# Patient Record
Sex: MALE | Race: WHITE | Employment: OTHER | URBAN - METROPOLITAN AREA
[De-identification: names, ages, dates, MRNs, and addresses within clinical notes are randomized per-mention and may not be internally consistent; named-entity substitution may affect disease eponyms.]

---

## 2017-01-12 ENCOUNTER — HOSPITAL ENCOUNTER (OUTPATIENT)
Dept: LAB | Age: 62
Discharge: HOME | End: 2017-01-12
Attending: INTERNAL MEDICINE
Payer: COMMERCIAL

## 2017-01-12 DIAGNOSIS — I34.0 NON-RHEUMATIC MITRAL REGURGITATION: ICD-10-CM

## 2017-01-12 DIAGNOSIS — I10 ESSENTIAL HYPERTENSION: ICD-10-CM

## 2017-01-12 LAB
ANION GAP BLD CALC-SCNC: 8 MMOL/L
BUN SERPL-MCNC: 14 MG/DL (ref 8–23)
CALCIUM SERPL-MCNC: 9.3 MG/DL (ref 8.3–10.4)
CHLORIDE SERPL-SCNC: 102 MMOL/L (ref 98–107)
CO2 SERPL-SCNC: 26 MMOL/L (ref 23–32)
CREAT SERPL-MCNC: 0.9 MG/DL (ref 0.6–1)
GLUCOSE SERPL-MCNC: 130 MG/DL (ref 65–100)
POTASSIUM SERPL-SCNC: 4.5 MMOL/L (ref 3.5–5.1)
SODIUM SERPL-SCNC: 136 MMOL/L (ref 136–145)

## 2017-01-12 PROCEDURE — 80048 BASIC METABOLIC PNL TOTAL CA: CPT | Performed by: INTERNAL MEDICINE

## 2017-01-12 PROCEDURE — 36415 COLL VENOUS BLD VENIPUNCTURE: CPT | Performed by: INTERNAL MEDICINE

## 2017-02-07 ENCOUNTER — HOSPITAL ENCOUNTER (OUTPATIENT)
Dept: INFUSION THERAPY | Age: 62
Discharge: HOME | End: 2017-02-07
Payer: COMMERCIAL

## 2017-02-07 VITALS
WEIGHT: 315 LBS | DIASTOLIC BLOOD PRESSURE: 65 MMHG | BODY MASS INDEX: 41.87 KG/M2 | RESPIRATION RATE: 18 BRPM | OXYGEN SATURATION: 96 % | SYSTOLIC BLOOD PRESSURE: 105 MMHG | TEMPERATURE: 97.8 F | HEART RATE: 81 BPM

## 2017-02-07 LAB — FERRITIN SERPL-MCNC: 266 NG/ML (ref 8–388)

## 2017-02-07 PROCEDURE — 99195 PHLEBOTOMY: CPT

## 2017-02-07 PROCEDURE — 82728 ASSAY OF FERRITIN: CPT | Performed by: INTERNAL MEDICINE

## 2017-02-07 PROCEDURE — 36415 COLL VENOUS BLD VENIPUNCTURE: CPT | Performed by: INTERNAL MEDICINE

## 2017-02-07 NOTE — PROGRESS NOTES
Arrived to the [facility]. Therapeutic phlebotomy completed. IV inserted in patient's right arm, 500 ml of blood removed. Patient tolerated well. Given crackers and water post procedure, patient monitored 40 minutes. Vital signs remained stable, patient felt stable to be discharged. Any issues or concerns during appointment: None. Patient aware of next infusion appointment on 5/12/17 at [address] [place] [address]. Discharged ambulatory from Infusion.

## 2017-02-10 ENCOUNTER — APPOINTMENT (OUTPATIENT)
Dept: INFUSION THERAPY | Age: 62
End: 2017-02-10
Payer: COMMERCIAL

## 2017-02-24 ENCOUNTER — HOSPITAL ENCOUNTER (OUTPATIENT)
Dept: INFUSION THERAPY | Age: 62
Discharge: HOME | End: 2017-02-24
Payer: COMMERCIAL

## 2017-02-24 VITALS
HEART RATE: 100 BPM | OXYGEN SATURATION: 96 % | RESPIRATION RATE: 20 BRPM | TEMPERATURE: 97.8 F | DIASTOLIC BLOOD PRESSURE: 60 MMHG | SYSTOLIC BLOOD PRESSURE: 104 MMHG

## 2017-02-24 DIAGNOSIS — E83.110 HEREDITARY HEMOCHROMATOSIS (HCC): ICD-10-CM

## 2017-02-24 LAB
BASOPHILS # BLD AUTO: 0 K/UL (ref 0–0.2)
BASOPHILS # BLD: 0 % (ref 0–2)
DIFFERENTIAL METHOD BLD: ABNORMAL
EOSINOPHIL # BLD: 0.1 K/UL (ref 0–0.8)
EOSINOPHIL NFR BLD: 1 % (ref 0.5–7.8)
ERYTHROCYTE [DISTWIDTH] IN BLOOD BY AUTOMATED COUNT: 13.4 % (ref 11.9–14.6)
FERRITIN SERPL-MCNC: 164 NG/ML (ref 8–388)
HCT VFR BLD AUTO: 41 % (ref 41.1–50.3)
HGB BLD-MCNC: 13.9 G/DL (ref 13.6–17.2)
LYMPHOCYTES # BLD AUTO: 18 % (ref 13–44)
LYMPHOCYTES # BLD: 1.4 K/UL (ref 0.5–4.6)
MCH RBC QN AUTO: 32.5 PG (ref 26.1–32.9)
MCHC RBC AUTO-ENTMCNC: 33.9 G/DL (ref 31.4–35)
MCV RBC AUTO: 95.8 FL (ref 79.6–97.8)
MONOCYTES # BLD: 0.5 K/UL (ref 0.1–1.3)
MONOCYTES NFR BLD AUTO: 6 % (ref 4–12)
NEUTS SEG # BLD: 5.9 K/UL (ref 1.7–8.2)
NEUTS SEG NFR BLD AUTO: 75 % (ref 43–78)
NRBC # BLD: 0 K/UL (ref 0–0.2)
PLATELET # BLD AUTO: 180 K/UL (ref 150–450)
PMV BLD AUTO: 9.9 FL (ref 10.8–14.1)
RBC # BLD AUTO: 4.28 M/UL (ref 4.23–5.67)
WBC # BLD AUTO: 7.8 K/UL (ref 4.3–11.1)

## 2017-02-24 PROCEDURE — 99195 PHLEBOTOMY: CPT

## 2017-02-24 PROCEDURE — 82728 ASSAY OF FERRITIN: CPT | Performed by: INTERNAL MEDICINE

## 2017-02-24 PROCEDURE — 36415 COLL VENOUS BLD VENIPUNCTURE: CPT | Performed by: INTERNAL MEDICINE

## 2017-02-24 PROCEDURE — 85025 COMPLETE CBC W/AUTO DIFF WBC: CPT | Performed by: INTERNAL MEDICINE

## 2017-02-24 NOTE — PROGRESS NOTES
Arrived to the [facility]. 500cc phlebotomy completed. Patient tolerated well. Any issues or concerns during appointment: no.  Patient aware of next infusion appointment on 3/10 (date) at 0 (time). Discharged ambulatory.

## 2017-03-10 ENCOUNTER — HOSPITAL ENCOUNTER (OUTPATIENT)
Dept: INFUSION THERAPY | Age: 62
Discharge: HOME | End: 2017-03-10
Payer: COMMERCIAL

## 2017-03-10 VITALS
HEART RATE: 72 BPM | RESPIRATION RATE: 18 BRPM | TEMPERATURE: 98.2 F | SYSTOLIC BLOOD PRESSURE: 118 MMHG | DIASTOLIC BLOOD PRESSURE: 70 MMHG

## 2017-03-10 DIAGNOSIS — E83.110 HEREDITARY HEMOCHROMATOSIS (HCC): ICD-10-CM

## 2017-03-10 LAB — FERRITIN SERPL-MCNC: 182 NG/ML (ref 8–388)

## 2017-03-10 PROCEDURE — 82728 ASSAY OF FERRITIN: CPT | Performed by: INTERNAL MEDICINE

## 2017-03-10 PROCEDURE — 36415 COLL VENOUS BLD VENIPUNCTURE: CPT | Performed by: INTERNAL MEDICINE

## 2017-03-10 PROCEDURE — 99195 PHLEBOTOMY: CPT

## 2017-03-10 NOTE — PROGRESS NOTES
Arrived ambulatory for infusion appt  Ferritin 189 today.   Phlebotomy performed for 450cc  Tolerated well  Observed for 30 min,drinking 16 oz fluids and eating a pack of crackers  No concerns   Next appt 3/24

## 2017-03-24 ENCOUNTER — HOSPITAL ENCOUNTER (OUTPATIENT)
Dept: INFUSION THERAPY | Age: 62
Discharge: HOME | End: 2017-03-24
Payer: COMMERCIAL

## 2017-03-24 DIAGNOSIS — E83.110 HEREDITARY HEMOCHROMATOSIS (HCC): ICD-10-CM

## 2017-03-24 LAB — FERRITIN SERPL-MCNC: 112 NG/ML (ref 8–388)

## 2017-03-24 PROCEDURE — 99195 PHLEBOTOMY: CPT

## 2017-03-24 PROCEDURE — 36415 COLL VENOUS BLD VENIPUNCTURE: CPT | Performed by: INTERNAL MEDICINE

## 2017-03-24 PROCEDURE — 82728 ASSAY OF FERRITIN: CPT | Performed by: INTERNAL MEDICINE

## 2017-03-24 NOTE — PROGRESS NOTES
Arrived to the [facility]. Assessment completed. Patient tolerated therapeutic phlebotomy well today. There was a total of 500 ml blood collected. Ferritin level 112 today. Patient drank (2) full glasses of water and ate crackers before discharge today. Any issues or concerns during appointment: none. Patient aware of next infusion appointment on 5/12/17 (date) at 0700 (time) with lab and 0715 with IV infusion center. Discharged ambulatory, per self. Patient instructed to call Dr. Bart Mathias office immediately for any problems or concerns. He verbalizes understanding.

## 2017-03-25 VITALS
TEMPERATURE: 98.4 F | OXYGEN SATURATION: 97 % | WEIGHT: 315 LBS | SYSTOLIC BLOOD PRESSURE: 118 MMHG | BODY MASS INDEX: 42.02 KG/M2 | HEART RATE: 86 BPM | DIASTOLIC BLOOD PRESSURE: 74 MMHG | RESPIRATION RATE: 16 BRPM

## 2017-04-25 ENCOUNTER — HOSPITAL ENCOUNTER (OUTPATIENT)
Dept: LAB | Age: 62
Discharge: HOME | End: 2017-04-25
Payer: COMMERCIAL

## 2017-04-25 DIAGNOSIS — E83.110 HEREDITARY HEMOCHROMATOSIS (HCC): ICD-10-CM

## 2017-04-25 LAB
ALBUMIN SERPL BCP-MCNC: 3.5 G/DL (ref 3.2–4.6)
ALBUMIN/GLOB SERPL: 0.9 {RATIO} (ref 1.2–3.5)
ALP SERPL-CCNC: 107 U/L (ref 50–136)
ALT SERPL-CCNC: 49 U/L (ref 12–65)
ANION GAP BLD CALC-SCNC: 8 MMOL/L (ref 7–16)
AST SERPL W P-5'-P-CCNC: 29 U/L (ref 15–37)
BASOPHILS # BLD AUTO: 0 K/UL (ref 0–0.2)
BASOPHILS # BLD: 0 % (ref 0–2)
BILIRUB SERPL-MCNC: 0.6 MG/DL (ref 0.2–1.1)
BUN SERPL-MCNC: 17 MG/DL (ref 8–23)
CALCIUM SERPL-MCNC: 9.3 MG/DL (ref 8.3–10.4)
CHLORIDE SERPL-SCNC: 102 MMOL/L (ref 98–107)
CO2 SERPL-SCNC: 26 MMOL/L (ref 21–32)
CREAT SERPL-MCNC: 0.93 MG/DL (ref 0.8–1.5)
DIFFERENTIAL METHOD BLD: ABNORMAL
EOSINOPHIL # BLD: 0.1 K/UL (ref 0–0.8)
EOSINOPHIL NFR BLD: 2 % (ref 0.5–7.8)
ERYTHROCYTE [DISTWIDTH] IN BLOOD BY AUTOMATED COUNT: 12.8 % (ref 11.9–14.6)
FERRITIN SERPL-MCNC: 94 NG/ML (ref 8–388)
GLOBULIN SER CALC-MCNC: 3.7 G/DL (ref 2.3–3.5)
GLUCOSE SERPL-MCNC: 198 MG/DL (ref 65–100)
HCT VFR BLD AUTO: 40.1 % (ref 41.1–50.3)
HGB BLD-MCNC: 13.4 G/DL (ref 13.6–17.2)
LYMPHOCYTES # BLD AUTO: 14 % (ref 13–44)
LYMPHOCYTES # BLD: 1.2 K/UL (ref 0.5–4.6)
MCH RBC QN AUTO: 32.1 PG (ref 26.1–32.9)
MCHC RBC AUTO-ENTMCNC: 33.4 G/DL (ref 31.4–35)
MCV RBC AUTO: 96.2 FL (ref 79.6–97.8)
MONOCYTES # BLD: 0.5 K/UL (ref 0.1–1.3)
MONOCYTES NFR BLD AUTO: 6 % (ref 4–12)
NEUTS SEG # BLD: 6.3 K/UL (ref 1.7–8.2)
NEUTS SEG NFR BLD AUTO: 78 % (ref 43–78)
NRBC # BLD: 0 K/UL (ref 0–0.2)
PLATELET # BLD AUTO: 197 K/UL (ref 150–450)
PMV BLD AUTO: 9.9 FL (ref 10.8–14.1)
POTASSIUM SERPL-SCNC: 4.1 MMOL/L (ref 3.5–5.1)
PROT SERPL-MCNC: 7.2 G/DL (ref 6.3–8.2)
RBC # BLD AUTO: 4.17 M/UL (ref 4.23–5.67)
SODIUM SERPL-SCNC: 136 MMOL/L (ref 136–145)
WBC # BLD AUTO: 8.1 K/UL (ref 4.3–11.1)

## 2017-04-25 PROCEDURE — 82728 ASSAY OF FERRITIN: CPT | Performed by: INTERNAL MEDICINE

## 2017-04-25 PROCEDURE — 80053 COMPREHEN METABOLIC PANEL: CPT | Performed by: INTERNAL MEDICINE

## 2017-04-25 PROCEDURE — 36415 COLL VENOUS BLD VENIPUNCTURE: CPT | Performed by: INTERNAL MEDICINE

## 2017-04-25 PROCEDURE — 85025 COMPLETE CBC W/AUTO DIFF WBC: CPT | Performed by: INTERNAL MEDICINE

## 2017-06-06 ENCOUNTER — HOSPITAL ENCOUNTER (OUTPATIENT)
Dept: INFUSION THERAPY | Age: 62
Discharge: HOME | End: 2017-06-06
Payer: COMMERCIAL

## 2017-06-06 VITALS
HEIGHT: 75 IN | RESPIRATION RATE: 18 BRPM | OXYGEN SATURATION: 93 % | DIASTOLIC BLOOD PRESSURE: 75 MMHG | WEIGHT: 315 LBS | TEMPERATURE: 98.3 F | BODY MASS INDEX: 39.17 KG/M2 | HEART RATE: 90 BPM | SYSTOLIC BLOOD PRESSURE: 126 MMHG

## 2017-06-06 DIAGNOSIS — E83.110 HEREDITARY HEMOCHROMATOSIS (HCC): ICD-10-CM

## 2017-06-06 LAB — FERRITIN SERPL-MCNC: 82 NG/ML (ref 8–388)

## 2017-06-06 PROCEDURE — 99211 OFF/OP EST MAY X REQ PHY/QHP: CPT

## 2017-06-06 PROCEDURE — 82728 ASSAY OF FERRITIN: CPT | Performed by: INTERNAL MEDICINE

## 2017-06-06 PROCEDURE — 36415 COLL VENOUS BLD VENIPUNCTURE: CPT | Performed by: INTERNAL MEDICINE

## 2017-06-06 NOTE — PROGRESS NOTES
Arrived to the [facility]. Therapeutic phlebotomy held for ferritin level of 82. Any issues or concerns during appointment: NO.  Patient aware of next infusion appointment on 09/05/17 (date) at [address] [place] [address] (time). Discharged ambulatory.

## 2017-06-19 PROBLEM — J30.1 SEASONAL ALLERGIC RHINITIS DUE TO POLLEN: Status: ACTIVE | Noted: 2017-06-19

## 2017-06-22 ENCOUNTER — HOSPITAL ENCOUNTER (OUTPATIENT)
Dept: GENERAL RADIOLOGY | Age: 62
Discharge: HOME | End: 2017-06-22
Attending: INTERNAL MEDICINE
Payer: COMMERCIAL

## 2017-06-22 ENCOUNTER — HOSPITAL ENCOUNTER (OUTPATIENT)
Dept: CT IMAGING | Age: 62
Discharge: HOME | End: 2017-06-22
Attending: INTERNAL MEDICINE
Payer: COMMERCIAL

## 2017-06-22 DIAGNOSIS — R93.5 ABNORMAL X-RAY OF ABDOMEN: ICD-10-CM

## 2017-06-22 DIAGNOSIS — R05.9 COUGH: ICD-10-CM

## 2017-06-22 DIAGNOSIS — R10.31 RIGHT LOWER QUADRANT ABDOMINAL PAIN: ICD-10-CM

## 2017-06-22 PROCEDURE — 74011000258 HC RX REV CODE- 258: Performed by: INTERNAL MEDICINE

## 2017-06-22 PROCEDURE — 74022 RADEX COMPL AQT ABD SERIES: CPT

## 2017-06-22 PROCEDURE — 74177 CT ABD & PELVIS W/CONTRAST: CPT

## 2017-06-22 PROCEDURE — 74011636320 HC RX REV CODE- 636/320: Performed by: INTERNAL MEDICINE

## 2017-06-22 PROCEDURE — 82565 ASSAY OF CREATININE: CPT

## 2017-06-22 RX ORDER — SODIUM CHLORIDE 0.9 % (FLUSH) 0.9 %
10 SYRINGE (ML) INJECTION
Status: COMPLETED | OUTPATIENT
Start: 2017-06-22 | End: 2017-06-22

## 2017-06-22 RX ADMIN — DIATRIZOATE MEGLUMINE AND DIATRIZOATE SODIUM 15 ML: 660; 100 LIQUID ORAL; RECTAL at 16:11

## 2017-06-22 RX ADMIN — Medication 10 ML: at 16:11

## 2017-06-22 RX ADMIN — IOPAMIDOL 100 ML: 755 INJECTION, SOLUTION INTRAVENOUS at 16:11

## 2017-06-22 RX ADMIN — SODIUM CHLORIDE 100 ML: 900 INJECTION, SOLUTION INTRAVENOUS at 16:11

## 2017-06-26 LAB — CREAT BLD-MCNC: 0.8 MG/DL (ref 0.6–1)

## 2017-07-12 ENCOUNTER — APPOINTMENT (RX ONLY)
Dept: URBAN - METROPOLITAN AREA CLINIC 24 | Facility: CLINIC | Age: 62
Setting detail: DERMATOLOGY
End: 2017-07-12

## 2017-07-12 DIAGNOSIS — L28.1 PRURIGO NODULARIS: ICD-10-CM

## 2017-07-12 DIAGNOSIS — D485 NEOPLASM OF UNCERTAIN BEHAVIOR OF SKIN: ICD-10-CM

## 2017-07-12 DIAGNOSIS — L20.89 OTHER ATOPIC DERMATITIS: ICD-10-CM

## 2017-07-12 DIAGNOSIS — L57.0 ACTINIC KERATOSIS: ICD-10-CM

## 2017-07-12 DIAGNOSIS — L81.4 OTHER MELANIN HYPERPIGMENTATION: ICD-10-CM

## 2017-07-12 DIAGNOSIS — L82.1 OTHER SEBORRHEIC KERATOSIS: ICD-10-CM

## 2017-07-12 DIAGNOSIS — D18.0 HEMANGIOMA: ICD-10-CM

## 2017-07-12 DIAGNOSIS — D22 MELANOCYTIC NEVI: ICD-10-CM

## 2017-07-12 PROBLEM — L20.84 INTRINSIC (ALLERGIC) ECZEMA: Status: ACTIVE | Noted: 2017-07-12

## 2017-07-12 PROBLEM — D22.5 MELANOCYTIC NEVI OF TRUNK: Status: ACTIVE | Noted: 2017-07-12

## 2017-07-12 PROBLEM — D18.01 HEMANGIOMA OF SKIN AND SUBCUTANEOUS TISSUE: Status: ACTIVE | Noted: 2017-07-12

## 2017-07-12 PROBLEM — D48.5 NEOPLASM OF UNCERTAIN BEHAVIOR OF SKIN: Status: ACTIVE | Noted: 2017-07-12

## 2017-07-12 PROCEDURE — 99203 OFFICE O/P NEW LOW 30 MIN: CPT | Mod: 25

## 2017-07-12 PROCEDURE — ? LIQUID NITROGEN

## 2017-07-12 PROCEDURE — ? COUNSELING

## 2017-07-12 PROCEDURE — 11100: CPT | Mod: 59

## 2017-07-12 PROCEDURE — 17000 DESTRUCT PREMALG LESION: CPT

## 2017-07-12 PROCEDURE — 17003 DESTRUCT PREMALG LES 2-14: CPT

## 2017-07-12 PROCEDURE — ? BIOPSY BY SHAVE METHOD

## 2017-07-12 PROCEDURE — ? PRESCRIPTION

## 2017-07-12 RX ORDER — TRIAMCINOLONE ACETONIDE 1 MG/G
CREAM TOPICAL
Qty: 1 | Refills: 2 | Status: ACTIVE

## 2017-07-12 ASSESSMENT — LOCATION DETAILED DESCRIPTION DERM
LOCATION DETAILED: EPIGASTRIC SKIN
LOCATION DETAILED: RIGHT ANTERIOR LATERAL MALLEOLUS
LOCATION DETAILED: LEFT PROXIMAL DORSAL FOREARM
LOCATION DETAILED: RIGHT POSTERIOR SHOULDER
LOCATION DETAILED: LEFT INFERIOR LATERAL UPPER BACK
LOCATION DETAILED: LEFT ACHILLES SKIN
LOCATION DETAILED: LEFT INFERIOR TEMPLE
LOCATION DETAILED: RIGHT LOWER CUTANEOUS LIP
LOCATION DETAILED: LEFT POSTERIOR SHOULDER
LOCATION DETAILED: PERIUMBILICAL SKIN

## 2017-07-12 ASSESSMENT — LOCATION SIMPLE DESCRIPTION DERM
LOCATION SIMPLE: LEFT UPPER BACK
LOCATION SIMPLE: LEFT TEMPLE
LOCATION SIMPLE: LEFT FOREARM
LOCATION SIMPLE: RIGHT SHOULDER
LOCATION SIMPLE: LEFT ACHILLES SKIN
LOCATION SIMPLE: ABDOMEN
LOCATION SIMPLE: LEFT SHOULDER
LOCATION SIMPLE: RIGHT ANKLE
LOCATION SIMPLE: RIGHT LIP

## 2017-07-12 ASSESSMENT — LOCATION ZONE DERM
LOCATION ZONE: LIP
LOCATION ZONE: FACE
LOCATION ZONE: LEG
LOCATION ZONE: ARM
LOCATION ZONE: TRUNK

## 2017-07-12 NOTE — PROCEDURE: BIOPSY BY SHAVE METHOD
Anesthesia Volume In Cc: 0.5
Destruction After The Procedure: No
Dressing: bandage
Billing Type: Third-Party Bill
Electrodesiccation Text: The wound bed was treated with electrodesiccation after the biopsy was performed.
Silver Nitrate Text: The wound bed was treated with silver nitrate after the biopsy was performed.
Curettage Text: The wound bed was treated with curettage after the biopsy was performed.
Electrodesiccation And Curettage Text: The wound bed was treated with electrodesiccation and curettage after the biopsy was performed.
Wound Care: Petrolatum
Type Of Destruction Used: Curettage
Anesthesia Type: 1% lidocaine with 1:100,000 epinephrine and a 1:6 solution of 8.4% sodium bicarbonate
Biopsy Method: Dermablade
Additional Anesthesia Volume In Cc (Will Not Render If 0): 0
Biopsy Type: H and E
Detail Level: Detailed
Cryotherapy Text: The wound bed was treated with cryotherapy after the biopsy was performed.
Hemostasis: Aluminum Chloride

## 2017-09-05 ENCOUNTER — HOSPITAL ENCOUNTER (OUTPATIENT)
Dept: INFUSION THERAPY | Age: 62
Discharge: HOME | End: 2017-09-05
Payer: COMMERCIAL

## 2017-09-05 VITALS
RESPIRATION RATE: 20 BRPM | TEMPERATURE: 97.9 F | DIASTOLIC BLOOD PRESSURE: 76 MMHG | SYSTOLIC BLOOD PRESSURE: 140 MMHG | OXYGEN SATURATION: 96 % | HEART RATE: 94 BPM

## 2017-09-05 LAB — FERRITIN SERPL-MCNC: 141 NG/ML (ref 8–388)

## 2017-09-05 PROCEDURE — 36415 COLL VENOUS BLD VENIPUNCTURE: CPT | Performed by: INTERNAL MEDICINE

## 2017-09-05 PROCEDURE — 82728 ASSAY OF FERRITIN: CPT | Performed by: INTERNAL MEDICINE

## 2017-09-05 PROCEDURE — 99195 PHLEBOTOMY: CPT

## 2017-09-05 NOTE — PROGRESS NOTES
Arrived ambulatory, theraputic phlebotomy completed, pt tolerated well, monitored and given a snack  post phlebotomy, discharged home ambulatory

## 2017-09-14 ENCOUNTER — APPOINTMENT (OUTPATIENT)
Dept: CT IMAGING | Age: 62
End: 2017-09-14
Attending: EMERGENCY MEDICINE
Payer: COMMERCIAL

## 2017-09-14 ENCOUNTER — HOSPITAL ENCOUNTER (EMERGENCY)
Age: 62
Discharge: HOME | End: 2017-09-14
Attending: EMERGENCY MEDICINE
Payer: COMMERCIAL

## 2017-09-14 ENCOUNTER — APPOINTMENT (OUTPATIENT)
Dept: GENERAL RADIOLOGY | Age: 62
End: 2017-09-14
Attending: EMERGENCY MEDICINE
Payer: COMMERCIAL

## 2017-09-14 VITALS
OXYGEN SATURATION: 100 % | RESPIRATION RATE: 18 BRPM | WEIGHT: 315 LBS | SYSTOLIC BLOOD PRESSURE: 151 MMHG | HEART RATE: 95 BPM | TEMPERATURE: 98 F | HEIGHT: 75 IN | DIASTOLIC BLOOD PRESSURE: 80 MMHG | BODY MASS INDEX: 39.17 KG/M2

## 2017-09-14 DIAGNOSIS — S22.32XA CLOSED FRACTURE OF ONE RIB OF LEFT SIDE, INITIAL ENCOUNTER: Primary | ICD-10-CM
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
## 2017-09-14 PROCEDURE — 99283 EMERGENCY DEPT VISIT LOW MDM: CPT | Performed by: EMERGENCY MEDICINE

## 2017-09-14 PROCEDURE — 71100 X-RAY EXAM RIBS UNI 2 VIEWS: CPT

## 2017-09-14 PROCEDURE — 70450 CT HEAD/BRAIN W/O DYE: CPT

## 2017-09-14 RX ORDER — HYDROCODONE BITARTRATE AND ACETAMINOPHEN 7.5; 325 MG/1; MG/1
1 TABLET ORAL
Qty: 15 TAB | Refills: 0 | Status: SHIPPED | OUTPATIENT
Start: 2017-09-14 | End: 2017-10-31

## 2017-09-15 NOTE — ED NOTES
Patient was given the incentive spirometer, as well as the instructional sheet. The patient was told how to use it, and was able to demonstrate correct use.

## 2017-09-15 NOTE — ED NOTES
I have reviewed discharge instructions with the patient. The patient verbalized understanding. Patient left ED via Discharge Method: ambulatory to Home with wife    Opportunity for questions and clarification provided. Patient given 1 scripts.

## 2017-09-15 NOTE — ED PROVIDER NOTES
Patient is a 57 y.o. male presenting with fall. The history is provided by the patient and the spouse. Fall   The accident occurred 1 to 2 hours ago. The fall occurred while standing. He fell from a height of ground level. He landed on hard floor. The pain is present in the right shoulder (left chest). The pain is at a severity of 9/10. The pain is severe. He was ambulatory at the scene. There was no entrapment after the fall. There was no drug use involved in the accident. There was no alcohol use involved in the accident. Pertinent negatives include no loss of consciousness and no laceration. He has tried nothing for the symptoms. Past Medical History:   Diagnosis Date    A-fib [facility])      cardioversion    Arteriosclerosis     Atrial fibrillation ([facility] [address] 1/3/2012    Diabetes mellitus     pharmacologically controlled    Diabetes mellitus ([facility] [address] 1/3/2012    Essential hypertension, benign 2014    Family history of malignant neoplasm of prostate 2014    Hemochromatosis     HTN (hypertension) 2014    Hypercholesteremia     Nodular prostate without urinary obstruction 2014    Obesity 2014       Past Surgical History:   Procedure Laterality Date    HX UROLOGICAL      prostate u/s and BX    KNEE ARTHROSCP HARV      right         Family History:   Problem Relation Age of Onset    Heart Attack Father 61         Diabetes Father     Heart Disease Father     Hypertension Father     Cancer Father      lung    Other Mother      Sarcoidosis    Stroke Brother     Psychiatric Disorder Brother     Heart Disease Paternal Grandmother        Social History     Social History    Marital status:      Spouse name: N/A    Number of children: N/A    Years of education: N/A     Occupational History    Not on file.      Social History Main Topics    Smoking status: Former Smoker     Packs/day: 1.50     Years: 18.00     Types: Cigarettes     Quit date: 1989    Smokeless tobacco: Former User     Quit date: 12/29/2016    Alcohol use 0.0 oz/week      Comment: has lessened his alcohol intake since learning about his abnormal liver labs    Drug use: No    Sexual activity: Yes     Partners: Female     Birth control/ protection: None     Other Topics Concern    Stress Concern Yes    Weight Concern Yes    Special Diet No    Exercise No    Seat Belt Yes    Self-Exams No     Social History Narrative         ALLERGIES: Zithromax [azithromycin]    Review of Systems   Constitutional: Negative. HENT: Negative. Respiratory: Negative. Cardiovascular: Negative. Gastrointestinal: Negative. Endocrine: Negative. Genitourinary: Negative. Musculoskeletal: Negative. Skin: Negative. Neurological: Negative. Negative for loss of consciousness. Hematological: Negative. Vitals:    09/14/17 2019   BP: 151/81   Pulse: 92   Resp: 16   Temp: 98 °F (36.7 °C)   SpO2: 94%   Weight: 154.2 kg (340 lb)   Height: 6' 3\" (1.905 m)            Physical Exam   Constitutional: He is oriented to person, place, and time. He appears well-developed and well-nourished. No distress. HENT:   Head: Normocephalic and atraumatic. Cardiovascular: Intact distal pulses. Pulmonary/Chest: Effort normal. No respiratory distress. He has no wheezes. TTP   Abdominal: Soft. Musculoskeletal:        Arms:  Pain with ROM. Only able to lift to 90 degrees without significant pain. No swelling. No deformity. N/V intact. Neurological: He is alert and oriented to person, place, and time. Skin: Skin is warm and dry. No laceration noted. Psychiatric: He has a normal mood and affect. His behavior is normal.   Nursing note and vitals reviewed.        MDM  Number of Diagnoses or Management Options  Accidental fall, initial encounter: new and requires workup  Closed fracture of one rib of left side, initial encounter: new and requires workup  Diagnosis management comments: Patient came to the ER for evaluation after falling at home due to a trip. He states that he has severe pain in his left chest and right shoulder pain but has a history of chronic shoulder injury. He feels that the fall has exacerbated this but is not as tender or is uncomfortable as his left chest.  Patient states he takes blood thinner for atrial fibrillation was concerned since he fell and primarily wanted to make sure he did not have any intracranial bleeding. On examination patient does have very well localized pain in the left chest wall with inspiration and palpation. Does not appear to be short of breath or in distress. Abdomen is nontender on examination. There is no bruising or injury seen on skin. Patient does have a very small superficial abrasion on the dorsal aspect left hand. Patient understands that if he has any delayed symptoms such as vomiting, headache, shortness of breath or other concerns needs to return have repeat images since slow bleeding cannot be excluded when her on blood thinners at this point does not have any evidence of such pathology. Patient was provided with an incentive spirometer and instructed to use every hour while awake. Medications provided for pain to use as needed. Amount and/or Complexity of Data Reviewed  Tests in the radiology section of CPT®: ordered and reviewed (Xr Ribs Lt Uni 2 V    Result Date: 9/14/2017  Left RIBS Clinical indication: Pain left ribs moderate acute after fall injury Comparison: none Technique: AP upright and oblique views left ribs Findings: There are 4 submitted images. There is no evidence of a displaced rib fracture or dislocation. There is a metallic marker overlying the left lower lateral chest wall area of pain, with minimal linear atelectasis noted in the lateral left base. There is suggestion of a nondisplaced fracture involving the left lateral eighth rib. No evidence of pneumothorax.      IMPRESSION: Nondisplaced left lateral rib fracture. Ct Head Wo Cont    Result Date: 9/14/2017  Noncontrast head CT Clinical Indication: Acute left frontal head pain after fall injury. Technique: Noncontrast axial images were obtained through the brain. Comparison: None available Findings: There is no acute intracranial hemorrhage, hydrocephalus, intra-axial mass, or mass-effect. There is no CT evidence of acute large artery territorial infarction or abnormal extra-axial fluid collection. The mastoid air cells and paranasal sinuses are aerated. No displaced skull fractures are present.      Impression: No acute intracranial abnormality.     )      ED Course       Procedures

## 2017-09-19 ENCOUNTER — HOSPITAL ENCOUNTER (EMERGENCY)
Age: 62
Discharge: HOME | End: 2017-09-19
Attending: EMERGENCY MEDICINE
Payer: COMMERCIAL

## 2017-09-19 ENCOUNTER — APPOINTMENT (OUTPATIENT)
Dept: GENERAL RADIOLOGY | Age: 62
End: 2017-09-19
Attending: EMERGENCY MEDICINE
Payer: COMMERCIAL

## 2017-09-19 VITALS
SYSTOLIC BLOOD PRESSURE: 130 MMHG | BODY MASS INDEX: 39.17 KG/M2 | DIASTOLIC BLOOD PRESSURE: 70 MMHG | RESPIRATION RATE: 16 BRPM | HEIGHT: 75 IN | WEIGHT: 315 LBS | TEMPERATURE: 97.6 F | HEART RATE: 92 BPM | OXYGEN SATURATION: 95 %

## 2017-09-19 DIAGNOSIS — S22.32XA CLOSED FRACTURE OF ONE RIB OF LEFT SIDE, INITIAL ENCOUNTER: Primary | ICD-10-CM

## 2017-09-19 LAB
ALBUMIN SERPL-MCNC: 3.7 G/DL (ref 3.2–4.6)
ALBUMIN/GLOB SERPL: 1 {RATIO} (ref 1.2–3.5)
ALP SERPL-CCNC: 94 U/L (ref 50–136)
ALT SERPL-CCNC: 79 U/L (ref 12–65)
ANION GAP SERPL CALC-SCNC: 7 MMOL/L (ref 7–16)
AST SERPL-CCNC: 36 U/L (ref 15–37)
ATRIAL RATE: 81 BPM
BASOPHILS # BLD: 0 K/UL (ref 0–0.2)
BASOPHILS NFR BLD: 0 % (ref 0–2)
BILIRUB SERPL-MCNC: 1.2 MG/DL (ref 0.2–1.1)
BUN SERPL-MCNC: 11 MG/DL (ref 8–23)
CALCIUM SERPL-MCNC: 10.8 MG/DL (ref 8.3–10.4)
CALCULATED R AXIS, ECG10: 32 DEGREES
CALCULATED T AXIS, ECG11: 34 DEGREES
CHLORIDE SERPL-SCNC: 94 MMOL/L (ref 98–107)
CO2 SERPL-SCNC: 29 MMOL/L (ref 21–32)
CREAT SERPL-MCNC: 1 MG/DL (ref 0.8–1.5)
DIAGNOSIS, 93000: NORMAL
DIFFERENTIAL METHOD BLD: ABNORMAL
EOSINOPHIL # BLD: 0.1 K/UL (ref 0–0.8)
EOSINOPHIL NFR BLD: 1 % (ref 0.5–7.8)
ERYTHROCYTE [DISTWIDTH] IN BLOOD BY AUTOMATED COUNT: 13.2 % (ref 11.9–14.6)
GLOBULIN SER CALC-MCNC: 3.8 G/DL (ref 2.3–3.5)
GLUCOSE SERPL-MCNC: 113 MG/DL (ref 65–100)
HCT VFR BLD AUTO: 41.3 % (ref 41.1–50.3)
HGB BLD-MCNC: 14.3 G/DL (ref 13.6–17.2)
IMM GRANULOCYTES # BLD: 0.1 K/UL (ref 0–0.5)
IMM GRANULOCYTES NFR BLD: 0.9 % (ref 0–5)
LIPASE SERPL-CCNC: 155 U/L (ref 73–393)
LYMPHOCYTES # BLD: 1 K/UL (ref 0.5–4.6)
LYMPHOCYTES NFR BLD: 11 % (ref 13–44)
MCH RBC QN AUTO: 32.6 PG (ref 26.1–32.9)
MCHC RBC AUTO-ENTMCNC: 34.6 G/DL (ref 31.4–35)
MCV RBC AUTO: 94.3 FL (ref 79.6–97.8)
MONOCYTES # BLD: 0.7 K/UL (ref 0.1–1.3)
MONOCYTES NFR BLD: 8 % (ref 4–12)
NEUTS SEG # BLD: 6.9 K/UL (ref 1.7–8.2)
NEUTS SEG NFR BLD: 79 % (ref 43–78)
PLATELET # BLD AUTO: 211 K/UL (ref 150–450)
PMV BLD AUTO: 10.5 FL (ref 10.8–14.1)
POTASSIUM SERPL-SCNC: 3.9 MMOL/L (ref 3.5–5.1)
PROT SERPL-MCNC: 7.5 G/DL (ref 6.3–8.2)
Q-T INTERVAL, ECG07: 334 MS
QRS DURATION, ECG06: 92 MS
QTC CALCULATION (BEZET), ECG08: 408 MS
RBC # BLD AUTO: 4.38 M/UL (ref 4.23–5.67)
SODIUM SERPL-SCNC: 130 MMOL/L (ref 136–145)
VENTRICULAR RATE, ECG03: 90 BPM
WBC # BLD AUTO: 8.8 K/UL (ref 4.3–11.1)

## 2017-09-19 PROCEDURE — 93005 ELECTROCARDIOGRAM TRACING: CPT | Performed by: EMERGENCY MEDICINE

## 2017-09-19 PROCEDURE — 83690 ASSAY OF LIPASE: CPT | Performed by: EMERGENCY MEDICINE

## 2017-09-19 PROCEDURE — 99284 EMERGENCY DEPT VISIT MOD MDM: CPT | Performed by: EMERGENCY MEDICINE

## 2017-09-19 PROCEDURE — 80053 COMPREHEN METABOLIC PANEL: CPT | Performed by: EMERGENCY MEDICINE

## 2017-09-19 PROCEDURE — 74020 XR ABD (AP AND ERECT OR DECUB): CPT

## 2017-09-19 PROCEDURE — 85025 COMPLETE CBC W/AUTO DIFF WBC: CPT | Performed by: EMERGENCY MEDICINE

## 2017-09-19 PROCEDURE — 71020 XR CHEST PA LAT: CPT

## 2017-09-19 RX ORDER — SODIUM CHLORIDE 0.9 % (FLUSH) 0.9 %
5-10 SYRINGE (ML) INJECTION EVERY 8 HOURS
Status: DISCONTINUED | OUTPATIENT
Start: 2017-09-19 | End: 2017-09-19 | Stop reason: HOSPADM

## 2017-09-19 RX ORDER — SODIUM CHLORIDE 0.9 % (FLUSH) 0.9 %
5-10 SYRINGE (ML) INJECTION AS NEEDED
Status: DISCONTINUED | OUTPATIENT
Start: 2017-09-19 | End: 2017-09-19 | Stop reason: HOSPADM

## 2017-09-19 NOTE — ED PROVIDER NOTES
HPI Comments: Patient is a 13-year-old male currently taking pradaxa for atrial fibrillation he experienced a mechanical fall 5 days ago. At that time. X-ray demonstrated a nondisplaced left eighth rib fracture and a normal noncontrasted head CT. He states he was driving today and felt worsening pain in his left chest wall. States it had been feeling okay for several days but sharp pain came back. He believes it may have been from making a turn while he was driving. He reports he's had a very mild headache but thinks this is improving with Tylenol and has no significant vomiting, nausea, dizziness. Reports some pain with deep inspiration but is been using incentive spirometer. Patient is a 57 y.o. male presenting with abdominal pain and rib pain. The history is provided by the patient. No  was used. Abdominal Pain    Associated symptoms include headaches. Pertinent negatives include no fever, no nausea, no vomiting, no dysuria and no back pain. Rib Pain   Associated symptoms include headaches. Pertinent negatives include no fever, no sore throat, no cough, no vomiting, no abdominal pain, no rash and no leg swelling.         Past Medical History:   Diagnosis Date    A-fib [facility])      cardioversion    Arteriosclerosis     Atrial fibrillation ([facility] [address] 1/3/2012    Diabetes mellitus     pharmacologically controlled    Diabetes mellitus ([facility] [address] 1/3/2012    Essential hypertension, benign 2014    Family history of malignant neoplasm of prostate 2014    Hemochromatosis     HTN (hypertension) 2014    Hypercholesteremia     Nodular prostate without urinary obstruction 2014    Obesity 2014       Past Surgical History:   Procedure Laterality Date    HX UROLOGICAL      prostate u/s and BX    KNEE ARTHROSCP HARV      right         Family History:   Problem Relation Age of Onset    Heart Attack Father 61         Diabetes Father     Heart Disease Father    [facility] Hypertension Father    [facility] Cancer Father      lung    Other Mother      Sarcoidosis    Stroke Brother     Psychiatric Disorder Brother     Heart Disease Paternal Grandmother        Social History     Social History    Marital status:      Spouse name: N/A    Number of children: N/A    Years of education: N/A     Occupational History    Not on file. Social History Main Topics    Smoking status: Former Smoker     Packs/day: 1.50     Years: 18.00     Types: Cigarettes     Quit date: 8/1/1989    Smokeless tobacco: Former User     Quit date: 12/29/2016    Alcohol use 0.0 oz/week      Comment: has lessened his alcohol intake since learning about his abnormal liver labs    Drug use: No    Sexual activity: Yes     Partners: Female     Birth control/ protection: None     Other Topics Concern    Stress Concern Yes    Weight Concern Yes    Special Diet No    Exercise No    Seat Belt Yes    Self-Exams No     Social History Narrative         ALLERGIES: Zithromax [azithromycin]    Review of Systems   Constitutional: Negative for fatigue and fever. HENT: Negative for sore throat. Eyes: Negative for pain. Respiratory: Negative for cough, chest tightness and shortness of breath. Cardiovascular: Negative for leg swelling. Gastrointestinal: Positive for abdominal distention. Negative for abdominal pain, nausea and vomiting. Genitourinary: Negative for dysuria. Musculoskeletal: Negative for back pain. Chest wall pain   Skin: Negative for rash. Neurological: Positive for headaches. Negative for syncope. Vitals:    09/19/17 1459   BP: 124/88   Pulse: 95   Resp: 16   Temp: 97.6 °F (36.4 °C)   SpO2: 96%   Weight: 154.2 kg (340 lb)   Height: 6' 3\" (1.905 m)            Physical Exam   Constitutional: He is oriented to person, place, and time. He appears well-developed and well-nourished. No distress. HENT:   Head: Normocephalic and atraumatic.    Eyes: Conjunctivae and EOM are normal. Pupils are equal, round, and reactive to light. Neck: Normal range of motion. Neck supple. Cardiovascular: Normal rate, regular rhythm and normal heart sounds. Pulmonary/Chest: Effort normal and breath sounds normal. No respiratory distress. He has no wheezes. He has no rales. Abdominal: Soft. He exhibits no distension. There is no tenderness. There is no rebound. No significant abdominal pain with palpation. Musculoskeletal: Normal range of motion. He exhibits tenderness. He exhibits no edema. Arms:  Chest wall pain is significantly worse with movement of his torso such as sitting up or turning. Neurological: He is alert and oriented to person, place, and time. Skin: Skin is warm and dry. No rash noted. He is not diaphoretic. Psychiatric: He has a normal mood and affect. His behavior is normal.   Vitals reviewed. MDM  Number of Diagnoses or Management Options  Closed fracture of one rib of left side, initial encounter: new and does not require workup  Diagnosis management comments: Patient's chest x-ray does not demonstrate any significant rib fractures or pneumothorax. Previous x-ray demonstrated a nondisplaced fracture of the left eighth rib. He do not feel the need to return pedis at this time. Abdominal x-ray negative. Labs overall reassuring with hemoglobin within normal range. Chest possibility of performing a CT of the patient's head and he states overall his headache is really not that concerning to him. He denies any significant nausea, vomiting or dizziness. He reports he would like to go home at this time. I suggested continue to take Tylenol and Norco as as needed for pain. I instructed him that rib fractures can take several weeks to improve. Return precautions discussed.        Amount and/or Complexity of Data Reviewed  Clinical lab tests: reviewed and ordered (Results for orders placed or performed during the hospital encounter of 09/19/17  -CBC WITH AUTOMATED DIFF       Result                                            Value                         Ref Range                       WBC                                               8.8                           4.3 - 11.1 K/uL                 RBC                                               4.38                          4.23 - 5.67 M/uL                HGB                                               14.3                          13.6 - 17.2 g/dL                HCT                                               41.3                          41.1 - 50.3 %                   MCV                                               94.3                          79.6 - 97.8 FL                  MCH                                               32.6                          26.1 - 32.9 PG                  MCHC                                              34.6                          31.4 - 35.0 g/dL                RDW                                               13.2                          11.9 - 14.6 %                   PLATELET                                          211                           150 - 450 K/uL                  MPV                                               10.5 (L)                      10.8 - 14.1 FL                  DF                                                AUTOMATED                                                     NEUTROPHILS                                       79 (H)                        43 - 78 %                       LYMPHOCYTES                                       11 (L)                        13 - 44 %                       MONOCYTES                                         8                             4.0 - 12.0 %                    EOSINOPHILS                                       1                             0.5 - 7.8 %                     BASOPHILS                                         0                             0.0 - 2.0 %                     IMMATURE GRANULOCYTES 0.9                           0.0 - 5.0 %                     ABS. NEUTROPHILS                                  6.9                           1.7 - 8.2 K/UL                  ABS. LYMPHOCYTES                                  1.0                           0.5 - 4.6 K/UL                  ABS. MONOCYTES                                    0.7                           0.1 - 1.3 K/UL                  ABS. EOSINOPHILS                                  0.1                           0.0 - 0.8 K/UL                  ABS. BASOPHILS                                    0.0                           0.0 - 0.2 K/UL                  ABS. IMM.  GRANS.                                  0.1                           0.0 - 0.5 K/UL             -METABOLIC PANEL, COMPREHENSIVE       Result                                            Value                         Ref Range                       Sodium                                            130 (L)                       136 - 145 mmol/L                Potassium                                         3.9                           3.5 - 5.1 mmol/L                Chloride                                          94 (L)                        98 - 107 mmol/L                 CO2                                               29                            21 - 32 mmol/L                  Anion gap                                         7                             7 - 16 mmol/L                   Glucose                                           113 (H)                       65 - 100 mg/dL                  BUN                                               11                            8 - 23 MG/DL                    Creatinine                                        1.00                          0.8 - 1.5 MG/DL                 GFR est AA                                        >60                           >60 ml/min/1.73m2               GFR est non-AA >60                           >60 ml/min/1.73m2               Calcium                                           10.8 (H)                      8.3 - 10.4 MG/DL                Bilirubin, total                                  1.2 (H)                       0.2 - 1.1 MG/DL                 ALT (SGPT)                                        79 (H)                        12 - 65 U/L                     AST (SGOT)                                        36                            15 - 37 U/L                     Alk. phosphatase                                  94                            50 - 136 U/L                    Protein, total                                    7.5                           6.3 - 8.2 g/dL                  Albumin                                           3.7                           3.2 - 4.6 g/dL                  Globulin                                          3.8 (H)                       2.3 - 3.5 g/dL                  A-G Ratio                                         1.0 (L)                       1.2 - 3.5                  -LIPASE       Result                                            Value                         Ref Range                       Lipase                                            155                           73 - 393 U/L               -EKG, 12 LEAD, INITIAL       Result                                            Value                         Ref Range                       Ventricular Rate                                  90                            BPM                             Atrial Rate                                       81                            BPM                             QRS Duration                                      92                            ms                              Q-T Interval                                      334                           ms                              QTC Calculation (Bhargav) 408                           ms                              Calculated R Axis                                 32                            degrees                         Calculated T Axis                                 34                            degrees                         Diagnosis                                                                                                   Atrial fibrillation with premature ventricular or aberrantly conducted    complexes   Nonspecific ST abnormality   Abnormal ECG   No previous ECGs available     )  Tests in the radiology section of CPT®: ordered and reviewed (Xr Chest Pa Lat    Result Date: 9/19/2017  Two view chest:  09/19/2017 History: History of fall 5 days ago with rib fracture. Pain worsening today. Comparison: 06/22/2017 Findings: The heart and mediastinal silhouette are normal in size and configuration. The lungs and pleural spaces are clear. The pulmonary vascularity is within normal limits. The visualized osseous structures are unremarkable. No definite acute rib fractures identified on this exam. There is no pneumothorax. Impression: No active disease in the chest.     Xr Abd (ap And Erect Or Decub)    Result Date: 9/19/2017  Abdominal radiographs HISTORY: Abdominal bloating since this morning. Severe left-sided pain. AP views of the abdomen were obtained in the supine and upright position. COMPARISON: 06/22/2017. FINDINGS: The intestinal gas pattern shows no evidence of obstruction. Lucency within the right upper abdomen is less pronounced and corresponds to a superiorly positioned cecum based on prior CT imaging. There is no free intraperitoneal air. No radiopaque densities resembling calculi are identified.      IMPRESSION: Unremarkable abdominal radiographs.    )  Review and summarize past medical records: yes  Independent visualization of images, tracings, or specimens: yes    Risk of Complications, Morbidity, and/or Mortality  Presenting problems: moderate  Diagnostic procedures: moderate  Management options: moderate    Patient Progress  Patient progress: stable    ED Course       Procedures

## 2017-09-19 NOTE — ED TRIAGE NOTES
PMD-Dr Tracee Edouard. Pt c/o sudden onset of abdominal bloating at around 11 am today. Subsequently he is healing from a left rib fracture and it has been doing better but since abdomen is bloated he has had pain in the left rib fracture area. O2 sat is 94% on RA. Tender to take a deep breath now where that had improved. Takes Pradaxa.

## 2017-10-02 ENCOUNTER — HOSPITAL ENCOUNTER (OUTPATIENT)
Dept: CT IMAGING | Age: 62
Discharge: HOME | End: 2017-10-02
Attending: PHYSICIAN ASSISTANT
Payer: COMMERCIAL

## 2017-10-02 ENCOUNTER — HOSPITAL ENCOUNTER (INPATIENT)
Age: 62
LOS: 3 days | Discharge: HOME | DRG: 026 | End: 2017-10-05
Attending: NEUROLOGICAL SURGERY | Admitting: NEUROLOGICAL SURGERY
Payer: COMMERCIAL

## 2017-10-02 ENCOUNTER — ANESTHESIA EVENT (OUTPATIENT)
Dept: SURGERY | Age: 62
DRG: 026 | End: 2017-10-02
Payer: COMMERCIAL

## 2017-10-02 ENCOUNTER — ANESTHESIA (OUTPATIENT)
Dept: SURGERY | Age: 62
DRG: 026 | End: 2017-10-02
Payer: COMMERCIAL

## 2017-10-02 DIAGNOSIS — G44.311 INTRACTABLE ACUTE POST-TRAUMATIC HEADACHE: ICD-10-CM

## 2017-10-02 DIAGNOSIS — Z92.29 HX OF LONG TERM USE OF BLOOD THINNERS: ICD-10-CM

## 2017-10-02 DIAGNOSIS — S06.5XAA SUBDURAL HEMATOMA: Primary | ICD-10-CM

## 2017-10-02 DIAGNOSIS — Z91.81 HISTORY OF RECENT FALL: ICD-10-CM

## 2017-10-02 LAB
ABO + RH BLD: NORMAL
ANION GAP SERPL CALC-SCNC: 11 MMOL/L (ref 7–16)
BLOOD GROUP ANTIBODIES SERPL: NORMAL
BUN SERPL-MCNC: 13 MG/DL (ref 8–23)
CALCIUM SERPL-MCNC: 10 MG/DL (ref 8.3–10.4)
CHLORIDE SERPL-SCNC: 95 MMOL/L (ref 98–107)
CO2 SERPL-SCNC: 24 MMOL/L (ref 21–32)
CREAT SERPL-MCNC: 0.79 MG/DL (ref 0.8–1.5)
ERYTHROCYTE [DISTWIDTH] IN BLOOD BY AUTOMATED COUNT: 13.2 % (ref 11.9–14.6)
GLUCOSE SERPL-MCNC: 133 MG/DL (ref 65–100)
HCT VFR BLD AUTO: 39.6 % (ref 41.1–50.3)
HGB BLD-MCNC: 14.1 G/DL (ref 13.6–17.2)
MCH RBC QN AUTO: 33.3 PG (ref 26.1–32.9)
MCHC RBC AUTO-ENTMCNC: 35.6 G/DL (ref 31.4–35)
MCV RBC AUTO: 93.6 FL (ref 79.6–97.8)
PLATELET # BLD AUTO: 258 K/UL (ref 150–450)
PMV BLD AUTO: 10 FL (ref 10.8–14.1)
POTASSIUM SERPL-SCNC: 4.3 MMOL/L (ref 3.5–5.1)
RBC # BLD AUTO: 4.23 M/UL (ref 4.23–5.67)
SODIUM SERPL-SCNC: 130 MMOL/L (ref 136–145)
SPECIMEN EXP DATE BLD: NORMAL
WBC # BLD AUTO: 9.3 K/UL (ref 4.3–11.1)

## 2017-10-02 PROCEDURE — 86900 BLOOD TYPING SEROLOGIC ABO: CPT | Performed by: ANESTHESIOLOGY

## 2017-10-02 PROCEDURE — 77030012406 HC DRN WND PENRS BARD -A: Performed by: NEUROLOGICAL SURGERY

## 2017-10-02 PROCEDURE — 74011250636 HC RX REV CODE- 250/636: Performed by: NEUROLOGICAL SURGERY

## 2017-10-02 PROCEDURE — 77030034850: Performed by: NEUROLOGICAL SURGERY

## 2017-10-02 PROCEDURE — 75810000275 HC EMERGENCY DEPT VISIT NO LEVEL OF CARE: Performed by: NEUROLOGICAL SURGERY

## 2017-10-02 PROCEDURE — 74011250636 HC RX REV CODE- 250/636

## 2017-10-02 PROCEDURE — 77030004472 HC BUR TAPR MEDT -B: Performed by: NEUROLOGICAL SURGERY

## 2017-10-02 PROCEDURE — 74011250636 HC RX REV CODE- 250/636: Performed by: ANESTHESIOLOGY

## 2017-10-02 PROCEDURE — 76060000033 HC ANESTHESIA 1 TO 1.5 HR: Performed by: NEUROLOGICAL SURGERY

## 2017-10-02 PROCEDURE — 77030021678 HC GLIDESCP STAT DISP VERT -B: Performed by: NURSE ANESTHETIST, CERTIFIED REGISTERED

## 2017-10-02 PROCEDURE — 77030013567 HC DRN WND RESERV BARD -A: Performed by: NEUROLOGICAL SURGERY

## 2017-10-02 PROCEDURE — 74011250637 HC RX REV CODE- 250/637: Performed by: NEUROLOGICAL SURGERY

## 2017-10-02 PROCEDURE — 74011000250 HC RX REV CODE- 250: Performed by: NEUROLOGICAL SURGERY

## 2017-10-02 PROCEDURE — 77030002916 HC SUT ETHLN J&J -A: Performed by: NEUROLOGICAL SURGERY

## 2017-10-02 PROCEDURE — 76210000063 HC OR PH I REC FIRST 0.5 HR: Performed by: NEUROLOGICAL SURGERY

## 2017-10-02 PROCEDURE — 82962 GLUCOSE BLOOD TEST: CPT

## 2017-10-02 PROCEDURE — 80048 BASIC METABOLIC PNL TOTAL CA: CPT | Performed by: ANESTHESIOLOGY

## 2017-10-02 PROCEDURE — 74011000258 HC RX REV CODE- 258: Performed by: NEUROLOGICAL SURGERY

## 2017-10-02 PROCEDURE — 77030014007 HC SPNG HEMSTAT J&J -B: Performed by: NEUROLOGICAL SURGERY

## 2017-10-02 PROCEDURE — 77030008468 HC STPLR SKN VISIS TELE -A: Performed by: NEUROLOGICAL SURGERY

## 2017-10-02 PROCEDURE — 77030004416 HC BUR PERF J&J -C: Performed by: NEUROLOGICAL SURGERY

## 2017-10-02 PROCEDURE — 76010000161 HC OR TIME 1 TO 1.5 HR INTENSV-TIER 1: Performed by: NEUROLOGICAL SURGERY

## 2017-10-02 PROCEDURE — 77030032490 HC SLV COMPR SCD KNE COVD -B: Performed by: NEUROLOGICAL SURGERY

## 2017-10-02 PROCEDURE — 77030020782 HC GWN BAIR PAWS FLX 3M -B: Performed by: NURSE ANESTHETIST, CERTIFIED REGISTERED

## 2017-10-02 PROCEDURE — 74011000250 HC RX REV CODE- 250

## 2017-10-02 PROCEDURE — 77030019908 HC STETH ESOPH SIMS -A: Performed by: NURSE ANESTHETIST, CERTIFIED REGISTERED

## 2017-10-02 PROCEDURE — 77030030163 HC BN WAX J&J -A: Performed by: NEUROLOGICAL SURGERY

## 2017-10-02 PROCEDURE — 65610000001 HC ROOM ICU GENERAL

## 2017-10-02 PROCEDURE — 85027 COMPLETE CBC AUTOMATED: CPT | Performed by: ANESTHESIOLOGY

## 2017-10-02 PROCEDURE — 70450 CT HEAD/BRAIN W/O DYE: CPT

## 2017-10-02 PROCEDURE — 77030008703 HC TU ET UNCUF COVD -A: Performed by: NURSE ANESTHETIST, CERTIFIED REGISTERED

## 2017-10-02 PROCEDURE — 77030018836 HC SOL IRR NACL ICUM -A: Performed by: NEUROLOGICAL SURGERY

## 2017-10-02 PROCEDURE — 77030003029 HC SUT VCRL J&J -B: Performed by: NEUROLOGICAL SURGERY

## 2017-10-02 PROCEDURE — 77030011640 HC PAD GRND REM COVD -A: Performed by: NEUROLOGICAL SURGERY

## 2017-10-02 PROCEDURE — 009400Z DRAINAGE OF INTRACRANIAL SUBDURAL SPACE WITH DRAINAGE DEVICE, OPEN APPROACH: ICD-10-PCS | Performed by: NEUROLOGICAL SURGERY

## 2017-10-02 RX ORDER — SODIUM CHLORIDE, SODIUM LACTATE, POTASSIUM CHLORIDE, CALCIUM CHLORIDE 600; 310; 30; 20 MG/100ML; MG/100ML; MG/100ML; MG/100ML
75 INJECTION, SOLUTION INTRAVENOUS CONTINUOUS
Status: DISCONTINUED | OUTPATIENT
Start: 2017-10-02 | End: 2017-10-02 | Stop reason: HOSPADM

## 2017-10-02 RX ORDER — SODIUM CHLORIDE 0.9 % (FLUSH) 0.9 %
5-10 SYRINGE (ML) INJECTION AS NEEDED
Status: DISCONTINUED | OUTPATIENT
Start: 2017-10-02 | End: 2017-10-02 | Stop reason: HOSPADM

## 2017-10-02 RX ORDER — HYDROCHLOROTHIAZIDE 25 MG/1
25 TABLET ORAL DAILY
Status: DISCONTINUED | OUTPATIENT
Start: 2017-10-03 | End: 2017-10-05 | Stop reason: HOSPADM

## 2017-10-02 RX ORDER — DEXAMETHASONE SODIUM PHOSPHATE 4 MG/ML
INJECTION, SOLUTION INTRA-ARTICULAR; INTRALESIONAL; INTRAMUSCULAR; INTRAVENOUS; SOFT TISSUE AS NEEDED
Status: DISCONTINUED | OUTPATIENT
Start: 2017-10-02 | End: 2017-10-02 | Stop reason: HOSPADM

## 2017-10-02 RX ORDER — ESMOLOL HYDROCHLORIDE 10 MG/ML
INJECTION INTRAVENOUS
Status: DISCONTINUED | OUTPATIENT
Start: 2017-10-02 | End: 2017-10-02

## 2017-10-02 RX ORDER — CARVEDILOL 25 MG/1
25 TABLET ORAL 2 TIMES DAILY WITH MEALS
Status: DISCONTINUED | OUTPATIENT
Start: 2017-10-03 | End: 2017-10-05 | Stop reason: HOSPADM

## 2017-10-02 RX ORDER — PROPOFOL 10 MG/ML
INJECTION, EMULSION INTRAVENOUS AS NEEDED
Status: DISCONTINUED | OUTPATIENT
Start: 2017-10-02 | End: 2017-10-02 | Stop reason: HOSPADM

## 2017-10-02 RX ORDER — ENALAPRIL MALEATE 10 MG/1
10 TABLET ORAL 2 TIMES DAILY
Status: DISCONTINUED | OUTPATIENT
Start: 2017-10-02 | End: 2017-10-05 | Stop reason: HOSPADM

## 2017-10-02 RX ORDER — NEOSTIGMINE METHYLSULFATE 1 MG/ML
INJECTION INTRAVENOUS AS NEEDED
Status: DISCONTINUED | OUTPATIENT
Start: 2017-10-02 | End: 2017-10-02 | Stop reason: HOSPADM

## 2017-10-02 RX ORDER — SODIUM CHLORIDE 0.9 % (FLUSH) 0.9 %
5-10 SYRINGE (ML) INJECTION AS NEEDED
Status: DISCONTINUED | OUTPATIENT
Start: 2017-10-02 | End: 2017-10-05 | Stop reason: HOSPADM

## 2017-10-02 RX ORDER — BUPIVACAINE HYDROCHLORIDE AND EPINEPHRINE 5; 5 MG/ML; UG/ML
INJECTION, SOLUTION EPIDURAL; INTRACAUDAL; PERINEURAL AS NEEDED
Status: DISCONTINUED | OUTPATIENT
Start: 2017-10-02 | End: 2017-10-02 | Stop reason: HOSPADM

## 2017-10-02 RX ORDER — POLYETHYLENE GLYCOL 3350 17 G/17G
17 POWDER, FOR SOLUTION ORAL 2 TIMES DAILY
Status: DISCONTINUED | OUTPATIENT
Start: 2017-10-02 | End: 2017-10-05 | Stop reason: HOSPADM

## 2017-10-02 RX ORDER — LORATADINE 10 MG/1
10 TABLET ORAL DAILY
Status: DISCONTINUED | OUTPATIENT
Start: 2017-10-03 | End: 2017-10-05 | Stop reason: HOSPADM

## 2017-10-02 RX ORDER — HYDROMORPHONE HYDROCHLORIDE 2 MG/ML
0.5 INJECTION, SOLUTION INTRAMUSCULAR; INTRAVENOUS; SUBCUTANEOUS
Status: DISCONTINUED | OUTPATIENT
Start: 2017-10-02 | End: 2017-10-02 | Stop reason: HOSPADM

## 2017-10-02 RX ORDER — OXYCODONE AND ACETAMINOPHEN 7.5; 325 MG/1; MG/1
1 TABLET ORAL
Status: DISCONTINUED | OUTPATIENT
Start: 2017-10-02 | End: 2017-10-03

## 2017-10-02 RX ORDER — SPIRONOLACTONE 25 MG/1
25 TABLET ORAL DAILY
Status: DISCONTINUED | OUTPATIENT
Start: 2017-10-03 | End: 2017-10-05 | Stop reason: HOSPADM

## 2017-10-02 RX ORDER — ACETAMINOPHEN 325 MG/1
650 TABLET ORAL
Status: DISCONTINUED | OUTPATIENT
Start: 2017-10-02 | End: 2017-10-05 | Stop reason: HOSPADM

## 2017-10-02 RX ORDER — HYDROMORPHONE HYDROCHLORIDE 1 MG/ML
1 INJECTION, SOLUTION INTRAMUSCULAR; INTRAVENOUS; SUBCUTANEOUS
Status: DISCONTINUED | OUTPATIENT
Start: 2017-10-02 | End: 2017-10-03

## 2017-10-02 RX ORDER — ESMOLOL HYDROCHLORIDE 10 MG/ML
INJECTION INTRAVENOUS AS NEEDED
Status: DISCONTINUED | OUTPATIENT
Start: 2017-10-02 | End: 2017-10-02 | Stop reason: HOSPADM

## 2017-10-02 RX ORDER — SIMVASTATIN 40 MG/1
40 TABLET, FILM COATED ORAL
Status: DISCONTINUED | OUTPATIENT
Start: 2017-10-02 | End: 2017-10-05 | Stop reason: HOSPADM

## 2017-10-02 RX ORDER — CLOTRIMAZOLE AND BETAMETHASONE DIPROPIONATE 10; .64 MG/G; MG/G
CREAM TOPICAL 2 TIMES DAILY
Status: DISCONTINUED | OUTPATIENT
Start: 2017-10-02 | End: 2017-10-05 | Stop reason: HOSPADM

## 2017-10-02 RX ORDER — ROCURONIUM BROMIDE 10 MG/ML
INJECTION, SOLUTION INTRAVENOUS AS NEEDED
Status: DISCONTINUED | OUTPATIENT
Start: 2017-10-02 | End: 2017-10-02 | Stop reason: HOSPADM

## 2017-10-02 RX ORDER — LIDOCAINE HYDROCHLORIDE 20 MG/ML
INJECTION, SOLUTION EPIDURAL; INFILTRATION; INTRACAUDAL; PERINEURAL AS NEEDED
Status: DISCONTINUED | OUTPATIENT
Start: 2017-10-02 | End: 2017-10-02 | Stop reason: HOSPADM

## 2017-10-02 RX ORDER — ENOXAPARIN SODIUM 100 MG/ML
30 INJECTION SUBCUTANEOUS EVERY 24 HOURS
Status: DISCONTINUED | OUTPATIENT
Start: 2017-10-02 | End: 2017-10-02

## 2017-10-02 RX ORDER — OXYCODONE HYDROCHLORIDE 5 MG/1
5 TABLET ORAL
Status: DISCONTINUED | OUTPATIENT
Start: 2017-10-02 | End: 2017-10-02 | Stop reason: HOSPADM

## 2017-10-02 RX ORDER — OXYCODONE AND ACETAMINOPHEN 10; 325 MG/1; MG/1
1 TABLET ORAL AS NEEDED
Status: DISCONTINUED | OUTPATIENT
Start: 2017-10-02 | End: 2017-10-02 | Stop reason: HOSPADM

## 2017-10-02 RX ORDER — GLYCOPYRROLATE 0.2 MG/ML
INJECTION INTRAMUSCULAR; INTRAVENOUS AS NEEDED
Status: DISCONTINUED | OUTPATIENT
Start: 2017-10-02 | End: 2017-10-02 | Stop reason: HOSPADM

## 2017-10-02 RX ORDER — ACETAMINOPHEN 10 MG/ML
INJECTION, SOLUTION INTRAVENOUS AS NEEDED
Status: DISCONTINUED | OUTPATIENT
Start: 2017-10-02 | End: 2017-10-02 | Stop reason: HOSPADM

## 2017-10-02 RX ORDER — SODIUM CHLORIDE 0.9 % (FLUSH) 0.9 %
5-10 SYRINGE (ML) INJECTION EVERY 8 HOURS
Status: DISCONTINUED | OUTPATIENT
Start: 2017-10-02 | End: 2017-10-05 | Stop reason: HOSPADM

## 2017-10-02 RX ORDER — SODIUM CHLORIDE AND POTASSIUM CHLORIDE .9; .15 G/100ML; G/100ML
SOLUTION INTRAVENOUS CONTINUOUS
Status: DISCONTINUED | OUTPATIENT
Start: 2017-10-02 | End: 2017-10-05 | Stop reason: HOSPADM

## 2017-10-02 RX ORDER — CEFAZOLIN SODIUM IN 0.9 % NACL 2 G/50 ML
2 INTRAVENOUS SOLUTION, PIGGYBACK (ML) INTRAVENOUS EVERY 8 HOURS
Status: COMPLETED | OUTPATIENT
Start: 2017-10-03 | End: 2017-10-04

## 2017-10-02 RX ORDER — CEFAZOLIN SODIUM IN 0.9 % NACL 2 G/50 ML
2 INTRAVENOUS SOLUTION, PIGGYBACK (ML) INTRAVENOUS ONCE
Status: COMPLETED | OUTPATIENT
Start: 2017-10-02 | End: 2017-10-02

## 2017-10-02 RX ORDER — FENTANYL CITRATE 50 UG/ML
INJECTION, SOLUTION INTRAMUSCULAR; INTRAVENOUS AS NEEDED
Status: DISCONTINUED | OUTPATIENT
Start: 2017-10-02 | End: 2017-10-02 | Stop reason: HOSPADM

## 2017-10-02 RX ORDER — METOPROLOL TARTRATE 5 MG/5ML
INJECTION INTRAVENOUS AS NEEDED
Status: DISCONTINUED | OUTPATIENT
Start: 2017-10-02 | End: 2017-10-02 | Stop reason: HOSPADM

## 2017-10-02 RX ORDER — HYDROCODONE BITARTRATE AND ACETAMINOPHEN 7.5; 325 MG/1; MG/1
1 TABLET ORAL
Status: DISCONTINUED | OUTPATIENT
Start: 2017-10-02 | End: 2017-10-03

## 2017-10-02 RX ORDER — METFORMIN HYDROCHLORIDE 500 MG/1
500 TABLET ORAL 2 TIMES DAILY WITH MEALS
Status: DISCONTINUED | OUTPATIENT
Start: 2017-10-03 | End: 2017-10-05 | Stop reason: HOSPADM

## 2017-10-02 RX ORDER — FLUTICASONE PROPIONATE 50 MCG
2 SPRAY, SUSPENSION (ML) NASAL DAILY
Status: DISCONTINUED | OUTPATIENT
Start: 2017-10-03 | End: 2017-10-05 | Stop reason: HOSPADM

## 2017-10-02 RX ADMIN — POLYETHYLENE GLYCOL 3350 17 G: 17 POWDER, FOR SOLUTION ORAL at 22:05

## 2017-10-02 RX ADMIN — ROCURONIUM BROMIDE 10 MG: 10 INJECTION, SOLUTION INTRAVENOUS at 17:58

## 2017-10-02 RX ADMIN — METOPROLOL TARTRATE 1 MG: 5 INJECTION INTRAVENOUS at 18:08

## 2017-10-02 RX ADMIN — SIMVASTATIN 40 MG: 40 TABLET, FILM COATED ORAL at 21:59

## 2017-10-02 RX ADMIN — POTASSIUM CHLORIDE AND SODIUM CHLORIDE: 900; 150 INJECTION, SOLUTION INTRAVENOUS at 21:41

## 2017-10-02 RX ADMIN — LIDOCAINE HYDROCHLORIDE 80 MG: 20 INJECTION, SOLUTION EPIDURAL; INFILTRATION; INTRACAUDAL; PERINEURAL at 17:49

## 2017-10-02 RX ADMIN — CEFAZOLIN 2 G: 1 INJECTION, POWDER, FOR SOLUTION INTRAMUSCULAR; INTRAVENOUS; PARENTERAL at 17:53

## 2017-10-02 RX ADMIN — METOPROLOL TARTRATE 1 MG: 5 INJECTION INTRAVENOUS at 18:09

## 2017-10-02 RX ADMIN — METOPROLOL TARTRATE 1 MG: 5 INJECTION INTRAVENOUS at 18:10

## 2017-10-02 RX ADMIN — SODIUM CHLORIDE 1000 MG: 900 INJECTION, SOLUTION INTRAVENOUS at 20:17

## 2017-10-02 RX ADMIN — GLYCOPYRROLATE 0.4 MG: 0.2 INJECTION INTRAMUSCULAR; INTRAVENOUS at 18:30

## 2017-10-02 RX ADMIN — ROCURONIUM BROMIDE 10 MG: 10 INJECTION, SOLUTION INTRAVENOUS at 17:49

## 2017-10-02 RX ADMIN — Medication 10 ML: at 22:08

## 2017-10-02 RX ADMIN — FENTANYL CITRATE 100 MCG: 50 INJECTION, SOLUTION INTRAMUSCULAR; INTRAVENOUS at 17:49

## 2017-10-02 RX ADMIN — ACETAMINOPHEN 1000 MG: 10 INJECTION, SOLUTION INTRAVENOUS at 18:55

## 2017-10-02 RX ADMIN — ESMOLOL HYDROCHLORIDE 30 MG: 10 INJECTION INTRAVENOUS at 17:52

## 2017-10-02 RX ADMIN — SODIUM CHLORIDE, SODIUM LACTATE, POTASSIUM CHLORIDE, AND CALCIUM CHLORIDE 75 ML/HR: 600; 310; 30; 20 INJECTION, SOLUTION INTRAVENOUS at 17:21

## 2017-10-02 RX ADMIN — PROPOFOL 200 MG: 10 INJECTION, EMULSION INTRAVENOUS at 17:49

## 2017-10-02 RX ADMIN — DEXAMETHASONE SODIUM PHOSPHATE 4 MG: 4 INJECTION, SOLUTION INTRA-ARTICULAR; INTRALESIONAL; INTRAMUSCULAR; INTRAVENOUS; SOFT TISSUE at 18:29

## 2017-10-02 RX ADMIN — NEOSTIGMINE METHYLSULFATE 3 MG: 1 INJECTION INTRAVENOUS at 18:30

## 2017-10-02 RX ADMIN — METOPROLOL TARTRATE 2 MG: 5 INJECTION INTRAVENOUS at 18:35

## 2017-10-02 RX ADMIN — ROCURONIUM BROMIDE 30 MG: 10 INJECTION, SOLUTION INTRAVENOUS at 17:56

## 2017-10-02 NOTE — IP AVS SNAPSHOT
Louis Mera 
 
 
 [address] 
[phone] Patient: Benjy Pollard MRN: [id number] OIV:1/0/2703 You are allergic to the following Allergen Reactions Zithromax (Azithromycin) Other (comments) Skin dryness/peeling Immunizations Administered for This Admission Name Date Influenza Vaccine (Quad) PF 10/5/2017 Recent Documentation Height Weight BMI Smoking Status 1.905 m 155.8 kg 42.93 kg/m2 Former Smoker Emergency Contacts Name Discharge Info Relation Home Work Mobile Zach Spillers DISCHARGE CAREGIVER [3] Spouse [3] [phone] About your hospitalization You were admitted on:  October 2, 2017 You last received care in the:  [facility] 7 MED SURG You were discharged on:  October 5, 2017 Unit phone number:  [phone] Why you were hospitalized Your primary diagnosis was:  Subdural Hematoma (Hcc) Providers Seen During Your Hospitalizations Provider Role Specialty Primary office phone Vernon Dill MD Attending Provider Neurosurgery [phone] Your Primary Care Physician (PCP) Primary Care Physician Office Phone Office Fax Helene Barrett [phone] Follow-up Information Follow up With Details Comments Contact Info Eugenio Powell MD Call As needed [address] [address] 
[phone] Vernon Dill MD On 10/16/2017 9:30  AM [address] Neurosurgical Group [facility] [address] 
[phone] Your Appointments Friday October 13, 2017  8:00 AM EDT Office Visit with Eugenio Powell MD  
[facility] ([facility]) [address] [facility] [address] [place] [id number] [phone]  Monday October 16, 2017  9:30 AM EDT  
 WOUND CHECK with [facility] NURSE  
[facility] ([facility]) [address] [address] [address] [address]  
[phone] Tuesday October 24, 2017  1:00 PM EDT  
LAB with [address]  
18[address] [facility] ([facility]) Rola Marshall [phone] [facility]  
[phone] Tuesday October 24, 2017  1:30 PM EDT Follow Up with MD Marshall Siddiqui Hematology and Oncology [facility]) SERGE/ Noah Ferrisesias [address] [facility] [address]  
[phone] Current Discharge Medication List  
  
CONTINUE these medications which have CHANGED Dose & Instructions Dispensing Information Comments Morning Noon Evening Bedtime * HYDROcodone-acetaminophen 7.5-325 mg per tablet Commonly known as:  Carline Romero What changed:  Another medication with the same name was added. Make sure you understand how and when to take each. Dose:  1 Tab Take 1 Tab by mouth every eight (8) hours as needed (breakthrough pain). Max Daily Amount: 3 Tabs. Quantity:  15 Tab Refills:  0  
     
   
   
   
  
 * HYDROcodone-acetaminophen 7.5-325 mg per tablet Commonly known as:  Carline Romero What changed: You were already taking a medication with the same name, and this prescription was added. Make sure you understand how and when to take each. Notes to Patient: This is your pain medication Dose:  1 Tab Take 1 Tab by mouth every eight (8) hours as needed for Pain. Max Daily Amount: 3 Tabs. Quantity:  30 Tab Refills:  0  
     
   
   
   
  
 * Notice: This list has 2 medication(s) that are the same as other medications prescribed for you. Read the directions carefully, and ask your doctor or other care provider to review them with you. CONTINUE these medications which have NOT CHANGED Dose & Instructions Dispensing Information Comments Morning Noon Evening Bedtime  
 carvedilol 25 mg tablet Commonly known as:  Karen Kowalski Your last dose was: This morning 10/6 Your next dose is: This evening 10/6 Dose:  25 mg Take 1 Tab by mouth two (2) times daily (with meals). Quantity:  180 Tab Refills:  3 CENTRUM ULTRA MEN'S PO Your next dose is: This morning 10/6 Dose:  1 Tab Take 1 Tab by mouth daily. Refills:  0  
     
  
   
   
   
  
 clotrimazole-betamethasone topical cream  
Commonly known as:  Hannibal Donahue Your next dose is: Take today if needed Apply to affected area bid as directed Quantity:  15 g Refills:  0  
     
   
   
   
  
 cpap machine kit  
   
 nightly. 14-18 cm pressure Refills:  0  
     
   
   
   
  
 enalapril 10 mg tablet Commonly known as:  Glenna Peñaloza Your last dose was: This morning 10/6 Your next dose is: This evening 10/6 Dose:  10 mg Take 1 Tab by mouth two (2) times a day. Quantity:  180 Tab Refills:  3 FISH OIL 1,000 mg Cap Generic drug:  omega-3 fatty acids-vitamin e Your next dose is: This evening 10/6 Dose:  1 Cap Take 1 Cap by mouth two (2) times a day. 1200 mg daily twice daily Refills:  0  
     
  
   
   
  
   
  
 FLONASE NA Your last dose was: This morning 10/6 Your next dose is:  Tomorrow morning 10/7 Dose:  2 Spray 2 Sprays by Nasal route daily. Refills:  0  
     
  
   
   
   
  
 hydroCHLOROthiazide 25 mg tablet Commonly known as:  HYDRODIURIL Your last dose was: This morning 10/6 Your next dose is:  Tomorrow morning 10/7 Dose:  25 mg Take 1 Tab by mouth daily. Quantity:  90 Tab Refills:  3  
     
  
   
   
   
  
 linaclotide 145 mcg Cap capsule Commonly known as:  Hannah Muir Your last dose was: This morning 10/6 Your next dose is:  Tomorrow morning 10/7 Dose:  145 mcg Take 1 Cap by mouth Daily (before breakfast). Quantity:  90 Cap Refills:  3  
     
  
   
   
   
  
 loratadine 10 mg tablet Commonly known as:  Darnell Sor Your last dose was: This morning 10/6 Your next dose is:  Tomorrow morning 10/7 Dose:  10 mg Take 10 mg by mouth. Refills:  0  
     
  
   
   
   
  
 metFORMIN 500 mg tablet Commonly known as:  GLUCOPHAGE Your last dose was: This morning 10/6 Your next dose is: This evening 10/6 Dose:  500 mg Take 1 Tab by mouth two (2) times daily (with meals). Quantity:  180 Tab Refills:  3 MIRALAX 17 gram/dose powder Generic drug:  polyethylene glycol Your next dose is: This evening 10/6 Dose:  17 g Take 17 g by mouth two (2) times a day. Refills:  0  
     
  
   
   
  
   
  
 NASACORT AQ 55 mcg nasal inhaler Generic drug:  triamcinolone Your next dose is:  Tomorrow morning 10/7 Dose:  2 Spray 2 Sprays daily. Refills:  0 PROBIOTIC 4X 10-15 mg Tbec Generic drug:  B.infantis-B.ani-B.long-B.bifi Your next dose is:  Tomorrow morning 10/7 Dose:  2 Cap Take 2 Caps by mouth daily. Refills:  0  
     
  
   
   
   
  
 simvastatin 40 mg tablet Commonly known as:  ZOCOR Your last dose was: Last night 10/5 Your next dose is: Take tonight 10/6 Dose:  40 mg Take 1 Tab by mouth nightly. Quantity:  30 Tab Refills:  0  
     
   
   
   
  
  
 spironolactone 25 mg tablet Commonly known as:  ALDACTONE Your last dose was: This morning 10/6 Your next dose is:  Tomorrow morning 10/7 Dose:  25 mg Take 1 Tab by mouth daily. Quantity:  90 Tab Refills:  3 STOP taking these medications   
 dabigatran etexilate 150 mg capsule Commonly known as:  PRADAXA Where to Get Your Medications Information on where to get these meds will be given to you by the nurse or doctor. ! Ask your nurse or doctor about these medications HYDROcodone-acetaminophen 7.5-325 mg per tablet Discharge Instructions MAY shower-->NO tub baths LEAVE incision open to the air NO lifting anything heavier than 5LBS  
 
NO driving until directed by [facility] Avoid having pets sleep in bed with you until incision is completely healed CALL DR. Gonzalo De La Rosa if:  Fever >100.5 
 
[phone])                Incision becomes red, swollen or opens up Incision has yellow, thick drainage or an odor Pain is not managed with prescribed medications Excessive nausea and/or vomiting Sudden visual changes Seizures HEADACHE CHANGES IN MENTAL STATUS Craniotomy: What to Expect at [facility] Your Recovery A craniotomy is surgery to open your skull to fix a problem in your brain. It can be done for many reasons. For example, you may need a craniotomy if your brain or blood vessels are damaged or if you have a tumor or an infection in your brain. You will probably feel very tired for several weeks after surgery. You may also have headaches or problems concentrating. It can take 4 to 8 weeks to recover from surgery. Your cuts (incisions) may be sore for about 5 days after surgery. You may also have numbness and shooting pains near your wound, or swelling and bruising around your eyes. As your wound starts to heal, it may begin to itch. Medicines and ice packs can help with headaches, pain, swelling, and itching. The stitches that hold your incisions together may go away on their own or will be removed in 7 to 10 days. This depends on the type of stitches the doctor uses. It is common for your scalp to swell with fluid. After the swelling goes down, you may have a dent in your head. Some kinds of plates stay attached to hold the skull flap to your head. If your head was shaved, you may want to wear hats or scarves on your head until your hair grows back. Or, it may not bother you. This care sheet gives you a general idea about how long it will take for you to recover. But each person recovers at a different pace. Follow the steps below to get better as quickly as possible. How can you care for yourself at home? Activity · Rest when you feel tired. It is normal to want to sleep during the day. It is a good idea to plan to take a nap every day. Getting enough sleep will help you recover. · Try not to lie flat when you rest or sleep. You can use a wedge pillow, or you can put a rolled towel or foam padding under your pillow. You can also raise the head of your bed by putting bricks or wooden blocks under the bed legs. · After lying down, bring your head up slowly. This can prevent headaches or dizziness. · You can wash your hair 2 to 3 days after your surgery. But do not soak your head or swim for 2 to 3 weeks. · Do not dye or color your hair for 4 weeks after your surgery. · Try to walk each day. Start by walking a little more than you did the day before. Bit by bit, increase the amount you walk. Walking boosts blood flow and helps prevent pneumonia and constipation. · Avoid heavy lifting until your doctor says it is okay. · Do not drive for 2 to 3 weeks or until your doctor says it is okay. When you begin driving again, start with short, familiar routes in the daytime. · Ask your doctor if it is safe for you to travel by plane. Diet · You can eat your normal diet. If your stomach is upset, try bland, low-fat foods like plain rice, broiled chicken, toast, and yogurt. · Follow your doctor's orders about how much fluid you should drink after surgery. · Do not drink alcohol until your doctor says it is okay. · You may notice that your bowel movements are not regular right after your surgery. This is common. Try to avoid constipation and straining with bowel movements. You may want to take a fiber supplement every day. If you have not had a bowel movement after a couple of days, ask your doctor about taking a mild laxative. Medicines · Your doctor will tell you if and when you can restart your medicines. He or she will also give you instructions about taking any new medicines. · If you take blood thinners, such as warfarin (Coumadin), clopidogrel (Plavix), or aspirin, be sure to talk to your doctor. He or she will tell you if and when to start taking those medicines again. Make sure that you understand exactly what your doctor wants you to do. · Be safe with medicines. Take pain medicines exactly as directed. ¨ If the doctor gave you a prescription medicine for pain, take it as prescribed. ¨ If you are not taking a prescription pain medicine, ask your doctor if you can take an over-the-counter medicine. · If you think your pain medicine is making you sick to your stomach: 
¨ Take your medicine after meals (unless your doctor has told you not to). ¨ Ask your doctor for a different pain medicine. · If your doctor prescribed antibiotics, take them as directed. Do not stop taking them just because you feel better. You need to take the full course of antibiotics. · If you get medicines to prevent seizures, take them exactly as directed. Incision care · If you have strips of tape on the incisions, leave the tape on for a week or until it falls off. · Keep the area clean and dry. Change the bandage every 2 days, or if it gets wet or soiled. · After your doctor says it is okay to shower or bathe, gently wash the surgery area with warm, soapy water and pat it dry. Exercise · Avoid risky activities, such as climbing a ladder, for 3 months after surgery. · Avoid strenuous activities, such as bicycle riding, jogging, weight lifting, or aerobic exercise, for 3 months or until your doctor says it is okay. · Do not play any rough or contact sports for 3 months or until your doctor says it is okay. Ice · For the first 1 or 2 days, you can use ice to reduce pain, swelling, and itching. Put ice or a cold pack on your head for 10 to 20 minutes at a time. Put a thin cloth between the ice and your skin. Follow-up care is a key part of your treatment and safety. Be sure to make and go to all appointments, and call your doctor if you are having problems. It's also a good idea to know your test results and keep a list of the medicines you take. When should you call for help? Call 911 anytime you think you may need emergency care. For example, call if: 
· You passed out (lost consciousness). · You have severe trouble breathing. · You have sudden chest pain and shortness of breath, or you cough up blood. · It is hard to think, move, speak, or see. · Your body is jerking or shaking. Call your doctor now or seek immediate medical care if: 
· You have trouble thinking clearly. · You have a fever with a stiff neck or a severe headache. · Your incision comes open. · You have signs of infection, such as: 
¨ Increased pain, swelling, warmth, or redness. ¨ Red streaks leading from the incision. ¨ Pus draining from the incision. ¨ Swollen lymph nodes in your neck, armpits, or groin. ¨ A fever. · You have any sudden vision changes. · You have new or worse headaches. · You fall and hit your head. · You are sleeping more than you are awake. · You have pain that does not get better after you take pain medicine. · You have a fever over 100°F. 
· You have a headache and you throw up. Watch closely for changes in your health, and be sure to contact your doctor if you have any problems. Where can you learn more? Go to http://mark.info/. Enter 26 [id number] in the search box to learn more about \"Craniotomy: What to Expect at Home. \" Current as of: March 20, 2017 Content Version: 11.3 © 8964-9712 [employer]. Care instructions adapted under license by [employer] (which disclaims liability or warranty for this information). If you have questions about a medical condition or this instruction, always ask your healthcare professional. [place] [address] any warranty or liability for your use of this information. DISCHARGE SUMMARY from Nurse The following personal items are in your possession at time of discharge: 
 
Dental Appliances: None Visual Aid: Glasses, With patient Home Medications: None Jewelry: Ring Clothing: Footwear, Pants, Shirt, Undergarments Other Valuables: None Personal Items Sent to Safe: none PATIENT INSTRUCTIONS: 
 
 
F-face looks uneven A-arms unable to move or move unevenly S-speech slurred or non-existent T-time-call 911 as soon as signs and symptoms begin-DO NOT go Back to bed or wait to see if you get better-TIME IS BRAIN. Warning Signs of HEART ATTACK Call 911 if you have these symptoms: 
? Chest discomfort. Most heart attacks involve discomfort in the center of the chest that lasts more than a few minutes, or that goes away and comes back. It can feel like uncomfortable pressure, squeezing, fullness, or pain. ? Discomfort in other areas of the upper body. Symptoms can include pain or discomfort in one or both arms, the back, neck, jaw, or stomach. ? Shortness of breath with or without chest discomfort. ? Other signs may include breaking out in a cold sweat, nausea, or lightheadedness. Don't wait more than five minutes to call [address]! Fast action can save your life. Calling 911 is almost always the fastest way to get lifesaving treatment. Emergency Medical Services staff can begin treatment when they arrive  up to an hour sooner than if someone gets to the hospital by car. The discharge information has been reviewed with the patient. The patient verbalized understanding. Discharge medications reviewed with the patient and appropriate educational materials and side effects teaching were provided. Discharge Orders None Introducing [facility]! Dear Mitchell Man: Thank you for requesting a [employer] account. Our records indicate that you already have an active Buzz[employer] account. You can access your account anytime at [email or web address] Did you know that you can access your hospital and ER discharge instructions at any time in BuzzFeed? You can also review all of your test results from your hospital stay or ER visit. Additional Information If you have questions, please visit the Frequently Asked Questions section of the Buzz[employer] website at [email or web address] Remember, BuzzFeed is NOT to be used for urgent needs. For medical emergencies, dial 911. Now available from your iPhone and Android! General Information Please provide this summary of care documentation to your next provider. Patient Signature:  ____________________________________________________________ Date:  ____________________________________________________________  
  
Aloma Snellen Provider Signature:  ____________________________________________________________ Date:  ____________________________________________________________

## 2017-10-02 NOTE — IP AVS SNAPSHOT
[address] 
[phone] Patient: Anne Cleveland MRN: [id number] QUH:2[birth year]2 Current Discharge Medication List  
  
CONTINUE these medications which have CHANGED Dose & Instructions Dispensing Information Comments Morning Noon Evening Bedtime * HYDROcodone-acetaminophen 7.5-325 mg per tablet Commonly known as:  Tamiko Saldana What changed:  Another medication with the same name was added. Make sure you understand how and when to take each. Dose:  1 Tab Take 1 Tab by mouth every eight (8) hours as needed (breakthrough pain). Max Daily Amount: 3 Tabs. Quantity:  15 Tab Refills:  0  
     
   
   
   
  
 * HYDROcodone-acetaminophen 7.5-325 mg per tablet Commonly known as:  Tamiko Saldana What changed: You were already taking a medication with the same name, and this prescription was added. Make sure you understand how and when to take each. Notes to Patient: This is your pain medication Dose:  1 Tab Take 1 Tab by mouth every eight (8) hours as needed for Pain. Max Daily Amount: 3 Tabs. Quantity:  30 Tab Refills:  0  
     
   
   
   
  
 * Notice: This list has 2 medication(s) that are the same as other medications prescribed for you. Read the directions carefully, and ask your doctor or other care provider to review them with you. CONTINUE these medications which have NOT CHANGED Dose & Instructions Dispensing Information Comments Morning Noon Evening Bedtime  
 carvedilol 25 mg tablet Commonly known as:  Evie Rocha Your last dose was: This morning 10/6 Your next dose is: This evening 10/6 Dose:  25 mg Take 1 Tab by mouth two (2) times daily (with meals). Quantity:  180 Tab Refills:  3 CENTRUM ULTRA MEN'S PO Your next dose is: This morning 10/6 Dose:  1 Tab Take 1 Tab by mouth daily. Refills:  0 clotrimazole-betamethasone topical cream  
Commonly known as:  Libertad Donahue Your next dose is: Take today if needed Apply to affected area bid as directed Quantity:  15 g Refills:  0  
     
   
   
   
  
 cpap machine kit  
   
 nightly. 14-18 cm pressure Refills:  0  
     
   
   
   
  
 enalapril 10 mg tablet Commonly known as:  Glenna Peñaloza Your last dose was: This morning 10/6 Your next dose is: This evening 10/6 Dose:  10 mg Take 1 Tab by mouth two (2) times a day. Quantity:  180 Tab Refills:  3 FISH OIL 1,000 mg Cap Generic drug:  omega-3 fatty acids-vitamin e Your next dose is: This evening 10/6 Dose:  1 Cap Take 1 Cap by mouth two (2) times a day. 1200 mg daily twice daily Refills:  0  
     
  
   
   
  
   
  
 FLONASE NA Your last dose was: This morning 10/6 Your next dose is:  Tomorrow morning 10/7 Dose:  2 Spray 2 Sprays by Nasal route daily. Refills:  0  
     
  
   
   
   
  
 hydroCHLOROthiazide 25 mg tablet Commonly known as:  HYDRODIURIL Your last dose was: This morning 10/6 Your next dose is:  Tomorrow morning 10/7 Dose:  25 mg Take 1 Tab by mouth daily. Quantity:  90 Tab Refills:  3  
     
  
   
   
   
  
 linaclotide 145 mcg Cap capsule Commonly known as:  Hannah Muir Your last dose was: This morning 10/6 Your next dose is:  Tomorrow morning 10/7 Dose:  145 mcg Take 1 Cap by mouth Daily (before breakfast). Quantity:  90 Cap Refills:  3  
     
  
   
   
   
  
 loratadine 10 mg tablet Commonly known as:  Kay Choi Your last dose was: This morning 10/6 Your next dose is:  Tomorrow morning 10/7 Dose:  10 mg Take 10 mg by mouth. Refills:  0  
     
  
   
   
   
  
 metFORMIN 500 mg tablet Commonly known as:  GLUCOPHAGE Your last dose was: This morning 10/6 Your next dose is: This evening 10/6  Dose:  500 mg  
 Take 1 Tab by mouth two (2) times daily (with meals). Quantity:  180 Tab Refills:  3 MIRALAX 17 gram/dose powder Generic drug:  polyethylene glycol Your next dose is: This evening 10/6 Dose:  17 g Take 17 g by mouth two (2) times a day. Refills:  0  
     
  
   
   
  
   
  
 NASACORT AQ 55 mcg nasal inhaler Generic drug:  triamcinolone Your next dose is:  Tomorrow morning 10/7 Dose:  2 Spray 2 Sprays daily. Refills:  0 PROBIOTIC 4X 10-15 mg Tbec Generic drug:  B.infantis-B.ani-B.long-B.bifi Your next dose is:  Tomorrow morning 10/7 Dose:  2 Cap Take 2 Caps by mouth daily. Refills:  0  
     
  
   
   
   
  
 simvastatin 40 mg tablet Commonly known as:  ZOCOR Your last dose was: Last night 10/5 Your next dose is: Take tonight 10/6 Dose:  40 mg Take 1 Tab by mouth nightly. Quantity:  30 Tab Refills:  0  
     
   
   
   
  
  
 spironolactone 25 mg tablet Commonly known as:  ALDACTONE Your last dose was: This morning 10/6 Your next dose is:  Tomorrow morning 10/7 Dose:  25 mg Take 1 Tab by mouth daily. Quantity:  90 Tab Refills:  3 STOP taking these medications   
 dabigatran etexilate 150 mg capsule Commonly known as:  PRADAXA Where to Get Your Medications Information on where to get these meds will be given to you by the nurse or doctor. ! Ask your nurse or doctor about these medications HYDROcodone-acetaminophen 7.5-325 mg per tablet

## 2017-10-02 NOTE — PROGRESS NOTES
NS  ADMIT NOTE  FULL NOTE  DICTATED  IMP: RIGHT  SUBDURAL HEMATOMA  WITH MASS EFFECT  VERY SMALL LEFT SUBDURAL HEMATOMA  NORMAL  NEURO EXAM    PLAN:  Ángela Prior SDH    Andrae Fisher MD

## 2017-10-02 NOTE — PROGRESS NOTES
Results discussed with patient and his wife. Instructed CT staff at [facility] to arrange for EMS transport to [address] [place] [address].

## 2017-10-02 NOTE — BRIEF OP NOTE
BRIEF OPERATIVE NOTE    Date of Procedure: 10/2/2017   Preoperative Diagnosis: RIGHT SUBDURAL HEMATOMA  Postoperative Diagnosis: RIGHT SUBDURAL HEMATOMA    Procedure(s):  RIGHT CRANIOTOMY HARI HOLES FOR SUBDURAL HEMATOMA  Surgeon(s) and Role:     * Aidee Phillips MD - Primary         Assistant Staff:       Surgical Staff:  Circ-1: Jens Collins RN  Scrub Tech-1: Humera Stauffer CNA  Scrub Private/Assistant: Sonal Mejia  Event Time In   Incision Start 1808   Incision Close 1836     Anesthesia: General   Estimated Blood Loss: MINIMAL  Specimens: * No specimens in log *   Findings:Large  Subacute  SDH   Complications: NONE    Implants: * No implants in log *

## 2017-10-02 NOTE — OP NOTES
[facility] 63   OPERATIVE REPORT       Name:  Ana Prater   MR#:  [id number]   :  1955   Account #:  [de-identified]   Date of Adm:  10/02/2017       DATE OF SURGERY: 10/02/2017    PREOPERATIVE DIAGNOSIS: Subacute right subdural hematoma. POSTOPERATIVE DIAGNOSIS: Subacute right subdural hematoma. OPERATION/PROCEDURE: Bur hole evacuation of right subacute   subdural hematoma. SURGEON: Milena Ardon. Krunal Matias MD    ANESTHESIA: General endotracheal.    ESTIMATED BLOOD LOSS: Minimal.    PREPARATION: ChloraPrep. COMPLICATIONS: None. HISTORY OF PRESENT ILLNESS: A 78-year-old man status post fall   with head injury 2-1/2 weeks ago with an initially normal head   CT; however, he is on Pradaxa and developed worsening headaches. CT brain scanning today confirmed a large right-sided subacute   subdural hematoma with mass effect and shift and a smaller   anterior left subdural hematoma which was more acute. He is   emergently taken to the operating room today for evacuation. He   is on the Pradaxa blood thinner; however, his CT brain scanning   shows significant mass effect and pressure and I do not believe   that delayed surgery is indicated. OPERATIVE NOTE: The patient was brought to the operating room,   was carefully placed under general endotracheal anesthesia   without complications. Tirado catheter, thigh high pneumatic hose   and PAWEL hose were placed. 2 grams of Ancef and 1 gram of Keppra   were given. He was positioned supine, table flexed, knees bent,   head turned to the left. The right frontal temporal scalp was   shaved and prepped in the usual sterile fashion. A linear   incision was drawn out just above the ear in the mid skull   region. This was infiltrated with 1% Xylocaine with epinephrine   and incised with a 15 blade down to the skull. Hemostasis   achieved using Bovie and bipolar cautery and a periosteal   elevator was used. Deep retractor was placed.  The Noble Click  was used and a bur hole was created down to the dura. Bone edges were waxed. The dura was opened with a 15 blade,   dural forceps and a large subacute subdural hematoma emanated   under significantly elevated pressure. The bloody fluid actually   shot out of the head and hit the floor in a pulsatile manner. It   was irrigated with 1000 mL of antibiotic irrigation until   relatively clear and the brain began reexpanding under direct   vision. A #10 round Eh-Segovia drain was placed in the   subdural space and brought out through a stab incision   inferiorly and secured with 3-0 Vicryl suture. A small amount of   Gelfoam soaked in thrombin was used in and around the hole. The   wound was dry. No further acute bleeding was noted and the wound   was closed. Subcutaneous and galeal tissues were closed with   interrupted 3-0 Vicryl. The scalp was closed with running locked   3-0 nylon suture and a large head dressing was placed. The drain   was hooked to a bulb suction. The patient tolerated the   procedure well, was awakened, extubated, and taken to PACU in   stable condition. There were no obvious complications.         MD RADHA Rodriguez / Daniel Escobar   D:  10/02/2017   18:46   T:  10/02/2017   19:46   Job #:  [id number]

## 2017-10-02 NOTE — PERIOP NOTES
TRANSFER - OUT REPORT:    Verbal report given to Philip RN(name) on Deon Sheffield  being transferred to [facility](unit) for routine post - op       Report consisted of patients Situation, Background, Assessment and   Recommendations(SBAR). Information from the following report(s) SBAR, Kardex, OR Summary, Procedure Summary, Intake/Output and MAR was reviewed with the receiving nurse. Lines:   Peripheral IV 10/02/17 Left Hand (Active)   Site Assessment Clean, dry, & intact 10/2/2017  6:53 PM   Phlebitis Assessment 0 10/2/2017  6:53 PM   Infiltration Assessment 0 10/2/2017  6:53 PM   Dressing Status Clean, dry, & intact 10/2/2017  6:53 PM   Dressing Type Tape;Transparent 10/2/2017  6:53 PM   Hub Color/Line Status Green; Infusing 10/2/2017  6:53 PM        Opportunity for questions and clarification was provided. Patient transported with:   Monitor  Registered Nurse    VTE prophylaxis orders have been written for Deon Sheffield. Patient and family given floor number and nurses name. Family updated re: pt status after security code verified.

## 2017-10-02 NOTE — ANESTHESIA PREPROCEDURE EVALUATION
Anesthetic History               Review of Systems / Medical History  Patient summary reviewed and pertinent labs reviewed    Pulmonary        Sleep apnea           Neuro/Psych              Cardiovascular    Hypertension: well controlled        Dysrhythmias : atrial fibrillation      Exercise tolerance: >4 METS  Comments:  On Pradaxa   GI/Hepatic/Renal           Liver disease     Endo/Other    Diabetes: type 2    Morbid obesity     Other Findings   Comments: Hx of hemochromatosis           Physical Exam    Airway  Mallampati: II  TM Distance: > 6 cm  Neck ROM: normal range of motion   Mouth opening: Normal     Cardiovascular    Rhythm: irregular           Dental    Dentition: Bridges and Caps/crowns     Pulmonary                 Abdominal  GI exam deferred       Other Findings            Anesthetic Plan    ASA: 3, emergent  Anesthesia type: general          Induction: Intravenous and RSI  Anesthetic plan and risks discussed with: Patient      Glidescope in OR for induction

## 2017-10-02 NOTE — H&P
[facility] 63   HISTORY AND PHYSICAL       Name:  Alka Dale   MR#:  [id number]   :  1955   Account #:  [de-identified]   Date of Adm:  10/02/2017       CHIEF COMPLAINT: Worsening headache times weeks. HISTORY OF PRESENT ILLNESS: A 27-year-old man status post fall   landing on his head and fracturing ribs on 2017. CT   scanning of the brain at that time was unremarkable. Over the   past 2 weeks he has had worsening headaches and presented to his   primary care office today. Repeat CT brain scanning confirmed a   large right-sided subacute subdural hematoma with mass effect   and shift. Smaller bleeding was present on the left side without   mass effect or shift. He is urgently admitted for surgical   intervention. ALLERGIES: Idelia Fines. PAST MEDICAL HISTORY: Significant for obesity, atrial   fibrillation, hypertension, prostate cancer, hemochromatosis,   mitral valve regurgitation, rhinitis, sleep apnea. FAMILY HISTORY: Positive for stroke, cancer, diabetes, and   coronary artery disease. SOCIAL HISTORY: Reveals he is a former smoker, having quit in    [employer]. He is a minimal ethanol consumer. He is . REVIEW OF SYSTEMS   Negative for chest pain, shortness of breath, fatigue. PHYSICAL EXAMINATION   GENERAL: Well-developed, well-nourished, overweight gentleman in   no significant distress. HEENT: Unremarkable. Nose and throat clear. CHEST: Clear bilaterally. CARDIAC: Regular rate and rhythm. No murmurs or gallops. ABDOMEN: Soft, benign, nontender, no masses. Bowel sounds   positive. EXTREMITIES: Free of deformities. NEUROLOGIC: Awake, alert, oriented x3. Cranial nerves 2 through   12 are intact. Motor strength 5/5. Gait not assessed. Sensation   normal. Reflexes symmetric. IMPRESSION: Right-sided subacute subdural hematoma with mass   effect and shift. Small left-sided bleeding. PLAN: Bur hole evacuation of right subdural hematoma.  The left side is not large enough for any surgical intervention at this   time. He may require craniotomy with bur hole evacuation  either today   or at some point in the future. understands this. The risks were   thoroughly explained and include bleeding, infection, stroke,   paralysis, death, and the additional need for more surgery if   this fails. I will speak with the patient's wife, Mrs. Josefa Tran as well. the patient understands and agrees to proceed.         MD RADHA Rodriguez / Daniel Escobar   D:  10/02/2017   17:21   T:  10/02/2017   17:48   Job #:  [id number]

## 2017-10-03 ENCOUNTER — APPOINTMENT (OUTPATIENT)
Dept: CT IMAGING | Age: 62
DRG: 026 | End: 2017-10-03
Attending: NEUROLOGICAL SURGERY
Payer: COMMERCIAL

## 2017-10-03 LAB
ANION GAP SERPL CALC-SCNC: 13 MMOL/L (ref 7–16)
BASOPHILS # BLD: 0 K/UL (ref 0–0.2)
BASOPHILS NFR BLD: 0 % (ref 0–2)
BUN SERPL-MCNC: 11 MG/DL (ref 8–23)
CALCIUM SERPL-MCNC: 8.8 MG/DL (ref 8.3–10.4)
CHLORIDE SERPL-SCNC: 100 MMOL/L (ref 98–107)
CO2 SERPL-SCNC: 24 MMOL/L (ref 21–32)
CREAT SERPL-MCNC: 1.19 MG/DL (ref 0.8–1.5)
DIFFERENTIAL METHOD BLD: ABNORMAL
EOSINOPHIL # BLD: 0 K/UL (ref 0–0.8)
EOSINOPHIL NFR BLD: 0 % (ref 0.5–7.8)
ERYTHROCYTE [DISTWIDTH] IN BLOOD BY AUTOMATED COUNT: 13.3 % (ref 11.9–14.6)
GLUCOSE BLD STRIP.AUTO-MCNC: 140 MG/DL (ref 65–100)
GLUCOSE SERPL-MCNC: 201 MG/DL (ref 65–100)
HCT VFR BLD AUTO: 39 % (ref 41.1–50.3)
HGB BLD-MCNC: 13.7 G/DL (ref 13.6–17.2)
IMM GRANULOCYTES # BLD: 0 K/UL (ref 0–0.5)
IMM GRANULOCYTES NFR BLD: 0.4 % (ref 0–5)
LYMPHOCYTES # BLD: 0.6 K/UL (ref 0.5–4.6)
LYMPHOCYTES NFR BLD: 6 % (ref 13–44)
MCH RBC QN AUTO: 33.1 PG (ref 26.1–32.9)
MCHC RBC AUTO-ENTMCNC: 35.1 G/DL (ref 31.4–35)
MCV RBC AUTO: 94.2 FL (ref 79.6–97.8)
MONOCYTES # BLD: 0.4 K/UL (ref 0.1–1.3)
MONOCYTES NFR BLD: 4 % (ref 4–12)
NEUTS SEG # BLD: 9.9 K/UL (ref 1.7–8.2)
NEUTS SEG NFR BLD: 90 % (ref 43–78)
PLATELET # BLD AUTO: 232 K/UL (ref 150–450)
PMV BLD AUTO: 9.9 FL (ref 10.8–14.1)
POTASSIUM SERPL-SCNC: 4.2 MMOL/L (ref 3.5–5.1)
RBC # BLD AUTO: 4.14 M/UL (ref 4.23–5.67)
SODIUM SERPL-SCNC: 137 MMOL/L (ref 136–145)
WBC # BLD AUTO: 11 K/UL (ref 4.3–11.1)

## 2017-10-03 PROCEDURE — 80048 BASIC METABOLIC PNL TOTAL CA: CPT | Performed by: NEUROLOGICAL SURGERY

## 2017-10-03 PROCEDURE — 97166 OT EVAL MOD COMPLEX 45 MIN: CPT

## 2017-10-03 PROCEDURE — 85025 COMPLETE CBC W/AUTO DIFF WBC: CPT | Performed by: NEUROLOGICAL SURGERY

## 2017-10-03 PROCEDURE — 97162 PT EVAL MOD COMPLEX 30 MIN: CPT

## 2017-10-03 PROCEDURE — 74011000250 HC RX REV CODE- 250: Performed by: NEUROLOGICAL SURGERY

## 2017-10-03 PROCEDURE — 77010033678 HC OXYGEN DAILY

## 2017-10-03 PROCEDURE — 74011250636 HC RX REV CODE- 250/636: Performed by: NEUROLOGICAL SURGERY

## 2017-10-03 PROCEDURE — 65610000001 HC ROOM ICU GENERAL

## 2017-10-03 PROCEDURE — 74011250637 HC RX REV CODE- 250/637: Performed by: NEUROLOGICAL SURGERY

## 2017-10-03 PROCEDURE — 74011000258 HC RX REV CODE- 258: Performed by: NEUROLOGICAL SURGERY

## 2017-10-03 PROCEDURE — 70450 CT HEAD/BRAIN W/O DYE: CPT

## 2017-10-03 PROCEDURE — 36415 COLL VENOUS BLD VENIPUNCTURE: CPT | Performed by: NEUROLOGICAL SURGERY

## 2017-10-03 RX ORDER — ONDANSETRON 2 MG/ML
8 INJECTION INTRAMUSCULAR; INTRAVENOUS
Status: DISCONTINUED | OUTPATIENT
Start: 2017-10-03 | End: 2017-10-05 | Stop reason: HOSPADM

## 2017-10-03 RX ORDER — TRAMADOL HYDROCHLORIDE 50 MG/1
50 TABLET ORAL
Status: DISCONTINUED | OUTPATIENT
Start: 2017-10-03 | End: 2017-10-05 | Stop reason: HOSPADM

## 2017-10-03 RX ADMIN — SODIUM CHLORIDE 25 MG: 9 INJECTION INTRAMUSCULAR; INTRAVENOUS; SUBCUTANEOUS at 01:59

## 2017-10-03 RX ADMIN — ACETAMINOPHEN 650 MG: 325 TABLET ORAL at 10:18

## 2017-10-03 RX ADMIN — LORATADINE 10 MG: 10 TABLET ORAL at 09:08

## 2017-10-03 RX ADMIN — FLUTICASONE PROPIONATE 2 SPRAY: 50 SPRAY, METERED NASAL at 09:08

## 2017-10-03 RX ADMIN — CARVEDILOL 25 MG: 25 TABLET, FILM COATED ORAL at 09:08

## 2017-10-03 RX ADMIN — ONDANSETRON 8 MG: 2 INJECTION INTRAMUSCULAR; INTRAVENOUS at 00:52

## 2017-10-03 RX ADMIN — SODIUM CHLORIDE 25 MG: 9 INJECTION INTRAMUSCULAR; INTRAVENOUS; SUBCUTANEOUS at 12:23

## 2017-10-03 RX ADMIN — CARVEDILOL 25 MG: 25 TABLET, FILM COATED ORAL at 17:23

## 2017-10-03 RX ADMIN — Medication 10 ML: at 05:53

## 2017-10-03 RX ADMIN — METFORMIN HYDROCHLORIDE 500 MG: 500 TABLET, FILM COATED ORAL at 17:23

## 2017-10-03 RX ADMIN — METFORMIN HYDROCHLORIDE 500 MG: 500 TABLET, FILM COATED ORAL at 09:08

## 2017-10-03 RX ADMIN — CEFAZOLIN 2 G: 1 INJECTION, POWDER, FOR SOLUTION INTRAMUSCULAR; INTRAVENOUS; PARENTERAL at 09:25

## 2017-10-03 RX ADMIN — POTASSIUM CHLORIDE AND SODIUM CHLORIDE: 900; 150 INJECTION, SOLUTION INTRAVENOUS at 14:37

## 2017-10-03 RX ADMIN — CEFAZOLIN 2 G: 1 INJECTION, POWDER, FOR SOLUTION INTRAMUSCULAR; INTRAVENOUS; PARENTERAL at 17:23

## 2017-10-03 RX ADMIN — SODIUM CHLORIDE 500 MG: 900 INJECTION, SOLUTION INTRAVENOUS at 19:38

## 2017-10-03 RX ADMIN — POLYETHYLENE GLYCOL 3350 17 G: 17 POWDER, FOR SOLUTION ORAL at 09:08

## 2017-10-03 RX ADMIN — ENALAPRIL MALEATE 10 MG: 10 TABLET ORAL at 17:23

## 2017-10-03 RX ADMIN — CEFAZOLIN 2 G: 1 INJECTION, POWDER, FOR SOLUTION INTRAMUSCULAR; INTRAVENOUS; PARENTERAL at 01:19

## 2017-10-03 RX ADMIN — ACETAMINOPHEN 650 MG: 325 TABLET ORAL at 18:38

## 2017-10-03 RX ADMIN — SIMVASTATIN 40 MG: 40 TABLET, FILM COATED ORAL at 21:27

## 2017-10-03 RX ADMIN — SODIUM CHLORIDE 500 MG: 900 INJECTION, SOLUTION INTRAVENOUS at 09:19

## 2017-10-03 RX ADMIN — SPIRONOLACTONE 25 MG: 25 TABLET, FILM COATED ORAL at 09:08

## 2017-10-03 RX ADMIN — HYDROMORPHONE HYDROCHLORIDE 1 MG: 1 INJECTION, SOLUTION INTRAMUSCULAR; INTRAVENOUS; SUBCUTANEOUS at 03:10

## 2017-10-03 RX ADMIN — ONDANSETRON 4 MG: 2 INJECTION INTRAMUSCULAR; INTRAVENOUS at 07:30

## 2017-10-03 RX ADMIN — HYDROCHLOROTHIAZIDE 25 MG: 25 TABLET ORAL at 09:08

## 2017-10-03 RX ADMIN — Medication 10 ML: at 14:00

## 2017-10-03 RX ADMIN — ENALAPRIL MALEATE 10 MG: 10 TABLET ORAL at 09:08

## 2017-10-03 RX ADMIN — POLYETHYLENE GLYCOL 3350 17 G: 17 POWDER, FOR SOLUTION ORAL at 17:26

## 2017-10-03 RX ADMIN — ONDANSETRON 4 MG: 2 INJECTION INTRAMUSCULAR; INTRAVENOUS at 09:19

## 2017-10-03 RX ADMIN — Medication 10 ML: at 21:27

## 2017-10-03 RX ADMIN — TRAMADOL HYDROCHLORIDE 50 MG: 50 TABLET, FILM COATED ORAL at 14:06

## 2017-10-03 RX ADMIN — SODIUM CHLORIDE 25 MG: 9 INJECTION INTRAMUSCULAR; INTRAVENOUS; SUBCUTANEOUS at 05:53

## 2017-10-03 NOTE — PROGRESS NOTES
Patient still very uncomfortable, complaining of 'indigestion,' and tossing and turning in bed, HR 140s. Given 1 mg dilaudid per orders @ 0310, able to sleep for a short time, HR down to 100s. Will continue to monitor.

## 2017-10-03 NOTE — INTERDISCIPLINARY ROUNDS
Interdisciplinary team rounds were held 10/3/2017 with the following team members:Nursing, Nurse Practitioner, Nutrition, Palliative Care, Pastoral Care, Pharmacy, Physician, Respiratory Therapy, Wound Care and Clinical Coordinator. Plan of care discussed. See clinical pathway and/or care plan for interventions and desired outcomes.

## 2017-10-03 NOTE — PROGRESS NOTES
Patient still with significant nausea, belching, and dry heaves. Dr. Chrissy Newman notified, new orders for phenergan 25mg now and q6hrs prn.

## 2017-10-03 NOTE — PROGRESS NOTES
Problem: Mobility Impaired (Adult and Pediatric)  Goal: *Acute Goals and Plan of Care (Insert Text)  LTG:  (1.)Mr. Harlan Hollis will move from supine to sit and sit to supine, scoot up and down and roll side to side in bed with INDEPENDENT within 7 day(s). (2.)Mr. Harlan Hollis will transfer from bed to chair and chair to bed with INDEPENDENT using the least restrictive device within 7 day(s). (3.)Mr. Harlan Hollis will ambulate with modified INDEPENDENCE for 250+ feet with the least restrictive/no device within 7 day(s). (4.)Mr. Harlan Hollis will perform standing static and dynamic balance activities x 8 minutes with SUPERVISION to improve safety within 7 day(s). (5.)Mr. Harlan Hollis will ascend and descend 4 stairs using one hand rail(s) with SUPERVISION to improve functional mobility and safety within 7 day(s). PHYSICAL THERAPY: INITIAL ASSESSMENT, AM 10/3/2017  INPATIENT: Hospital Day: 2  Payor: Kit Chol / Plan: [place] [employer] [address] / Product Type: PPO /      NAME/AGE/GENDER: Riki Cain is a 57 y.o. male       PRIMARY DIAGNOSIS: RIGHT SUBDURAL HEMATOMA  Subdural hematoma (HCC) Subdural hematoma (HCC) Subdural hematoma (HCC)  Procedure(s) (LRB):  RIGHT CRANIOTOMY HARI HOLES FOR SUBDURAL HEMATOMA (Right)  1 Day Post-Op  ICD-10: Treatment Diagnosis:       · Difficulty in walking, Not elsewhere classified (R26.2)  · History of falling (Z91.81)   Precaution/Allergies:  Zithromax [azithromycin]       ASSESSMENT:      Mr. Harlan Hollis presents sitting in recliner in ICU after undergoing above surgery for R SDH. Patient states that he fell 3 weeks ago and hit the floor very forcefully resulting in rib fracture. Patient extremely talkative and required redirection at times to stay on task. His LE strength was grossly equal.  He stood with min assist and cueing but felt unsteady. States that he has OA of both knees and needs TKR surgery. Ambulated 20' in room with RW and trunk sway.   Patient required cueing for safe technique with sit to/from stand and in use of RW. Complained of HA after walking. Patient has declined in functional mobility and per OT has decreased tracking visually. He seems to be at high risk of falling again. Mr. Faith Guerra would benefit from skilled physical therapy (medically necessary) to address his deficits and maximize his function. This section established at most recent assessment   PROBLEM LIST (Impairments causing functional limitations):  1. Decreased ADL/Functional Activities  2. Decreased Transfer Abilities  3. Decreased Ambulation Ability/Technique  4. Decreased Balance  5. Increased Pain  6. Decreased Activity Tolerance    INTERVENTIONS PLANNED: (Benefits and precautions of physical therapy have been discussed with the patient.)  1. Balance Exercise  2. Bed Mobility  3. Gait Training  4. Therapeutic Activites  5. Therapeutic Exercise/Strengthening  6. Transfer Training  7. education  8. Group Therapy      TREATMENT PLAN: Frequency/Duration: 3 times a week for duration of hospital stay  Rehabilitation Potential For Stated Goals: GOOD      RECOMMENDED REHABILITATION/EQUIPMENT: (at time of discharge pending progress): Due to the probability of continued deficits (see above) this patient will likely need continued skilled physical therapy after discharge. Equipment:   · cane vs. RW                   HISTORY:   History of Present Injury/Illness (Reason for Referral):  Per MD note, \" A 64-year-old man status post fall   landing on his head and fracturing ribs on 09/14/2017. CT   scanning of the brain at that time was unremarkable. Over the   past 2 weeks he has had worsening headaches and presented to his   primary care office today. Repeat CT brain scanning confirmed a   large right-sided subacute subdural hematoma with mass effect   and shift. Smaller bleeding was present on the left side without   mass effect or shift. He is urgently admitted for surgical   Intervention. \"  Past Medical History/Comorbidities:   Mr. Michael Damon  has a past medical history of A-fib ([facility] [address] Arteriosclerosis; Atrial fibrillation ([facility] [address] (1/3/2012); Diabetes mellitus; Diabetes mellitus ([facility] [address] (1/3/2012); Essential hypertension, benign (2/6/2014); Family history of malignant neoplasm of prostate (2/6/2014); Hemochromatosis; HTN (hypertension) (2/6/2014); Hypercholesteremia; Nodular prostate without urinary obstruction (2/6/2014); and Obesity (2/6/2014). Mr. Michael Damon  has a past surgical history that includes knee arthroscp harv and urological.  Social History/Living Environment:   Home Environment: Private residence  # Steps to Enter: 8  Rails to Enter: Yes  Hand Rails : Bilateral  One/Two Story Residence: Two story, live on 1st floor  [place]: Child(titi), [employer] day / marilin community, Family member(s), Friends \ neighbors, Spouse/Significant Other/Partner  Tub or Shower Type: Tub  Prior Level of Function/Work/Activity:  Lives at home with wife. Ambulates independently but negotiates stairs slowly and carefully via step to pattern due to knee OA. Independent in home and community. Works at home from basement. Dominant Side:         RIGHT   Number of Personal Factors/Comorbidities that affect the Plan of Care: 1-2: MODERATE COMPLEXITY   EXAMINATION:   Most Recent Physical Functioning:   Gross Assessment:  AROM: Generally decreased, functional  Strength: Within functional limits  Coordination: Generally decreased, functional  Sensation:  (intact to light touch, however, patient reports peripheral neuropathy)               Posture:  Posture (WDL): Exceptions to WDL  Posture Assessment:  Forward head, Rounded shoulders  Balance:  Sitting: Intact  Standing: Impaired  Standing - Static: Fair  Standing - Dynamic : Fair Bed Mobility:     Wheelchair Mobility:     Transfers:  Sit to Stand: Minimum assistance  Stand to Sit: Minimum assistance  Gait:     Base of Support: Widened  Speed/Loreto: Slow  Step Length: Left shortened;Right shortened  Gait Abnormalities: Decreased step clearance; Step to gait;Trunk sway increased  Distance (ft): 20 Feet (ft)  Assistive Device: Gait belt;Walker, rolling  Ambulation - Level of Assistance: Contact guard assistance;Minimal assistance       Body Structures Involved:  1. Nerves  2. Joints  3. Muscles Body Functions Affected:  1. Sensory/Pain  2. Neuromusculoskeletal  3. Movement Related Activities and Participation Affected:  1. Mobility  2. Self Care  3. Domestic Life  4. Community, Social and [place]   Number of elements that affect the Plan of Care: 4+: HIGH COMPLEXITY   CLINICAL PRESENTATION:   Presentation: Evolving clinical presentation with changing clinical characteristics: MODERATE COMPLEXITY   CLINICAL DECISION MAKIN [facility] Mobility Inpatient Short Form  How much difficulty does the patient currently have. .. Unable A Lot A Little None   1. Turning over in bed (including adjusting bedclothes, sheets and blankets)? [ ] 1   [ ] 2   [X] 3   [ ] 4   2. Sitting down on and standing up from a chair with arms ( e.g., wheelchair, bedside commode, etc.)   [ ] 1   [ ] 2   [X] 3   [ ] 4   3. Moving from lying on back to sitting on the side of the bed? [ ] 1   [ ] 2   [X] 3   [ ] 4   How much help from another person does the patient currently need. .. Total A Lot A Little None   4. Moving to and from a bed to a chair (including a wheelchair)? [ ] 1   [ ] 2   [X] 3   [ ] 4   5. Need to walk in hospital room? [ ] 1   [ ] 2   [X] 3   [ ] 4   6. Climbing 3-5 steps with a railing? [ ] 1   [ ] 2   [X] 3   [ ] 4   © , Trustees of [address] [place] [id number], under license to [employer]. All rights reserved    Score:  Initial: 18 Most Recent: X (Date: -- )     Interpretation of Tool:  Represents activities that are increasingly more difficult (i.e. Bed mobility, Transfers, Gait).        Score 24 23 22-20 19-15 14-10 9-7 6       Modifier CH CI CJ CK CL CM CN · Mobility - Walking and Moving Around:               - CURRENT STATUS:    CK - 40%-59% impaired, limited or restricted               - GOAL STATUS:           CJ - 20%-39% impaired, limited or restricted               - D/C STATUS:                       ---------------To be determined---------------  Payor: BLUE CROSS / Plan: [place] [employer]OSS OF [address] [place] [address] Rd / Product Type: PPO /       Medical Necessity:     · Patient is expected to demonstrate progress in functional technique to increase independence with   and improve safety during transfers/gait. Reason for Services/Other Comments:  · Patient continues to require skilled intervention due to medical complications and recent fall with decreased safety. Use of outcome tool(s) and clinical judgement create a POC that gives a: Questionable prediction of patient's progress: MODERATE COMPLEXITY                 TREATMENT:   (In addition to Assessment/Re-Assessment sessions the following treatments were rendered)   Pre-treatment Symptoms/Complaints:    Pain: Initial:   Pain Intensity 1: 0  Post Session:  Moderate HA, notified RN. Assessment/Reassessment only, no treatment provided today     Braces/Orthotics/Lines/Etc:   · IV  · nieves catheter  Treatment/Session Assessment:    · Response to Treatment:  HA  · Interdisciplinary Collaboration:  · Physical Therapist  · Occupational Therapist  · Registered Nurse  · Physician  · After treatment position/precautions:  · Up in chair  · Bed/Chair-wheels locked  · Call light within reach  · RN notified  · Family at bedside  · Compliance with Program/Exercises: Will assess as treatment progresses. · Recommendations/Intent for next treatment session: \"Next visit will focus on advancements to more challenging activities and reduction in assistance provided\".   Total Treatment Duration:  PT Patient Time In/Time Out  Time In: 0925  Time Out: [address], PT, DPT

## 2017-10-03 NOTE — CONSULTS
LEAPFROG PROTOCOL NOTE    Macario Vaughan  10/3/2017    The patient is currently in the critical care setting managed by Dr. Isabel Gallego with subdural hematoma s/p right craniotomy with cailin holes. The patient's chart is reviewed and the patient is discussed with the staff. On room air. Patient is currently hemodynamically stable. Patient has no needs identified for Intensivist management in the critical care setting at this time. Please notify us if can be of assistance. No charge billed to the patient. Thank you.     NANCY Garcia MD

## 2017-10-03 NOTE — CDMP QUERY
Please clarify if this patient is being treated/managed for:    BRAIN COMPRESSION in the setting of SDH, mass effect and midline shift on CT  treated with craniotomy cailin holes for evacuation of hematoma. =>Other Explanation of clinical findings  =>Unable to Determine (no explanation of clinical findings)    The medical record reflects the following:    Risk Factors: SDH    Clinical Indicators: mass effect and midline shift on CT    Treatment: [facility] Evacuation     Please clarify and document your clinical opinion in the progress notes and discharge summary including the definitive and/or presumptive diagnosis, (suspected or probable), related to the above clinical findings. Please include clinical findings supporting your diagnosis.     Thank you,  Parris Maier RN  [phone]

## 2017-10-03 NOTE — PROGRESS NOTES
Pt c/o nausea. MD Kayley Lane paged, orders received to administer Zofran 8 mg IVP now and every 6 hours PRN.

## 2017-10-03 NOTE — PROGRESS NOTES
Bedside and Verbal shift change report given to Loyd Edwards RN by Maureen Thomas RN. Report included the following information SBAR, Kardex, Intake/Output, MAR, Recent Results, Med Rec Status and Cardiac Rhythm A Fib .

## 2017-10-03 NOTE — PROGRESS NOTES
SPEECH PATHOLOGY NOTE:    Participated in interdisciplinary rounds in ICU and chart reviewed. Patient would benefit from skilled acute therapy to assess speech, language, and cognitive abilities.   Recommend ST consult when medically stable and MD agrees.     Thank you for your consideration,  MADHU Padron, CCC-SLP, CBIS

## 2017-10-03 NOTE — PROGRESS NOTES
[employer] received request to offer assistance with [address]. Spoke with nurse to ensure that patient was sufficiently alert and oriented to proceed with consult. Mr. Dang Erm stated that he already had a completed [address] at home. I encouraged family to bring the document to the hospital for patient's medical record. Rev.  Arlette Loya MDiv, BS  Board Certified

## 2017-10-03 NOTE — PROGRESS NOTES
Patient now with emesis after going to CT, phenergan with no effect. Given cool rag and emesis bag. Brown/orange liquid emesis ~300mL in bag. Scalp incision with small amount sanguinous drainage, RON with serosanguinous output, remains charged to thumbprint. Will continue to monitor.

## 2017-10-03 NOTE — PROGRESS NOTES
Patient still with nausea when awake, CT head pending, Dr. Chrissy Newman notified of continued nausea, new orders for additional dose of phenergan for trip to CT.

## 2017-10-03 NOTE — ACP (ADVANCE CARE PLANNING)
[employer] received request to offer assistance with [address]. Spoke with nurse to ensure that patient was sufficiently alert and oriented to proceed with consult. Mr. Moses Severs stated that he already had a completed [address] at home. I encouraged family to bring the document to the hospital for patient's medical record. Rev.  Cherry Díaz MDiv, BS  Board Certified

## 2017-10-03 NOTE — ROUTINE PROCESS
TRANSFER - IN REPORT:    Verbal report received from LIDIA Bernstein(name) on Stacy Gain  being received from PACU (unit) for routine progression of care      Report consisted of patients Situation, Background, Assessment and   Recommendations(SBAR). Information from the following report(s) SBAR, Kardex, OR Summary, Intake/Output, MAR, Recent Results and Cardiac Rhythm a fib was reviewed with the receiving nurse. Opportunity for questions and clarification was provided. Assessment completed upon patients arrival to unit and care assumed. Dual skin assessment completed with Swapna Lyon RN (name) findings were Skin color, texture, turgor normal. Posterior scalp incision covered by dressing and gauze wrap, RON warren intact, charged with thumbprint, sanguinous output. Sacrum intact, allevyn placed. CHG bath given. SCDs in place to BLE. Patient denies headache, neuro assessment WNL, do deficits noted.

## 2017-10-03 NOTE — PROGRESS NOTES
NS  The patient did have preoperative brain compression from the subdural hematoma.     Justin Cruz MD

## 2017-10-03 NOTE — PROGRESS NOTES
NS  POD#1  AFEBRILE  DRY DRESSINGS  NON FOCAL;  5/5 POWER  CT: RESOLUTION OF  SHIFT,  SOME BLOOD ON LEFT MORE CHRONIC APPEARING  INTRACTABLE  NAUSEA:  [address] PHENERGAN  A/P   STOP NARCOTICS  HOSPITALIST CONSULT  Axel Subramanian MD

## 2017-10-03 NOTE — PROGRESS NOTES
Problem: Self Care Deficits Care Plan (Adult)  Goal: *Acute Goals and Plan of Care (Insert Text)  GOALS:    1: Pt will perform toileting with CGA and adaptive equipment as needed to prevent skin breakdown. 2: Pt will perform LB dressing with minimal assistance and adaptive equipment as needed to reduce risk of falls. 3: Pt will perform bathing with minimal assistance and adaptive equipment as needed to promote good skin integrity. 4: Pt will perform toilet transfers with CGA and the least restrictive device to promote quality of life. 5: Pt will tolerate 25 minutes of therapeutic activity with minimal rest breaks. Time Frame: 7 visits      OCCUPATIONAL THERAPY: Initial Assessment 10/3/2017  INPATIENT: Hospital Day: 2  Payor: [facility] / Plan: [place] [facility] [address] / Product Type: PPO /      NAME/AGE/GENDER: Dio Hunter is a 57 y.o. male       PRIMARY DIAGNOSIS:  RIGHT SUBDURAL HEMATOMA  Subdural hematoma (HCC) Subdural hematoma (HCC) Subdural hematoma (HCC)  Procedure(s) (LRB):  RIGHT CRANIOTOMY HARI HOLES FOR SUBDURAL HEMATOMA (Right)  1 Day Post-Op  ICD-10: Treatment Diagnosis:        · Generalized Muscle Weakness (M62.81)  · Dizziness and Giddiness (R42)   Precautions/Allergies:         Zithromax [azithromycin]       ASSESSMENT:       1. Mr. Cruz Soriano presents sitting in chair agreeable to therapy. He is s/p RIGHT CRANIOTOMY HARI HOLES FOR SUBDURAL HEMATOMA and presents with decreased activity tolerance, and decreased independence for self care, functional mobility, and ADL's. Pt's visual assessment reveals a fixed gaze with minimal tracking across midline as well as in all quadrants. Pt with moderate difficulty identifying the number of fingers held up both right and left upper quadrants and left lower. Pt transferred to sink to brush teeth with minimal assistance exhibiting slight impulsivity; pt appears to over compensate to mask deficits.   He completed teeth brushing in standing with electric toothbrush and then requested to sit secondary to dizziness and headache; noted BUE tremor L>R. Mr. Yolie Herzog  requires skilled OT to maximize independence with self care tasks and functional transfers, educate on safety awareness, fall prevention, energy conservation/work simplification, and AE/adaptations. Pt left with PT and all needs in reach, pt instructed to call for assistance; RN aware. Pt appears motivated with good family support. Patient will make a good candidate for IPR from a therapy perspective. This section established at most recent assessment   PROBLEM LIST (Impairments causing functional limitations):  1. Decreased Strength  2. Decreased ADL/Functional Activities  3. Decreased Transfer Abilities  4. Decreased Ambulation Ability/Technique  5. Decreased Balance  6. Increased Pain  7. Decreased Activity Tolerance  8. Decreased Work Simplification/Energy Conservation Techniques  9. Increased Fatigue  10. Edema/Girth  11. Decreased [place] with Home Exercise Program    INTERVENTIONS PLANNED: (Benefits and precautions of occupational therapy have been discussed with the patient.)  1. Activities of daily living training  2. Adaptive equipment training  3. Balance training  4. Donning&doffing training  5. Group therapy  6. Neuromuscular re-eduation  7. Sensory reintegration training  8. Therapeutic activity  9. Therapeutic exercise      TREATMENT PLAN: Frequency/Duration: Follow patient 3x/week to address above goals. Rehabilitation Potential For Stated Goals: EXCELLENT      RECOMMENDED REHABILITATION/EQUIPMENT: (at time of discharge pending progress): Due to the probability of continued deficits (see above) this patient will likely need continued skilled occupational therapy after discharge.   Equipment:   · to be detemined                      OCCUPATIONAL PROFILE AND HISTORY:   History of Present Injury/Illness (Reason for Referral):  See H&P  Past Medical History/Comorbidities: Mr. Raji Traore  has a past medical history of A-fib ([facility] [address] Arteriosclerosis; Atrial fibrillation ([facility] [address] (1/3/2012); Diabetes mellitus; Diabetes mellitus ([facility] [address] (1/3/2012); Essential hypertension, benign (2/6/2014); Family history of malignant neoplasm of prostate (2/6/2014); Hemochromatosis; HTN (hypertension) (2/6/2014); Hypercholesteremia; Nodular prostate without urinary obstruction (2/6/2014); and Obesity (2/6/2014). Mr. Raji Traore  has a past surgical history that includes knee arthroscp harv and urological.  Social History/Living Environment:   Home Environment: Private residence  # Steps to Enter: 8  Rails to Enter: Yes  Hand Rails : Bilateral  One/Two Story Residence: Two story, live on 1st floor  [place]: Child(titi), Taoism / marilin community, Family member(s), Friends \ neighbors, Spouse/Significant Other/Partner  Tub or Shower Type: Tub/Shower combination  Prior Level of Function/Work/Activity:  Lives with wife, independent      Number of Personal Factors/Comorbidities that affect the Plan of Care: Expanded review of therapy/medical records (1-2):  MODERATE COMPLEXITY   ASSESSMENT OF OCCUPATIONAL PERFORMANCE[de-identified]   Activities of Daily Living:           Basic ADLs (From Assessment) Complex ADLs (From Assessment)   Basic ADL  Feeding: Modified independent  Oral Facial Hygiene/Grooming: Setup  Bathing: Moderate assistance  Upper Body Dressing: Setup  Lower Body Dressing: Moderate assistance  Toileting: Minimum assistance Instrumental ADL  Meal Preparation: Maximum assistance  Homemaking:  Total assistance   Grooming/Bathing/Dressing Activities of Daily Living   Grooming  Grooming Assistance: Supervision/set up  Washing Face: Supervision/set-up  Washing Hands: Supervision/set-up  Brushing Teeth: Supervision/set-up  Adaptive Equipment: Electric toothbrush Cognitive Retraining  Safety/Judgement: Fall prevention                 Functional Transfers  Bathroom Mobility: Minimum assistance  Toilet Transfer : Minimum assistance  Tub Transfer: Moderate assistance     Bed/Mat Mobility  Sit to Stand: Minimum assistance  Bed to Chair: Minimum assistance          Most Recent Physical Functioning:   Gross Assessment:  AROM: Within functional limits  Strength: Generally decreased, functional  Coordination: Within functional limits  Sensation: Intact               Posture:  Posture (WDL): Exceptions to WDL  Posture Assessment: Forward head, Rounded shoulders  Balance:  Sitting: Intact  Standing: Impaired  Standing - Static: Fair  Standing - Dynamic : Fair Bed Mobility:     Wheelchair Mobility:     Transfers:  Sit to Stand: Minimum assistance  Stand to Sit: Minimum assistance  Bed to Chair: Minimum assistance                 Patient Vitals for the past 6 hrs:       BP SpO2 O2 Flow Rate (L/min) Pulse   10/03/17 0706 138/69 90 % - 99   10/03/17 0729 134/76 93 % - 98   10/03/17 0746 - 95 % 3 l/min -   10/03/17 0758 136/75 - - (!) 104   10/03/17 0828 127/66 95 % - (!) 108   10/03/17 0859 121/73 96 % - (!) 102   10/03/17 0928 143/67 94 % - (!) 109   10/03/17 1011 151/72 98 % - (!) 104        Mental Status  Neurologic State: Alert  Orientation Level: Oriented X4  Cognition: Appropriate for age attention/concentration  Perception: Appears intact  Perseveration: No perseveration noted  Safety/Judgement: Fall prevention                               Physical Skills Involved:  1. Balance  2. Strength  3. Activity Tolerance  4. Pain (acute) Cognitive Skills Affected (resulting in the inability to perform in a timely and safe manner):  1. WFLs Psychosocial Skills Affected:  1. Habits/Routines  2. Environmental Adaptation  3. Self-Awareness   Number of elements that affect the Plan of Care: 3-5:  MODERATE COMPLEXITY   CLINICAL DECISION MAKIN hospitals [address] [address] AM-PAC 6 Clicks   Daily Activity Inpatient Short Form  How much help from another person does the patient currently need. .. Total A Lot A Little None   1.   Putting on and taking off regular lower body clothing?   [ ] 1   [X] 2   [ ] 3   [ ] 4   2. Bathing (including washing, rinsing, drying)? [ ] 1   [X] 2   [ ] 3   [ ] 4   3. Toileting, which includes using toilet, bedpan or urinal?   [ ] 1   [ ] 2   [X] 3   [ ] 4   4. Putting on and taking off regular upper body clothing?   [ ] 1   [ ] 2   [X] 3   [ ] 4   5. Taking care of personal grooming such as brushing teeth? [ ] 1   [ ] 2   [X] 3   [ ] 4   6. Eating meals? [ ] 1   [ ] 2   [ ] 3   [X] 4   © 2007, Trustees of [address] [place] [address] [address] [address], under license to [employer]. All rights reserved    Score:  Initial: 17 Most Recent: X (Date: -- )     Interpretation of Tool:  Represents activities that are increasingly more difficult (i.e. Bed mobility, Transfers, Gait). Score 24 23 22-20 19-15 14-10 9-7 6       Modifier CH CI CJ CK CL CM CN         · Self Care:               - CURRENT STATUS:    CK - 40%-59% impaired, limited or restricted               - GOAL STATUS:           CJ - 20%-39% impaired, limited or restricted               - D/C STATUS:                       ---------------To be determined---------------  Payor: [employer] / [place]: [place] [employer] / Product Type: PPO /       Medical Necessity:     · Patient demonstrates excellent rehab potential due to higher previous functional level. Reason for Services/Other Comments:  · Patient continues to require skilled intervention due to decreased ability to perform self care.    Use of outcome tool(s) and clinical judgement create a POC that gives a: MODERATE COMPLEXITY             TREATMENT:   (In addition to Assessment/Re-Assessment sessions the following treatments were rendered)      Pre-treatment Symptoms/Complaints:  Decreased ability to perform ADLs, self care, and functional mobility; decreased tolerance for activities  Pain: Initial:   Pain Intensity 1: 5  Pain Location 1: Head  Pain Intervention(s) 1: Emotional support, Repositioned  Post Session:  5      Assessment/Reassessment only, no treatment provided today     Braces/Orthotics/Lines/Etc:   · room air  Treatment/Session Assessment:    · Response to Treatment:  Agrees to therapy  · Interdisciplinary Collaboration:  · Physical Therapist  · Occupational Therapist  · Registered Nurse  · Physician  · After treatment position/precautions:  · Up in chair  · Bed/Chair-wheels locked  · Call light within reach  · RN notified  · Family at bedside  · Compliance with Program/Exercises: Will assess as treatment progresses. · Recommendations/Intent for next treatment session: \"Next visit will focus on advancements to more challenging activities and reduction in assistance provided\".   Total Treatment Duration:  OT Patient Time In/Time Out  Time In: 0946  Time Out: Janeth 57, OTR/L

## 2017-10-03 NOTE — PROGRESS NOTES
Problem: Falls - Risk of  Goal: *Absence of Falls  Document [place] Fall Risk and appropriate interventions in the flowsheet. Outcome: Progressing Towards Goal  Fall Risk Interventions:                                      Comments:   Bed low, alarm on, call light within reach, to call for assistance as needed. Family at bedside.

## 2017-10-03 NOTE — H&P
[address] 63   HISTORY AND PHYSICAL       Name:  Elinor Sofia   MR#:  [id number]   :  1955   Account #:  [de-identified]   Date of Adm:  10/02/2017       CHIEF COMPLAINT: Worsening headache times weeks. HISTORY OF PRESENT ILLNESS: A 25-year-old man status post fall   landing on his head and fracturing ribs on 2017. CT   scanning of the brain at that time was unremarkable. Over the   past 2 weeks he has had worsening headaches and presented to his   primary care office today. Repeat CT brain scanning confirmed a   large right-sided subacute subdural hematoma with mass effect   and shift. Smaller bleeding was present on the left side without   mass effect or shift. He is urgently admitted for surgical   intervention. ALLERGIES: Unice Messing. PAST MEDICAL HISTORY: Significant for obesity, atrial   fibrillation, hypertension, prostate cancer, hemochromatosis,   mitral valve regurgitation, rhinitis, sleep apnea. FAMILY HISTORY: Positive for stroke, cancer, diabetes, and   coronary artery disease. SOCIAL HISTORY: Reveals he is a former smoker, having quit in    [place]. He is a minimal ethanol consumer. He is . REVIEW OF SYSTEMS   Negative for chest pain, shortness of breath, fatigue. PHYSICAL EXAMINATION   GENERAL: Well-developed, well-nourished, overweight gentleman in   no significant distress. HEENT: Unremarkable. Nose and throat clear. CHEST: Clear bilaterally. CARDIAC: Regular rate and rhythm. No murmurs or gallops. ABDOMEN: Soft, benign, nontender, no masses. Bowel sounds   positive. EXTREMITIES: Free of deformities. NEUROLOGIC: Awake, alert, oriented x3. Cranial nerves 2 through   12 are intact. Motor strength 5/5. Gait not assessed. Sensation   normal. Reflexes symmetric. IMPRESSION: Right-sided subacute subdural hematoma with mass   effect and shift. Small left-sided bleeding. PLAN: Bur hole evacuation of right subdural hematoma.  The left side is not large enough for any surgical intervention at this   time. He may require craniotomy or cailin hole evacuation either today   or at some point in the future. understands this. The risks were   thoroughly explained and include bleeding, infection, stroke,   paralysis, death, and the additional need for more surgery if   this fails. I will speak with the patient's wife, Mrs. Meche Li as well. the patient understands and agrees to proceed. ADDENDUM     After again speaking with the patient and his wife, the patient   states that he is on chronic anticoagulant medication, namely   Pradaxa. I explained to both of them that there was no FDA-  approved antidote for this. We normally would like to wait 2-3   days to let the medication effects eliminate from the body   system; however, he has mass effect and shift and I do not feel   comfortable waiting 2 or 3 days with the potential for   neurological deterioration. Therefore, we will proceed, if we   need to give platelets intraoperatively we will.         MD RADHA Cheney / Chino Castañeda   D:  10/02/2017   17:21   T:  10/02/2017   17:48   Job #:  [id number]

## 2017-10-03 NOTE — PROGRESS NOTES
SPEECH PATHOLOGY NOTE:    Speech therapy consult received and appreciated. Attempted to see patient this PM; however, he reports increased fatigue due to recent medications. Will hold speech-language/cognitive assessment at this time per patient's request. Plan to re-attempt tomorrow when patient is more alert. He does report mild word finding deficits both before and since surgery.      MADHU Garcia, CCC-SLP, CBIS

## 2017-10-03 NOTE — ANESTHESIA POSTPROCEDURE EVALUATION
Post-Anesthesia Evaluation and Assessment    Patient: Cayden Napier MRN: [id number]  SSN: [id number]    YOB: 1955  Age: 57 y.o. Sex: male       Cardiovascular Function/Vital Signs  Visit Vitals    /64    Pulse 81    Temp 36.6 °C (97.9 °F)    Resp 16    Ht 6' 3\" (1.905 m)    Wt (!) 160.6 kg (354 lb)    SpO2 94%    BMI 44.25 kg/m2       Patient is status post general anesthesia for Procedure(s):  RIGHT CRANIOTOMY HARI HOLES FOR SUBDURAL HEMATOMA. Nausea/Vomiting: None    Postoperative hydration reviewed and adequate. Pain:  Pain Scale 1: Numeric (0 - 10) (10/02/17 1853)  Pain Intensity 1: 5 (10/02/17 1853)   Managed    Neurological Status:   Neuro (WDL): Exceptions to [facility] (10/02/17 1853)  Neuro  Neurologic State: Alert; Appropriate for age (10/02/17 1853)  Orientation Level: Oriented X4 (10/02/17 1853)  Cognition: Follows commands (10/02/17 1853)  Speech: Clear (10/02/17 1853)  Assessment L Pupil: Brisk (10/02/17 1853)  Size L Pupil (mm): 3 (10/02/17 1853)  Assessment R Pupil: Brisk (10/02/17 1853)  Size R Pupil (mm): 3 (10/02/17 1853)  LUE Motor Response: Purposeful;Spontaneous  (10/02/17 1853)  LLE Motor Response: Purposeful;Spontaneous  (10/02/17 1853)  RUE Motor Response: Purposeful;Spontaneous  (10/02/17 1853)  RLE Motor Response: Purposeful;Spontaneous  (10/02/17 1853)   At baseline    Mental Status and Level of Consciousness: Arousable    Pulmonary Status:   O2 Device: Room air (10/02/17 1924)   Adequate oxygenation and airway patent    Complications related to anesthesia: None    Post-anesthesia assessment completed.  No concerns    Signed By: Rodolfo Hardy MD     October 2, 2017

## 2017-10-03 NOTE — CONSULTS
Jose Armando Baker is a 58 yo male with PMH of atrial Fib on pradaxa, DM, admitted on 10/2/17 after right sided subdural hematoma after a mechanical fall he suffered 2-3 weeks ago. He underwent right craniotomy with cailin holes yesterday. Managed by Dr Christal Bains. Primary team requested hospitalist evaluation in terms of persistent nausea and vomiting. Patient is currently receiving zofran and phenergan. On my assessment he was found today sitting in a chair. Family members in room ( spouse and daughter ). He seemed calm, in  No distress, stated he has been vomiting and nausea free since early this morning. Has been drinking water. Currently on opiates for pain. PMH:  A FIB on pradaxa ( held since admission )  HTN  DM    Allergies: zithromax     Physical exam:  VS:   /72  RR 21  O2: 98%  Alert, oriented x 3, no distress  Heent: perrla, right side of face and ear redness, non tender- right scalp with carson, braxton prat drain in place. Wet oral mucosas   Neck: supple  Cardiac: irregular, no murmurs, no gallops  Lungs: clear, no rales  Abdomen: obese, non tender, no masses  Extremities: no edema, pulses present   Neurology: cranial nerves grossly normal, strength preserved in all 4 extremities. Labs and ancillary:  CBC: H/H: 13.7/39%  platelets 003M   Chemistry: normal electrolytes   cr 1.1    Assessment and plan:    #S/P Right parietal craniotomy with cailin holes due to subdural hematoma  #Persistent nausea and vomiting, possible anesthesia effect and opiate related  #A fib, off pradaxa now     At this moment patient has no signs of dehydration, hemodynamically stable. Has been able to tolerate water for now. No nausea nor vomiting since 7am today.      Plan:  -Continue clear liquid diet and progress as tolerated  -continue on zofran and phenergan, in case he continues with symptoms can start reglan 10mg iv q 8hrs  -monitor bmp daily, and correct electrolytes as needed   -Discontinue opiates since it may be a trigger   -rest as per primary team  -Will follow     Thank you for allowing us to participate in the care of this pleasant patient.

## 2017-10-04 PROCEDURE — 74011250637 HC RX REV CODE- 250/637: Performed by: NEUROLOGICAL SURGERY

## 2017-10-04 PROCEDURE — 74011250636 HC RX REV CODE- 250/636: Performed by: NEUROLOGICAL SURGERY

## 2017-10-04 PROCEDURE — 97530 THERAPEUTIC ACTIVITIES: CPT

## 2017-10-04 PROCEDURE — 74011000250 HC RX REV CODE- 250: Performed by: NEUROLOGICAL SURGERY

## 2017-10-04 PROCEDURE — 65270000029 HC RM PRIVATE

## 2017-10-04 PROCEDURE — 97110 THERAPEUTIC EXERCISES: CPT

## 2017-10-04 PROCEDURE — 96125 COGNITIVE TEST BY HC PRO: CPT

## 2017-10-04 RX ORDER — CALCIUM CARBONATE 200(500)MG
200 TABLET,CHEWABLE ORAL
Status: DISCONTINUED | OUTPATIENT
Start: 2017-10-04 | End: 2017-10-05 | Stop reason: HOSPADM

## 2017-10-04 RX ORDER — LEVETIRACETAM 500 MG/1
500 TABLET ORAL EVERY 12 HOURS
Status: DISCONTINUED | OUTPATIENT
Start: 2017-10-04 | End: 2017-10-05 | Stop reason: HOSPADM

## 2017-10-04 RX ORDER — BACITRACIN ZINC 500 UNIT/G
OINTMENT (GRAM) TOPICAL 2 TIMES DAILY
Status: DISCONTINUED | OUTPATIENT
Start: 2017-10-04 | End: 2017-10-05 | Stop reason: HOSPADM

## 2017-10-04 RX ADMIN — ACETAMINOPHEN 650 MG: 325 TABLET ORAL at 15:46

## 2017-10-04 RX ADMIN — LEVETIRACETAM 500 MG: 500 TABLET, FILM COATED ORAL at 20:07

## 2017-10-04 RX ADMIN — HYDROCHLOROTHIAZIDE 25 MG: 25 TABLET ORAL at 08:41

## 2017-10-04 RX ADMIN — POLYETHYLENE GLYCOL 3350 17 G: 17 POWDER, FOR SOLUTION ORAL at 17:36

## 2017-10-04 RX ADMIN — LEVETIRACETAM 500 MG: 500 TABLET, FILM COATED ORAL at 09:19

## 2017-10-04 RX ADMIN — CEFAZOLIN 2 G: 1 INJECTION, POWDER, FOR SOLUTION INTRAMUSCULAR; INTRAVENOUS; PARENTERAL at 17:34

## 2017-10-04 RX ADMIN — CARVEDILOL 25 MG: 25 TABLET, FILM COATED ORAL at 08:41

## 2017-10-04 RX ADMIN — ENALAPRIL MALEATE 10 MG: 10 TABLET ORAL at 17:33

## 2017-10-04 RX ADMIN — METFORMIN HYDROCHLORIDE 500 MG: 500 TABLET, FILM COATED ORAL at 08:42

## 2017-10-04 RX ADMIN — TRAMADOL HYDROCHLORIDE 50 MG: 50 TABLET, FILM COATED ORAL at 06:10

## 2017-10-04 RX ADMIN — SPIRONOLACTONE 25 MG: 25 TABLET, FILM COATED ORAL at 08:41

## 2017-10-04 RX ADMIN — POLYETHYLENE GLYCOL 3350 17 G: 17 POWDER, FOR SOLUTION ORAL at 08:43

## 2017-10-04 RX ADMIN — METFORMIN HYDROCHLORIDE 500 MG: 500 TABLET, FILM COATED ORAL at 17:34

## 2017-10-04 RX ADMIN — CALCIUM CARBONATE (ANTACID) CHEW TAB 500 MG 200 MG: 500 CHEW TAB at 14:28

## 2017-10-04 RX ADMIN — TRAMADOL HYDROCHLORIDE 50 MG: 50 TABLET, FILM COATED ORAL at 17:47

## 2017-10-04 RX ADMIN — CARVEDILOL 25 MG: 25 TABLET, FILM COATED ORAL at 17:34

## 2017-10-04 RX ADMIN — FLUTICASONE PROPIONATE 2 SPRAY: 50 SPRAY, METERED NASAL at 08:42

## 2017-10-04 RX ADMIN — ACETAMINOPHEN 650 MG: 325 TABLET ORAL at 04:16

## 2017-10-04 RX ADMIN — TRAMADOL HYDROCHLORIDE 50 MG: 50 TABLET, FILM COATED ORAL at 12:29

## 2017-10-04 RX ADMIN — SIMVASTATIN 40 MG: 40 TABLET, FILM COATED ORAL at 20:07

## 2017-10-04 RX ADMIN — LORATADINE 10 MG: 10 TABLET ORAL at 08:41

## 2017-10-04 RX ADMIN — Medication 10 ML: at 06:10

## 2017-10-04 RX ADMIN — ACETAMINOPHEN 650 MG: 325 TABLET ORAL at 21:45

## 2017-10-04 RX ADMIN — CEFAZOLIN 2 G: 1 INJECTION, POWDER, FOR SOLUTION INTRAMUSCULAR; INTRAVENOUS; PARENTERAL at 10:00

## 2017-10-04 RX ADMIN — POTASSIUM CHLORIDE AND SODIUM CHLORIDE: 900; 150 INJECTION, SOLUTION INTRAVENOUS at 06:56

## 2017-10-04 RX ADMIN — TRAMADOL HYDROCHLORIDE 50 MG: 50 TABLET, FILM COATED ORAL at 23:26

## 2017-10-04 RX ADMIN — Medication 10 ML: at 20:08

## 2017-10-04 RX ADMIN — ACETAMINOPHEN 650 MG: 325 TABLET ORAL at 10:16

## 2017-10-04 RX ADMIN — Medication 10 ML: at 14:29

## 2017-10-04 RX ADMIN — CALCIUM CARBONATE (ANTACID) CHEW TAB 500 MG 200 MG: 500 CHEW TAB at 17:34

## 2017-10-04 RX ADMIN — CEFAZOLIN 2 G: 1 INJECTION, POWDER, FOR SOLUTION INTRAMUSCULAR; INTRAVENOUS; PARENTERAL at 01:39

## 2017-10-04 RX ADMIN — BACITRACIN ZINC: 500 OINTMENT TOPICAL at 11:00

## 2017-10-04 RX ADMIN — ENALAPRIL MALEATE 10 MG: 10 TABLET ORAL at 08:41

## 2017-10-04 RX ADMIN — POTASSIUM CHLORIDE AND SODIUM CHLORIDE: 900; 150 INJECTION, SOLUTION INTRAVENOUS at 21:42

## 2017-10-04 NOTE — PROGRESS NOTES
NS  POD#2  AFEBRILE  DOING WELL  NO NAUSEA  5/5  POWER  RON:  HIGH OUTPUT  STILL   CLEARER  FLUID  A/P TX TO FLOOR  PULL NEWELL  CT IN AM  Kalpesh Mina MD

## 2017-10-04 NOTE — PROGRESS NOTES
Pt up to bedside commode with assist x2 RNs, tolerated well and returned to bed, in chair position. C/o mild headache after ambulating. VSS. Two peripheral IVs leaking and painful, both removed and new 20g IV placed in left AC. Right crani site is leaking and has old drainage. Dr. Krunal Matias ordered to removed dressing, and cover site with Bacitracin BID. Waiting for ointment from pharmacy. Nieves catheter removed per VOR[facility] by Dr. Krunal Matias this AM, nieves pulled at 1000. Report called to [facility] on 7th floor.

## 2017-10-04 NOTE — PROGRESS NOTES
TRANSFER - IN REPORT:    Verbal report received from [facility]  on Leola Feliberto  being received from CCU for routine progression of care      Report consisted of patients Situation, Background, Assessment and   Recommendations(SBAR). Information from the following report(s) MAR  was reviewed with the receiving nurse. Opportunity for questions and clarification was provided. Assessment completed upon patients arrival to unit and care assumed.

## 2017-10-04 NOTE — PROGRESS NOTES
Dual Skin Assessment    Skin assessment completed with Dimas Cagle RN . See below. Wound Head Right (Active)   DRESSING STATUS Discontinued; Old drainage 10/4/2017 11:00 AM   DRESSING TYPE Transparent film 10/4/2017 11:00 AM   Drainage Amount  Scant 10/4/2017 11:00 AM   Drainage Color Serosanguinous 10/4/2017 11:00 AM   Periwound Skin Condition Intact 10/2/2017  8:14 PM        Pollo Adames RN    10/4/2017 3:27 PM

## 2017-10-04 NOTE — INTERDISCIPLINARY ROUNDS
Interdisciplinary team rounds were held 10/4/2017 with the following team members:Cardiac Rehab and Wellness, Care Management, Diabetes Treatment Specialist, Nursing, Nurse Practitioner, Nutrition, Occupational Therapy, Palliative Care, Pastoral Care, Patient Relations, Pharmacy, Physical Therapy, Physician, Respiratory Therapy, Speech Therapy and Clinical Coordinator and the patient and child(titi). Plan of care discussed. See clinical pathway and/or care plan for interventions and desired outcomes.

## 2017-10-04 NOTE — CONSULTS
Laly Sesay is a 58 yo male with PMH of atrial Fib on pradaxa, DM, admitted on 10/2/17 after right sided subdural hematoma after a mechanical fall he suffered 2-3 weeks ago. He underwent right craniotomy with cailin holes 10/2/17. He was presented to us for nausea and vomiting. He was able to tolerate diet since yesterday afternoon, no need for antiemetics. Doing fine this morning. Denies abdominal pain. PMH:  A FIB on pradaxa ( held since admission )  HTN  DM    Allergies: zithromax     Physical exam:  Alert, oriented x 3, no distress  Heent: perrla, right side of face and ear redness, non tender- right scalp with carson, braxton prat drain in place. Wet oral mucosas   Neck: supple  Cardiac: irregular, no murmurs, no gallops  Lungs: clear, no rales  Abdomen: obese, non tender, no masses  Extremities: no edema, pulses present   Neurology: cranial nerves grossly normal, strength preserved in all 4 extremities. Labs and ancillary: 10/3/17   CBC: H/H: 13.7/39%  platelets 312C   Chemistry: normal electrolytes   cr 1.1    Assessment and plan:    #Persistent nausea and vomiting, possible anesthesia effect and opiate related: resolved  #S/P Right parietal craniotomy with cailin holes due to subdural hematoma  #A fib, off pradaxa now     Plan:    -Continue regular diet   -continue on zofran and phenergan  -rest as per primary team  -Will sign off    Thank you for allowing us to participate in the care of this pleasant patient.

## 2017-10-04 NOTE — PROGRESS NOTES
Spoke with Skylar Whitlock from PT. Feels patient would benefit from PT [facility] and may require a walker for stability. CM will continue to follow for any further d/c needs.

## 2017-10-04 NOTE — PROGRESS NOTES
Problem: Mobility Impaired (Adult and Pediatric)  Goal: *Acute Goals and Plan of Care (Insert Text)  LTG:  (1.)Mr. Michael Damon will move from supine to sit and sit to supine, scoot up and down and roll side to side in bed with INDEPENDENT within 7 day(s). (2.)Mr. Michael Damon will transfer from bed to chair and chair to bed with INDEPENDENT using the least restrictive device within 7 day(s). (3.)Mr. Michael Damon will ambulate with modified INDEPENDENCE for 250+ feet with the least restrictive/no device within 7 day(s). (4.)Mr. Michael Damon will perform standing static and dynamic balance activities x 8 minutes with SUPERVISION to improve safety within 7 day(s). (5.)Mr. Michael Damon will ascend and descend 4 stairs using one hand rail(s) with SUPERVISION to improve functional mobility and safety within 7 day(s). PHYSICAL THERAPY: Daily Note, Treatment Day: 1st and PM 10/4/2017  INPATIENT: Hospital Day: 3  Payor: Shahzad Tee / Plan: [address] / Product Type: PPO /      NAME/AGE/GENDER: Alisa Lozano is a 57 y.o. male       PRIMARY DIAGNOSIS: RIGHT SUBDURAL HEMATOMA  Subdural hematoma (HCC) Subdural hematoma (HCC) Subdural hematoma (HCC)  Procedure(s) (LRB):  RIGHT CRANIOTOMY HARI HOLES FOR SUBDURAL HEMATOMA (Right)  2 Days Post-Op  ICD-10: Treatment Diagnosis:       · Difficulty in walking, Not elsewhere classified (R26.2)  · History of falling (Z91.81)   Precaution/Allergies:  Zithromax [azithromycin]       ASSESSMENT:      Mr. Michael Damon presents sitting edge of bed, underwent above surgery for R SDH. Performed standing balance exercises with RW for support. Patient not demonstrating impulsivity during PT today. He stood with CG assist and cueing then ambulated 250' in room and rivas with RW. During challenges to balance such as head turning, patient slightly unsteady, but no loss of balance occurred. Patient required cueing for safe technique with sit to/from stand and in use of RW.   Tracking appears improved today. Patient progressing well with all functional mobility. Mr. Rex Burns would benefit from skilled physical therapy (medically necessary) to address his deficits and maximize his function. This section established at most recent assessment   PROBLEM LIST (Impairments causing functional limitations):  1. Decreased ADL/Functional Activities  2. Decreased Transfer Abilities  3. Decreased Ambulation Ability/Technique  4. Decreased Balance  5. Increased Pain  6. Decreased Activity Tolerance    INTERVENTIONS PLANNED: (Benefits and precautions of physical therapy have been discussed with the patient.)  1. Balance Exercise  2. Bed Mobility  3. Gait Training  4. Therapeutic Activites  5. Therapeutic Exercise/Strengthening  6. Transfer Training  7. education  8. Group Therapy      TREATMENT PLAN: Frequency/Duration: 3 times a week for duration of hospital stay  Rehabilitation Potential For Stated Goals: GOOD      RECOMMENDED REHABILITATION/EQUIPMENT: (at time of discharge pending progress): Due to the probability of continued deficits (see above) this patient will likely need continued skilled physical therapy after discharge. Equipment:   · RW, may need bariatric                   HISTORY:   History of Present Injury/Illness (Reason for Referral):  Per MD note, \" A 20-year-old man status post fall   landing on his head and fracturing ribs on 09/14/2017. CT   scanning of the brain at that time was unremarkable. Over the   past 2 weeks he has had worsening headaches and presented to his   primary care office today. Repeat CT brain scanning confirmed a   large right-sided subacute subdural hematoma with mass effect   and shift. Smaller bleeding was present on the left side without   mass effect or shift. He is urgently admitted for surgical   Intervention. \"  Past Medical History/Comorbidities:   Mr. Rex Burns  has a past medical history of A-fib ([address] Arteriosclerosis; Atrial fibrillation ([address] (1/3/2012);  Diabetes mellitus; Diabetes mellitus ([facility] (1/3/2012); Essential hypertension, benign (2/6/2014); Family history of malignant neoplasm of prostate (2/6/2014); Hemochromatosis; HTN (hypertension) (2/6/2014); Hypercholesteremia; Nodular prostate without urinary obstruction (2/6/2014); and Obesity (2/6/2014). Mr. Mila Meier  has a past surgical history that includes knee arthroscp harv and urological.  Social History/Living Environment:   Home Environment: Private residence  # Steps to Enter: 8  Rails to Enter: Yes  Hand Rails : Bilateral  One/Two Story Residence: Two story, live on 1st floor  Living Alone: No  Support Systems: Child(titi), Family member(s), Friends \ neighbors, Spouse/Significant Other/Partner  Patient Expects to be Discharged to[de-identified] Private residence  Current DME Used/Available at Home: None  Tub or Shower Type: Tub/Shower combination  Prior Level of Function/Work/Activity:  Lives at home with wife. Ambulates independently but negotiates stairs slowly and carefully via step to pattern due to knee OA. Independent in home and community. Works at home from basement. Dominant Side:         RIGHT   Number of Personal Factors/Comorbidities that affect the Plan of Care: 1-2: MODERATE COMPLEXITY   EXAMINATION:   Most Recent Physical Functioning:   Gross Assessment:  AROM: Generally decreased, functional  Strength: Within functional limits  Coordination: Generally decreased, functional  Sensation:  (intact to light touch, however, patient reports peripheral neuropathy)               Posture:  Posture (WDL): Exceptions to WDL  Posture Assessment:  Forward head, Rounded shoulders  Balance:  Sitting: Intact  Standing: Impaired  Standing - Static: Fair  Standing - Dynamic : Fair Bed Mobility:     Wheelchair Mobility:     Transfers:  Sit to Stand: Contact guard assistance  Stand to Sit: Contact guard assistance  Duration: 15 Minutes  Gait:     Base of Support: Widened  Speed/Loreto: Slow  Step Length: Right shortened;Left shortened  Gait Abnormalities: Decreased step clearance;Trunk sway increased  Distance (ft): 250 Feet (ft)  Assistive Device: Walker, rolling;Gait belt  Ambulation - Level of Assistance: Contact guard assistance       Body Structures Involved:  1. Nerves  2. Joints  3. Muscles Body Functions Affected:  1. Sensory/Pain  2. Neuromusculoskeletal  3. Movement Related Activities and Participation Affected:  1. Mobility  2. Self Care  3. Domestic Life  4. Community, Social and [place]   Number of elements that affect the Plan of Care: 4+: HIGH COMPLEXITY   CLINICAL PRESENTATION:   Presentation: Evolving clinical presentation with changing clinical characteristics: MODERATE COMPLEXITY   CLINICAL DECISION MAKIN [facility] Mobility Inpatient Short Form  How much difficulty does the patient currently have. .. Unable A Lot A Little None   1. Turning over in bed (including adjusting bedclothes, sheets and blankets)? [ ] 1   [ ] 2   [X] 3   [ ] 4   2. Sitting down on and standing up from a chair with arms ( e.g., wheelchair, bedside commode, etc.)   [ ] 1   [ ] 2   [X] 3   [ ] 4   3. Moving from lying on back to sitting on the side of the bed? [ ] 1   [ ] 2   [X] 3   [ ] 4   How much help from another person does the patient currently need. .. Total A Lot A Little None   4. Moving to and from a bed to a chair (including a wheelchair)? [ ] 1   [ ] 2   [X] 3   [ ] 4   5. Need to walk in hospital room? [ ] 1   [ ] 2   [X] 3   [ ] 4   6. Climbing 3-5 steps with a railing? [ ] 1   [ ] 2   [X] 3   [ ] 4   © , Trustees of [address] [place] [address] [address] [address], under license to [employer]. All rights reserved    Score:  Initial: 18 Most Recent: X (Date: -- )     Interpretation of Tool:  Represents activities that are increasingly more difficult (i.e. Bed mobility, Transfers, Gait).        Score 24 23 22-20 19-15 14-10 9-7 6       Modifier CH CI CJ CK CL CM CN         · Mobility - Walking and Moving Around:               - CURRENT STATUS:    CK - 40%-59% impaired, limited or restricted               - GOAL STATUS:           CJ - 20%-39% impaired, limited or restricted               - D/C STATUS:                       ---------------To be determined---------------  Payor: BLUE CROSS / [place] OF [address] [facility] / Product Type: PPO /       Medical Necessity:     · Patient is expected to demonstrate progress in functional technique to increase independence with   and improve safety during transfers/gait. Reason for Services/Other Comments:  · Patient continues to require skilled intervention due to medical complications and recent fall with decreased safety. Use of outcome tool(s) and clinical judgement create a POC that gives a: Questionable prediction of patient's progress: MODERATE COMPLEXITY                 TREATMENT:   (In addition to Assessment/Re-Assessment sessions the following treatments were rendered)   Pre-treatment Symptoms/Complaints:    Pain: Initial:   Pain Intensity 1: 2  Pain Location 1: Head  Pain Intervention(s) 1: Ambulation/Increased Activity  Post Session:  Unchanged. Therapeutic Activity: (  15 Minutes ):  Therapeutic activities including Ambulation on level ground and sit to/from stand to improve mobility, strength and balance. Required minimal cueing   to promote safe technique during sit to/from stand. Therapeutic Exercise: (  15 minutes):  Exercises per grid below to improve strength, balance and coordination. Required minimal visual and verbal cues to promote proper body alignment. Progressed complexity of movement as indicated. Hands on RW for balance.      Date: 10/04/17        Ambulation  Device  assistance         Partial Squats         Hip Abduction/ Adduction Standing 2x10 AB        Heel Raises  Standing 2x10 AB        Toe Raises Standing 2x10 AB        Hip Flexion Standing         Sit to Stand         Key:  R=right, L=left, B=bilaterally, A=active, AA=active assisted, P=passive           Braces/Orthotics/Lines/Etc:   · IV  Treatment/Session Assessment:    · Response to Treatment:    · Interdisciplinary Collaboration:  · Physical Therapist, Occupational Therapist, Registered Nurse and   · After treatment position/precautions:  · Bed/Chair-wheels locked, Call light within reach, Family at bedside, Nurse at bedside and sitting edge of bed  · Compliance with Program/Exercises: Will assess as treatment progresses. · Recommendations/Intent for next treatment session: \"Next visit will focus on advancements to more challenging activities and reduction in assistance provided\".   Total Treatment Duration:PT Patient Time In/Time Out  Time In: 7388  Time Out: [address], PT, DPT

## 2017-10-04 NOTE — PROGRESS NOTES
Pt requested tums for indigestion, Dr. Gonzalo De La Rosa notified. New order received, see MAR. Primary RN updated.

## 2017-10-04 NOTE — PROGRESS NOTES
[address] CRITICAL CARE OUTREACH NURSE PROGRESS REPORT      SUBJECTIVE: Called to assess patient secondary to transfer from critical care. MEWS Score: 0 (10/04/17 6340)  Vitals:    10/04/17 5134 10/04/17 0758 10/04/17 0843 10/04/17 0859   BP: 110/59 102/56 111/61 118/57   Pulse: 92 95 98 (!) 108   Resp: 13 10 11    Temp:   97.9 °F (36.6 °C)    SpO2: 95% 94% 94% 97%   Weight:       Height:          LAB DATA:    Recent Labs      10/03/17   0414  10/02/17   1737   NA  137  130*   K  4.2  4.3   CL  100  95*   CO2  24  24   AGAP  13  11   GLU  201*  133*   BUN  11  13   CREA  1.19  0.79*   GFRAA  >60  >60   GFRNA  >60  >60   CA  8.8  10.0        Recent Labs      10/03/17   0414  10/02/17   1737   WBC  11.0  9.3   HGB  13.7  14.1   HCT  39.0*  39.6*   PLT  232  258          OBJECTIVE: On arrival to room, I found patient to be in room with family and primary RN at bedside. Pain Assessment  Pain Intensity 1: 0 (10/04/17 0951)  Pain Location 1: Head  Pain Intervention(s) 1: Medication (see MAR)  Patient Stated Pain Goal: 0        ASSESSMENT:  Pt awake and oriented x 4. States HA is controlled with medication. PERRLA. No facial droop noted. Speech is clear. surgical site dsg intact with old drainage. Pt states that dsg will be changed by primary RN after bactricin arrives. RON with serosanguinous drainage.  equal bilaterally. Moves all extremities purposefully and spontaneously. VSS. Pulse is irregular, pt has hx of chronic a-fib. Rate 90's. PLAN:  Will continue to follow per outreach protocol.

## 2017-10-04 NOTE — PROGRESS NOTES
Date of Outreach Update:  Benjy Pollard was seen and assessed. MEWS Score: 2 (10/04/17 1133)  Vitals:    10/04/17 0758 10/04/17 0843 10/04/17 0859 10/04/17 1133   BP: 102/56 111/61 118/57 93/59   Pulse: 95 98 (!) 108 90   Resp: 10 11  19   Temp:  97.9 °F (36.6 °C)  98.4 °F (36.9 °C)   SpO2: 94% 94% 97% 94%   Weight:       Height:             Pain Assessment  Pain Intensity 1: 4 (10/04/17 1231)  Pain Location 1: Head  Pain Intervention(s) 1: Medication (see MAR)  Patient Stated Pain Goal: 0      Previous Outreach assessment has been reviewed. There have been no significant clinical changes since the completion of the last dated Outreach assessment. Will continue to follow up per outreach protocol.     Signed By:   Melly Skinner RN    October 4, 2017 1:37 PM

## 2017-10-04 NOTE — PROGRESS NOTES
STG: Patient will complete abstract divergent naming tasks with 80% accuracy with minimal assistance. STG: Patient will complete functional organizational and planning tasks with 80% accuracy and minimal assistance. STG: Patient will recall relevant verbal information with 80% accuracy with minimal assistance. LTG: Patient will increase neuro-linguistic abilities to increase safety and awareness of deficits. Speech language pathology: Speech-language and cognitive note: Initial Assessment    NAME/AGE/GENDER: Juanjose Lara is a 57 y.o. male  DATE: 10/4/2017  PRIMARY DIAGNOSIS: RIGHT SUBDURAL HEMATOMA  Subdural hematoma (HCC)  Procedure(s) (LRB):  RIGHT CRANIOTOMY HARI HOLES FOR SUBDURAL HEMATOMA (Right) 2 Days Post-Op  ICD-10: Treatment Diagnosis: .R41.841 Cognitive communicative deficit  INTERDISCIPLINARY COLLABORATION: Registered NurseASSESSMENT:Based on the objective data described below, Mr. Raji Traore presents with mild cognitive communicative impairment characterized by decreased memory, attention, organization, and higher level language. He completed the [facility] REHABILITATION assessment with a raw score of 23/30. Decreased visual attention to details in trial making task. Poor planning when completing clock task as he began with #1 and skipped #8. Errors with spacing also noted with patient verbalizing minimal awareness of errors. He recalled 1/5 words after 5 minute delay. No improvement with semantic cues. Patient will benefit from skilled intervention to address the below impairments. ?????? ? ? This section established at most recent assessment??????????  PROBLEM LIST (Impairments causing functional limitations):  1. Memory  2. Attention  3. Organization  4. Higher level language  REHABILITATION POTENTIAL FOR STATED GOALS: Excellent  PLAN OF CARE:   Patient will benefit from skilled intervention to address the following impairments.   INTERVENTIONS PLANNED: (Benefits and precautions of therapy have been discussed with the patient.)  1. Cognitive communication deficit  FREQUENCY/DURATION: Continue to follow patient 3 times a week for duration of hospital stay to address above goals. RECOMMENDED REHABILITATION/EQUIPMENT: (at time of discharge pending progress): Due to the probability of continued deficits (see above) this patient will not likely need continued skilled speech therapy after discharge. SUBJECTIVE:   Alert, cooperative. Daughter at bedside  History of Present Injury/Illness: Mr. Laura Manrique  has a past medical history of A-fib ([facility] [address] Arteriosclerosis; Atrial fibrillation ([facility] [address] (1/3/2012); Diabetes mellitus; Diabetes mellitus ([address] (1/3/2012); Essential hypertension, benign (2/6/2014); Family history of malignant neoplasm of prostate (2/6/2014); Hemochromatosis; HTN (hypertension) (2/6/2014); Hypercholesteremia; Nodular prostate without urinary obstruction (2/6/2014); and Obesity (2/6/2014). Bianca Riser He also  has a past surgical history that includes knee arthroscp harv and urological.   Present Symptoms: Decreased attention and memory with structured tasks   Pain Intensity 1: 0  Pain Location 1: Head  Pain Intervention(s) 1: Medication (see MAR)  Current Medications:   No current facility-administered medications on file prior to encounter. Current Outpatient Prescriptions on File Prior to Encounter   Medication Sig Dispense Refill    linaclotide (LINZESS) 145 mcg cap capsule Take 1 Cap by mouth Daily (before breakfast). 90 Cap 3    HYDROcodone-acetaminophen (NORCO) 7.5-325 mg per tablet Take 1 Tab by mouth every eight (8) hours as needed (breakthrough pain). Max Daily Amount: 3 Tabs. 15 Tab 0    simvastatin (ZOCOR) 40 mg tablet Take 1 Tab by mouth nightly. 30 Tab 0    enalapril (VASOTEC) 10 mg tablet Take 1 Tab by mouth two (2) times a day. 180 Tab 3    metFORMIN (GLUCOPHAGE) 500 mg tablet Take 1 Tab by mouth two (2) times daily (with meals).  180 Tab 3    hydroCHLOROthiazide (HYDRODIURIL) 25 mg tablet Take 1 Tab by mouth daily. 90 Tab 3    triamcinolone (NASACORT AQ) 55 mcg nasal inhaler 2 Sprays daily.  carvedilol (COREG) 25 mg tablet Take 1 Tab by mouth two (2) times daily (with meals). 180 Tab 3    spironolactone (ALDACTONE) 25 mg tablet Take 1 Tab by mouth daily. 90 Tab 3    dabigatran etexilate (PRADAXA) 150 mg capsule Take 1 Cap by mouth every twelve (12) hours. 180 Cap 3    loratadine (CLARITIN) 10 mg tablet Take 10 mg by mouth.  FLUTICASONE PROPIONATE (FLONASE NA) 2 Sprays by Nasal route daily.  B.infantis-B.ani-B.long-B.bifi (PROBIOTIC 4X) 10-15 mg TbEC Take 2 Caps by mouth daily.  clotrimazole-betamethasone (LOTRISONE) topical cream Apply to affected area bid as directed 15 g 0    MULTIVITS,CA,MIN/IRON/FA/LYCOP (CENTRUM ULTRA MEN'S PO) Take 1 Tab by mouth daily.  omega-3 fatty acids-vitamin e (FISH OIL) 1,000 mg cap Take 1 Cap by mouth two (2) times a day. 1200 mg daily twice daily      cpap machine kit nightly. 14-18 cm pressure       polyethylene glycol (MIRALAX) 17 gram/dose powder Take 17 g by mouth two (2) times a day.        Current Dietary Status:  Regular/thin      Social History/Home Situation:    Home Environment: Private residence  # Steps to Enter: 8  Rails to Enter: Yes  Hand Rails : Bilateral  One/Two Story Residence: Two story, live on 1st floor  Living Alone: No  Support Systems: Child(titi), Family member(s), Friends \ neighbors, Spouse/Significant Other/Partner  Patient Expects to be Discharged to[de-identified] Private residence  Current DME Used/Available at Home: None  Tub or Shower Type: Tub/Shower combination  Work/Activity History:   OBJECTIVE:   Oral Motor Structure/Speech:  Oral-Motor Structure/Motor Speech  Labial: Left droop  Oral Hygiene: Adequate  Lingual: No impairment    SPEECH-LANGUAGE COGNITIVE EVALUATION  Tests Given:MOCA    Mental Status:  Neurologic State: Alert  Orientation Level: Oriented X4  Cognition: Follows commands  Perception: Appears intact  Perseveration: No perseveration noted  Safety/Judgement: Fall prevention    Motor Speech:  Oral-Motor Structure/Motor Speech  Labial: Left droop  Oral Hygiene: Adequate  Lingual: No impairment    Auditory Comprehension:   Auditory Comprehension  Auditory Impairment: No   Cueing type: Verbal  Verbal Expression:   Verbal Expression  Primary Mode of Expression: Verbal  Initiation: No impairment  Automatic Speech Task: No impairment  Repetition: No impairment  Naming: Impaired  Confrontation (%): 100 %  Divergent (%): 50 %  Sentence Completion: No impairment  Conversation: No impairment  Overall Impairment: Mild    Neuro-Linguistics:  Verbal Reasoning Tasks: Impaired  Reasoning Task Exercises-Similarities/Differences  Similarities Level of Impairment: Simple  Similarities Accuracy (%): 100 %    MOCA Results   Visuospatial/executive: 3/5  Naming: 3/3  Memory: 5/5  Attention: 6/6  Language: 2/3  Abstraction: 0/1  Delayed Recall: 1/5  Orientation: 6/6    Pragmatics:  Pragmatics Impairment: No impairment       Assessment/Reassessment only, no treatment provided today    Tool Used: Functional [place] Measure (FIMTM)   Score Comments   Eating       Comprehension       Expression   Primary Mode of Expression: Verbal   Social Interaction       Problem Solving       Memory  5        Score:  Initial:5 Most Recent: X (Date: -- )   Interpretation of Tool: Provides a uniform system of measurement for disability based on the International Classification of Impairment, Disabilities and Handicaps; measures the level of a patient's disability and indicates how much assistance is required for the individual to carry out activities of daily living. Score 7 6 5 4 3 2 1   Modifier   CH CI CJ CK CL CM CN   ?  Memory:     - CURRENT STATUS: CJ - 20%-39% impaired, limited or restricted    - GOAL STATUS:  CI - 1%-19% impaired, limited or restricted    - D/C STATUS:  ---------------To be determined---------------  Payor: BLUE CROSS / [place] [facility] / Product Type: PPO /   __________________________________________________________________________________________________  Safety:   After treatment position/precautions:  · Upright in Bed. Progression/Medical Necessity:   · Patient is expected to demonstrate progress in cognitive ability to return to work. Compliance with Program/Exercises: Will assess as treatment progresses. Reason for Continuation of Services/Other Comments:  · Patient continues to require skilled intervention due to cognitive-communicative impairment. Recommendations/Intent for next treatment session: \"Treatment next visit will focus on executive functioning skills\".     Total Treatment Duration:  Time In: 0929  Time Out: 0951    MADHU Candelaria, CCC-SLP, CBIS

## 2017-10-04 NOTE — PROGRESS NOTES
Bedside report received from [facility]. Pt resting quietly, A&Ox4. No need expressed at this time. Call light within reach. R RON drain site with old drainage but intact.

## 2017-10-04 NOTE — PROGRESS NOTES
Pt seen in ICU s/p subdural hematoma. He is now s/p right bur hole evacuation. He is alert and oriented, sitting up in bed. Daughter at bedside. Pt states he lives at home with wife. They have 2 children. He was independent prior to hospital admission with ADL's. Does not use anything to ambulate with and drives self. Works from home. Able to get rx with drug plan and confirms [facility] insurance. Dr. Bladimir Helms is PCP. 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 We discussed different options that he may need at d/c. HH, outpt therapy, inpt therapy (IRC vs STR). He is doing very well, verbalized understanding of therapy. Explained that CM will continue to follow and available for d/c needs. Care Management Interventions  PCP Verified by CM: Yes  Mode of Transport at Discharge: Other (see comment)  Physical Therapy Consult: Yes  Occupational Therapy Consult: Yes  Speech Therapy Consult: Yes  Current Support Network: Lives with Spouse, Own Home  Confirm Follow Up Transport: Family  Plan discussed with Pt/Family/Caregiver: Yes  Freedom of Choice Offered:  Yes

## 2017-10-04 NOTE — PROGRESS NOTES
TRANSFER - OUT REPORT:    Verbal report given to Monserrat Ramos RN (name) on Julia Mackey  being transferred to 708(unit) for routine progression of care       Report consisted of patients Situation, Background, Assessment and   Recommendations(SBAR). Information from the following report(s) SBAR, ED Summary, OR Summary, Procedure Summary, Intake/Output, MAR, Accordion, Recent Results, Med Rec Status and Cardiac Rhythm Afib was reviewed with the receiving nurse. Lines:   Peripheral IV 10/04/17 Left Antecubital (Active)   Site Assessment Clean, dry, & intact 10/4/2017  6:59 AM   Phlebitis Assessment 0 10/4/2017  6:59 AM   Infiltration Assessment 0 10/4/2017  6:59 AM   Dressing Status Clean, dry, & intact; New 10/4/2017  6:59 AM   Dressing Type Tape;Transparent 10/4/2017  6:59 AM   Hub Color/Line Status Pink;Flushed 10/4/2017  6:59 AM        Opportunity for questions and clarification was provided.       Patient transported with:   Patient-specific medications from Pharmacy  Tech    Daughter  Pt belongings  SCD sleeves, IVF, Abx

## 2017-10-04 NOTE — PROGRESS NOTES
TRANSFER - IN REPORT:    Verbal report received from [address]  on Eneida Mccartney  being received from CCU for routine progression of care      Report consisted of patients Situation, Background, Assessment and   Recommendations(SBAR). Information from the following report(s) SBAR, Kardex, Procedure Summary, Intake/Output, MAR, Recent Results, Med Rec Status and Cardiac Rhythm A Fib was reviewed with the receiving nurse. Opportunity for questions and clarification was provided. Assessment completed upon patients arrival to unit and care assumed.

## 2017-10-04 NOTE — PROGRESS NOTES
Date of Outreach Update:  Tamie Dill was seen and assessed. MEWS Score: 1 (10/04/17 1535)  Vitals:    10/04/17 0843 10/04/17 0859 10/04/17 1133 10/04/17 1535   BP: 111/61 118/57 93/59 112/73   Pulse: 98 (!) 108 90 85   Resp: 11  19 19   Temp: 97.9 °F (36.6 °C)  98.4 °F (36.9 °C) 97.9 °F (36.6 °C)   SpO2: 94% 97% 94% 94%   Weight:       Height:             Pain Assessment  Pain Intensity 1: 5 (10/04/17 8738)  Pain Location 1: Head  Pain Intervention(s) 1: Medication (see MAR)  Patient Stated Pain Goal: 0      Previous Outreach assessment has been reviewed. There have been no significant clinical changes since the completion of the last dated Outreach assessment. Will continue to follow up per outreach protocol.     Signed By:   Giselle Arteaga RN    October 4, 2017 5:55 PM

## 2017-10-04 NOTE — PROGRESS NOTES
Problem: Falls - Risk of  Goal: *Absence of Falls  Document [place] Fall Risk and appropriate interventions in the flowsheet.    Outcome: Progressing Towards Goal  Fall Risk Interventions:  Mobility Interventions: Assess mobility with egress test, Bed/chair exit alarm, Communicate number of staff needed for ambulation/transfer, OT consult for ADLs, Patient to call before getting OOB           Medication Interventions: Bed/chair exit alarm, Evaluate medications/consider consulting pharmacy, Patient to call before getting OOB, Teach patient to arise slowly     Elimination Interventions: Bed/chair exit alarm, Call light in reach, Patient to call for help with toileting needs     History of Falls Interventions: Bed/chair exit alarm, Evaluate medications/consider consulting pharmacy, Investigate reason for fall

## 2017-10-05 ENCOUNTER — APPOINTMENT (OUTPATIENT)
Dept: CT IMAGING | Age: 62
DRG: 026 | End: 2017-10-05
Attending: NEUROLOGICAL SURGERY
Payer: COMMERCIAL

## 2017-10-05 VITALS
HEART RATE: 99 BPM | RESPIRATION RATE: 20 BRPM | TEMPERATURE: 98 F | BODY MASS INDEX: 39.17 KG/M2 | HEIGHT: 75 IN | WEIGHT: 315 LBS | DIASTOLIC BLOOD PRESSURE: 79 MMHG | SYSTOLIC BLOOD PRESSURE: 120 MMHG | OXYGEN SATURATION: 94 %

## 2017-10-05 PROCEDURE — 74011250637 HC RX REV CODE- 250/637: Performed by: NEUROLOGICAL SURGERY

## 2017-10-05 PROCEDURE — 74011250636 HC RX REV CODE- 250/636: Performed by: NEUROLOGICAL SURGERY

## 2017-10-05 PROCEDURE — 90471 IMMUNIZATION ADMIN: CPT

## 2017-10-05 PROCEDURE — 70450 CT HEAD/BRAIN W/O DYE: CPT

## 2017-10-05 PROCEDURE — 97530 THERAPEUTIC ACTIVITIES: CPT

## 2017-10-05 PROCEDURE — 90686 IIV4 VACC NO PRSV 0.5 ML IM: CPT | Performed by: NEUROLOGICAL SURGERY

## 2017-10-05 RX ORDER — HYDROCODONE BITARTRATE AND ACETAMINOPHEN 7.5; 325 MG/1; MG/1
1 TABLET ORAL
Qty: 30 TAB | Refills: 0 | Status: SHIPPED | OUTPATIENT
Start: 2017-10-05 | End: 2017-10-08

## 2017-10-05 RX ADMIN — POLYETHYLENE GLYCOL 3350 17 G: 17 POWDER, FOR SOLUTION ORAL at 07:25

## 2017-10-05 RX ADMIN — CARVEDILOL 25 MG: 25 TABLET, FILM COATED ORAL at 07:24

## 2017-10-05 RX ADMIN — ACETAMINOPHEN 650 MG: 325 TABLET ORAL at 02:39

## 2017-10-05 RX ADMIN — SPIRONOLACTONE 25 MG: 25 TABLET, FILM COATED ORAL at 07:23

## 2017-10-05 RX ADMIN — Medication 10 ML: at 05:44

## 2017-10-05 RX ADMIN — INFLUENZA VIRUS VACCINE 0.5 ML: 15; 15; 15; 15 SUSPENSION INTRAMUSCULAR at 08:58

## 2017-10-05 RX ADMIN — LORATADINE 10 MG: 10 TABLET ORAL at 07:23

## 2017-10-05 RX ADMIN — LEVETIRACETAM 500 MG: 500 TABLET, FILM COATED ORAL at 07:24

## 2017-10-05 RX ADMIN — TRAMADOL HYDROCHLORIDE 50 MG: 50 TABLET, FILM COATED ORAL at 07:24

## 2017-10-05 RX ADMIN — FLUTICASONE PROPIONATE 2 SPRAY: 50 SPRAY, METERED NASAL at 07:30

## 2017-10-05 RX ADMIN — METFORMIN HYDROCHLORIDE 500 MG: 500 TABLET, FILM COATED ORAL at 07:23

## 2017-10-05 RX ADMIN — ENALAPRIL MALEATE 10 MG: 10 TABLET ORAL at 07:24

## 2017-10-05 RX ADMIN — HYDROCHLOROTHIAZIDE 25 MG: 25 TABLET ORAL at 07:24

## 2017-10-05 RX ADMIN — BACITRACIN ZINC: 500 OINTMENT TOPICAL at 07:28

## 2017-10-05 NOTE — DISCHARGE INSTRUCTIONS
MAY shower-->NO tub baths    LEAVE incision open to the air    NO lifting anything heavier than 5LBS     NO driving until directed by [facility]    Avoid having pets sleep in bed with you until incision is completely healed    CALL DR. Marii Rolon if:  Fever >100.5    ([phone])                Incision becomes red, swollen or opens up                Incision has yellow, thick drainage or an odor                Pain is not managed with prescribed medications                Excessive nausea and/or vomiting                                      Sudden visual changes                                      Seizures                                                       HEADACHE           CHANGES IN MENTAL STATUS             Craniotomy: What to Expect at [address] [place] [address]  A craniotomy is surgery to open your skull to fix a problem in your brain. It can be done for many reasons. For example, you may need a craniotomy if your brain or blood vessels are damaged or if you have a tumor or an infection in your brain. You will probably feel very tired for several weeks after surgery. You may also have headaches or problems concentrating. It can take 4 to 8 weeks to recover from surgery. Your cuts (incisions) may be sore for about 5 days after surgery. You may also have numbness and shooting pains near your wound, or swelling and bruising around your eyes. As your wound starts to heal, it may begin to itch. Medicines and ice packs can help with headaches, pain, swelling, and itching. The stitches that hold your incisions together may go away on their own or will be removed in 7 to 10 days. This depends on the type of stitches the doctor uses. It is common for your scalp to swell with fluid. After the swelling goes down, you may have a dent in your head. Some kinds of plates stay attached to hold the skull flap to your head. If your head was shaved, you may want to wear hats or scarves on your head until your hair grows back.  Or, it may not bother you. This care sheet gives you a general idea about how long it will take for you to recover. But each person recovers at a different pace. Follow the steps below to get better as quickly as possible. How can you care for yourself at home? Activity  · Rest when you feel tired. It is normal to want to sleep during the day. It is a good idea to plan to take a nap every day. Getting enough sleep will help you recover. · Try not to lie flat when you rest or sleep. You can use a wedge pillow, or you can put a rolled towel or foam padding under your pillow. You can also raise the head of your bed by putting bricks or wooden blocks under the bed legs. · After lying down, bring your head up slowly. This can prevent headaches or dizziness. · You can wash your hair 2 to 3 days after your surgery. But do not soak your head or swim for 2 to 3 weeks. · Do not dye or color your hair for 4 weeks after your surgery. · Try to walk each day. Start by walking a little more than you did the day before. Bit by bit, increase the amount you walk. Walking boosts blood flow and helps prevent pneumonia and constipation. · Avoid heavy lifting until your doctor says it is okay. · Do not drive for 2 to 3 weeks or until your doctor says it is okay. When you begin driving again, start with short, familiar routes in the daytime. · Ask your doctor if it is safe for you to travel by plane. Diet  · You can eat your normal diet. If your stomach is upset, try bland, low-fat foods like plain rice, broiled chicken, toast, and yogurt. · Follow your doctor's orders about how much fluid you should drink after surgery. · Do not drink alcohol until your doctor says it is okay. · You may notice that your bowel movements are not regular right after your surgery. This is common. Try to avoid constipation and straining with bowel movements. You may want to take a fiber supplement every day.  If you have not had a bowel movement after a couple of days, ask your doctor about taking a mild laxative. Medicines  · Your doctor will tell you if and when you can restart your medicines. He or she will also give you instructions about taking any new medicines. · If you take blood thinners, such as warfarin (Coumadin), clopidogrel (Plavix), or aspirin, be sure to talk to your doctor. He or she will tell you if and when to start taking those medicines again. Make sure that you understand exactly what your doctor wants you to do. · Be safe with medicines. Take pain medicines exactly as directed. ¨ If the doctor gave you a prescription medicine for pain, take it as prescribed. ¨ If you are not taking a prescription pain medicine, ask your doctor if you can take an over-the-counter medicine. · If you think your pain medicine is making you sick to your stomach:  ¨ Take your medicine after meals (unless your doctor has told you not to). ¨ Ask your doctor for a different pain medicine. · If your doctor prescribed antibiotics, take them as directed. Do not stop taking them just because you feel better. You need to take the full course of antibiotics. · If you get medicines to prevent seizures, take them exactly as directed. Incision care  · If you have strips of tape on the incisions, leave the tape on for a week or until it falls off. · Keep the area clean and dry. Change the bandage every 2 days, or if it gets wet or soiled. · After your doctor says it is okay to shower or bathe, gently wash the surgery area with warm, soapy water and pat it dry. Exercise  · Avoid risky activities, such as climbing a ladder, for 3 months after surgery. · Avoid strenuous activities, such as bicycle riding, jogging, weight lifting, or aerobic exercise, for 3 months or until your doctor says it is okay. · Do not play any rough or contact sports for 3 months or until your doctor says it is okay.   Ice  · For the first 1 or 2 days, you can use ice to reduce pain, swelling, and itching. Put ice or a cold pack on your head for 10 to 20 minutes at a time. Put a thin cloth between the ice and your skin. Follow-up care is a key part of your treatment and safety. Be sure to make and go to all appointments, and call your doctor if you are having problems. It's also a good idea to know your test results and keep a list of the medicines you take. When should you call for help? Call 911 anytime you think you may need emergency care. For example, call if:  · You passed out (lost consciousness). · You have severe trouble breathing. · You have sudden chest pain and shortness of breath, or you cough up blood. · It is hard to think, move, speak, or see. · Your body is jerking or shaking. Call your doctor now or seek immediate medical care if:  · You have trouble thinking clearly. · You have a fever with a stiff neck or a severe headache. · Your incision comes open. · You have signs of infection, such as:  ¨ Increased pain, swelling, warmth, or redness. ¨ Red streaks leading from the incision. ¨ Pus draining from the incision. ¨ Swollen lymph nodes in your neck, armpits, or groin. ¨ A fever. · You have any sudden vision changes. · You have new or worse headaches. · You fall and hit your head. · You are sleeping more than you are awake. · You have pain that does not get better after you take pain medicine. · You have a fever over 100°F.  · You have a headache and you throw up. Watch closely for changes in your health, and be sure to contact your doctor if you have any problems. Where can you learn more? Go to http://shital-javi.info/. Enter 26 [id number] in the search box to learn more about \"Craniotomy: What to Expect at Home. \"  Current as of: March 20, 2017  Content Version: 11.3  © 5192-3349 [employer], [facility]. Care instructions adapted under license by [employer] (which disclaims liability or warranty for this information).  If you have questions about a medical condition or this instruction, always ask your healthcare professional. [address] any warranty or liability for your use of this information. DISCHARGE SUMMARY from Nurse    The following personal items are in your possession at time of discharge:    Dental Appliances: None  Visual Aid: Glasses, With patient     Home Medications: None  Jewelry: Ring  Clothing: Footwear, Pants, Shirt, Undergarments  Other Valuables: None  Personal Items Sent to Safe: none          PATIENT INSTRUCTIONS:    After general anesthesia or intravenous sedation, for 24 hours or while taking prescription Narcotics:  · Limit your activities  · Do not drive and operate hazardous machinery  · Do not make important personal or business decisions  · Do  not drink alcoholic beverages  · If you have not urinated within 8 hours after discharge, please contact your surgeon on call. Report the following to your surgeon:  · Excessive pain, swelling, redness or odor of or around the surgical area  · Temperature over 100.5  · Nausea and vomiting lasting longer than 4 hours or if unable to take medications  · Any signs of decreased circulation or nerve impairment to extremity: change in color, persistent  numbness, tingling, coldness or increase pain  · Any questions        What to do at Home:  Recommended activity: see discharfge instructions    If you experience any of the following symptoms see discharge instructions, please follow up with surgeon. *  Please give a list of your current medications to your Primary Care Provider. *  Please update this list whenever your medications are discontinued, doses are      changed, or new medications (including over-the-counter products) are added. *  Please carry medication information at all times in case of emergency situations.           These are general instructions for a healthy lifestyle:    No smoking/ No tobacco products/ Avoid exposure to second hand smoke    Surgeon General's Warning:  Quitting smoking now greatly reduces serious risk to your health. Obesity, smoking, and sedentary lifestyle greatly increases your risk for illness    A healthy diet, regular physical exercise & weight monitoring are important for maintaining a healthy lifestyle    You may be retaining fluid if you have a history of heart failure or if you experience any of the following symptoms:  Weight gain of 3 pounds or more overnight or 5 pounds in a week, increased swelling in our hands or feet or shortness of breath while lying flat in bed. Please call your doctor as soon as you notice any of these symptoms; do not wait until your next office visit. Recognize signs and symptoms of STROKE:    F-face looks uneven    A-arms unable to move or move unevenly    S-speech slurred or non-existent    T-time-call 911 as soon as signs and symptoms begin-DO NOT go       Back to bed or wait to see if you get better-TIME IS BRAIN. Warning Signs of HEART ATTACK     Call 911 if you have these symptoms:   Chest discomfort. Most heart attacks involve discomfort in the center of the chest that lasts more than a few minutes, or that goes away and comes back. It can feel like uncomfortable pressure, squeezing, fullness, or pain.  Discomfort in other areas of the upper body. Symptoms can include pain or discomfort in one or both arms, the back, neck, jaw, or stomach.  Shortness of breath with or without chest discomfort.  Other signs may include breaking out in a cold sweat, nausea, or lightheadedness. Don't wait more than five minutes to call 911 - MINUTES MATTER! Fast action can save your life. Calling 911 is almost always the fastest way to get lifesaving treatment. Emergency Medical Services staff can begin treatment when they arrive -- up to an hour sooner than if someone gets to the hospital by car.        The discharge information has been reviewed with the patient. The patient verbalized understanding. Discharge medications reviewed with the patient and appropriate educational materials and side effects teaching were provided.

## 2017-10-05 NOTE — PROGRESS NOTES
Problem: Falls - Risk of  Goal: *Absence of Falls  Document [place] Fall Risk and appropriate interventions in the flowsheet.    Outcome: Progressing Towards Goal  Fall Risk Interventions:  Mobility Interventions: Assess mobility with egress test, Bed/chair exit alarm, Communicate number of staff needed for ambulation/transfer, OT consult for ADLs, Patient to call before getting OOB           Medication Interventions: Patient to call before getting OOB, Teach patient to arise slowly     Elimination Interventions: Bed/chair exit alarm, Call light in reach, Patient to call for help with toileting needs     History of Falls Interventions: Door open when patient unattended, Bed/chair exit alarm

## 2017-10-05 NOTE — PROGRESS NOTES
NS   POD#3  AFEBRILE  DOING WELL  RON:  CLEARER FLUID DECREASED OUTPUT  CT:  STABLE  WITH LESS R  SDH  NEURO EXAM  WNL  A/P Kanchan Galo MD

## 2017-10-05 NOTE — PROGRESS NOTES
Critical Care Outreach Nurse Progress Report:    Subjective: In to assess pt secondary to recent tx from ICU. MEWS Score: 0 (10/04/17 2012)    Vitals:    10/04/17 2012 10/05/17 0040 10/05/17 0426 10/05/17 0903   BP: 131/77 107/68 102/65 120/79   Pulse: 93 94 99    Resp: 14 18 17 20   Temp: 98 °F (36.7 °C) 98 °F (36.7 °C) 99.1 °F (37.3 °C) 98 °F (36.7 °C)   SpO2: 94% 93% 93% 94%   Weight:       Height:            Objective: Adult male pt sitting in recliner beside bed. Pain Intensity 1: 3 (10/05/17 0724)  Pain Location 1: Head  Pain Intervention(s) 1: Medication (see MAR)  Patient Stated Pain Goal: 0    Assessment: Adult male pt sitting in recliner beside bed. Pt is awake and alert. No distress noted. Stable at this time. Pt reports he will be going home today. Plan: Will discharge from outreach program d/t impending d/c.

## 2017-10-05 NOTE — PROGRESS NOTES
[facility] CRITICAL CARE OUTREACH NURSE PROGRESS REPORT      SUBJECTIVE: Called to assess patient secondary to transfer from unit. MEWS Score: 0 (10/04/17 2012)  Vitals:    10/04/17 0859 10/04/17 1133 10/04/17 1535 10/04/17 2012   BP: 118/57 93/59 112/73 131/77   Pulse: (!) 108 90 85 93   Resp:  19 19 14   Temp:  98.4 °F (36.9 °C) 97.9 °F (36.6 °C) 98 °F (36.7 °C)   SpO2: 97% 94% 94% 94%   Weight:       Height:          LAB DATA:    Recent Labs      10/03/17   0414  10/02/17   1737   NA  137  130*   K  4.2  4.3   CL  100  95*   CO2  24  24   AGAP  13  11   GLU  201*  133*   BUN  11  13   CREA  1.19  0.79*   GFRAA  >60  >60   GFRNA  >60  >60   CA  8.8  10.0        Recent Labs      10/03/17   0414  10/02/17   1737   WBC  11.0  9.3   HGB  13.7  14.1   HCT  39.0*  39.6*   PLT  232  258          OBJECTIVE: On arrival to room, I found patient to be resting in bed. ASSESSMENT:  Pt resting in bed NAD. Pt reports headache, states he was just medicated for same. Respirations even and unlabored. Pt with no complaints at this time. PLAN:  Continue to follow per outreach protocol.

## 2017-10-05 NOTE — PROGRESS NOTES
limitations):  1. Decreased ADL/Functional Activities  2. Decreased Transfer Abilities  3. Decreased Ambulation Ability/Technique  4. Decreased Balance  5. Increased Pain  6. Decreased Activity Tolerance    INTERVENTIONS PLANNED: (Benefits and precautions of physical therapy have been discussed with the patient.)  1. Balance Exercise  2. Bed Mobility  3. Gait Training  4. Therapeutic Activites  5. Therapeutic Exercise/Strengthening  6. Transfer Training  7. education  8. Group Therapy      TREATMENT PLAN: Frequency/Duration: 3 times a week for duration of hospital stay  Rehabilitation Potential For Stated Goals: GOOD      RECOMMENDED REHABILITATION/EQUIPMENT: (at time of discharge pending progress): Due to the probability of continued deficits (see above) this patient will likely need continued skilled physical therapy after discharge. Equipment:   · RW, may need bariatric                   HISTORY:   History of Present Injury/Illness (Reason for Referral):  Per MD note, \" A 58-year-old man status post fall   landing on his head and fracturing ribs on 09/14/2017. CT   scanning of the brain at that time was unremarkable. Over the   past 2 weeks he has had worsening headaches and presented to his   primary care office today. Repeat CT brain scanning confirmed a   large right-sided subacute subdural hematoma with mass effect   and shift. Smaller bleeding was present on the left side without   mass effect or shift. He is urgently admitted for surgical   Intervention. \"  Past Medical History/Comorbidities:   Mr. Maribel Chapman  has a past medical history of A-fib ([facility] [address] Arteriosclerosis; Atrial fibrillation ([facility] [address] (1/3/2012); Diabetes mellitus; Diabetes mellitus ([address] (1/3/2012); Essential hypertension, benign (2/6/2014); Family history of malignant neoplasm of prostate (2/6/2014); Hemochromatosis; HTN (hypertension) (2/6/2014); Hypercholesteremia; Nodular prostate without urinary obstruction (2/6/2014); and Obesity (2/6/2014). Mr. Koko Solorzano  has a past surgical history that includes knee arthroscp harv and urological.  Social History/Living Environment:   Home Environment: Private residence  # Steps to Enter: 8  Rails to Enter: Yes  Hand Rails : Bilateral  One/Two Story Residence: Two story, live on 1st floor  Living Alone: No  Support Systems: Child(titi), Family member(s), Friends \ neighbors, Spouse/Significant Other/Partner  Patient Expects to be Discharged to[de-identified] Private residence  Current DME Used/Available at Home: None  Tub or Shower Type: Tub/Shower combination  Prior Level of Function/Work/Activity:  Lives at home with wife. Ambulates independently but negotiates stairs slowly and carefully via step to pattern due to knee OA. Independent in home and community. Works at home from basement. Dominant Side:         RIGHT   Number of Personal Factors/Comorbidities that affect the Plan of Care: 1-2: MODERATE COMPLEXITY   EXAMINATION:   Most Recent Physical Functioning:   Gross Assessment:                  Posture:     Balance:  Sitting: Intact  Standing: Impaired  Standing - Static: Good  Standing - Dynamic : Fair Bed Mobility:     Wheelchair Mobility:     Transfers:  Sit to Stand: Modified independent  Stand to Sit: Modified independent  Gait:     Base of Support: Widened  Speed/[place]: Slow  Step Length: Left shortened;Right shortened  Gait Abnormalities: Trunk sway increased  Distance (ft): 500 Feet (ft)  Assistive Device: Walker, rolling  Ambulation - Level of Assistance: Modified independent  Number of Stairs Trained: 3  Stairs - Level of Assistance: Supervision  Rail Use: Both       Body Structures Involved:  1. Nerves  2. Joints  3. Muscles Body Functions Affected:  1. Sensory/Pain  2. Neuromusculoskeletal  3. Movement Related Activities and Participation Affected:  1. Mobility  2. Self Care  3. Domestic Life  4.  Community, Social and [place]   Number of elements that affect the Plan of Care: 4+: HIGH COMPLEXITY   CLINICAL PRESENTATION:   Presentation: Evolving clinical presentation with changing clinical characteristics: MODERATE COMPLEXITY   CLINICAL DECISION MAKIN [employer] Mobility Inpatient Short Form  How much difficulty does the patient currently have. .. Unable A Lot A Little None   1. Turning over in bed (including adjusting bedclothes, sheets and blankets)? [ ] 1   [ ] 2   [X] 3   [ ] 4   2. Sitting down on and standing up from a chair with arms ( e.g., wheelchair, bedside commode, etc.)   [ ] 1   [ ] 2   [X] 3   [ ] 4   3. Moving from lying on back to sitting on the side of the bed? [ ] 1   [ ] 2   [X] 3   [ ] 4   How much help from another person does the patient currently need. .. Total A Lot A Little None   4. Moving to and from a bed to a chair (including a wheelchair)? [ ] 1   [ ] 2   [X] 3   [ ] 4   5. Need to walk in hospital room? [ ] 1   [ ] 2   [X] 3   [ ] 4   6. Climbing 3-5 steps with a railing? [ ] 1   [ ] 2   [X] 3   [ ] 4   © , Trustees of [facility] [employer], under license to [employer]. All rights reserved    Score:  Initial: 18 Most Recent: X (Date: -- )     Interpretation of Tool:  Represents activities that are increasingly more difficult (i.e. Bed mobility, Transfers, Gait). Score 24 23 22-20 19-15 14-10 9-7 6       Modifier CH CI CJ CK CL CM CN         · Mobility - Walking and Moving Around:               - CURRENT STATUS:    CK - 40%-59% impaired, limited or restricted               - GOAL STATUS:           CJ - 20%-39% impaired, limited or restricted               - D/C STATUS:                       ---------------To be determined---------------  Payor: BLUE CROSS / Plan: [place] BLUE CROSS [facility] / Product Type: PPO /       Medical Necessity:     · Patient is expected to demonstrate progress in functional technique to increase independence with   and improve safety during transfers/gait.   Reason for Services/Other Comments:  · Patient continues to require skilled intervention due to medical complications and recent fall with decreased safety. Use of outcome tool(s) and clinical judgement create a POC that gives a: Questionable prediction of patient's progress: MODERATE COMPLEXITY                 TREATMENT:   (In addition to Assessment/Re-Assessment sessions the following treatments were rendered)   Pre-treatment Symptoms/Complaints: Headache   Pain: Initial:   Pain Intensity 1: 3  Pain Location 1: Head  Post Session:  3/10. Therapeutic Activity: (   9 minutes ):  Therapeutic activities including Ambulation on level ground, stairs and STS transfers to improve mobility, strength and balance. Required minimal verbal cueing   to promote safe stair negotiation. Therapeutic Exercise: (  ):  Exercises per grid below to improve strength, balance and coordination. Required minimal visual and verbal cues to promote proper body alignment. Progressed complexity of movement as indicated. Hands on RW for balance. Date: 10/04/17        Ambulation  Device  assistance         Partial Squats         Hip Abduction/ Adduction Standing 2x10 AB        Heel Raises  Standing 2x10 AB        Toe Raises Standing 2x10 AB        Hip Flexion Standing         Sit to Stand         Key:  R=right, L=left, B=bilaterally, A=active, AA=active assisted, P=passive           Braces/Orthotics/Lines/Etc:   · Room air  Treatment/Session Assessment:    · Response to Treatment:    · Interdisciplinary Collaboration:  · Physical Therapist and Registered Nurse  · After treatment position/precautions:  · Family at bedside and Up in room  · Compliance with Program/Exercises: Will assess as treatment progresses. · Recommendations/Intent for next treatment session: \"Next visit will focus on advancements to more challenging activities and reduction in assistance provided\".   Total Treatment Duration:PT Patient Time In/Time Out  Time In: 1046  Time Out: 111 NEHEMIAS ReynosoT

## 2017-10-05 NOTE — PROGRESS NOTES
Date of Outreach Update:  Stacy Dallas was seen and assessed. MEWS Score: 0 (10/04/17 2012)  Vitals:    10/04/17 1133 10/04/17 1535 10/04/17 2012 10/05/17 0040   BP: 93/59 112/73 131/77 107/68   Pulse: 90 85 93 94   Resp: 19 19 14 18   Temp: 98.4 °F (36.9 °C) 97.9 °F (36.6 °C) 98 °F (36.7 °C) 98 °F (36.7 °C)   SpO2: 94% 94% 94% 93%   Weight:       Height:             Pain Assessment  Pain Intensity 1: 0 (10/05/17 0010)  Pain Location 1: Head  Pain Intervention(s) 1: Medication (see MAR)  Patient Stated Pain Goal: 0      Previous Outreach assessment has been reviewed. There have been no significant clinical changes since the completion of the last dated Outreach assessment. Will continue to follow up per outreach protocol.     Signed By:   Arabella Archer RN    October 5, 2017 3:24 AM

## 2017-10-05 NOTE — PROGRESS NOTES
Discharge instructions ,medication side effects sheet, follow up appointment and prescriptions reviewed and explained to the patient and spouse. Patient verbalizes understanding of instructions. A copy of discharge instructions and prescriptions  have been given to patient. Opportunity for questions provided.   Patient to be discharged after drain removed and receiving morning PT

## 2017-10-05 NOTE — PROGRESS NOTES
Patient and wife are declining [facility] services at this time. Would like script for walker and shower chair in case they feel they are needed for his safety at home. Order for walker faxed to [facility] and wife will call and  if needed. Lesa Song at [facility] made aware of the situation.

## 2017-10-05 NOTE — DISCHARGE SUMMARY
[address] SUMMARY       Name:  Milena Reid   MR#:  [id number]   :  1955   Account #:  [de-identified]   Date of Adm:  10/02/2017       DISCHARGE DIAGNOSES:   1. Bilateral subdural hematoma, right greater than left,   subacute. 2. Fatty liver. 3. Sleep apnea. 4. Morbid obesity. 5. Mitral valve regurgitation. 6. Hypertension. 7. Atrial fibrillation. OPERATIONS AND PROCEDURES: Bur hole evacuation of right subdural   hematoma on      COMPLICATIONS: None. DISCHARGE CONDITION: Satisfactory and improved. HISTORY OF PRESENT ILLNESS: 67-year-old gentleman with atrial   fibrillation on Pradaxa, status post fall 2-1/2 weeks prior to   admission, with fractured ribs and mild closed head injury. CT   brain scanning was negative. Over the course of the next 2   weeks, he developed worsening headaches and presented back to   the hospital where CT scanning now showed a large right-sided   subacute subdural hematoma with mass effect and significant   shift with bleeding in the left subdural space as well. He was   emergently admitted for surgery. ALLERGIES: [place]. PAST MEDICAL HISTORY: As listed above. MEDICATIONS: Listed on the universal medication form attached to   the chart, which did include Pradaxa. PHYSICAL EXAMINATION   GENERAL: Significant for morbid obesity. NEUROLOGIC: Normal cranial nerves 2-12 bilaterally, 5/5   strength, no drift. Reflexes symmetric. HOSPITAL COURSE: He was emergently taken to the operating room   where he underwent cailin hole evacuation of his right subdural   hematoma and postoperatively, he did well. Although he had some   initial intractable nausea and vomiting, had resolved by 7 a.m.   the next morning. CT brain scanning the following morning showed   resolution of the brain shift with some residual fluid and the   drain in place. The left-sided subdural was more apparent.  He   was kept in the ICU for 24 hours and transferred to the general   [facility] [place]. He was seen by Physical Therapy and was noted to be   able to ambulate, although somewhat unsteady at times. He was   strong and his headaches had resolved. A followup CT brain   scanning on 10/05/2017 showed minimal residual right-sided   fluid,stable left-sided fluid in the subdural space, and   resolution of the shift. The Eh-Segovia drain was pulled. He   was ambulatory and eating well and it was decided to be   discharged home. DISCHARGE INSTRUCTIONS: Diet diabetic. Activity up as tolerated. He will remain off his blood thinners for the time being. Return   to my office in 10 days for suture removal.    DISCHARGE MEDICATIONS: Include admission medicines except he   will hold his Pradaxa. Norco 7.5/325 q.8. p.r.n. prescribed. He understands that he has left subdural fluid and we will watch   this closely for any sign of worsening.         [place] MD Felicity Schreiber / Andrew Kelley   D:  10/05/2017   08:34   T:  10/05/2017   11:17   Job #:  [id number]

## 2017-10-07 ENCOUNTER — HOSPITAL ENCOUNTER (INPATIENT)
Age: 62
LOS: 1 days | Discharge: HOME | DRG: 199 | End: 2017-10-09
Admitting: INTERNAL MEDICINE
Payer: COMMERCIAL

## 2017-10-07 DIAGNOSIS — J93.9 PNEUMOTHORAX, RIGHT: ICD-10-CM

## 2017-10-07 DIAGNOSIS — I48.21 PERMANENT ATRIAL FIBRILLATION (HCC): ICD-10-CM

## 2017-10-07 DIAGNOSIS — J96.01 ACUTE RESPIRATORY FAILURE WITH HYPOXIA (HCC): ICD-10-CM

## 2017-10-07 PROCEDURE — 84484 ASSAY OF TROPONIN QUANT: CPT

## 2017-10-07 PROCEDURE — 99285 EMERGENCY DEPT VISIT HI MDM: CPT

## 2017-10-07 PROCEDURE — 96374 THER/PROPH/DIAG INJ IV PUSH: CPT

## 2017-10-07 PROCEDURE — 96375 TX/PRO/DX INJ NEW DRUG ADDON: CPT

## 2017-10-07 NOTE — IP AVS SNAPSHOT
Yasmin Coelho 
 
 
 [address] 
[phone] Patient: Leola Dao MRN: [id number] AXN:[birth year] You are allergic to the following Allergen Reactions Zithromax (Azithromycin) Other (comments) Skin dryness/peeling Recent Documentation Height Weight BMI Smoking Status 1.905 m 155.6 kg 42.87 kg/m2 Former Smoker Emergency Contacts Name Discharge Info Relation Home Work Mobile March Patter DISCHARGE CAREGIVER [3] Spouse [3] [phone] About your hospitalization You were admitted on:  October 8, 2017 You last received care in the:  [facility] 6 MED SURG You were discharged on:  October 9, 2017 Unit phone number:  [phone] Why you were hospitalized Your primary diagnosis was:  Pneumothorax, Right Your diagnoses also included:  Atrial Fibrillation (Hcc), Obesity, Acute Respiratory Failure With Hypoxia (Hcc), Subdural Hematoma (Hcc), Hepatic Steatosis Providers Seen During Your Hospitalizations Provider Role Specialty Primary office phone Manohar Alfonso MD Attending Provider Emergency Medicine [phone] Zaira Kearney MD Attending Provider Pulmonary Disease [phone] Your Primary Care Physician (PCP) Primary Care Physician Office Phone Office Fax Delmer Ken [phone] Follow-up Information Follow up With Details Comments Contact Info Samia Chua MD   [address] [place] [id number] 
[phone] Irma Mcdonald NP On 10/30/2017 1:10 with CXR [facility] [place] [address] 
[phone] Your Appointments Thursday October 12, 2017  9:00 AM EDT  
WOUND CHECK with [facility] NURSE  
[facility] ([facility]) [address] [address] [place] [address]  
[phone] Friday October 13, 2017  8:00 AM EDT Office Visit with Breanne Montano MD  
[facility] ([facility] INTERNAL MEDICINE) [address] [facility] [address] [facility] [address] [phone] Tuesday October 24, 2017  1:00 PM EDT  
LAB with [address]  
[address] [facility] OUTREACH INSURANCE ([address]) Rola Marshall [address] [place] [address]  
[phone] Tuesday October 24, 2017  1:30 PM EDT Follow Up with MD Triston Denis [facility] SERGE/ Noah Glaser [address] [facility] [address]  
[phone] Current Discharge Medication List  
  
CONTINUE these medications which have NOT CHANGED Dose & Instructions Dispensing Information Comments Morning Noon Evening Bedtime  
 carvedilol 25 mg tablet Commonly known as:  Jinx Re Your next dose is: This evening Dose:  25 mg Take 1 Tab by mouth two (2) times daily (with meals). Quantity:  180 Tab Refills:  3 CENTRUM ULTRA MEN'S PO Your next dose is:  10-10 Dose:  1 Tab Take 1 Tab by mouth daily. Refills:  0  
     
   
   
   
  
 clotrimazole-betamethasone topical cream  
Commonly known as:  Yaima Chapman Your next dose is:  As needed Apply to affected area bid as directed Quantity:  15 g Refills:  0  
     
   
   
   
  
 cpap machine kit Your next dose is:  Hold for 7 days  
   
 nightly. 14-18 cm pressure Refills:  0  
     
   
   
   
  
 enalapril 10 mg tablet Commonly known as:  Marixa Bhats Your next dose is: This evening Dose:  10 mg Take 1 Tab by mouth two (2) times a day. Quantity:  180 Tab Refills:  3 FISH OIL 1,000 mg Cap Generic drug:  omega-3 fatty acids-vitamin e Your next dose is:  10-10 Dose:  1 Cap Take 1 Cap by mouth two (2) times a day. 1200 mg daily twice daily Refills:  0  
     
   
   
   
  
 FLONASE NA Your next dose is:  10-10 Dose:  2 Spray 2 Sprays by Nasal route daily. Refills:  0  
     
   
   
   
  
 hydroCHLOROthiazide 25 mg tablet Commonly known as:  HYDRODIURIL Your next dose is:  10-10 Dose:  25 mg Take 1 Tab by mouth daily. Quantity:  90 Tab Refills:  3 HYDROcodone-acetaminophen 7.5-325 mg per tablet Commonly known as:  Sukhwinder Ill Your next dose is:  As needed Dose:  1 Tab Take 1 Tab by mouth every eight (8) hours as needed (breakthrough pain). Max Daily Amount: 3 Tabs. Quantity:  15 Tab Refills:  0  
     
   
   
   
  
 linaclotide 145 mcg Cap capsule Commonly known as:  [place] Your next dose is:  10-10 Dose:  145 mcg Take 1 Cap by mouth Daily (before breakfast). Quantity:  90 Cap Refills:  3  
     
   
   
   
  
 loratadine 10 mg tablet Commonly known as:  Amber Isa Your next dose is:  10-10 Dose:  10 mg Take 10 mg by mouth. Refills:  0  
     
   
   
   
  
 metFORMIN 500 mg tablet Commonly known as:  GLUCOPHAGE Your next dose is: This evening Dose:  500 mg Take 1 Tab by mouth two (2) times daily (with meals). Quantity:  180 Tab Refills:  3 MIRALAX 17 gram/dose powder Generic drug:  polyethylene glycol Your next dose is: This evening Dose:  17 g Take 17 g by mouth two (2) times a day. Refills:  0  
     
   
   
   
  
 NASACORT AQ 55 mcg nasal inhaler Generic drug:  triamcinolone Your next dose is:  10-10 Dose:  2 Spray 2 Sprays daily. Refills:  0 PROBIOTIC 4X 10-15 mg Tbec Generic drug:  B.infantis-B.ani-B.long-B.bifi Your next dose is:  10-10 Dose:  2 Cap Take 2 Caps by mouth daily. Refills:  0  
     
   
   
   
  
 simvastatin 40 mg tablet Commonly known as:  ZOCOR Your next dose is:  bedtime  Dose:  40 mg  
 Take 1 Tab by mouth nightly. Quantity:  30 Tab Refills:  0  
     
   
   
   
  
  
 spironolactone 25 mg tablet Commonly known as:  ALDACTONE Your next dose is:  10-10 Dose:  25 mg Take 1 Tab by mouth daily. Quantity:  90 Tab Refills:  3 Discharge Instructions DISCHARGE SUMMARY from Nurse The following personal items are in your possession at time of discharge: 
 
  
Visual Aid: Glasses, With patient PATIENT INSTRUCTIONS: 
 
 
F-face looks uneven A-arms unable to move or move unevenly S-speech slurred or non-existent T-time-call 911 as soon as signs and symptoms begin-DO NOT go Back to bed or wait to see if you get better-TIME IS BRAIN. Warning Signs of HEART ATTACK Call 911 if you have these symptoms: 
? Chest discomfort. Most heart attacks involve discomfort in the center of the chest that lasts more than a few minutes, or that goes away and comes back. It can feel like uncomfortable pressure, squeezing, fullness, or pain. ? Discomfort in other areas of the upper body. Symptoms can include pain or discomfort in one or both arms, the back, neck, jaw, or stomach. ? Shortness of breath with or without chest discomfort. ? Other signs may include breaking out in a cold sweat, nausea, or lightheadedness. Don't wait more than five minutes to call [address]! Fast action can save your life. Calling 911 is almost always the fastest way to get lifesaving treatment. Emergency Medical Services staff can begin treatment when they arrive  up to an hour sooner than if someone gets to the hospital by car. The discharge information has been reviewed with the patient. The patient verbalized understanding. Discharge medications reviewed with the patient and appropriate educational materials and side effects teaching were provided. Discharge Instructions Attachments/References COLLAPSED LUNG (ENGLISH) Discharge Orders None [employer] Announcement We are excited to announce that we are making your provider's discharge notes available to you in [employer]. You will see these notes when they are completed and signed by the physician that discharged you from your recent hospital stay. If you have any questions or concerns about any information you see in [employer], please call the Health Information Department where you were seen or reach out to your Primary Care Provider for more information about your plan of care. Introducing [facility]! Dear Tom Going: Thank you for requesting a [employer] account. Our records indicate that you already have an active [employer] account. You can access your account anytime at [email or web address] Did you know that you can access your hospital and ER discharge instructions at any time in [employer]? You can also review all of your test results from your hospital stay or ER visit. Additional Information If you have questions, please visit the Frequently Asked Questions section of the [employer] website at [email or web address] Remember, [employer] is NOT to be used for urgent needs. For medical emergencies, dial 911. Now available from your iPhone and Android! General Information Please provide this summary of care documentation to your next provider. Patient Signature:  ____________________________________________________________ Date:  ____________________________________________________________  
  
Nils Carrasquillo  Provider Signature: ____________________________________________________________ Date:  ____________________________________________________________ More Information Collapsed Lung: Care Instructions Your Care Instructions A collapsed lung (pneumothorax) is a buildup of air in the space between the lung and the chest wall. As more air builds up in this space, the pressure against the lung makes the lung collapse. This causes shortness of breath and chest pain because your lung cannot fully expand. A collapsed lung is usually caused by an injury to the chest, but it may also occur suddenly without an injury because of a lung illness, such as emphysema or lung fibrosis. Your lung may collapse after lung surgery or another medical procedure. Sometimes it happens for no known reason in an otherwise healthy person (spontaneous pneumothorax). Treatment depends on the cause of the collapse. It may heal with rest, although your doctor will want to keep track of your progress. It can take several days for the lung to expand again. Your doctor may have drained the air with a needle or tube inserted into the space between your chest and the collapsed lung. If you have a chest tube, be sure to follow your doctor's instructions about how to care for the tube. You may need further treatment if you are not getting better. Surgery is sometimes needed to keep the lung inflated. The doctor will want to keep track of your progress, so you will need a follow-up exam within a few days. The doctor has checked you carefully, but problems can develop later. If you notice any problems or new symptoms, get medical treatment right away. Follow-up care is a key part of your treatment and safety. Be sure to make and go to all appointments, and call your doctor if you are having problems. It's also a good idea to know your test results and keep a list of the medicines you take. How can you care for yourself at home? · Get plenty of rest and sleep. You may feel weak and tired for a while, but your energy level will improve with time. · Hold a pillow against your chest when you cough or take deep breaths. This will support your chest and decrease your pain. · Take pain medicines exactly as directed. ¨ If the doctor gave you a prescription medicine for pain, take it as prescribed. ¨ If you are not taking a prescription pain medicine, ask your doctor if you can take an over-the-counter medicine. · If your doctor prescribed antibiotics, take them as directed. Do not stop taking them just because you feel better. You need to take the full course of antibiotics. · If you have a bandage over your chest tube, or the place where the chest tube was inserted, keep it clean and dry. Follow your doctor's instructions on bandage care. · If you go home with a tube in place, follow the doctor's directions. Do not adjust the tube in any way. This could break the seal or cause other problems. Keep the tube dry. · Avoid any movements that require your muscles, especially your chest muscles, to strain. Such movements include laughing hard, bearing down to have a bowel movement, and heavy lifting. Try not to cough. · Do not fly in an airplane until your doctor tells you it is okay. Avoid any situations where there is increased air pressure. · Do not smoke or allow others to smoke around you. If you need help quitting, talk to your doctor about stop-smoking programs and medicines. These can increase your chances of quitting for good. When should you call for help? Call 911 anytime you think you may need emergency care. For example, call if: 
· You have severe trouble breathing. · You passed out (lost consciousness). Call your doctor now or seek immediate medical care if: 
· You have new or worse trouble breathing. · You have new pain or your pain gets worse. · You have a fever. · You cough up blood. · Your chest tube starts to come out or falls out. · You are bleeding through the bandage where the tube was put in. Watch closely for changes in your health, and be sure to contact your doctor if: · The skin around the place where the chest tube was put in is red or irritated. · You do not get better as expected. Where can you learn more? Go to http://shital-javi.info/. Enter D657 in the search box to learn more about \"Collapsed Lung: Care Instructions. \" Current as of: March 25, 2017 Content Version: 11.3 © 1247-8468 [employer]. Care instructions adapted under license by [employer] (which disclaims liability or warranty for this information). If you have questions about a medical condition or this instruction, always ask your healthcare professional. [address] any warranty or liability for your use of this information.

## 2017-10-08 ENCOUNTER — APPOINTMENT (OUTPATIENT)
Dept: GENERAL RADIOLOGY | Age: 62
DRG: 199 | End: 2017-10-08
Payer: COMMERCIAL

## 2017-10-08 PROBLEM — S06.5XAA SUBDURAL HEMATOMA: Status: ACTIVE | Noted: 2017-10-08

## 2017-10-08 PROBLEM — I48.91 ATRIAL FIBRILLATION (HCC): Status: ACTIVE | Noted: 2017-10-08

## 2017-10-08 PROBLEM — J96.01 ACUTE RESPIRATORY FAILURE WITH HYPOXIA (HCC): Status: ACTIVE | Noted: 2017-10-08

## 2017-10-08 PROBLEM — J93.9 PNEUMOTHORAX, RIGHT: Status: ACTIVE | Noted: 2017-10-08

## 2017-10-08 LAB
ALBUMIN SERPL-MCNC: 3.6 G/DL (ref 3.2–4.6)
ALBUMIN/GLOB SERPL: 0.9 {RATIO} (ref 1.2–3.5)
ALP SERPL-CCNC: 129 U/L (ref 50–136)
ALT SERPL-CCNC: 75 U/L (ref 12–65)
ANION GAP SERPL CALC-SCNC: 10 MMOL/L (ref 7–16)
APTT PPP: 25.7 SEC (ref 23.5–31.7)
AST SERPL-CCNC: 53 U/L (ref 15–37)
ATRIAL RATE: 227 BPM
BASOPHILS # BLD: 0 K/UL (ref 0–0.2)
BASOPHILS NFR BLD: 0 % (ref 0–2)
BILIRUB SERPL-MCNC: 0.9 MG/DL (ref 0.2–1.1)
BNP SERPL-MCNC: 28 PG/ML
BUN SERPL-MCNC: 16 MG/DL (ref 8–23)
CALCIUM SERPL-MCNC: 9.8 MG/DL (ref 8.3–10.4)
CALCULATED R AXIS, ECG10: 70 DEGREES
CALCULATED T AXIS, ECG11: 74 DEGREES
CHLORIDE SERPL-SCNC: 100 MMOL/L (ref 98–107)
CO2 SERPL-SCNC: 26 MMOL/L (ref 21–32)
CREAT SERPL-MCNC: 0.87 MG/DL (ref 0.8–1.5)
DIAGNOSIS, 93000: NORMAL
DIFFERENTIAL METHOD BLD: ABNORMAL
EOSINOPHIL # BLD: 0.1 K/UL (ref 0–0.8)
EOSINOPHIL NFR BLD: 2 % (ref 0.5–7.8)
ERYTHROCYTE [DISTWIDTH] IN BLOOD BY AUTOMATED COUNT: 13.3 % (ref 11.9–14.6)
ERYTHROCYTE [DISTWIDTH] IN BLOOD BY AUTOMATED COUNT: 13.3 % (ref 11.9–14.6)
GLOBULIN SER CALC-MCNC: 3.9 G/DL (ref 2.3–3.5)
GLUCOSE BLD STRIP.AUTO-MCNC: 129 MG/DL (ref 65–100)
GLUCOSE BLD STRIP.AUTO-MCNC: 137 MG/DL (ref 65–100)
GLUCOSE BLD STRIP.AUTO-MCNC: 139 MG/DL (ref 65–100)
GLUCOSE BLD STRIP.AUTO-MCNC: 140 MG/DL (ref 65–100)
GLUCOSE SERPL-MCNC: 154 MG/DL (ref 65–100)
HCT VFR BLD AUTO: 40.1 % (ref 41.1–50.3)
HCT VFR BLD AUTO: 40.2 % (ref 41.1–50.3)
HGB BLD-MCNC: 13.9 G/DL (ref 13.6–17.2)
HGB BLD-MCNC: 13.9 G/DL (ref 13.6–17.2)
IMM GRANULOCYTES # BLD: 0.1 K/UL (ref 0–0.5)
IMM GRANULOCYTES NFR BLD: 0.9 % (ref 0–5)
INR PPP: 1 (ref 0.9–1.2)
LYMPHOCYTES # BLD: 1.2 K/UL (ref 0.5–4.6)
LYMPHOCYTES NFR BLD: 13 % (ref 13–44)
MCH RBC QN AUTO: 32.9 PG (ref 26.1–32.9)
MCH RBC QN AUTO: 33 PG (ref 26.1–32.9)
MCHC RBC AUTO-ENTMCNC: 34.6 G/DL (ref 31.4–35)
MCHC RBC AUTO-ENTMCNC: 34.7 G/DL (ref 31.4–35)
MCV RBC AUTO: 94.8 FL (ref 79.6–97.8)
MCV RBC AUTO: 95.5 FL (ref 79.6–97.8)
MONOCYTES # BLD: 0.6 K/UL (ref 0.1–1.3)
MONOCYTES NFR BLD: 7 % (ref 4–12)
NEUTS SEG # BLD: 7 K/UL (ref 1.7–8.2)
NEUTS SEG NFR BLD: 77 % (ref 43–78)
PLATELET # BLD AUTO: 225 K/UL (ref 150–450)
PLATELET # BLD AUTO: 238 K/UL (ref 150–450)
PMV BLD AUTO: 9.7 FL (ref 10.8–14.1)
PMV BLD AUTO: 9.8 FL (ref 10.8–14.1)
POTASSIUM SERPL-SCNC: 4 MMOL/L (ref 3.5–5.1)
PROT SERPL-MCNC: 7.5 G/DL (ref 6.3–8.2)
PROTHROMBIN TIME: 10.7 SEC (ref 9.6–12)
Q-T INTERVAL, ECG07: 328 MS
QRS DURATION, ECG06: 86 MS
QTC CALCULATION (BEZET), ECG08: 407 MS
RBC # BLD AUTO: 4.21 M/UL (ref 4.23–5.67)
RBC # BLD AUTO: 4.23 M/UL (ref 4.23–5.67)
SODIUM SERPL-SCNC: 136 MMOL/L (ref 136–145)
TROPONIN I BLD-MCNC: 0 NG/ML (ref 0.02–0.05)
TROPONIN I SERPL-MCNC: <0.02 NG/ML (ref 0.02–0.05)
VENTRICULAR RATE, ECG03: 93 BPM
WBC # BLD AUTO: 9 K/UL (ref 4.3–11.1)
WBC # BLD AUTO: 9.5 K/UL (ref 4.3–11.1)

## 2017-10-08 PROCEDURE — 94762 N-INVAS EAR/PLS OXIMTRY CONT: CPT

## 2017-10-08 PROCEDURE — 85025 COMPLETE CBC W/AUTO DIFF WBC: CPT

## 2017-10-08 PROCEDURE — 83880 ASSAY OF NATRIURETIC PEPTIDE: CPT

## 2017-10-08 PROCEDURE — 74011250636 HC RX REV CODE- 250/636

## 2017-10-08 PROCEDURE — 65270000029 HC RM PRIVATE

## 2017-10-08 PROCEDURE — 94660 CPAP INITIATION&MGMT: CPT

## 2017-10-08 PROCEDURE — 71010 XR CHEST PORT: CPT

## 2017-10-08 PROCEDURE — 80053 COMPREHEN METABOLIC PANEL: CPT

## 2017-10-08 PROCEDURE — C1729 CATH, DRAINAGE: HCPCS

## 2017-10-08 PROCEDURE — 74011250637 HC RX REV CODE- 250/637: Performed by: INTERNAL MEDICINE

## 2017-10-08 PROCEDURE — 36415 COLL VENOUS BLD VENIPUNCTURE: CPT | Performed by: INTERNAL MEDICINE

## 2017-10-08 PROCEDURE — 85027 COMPLETE CBC AUTOMATED: CPT | Performed by: INTERNAL MEDICINE

## 2017-10-08 PROCEDURE — 82962 GLUCOSE BLOOD TEST: CPT

## 2017-10-08 PROCEDURE — 77030012301

## 2017-10-08 PROCEDURE — 85610 PROTHROMBIN TIME: CPT

## 2017-10-08 PROCEDURE — 93005 ELECTROCARDIOGRAM TRACING: CPT

## 2017-10-08 PROCEDURE — 99223 1ST HOSP IP/OBS HIGH 75: CPT | Performed by: INTERNAL MEDICINE

## 2017-10-08 PROCEDURE — 85730 THROMBOPLASTIN TIME PARTIAL: CPT

## 2017-10-08 PROCEDURE — 74011250636 HC RX REV CODE- 250/636: Performed by: INTERNAL MEDICINE

## 2017-10-08 PROCEDURE — 99152 MOD SED SAME PHYS/QHP 5/>YRS: CPT

## 2017-10-08 PROCEDURE — 0W9930Z DRAINAGE OF RIGHT PLEURAL CAVITY WITH DRAINAGE DEVICE, PERCUTANEOUS APPROACH: ICD-10-PCS

## 2017-10-08 PROCEDURE — 75810000165 HC THORACENTESIS

## 2017-10-08 RX ORDER — MIDAZOLAM HYDROCHLORIDE 1 MG/ML
5 INJECTION, SOLUTION INTRAMUSCULAR; INTRAVENOUS ONCE
Status: COMPLETED | OUTPATIENT
Start: 2017-10-08 | End: 2017-10-08

## 2017-10-08 RX ORDER — SODIUM CHLORIDE 0.9 % (FLUSH) 0.9 %
5-10 SYRINGE (ML) INJECTION AS NEEDED
Status: DISCONTINUED | OUTPATIENT
Start: 2017-10-08 | End: 2017-10-09 | Stop reason: HOSPADM

## 2017-10-08 RX ORDER — FENTANYL CITRATE 50 UG/ML
100 INJECTION, SOLUTION INTRAMUSCULAR; INTRAVENOUS ONCE
Status: COMPLETED | OUTPATIENT
Start: 2017-10-08 | End: 2017-10-08

## 2017-10-08 RX ORDER — MIDAZOLAM HYDROCHLORIDE 1 MG/ML
INJECTION, SOLUTION INTRAMUSCULAR; INTRAVENOUS
Status: ACTIVE
Start: 2017-10-08 | End: 2017-10-08

## 2017-10-08 RX ORDER — MORPHINE SULFATE 10 MG/ML
4 INJECTION, SOLUTION INTRAMUSCULAR; INTRAVENOUS
Status: DISCONTINUED | OUTPATIENT
Start: 2017-10-08 | End: 2017-10-09

## 2017-10-08 RX ORDER — DOCUSATE SODIUM 100 MG/1
100 CAPSULE, LIQUID FILLED ORAL 2 TIMES DAILY
Status: DISCONTINUED | OUTPATIENT
Start: 2017-10-08 | End: 2017-10-09 | Stop reason: HOSPADM

## 2017-10-08 RX ORDER — FENTANYL CITRATE 50 UG/ML
50 INJECTION, SOLUTION INTRAMUSCULAR; INTRAVENOUS
Status: COMPLETED | OUTPATIENT
Start: 2017-10-08 | End: 2017-10-08

## 2017-10-08 RX ORDER — FENTANYL CITRATE 50 UG/ML
INJECTION, SOLUTION INTRAMUSCULAR; INTRAVENOUS
Status: COMPLETED
Start: 2017-10-08 | End: 2017-10-08

## 2017-10-08 RX ORDER — SENNOSIDES 8.6 MG/1
2 TABLET ORAL
Status: DISCONTINUED | OUTPATIENT
Start: 2017-10-08 | End: 2017-10-09 | Stop reason: HOSPADM

## 2017-10-08 RX ORDER — HYDROCODONE BITARTRATE AND ACETAMINOPHEN 7.5; 325 MG/1; MG/1
1 TABLET ORAL
Status: DISCONTINUED | OUTPATIENT
Start: 2017-10-08 | End: 2017-10-09 | Stop reason: HOSPADM

## 2017-10-08 RX ORDER — ENALAPRIL MALEATE 2.5 MG/1
10 TABLET ORAL 2 TIMES DAILY
Status: DISCONTINUED | OUTPATIENT
Start: 2017-10-08 | End: 2017-10-09 | Stop reason: HOSPADM

## 2017-10-08 RX ORDER — LORATADINE 10 MG/1
10 TABLET ORAL DAILY
Status: DISCONTINUED | OUTPATIENT
Start: 2017-10-08 | End: 2017-10-09 | Stop reason: HOSPADM

## 2017-10-08 RX ORDER — SPIRONOLACTONE 25 MG/1
25 TABLET ORAL DAILY
Status: DISCONTINUED | OUTPATIENT
Start: 2017-10-08 | End: 2017-10-09 | Stop reason: HOSPADM

## 2017-10-08 RX ORDER — CARVEDILOL 25 MG/1
25 TABLET ORAL 2 TIMES DAILY WITH MEALS
Status: DISCONTINUED | OUTPATIENT
Start: 2017-10-08 | End: 2017-10-09 | Stop reason: HOSPADM

## 2017-10-08 RX ORDER — POLYETHYLENE GLYCOL 3350 17 G/17G
17 POWDER, FOR SOLUTION ORAL 2 TIMES DAILY
Status: DISCONTINUED | OUTPATIENT
Start: 2017-10-08 | End: 2017-10-09 | Stop reason: HOSPADM

## 2017-10-08 RX ORDER — SODIUM CHLORIDE 0.9 % (FLUSH) 0.9 %
5-10 SYRINGE (ML) INJECTION EVERY 8 HOURS
Status: DISCONTINUED | OUTPATIENT
Start: 2017-10-08 | End: 2017-10-09 | Stop reason: HOSPADM

## 2017-10-08 RX ORDER — METFORMIN HYDROCHLORIDE 500 MG/1
500 TABLET ORAL 2 TIMES DAILY WITH MEALS
Status: DISCONTINUED | OUTPATIENT
Start: 2017-10-08 | End: 2017-10-09 | Stop reason: HOSPADM

## 2017-10-08 RX ORDER — MORPHINE SULFATE 2 MG/ML
2 INJECTION, SOLUTION INTRAMUSCULAR; INTRAVENOUS
Status: DISCONTINUED | OUTPATIENT
Start: 2017-10-08 | End: 2017-10-09

## 2017-10-08 RX ORDER — WATER FOR INJECTION,STERILE
VIAL (ML) INJECTION
Status: ACTIVE
Start: 2017-10-08 | End: 2017-10-08

## 2017-10-08 RX ORDER — INSULIN LISPRO 100 [IU]/ML
INJECTION, SOLUTION INTRAVENOUS; SUBCUTANEOUS
Status: DISCONTINUED | OUTPATIENT
Start: 2017-10-08 | End: 2017-10-09 | Stop reason: HOSPADM

## 2017-10-08 RX ORDER — SODIUM CHLORIDE 9 MG/ML
250 INJECTION, SOLUTION INTRAVENOUS AS NEEDED
Status: DISCONTINUED | OUTPATIENT
Start: 2017-10-08 | End: 2017-10-09 | Stop reason: HOSPADM

## 2017-10-08 RX ORDER — MIDAZOLAM HYDROCHLORIDE 1 MG/ML
3 INJECTION, SOLUTION INTRAMUSCULAR; INTRAVENOUS ONCE
Status: COMPLETED | OUTPATIENT
Start: 2017-10-08 | End: 2017-10-08

## 2017-10-08 RX ORDER — FLUTICASONE PROPIONATE 50 MCG
2 SPRAY, SUSPENSION (ML) NASAL
Status: DISCONTINUED | OUTPATIENT
Start: 2017-10-08 | End: 2017-10-09 | Stop reason: HOSPADM

## 2017-10-08 RX ORDER — HYDROCHLOROTHIAZIDE 25 MG/1
25 TABLET ORAL DAILY
Status: DISCONTINUED | OUTPATIENT
Start: 2017-10-08 | End: 2017-10-09 | Stop reason: HOSPADM

## 2017-10-08 RX ADMIN — SENNOSIDES 17.2 MG: 8.6 TABLET, FILM COATED ORAL at 22:50

## 2017-10-08 RX ADMIN — METFORMIN HYDROCHLORIDE 500 MG: 500 TABLET, FILM COATED ORAL at 17:12

## 2017-10-08 RX ADMIN — HYDROCODONE BITARTRATE AND ACETAMINOPHEN 1 TABLET: 7.5; 325 TABLET ORAL at 07:42

## 2017-10-08 RX ADMIN — Medication 10 ML: at 09:51

## 2017-10-08 RX ADMIN — POLYETHYLENE GLYCOL 3350 17 G: 17 POWDER, FOR SOLUTION ORAL at 07:44

## 2017-10-08 RX ADMIN — FENTANYL CITRATE 100 MCG: 50 INJECTION, SOLUTION INTRAMUSCULAR; INTRAVENOUS at 01:32

## 2017-10-08 RX ADMIN — HYDROCODONE BITARTRATE AND ACETAMINOPHEN 1 TABLET: 7.5; 325 TABLET ORAL at 17:18

## 2017-10-08 RX ADMIN — FENTANYL CITRATE 50 MCG: 50 INJECTION, SOLUTION INTRAMUSCULAR; INTRAVENOUS at 01:41

## 2017-10-08 RX ADMIN — MORPHINE SULFATE 2 MG: 2 INJECTION, SOLUTION INTRAMUSCULAR; INTRAVENOUS at 14:49

## 2017-10-08 RX ADMIN — HYDROCODONE BITARTRATE AND ACETAMINOPHEN 1 TABLET: 7.5; 325 TABLET ORAL at 22:56

## 2017-10-08 RX ADMIN — MIDAZOLAM 5 MG: 1 INJECTION INTRAMUSCULAR; INTRAVENOUS at 01:33

## 2017-10-08 RX ADMIN — LORATADINE 10 MG: 10 TABLET ORAL at 07:46

## 2017-10-08 RX ADMIN — POLYETHYLENE GLYCOL 3350 17 G: 17 POWDER, FOR SOLUTION ORAL at 17:12

## 2017-10-08 RX ADMIN — ENALAPRIL MALEATE 10 MG: 2.5 TABLET ORAL at 17:12

## 2017-10-08 RX ADMIN — MIDAZOLAM HYDROCHLORIDE 3 MG: 1 INJECTION, SOLUTION INTRAMUSCULAR; INTRAVENOUS at 01:43

## 2017-10-08 RX ADMIN — MORPHINE SULFATE 4 MG: 10 INJECTION INTRAMUSCULAR; INTRAVENOUS; SUBCUTANEOUS at 04:05

## 2017-10-08 RX ADMIN — SPIRONOLACTONE 25 MG: 25 TABLET, FILM COATED ORAL at 07:44

## 2017-10-08 RX ADMIN — HYDROCHLOROTHIAZIDE 25 MG: 25 TABLET ORAL at 07:45

## 2017-10-08 RX ADMIN — Medication 10 ML: at 11:54

## 2017-10-08 RX ADMIN — CARVEDILOL 25 MG: 25 TABLET, FILM COATED ORAL at 17:11

## 2017-10-08 RX ADMIN — DOCUSATE SODIUM 100 MG: 100 CAPSULE, LIQUID FILLED ORAL at 17:18

## 2017-10-08 RX ADMIN — METFORMIN HYDROCHLORIDE 500 MG: 500 TABLET, FILM COATED ORAL at 07:42

## 2017-10-08 RX ADMIN — CARVEDILOL 25 MG: 25 TABLET, FILM COATED ORAL at 07:43

## 2017-10-08 RX ADMIN — ENALAPRIL MALEATE 10 MG: 2.5 TABLET ORAL at 07:43

## 2017-10-08 RX ADMIN — DOCUSATE SODIUM 100 MG: 100 CAPSULE, LIQUID FILLED ORAL at 07:44

## 2017-10-08 RX ADMIN — Medication 10 ML: at 22:50

## 2017-10-08 RX ADMIN — SENNOSIDES 17.2 MG: 8.6 TABLET, FILM COATED ORAL at 04:05

## 2017-10-08 NOTE — ED PROVIDER NOTES
HPI Comments: 47-year-old male complaining of shortness of breath. The patient was found in a tripod position by EMS on his porch waiting for the ambulance. Patient states shortness of breath increased over the last 24 hours. She has 2 days postop from craniotomy with hematoma evacuation. Patient has history of A. Fib and was in A. Fib RVR when EMS arrived. They gave him 20 of Cardizem his rate decreased to     Patient is a 57 y.o. male presenting with respiratory distress syndrome. The history is provided by the patient. Respiratory Distress   This is a new problem. The problem occurs continuously. The current episode started 12 to 24 hours ago. The problem has been gradually worsening. Associated symptoms include orthopnea. Pertinent negatives include no fever, no headaches, no coryza, no rhinorrhea, no sore throat, no chest pain and no syncope. It is unknown what precipitated the problem. He has tried nothing for the symptoms. He has had prior hospitalizations. He has had prior ED visits.         Past Medical History:   Diagnosis Date    A-fib [facility])      cardioversion    Arteriosclerosis     Atrial fibrillation ([facility] [address] 1/3/2012    Diabetes mellitus     pharmacologically controlled    Diabetes mellitus ([facility] [address] 1/3/2012    Essential hypertension, benign 2014    Family history of malignant neoplasm of prostate 2014    Hemochromatosis     HTN (hypertension) 2014    Hypercholesteremia     Nodular prostate without urinary obstruction 2014    Obesity 2014       Past Surgical History:   Procedure Laterality Date    HX UROLOGICAL      prostate u/s and BX    KNEE ARTHROSCP HARV      right         Family History:   Problem Relation Age of Onset    Heart Attack Father 61         Diabetes Father     Heart Disease Father     Hypertension Father     Cancer Father      lung    Other Mother      Sarcoidosis    Stroke Brother     Psychiatric Disorder Brother     Heart Disease Paternal Grandmother        Social History     Social History    Marital status:      Spouse name: N/A    Number of children: N/A    Years of education: N/A     Occupational History    Not on file. Social History Main Topics    Smoking status: Former Smoker     Packs/day: 1.50     Years: 18.00     Types: Cigarettes     Quit date: 8/1/1989    Smokeless tobacco: Former User     Quit date: 12/29/2016    Alcohol use 0.0 oz/week      Comment: has lessened his alcohol intake since learning about his abnormal liver labs    Drug use: No    Sexual activity: Yes     Partners: Female     Birth control/ protection: None     Other Topics Concern    Stress Concern Yes    Weight Concern Yes    Special Diet No    Exercise No    Seat Belt Yes    Self-Exams No     Social History Narrative         ALLERGIES: Zithromax [azithromycin]    Review of Systems   Constitutional: Negative. Negative for activity change and fever. HENT: Negative. Negative for rhinorrhea and sore throat. Eyes: Negative. Respiratory: Negative. Cardiovascular: Positive for orthopnea. Negative for chest pain and syncope. Gastrointestinal: Negative. Genitourinary: Negative. Musculoskeletal: Negative. Skin: Negative. Neurological: Negative. Negative for headaches. Psychiatric/Behavioral: Negative. All other systems reviewed and are negative. Vitals:    10/07/17 2357   BP: (!) 172/92   Pulse: (!) 108   Resp: 30   SpO2: 92%   Weight: 155.6 kg (343 lb)   Height: 6' 3\" (1.905 m)            Physical Exam   Constitutional: He is oriented to person, place, and time. He appears well-developed and well-nourished. No distress. HENT:   Head: Normocephalic and atraumatic. Right Ear: External ear normal.   Left Ear: External ear normal.   Nose: Nose normal.   Mouth/Throat: Oropharynx is clear and moist. No oropharyngeal exudate.    Eyes: Conjunctivae and EOM are normal. Pupils are equal, round, and reactive to light. Right eye exhibits no discharge. Left eye exhibits no discharge. No scleral icterus. Neck: Normal range of motion. Neck supple. No JVD present. No tracheal deviation present. Cardiovascular: Normal rate and intact distal pulses. An irregularly irregular rhythm present. Pulmonary/Chest: No stridor. Tachypnea noted. He is in respiratory distress. He has decreased breath sounds in the right upper field, the right middle field and the right lower field. He has no wheezes. He has rales. He exhibits no tenderness. Abdominal: Soft. Bowel sounds are normal. He exhibits no distension and no mass. There is no tenderness. Musculoskeletal: Normal range of motion. He exhibits no edema or tenderness. Neurological: He is alert and oriented to person, place, and time. No cranial nerve deficit. Skin: Skin is warm and dry. No rash noted. He is not diaphoretic. No erythema. No pallor. Psychiatric: He has a normal mood and affect. His behavior is normal. Thought content normal.   Nursing note and vitals reviewed. MDM  Number of Diagnoses or Management Options  Diagnosis management comments: Patient in respiratory distress. chest x-ray shows a 50% pneumothorax no tension or shift. Discussed with pulmonology placed chest tube fifth intercostal space with good reexpansion of the lung. Patient's sats returned to 99% on  Nonrebreather. Pulmonary interim admit.        Amount and/or Complexity of Data Reviewed  Clinical lab tests: ordered and reviewed  Tests in the radiology section of CPT®: ordered and reviewed  Tests in the medicine section of CPT®: ordered and reviewed    Risk of Complications, Morbidity, and/or Mortality  Presenting problems: high  Diagnostic procedures: high  Management options: high      ED Course       Chest Tube Insertion  Date/Time: 10/8/2017 2:17 AM  Performed by: Kvng Triplett by: Sharad De Paz     Consent:     Consent obtained:  Verbal    Consent given by: Patient and spouse    Risks discussed:  Bleeding, damage to surrounding structures, infection and incomplete drainage    Alternatives discussed:  No treatment  Pre-procedure details:     Skin preparation:  ChloraPrep  Sedation:     Sedation type: Moderate (conscious) sedation  Anesthesia (see MAR for exact dosages): Anesthesia method:  Local infiltration    Local anesthetic:  Lidocaine 1% w/o epi  Procedure details:     Placement location:  R lateral    Scalpel size:  11    Tube size (Fr):  24    Dissection instrument:  Katrin clamp and finger    Ultrasound guidance: no      Tension pneumothorax: no      Tube connected to:  Water seal    Drainage characteristics:  Air only    Suture material: 3-0 Prolene. Dressing:  Petrolatum-impregnated gauze  Post-procedure details:     Post-insertion x-ray findings: tube in good position      Patient tolerance of procedure:   Tolerated well, no immediate complications

## 2017-10-08 NOTE — H&P
HISTORY AND PHYSICAL      Carolina Crystal    10/8/2017    Date of Admission:  10/7/2017    The patient's chart is reviewed and the patient is discussed with the staff. Dr. Shirin Cleveland of the [address] [place] [address] Emergency Department has requested admission to the hospital for pneumothorax    HPI:   Carolina Crystal is a 52-year-old gentleman with morbid obesity, atrial fibrillation now OFF pradaxa, diabetes, sleep apnea, mitral valve regurgitation, htn who was hospitalized from October 2nd-5th with a right subdural hematoma after a mechanical fall in mid September. During the hospitalization he underwent a cailin hole evacuation of the right subdural hematoma on October 2 by Dr. Vidhya Chery. He was noted to have left-sided nondisplaced 8th rib fracture at that time, but no right sided fractures were noted. Today he presented to the ER with dyspnea and an O2 sat of 83%. He called EMS after awakening from sleep with severe dyspnea. He was placed on BiPAP and Dr. Shirin Cleveland has placed a 24Fr right chest tube in the ER. The CXR is improved, but there is no current air leak. REVIEW OF SYSTEMS:  CONSTITUTIONAL:  There is no history of fever, chills, night sweats, weight loss, weight gain, persistent fatigue, or lethargy/hypersomnolence. EYES:  Denies problems with eye pain, erythema, blurred vision, or visual field loss. ENTM:  Denies history of tinnitus, epistaxis, sore throat, hoarseness, or dysphonia. LYMPH:  Denies swollen glands. CARDIAC:  + post-procedural chest pain, No pressure, discomfort, palpitations, orthopnea, murmurs, or edema. GI:  No dysphagia, heartburn reflux, nausea/vomiting, diarrhea, abdominal pain, or bleeding. :Denies history of dysuria, hematuria, polyuria, or decreased urine output. MS:  No history of myalgias, arthralgias, bone pain, or muscle cramps. SKIN:  No history of rashes, jaundice, cyanosis, nodules, or ulcers. ENDO:  Negative for heat or cold intolerance.   +history of DM.  PSYCH:  Negative for anxiety, depression, insomnia, hallucinations. Patient Active Problem List   Diagnosis Code    Hepatic steatosis K76.0    Iron excess E83.19    Hemochromatosis E83.119    Atrial fibrillation ([facility] [address] I48.91    Family history of malignant neoplasm of prostate Z80.42    Obesity E66.9    HTN (hypertension) I10    Nodular prostate without urinary obstruction N40.2    Essential hypertension, benign I10    Sleep apnea G47.30    Mitral valve regurgitation I34.0    Seasonal allergic rhinitis due to pollen J30.1    Abnormal x-ray of abdomen R93.5    Subdural hematoma (HCC) I62.00    Pneumothorax, right J93.9    Acute respiratory failure with hypoxia (HCC) J96.01       Prior to Admission Medications   Prescriptions Last Dose Informant Patient Reported? Taking? B.infantis-B.ani-B.long-B.bifi (PROBIOTIC 4X) 10-15 mg TbEC   Yes No   Sig: Take 2 Caps by mouth daily. FLUTICASONE PROPIONATE (FLONASE NA)   Yes No   Si Sprays by Nasal route daily. HYDROcodone-acetaminophen (NORCO) 7.5-325 mg per tablet   No No   Sig: Take 1 Tab by mouth every eight (8) hours as needed (breakthrough pain). Max Daily Amount: 3 Tabs. HYDROcodone-acetaminophen (NORCO) 7.5-325 mg per tablet   No No   Sig: Take 1 Tab by mouth every eight (8) hours as needed for Pain. Max Daily Amount: 3 Tabs. MULTIVITS,CA,MIN/IRON/FA/LYCOP (CENTRUM ULTRA MEN'S PO)   Yes No   Sig: Take 1 Tab by mouth daily. carvedilol (COREG) 25 mg tablet   No No   Sig: Take 1 Tab by mouth two (2) times daily (with meals). clotrimazole-betamethasone (LOTRISONE) topical cream   No No   Sig: Apply to affected area bid as directed   cpap machine kit   Yes No   Sig: nightly. 14-18 cm pressure    enalapril (VASOTEC) 10 mg tablet   No No   Sig: Take 1 Tab by mouth two (2) times a day. hydroCHLOROthiazide (HYDRODIURIL) 25 mg tablet   No No   Sig: Take 1 Tab by mouth daily.    linaclotide (LINZESS) 145 mcg cap capsule   No No   Sig: Take 1 Cap by mouth Daily (before breakfast). loratadine (CLARITIN) 10 mg tablet   Yes No   Sig: Take 10 mg by mouth.   metFORMIN (GLUCOPHAGE) 500 mg tablet   No No   Sig: Take 1 Tab by mouth two (2) times daily (with meals). omega-3 fatty acids-vitamin e (FISH OIL) 1,000 mg cap   Yes No   Sig: Take 1 Cap by mouth two (2) times a day. 1200 mg daily twice daily   polyethylene glycol (MIRALAX) 17 gram/dose powder   Yes No   Sig: Take 17 g by mouth two (2) times a day. simvastatin (ZOCOR) 40 mg tablet   No No   Sig: Take 1 Tab by mouth nightly. spironolactone (ALDACTONE) 25 mg tablet   No No   Sig: Take 1 Tab by mouth daily. triamcinolone (NASACORT AQ) 55 mcg nasal inhaler   Yes No   Si Sprays daily. Facility-Administered Medications: None       Past Medical History:   Diagnosis Date    A-fib [facility])      cardioversion    Arteriosclerosis     Atrial fibrillation ([facility] [address] 1/3/2012    Diabetes mellitus     pharmacologically controlled    Diabetes mellitus ([facility] [address] 1/3/2012    Essential hypertension, benign 2014    Family history of malignant neoplasm of prostate 2014    Hemochromatosis     HTN (hypertension) 2014    Hypercholesteremia     Nodular prostate without urinary obstruction 2014    Obesity 2014     Past Surgical History:   Procedure Laterality Date    HX UROLOGICAL      prostate u/s and BX    KNEE ARTHROSCP HARV      right     Social History     Social History    Marital status:      Spouse name: N/A    Number of children: N/A    Years of education: N/A     Occupational History    Not on file.      Social History Main Topics    Smoking status: Former Smoker     Packs/day: 1.50     Years: 18.00     Types: Cigarettes     Quit date: 1989    Smokeless tobacco: Former User     Quit date: 2016    Alcohol use 0.0 oz/week      Comment: has lessened his alcohol intake since learning about his abnormal liver labs    Drug use: No    Sexual activity: Yes Partners: Female     Birth control/ protection: None     Other Topics Concern    Stress Concern Yes    Weight Concern Yes    Special Diet No    Exercise No    Seat Belt Yes    Self-Exams No     Social History Narrative     Family History   Problem Relation Age of Onset    Heart Attack Father 61         Diabetes Father     Heart Disease Father     Hypertension Father     Cancer Father      lung    Other Mother      Sarcoidosis    Stroke Brother     Psychiatric Disorder Brother     Heart Disease Paternal Grandmother      Allergies   Allergen Reactions    Zithromax [Azithromycin] Other (comments)     Skin dryness/peeling       Current Facility-Administered Medications   Medication Dose Route Frequency    midazolam (VERSED) injection 5 mg  5 mg IntraVENous ONCE    fentaNYL citrate (PF) injection 100 mcg  100 mcg IntraVENous ONCE    0.9% sodium chloride infusion 250 mL  250 mL IntraVENous PRN    sterile water (preservative free) injection        sterile water (preservative free) injection        fentaNYL citrate (PF) 50 mcg/mL injection        midazolam (VERSED) 1 mg/mL injection        fentaNYL citrate (PF) 50 mcg/mL injection         Current Outpatient Prescriptions   Medication Sig    linaclotide (LINZESS) 145 mcg cap capsule Take 1 Cap by mouth Daily (before breakfast).  HYDROcodone-acetaminophen (NORCO) 7.5-325 mg per tablet Take 1 Tab by mouth every eight (8) hours as needed (breakthrough pain). Max Daily Amount: 3 Tabs.  polyethylene glycol (MIRALAX) 17 gram/dose powder Take 17 g by mouth two (2) times a day.  simvastatin (ZOCOR) 40 mg tablet Take 1 Tab by mouth nightly.  enalapril (VASOTEC) 10 mg tablet Take 1 Tab by mouth two (2) times a day.  hydroCHLOROthiazide (HYDRODIURIL) 25 mg tablet Take 1 Tab by mouth daily.  triamcinolone (NASACORT AQ) 55 mcg nasal inhaler 2 Sprays daily.     carvedilol (COREG) 25 mg tablet Take 1 Tab by mouth two (2) times daily (with meals).  spironolactone (ALDACTONE) 25 mg tablet Take 1 Tab by mouth daily.  loratadine (CLARITIN) 10 mg tablet Take 10 mg by mouth.  FLUTICASONE PROPIONATE (FLONASE NA) 2 Sprays by Nasal route daily.  B.infantis-B.ani-B.long-B.bifi (PROBIOTIC 4X) 10-15 mg TbEC Take 2 Caps by mouth daily.  clotrimazole-betamethasone (LOTRISONE) topical cream Apply to affected area bid as directed    MULTIVITS,CA,MIN/IRON/FA/LYCOP (CENTRUM ULTRA MEN'S PO) Take 1 Tab by mouth daily.  omega-3 fatty acids-vitamin e (FISH OIL) 1,000 mg cap Take 1 Cap by mouth two (2) times a day. 1200 mg daily twice daily    cpap machine kit nightly. 14-18 cm pressure     metFORMIN (GLUCOPHAGE) 500 mg tablet Take 1 Tab by mouth two (2) times daily (with meals). Objective:     Vitals:    10/07/17 2357 10/08/17 0038   BP: (!) 172/92    Pulse: (!) 108    Resp: 30    SpO2: 92% 95%   Weight: 343 lb (155.6 kg)    Height: 6' 3\" (1.905 m)        PHYSICAL EXAM     Constitutional:  the patient is well developed and in no acute distress. Rodena Screen hole healed R scalp  EENMT:  Sclera clear, pupils equal, oral mucosa moist  Respiratory: decreased bilaterally  Cardiovascular:  RRR without M,G,R  Gastrointestinal: soft and non-tender; with positive bowel sounds. Musculoskeletal: warm without cyanosis. There is trace lower leg edema. Skin:  no jaundice or rashes  Neurologic: no gross neuro deficits     Psychiatric:  alert and oriented x 3    CXR: large R pneumothorax      Recent Labs      10/08/17   0001   WBC  9.0   HGB  13.9   HCT  40.1*   PLT  238   INR  1.0     Recent Labs      10/08/17   0001   NA  136   K  4.0   CL  100   GLU  154*   CO2  26   BUN  16   CREA  0.87   CA  9.8   TROIQ  <0.02*   ALB  3.6   TBILI  0.9   ALT  75*   SGOT  53*     No results for input(s): PH, PCO2, PO2, HCO3 in the last 72 hours. No results for input(s): LCAD, LAC in the last 72 hours.     Assessment:  (Medical Decision Making)     Hospital Problems Date Reviewed: 10/2/2017          Codes Class Noted POA    Atrial fibrillation ([facility] [address] ICD-10-CM: I48.91  ICD-9-CM: 427.31  10/8/2017 Yes    Permanent. Currently not on pradaxa    Subdural hematoma (HCC) ICD-10-CM: I62.00  ICD-9-CM: 432.1  10/8/2017 Yes    S/p cailin hole in Sept    * (Principal)Pneumothorax, right ICD-10-CM: J93.9  ICD-9-CM: 512.89  10/8/2017 Unknown    S/p chest tube. F/u CXR daily and continue suction. Acute respiratory failure with hypoxia [facility]) ICD-10-CM: J96.01  ICD-9-CM: 518.81  10/8/2017 Unknown    Wean O2 as able    Obesity ICD-10-CM: E66.9  ICD-9-CM: 278.00  2/6/2014 Yes    With [place]. Nightly CPAP    Hepatic steatosis ICD-10-CM: K76.0  ICD-9-CM: 571.8  1/3/2012 Yes    Overview Signed 7/17/2012 10:08 AM by Remington Martinez        1/3/2012 10:30 1/17/2012 12:22 7/10/2012 09:32   Bilirubin, total 1.0 0.9 1.5 (H)   Protein, total 7.6 7.0 7.3   Albumin 3.9 3.6 3.7   Globulin 3.7 (H) 3.4 3.6 (H)   A-G Ratio 1.1 (L) 1.1 (L) 1.0 (L)   ALT 66 (H) 65 53   AST 34 23 24   Alk. phosphatase 133 115 107            transaminitis persists      Monitor bleeding. Plan:  (Medical Decision Making)   --Admit for chest tube management and pain management. Continue careful management of tube with continuous suction [id number]  --Continue nightly CPAP and O2 as needed  --CXR post-procedure and in am  --Bowel regimen  --f/u H/H in am  --Continuous oximetry for first 12h  --Full code    More than 50% of the time documented was spent in face-to-face contact with the patient and in the care of the patient on the floor/unit where the patient is located.     Philippe Robledo MD

## 2017-10-08 NOTE — PROGRESS NOTES
TRANSFER - IN REPORT:    Verbal report received from [place] Eneida Mccartney  being received from ED for routine progression of care      Report consisted of patients Situation, Background, Assessment and   Recommendations(SBAR). Information from the following report(s) Kardex was reviewed with the receiving nurse. Opportunity for questions and clarification was provided. Assessment completed upon patients arrival to unit and care assumed.

## 2017-10-08 NOTE — PROGRESS NOTES
Currently on RA with no airleak evident. Will place on 4L for N2 washout and plan for repeat CXR in AM. If PXT gone and no air leak, can clamp tube and recheck CXR in 2 hrs.        Padmini Suarez MD

## 2017-10-08 NOTE — PROGRESS NOTES
Hourly rounds done on patient throughout the day, all needs met. Patient complaining of some pain throughout the pain. Rated pain 6 at 0742 and aching, given Norco 7.5-325 po. Patient complained of pain at 1449 rated 6 and aching, given Morphine 2mg IV push. The morphine was effective but short lived, at [id number] he complained of pain rated 5 and aching, and was given Norco 7.5-325 mg po. Will continue to monitor the patient.

## 2017-10-08 NOTE — ED TRIAGE NOTES
C/o slip and fall today. C/o left rib pain. States that he hit his head. Denies LOC.   Right shoulder pain none

## 2017-10-08 NOTE — ED TRIAGE NOTES
PT arrived to ED c/o sob. PT recently had surgery for a brain bleed. PT had an initial O2 of 83. PT was given 20 Cardizem from EMS.

## 2017-10-08 NOTE — PROGRESS NOTES
Hourly rounds complete with this pt. Pt has been made aware not to pull on tube or change his position without assistance. Patient states he is not able to use the urinal sitting upright, pt found using urinal on side of bed assisted by wife.

## 2017-10-08 NOTE — PROGRESS NOTES
Primary Nurse Kiarra Degroot, RN and Junito RN, RN performed a dual skin assessment on this patient No impairment noted except right side incision C/d/i

## 2017-10-09 ENCOUNTER — APPOINTMENT (OUTPATIENT)
Dept: GENERAL RADIOLOGY | Age: 62
DRG: 199 | End: 2017-10-09
Payer: COMMERCIAL

## 2017-10-09 ENCOUNTER — APPOINTMENT (OUTPATIENT)
Dept: GENERAL RADIOLOGY | Age: 62
DRG: 199 | End: 2017-10-09
Attending: INTERNAL MEDICINE
Payer: COMMERCIAL

## 2017-10-09 VITALS
OXYGEN SATURATION: 94 % | BODY MASS INDEX: 39.17 KG/M2 | TEMPERATURE: 98.8 F | WEIGHT: 315 LBS | HEART RATE: 96 BPM | HEIGHT: 75 IN | RESPIRATION RATE: 18 BRPM | SYSTOLIC BLOOD PRESSURE: 93 MMHG | DIASTOLIC BLOOD PRESSURE: 67 MMHG

## 2017-10-09 LAB
GLUCOSE BLD STRIP.AUTO-MCNC: 120 MG/DL (ref 65–100)
GLUCOSE BLD STRIP.AUTO-MCNC: 130 MG/DL (ref 65–100)

## 2017-10-09 PROCEDURE — 77030012893

## 2017-10-09 PROCEDURE — 99239 HOSP IP/OBS DSCHRG MGMT >30: CPT | Performed by: INTERNAL MEDICINE

## 2017-10-09 PROCEDURE — 82962 GLUCOSE BLOOD TEST: CPT

## 2017-10-09 PROCEDURE — 74011250637 HC RX REV CODE- 250/637: Performed by: INTERNAL MEDICINE

## 2017-10-09 PROCEDURE — 74011250636 HC RX REV CODE- 250/636: Performed by: INTERNAL MEDICINE

## 2017-10-09 PROCEDURE — 94761 N-INVAS EAR/PLS OXIMETRY MLT: CPT

## 2017-10-09 PROCEDURE — 71010 XR CHEST PORT: CPT

## 2017-10-09 PROCEDURE — 99232 SBSQ HOSP IP/OBS MODERATE 35: CPT | Performed by: INTERNAL MEDICINE

## 2017-10-09 RX ADMIN — ENALAPRIL MALEATE 10 MG: 2.5 TABLET ORAL at 09:26

## 2017-10-09 RX ADMIN — CARVEDILOL 25 MG: 25 TABLET, FILM COATED ORAL at 09:26

## 2017-10-09 RX ADMIN — POLYETHYLENE GLYCOL 3350 17 G: 17 POWDER, FOR SOLUTION ORAL at 09:27

## 2017-10-09 RX ADMIN — MORPHINE SULFATE 4 MG: 10 INJECTION INTRAMUSCULAR; INTRAVENOUS; SUBCUTANEOUS at 02:46

## 2017-10-09 RX ADMIN — LORATADINE 10 MG: 10 TABLET ORAL at 09:26

## 2017-10-09 RX ADMIN — METFORMIN HYDROCHLORIDE 500 MG: 500 TABLET, FILM COATED ORAL at 09:26

## 2017-10-09 RX ADMIN — HYDROCODONE BITARTRATE AND ACETAMINOPHEN 1 TABLET: 7.5; 325 TABLET ORAL at 06:53

## 2017-10-09 RX ADMIN — DOCUSATE SODIUM 100 MG: 100 CAPSULE, LIQUID FILLED ORAL at 09:26

## 2017-10-09 RX ADMIN — Medication 10 ML: at 13:55

## 2017-10-09 RX ADMIN — Medication 10 ML: at 05:56

## 2017-10-09 RX ADMIN — HYDROCHLOROTHIAZIDE 25 MG: 25 TABLET ORAL at 09:26

## 2017-10-09 RX ADMIN — SPIRONOLACTONE 25 MG: 25 TABLET, FILM COATED ORAL at 09:26

## 2017-10-09 NOTE — PROGRESS NOTES
Hourly rounds complete with this pt .   Patient took a long nap in the chair now C/o severe  back pain, pain meds given, Chest tube patent, c/d/i

## 2017-10-09 NOTE — PROGRESS NOTES
Patient rounded on hourly throughout the day, all needs met. Very minor complaints of soreness rated 2-3, no pain medications requested. He is discharged and will be given discharge paperwork soon. Dressing to right side was changed per request, small amount of serosanguinous drainage. Sent some guaze and tape home to change once discharged as needed. Will continue to monitor the patient.

## 2017-10-09 NOTE — PROGRESS NOTES
Julia Mackey  Admission Date: 10/7/2017             Daily Progress Note: 10/9/2017     Julia Mackey is a 66-year-old gentleman with morbid obesity, atrial fibrillation now OFF pradaxa, diabetes, sleep apnea, mitral valve regurgitation, htn who was hospitalized from October 2nd-5th with a right subdural hematoma after a mechanical fall in mid September. During the hospitalization he underwent a cailin hole evacuation of the right subdural hematoma on October 2 by Dr. Ramon Cuba. He was noted to have left-sided nondisplaced 8th rib fracture at that time, but no right sided fractures were noted.     Today he presented to the ER with dyspnea and an O2 sat of 83%. He called EMS after awakening from sleep with severe dyspnea. He was placed on BiPAP and Dr. Fadi Giang has placed a 24Fr right chest tube in the ER. The CXR is improved, but there is no current air leak.   AM CXR on 10/9/17- with reexpanded lung and chest tube in sq tissue outside of chest.  Subjective:     No distress up in chair    Current Facility-Administered Medications   Medication Dose Route Frequency    0.9% sodium chloride infusion 250 mL  250 mL IntraVENous PRN    carvedilol (COREG) tablet 25 mg  25 mg Oral BID WITH MEALS    enalapril (VASOTEC) tablet 10 mg  10 mg Oral BID    fluticasone (FLONASE) 50 mcg/actuation nasal spray 2 Spray  2 Spray Both Nostrils DAILY PRN    hydroCHLOROthiazide (HYDRODIURIL) tablet 25 mg  25 mg Oral DAILY    HYDROcodone-acetaminophen (NORCO) 7.5-325 mg per tablet 1 Tab  1 Tab Oral Q6H PRN    loratadine (CLARITIN) tablet 10 mg  10 mg Oral DAILY    metFORMIN (GLUCOPHAGE) tablet 500 mg  500 mg Oral BID WITH MEALS    polyethylene glycol (MIRALAX) packet 17 g  17 g Oral BID    spironolactone (ALDACTONE) tablet 25 mg  25 mg Oral DAILY    sodium chloride (NS) flush 5-10 mL  5-10 mL IntraVENous Q8H    sodium chloride (NS) flush 5-10 mL  5-10 mL IntraVENous PRN    morphine injection 4 mg  4 mg IntraVENous Q4H PRN    insulin lispro (HUMALOG) injection   SubCUTAneous AC&HS    senna (SENOKOT) tablet 17.2 mg  2 Tab Oral QHS    docusate sodium (COLACE) capsule 100 mg  100 mg Oral BID    morphine injection 2 mg  2 mg IntraVENous Q4H PRN         Objective:     Vitals:    10/08/17 1955 10/08/17 2307 10/09/17 0454 10/09/17 0656   BP: 118/58 127/70 (!) 136/91 132/89   Pulse: 87 89 92 95   Resp: 18 18 18 18   Temp: 97.6 °F (36.4 °C) 98 °F (36.7 °C) 98 °F (36.7 °C) 98.8 °F (37.1 °C)   SpO2: 95% 94% 94% 100%   Weight:       Height:         Intake and Output:   10/07 1901 - 10/09 0700  In: 480 [P.O.:480]  Out: 600 [Urine:600]       Physical Exam:          GEN: well developed and in no acute distress, Oxygen per 2LNC  HEENT:  PERRL, EOMI, no alar flaring or epistaxis, oral mucosa moist without cyanosis, post op wound R temple healing  NECK:  no JVD, no retractions, no thyromegaly or masses,   LUNGS:  CTA  HEART:  RRR with no M,G,R;  ABDOMEN:  soft with no tenderness; positive bowel sounds present  EXTREMITIES:  warm with no cyanosis, trace lower leg edema  SKIN:  no jaundice or ecchymosis   NEURO:  alert and oriented, grossly non-focal    CHEST XRAY:     LAB  Recent Labs      10/08/17   0417  10/08/17   0001   WBC  9.5  9.0   HGB  13.9  13.9   HCT  40.2*  40.1*   PLT  225  238     Recent Labs      10/08/17   0001   NA  136   K  4.0   CL  100   CO2  26   GLU  154*   BUN  16   CREA  0.87   TROIQ  <0.02*   INR  1.0     No results for input(s): PH, PCO2, PO2, HCO3 in the last 72 hours. No results for input(s): LCAD, LAC in the last 72 hours.   Assessment:     Patient Active Problem List   Diagnosis Code    Hepatic steatosis K76.0    Iron excess E83.19    Hemochromatosis E83.119    Atrial fibrillation ([facility] [address] I48.91    Family history of malignant neoplasm of prostate Z80.42    Obesity E66.9    HTN (hypertension) I10    Nodular prostate without urinary obstruction N40.2    Essential hypertension, benign I10    Sleep apnea G47.30    Mitral valve regurgitation I34.0    Seasonal allergic rhinitis due to pollen J30.1    Abnormal x-ray of abdomen R93.5    Subdural hematoma (HCC) I62.00    Pneumothorax, right J93.9    Acute respiratory failure with hypoxia ([facility] [address] J96.01       Plan     Hospital Problems  Date Reviewed: 10/2/2017          Codes Class Noted POA    Atrial fibrillation ([facility] [address] ICD-10-CM: I48.91  ICD-9-CM: 427.31  10/8/2017 Yes    Rate controlled    Subdural hematoma ([facility] [address] ICD-10-CM: I62.00  ICD-9-CM: 432.1  10/8/2017 Yes        * (Principal)Pneumothorax, right ICD-10-CM: J93.9  ICD-9-CM: 512.89  10/8/2017 Unknown    Chest tube in sq tissue- lung reexpanded- chest tube removed and dressing applied- repeat CXR at noon and if lung is still reexpanded- likely discharge home    Acute respiratory failure with hypoxia [facility]) ICD-10-CM: J96.01  ICD-9-CM: 518.81  10/8/2017 Unknown        Obesity ICD-10-CM: E66.9  ICD-9-CM: 278.00  2/6/2014 Yes        Hepatic steatosis ICD-10-CM: K76.0  ICD-9-CM: 571.8  1/3/2012 Yes    Overview Signed 7/17/2012 10:08 AM by Tana Jeter        1/3/2012 10:30 1/17/2012 12:22 7/10/2012 09:32   Bilirubin, total 1.0 0.9 1.5 (H)   Protein, total 7.6 7.0 7.3   Albumin 3.9 3.6 3.7   Globulin 3.7 (H) 3.4 3.6 (H)   A-G Ratio 1.1 (L) 1.1 (L) 1.0 (L)   ALT 66 (H) 65 53   AST 34 23 24   Alk. phosphatase 133 115 107                  Discussed with patient and wife.         More than 50% of time documented was spent in face-to-face contact with the patient and in the care of the patient on the floor/unit where the patient is located.              Regulo Iyer MD

## 2017-10-09 NOTE — DISCHARGE INSTRUCTIONS
DISCHARGE SUMMARY from Nurse    The following personal items are in your possession at time of discharge:       Visual Aid: Glasses, With patient                            PATIENT INSTRUCTIONS:    After general anesthesia or intravenous sedation, for 24 hours or while taking prescription Narcotics:  · Limit your activities  · Do not drive and operate hazardous machinery  · Do not make important personal or business decisions  · Do  not drink alcoholic beverages  · If you have not urinated within 8 hours after discharge, please contact your surgeon on call. Report the following to your surgeon:  · Excessive pain, swelling, redness or odor of or around the surgical area  · Temperature over 100.5  · Nausea and vomiting lasting longer than 4 hours or if unable to take medications  · Any signs of decreased circulation or nerve impairment to extremity: change in color, persistent  numbness, tingling, coldness or increase pain  · Any questions        What to do at Home:  Recommended activity: Activity as tolerated, hold CPAP therapy for 7 days. If you experience any of the following symptoms chest pain or shortness of breath, please follow up with [facility] Pulmonary. *  Please give a list of your current medications to your Primary Care Provider. *  Please update this list whenever your medications are discontinued, doses are      changed, or new medications (including over-the-counter products) are added. *  Please carry medication information at all times in case of emergency situations. These are general instructions for a healthy lifestyle:    No smoking/ No tobacco products/ Avoid exposure to second hand smoke    Surgeon General's Warning:  Quitting smoking now greatly reduces serious risk to your health.     Obesity, smoking, and sedentary lifestyle greatly increases your risk for illness    A healthy diet, regular physical exercise & weight monitoring are important for maintaining a healthy lifestyle    You may be retaining fluid if you have a history of heart failure or if you experience any of the following symptoms:  Weight gain of 3 pounds or more overnight or 5 pounds in a week, increased swelling in our hands or feet or shortness of breath while lying flat in bed. Please call your doctor as soon as you notice any of these symptoms; do not wait until your next office visit. Recognize signs and symptoms of STROKE:    F-face looks uneven    A-arms unable to move or move unevenly    S-speech slurred or non-existent    T-time-call 911 as soon as signs and symptoms begin-DO NOT go       Back to bed or wait to see if you get better-TIME IS BRAIN. Warning Signs of HEART ATTACK     Call 911 if you have these symptoms:   Chest discomfort. Most heart attacks involve discomfort in the center of the chest that lasts more than a few minutes, or that goes away and comes back. It can feel like uncomfortable pressure, squeezing, fullness, or pain.  Discomfort in other areas of the upper body. Symptoms can include pain or discomfort in one or both arms, the back, neck, jaw, or stomach.  Shortness of breath with or without chest discomfort.  Other signs may include breaking out in a cold sweat, nausea, or lightheadedness. Don't wait more than five minutes to call 911 - MINUTES MATTER! Fast action can save your life. Calling 911 is almost always the fastest way to get lifesaving treatment. Emergency Medical Services staff can begin treatment when they arrive -- up to an hour sooner than if someone gets to the hospital by car. The discharge information has been reviewed with the patient. The patient verbalized understanding. Discharge medications reviewed with the patient and appropriate educational materials and side effects teaching were provided.

## 2017-10-09 NOTE — DISCHARGE SUMMARY
Discharge Note    Cathie Villalobos  Admission date:  10/7/2017  Discharge date:  10/9/2017     Admitting Diagnosis:  Pneumothorax, right    Discharge Diagnoses:   Hospital Problems  Date Reviewed: 10/2/2017          Codes Class Noted POA    Atrial fibrillation ([facility] ICD-10-CM: I48.91  ICD-9-CM: 427.31  10/8/2017 Yes        Subdural hematoma ([facility] ICD-10-CM: I62.00  ICD-9-CM: 432.1  10/8/2017 Yes        * (Principal)Pneumothorax, right ICD-10-CM: J93.9  ICD-9-CM: 512.89  10/8/2017 Unknown        Acute respiratory failure with hypoxia ([facility] ICD-10-CM: J96.01  ICD-9-CM: 518.81  10/8/2017 Unknown        Obesity ICD-10-CM: E66.9  ICD-9-CM: 278.00  2/6/2014 Yes        Hepatic steatosis ICD-10-CM: K76.0  ICD-9-CM: 571.8  1/3/2012 Yes    Overview Signed 7/17/2012 10:08 AM by Colletta Sprang        1/3/2012 10:30 1/17/2012 12:22 7/10/2012 09:32   Bilirubin, total 1.0 0.9 1.5 (H)   Protein, total 7.6 7.0 7.3   Albumin 3.9 3.6 3.7   Globulin 3.7 (H) 3.4 3.6 (H)   A-G Ratio 1.1 (L) 1.1 (L) 1.0 (L)   ALT 66 (H) 65 53   AST 34 23 24   Alk. phosphatase 133 115 107                      Consultants: none     Studies/Procedures:  CXR  10/8 large left pneumothorax      10/9  No pneumothorax evident        Condition on Discharge:  stable    Disposition:  Home       Presenting Illness: Cathie Villalobos is a 77-year-old gentleman with morbid obesity, atrial fibrillation now OFF pradaxa, diabetes, sleep apnea, mitral valve regurgitation, htn who was hospitalized from October 2nd-5th with a right subdural hematoma after a mechanical fall in mid September. During the hospitalization he underwent a cailin hole evacuation of the right subdural hematoma on October 2 by Dr. Gonzalo De La Rosa. He was noted to have left-sided nondisplaced 8th rib fracture at that time, but no right sided fractures were noted.     Today he presented to the ER with dyspnea and an O2 sat of 83%. He called EMS after awakening from sleep with severe dyspnea.  He was placed on BiPAP and Dr. Katy Zhang has placed a 24Fr right chest tube in the ER. The CXR is improved, but there is no current air leak. Hospital course: Patient was admitted for further medical management with traumatic pneumothorax with chest tube to continuous suction. Nightly CPAP was continued with supplemental oxygen as needed. He was placed on 4LNC for N2 washout. CXR is improved with no air leak. The morning of 10/9, chest xray with reexpanded lung and chest tube in sq tissue outside of the chest. Patient was comfortable and sitting up in the chair. Chest tube was removed and dressing applied. Chest xray was repeated at noon today. Repeat chest xray with no pneumothorax present. Patient will be discharged home today. Recommend holding cpap therapy for 7 days after discharge. Patient is to return to hospital if symptoms reoccur. Physical Exam:   GEN: well developed and in no acute distress, Oxygen per 2LNC  HEENT:  PERRL, EOMI, no alar flaring or epistaxis, oral mucosa moist without cyanosis, post op wound R temple healing  NECK:  no JVD, no retractions, no thyromegaly or masses,   LUNGS:  CTA  HEART:  RRR with no M,G,R;  ABDOMEN:  soft with no tenderness; positive bowel sounds present  EXTREMITIES:  warm with no cyanosis, trace lower leg edema  SKIN:  no jaundice or ecchymosis   NEURO:  alert and oriented, grossly non-focal      LAB  Recent Labs      10/08/17   0417  10/08/17   0001   WBC  9.5  9.0   HGB  13.9  13.9   HCT  40.2*  40.1*   PLT  225  238   INR   --   1.0     Recent Labs      10/08/17   0001   NA  136   K  4.0   CL  100   CO2  26   BUN  16   CREA  0.87   ALB  3.6     No results for input(s): PH, PCO2, PO2, HCO3 in the last 72 hours. Discharge Medications:   Current Discharge Medication List      CONTINUE these medications which have NOT CHANGED    Details   linaclotide (LINZESS) 145 mcg cap capsule Take 1 Cap by mouth Daily (before breakfast).   Qty: 90 Cap, Refills: 3      HYDROcodone-acetaminophen (NORCO) 7.5-325 mg per tablet Take 1 Tab by mouth every eight (8) hours as needed (breakthrough pain). Max Daily Amount: 3 Tabs. Qty: 15 Tab, Refills: 0      polyethylene glycol (MIRALAX) 17 gram/dose powder Take 17 g by mouth two (2) times a day. simvastatin (ZOCOR) 40 mg tablet Take 1 Tab by mouth nightly. Qty: 30 Tab, Refills: 0      enalapril (VASOTEC) 10 mg tablet Take 1 Tab by mouth two (2) times a day. Qty: 180 Tab, Refills: 3    Associated Diagnoses: Essential hypertension      hydroCHLOROthiazide (HYDRODIURIL) 25 mg tablet Take 1 Tab by mouth daily. Qty: 90 Tab, Refills: 3    Associated Diagnoses: Essential hypertension      triamcinolone (NASACORT AQ) 55 mcg nasal inhaler 2 Sprays daily. carvedilol (COREG) 25 mg tablet Take 1 Tab by mouth two (2) times daily (with meals). Qty: 180 Tab, Refills: 3      spironolactone (ALDACTONE) 25 mg tablet Take 1 Tab by mouth daily. Qty: 90 Tab, Refills: 3      loratadine (CLARITIN) 10 mg tablet Take 10 mg by mouth. FLUTICASONE PROPIONATE (FLONASE NA) 2 Sprays by Nasal route daily. B.infantis-B.ani-B.long-B.bifi (PROBIOTIC 4X) 10-15 mg TbEC Take 2 Caps by mouth daily. clotrimazole-betamethasone (LOTRISONE) topical cream Apply to affected area bid as directed  Qty: 15 g, Refills: 0    Associated Diagnoses: Candidiasis, cutaneous      MULTIVITS,CA,MIN/IRON/FA/LYCOP (CENTRUM ULTRA MEN'S PO) Take 1 Tab by mouth daily. omega-3 fatty acids-vitamin e (FISH OIL) 1,000 mg cap Take 1 Cap by mouth two (2) times a day. 1200 mg daily twice daily      cpap machine kit nightly. 14-18 cm pressure       metFORMIN (GLUCOPHAGE) 500 mg tablet Take 1 Tab by mouth two (2) times daily (with meals). Qty: 180 Tab, Refills: 3    Associated Diagnoses: Diabetes mellitus without complication ([address]             Followup/Outpt Studies:  --Follow up appointment with [facility] in 2 weeks with chest xray. --Resume all home medications.    --Recommend holding cpap therapy for 7 days after discharge. Patient is to return to hospital if symptoms reoccur. --Total discharge greater than 30 minutes in duration. More than 50% of the time documented was spent in face-to-face contact with the patient and in the care of the patient on the floor/unit where the patient is located. Ninfa Anthony NP    The patient has been seen and examined by me personally, the chart,labs, and radiographic studies have been reviewed. Chest: CTA  Extremities: trace edema    I agree with the above assessment and plan.   Discharge as planned  Koko Montero MD.

## 2017-10-09 NOTE — PROGRESS NOTES
Spiritual Care visit. Initial visit. Patient visited by Donnamaria Siemens.     Entered by Johnathon Palacio M.Ed., .B. ,Staff

## 2017-10-13 ENCOUNTER — HOSPITAL ENCOUNTER (OUTPATIENT)
Dept: GENERAL RADIOLOGY | Age: 62
Discharge: HOME | End: 2017-10-13
Attending: INTERNAL MEDICINE
Payer: COMMERCIAL

## 2017-10-13 DIAGNOSIS — J93.9 PNEUMOTHORAX, RIGHT: ICD-10-CM

## 2017-10-13 PROBLEM — G47.33 OSA ON CPAP: Status: ACTIVE | Noted: 2017-10-13

## 2017-10-13 PROBLEM — Z99.89 OSA ON CPAP: Status: ACTIVE | Noted: 2017-10-13

## 2017-10-13 PROCEDURE — 71020 XR CHEST PA LAT: CPT

## 2017-10-16 ENCOUNTER — HOSPITAL ENCOUNTER (EMERGENCY)
Age: 62
Discharge: OTHER INSTITUTION | DRG: 981 | End: 2017-10-17
Admitting: NEUROLOGICAL SURGERY
Payer: COMMERCIAL

## 2017-10-16 ENCOUNTER — APPOINTMENT (OUTPATIENT)
Dept: CT IMAGING | Age: 62
DRG: 981 | End: 2017-10-16
Attending: EMERGENCY MEDICINE
Payer: COMMERCIAL

## 2017-10-16 DIAGNOSIS — R27.0 ATAXIA DUE TO OLD HEAD TRAUMA: ICD-10-CM

## 2017-10-16 DIAGNOSIS — S06.5XAA SUBACUTE SUBDURAL HEMATOMA: Primary | ICD-10-CM

## 2017-10-16 DIAGNOSIS — S09.90XS ATAXIA DUE TO OLD HEAD TRAUMA: ICD-10-CM

## 2017-10-16 LAB
ALBUMIN SERPL-MCNC: 3.5 G/DL (ref 3.2–4.6)
ALBUMIN/GLOB SERPL: 0.9 {RATIO} (ref 1.2–3.5)
ALP SERPL-CCNC: 110 U/L (ref 50–136)
ALT SERPL-CCNC: 64 U/L (ref 12–65)
ANION GAP SERPL CALC-SCNC: 7 MMOL/L (ref 7–16)
AST SERPL-CCNC: 30 U/L (ref 15–37)
BASOPHILS # BLD: 0 K/UL (ref 0–0.2)
BASOPHILS NFR BLD: 0 % (ref 0–2)
BILIRUB SERPL-MCNC: 0.7 MG/DL (ref 0.2–1.1)
BUN SERPL-MCNC: 13 MG/DL (ref 8–23)
CALCIUM SERPL-MCNC: 9.4 MG/DL (ref 8.3–10.4)
CHLORIDE SERPL-SCNC: 97 MMOL/L (ref 98–107)
CO2 SERPL-SCNC: 27 MMOL/L (ref 21–32)
CREAT SERPL-MCNC: 0.81 MG/DL (ref 0.8–1.5)
DIFFERENTIAL METHOD BLD: ABNORMAL
EOSINOPHIL # BLD: 0.1 K/UL (ref 0–0.8)
EOSINOPHIL NFR BLD: 1 % (ref 0.5–7.8)
ERYTHROCYTE [DISTWIDTH] IN BLOOD BY AUTOMATED COUNT: 13.1 % (ref 11.9–14.6)
GLOBULIN SER CALC-MCNC: 3.9 G/DL (ref 2.3–3.5)
GLUCOSE SERPL-MCNC: 135 MG/DL (ref 65–100)
HCT VFR BLD AUTO: 40.5 % (ref 41.1–50.3)
HGB BLD-MCNC: 13.7 G/DL (ref 13.6–17.2)
IMM GRANULOCYTES # BLD: 0.1 K/UL (ref 0–0.5)
IMM GRANULOCYTES NFR BLD: 0.6 % (ref 0–5)
LYMPHOCYTES # BLD: 1.2 K/UL (ref 0.5–4.6)
LYMPHOCYTES NFR BLD: 14 % (ref 13–44)
MCH RBC QN AUTO: 32.4 PG (ref 26.1–32.9)
MCHC RBC AUTO-ENTMCNC: 33.8 G/DL (ref 31.4–35)
MCV RBC AUTO: 95.7 FL (ref 79.6–97.8)
MONOCYTES # BLD: 0.5 K/UL (ref 0.1–1.3)
MONOCYTES NFR BLD: 5 % (ref 4–12)
NEUTS SEG # BLD: 7.2 K/UL (ref 1.7–8.2)
NEUTS SEG NFR BLD: 79 % (ref 43–78)
PLATELET # BLD AUTO: 237 K/UL (ref 150–450)
PMV BLD AUTO: 10.1 FL (ref 10.8–14.1)
POTASSIUM SERPL-SCNC: 4 MMOL/L (ref 3.5–5.1)
PROT SERPL-MCNC: 7.4 G/DL (ref 6.3–8.2)
RBC # BLD AUTO: 4.23 M/UL (ref 4.23–5.67)
SODIUM SERPL-SCNC: 131 MMOL/L (ref 136–145)
TROPONIN I BLD-MCNC: 0.01 NG/ML (ref 0.02–0.05)
WBC # BLD AUTO: 9 K/UL (ref 4.3–11.1)

## 2017-10-16 RX ORDER — SODIUM CHLORIDE 0.9 % (FLUSH) 0.9 %
5-10 SYRINGE (ML) INJECTION EVERY 8 HOURS
Status: DISCONTINUED | OUTPATIENT
Start: 2017-10-16 | End: 2017-10-17 | Stop reason: HOSPADM

## 2017-10-16 RX ORDER — TRAMADOL HYDROCHLORIDE 50 MG/1
50 TABLET ORAL
COMMUNITY
End: 2017-10-31

## 2017-10-16 RX ORDER — SODIUM CHLORIDE 0.9 % (FLUSH) 0.9 %
5-10 SYRINGE (ML) INJECTION AS NEEDED
Status: DISCONTINUED | OUTPATIENT
Start: 2017-10-16 | End: 2017-10-17 | Stop reason: HOSPADM

## 2017-10-17 ENCOUNTER — APPOINTMENT (OUTPATIENT)
Dept: GENERAL RADIOLOGY | Age: 62
DRG: 981 | End: 2017-10-17
Payer: COMMERCIAL

## 2017-10-17 ENCOUNTER — ANESTHESIA EVENT (OUTPATIENT)
Dept: SURGERY | Age: 62
DRG: 981 | End: 2017-10-17
Payer: COMMERCIAL

## 2017-10-17 ENCOUNTER — APPOINTMENT (OUTPATIENT)
Dept: MRI IMAGING | Age: 62
DRG: 981 | End: 2017-10-17
Attending: NEUROLOGICAL SURGERY
Payer: COMMERCIAL

## 2017-10-17 ENCOUNTER — HOSPITAL ENCOUNTER (INPATIENT)
Age: 62
LOS: 3 days | Discharge: NURSING, REHAB OR LONG-TERM CARE | DRG: 981 | End: 2017-10-20
Attending: NEUROLOGICAL SURGERY | Admitting: NEUROLOGICAL SURGERY
Payer: COMMERCIAL

## 2017-10-17 VITALS
TEMPERATURE: 98.2 F | SYSTOLIC BLOOD PRESSURE: 153 MMHG | BODY MASS INDEX: 39.17 KG/M2 | DIASTOLIC BLOOD PRESSURE: 88 MMHG | HEART RATE: 100 BPM | WEIGHT: 315 LBS | HEIGHT: 75 IN | RESPIRATION RATE: 16 BRPM | OXYGEN SATURATION: 93 %

## 2017-10-17 DIAGNOSIS — I48.21 PERMANENT ATRIAL FIBRILLATION (HCC): ICD-10-CM

## 2017-10-17 DIAGNOSIS — I10 ESSENTIAL HYPERTENSION: ICD-10-CM

## 2017-10-17 DIAGNOSIS — E11.9 TYPE 2 DIABETES MELLITUS WITHOUT COMPLICATION, WITHOUT LONG-TERM CURRENT USE OF INSULIN (HCC): ICD-10-CM

## 2017-10-17 DIAGNOSIS — S06.5XAA SUBDURAL HEMATOMA: Primary | ICD-10-CM

## 2017-10-17 LAB
APTT PPP: 25 SEC (ref 23.5–31.7)
ARTERIAL PATENCY WRIST A: POSITIVE
ATRIAL RATE: 82 BPM
BASE DEFICIT BLDA-SCNC: 0.4 MMOL/L (ref 0–2)
BDY SITE: ABNORMAL
CALCULATED R AXIS, ECG10: 37 DEGREES
CALCULATED T AXIS, ECG11: 30 DEGREES
COHGB MFR BLD: 0.5 % (ref 0.5–1.5)
DIAGNOSIS, 93000: NORMAL
DO-HGB BLD-MCNC: 4 % (ref 0–5)
GLUCOSE BLD STRIP.AUTO-MCNC: 134 MG/DL (ref 65–100)
GLUCOSE BLD STRIP.AUTO-MCNC: 136 MG/DL (ref 65–100)
HCO3 BLDA-SCNC: 23 MMOL/L (ref 22–26)
HGB BLDMV-MCNC: 14.6 GM/DL (ref 11.7–15)
INR PPP: 1 (ref 0.9–1.2)
MAGNESIUM SERPL-MCNC: 2.1 MG/DL (ref 1.8–2.4)
METHGB MFR BLD: 0.3 % (ref 0–1.5)
OXYHGB MFR BLDA: 95.2 % (ref 94–97)
PCO2 BLDA: 33 MMHG (ref 35–45)
PH BLDA: 7.46 [PH] (ref 7.35–7.45)
PO2 BLDA: 79 MMHG (ref 80–105)
PROTHROMBIN TIME: 10.9 SEC (ref 8.6–12.2)
Q-T INTERVAL, ECG07: 352 MS
QRS DURATION, ECG06: 88 MS
QTC CALCULATION (BEZET), ECG08: 432 MS
SAO2 % BLD: 96 % (ref 92–98.5)
TSH SERPL DL<=0.005 MIU/L-ACNC: 1.96 UIU/ML (ref 0.36–3.74)
VENTILATION MODE VENT: ABNORMAL
VENTRICULAR RATE, ECG03: 91 BPM

## 2017-10-17 PROCEDURE — 97163 PT EVAL HIGH COMPLEX 45 MIN: CPT

## 2017-10-17 PROCEDURE — 74011250637 HC RX REV CODE- 250/637: Performed by: INTERNAL MEDICINE

## 2017-10-17 PROCEDURE — 74011250637 HC RX REV CODE- 250/637: Performed by: HOSPITALIST

## 2017-10-17 PROCEDURE — 65270000029 HC RM PRIVATE

## 2017-10-17 PROCEDURE — 82962 GLUCOSE BLOOD TEST: CPT

## 2017-10-17 PROCEDURE — 74011250637 HC RX REV CODE- 250/637: Performed by: NEUROLOGICAL SURGERY

## 2017-10-17 RX ORDER — SPIRONOLACTONE 25 MG/1
25 TABLET ORAL DAILY
Status: DISCONTINUED | OUTPATIENT
Start: 2017-10-18 | End: 2017-10-20 | Stop reason: HOSPADM

## 2017-10-17 RX ORDER — HYDROCODONE BITARTRATE AND ACETAMINOPHEN 7.5; 325 MG/1; MG/1
1 TABLET ORAL
Status: DISCONTINUED | OUTPATIENT
Start: 2017-10-17 | End: 2017-10-18

## 2017-10-17 RX ORDER — HYDROCODONE BITARTRATE AND ACETAMINOPHEN 7.5; 325 MG/1; MG/1
1 TABLET ORAL
Status: DISCONTINUED | OUTPATIENT
Start: 2017-10-17 | End: 2017-10-17

## 2017-10-17 RX ORDER — CARVEDILOL 12.5 MG/1
12.5 TABLET ORAL 2 TIMES DAILY WITH MEALS
Status: DISCONTINUED | OUTPATIENT
Start: 2017-10-17 | End: 2017-10-20 | Stop reason: HOSPADM

## 2017-10-17 RX ORDER — INSULIN LISPRO 100 [IU]/ML
INJECTION, SOLUTION INTRAVENOUS; SUBCUTANEOUS
Status: DISCONTINUED | OUTPATIENT
Start: 2017-10-17 | End: 2017-10-20 | Stop reason: HOSPADM

## 2017-10-17 RX ORDER — ONDANSETRON 2 MG/ML
4 INJECTION INTRAMUSCULAR; INTRAVENOUS
Status: COMPLETED | OUTPATIENT
Start: 2017-10-17 | End: 2017-10-17

## 2017-10-17 RX ORDER — METFORMIN HYDROCHLORIDE 500 MG/1
500 TABLET ORAL 2 TIMES DAILY WITH MEALS
Status: DISCONTINUED | OUTPATIENT
Start: 2017-10-18 | End: 2017-10-20 | Stop reason: HOSPADM

## 2017-10-17 RX ORDER — ONDANSETRON 2 MG/ML
4 INJECTION INTRAMUSCULAR; INTRAVENOUS
Status: DISCONTINUED | OUTPATIENT
Start: 2017-10-17 | End: 2017-10-17 | Stop reason: HOSPADM

## 2017-10-17 RX ORDER — HYDROMORPHONE HYDROCHLORIDE 1 MG/ML
1 INJECTION, SOLUTION INTRAMUSCULAR; INTRAVENOUS; SUBCUTANEOUS
Status: DISCONTINUED | OUTPATIENT
Start: 2017-10-17 | End: 2017-10-17 | Stop reason: HOSPADM

## 2017-10-17 RX ORDER — ONDANSETRON 2 MG/ML
4 INJECTION INTRAMUSCULAR; INTRAVENOUS
Status: DISCONTINUED | OUTPATIENT
Start: 2017-10-17 | End: 2017-10-20 | Stop reason: HOSPADM

## 2017-10-17 RX ORDER — CARVEDILOL 12.5 MG/1
12.5 TABLET ORAL ONCE
Status: COMPLETED | OUTPATIENT
Start: 2017-10-17 | End: 2017-10-17

## 2017-10-17 RX ORDER — HYDROCODONE BITARTRATE AND ACETAMINOPHEN 10; 325 MG/1; MG/1
1 TABLET ORAL
Status: DISCONTINUED | OUTPATIENT
Start: 2017-10-17 | End: 2017-10-17

## 2017-10-17 RX ORDER — MORPHINE SULFATE 2 MG/ML
4 INJECTION, SOLUTION INTRAMUSCULAR; INTRAVENOUS ONCE
Status: COMPLETED | OUTPATIENT
Start: 2017-10-17 | End: 2017-10-17

## 2017-10-17 RX ORDER — ENALAPRIL MALEATE 2.5 MG/1
10 TABLET ORAL DAILY
Status: DISCONTINUED | OUTPATIENT
Start: 2017-10-18 | End: 2017-10-18

## 2017-10-17 RX ADMIN — HYDROMORPHONE HYDROCHLORIDE 1 MG: 1 INJECTION, SOLUTION INTRAMUSCULAR; INTRAVENOUS; SUBCUTANEOUS at 07:27

## 2017-10-17 RX ADMIN — HYDROMORPHONE HYDROCHLORIDE 1 MG: 1 INJECTION, SOLUTION INTRAMUSCULAR; INTRAVENOUS; SUBCUTANEOUS at 03:05

## 2017-10-17 RX ADMIN — ONDANSETRON 4 MG: 2 INJECTION INTRAMUSCULAR; INTRAVENOUS at 07:27

## 2017-10-17 RX ADMIN — CARVEDILOL 12.5 MG: 12.5 TABLET, FILM COATED ORAL at 17:18

## 2017-10-17 RX ADMIN — HYDROCODONE BITARTRATE AND ACETAMINOPHEN 1 TABLET: 7.5; 325 TABLET ORAL at 17:18

## 2017-10-17 RX ADMIN — CARVEDILOL 12.5 MG: 12.5 TABLET, FILM COATED ORAL at 20:00

## 2017-10-17 RX ADMIN — ONDANSETRON 4 MG: 2 INJECTION INTRAMUSCULAR; INTRAVENOUS at 00:36

## 2017-10-17 RX ADMIN — ONDANSETRON 4 MG: 2 INJECTION INTRAMUSCULAR; INTRAVENOUS at 03:05

## 2017-10-17 RX ADMIN — Medication 4 MG: at 00:35

## 2017-10-17 RX ADMIN — HYDROCODONE BITARTRATE AND ACETAMINOPHEN 1 TABLET: 7.5; 325 TABLET ORAL at 21:44

## 2017-10-17 NOTE — PROGRESS NOTES
Primary Nurse Flaquita Chaparro and [address] , RN performed a dual skin assessment on this patient. 4x4 Dsg with tape and sutures noted to right scalp. Otherwise skin intact.

## 2017-10-17 NOTE — ED TRIAGE NOTES
Headache x 2 weeks due to SDH w/ evacuation 10-2-17, pt beginning to have gait disturbance and AMS/concentration issues since yesterday, primary w/ Dr. Amanda Mckeon.

## 2017-10-17 NOTE — ANESTHESIA PREPROCEDURE EVALUATION
Anesthetic History   No history of anesthetic complications            Review of Systems / Medical History  Patient summary reviewed and pertinent labs reviewed    Pulmonary        Sleep apnea: CPAP           Neuro/Psych             Comments: Subdural hematoma - s/p [place] holes with evacuation 10/2/17  Now with reaccumulation of fluid and questionable AMS  Cardiovascular    Hypertension: well controlled        Dysrhythmias (Pradaxa being held since initial subdural hematoma) : atrial fibrillation  Hyperlipidemia    Exercise tolerance: >4 METS  Comments: Off pradaxa now since 10/02   GI/Hepatic/Renal                Endo/Other    Diabetes: well controlled, type 2    Morbid obesity     Other Findings   Comments: hemochromatosis         Physical Exam    Airway  Mallampati: II  TM Distance: > 6 cm  Neck ROM: normal range of motion   Mouth opening: Normal     Cardiovascular    Rhythm: irregular  Rate: normal         Dental    Dentition: Caps/crowns and Bridges     Pulmonary  Breath sounds clear to auscultation               Abdominal  GI exam deferred       Other Findings            Anesthetic Plan    ASA: 3  Anesthesia type: general            Anesthetic plan and risks discussed with: Patient and Spouse      Glidescope used electively 10/2/17  Plan per Vidhya Chery to evaluate overnight, possibility of avoiding surgery. Plan glidescope intubation.

## 2017-10-17 NOTE — ED NOTES
Pt returned from MRI, placed on the bed. Pt states he is having some loss of sensation to both feet. Pt states he does have PMH of lower back problems and neuropathy.

## 2017-10-17 NOTE — PROGRESS NOTES
Problem: Falls - Risk of  Goal: *Absence of Falls  Document [place] Fall Risk and appropriate interventions in the flowsheet.    Outcome: Progressing Towards Goal  Fall Risk Interventions:  Mobility Interventions: Bed/chair exit alarm     Mentation Interventions: Door open when patient unattended     Medication Interventions: Patient to call before getting OOB

## 2017-10-17 NOTE — PROGRESS NOTES
Problem: Mobility Impaired (Adult and Pediatric)  Goal: *Acute Goals and Plan of Care (Insert Text)  LTG:  (1.)Mr. Stephanie Cline will move from supine to sit and sit to supine , scoot up and down and roll side to side in bed with CONTACT GUARD ASSIST within 7 day(s). (2.)Mr. Stephanie Cline will transfer from bed to chair and chair to bed with CONTACT GUARD ASSIST using the least restrictive device within 7 day(s). (3.)Mr. Stephanie Cline will ambulate with CONTACT GUARD ASSIST for 250 feet with the least restrictive device within 7 day(s). ________________________________________________________________________________________________      PHYSICAL THERAPY: INITIAL ASSESSMENT, PM 10/17/2017  INPATIENT: Hospital Day: 1  Payor: Salo Crane / Plan: [place] [employer] [facility] / Product Type: PPO /      NAME/AGE/GENDER: Libra Mendoza is a 57 y.o. male       PRIMARY DIAGNOSIS: ataxia; sub-dural hematoma  Subdural hematoma (HCC) Subdural hematoma (HCC) Subdural hematoma (HCC)        ICD-10: Treatment Diagnosis:       · Other abnormalities of gait and mobility (R26.89)  · History of falling (Z91.81)   Precaution/Allergies:  Zithromax [azithromycin]       ASSESSMENT:      Mr. Stephanie Cline presents supine in bed with wife at the bedside. He was pleasant and agreeable for PT assessment, endorsed HA 4/10. He required mod assist to transition to edge of bed. B LE strength WFL, however, patient's movement ataxic. He stood with min assist of 2, had difficulty shifting weight and taking steps initially. Sat, then stood again and transferred to chair with RW and min assist of 2. Reported increased headache with mobility-notified RN. Patient has declined in functional mobility over past couple days, Mr. Stephanie Cline would benefit from skilled physical therapy (medically necessary) to address his deficits and maximize his function.           This section established at most recent assessment   PROBLEM LIST (Impairments causing functional limitations):  1. Decreased ADL/Functional Activities  2. Decreased Transfer Abilities  3. Decreased Ambulation Ability/Technique  4. Decreased Balance  5. Decreased Activity Tolerance    INTERVENTIONS PLANNED: (Benefits and precautions of physical therapy have been discussed with the patient.)  1. Balance Exercise  2. Bed Mobility  3. Gait Training  4. Neuromuscular Re-education/Strengthening  5. Therapeutic Activites  6. Therapeutic Exercise/Strengthening  7. Transfer Training  8. education  9. Group Therapy      TREATMENT PLAN: Frequency/Duration: 3 times a week for duration of hospital stay  Rehabilitation Potential For Stated Goals: GOOD      RECOMMENDED REHABILITATION/EQUIPMENT: (at time of discharge pending progress): Due to the probability of continued deficits (see above) this patient will likely need continued skilled physical therapy after discharge. Equipment:   · None at this time                   HISTORY:   History of Present Injury/Illness (Reason for Referral):  Per MD note, \"Expand All Collapse All    [ ]Hide copied text  [ Kenneth Wilburn for attribution information  HPI Comments: 27-year-old male complaining of unsteady gait weakness. Patient was recently in the hospital for evacuation of subdural hematoma after a fall. Patient also developed a spontaneous pneumothorax. She was in the hospital several days and was discharged ambulating without difficulty. Over the last few days he's gotten more unsteady an weak. Fevers chills nausea vomiting or diarrhea no new falls. Patient is a 57 y.o. male presenting with headaches, gait problem, and altered mental status. The history is provided by the patient and a relative. \"       Past Medical History/Comorbidities:   Mr. Evelyn Mahan  has a past medical history of A-fib ([facility] Arteriosclerosis; Atrial fibrillation ([facility] (1/3/2012); Diabetes mellitus; Diabetes mellitus ([address] (1/3/2012); Essential hypertension, benign (2/6/2014);  Family history of malignant neoplasm of prostate (2/6/2014); Hemochromatosis; HTN (hypertension) (2/6/2014); Hypercholesteremia; Nodular prostate without urinary obstruction (2/6/2014); and Obesity (2/6/2014). Mr. Bullard Speaker  has a past surgical history that includes knee arthroscp harv and urological.  Social History/Living Environment:   Home Environment: Private residence  One/Two Story Residence: Two story, live on 1st floor  Living Alone: No  Support Systems: Family member(s), Spouse/Significant Other/Partner  Patient Expects to be Discharged to[de-identified] Unknown  Current DME Used/Available at Home: Walker, rolling, Transfer bench  Tub or Shower Type: Tub/Shower combination  Prior Level of Function/Work/Activity:  Lives at home with wife. After returning home from acute care he was ambulating independently in home. Number of Personal Factors/Comorbidities that affect the Plan of Care: 1-2: MODERATE COMPLEXITY   EXAMINATION:   Most Recent Physical Functioning:   Gross Assessment:  AROM: Generally decreased, functional  Strength: Generally decreased, functional  Coordination: Grossly decreased, non-functional (movement ataxic B LE's)  Sensation: Impaired (peripheral neuropathy)               Posture:  Posture (WDL): Exceptions to WDL  Posture Assessment: Forward head, Rounded shoulders  Balance:  Sitting: Impaired  Sitting - Static: Good (unsupported)  Sitting - Dynamic: Prop sitting  Standing: Impaired  Standing - Static: Constant support  Standing - Dynamic : Poor Bed Mobility:  Supine to Sit: Moderate assistance  Scooting: Minimum assistance  Wheelchair Mobility:     Transfers:  Sit to Stand: Minimum assistance  Stand to Sit: Minimum assistance  Bed to Chair: Minimum assistance;Assist x2  Gait:             Body Structures Involved:  1. Nerves  2. Muscles Body Functions Affected:  1. Sensory/Pain  2. Neuromusculoskeletal  3. Movement Related Activities and Participation Affected:  1. Mobility  2. Self Care  3. Domestic Life  4.  Community, Social and [place]   Number of elements that affect the Plan of Care: 4+: HIGH COMPLEXITY   CLINICAL PRESENTATION:   Presentation: Evolving clinical presentation with unstable and unpredictable characteristics: HIGH COMPLEXITY   CLINICAL DECISION MAKIN [facility] Short Form  How much difficulty does the patient currently have. .. Unable A Lot A Little None   1. Turning over in bed (including adjusting bedclothes, sheets and blankets)? [ ] 1   [ ] 2   [X] 3   [ ] 4   2. Sitting down on and standing up from a chair with arms ( e.g., wheelchair, bedside commode, etc.)   [ ] 1   [ ] 2   [X] 3   [ ] 4   3. Moving from lying on back to sitting on the side of the bed? [ ] 1   [X] 2   [ ] 3   [ ] 4   How much help from another person does the patient currently need. .. Total A Lot A Little None   4. Moving to and from a bed to a chair (including a wheelchair)? [ ] 1   [ ] 2   [X] 3   [ ] 4   5. Need to walk in hospital room? [ ] 1   [X] 2   [ ] 3   [ ] 4   6. Climbing 3-5 steps with a railing? [ ] 1   [X] 2   [ ] 3   [ ] 4   © , Trustees of [address] [place] [address] [address] [address], under license to [employer]. All rights reserved    Score:  Initial: 15 Most Recent: X (Date: -- )     Interpretation of Tool:  Represents activities that are increasingly more difficult (i.e. Bed mobility, Transfers, Gait).        Score 24 23 22-20 19-15 14-10 9-7 6       Modifier CH CI CJ CK CL CM CN         · Mobility - Walking and Moving Around:               - CURRENT STATUS:    CK - 40%-59% impaired, limited or restricted               - GOAL STATUS:           CJ - 20%-39% impaired, limited or restricted               - D/C STATUS:                       ---------------To be determined---------------  Payor: BLUE CROSS / Plan: [place] [facility] / Product Type: PPO /       Medical Necessity:     · Patient is expected to demonstrate progress in balance, coordination and functional technique to increase independence with   and improve safety during all functional mobility. Reason for Services/Other Comments:  · Patient continues to require skilled intervention due to medical complications and patient unable to attend/participate in therapy as expected. Use of outcome tool(s) and clinical judgement create a POC that gives a: Difficult prediction of patient's progress: HIGH COMPLEXITY                 TREATMENT:   (In addition to Assessment/Re-Assessment sessions the following treatments were rendered)   Pre-treatment Symptoms/Complaints:    Pain: Initial:   Pain Intensity 1: 4  Pain Location 1: Head  Pain Intervention(s) 1: Repositioned, Nurse notified  Post Session:  6/10, RN notified      Assessment/Reassessment only, no treatment provided today     Braces/Orthotics/Lines/Etc:   · none  ·   Treatment/Session Assessment:    · Response to Treatment:  Increase in HA  · Interdisciplinary Collaboration:  · Physical Therapist  · Registered Nurse  · After treatment position/precautions:  · Up in chair  · Bed/Chair-wheels locked  · Call light within reach  · RN notified  · MD at bedside  · Compliance with Program/Exercises: compliant all of the time. · Recommendations/Intent for next treatment session: \"Next visit will focus on advancements to more challenging activities and reduction in assistance provided\".   Total Treatment Duration:  PT Patient Time In/Time Out  Time In: 1540  Time Out: 501 Keagan Christensen, PT, DPT

## 2017-10-17 NOTE — PROGRESS NOTES
TRANSFER - IN REPORT:    Verbal report received from [facility] (name) on Mardee Class  being received from  ER (unit) for routine progression of care      Report consisted of patients Situation, Background, Assessment and   Recommendations(SBAR). Information from the following report(s) SBAR, Kardex, Intake/Output, MAR and Recent Results was reviewed with the receiving nurse. Opportunity for questions and clarification was provided. Assessment completed upon patients arrival to unit (room 721) and care assumed.

## 2017-10-17 NOTE — H&P
[address]   HISTORY AND PHYSICAL       Name:  Sandra Bates   MR#:  [id number]   :  1955   Account #:  [de-identified]   Date of Adm:  10/17/2017       LOCATION: Seventh floor    CHIEF COMPLAINT: Ataxia and headaches for days. HISTORY OF PRESENT ILLNESS: A 69-year-old gentleman on   anticoagulation for atrial fibrillation who presented following   a fall with head injury several weeks prior. Right rib injuries   and closed head injury; however, CT brain scanning at the time   was negative. He presented back 15 days ago with signs of   headache and left-sided weakness. CT brain scan confirmed a   large right-sided subacute subdural hematoma with acute on   chronic features with mass effect and shift. A smaller fluid   collection was present on the left. He was taken to the   operating room and underwent cailin hole evacuation of his right-  sided subdural hematoma with reexpansion of the right hemisphere   and a Eh-Segovia drain was left in for several days and   removed and he was discharged home. He presented back to the   hospital with shortness of breath and chest pain the following   week and was found to have a large tension pneumothorax on the   right and required chest tube placement. This was likely due to   the fall initially as well. He was discharged home from that and   then returned back to the hospital emergency room last evening   with symptoms of headache and ataxia. CT brain scanning did   confirm bilateral fluid collection on the subdural space with   enlargement of the left-sided collection. No acute bleeding was   identified. MRI scanning of the brain was ordered from the   emergency department. It was negative for acute cerebrovascular   accident and the fluid collections proved to be hygromas. There   did appear to be residual gyri and sulci seen on the MRI scan   without complete effacement, although there was definite   compression seen.  No mass effect or shift as previously. He was   admitted for further neurosurgical care. ALLERGIES: [place]. PAST MEDICAL HISTORY: Significant for sleep apnea, obesity,   mitral valve regurgitation, hypertension, hepatic steatosis,   hemochromatosis, atrial fibrillation. FAMILY HISTORY: Positive for stroke, cancer, heart disease,   hypertension, and sarcoidosis. SOCIAL HISTORY: He is a former smoker, having quit in 1901 [employer]. Minimal ethanol consumer. He is  and lives locally. REVIEW OF SYSTEMS: Negative for chest pain, shortness of breath,   or fatigue currently. PHYSICAL EXAMINATION   NEUROLOGIC: Awake, alert, and oriented x3. No pronator drift. Dressing on the right side of his scalp from his previous   surgery. Cranial nerves 2-12 intact. Motor strength 5/5. Gait   not assessed. Reflexes symmetric. HEENT: Unremarkable. Nose and throat clear. CHEST: Clear bilaterally. CARDIAC: Regular rate and rhythm. No murmurs or gallops. ABDOMEN: Obese, nontender, no masses. Bowel sounds positive. EXTREMITIES: Swelling in the distal lower extremities. IMPRESSION: Headache and gait ataxia. Evidence for subdural   effusions consistent with subdural hygromas and not  subdural   hematomas. No significant shift or mass effect, but there does   appear to be some cortical compression. RECOMMENDATIONS: Admit for possible cailin hole evacuation and   drainage. Physical therapy, occupational therapy, and most   probably will require inpatient rehabilitation. If his mental   status remains intact, does not have any significant confusion   or neurological issues we can perhaps watch these subdural   effusions to see if they will reabsorb in time, as there does   not appear to be any acute bleeding. Nevertheless, he is   scheduled for surgery tomorrow and will remain on the operative   schedule until further determination in the morning. ADMISSION CONDITION: Fair.         MD RADHA Prajapati / ROBERT   D: 10/17/2017   16:16   T:  10/17/2017   16:48   Job #:  [id number]

## 2017-10-17 NOTE — PROGRESS NOTES
NS  MRI BRAIN IS NEGATIVE FOR ACUTE  CVA  FLUID COLLECTIONS ARE HYGROMAS  NOT HEMATOMAS  GYRI  AND  SULCI ARE EVIDENT BUT COMPRESSED;  NO SHIFT  A/P OR TENTATIVE FOR TOMORROW  CT BRAIN IN  AM  REHAB/PT/OT  Liam Baker MD

## 2017-10-17 NOTE — PROGRESS NOTES
NS  PATIENT KNOWN TO ME  S/P RIGHT HARI HOLE EVACUATION OF SDH 2 WEEKS AGO  NOW WITH INTERMITTENT CONFUSION  AND GAIT TROUBLES  CT:  BILAT CHRONIC SDH;  PREVIOUS SHIFT IS NO LONGER PRESENT  A/P ADMIT FOR OBSERVATION    MAY NEED  Lizzeth Garcia MD

## 2017-10-17 NOTE — ED NOTES
TRANSFER - OUT REPORT:    Verbal report given to Nura King RN on Sanjay Kaur  being transferred to [facility] 721for routine progression of care       Report consisted of patients Situation, Background, Assessment and   Recommendations(SBAR). Information from the following report(s) ED Summary, Intake/Output, MAR, Recent Results and Cardiac Rhythm A Fib was reviewed with the receiving nurse. Lines:   Peripheral IV 10/16/17 Left Forearm (Active)   Site Assessment Clean, dry, & intact 10/16/2017 10:08 PM   Phlebitis Assessment 0 10/16/2017 10:08 PM   Infiltration Assessment 0 10/16/2017 10:08 PM   Dressing Status Clean, dry, & intact 10/16/2017 10:08 PM        Opportunity for questions and clarification was provided.       Patient transported with:   Monitor  Tech

## 2017-10-17 NOTE — CONSULTS
Tyree Daniel is a 58 yo male with PMH of right subdural hematoma s/p surgery/cailin holes on 10/2/17, right pneumohorax s/p chest tube 10/7/17, atrial fibrillation off pradaxa at the moment, Obesity, [place] on  CPAP, hyperferritininemia with phlebotomies admitted for AMS, gait disturbances for the past 1-2 days. His wife was present and helped to provide history. Patient stated he has been having difficulty concentrating, lost of coordination, tremors and gait abnormality ( he was dragging his legs ), reason for him to seek medical attention. He was able to speak in full sentences. Denies nausea, vomiting, dizziness, chest pain, sob, fever, chills, palsies, visual impairments. Upon arrival to ER vitals checked. Laboratory findings reviewed, pertinent for chronic mild hyponatremia os 131. Potassium, magnesium and creatinine within normal limits. H/H stable > 13gr/dl. Patient was evaluated by Dr. Tonia Le after brain ct scan showed bilateral subdural effusions with mass effect. Brain MRI done today compatible with increased subdural effusions: left side subdural 14mm ( before 9 mm ), mass effect. Acute on chronic right sided subdural collection 11 mm ( before 5 mm ). Hospitalist was consulted for confusion. PMH:  Atrial fibrillation off pradaxa  [place] on CPAP  Obesity  HTN  Type 2 DM  Hemochromatosis   Right subdural hematoma s/p cailin hole surgery 10/2/17  Right pneumothorax s/p chest tube 10/7/17    Family history: contributory for heart disease in his father. Social hx: past  smoker, no alcohol use. Physical exam:  VS: /89    T98.6F  O2: 94%  RR 16  He was found lying on his bed, eating, able to speak in full sentences. Alert, oriented x 3, no distress  Heent: right craniotomy surgery, covered with sterile gauze.  Eyes: normal conjunctivas- no ocular movement palsies- normal oral mucosas  Neck: supple  Cardiac: irregular, no murmurs, no gallops, no rubs  Lungs: clear, no rales, no wheezing  Abdomen: obese, non tender, no masses  Extremities: no edema  Neurology: cranial nerves II-XII grossly normal- motor, sensitivity preserved 5/5. No palsies, no pronator drift, coordination preserved. No visual deficits-  Vascular: peripheral pulses present  Skin: redness over right hemifacial area, no rash    Labs and ancillary:  Sodium 131- chronic  Abg: respiratory alkalosis findings  CXR negative  Brain ct and brain MRI: as above     Assessment and plan:    1. Metabolic encephalopathy / gait disturbances, likely related to subdural collections with mass effect. Patient had a recent right subdural hematoma drainage 2 weeks ago. Collections seem to have increased. So far no focal neurologic deficits, no visual loss. Neurosurgery on board, plan for drainage and rehab. 2.Type 2 DM  3. HTN  4. A fib off pradaxa    At the moment no signs of infection, hypoxia, acute electrolyte derangement, anemia nor CVA present. Suggest to resume home meds: coreg 12.5mg bid, aldactone 25mg po day, enalapril 10mg po day, Hold HCTZ for now. Hold metformin and start insulin sliding scale  Norco for pain- he stated cannot tolerate dilauid very well  Will follow patient    Thank you for allowing us to participate in the care of this pleasant patient.

## 2017-10-17 NOTE — ED PROVIDER NOTES
HPI Comments: 58-year-old male complaining of unsteady gait weakness. Patient was recently in the hospital for evacuation of subdural hematoma after a fall. Patient also developed a spontaneous pneumothorax. She was in the hospital several days and was discharged ambulating without difficulty. Over the last few days he's gotten more unsteady an weak. Fevers chills nausea vomiting or diarrhea no new falls. Patient is a 57 y.o. male presenting with headaches, gait problem, and altered mental status. The history is provided by the patient and a relative. Headache    This is a new problem. The current episode started 12 to 24 hours ago. The problem occurs constantly. The problem has not changed since onset. The headache is aggravated by nothing. Associated symptoms include weakness and visual change. He has tried nothing for the symptoms. Gait Problem      Altered mental status    Associated symptoms include weakness.         Past Medical History:   Diagnosis Date    A-fib [facility])      cardioversion    Arteriosclerosis     Atrial fibrillation ([facility] [address] 1/3/2012    Diabetes mellitus     pharmacologically controlled    Diabetes mellitus ([facility] [address] 1/3/2012    Essential hypertension, benign 2014    Family history of malignant neoplasm of prostate 2014    Hemochromatosis     HTN (hypertension) 2014    Hypercholesteremia     Nodular prostate without urinary obstruction 2014    Obesity 2014       Past Surgical History:   Procedure Laterality Date    HX UROLOGICAL      prostate u/s and BX    KNEE ARTHROSCP HARV      right         Family History:   Problem Relation Age of Onset    Heart Attack Father 61         Diabetes Father     Heart Disease Father     Hypertension Father     Cancer Father      lung    Other Mother      Sarcoidosis    Stroke Brother     Psychiatric Disorder Brother     Heart Disease Paternal Grandmother        Social History     Social History    Marital status:      Spouse name: N/A    Number of children: N/A    Years of education: N/A     Occupational History    Not on file. Social History Main Topics    Smoking status: Former Smoker     Packs/day: 1.50     Years: 18.00     Types: Cigarettes     Quit date: 8/1/1989    Smokeless tobacco: Former User     Quit date: 12/29/2016    Alcohol use 0.0 oz/week      Comment: has lessened his alcohol intake since learning about his abnormal liver labs    Drug use: No    Sexual activity: Yes     Partners: Female     Birth control/ protection: None     Other Topics Concern    Stress Concern Yes    Weight Concern Yes    Special Diet No    Exercise No    Seat Belt Yes    Self-Exams No     Social History Narrative         ALLERGIES: Zithromax [azithromycin]    Review of Systems   Constitutional: Negative for activity change. HENT: Negative. Eyes: Negative. Respiratory: Negative. Cardiovascular: Negative. Gastrointestinal: Negative. Genitourinary: Negative. Musculoskeletal: Positive for gait problem. Skin: Negative. Neurological: Positive for weakness and headaches. All other systems reviewed and are negative. Vitals:    10/16/17 2149   BP: (!) 133/94   Pulse: 92   Resp: 18   Temp: 98 °F (36.7 °C)   SpO2: 95%   Weight: 152 kg (335 lb)   Height: 6' 3\" (1.905 m)            Physical Exam   Constitutional: He is oriented to person, place, and time. He appears well-developed and well-nourished. No distress. HENT:   Head: Normocephalic and atraumatic. Right Ear: External ear normal.   Left Ear: External ear normal.   Nose: Nose normal.   Mouth/Throat: Oropharynx is clear and moist. No oropharyngeal exudate. Eyes: Conjunctivae and EOM are normal. Pupils are equal, round, and reactive to light. Right eye exhibits no discharge. Left eye exhibits no discharge. No scleral icterus. Neck: Normal range of motion. Neck supple. No JVD present. No tracheal deviation present. Cardiovascular: Normal rate, regular rhythm and intact distal pulses. Pulmonary/Chest: Effort normal and breath sounds normal. No stridor. No respiratory distress. He has no wheezes. He exhibits no tenderness. Abdominal: Soft. Bowel sounds are normal. He exhibits no distension and no mass. There is no tenderness. Musculoskeletal: Normal range of motion. He exhibits no edema or tenderness. Neurological: He is alert and oriented to person, place, and time. No cranial nerve deficit. Skin: Skin is warm and dry. No rash noted. He is not diaphoretic. No erythema. No pallor. Psychiatric: He has a normal mood and affect. His behavior is normal. Thought content normal.   Nursing note and vitals reviewed. 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 Vidhya Chery he will admit for observation transfer the patient downtown to the neuro floor hospitalist to see in consultation tonight.        Amount and/or Complexity of Data Reviewed  Clinical lab tests: ordered and reviewed  Tests in the radiology section of CPT®: ordered and reviewed  Tests in the medicine section of CPT®: ordered and reviewed    Risk of Complications, Morbidity, and/or Mortality  Presenting problems: high  Diagnostic procedures: high  Management options: high      ED Course       Procedures

## 2017-10-17 NOTE — PROGRESS NOTES
Per Dr. Tyson Fitzgerald, pt may eat this afternoon and he would like PT/OT to see pt this afternoon. Orders placed. Primary RN updated. Dr. Tyson Fitzgerald also stated he would be up to see pt in awhile.

## 2017-10-18 ENCOUNTER — APPOINTMENT (OUTPATIENT)
Dept: CT IMAGING | Age: 62
DRG: 981 | End: 2017-10-18
Attending: NEUROLOGICAL SURGERY
Payer: COMMERCIAL

## 2017-10-18 ENCOUNTER — ANESTHESIA (OUTPATIENT)
Dept: SURGERY | Age: 62
DRG: 981 | End: 2017-10-18
Payer: COMMERCIAL

## 2017-10-18 PROBLEM — G96.08 SUBDURAL HYGROMA: Status: ACTIVE | Noted: 2017-10-18

## 2017-10-18 LAB
GLUCOSE BLD STRIP.AUTO-MCNC: 123 MG/DL (ref 65–100)
GLUCOSE BLD STRIP.AUTO-MCNC: 135 MG/DL (ref 65–100)
GLUCOSE BLD STRIP.AUTO-MCNC: 137 MG/DL (ref 65–100)
GLUCOSE BLD STRIP.AUTO-MCNC: 159 MG/DL (ref 65–100)

## 2017-10-18 PROCEDURE — 74011250637 HC RX REV CODE- 250/637: Performed by: INTERNAL MEDICINE

## 2017-10-18 PROCEDURE — 77030034850: Performed by: NEUROLOGICAL SURGERY

## 2017-10-18 PROCEDURE — 77030002916 HC SUT ETHLN J&J -A: Performed by: NEUROLOGICAL SURGERY

## 2017-10-18 PROCEDURE — 76210000016 HC OR PH I REC 1 TO 1.5 HR: Performed by: NEUROLOGICAL SURGERY

## 2017-10-18 PROCEDURE — C1713 ANCHOR/SCREW BN/BN,TIS/BN: HCPCS | Performed by: NEUROLOGICAL SURGERY

## 2017-10-18 PROCEDURE — 77030018836 HC SOL IRR NACL ICUM -A: Performed by: NEUROLOGICAL SURGERY

## 2017-10-18 PROCEDURE — 77030030163 HC BN WAX J&J -A: Performed by: NEUROLOGICAL SURGERY

## 2017-10-18 PROCEDURE — 77030008468 HC STPLR SKN VISIS TELE -A: Performed by: NEUROLOGICAL SURGERY

## 2017-10-18 PROCEDURE — 77030012894: Performed by: NEUROLOGICAL SURGERY

## 2017-10-18 PROCEDURE — 74011250636 HC RX REV CODE- 250/636: Performed by: ANESTHESIOLOGY

## 2017-10-18 PROCEDURE — 77030002946 HC SUT NRLN J&J -B: Performed by: NEUROLOGICAL SURGERY

## 2017-10-18 PROCEDURE — 77030003029 HC SUT VCRL J&J -B: Performed by: NEUROLOGICAL SURGERY

## 2017-10-18 PROCEDURE — 74011000250 HC RX REV CODE- 250: Performed by: ANESTHESIOLOGY

## 2017-10-18 PROCEDURE — 77030008477 HC STYL SATN SLP COVD -A: Performed by: ANESTHESIOLOGY

## 2017-10-18 PROCEDURE — 74011250636 HC RX REV CODE- 250/636: Performed by: NEUROLOGICAL SURGERY

## 2017-10-18 PROCEDURE — 65620000000 HC RM CCU GENERAL

## 2017-10-18 PROCEDURE — 74011000250 HC RX REV CODE- 250: Performed by: NEUROLOGICAL SURGERY

## 2017-10-18 PROCEDURE — 74011250637 HC RX REV CODE- 250/637: Performed by: NEUROLOGICAL SURGERY

## 2017-10-18 PROCEDURE — 74011250636 HC RX REV CODE- 250/636

## 2017-10-18 PROCEDURE — 74011000250 HC RX REV CODE- 250

## 2017-10-18 PROCEDURE — 82962 GLUCOSE BLOOD TEST: CPT

## 2017-10-18 PROCEDURE — 76010000162 HC OR TIME 1.5 TO 2 HR INTENSV-TIER 1: Performed by: NEUROLOGICAL SURGERY

## 2017-10-18 PROCEDURE — 74011000258 HC RX REV CODE- 258: Performed by: NEUROLOGICAL SURGERY

## 2017-10-18 PROCEDURE — 77030014007 HC SPNG HEMSTAT J&J -B: Performed by: NEUROLOGICAL SURGERY

## 2017-10-18 PROCEDURE — 76060000034 HC ANESTHESIA 1.5 TO 2 HR: Performed by: NEUROLOGICAL SURGERY

## 2017-10-18 PROCEDURE — 77030013567 HC DRN WND RESERV BARD -A: Performed by: NEUROLOGICAL SURGERY

## 2017-10-18 PROCEDURE — 74011250637 HC RX REV CODE- 250/637: Performed by: ANESTHESIOLOGY

## 2017-10-18 PROCEDURE — 77030020782 HC GWN BAIR PAWS FLX 3M -B: Performed by: ANESTHESIOLOGY

## 2017-10-18 PROCEDURE — 77030014355 HC CVR BUR H TI BIOM -C: Performed by: NEUROLOGICAL SURGERY

## 2017-10-18 PROCEDURE — 77030016570 HC BLNKT BAIR HGGR 3M -B: Performed by: ANESTHESIOLOGY

## 2017-10-18 PROCEDURE — 77030012406 HC DRN WND PENRS BARD -A: Performed by: NEUROLOGICAL SURGERY

## 2017-10-18 PROCEDURE — 77030019908 HC STETH ESOPH SIMS -A: Performed by: ANESTHESIOLOGY

## 2017-10-18 PROCEDURE — 77030018390 HC SPNG HEMSTAT2 J&J -B: Performed by: NEUROLOGICAL SURGERY

## 2017-10-18 PROCEDURE — 70450 CT HEAD/BRAIN W/O DYE: CPT

## 2017-10-18 PROCEDURE — 77030032490 HC SLV COMPR SCD KNE COVD -B: Performed by: NEUROLOGICAL SURGERY

## 2017-10-18 PROCEDURE — 77030011640 HC PAD GRND REM COVD -A: Performed by: NEUROLOGICAL SURGERY

## 2017-10-18 PROCEDURE — 74011636637 HC RX REV CODE- 636/637: Performed by: INTERNAL MEDICINE

## 2017-10-18 PROCEDURE — 77030004416 HC BUR PERF J&J -C: Performed by: NEUROLOGICAL SURGERY

## 2017-10-18 PROCEDURE — 009400Z DRAINAGE OF INTRACRANIAL SUBDURAL SPACE WITH DRAINAGE DEVICE, OPEN APPROACH: ICD-10-PCS | Performed by: NEUROLOGICAL SURGERY

## 2017-10-18 PROCEDURE — 77030008703 HC TU ET UNCUF COVD -A: Performed by: ANESTHESIOLOGY

## 2017-10-18 DEVICE — COVER BUR H L DIA18.5MM THK0.5MM 5 H NEURO TI W/O TAB: Type: IMPLANTABLE DEVICE | Site: SKULL | Status: FUNCTIONAL

## 2017-10-18 DEVICE — IMPLANTABLE DEVICE
Type: IMPLANTABLE DEVICE | Site: SKULL | Status: FUNCTIONAL
Brand: 1.5MM SYSTEM

## 2017-10-18 RX ORDER — CARVEDILOL 25 MG/1
25 TABLET ORAL 2 TIMES DAILY WITH MEALS
Status: DISCONTINUED | OUTPATIENT
Start: 2017-10-18 | End: 2017-10-18

## 2017-10-18 RX ORDER — LEVALBUTEROL INHALATION SOLUTION 1.25 MG/3ML
1.25 SOLUTION RESPIRATORY (INHALATION)
Status: COMPLETED | OUTPATIENT
Start: 2017-10-18 | End: 2017-10-18

## 2017-10-18 RX ORDER — FAMOTIDINE 20 MG/1
20 TABLET, FILM COATED ORAL ONCE
Status: COMPLETED | OUTPATIENT
Start: 2017-10-18 | End: 2017-10-18

## 2017-10-18 RX ORDER — MIDAZOLAM HYDROCHLORIDE 1 MG/ML
2 INJECTION, SOLUTION INTRAMUSCULAR; INTRAVENOUS ONCE
Status: DISCONTINUED | OUTPATIENT
Start: 2017-10-18 | End: 2017-10-18 | Stop reason: HOSPADM

## 2017-10-18 RX ORDER — OXYCODONE AND ACETAMINOPHEN 7.5; 325 MG/1; MG/1
1 TABLET ORAL
Status: DISCONTINUED | OUTPATIENT
Start: 2017-10-18 | End: 2017-10-20 | Stop reason: HOSPADM

## 2017-10-18 RX ORDER — SIMVASTATIN 40 MG/1
40 TABLET, FILM COATED ORAL
Status: DISCONTINUED | OUTPATIENT
Start: 2017-10-18 | End: 2017-10-20 | Stop reason: HOSPADM

## 2017-10-18 RX ORDER — VECURONIUM BROMIDE FOR INJECTION 1 MG/ML
INJECTION, POWDER, LYOPHILIZED, FOR SOLUTION INTRAVENOUS AS NEEDED
Status: DISCONTINUED | OUTPATIENT
Start: 2017-10-18 | End: 2017-10-18 | Stop reason: HOSPADM

## 2017-10-18 RX ORDER — ONDANSETRON 2 MG/ML
INJECTION INTRAMUSCULAR; INTRAVENOUS AS NEEDED
Status: DISCONTINUED | OUTPATIENT
Start: 2017-10-18 | End: 2017-10-18 | Stop reason: HOSPADM

## 2017-10-18 RX ORDER — CEFAZOLIN SODIUM 1 G/3ML
INJECTION, POWDER, FOR SOLUTION INTRAMUSCULAR; INTRAVENOUS AS NEEDED
Status: DISCONTINUED | OUTPATIENT
Start: 2017-10-18 | End: 2017-10-18 | Stop reason: HOSPADM

## 2017-10-18 RX ORDER — NEOSTIGMINE METHYLSULFATE 1 MG/ML
INJECTION INTRAVENOUS AS NEEDED
Status: DISCONTINUED | OUTPATIENT
Start: 2017-10-18 | End: 2017-10-18 | Stop reason: HOSPADM

## 2017-10-18 RX ORDER — PROPOFOL 10 MG/ML
INJECTION, EMULSION INTRAVENOUS AS NEEDED
Status: DISCONTINUED | OUTPATIENT
Start: 2017-10-18 | End: 2017-10-18 | Stop reason: HOSPADM

## 2017-10-18 RX ORDER — OXYCODONE AND ACETAMINOPHEN 5; 325 MG/1; MG/1
1 TABLET ORAL AS NEEDED
Status: DISCONTINUED | OUTPATIENT
Start: 2017-10-18 | End: 2017-10-18 | Stop reason: HOSPADM

## 2017-10-18 RX ORDER — LIDOCAINE HYDROCHLORIDE 20 MG/ML
INJECTION, SOLUTION EPIDURAL; INFILTRATION; INTRACAUDAL; PERINEURAL AS NEEDED
Status: DISCONTINUED | OUTPATIENT
Start: 2017-10-18 | End: 2017-10-18 | Stop reason: HOSPADM

## 2017-10-18 RX ORDER — LIDOCAINE HYDROCHLORIDE AND EPINEPHRINE 10; 10 MG/ML; UG/ML
INJECTION, SOLUTION INFILTRATION; PERINEURAL AS NEEDED
Status: DISCONTINUED | OUTPATIENT
Start: 2017-10-18 | End: 2017-10-18 | Stop reason: HOSPADM

## 2017-10-18 RX ORDER — HYDROCHLOROTHIAZIDE 25 MG/1
25 TABLET ORAL DAILY
Status: DISCONTINUED | OUTPATIENT
Start: 2017-10-19 | End: 2017-10-20 | Stop reason: HOSPADM

## 2017-10-18 RX ORDER — MORPHINE SULFATE 2 MG/ML
2 INJECTION, SOLUTION INTRAMUSCULAR; INTRAVENOUS
Status: DISCONTINUED | OUTPATIENT
Start: 2017-10-18 | End: 2017-10-18 | Stop reason: HOSPADM

## 2017-10-18 RX ORDER — SODIUM CHLORIDE, SODIUM LACTATE, POTASSIUM CHLORIDE, CALCIUM CHLORIDE 600; 310; 30; 20 MG/100ML; MG/100ML; MG/100ML; MG/100ML
100 INJECTION, SOLUTION INTRAVENOUS CONTINUOUS
Status: DISCONTINUED | OUTPATIENT
Start: 2017-10-18 | End: 2017-10-18 | Stop reason: HOSPADM

## 2017-10-18 RX ORDER — SODIUM CHLORIDE, SODIUM LACTATE, POTASSIUM CHLORIDE, CALCIUM CHLORIDE 600; 310; 30; 20 MG/100ML; MG/100ML; MG/100ML; MG/100ML
INJECTION, SOLUTION INTRAVENOUS
Status: DISCONTINUED | OUTPATIENT
Start: 2017-10-18 | End: 2017-10-18 | Stop reason: HOSPADM

## 2017-10-18 RX ORDER — SODIUM CHLORIDE 0.9 % (FLUSH) 0.9 %
5-10 SYRINGE (ML) INJECTION EVERY 8 HOURS
Status: DISCONTINUED | OUTPATIENT
Start: 2017-10-18 | End: 2017-10-18

## 2017-10-18 RX ORDER — SODIUM CHLORIDE 0.9 % (FLUSH) 0.9 %
5-10 SYRINGE (ML) INJECTION AS NEEDED
Status: DISCONTINUED | OUTPATIENT
Start: 2017-10-18 | End: 2017-10-18

## 2017-10-18 RX ORDER — MORPHINE SULFATE 10 MG/ML
2 INJECTION, SOLUTION INTRAMUSCULAR; INTRAVENOUS ONCE
Status: COMPLETED | OUTPATIENT
Start: 2017-10-18 | End: 2017-10-18

## 2017-10-18 RX ORDER — METFORMIN HYDROCHLORIDE 500 MG/1
500 TABLET ORAL 2 TIMES DAILY WITH MEALS
Status: DISCONTINUED | OUTPATIENT
Start: 2017-10-18 | End: 2017-10-18 | Stop reason: SDUPTHER

## 2017-10-18 RX ORDER — HYDROCODONE BITARTRATE AND ACETAMINOPHEN 7.5; 325 MG/1; MG/1
1 TABLET ORAL
Status: DISCONTINUED | OUTPATIENT
Start: 2017-10-18 | End: 2017-10-20 | Stop reason: HOSPADM

## 2017-10-18 RX ORDER — SPIRONOLACTONE 25 MG/1
25 TABLET ORAL DAILY
Status: DISCONTINUED | OUTPATIENT
Start: 2017-10-19 | End: 2017-10-18

## 2017-10-18 RX ORDER — FLUTICASONE PROPIONATE 50 MCG
1 SPRAY, SUSPENSION (ML) NASAL DAILY
Status: DISCONTINUED | OUTPATIENT
Start: 2017-10-19 | End: 2017-10-20 | Stop reason: HOSPADM

## 2017-10-18 RX ORDER — GLYCOPYRROLATE 0.2 MG/ML
INJECTION INTRAMUSCULAR; INTRAVENOUS AS NEEDED
Status: DISCONTINUED | OUTPATIENT
Start: 2017-10-18 | End: 2017-10-18 | Stop reason: HOSPADM

## 2017-10-18 RX ORDER — SODIUM CHLORIDE AND POTASSIUM CHLORIDE .9; .15 G/100ML; G/100ML
SOLUTION INTRAVENOUS CONTINUOUS
Status: DISCONTINUED | OUTPATIENT
Start: 2017-10-18 | End: 2017-10-19

## 2017-10-18 RX ORDER — MIDAZOLAM HYDROCHLORIDE 1 MG/ML
2 INJECTION, SOLUTION INTRAMUSCULAR; INTRAVENOUS
Status: DISCONTINUED | OUTPATIENT
Start: 2017-10-18 | End: 2017-10-18 | Stop reason: HOSPADM

## 2017-10-18 RX ORDER — ACETAMINOPHEN 325 MG/1
650 TABLET ORAL
Status: COMPLETED | OUTPATIENT
Start: 2017-10-18 | End: 2017-10-18

## 2017-10-18 RX ORDER — DIPHENHYDRAMINE HYDROCHLORIDE 50 MG/ML
12.5 INJECTION, SOLUTION INTRAMUSCULAR; INTRAVENOUS ONCE
Status: DISCONTINUED | OUTPATIENT
Start: 2017-10-18 | End: 2017-10-18 | Stop reason: HOSPADM

## 2017-10-18 RX ORDER — DEXAMETHASONE SODIUM PHOSPHATE 4 MG/ML
INJECTION, SOLUTION INTRA-ARTICULAR; INTRALESIONAL; INTRAMUSCULAR; INTRAVENOUS; SOFT TISSUE AS NEEDED
Status: DISCONTINUED | OUTPATIENT
Start: 2017-10-18 | End: 2017-10-18 | Stop reason: HOSPADM

## 2017-10-18 RX ORDER — SODIUM CHLORIDE 900 MG/100ML
INJECTION INTRAVENOUS AS NEEDED
Status: DISCONTINUED | OUTPATIENT
Start: 2017-10-18 | End: 2017-10-18 | Stop reason: HOSPADM

## 2017-10-18 RX ORDER — SODIUM CHLORIDE 9 MG/ML
50 INJECTION, SOLUTION INTRAVENOUS CONTINUOUS
Status: DISCONTINUED | OUTPATIENT
Start: 2017-10-18 | End: 2017-10-18 | Stop reason: HOSPADM

## 2017-10-18 RX ORDER — ENALAPRIL MALEATE 10 MG/1
10 TABLET ORAL 2 TIMES DAILY
Status: DISCONTINUED | OUTPATIENT
Start: 2017-10-18 | End: 2017-10-20 | Stop reason: HOSPADM

## 2017-10-18 RX ORDER — ACETAMINOPHEN 325 MG/1
650 TABLET ORAL
Status: DISCONTINUED | OUTPATIENT
Start: 2017-10-18 | End: 2017-10-20 | Stop reason: HOSPADM

## 2017-10-18 RX ORDER — SODIUM CHLORIDE 0.9 % (FLUSH) 0.9 %
5-10 SYRINGE (ML) INJECTION AS NEEDED
Status: DISCONTINUED | OUTPATIENT
Start: 2017-10-18 | End: 2017-10-20 | Stop reason: HOSPADM

## 2017-10-18 RX ORDER — FENTANYL CITRATE 50 UG/ML
INJECTION, SOLUTION INTRAMUSCULAR; INTRAVENOUS AS NEEDED
Status: DISCONTINUED | OUTPATIENT
Start: 2017-10-18 | End: 2017-10-18 | Stop reason: HOSPADM

## 2017-10-18 RX ORDER — FENTANYL CITRATE 50 UG/ML
25 INJECTION, SOLUTION INTRAMUSCULAR; INTRAVENOUS ONCE
Status: DISCONTINUED | OUTPATIENT
Start: 2017-10-18 | End: 2017-10-18 | Stop reason: HOSPADM

## 2017-10-18 RX ORDER — ROCURONIUM BROMIDE 10 MG/ML
INJECTION, SOLUTION INTRAVENOUS AS NEEDED
Status: DISCONTINUED | OUTPATIENT
Start: 2017-10-18 | End: 2017-10-18 | Stop reason: HOSPADM

## 2017-10-18 RX ORDER — POLYETHYLENE GLYCOL 3350 17 G/17G
17 POWDER, FOR SOLUTION ORAL 2 TIMES DAILY
Status: DISCONTINUED | OUTPATIENT
Start: 2017-10-18 | End: 2017-10-18 | Stop reason: SDUPTHER

## 2017-10-18 RX ORDER — LIDOCAINE HYDROCHLORIDE 10 MG/ML
0.1 INJECTION INFILTRATION; PERINEURAL AS NEEDED
Status: DISCONTINUED | OUTPATIENT
Start: 2017-10-18 | End: 2017-10-18 | Stop reason: HOSPADM

## 2017-10-18 RX ORDER — LORATADINE 10 MG/1
10 TABLET ORAL DAILY
Status: DISCONTINUED | OUTPATIENT
Start: 2017-10-19 | End: 2017-10-20 | Stop reason: HOSPADM

## 2017-10-18 RX ORDER — MORPHINE SULFATE 2 MG/ML
1 INJECTION, SOLUTION INTRAMUSCULAR; INTRAVENOUS
Status: DISCONTINUED | OUTPATIENT
Start: 2017-10-18 | End: 2017-10-20 | Stop reason: HOSPADM

## 2017-10-18 RX ORDER — SODIUM CHLORIDE 0.9 % (FLUSH) 0.9 %
5-10 SYRINGE (ML) INJECTION AS NEEDED
Status: DISCONTINUED | OUTPATIENT
Start: 2017-10-18 | End: 2017-10-18 | Stop reason: HOSPADM

## 2017-10-18 RX ORDER — SODIUM CHLORIDE 0.9 % (FLUSH) 0.9 %
5-10 SYRINGE (ML) INJECTION EVERY 8 HOURS
Status: DISCONTINUED | OUTPATIENT
Start: 2017-10-18 | End: 2017-10-18 | Stop reason: HOSPADM

## 2017-10-18 RX ORDER — OXYCODONE HYDROCHLORIDE 5 MG/1
5 TABLET ORAL
Status: DISCONTINUED | OUTPATIENT
Start: 2017-10-18 | End: 2017-10-18 | Stop reason: HOSPADM

## 2017-10-18 RX ORDER — POLYETHYLENE GLYCOL 3350 17 G/17G
17 POWDER, FOR SOLUTION ORAL 2 TIMES DAILY
Status: DISCONTINUED | OUTPATIENT
Start: 2017-10-18 | End: 2017-10-20 | Stop reason: HOSPADM

## 2017-10-18 RX ADMIN — LIDOCAINE HYDROCHLORIDE 100 MG: 20 INJECTION, SOLUTION EPIDURAL; INFILTRATION; INTRACAUDAL; PERINEURAL at 11:57

## 2017-10-18 RX ADMIN — CEFAZOLIN 3 G: 1 INJECTION, POWDER, FOR SOLUTION INTRAMUSCULAR; INTRAVENOUS; PARENTERAL at 20:13

## 2017-10-18 RX ADMIN — LEVALBUTEROL HYDROCHLORIDE 1.25 MG: 1.25 SOLUTION RESPIRATORY (INHALATION) at 13:46

## 2017-10-18 RX ADMIN — DEXAMETHASONE SODIUM PHOSPHATE 10 MG: 4 INJECTION, SOLUTION INTRA-ARTICULAR; INTRALESIONAL; INTRAMUSCULAR; INTRAVENOUS; SOFT TISSUE at 12:41

## 2017-10-18 RX ADMIN — HYDROCODONE BITARTRATE AND ACETAMINOPHEN 1 TABLET: 7.5; 325 TABLET ORAL at 16:17

## 2017-10-18 RX ADMIN — SODIUM CHLORIDE, SODIUM LACTATE, POTASSIUM CHLORIDE, CALCIUM CHLORIDE: 600; 310; 30; 20 INJECTION, SOLUTION INTRAVENOUS at 12:06

## 2017-10-18 RX ADMIN — SODIUM CHLORIDE, SODIUM LACTATE, POTASSIUM CHLORIDE, AND CALCIUM CHLORIDE 100 ML/HR: 600; 310; 30; 20 INJECTION, SOLUTION INTRAVENOUS at 09:54

## 2017-10-18 RX ADMIN — PROPOFOL 100 MG: 10 INJECTION, EMULSION INTRAVENOUS at 13:05

## 2017-10-18 RX ADMIN — Medication 10 ML: at 21:30

## 2017-10-18 RX ADMIN — HYDROCODONE BITARTRATE AND ACETAMINOPHEN 1 TABLET: 7.5; 325 TABLET ORAL at 03:16

## 2017-10-18 RX ADMIN — ENALAPRIL MALEATE 10 MG: 10 TABLET ORAL at 17:22

## 2017-10-18 RX ADMIN — OXYCODONE HYDROCHLORIDE AND ACETAMINOPHEN 1 TABLET: 7.5; 325 TABLET ORAL at 21:52

## 2017-10-18 RX ADMIN — NEOSTIGMINE METHYLSULFATE 4 MG: 1 INJECTION INTRAVENOUS at 13:20

## 2017-10-18 RX ADMIN — POLYETHYLENE GLYCOL 3350 17 G: 17 POWDER, FOR SOLUTION ORAL at 17:22

## 2017-10-18 RX ADMIN — ENALAPRIL MALEATE 10 MG: 5 TABLET ORAL at 08:57

## 2017-10-18 RX ADMIN — GLYCOPYRROLATE 0.6 MG: 0.2 INJECTION INTRAMUSCULAR; INTRAVENOUS at 13:20

## 2017-10-18 RX ADMIN — CARVEDILOL 12.5 MG: 12.5 TABLET, FILM COATED ORAL at 08:57

## 2017-10-18 RX ADMIN — MORPHINE SULFATE 2 MG: 10 INJECTION INTRAMUSCULAR; INTRAVENOUS; SUBCUTANEOUS at 13:58

## 2017-10-18 RX ADMIN — ROCURONIUM BROMIDE 30 MG: 10 INJECTION, SOLUTION INTRAVENOUS at 11:57

## 2017-10-18 RX ADMIN — ACETAMINOPHEN 650 MG: 325 TABLET ORAL at 14:37

## 2017-10-18 RX ADMIN — METFORMIN HYDROCHLORIDE 500 MG: 500 TABLET, FILM COATED ORAL at 08:57

## 2017-10-18 RX ADMIN — SODIUM CHLORIDE AND POTASSIUM CHLORIDE: 9; 1.49 INJECTION, SOLUTION INTRAVENOUS at 16:17

## 2017-10-18 RX ADMIN — FENTANYL CITRATE 50 MCG: 50 INJECTION, SOLUTION INTRAMUSCULAR; INTRAVENOUS at 11:57

## 2017-10-18 RX ADMIN — SIMVASTATIN 40 MG: 40 TABLET, FILM COATED ORAL at 21:30

## 2017-10-18 RX ADMIN — CARVEDILOL 12.5 MG: 12.5 TABLET, FILM COATED ORAL at 16:18

## 2017-10-18 RX ADMIN — FENTANYL CITRATE 100 MCG: 50 INJECTION, SOLUTION INTRAMUSCULAR; INTRAVENOUS at 12:36

## 2017-10-18 RX ADMIN — MIDAZOLAM HYDROCHLORIDE 2 MG: 1 INJECTION, SOLUTION INTRAMUSCULAR; INTRAVENOUS at 11:04

## 2017-10-18 RX ADMIN — PROPOFOL 200 MG: 10 INJECTION, EMULSION INTRAVENOUS at 11:57

## 2017-10-18 RX ADMIN — SODIUM CHLORIDE 500 MG: 900 INJECTION, SOLUTION INTRAVENOUS at 13:09

## 2017-10-18 RX ADMIN — CEFAZOLIN 3 G: 1 INJECTION, POWDER, FOR SOLUTION INTRAMUSCULAR; INTRAVENOUS; PARENTERAL at 12:03

## 2017-10-18 RX ADMIN — SPIRONOLACTONE 25 MG: 25 TABLET, FILM COATED ORAL at 08:57

## 2017-10-18 RX ADMIN — SODIUM CHLORIDE 1 MG/MIN: 900 INJECTION, SOLUTION INTRAVENOUS at 22:10

## 2017-10-18 RX ADMIN — FENTANYL CITRATE 50 MCG: 50 INJECTION, SOLUTION INTRAMUSCULAR; INTRAVENOUS at 12:14

## 2017-10-18 RX ADMIN — PROPOFOL 50 MG: 10 INJECTION, EMULSION INTRAVENOUS at 12:48

## 2017-10-18 RX ADMIN — ONDANSETRON 4 MG: 2 INJECTION INTRAMUSCULAR; INTRAVENOUS at 12:28

## 2017-10-18 RX ADMIN — INSULIN LISPRO 2 UNITS: 100 INJECTION, SOLUTION INTRAVENOUS; SUBCUTANEOUS at 08:57

## 2017-10-18 RX ADMIN — MORPHINE SULFATE 2 MG: 2 INJECTION, SOLUTION INTRAMUSCULAR; INTRAVENOUS at 14:10

## 2017-10-18 RX ADMIN — SODIUM CHLORIDE 500 MG: 900 INJECTION, SOLUTION INTRAVENOUS at 21:16

## 2017-10-18 RX ADMIN — VECURONIUM BROMIDE FOR INJECTION 2 MG: 1 INJECTION, POWDER, LYOPHILIZED, FOR SOLUTION INTRAVENOUS at 12:26

## 2017-10-18 RX ADMIN — FAMOTIDINE 20 MG: 20 TABLET, FILM COATED ORAL at 11:04

## 2017-10-18 RX ADMIN — METFORMIN HYDROCHLORIDE 500 MG: 500 TABLET, FILM COATED ORAL at 16:17

## 2017-10-18 NOTE — PROGRESS NOTES
OT NOTE:    Charts reviewed, orders appreciated. Pt off floor this AM for neurosurgery. Will re-attempt at later time/date as schedule allows and as MD deems appropriate.     Thanks,    Driss Abbasi, OTR/L

## 2017-10-18 NOTE — OP NOTES
[facility]   OPERATIVE REPORT       Name:  Josefina Cruz   MR#:  [id number]   :  1955   Account #:  [de-identified]   Date of Adm:  10/17/2017       DATE OF SURGERY: 10/18/2017. PREOPERATIVE DIAGNOSIS: Bilateral subdural fluid collections   rule out hygroma. POSTOPERATIVE DIAGNOSIS: Bilateral subdural fluid collections   rule out hygroma. NAME OF PROCEDURE: Bilateral bur hole evacuation of subdural   hygroma. SURGEON: Nick Zepeda. Vidhya Chery MD.    ANESTHESIA: General endotracheal.    ESTIMATED BLOOD LOSS: Minimal.    PREPARATION: ChloraPrep. COMPLICATIONS: None. HISTORY OF PRESENT ILLNESS: This is a 67-year-old man status   post fall 5 weeks ago. Chronic anticoagulation at the time due to   atrial fibrillation. Initial CT brain scanning was negative;   however, developed ataxia and worsening headaches and CT brain   scan 2 weeks after the fall showed a large right-sided subacute   subdural hematoma with mass effect and shift and a left-sided   fluid collection which is of mixed density. He was taken to the   operating room for evacuation of the right-sided lesion. Postoperatively, he developed problems with headache and   difficulty with weakness in his gait. CT brain scanning revealed   bilateral subdural fluid collections, chronic in nature, and MRI   scanning confirmed there was no acute cerebrovascular accident   and that the fluid collections were mainly CSF density   consistent with hygroma. The patient continued to be weak with   headaches and ataxia and difficulty walking and therefore he was   taken to the operating room urgently today. OPERATIVE NOTE: The patient was brought to the operating room,   carefully placed under general endotracheal anesthesia without   complications. Tirado catheter, thigh pneumatic hose and PAWEL hose   were placed. He was given 3 grams of IV Ancef and 500 mg of IV   Keppra.  The entire head was shaved and scrubbed and prepped in the usual sterile manner. The previous incision on the right   side was evident. The sutures were removed and the incision was   scrubbed until clear. The entire scalp was then prepped and draped   in the usual sterile manner. Mirror image incisions were drawn   out bilaterally on the right side using the existing hole   incision and on the left side a new area was created. Both were   infiltrated with 1% Xylocaine with epinephrine. The right side   was opened first on the galea. Hemostasis achieved using bipolar   cautery and deep retractors were placed. Sutures were removed   with an arterial hemostat. The scar tissue overlying the dura   was sharply resected and a large volume of chronic appearing   fluid emanated under pressure and irrigated until clear with the   brain evident in the subdural space having risen up to fill the   void and a Eh-Segovia drain could not be placed in the   subdural space due to the expansion of the brain. At this point,   attention was directed to the left-sided approach. An incision was   made with a 15 blade and carried down to the skull. Hemostasis   was achieved using bipolar and Bovie cautery and deep retractors   were placed. The Midas Salvatore  was used and a bur hole   was created down to the dura. Bone edges were waxed. The dura   was opened in a cruciate fashion with a 15 blade and once again   chronic appearing subdural fluid emanated under less pressure   from the left side. It was irrigated until clear with some   minimal brain expansion noted which did facilitate placement of   a Eh-Segovia drain #7 round and placed in the subdural space   and brought out through a stab incision inferiorly and secured   with a 3-0 Vicryl suture. At this point, the wound was irrigated   until clear and closed. The right-sided bur hole was covered   with a Perry Campi 5 screw cailin hole cover.  The galea was   reapproximated with interrupted 3-0 Vicryl and the scalp was closed with 3-0 nylon suture. On the left side, the bur hole was   packed with thrombin-soaked Gelfoam and the wound was closed. The galea was closed with interrupted 3-0 Vicryl. The scalp was   closed with 3-0 nylon suture. Sterile dressings were placed and   the drain was hooked to a bulb suction. The patient tolerated   the procedure well, was awakened, extubated, and taken to PACU   in stable condition. There were no apparent complications.         MD RADHA Price / Miguelina Jauregui   D:  10/18/2017   13:28   T:  10/18/2017   13:54   Job #:  [id number]

## 2017-10-18 NOTE — BRIEF OP NOTE
BRIEF OPERATIVE NOTE    Date of Procedure: 10/18/2017   Preoperative Diagnosis: Subdural hematoma (HCC) [I62.00]  Postoperative Diagnosis: Subdural hematoma (HCC) [I62.00]    Procedure(s):  BILATERAL HARI HOLES FOR SUBDURAL HEMATOMA    Surgeon(s) and Role:     * Oriana Oliva MD - Primary         Assistant Staff:  Nurse Practitioner: Jen Nelson NP    Surgical Staff:  Circ-1: Carmelita Henderson RN  Circ-Relief: Anh Gastelum RN  Scrub Tech-1: Anuradha Patrick  Scrub Tech-Relief: Sean Alcaraz  Scrub Private/Assistant: Hansa Pride  Nurse Practitioner: Jen Nelson NP  Event Time In   Incision Start 1226   Incision Close      Anesthesia: General   Estimated Blood Loss: MINIMAL  Specimens: * No specimens in log *   Findings: CHRONIC FLUID COLLECTIONS   Complications: NONE  Implants:   Implant Name Type Inv.  Item Serial No.  Lot No. LRB No. Used Action   COVER BUR H LG 0.5X18.5MM TI --  - [id number]  COVER BUR H LG 0.5X18.5MM TI --   [employer] [id number] Right 1 Implanted   SCR BNE CLP RAP FIRE 5EA 1.5X4 --  - [id number]   SCR BNE CLP RAP FIRE 5EA 1.5X4 --    [employer] [id number] Right 1 Implanted

## 2017-10-18 NOTE — PROGRESS NOTES
Problem: Falls - Risk of  Goal: *Absence of Falls  Document Parish Fall Risk and appropriate interventions in the flowsheet.    Outcome: Progressing Towards Goal  Fall Risk Interventions:  Mobility Interventions: Bed/chair exit alarm, Communicate number of staff needed for ambulation/transfer, Patient to call before getting OOB     Mentation Interventions: Bed/chair exit alarm, Door open when patient unattended     Medication Interventions: Bed/chair exit alarm, Patient to call before getting OOB, Assess postural VS orthostatic hypotension

## 2017-10-18 NOTE — PROGRESS NOTES
LEAPFROG PROTOCOL NOTE    Krzysztof Garber  10/18/2017    The patient is currently in the critical care setting managed by Dr. Pablo Brush with subdural hygroma. The patient's chart is reviewed and the patient is discussed with the staff. Patient is currently hemodynamically stable. Patient has no needs identified for Intensivist management in the critical care setting at this time. Please notify us if can be of assistance. No charge billed to the patient. Thank you.     Idalia Howard MD

## 2017-10-18 NOTE — PERIOP NOTES
TRANSFER - IN REPORT:    Verbal report received from Vivienne Lafleur RN on Memory Swiss  being received from 7th floor for ordered procedure      Report consisted of patients Situation, Background, Assessment and   Recommendations(SBAR). Information from the following report(s) SBAR, Intake/Output, MAR and Recent Results was reviewed with the receiving nurse. Opportunity for questions and clarification was provided. Assessment completed upon patients arrival to unit and care assumed.

## 2017-10-18 NOTE — PROGRESS NOTES
TRANSFER - OUT REPORT:    Verbal report given to Sydney(name) on Tristian Raymond  being transferred to Pre-op(unit) for ordered procedure       Report consisted of patients Situation, Background, Assessment and   Recommendations(SBAR). Information from the following report(s) SBAR, Kardex, Intake/Output, MAR and Recent Results was reviewed with the receiving nurse. Lines:   Peripheral IV 10/16/17 Left Forearm (Active)   Site Assessment Clean, dry, & intact 10/18/2017  9:00 AM   Phlebitis Assessment 0 10/18/2017  9:00 AM   Infiltration Assessment 0 10/18/2017  9:00 AM   Dressing Status Clean, dry, & intact 10/18/2017  9:00 AM   Dressing Type Transparent;Tape 10/18/2017  9:00 AM   Hub Color/Line Status Pink 10/18/2017  9:00 AM        Opportunity for questions and clarification was provided.       Patient transported with:   Atrenta

## 2017-10-18 NOTE — PERIOP NOTES
TRANSFER - OUT REPORT:    Verbal report given to Maxime Alvarenga  on Macario Vaughan  being transferred to [id number]  for routine progression of care       Report consisted of patients Situation, Background, Assessment and   Recommendations(SBAR). Information from the following report(s) SBAR, OR Summary and MAR was reviewed with the receiving nurse. Lines:   Peripheral IV 10/16/17 Left Forearm (Active)   Site Assessment Clean, dry, & intact 10/18/2017  1:40 PM   Phlebitis Assessment 0 10/18/2017  1:40 PM   Infiltration Assessment 0 10/18/2017  1:40 PM   Dressing Status Clean, dry, & intact 10/18/2017  1:40 PM   Dressing Type Transparent;Tape 10/18/2017  1:40 PM   Hub Color/Line Status Pink;Capped 10/18/2017  1:40 PM       Peripheral IV 10/18/17 Right Hand (Active)   Site Assessment Clean, dry, & intact 10/18/2017  1:40 PM   Phlebitis Assessment 0 10/18/2017  1:40 PM   Infiltration Assessment 0 10/18/2017  1:40 PM   Dressing Status Clean, dry, & intact 10/18/2017  1:40 PM   Dressing Type Transparent;Tape 10/18/2017  1:40 PM   Hub Color/Line Status Infusing;Green 10/18/2017  1:40 PM        Opportunity for questions and clarification was provided. Patient transported with:   O2 @ 4 liters    VTE prophylaxis orders have been written for Macario Vaughan. Patient and family given floor number and nurses name. Family updated re: pt status after security code verified.

## 2017-10-18 NOTE — ANESTHESIA POSTPROCEDURE EVALUATION
Post-Anesthesia Evaluation and Assessment    Patient: Issac Erickson MRN: [id number]  SSN: [id number]    YOB: 1955  Age: 57 y.o. Sex: male       Cardiovascular Function/Vital Signs  Visit Vitals    /60    Pulse 100    Temp 36.4 °C (97.5 °F)    Resp 20    Ht 6' 3\" (1.905 m)    Wt 152 kg (335 lb)    SpO2 96%    BMI 41.87 kg/m2       Patient is status post general anesthesia for Procedure(s):  BILATERAL HARI HOLES FOR SUBDURAL HEMATOMA  . Nausea/Vomiting: None    Postoperative hydration reviewed and adequate. Pain:  Pain Scale 1: Numeric (0 - 10) (10/18/17 1616)  Pain Intensity 1: 7 (10/18/17 1616)   Managed    Neurological Status:   Neuro (WDL): Exceptions to WDL (10/18/17 1420)  Neuro  Neurologic State: Alert;Eyes open spontaneously (10/18/17 1513)  Orientation Level: Oriented X4 (10/18/17 1513)  Cognition: Follows commands; Appropriate safety awareness; Appropriate decision making (10/18/17 1513)  Speech: Clear (10/18/17 1513)  Assessment L Pupil: Brisk;Round (10/18/17 1513)  Size L Pupil (mm): 3 (10/18/17 1513)  Assessment R Pupil: Brisk;Round (10/18/17 1513)  Size R Pupil (mm): 3 (10/18/17 1513)  LUE Motor Response: Purposeful;Spontaneous  (10/18/17 1513)  LLE Motor Response: Purposeful;Spontaneous ;Weak (10/18/17 1513)  RUE Motor Response: Purposeful;Spontaneous  (10/18/17 1513)  RLE Motor Response: Purposeful;Spontaneous ;Weak (10/18/17 1513)   At baseline    Mental Status and Level of Consciousness: Arousable    Pulmonary Status:   O2 Device: Nasal cannula (10/18/17 1513)   Adequate oxygenation and airway patent    Complications related to anesthesia: None    Post-anesthesia assessment completed.  No concerns    Signed By: Padmini Corrales MD     October 18, 2017

## 2017-10-19 ENCOUNTER — APPOINTMENT (OUTPATIENT)
Dept: CT IMAGING | Age: 62
DRG: 981 | End: 2017-10-19
Attending: NEUROLOGICAL SURGERY
Payer: COMMERCIAL

## 2017-10-19 LAB
ANION GAP SERPL CALC-SCNC: 9 MMOL/L (ref 7–16)
BASOPHILS # BLD: 0 K/UL (ref 0–0.2)
BASOPHILS NFR BLD: 0 % (ref 0–2)
BUN SERPL-MCNC: 9 MG/DL (ref 8–23)
CALCIUM SERPL-MCNC: 8.7 MG/DL (ref 8.3–10.4)
CHLORIDE SERPL-SCNC: 105 MMOL/L (ref 98–107)
CO2 SERPL-SCNC: 24 MMOL/L (ref 21–32)
CREAT SERPL-MCNC: 0.8 MG/DL (ref 0.8–1.5)
DIFFERENTIAL METHOD BLD: ABNORMAL
EOSINOPHIL # BLD: 0.1 K/UL (ref 0–0.8)
EOSINOPHIL NFR BLD: 1 % (ref 0.5–7.8)
ERYTHROCYTE [DISTWIDTH] IN BLOOD BY AUTOMATED COUNT: 13.1 % (ref 11.9–14.6)
GLUCOSE BLD STRIP.AUTO-MCNC: 127 MG/DL (ref 65–100)
GLUCOSE BLD STRIP.AUTO-MCNC: 149 MG/DL (ref 65–100)
GLUCOSE BLD STRIP.AUTO-MCNC: 162 MG/DL (ref 65–100)
GLUCOSE SERPL-MCNC: 128 MG/DL (ref 65–100)
HCT VFR BLD AUTO: 39.3 % (ref 41.1–50.3)
HGB BLD-MCNC: 13.3 G/DL (ref 13.6–17.2)
IMM GRANULOCYTES # BLD: 0 K/UL (ref 0–0.5)
IMM GRANULOCYTES NFR BLD: 0.4 % (ref 0–5)
LYMPHOCYTES # BLD: 1 K/UL (ref 0.5–4.6)
LYMPHOCYTES NFR BLD: 10 % (ref 13–44)
MCH RBC QN AUTO: 32.5 PG (ref 26.1–32.9)
MCHC RBC AUTO-ENTMCNC: 33.8 G/DL (ref 31.4–35)
MCV RBC AUTO: 96.1 FL (ref 79.6–97.8)
MONOCYTES # BLD: 0.6 K/UL (ref 0.1–1.3)
MONOCYTES NFR BLD: 7 % (ref 4–12)
NEUTS SEG # BLD: 7.8 K/UL (ref 1.7–8.2)
NEUTS SEG NFR BLD: 82 % (ref 43–78)
PLATELET # BLD AUTO: 227 K/UL (ref 150–450)
PMV BLD AUTO: 9.7 FL (ref 10.8–14.1)
POTASSIUM SERPL-SCNC: 4 MMOL/L (ref 3.5–5.1)
RBC # BLD AUTO: 4.09 M/UL (ref 4.23–5.67)
SODIUM SERPL-SCNC: 138 MMOL/L (ref 136–145)
WBC # BLD AUTO: 9.5 K/UL (ref 4.3–11.1)

## 2017-10-19 PROCEDURE — 74011636637 HC RX REV CODE- 636/637: Performed by: INTERNAL MEDICINE

## 2017-10-19 PROCEDURE — 65270000029 HC RM PRIVATE

## 2017-10-19 PROCEDURE — 97166 OT EVAL MOD COMPLEX 45 MIN: CPT

## 2017-10-19 PROCEDURE — 74011000258 HC RX REV CODE- 258: Performed by: NEUROLOGICAL SURGERY

## 2017-10-19 PROCEDURE — 97164 PT RE-EVAL EST PLAN CARE: CPT

## 2017-10-19 PROCEDURE — 74011250637 HC RX REV CODE- 250/637: Performed by: INTERNAL MEDICINE

## 2017-10-19 PROCEDURE — 80048 BASIC METABOLIC PNL TOTAL CA: CPT | Performed by: NEUROLOGICAL SURGERY

## 2017-10-19 PROCEDURE — 36415 COLL VENOUS BLD VENIPUNCTURE: CPT | Performed by: NEUROLOGICAL SURGERY

## 2017-10-19 PROCEDURE — 70450 CT HEAD/BRAIN W/O DYE: CPT

## 2017-10-19 PROCEDURE — 82962 GLUCOSE BLOOD TEST: CPT

## 2017-10-19 PROCEDURE — 74011250636 HC RX REV CODE- 250/636: Performed by: NEUROLOGICAL SURGERY

## 2017-10-19 PROCEDURE — 74011250637 HC RX REV CODE- 250/637: Performed by: NEUROLOGICAL SURGERY

## 2017-10-19 PROCEDURE — 85025 COMPLETE CBC W/AUTO DIFF WBC: CPT | Performed by: NEUROLOGICAL SURGERY

## 2017-10-19 PROCEDURE — 74011000250 HC RX REV CODE- 250: Performed by: NEUROLOGICAL SURGERY

## 2017-10-19 RX ORDER — LEVETIRACETAM 500 MG/1
500 TABLET ORAL 2 TIMES DAILY
Status: DISCONTINUED | OUTPATIENT
Start: 2017-10-19 | End: 2017-10-20 | Stop reason: HOSPADM

## 2017-10-19 RX ORDER — MAG HYDROX/ALUMINUM HYD/SIMETH 200-200-20
30 SUSPENSION, ORAL (FINAL DOSE FORM) ORAL
Status: DISCONTINUED | OUTPATIENT
Start: 2017-10-19 | End: 2017-10-20 | Stop reason: HOSPADM

## 2017-10-19 RX ADMIN — INSULIN LISPRO 2 UNITS: 100 INJECTION, SOLUTION INTRAVENOUS; SUBCUTANEOUS at 16:52

## 2017-10-19 RX ADMIN — POLYETHYLENE GLYCOL 3350 17 G: 17 POWDER, FOR SOLUTION ORAL at 09:00

## 2017-10-19 RX ADMIN — MORPHINE SULFATE 1 MG: 2 INJECTION, SOLUTION INTRAMUSCULAR; INTRAVENOUS at 06:38

## 2017-10-19 RX ADMIN — CARVEDILOL 12.5 MG: 12.5 TABLET, FILM COATED ORAL at 16:51

## 2017-10-19 RX ADMIN — METFORMIN HYDROCHLORIDE 500 MG: 500 TABLET, FILM COATED ORAL at 08:59

## 2017-10-19 RX ADMIN — OXYCODONE HYDROCHLORIDE AND ACETAMINOPHEN 1 TABLET: 7.5; 325 TABLET ORAL at 20:11

## 2017-10-19 RX ADMIN — METFORMIN HYDROCHLORIDE 500 MG: 500 TABLET, FILM COATED ORAL at 16:51

## 2017-10-19 RX ADMIN — LEVETIRACETAM 500 MG: 500 TABLET ORAL at 08:59

## 2017-10-19 RX ADMIN — OXYCODONE HYDROCHLORIDE AND ACETAMINOPHEN 1 TABLET: 7.5; 325 TABLET ORAL at 15:10

## 2017-10-19 RX ADMIN — SPIRONOLACTONE 25 MG: 25 TABLET, FILM COATED ORAL at 08:59

## 2017-10-19 RX ADMIN — LORATADINE 10 MG: 10 TABLET ORAL at 08:59

## 2017-10-19 RX ADMIN — ENALAPRIL MALEATE 10 MG: 10 TABLET ORAL at 16:51

## 2017-10-19 RX ADMIN — FLUTICASONE PROPIONATE 1 SPRAY: 50 SPRAY, METERED NASAL at 09:00

## 2017-10-19 RX ADMIN — CEFAZOLIN 3 G: 1 INJECTION, POWDER, FOR SOLUTION INTRAMUSCULAR; INTRAVENOUS; PARENTERAL at 20:16

## 2017-10-19 RX ADMIN — HYDROCHLOROTHIAZIDE 25 MG: 25 TABLET ORAL at 08:59

## 2017-10-19 RX ADMIN — CARVEDILOL 12.5 MG: 12.5 TABLET, FILM COATED ORAL at 08:59

## 2017-10-19 RX ADMIN — ENALAPRIL MALEATE 10 MG: 10 TABLET ORAL at 08:59

## 2017-10-19 RX ADMIN — Medication 5 ML: at 22:40

## 2017-10-19 RX ADMIN — CEFAZOLIN 3 G: 1 INJECTION, POWDER, FOR SOLUTION INTRAMUSCULAR; INTRAVENOUS; PARENTERAL at 12:58

## 2017-10-19 RX ADMIN — LEVETIRACETAM 500 MG: 500 TABLET ORAL at 16:51

## 2017-10-19 RX ADMIN — HYDROCODONE BITARTRATE AND ACETAMINOPHEN 1 TABLET: 7.5; 325 TABLET ORAL at 22:45

## 2017-10-19 RX ADMIN — SODIUM CHLORIDE 1 MG/MIN: 900 INJECTION, SOLUTION INTRAVENOUS at 01:52

## 2017-10-19 RX ADMIN — ALUMINUM HYDROXIDE, MAGNESIUM HYDROXIDE, AND SIMETHICONE 30 ML: 200; 200; 20 SUSPENSION ORAL at 14:26

## 2017-10-19 RX ADMIN — OXYCODONE HYDROCHLORIDE AND ACETAMINOPHEN 1 TABLET: 7.5; 325 TABLET ORAL at 03:50

## 2017-10-19 RX ADMIN — CEFAZOLIN 3 G: 1 INJECTION, POWDER, FOR SOLUTION INTRAMUSCULAR; INTRAVENOUS; PARENTERAL at 03:41

## 2017-10-19 RX ADMIN — HYDROCODONE BITARTRATE AND ACETAMINOPHEN 1 TABLET: 7.5; 325 TABLET ORAL at 08:14

## 2017-10-19 RX ADMIN — SIMVASTATIN 40 MG: 40 TABLET, FILM COATED ORAL at 22:40

## 2017-10-19 RX ADMIN — HYDROCODONE BITARTRATE AND ACETAMINOPHEN 1 TABLET: 7.5; 325 TABLET ORAL at 00:26

## 2017-10-19 RX ADMIN — POLYETHYLENE GLYCOL 3350 17 G: 17 POWDER, FOR SOLUTION ORAL at 16:54

## 2017-10-19 NOTE — PROGRESS NOTES
Problem: Self Care Deficits Care Plan (Adult)  Goal: *Acute Goals and Plan of Care (Insert Text)  1. Patient will complete full body bathing and dressing with setup assist, additional time, and adaptive equipment as needed. 2. Patient will complete toileting with independence. 3. Patient will tolerate 23 minutes of OT treatment with 1 rest breaks to increase activity tolerance for ADLs. 4. Patient will complete functional transfers with modified independence and adaptive equipment as needed. 5. Patient will complete grooming tasks after setup at sink level in standing with no loss of balance. 6. Patient will safely  3 small items from floor level without loss of balance in 1 OT session to increase safety with home management. Timeframe: 7 visits       OCCUPATIONAL THERAPY: Initial Assessment 10/19/2017  INPATIENT: Hospital Day: 3  Payor: Darryle Blander / Plan: [place] [facility] / Product Type: PPO /      NAME/AGE/GENDER: Deon Sheffield is a 57 y.o. male   PRIMARY DIAGNOSIS:  Subdural hematoma (HCC) [I62.00] Subdural hematoma (HCC) Subdural hematoma (HCC)  Procedure(s) (LRB):  BILATERAL HARI HOLES FOR SUBDURAL HEMATOMA   (Bilateral)  1 Day Post-Op  ICD-10: Treatment Diagnosis:    · Generalized Muscle Weakness (M62.81)  · Other lack of cordination (R27.8)  · Difficulty in walking, Not elsewhere classified (R26.2)  · History of falling (Z91.81)   Precautions/Allergies:    falls,  Zithromax [azithromycin]      ASSESSMENT:     Mr. Maribel Chapman presents supine in ICU bed, getting ready to transfer to [facility]. RN reports pt with Afib from 80s to 140s. Pt reports pain 5/10. Agreeable to OT evaluation while waiting. Bed mobility at min A to CGA and additional time. Reported some dizziness with sitting. Sat a few minutes for testing. Tracking testing and pt noted to have some difficulty with left sided vision. Eyes were jumpy and needed more time to focus in the Left visual fields.  B UE are WFLs for basic self care tasks, reports some trouble with [facility]. Able to touch the top of his head and behind his head and behinds his back. Reports he hurt his shoulder when he fell this last time. R dominant. Reports peripheral neuropathy in B hands and feet, some times worse than others and sometimes interfering with [facility]. Pt sit to stand with Min A to CGA x2 to RW and ambulated 20 feet to new bed, slow steady steps, but held head still and arms stiff on RW. Unable to relax it seems. Stand to sit with CGA and min A to get feet up to bed. Pt in bed moving to 703 with RN staff. Had been home working this past Friday after previous surgery from [address] and daughter noted he was not doing as well and encouraged him to return to the ER. Needing more help and unsteady on his feet. Reports he does note remember all of last week. Above surgery performed for found hygroma with fluid extracted. Has RON drain from head cailin holes. Pt is functioning below his normal baseline but better than before this recent surgery. Will benefit from skilled OT to maximize his independence with self care tasks, activity tolerance, overall strength, and functional mobility. 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 Lives with his wife and has good family support. Will follow in Acute care. Would benefit from further rehab and [facility] is looking at pt. Discussed progress with Dr Nancy Strong. This section established at most recent assessment   PROBLEM LIST (Impairments causing functional limitations):  1. Decreased Strength  2. Decreased ADL/Functional Activities  3. Decreased Transfer Abilities  4. Decreased Ambulation Ability/Technique  5. Decreased Balance  6. Increased Pain  7. Decreased Activity Tolerance  8. Decreased Skin Integrity/Hygeine  9. Decreased Cognition   INTERVENTIONS PLANNED: (Benefits and precautions of occupational therapy have been discussed with the patient.)  1. Activities of daily living training  2.  Adaptive equipment training  3. Balance training  4. Clothing management  5. Cognitive training  6. Donning&doffing training  7. Group therapy  8. Re-evaluation  9. Therapeutic activity  10. Therapeutic exercise  11. Safety training  12. Family training     TREATMENT PLAN: Frequency/Duration: Follow patient 3x per week to address above goals. Rehabilitation Potential For Stated Goals: Excellent     RECOMMENDED REHABILITATION/EQUIPMENT: (at time of discharge pending progress): Due to the probability of continued deficits (see above) this patient will likely need continued skilled occupational therapy after discharge. Equipment:    TBD based on progress              OCCUPATIONAL PROFILE AND HISTORY:   History of Present Injury/Illness (Reason for Referral):  See H & P  Past Medical History/Comorbidities:   Mr. Jos Kirk  has a past medical history of A-fib ([facility] [address] Arteriosclerosis; Atrial fibrillation ([facility] [address] (1/3/2012); Diabetes mellitus; Diabetes mellitus ([facility] [address] (1/3/2012); Essential hypertension, benign (2/6/2014); Family history of malignant neoplasm of prostate (2/6/2014); Hemochromatosis; HTN (hypertension) (2/6/2014); Hypercholesteremia; Nodular prostate without urinary obstruction (2/6/2014); and Obesity (2/6/2014).   Mr. Jos Kirk  has a past surgical history that includes knee arthroscp harv and urological.  Social History/Living Environment:   Home Environment: Private residence  One/Two Story Residence: Two story, live on 1st floor  Living Alone: No  [place]: Family member(s), Spouse/Significant Other/Partner  Patient Expects to be Discharged to[de-identified] Unknown  Current DME Used/Available at Home: Walker, rolling, Transfer bench  Tub or Shower Type: Tub/Shower combination, has been getting in garden tub at home  Prior Level of Function/Work/Activity:  Lives with wife in 2 level home, operates business from his home, was independent with self care and ambulation without DME prior to initial surgery, since going home had progressed to no DME, was needing some help with ADLs. Very motivated. Dominant Side:         RIGHT   Number of Personal Factors/Comorbidities that affect the Plan of Care: Extensive review of physical, cognitive, and psychosocial performance (3+):  HIGH COMPLEXITY   ASSESSMENT OF OCCUPATIONAL PERFORMANCE[de-identified]   Activities of Daily Living:           Basic ADLs (From Assessment) Complex ADLs (From Assessment)   Basic ADL  Feeding: Setup, Additional time  Oral Facial Hygiene/Grooming: Minimum assistance, Additional time  Bathing: Moderate assistance, Additional time  Upper Body Dressing: Minimum assistance, Additional time  Lower Body Dressing: Maximum assistance, Additional time  Toileting: Minimum assistance, Additional time Instrumental ADL  Meal Preparation: Minimum assistance (wife completes)  Homemaking: Minimum assistance (family shares)  Medication Management: Setup  Financial Management: Setup   Grooming/Bathing/Dressing Activities of Daily Living     Cognitive Retraining  Safety/Judgement: Awareness of environment; Fall prevention                 Functional Transfers  Toilet Transfer : Moderate assistance (from lower surface)  Tub Transfer: Moderate assistance (has been getting in the garden tub at home)  Shower Transfer: Minimum assistance     Bed/Mat Mobility  Rolling: Modified independent; Additional time  Supine to Sit: Contact guard assistance; Additional time  Sit to Supine: Contact guard assistance; Additional time  Sit to Stand: Assist x2;Contact guard assistance  Bed to Chair: Assist x2;Contact guard assistance  Scooting: Contact guard assistance       Most Recent Physical Functioning:   Gross Assessment:  AROM: Generally decreased, functional  Strength: Generally decreased, functional  Coordination: Generally decreased, functional  Sensation: Impaired (peripheral neuropathy in B hands and feet per pt)               Posture:  Posture (WDL): Exceptions to WDL  Posture Assessment:  Forward head, Rounded shoulders  Balance:  Sitting: Impaired  Sitting - Static: Good (unsupported)  Sitting - Dynamic: Fair (occasional)  Standing: Impaired  Standing - Static: Constant support  Standing - Dynamic : Fair Bed Mobility:  Rolling: Modified independent; Additional time  Supine to Sit: Contact guard assistance; Additional time  Sit to Supine: Contact guard assistance; Additional time  Scooting: Contact guard assistance  Wheelchair Mobility:     Transfers:  Sit to Stand: Assist x2;Contact guard assistance  Stand to Sit: Assist x2;Contact guard assistance  Bed to Chair: Assist x2;Contact guard assistance            Patient Vitals for the past 6 hrs:   BP SpO2 Pulse   10/19/17 0558 114/67 94 % 78   10/19/17 0628 122/60 92 % 76   10/19/17 0658 102/61 91 % 78   10/19/17 0727 107/63 93 % 76   10/19/17 0758 112/82 93 % 80   10/19/17 0828 127/65 93 % 91   10/19/17 0858 103/56 93 % 91   10/19/17 0928 105/63 93 % 94   10/19/17 0958 102/60 93 % 100   10/19/17 1028 102/69 94 % 96   10/19/17 1058 110/71 91 % (!) 113       Mental Status  Neurologic State: Alert  Orientation Level: Oriented X4  Cognition: Appropriate for age attention/concentration, Appropriate safety awareness, Appropriate decision making, Follows commands  Perception: Appears intact  Perseveration: No perseveration noted  Safety/Judgement: Awareness of environment, Fall prevention                          Physical Skills Involved:  1. Balance  2. Strength  3. Activity Tolerance  4. Sensation  5. Fine Motor Control  6. Vision  7. Pain (acute)  8. Pain (Chronic)  9. Edema  10. Skin Integrity Cognitive Skills Affected (resulting in the inability to perform in a timely and safe manner):  1. Perception  2. Executive Function  3. Immediate Memory  4. Short Term Recall  5. Sustained Attention  6. Divided Attention  7. Comprehension Psychosocial Skills Affected:  1. Habits/Routines  2. Environmental Adaptation  3. Social Interaction  4.  Emotional Regulation  5. Self-Awareness  6. Awareness of Others  7. Social Roles   Number of elements that affect the Plan of Care: 5+:  HIGH COMPLEXITY   CLINICAL DECISION MAKIN[address] [place] [address] AM-PAC 6 Clicks   Daily Activity Inpatient Short Form  How much help from another person does the patient currently need. .. Total A Lot A Little None   1. Putting on and taking off regular lower body clothing? [x] 1   [] 2   [] 3   [] 4   2. Bathing (including washing, rinsing, drying)? [] 1   [x] 2   [] 3   [] 4   3. Toileting, which includes using toilet, bedpan or urinal?   [] 1   [x] 2   [] 3   [] 4   4. Putting on and taking off regular upper body clothing? [] 1   [x] 2   [] 3   [] 4   5. Taking care of personal grooming such as brushing teeth? [] 1   [] 2   [x] 3   [] 4   6. Eating meals? [] 1   [] 2   [x] 3   [] 4   © , Trustees of [address] [place] [address], under license to [employer]. All rights reserved      Score:  Initial: 13, completed 10/19/2017 Most Recent: X (Date: -- )    Interpretation of Tool:  Represents activities that are increasingly more difficult (i.e. Bed mobility, Transfers, Gait). Score 24 23 22-20 19-15 14-10 9-7 6     Modifier CH CI CJ CK CL CM CN      ? Self Care:     - CURRENT STATUS: CL - 60%-79% impaired, limited or restricted    - GOAL STATUS: CI - 1%-19% impaired, limited or restricted    - D/C STATUS:  ---------------To be determined---------------  Payor: BLUE CROSS / [place]: [place] [facility] [facility] / Product Type: PPO /      Medical Necessity:     · Patient demonstrates excellent rehab potential due to higher previous functional level. Reason for Services/Other Comments:  · Patient continues to require skilled intervention due to s/p above, 2nd surgery, and decreased ADLs and mobility.    Use of outcome tool(s) and clinical judgement create a POC that gives a: MODERATE COMPLEXITY         TREATMENT:   (In addition to Assessment/Re-Assessment sessions the following treatments were rendered)     Pre-treatment Symptoms/Complaints:  Agreeable to OT evaluation and getting up out of bed  Pain: Initial:     pain 5/10 during OT eval Post Session:  unchanged     Assessment/Reassessment only, no treatment provided today    Braces/Orthotics/Lines/Etc:   · IV  · daily dressing change  · drain RON drain from head incision/cailin hole  · O2 Device: Room air  Treatment/Session Assessment:    · Response to Treatment:  Tolerated well, unsteady on feet, anxious, but good spirits and motivated. · Interdisciplinary Collaboration:   o Physical Therapist  o Occupational Therapist  o Registered Nurse  o Physician  · After treatment position/precautions:   o Supine in bed  o Bed/Chair-wheels locked  o Bed in low position  o Call light within reach  o RN notified  o Family at bedside  o Nurse at bedside  o Side rails x 3  o transferring to 703   · Compliance with Program/Exercises: Will assess as treatment progresses. · Recommendations/Intent for next treatment session: \"Next visit will focus on advancements to more challenging activities and reduction in assistance provided\".   Total Treatment Duration: 10 mins  OT Patient Time In/Time Out  Time In: 1105  Time Out: Charles Chen [address] TRA[address] [place], MS, OTR/L

## 2017-10-19 NOTE — PROGRESS NOTES
NS  POD#1  AFEBRILE  DRY DRESSINGS  5/5 POWER  NO DRIFT  HA  LEGS  FEEL  BETTER  CT:  LEFT SIDE  HYGROMA IS GONE;  RIGHT FRONTAL IS GONE, RIGHT POSTERIOR IS STILL PRESENT  AIR IS SEEN IN FRONTAL SD  SPACE  A/P TO  FLOOR  PT/OT  REHAB C/S  Andrae Fisher MD

## 2017-10-19 NOTE — PROGRESS NOTES
Trandate gtt off at 0344 a.m.  CT scan at 0430 uneventful. Tabatha Bledsoe remains charged with slow trickle of serosanguinous drainage. Head Kerlix reinforced with loose 4x4 over top of original surgical dressing. No neuro changes through night. Chronic slight hand tremor continues.

## 2017-10-19 NOTE — PROGRESS NOTES
Problem: Mobility Impaired (Adult and Pediatric)  Goal: *Acute Goals and Plan of Care (Insert Text)  _LTG:  (1.)Mr. Fran Shipman will move from supine to sit and sit to supine, scoot up and down and roll side to side in bed with modified INDEPENDENT within 7 day(s). (2.)Mr. Fran Shipman will transfer from bed to chair and chair to bed with supervision using the least restrictive device within 7 day(s). (3.)Mr. Fran Shipman will ambulate with supervision for 250+ feet with the least restrictive/no device within 7 day(s). (4.)Mr. Fran Shipman will perform standing static and dynamic balance activities x 8 minutes with SUPERVISION to improve safety within 7 day(s). (5.)Mr. Fran Shipman will ascend and descend 4 stairs using one hand rail(s) with CGA to improve functional mobility and safety within 7 day(s). _______________________________________________________________________________________________      PHYSICAL THERAPY: Re-evaluation and AM 10/19/2017  INPATIENT: Hospital Day: 3  Payor: Delena Boeck / Plan: [facility] / Product Type: PPO /      NAME/AGE/GENDER: Carolina Crystal is a 57 y.o. male       PRIMARY DIAGNOSIS: Subdural hematoma (HCC) [I62.00] Subdural hematoma (HCC) Subdural hematoma (HCC)  Procedure(s) (LRB):  BILATERAL HARI HOLES FOR SUBDURAL HEMATOMA   (Bilateral)  1 Day Post-Op  ICD-10: Treatment Diagnosis:       · Other abnormalities of gait and mobility (R26.89)  · History of falling (Z91.81)   Precaution/Allergies:  Zithromax [azithromycin]       ASSESSMENT:      Mr. Fran Shipman presents supine in bed in CCU with son at the bedside. He underwent above surgery yesterday, dressing and drain on head. He was pleasant and agreeable for PT reassessment, endorsed HA 5/10. HR erratic ranging from 80's to briefly 150's, in a fib per RN. He required min assist to transition to edge of bed. B LE strength WFL and coordination decreased but much better than during previous evaluation.  He stood with min assist, took a couple minutes to adjust.  Ambulated 20' with RW and min assist.  Patient has improved in mobility since undergoing surgery, now able to ambulate and no ataxia noted. Goals of initial evaluation updated. Mr. Fran Shipman would benefit from resuming skilled physical therapy (medically necessary) to address his deficits and maximize his function. This section established at most recent assessment   PROBLEM LIST (Impairments causing functional limitations):  1. Decreased ADL/Functional Activities  2. Decreased Transfer Abilities  3. Decreased Ambulation Ability/Technique  4. Decreased Balance  5. Decreased Activity Tolerance    INTERVENTIONS PLANNED: (Benefits and precautions of physical therapy have been discussed with the patient.)  1. Balance Exercise  2. Bed Mobility  3. Gait Training  4. Neuromuscular Re-education/Strengthening  5. Therapeutic Activites  6. Therapeutic Exercise/Strengthening  7. Transfer Training  8. education  9. Group Therapy      TREATMENT PLAN: Frequency/Duration: 3-5 times a week for duration of hospital stay  Rehabilitation Potential For Stated Goals: GOOD      RECOMMENDED REHABILITATION/EQUIPMENT: (at time of discharge pending progress): Due to the probability of continued deficits (see above) this patient will likely need continued skilled physical therapy after discharge. Equipment:   · None at this time                   HISTORY:   History of Present Injury/Illness (Reason for Referral):  Per MD note,     HPI Comments: 68-year-old male complaining of unsteady gait weakness. Patient was recently in the hospital for evacuation of subdural hematoma after a fall. Patient also developed a spontaneous pneumothorax. She was in the hospital several days and was discharged ambulating without difficulty. Over the last few days he's gotten more unsteady an weak. Fevers chills nausea vomiting or diarrhea no new falls.      Patient is a 57 y.o. male presenting with headaches, gait problem, and altered mental status. The history is provided by the patient and a relative. \"       Past Medical History/Comorbidities:   Mr. Cruz Soriano  has a past medical history of A-fib ([facility] [address] Arteriosclerosis; Atrial fibrillation ([facility] [address] (1/3/2012); Diabetes mellitus; Diabetes mellitus ([facility] (1/3/2012); Essential hypertension, benign (2/6/2014); Family history of malignant neoplasm of prostate (2/6/2014); Hemochromatosis; HTN (hypertension) (2/6/2014); Hypercholesteremia; Nodular prostate without urinary obstruction (2/6/2014); and Obesity (2/6/2014). Mr. Cruz Soriano  has a past surgical history that includes knee arthroscp harv and urological.  Social History/Living Environment:   Home Environment: Private residence  One/Two Story Residence: Two story, live on 1st floor  Living Alone: No  Support Systems: Family member(s), Spouse/Significant Other/Partner  Patient Expects to be Discharged to[de-identified] Unknown  Current DME Used/Available at Home: Walker, rolling, Transfer bench  Tub or Shower Type: Tub/Shower combination  Prior Level of Function/Work/Activity:  Lives at home with wife. After returning home from acute care he was ambulating independently in home. Declined in cognition and ambulation during last couple days at home. Works from home. Number of Personal Factors/Comorbidities that affect the Plan of Care: 1-2: MODERATE COMPLEXITY   EXAMINATION:   Most Recent Physical Functioning:   Gross Assessment:  AROM: Generally decreased, functional  Strength: Generally decreased, functional  Coordination: Generally decreased, functional  Sensation: Impaired (peripheral neuropathy per patient)               Posture:  Posture (WDL): Exceptions to WDL  Posture Assessment:  Forward head, Rounded shoulders  Balance:  Sitting: Impaired  Sitting - Static: Good (unsupported)  Sitting - Dynamic: Fair (occasional)  Standing: Impaired  Standing - Static: Constant support  Standing - Dynamic : Poor Bed Mobility:  Supine to Sit: Minimum assistance  Scooting: Contact guard assistance  Wheelchair Mobility:     Transfers:  Sit to Stand: Contact guard assistance  Stand to Sit: Contact guard assistance  Gait:     Base of Support: Widened  Speed/Loreto: Slow  Step Length: Left shortened;Right shortened  Gait Abnormalities: Decreased step clearance  Distance (ft): 20 Feet (ft)  Assistive Device: Walker, rolling  Ambulation - Level of Assistance: Minimal assistance  Interventions: Safety awareness training; Tactile cues; Verbal cues       Body Structures Involved:  1. Nerves  2. Muscles Body Functions Affected:  1. Sensory/Pain  2. Neuromusculoskeletal  3. Movement Related Activities and Participation Affected:  1. Mobility  2. Self Care  3. Domestic Life  4. Community, Social and [place]   Number of elements that affect the Plan of Care: 4+: HIGH COMPLEXITY   CLINICAL PRESENTATION:   Presentation: Evolving clinical presentation with unstable and unpredictable characteristics: HIGH COMPLEXITY   CLINICAL DECISION MAKIN [facility] Inpatient Short Form  How much difficulty does the patient currently have. .. Unable A Lot A Little None   1. Turning over in bed (including adjusting bedclothes, sheets and blankets)? [ ] 1   [ ] 2   [X] 3   [ ] 4   2. Sitting down on and standing up from a chair with arms ( e.g., wheelchair, bedside commode, etc.)   [ ] 1   [ ] 2   [X] 3   [ ] 4   3. Moving from lying on back to sitting on the side of the bed? [ ] 1   [X] 2   [ ] 3   [ ] 4   How much help from another person does the patient currently need. .. Total A Lot A Little None   4. Moving to and from a bed to a chair (including a wheelchair)? [ ] 1   [ ] 2   [X] 3   [ ] 4   5. Need to walk in hospital room? [ ] 1   [X] 2   [ ] 3   [ ] 4   6. Climbing 3-5 steps with a railing? [ ] 1   [X] 2   [ ] 3   [ ] 4   © , Trustees of [address] [place] [address] [address] [address], under license to [employer].  All rights reserved Score:  Initial: 15 Most Recent: X (Date: -- )     Interpretation of Tool:  Represents activities that are increasingly more difficult (i.e. Bed mobility, Transfers, Gait). Score 24 23 22-20 19-15 14-10 9-7 6       Modifier CH CI CJ CK CL CM CN         · Mobility - Walking and Moving Around:               - CURRENT STATUS:    CK - 40%-59% impaired, limited or restricted               - GOAL STATUS:           CJ - 20%-39% impaired, limited or restricted               - D/C STATUS:                       ---------------To be determined---------------  Payor: BLUE CROSS / [place] BLUE [place] [facility] / Product Type: PPO /       Medical Necessity:     · Patient is expected to demonstrate progress in balance, coordination and functional technique to increase independence with   and improve safety during all functional mobility. Reason for Services/Other Comments:  · Patient continues to require skilled intervention due to medical complications and patient unable to attend/participate in therapy as expected. Use of outcome tool(s) and clinical judgement create a POC that gives a: Difficult prediction of patient's progress: HIGH COMPLEXITY                 TREATMENT:   (In addition to Assessment/Re-Assessment sessions the following treatments were rendered)   Pre-treatment Symptoms/Complaints:    Pain: Initial:   Pain Intensity 1: 5  Pain Location 1: Head  Pain Orientation 1: Anterior  Pain Intervention(s) 1: Repositioned  Post Session:  Unchanged.       Assessment/Reassessment only, no treatment provided today     Braces/Orthotics/Lines/Etc:   · none  ·   Treatment/Session Assessment:    · Response to Treatment:  Increase in HA  · Interdisciplinary Collaboration:  · Physical Therapist, Occupational Therapist, Registered Nurse and Physician  · After treatment position/precautions:  · Supine in bed, Bed/Chair-wheels locked, Family at bedside, Nurse at bedside and about to be transported off floor  · Compliance with Program/Exercises: compliant all of the time. · Recommendations/Intent for next treatment session: \"Next visit will focus on advancements to more challenging activities and reduction in assistance provided\".   Total Treatment Duration:  PT Patient Time In/Time Out  Time In: 1055  Time Out: [address], PT, DPT

## 2017-10-19 NOTE — CONSULTS
MD Qian   Medical Director  [address] [place] [address], [address] [facility]  Tel: [phone]     Physical Medicine & Rehabilitation Note-consult    Patient: Stacy Dallas MRN: [id number]  SSN: [id number]    YOB: 1955  Age: 57 y.o. Sex: male      Admit Date: 10/17/2017  Admitting Physician: Ander Iglesias MD    Medical Decision Making/Plan/Recommend: s/p SDH evacuation. Ataxia, gait impairment. Recommend  acute rehab at [facility]. Discussed rehabilitation and further care program options with the patient. After careful consideration of all the needs of this patient, it is my professional opinion that the admission to inpatient rehab program is reasonable and necessary due to ongoing medical conditions requiring monitoring for neurologic decline and post surgical care. He has continued ataxia, functional and gait impaiirment. He will benefit from daily multi disciplinary  inpatient rehabilitation program and the availability of all the needed medical and nursing care. Continue PT, OT for balance, mobility, transfers, and gait training. Will follow progress. Chief Complaint : Gait dysfunction secondary to below. Admit Diagnosis: Subdural hematoma (HCC) [I62.00]  bilateral SDH  Bilateral subdural fluid collections   Bilateral bur hole evacuation of subdural hygroma 10/18/2017  Essential hypertension, benign  Diabetes mellitus ([facility] [address]  Atrial fibrillation (HCC)  Tension pneumothorax, treated, resolved  Pain  DVT risk  Acute Rehab Dx:  Debility    deconditioning  Mobility and ambulation deficits  Self Care/ADL deficits    Subjective:     Date of Evaluation:  October 19, 2017    HPI: Stacy Dallas is a 57 y.o. male patient at [facility] who was admitted on 10/17/2017  by Ander Iglesias MD with below mentioned medical history ,is being seen for Physical Medicine and Rehabilitation.     Stacy Dallas who fell and hit his head several weeks ago has had fell confusion, difficulty with balance, walking . He had a CT brain scan confirmed a large right-sided subacute subdural hematoma with acute on chronic features with mass effect and shift. A smaller fluid collection was present on the left. He was taken to the OR and underwent cailin hole evacuation of his right-sided subdural hematoma with reexpansion of the right hemisphere and was discharged home. He was again brought to the ER with shortness of breath and chest pain subsequently, was found to have a large right tension pneumothorax which required chest tube placement and was discharged again. He is again brought to the ER on 10/17/2017 with ataxia and difficulty walking. He was found to have on CT bilateral fluid collection on the subdural space with enlargement of the left-sided collection. He was admitted and underwent a bilateral bur hole evacuation of subdural hygroma per Dr. Katie Sky. Ramon Cuba MD. On 10/18/2017. The patient's post operative course has been fairly tolerated at ICU. Patient still requiring a drain. Noted pneumocephalus, most pronounced in the anterior frontal lobes in follow up CT. We are consulted to assist with rehab needs and placement. Post operatively patient still noted to have difficulty with balance and gait. Patient continuing to work with acute PT, OT. Patient requires min/ mod assist for mobility and transfers at his time. Julia Mackey is seen and examined today. Medical Records reviewed. Patient is independent and active at his baseline without any major debilities.        Medical  Dx:  Past Medical History:   Diagnosis Date    A-fib [facility])      cardioversion    Arteriosclerosis     Atrial fibrillation ([facility] [address] 1/3/2012    Diabetes mellitus     pharmacologically controlled    Diabetes mellitus ([facility] [address] 1/3/2012    Essential hypertension, benign 2/6/2014    Family history of malignant neoplasm of prostate 2/6/2014    Hemochromatosis     HTN (hypertension) 2/6/2014    Hypercholesteremia     Nodular prostate without urinary obstruction 2014    Obesity 2014        Current Level of Function: bed mobility - min A, transfers - CGA, decreased balance , ambulation - 20' with minimum assist using a RW. Prior Level of Function/Work/Activity:  Lives at home with wife. After returning home from acute care he was ambulating independently in home. Declined in cognition and ambulation during last couple days at home. Works from home. Family History   Problem Relation Age of Onset    Heart Attack Father 61         Diabetes Father     Heart Disease Father     Hypertension Father     Cancer Father      lung    Other Mother      Sarcoidosis    Stroke Brother     Psychiatric Disorder Brother     Heart Disease Paternal Grandmother       Social History   Substance Use Topics    Smoking status: Former Smoker     Packs/day: 1.50     Years: 18.00     Types: Cigarettes     Quit date: 1989    Smokeless tobacco: Former User     Quit date: 2016    Alcohol use 0.0 oz/week      Comment: has lessened his alcohol intake since learning about his abnormal liver labs     Past Surgical History:   Procedure Laterality Date    HX UROLOGICAL      prostate u/s and BX    KNEE ARTHROSCP HARV      right      Prior to Admission medications    Medication Sig Start Date End Date Taking? Authorizing Provider   traMADol (ULTRAM) 50 mg tablet Take 50 mg by mouth every six (6) hours as needed for Pain. Phys Other, MD   linaclotide Sanchez Console) 145 mcg cap capsule Take 1 Cap by mouth Daily (before breakfast). 17   HAWA Christine   HYDROcodone-acetaminophen (NORCO) 7.5-325 mg per tablet Take 1 Tab by mouth every eight (8) hours as needed (breakthrough pain). Max Daily Amount: 3 Tabs. 17   Bjorn Willis,    polyethylene glycol (MIRALAX) 17 gram/dose powder Take 17 g by mouth two (2) times a day.     Historical Provider   simvastatin (ZOCOR) 40 mg tablet Take 1 Tab by mouth nightly. 5/1/17   Marvin Ardon MD   enalapril (VASOTEC) 10 mg tablet Take 1 Tab by mouth two (2) times a day. 4/6/17   Marvin Ardon MD   metFORMIN (GLUCOPHAGE) 500 mg tablet Take 1 Tab by mouth two (2) times daily (with meals). 4/6/17   Marvin Ardon MD   hydroCHLOROthiazide (HYDRODIURIL) 25 mg tablet Take 1 Tab by mouth daily. 4/6/17   Marvin Ardon MD   triamcinolone (NASACORT AQ) 55 mcg nasal inhaler 2 Sprays daily. Historical Provider   carvedilol (COREG) 25 mg tablet Take 1 Tab by mouth two (2) times daily (with meals). 1/31/17   Corie Hayes MD   spironolactone (ALDACTONE) 25 mg tablet Take 1 Tab by mouth daily. 1/31/17   Corie Hayes MD   loratadine (CLARITIN) 10 mg tablet Take 10 mg by mouth. Historical Provider   FLUTICASONE PROPIONATE (FLONASE NA) 2 Sprays by Nasal route daily. Historical Provider   B.infantis-B.ani-B.long-B.bifi (PROBIOTIC 4X) 10-15 mg TbEC Take 2 Caps by mouth daily. Historical Provider   clotrimazole-betamethasone (LOTRISONE) topical cream Apply to affected area bid as directed 3/5/15   Marvin Ardon MD   MULTIVITS,CA,MIN/IRON/FA/LYCOP (CENTRUM ULTRA MEN'S PO) Take 1 Tab by mouth daily. Historical Provider   omega-3 fatty acids-vitamin e (FISH OIL) 1,000 mg cap Take 1 Cap by mouth two (2) times a day. 1200 mg daily twice daily    Historical Provider   cpap machine kit nightly. 14-18 cm pressure     Historical Provider     Allergies   Allergen Reactions    Zithromax [Azithromycin] Other (comments)     Skin dryness/peeling        Review of Systems: Denies chest pain, shortness of breath, cough, headache, visual problems, abdominal pain, dysurea, calf pain. Pertinent positives are as noted in the medical records and unremarkable otherwise. Objective:     Vitals:  Blood pressure 110/71, pulse (!) 113, temperature 98.2 °F (36.8 °C), resp.  rate 16, height 6' 3\" (1.905 m), weight 337 lb 1.3 oz (152.9 kg), SpO2 91 %.  Temp (24hrs), Av °F (36.7 °C), Min:97.4 °F (36.3 °C), Max:98.8 °F (37.1 °C)    BMI (calculated): 42.1 (10/19/17 0042)   Intake and Output:  10/17 1901 - 10/19 0700  In: 5591 [P.O.:1365; I.V.:2494]  Out: 1920 [Urine:1750; Drains:120]    Physical Exam:   General: Alert and age appropriately oriented. No acute cardio respiratory distress. HEENT: Normocephalic,no scleral icterus  Oral mucosa moist without cyanosis   Lungs: Clear to auscultation  bilaterally. Respiration even and unlabored   Heart: Regular rate and rhythm, S1, S2   No  murmurs, clicks, rub or gallops   Abdomen: Soft, non-tender, nondistended. Bowel sounds present. No organomegaly. Genitourinary: Benign . Neuromuscular:      JULES, EOMI  Speech clear. follows simple commands well, consistently. LUE     Shoulder abduction   5-/5              Elbow flexion:   5-/5               Wrist extension:  5 /5              Finger flexion;   5/5   ADMQ:  5/5  RUE    Shoulder abduction: 5 /5                Elbow flexion: 5  /5    Wrist extension:  5/5   Finger flexion:  5 /5   ADMQ: 5  /5  LLE     Hip flexion:  5- /5              Knee extension:   5-/5    Ankle dorsiflexion: 5 /5   Ankle plantarflexion:  5 /5        RLE     Hip flexion: 5  /5   Knee extension:  5- /5    Ankle dorsiflexion:   5/5   Ankle plantarflexion:  5 /5  Sensory - intact grossly   Skin/extremity: No rashes, no erythema. Wound covered. Head + RON drain with serous fluid.                                                                                          Labs/Studies:  Recent Results (from the past 72 hour(s))   CBC WITH AUTOMATED DIFF    Collection Time: 10/16/17 10:04 PM   Result Value Ref Range    WBC 9.0 4.3 - 11.1 K/uL    RBC 4.23 4.23 - 5.67 M/uL    HGB 13.7 13.6 - 17.2 g/dL    HCT 40.5 (L) 41.1 - 50.3 %    MCV 95.7 79.6 - 97.8 FL    MCH 32.4 26.1 - 32.9 PG    MCHC 33.8 31.4 - 35.0 g/dL    RDW 13.1 11.9 - 14.6 %    PLATELET 343 404 - 693 K/uL    MPV 10.1 (L) 10.8 - 14.1 FL    DF AUTOMATED      NEUTROPHILS 79 (H) 43 - 78 %    LYMPHOCYTES 14 13 - 44 %    MONOCYTES 5 4.0 - 12.0 %    EOSINOPHILS 1 0.5 - 7.8 %    BASOPHILS 0 0.0 - 2.0 %    IMMATURE GRANULOCYTES 0.6 0.0 - 5.0 %    ABS. NEUTROPHILS 7.2 1.7 - 8.2 K/UL    ABS. LYMPHOCYTES 1.2 0.5 - 4.6 K/UL    ABS. MONOCYTES 0.5 0.1 - 1.3 K/UL    ABS. EOSINOPHILS 0.1 0.0 - 0.8 K/UL    ABS. BASOPHILS 0.0 0.0 - 0.2 K/UL    ABS. IMM. GRANS. 0.1 0.0 - 0.5 K/UL   METABOLIC PANEL, COMPREHENSIVE    Collection Time: 10/16/17 10:04 PM   Result Value Ref Range    Sodium 131 (L) 136 - 145 mmol/L    Potassium 4.0 3.5 - 5.1 mmol/L    Chloride 97 (L) 98 - 107 mmol/L    CO2 27 21 - 32 mmol/L    Anion gap 7 7 - 16 mmol/L    Glucose 135 (H) 65 - 100 mg/dL    BUN 13 8 - 23 MG/DL    Creatinine 0.81 0.8 - 1.5 MG/DL    GFR est AA >60 >60 ml/min/1.73m2    GFR est non-AA >60 >60 ml/min/1.73m2    Calcium 9.4 8.3 - 10.4 MG/DL    Bilirubin, total 0.7 0.2 - 1.1 MG/DL    ALT (SGPT) 64 12 - 65 U/L    AST (SGOT) 30 15 - 37 U/L    Alk.  phosphatase 110 50 - 136 U/L    Protein, total 7.4 6.3 - 8.2 g/dL    Albumin 3.5 3.2 - 4.6 g/dL    Globulin 3.9 (H) 2.3 - 3.5 g/dL    A-G Ratio 0.9 (L) 1.2 - 3.5     PTT    Collection Time: 10/16/17 10:04 PM   Result Value Ref Range    aPTT 25.0 23.5 - 31.7 SEC   PROTHROMBIN TIME + INR    Collection Time: 10/16/17 10:04 PM   Result Value Ref Range    Prothrombin time 10.9 8.6 - 12.2 sec    INR 1.0 0.9 - 1.2     MAGNESIUM    Collection Time: 10/16/17 10:04 PM   Result Value Ref Range    Magnesium 2.1 1.8 - 2.4 mg/dL   TSH 3RD GENERATION    Collection Time: 10/16/17 10:04 PM   Result Value Ref Range    TSH 1.964 0.358 - 3.740 uIU/mL   POC TROPONIN-I    Collection Time: 10/16/17 10:07 PM   Result Value Ref Range    Troponin-I (POC) 0.01 (L) 0.02 - 0.05 ng/ml   EKG, 12 LEAD, INITIAL    Collection Time: 10/16/17 10:29 PM   Result Value Ref Range    Ventricular Rate 91 BPM    Atrial Rate 82 BPM    QRS Duration 88 ms    Q-T Interval 352 ms QTC Calculation (Bezet) 432 ms    Calculated R Axis 37 degrees    Calculated T Axis 30 degrees    Diagnosis       Atrial fibrillation with premature ventricular or aberrantly conducted   complexes  Nonspecific ST abnormality , probably digitalis effect  Abnormal ECG  When compared with ECG of 08-OCT-2017 00:03,  No significant change was found  Confirmed by [facility]  MD ()PERNELL ([id number]) on 10/17/2017 7:59:30 AM     BLOOD GAS, ARTERIAL    Collection Time: 10/17/17 12:13 AM   Result Value Ref Range    pH 7.46 (H) 7.35 - 7.45      PCO2 33 (L) 35 - 45 mmHg    PO2 79 (L) 80 - 105 mmHg    BICARBONATE 23 22 - 26 mmol/L    BASE DEFICIT 0.4 0 - 2 mmol/L    TOTAL HEMOGLOBIN 14.6 11.7 - 15.0 GM/DL    O2 SAT 96 92 - 98.5 %    Arterial O2 Hgb 95.2 94 - 97 %    CARBOXYHEMOGLOBIN 0.5 0.5 - 1.5 %    METHEMOGLOBIN 0.3 0.0 - 1.5 %    DEOXYHEMOGLOBIN 4 0.0 - 5.0 %    SITE RR     ALLENS TEST POSITIVE      MODE RA    GLUCOSE, POC    Collection Time: 10/17/17  2:05 PM   Result Value Ref Range    Glucose (POC) 136 (H) 65 - 100 mg/dL   GLUCOSE, POC    Collection Time: 10/17/17  9:27 PM   Result Value Ref Range    Glucose (POC) 134 (H) 65 - 100 mg/dL   GLUCOSE, POC    Collection Time: 10/18/17  8:09 AM   Result Value Ref Range    Glucose (POC) 159 (H) 65 - 100 mg/dL   GLUCOSE, POC    Collection Time: 10/18/17  9:51 AM   Result Value Ref Range    Glucose (POC) 135 (H) 65 - 100 mg/dL   GLUCOSE, POC    Collection Time: 10/18/17  1:45 PM   Result Value Ref Range    Glucose (POC) 137 (H) 65 - 100 mg/dL   GLUCOSE, POC    Collection Time: 10/18/17  4:24 PM   Result Value Ref Range    Glucose (POC) 123 (H) 65 - 929 mg/dL   METABOLIC PANEL, BASIC    Collection Time: 10/19/17  5:09 AM   Result Value Ref Range    Sodium 138 136 - 145 mmol/L    Potassium 4.0 3.5 - 5.1 mmol/L    Chloride 105 98 - 107 mmol/L    CO2 24 21 - 32 mmol/L    Anion gap 9 7 - 16 mmol/L    Glucose 128 (H) 65 - 100 mg/dL    BUN 9 8 - 23 MG/DL    Creatinine 0.80 0.8 - 1.5 MG/DL    GFR est AA >60 >60 ml/min/1.73m2    GFR est non-AA >60 >60 ml/min/1.73m2    Calcium 8.7 8.3 - 10.4 MG/DL   CBC WITH AUTOMATED DIFF    Collection Time: 10/19/17  5:09 AM   Result Value Ref Range    WBC 9.5 4.3 - 11.1 K/uL    RBC 4.09 (L) 4.23 - 5.67 M/uL    HGB 13.3 (L) 13.6 - 17.2 g/dL    HCT 39.3 (L) 41.1 - 50.3 %    MCV 96.1 79.6 - 97.8 FL    MCH 32.5 26.1 - 32.9 PG    MCHC 33.8 31.4 - 35.0 g/dL    RDW 13.1 11.9 - 14.6 %    PLATELET 038 736 - 758 K/uL    MPV 9.7 (L) 10.8 - 14.1 FL    DF AUTOMATED      NEUTROPHILS 82 (H) 43 - 78 %    LYMPHOCYTES 10 (L) 13 - 44 %    MONOCYTES 7 4.0 - 12.0 %    EOSINOPHILS 1 0.5 - 7.8 %    BASOPHILS 0 0.0 - 2.0 %    IMMATURE GRANULOCYTES 0.4 0.0 - 5.0 %    ABS. NEUTROPHILS 7.8 1.7 - 8.2 K/UL    ABS. LYMPHOCYTES 1.0 0.5 - 4.6 K/UL    ABS. MONOCYTES 0.6 0.1 - 1.3 K/UL    ABS. EOSINOPHILS 0.1 0.0 - 0.8 K/UL    ABS. BASOPHILS 0.0 0.0 - 0.2 K/UL    ABS. IMM.  GRANS. 0.0 0.0 - 0.5 K/UL   GLUCOSE, POC    Collection Time: 10/19/17  8:57 AM   Result Value Ref Range    Glucose (POC) 149 (H) 65 - 100 mg/dL       Functional Assessment:  Reviewed participation and progress in therapies  Gross Assessment  AROM: Generally decreased, functional (10/19/17 1100)  Strength: Generally decreased, functional (10/19/17 1100)  Coordination: Generally decreased, functional (10/19/17 1100)  Sensation: Impaired (peripheral neuropathy per patient) (10/19/17 1100)   Bed Mobility  Supine to Sit: Minimum assistance (10/19/17 1100)  Scooting: Contact guard assistance (10/19/17 1100)   Balance  Sitting: Impaired (10/19/17 1100)  Sitting - Static: Good (unsupported) (10/19/17 1100)  Sitting - Dynamic: Fair (occasional) (10/19/17 1100)  Standing: Impaired (10/19/17 1100)  Standing - Static: Constant support (10/19/17 1100)  Standing - Dynamic : Poor (10/19/17 1100)               Bed/Mat Mobility  Supine to Sit: Minimum assistance (10/19/17 1100)  Sit to Stand: Contact guard assistance (10/19/17 1100)  Bed to Chair: Minimum assistance;Assist x2 (10/17/17 1607)  Scooting: Contact guard assistance (10/19/17 1100)     Ambulation:  Gait  Base of Support: Widened (10/19/17 1100)  Speed/[place]: Slow (10/19/17 1100)  Step Length: Left shortened;Right shortened (10/19/17 1100)  Gait Abnormalities: Decreased step clearance (10/19/17 1100)  Ambulation - Level of Assistance: Minimal assistance (10/19/17 1100)  Distance (ft): 20 Feet (ft) (10/19/17 1100)  Assistive Device: Walker, rolling (10/19/17 1100)  Interventions: Safety awareness training; Tactile cues; Verbal cues (10/19/17 1100)    Impression/Plan:     Principal Problem:    Subdural hematoma ([facility] [address] (10/8/2017)    Active Problems:    Subdural hygroma (10/18/2017)        Current Facility-Administered Medications   Medication Dose Route Frequency Provider Last Rate Last Dose    levETIRAcetam (KEPPRA) tablet 500 mg  500 mg Oral BID Hosea Clifton MD   500 mg at 10/19/17 0859    alum-mag hydroxide-simeth (MYLANTA) oral suspension 30 mL  30 mL Oral Q4H PRN Ewelina Scott MD        fluticasone (FLONASE) 50 mcg/actuation nasal spray 1 Spray  1 Spray Both Nostrils DAILY Hosea Clifton MD   1 Ralph at 10/19/17 0900    loratadine (CLARITIN) tablet 10 mg  10 mg Oral DAILY Hosea Clifton MD   10 mg at 10/19/17 0859    enalapril (VASOTEC) tablet 10 mg  10 mg Oral BID Hosea Clifton MD   10 mg at 10/19/17 0859    hydroCHLOROthiazide (HYDRODIURIL) tablet 25 mg  25 mg Oral DAILY Hosea Clifton MD   25 mg at 10/19/17 0859    simvastatin (ZOCOR) tablet 40 mg  40 mg Oral QHS Hosea Clifton MD   40 mg at 10/18/17 2130    HYDROcodone-acetaminophen (NORCO) 7.5-325 mg per tablet 1 Tab  1 Tab Oral Q8H PRN Hosea Clifton MD   1 Tab at 10/19/17 9056    linaclotide (LINZESS) capsule 145 mcg (Patient Supplied)  145 mcg Oral DAILY Kihei Clore, MD   145 mcg at 10/19/17 0829    sodium chloride (NS) flush 5-10 mL  5-10 mL IntraVENous PRN Hosea Clifton MD   10 mL at 10/18/17 3063  labetalol (NORMODYNE;TRANDATE) 300 mg in 0.9% sodium chloride 150 mL (2 mg / 1 mL) infusion  1 mg/min IntraVENous TITRATE Danielle Freedman MD   Stopped at 10/19/17 0344    acetaminophen (TYLENOL) tablet 650 mg  650 mg Oral Q4H PRN Danielle Freedman MD        oxyCODONE-acetaminophen (PERCOCET 7.5) 7.5-325 mg per tablet 1 Tab  1 Tab Oral Q4H PRN Danielle Freedman MD   1 Tab at 10/19/17 0350    morphine injection 1 mg  1 mg IntraVENous Q4H PRN Danielle Freedman MD   1 mg at 10/19/17 4485    ceFAZolin (ANCEF) 3 g in 0.9%  ml IVPB  3 g IntraVENous Q8H Danielle Freedman MD   3 g at 10/19/17 0341    polyethylene glycol (MIRALAX) packet 17 g  17 g Oral BID Danielle Freedman MD   17 g at 10/19/17 0900    insulin lispro (HUMALOG) injection   SubCUTAneous Daniella Ruiz MD   Stopped at 10/18/17 1630    carvedilol (COREG) tablet 12.5 mg  12.5 mg Oral BID WITH MEALS Norene Mcardle, MD   12.5 mg at 10/19/17 0859    spironolactone (ALDACTONE) tablet 25 mg  25 mg Oral DAILY Norene Mcardle, MD   25 mg at 10/19/17 0859    ondansetron (ZOFRAN) injection 4 mg  4 mg IntraVENous Q8H PRN Norene Mcardle, MD        metFORMIN (GLUCOPHAGE) tablet 500 mg  500 mg Oral BID WITH MEALS Danielle Freedman MD   500 mg at 10/19/17 3996      Recommendations: Recommend sub acute rehab at [address] [place] [address].  Coordination of rehab/medical care. Counseling of Physical Medicine & Rehab care issues management. Monitoring and management of rehab conditions per the plan of care/orders. Will follow along with you for PM&R issues and provide you follow up. Will follow with SW/ /Admissions Coordinators regarding insurance approvals and acceptance. Rehabilitation Management/ Medical Management: 1. Devices:Walkers, Type: Rolling Walker  2. Consult:Rehab team including PT, OT, ST and . 3. Disposition Rehab-discussed with patient. 4. Plexipulse when in bed  5.  Thigh-high or knee-high PAWEL's when out of bed  6. DVT Prophylaxis per NS - no anticoagulation. SCDs applied. 7. Incentive spirometer Q1H while awake  8. Post op hemorrhagic anemia-  monitor. 9. Activity: WBAT BLE  10. Planned Labs: CBC,BMP  11. Pain Control: stable, mild-to-moderate joint symptoms intermittently, reasonably well controlled by PRN meds   12. Wound Care: Keep wound clean and dry and Reinforce dressing PRN- remove RON drain per NS direction  13. Potential neurogenic bladder/ bowel. Monitor. Follow up with Dr Berenice Rush  2 weeks after discharge from rehab. Follow up with ORTHO per instructions. Thank you for the opportunity to participate in the care of this patient.   Signed By: Tae Welch MD     October 19, 2017

## 2017-10-19 NOTE — PROGRESS NOTES
TRANSFER - IN REPORT:    Verbal report received from [address] [place] [address],[address], RN(name) on Cristal Simpson  being received from CCU(unit) for routine progression of care      Report consisted of patients Situation, Background, Assessment and   Recommendations(SBAR). Information from the following report(s) SBAR, Kardex, Intake/Output and MAR was reviewed with the receiving nurse. Opportunity for questions and clarification was provided. Assessment completed upon patients arrival to unit and care assumed.

## 2017-10-19 NOTE — PROGRESS NOTES
TRANSFER - OUT REPORT:    Verbal report given to Jeanette Liu RN(name) on Cayden Napier  being transferred to 703(unit) for routine progression of care       Report consisted of patients Situation, Background, Assessment and   Recommendations(SBAR). Information from the following report(s) SBAR, Kardex, ED Summary, Intake/Output, MAR, Recent Results and Cardiac Rhythm NSR was reviewed with the receiving nurse. Lines:   Peripheral IV 10/18/17 Right Hand (Active)   Site Assessment Clean, dry, & intact 10/19/2017  7:30 AM   Phlebitis Assessment 0 10/19/2017  7:30 AM   Infiltration Assessment 0 10/19/2017  7:30 AM   Dressing Status Clean, dry, & intact 10/19/2017  7:30 AM   Dressing Type Transparent;Tape 10/19/2017  7:30 AM   Hub Color/Line Status Flushed;Patent 10/19/2017  7:30 AM   Alcohol Cap Used No 10/19/2017  7:30 AM        Opportunity for questions and clarification was provided.       Patient transported with:   Patients medications from home  Registered Nurse  Tech

## 2017-10-19 NOTE — PROGRESS NOTES
Interdisciplinary team rounds were held 10/19/2017 with the following team members:Home Health, Nursing, Nurse Practitioner, Nutrition, Occupational Therapy, Palliative Care, Pastoral Care, Pharmacy, Physical Therapy, Physician, Physician's Assistant, Respiratory Therapy, Speech Therapy and Wound Care and the patient. Plan of care discussed. See clinical pathway and/or care plan for interventions and desired outcomes.

## 2017-10-19 NOTE — PROGRESS NOTES
Spoke with patient regarding d/c planning. Patient sitting up on side of bed. Son at bedside   Alert and oriented x 4  Lives with wife and has family support with two children. States he was functioning well until 4 days ago and then was \" unable to walk and had problems thinking. \"  PCP- Dr. Deepa Tubbs and last seen on 10-13-17  DME- rolling walker  Denies having any problems filling Rx  PT/OT evals pending  CM will continue to follow for any further needs. Care Management Interventions  PCP Verified by CM: Yes (10-13-17)  Mode of Transport at Discharge: Other (see comment) (family)  Transition of Care Consult (CM Consult): Discharge Planning  Physical Therapy Consult: Yes  Occupational Therapy Consult: Yes  Current Support Network: Lives with Spouse  Confirm Follow Up Transport: Family  Plan discussed with Pt/Family/Caregiver: Yes  Freedom of Choice Offered:  Yes

## 2017-10-19 NOTE — PROGRESS NOTES
Bedside report to Marvin Mccain RN with wound, drain, neuro check and Interdisciplinary Care Rounds with pt, his wife, and Bhargavi Pitts.

## 2017-10-19 NOTE — PROGRESS NOTES
Dual skin assessment completed with Jack Flores RN. Telfa and tape present to right chest from chest tube removal a couple weeks ago. Dressing removed. No drainage. Steri strips intact. Kerlix present around head with RON drain intact. No further skin issues noted.

## 2017-10-19 NOTE — PROGRESS NOTES
Problem: Falls - Risk of  Goal: *Absence of Falls  Document [place] Fall Risk and appropriate interventions in the flowsheet.    Outcome: Progressing Towards Goal  Fall Risk Interventions:  Mobility Interventions: Bed/chair exit alarm, Communicate number of staff needed for ambulation/transfer, Patient to call before getting OOB, PT Consult for mobility concerns, PT Consult for assist device competence, Utilize walker, cane, or other assitive device    Mentation Interventions: Adequate sleep, hydration, pain control, Bed/chair exit alarm, Door open when patient unattended, Evaluate medications/consider consulting pharmacy, Reorient patient, Increase mobility, Toileting rounds, Update white board    Medication Interventions: Evaluate medications/consider consulting pharmacy, Patient to call before getting OOB, Teach patient to arise slowly    Elimination Interventions: Bed/chair exit alarm, Call light in reach, Toileting schedule/hourly rounds    History of Falls Interventions: Consult care management for discharge planning, Door open when patient unattended, Evaluate medications/consider consulting pharmacy, Investigate reason for fall

## 2017-10-19 NOTE — INTERDISCIPLINARY ROUNDS
IDR held at bedside with [facility],  his PA, oncoming RN, and pt and his wife. Cherise Gallego

## 2017-10-19 NOTE — PROGRESS NOTES
Pt awakened for Q 1 hr neuro checks. Trandate drip started last hour for SBP of 160 & 163. Current BP is now 147/82.   HR 90

## 2017-10-20 ENCOUNTER — APPOINTMENT (OUTPATIENT)
Dept: ULTRASOUND IMAGING | Age: 62
DRG: 981 | End: 2017-10-20
Attending: FAMILY MEDICINE
Payer: COMMERCIAL

## 2017-10-20 ENCOUNTER — HOSPITAL ENCOUNTER (INPATIENT)
Age: 62
LOS: 11 days | Discharge: HOME HEALTH | DRG: 950 | End: 2017-10-31
Attending: PHYSICAL MEDICINE & REHABILITATION | Admitting: PHYSICAL MEDICINE & REHABILITATION
Payer: COMMERCIAL

## 2017-10-20 VITALS
OXYGEN SATURATION: 96 % | TEMPERATURE: 97.7 F | DIASTOLIC BLOOD PRESSURE: 68 MMHG | RESPIRATION RATE: 19 BRPM | BODY MASS INDEX: 39.17 KG/M2 | HEART RATE: 85 BPM | HEIGHT: 75 IN | SYSTOLIC BLOOD PRESSURE: 112 MMHG | WEIGHT: 315 LBS

## 2017-10-20 DIAGNOSIS — I48.21 PERMANENT ATRIAL FIBRILLATION (HCC): ICD-10-CM

## 2017-10-20 DIAGNOSIS — E11.9 TYPE 2 DIABETES MELLITUS WITHOUT COMPLICATION, WITHOUT LONG-TERM CURRENT USE OF INSULIN (HCC): ICD-10-CM

## 2017-10-20 DIAGNOSIS — S06.5XAA SUBDURAL HEMATOMA: ICD-10-CM

## 2017-10-20 DIAGNOSIS — I10 ESSENTIAL HYPERTENSION: ICD-10-CM

## 2017-10-20 PROBLEM — I69.093: Status: ACTIVE | Noted: 2017-10-20

## 2017-10-20 LAB
GLUCOSE BLD STRIP.AUTO-MCNC: 103 MG/DL (ref 65–100)
GLUCOSE BLD STRIP.AUTO-MCNC: 138 MG/DL (ref 65–100)
GLUCOSE BLD STRIP.AUTO-MCNC: 147 MG/DL (ref 65–100)
GLUCOSE BLD STRIP.AUTO-MCNC: 179 MG/DL (ref 65–100)

## 2017-10-20 PROCEDURE — 77030032490 HC SLV COMPR SCD KNE COVD -B

## 2017-10-20 PROCEDURE — 97530 THERAPEUTIC ACTIVITIES: CPT

## 2017-10-20 PROCEDURE — 82962 GLUCOSE BLOOD TEST: CPT

## 2017-10-20 PROCEDURE — 93971 EXTREMITY STUDY: CPT

## 2017-10-20 PROCEDURE — 74011250636 HC RX REV CODE- 250/636: Performed by: NEUROLOGICAL SURGERY

## 2017-10-20 PROCEDURE — 74011250637 HC RX REV CODE- 250/637: Performed by: INTERNAL MEDICINE

## 2017-10-20 PROCEDURE — 99223 1ST HOSP IP/OBS HIGH 75: CPT | Performed by: PHYSICAL MEDICINE & REHABILITATION

## 2017-10-20 PROCEDURE — 74011250637 HC RX REV CODE- 250/637: Performed by: PHYSICAL MEDICINE & REHABILITATION

## 2017-10-20 PROCEDURE — 74011250637 HC RX REV CODE- 250/637: Performed by: NEUROLOGICAL SURGERY

## 2017-10-20 PROCEDURE — 65310000000 HC RM PRIVATE REHAB

## 2017-10-20 PROCEDURE — 74011636637 HC RX REV CODE- 636/637: Performed by: INTERNAL MEDICINE

## 2017-10-20 RX ORDER — LORATADINE 10 MG/1
10 TABLET ORAL DAILY
Status: CANCELLED | OUTPATIENT
Start: 2017-10-21

## 2017-10-20 RX ORDER — SIMVASTATIN 20 MG/1
40 TABLET, FILM COATED ORAL
Status: DISCONTINUED | OUTPATIENT
Start: 2017-10-20 | End: 2017-10-31 | Stop reason: HOSPADM

## 2017-10-20 RX ORDER — LEVETIRACETAM 500 MG/1
500 TABLET ORAL 2 TIMES DAILY
Status: CANCELLED | OUTPATIENT
Start: 2017-10-20

## 2017-10-20 RX ORDER — CARVEDILOL 6.25 MG/1
12.5 TABLET ORAL 2 TIMES DAILY WITH MEALS
Status: DISCONTINUED | OUTPATIENT
Start: 2017-10-21 | End: 2017-10-31 | Stop reason: HOSPADM

## 2017-10-20 RX ORDER — POLYETHYLENE GLYCOL 3350 17 G/17G
17 POWDER, FOR SOLUTION ORAL 2 TIMES DAILY
Status: DISCONTINUED | OUTPATIENT
Start: 2017-10-21 | End: 2017-10-31 | Stop reason: HOSPADM

## 2017-10-20 RX ORDER — SIMVASTATIN 40 MG/1
40 TABLET, FILM COATED ORAL
Status: CANCELLED | OUTPATIENT
Start: 2017-10-20

## 2017-10-20 RX ORDER — MAG HYDROX/ALUMINUM HYD/SIMETH 200-200-20
30 SUSPENSION, ORAL (FINAL DOSE FORM) ORAL
Status: CANCELLED | OUTPATIENT
Start: 2017-10-20

## 2017-10-20 RX ORDER — FLUTICASONE PROPIONATE 50 MCG
1 SPRAY, SUSPENSION (ML) NASAL DAILY
Status: DISCONTINUED | OUTPATIENT
Start: 2017-10-21 | End: 2017-10-31 | Stop reason: HOSPADM

## 2017-10-20 RX ORDER — LEVETIRACETAM 500 MG/1
500 TABLET ORAL 2 TIMES DAILY
Status: DISCONTINUED | OUTPATIENT
Start: 2017-10-21 | End: 2017-10-31 | Stop reason: HOSPADM

## 2017-10-20 RX ORDER — LORATADINE 10 MG/1
10 TABLET ORAL DAILY
Status: DISCONTINUED | OUTPATIENT
Start: 2017-10-21 | End: 2017-10-31 | Stop reason: HOSPADM

## 2017-10-20 RX ORDER — SPIRONOLACTONE 25 MG/1
25 TABLET ORAL DAILY
Status: DISCONTINUED | OUTPATIENT
Start: 2017-10-21 | End: 2017-10-31 | Stop reason: HOSPADM

## 2017-10-20 RX ORDER — INSULIN LISPRO 100 [IU]/ML
INJECTION, SOLUTION INTRAVENOUS; SUBCUTANEOUS
Status: DISCONTINUED | OUTPATIENT
Start: 2017-10-21 | End: 2017-10-22

## 2017-10-20 RX ORDER — METFORMIN HYDROCHLORIDE 500 MG/1
500 TABLET ORAL 2 TIMES DAILY WITH MEALS
Status: DISCONTINUED | OUTPATIENT
Start: 2017-10-21 | End: 2017-10-31 | Stop reason: HOSPADM

## 2017-10-20 RX ORDER — HYDROCHLOROTHIAZIDE 25 MG/1
25 TABLET ORAL DAILY
Status: DISCONTINUED | OUTPATIENT
Start: 2017-10-21 | End: 2017-10-31 | Stop reason: HOSPADM

## 2017-10-20 RX ORDER — BUTALBITAL, ACETAMINOPHEN AND CAFFEINE 50; 325; 40 MG/1; MG/1; MG/1
1 TABLET ORAL
Status: DISCONTINUED | OUTPATIENT
Start: 2017-10-20 | End: 2017-10-20 | Stop reason: HOSPADM

## 2017-10-20 RX ORDER — HYDROCODONE BITARTRATE AND ACETAMINOPHEN 7.5; 325 MG/1; MG/1
1 TABLET ORAL
Status: DISCONTINUED | OUTPATIENT
Start: 2017-10-20 | End: 2017-10-21

## 2017-10-20 RX ORDER — MAG HYDROX/ALUMINUM HYD/SIMETH 200-200-20
30 SUSPENSION, ORAL (FINAL DOSE FORM) ORAL
Status: DISCONTINUED | OUTPATIENT
Start: 2017-10-20 | End: 2017-10-31 | Stop reason: HOSPADM

## 2017-10-20 RX ORDER — INSULIN LISPRO 100 [IU]/ML
INJECTION, SOLUTION INTRAVENOUS; SUBCUTANEOUS
Status: CANCELLED | OUTPATIENT
Start: 2017-10-21

## 2017-10-20 RX ORDER — POLYETHYLENE GLYCOL 3350 17 G/17G
17 POWDER, FOR SOLUTION ORAL 2 TIMES DAILY
Status: CANCELLED | OUTPATIENT
Start: 2017-10-20

## 2017-10-20 RX ORDER — HYDROCHLOROTHIAZIDE 25 MG/1
25 TABLET ORAL DAILY
Status: CANCELLED | OUTPATIENT
Start: 2017-10-21

## 2017-10-20 RX ORDER — CARVEDILOL 12.5 MG/1
12.5 TABLET ORAL 2 TIMES DAILY WITH MEALS
Status: CANCELLED | OUTPATIENT
Start: 2017-10-20

## 2017-10-20 RX ORDER — SODIUM CHLORIDE 0.9 % (FLUSH) 0.9 %
5-10 SYRINGE (ML) INJECTION AS NEEDED
Status: CANCELLED | OUTPATIENT
Start: 2017-10-20

## 2017-10-20 RX ORDER — ENALAPRIL MALEATE 5 MG/1
10 TABLET ORAL 2 TIMES DAILY
Status: DISCONTINUED | OUTPATIENT
Start: 2017-10-21 | End: 2017-10-31 | Stop reason: HOSPADM

## 2017-10-20 RX ORDER — ENALAPRIL MALEATE 10 MG/1
10 TABLET ORAL 2 TIMES DAILY
Status: CANCELLED | OUTPATIENT
Start: 2017-10-20

## 2017-10-20 RX ORDER — HYDROCODONE BITARTRATE AND ACETAMINOPHEN 7.5; 325 MG/1; MG/1
1 TABLET ORAL
Status: CANCELLED | OUTPATIENT
Start: 2017-10-20

## 2017-10-20 RX ORDER — OXYCODONE AND ACETAMINOPHEN 7.5; 325 MG/1; MG/1
1 TABLET ORAL
Status: DISCONTINUED | OUTPATIENT
Start: 2017-10-20 | End: 2017-10-21

## 2017-10-20 RX ORDER — BUTALBITAL, ACETAMINOPHEN AND CAFFEINE 50; 325; 40 MG/1; MG/1; MG/1
1 TABLET ORAL
Status: CANCELLED | OUTPATIENT
Start: 2017-10-20

## 2017-10-20 RX ORDER — BUTALBITAL, ACETAMINOPHEN AND CAFFEINE 50; 325; 40 MG/1; MG/1; MG/1
1 TABLET ORAL
Status: DISCONTINUED | OUTPATIENT
Start: 2017-10-20 | End: 2017-10-31 | Stop reason: HOSPADM

## 2017-10-20 RX ORDER — FLUTICASONE PROPIONATE 50 MCG
1 SPRAY, SUSPENSION (ML) NASAL DAILY
Status: CANCELLED | OUTPATIENT
Start: 2017-10-21

## 2017-10-20 RX ORDER — ACETAMINOPHEN 325 MG/1
650 TABLET ORAL
Status: CANCELLED | OUTPATIENT
Start: 2017-10-20

## 2017-10-20 RX ORDER — ACETAMINOPHEN 325 MG/1
650 TABLET ORAL
Status: DISCONTINUED | OUTPATIENT
Start: 2017-10-20 | End: 2017-10-31 | Stop reason: HOSPADM

## 2017-10-20 RX ORDER — SPIRONOLACTONE 25 MG/1
25 TABLET ORAL DAILY
Status: CANCELLED | OUTPATIENT
Start: 2017-10-21

## 2017-10-20 RX ORDER — OXYCODONE AND ACETAMINOPHEN 7.5; 325 MG/1; MG/1
1 TABLET ORAL
Status: CANCELLED | OUTPATIENT
Start: 2017-10-20

## 2017-10-20 RX ORDER — DABIGATRAN ETEXILATE 150 MG/1
150 CAPSULE ORAL 2 TIMES DAILY
COMMUNITY
End: 2017-10-31

## 2017-10-20 RX ORDER — SODIUM CHLORIDE 0.9 % (FLUSH) 0.9 %
5-10 SYRINGE (ML) INJECTION AS NEEDED
Status: DISCONTINUED | OUTPATIENT
Start: 2017-10-20 | End: 2017-10-31 | Stop reason: HOSPADM

## 2017-10-20 RX ORDER — METFORMIN HYDROCHLORIDE 500 MG/1
500 TABLET ORAL 2 TIMES DAILY WITH MEALS
Status: CANCELLED | OUTPATIENT
Start: 2017-10-20

## 2017-10-20 RX ADMIN — INSULIN LISPRO 2 UNITS: 100 INJECTION, SOLUTION INTRAVENOUS; SUBCUTANEOUS at 12:22

## 2017-10-20 RX ADMIN — METFORMIN HYDROCHLORIDE 500 MG: 500 TABLET, FILM COATED ORAL at 08:32

## 2017-10-20 RX ADMIN — ENALAPRIL MALEATE 10 MG: 10 TABLET ORAL at 08:33

## 2017-10-20 RX ADMIN — BUTALBITAL, ACETAMINOPHEN AND CAFFEINE 1 TABLET: 50; 325; 40 TABLET ORAL at 14:37

## 2017-10-20 RX ADMIN — OXYCODONE HYDROCHLORIDE AND ACETAMINOPHEN 1 TABLET: 7.5; 325 TABLET ORAL at 05:04

## 2017-10-20 RX ADMIN — POLYETHYLENE GLYCOL 3350 17 G: 17 POWDER, FOR SOLUTION ORAL at 17:30

## 2017-10-20 RX ADMIN — LORATADINE 10 MG: 10 TABLET ORAL at 08:32

## 2017-10-20 RX ADMIN — SPIRONOLACTONE 25 MG: 25 TABLET, FILM COATED ORAL at 08:33

## 2017-10-20 RX ADMIN — POLYETHYLENE GLYCOL 3350 17 G: 17 POWDER, FOR SOLUTION ORAL at 08:33

## 2017-10-20 RX ADMIN — BUTALBITAL, ACETAMINOPHEN AND CAFFEINE 1 TABLET: 50; 325; 40 TABLET ORAL at 21:56

## 2017-10-20 RX ADMIN — LEVETIRACETAM 500 MG: 500 TABLET ORAL at 17:30

## 2017-10-20 RX ADMIN — Medication 5 ML: at 05:05

## 2017-10-20 RX ADMIN — FLUTICASONE PROPIONATE 1 SPRAY: 50 SPRAY, METERED NASAL at 08:33

## 2017-10-20 RX ADMIN — CARVEDILOL 12.5 MG: 12.5 TABLET, FILM COATED ORAL at 17:30

## 2017-10-20 RX ADMIN — CARVEDILOL 12.5 MG: 12.5 TABLET, FILM COATED ORAL at 08:32

## 2017-10-20 RX ADMIN — LEVETIRACETAM 500 MG: 500 TABLET ORAL at 08:32

## 2017-10-20 RX ADMIN — METFORMIN HYDROCHLORIDE 500 MG: 500 TABLET, FILM COATED ORAL at 17:29

## 2017-10-20 RX ADMIN — CEFAZOLIN 3 G: 1 INJECTION, POWDER, FOR SOLUTION INTRAMUSCULAR; INTRAVENOUS; PARENTERAL at 12:22

## 2017-10-20 RX ADMIN — HYDROCHLOROTHIAZIDE 25 MG: 25 TABLET ORAL at 08:32

## 2017-10-20 RX ADMIN — SIMVASTATIN 40 MG: 20 TABLET, FILM COATED ORAL at 21:56

## 2017-10-20 RX ADMIN — CEFAZOLIN 3 G: 1 INJECTION, POWDER, FOR SOLUTION INTRAMUSCULAR; INTRAVENOUS; PARENTERAL at 04:51

## 2017-10-20 RX ADMIN — ENALAPRIL MALEATE 10 MG: 10 TABLET ORAL at 17:30

## 2017-10-20 NOTE — IP AVS SNAPSHOT
[address] 
[phone] Patient: Krzysztof Garber MRN: [id number] My Medications STOP taking these medications   
 cpap machine kit  
   
  
 enalapril 10 mg tablet Commonly known as:  Trish Garcia FISH OIL 1,000 mg Cap Generic drug:  omega-3 fatty acids-vitamin e HYDROcodone-acetaminophen 7.5-325 mg per tablet Commonly known as:  NORCO  
   
  
 PRADAXA 150 mg capsule Generic drug:  dabigatran etexilate ULTRAM 50 mg tablet Generic drug:  traMADol TAKE these medications as instructed Instructions Each Dose to Equal  
 Morning Noon Evening Bedtime  
 butalbital-acetaminophen-caffeine -40 mg per tablet Commonly known as:  Michael Willett Your last dose was: Your next dose is: Take 1 Tab by mouth every six (6) hours as needed for Headache. Max Daily Amount: 4 Tabs. 1 Tab  
    
   
   
   
  
 carvedilol 25 mg tablet Commonly known as:  Efrem Balzarine Your last dose was: Your next dose is: Take 1 Tab by mouth two (2) times daily (with meals). 25 mg CENTRUM ULTRA MEN'S PO Your last dose was: Your next dose is: Take 1 Tab by mouth daily. 1 Tab  
    
   
   
   
  
 clotrimazole-betamethasone topical cream  
Commonly known as:  Raul Vee Your last dose was: Your next dose is:    
   
   
 Apply to affected area bid as directed FLONASE NA Your last dose was: Your next dose is: 2 Sprays by Nasal route daily. 2 Spray  
    
   
   
   
  
 hydroCHLOROthiazide 25 mg tablet Commonly known as:  HYDRODIURIL Your last dose was: Your next dose is: Take 1 Tab by mouth daily. 25 mg  
    
   
   
   
  
 levETIRAcetam 500 mg tablet Commonly known as:  KEPPRA Your last dose was: Your next dose is: Take 1 Tab by mouth two (2) times a day. Indications: seizure prophylaxis 500 mg  
    
   
   
   
  
 linaclotide 145 mcg Cap capsule Commonly known as:  Smiley Runner Your last dose was: Your next dose is: Take 1 Cap by mouth Daily (before breakfast). 145 mcg  
    
   
   
   
  
 loratadine 10 mg tablet Commonly known as:  Patricia Garcia Start taking on:  11/1/2017 Your last dose was: Your next dose is: Take 1 Tab by mouth daily. 10 mg  
    
   
   
   
  
 metFORMIN 500 mg tablet Commonly known as:  GLUCOPHAGE Your last dose was: Your next dose is: Take 1 Tab by mouth two (2) times daily (with meals). 500 mg MIRALAX 17 gram/dose powder Generic drug:  polyethylene glycol Your last dose was: Your next dose is: Take 17 g by mouth two (2) times a day. 17 g NASACORT AQ 55 mcg nasal inhaler Generic drug:  triamcinolone Your last dose was: Your next dose is: 2 Sprays daily. 2 Spray  
    
   
   
   
  
 oxyCODONE-acetaminophen 5-325 mg per tablet Commonly known as:  PERCOCET Your last dose was: Your next dose is: Take 1 Tab by mouth every four (4) hours as needed. Max Daily Amount: 6 Tabs. 1 Tab PROBIOTIC 4X 10-15 mg Tbec Generic drug:  B.infantis-B.ani-B.long-B.bifi Your last dose was: Your next dose is: Take 2 Caps by mouth daily. 2 Cap  
    
   
   
   
  
 simvastatin 40 mg tablet Commonly known as:  ZOCOR Your last dose was: Your next dose is: Take 1 Tab by mouth nightly. 40 mg  
    
   
   
   
  
 spironolactone 25 mg tablet Commonly known as:  ALDACTONE Your last dose was: Your next dose is: Take 1 Tab by mouth daily. 25 mg Where to Get Your Medications Information on where to get these meds will be given to you by the nurse or doctor. ! Ask your nurse or doctor about these medications  
  butalbital-acetaminophen-caffeine -40 mg per tablet  
 levETIRAcetam 500 mg tablet  
 loratadine 10 mg tablet  
 oxyCODONE-acetaminophen 5-325 mg per tablet

## 2017-10-20 NOTE — PROGRESS NOTES
Problem: Mobility Impaired (Adult and Pediatric)  Goal: *Acute Goals and Plan of Care (Insert Text)  _LTG:  (1.)Mr. Bj Ovalles will move from supine to sit and sit to supine, scoot up and down and roll side to side in bed with modified INDEPENDENT within 7 day(s). (2.)Mr. Bj Ovalles will transfer from bed to chair and chair to bed with supervision using the least restrictive device within 7 day(s). (3.)Mr. Bj Ovalles will ambulate with supervision for 250+ feet with the least restrictive/no device within 7 day(s). (4.)Mr. Bj Ovalles will perform standing static and dynamic balance activities x 8 minutes with SUPERVISION to improve safety within 7 day(s). (5.)Mr. Bj Ovalles will ascend and descend 4 stairs using one hand rail(s) with CGA to improve functional mobility and safety within 7 day(s). _______________________________________________________________________________________________      PHYSICAL THERAPY: Daily Note, Treatment Day: 1st and AM 10/20/2017  INPATIENT: Hospital Day: 4  Payor: [employer] Mena / Plan: [facility] / Product Type: PPO /      NAME/AGE/GENDER: Pepper Carreno is a 57 y.o. male       PRIMARY DIAGNOSIS: Subdural hematoma (HCC) [I62.00] Subdural hematoma (HCC) Subdural hematoma (HCC)  Procedure(s) (LRB):  BILATERAL HARI HOLES FOR SUBDURAL HEMATOMA   (Bilateral)  2 Days Post-Op  ICD-10: Treatment Diagnosis:       · Other abnormalities of gait and mobility (R26.89)  · History of falling (Z91.81)   Precaution/Allergies:  Zithromax [azithromycin]       ASSESSMENT:      Mr. Bj Ovalles presents sitting on side of bed with family attending, very agreeable to therapy. Pt stood and was able to increase his ambulation to 50' x 2 with rolling walker and min assist.  Pt with some decreased coordination with ambulation. Pt returned to side of bed and was left up with family present. Pt with R LE edema, RN notified. Pt making progress with ambulation and overall mobility. Will continue with POC. Mr. Billy Melgar would benefit from resuming skilled physical therapy (medically necessary) to address his deficits and maximize his function. This section established at most recent assessment   PROBLEM LIST (Impairments causing functional limitations):  1. Decreased ADL/Functional Activities  2. Decreased Transfer Abilities  3. Decreased Ambulation Ability/Technique  4. Decreased Balance  5. Decreased Activity Tolerance    INTERVENTIONS PLANNED: (Benefits and precautions of physical therapy have been discussed with the patient.)  1. Balance Exercise  2. Bed Mobility  3. Gait Training  4. Neuromuscular Re-education/Strengthening  5. Therapeutic Activites  6. Therapeutic Exercise/Strengthening  7. Transfer Training  8. education  9. Group Therapy      TREATMENT PLAN: Frequency/Duration: 3-5 times a week for duration of hospital stay  Rehabilitation Potential For Stated Goals: GOOD      RECOMMENDED REHABILITATION/EQUIPMENT: (at time of discharge pending progress): Due to the probability of continued deficits (see above) this patient will likely need continued skilled physical therapy after discharge. Equipment:   · None at this time                   HISTORY:   History of Present Injury/Illness (Reason for Referral):  Per MD note,     HPI Comments: 58-year-old male complaining of unsteady gait weakness. Patient was recently in the hospital for evacuation of subdural hematoma after a fall. Patient also developed a spontaneous pneumothorax. She was in the hospital several days and was discharged ambulating without difficulty. Over the last few days he's gotten more unsteady an weak. Fevers chills nausea vomiting or diarrhea no new falls. Patient is a 57 y.o. male presenting with headaches, gait problem, and altered mental status. The history is provided by the patient and a relative. \"       Past Medical History/Comorbidities:   Mr. Billy Melgar  has a past medical history of A-fib ([facility] [address]  Arteriosclerosis; Atrial fibrillation ([facility] [address] (1/3/2012); Diabetes mellitus; Diabetes mellitus ([facility] [address] (1/3/2012); Essential hypertension, benign (2/6/2014); Family history of malignant neoplasm of prostate (2/6/2014); Hemochromatosis; HTN (hypertension) (2/6/2014); Hypercholesteremia; Nodular prostate without urinary obstruction (2/6/2014); and Obesity (2/6/2014). Mr. Radha Mendoza  has a past surgical history that includes knee arthroscp harv and urological.  Social History/Living Environment:   Home Environment: Private residence  One/Two Story Residence: Two story, live on 1st floor  Living Alone: No  Support Systems: Family member(s), Spouse/Significant Other/Partner  Patient Expects to be Discharged to[de-identified] Unknown  Current DME Used/Available at Home: Walker, rolling, Transfer bench  Tub or Shower Type: Tub/Shower combination  Prior Level of Function/Work/Activity:  Lives at home with wife. After returning home from acute care he was ambulating independently in home. Declined in cognition and ambulation during last couple days at home. Works from home. Number of Personal Factors/Comorbidities that affect the Plan of Care: 1-2: MODERATE COMPLEXITY   EXAMINATION:   Most Recent Physical Functioning:   Gross Assessment:                  Posture:     Balance:  Sitting - Static: Good (unsupported)  Sitting - Dynamic: Fair (occasional) (+)  Standing - Static: Constant support  Standing - Dynamic : Fair Bed Mobility:     Wheelchair Mobility:     Transfers:  Sit to Stand: Contact guard assistance;Minimum assistance  Stand to Sit: Contact guard assistance  Gait:     Base of Support: Widened  Speed/[place]: Slow  Step Length: Left shortened;Right shortened  Gait Abnormalities: Decreased step clearance  Distance (ft): 50 Feet (ft) (x 2)  Assistive Device: Walker, rolling  Ambulation - Level of Assistance: Minimal assistance  Interventions: Safety awareness training;Verbal cues       Body Structures Involved:  1. Nerves  2.  Muscles Body Functions Affected:  1. Sensory/Pain  2. Neuromusculoskeletal  3. Movement Related Activities and Participation Affected:  1. Mobility  2. Self Care  3. Domestic Life  4. Community, Social and [place]   Number of elements that affect the Plan of Care: 4+: HIGH COMPLEXITY   CLINICAL PRESENTATION:   Presentation: Evolving clinical presentation with unstable and unpredictable characteristics: HIGH COMPLEXITY   CLINICAL DECISION MAKIN [facility] Inpatient Short Form  How much difficulty does the patient currently have. .. Unable A Lot A Little None   1. Turning over in bed (including adjusting bedclothes, sheets and blankets)? [ ] 1   [ ] 2   [X] 3   [ ] 4   2. Sitting down on and standing up from a chair with arms ( e.g., wheelchair, bedside commode, etc.)   [ ] 1   [ ] 2   [X] 3   [ ] 4   3. Moving from lying on back to sitting on the side of the bed? [ ] 1   [X] 2   [ ] 3   [ ] 4   How much help from another person does the patient currently need. .. Total A Lot A Little None   4. Moving to and from a bed to a chair (including a wheelchair)? [ ] 1   [ ] 2   [X] 3   [ ] 4   5. Need to walk in hospital room? [ ] 1   [X] 2   [ ] 3   [ ] 4   6. Climbing 3-5 steps with a railing? [ ] 1   [X] 2   [ ] 3   [ ] 4   © , Trustees of [address] [place] [address], under license to [employer]. All rights reserved    Score:  Initial: 15 Most Recent: X (Date: -- )     Interpretation of Tool:  Represents activities that are increasingly more difficult (i.e. Bed mobility, Transfers, Gait).        Score 24 23 22-20 19-15 14-10 9-7 6       Modifier CH CI CJ CK CL CM CN         · Mobility - Walking and Moving Around:               - CURRENT STATUS:    CK - 40%-59% impaired, limited or restricted               - GOAL STATUS:           CJ - 20%-39% impaired, limited or restricted               - D/C STATUS:                       ---------------To be determined---------------  Payor: BLUE CROSS / Plan: [place] [employer] [facility] / Product Type: PPO /       Medical Necessity:     · Patient is expected to demonstrate progress in balance, coordination and functional technique to increase independence with   and improve safety during all functional mobility. Reason for Services/Other Comments:  · Patient continues to require skilled intervention due to medical complications and patient unable to attend/participate in therapy as expected. Use of outcome tool(s) and clinical judgement create a POC that gives a: Difficult prediction of patient's progress: HIGH COMPLEXITY                 TREATMENT:      Pre-treatment Symptoms/Complaints:    Pain: Initial:      Post Session:  Unchanged. Therapeutic Activity: (    14 minutes): Therapeutic activities including Bed transfers and Ambulation on level ground to improve mobility, strength, balance and coordination. Required minimal Safety awareness training;Verbal cues to promote dynamic balance in standing and promote coordination of bilateral, lower extremity(s). Braces/Orthotics/Lines/Etc:   · none  ·   Treatment/Session Assessment:    · Response to Treatment:  R LE noted during ambulation and RN notified  · Interdisciplinary Collaboration:  · Physical Therapy Assistant and Registered Nurse  · After treatment position/precautions:Bed/Chair-wheels locked, Bed in low position, RN notified, Family at bedside and sitting on side of bed  · Compliance with Program/Exercises: compliant all of the time. · Recommendations/Intent for next treatment session: \"Next visit will focus on advancements to more challenging activities and reduction in assistance provided\".   Total Treatment Duration:  PT Patient Time In/Time Out  Time In: 0940  Time Out: 5230 Lazarus Avalos PTA

## 2017-10-20 NOTE — PROGRESS NOTES
[address] [facility] CRITICAL CARE OUTREACH NURSE PROGRESS REPORT      SUBJECTIVE: Called to assess patient secondary to [employer]. MEWS Score: 1 (10/20/17 0356)  Vitals:    10/19/17 2220 10/20/17 0356 10/20/17 0738 10/20/17 1035   BP: 118/82 124/72 131/84 104/66   Pulse: 86 84 93 84   Resp: 16 16 17 18   Temp: 98.1 °F (36.7 °C) 98.6 °F (37 °C) 98.1 °F (36.7 °C) 98.1 °F (36.7 °C)   SpO2: 95% 93% 92% 95%   Weight:       Height:              LAB DATA:    Recent Labs      10/19/17   0509   NA  138   K  4.0   CL  105   CO2  24   AGAP  9   GLU  128*   BUN  9   CREA  0.80   GFRAA  >60   GFRNA  >60   CA  8.7        Recent Labs      10/19/17   0509   WBC  9.5   HGB  13.3*   HCT  39.3*   PLT  227          OBJECTIVE: On arrival to room, I found patient to be up ambulating to bathroom with wife at bedside. Pain Assessment  Pain Intensity 1: 4 (10/20/17 0601)  Pain Location 1: Head  Pain Intervention(s) 1: Medication (see MAR)  Patient Stated Pain Goal: 3                                 ASSESSMENT:  Patient is alert and oriented x4, follows commands and ambulatory with no assist. RON drain intact. VSS. Denies SOB or pain at this time. Awaiting transfer to 9th floor rehab. PLAN:  Will continue to follow per outreach program protocol.      Silvino Lima RN

## 2017-10-20 NOTE — PROGRESS NOTES
TRANSFER - IN REPORT:    Verbal report received from Ladonna Councilman RN(name) on Deon Sheffield  being received from 7th floor(unit) for routine progression of care      Report consisted of patients Situation, Background, Assessment and   Recommendations(SBAR). Information from the following report(s) SBAR, Kardex, Procedure Summary, Intake/Output, MAR and Recent Results was reviewed with the receiving nurse. Opportunity for questions and clarification was provided. Assessment completed upon patients arrival to unit and care assumed. Dual skin assessment performed with Justus Gee RN. Incision to right chest wall from chest tube healing with steristrips. Redness noted from tape adhesive.  [place] holes to head wrapped with 4x4 and kerlix

## 2017-10-20 NOTE — IP AVS SNAPSHOT
[address] [facility] [address] 
 
 
 [address] [facility] [address] [facility] 
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  
  
Phone:  [phone] Where:  [address] [facility], [address] [facility] [address] Follow up with [facility]. YOU WILL BE CONTACTED BY  PRIOR TO FIRST HOME VISIT. Phone:  [phone] Where:  [address], [address] [address] Monday Nov 13, 2017 CT HEAD WO CONT with [facility] CT 16 SLICE UNIT 1 at  7:35 AM  
PATIENT ARRIVAL Please report 30 minutes early to check in Where:  [address] ([facility] [address] [facility] [address]) Thursday Nov 16, 2017 Follow Up with Keya Valle MD at  9:45 AM  
Where:  [facility] [address] ([address]) Friday Nov 17, 2017 HOSPITAL with Tonio Hernadez NP at  2:40 PM (Arrive by 2:10 PM) Where:  [facility] ([facility]) Follow up with Alessandra Maldonado MD  
appointment time at 2:10       phone number [phone] follow up with Hung Olivo Phone:  [phone] Where:  [address] [place], [address], [address] [facility] [address] and [address] [facility] [place] [address] [facility] Saturday Nov 18, 2017 Follow up with Keya Valle MD  
Appointment 9:45 Phone:  [phone] Where:  Rosita Hodges, [address] [facility], [address] [place] [address] Tuesday Nov 21, 2017 LAB with [address] at  [employer] PM  
Where:  [address] ([address]) Follow Up with Ellyn Moreno MD at  1:45 PM  
Where: [facility]) Wednesday Nov 29, 2017 Follow Up with Pushpa Choi MD at  1:30 PM  
Where: Ajit ([address]) Tuesday Dec 05, 2017 ECHOCARDIOGRAM with GVLLE ECHO [address] at  7:30 AM  
Where:  [facility] [employer] ([facility]) Tuesday Dec 12, 2017 Office Visit with Arina Díaz MD at  8:15 AM  
Where:  [facility] [employer] ([facility] [place]) Discharge Orders None A check carlos indicates which time of day the medication should be taken. My Medications STOP taking these medications   
 cpap machine kit  
   
  
 enalapril 10 mg tablet Commonly known as:  [place] FISH OIL 1,000 mg Cap Generic drug:  omega-3 fatty acids-vitamin e HYDROcodone-acetaminophen 7.5-325 mg per tablet Commonly known as:  NORCO  
   
  
 PRADAXA 150 mg capsule Generic drug:  dabigatran etexilate ULTRAM 50 mg tablet Generic drug:  traMADol TAKE these medications as instructed Instructions Each Dose to Equal  
 Morning Noon Evening Bedtime  
 butalbital-acetaminophen-caffeine -40 mg per tablet Commonly known as:  Meyer Mcburney Your last dose was: Your next dose is: Take 1 Tab by mouth every six (6) hours as needed for Headache. Max Daily Amount: 4 Tabs. 1 Tab  
    
   
   
   
  
 carvedilol 25 mg tablet Commonly known as:  Elisabet Keyes Your last dose was: Your next dose is: Take 1 Tab by mouth two (2) times daily (with meals). 25 mg CENTRUM ULTRA MEN'S PO Your last dose was: Your next dose is: Take 1 Tab by mouth daily. 1 Tab  
    
   
   
   
  
 clotrimazole-betamethasone topical cream  
Commonly known as:  Ricky Cotton Your last dose was: Your next dose is:    
   
   
 Apply to affected area bid as directed FLONASE NA Your last dose was: Your next dose is: 2 Sprays by Nasal route daily. 2 Spray  
    
   
   
   
  
 hydroCHLOROthiazide 25 mg tablet Commonly known as:  HYDRODIURIL Your last dose was: Your next dose is: Take 1 Tab by mouth daily. 25 mg  
    
   
   
   
  
 levETIRAcetam 500 mg tablet Commonly known as:  KEPPRA Your last dose was: Your next dose is: Take 1 Tab by mouth two (2) times a day. Indications: seizure prophylaxis 500 mg  
    
   
   
   
  
 linaclotide 145 mcg Cap capsule Commonly known as:  Vickie Villalobos Your last dose was: Your next dose is: Take 1 Cap by mouth Daily (before breakfast). 145 mcg  
    
   
   
   
  
 loratadine 10 mg tablet Commonly known as:  Андрей Robb Start taking on:  11/1/2017 Your last dose was: Your next dose is: Take 1 Tab by mouth daily. 10 mg  
    
   
   
   
  
 metFORMIN 500 mg tablet Commonly known as:  GLUCOPHAGE Your last dose was: Your next dose is: Take 1 Tab by mouth two (2) times daily (with meals). 500 mg MIRALAX 17 gram/dose powder Generic drug:  polyethylene glycol Your last dose was: Your next dose is: Take 17 g by mouth two (2) times a day. 17 g NASACORT AQ 55 mcg nasal inhaler Generic drug:  triamcinolone Your last dose was: Your next dose is: 2 Sprays daily. 2 Spray  
    
   
   
   
  
 oxyCODONE-acetaminophen 5-325 mg per tablet Commonly known as:  PERCOCET  
   
 Your last dose was: Your next dose is: Take 1 Tab by mouth every four (4) hours as needed. Max Daily Amount: 6 Tabs. 1 Tab PROBIOTIC 4X 10-15 mg Tbec Generic drug:  B.infantis-B.ani-B.long-B.bifi Your last dose was: Your next dose is: Take 2 Caps by mouth daily. 2 Cap  
    
   
   
   
  
 simvastatin 40 mg tablet Commonly known as:  ZOCOR Your last dose was: Your next dose is: Take 1 Tab by mouth nightly. 40 mg  
    
   
   
   
  
 spironolactone 25 mg tablet Commonly known as:  ALDACTONE Your last dose was: Your next dose is: Take 1 Tab by mouth daily. 25 mg Where to Get Your Medications Information on where to get these meds will be given to you by the nurse or doctor. ! Ask your nurse or doctor about these medications  
  butalbital-acetaminophen-caffeine -40 mg per tablet  
 levETIRAcetam 500 mg tablet  
 loratadine 10 mg tablet  
 oxyCODONE-acetaminophen 5-325 mg per tablet Discharge Instructions None [facility] Announcement We are excited to announce that we are making your provider's discharge notes available to you in [employer]ack[employer]. You will see these notes when they are completed and signed by the physician that discharged you from your recent hospital stay. If you have any questions or concerns about any information you see in Racktivity, please call the Health Information Department where you were seen or reach out to your Primary Care Provider for more information about your plan of care. Introducing [facility]! Dear Pato Veliz: Thank you for requesting a Racktivity account. Our records indicate that you already have an active Racktivity account. You can access your account anytime at [email or web address] Did you know that you can access your hospital and ER discharge instructions at any time in MyChart? You can also review all of your test results from your hospital stay or ER visit. Additional Information If you have questions, please visit the Frequently Asked Questions section of the [employer] website at [email or web address] Remember, MyChart is NOT to be used for urgent needs. For medical emergencies, dial 911. Now available from your iPhone and Android! Providers Seen During Your Hospitalization Provider Specialty Primary office phone Hemant Gonzalez MD Physical Medicine and Rehab [phone] Your Primary Care Physician (PCP) Primary Care Physician Office Phone Office Fax Shirin Bashir [phone] You are allergic to the following Allergen Reactions Zithromax (Azithromycin) Other (comments) Skin dryness/peeling Recent Documentation Smoking Status Former Smoker Emergency Contacts Name Discharge Info Relation Home Work Mobile Temo Jasso DISCHARGE CAREGIVER [3] Spouse [3] [phone] Patient Belongings The following personal items are in your possession at time of discharge: 
  Dental Appliances: None  Visual Aid: Glasses      Home Medications: None   Jewelry: None  Clothing: Shirt, Undergarments, Pants, Footwear    Other Valuables: None Please provide this summary of care documentation to your next provider. Signatures-by signing, you are acknowledging that this After Visit Summary has been reviewed with you and you have received a copy. Patient Signature:  ____________________________________________________________ Date:  ____________________________________________________________  
  
Brianna Ordaz Provider Signature:  ____________________________________________________________ Date:  ____________________________________________________________

## 2017-10-20 NOTE — CONSULTS
Hospitalist Consult Note     Admit Date:  10/17/2017  1:55 PM   Name:  Judah Oreilly   Age:  57 y.o.  :  1955   MRN:  [id number]   PCP:  Walter Lu MD  Treatment Team: Attending Provider: Shayna Sharma MD; Utilization Review: Stacy Zavala RN; Consulting Provider: Meche Garrido MD; Consulting Provider: Jeaneth Malagon MD; Care Manager: Kandi Sung; Charge Nurse: Tj Rich RN; Consulting Provider: Deacon العراقي DO    HPI:   Santino Winn is a 56 yo male with PMH of right subdural hematoma s/p surgery/cailin holes on 10/2/17, right pneumohorax s/p chest tube 10/7/17, atrial fibrillation off pradaxa at the moment, Obesity,htn,dm type 2, [place] on  CPAP, hyperferritininemia with phlebotomies admitted for AMS, gait disturbances . Patient was evaluated by Dr. Zach Dixon after brain ct scan showed bilateral subdural effusions with mass effect. Brain MRI done today compatible with increased subdural effusions: left side subdural 14mm ( before 9 mm ), mass effect. Acute on chronic right sided subdural collection 11 mm ( before 5 mm ). Pt had a bialteral  cailin hole procedure done on 10/18/17. Hospitalist service was reconsulted for rt calf swelling. Pt said he noticed swelling rt calf this am while working with PT. Says not much pain. O/E rt calf swollen,nontender,mild varicose veins both lower legs  Dressing to the head from recent surgery         10 systems reviewed and negative except as noted in HPI.   Past Medical History:   Diagnosis Date    A-fib [facility])      cardioversion    Arteriosclerosis     Atrial fibrillation ([facility] 1/3/2012    Diabetes mellitus     pharmacologically controlled    Diabetes mellitus ([facility] 1/3/2012    Essential hypertension, benign 2014    Family history of malignant neoplasm of prostate 2014    Hemochromatosis     HTN (hypertension) 2014    Hypercholesteremia     Nodular prostate without urinary obstruction 2/6/2014    Obesity 2/6/2014      Past Surgical History:   Procedure Laterality Date    HX UROLOGICAL      prostate u/s and BX    KNEE ARTHROSCP HARV      right      Current Facility-Administered Medications   Medication Dose Route Frequency    butalbital-acetaminophen-caffeine (FIORICET, ESGIC) -40 mg per tablet 1 Tab  1 Tab Oral Q6H PRN    levETIRAcetam (KEPPRA) tablet 500 mg  500 mg Oral BID    alum-mag hydroxide-simeth (MYLANTA) oral suspension 30 mL  30 mL Oral Q4H PRN    fluticasone (FLONASE) 50 mcg/actuation nasal spray 1 Spray  1 Spray Both Nostrils DAILY    loratadine (CLARITIN) tablet 10 mg  10 mg Oral DAILY    enalapril (VASOTEC) tablet 10 mg  10 mg Oral BID    hydroCHLOROthiazide (HYDRODIURIL) tablet 25 mg  25 mg Oral DAILY    simvastatin (ZOCOR) tablet 40 mg  40 mg Oral QHS    HYDROcodone-acetaminophen (NORCO) 7.5-325 mg per tablet 1 Tab  1 Tab Oral Q8H PRN    linaclotide (LINZESS) capsule 145 mcg (Patient Supplied)  145 mcg Oral DAILY    sodium chloride (NS) flush 5-10 mL  5-10 mL IntraVENous PRN    labetalol (NORMODYNE;TRANDATE) 300 mg in 0.9% sodium chloride 150 mL (2 mg / 1 mL) infusion  1 mg/min IntraVENous TITRATE    acetaminophen (TYLENOL) tablet 650 mg  650 mg Oral Q4H PRN    oxyCODONE-acetaminophen (PERCOCET 7.5) 7.5-325 mg per tablet 1 Tab  1 Tab Oral Q4H PRN    morphine injection 1 mg  1 mg IntraVENous Q4H PRN    polyethylene glycol (MIRALAX) packet 17 g  17 g Oral BID    insulin lispro (HUMALOG) injection   SubCUTAneous TIDAC    carvedilol (COREG) tablet 12.5 mg  12.5 mg Oral BID WITH MEALS    spironolactone (ALDACTONE) tablet 25 mg  25 mg Oral DAILY    ondansetron (ZOFRAN) injection 4 mg  4 mg IntraVENous Q8H PRN    metFORMIN (GLUCOPHAGE) tablet 500 mg  500 mg Oral BID WITH MEALS     Allergies   Allergen Reactions    Zithromax [Azithromycin] Other (comments)     Skin dryness/peeling      Social History   Substance Use Topics    Smoking status: Former Smoker     Packs/day: 1.50     Years: 18.00     Types: Cigarettes     Quit date: 1989    Smokeless tobacco: Former User     Quit date: 2016    Alcohol use 0.0 oz/week      Comment: has lessened his alcohol intake since learning about his abnormal liver labs      Family History   Problem Relation Age of Onset    Heart Attack Father 61         Diabetes Father     Heart Disease Father     Hypertension Father     Cancer Father      lung    Other Mother      Sarcoidosis    Stroke Brother     Psychiatric Disorder Brother     Heart Disease Paternal Grandmother       Immunization History   Administered Date(s) Administered    Influenza High Dose Vaccine PF 10/05/2016    Influenza Vaccine 2013    Influenza Vaccine (Quad) PF 10/05/2017    Influenza Vaccine Intradermal PF 2015    Td 2015    Tdap 2015       Objective:   Patient Vitals for the past 24 hrs:   Temp Pulse Resp BP SpO2   10/20/17 1035 98.1 °F (36.7 °C) 84 18 104/66 95 %   10/20/17 0738 98.1 °F (36.7 °C) 93 17 131/84 92 %   10/20/17 0356 98.6 °F (37 °C) 84 16 124/72 93 %   10/19/17 2220 98.1 °F (36.7 °C) 86 16 118/82 95 %   10/19/17 2015 98.7 °F (37.1 °C) 90 16 124/78 95 %   10/19/17 1538 97.4 °F (36.3 °C) 81 16 124/77 94 %     Oxygen Therapy  O2 Sat (%): 95 % (10/20/17 1035)  Pulse via Oximetry: 92 beats per minute (10/19/17 1058)  O2 Device: Room air (10/19/17 0730)  O2 Flow Rate (L/min): 4 l/min (10/18/17 1513)    Intake/Output Summary (Last 24 hours) at 10/20/17 1356  Last data filed at 10/20/17 1225   Gross per 24 hour   Intake              560 ml   Output              765 ml   Net             -205 ml       Physical Exam:  General:    Well nourished. Alert. Dressing to the head from recent cailin hole surgery  Eyes:   Normal sclera. Extraocular movements intact. ENT:  Normocephalic, atraumatic. Moist mucous membranes  CV:   RRR. No murmur, rub, or gallop. Lungs:  CTAB.   No wheezing, rhonchi, or rales. Abdomen: Soft, nontender, nondistended. Bowel sounds normal. obese  Extremities: Warm and dry. No cyanosis or edema. Right calf swelling,nontender. both lower ext have varicose veins. Neurologic: CN II-XII grossly intact. Sensation intact. Skin:     No rashes or jaundice. Psych:  Normal mood and affect. I reviewed the labs, imaging, EKGs, telemetry, and other studies done this admission. Data Review:   Recent Results (from the past 24 hour(s))   GLUCOSE, POC    Collection Time: 10/19/17  3:37 PM   Result Value Ref Range    Glucose (POC) 162 (H) 65 - 100 mg/dL   GLUCOSE, POC    Collection Time: 10/20/17  7:43 AM   Result Value Ref Range    Glucose (POC) 147 (H) 65 - 100 mg/dL   GLUCOSE, POC    Collection Time: 10/20/17 11:31 AM   Result Value Ref Range    Glucose (POC) 179 (H) 65 - 100 mg/dL       All Micro Results     None          Other Studies:  Mri Brain Wo Cont    Result Date: 10/17/2017  Cranial MRI without contrast: 10/17/2017 INDICATION: Chronic subdural hematoma, ataxia COMPARISON: CT scan of the brain 10/16/2017 TECHNIQUE: T1 sagittal, diffusion-weighted images, T2, gradient echo, FLAIR, and T1 axial sequences and coronal T2 sequences were performed. FINDINGS: There are no restricted diffusion abnormalities. The basilar vascular flow voids are unremarkable. Bilateral CSF density subdural hygromas are again noted as well as within the bilateral tentorial subdural space. . On the coronal sequences the maximum thickness of the right subdural effusion measures 14.4 mm and the maximum thickness of the left subdural effusion measures 17.1 mm. There is compression of the underlying bilateral frontal, parietal, and occipital white matter and compression of the midline ventricles is noted. The pituitary and sinuses are unremarkable. Some hemosiderin is noted outlining the bilateral tentorial effusions and within the periphery of the bilateral subdural effusions.  There are a few scattered foci of T2 prolongation within the subcortical and periventricular white matter. Right parietal cailin hole is demonstrated. IMPRESSION: Chronic tentorial and bilateral subdural effusions with mass effect as above, supratentorial microischemia, right parietal cailin hole. Ct Head Wo Cont    Result Date: 10/18/2017  Noncontrast head CT Clinical Indication: Follow-up subdural fluid collections. Technique: Noncontrast axial images were obtained through the brain. Comparison: 10/16/2017 Findings: There is no significant change involving bilateral crescentic subdural fluid collections. Right cranial cailin hole again noted. No increasing midline shift or mass effect. No herniation. Stable ventricular size and configuration. No new hemorrhage. No CT evidence of large territory infarct. . The mastoid air cells and paranasal sinuses are clear where imaged. No displaced skull fractures are present. Impression: Stable bilateral subdural collections. Xr Chest Port    Result Date: 10/17/2017  Portable chest xray  COMPARISON: October 13, 2017 INDICATION: Shortness of breath FINDINGS: Lungs are underinflated. No focal pulmonary consolidation, pleural effusion or pneumothorax. No pulmonary edema. Cardiac mediastinal contour appears within normal limits. Surrounding bones are unremarkable. Possible subsegmental atelectasis or scarring at the lateral left lung base. IMPRESSION: No pulmonary consolidation. No significant change compared to prior exam.      Assessment and Plan:     Hospital Problems as of 10/20/2017  Date Reviewed: 10/2/2017          Codes Class Noted - Resolved POA    Subdural hygroma ICD-10-CM: D18.1  ICD-9-CM: 432.1  10/18/2017 - Present Unknown        * (Principal)Subdural hematoma (HCC) ICD-10-CM: I62.00  ICD-9-CM: 432.1  10/8/2017 - Present Yes          Right calf swelling- r/o dvt    PLAN:  Will order venous doppler rt lower ext. Cont other medications.   Appreciate pcp involving in patient care.    Signed:  Melany Keating MD

## 2017-10-20 NOTE — PROGRESS NOTES
TRANSFER - OUT REPORT:    Verbal report given to LIDIA Romero(name) on Macario Vaughan  being transferred to 9th floor(unit) for routine progression of care       Report consisted of patients Situation, Background, Assessment and   Recommendations(SBAR). Information from the following report(s) SBAR was reviewed with the receiving nurse. Opportunity for questions and clarification was provided.

## 2017-10-20 NOTE — PROGRESS NOTES
Spoke to Dr Bhupinder Douglas about RLE swelling. Hospitalist consulted. Pt also requesting Fioricet for headaches. Order put in connect care.

## 2017-10-21 LAB
ANION GAP SERPL CALC-SCNC: 9 MMOL/L (ref 7–16)
BASOPHILS # BLD: 0 K/UL (ref 0–0.2)
BASOPHILS NFR BLD: 0 % (ref 0–2)
BUN SERPL-MCNC: 9 MG/DL (ref 8–23)
CALCIUM SERPL-MCNC: 9.1 MG/DL (ref 8.3–10.4)
CHLORIDE SERPL-SCNC: 103 MMOL/L (ref 98–107)
CO2 SERPL-SCNC: 25 MMOL/L (ref 21–32)
CREAT SERPL-MCNC: 0.63 MG/DL (ref 0.8–1.5)
DIFFERENTIAL METHOD BLD: ABNORMAL
EOSINOPHIL # BLD: 0.1 K/UL (ref 0–0.8)
EOSINOPHIL NFR BLD: 2 % (ref 0.5–7.8)
ERYTHROCYTE [DISTWIDTH] IN BLOOD BY AUTOMATED COUNT: 12.9 % (ref 11.9–14.6)
GLUCOSE BLD STRIP.AUTO-MCNC: 107 MG/DL (ref 65–100)
GLUCOSE BLD STRIP.AUTO-MCNC: 128 MG/DL (ref 65–100)
GLUCOSE BLD STRIP.AUTO-MCNC: 138 MG/DL (ref 65–100)
GLUCOSE BLD STRIP.AUTO-MCNC: 165 MG/DL (ref 65–100)
GLUCOSE SERPL-MCNC: 114 MG/DL (ref 65–100)
HCT VFR BLD AUTO: 38.7 % (ref 41.1–50.3)
HGB BLD-MCNC: 13.3 G/DL (ref 13.6–17.2)
IMM GRANULOCYTES # BLD: 0 K/UL (ref 0–0.5)
IMM GRANULOCYTES NFR BLD: 1 % (ref 0–5)
LYMPHOCYTES # BLD: 1 K/UL (ref 0.5–4.6)
LYMPHOCYTES NFR BLD: 15 % (ref 13–44)
MCH RBC QN AUTO: 32.6 PG (ref 26.1–32.9)
MCHC RBC AUTO-ENTMCNC: 34.4 G/DL (ref 31.4–35)
MCV RBC AUTO: 94.9 FL (ref 79.6–97.8)
MONOCYTES # BLD: 0.4 K/UL (ref 0.1–1.3)
MONOCYTES NFR BLD: 6 % (ref 4–12)
NEUTS SEG # BLD: 5.1 K/UL (ref 1.7–8.2)
NEUTS SEG NFR BLD: 76 % (ref 43–78)
PLATELET # BLD AUTO: 216 K/UL (ref 150–450)
PMV BLD AUTO: 10 FL (ref 10.8–14.1)
POTASSIUM SERPL-SCNC: 3.9 MMOL/L (ref 3.5–5.1)
RBC # BLD AUTO: 4.08 M/UL (ref 4.23–5.67)
SODIUM SERPL-SCNC: 137 MMOL/L (ref 136–145)
WBC # BLD AUTO: 6.6 K/UL (ref 4.3–11.1)

## 2017-10-21 PROCEDURE — 65310000000 HC RM PRIVATE REHAB

## 2017-10-21 PROCEDURE — 97530 THERAPEUTIC ACTIVITIES: CPT

## 2017-10-21 PROCEDURE — 74011250637 HC RX REV CODE- 250/637: Performed by: PHYSICAL MEDICINE & REHABILITATION

## 2017-10-21 PROCEDURE — 97166 OT EVAL MOD COMPLEX 45 MIN: CPT

## 2017-10-21 PROCEDURE — 97110 THERAPEUTIC EXERCISES: CPT

## 2017-10-21 PROCEDURE — 97162 PT EVAL MOD COMPLEX 30 MIN: CPT

## 2017-10-21 PROCEDURE — 97535 SELF CARE MNGMENT TRAINING: CPT

## 2017-10-21 PROCEDURE — 80048 BASIC METABOLIC PNL TOTAL CA: CPT | Performed by: PHYSICAL MEDICINE & REHABILITATION

## 2017-10-21 PROCEDURE — 82962 GLUCOSE BLOOD TEST: CPT

## 2017-10-21 PROCEDURE — 97116 GAIT TRAINING THERAPY: CPT

## 2017-10-21 PROCEDURE — 85025 COMPLETE CBC W/AUTO DIFF WBC: CPT | Performed by: PHYSICAL MEDICINE & REHABILITATION

## 2017-10-21 RX ORDER — OXYCODONE AND ACETAMINOPHEN 5; 325 MG/1; MG/1
1 TABLET ORAL
Status: DISCONTINUED | OUTPATIENT
Start: 2017-10-21 | End: 2017-10-31 | Stop reason: HOSPADM

## 2017-10-21 RX ORDER — GUAIFENESIN 600 MG/1
600 TABLET, EXTENDED RELEASE ORAL EVERY 12 HOURS
Status: DISCONTINUED | OUTPATIENT
Start: 2017-10-21 | End: 2017-10-31 | Stop reason: HOSPADM

## 2017-10-21 RX ADMIN — BUTALBITAL, ACETAMINOPHEN AND CAFFEINE 1 TABLET: 50; 325; 40 TABLET ORAL at 17:42

## 2017-10-21 RX ADMIN — SPIRONOLACTONE 25 MG: 25 TABLET, FILM COATED ORAL at 09:28

## 2017-10-21 RX ADMIN — METFORMIN HYDROCHLORIDE 500 MG: 500 TABLET, FILM COATED ORAL at 09:29

## 2017-10-21 RX ADMIN — LEVETIRACETAM 500 MG: 500 TABLET, FILM COATED ORAL at 09:28

## 2017-10-21 RX ADMIN — FLUTICASONE PROPIONATE 1 SPRAY: 50 SPRAY, METERED NASAL at 09:31

## 2017-10-21 RX ADMIN — GUAIFENESIN 600 MG: 600 TABLET, EXTENDED RELEASE ORAL at 12:13

## 2017-10-21 RX ADMIN — CARVEDILOL 12.5 MG: 6.25 TABLET, FILM COATED ORAL at 09:29

## 2017-10-21 RX ADMIN — POLYETHYLENE GLYCOL 3350 17 G: 17 POWDER, FOR SOLUTION ORAL at 09:26

## 2017-10-21 RX ADMIN — ENALAPRIL MALEATE 10 MG: 5 TABLET ORAL at 09:30

## 2017-10-21 RX ADMIN — CARVEDILOL 12.5 MG: 6.25 TABLET, FILM COATED ORAL at 17:44

## 2017-10-21 RX ADMIN — METFORMIN HYDROCHLORIDE 500 MG: 500 TABLET, FILM COATED ORAL at 17:44

## 2017-10-21 RX ADMIN — BUTALBITAL, ACETAMINOPHEN AND CAFFEINE 1 TABLET: 50; 325; 40 TABLET ORAL at 10:42

## 2017-10-21 RX ADMIN — BUTALBITAL, ACETAMINOPHEN AND CAFFEINE 1 TABLET: 50; 325; 40 TABLET ORAL at 04:06

## 2017-10-21 RX ADMIN — LEVETIRACETAM 500 MG: 500 TABLET, FILM COATED ORAL at 17:43

## 2017-10-21 RX ADMIN — HYDROCHLOROTHIAZIDE 25 MG: 25 TABLET ORAL at 09:30

## 2017-10-21 RX ADMIN — GUAIFENESIN 600 MG: 600 TABLET, EXTENDED RELEASE ORAL at 21:26

## 2017-10-21 RX ADMIN — ALUMINUM HYDROXIDE, MAGNESIUM HYDROXIDE, AND SIMETHICONE 30 ML: 200; 200; 20 SUSPENSION ORAL at 10:37

## 2017-10-21 RX ADMIN — ENALAPRIL MALEATE 10 MG: 5 TABLET ORAL at 17:43

## 2017-10-21 RX ADMIN — LORATADINE 10 MG: 10 TABLET ORAL at 09:29

## 2017-10-21 RX ADMIN — POLYETHYLENE GLYCOL 3350 17 G: 17 POWDER, FOR SOLUTION ORAL at 17:45

## 2017-10-21 NOTE — PROGRESS NOTES
Problem: Falls - Risk of  Goal: *Absence of Falls  Document Parish Fall Risk and appropriate interventions in the flowsheet.    Outcome: Progressing Towards Goal  Fall Risk Interventions:  Mobility Interventions: Patient to call before getting OOB, Utilize walker, cane, or other assitive device    Mentation Interventions: Adequate sleep, hydration, pain control, Door open when patient unattended, Increase mobility, More frequent rounding    Medication Interventions: Evaluate medications/consider consulting pharmacy, Patient to call before getting OOB    Elimination Interventions: Call light in reach, Patient to call for help with toileting needs, Urinal in reach    History of Falls Interventions: Door open when patient unattended

## 2017-10-21 NOTE — PROGRESS NOTES
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 Reports nasal drainage. Denies productive cough, SOB, HA, dizziness, palpations or nausea. Participating in therapy.     Objective:     Current Facility-Administered Medications   Medication Dose Route Frequency    acetaminophen (TYLENOL) tablet 650 mg  650 mg Oral Q4H PRN    oxyCODONE-acetaminophen (PERCOCET 7.5) 7.5-325 mg per tablet 1 Tab  1 Tab Oral Q4H PRN    sodium chloride (NS) flush 5-10 mL  5-10 mL IntraVENous PRN    alum-mag hydroxide-simeth (MYLANTA) oral suspension 30 mL  30 mL Oral Q4H PRN    butalbital-acetaminophen-caffeine (FIORICET, ESGIC) -40 mg per tablet 1 Tab  1 Tab Oral Q6H PRN    carvedilol (COREG) tablet 12.5 mg  12.5 mg Oral BID WITH MEALS    enalapril (VASOTEC) tablet 10 mg  10 mg Oral BID    fluticasone (FLONASE) 50 mcg/actuation nasal spray 1 Spray  1 Spray Both Nostrils DAILY    hydroCHLOROthiazide (HYDRODIURIL) tablet 25 mg  25 mg Oral DAILY    HYDROcodone-acetaminophen (NORCO) 7.5-325 mg per tablet 1 Tab  1 Tab Oral Q8H PRN    insulin lispro (HUMALOG) injection   SubCUTAneous TIDAC    levETIRAcetam (KEPPRA) tablet 500 mg  500 mg Oral BID    linaclotide (LINZESS) capsule 145 mcg (Patient Supplied)  145 mcg Oral DAILY    loratadine (CLARITIN) tablet 10 mg  10 mg Oral DAILY    metFORMIN (GLUCOPHAGE) tablet 500 mg  500 mg Oral BID WITH MEALS    polyethylene glycol (MIRALAX) packet 17 g  17 g Oral BID    simvastatin (ZOCOR) tablet 40 mg  40 mg Oral QHS    spironolactone (ALDACTONE) tablet 25 mg  25 mg Oral DAILY     Allergies   Allergen Reactions    Zithromax [Azithromycin] Other (comments)     Skin dryness/peeling       Visit Vitals    /84    Pulse 94    Temp 98.5 °F (36.9 °C)    Resp 18    SpO2 93% Intake and Output:  10/19 1901 - 10/21 0700  In: -   Out: 325 [Urine:325]    Physical Exam:   General: Alert, NAD, up in chair in room . Lungs: Clear to auscultation  bilaterally. Heart: Regular rate and rhythm, no  murmurs    Abdomen: Soft, bowel sounds present. Neuromuscular:      Speech clear. follows simple commands well, consistently. LUE     Shoulder abduction   5-/5              Elbow flexion:   5-/5               Wrist extension:  5 /5              Finger flexion;   5/5                       RUE    Shoulder abduction: 5 /5                Elbow flexion: 5  /5                         Wrist extension:  5/5                        Finger flexion:  5 /5                        LLE     Hip flexion:  5- /5              Knee extension:   5-/5                         Ankle dorsiflexion: 5 /5                        Ankle plantarflexion:5/5                                                              RLE     Hip flexion: 5  /5                        Knee extension:  5- /5                         Ankle dorsiflexion:   5/5                        Ankle plantarflexion:  5 /5  Sensory - intact grossly   Skin/extremity: No rashes, no erythema. Wound covered.       Functional Assessment:         Maxwell Perea Fall Risk Assessment:  [facility] Fall Risk  Mobility: Ambulates or transfers with assist devices or assistance/unsteady gait (10/21/17 0155)  Mobility Interventions: Bed/chair exit alarm (10/21/17 0155)  Mentation: Alert, oriented x 3 (10/21/17 0155)  Mentation Interventions: Adequate sleep, hydration, pain control (10/21/17 0155)  Medication: Patient receiving anticonvulsants, sedatives(tranquilizers), psychotropics or hypnotics, hypoglycemics, narcotics, sleep aids, antihypertensives, laxatives, or diuretics (10/21/17 0155)  Medication Interventions: Patient to call before getting OOB (10/21/17 0155)  Elimination: Needs assistance with toileting (10/21/17 0155)  Elimination Interventions: Call light in reach (10/21/17 0155)  Prior Fall History: Before admission in past 12 months _home or previous inpatient care) (10/21/17 0155)  History of Falls Interventions: Door open when patient unattended (10/21/17 0155)  Total Score: 4 (10/21/17 0155)  High Fall Risk: Yes (10/21/17 0155)     Speech Assessment:         Ambulation:        Labs/Studies:  Recent Results (from the past 72 hour(s))   GLUCOSE, POC    Collection Time: 10/18/17  1:45 PM   Result Value Ref Range    Glucose (POC) 137 (H) 65 - 100 mg/dL   GLUCOSE, POC    Collection Time: 10/18/17  4:24 PM   Result Value Ref Range    Glucose (POC) 123 (H) 65 - 461 mg/dL   METABOLIC PANEL, BASIC    Collection Time: 10/19/17  5:09 AM   Result Value Ref Range    Sodium 138 136 - 145 mmol/L    Potassium 4.0 3.5 - 5.1 mmol/L    Chloride 105 98 - 107 mmol/L    CO2 24 21 - 32 mmol/L    Anion gap 9 7 - 16 mmol/L    Glucose 128 (H) 65 - 100 mg/dL    BUN 9 8 - 23 MG/DL    Creatinine 0.80 0.8 - 1.5 MG/DL    GFR est AA >60 >60 ml/min/1.73m2    GFR est non-AA >60 >60 ml/min/1.73m2    Calcium 8.7 8.3 - 10.4 MG/DL   CBC WITH AUTOMATED DIFF    Collection Time: 10/19/17  5:09 AM   Result Value Ref Range    WBC 9.5 4.3 - 11.1 K/uL    RBC 4.09 (L) 4.23 - 5.67 M/uL    HGB 13.3 (L) 13.6 - 17.2 g/dL    HCT 39.3 (L) 41.1 - 50.3 %    MCV 96.1 79.6 - 97.8 FL    MCH 32.5 26.1 - 32.9 PG    MCHC 33.8 31.4 - 35.0 g/dL    RDW 13.1 11.9 - 14.6 %    PLATELET 675 115 - 894 K/uL    MPV 9.7 (L) 10.8 - 14.1 FL    DF AUTOMATED      NEUTROPHILS 82 (H) 43 - 78 %    LYMPHOCYTES 10 (L) 13 - 44 %    MONOCYTES 7 4.0 - 12.0 %    EOSINOPHILS 1 0.5 - 7.8 %    BASOPHILS 0 0.0 - 2.0 %    IMMATURE GRANULOCYTES 0.4 0.0 - 5.0 %    ABS. NEUTROPHILS 7.8 1.7 - 8.2 K/UL    ABS. LYMPHOCYTES 1.0 0.5 - 4.6 K/UL    ABS. MONOCYTES 0.6 0.1 - 1.3 K/UL    ABS. EOSINOPHILS 0.1 0.0 - 0.8 K/UL    ABS. BASOPHILS 0.0 0.0 - 0.2 K/UL    ABS. IMM.  GRANS. 0.0 0.0 - 0.5 K/UL   GLUCOSE, POC    Collection Time: 10/19/17  8:57 AM   Result Value Ref Range    Glucose (POC) 149 (H) 65 - 100 mg/dL   GLUCOSE, POC    Collection Time: 10/19/17 11:49 AM   Result Value Ref Range    Glucose (POC) 127 (H) 65 - 100 mg/dL   GLUCOSE, POC    Collection Time: 10/19/17  3:37 PM   Result Value Ref Range    Glucose (POC) 162 (H) 65 - 100 mg/dL   GLUCOSE, POC    Collection Time: 10/20/17  7:43 AM   Result Value Ref Range    Glucose (POC) 147 (H) 65 - 100 mg/dL   GLUCOSE, POC    Collection Time: 10/20/17 11:31 AM   Result Value Ref Range    Glucose (POC) 179 (H) 65 - 100 mg/dL   GLUCOSE, POC    Collection Time: 10/20/17  4:52 PM   Result Value Ref Range    Glucose (POC) 103 (H) 65 - 100 mg/dL   GLUCOSE, POC    Collection Time: 10/20/17  8:51 PM   Result Value Ref Range    Glucose (POC) 138 (H) 65 - 100 mg/dL   CBC WITH AUTOMATED DIFF    Collection Time: 10/21/17  5:43 AM   Result Value Ref Range    WBC 6.6 4.3 - 11.1 K/uL    RBC 4.08 (L) 4.23 - 5.67 M/uL    HGB 13.3 (L) 13.6 - 17.2 g/dL    HCT 38.7 (L) 41.1 - 50.3 %    MCV 94.9 79.6 - 97.8 FL    MCH 32.6 26.1 - 32.9 PG    MCHC 34.4 31.4 - 35.0 g/dL    RDW 12.9 11.9 - 14.6 %    PLATELET 417 441 - 798 K/uL    MPV 10.0 (L) 10.8 - 14.1 FL    DF AUTOMATED      NEUTROPHILS 76 43 - 78 %    LYMPHOCYTES 15 13 - 44 %    MONOCYTES 6 4.0 - 12.0 %    EOSINOPHILS 2 0.5 - 7.8 %    BASOPHILS 0 0.0 - 2.0 %    IMMATURE GRANULOCYTES 1 0.0 - 5.0 %    ABS. NEUTROPHILS 5.1 1.7 - 8.2 K/UL    ABS. LYMPHOCYTES 1.0 0.5 - 4.6 K/UL    ABS. MONOCYTES 0.4 0.1 - 1.3 K/UL    ABS. EOSINOPHILS 0.1 0.0 - 0.8 K/UL    ABS. BASOPHILS 0.0 0.0 - 0.2 K/UL    ABS. IMM.  GRANS. 0.0 0.0 - 0.5 K/UL   METABOLIC PANEL, BASIC    Collection Time: 10/21/17  5:43 AM   Result Value Ref Range    Sodium 137 136 - 145 mmol/L    Potassium 3.9 3.5 - 5.1 mmol/L    Chloride 103 98 - 107 mmol/L    CO2 25 21 - 32 mmol/L    Anion gap 9 7 - 16 mmol/L    Glucose 114 (H) 65 - 100 mg/dL    BUN 9 8 - 23 MG/DL    Creatinine 0.63 (L) 0.8 - 1.5 MG/DL    GFR est AA >60 >60 ml/min/1.73m2    GFR est non-AA >60 >60 ml/min/1.73m2    Calcium 9.1 8.3 - 10.4 MG/DL   GLUCOSE, POC    Collection Time: 10/21/17  7:31 AM   Result Value Ref Range    Glucose (POC) 138 (H) 65 - 100 mg/dL     Assessment: This is a 57 y. o. who fell striking his head several weeks ago and found to have R SDH, s/p cailin hole evacuation and d/[place] home. Now readmitted on 10/17 d/t subdural fluid collection and R PTX. He continues with mobility and self care impairments. Rehabilitation Plan  Continue PT for a minimum of 1.5 hours a day, at least 5 out of 7 days per week to address bed mobility, transfers, ambulation, strengthening, balance, and endurance. Continue OT for a minimum of 1.5 hours a day, at least 5 out of 7 days per week to address ADL ( bathing, LE dressing, toileting) and adaptive equipment as needed.     Continue 24-hour skilled rehabilitation nursing for bowel and bladder management, skin care for decubitus ulcer prevention , pain management and ongoing medication administration      Continue daily physician medical management:     bilateral SDH/ Bilateral subdural fluid collections   - s/p Bilateral bur hole evacuation of subdural hygroma 10/18/2017  - monitor for recurrent s/s of increased ICP. Monitor for neuroogic deficits. - seizure prophylaxis -keppra  - Hgb- 13.3 > 13.3, low, yet stable     Essential hypertension   - HCTZ, aldactone, vasotec. HR and BP acceptable. BMP unremarkable.     Atrial fibrillation - anticoagulation held due to recent SDH. - continue to monitor HR/BP  - continue coreg     Tension pneumothorax, treated, resolved  - monitor for s/s.      Pneumonia prophylaxis- Insentive spirometer every hour while awake     DVT risk / DVT Prophylaxis- Will require daily physician exam to assess for signs and symptoms as patient is at increased risk for of thromboembolism. Mobilization as tolerated.  Intermittent pneumatic compression devices when in bed Thigh-high or knee-high thromboembolic deterrent hose when out of bed.   - no anticoagulation      Pain Control: no current joint or muscle symptoms, essentially pain-free. Will require regular pain assessment and comprenhensive pain management. - prn perccet, fioricet. Control headache.      Wound Care: Monitor wound status daily per staff and physician. At risk for failure. Will require 24/7 rehab nursing. Keep wound clean and dry      Diabetes mellitus - Uncontrolled. poor glycemic control. Will require daily, close FSG monitoring and medication adjustment to optimize glycemic control in setting of acute illness and hospitalization.   - metformin, linzess  - SSI lispro  - BG - 138 this am     Urinary retention/ neurogenic bladder - schedule voids q6-8 hrs. Check post-void residual every shift; In and Out catheterize if post-void residual is more than 400 cc.     bowel program - miralax prn. [place] on CPAP  Allergic rhinitis - begin mucinex bid     Time spent was 25 minutes with over 1/2 in direct patient care/examination, consultation and coordination of care.     Signed By: Thelma Mckeon MD     October 21, 2017

## 2017-10-21 NOTE — PROGRESS NOTES
End Of Shift Functional Summary, Nursing      TOILETING/ BLADDER/ BOWEL    TOILET TRANSFER:  Pt requires assistance in ambulating to bathroom. Pt uses urinal occasionally. BLADDER:  Pt DOES NOT have a nieves catheter that staff manages. Pt DOES NOT take medication. Pt is CONTINENT of bladder and voids in toilet. Pt requires staff to empty urinal. Pt has had  0 bladder accidents during this shift.  (An accident is when the episode is not contained in a brief AND/OR the clothing/linen requires changing/cleaning up.

## 2017-10-21 NOTE — PROGRESS NOTES
Time in:  13:31  Time out:  14:49    PHYSICAL THERAPY EXAMINATION    Patient Name: Karmen Flores  Patient Age: 57 y.o. Past Medical History:   Past Medical History:   Diagnosis Date    A-fib [facility])      cardioversion    Arteriosclerosis     Atrial fibrillation ([facility] [address] [address] 1/3/2012    Diabetes mellitus     pharmacologically controlled    Diabetes mellitus ([facility] 1/3/2012    Essential hypertension, benign 2/6/2014    Family history of malignant neoplasm of prostate 2/6/2014    Hemochromatosis     HTN (hypertension) 2/6/2014    Hypercholesteremia     Nodular prostate without urinary obstruction 2/6/2014    Obesity 2/6/2014       Medical Diagnosis:  subdural hematoma  Subdural hemorrhage following injury ([facility] [address]  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 <principal problem not specified>    Precautions at Admission: Other (comment) (falls)    Therapy Diagnosis:   Difficulty with bed mobility  [x]     Difficulty with functional transfers  [x]     Difficulty with ambulation  [x]     Difficulty with stair negotiations  [x]       Problem List:    Decreased strength B LE  [x]     Decreased strength trunk/core  [x]     Decreased AROM   [x]     Decreased PROM  [x]    Decreased endurance  [x]     Decreased balance sitting  [x]     Decreased balance standing  [x]     Pain   [x]     Slow ambulation velocity  [x]    Decreased coordination  [x]    Decreased safety awareness  [x]      Functional Limitations:   Decreased independence with bed mobility  [x]     Decreased independence with functional transfers  [x]     Decreased independence with ambulation  [x]     Decreased independence with stair negotiation  [x]       Previous Functional Level: Prior to recent events patient was independent wtih all ADL's and IADL's.   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 [address] [place] [address] Street: Home Environment: Private residence  # Steps to Enter: 7  Rails to Enter: Yes  [employer] : Bilateral  One/Two Story Residence: Two story  # of Interior Steps: 10  Height of Each Step (in): 8 inches  Interior Rails: Both  Living Alone: No  Support Systems: Family member(s)  Patient Expects to be Discharged to[de-identified] Private residence  Current DME Used/Available at Home: Walker  Tub or Shower Type: Tub/Shower combination         Outcome Measures: Vital Signs:    Visit Vitals    /76 (BP 1 Location: Right arm, BP Patient Position: At rest)    Pulse 86    Temp 97.6 °F (36.4 °C)    Resp 18    SpO2 94%       Interdisciplinary Communication:  Consulted with OT re: anticipated LOS and patients overall level of functioning. MMT Initial Asssessment   Right Lower Extremity Left Lower Extremity   Hip Flexion 4 4   Knee Extension 4+ 4+        Ankle Dorsiflexion 4+ 4+   0/5 No palpable muscle contraction  1/5 Palpable muscle contraction, no joint movement  2-/5 Less than full range of motion in gravity eliminated position  2/5 Able to complete full range of motion in gravity eliminated position  2+/5 Able to initiate movement against gravity  3-/5 More than half but not full range of motion against gravity  3/5 Able to complete full range of motion against gravity  3+/5 Completes full range of motion against gravity with minimal resistance  4-/5 Completes full range of motion against gravity with minimal-moderate resistance  4/5 Completes full range of motion against gravity with moderate resistance  4+/5 Completes full range of motion against gravity with moderate-maximum resistance  5/5 Completes full range of motion against gravity with maximum resistance     AROM: BLE WFL. FIM SCORES Initial Assessment   Bed/Chair/Wheelchair Transfers 5   Wheelchair Mobility 0   Walking [place] 5   Steps/Stairs 5   PRIMARY MODE OF LOCOMOTION: ambulation  Please see [facility] Interdisciplinary Eval: Coordination/Balance Section for details regarding FIM score description.     BED/CHAIR/WHEELCHAIR TRANSFERS Initial Assessment   Rolling Right 6 (Modified independent) Rolling Left 6 (Modified independent)   Supine to Sit 5 (Supervision)   Sit to Stand Modified independent   Sit to Supine 5 (Supervision)   Transfer Assist Score 5   Transfer Type SPT with walker   Comments     Car Transfer Not tested   Car Type         WHEELCHAIR MOBILITY/MANAGEMENT Initial Assessment   Able to Propel     Functional Level 0   Curbs/ramps assistance required     Wheelchair set up assistance required     Wheelchair management         WALKING INDEPENDENCE Initial Assessment   Assistive device Walker, rolling   Ambulation assistance - level surface 5 (Supervision)   Distance 150 Feet (ft)   Functional Level 5   Comments     Ambulation assistance - unlevel surface 0 (Not tested)       STEPS/STAIRS Initial Assessment   Steps/Stairs ambulated  (4)   Rail Use Both   Functional Level 5   Comments     Curbs/Ramps 0 (Not tested)     QUALITY INDICATOR ASSIST COMMENTS   Walk 10 feet 4    Walk 50 feet with 2 turns 4    Walk 150 feet 4    Walk 10 feet on uneven  88 Not safe   1 step/curb 4    4 steps 4    12 steps 88 Not safe    object 88 Not safe   Wheel 50' w/2 turns 09 Primary mode of locomotion in ambulation   Wheel 150' 09               PHYSICAL THERAPY PLAN OF CARE    Therapy Diagnosis:   Please see table above    Order received from MD for physical therapy services and chart reviewed. Pt to be seen at least 5 times per week for at least 1.5 hours of physical therapy per day for 7-10 days. Thank you for the referral.    LTGs:    LTG 1. Patient transfer supine<>sit independently in 10 days. LTG 2. Patient transfer sit<>stand and perform stand pivot transfer with RW and modified independence/supervision in 1.5 weeks  LTG 3. Patient ambulate 250 feet with RW and SBA in 1.5 weeks  LTG 4. Patient ambulate up/down 6 steps with handrails and SBA in 1.5 weeks      Pt would benefit from skilled physical therapy in order to improve independent functional mobility within the home.  Interventions may include range of motion (AROM, PROM B LE/trunk), motor function (B LE/trunk strengthening/coordination), activity tolerance (vitals, oxygen saturation levels), bed mobility training, balance activities, gait training (progressive ambulation program), and functional transfer training. Please see [facility]; Interdisciplinary Eval, Care Plan, and Patient Education for further information regarding physical therapy examination and plan of care.      Ginny Harden, PT  10/21/2017

## 2017-10-21 NOTE — H&P
[facility], [address]  Tel: [phone]  Fax: [phone]      HISTORY AND PHYSICAL  IRC       Admit Date: 10/20/2017  Surgeon: Rashaad Lea MD  Primary Care Physician: Lisette Boyer MD  Specialty Group: Neurosurgery    Chief Complaint : Gait dysfunction secondary to below. Admit Diagnosis: Subdural hematoma (HCC) [I62.00]  bilateral SDH  Bilateral subdural fluid collections   Bilateral bur hole evacuation of subdural hygroma 10/18/2017  Essential hypertension, benign  Diabetes mellitus ([address]  Atrial fibrillation (HCC)  Tension pneumothorax, treated, resolved  Pain  DVT risk  Acute Rehab Dx:  Gait impairment/ gait dysfunction  Debility    Mobility and ambulation deficits  Self Care/ADL deficits    Medical Dx:  Past Medical History:   Diagnosis Date    A-fib [address] [address]      cardioversion    Arteriosclerosis     Atrial fibrillation ([facility] [address] 1/3/2012    Diabetes mellitus     pharmacologically controlled    Diabetes mellitus ([facility] [address] 1/3/2012    Essential hypertension, benign 2/6/2014    Family history of malignant neoplasm of prostate 2/6/2014    Hemochromatosis     HTN (hypertension) 2/6/2014    Hypercholesteremia     Nodular prostate without urinary obstruction 2/6/2014    Obesity 2/6/2014       Date of Evaluation:  October 20, 2017    HPI: Cathie Villalobos is a 57 y.o. male patient at [facility] [place] who was admitted on 10/17/2017  by Cassidy Toledo MD with below mentioned medical history ,is being seen for Physical Medicine and Rehabilitation. Cathie Villalobos who fell and hit his head several weeks ago has had fell confusion, difficulty with balance, walking . He had a CT brain scan confirmed a large right-sided subacute subdural hematoma with acute on chronic features with mass effect and shift. A smaller fluid collection was present on the left.  He was taken to the OR and underwent cailin hole evacuation of his right-sided subdural hematoma with reexpansion of the right hemisphere and was discharged home. He was again brought to the ER with shortness of breath and chest pain subsequently, was found to have a large right tension pneumothorax which required chest tube placement and was discharged again. He is again brought to the ER on 10/17/2017 with ataxia and difficulty walking. He was found to have on CT bilateral fluid collection on the subdural space with enlargement of the left-sided collection. He was admitted and underwent a bilateral bur hole evacuation of subdural hygroma per Dr. Justino Torres. Tonny Vegas MD. On 10/18/2017. The patient's post operative course has been fairly tolerated at ICU. Patient still requiring a drain. Noted pneumocephalus, most pronounced in the anterior frontal lobes in follow up CT. Post operatively patient still noted to have difficulty with balance and gait. Patient continuing to work with acute PT, OT. Patient requires min/ mod assist for mobility and transfers at his time. uRdy Samano is seen and examined today. Medical Records reviewed. Patient is independent and active at his baseline without any major debilities.    Physical therapy was initiated and patient was starting to mobilize. However, she shows significant functional deficits due to weakness, impaired balance. Our service was consulted for rehab needs and we recommended inpatient hospital rehabilitation is reasonable and necessary due to ongoing acute medical issues which have not changed since initial pre-admission evaluation. and rehab needs still likely best managed in IRU setting. The patient was evaluated by [facility] admissions coordinators. I reviewed the preadmission screening and have approved for admission to the [facility]. The patient was cleared for transfer to rehab today.  Patient continues to have ongoing  medical issues outlined above requiring physician medical supervision and functional deficits which would benefit from continued intensive therapies. Current Level of Function: (evaluated by acute therapy staff, with bed mobility, transfers, balance personally observed post-admission in the IRF setting minutes prior to submission of document) bathing - mod A, lower body dressing - max A, toileting - max A, bed mobility - CGA, transfers- CGA, poor balance , ambulation- short distances with minimum assist with RW     Prior Level of Function/Work/Activity:  Lives at home with wife. After returning home from acute care he was ambulating independently in home. Declined in cognition and ambulation during last couple days at home. Works from home.          Past Medical History:   Diagnosis Date    A-fib [facility])      cardioversion    Arteriosclerosis     Atrial fibrillation ([facility] [address] 1/3/2012    Diabetes mellitus     pharmacologically controlled    Diabetes mellitus ([facility] [address] 1/3/2012    Essential hypertension, benign 2014    Family history of malignant neoplasm of prostate 2014    Hemochromatosis     HTN (hypertension) 2014    Hypercholesteremia     Nodular prostate without urinary obstruction 2014    Obesity 2014      Past Surgical History:   Procedure Laterality Date    HX UROLOGICAL      prostate u/s and BX    KNEE ARTHROSCP HARV      right     Allergies   Allergen Reactions    Zithromax [Azithromycin] Other (comments)     Skin dryness/peeling      Family History   Problem Relation Age of Onset    Heart Attack Father 61         Diabetes Father     Heart Disease Father     Hypertension Father     Cancer Father      lung    Other Mother      Sarcoidosis    Stroke Brother     Psychiatric Disorder Brother     Heart Disease Paternal Grandmother       Social History   Substance Use Topics    Smoking status: Former Smoker     Packs/day: 1.50     Years: 18.00     Types: Cigarettes     Quit date: 1989    Smokeless tobacco: Former User     Quit date: 2016    Alcohol use 0.0 oz/week Comment: has lessened his alcohol intake since learning about his abnormal liver labs      Current Facility-Administered Medications   Medication Dose Route Frequency    acetaminophen (TYLENOL) tablet 650 mg  650 mg Oral Q4H PRN    oxyCODONE-acetaminophen (PERCOCET 7.5) 7.5-325 mg per tablet 1 Tab  1 Tab Oral Q4H PRN    sodium chloride (NS) flush 5-10 mL  5-10 mL IntraVENous PRN    alum-mag hydroxide-simeth (MYLANTA) oral suspension 30 mL  30 mL Oral Q4H PRN    butalbital-acetaminophen-caffeine (FIORICET, ESGIC) -40 mg per tablet 1 Tab  1 Tab Oral Q6H PRN    [START ON 10/21/2017] carvedilol (COREG) tablet 12.5 mg  12.5 mg Oral BID WITH MEALS    [START ON 10/21/2017] enalapril (VASOTEC) tablet 10 mg  10 mg Oral BID    [START ON 10/21/2017] fluticasone (FLONASE) 50 mcg/actuation nasal spray 1 Spray  1 Spray Both Nostrils DAILY    [START ON 10/21/2017] hydroCHLOROthiazide (HYDRODIURIL) tablet 25 mg  25 mg Oral DAILY    HYDROcodone-acetaminophen (NORCO) 7.5-325 mg per tablet 1 Tab  1 Tab Oral Q8H PRN    [START ON 10/21/2017] insulin lispro (HUMALOG) injection   SubCUTAneous TIDAC    [START ON 10/21/2017] levETIRAcetam (KEPPRA) tablet 500 mg  500 mg Oral BID    [START ON 10/21/2017] linaclotide (LINZESS) capsule 145 mcg (Patient Supplied)  145 mcg Oral DAILY    [START ON 10/21/2017] loratadine (CLARITIN) tablet 10 mg  10 mg Oral DAILY    [START ON 10/21/2017] metFORMIN (GLUCOPHAGE) tablet 500 mg  500 mg Oral BID WITH MEALS    [START ON 10/21/2017] polyethylene glycol (MIRALAX) packet 17 g  17 g Oral BID    simvastatin (ZOCOR) tablet 40 mg  40 mg Oral QHS    [START ON 10/21/2017] spironolactone (ALDACTONE) tablet 25 mg  25 mg Oral DAILY       Review of Systems:  + Weakness, headache.    Denies: fevers, chills, sweats, fatigue, malaise, anorexia, weight loss   Denies: blurry vision, loss of vision, eye pain, photophobia   Denies: hearing loss, ringing in the ears, earache, epistaxis   Denies: chest pain, palpitations, syncope, orthopnea, paroxysmal nocturnal dyspnea, claudication   Denies: dysphagia, odynophagia, nausea, vomiting, diarrhea, constipation, abdominal pain, jaundice, melena   Denies: frequency, dysuria, nocturia, urinary incontinence, stones, hematuria   Denies: polydipsia/polyuria, skin changes, temperature intolerance, unexpected weight gain   Denies: back pain, joint pain, joint swelling, muscle pain,   Denies: bleeding problems, blood transfusions, bruising, pallor, swollen lymph nodes   Denies: dysarthria, blurred vision, diplopia,seizure, focal deficits. Objective:     Visit Vitals    /74 (BP Patient Position: At rest)    Pulse (!) 103    Temp 97.9 °F (36.6 °C)    Resp 20    SpO2 94%      Intake and Output:     Physical Exam:   General: Alert and age appropriately oriented. No acute cardio respiratory distress. HEENT: Normocephalic,no scleral icterus  Oral mucosa moist without cyanosis   Lungs: Clear to auscultation  bilaterally. Respiration even and unlabored   Heart: Regular rate and rhythm, S1, S2   No  murmurs, clicks, rub or gallops   Abdomen: Soft, non-tender, nondistended. Bowel sounds present. No organomegaly. Genitourinary: Benign . Neuromuscular:     JULES, EOMI  Speech clear. follows simple commands well, consistently.   LUE     Shoulder abduction   5-/5              Elbow flexion:   5-/5               Wrist extension:  5 /5              Finger flexion;   5/5                        ADMQ:  5/5  RUE    Shoulder abduction: 5 /5                Elbow flexion: 5  /5                         Wrist extension:  5/5                        Finger flexion:  5 /5                        ADMQ: 5  /5  LLE     Hip flexion:  5- /5              Knee extension:   5-/5                         Ankle dorsiflexion: 5 /5                        Ankle plantarflexion:  5 /5                                                                 RLE     Hip flexion: 5  /5 Knee extension:  5- /5                         Ankle dorsiflexion:   5/5                        Ankle plantarflexion:  5 /5  Sensory - intact grossly   Skin/extremity: No rashes, no erythema. Wound covered. Lab Review:    Recent Results (from the past 72 hour(s))   GLUCOSE, POC    Collection Time: 10/18/17  8:09 AM   Result Value Ref Range    Glucose (POC) 159 (H) 65 - 100 mg/dL   GLUCOSE, POC    Collection Time: 10/18/17  9:51 AM   Result Value Ref Range    Glucose (POC) 135 (H) 65 - 100 mg/dL   GLUCOSE, POC    Collection Time: 10/18/17  1:45 PM   Result Value Ref Range    Glucose (POC) 137 (H) 65 - 100 mg/dL   GLUCOSE, POC    Collection Time: 10/18/17  4:24 PM   Result Value Ref Range    Glucose (POC) 123 (H) 65 - 110 mg/dL   METABOLIC PANEL, BASIC    Collection Time: 10/19/17  5:09 AM   Result Value Ref Range    Sodium 138 136 - 145 mmol/L    Potassium 4.0 3.5 - 5.1 mmol/L    Chloride 105 98 - 107 mmol/L    CO2 24 21 - 32 mmol/L    Anion gap 9 7 - 16 mmol/L    Glucose 128 (H) 65 - 100 mg/dL    BUN 9 8 - 23 MG/DL    Creatinine 0.80 0.8 - 1.5 MG/DL    GFR est AA >60 >60 ml/min/1.73m2    GFR est non-AA >60 >60 ml/min/1.73m2    Calcium 8.7 8.3 - 10.4 MG/DL   CBC WITH AUTOMATED DIFF    Collection Time: 10/19/17  5:09 AM   Result Value Ref Range    WBC 9.5 4.3 - 11.1 K/uL    RBC 4.09 (L) 4.23 - 5.67 M/uL    HGB 13.3 (L) 13.6 - 17.2 g/dL    HCT 39.3 (L) 41.1 - 50.3 %    MCV 96.1 79.6 - 97.8 FL    MCH 32.5 26.1 - 32.9 PG    MCHC 33.8 31.4 - 35.0 g/dL    RDW 13.1 11.9 - 14.6 %    PLATELET 003 424 - 695 K/uL    MPV 9.7 (L) 10.8 - 14.1 FL    DF AUTOMATED      NEUTROPHILS 82 (H) 43 - 78 %    LYMPHOCYTES 10 (L) 13 - 44 %    MONOCYTES 7 4.0 - 12.0 %    EOSINOPHILS 1 0.5 - 7.8 %    BASOPHILS 0 0.0 - 2.0 %    IMMATURE GRANULOCYTES 0.4 0.0 - 5.0 %    ABS. NEUTROPHILS 7.8 1.7 - 8.2 K/UL    ABS. LYMPHOCYTES 1.0 0.5 - 4.6 K/UL    ABS. MONOCYTES 0.6 0.1 - 1.3 K/UL    ABS.  EOSINOPHILS 0.1 0.0 - 0.8 K/UL    ABS. BASOPHILS 0.0 0.0 - 0.2 K/UL    ABS. IMM. GRANS. 0.0 0.0 - 0.5 K/UL   GLUCOSE, POC    Collection Time: 10/19/17  8:57 AM   Result Value Ref Range    Glucose (POC) 149 (H) 65 - 100 mg/dL   GLUCOSE, POC    Collection Time: 10/19/17 11:49 AM   Result Value Ref Range    Glucose (POC) 127 (H) 65 - 100 mg/dL   GLUCOSE, POC    Collection Time: 10/19/17  3:37 PM   Result Value Ref Range    Glucose (POC) 162 (H) 65 - 100 mg/dL   GLUCOSE, POC    Collection Time: 10/20/17  7:43 AM   Result Value Ref Range    Glucose (POC) 147 (H) 65 - 100 mg/dL   GLUCOSE, POC    Collection Time: 10/20/17 11:31 AM   Result Value Ref Range    Glucose (POC) 179 (H) 65 - 100 mg/dL   GLUCOSE, POC    Collection Time: 10/20/17  4:52 PM   Result Value Ref Range    Glucose (POC) 103 (H) 65 - 100 mg/dL   GLUCOSE, POC    Collection Time: 10/20/17  8:51 PM   Result Value Ref Range    Glucose (POC) 138 (H) 65 - 100 mg/dL       Functional Assessment:    Balance:  Sitting - Static: Good (unsupported)  Sitting - Dynamic: Fair (occasional) (+)  Standing - Static: Constant support  Standing - Dynamic : Fair    Transfers:  Sit to Stand: Contact guard assistance;Minimum assistance  Stand to Sit: Contact guard assistance    Speech Assessment:         Ambulation:      Gait:     Base of Support: Widened  Speed/Loreto: Slow  Step Length: Left shortened;Right shortened  Gait Abnormalities: Decreased step clearance  Distance (ft): 50 Feet (ft) (x 2)  Assistive Device: Walker, rolling  Ambulation - Level of Assistance: Minimal assistance  Interventions: Safety awareness training;Verbal cues       Condition on Admission: Good      Assessment:    The Post Assessment Physician Evaluation (MELA) found the current functional status to be comparable with the Pre-admission Screening. The Patient is a good candidate for acute inpatient rehabilitation. Nothing since the Pre-admission screen has changed that determination.      Rehabilitation Plan  The patient has shown the ability to tolerate and benefit from 3 hours of therapy daily and is being admitted to a comprehensive acute inpatient rehabilitation program consisting of at least 3 hours of combined physical and occupational therapies. Begin intensive Physical Therapy for a minimum of 1.5 hours a day, at least 5 out of 7 days per week to address bed mobility, transfers, ambulation, strengthening, balance, and endurance. Begin intensive Occupational Therapy for a minimum of 1.5 hours a day, at least 5 out of 7 days per week to address ADL ( bathing, LE dressing, toileting) and adaptive equipment as needed. Continue 24-hour skilled rehabilitation nursing for bowel and bladder management, skin care for decubitus ulcer prevention , pain management and ongoing medication administration     Continue daily physician medical management:    bilateral SDH/ Bilateral subdural fluid collections   - s/p Bilateral bur hole evacuation of subdural hygroma 10/18/2017  - monitor for recurrent s/s of increased ICP. Monitor for neuroogic deficits. - seizure prophylaxis -keppra    Essential hypertension   - HCTZ, aldactone, vasotec    Atrial fibrillation - anticoagulation held due to recent SDH. - continue to monitor HR/BP  - continue coreg    Tension pneumothorax, treated, resolved  - monitor for s/s. Pneumonia prophylaxis- Insentive spirometer every hour while awake    DVT risk / DVT Prophylaxis- Will require daily physician exam to assess for signs and symptoms as patient is at increased risk for of thromboembolism. Mobilization as tolerated. Intermittent pneumatic compression devices when in bed Thigh-high or knee-high thromboembolic deterrent hose when out of bed.   - no anticoagulation      Pain Control: no current joint or muscle symptoms, essentially pain-free. Will require regular pain assessment and comprenhensive pain management. - prn norco, perccet, fioricet. Control headache.      Wound Care: Monitor wound status daily per staff and physician. At risk for failure. Will require 24/7 rehab nursing. Keep wound clean and dry     Diabetes mellitus - Uncontrolled. poor glycemic control. Will require daily, close FSG monitoring and medication adjustment to optimize glycemic control in setting of acute illness and hospitalization.   - metformin, linzess  - SSI lispro    Urinary retention/ neurogenic bladder - schedule voids q6-8 hrs. Check post-void residual every shift; In and Out catheterize if post-void residual is more than 400 cc.    bowel program - miralax prn. The patient's prognosis for significant practical improvement within a reasonable period of time appears good and the estimated length of stay is 14 days and he is expected to return home with spouse and continued rehabilitation with home health therapy. Given the patient's complex neurologic/medical condition and the risk of further medical complications including: DVT, PE, skin breakdown, pneumonia due to decreased mobility, infection at surgical site, CVA due to hx of a.fib, contraindication to anticoaulation, cardiac arrhythmias, increased risk of thromboembolism could potentially impact the IRF program with decreased cardiovascular efficiency, postural hypotension and cardiac arrhythmia. For these ongoing medical issues, rehabilitation services could not be safely provided at a lower level of care such as a skilled nursing facility or nursing home.         Signed By: Freida Rees MD     October 20, 2017

## 2017-10-21 NOTE — PROGRESS NOTES
[facility] OCCUPATIONAL THERAPY INITIAL EVALUATION    Time In: 9779  Time Out: 8053    Precautions: Falls    Vitals: Patient Vitals for the past 8 hrs:   Temp Pulse Resp BP SpO2   10/21/17 0705 98.5 °F (36.9 °C) 94 18 124/84 93 %         Pain: Patient rated pain 0 out of 10. History of Presenting Illness (per previous reports): Jacey Louie a 57 y. o. male patient at [address] [place] [address] was admitted on 10/17/2017  by Sriram Torres below mentioned medical history ,is being seen for Physical Medicine and Rehabilitation.    Lalitha Nevarez fell and hit his head several weeks ago has had fell confusion, difficulty with balance, walking . He had a CT brain scan confirmed a large right-sided subacute subdural hematoma with acute on chronic features with mass effect and shift. A smaller fluid collection was present on the left. He was taken to the [facility] underwent cailin hole evacuation of his right-sided subdural hematoma with reexpansion of the right hemisphere and was discharged home. He was again brought to the ER with shortness of breath and chest pain subsequently, was found to have a large right tension pneumothorax which required chest tube placement and was discharged again. He is again brought to the ER on 10/17/2017 with ataxia and difficulty walking. He was found to have on CT bilateral fluid collection on the subdural space with enlargement of the left-sided collection. He was admitted and underwent a bilateral bur hole evacuation of subdural hygroma per Dr. Marco Wilder. Tonia Le MD. On 10/18/2017. The patient's post operative course has been fairly tolerated at ICU. Patient still requiring a drain. Noted pneumocephalus, most pronounced in the anterior frontal lobes in follow up CT. Post operatively patient still noted to have difficulty with balance and gait. Patient continuing to work with acute PT, OT.  Patient requires min/ mod assist for mobility and transfers at his time.    Katrina Vivas     Past Medical History/ Co-morbidities:   Past Medical History:   Diagnosis Date    A-fib [facility])      cardioversion    Arteriosclerosis     Atrial fibrillation ([facility] [address] 1/3/2012    Diabetes mellitus     pharmacologically controlled    Diabetes mellitus ([facility] [address] 1/3/2012    Essential hypertension, benign 2/6/2014    Family history of malignant neoplasm of prostate 2/6/2014    Hemochromatosis     HTN (hypertension) 2/6/2014    Hypercholesteremia     Nodular prostate without urinary obstruction 2/6/2014    Obesity 2/6/2014       Patient's Goal: \"To walk and even ride a bike. \"    Previous Level of Function: Prior to recent events patient was independent wtih all ADL's and IADL's.   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 [address]  residence    Lives Alone No    New Itzel Child(titi), Spouse/Significant Other/Partner    Shower Situation Tub/Shower combination    Current DME Tub transfer bench, Walker, rolling    Lift Chair      Stairs to [address] Steps        Upper Extremity Function   LUE RUE   [address]  Intact  Intact   GMC  Intact  Intact   Light Touch No apparent deficit No apparent deficit   Sharp/Dull Discrimination No apparent deficit No apparent deficit   Hot/Cold       Proprioception No apparent deficit No apparent deficit   Stereognosis       9 Hole Peg Test       General Comments        LUE RUE   General Evalutaion       AROM Within defined limits Within defined limits (RUE dominant)   Shoulder Flexion 4 4+   Shoulder Extension  4 4+   Shoulder Abduction 4 4+   Shoulder Adduction 4 4+   Elbow Flexion 4     Elbow Extension  4     Wrist Flexion/Extension 4      4 4+   General Comments     0/5 No palpable muscle contraction  1/5 Palpable muscle contraction, no joint movement  2-/5 Less than full range of motion in gravity eliminated position  2/5 Able to complete full range of motion in gravity eliminated position  2+/5 Able to initiate movement against gravity  3-/5 More than half but not full range of motion against gravity  3/5 Able to complete full range of motion against gravity  3+/5 Completes full range of motion against gravity with minimal resistance  4-/5 Completes full range of motion against gravity with minimal-moderate resistance  4/5 Completes full range of motion against gravity with moderate resistance  4+/5 Completes full range of motion against gravity with moderate-maximum resistance  5/5 Completes full range of motion against gravity with maximum resistance      Functional Mobility   Performance Comments   Sitting: Static  Supervision    Sitting: Dynamic   Supervision    Standing: Static   Supervision    Standing: Dynamic  CGA    Supine to Sit   Supervision    Sit to Stand Assistance  CGA    Transfer Assist Score   Supervision    Transfer Type        Cognition   Performance Comments   Orientation Level Appropriate for age    Comprehension Level 7 Mode: Auditory  Hearing Aid:None  Glasses:    Expression (Native Language) 7 Mode: Verbal  no difficulty expressing complex thoughts   Social Interaction/Pragmatics 7 pleasant and cooperative   Problem Solving 6 extra time for solving more complex problems   Memory 7 no difficulty noted with memory   Comments       Activities of Daily Living    Score Comments   Self-Feeding 7     Grooming 5 Tasks completed by patient: Washed face, Washed hands, Brushed hair  set up assistance   Bathing 4 Body Parts Bathe: Abdomen, Arm, left, Arm, right, Buttocks, Chest, Lower leg and foot, left, Lower leg and foot, right, Opal area, Thigh, left, Thigh, right  steadying assistance when standing for safety   Tub/Shower Transfer Inez 4 Type of Shower: Shower  Adaptive  Equipment:Tub transfer bench, Shower Chair, Grab bars and Walker   Upper Body  Dressing/Undressing 4 Items Applied:   assistance to fully pull shirt down in back   Lower Body Dressing/Undressing 2 Items Applied: Fasten shoe (1 step), Underpants (3 steps), Elastic waist pants (3 steps)  patient completed 50% of task   Toileting 4 steadying assistance for pants management   Toilet Transfer 4 CGA, rolling walker     Vision/Perception: Swapna was able to track in all quadrants without difficulty. Instrumental Activities of Daily Living   Performance Comments   Meal Preperation Maximum assistance    Homemaking Maximum assistance    Medication Management Supervision    Financial Management Supervision        Session: Patient agreeable to ADL and evaluation. Patient tolerated session well without any complications. Interdisciplinary Communication: PT, RN, OT, CNA     Patient/Family Education: Patient was/were educated On the role of OT, On POC and On IRC expectations. Problem List: GMC, Activity Tolerance, Safety Awareness, Strength and Sitting Balance    Functional Limitations: ADL, IADL, Functional Transfers and Functional Mobility    Goals: Please see Care Plan    OT order received and chart reviewed. OT orders have been acknowledged. Patient will benefit from skilled OT services to address ADL, functional transfers, UE strength and activity tolerance to maximize functional performance with daily self-care tasks and functional mobility. Treatment is likely to include ADL, Balance, Strength, Activity tolerance, Visual perceptual, DME, AE, Family  and Safety awareness training to increase independence with self-care. Patient will be seen for 1.5-2 hours of skilled OT services 5-6 days a week.      MANJINDER Mcfarland/ALMA

## 2017-10-21 NOTE — PROGRESS NOTES
End Of Shift Functional Summary, Nursing      TOILETING/ BLADDER/ BOWEL    TOILET TRANSFER:  Pt requires standby assistance/setup. Pt uses walker. BLADDER:  Pt does not have a nieves catheter that staff manages. Pt does not take medication. Pt is continent. of bladder and voids in toilet   Pt has had 0 bladder accidents during this shift requiring   (An accident is when the episode is not contained in a brief AND/OR the clothing/linen requires changing/cleaning up.)    BOWEL:  Pt does take medication. Pt is continent of bowel and uses toilet. Pt has had 0 bowel accidents during this shift requiring (An accident is when the episode is not contained in a brief AND/OR the clothing/linen requires changing/cleaning up.)    BED/CHAIR TRANSFER  Pt requires minimal assistance. Patient requires the assistance of 1 staff member(s). Pt uses walker    EATING  Pt requires no assistance. Pt does not wear dentures. TUBE FEEDINGS:  Pt does not  receive nutrition through tube feedings. Patient requires no assistance with feedings. Documentation reviewed and plan of care discussed/reviewed with   patient during the shift.

## 2017-10-21 NOTE — PROGRESS NOTES
End Of Shift Functional Summary, Nursing      TOILETING/ BLADDER/ BOWEL    TOILET TRANSFER:  Pt requires contact guard. Pt uses walker. BLADDER:  Pt does not have a nieves catheter that staff manages. Pt is continent. of bladder and voids in toilet  (An accident is when the episode is not contained in a brief AND/OR the clothing/linen requires changing/cleaning up.)    BOWEL:  Pt does take medication. Pt is continent of bowel and uses toilet. (An accident is when the episode is not contained in a brief AND/OR the clothing/linen requires changing/cleaning up.)    BED/CHAIR TRANSFER  Pt requires contact guard. Patient requires the assistance of 1 staff member. Pt uses walker      EATING  Pt requires no assistance. Pt does not wear dentures. TUBE FEEDINGS:  Pt does not  receive nutrition through tube feedings. Documentation reviewed and plan of care discussed/reviewed with   patient assistant during the shift.

## 2017-10-22 LAB
GLUCOSE BLD STRIP.AUTO-MCNC: 111 MG/DL (ref 65–100)
GLUCOSE BLD STRIP.AUTO-MCNC: 119 MG/DL (ref 65–100)
GLUCOSE BLD STRIP.AUTO-MCNC: 134 MG/DL (ref 65–100)
GLUCOSE BLD STRIP.AUTO-MCNC: 140 MG/DL (ref 65–100)

## 2017-10-22 PROCEDURE — 65310000000 HC RM PRIVATE REHAB

## 2017-10-22 PROCEDURE — 74011250637 HC RX REV CODE- 250/637: Performed by: PHYSICAL MEDICINE & REHABILITATION

## 2017-10-22 PROCEDURE — 97110 THERAPEUTIC EXERCISES: CPT

## 2017-10-22 PROCEDURE — 97116 GAIT TRAINING THERAPY: CPT

## 2017-10-22 PROCEDURE — 82962 GLUCOSE BLOOD TEST: CPT

## 2017-10-22 RX ORDER — INSULIN LISPRO 100 [IU]/ML
INJECTION, SOLUTION INTRAVENOUS; SUBCUTANEOUS
Status: DISCONTINUED | OUTPATIENT
Start: 2017-10-22 | End: 2017-10-31 | Stop reason: HOSPADM

## 2017-10-22 RX ADMIN — SPIRONOLACTONE 25 MG: 25 TABLET, FILM COATED ORAL at 09:12

## 2017-10-22 RX ADMIN — HYDROCHLOROTHIAZIDE 25 MG: 25 TABLET ORAL at 09:11

## 2017-10-22 RX ADMIN — BUTALBITAL, ACETAMINOPHEN AND CAFFEINE 1 TABLET: 50; 325; 40 TABLET ORAL at 09:26

## 2017-10-22 RX ADMIN — LEVETIRACETAM 500 MG: 500 TABLET, FILM COATED ORAL at 09:12

## 2017-10-22 RX ADMIN — POLYETHYLENE GLYCOL 3350 17 G: 17 POWDER, FOR SOLUTION ORAL at 09:11

## 2017-10-22 RX ADMIN — CARVEDILOL 12.5 MG: 6.25 TABLET, FILM COATED ORAL at 16:13

## 2017-10-22 RX ADMIN — OXYCODONE HYDROCHLORIDE AND ACETAMINOPHEN 1 TABLET: 5; 325 TABLET ORAL at 00:20

## 2017-10-22 RX ADMIN — ENALAPRIL MALEATE 10 MG: 5 TABLET ORAL at 09:12

## 2017-10-22 RX ADMIN — OXYCODONE HYDROCHLORIDE AND ACETAMINOPHEN 1 TABLET: 5; 325 TABLET ORAL at 16:11

## 2017-10-22 RX ADMIN — GUAIFENESIN 600 MG: 600 TABLET, EXTENDED RELEASE ORAL at 20:13

## 2017-10-22 RX ADMIN — METFORMIN HYDROCHLORIDE 500 MG: 500 TABLET, FILM COATED ORAL at 09:13

## 2017-10-22 RX ADMIN — METFORMIN HYDROCHLORIDE 500 MG: 500 TABLET, FILM COATED ORAL at 16:13

## 2017-10-22 RX ADMIN — BUTALBITAL, ACETAMINOPHEN AND CAFFEINE 1 TABLET: 50; 325; 40 TABLET ORAL at 16:12

## 2017-10-22 RX ADMIN — LEVETIRACETAM 500 MG: 500 TABLET, FILM COATED ORAL at 17:59

## 2017-10-22 RX ADMIN — OXYCODONE HYDROCHLORIDE AND ACETAMINOPHEN 1 TABLET: 5; 325 TABLET ORAL at 05:19

## 2017-10-22 RX ADMIN — POLYETHYLENE GLYCOL 3350 17 G: 17 POWDER, FOR SOLUTION ORAL at 17:59

## 2017-10-22 RX ADMIN — ENALAPRIL MALEATE 10 MG: 5 TABLET ORAL at 17:58

## 2017-10-22 RX ADMIN — LORATADINE 10 MG: 10 TABLET ORAL at 09:11

## 2017-10-22 RX ADMIN — CARVEDILOL 12.5 MG: 6.25 TABLET, FILM COATED ORAL at 09:13

## 2017-10-22 RX ADMIN — SIMVASTATIN 40 MG: 20 TABLET, FILM COATED ORAL at 00:21

## 2017-10-22 RX ADMIN — SIMVASTATIN 40 MG: 20 TABLET, FILM COATED ORAL at 20:15

## 2017-10-22 RX ADMIN — OXYCODONE HYDROCHLORIDE AND ACETAMINOPHEN 1 TABLET: 5; 325 TABLET ORAL at 22:34

## 2017-10-22 RX ADMIN — GUAIFENESIN 600 MG: 600 TABLET, EXTENDED RELEASE ORAL at 09:11

## 2017-10-22 RX ADMIN — FLUTICASONE PROPIONATE 1 SPRAY: 50 SPRAY, METERED NASAL at 09:21

## 2017-10-22 NOTE — PROGRESS NOTES
Care Management Interventions  PCP Verified by CM: Yes Kyle Lazaro MD)  Transition of Care Consult (CM Consult): Discharge Planning  Discharge Durable Medical Equipment: No  Physical Therapy Consult: Yes  Occupational Therapy Consult: Yes  Speech Therapy Consult: No  Current Support Network: Lives with Spouse, Own Home ([address]ie Villegas [phone])  Discharge Location  Discharge Placement: Home with family assistance ([facility] Out[facility] therapy)    Admitted from [facility] - subdural hematoma-- cailin hole evacuation of subdural hygroma on 10/18- Dr Willa Mcmullen. Previously was independent with ambulation and self care. Employed. Has [employer] [place] approved for 7 days with update due on 10/26 to Alexander Howell  Ph. [phone]. Lives in multi level home but stays on main level. Pt has walker and transfer bench. Will return home with spouse who works  Has two children - one in [facility] and the other in [place]. Family will make arrangements to be with patient initially at ID. Will follow for case management needs.

## 2017-10-22 NOTE — PROGRESS NOTES
End Of Shift Functional Summary, Nursing      TOILETING/ BLADDER/ BOWEL    TOILET TRANSFER:  Pt requires minimal assistance. Pt uses walker. BLADDER:  Pt does not have a nieves catheter that staff manages. Pt does not take medication. Pt is continent. of bladder and voids in toilet   Pt has had 0 bladder accidents during this shift requiring minimal assistance to clean up. (An accident is when the episode is not contained in a brief AND/OR the clothing/linen requires changing/cleaning up.)    BOWEL:  Pt does take medication. Pt is continent of bowel and uses toilet. Pt has had 0 bowel accidents during this shift requiring minimal assistance from staff to clean up. (An accident is when the episode is not contained in a brief AND/OR the clothing/linen requires changing/cleaning up.)    BED/CHAIR TRANSFER  Pt requires minimal assistance. Patient requires the assistance of 1 staff member(s). Pt uses walker    BATHING  Pt requires supervision. Pt requires assistance with buttocks, left lower leg and foot and right lower leg and foot    SHOWER TRANSFER:  Pt requires minimal assistance  Patient requires the assistance of 1 staff member(s). Pt uses tub transfer bench    DRESSING  UPPER BODY:  Pt requires no assistance Pt requires minimal assistance. LOWER BODY:  Pt requires extra time. Pt requires moderate assistance    EATING  Pt requires no assistance. Pt does not wear dentures. GROOMING  Pt requires extra time with oral care and shaving. Documentation reviewed and plan of care discussed/reviewed with   patient, physician, therapists, oncoming nurse and patient assistant during the shift.

## 2017-10-22 NOTE — PROGRESS NOTES
PHYSICAL THERAPY DAILY NOTE  Time In: 6055  Time Out: 9990  Patient Seen For: AM;Gait training;Patient education; Therapeutic exercise;Transfer training    Subjective: \"I'm doing okay this morning\"         Objective: Pt rates 3/10 pain in left knee before and after PT session   Other (comment) (falls)  GROSS ASSESSMENT Daily Assessment            BED/MAT MOBILITY Daily Assessment            TRANSFERS Daily Assessment            GAIT Daily Assessment    Amount of Assistance: 5 (Supervision/setup)  Distance (ft): 135 Feet (ft)  Assistive Device: Walker       STEPS or STAIRS Daily Assessment            BALANCE Daily Assessment            WHEELCHAIR MOBILITY Daily Assessment          LOWER EXTREMITY EXERCISES Daily Assessment    Extremity: Both  Exercise Type #1: Standing lower extremity strengthening  Sets Performed: 1  Reps Performed: 15  Level of Assist: Stand-by assistance          Assessment: Pt displays decrease endurance with gait and therex requiring multiple seated rest breaks for recovery.         Plan of Care: Continue with current POC to help pt achieve PLOF    Todd Macedo, WESLY  10/22/2017

## 2017-10-22 NOTE — DISCHARGE SUMMARY
[address] [facility]       Name:  Carlota Negro   MR#:  [id number]   :  1955   Account #:  [de-identified]   Date of Adm:  10/17/2017       DISCHARGE DIAGNOSES   1. Bilateral subdural hygroma. 2. History of subdural hematoma. 3. Sleep apnea. 4. History of pneumothorax. 5. Obstructive sleep apnea. 6. Morbid obesity. 7. Mitral valve regurgitation. 8. Iron excess. 9. Hypertension. 10. History of atrial fibrillation. OPERATIONS AND PROCEDURES: Bilateral bur hole evacuation for   subdural hygroma on 10/18/2017. COMPLICATIONS: None. DISCHARGE CONDITION: Improved. HISTORY OF PRESENT ILLNESS: A 20-year-old male with a   complicated history who had a fall 4-5 weeks ago injured his   head and his chest. Initial CT scans and chest x-rays were   negative. However, the patient developed worsening headache and   nausea with ataxia. CT brain scanning confirmed bilateral   subdural hematomas, right much greater than left, with mass   effect and shift noted. He had been taking Pradaxa for his   atrial fibrillation. HOSPITAL COURSE: He was taken to the operating room and   underwent cailin hole evacuation of his right subdural hematoma   and did well and was discharged home 3 days later. He presented   back to the emergency room the following week with chest pain   and shortness of breath, and was found to have a pneumothorax   tension in nature on the right side, likely due the initial   trauma which did not manifest itself until later. A chest tube   was placed. He was kept in the hospital and sent home. He then   developed worsening headache and ataxia, again, and was brought   to the hospital where CT brain scanning now confirmed bilateral   subdural hygromas confirmed by MRI scanning without the   previously described mass effect and shift on the right, but   nevertheless the cerebral hemispheres were effaced bilaterally.     PAST MEDICAL HISTORY: As listed above.    ALLERGIES: Salas Guan. MEDICATIONS: Listed on the universal medication form attached to   the chart. PHYSICAL EXAMINATION   He was awake, alert, oriented x3. Cranial nerves 2 through 12   intact. Motor strength 5/5. Gait not assessed. Sensation normal.   Reflexes symmetric. Right-sided craniotomy incision evident. HOSPITAL COURSE:  The patient did undergo MRI scanning to rule   out acute CVA and this was negative for acute CVA, but did   confirm bilateral subdural hygromas. A repeat CT brain scan was   performed the following morning on 10/18/2017 without change. The patient stated that he felt bad, was weak in his legs and   therefore the decision was to take him back to the operating   room which was accomplished on 10/18/2017. Large-volume subdural   hygromas were evacuated and a drain was placed on the left   side. A drain could not be placed on the right side due to   brain expansion. Postoperatively he did very well. CT brain scanning   showed a total resolution of the left sided hygroma, some   minimal fluid still on the right, posteriorly, but frontally   this was gone and there was some pneumocephalus from the   surgery. Tirado catheter was discontinued. He was allowed to be   ambulatory, which was much improved from preoperatively. He was   then seen by physical therapy, occupational therapy, and   eventually by Dr. Brady Camacho of physical medicine and rehabilitation. The patient was [facility] [place]. The   Eh-Segovia drain was removed from the left side of his skull   and he again was cleared for admission to the ninth floor for   inpatient rehab pending insurance approval, which happened late   this afternoon. Arrangements were made for his transfer   upstairs:     DISCHARGE INSTRUCTIONS   1. Diet regular. 2. Activity up with assistance. 3. Return to our office in 2 weeks for suture removal.    [facility] admission medicines.  However, he was placed on Keppra   500 mg b.i.d. which will be continued for the time being. DISCHARGE CONDITION:  Improved.          MD RADHA Black / DV   D:  10/20/2017   17:28   T:  10/22/2017   17:20   Job #:  [id number]

## 2017-10-22 NOTE — PROGRESS NOTES
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 Reports nasal drainage improved with clear productive cough. Denies SOB, HA, dizziness, palpations or nausea. Participating in therapy.     Objective:     Current Facility-Administered Medications   Medication Dose Route Frequency    oxyCODONE-acetaminophen (PERCOCET) 5-325 mg per tablet 1 Tab  1 Tab Oral Q4H PRN    guaiFENesin ER (MUCINEX) tablet 600 mg  600 mg Oral Q12H    acetaminophen (TYLENOL) tablet 650 mg  650 mg Oral Q4H PRN    sodium chloride (NS) flush 5-10 mL  5-10 mL IntraVENous PRN    alum-mag hydroxide-simeth (MYLANTA) oral suspension 30 mL  30 mL Oral Q4H PRN    butalbital-acetaminophen-caffeine (FIORICET, ESGIC) -40 mg per tablet 1 Tab  1 Tab Oral Q6H PRN    carvedilol (COREG) tablet 12.5 mg  12.5 mg Oral BID WITH MEALS    enalapril (VASOTEC) tablet 10 mg  10 mg Oral BID    fluticasone (FLONASE) 50 mcg/actuation nasal spray 1 Spray  1 Spray Both Nostrils DAILY    hydroCHLOROthiazide (HYDRODIURIL) tablet 25 mg  25 mg Oral DAILY    insulin lispro (HUMALOG) injection   SubCUTAneous TIDAC    levETIRAcetam (KEPPRA) tablet 500 mg  500 mg Oral BID    linaclotide (LINZESS) capsule 145 mcg (Patient Supplied)  145 mcg Oral DAILY    loratadine (CLARITIN) tablet 10 mg  10 mg Oral DAILY    metFORMIN (GLUCOPHAGE) tablet 500 mg  500 mg Oral BID WITH MEALS    polyethylene glycol (MIRALAX) packet 17 g  17 g Oral BID    simvastatin (ZOCOR) tablet 40 mg  40 mg Oral QHS    spironolactone (ALDACTONE) tablet 25 mg  25 mg Oral DAILY     Allergies   Allergen Reactions    Zithromax [Azithromycin] Other (comments)     Skin dryness/peeling       Visit Vitals    /64    Pulse 85    Temp 98 °F (36.7 °C)    Resp 18    SpO2 97% Intake and Output:  10/20 1901 - 10/22 0700  In: -   Out: 9162 [Urine:1476]    Physical Exam:   General: Alert, NAD, ambulating in room with RW       Lungs: Clear to auscultation  bilaterally. Heart: Regular rate and rhythm, no  murmurs    Abdomen: Soft, bowel sounds present. Neuromuscular:      Speech clear. follows simple commands well, consistently. LUE     Shoulder abduction   5-/5              Elbow flexion:   5-/5               Wrist extension:  5 /5              Finger flexion;   5/5                       RUE    Shoulder abduction: 5 /5                Elbow flexion: 5  /5                         Wrist extension:  5/5                        Finger flexion:  5 /5                        LLE     Hip flexion:  5- /5              Knee extension:   5-/5                         Ankle dorsiflexion: 5 /5                        Ankle plantarflexion:5/5                                                              RLE     Hip flexion: 5  /5                        Knee extension:  5- /5                         Ankle dorsiflexion:   5/5                        Ankle plantarflexion:  5 /5  Sensory - intact grossly   Skin/extremity: No rashes, no erythema.   Wound covered, dressing c/d/i     Functional Assessment:         Mary Alvarez Fall Risk Assessment:  Mary Alvarez Fall Risk  Mobility: Ambulates or transfers with assist devices or assistance/unsteady gait (10/22/17 0313)  Mobility Interventions: Patient to call before getting OOB (10/22/17 0313)  Mentation: Alert, oriented x 3 (10/22/17 0313)  Mentation Interventions: Adequate sleep, hydration, pain control (10/22/17 0313)  Medication: Patient receiving anticonvulsants, sedatives(tranquilizers), psychotropics or hypnotics, hypoglycemics, narcotics, sleep aids, antihypertensives, laxatives, or diuretics (10/22/17 0313)  Medication Interventions: Evaluate medications/consider consulting pharmacy (10/22/17 7422)  Elimination: Needs assistance with toileting (10/22/17 6393)  Elimination Interventions: Call light in reach (10/22/17 0313)  Prior Fall History: Before admission in past 12 months _home or previous inpatient care) (10/22/17 3332)  History of Falls Interventions: Door open when patient unattended (10/22/17 0313)  Total Score: 4 (10/22/17 0313)  High Fall Risk: Yes (10/22/17 0313)     Speech Assessment:         Ambulation:  Gait  Distance (ft): 135 Feet (ft) (10/22/17 1000)  Assistive Device: Walker (10/22/17 1000)  Rail Use: Both (10/21/17 1351)     Labs/Studies:  Recent Results (from the past 72 hour(s))   GLUCOSE, POC    Collection Time: 10/19/17 11:49 AM   Result Value Ref Range    Glucose (POC) 127 (H) 65 - 100 mg/dL   GLUCOSE, POC    Collection Time: 10/19/17  3:37 PM   Result Value Ref Range    Glucose (POC) 162 (H) 65 - 100 mg/dL   GLUCOSE, POC    Collection Time: 10/20/17  7:43 AM   Result Value Ref Range    Glucose (POC) 147 (H) 65 - 100 mg/dL   GLUCOSE, POC    Collection Time: 10/20/17 11:31 AM   Result Value Ref Range    Glucose (POC) 179 (H) 65 - 100 mg/dL   GLUCOSE, POC    Collection Time: 10/20/17  4:52 PM   Result Value Ref Range    Glucose (POC) 103 (H) 65 - 100 mg/dL   GLUCOSE, POC    Collection Time: 10/20/17  8:51 PM   Result Value Ref Range    Glucose (POC) 138 (H) 65 - 100 mg/dL   CBC WITH AUTOMATED DIFF    Collection Time: 10/21/17  5:43 AM   Result Value Ref Range    WBC 6.6 4.3 - 11.1 K/uL    RBC 4.08 (L) 4.23 - 5.67 M/uL    HGB 13.3 (L) 13.6 - 17.2 g/dL    HCT 38.7 (L) 41.1 - 50.3 %    MCV 94.9 79.6 - 97.8 FL    MCH 32.6 26.1 - 32.9 PG    MCHC 34.4 31.4 - 35.0 g/dL    RDW 12.9 11.9 - 14.6 %    PLATELET 835 547 - 821 K/uL    MPV 10.0 (L) 10.8 - 14.1 FL    DF AUTOMATED      NEUTROPHILS 76 43 - 78 %    LYMPHOCYTES 15 13 - 44 %    MONOCYTES 6 4.0 - 12.0 %    EOSINOPHILS 2 0.5 - 7.8 %    BASOPHILS 0 0.0 - 2.0 %    IMMATURE GRANULOCYTES 1 0.0 - 5.0 %    ABS. NEUTROPHILS 5.1 1.7 - 8.2 K/UL    ABS. LYMPHOCYTES 1.0 0.5 - 4.6 K/UL    ABS.  MONOCYTES 0.4 0.1 - 1.3 K/UL    ABS. EOSINOPHILS 0.1 0.0 - 0.8 K/UL    ABS. BASOPHILS 0.0 0.0 - 0.2 K/UL    ABS. IMM. GRANS. 0.0 0.0 - 0.5 K/UL   METABOLIC PANEL, BASIC    Collection Time: 10/21/17  5:43 AM   Result Value Ref Range    Sodium 137 136 - 145 mmol/L    Potassium 3.9 3.5 - 5.1 mmol/L    Chloride 103 98 - 107 mmol/L    CO2 25 21 - 32 mmol/L    Anion gap 9 7 - 16 mmol/L    Glucose 114 (H) 65 - 100 mg/dL    BUN 9 8 - 23 MG/DL    Creatinine 0.63 (L) 0.8 - 1.5 MG/DL    GFR est AA >60 >60 ml/min/1.73m2    GFR est non-AA >60 >60 ml/min/1.73m2    Calcium 9.1 8.3 - 10.4 MG/DL   GLUCOSE, POC    Collection Time: 10/21/17  7:31 AM   Result Value Ref Range    Glucose (POC) 138 (H) 65 - 100 mg/dL   GLUCOSE, POC    Collection Time: 10/21/17 11:16 AM   Result Value Ref Range    Glucose (POC) 128 (H) 65 - 100 mg/dL   GLUCOSE, POC    Collection Time: 10/21/17  4:04 PM   Result Value Ref Range    Glucose (POC) 107 (H) 65 - 100 mg/dL   GLUCOSE, POC    Collection Time: 10/21/17  9:39 PM   Result Value Ref Range    Glucose (POC) 165 (H) 65 - 100 mg/dL   GLUCOSE, POC    Collection Time: 10/22/17  7:30 AM   Result Value Ref Range    Glucose (POC) 111 (H) 65 - 100 mg/dL     Assessment: This is a 57 y. o. who fell striking his head several weeks ago and found to have R SDH, s/p [place]ailin hole evacuation and d/[place] home. Now readmitted on 10/17 d/t subdural fluid collection and R PTX. He continues with mobility and self care impairments. Rehabilitation Plan  Continue PT for a minimum of 1.5 hours a day, at least 5 out of 7 days per week to address bed mobility, transfers, ambulation, strengthening, balance, and endurance.    Continue OT for a minimum of 1.5 hours a day, at least 5 out of 7 days per week to address ADL ( bathing, LE dressing, toileting) and adaptive equipment as needed.     Continue 24-hour skilled rehabilitation nursing for bowel and bladder management, skin care for decubitus ulcer prevention , pain management and ongoing medication administration      Continue daily physician medical management:     bilateral SDH/ Bilateral subdural fluid collections   - s/p Bilateral bur hole evacuation of subdural hygroma 10/18/2017  - monitor for recurrent s/s of increased ICP. Monitor for neuroogic deficits. - seizure prophylaxis -keppra  - Hgb- 13.3 > 13.3, low, yet stable  - spouse requested neurosurg wound f/u     Essential hypertension   - HCTZ, aldactone, vasotec. HR and BP acceptable. BMP unremarkable.     Atrial fibrillation - anticoagulation held due to recent SDH. - continue to monitor HR/BP  - continue coreg     Tension pneumothorax, treated, resolved  - monitor for s/s.      Pneumonia prophylaxis- Insentive spirometer every hour while awake     DVT risk / DVT Prophylaxis- Will require daily physician exam to assess for signs and symptoms as patient is at increased risk for of thromboembolism. Mobilization as tolerated. Intermittent pneumatic compression devices when in bed Thigh-high or knee-high thromboembolic deterrent hose when out of bed.   - no anticoagulation      Pain Control: no current joint or muscle symptoms, essentially pain-free. Will require regular pain assessment and comprenhensive pain management. - prn perccet, fioricet. Control headache.      Wound Care: Monitor wound status daily per staff and physician. At risk for failure. Will require 24/7 rehab nursing. Keep wound clean and dry      Diabetes mellitus - Uncontrolled. poor glycemic control. Will require daily, close FSG monitoring and medication adjustment to optimize glycemic control in setting of acute illness and hospitalization.   - metformin, linzess  - SSI lispro  - BG - 111 this am, yesterday ranged 107-165, begin BG monitoring bid     Urinary retention/ neurogenic bladder - schedule voids q6-8 hrs. Check post-void residual every shift; In and Out catheterize if post-void residual is more than 400 cc.     bowel program - miralax prn.     [place] on CPAP  Allergic rhinitis/chronic cough - continue mucinex bid     Time spent was 15 minutes with over 1/2 in direct patient care/examination, consultation and coordination of care.     Signed By: Teddy Hernandez MD     October 22, 2017

## 2017-10-22 NOTE — PROGRESS NOTES
Patient resting up in bed. Alert and oriented. Pleasant affect. Dressing clean, dry, and intact around scalp. Lung sounds clear. S1S2, bowel sounds active. Denies any discomfort at present time. Moving all extremities without difficulty. Up with assist of one by staff. No other verbalized needs at present time. Assessment completed. See doc flow sheet for further assessments.

## 2017-10-22 NOTE — PROGRESS NOTES
End Of Shift Functional Summary, Nursing      TOILETING/ BLADDER/ BOWEL    TOILET TRANSFER:  Pt  requires standby assistance/setup. Pt uses walker. BLADDER:  Pt does not have a nieves catheter that staff manages. Pt does take medication. Pt is continent. of bladder and voids in toilet Pt has had 0 bladder accidents during this shift requiring standby assistance/setup to clean up. (An accident is when the episode is not contained in a brief AND/OR the clothing/linen requires changing/cleaning up.)    BOWEL:  Pt does take medication. Pt is continent of bowel and uses toilet. Pt has had 0 bowel accidents during this shift requiring standby assistance/setup from staff to clean up.  (An accident is when the episode is not contained in a brief AND/OR the clothing/linen requires changing/cleaning up.)

## 2017-10-22 NOTE — PROGRESS NOTES
Problem: Falls - Risk of  Goal: *Absence of Falls  Document Parish Fall Risk and appropriate interventions in the flowsheet.    Outcome: Progressing Towards Goal  Fall Risk Interventions:  Mobility Interventions: Patient to call before getting OOB, Communicate number of staff needed for ambulation/transfer, Utilize walker, cane, or other assitive device    Mentation Interventions: Adequate sleep, hydration, pain control, Door open when patient unattended, Evaluate medications/consider consulting pharmacy, Eyeglasses and hearing aids    Medication Interventions: Evaluate medications/consider consulting pharmacy, Patient to call before getting OOB    Elimination Interventions: Call light in reach    History of Falls Interventions: Door open when patient unattended, Consult care management for discharge planning

## 2017-10-23 LAB
GLUCOSE BLD STRIP.AUTO-MCNC: 108 MG/DL (ref 65–100)
GLUCOSE BLD STRIP.AUTO-MCNC: 119 MG/DL (ref 65–100)
GLUCOSE BLD STRIP.AUTO-MCNC: 134 MG/DL (ref 65–100)
GLUCOSE BLD STRIP.AUTO-MCNC: 138 MG/DL (ref 65–100)

## 2017-10-23 PROCEDURE — 97110 THERAPEUTIC EXERCISES: CPT

## 2017-10-23 PROCEDURE — 97530 THERAPEUTIC ACTIVITIES: CPT

## 2017-10-23 PROCEDURE — 82962 GLUCOSE BLOOD TEST: CPT

## 2017-10-23 PROCEDURE — 97116 GAIT TRAINING THERAPY: CPT

## 2017-10-23 PROCEDURE — 97150 GROUP THERAPEUTIC PROCEDURES: CPT

## 2017-10-23 PROCEDURE — 65310000000 HC RM PRIVATE REHAB

## 2017-10-23 PROCEDURE — 74011250637 HC RX REV CODE- 250/637: Performed by: PHYSICAL MEDICINE & REHABILITATION

## 2017-10-23 PROCEDURE — 99232 SBSQ HOSP IP/OBS MODERATE 35: CPT | Performed by: PHYSICAL MEDICINE & REHABILITATION

## 2017-10-23 PROCEDURE — 74011000250 HC RX REV CODE- 250: Performed by: PHYSICAL MEDICINE & REHABILITATION

## 2017-10-23 RX ORDER — BACITRACIN ZINC 500 UNIT/G
OINTMENT (GRAM) TOPICAL DAILY
Status: DISCONTINUED | OUTPATIENT
Start: 2017-10-23 | End: 2017-10-31 | Stop reason: HOSPADM

## 2017-10-23 RX ADMIN — CARVEDILOL 12.5 MG: 6.25 TABLET, FILM COATED ORAL at 18:38

## 2017-10-23 RX ADMIN — OXYCODONE HYDROCHLORIDE AND ACETAMINOPHEN 1 TABLET: 5; 325 TABLET ORAL at 09:34

## 2017-10-23 RX ADMIN — METFORMIN HYDROCHLORIDE 500 MG: 500 TABLET, FILM COATED ORAL at 09:40

## 2017-10-23 RX ADMIN — OXYCODONE HYDROCHLORIDE AND ACETAMINOPHEN 1 TABLET: 5; 325 TABLET ORAL at 03:39

## 2017-10-23 RX ADMIN — ENALAPRIL MALEATE 10 MG: 5 TABLET ORAL at 09:40

## 2017-10-23 RX ADMIN — METFORMIN HYDROCHLORIDE 500 MG: 500 TABLET, FILM COATED ORAL at 18:38

## 2017-10-23 RX ADMIN — HYDROCHLOROTHIAZIDE 25 MG: 25 TABLET ORAL at 09:40

## 2017-10-23 RX ADMIN — POLYETHYLENE GLYCOL 3350 17 G: 17 POWDER, FOR SOLUTION ORAL at 09:00

## 2017-10-23 RX ADMIN — BUTALBITAL, ACETAMINOPHEN AND CAFFEINE 1 TABLET: 50; 325; 40 TABLET ORAL at 12:19

## 2017-10-23 RX ADMIN — BACITRACIN ZINC: 500 OINTMENT TOPICAL at 15:00

## 2017-10-23 RX ADMIN — ENALAPRIL MALEATE 10 MG: 5 TABLET ORAL at 18:38

## 2017-10-23 RX ADMIN — BUTALBITAL, ACETAMINOPHEN AND CAFFEINE 1 TABLET: 50; 325; 40 TABLET ORAL at 18:45

## 2017-10-23 RX ADMIN — SIMVASTATIN 40 MG: 20 TABLET, FILM COATED ORAL at 21:01

## 2017-10-23 RX ADMIN — LEVETIRACETAM 500 MG: 500 TABLET, FILM COATED ORAL at 09:40

## 2017-10-23 RX ADMIN — SPIRONOLACTONE 25 MG: 25 TABLET, FILM COATED ORAL at 09:40

## 2017-10-23 RX ADMIN — CARVEDILOL 12.5 MG: 6.25 TABLET, FILM COATED ORAL at 09:40

## 2017-10-23 RX ADMIN — FLUTICASONE PROPIONATE 1 SPRAY: 50 SPRAY, METERED NASAL at 06:14

## 2017-10-23 RX ADMIN — LEVETIRACETAM 500 MG: 500 TABLET, FILM COATED ORAL at 18:38

## 2017-10-23 RX ADMIN — LORATADINE 10 MG: 10 TABLET ORAL at 09:40

## 2017-10-23 RX ADMIN — GUAIFENESIN 600 MG: 600 TABLET, EXTENDED RELEASE ORAL at 09:40

## 2017-10-23 RX ADMIN — GUAIFENESIN 600 MG: 600 TABLET, EXTENDED RELEASE ORAL at 21:01

## 2017-10-23 RX ADMIN — OXYCODONE HYDROCHLORIDE AND ACETAMINOPHEN 1 TABLET: 5; 325 TABLET ORAL at 18:45

## 2017-10-23 NOTE — PROGRESS NOTES
PHYSICAL THERAPY DAILY NOTE  Time In: 7259  Time Out: 9231  Patient Seen For: PM;Gait training; Therapeutic exercise;Transfer training    Subjective: Pt seen immediately after OT and was agreeable to PT treatment. No complaints of headache, pain, or dizziness during treatment. Objective: Other (comment) (falls)  GROSS ASSESSMENT Daily Assessment            BED/MAT MOBILITY Daily Assessment    Supine to Sit : 4 (Contact guard assistance)  Sit to Supine : 5 (Supervision)       TRANSFERS Daily Assessment    Transfer Type: SPT without device  Transfer Assistance : 4 (Contact guard assistance)  Sit to Stand Assistance: Contact guard assistance  Car Transfers: Not tested       GAIT Daily Assessment   Instructed in gait training with verbal cues for posture and foot clearance. Amount of Assistance: 4 (Contact guard assistance)  Distance (ft): 120 Feet (ft) (standing rest breaks x2)  Assistive Device: Walker, rolling;Gait belt       STEPS or STAIRS Daily Assessment            BALANCE Daily Assessment            WHEELCHAIR MOBILITY Daily Assessment            LOWER EXTREMITY EXERCISES Daily Assessment   Exercises included: SAQ (with 3# wt.), HS, bridging, hip abd/add. Pt required extra time and min assist to complete activities (except SAQ) Extremity: Both  Exercise Type #1: Supine lower extremity strengthening  Sets Performed: 1  Reps Performed: 10  Level of Assist: Minimal assistance          Assessment: Pt showed more strength in RLE than LLE during exercises. Showed poor foot clearance and decreased step length during gait. Returned pt to room to recliner with call bell and needs in reach and daughter present in room. Plan of Care: Continue with PT POC and progress as tolerated.     Romelia A Mable  10/23/2017

## 2017-10-23 NOTE — PROGRESS NOTES
Dressing on both side of head changed and redressed with Bacitracin, sponge and Opsite. Wound is well approximated with sutures intact, no drainage except for scant amount of dried blood around sites. Tolerated well with minimal discomfort. Dr Kuldeep Ramon in room to view incisions. No additional orders recevied.

## 2017-10-23 NOTE — PROGRESS NOTES
End Of Shift Functional Summary, Nursing      TOILETING/ BLADDER/ BOWEL    TOILET TRANSFER:  Pt requires standby assistance/setup. Pt uses walker. BLADDER:  Pt does not have a nieves catheter that staff manages. Pt does take medication (diuretics). Pt is continent. of bladder and voids in toilet as well as urinal.  Pt requires staff to empty device Pt has had 0 bladder accidents during this shift requiring standby assistance/setup to clean up. (An accident is when the episode is not contained in a brief AND/OR the clothing/linen requires changing/cleaning up.)    BOWEL:  Pt does take medication. Pt is continent of bowel and uses toilet. Pt requires staff to position device    Pt has had 0 bowel accidents during this shift requiring standby assistance/setup from staff to clean up.  (An accident is when the episode is not contained in a brief AND/OR the clothing/linen requires changing/cleaning up.)

## 2017-10-23 NOTE — PROGRESS NOTES
Physical Medicine and Rehabilitation Progress Note    Benjy Pollard  Admit Date: 10/20/2017  Admit Diagnosis: subdural hematoma;Subdural hemorrhage following injury [facility]*  Chief Complaint : Gait dysfunction secondary to below. Admit Diagnosis: Subdural hematoma ([address] [I62.00]  bilateral SDH  Bilateral subdural fluid collections   Bilateral bur hole evacuation of subdural hygroma 10/18/2017  Essential hypertension, benign  Diabetes mellitus (HCC)  Atrial fibrillation (HCC)  Tension pneumothorax, treated, resolved  Pain  DVT risk  Acute Rehab Dx:  Gait impairment/ gait dysfunction  Debility    Mobility and ambulation deficits  Self Care/ADL deficits     Subjective: Patient seen and examined. Surgical wound healing well. No new neurologic deficits. Denies SOB, HA, dizziness, palpations or nausea. Making excellent functional gains.      Objective:     Current Facility-Administered Medications   Medication Dose Route Frequency    insulin lispro (HUMALOG) injection   SubCUTAneous ACB&D    oxyCODONE-acetaminophen (PERCOCET) 5-325 mg per tablet 1 Tab  1 Tab Oral Q4H PRN    guaiFENesin ER (MUCINEX) tablet 600 mg  600 mg Oral Q12H    acetaminophen (TYLENOL) tablet 650 mg  650 mg Oral Q4H PRN    sodium chloride (NS) flush 5-10 mL  5-10 mL IntraVENous PRN    alum-mag hydroxide-simeth (MYLANTA) oral suspension 30 mL  30 mL Oral Q4H PRN    butalbital-acetaminophen-caffeine (FIORICET, ESGIC) -40 mg per tablet 1 Tab  1 Tab Oral Q6H PRN    carvedilol (COREG) tablet 12.5 mg  12.5 mg Oral BID WITH MEALS    enalapril (VASOTEC) tablet 10 mg  10 mg Oral BID    fluticasone (FLONASE) 50 mcg/actuation nasal spray 1 Spray  1 Spray Both Nostrils DAILY    hydroCHLOROthiazide (HYDRODIURIL) tablet 25 mg  25 mg Oral DAILY    levETIRAcetam (KEPPRA) tablet 500 mg  500 mg Oral BID    linaclotide (LINZESS) capsule 145 mcg (Patient Supplied)  145 mcg Oral DAILY    loratadine (CLARITIN) tablet 10 mg  10 mg Oral DAILY    metFORMIN (GLUCOPHAGE) tablet 500 mg  500 mg Oral BID WITH MEALS    polyethylene glycol (MIRALAX) packet 17 g  17 g Oral BID    simvastatin (ZOCOR) tablet 40 mg  40 mg Oral QHS    spironolactone (ALDACTONE) tablet 25 mg  25 mg Oral DAILY     Allergies   Allergen Reactions    Zithromax [Azithromycin] Other (comments)     Skin dryness/peeling       Visit Vitals    /87    Pulse 100    Temp 98 °F (36.7 °C)    Resp 18    SpO2 93%      Intake and Output:  10/21 1901 - 10/23 0700  In: -   Out: 2452 [Urine:2451]    Physical Exam:   General: Alert, NAD, ambulating in room with RW       Lungs: Clear to auscultation  bilaterally. Heart: Regular rate and rhythm, no  murmurs    Abdomen: Soft, bowel sounds present. Neuromuscular:      Speech clear. follows simple commands well, consistently. LUE     Shoulder abduction   5-/5              Elbow flexion:   5-/5               Wrist extension:  5 /5              Finger flexion;   5/5                       RUE    Shoulder abduction: 5 /5                Elbow flexion: 5  /5                         Wrist extension:  5/5                        Finger flexion:  5 /5                        LLE     Hip flexion:  5- /5              Knee extension:   5-/5                         Ankle dorsiflexion: 5 /5                        Ankle plantarflexion:5/5                                                              RLE     Hip flexion: 5  /5                        Knee extension:  5- /5                         Ankle dorsiflexion:   5/5                        Ankle plantarflexion:  5 /5  Sensory - intact grossly   Skin/extremity: No rashes, no erythema.   Wound covered, dressing c/d/i     Functional Assessment:         Waldwick Howell Fall Risk Assessment:  Maria Isabel Howell Fall Risk  Mobility: Ambulates or transfers with assist devices or assistance/unsteady gait (10/23/17 0325)  Mobility Interventions: Patient to call before getting OOB (10/23/17 0325)  Mentation: Alert, oriented x 3 (10/23/17 9843)  Mentation Interventions: Adequate sleep, hydration, pain control (10/23/17 0325)  Medication: Patient receiving anticonvulsants, sedatives(tranquilizers), psychotropics or hypnotics, hypoglycemics, narcotics, sleep aids, antihypertensives, laxatives, or diuretics (10/23/17 0325)  Medication Interventions: Evaluate medications/consider consulting pharmacy (10/23/17 0325)  Elimination: Needs assistance with toileting (10/23/17 0325)  Elimination Interventions: Call light in reach (10/23/17 0325)  Prior Fall History: Before admission in past 12 months _home or previous inpatient care) (10/23/17 0325)  History of Falls Interventions: Door open when patient unattended (10/23/17 0325)  Total Score: 4 (10/23/17 0325)  Standard Fall Precautions: Yes (10/23/17 0325)  High Fall Risk: Yes (10/22/17 0313)     Speech Assessment:         Ambulation:  Gait  Distance (ft): 135 Feet (ft) (10/22/17 1000)  Assistive Device: Walker (10/22/17 1000)  Rail Use: Both (10/21/17 1351)     Labs/Studies:  Recent Results (from the past 72 hour(s))   GLUCOSE, POC    Collection Time: 10/20/17 11:31 AM   Result Value Ref Range    Glucose (POC) 179 (H) 65 - 100 mg/dL   GLUCOSE, POC    Collection Time: 10/20/17  4:52 PM   Result Value Ref Range    Glucose (POC) 103 (H) 65 - 100 mg/dL   GLUCOSE, POC    Collection Time: 10/20/17  8:51 PM   Result Value Ref Range    Glucose (POC) 138 (H) 65 - 100 mg/dL   CBC WITH AUTOMATED DIFF    Collection Time: 10/21/17  5:43 AM   Result Value Ref Range    WBC 6.6 4.3 - 11.1 K/uL    RBC 4.08 (L) 4.23 - 5.67 M/uL    HGB 13.3 (L) 13.6 - 17.2 g/dL    HCT 38.7 (L) 41.1 - 50.3 %    MCV 94.9 79.6 - 97.8 FL    MCH 32.6 26.1 - 32.9 PG    MCHC 34.4 31.4 - 35.0 g/dL    RDW 12.9 11.9 - 14.6 %    PLATELET 101 672 - 292 K/uL    MPV 10.0 (L) 10.8 - 14.1 FL    DF AUTOMATED      NEUTROPHILS 76 43 - 78 %    LYMPHOCYTES 15 13 - 44 %    MONOCYTES 6 4.0 - 12.0 %    EOSINOPHILS 2 0.5 - 7.8 %    BASOPHILS 0 0.0 - 2.0 %    IMMATURE GRANULOCYTES 1 0.0 - 5.0 %    ABS. NEUTROPHILS 5.1 1.7 - 8.2 K/UL    ABS. LYMPHOCYTES 1.0 0.5 - 4.6 K/UL    ABS. MONOCYTES 0.4 0.1 - 1.3 K/UL    ABS. EOSINOPHILS 0.1 0.0 - 0.8 K/UL    ABS. BASOPHILS 0.0 0.0 - 0.2 K/UL    ABS. IMM. GRANS. 0.0 0.0 - 0.5 K/UL   METABOLIC PANEL, BASIC    Collection Time: 10/21/17  5:43 AM   Result Value Ref Range    Sodium 137 136 - 145 mmol/L    Potassium 3.9 3.5 - 5.1 mmol/L    Chloride 103 98 - 107 mmol/L    CO2 25 21 - 32 mmol/L    Anion gap 9 7 - 16 mmol/L    Glucose 114 (H) 65 - 100 mg/dL    BUN 9 8 - 23 MG/DL    Creatinine 0.63 (L) 0.8 - 1.5 MG/DL    GFR est AA >60 >60 ml/min/1.73m2    GFR est non-AA >60 >60 ml/min/1.73m2    Calcium 9.1 8.3 - 10.4 MG/DL   GLUCOSE, POC    Collection Time: 10/21/17  7:31 AM   Result Value Ref Range    Glucose (POC) 138 (H) 65 - 100 mg/dL   GLUCOSE, POC    Collection Time: 10/21/17 11:16 AM   Result Value Ref Range    Glucose (POC) 128 (H) 65 - 100 mg/dL   GLUCOSE, POC    Collection Time: 10/21/17  4:04 PM   Result Value Ref Range    Glucose (POC) 107 (H) 65 - 100 mg/dL   GLUCOSE, POC    Collection Time: 10/21/17  9:39 PM   Result Value Ref Range    Glucose (POC) 165 (H) 65 - 100 mg/dL   GLUCOSE, POC    Collection Time: 10/22/17  7:30 AM   Result Value Ref Range    Glucose (POC) 111 (H) 65 - 100 mg/dL   GLUCOSE, POC    Collection Time: 10/22/17 11:22 AM   Result Value Ref Range    Glucose (POC) 119 (H) 65 - 100 mg/dL   GLUCOSE, POC    Collection Time: 10/22/17  4:20 PM   Result Value Ref Range    Glucose (POC) 134 (H) 65 - 100 mg/dL   GLUCOSE, POC    Collection Time: 10/22/17  9:56 PM   Result Value Ref Range    Glucose (POC) 140 (H) 65 - 100 mg/dL   GLUCOSE, POC    Collection Time: 10/23/17  7:42 AM   Result Value Ref Range    Glucose (POC) 108 (H) 65 - 100 mg/dL     Assessment: This is a 57 y. o. who fell striking his head several weeks ago and found to have R SDH, s/p cailin hole evacuation and d/[place] home.  Now readmitted on 10/17 d/t subdural fluid collection and R PTX. He continues with mobility and self care impairments. Rehabilitation Plan  Continue PT for a minimum of 1.5 hours a day, at least 5 out of 7 days per week to address bed mobility, transfers, ambulation, strengthening, balance, and endurance. Continue OT for a minimum of 1.5 hours a day, at least 5 out of 7 days per week to address ADL ( bathing, LE dressing, toileting) and adaptive equipment as needed.     Continue 24-hour skilled rehabilitation nursing for bowel and bladder management, skin care for decubitus ulcer prevention , pain management and ongoing medication administration      Continue daily physician medical management:     bilateral SDH/ Bilateral subdural fluid collections   - s/p Bilateral bur hole evacuation of subdural hygroma 10/18/2017  - monitor for recurrent s/s of increased ICP. Monitor for neuroogic deficits. - seizure prophylaxis -  Continue keppra  - Hgb- 13.3 - > 13.3 (10/21)   - 10/23 wound healing well. Benign. No signs of increased ICP.      Essential hypertension   - HCTZ, aldactone, vasotec.    - 10/23 SBP 120s. In good control. Atrial fibrillation - anticoagulation held due to recent SDH. - continue to monitor HR/BP  - continue coreg. Monitor HR.      Tension pneumothorax, treated, resolved  - monitor for s/s.      Pneumonia prophylaxis- Insentive spirometer every hour while awake     DVT risk / DVT Prophylaxis- Will require daily physician exam to assess for signs and symptoms as patient is at increased risk for of thromboembolism. Mobilization as tolerated. Intermittent pneumatic compression devices when in bed Thigh-high or knee-high thromboembolic deterrent hose when out of bed.   - no anticoagulation      Pain Control: no current joint or muscle symptoms, essentially pain-free. Will require regular pain assessment and comprenhensive pain management. - prn perccet, fioricet.  Control headache.      Wound Care: Monitor wound status daily per staff and physician. At risk for failure. Will require 24/7 rehab nursing. Keep wound clean and dry      Diabetes mellitus - Uncontrolled. poor glycemic control. Will require daily, close FSG monitoring and medication adjustment to optimize glycemic control in setting of acute illness and hospitalization.   - metformin, linzess  - SSI lispro  - 10/23 - glycemic control good. No new changes.      Urinary retention/ neurogenic bladder - schedule voids q6-8 hrs. Check post-void residual every shift; In and Out catheterize if post-void residual is more than 400 cc.  - 10/23 voiding well. No signs of retention, incontinence.      bowel program - miralax prn. [place] on CPAP    Allergic rhinitis/chronic cough - continue mucinex bid     Time spent was 25 minutes with over 1/2 in direct patient care/examination, consultation and coordination of care.     Signed By: Bhaskar Hall MD     October 23, 2017

## 2017-10-23 NOTE — PROGRESS NOTES
End Of Shift Functional Summary, Nursing      TOILETING/ BLADDER/ BOWEL    TOILET TRANSFER:  Pt requires moderate assistance. Pt uses walker. BLADDER:  Pt does not have a nieves catheter that staff manages. Pt does not take medication. Pt is continent. of bladder and voids in toilet   Pt has had 0 bladder accidents during this shift   (An accident is when the episode is not contained in a brief AND/OR the clothing/linen requires changing/cleaning up.)    BOWEL:  Pt does take medication. Pt is continent of bowel and uses toilet. Pt has had 0 bowel accidents during this shift. (An accident is when the episode is not contained in a brief AND/OR the clothing/linen requires changing/cleaning up.)    BED/CHAIR TRANSFER  Pt requires moderate assistance. Patient requires the assistance of 1 staff member(s). Pt uses walker    BATHING  Pt requires setup of supplies. Pt requires assistance with left lower leg and foot and right lower leg and foot    SHOWER TRANSFER:  Pt requires moderate assistance  Patient requires the assistance of 1 staff member(s). Pt uses tub transfer bench    DRESSING  UPPER BODY:  Pt requires staff to get clothing Pt requires minimal assistance. LOWER BODY:  Pt requires staff to get clothing. Pt requires moderate assistance    EATING  Pt requires no assistance. Pt does not wear dentures. TUBE FEEDINGS:  Pt does not  receive nutrition through tube feedings. GROOMING  Pt requires setup of supplies with oral care. Documentation reviewed and plan of care discussed/reviewed with   patient assistant during the shift.

## 2017-10-23 NOTE — PROGRESS NOTES
PHYSICAL THERAPY DAILY NOTE  Time In: 1002  Time Out: 1052  Patient Seen For: AM;Gait training; Therapeutic exercise;Transfer training;Balance activities    Subjective: Pt reported a headache at the beginning of treatment, pain 5/10. Nursing (in the room) had just given him meds to relieve headache. Checked with pt throughout treatment on status of pain from headache, and pt reported that pain had decreased by end of treatment. Pt very agreeable to therapy. Objective: Other (comment) (falls)  GROSS ASSESSMENT Daily Assessment            BED/MAT MOBILITY Daily Assessment            TRANSFERS Daily Assessment    Transfer Type: SPT with walker  Transfer Assistance : 4 (Contact guard assistance)  Sit to Stand Assistance: Contact guard assistance  Car Transfers: Not tested       GAIT Daily Assessment   Instructed in gait training with verbal cues to maintain erect posture Amount of Assistance: 4 (Contact guard assistance)  Distance (ft): 190 Feet (ft) (standing rest breaks x3)  Assistive Device: Gait belt;Walker, rolling       STEPS or STAIRS Daily Assessment            BALANCE Daily Assessment            WHEELCHAIR MOBILITY Daily Assessment            LOWER EXTREMITY EXERCISES Daily Assessment   Ex #1: Activities included side-stepping L and R, high knee marching in place, and walking with training for increased step length. Also instructed in wt shifting L and R to improve single leg (SL) balance. Extremity: Both  Exercise Type #1: Other (comment) (pre-gait in parallel bars)  Sets Performed: 2  Reps Performed: 10 (steps)  Level of Assist: Contact guard assistance  Exercise Type #2: Other (comment) (NuStep- level 2 resistance)  Sets Performed: 1  Reps Performed: 10 (minutes)  Level of Assist: Supervision          Assessment: Pt showed decreased step length and poor wt shift during ambulation, but improved with instruction during activities in parallel bars.  Wt shift worse when shifting to L than shifting to R.       Returned pt to room sitting in w/c with call bell and needs in reach. Plan of Care: Continue per PT POC for strengthening and gait/balance training.     Romelia Akins  10/23/2017

## 2017-10-23 NOTE — PROGRESS NOTES
Problem: Falls - Risk of  Goal: *Absence of Falls  Document [place] Fall Risk and appropriate interventions in the flowsheet.    Outcome: Progressing Towards Goal  Fall Risk Interventions:  Mobility Interventions: Patient to call before getting OOB    Mentation Interventions: Adequate sleep, hydration, pain control    Medication Interventions: Evaluate medications/consider consulting pharmacy    Elimination Interventions: Call light in reach    History of Falls Interventions: Door open when patient unattended

## 2017-10-23 NOTE — PROGRESS NOTES
OT Daily Note  Time In 1349   Time Out 1515     Subjective: Patient agreeable for therapy. Pain: Headache pain, 4 out of 10; Nursing gave medication prior to therapy. Education: Education completed on safety with therapeutic tasks  Interdisciplinary Communication: PT, Nursing  Precautions: Other (comment) (falls)    Activity tolerance, B UE strengthening, & Cognition    Patient tolerated UBE x 13 minutes x minimal resistance, going in fwd/bkwd direction(s) with 1 rest break, working on B UE mm strength/endurance. Patient completed 2 PVC patterns accurately within normal time while standing with supervision for 9 minutes. Patient complained of lower back pain from standing too long. Patient sat with Supervision. Patient performed a 3rd PVC pattern while sitting accurately, working on cognition, B UE reach/coordination, activity tolerance, and visual motor skills. Patient performed reaching activity using the MSRT with 1 UE at a time while donning 0 pound wrist weights with good quality and extra time, completing 2 times, working on B UE reach/coordination/endurance. Patient participated in bilateral meseret exercises with 10 pounds total weight 6 sets x 20 reps , going fwd/bkwd and diagonal patterns, working on B UE strength/endurance, and B UE coordination. Assessment: Patient steadily progressing towards goals.   Plan: Cont OT per tx plan

## 2017-10-24 LAB
GLUCOSE BLD STRIP.AUTO-MCNC: 114 MG/DL (ref 65–100)
GLUCOSE BLD STRIP.AUTO-MCNC: 116 MG/DL (ref 65–100)
GLUCOSE BLD STRIP.AUTO-MCNC: 121 MG/DL (ref 65–100)
GLUCOSE BLD STRIP.AUTO-MCNC: 129 MG/DL (ref 65–100)

## 2017-10-24 PROCEDURE — 74011250637 HC RX REV CODE- 250/637: Performed by: PHYSICAL MEDICINE & REHABILITATION

## 2017-10-24 PROCEDURE — 97535 SELF CARE MNGMENT TRAINING: CPT

## 2017-10-24 PROCEDURE — 97110 THERAPEUTIC EXERCISES: CPT

## 2017-10-24 PROCEDURE — 97112 NEUROMUSCULAR REEDUCATION: CPT

## 2017-10-24 PROCEDURE — 82962 GLUCOSE BLOOD TEST: CPT

## 2017-10-24 PROCEDURE — 97530 THERAPEUTIC ACTIVITIES: CPT

## 2017-10-24 PROCEDURE — 97116 GAIT TRAINING THERAPY: CPT

## 2017-10-24 PROCEDURE — 65310000000 HC RM PRIVATE REHAB

## 2017-10-24 RX ADMIN — GUAIFENESIN 600 MG: 600 TABLET, EXTENDED RELEASE ORAL at 21:09

## 2017-10-24 RX ADMIN — GUAIFENESIN 600 MG: 600 TABLET, EXTENDED RELEASE ORAL at 08:34

## 2017-10-24 RX ADMIN — SIMVASTATIN 40 MG: 20 TABLET, FILM COATED ORAL at 21:09

## 2017-10-24 RX ADMIN — OXYCODONE HYDROCHLORIDE AND ACETAMINOPHEN 1 TABLET: 5; 325 TABLET ORAL at 22:56

## 2017-10-24 RX ADMIN — POLYETHYLENE GLYCOL 3350 17 G: 17 POWDER, FOR SOLUTION ORAL at 17:06

## 2017-10-24 RX ADMIN — POLYETHYLENE GLYCOL 3350 17 G: 17 POWDER, FOR SOLUTION ORAL at 08:31

## 2017-10-24 RX ADMIN — OXYCODONE HYDROCHLORIDE AND ACETAMINOPHEN 1 TABLET: 5; 325 TABLET ORAL at 00:42

## 2017-10-24 RX ADMIN — BUTALBITAL, ACETAMINOPHEN AND CAFFEINE 1 TABLET: 50; 325; 40 TABLET ORAL at 08:33

## 2017-10-24 RX ADMIN — ENALAPRIL MALEATE 10 MG: 5 TABLET ORAL at 17:06

## 2017-10-24 RX ADMIN — CARVEDILOL 12.5 MG: 6.25 TABLET, FILM COATED ORAL at 08:34

## 2017-10-24 RX ADMIN — METFORMIN HYDROCHLORIDE 500 MG: 500 TABLET, FILM COATED ORAL at 17:06

## 2017-10-24 RX ADMIN — OXYCODONE HYDROCHLORIDE AND ACETAMINOPHEN 1 TABLET: 5; 325 TABLET ORAL at 17:05

## 2017-10-24 RX ADMIN — BUTALBITAL, ACETAMINOPHEN AND CAFFEINE 1 TABLET: 50; 325; 40 TABLET ORAL at 17:05

## 2017-10-24 RX ADMIN — LEVETIRACETAM 500 MG: 500 TABLET, FILM COATED ORAL at 17:06

## 2017-10-24 RX ADMIN — BUTALBITAL, ACETAMINOPHEN AND CAFFEINE 1 TABLET: 50; 325; 40 TABLET ORAL at 00:42

## 2017-10-24 RX ADMIN — METFORMIN HYDROCHLORIDE 500 MG: 500 TABLET, FILM COATED ORAL at 08:33

## 2017-10-24 RX ADMIN — ENALAPRIL MALEATE 10 MG: 5 TABLET ORAL at 08:35

## 2017-10-24 RX ADMIN — BUTALBITAL, ACETAMINOPHEN AND CAFFEINE 1 TABLET: 50; 325; 40 TABLET ORAL at 22:56

## 2017-10-24 RX ADMIN — SPIRONOLACTONE 25 MG: 25 TABLET, FILM COATED ORAL at 08:32

## 2017-10-24 RX ADMIN — FLUTICASONE PROPIONATE 1 SPRAY: 50 SPRAY, METERED NASAL at 08:36

## 2017-10-24 RX ADMIN — CARVEDILOL 12.5 MG: 6.25 TABLET, FILM COATED ORAL at 17:06

## 2017-10-24 RX ADMIN — OXYCODONE HYDROCHLORIDE AND ACETAMINOPHEN 1 TABLET: 5; 325 TABLET ORAL at 08:32

## 2017-10-24 RX ADMIN — BACITRACIN ZINC: 500 OINTMENT TOPICAL at 08:36

## 2017-10-24 RX ADMIN — HYDROCHLOROTHIAZIDE 25 MG: 25 TABLET ORAL at 08:35

## 2017-10-24 RX ADMIN — LEVETIRACETAM 500 MG: 500 TABLET, FILM COATED ORAL at 08:33

## 2017-10-24 RX ADMIN — LORATADINE 10 MG: 10 TABLET ORAL at 08:35

## 2017-10-24 NOTE — PROGRESS NOTES
Resting up in recliner. Alert and oriented with pleasant affect. Dressing to scalp intact. Lung sounds clear. S1S2, bowel sounds active. Moving all extremities without difficulty. States has slight headache, but doesn't want anything til regular medication time. See MAR. No acute signs of distress. Assessment completed. No other verbalized needs made known at present time. See doc flow sheet for further assessments.

## 2017-10-24 NOTE — PROGRESS NOTES
Hourly nursing rounds completed. All pts needs met at this time. Pt sitting up in chair watching T.V. Will cont to monitor.

## 2017-10-24 NOTE — PROGRESS NOTES
10/24/17 1033   Time Spent With Patient   Time In 0916   Time Out 0958   Patient Seen For: AM;ADLs   Grooming   Grooming Assistance  SBA   Comments Stood sinkside to brush teeth   Upper Body Bathing   Bathing Assistance, Upper S   Position Performed Seated in chair;Standing   Lower Body Bathing   Bathing Assistance, Lower  Min A   Comments Assist with drying distal LE's. Toileting   Toileting Assistance (FIM Score) Min A   Upper Body Dressing    Dressing Assistance  S   Lower Body Dressing    Dressing Assistance  Mod A   Comments Assist threading LLE. Assist with Left shoe and sock. Functional Transfers   Toilet Transfer  Grab bars   Amount of Assistance Required Min A   Tub or Shower Type Shower   Amount of Assistance Required Min A   Adaptive Equipment Tub transfer bench;Grab bars; Walker (comment)     S: \"I am doing good, ready to shower. \" Agreeable to therapy. Focus of session was on morning ADL routine. Patient was able to ambulate ~15 feet using a RW with minimal assist.   Pain denied during session. Collaborated with PT and confirmed patient is on track to reach goals as documented in the care plan. Patient tolerated session well, but strength, activity tolerance, balance are still below baseline and requires skilled facilitation to successfully and safely complete ADL's and transfers. Patient ended session in recliner with call remote and phone within reach.      Miguel Green, TRAVISR

## 2017-10-24 NOTE — PROGRESS NOTES
PHYSICAL THERAPY DAILY NOTE  Time In: 1000  Time Out: 1050  Patient Seen For: AM;Gait training; Therapeutic exercise    Subjective: No complaints of pain during treatment. Pt said that head bandage was removed last night. Very agreeable to therapy and stated that he was glad to be here. Objective: Other (comment) (falls)  GROSS ASSESSMENT Daily Assessment            BED/MAT MOBILITY Daily Assessment            TRANSFERS Daily Assessment    Sit to Stand Assistance: Stand-by assistance  Car Transfers: Not tested       GAIT Daily Assessment   Instructed in gait training with verbal and tactile cues to maintain correct posture. Amount of Assistance: 4 (Contact guard assistance)  Distance (ft): 60 Feet (ft)  Assistive Device: Gait belt;Walker, rolling       STEPS or STAIRS Daily Assessment            BALANCE Daily Assessment            WHEELCHAIR MOBILITY Daily Assessment    Wheelchair Management: Manages left brake;Manages right brake       LOWER EXTREMITY EXERCISES Daily Assessment   Exercises included: High knee marching, hip and knee extension, side-stepping to L and R, and gait with emphasis on long step length Extremity: Both  Exercise Type #1: Other (comment) (pre-gait in parallel bars (x5 activities))  Sets Performed: 1  Reps Performed: 10  Level of Assist: Contact guard assistance          Assessment: Pt demonstrated poor ability to maintain erect posture due to weakness of back and hip muscles, but was able to control posture better during gait training. Pt required multiple rest breaks during treatment and required more time to complete activities. Returned pt to room to recliner with call bell and needs in reach. Plan of Care: Continue per PT POC to progress towards goals.     Romelia ARBOLEDA Mable  10/24/2017

## 2017-10-24 NOTE — PROGRESS NOTES
Physical Medicine and Rehabilitation Progress Note    Macario Vaughan  Admit Date: 10/20/2017  Admit Diagnosis: subdural hematoma;Subdural hemorrhage following injury [facility]*  Chief Complaint : Gait dysfunction secondary to below. Admit Diagnosis: Subdural hematoma ([address] [I62.00]  bilateral SDH  Bilateral subdural fluid collections   Bilateral bur hole evacuation of subdural hygroma 10/18/2017  Essential hypertension, benign  Diabetes mellitus (HCC)  Atrial fibrillation (HCC)  Tension pneumothorax, treated, resolved  Pain  DVT risk  Acute Rehab Dx:  Gait impairment/ gait dysfunction  Debility    Mobility and ambulation deficits  Self Care/ADL deficits       subjective:   Patient seen and examined. Vss. Afebrile. Still with daily headaches. No changes in mental status, balance, and motor/sensory exam.    Case discussed in team conference. PT, OT anticipate safe community discharge early next week.  PT, OT to focus on balance, mobility,      Objective:     Current Facility-Administered Medications   Medication Dose Route Frequency    bacitracin zinc (BACITRACIN) 500 unit/gram ointment   Topical DAILY    insulin lispro (HUMALOG) injection   SubCUTAneous ACB&D    oxyCODONE-acetaminophen (PERCOCET) 5-325 mg per tablet 1 Tab  1 Tab Oral Q4H PRN    guaiFENesin ER (MUCINEX) tablet 600 mg  600 mg Oral Q12H    acetaminophen (TYLENOL) tablet 650 mg  650 mg Oral Q4H PRN    sodium chloride (NS) flush 5-10 mL  5-10 mL IntraVENous PRN    alum-mag hydroxide-simeth (MYLANTA) oral suspension 30 mL  30 mL Oral Q4H PRN    butalbital-acetaminophen-caffeine (FIORICET, ESGIC) -40 mg per tablet 1 Tab  1 Tab Oral Q6H PRN    carvedilol (COREG) tablet 12.5 mg  12.5 mg Oral BID WITH MEALS    enalapril (VASOTEC) tablet 10 mg  10 mg Oral BID    fluticasone (FLONASE) 50 mcg/actuation nasal spray 1 Spray  1 Spray Both Nostrils DAILY    hydroCHLOROthiazide (HYDRODIURIL) tablet 25 mg  25 mg Oral DAILY    levETIRAcetam (KEPPRA) tablet 500 mg  500 mg Oral BID    linaclotide (LINZESS) capsule 145 mcg (Patient Supplied)  145 mcg Oral DAILY    loratadine (CLARITIN) tablet 10 mg  10 mg Oral DAILY    metFORMIN (GLUCOPHAGE) tablet 500 mg  500 mg Oral BID WITH MEALS    polyethylene glycol (MIRALAX) packet 17 g  17 g Oral BID    simvastatin (ZOCOR) tablet 40 mg  40 mg Oral QHS    spironolactone (ALDACTONE) tablet 25 mg  25 mg Oral DAILY     Allergies   Allergen Reactions    Zithromax [Azithromycin] Other (comments)     Skin dryness/peeling       Visit Vitals    /72 (BP 1 Location: Right arm, BP Patient Position: At rest)    Pulse 82    Temp 97.7 °F (36.5 °C)    Resp 18    SpO2 98%      Intake and Output:  10/22 1901 - 10/24 0700  In: 600 [P.O.:600]  Out: 3351 [Urine:3351]    Physical Exam:   General: Alert, NAD, ambulating in room with RW       Lungs: Clear to auscultation  bilaterally. Heart: Regular rate and rhythm, no  murmurs    Abdomen: Soft, bowel sounds present. Neuromuscular:      Speech clear. follows simple commands well, consistently. LUE     Shoulder abduction   5-/5              Elbow flexion:   5-/5               Wrist extension:  5 /5              Finger flexion;   5/5                       RUE    Shoulder abduction: 5 /5                Elbow flexion: 5  /5                         Wrist extension:  5/5                        Finger flexion:  5 /5                        LLE     Hip flexion:  5- /5              Knee extension:   5-/5                         Ankle dorsiflexion: 5 /5                        Ankle plantarflexion:5/5                                                              RLE     Hip flexion: 5  /5                        Knee extension:  5- /5                         Ankle dorsiflexion:   5/5                        Ankle plantarflexion:  5 /5  Sensory - intact grossly   Skin/extremity: No rashes, no erythema.   Wound incisions B scalp c/d/i     Functional Assessment:         Edgar Brunner Fall Risk Assessment:  Maria Isabel Howell Fall Risk  Mobility: Ambulates or transfers with assist devices or assistance/unsteady gait (10/24/17 0013)  Mobility Interventions: Patient to call before getting OOB (10/24/17 0013)  Mentation: Alert, oriented x 3 (10/24/17 0013)  Mentation Interventions: Adequate sleep, hydration, pain control (10/24/17 0013)  Medication: Patient receiving anticonvulsants, sedatives(tranquilizers), psychotropics or hypnotics, hypoglycemics, narcotics, sleep aids, antihypertensives, laxatives, or diuretics (10/24/17 0013)  Medication Interventions: Evaluate medications/consider consulting pharmacy (10/24/17 0013)  Elimination: Needs assistance with toileting (10/24/17 0013)  Elimination Interventions: Call light in reach (10/24/17 0013)  Prior Fall History: Before admission in past 12 months _home or previous inpatient care) (10/24/17 0013)  History of Falls Interventions: Door open when patient unattended (10/24/17 0013)  Total Score: 4 (10/24/17 0013)  Standard Fall Precautions: Yes (10/24/17 0013)  High Fall Risk: Yes (10/22/17 0313)     Speech Assessment:         Ambulation:  Gait  Distance (ft): 120 Feet (ft) (standing rest breaks x2) (10/23/17 1600)  Assistive Device: Walker, rolling;Gait belt (10/23/17 1600)  Rail Use: Both (10/21/17 1351)     Labs/Studies:  Recent Results (from the past 72 hour(s))   GLUCOSE, POC    Collection Time: 10/21/17 11:16 AM   Result Value Ref Range    Glucose (POC) 128 (H) 65 - 100 mg/dL   GLUCOSE, POC    Collection Time: 10/21/17  4:04 PM   Result Value Ref Range    Glucose (POC) 107 (H) 65 - 100 mg/dL   GLUCOSE, POC    Collection Time: 10/21/17  9:39 PM   Result Value Ref Range    Glucose (POC) 165 (H) 65 - 100 mg/dL   GLUCOSE, POC    Collection Time: 10/22/17  7:30 AM   Result Value Ref Range    Glucose (POC) 111 (H) 65 - 100 mg/dL   GLUCOSE, POC    Collection Time: 10/22/17 11:22 AM   Result Value Ref Range    Glucose (POC) 119 (H) 65 - 100 mg/dL   GLUCOSE, POC Collection Time: 10/22/17  4:20 PM   Result Value Ref Range    Glucose (POC) 134 (H) 65 - 100 mg/dL   GLUCOSE, POC    Collection Time: 10/22/17  9:56 PM   Result Value Ref Range    Glucose (POC) 140 (H) 65 - 100 mg/dL   GLUCOSE, POC    Collection Time: 10/23/17  7:42 AM   Result Value Ref Range    Glucose (POC) 108 (H) 65 - 100 mg/dL   GLUCOSE, POC    Collection Time: 10/23/17 11:22 AM   Result Value Ref Range    Glucose (POC) 119 (H) 65 - 100 mg/dL   GLUCOSE, POC    Collection Time: 10/23/17  4:07 PM   Result Value Ref Range    Glucose (POC) 138 (H) 65 - 100 mg/dL   GLUCOSE, POC    Collection Time: 10/23/17  8:57 PM   Result Value Ref Range    Glucose (POC) 134 (H) 65 - 100 mg/dL   GLUCOSE, POC    Collection Time: 10/24/17  6:59 AM   Result Value Ref Range    Glucose (POC) 114 (H) 65 - 100 mg/dL     Assessment: This is a 57 y. o. who fell striking his head several weeks ago and found to have R SDH, s/p cailin hole evacuation and d/[place] home. Now readmitted on 10/17 d/t subdural fluid collection and R PTX. He continues with mobility and self care impairments. Rehabilitation Plan  Continue PT for a minimum of 1.5 hours a day, at least 5 out of 7 days per week to address bed mobility, transfers, ambulation, strengthening, balance, and endurance. Continue OT for a minimum of 1.5 hours a day, at least 5 out of 7 days per week to address ADL ( bathing, LE dressing, toileting) and adaptive equipment as needed.     Continue 24-hour skilled rehabilitation nursing for bowel and bladder management, skin care for decubitus ulcer prevention , pain management and ongoing medication administration      Continue daily physician medical management:     bilateral SDH/ Bilateral subdural fluid collections   - s/p Bilateral bur hole evacuation of subdural hygroma 10/18/2017  - monitor for recurrent s/s of increased ICP. Monitor for neuroogic deficits.   - seizure prophylaxis -  Continue keppra  - Hgb- 13.3 - > 13.3 (10/21)   - 10/25 - wound healing well. Stable exam. Still with headches. Will obtain CT.      Essential hypertension   - HCTZ, aldactone, vasotec.    - 10/23  -120s. No new changes. Atrial fibrillation - anticoagulation held due to recent SDH. - continue to monitor HR/BP  - continue coreg. Monitor HR.      Tension pneumothorax, treated, resolved  - monitor for s/s.      Pneumonia prophylaxis- Insentive spirometer every hour while awake     DVT risk / DVT Prophylaxis- Will require daily physician exam to assess for signs and symptoms as patient is at increased risk for of thromboembolism. Mobilization as tolerated. Intermittent pneumatic compression devices when in bed Thigh-high or knee-high thromboembolic deterrent hose when out of bed.   - no anticoagulation      Pain Control: no current joint or muscle symptoms, essentially pain-free. Will require regular pain assessment and comprenhensive pain management. - prn perccet, fioricet. Control headache.      Wound Care: Monitor wound status daily per staff and physician. At risk for failure. Will require 24/7 rehab nursing. Keep wound clean and dry      Diabetes mellitus - Uncontrolled. poor glycemic control. Will require daily, close FSG monitoring and medication adjustment to optimize glycemic control in setting of acute illness and hospitalization.   - metformin, linzess  - SSI lispro  - 10/23 - glycemic control good. No new changes.      Urinary retention/ neurogenic bladder - schedule voids q6-8 hrs. Check post-void residual every shift; In and Out catheterize if post-void residual is more than 400 cc.  - 10/25  . No signs of retention, incontinence.      bowel program - miralax prn. [place] on CPAP    Allergic rhinitis/chronic cough - continue mucinex bid     Time spent was 25 minutes with over 1/2 in direct patient care/examination, consultation and coordination of care.     Signed By: Chepe Villeda MD     October 24, 2017

## 2017-10-24 NOTE — PROGRESS NOTES
PHYSICAL THERAPY DAILY NOTE  Time In: 7260  Time Out: 1610  Patient Seen For: PM;Balance activities;Gait training;Equipment assessment    Subjective: Pt complained of headache, 6/10 on pain scale at beginning of treatment. Pain did not increase during treatment, and was still 6/10 at end of treatment. Pt very agreeable to treatment. States that he has about 7 steps to get into his house, but bedroom and office are on the first floor. Objective: Other (comment) (falls)  GROSS ASSESSMENT Daily Assessment            BED/MAT MOBILITY Daily Assessment            TRANSFERS Daily Assessment    Sit to Stand Assistance: Stand-by assistance  Car Transfers: Not tested       GAIT Daily Assessment   Instructed in gait training with verbal and tactile cues for upright posture. Amount of Assistance: 5 (Stand-by assistance)  Distance (ft): 100 Feet (ft) (100ft x2)  Assistive Device: Gait belt;Walker, rolling       STEPS or STAIRS Daily Assessment   Instructed in stair training up with R foot and down with L foot, use of R hand rail. Instructed pt on one attempt to descend stairs sideways with both hands on one rail. Also instructed in ramp training, up and down 2x with one rest break between. Steps/Stairs Ambulated (#): 4 (4 steps x4)  Level of Assist : 4 (Contact guard assistance)  Rail Use: Right        BALANCE Daily Assessment   See LE exercises         WHEELCHAIR MOBILITY Daily Assessment    Wheelchair Management: Manages left brake;Manages right brake       LOWER EXTREMITY EXERCISES Daily Assessment   In parallel bars, instructed in wt shifting to R and L with opposite hand reaching to touch therapist hand to improve balance and wt shifting ability. Extremity: Both  Exercise Type #1: Other (comment) (balance reaching wt shifting)  Sets Performed: 1  Reps Performed: 10  Level of Assist: Contact guard assistance          Assessment: Pt showed good technique going up and down stairs.  Demonstrated fair balance and wt shift in parallel bars, but needed verbal cues to maintain correct posture. Returned pt to room to recliner with call bell and needs in reach. Plan of Care: Continue per PT POC and progress as tolerated.     Romelia Akins  10/24/2017

## 2017-10-24 NOTE — PROGRESS NOTES
End Of Shift Functional Summary, Nursing      TOILETING/ BLADDER/ BOWEL    TOILET TRANSFER:  Pt requires standby assistance/setup. Pt uses walker. BLADDER:  Pt does not have a nieves catheter that staff manages. Pt does not take medication. Pt is continent. of bladder and voids in toilet  (An accident is when the episode is not contained in a brief AND/OR the clothing/linen requires changing/cleaning up.)    BOWEL:  Pt does take medication. Pt is continent of bowel and uses toilet. (An accident is when the episode is not contained in a brief AND/OR the clothing/linen requires changing/cleaning up.)    BED/CHAIR TRANSFER  Pt requires standby assistance/setup. Patient requires the assistance of 1 staff member(s). Pt uses walker    EATING  Pt requires no assistance. Pt does not wear dentures. TUBE FEEDINGS:  Pt does not  receive nutrition through tube feedings. Documentation reviewed and plan of care discussed/reviewed with   patient assistant during the shift.

## 2017-10-24 NOTE — PROGRESS NOTES
Problem: Falls - Risk of  Goal: *Absence of Falls  Document Parish Fall Risk and appropriate interventions in the flowsheet.    Outcome: Progressing Towards Goal  Fall Risk Interventions:  Mobility Interventions: Patient to call before getting OOB    Mentation Interventions: Door open when patient unattended, Evaluate medications/consider consulting pharmacy    Medication Interventions: Evaluate medications/consider consulting pharmacy, Patient to call before getting OOB    Elimination Interventions: Call light in reach    History of Falls Interventions: Consult care management for discharge planning

## 2017-10-24 NOTE — PROGRESS NOTES
Team conference; discharge scheduled for 10/31 with [facility] PT and OT. Discussed with pt and daughter; agreeable. Dtr will discuss with pt's spouse and see if spouse is interested in family training. Insurance update due on 10/26. Continue to follow for dc planning.

## 2017-10-24 NOTE — PROGRESS NOTES
OT Daily Note    Time In 1116   Time Out 1200     Subjective: \"That was hard (blue foam)\" Agreeable to therapy. Pain:Not indicated during session. Interdisciplinary Communication: Collaborated with PTA, Alis Daughters and patient is making progress towards goals. Precautions: Other (comment) (falls)    Balance/functional mobility Daily Assessment   Blue foam with walker for assist and mirror for visual feedback. Focus on upright stance, hips to increase extension, cues for shoulder posteriorly. Each stance ~5 minutes. Ambulated ~100' and ~20' with RW with minimal assist, 1 LOB, patient able to self correct secondary to right knee buckling. Strengthening/activity tolerance Daily Assessment   UE exercise bike completed for 8 minutes with moderate resistance at moderate pace to increase activity tolerance and UE strength. Cognition Daily Assessment   IRF cognitive assessment 14/15. Clock drawing intact. Medication management, modified independent. Ended session:In recliner, all needs within reach.      Azael Carrillo OTR/L

## 2017-10-24 NOTE — PROGRESS NOTES
Subjective \"I want to get my independence back and hope to be going home by the end of the week. \"   Activity Evaluation   Strength/Endurance Patient with decreased activity tolerance but willing to participate. Balance Sit<->stand:  SBA with RW   Social Interaction Patient was friendly and conversational during the session. Cognitive A&O X4   Comments Patient appears receptive to participation with recreational therapy. Patient's Recreational Therapy evaluation completed under the [facility] navigation tool on 10/24/17. Please see for specifics regarding plan of care and goals. Patient prefers to be called \"Ramiro. \" Thank you for the referral.  Mima Gomez, CTRS

## 2017-10-25 ENCOUNTER — APPOINTMENT (OUTPATIENT)
Dept: CT IMAGING | Age: 62
DRG: 950 | End: 2017-10-25
Attending: PHYSICAL MEDICINE & REHABILITATION
Payer: COMMERCIAL

## 2017-10-25 LAB
GLUCOSE BLD STRIP.AUTO-MCNC: 111 MG/DL (ref 65–100)
GLUCOSE BLD STRIP.AUTO-MCNC: 114 MG/DL (ref 65–100)
GLUCOSE BLD STRIP.AUTO-MCNC: 125 MG/DL (ref 65–100)
GLUCOSE BLD STRIP.AUTO-MCNC: 140 MG/DL (ref 65–100)

## 2017-10-25 PROCEDURE — 97110 THERAPEUTIC EXERCISES: CPT

## 2017-10-25 PROCEDURE — 97530 THERAPEUTIC ACTIVITIES: CPT

## 2017-10-25 PROCEDURE — 74011250637 HC RX REV CODE- 250/637: Performed by: PHYSICAL MEDICINE & REHABILITATION

## 2017-10-25 PROCEDURE — 97535 SELF CARE MNGMENT TRAINING: CPT

## 2017-10-25 PROCEDURE — 65310000000 HC RM PRIVATE REHAB

## 2017-10-25 PROCEDURE — 97116 GAIT TRAINING THERAPY: CPT

## 2017-10-25 PROCEDURE — 70450 CT HEAD/BRAIN W/O DYE: CPT

## 2017-10-25 PROCEDURE — 82962 GLUCOSE BLOOD TEST: CPT

## 2017-10-25 RX ADMIN — LEVETIRACETAM 500 MG: 500 TABLET, FILM COATED ORAL at 17:08

## 2017-10-25 RX ADMIN — METFORMIN HYDROCHLORIDE 500 MG: 500 TABLET, FILM COATED ORAL at 17:08

## 2017-10-25 RX ADMIN — CARVEDILOL 12.5 MG: 6.25 TABLET, FILM COATED ORAL at 17:08

## 2017-10-25 RX ADMIN — BUTALBITAL, ACETAMINOPHEN AND CAFFEINE 1 TABLET: 50; 325; 40 TABLET ORAL at 05:52

## 2017-10-25 RX ADMIN — OXYCODONE HYDROCHLORIDE AND ACETAMINOPHEN 1 TABLET: 5; 325 TABLET ORAL at 15:39

## 2017-10-25 RX ADMIN — ENALAPRIL MALEATE 10 MG: 5 TABLET ORAL at 17:08

## 2017-10-25 RX ADMIN — METFORMIN HYDROCHLORIDE 500 MG: 500 TABLET, FILM COATED ORAL at 09:26

## 2017-10-25 RX ADMIN — OXYCODONE HYDROCHLORIDE AND ACETAMINOPHEN 1 TABLET: 5; 325 TABLET ORAL at 05:52

## 2017-10-25 RX ADMIN — HYDROCHLOROTHIAZIDE 25 MG: 25 TABLET ORAL at 09:28

## 2017-10-25 RX ADMIN — GUAIFENESIN 600 MG: 600 TABLET, EXTENDED RELEASE ORAL at 21:39

## 2017-10-25 RX ADMIN — BACITRACIN ZINC: 500 OINTMENT TOPICAL at 09:29

## 2017-10-25 RX ADMIN — CARVEDILOL 12.5 MG: 6.25 TABLET, FILM COATED ORAL at 09:26

## 2017-10-25 RX ADMIN — FLUTICASONE PROPIONATE 1 SPRAY: 50 SPRAY, METERED NASAL at 05:55

## 2017-10-25 RX ADMIN — SPIRONOLACTONE 25 MG: 25 TABLET, FILM COATED ORAL at 09:27

## 2017-10-25 RX ADMIN — ENALAPRIL MALEATE 10 MG: 5 TABLET ORAL at 09:26

## 2017-10-25 RX ADMIN — SIMVASTATIN 40 MG: 20 TABLET, FILM COATED ORAL at 21:39

## 2017-10-25 RX ADMIN — GUAIFENESIN 600 MG: 600 TABLET, EXTENDED RELEASE ORAL at 09:28

## 2017-10-25 RX ADMIN — BUTALBITAL, ACETAMINOPHEN AND CAFFEINE 1 TABLET: 50; 325; 40 TABLET ORAL at 15:40

## 2017-10-25 RX ADMIN — LEVETIRACETAM 500 MG: 500 TABLET, FILM COATED ORAL at 09:28

## 2017-10-25 RX ADMIN — LORATADINE 10 MG: 10 TABLET ORAL at 09:27

## 2017-10-25 RX ADMIN — POLYETHYLENE GLYCOL 3350 17 G: 17 POWDER, FOR SOLUTION ORAL at 17:07

## 2017-10-25 RX ADMIN — OXYCODONE HYDROCHLORIDE AND ACETAMINOPHEN 1 TABLET: 5; 325 TABLET ORAL at 21:45

## 2017-10-25 RX ADMIN — BUTALBITAL, ACETAMINOPHEN AND CAFFEINE 1 TABLET: 50; 325; 40 TABLET ORAL at 21:45

## 2017-10-25 RX ADMIN — POLYETHYLENE GLYCOL 3350 17 G: 17 POWDER, FOR SOLUTION ORAL at 09:25

## 2017-10-25 NOTE — PROGRESS NOTES
PHYSICAL THERAPY DAILY NOTE  Time In: 0830  Time Out: 2545  Patient Seen For: AM;Transfer training;Gait training; Therapeutic exercise; Other (see progress notes)    Subjective: Patient had no complaints. Objective: He reported a small headache. Other (comment) (falls)  GROSS ASSESSMENT Daily Assessment            BED/MAT MOBILITY Daily Assessment    Supine to Sit : 4 (Contact guard assistance)  Sit to Supine : 5 (Supervision)       TRANSFERS Daily Assessment    Transfer Type: SPT without device  Transfer Assistance : 4 (Contact guard assistance)  Sit to Stand Assistance: Stand-by assistance       GAIT Daily Assessment   Right knee 'gave away' during gait. Amount of Assistance: 4 (Contact guard assistance)  Distance (ft): 100 Feet (ft)  Assistive Device: Gait belt;Walker, rolling       STEPS or STAIRS Daily Assessment    Level of Assist : 0 (Not tested)       BALANCE Daily Assessment            WHEELCHAIR MOBILITY Daily Assessment            LOWER EXTREMITY EXERCISES Daily Assessment    Extremity: Both  Exercise Type #1: Other (comment) (pre gait weight shifting exs uing walker)  Sets Performed: 5  Reps Performed: 0  Level of Assist: Contact guard assistance          Assessment: Patient making progress but his pain in knees and hips seems to be his biggest barrier. Plan of Care: Continue with plan of care to reach PT goals. Returned to room with call edmonds at reach .     Evangelist Knight, WESLY  10/25/2017

## 2017-10-25 NOTE — PROGRESS NOTES
Problem: Falls - Risk of  Goal: *Absence of Falls  Document Parish Fall Risk and appropriate interventions in the flowsheet.    Outcome: Progressing Towards Goal  Fall Risk Interventions:  Mobility Interventions: Patient to call before getting OOB    Mentation Interventions: Door open when patient unattended    Medication Interventions: Evaluate medications/consider consulting pharmacy    Elimination Interventions: Call light in reach    History of Falls Interventions: Consult care management for discharge planning

## 2017-10-25 NOTE — PROGRESS NOTES
10/25/17 0943   Time Spent With Patient   Time In 0745   Time Out 0829   Patient Seen For: AM;ADLs   Grooming   Grooming Assistance  SBA   Comments Starting standing, but ended sitting for brushing teeth   Upper Body Bathing   Bathing Assistance, Upper S   Lower Body Bathing   Bathing Assistance, Lower  Min A   Toileting   Toileting Assistance (FIM Score) Min A   Upper Body Dressing    Dressing Assistance  S   Lower Body Dressing    Dressing Assistance  Mod A   Position Performed Seated in chair;Standing   Functional Transfers   Toilet Transfer  Grab bars   Amount of Assistance Required Min A   Tub or Shower Type Shower   Amount of Assistance Required Min A   Adaptive Equipment Tub transfer bench;Grab bars; Walker (comment)     S: \"I am doing okay, I am very tired (start of session). \" Agreeable to therapy. Focus of session was on morning ADL routine. Patient was able to ambulate ~20 feet using a RW with CGA. Pain not indicated during session. Collaborated with Quique JARRELL and confirmed patient is on track to reach goals as documented in the care plan. Patient tolerated session well, but strength, balance, activity tolerance are still below baseline and requires skilled facilitation to successfully and safely complete ADL's and transfers. Patient ended session in w/c in therapy gym with Quique JARRELL.      Cayden Zhu OTR

## 2017-10-25 NOTE — PROGRESS NOTES
Problem: Falls - Risk of  Goal: *Absence of Falls  Document Parish Fall Risk and appropriate interventions in the flowsheet.    Outcome: Progressing Towards Goal  Fall Risk Interventions:  Mobility Interventions: Patient to call before getting OOB    Mentation Interventions: Door open when patient unattended, Evaluate medications/consider consulting pharmacy    Medication Interventions: Evaluate medications/consider consulting pharmacy, Patient to call before getting OOB    Elimination Interventions: Call light in reach    History of Falls Interventions: Consult care management for discharge planning, Door open when patient unattended

## 2017-10-25 NOTE — PROGRESS NOTES
PHYSICAL THERAPY DAILY NOTE  Time In: 1400  Time Out: 6195  Patient Seen For: PM;Therapeutic exercise;Gait training;Transfer training    Subjective: Pt reported a headache when beginning treatment today, 5/10 on pain scale. Said that headache was not as bad when finishing treatment, and that he felt he did not need pain meds for it right then. No dizziness reported when rolling. Objective: Other (comment) (falls)  GROSS ASSESSMENT Daily Assessment            BED/MAT MOBILITY Daily Assessment    Rolling Right : 4 (Contact guard assistance)  Rolling Left : 4 (Contact guard assistance)  Supine to Sit : 5 (Stand-by assistance)  Sit to Supine : 5 (Supervision)       TRANSFERS Daily Assessment    Transfer Type: SPT with walker  Transfer Assistance : 5 (Stand-by assistance)  Sit to Stand Assistance: Stand-by assistance  Car Transfers: Not tested       GAIT Daily Assessment    Amount of Assistance: 4 (Contact guard assistance)  Distance (ft): 100 Feet (ft)  Assistive Device: Gait belt;Walker, rolling       STEPS or STAIRS Daily Assessment   Reciprocal patten selected by pt when ascending stairs, but instructed pt to go one step at a time when going down stairs. Steps/Stairs Ambulated (#): 4 (x2)  Level of Assist : 4 (Contact guard assistance)  Rail Use: Both       BALANCE Daily Assessment            WHEELCHAIR MOBILITY Daily Assessment    Wheelchair Management: Manages left brake;Manages right brake       LOWER EXTREMITY EXERCISES Daily Assessment   Exercises included: Bridging, single knee to chest, hip abd/adduction, clams (in side-lying) Extremity: Both  Exercise Type #1: Supine lower extremity strengthening (supine and sidelying)  Sets Performed: 2  Reps Performed: 10  Level of Assist: Contact guard assistance          Assessment: Pt required extra time to complete exercises. Showed more weakness on LLE than RLE but quality of movement has improved since Friday.         Returned pt to room to recliner with call bell and needs in reach. Plan of Care: Continue per PT POC and progress as tolerated.     Romelia Akins  10/25/2017

## 2017-10-25 NOTE — PROGRESS NOTES
Patient resting up in bed. Alert and oriented with pleasant affect. Sutures to scalp intact and clean. Antibiotic ointment applied. Lung sounds clear. S1S2, bowel sounds active. Light headache. See MAR. States feels good this morning. Up to recliner for breakfast. No other verbalized needs at present time. Assessment completed. See doc flow sheet for further assessments.

## 2017-10-25 NOTE — PROGRESS NOTES
10/25/17 1111   Time Spent With Patient   Time In 1001   Time Out 1046   Lower Body Dressing    Dressing Assistance  Mod I   Comments Applied elastic laces in shoes and demonstrated long shoe horn use with modified independence. OT Daily Note    Time In 1001   Time Out 1046     Subjective:\"I am feeling better than ealier\" Agreeable to therapy. Pain:Noted as a headache, 4 out of 10. Education:On upright posture, use of elastic laces and shoe horn. Interdisciplinary Communication: Collaborated with PTA, Reggie Slider and patient is making progress towards goals. Precautions:  (falls)    Balance/functional mobility Daily Assessment   Ambulated 20' and 140' with personal RW with CGA, knee did not buckle this session, improving RW control with UE's. Stood 2 times at standing table for ~4 minutes to times attempting to achieve upright posture, also worked on various width stance. Strengthening/activity tolerance Daily Assessment   UE exercise bike completed for 10 minutes, going both forward and backwards with moderate resistance at moderate pace to increase activity tolerance and UE strength. Ended session:In recliner, all needs within reach.      Miguel Green OTR/L

## 2017-10-25 NOTE — PROGRESS NOTES
Physical Medicine and Rehabilitation Progress Note    Judah Oreilly  Admit Date: 10/20/2017  Admit Diagnosis: subdural hematoma;Subdural hemorrhage following injury [facility]*  Chief Complaint : Gait dysfunction secondary to below. Admit Diagnosis: Subdural hematoma ([address] [I62.00]  bilateral SDH  Bilateral subdural fluid collections   Bilateral bur hole evacuation of subdural hygroma 10/18/2017  Essential hypertension, benign  Diabetes mellitus (HCC)  Atrial fibrillation (HCC)  Tension pneumothorax, treated, resolved  Pain  DVT risk  Acute Rehab Dx:  Gait impairment/ gait dysfunction  Debility    Mobility and ambulation deficits  Self Care/ADL deficits     Subjective:     Patient seen and examined. vss afebrile. No new complaints. Feels he is getting stronger. Denies SOB, HA, dizziness, palpations or nausea.      Objective:     Current Facility-Administered Medications   Medication Dose Route Frequency    bacitracin zinc (BACITRACIN) 500 unit/gram ointment   Topical DAILY    insulin lispro (HUMALOG) injection   SubCUTAneous ACB&D    oxyCODONE-acetaminophen (PERCOCET) 5-325 mg per tablet 1 Tab  1 Tab Oral Q4H PRN    guaiFENesin ER (MUCINEX) tablet 600 mg  600 mg Oral Q12H    acetaminophen (TYLENOL) tablet 650 mg  650 mg Oral Q4H PRN    sodium chloride (NS) flush 5-10 mL  5-10 mL IntraVENous PRN    alum-mag hydroxide-simeth (MYLANTA) oral suspension 30 mL  30 mL Oral Q4H PRN    butalbital-acetaminophen-caffeine (FIORICET, ESGIC) -40 mg per tablet 1 Tab  1 Tab Oral Q6H PRN    carvedilol (COREG) tablet 12.5 mg  12.5 mg Oral BID WITH MEALS    enalapril (VASOTEC) tablet 10 mg  10 mg Oral BID    fluticasone (FLONASE) 50 mcg/actuation nasal spray 1 Spray  1 Spray Both Nostrils DAILY    hydroCHLOROthiazide (HYDRODIURIL) tablet 25 mg  25 mg Oral DAILY    levETIRAcetam (KEPPRA) tablet 500 mg  500 mg Oral BID    linaclotide (LINZESS) capsule 145 mcg (Patient Supplied)  145 mcg Oral DAILY    loratadine (CLARITIN) tablet 10 mg  10 mg Oral DAILY    metFORMIN (GLUCOPHAGE) tablet 500 mg  500 mg Oral BID WITH MEALS    polyethylene glycol (MIRALAX) packet 17 g  17 g Oral BID    simvastatin (ZOCOR) tablet 40 mg  40 mg Oral QHS    spironolactone (ALDACTONE) tablet 25 mg  25 mg Oral DAILY     Allergies   Allergen Reactions    Zithromax [Azithromycin] Other (comments)     Skin dryness/peeling       Visit Vitals    /85 (BP 1 Location: Right arm)    Pulse 98    Temp 98.1 °F (36.7 °C)    Resp 18    SpO2 91%      Intake and Output:  10/23 1901 - 10/25 0700  In: 240 [P.O.:240]  Out: 4000 [Urine:4000]    Physical Exam:   General: Alert, NAD, ambulating in room with RW       Lungs: Clear to auscultation  bilaterally. Heart: Regular rate and rhythm, no  murmurs    Abdomen: Soft, bowel sounds present. Neuromuscular:      Speech clear. follows simple commands well, consistently. LUE     Shoulder abduction   5-/5              Elbow flexion:   5-/5               Wrist extension:  5 /5              Finger flexion;   5/5                       RUE    Shoulder abduction: 5 /5                Elbow flexion: 5  /5                         Wrist extension:  5/5                        Finger flexion:  5 /5                        LLE     Hip flexion:  5- /5              Knee extension:   5-/5                         Ankle dorsiflexion: 5 /5                        Ankle plantarflexion:5/5                                                              RLE     Hip flexion: 5  /5                        Knee extension:  5- /5                         Ankle dorsiflexion:   5/5                        Ankle plantarflexion:  5 /5  Sensory - intact grossly   Skin/extremity: No rashes, no erythema.   Wound covered, dressing c/d/i     Functional Assessment:         Fartun Celis Fall Risk Assessment:  Fartun Celis Fall Risk  Mobility: Ambulates or transfers with assist devices or assistance/unsteady gait (10/24/17 2056)  Mobility Interventions: Patient to call before getting OOB (10/24/17 2056)  Mentation: Alert, oriented x 3 (10/24/17 2056)  Mentation Interventions: Door open when patient unattended (10/24/17 2056)  Medication: Patient receiving anticonvulsants, sedatives(tranquilizers), psychotropics or hypnotics, hypoglycemics, narcotics, sleep aids, antihypertensives, laxatives, or diuretics (10/24/17 2056)  Medication Interventions: Evaluate medications/consider consulting pharmacy (10/24/17 2056)  Elimination: Needs assistance with toileting (10/24/17 2056)  Elimination Interventions: Call light in reach (10/24/17 2056)  Prior Fall History: Before admission in past 12 months _home or previous inpatient care) (10/24/17 2056)  History of Falls Interventions: Consult care management for discharge planning (10/24/17 2056)  Total Score: 4 (10/24/17 2056)  Standard Fall Precautions: Yes (10/24/17 2056)  High Fall Risk: Yes (10/22/17 0313)     Speech Assessment:         Ambulation:  Gait  Distance (ft): 100 Feet (ft) (10/25/17 1020)  Assistive Device: Gait belt;Walker, rolling (10/25/17 1020)  Rail Use: Right  (10/24/17 1600)     Labs/Studies:  Recent Results (from the past 72 hour(s))   GLUCOSE, POC    Collection Time: 10/22/17 11:22 AM   Result Value Ref Range    Glucose (POC) 119 (H) 65 - 100 mg/dL   GLUCOSE, POC    Collection Time: 10/22/17  4:20 PM   Result Value Ref Range    Glucose (POC) 134 (H) 65 - 100 mg/dL   GLUCOSE, POC    Collection Time: 10/22/17  9:56 PM   Result Value Ref Range    Glucose (POC) 140 (H) 65 - 100 mg/dL   GLUCOSE, POC    Collection Time: 10/23/17  7:42 AM   Result Value Ref Range    Glucose (POC) 108 (H) 65 - 100 mg/dL   GLUCOSE, POC    Collection Time: 10/23/17 11:22 AM   Result Value Ref Range    Glucose (POC) 119 (H) 65 - 100 mg/dL   GLUCOSE, POC    Collection Time: 10/23/17  4:07 PM   Result Value Ref Range    Glucose (POC) 138 (H) 65 - 100 mg/dL   GLUCOSE, POC    Collection Time: 10/23/17  8:57 PM   Result Value Ref Range Glucose (POC) 134 (H) 65 - 100 mg/dL   GLUCOSE, POC    Collection Time: 10/24/17  6:59 AM   Result Value Ref Range    Glucose (POC) 114 (H) 65 - 100 mg/dL   GLUCOSE, POC    Collection Time: 10/24/17 11:07 AM   Result Value Ref Range    Glucose (POC) 121 (H) 65 - 100 mg/dL   GLUCOSE, POC    Collection Time: 10/24/17  4:00 PM   Result Value Ref Range    Glucose (POC) 116 (H) 65 - 100 mg/dL   GLUCOSE, POC    Collection Time: 10/24/17  9:17 PM   Result Value Ref Range    Glucose (POC) 129 (H) 65 - 100 mg/dL   GLUCOSE, POC    Collection Time: 10/25/17  7:35 AM   Result Value Ref Range    Glucose (POC) 140 (H) 65 - 100 mg/dL     Assessment: This is a 57 y. o. who fell striking his head several weeks ago and found to have R SDH, s/p [place]ailin hole evacuation and [place]. Now readmitted on 10/17 d/t subdural fluid collection and R PTX. He continues with mobility and self care impairments. Rehabilitation Plan  Continue PT for a minimum of 1.5 hours a day, at least 5 out of 7 days per week to address bed mobility, transfers, ambulation, strengthening, balance, and endurance. Continue OT for a minimum of 1.5 hours a day, at least 5 out of 7 days per week to address ADL ( bathing, LE dressing, toileting) and adaptive equipment as needed.     Continue 24-hour skilled rehabilitation nursing for bowel and bladder management, skin care for decubitus ulcer prevention , pain management and ongoing medication administration      Continue daily physician medical management:     bilateral SDH/ Bilateral subdural fluid collections   - s/p Bilateral bur hole evacuation of subdural hygroma 10/18/2017  - monitor for recurrent s/s of increased ICP. Monitor for neuroogic deficits. - seizure prophylaxis -  Continue keppra  - Hgb- 13.3 - > 13.3 (10/21)   - 10/23 wound healing well. Benign. No signs of increased ICP.      Essential hypertension   - HCTZ, aldactone, vasotec.    - 10/23 SBP 120s. In good control.      Atrial fibrillation - anticoagulation held due to recent SDH. - continue to monitor HR/BP  - continue coreg. Monitor HR.      Tension pneumothorax, treated, resolved  - monitor for s/s.      Pneumonia prophylaxis- Insentive spirometer every hour while awake     DVT risk / DVT Prophylaxis- Will require daily physician exam to assess for signs and symptoms as patient is at increased risk for of thromboembolism. Mobilization as tolerated. Intermittent pneumatic compression devices when in bed Thigh-high or knee-high thromboembolic deterrent hose when out of bed.   - no anticoagulation      Pain Control: no current joint or muscle symptoms, essentially pain-free. Will require regular pain assessment and comprenhensive pain management. - prn perccet, fioricet. Control headache.      Wound Care: Monitor wound status daily per staff and physician. At risk for failure. Will require 24/7 rehab nursing. Keep wound clean and dry      Diabetes mellitus - Uncontrolled. poor glycemic control. Will require daily, close FSG monitoring and medication adjustment to optimize glycemic control in setting of acute illness and hospitalization.   - metformin, linzess  - SSI lispro  - 10/23 - glycemic control good. No new changes.      Urinary retention/ neurogenic bladder - schedule voids q6-8 hrs. Check post-void residual every shift; In and Out catheterize if post-void residual is more than 400 cc.  - 10/23 voiding well. No signs of retention, incontinence.      bowel program - miralax prn. [place] on CPAP    Allergic rhinitis/chronic cough - continue mucinex bid     Time spent was 25 minutes with over 1/2 in direct patient care/examination, consultation and coordination of care.     Signed By: Pushpa Choi MD     October 25, 2017

## 2017-10-26 LAB
GLUCOSE BLD STRIP.AUTO-MCNC: 104 MG/DL (ref 65–100)
GLUCOSE BLD STRIP.AUTO-MCNC: 114 MG/DL (ref 65–100)
GLUCOSE BLD STRIP.AUTO-MCNC: 125 MG/DL (ref 65–100)
GLUCOSE BLD STRIP.AUTO-MCNC: 131 MG/DL (ref 65–100)

## 2017-10-26 PROCEDURE — 97110 THERAPEUTIC EXERCISES: CPT

## 2017-10-26 PROCEDURE — 97530 THERAPEUTIC ACTIVITIES: CPT

## 2017-10-26 PROCEDURE — 74011250637 HC RX REV CODE- 250/637: Performed by: PHYSICAL MEDICINE & REHABILITATION

## 2017-10-26 PROCEDURE — 82962 GLUCOSE BLOOD TEST: CPT

## 2017-10-26 PROCEDURE — 97535 SELF CARE MNGMENT TRAINING: CPT

## 2017-10-26 PROCEDURE — 65310000000 HC RM PRIVATE REHAB

## 2017-10-26 PROCEDURE — 97116 GAIT TRAINING THERAPY: CPT

## 2017-10-26 RX ADMIN — ENALAPRIL MALEATE 10 MG: 5 TABLET ORAL at 17:26

## 2017-10-26 RX ADMIN — LORATADINE 10 MG: 10 TABLET ORAL at 08:55

## 2017-10-26 RX ADMIN — OXYCODONE HYDROCHLORIDE AND ACETAMINOPHEN 1 TABLET: 5; 325 TABLET ORAL at 19:10

## 2017-10-26 RX ADMIN — OXYCODONE HYDROCHLORIDE AND ACETAMINOPHEN 1 TABLET: 5; 325 TABLET ORAL at 07:27

## 2017-10-26 RX ADMIN — SPIRONOLACTONE 25 MG: 25 TABLET, FILM COATED ORAL at 08:55

## 2017-10-26 RX ADMIN — FLUTICASONE PROPIONATE 1 SPRAY: 50 SPRAY, METERED NASAL at 08:58

## 2017-10-26 RX ADMIN — BACITRACIN ZINC: 500 OINTMENT TOPICAL at 08:58

## 2017-10-26 RX ADMIN — POLYETHYLENE GLYCOL 3350 17 G: 17 POWDER, FOR SOLUTION ORAL at 08:54

## 2017-10-26 RX ADMIN — ENALAPRIL MALEATE 10 MG: 5 TABLET ORAL at 08:57

## 2017-10-26 RX ADMIN — GUAIFENESIN 600 MG: 600 TABLET, EXTENDED RELEASE ORAL at 20:59

## 2017-10-26 RX ADMIN — FLUTICASONE PROPIONATE 1 SPRAY: 50 SPRAY, METERED NASAL at 05:54

## 2017-10-26 RX ADMIN — CARVEDILOL 12.5 MG: 6.25 TABLET, FILM COATED ORAL at 08:56

## 2017-10-26 RX ADMIN — HYDROCHLOROTHIAZIDE 25 MG: 25 TABLET ORAL at 08:56

## 2017-10-26 RX ADMIN — POLYETHYLENE GLYCOL 3350 17 G: 17 POWDER, FOR SOLUTION ORAL at 17:23

## 2017-10-26 RX ADMIN — BUTALBITAL, ACETAMINOPHEN AND CAFFEINE 1 TABLET: 50; 325; 40 TABLET ORAL at 07:28

## 2017-10-26 RX ADMIN — LEVETIRACETAM 500 MG: 500 TABLET, FILM COATED ORAL at 17:24

## 2017-10-26 RX ADMIN — SIMVASTATIN 40 MG: 20 TABLET, FILM COATED ORAL at 20:59

## 2017-10-26 RX ADMIN — GUAIFENESIN 600 MG: 600 TABLET, EXTENDED RELEASE ORAL at 08:56

## 2017-10-26 RX ADMIN — METFORMIN HYDROCHLORIDE 500 MG: 500 TABLET, FILM COATED ORAL at 17:25

## 2017-10-26 RX ADMIN — BACITRACIN ZINC: 500 OINTMENT TOPICAL at 05:55

## 2017-10-26 RX ADMIN — CARVEDILOL 12.5 MG: 6.25 TABLET, FILM COATED ORAL at 17:24

## 2017-10-26 RX ADMIN — LEVETIRACETAM 500 MG: 500 TABLET, FILM COATED ORAL at 08:55

## 2017-10-26 RX ADMIN — ALUMINUM HYDROXIDE, MAGNESIUM HYDROXIDE, AND SIMETHICONE 30 ML: 200; 200; 20 SUSPENSION ORAL at 05:52

## 2017-10-26 RX ADMIN — METFORMIN HYDROCHLORIDE 500 MG: 500 TABLET, FILM COATED ORAL at 08:55

## 2017-10-26 RX ADMIN — BUTALBITAL, ACETAMINOPHEN AND CAFFEINE 1 TABLET: 50; 325; 40 TABLET ORAL at 19:10

## 2017-10-26 NOTE — PROGRESS NOTES
Physical Medicine and Rehabilitation Progress Note    Macario Vaughan  Admit Date: 10/20/2017  Admit Diagnosis: subdural hematoma;Subdural hemorrhage following injury [facility]*  Chief Complaint : Gait dysfunction secondary to below. Admit Diagnosis: Subdural hematoma ([address] [I62.00]  bilateral SDH  Bilateral subdural fluid collections   Bilateral bur hole evacuation of subdural hygroma 10/18/2017  Essential hypertension, benign  Diabetes mellitus (HCC)  Atrial fibrillation (HCC)  Tension pneumothorax, treated, resolved  Pain  DVT risk  Acute Rehab Dx:  Gait impairment/ gait dysfunction  Debility    Mobility and ambulation deficits  Self Care/ADL deficits       Subjective:      Patient seen and examined. vss afebrile. No new neurologic deficits. Mild headache. CT yesterday appears to show no acute changes, mildly improved. continues to make functional gains. Gait, balance improving.       Objective:     Current Facility-Administered Medications   Medication Dose Route Frequency    bacitracin zinc (BACITRACIN) 500 unit/gram ointment   Topical DAILY    insulin lispro (HUMALOG) injection   SubCUTAneous ACB&D    oxyCODONE-acetaminophen (PERCOCET) 5-325 mg per tablet 1 Tab  1 Tab Oral Q4H PRN    guaiFENesin ER (MUCINEX) tablet 600 mg  600 mg Oral Q12H    acetaminophen (TYLENOL) tablet 650 mg  650 mg Oral Q4H PRN    sodium chloride (NS) flush 5-10 mL  5-10 mL IntraVENous PRN    alum-mag hydroxide-simeth (MYLANTA) oral suspension 30 mL  30 mL Oral Q4H PRN    butalbital-acetaminophen-caffeine (FIORICET, ESGIC) -40 mg per tablet 1 Tab  1 Tab Oral Q6H PRN    carvedilol (COREG) tablet 12.5 mg  12.5 mg Oral BID WITH MEALS    enalapril (VASOTEC) tablet 10 mg  10 mg Oral BID    fluticasone (FLONASE) 50 mcg/actuation nasal spray 1 Spray  1 Spray Both Nostrils DAILY    hydroCHLOROthiazide (HYDRODIURIL) tablet 25 mg  25 mg Oral DAILY    levETIRAcetam (KEPPRA) tablet 500 mg  500 mg Oral BID    linaclotide (LINZESS) capsule 145 mcg (Patient Supplied)  145 mcg Oral DAILY    loratadine (CLARITIN) tablet 10 mg  10 mg Oral DAILY    metFORMIN (GLUCOPHAGE) tablet 500 mg  500 mg Oral BID WITH MEALS    polyethylene glycol (MIRALAX) packet 17 g  17 g Oral BID    simvastatin (ZOCOR) tablet 40 mg  40 mg Oral QHS    spironolactone (ALDACTONE) tablet 25 mg  25 mg Oral DAILY     Allergies   Allergen Reactions    Zithromax [Azithromycin] Other (comments)     Skin dryness/peeling       Visit Vitals    /90 (BP 1 Location: Left arm)    Pulse 92    Temp 97.3 °F (36.3 °C)    Resp 18    SpO2 94%      Intake and Output:  10/24 1901 - 10/26 0700  In: 720 [P.O.:720]  Out: 2802 [Urine:2801]    Physical Exam:   General: Alert, NAD, ambulating in room with RW       Lungs: Clear to auscultation  bilaterally. Heart: Regular rate and rhythm, no  murmurs    Abdomen: Soft, bowel sounds present. Neuromuscular:      Speech clear. follows simple commands well, consistently. LUE     Shoulder abduction   5-/5              Elbow flexion:   5-/5               Wrist extension:  5 /5              Finger flexion;   5/5                       RUE    Shoulder abduction: 5 /5                Elbow flexion: 5  /5                         Wrist extension:  5/5                        Finger flexion:  5 /5                        LLE     Hip flexion:  5- /5              Knee extension:   5-/5                         Ankle dorsiflexion: 5 /5                        Ankle plantarflexion:5/5                                                              RLE     Hip flexion: 5  /5                        Knee extension:  5- /5                         Ankle dorsiflexion:   5/5                        Ankle plantarflexion:  5 /5  Sensory - intact grossly   Skin/extremity: No rashes, no erythema.   Wound covered, dressing c/d/i     Functional Assessment:         Chloe Brew Fall Risk Assessment:  Chloe Brew Fall Risk  Mobility: Ambulates or transfers with assist devices or assistance/unsteady gait (10/26/17 0730)  Mobility Interventions: Patient to call before getting OOB (10/26/17 0730)  Mentation: Alert, oriented x 3 (10/26/17 0730)  Mentation Interventions: Door open when patient unattended;Evaluate medications/consider consulting pharmacy (10/26/17 0730)  Medication: Patient receiving anticonvulsants, sedatives(tranquilizers), psychotropics or hypnotics, hypoglycemics, narcotics, sleep aids, antihypertensives, laxatives, or diuretics (10/26/17 0730)  Medication Interventions: Evaluate medications/consider consulting pharmacy (10/26/17 0730)  Elimination: Needs assistance with toileting (10/26/17 0730)  Elimination Interventions: Call light in reach (10/26/17 0730)  Prior Fall History: Before admission in past 12 months _home or previous inpatient care) (10/26/17 0730)  History of Falls Interventions: Consult care management for discharge planning (10/26/17 0730)  Total Score: 4 (10/26/17 0730)  Standard Fall Precautions: Yes (10/26/17 0730)  High Fall Risk: Yes (10/25/17 2015)     Speech Assessment:         Ambulation:  Gait  Distance (ft): 200 Feet (ft) (one standing rest break @100 ft) (10/26/17 1200)  Assistive Device: Gait belt;Walker, rolling (10/26/17 1200)  Rail Use: Both (10/25/17 1600)     Labs/Studies:  Recent Results (from the past 72 hour(s))   GLUCOSE, POC    Collection Time: 10/23/17  4:07 PM   Result Value Ref Range    Glucose (POC) 138 (H) 65 - 100 mg/dL   GLUCOSE, POC    Collection Time: 10/23/17  8:57 PM   Result Value Ref Range    Glucose (POC) 134 (H) 65 - 100 mg/dL   GLUCOSE, POC    Collection Time: 10/24/17  6:59 AM   Result Value Ref Range    Glucose (POC) 114 (H) 65 - 100 mg/dL   GLUCOSE, POC    Collection Time: 10/24/17 11:07 AM   Result Value Ref Range    Glucose (POC) 121 (H) 65 - 100 mg/dL   GLUCOSE, POC    Collection Time: 10/24/17  4:00 PM   Result Value Ref Range    Glucose (POC) 116 (H) 65 - 100 mg/dL   GLUCOSE, POC    Collection Time: 10/24/17 9:17 PM   Result Value Ref Range    Glucose (POC) 129 (H) 65 - 100 mg/dL   GLUCOSE, POC    Collection Time: 10/25/17  7:35 AM   Result Value Ref Range    Glucose (POC) 140 (H) 65 - 100 mg/dL   GLUCOSE, POC    Collection Time: 10/25/17 10:58 AM   Result Value Ref Range    Glucose (POC) 114 (H) 65 - 100 mg/dL   GLUCOSE, POC    Collection Time: 10/25/17  3:46 PM   Result Value Ref Range    Glucose (POC) 111 (H) 65 - 100 mg/dL   GLUCOSE, POC    Collection Time: 10/25/17  8:11 PM   Result Value Ref Range    Glucose (POC) 125 (H) 65 - 100 mg/dL   GLUCOSE, POC    Collection Time: 10/26/17  7:19 AM   Result Value Ref Range    Glucose (POC) 125 (H) 65 - 100 mg/dL   GLUCOSE, POC    Collection Time: 10/26/17 11:13 AM   Result Value Ref Range    Glucose (POC) 114 (H) 65 - 100 mg/dL     Assessment: This is a 57 y. o. who fell striking his head several weeks ago and found to have R SDH, s/p [place] hole evacuation and [place]. Now readmitted on 10/17 d/t subdural fluid collection and R PTX. He continues with mobility and self care impairments. Rehabilitation Plan  Continue PT for a minimum of 1.5 hours a day, at least 5 out of 7 days per week to address bed mobility, transfers, ambulation, strengthening, balance, and endurance. Continue OT for a minimum of 1.5 hours a day, at least 5 out of 7 days per week to address ADL ( bathing, LE dressing, toileting) and adaptive equipment as needed.     Continue 24-hour skilled rehabilitation nursing for bowel and bladder management, skin care for decubitus ulcer prevention , pain management and ongoing medication administration      Continue daily physician medical management:     bilateral SDH/ Bilateral subdural fluid collections   - s/p Bilateral bur hole evacuation of subdural hygroma 10/18/2017  - monitor for recurrent s/s of increased ICP. Monitor for neuroogic deficits. - seizure prophylaxis -  Continue keppra  - Hgb- 13.3 - > 13.3 (10/21)   - 10/23 wound healing well. Benign. No signs of increased ICP. - 10/26 - wound opern to air. Healing well. CT yesterday appears benign. No worsening SDH.      Essential hypertension   - HCTZ, aldactone, vasotec.    - 10/23 SBP 120s. In good control.   - 10/26 - SBP in good range. Atrial fibrillation - anticoagulation held due to recent SDH. - continue to monitor HR/BP  - continue coreg. Monitor HR.   - 10/26 - HR  In good range.      Tension pneumothorax, treated, resolved  - monitor for s/s.      Pneumonia prophylaxis- Insentive spirometer every hour while awake     DVT risk / DVT Prophylaxis- Will require daily physician exam to assess for signs and symptoms as patient is at increased risk for of thromboembolism. Mobilization as tolerated. Intermittent pneumatic compression devices when in bed Thigh-high or knee-high thromboembolic deterrent hose when out of bed.   - no anticoagulation due to SDH.     Pain Control: no current joint or muscle symptoms, essentially pain-free. Will require regular pain assessment and comprenhensive pain management. - prn perccet, fioricet. Control headache.      Wound Care: Monitor wound status daily per staff and physician. At risk for failure. Will require 24/7 rehab nursing. Keep wound clean and dry      Diabetes mellitus - Uncontrolled. poor glycemic control. Will require daily, close FSG monitoring and medication adjustment to optimize glycemic control in setting of acute illness and hospitalization.   - metformin, linzess  - SSI lispro  - 10/23 - glycemic control good. No new changes. - 10/26 - excellent control.      Urinary retention/ neurogenic bladder - schedule voids q6-8 hrs. Check post-void residual every shift; In and Out catheterize if post-void residual is more than 400 cc.  - 10/23 voiding well. No signs of retention, incontinence.      bowel program - miralax prn. + BM.      [place] on CPAP    Allergic rhinitis/chronic cough - continue mucinex bid     Time spent was 25 minutes with over 1/2 in direct patient care/examination, consultation and coordination of care.     Signed By: Reyna Wells MD     October 26, 2017

## 2017-10-26 NOTE — PROGRESS NOTES
OT Daily Note  Time In 1029   Time Out 1114     Pain: Patient had no complaint of pain. Functional Mobility   Pt stood with SBA. Activity Tolerance   Pt stood twice for 7 and 5 minutes with a long sitting break in between. Pt engaged in complex problem solving without issue. Strengthening   Pt completed 5 minutes on the ergometer frontwards and backwards with moderate resistance to increase UB strength and activity tolerance for integration into IADL. Education   Common causes of back pain     Plan: Continue with POC.  Pt was left in w/c with PT.     Harvey Laura OTR/L  10/26/2017

## 2017-10-26 NOTE — PROGRESS NOTES
Clinical update faxed to Michelle Hall with [facility] requesting additional rehab days.

## 2017-10-26 NOTE — PROGRESS NOTES
Problem: Falls - Risk of  Goal: *Absence of Falls  Document Parish Fall Risk and appropriate interventions in the flowsheet.    Outcome: Progressing Towards Goal  Fall Risk Interventions:  Mobility Interventions: Patient to call before getting OOB    Mentation Interventions: Door open when patient unattended, Evaluate medications/consider consulting pharmacy    Medication Interventions: Evaluate medications/consider consulting pharmacy    Elimination Interventions: Call light in reach    History of Falls Interventions: Consult care management for discharge planning

## 2017-10-26 NOTE — PROGRESS NOTES
PHYSICAL THERAPY DAILY NOTE  Time In: 1115  Time Out: 1200  Patient Seen For: AM;Gait training; Therapeutic exercise    Subjective: Pt did not report any complaints of headache pain during treatment. Was agreeable to therapy and thankful for the treatment he is getting. Said he understands why he needs therapy and wants to work to get better. Objective: Other (comment) (falls)  GROSS ASSESSMENT Daily Assessment            BED/MAT MOBILITY Daily Assessment            TRANSFERS Daily Assessment   Required verbal cues for hand placement and safety when sitting down. Sit to Stand Assistance: Stand-by assistance  Car Transfers: Not tested       GAIT Daily Assessment   Instructed in gait training for 200 ft with one standing rest break @100 ft and verbal cues for posture and foot placement. An additional 100 ft was performed after a seated rest break. Amount of Assistance: 5 (Stand-by assistance)  Distance (ft): 200 Feet (ft) (one standing rest break @100 ft)  Assistive Device: Gait belt;Walker, rolling       STEPS or STAIRS Daily Assessment            BALANCE Daily Assessment            WHEELCHAIR MOBILITY Daily Assessment    Wheelchair Management: Manages left brake;Manages right brake       LOWER EXTREMITY EXERCISES Daily Assessment   Exercises included: high knee in-place marching, sidestepping, wt shifting to L and R (with emphasis on LLE) with standing TKEs; to improve balance, wt shift to LLE and LLE strength Extremity: Both  Exercise Type #1: Other (comment) (Exercises in parallel bars)  Sets Performed: 1  Reps Performed: 10  Level of Assist: Contact guard assistance          Assessment: Pt showed better quality ambulation when instructed to decrease tomas and increase knee flexion. Still shows more weakness in L hip than R, and is hesitant to completely shift wt to LLE. Returned pt to room to recliner with daughter in the room and needs and call bell in reach.   Plan of Care: Continue with PT POC to progress as tolerated toward goals.     Romelia Akins  10/26/2017

## 2017-10-26 NOTE — PROGRESS NOTES
PHYSICAL THERAPY DAILY NOTE  Time In: 7009  Time Out: 1440  Patient Seen For: PM;Transfer training; Therapeutic exercise    Subjective: Pt agreeable to therapy. Did not report any dizziness or pain during treatment. When asked if he had a headache, he said that he actually didn't, and he felt he did not need medication right then. Objective: Other (comment) (falls)  GROSS ASSESSMENT Daily Assessment            BED/MAT MOBILITY Daily Assessment    Rolling Right : 5 (Stand-by assistance)  Supine to Sit : 5 (Stand-by assistance)  Sit to Supine : 5 (Supervision)       TRANSFERS Daily Assessment   SPT x2 Transfer Type: SPT with walker  Transfer Assistance : 5 (Stand-by assistance)  Sit to Stand Assistance: Stand-by assistance  Car Transfers: Not tested       GAIT Daily Assessment    Amount of Assistance: 5 (Stand-by assistance)  Distance (ft): 25 Feet (ft)  Assistive Device: Walker, rolling       STEPS or STAIRS Daily Assessment            BALANCE Daily Assessment            WHEELCHAIR MOBILITY Daily Assessment    Wheelchair Management: Manages left brake;Manages right brake       LOWER EXTREMITY EXERCISES Daily Assessment   Exercises included: SAQ (with 3# wt.), HS, AP, hip abd/adduction (with green t-band), bridging, single knee to chest, HS on red SB Extremity: Both  Exercise Type #1: Supine lower extremity strengthening (+clamshells in sidelying)  Sets Performed: 1  Reps Performed: 10  Level of Assist: Supervision          Assessment: Pt continues to show LE weakness, worse on L than R. Showed good concentration on exercises and better control when concentrating. Pt to OT immediately following treatment. Plan of Care: Continue per PT POC and progress as tolerated.     Romelia Akins  10/26/2017

## 2017-10-26 NOTE — PROGRESS NOTES
Patient resting up in bed. Alert and oriented. Lung sounds clear. S1S2, bowel sounds active. Sutures to bilateral temporal scalp with sutures open to air. Antibiotic ointment applied to suture sites. Complaint of headache. See MAR. Patient moving all extremities without difficulty. Up to recliner for breakfast tray. No other voiced needs at present time. See doc flow sheet for further assessments.

## 2017-10-26 NOTE — PROGRESS NOTES
OT Daily Note  Time In 1440   Time Out 1530     Subjective: Patient agreeable for therapy. Pain: pain after standing for 6 minutes; alleviated when patient sits down. Education: Safety with therapy tasks and toileting  Interdisciplinary Communication: PT  Precautions:  (falls)    Activity Tolerance    Patient tolerated UBE x 14 minutes x minimal resistance, going in fwd/bkwd direction(s) with 0 rest breaks, working on B UE strength/endurance. Patient performed rubber band patterns accurately while standing with supervision for 6 minutes. Patient performed [employer] with max verbal cues for first 2 patterns and no verbal cues for last pattern. .        Self-Care    Patient ambulated to toilet using RW with CGA. Patient performed toileting with Supervision. Assessment: Patient progressing towards goals.   Plan: Cont OT per tx plan  Daquan Cardoza OT

## 2017-10-26 NOTE — PROGRESS NOTES
Subjective \"I am ready to work with you. \"   Activity Bean bag toss with 1 lb wrist weights on BUE   Strength/Endurance Patient with no c/o tiredness or fatigue during the session. He was motivated and eager to participate. Balance Sit<->stand:  CGA with RW   Social Interaction Patient was outgoing and friendly during the session. Cognitive A&O X4   Comments Patient was handed off to OT at the end of the session.      Adeola Tian, ALECIAS

## 2017-10-27 LAB
GLUCOSE BLD STRIP.AUTO-MCNC: 113 MG/DL (ref 65–100)
GLUCOSE BLD STRIP.AUTO-MCNC: 114 MG/DL (ref 65–100)
GLUCOSE BLD STRIP.AUTO-MCNC: 138 MG/DL (ref 65–100)
GLUCOSE BLD STRIP.AUTO-MCNC: 155 MG/DL (ref 65–100)

## 2017-10-27 PROCEDURE — 97535 SELF CARE MNGMENT TRAINING: CPT

## 2017-10-27 PROCEDURE — 97530 THERAPEUTIC ACTIVITIES: CPT

## 2017-10-27 PROCEDURE — 74011250637 HC RX REV CODE- 250/637: Performed by: PHYSICAL MEDICINE & REHABILITATION

## 2017-10-27 PROCEDURE — 65310000000 HC RM PRIVATE REHAB

## 2017-10-27 PROCEDURE — 97116 GAIT TRAINING THERAPY: CPT

## 2017-10-27 PROCEDURE — 82962 GLUCOSE BLOOD TEST: CPT

## 2017-10-27 PROCEDURE — 97110 THERAPEUTIC EXERCISES: CPT

## 2017-10-27 RX ADMIN — POLYETHYLENE GLYCOL 3350 17 G: 17 POWDER, FOR SOLUTION ORAL at 08:49

## 2017-10-27 RX ADMIN — OXYCODONE HYDROCHLORIDE AND ACETAMINOPHEN 1 TABLET: 5; 325 TABLET ORAL at 13:59

## 2017-10-27 RX ADMIN — BUTALBITAL, ACETAMINOPHEN AND CAFFEINE 1 TABLET: 50; 325; 40 TABLET ORAL at 13:59

## 2017-10-27 RX ADMIN — ENALAPRIL MALEATE 10 MG: 5 TABLET ORAL at 17:30

## 2017-10-27 RX ADMIN — ENALAPRIL MALEATE 10 MG: 5 TABLET ORAL at 08:50

## 2017-10-27 RX ADMIN — LORATADINE 10 MG: 10 TABLET ORAL at 08:50

## 2017-10-27 RX ADMIN — OXYCODONE HYDROCHLORIDE AND ACETAMINOPHEN 1 TABLET: 5; 325 TABLET ORAL at 06:18

## 2017-10-27 RX ADMIN — CARVEDILOL 12.5 MG: 6.25 TABLET, FILM COATED ORAL at 08:49

## 2017-10-27 RX ADMIN — GUAIFENESIN 600 MG: 600 TABLET, EXTENDED RELEASE ORAL at 20:52

## 2017-10-27 RX ADMIN — METFORMIN HYDROCHLORIDE 500 MG: 500 TABLET, FILM COATED ORAL at 17:30

## 2017-10-27 RX ADMIN — CARVEDILOL 12.5 MG: 6.25 TABLET, FILM COATED ORAL at 17:30

## 2017-10-27 RX ADMIN — LEVETIRACETAM 500 MG: 500 TABLET, FILM COATED ORAL at 17:30

## 2017-10-27 RX ADMIN — POLYETHYLENE GLYCOL 3350 17 G: 17 POWDER, FOR SOLUTION ORAL at 17:30

## 2017-10-27 RX ADMIN — BACITRACIN ZINC: 500 OINTMENT TOPICAL at 08:53

## 2017-10-27 RX ADMIN — OXYCODONE HYDROCHLORIDE AND ACETAMINOPHEN 1 TABLET: 5; 325 TABLET ORAL at 20:52

## 2017-10-27 RX ADMIN — FLUTICASONE PROPIONATE 1 SPRAY: 50 SPRAY, METERED NASAL at 08:53

## 2017-10-27 RX ADMIN — BUTALBITAL, ACETAMINOPHEN AND CAFFEINE 1 TABLET: 50; 325; 40 TABLET ORAL at 06:18

## 2017-10-27 RX ADMIN — METFORMIN HYDROCHLORIDE 500 MG: 500 TABLET, FILM COATED ORAL at 08:50

## 2017-10-27 RX ADMIN — HYDROCHLOROTHIAZIDE 25 MG: 25 TABLET ORAL at 09:00

## 2017-10-27 RX ADMIN — SPIRONOLACTONE 25 MG: 25 TABLET, FILM COATED ORAL at 08:50

## 2017-10-27 RX ADMIN — LEVETIRACETAM 500 MG: 500 TABLET, FILM COATED ORAL at 08:51

## 2017-10-27 RX ADMIN — BUTALBITAL, ACETAMINOPHEN AND CAFFEINE 1 TABLET: 50; 325; 40 TABLET ORAL at 20:52

## 2017-10-27 RX ADMIN — SIMVASTATIN 40 MG: 20 TABLET, FILM COATED ORAL at 20:52

## 2017-10-27 RX ADMIN — GUAIFENESIN 600 MG: 600 TABLET, EXTENDED RELEASE ORAL at 09:00

## 2017-10-27 NOTE — PROGRESS NOTES
OT Daily Note  Time In 1258   Time Out 1345     Subjective: \"I'm doing well. I was glad to hear I could order a cheese burger for lunch. \" patient stated  Pain: reports he has a headache, able to tolerate therapy  Education: awareness while ambulating with FWW  Interdisciplinary Communication: handoff to PT at the end of session  Precautions:  (falls)    Therapeutic Exercise Daily Assessment   Therapist facilitated transfer from [facility] [address] to NuStep bike to engage in UE and LE movement to address activity tolerance and endurance. Good ability to pace and complete task     Visual-Perceptual Daily Assessment   Therapist facilitated V-P and cognition activity of [employer] [place]. Patient was able to identify 27 of 42 within a 30 minute period time. Good effort demonstrated, required glasses to assist in visual aspect of task. Minimal cuing require to find a few items. Assessment: Good ability to complete all therapist directed tasks. Mr. Desi Mattson demonstrated good effort throughout session.    Plan: Continue with POC and address current goals for improved functional performance    DIO Kingsley, OTR/L  10/27/17

## 2017-10-27 NOTE — PROGRESS NOTES
Physical Medicine and Rehabilitation Progress Note    Deepali Asif  Admit Date: 10/20/2017  Admit Diagnosis: subdural hematoma;Subdural hemorrhage following injury [facility]*  Chief Complaint : Gait dysfunction secondary to below. Admit Diagnosis: Subdural hematoma ([address] [I62.00]  bilateral SDH  Bilateral subdural fluid collections   Bilateral bur hole evacuation of subdural hygroma 10/18/2017  Essential hypertension, benign  Diabetes mellitus (HCC)  Atrial fibrillation (HCC)  Tension pneumothorax, treated, resolved  Pain  DVT risk  Acute Rehab Dx:  Gait impairment/ gait dysfunction  Debility    Mobility and ambulation deficits  Self Care/ADL deficits       Subjective:      Patient seen and examined. vss afebrile. No new neurologic deficits. Denies headache, dizziness, vision changes. Wound healing well. Mood good. appetite good. Walking at [facility] 62 level.      Objective:     Current Facility-Administered Medications   Medication Dose Route Frequency    bacitracin zinc (BACITRACIN) 500 unit/gram ointment   Topical DAILY    insulin lispro (HUMALOG) injection   SubCUTAneous ACB&D    oxyCODONE-acetaminophen (PERCOCET) 5-325 mg per tablet 1 Tab  1 Tab Oral Q4H PRN    guaiFENesin ER (MUCINEX) tablet 600 mg  600 mg Oral Q12H    acetaminophen (TYLENOL) tablet 650 mg  650 mg Oral Q4H PRN    sodium chloride (NS) flush 5-10 mL  5-10 mL IntraVENous PRN    alum-mag hydroxide-simeth (MYLANTA) oral suspension 30 mL  30 mL Oral Q4H PRN    butalbital-acetaminophen-caffeine (FIORICET, ESGIC) -40 mg per tablet 1 Tab  1 Tab Oral Q6H PRN    carvedilol (COREG) tablet 12.5 mg  12.5 mg Oral BID WITH MEALS    enalapril (VASOTEC) tablet 10 mg  10 mg Oral BID    fluticasone (FLONASE) 50 mcg/actuation nasal spray 1 Spray  1 Spray Both Nostrils DAILY    hydroCHLOROthiazide (HYDRODIURIL) tablet 25 mg  25 mg Oral DAILY    levETIRAcetam (KEPPRA) tablet 500 mg  500 mg Oral BID    linaclotide (LINZESS) capsule 145 mcg (Patient Supplied)  145 mcg Oral DAILY    loratadine (CLARITIN) tablet 10 mg  10 mg Oral DAILY    metFORMIN (GLUCOPHAGE) tablet 500 mg  500 mg Oral BID WITH MEALS    polyethylene glycol (MIRALAX) packet 17 g  17 g Oral BID    simvastatin (ZOCOR) tablet 40 mg  40 mg Oral QHS    spironolactone (ALDACTONE) tablet 25 mg  25 mg Oral DAILY     Allergies   Allergen Reactions    Zithromax [Azithromycin] Other (comments)     Skin dryness/peeling       Visit Vitals    /79 (BP 1 Location: Right arm)    Pulse 80    Temp 97.4 °F (36.3 °C)    Resp 18    SpO2 94%      Intake and Output:  10/25 1901 - 10/27 0700  In: 240 [P.O.:240]  Out: 1901 [Urine:1901]    Physical Exam:   General: Alert, NAD, ambulating in room with RW       Lungs: Clear to auscultation  bilaterally. Heart: Regular rate and rhythm, no  murmurs    Abdomen: Soft, bowel sounds present. Neuromuscular:      Speech clear. follows simple commands well, consistently. LUE     Shoulder abduction   5-/5              Elbow flexion:   5-/5               Wrist extension:  5 /5              Finger flexion;   5/5                       RUE    Shoulder abduction: 5 /5                Elbow flexion: 5  /5                         Wrist extension:  5/5                        Finger flexion:  5 /5                        LLE     Hip flexion:  5- /5              Knee extension:   5-/5                         Ankle dorsiflexion: 5 /5                        Ankle plantarflexion:5/5                                                              RLE     Hip flexion: 5  /5                        Knee extension:  5- /5                         Ankle dorsiflexion:   5/5                        Ankle plantarflexion:  5 /5  Sensory - intact grossly   Skin/extremity: No rashes, no erythema.   Wound c/d/i     Functional Assessment:         Easter Getting Fall Risk Assessment:  Easter Getting Fall Risk  Mobility: Ambulates or transfers with assist devices or assistance/unsteady gait (10/26/17 2013)  Mobility Interventions: Patient to call before getting OOB (10/26/17 2013)  Mentation: Alert, oriented x 3 (10/26/17 2013)  Mentation Interventions: Door open when patient unattended (10/26/17 2013)  Medication: Patient receiving anticonvulsants, sedatives(tranquilizers), psychotropics or hypnotics, hypoglycemics, narcotics, sleep aids, antihypertensives, laxatives, or diuretics (10/26/17 2013)  Medication Interventions: Patient to call before getting OOB; Teach patient to arise slowly (10/26/17 2013)  Elimination: Needs assistance with toileting (10/26/17 2013)  Elimination Interventions: Call light in reach; Patient to call for help with toileting needs; Toilet paper/wipes in reach; Toileting schedule/hourly rounds (10/26/17 2013)  Prior Fall History: Before admission in past 12 months _home or previous inpatient care) (10/26/17 2013)  History of Falls Interventions: Consult care management for discharge planning (10/26/17 2013)  Total Score: 4 (10/26/17 2013)  Standard Fall Precautions: Yes (10/26/17 2013)  High Fall Risk: Yes (10/25/17 2015)     Speech Assessment:         Ambulation:  Gait  Distance (ft): 25 Feet (ft) (10/26/17 1500)  Assistive Device: Walker, rolling (10/26/17 1500)  Rail Use: Both (10/25/17 1600)     Labs/Studies:  Recent Results (from the past 72 hour(s))   GLUCOSE, POC    Collection Time: 10/24/17 11:07 AM   Result Value Ref Range    Glucose (POC) 121 (H) 65 - 100 mg/dL   GLUCOSE, POC    Collection Time: 10/24/17  4:00 PM   Result Value Ref Range    Glucose (POC) 116 (H) 65 - 100 mg/dL   GLUCOSE, POC    Collection Time: 10/24/17  9:17 PM   Result Value Ref Range    Glucose (POC) 129 (H) 65 - 100 mg/dL   GLUCOSE, POC    Collection Time: 10/25/17  7:35 AM   Result Value Ref Range    Glucose (POC) 140 (H) 65 - 100 mg/dL   GLUCOSE, POC    Collection Time: 10/25/17 10:58 AM   Result Value Ref Range    Glucose (POC) 114 (H) 65 - 100 mg/dL   GLUCOSE, POC    Collection Time: 10/25/17  3:46 PM Result Value Ref Range    Glucose (POC) 111 (H) 65 - 100 mg/dL   GLUCOSE, POC    Collection Time: 10/25/17  8:11 PM   Result Value Ref Range    Glucose (POC) 125 (H) 65 - 100 mg/dL   GLUCOSE, POC    Collection Time: 10/26/17  7:19 AM   Result Value Ref Range    Glucose (POC) 125 (H) 65 - 100 mg/dL   GLUCOSE, POC    Collection Time: 10/26/17 11:13 AM   Result Value Ref Range    Glucose (POC) 114 (H) 65 - 100 mg/dL   GLUCOSE, POC    Collection Time: 10/26/17  4:47 PM   Result Value Ref Range    Glucose (POC) 104 (H) 65 - 100 mg/dL   GLUCOSE, POC    Collection Time: 10/26/17  8:33 PM   Result Value Ref Range    Glucose (POC) 131 (H) 65 - 100 mg/dL   GLUCOSE, POC    Collection Time: 10/27/17  6:55 AM   Result Value Ref Range    Glucose (POC) 138 (H) 65 - 100 mg/dL     Assessment: This is a 57 y. o. who fell striking his head several weeks ago and found to have R SDH, s/p cailin hole evacuation and d/[place] home. Now readmitted on 10/17 d/t subdural fluid collection and R PTX. He continues with mobility and self care impairments. Rehabilitation Plan  Continue PT for a minimum of 1.5 hours a day, at least 5 out of 7 days per week to address bed mobility, transfers, ambulation, strengthening, balance, and endurance. Continue OT for a minimum of 1.5 hours a day, at least 5 out of 7 days per week to address ADL ( bathing, LE dressing, toileting) and adaptive equipment as needed.     Continue 24-hour skilled rehabilitation nursing for bowel and bladder management, skin care for decubitus ulcer prevention , pain management and ongoing medication administration      Continue daily physician medical management:     bilateral SDH/ Bilateral subdural fluid collections   - s/p Bilateral bur hole evacuation of subdural hygroma 10/18/2017  - monitor for recurrent s/s of increased ICP. Monitor for neuroogic deficits. - seizure prophylaxis -  Continue keppra  - Hgb- 13.3 - > 13.3 (10/21)   - 10/23 wound healing well. Benign.  No signs of increased ICP. - 10/27 - wound opern to air.      Essential hypertension   - 10/27 - SBP in good range. HCTZ, aldactone, vasotec. No new changes    Atrial fibrillation - anticoagulation held due to recent SDH. - continue to monitor HR/BP  - continue coreg. Monitor HR.   - 10/27 - BP/ HR acceptable. , no changes.      Tension pneumothorax, treated, resolved  - monitor for s/s.      Pneumonia prophylaxis- Insentive spirometer every hour while awake     DVT risk / DVT Prophylaxis- Will require daily physician exam to assess for signs and symptoms as patient is at increased risk for of thromboembolism. Mobilization as tolerated. Intermittent pneumatic compression devices when in bed Thigh-high or knee-high thromboembolic deterrent hose when out of bed.   - no anticoagulation due to SDH.     Pain Control: no current joint or muscle symptoms, essentially pain-free. Will require regular pain assessment and comprenhensive pain management. - prn perccet, fioricet. Control headache.      Wound Care: Monitor wound status daily per staff and physician. At risk for failure. Will require 24/7 rehab nursing. Keep wound clean and dry      Diabetes mellitus - Uncontrolled. poor glycemic control. Will require daily, close FSG monitoring and medication adjustment to optimize glycemic control in setting of acute illness and hospitalization.   - metformin, linzess  - SSI lispro  - 10/23 - glycemic control good. No new changes. - 10/26 - excellent control.      Urinary retention/ neurogenic bladder - schedule voids q6-8 hrs. Check post-void residual every shift; In and Out catheterize if post-void residual is more than 400 cc.  - 10/27 voiding well. No signs of retention, incontinence.      bowel program - miralax prn. + BM. [place] on CPAP - holding CPAP at admission due to recent tension pneumothorax.  .     Allergic rhinitis/chronic cough - continue mucinex bid     Time spent was 25 minutes with over 1/2 in direct patient care/examination, consultation and coordination of care.     Signed By: Mary Lopez MD     October 27, 2017

## 2017-10-27 NOTE — PROGRESS NOTES
OT Daily Note  Time In 0904   Time Out 1000     Subjective: Patient ready for therapy. Pain: none  Education: Educated patient on walker safety with ADLs  Interdisciplinary Communication: PT  Precautions:  (falls)    Self-Care    Patient stood from chair with RW with S and ambulated to closet with S using RW with verbal cues for safety and retrieved his clothes with S.  Patient then ambulated to bathroom using RW with S and performed toilet transfer with S.  Patient performed toileting with S.  Patient then ambulated to shower using RW with S and performed TTB with S using grab bars and v/c's for technique. Patient bathed with S.  Patient then dried himself. Patient donned briefs with S.  Patient then ambulated to bed using RW with S and finished dressing with S.  Patient performed sink side ADLs while standing with S using RW for technique. Patient then ambulated to chair using RW with S and sat with S.                          Assessment: Patient progressing towards goals. Plan: Cont OT per tx plan.   Kvng Diaz OT

## 2017-10-27 NOTE — PROGRESS NOTES
Problem: Falls - Risk of  Goal: *Absence of Falls  Document [place] Fall Risk and appropriate interventions in the flowsheet. Outcome: Progressing Towards Goal  Fall Risk Interventions:  Mobility Interventions: Patient to call before getting OOB    Mentation Interventions: Door open when patient unattended    Medication Interventions: Teach patient to arise slowly    Elimination Interventions: Call light in reach    History of Falls Interventions:  Investigate reason for fall

## 2017-10-27 NOTE — PROGRESS NOTES
PHYSICAL THERAPY DAILY NOTE  Time In: 1115  Time Out: 4497  Patient Seen For: AM;Therapeutic exercise;Balance activities;Transfer training    Subjective: Pt reported feeling tired today due to lack of good sleep last night. Said that he does not feel any soreness from exercises. Objective: Other (comment) (falls)  GROSS ASSESSMENT Daily Assessment            BED/MAT MOBILITY Daily Assessment            TRANSFERS Daily Assessment   Pt held on to therapist hand for balance, but not for assistance Transfer Type: SPT without device  Transfer Assistance : 4 (Contact guard assistance)  Sit to Stand Assistance: Supervision  Car Transfers: Not tested       GAIT Daily Assessment    Amount of Assistance: 5 (Stand-by assistance)  Distance (ft): 20 Feet (ft) (x2)  Assistive Device: Walker, rolling       STEPS or STAIRS Daily Assessment            BALANCE Daily Assessment            WHEELCHAIR MOBILITY Daily Assessment    Wheelchair Management: Manages left brake;Manages right brake       LOWER EXTREMITY EXERCISES Daily Assessment   Ex #1: HS, marching, hip adduction, hip abduction with blue t-band    Ex #2: Sitting on edge of mat, rolling SB forward for core strengthening and improving dynamic sitting balance     Extremity: Both  Exercise Type #1: Seated lower extremity strengthening  Sets Performed: 1  Reps Performed: 10  Level of Assist: Supervision  Exercise Type #2: Other (comment) (core strengthening/ sitting balance)  Sets Performed: 2  Reps Performed: 10  Level of Assist: Contact guard assistance  Exercise Type #3: Other (comment) (NuStep)  Sets Performed: 1  Reps Performed: 10 (minutes), level 5  Level of Assist: Supervision          Assessment: Pt showed good endurance on NuStep and during exercises. Good control on SB exercises. Returned pt to room to recliner with call bell and needs in reach.   Plan of Care: Continue per PT POC and progress as tolerated    Romelia Akins  10/27/2017

## 2017-10-27 NOTE — PROGRESS NOTES
End Of Shift Functional Summary, Nursing      TOILETING:      TOILET TRANSFER:  Pt requires standby assistance/setup. Pt uses walker. BLADDER:  Pt does not have a nieves catheter that staff manages. Pt does not take medication. Pt is continent. of bladder and voids in toilet  Pt has had 0 bladder accidents during this shift.  (An accident is when the episode is not contained in a brief AND/OR the clothing/linen requires changing/cleaning up.)    BOWEL:  Pt does take medication. Pt is continent of bowel and uses toilet.   No bm this shift

## 2017-10-27 NOTE — PROGRESS NOTES
Subjective \"I have a headache and had to take something strong for it. I don't like to have to do that but my head was hurting badly. \"   Activity UE exercises with 3 lb weighted bar   Strength/Endurance Patient took appropriate rest breaks throughout the session. He was motivated to participate. Balance Sit<->stand:  CGA with RW   Social Interaction Patient was motivated and in good spirits. His friend came to visit and he was sociable and thankful for the visit. Cognitive A&O X4   Comments Patient was assisted to his room and into the recliner chair. He was left with all needs within reach. His friend continued to be present during the session.      Nba Figueroa, CTRS

## 2017-10-27 NOTE — PROGRESS NOTES
PHYSICAL THERAPY DAILY NOTE  Time In: 4820  Time Out: 1435  Patient Seen For: PM;Gait training; Therapeutic exercise    Subjective: Pt reported headache 8/10 at beginning of treatment. Checked with nurse about pain meds. After taking meds and getting up and moving around, pt reported pain had decreased and rated 6/10. Objective: Other (comment) (falls)  GROSS ASSESSMENT Daily Assessment            BED/MAT MOBILITY Daily Assessment            TRANSFERS Daily Assessment           GAIT Daily Assessment   Instructed in gait training for 120 ft, standing rest break, then another 120 ft. Used verbal and tactile cues for posture and picking up feet, and pt was able to self-correct occasionally. After seated rest break, pt ambulated another 120 ft with instruction in the same manner. Amount of Assistance: 4 (Contact guard assistance)  Distance (ft): 120 Feet (ft) (x3)  Assistive Device: Gait belt;Walker, rolling       STEPS or STAIRS Daily Assessment   Instruction given to ascend with RLE, descend with LLE, one step at a time, while using one rail. Also instructed in going up and down ramp 1x, similar instructions as gait training. Steps/Stairs Ambulated (#): 4 (x2)  Level of Assist : 4 (Contact guard assistance)  Rail Use: Right        BALANCE Daily Assessment            WHEELCHAIR MOBILITY Daily Assessment    Wheelchair Management: Manages left brake;Manages right brake       LOWER EXTREMITY EXERCISES Daily Assessment    Extremity: Both  Exercise Type #1: Other (comment) (side-stepping with rail)  Sets Performed: 2  Reps Performed: 10 (steps)  Level of Assist: Contact guard assistance          Assessment: Pt showed fair endurance while ambulating, and was able to verbalize previous instructions for good posture, pace, and foot clearance. Followed instructions well on stairs for which foot to advance first.        Pt to RT immediately following treatment.   Plan of Care: Continue per PT POC and progress as tolerated.     Romelia Akins  10/27/2017

## 2017-10-28 LAB
GLUCOSE BLD STRIP.AUTO-MCNC: 115 MG/DL (ref 65–100)
GLUCOSE BLD STRIP.AUTO-MCNC: 120 MG/DL (ref 65–100)
GLUCOSE BLD STRIP.AUTO-MCNC: 133 MG/DL (ref 65–100)
GLUCOSE BLD STRIP.AUTO-MCNC: 141 MG/DL (ref 65–100)

## 2017-10-28 PROCEDURE — 97150 GROUP THERAPEUTIC PROCEDURES: CPT

## 2017-10-28 PROCEDURE — 74011250637 HC RX REV CODE- 250/637: Performed by: PHYSICAL MEDICINE & REHABILITATION

## 2017-10-28 PROCEDURE — 97530 THERAPEUTIC ACTIVITIES: CPT

## 2017-10-28 PROCEDURE — 82962 GLUCOSE BLOOD TEST: CPT

## 2017-10-28 PROCEDURE — 65310000000 HC RM PRIVATE REHAB

## 2017-10-28 PROCEDURE — 97116 GAIT TRAINING THERAPY: CPT

## 2017-10-28 RX ADMIN — POLYETHYLENE GLYCOL 3350 17 G: 17 POWDER, FOR SOLUTION ORAL at 17:30

## 2017-10-28 RX ADMIN — CARVEDILOL 12.5 MG: 6.25 TABLET, FILM COATED ORAL at 17:30

## 2017-10-28 RX ADMIN — SIMVASTATIN 40 MG: 20 TABLET, FILM COATED ORAL at 21:56

## 2017-10-28 RX ADMIN — ENALAPRIL MALEATE 10 MG: 5 TABLET ORAL at 08:00

## 2017-10-28 RX ADMIN — FLUTICASONE PROPIONATE 1 SPRAY: 50 SPRAY, METERED NASAL at 08:01

## 2017-10-28 RX ADMIN — BUTALBITAL, ACETAMINOPHEN AND CAFFEINE 1 TABLET: 50; 325; 40 TABLET ORAL at 07:57

## 2017-10-28 RX ADMIN — LEVETIRACETAM 500 MG: 500 TABLET, FILM COATED ORAL at 17:31

## 2017-10-28 RX ADMIN — LORATADINE 10 MG: 10 TABLET ORAL at 08:00

## 2017-10-28 RX ADMIN — GUAIFENESIN 600 MG: 600 TABLET, EXTENDED RELEASE ORAL at 21:56

## 2017-10-28 RX ADMIN — GUAIFENESIN 600 MG: 600 TABLET, EXTENDED RELEASE ORAL at 08:00

## 2017-10-28 RX ADMIN — SPIRONOLACTONE 25 MG: 25 TABLET, FILM COATED ORAL at 08:00

## 2017-10-28 RX ADMIN — BACITRACIN ZINC: 500 OINTMENT TOPICAL at 08:01

## 2017-10-28 RX ADMIN — POLYETHYLENE GLYCOL 3350 17 G: 17 POWDER, FOR SOLUTION ORAL at 08:00

## 2017-10-28 RX ADMIN — METFORMIN HYDROCHLORIDE 500 MG: 500 TABLET, FILM COATED ORAL at 17:30

## 2017-10-28 RX ADMIN — LEVETIRACETAM 500 MG: 500 TABLET, FILM COATED ORAL at 08:00

## 2017-10-28 RX ADMIN — OXYCODONE HYDROCHLORIDE AND ACETAMINOPHEN 1 TABLET: 5; 325 TABLET ORAL at 21:56

## 2017-10-28 RX ADMIN — ENALAPRIL MALEATE 10 MG: 5 TABLET ORAL at 17:30

## 2017-10-28 RX ADMIN — METFORMIN HYDROCHLORIDE 500 MG: 500 TABLET, FILM COATED ORAL at 08:00

## 2017-10-28 RX ADMIN — HYDROCHLOROTHIAZIDE 25 MG: 25 TABLET ORAL at 08:00

## 2017-10-28 RX ADMIN — CARVEDILOL 12.5 MG: 6.25 TABLET, FILM COATED ORAL at 08:00

## 2017-10-28 RX ADMIN — OXYCODONE HYDROCHLORIDE AND ACETAMINOPHEN 1 TABLET: 5; 325 TABLET ORAL at 07:57

## 2017-10-28 RX ADMIN — BUTALBITAL, ACETAMINOPHEN AND CAFFEINE 1 TABLET: 50; 325; 40 TABLET ORAL at 21:55

## 2017-10-28 NOTE — PROGRESS NOTES
End Of Shift Functional Summary, Nursing      TOILETING:  Does patient need assist with clothing management and/or pericare? no    TOILET TRANSFER:  Pt requires standby assistance/setup. Pt uses walker. BLADDER:  Pt does not have a nieves catheter that staff manages. Pt does not take medication. Pt is continent. of bladder and voids in toilet   Pt has had 0 bladder accidents during this shift  BOWEL:  Pt does take medication. Pt is continent of bowel and uses toilet. Pt has had 0 bowel accidents during this shift BED/CHAIR TRANSFER  Pt requires standby assistance/setup. Patient requires the assistance of 1 staff member(s).   Pt uses walker        Documentation reviewed and plan of care discussed/reviewed with   oncoming

## 2017-10-28 NOTE — PROGRESS NOTES
OT Daily Note  Time In 0918   Time Out 0951     Subjective: \"I slept like a rock last night. \" patient states   Pain: patient reports he has a slight headache, however, states he is able to participate in therapy  Interdisciplinary Communication: handoff with PTTejas  Precautions:  (falls)    Strength/ROM Daily Assessment   Mr. Moses Severs completed 5 minutes of forward rotation on UE bike, followed by 5 minutes of reverse (backwards) rotation to assist with strength and ROM of BUE. Good participation and ability to increase resistance. Activity Tolerance Daily Assessment   Therapist facilitated standing activity at heightened table. Mr. Moses Severs was instructed to use green resistance clothespin to move rings from lower rung to higher rung while crossing midline. Minimal dynamic balance required for task and occasional use of UE support with hand on table. Therapist provided minimal cuing at beginning of activity for guiding in initiating task. Good tolerance and effort demonstrated. Patient required 1 break to assist with comfort of back. Assessment: Patient was in therapy gym upon arrival. Therapist facilitated activity addressing coordination, activity tolerance, and dynamic standing balance. He was cooperative and demonstrated a good effort in all therapist directed tasks. Plan: Continue with current goals and POC to address improved functional performance and increased independence.     Ashu Taylor, DIO, OTR/L  10/28/17

## 2017-10-28 NOTE — PROGRESS NOTES
PHYSICAL THERAPY DAILY NOTE  Time In: 1000  Time Out: 1050  Patient Seen For: AM;Balance activities;Gait training;Patient education; Therapeutic exercise;Transfer training; Other (see progress notes)    Subjective: patient reporting he is going home next week. Reports he may want to go to OP PT. Reports [facility] is really close to his home. Reports he is still having periodic headaches. No c/o headache or dizziness during treatment         Objective:Vital Signs:  Patient Vitals for the past 12 hrs:   Temp Pulse Resp BP SpO2   10/28/17 0649 98.4 °F (36.9 °C) 93 18 110/78 96 %     Pain level:No c/o pain  Pain location:NA  Pain interventions:NA    Patient education:Bed mobility training,transfer training, gait training, fall precautions, activity pacing, precautions, w/c mobility and parts management      Interdisciplinary Communication:NA    Other (comment) (falls)  GROSS ASSESSMENT Daily Assessment     NA       BED/MAT MOBILITY Daily Assessment    Rolling Right : 0 (Not tested)  Rolling Left : 0 (Not tested)  Supine to Sit : 0 (Not tested)  Sit to Supine : 0 (Not tested)       TRANSFERS Daily Assessment   Increased time and effort to complete with cues for body mechanics   Transfer Type: SPT with walker  Transfer Assistance : 5 (Stand-by assistance)  Sit to Stand Assistance: Supervision  Car Transfers: Not tested       GAIT Daily Assessment    Amount of Assistance: 5 (Stand-by assistance), verbal cues for improving step length and step clearance and increasing gait speed  Distance (ft): 150 Feet (ft)  Assistive Device: Walker, rolling   Gait training x 100ft x 1 in parallel bars facilitating symmetrical step length with improved ankle DF and knee ext at initial contact and improved step length    STEPS or STAIRS Daily Assessment   Single step at a time leading up with RLE and down with LLE, increased time and effort to complete with cues for sequence and improved quad eccentric control going down steps. Steps/Stairs Ambulated (#): 4  Level of Assist : 5 (Stand-by assistance)  Rail Use: Both       BALANCE Daily Assessment    Sitting - Static: Good (unsupported)  Sitting - Dynamic: Good (unsupported)  Standing - Static: Fair  Standing - Dynamic : Impaired       WHEELCHAIR MOBILITY Daily Assessment    Curbs/Ramps Assist Required (FIM Score): 0 (Not tested)  Wheelchair Management: Manages left brake;Manages right brake       LOWER EXTREMITY EXERCISES Daily Assessment    Extremity: Both  Exercise Type #1: Other (comment) (Nu Step x 10 mins at level 3)  Level of Assist: Supervision          Assessment: progressing towards goals       Patient returned to room at end of treatment and remained up in recliner with LEs elevated and with needs in reach. Plan of Care: Continue with POC and progress as tolerated.      Denisa Rai, PT  10/28/2017

## 2017-10-28 NOTE — PROGRESS NOTES
Physical Medicine and Rehabilitation Progress Note    Carolina Crystal  Admit Date: 10/20/2017  Admit Diagnosis: subdural hematoma;Subdural hemorrhage following injury [facility]*  Chief Complaint : Gait dysfunction secondary to below. Admit Diagnosis: Subdural hematoma ([address] [I62.00]  bilateral SDH  Bilateral subdural fluid collections   Bilateral bur hole evacuation of subdural hygroma 10/18/2017  Essential hypertension, benign  Diabetes mellitus (HCC)  Atrial fibrillation (HCC)  Tension pneumothorax, treated, resolved  Pain  DVT risk  Acute Rehab Dx:  Gait impairment/ gait dysfunction  Debility    Mobility and ambulation deficits  Self Care/ADL deficits       Subjective:      Patient seen and examined. vss afebrile. No new neurologic deficits. Slept well. Denies headache, dizziness, vision changes. Family visiting, notes no acute issues. Patient walking at stand-by assistance level, needs verbal cues for improving step length and step clearance.     Objective:     Current Facility-Administered Medications   Medication Dose Route Frequency    bacitracin zinc (BACITRACIN) 500 unit/gram ointment   Topical DAILY    insulin lispro (HUMALOG) injection   SubCUTAneous ACB&D    oxyCODONE-acetaminophen (PERCOCET) 5-325 mg per tablet 1 Tab  1 Tab Oral Q4H PRN    guaiFENesin ER (MUCINEX) tablet 600 mg  600 mg Oral Q12H    acetaminophen (TYLENOL) tablet 650 mg  650 mg Oral Q4H PRN    sodium chloride (NS) flush 5-10 mL  5-10 mL IntraVENous PRN    alum-mag hydroxide-simeth (MYLANTA) oral suspension 30 mL  30 mL Oral Q4H PRN    butalbital-acetaminophen-caffeine (FIORICET, ESGIC) -40 mg per tablet 1 Tab  1 Tab Oral Q6H PRN    carvedilol (COREG) tablet 12.5 mg  12.5 mg Oral BID WITH MEALS    enalapril (VASOTEC) tablet 10 mg  10 mg Oral BID    fluticasone (FLONASE) 50 mcg/actuation nasal spray 1 Spray  1 Spray Both Nostrils DAILY    hydroCHLOROthiazide (HYDRODIURIL) tablet 25 mg  25 mg Oral DAILY    levETIRAcetam (KEPPRA) tablet 500 mg  500 mg Oral BID    linaclotide (LINZESS) capsule 145 mcg (Patient Supplied)  145 mcg Oral DAILY    loratadine (CLARITIN) tablet 10 mg  10 mg Oral DAILY    metFORMIN (GLUCOPHAGE) tablet 500 mg  500 mg Oral BID WITH MEALS    polyethylene glycol (MIRALAX) packet 17 g  17 g Oral BID    simvastatin (ZOCOR) tablet 40 mg  40 mg Oral QHS    spironolactone (ALDACTONE) tablet 25 mg  25 mg Oral DAILY     Allergies   Allergen Reactions    Zithromax [Azithromycin] Other (comments)     Skin dryness/peeling       Visit Vitals    /78 (BP 1 Location: Right arm, BP Patient Position: Sitting)    Pulse 93    Temp 98.4 °F (36.9 °C)    Resp 18    SpO2 96%      Intake and Output:  10/26 1901 - 10/28 0700  In: 480 [P.O.:480]  Out: 1050 [Urine:1050]    Physical Exam:   General: Alert, NAD, ambulating in room with RW       Lungs: Clear to auscultation  bilaterally. Heart: Regular rate and rhythm, no  murmurs    Abdomen: Soft, bowel sounds present. Neuromuscular:      Speech clear. follows simple commands well, consistently. LUE     Shoulder abduction   5-/5              Elbow flexion:   5-/5               Wrist extension:  5 /5              Finger flexion;   5/5                       RUE    Shoulder abduction: 5 /5                Elbow flexion: 5  /5                         Wrist extension:  5/5                        Finger flexion:  5 /5                        LLE     Hip flexion:  5- /5              Knee extension:   5-/5                         Ankle dorsiflexion: 5 /5                        Ankle plantarflexion:5/5                                                              RLE     Hip flexion: 5  /5                        Knee extension:  5- /5                         Ankle dorsiflexion:   5/5                        Ankle plantarflexion:  5 /5  Sensory - intact grossly   Skin/extremity: No rashes, no erythema.   Wound c/d/i     Functional Assessment:         Abiola Urias Fall Risk Assessment:  [place] Fall Risk  Mobility: Ambulates or transfers with assist devices or assistance/unsteady gait (10/27/17 2240)  Mobility Interventions: Patient to call before getting OOB (10/27/17 2240)  Mentation: Alert, oriented x 3 (10/27/17 2240)  Mentation Interventions: Door open when patient unattended;More frequent rounding; Toileting rounds;Update white board (10/27/17 2240)  Medication: Patient receiving anticonvulsants, sedatives(tranquilizers), psychotropics or hypnotics, hypoglycemics, narcotics, sleep aids, antihypertensives, laxatives, or diuretics (10/27/17 2240)  Medication Interventions: Teach patient to arise slowly; Patient to call before getting OOB (10/27/17 2240)  Elimination: Needs assistance with toileting (10/27/17 2240)  Elimination Interventions: Call light in reach; Patient to call for help with toileting needs; Toilet paper/wipes in reach; Toileting schedule/hourly rounds;Urinal in reach (10/27/17 2240)  Prior Fall History: Before admission in past 12 months _home or previous inpatient care) (10/27/17 2240)  History of Falls Interventions: Door open when patient unattended (10/27/17 2240)  Total Score: 4 (10/27/17 2240)  Standard Fall Precautions: Yes (10/26/17 2013)  High Fall Risk: Yes (10/27/17 1637)     Speech Assessment:         Ambulation:  Gait  Distance (ft): 120 Feet (ft) (x3) (10/27/17 1400)  Assistive Device: Gait belt;Walker, rolling (10/27/17 1400)  Rail Use: Right  (10/27/17 1400)     Labs/Studies:  Recent Results (from the past 72 hour(s))   GLUCOSE, POC    Collection Time: 10/25/17 10:58 AM   Result Value Ref Range    Glucose (POC) 114 (H) 65 - 100 mg/dL   GLUCOSE, POC    Collection Time: 10/25/17  3:46 PM   Result Value Ref Range    Glucose (POC) 111 (H) 65 - 100 mg/dL   GLUCOSE, POC    Collection Time: 10/25/17  8:11 PM   Result Value Ref Range    Glucose (POC) 125 (H) 65 - 100 mg/dL   GLUCOSE, POC    Collection Time: 10/26/17  7:19 AM   Result Value Ref Range    Glucose (POC) 125 (H) 65 - 100 mg/dL   GLUCOSE, POC    Collection Time: 10/26/17 11:13 AM   Result Value Ref Range    Glucose (POC) 114 (H) 65 - 100 mg/dL   GLUCOSE, POC    Collection Time: 10/26/17  4:47 PM   Result Value Ref Range    Glucose (POC) 104 (H) 65 - 100 mg/dL   GLUCOSE, POC    Collection Time: 10/26/17  8:33 PM   Result Value Ref Range    Glucose (POC) 131 (H) 65 - 100 mg/dL   GLUCOSE, POC    Collection Time: 10/27/17  6:55 AM   Result Value Ref Range    Glucose (POC) 138 (H) 65 - 100 mg/dL   GLUCOSE, POC    Collection Time: 10/27/17 11:36 AM   Result Value Ref Range    Glucose (POC) 114 (H) 65 - 100 mg/dL   GLUCOSE, POC    Collection Time: 10/27/17  4:12 PM   Result Value Ref Range    Glucose (POC) 113 (H) 65 - 100 mg/dL   GLUCOSE, POC    Collection Time: 10/27/17  8:52 PM   Result Value Ref Range    Glucose (POC) 155 (H) 65 - 100 mg/dL   GLUCOSE, POC    Collection Time: 10/28/17  7:21 AM   Result Value Ref Range    Glucose (POC) 115 (H) 65 - 100 mg/dL     Assessment: This is a 57 y. o. who fell striking his head several weeks ago and found to have R SDH, s/p cailin hole evacuation and d/[place] home. Now readmitted on 10/17 d/t subdural fluid collection and R PTX. He continues with mobility and self care impairments. Rehabilitation Plan  Continue PT for a minimum of 1.5 hours a day, at least 5 out of 7 days per week to address bed mobility, transfers, ambulation, strengthening, balance, and endurance.    Continue OT for a minimum of 1.5 hours a day, at least 5 out of 7 days per week to address ADL ( bathing, LE dressing, toileting) and adaptive equipment as needed.     Continue 24-hour skilled rehabilitation nursing for bowel and bladder management, skin care for decubitus ulcer prevention , pain management and ongoing medication administration      Continue daily physician medical management:     bilateral SDH/ Bilateral subdural fluid collections   - s/p Bilateral bur hole evacuation of subdural hygroma 10/18/2017  - monitor for recurrent s/s of increased ICP. Monitor for neuroogic deficits. - seizure prophylaxis -  Continue keppra  - Hgb- 13.3 - > 13.3 (10/21)   - 10/23 wound healing well. Benign. No signs of increased ICP. - 10/27 - wound opern to air.   - 10/28 -scalp wound healing well. Plan to remove sutures prior to discharge. Continue keppra. CT 10/25- Persistent mixed density bilateral subdural hematomas, stable to slightly smaller when compared with the prior exam.     Essential hypertension   - 10/27 - SBP in good range. HCTZ, aldactone, vasotec. No new changes  - 10/28 - BP fair control. No need for dose change. Atrial fibrillation - anticoagulation held due to recent SDH. - continue to monitor HR/BP  - continue coreg. Monitor HR.   - 10/27 - BP/ HR acceptable. , no changes. Continue Coreg.   - 10/28 -stable exam.      Tension pneumothorax, treated, resolved  - monitor for s/s.   - 10/28 - no new issues.     Pneumonia prophylaxis- Insentive spirometer every hour while awake     DVT risk / DVT Prophylaxis- Will require daily physician exam to assess for signs and symptoms as patient is at increased risk for of thromboembolism. Mobilization as tolerated. Intermittent pneumatic compression devices when in bed Thigh-high or knee-high thromboembolic deterrent hose when out of bed.   - no anticoagulation due to SDH.     Pain Control: no current joint or muscle symptoms, essentially pain-free. Will require regular pain assessment and comprenhensive pain management. - prn perccet, fioricet. Control headache.   - 10/28 - continued to need fioricet for headache control.      Wound Care: Monitor wound status daily per staff and physician. At risk for failure. Will require 24/7 rehab nursing. Keep wound clean and dry      Diabetes mellitus - Uncontrolled. poor glycemic control.  Will require daily, close FSG monitoring and medication adjustment to optimize glycemic control in setting of acute illness and hospitalization.   - metformin, linzess  - SSI lispro  - 10/23 - glycemic control good. No new changes. - 10/26 - excellent control.   - 10/28 good control. Do not need adjustment presently.      Urinary retention/ neurogenic bladder - schedule voids q6-8 hrs. Check post-void residual every shift; In and Out catheterize if post-void residual is more than 400 cc.  - 10/27 voiding well. No signs of retention, incontinence.      bowel program - miralax prn. + BM. [place] on CPAP - holding CPAP at admission due to recent tension pneumothorax. .     Allergic rhinitis/chronic cough - continue mucinex bid     Time spent was 25 minutes with over 1/2 in direct patient care/examination, consultation and coordination of care.     Signed By: Cayetano England MD     October 28, 2017

## 2017-10-28 NOTE — PROGRESS NOTES
Problem: Falls - Risk of  Goal: *Absence of Falls  Document [place] Fall Risk and appropriate interventions in the flowsheet. Outcome: Progressing Towards Goal  Fall Risk Interventions:  Mobility Interventions: Patient to call before getting OOB    Mentation Interventions: Increase mobility, Toileting rounds    Medication Interventions: Patient to call before getting OOB, Teach patient to arise slowly    Elimination Interventions: Patient to call for help with toileting needs, Urinal in reach    History of Falls Interventions: Consult care management for discharge planning        Comments: Patient calls appropriately for assistance.

## 2017-10-28 NOTE — PROGRESS NOTES
End Of Shift Functional Summary, Nursing      TOILETING:  Does patient need assist with clothing management and/or brannon care? No     TOILET TRANSFER:  Pt requires standby assistance/setup. Pt uses walker. BLADDER:  Pt does not have a nieves catheter that staff manages. Pt does not take medication. Pt is continent. of bladder and voids in urinal  Pt requires staff to empty device Pt has had 0 bladder accidents during this shift    (An accident is when the episode is not contained in a brief AND/OR the clothing/linen requires changing/cleaning up.)    BOWEL:  Pt does take medication.      Pt has had 0 bowel accidents during this shift  No bm this shift (An accident is when the episode is not contained in a brief AND/OR the clothing/linen requires changing/cleaning up.)

## 2017-10-29 LAB
GLUCOSE BLD STRIP.AUTO-MCNC: 101 MG/DL (ref 65–100)
GLUCOSE BLD STRIP.AUTO-MCNC: 139 MG/DL (ref 65–100)
GLUCOSE BLD STRIP.AUTO-MCNC: 144 MG/DL (ref 65–100)
GLUCOSE BLD STRIP.AUTO-MCNC: 184 MG/DL (ref 65–100)

## 2017-10-29 PROCEDURE — 82962 GLUCOSE BLOOD TEST: CPT

## 2017-10-29 PROCEDURE — 74011636637 HC RX REV CODE- 636/637: Performed by: PHYSICAL MEDICINE & REHABILITATION

## 2017-10-29 PROCEDURE — 65310000000 HC RM PRIVATE REHAB

## 2017-10-29 PROCEDURE — 74011250637 HC RX REV CODE- 250/637: Performed by: PHYSICAL MEDICINE & REHABILITATION

## 2017-10-29 RX ADMIN — LEVETIRACETAM 500 MG: 500 TABLET, FILM COATED ORAL at 09:47

## 2017-10-29 RX ADMIN — BACITRACIN ZINC: 500 OINTMENT TOPICAL at 09:51

## 2017-10-29 RX ADMIN — CARVEDILOL 12.5 MG: 6.25 TABLET, FILM COATED ORAL at 09:46

## 2017-10-29 RX ADMIN — FLUTICASONE PROPIONATE 1 SPRAY: 50 SPRAY, METERED NASAL at 09:51

## 2017-10-29 RX ADMIN — METFORMIN HYDROCHLORIDE 500 MG: 500 TABLET, FILM COATED ORAL at 09:47

## 2017-10-29 RX ADMIN — GUAIFENESIN 600 MG: 600 TABLET, EXTENDED RELEASE ORAL at 20:49

## 2017-10-29 RX ADMIN — OXYCODONE HYDROCHLORIDE AND ACETAMINOPHEN 1 TABLET: 5; 325 TABLET ORAL at 09:56

## 2017-10-29 RX ADMIN — HYDROCHLOROTHIAZIDE 25 MG: 25 TABLET ORAL at 09:47

## 2017-10-29 RX ADMIN — METFORMIN HYDROCHLORIDE 500 MG: 500 TABLET, FILM COATED ORAL at 18:12

## 2017-10-29 RX ADMIN — ENALAPRIL MALEATE 10 MG: 5 TABLET ORAL at 09:47

## 2017-10-29 RX ADMIN — INSULIN LISPRO 2 UNITS: 100 INJECTION, SOLUTION INTRAVENOUS; SUBCUTANEOUS at 09:59

## 2017-10-29 RX ADMIN — BUTALBITAL, ACETAMINOPHEN AND CAFFEINE 1 TABLET: 50; 325; 40 TABLET ORAL at 09:56

## 2017-10-29 RX ADMIN — CARVEDILOL 12.5 MG: 6.25 TABLET, FILM COATED ORAL at 18:12

## 2017-10-29 RX ADMIN — ENALAPRIL MALEATE 10 MG: 5 TABLET ORAL at 18:12

## 2017-10-29 RX ADMIN — OXYCODONE HYDROCHLORIDE AND ACETAMINOPHEN 1 TABLET: 5; 325 TABLET ORAL at 18:12

## 2017-10-29 RX ADMIN — SIMVASTATIN 40 MG: 20 TABLET, FILM COATED ORAL at 20:49

## 2017-10-29 RX ADMIN — BUTALBITAL, ACETAMINOPHEN AND CAFFEINE 1 TABLET: 50; 325; 40 TABLET ORAL at 18:12

## 2017-10-29 RX ADMIN — LORATADINE 10 MG: 10 TABLET ORAL at 09:47

## 2017-10-29 RX ADMIN — LEVETIRACETAM 500 MG: 500 TABLET, FILM COATED ORAL at 18:11

## 2017-10-29 RX ADMIN — SPIRONOLACTONE 25 MG: 25 TABLET, FILM COATED ORAL at 09:47

## 2017-10-29 NOTE — PROGRESS NOTES
Problem: Falls - Risk of  Goal: *Absence of Falls  Document [place] Fall Risk and appropriate interventions in the flowsheet.    Outcome: Progressing Towards Goal  Fall Risk Interventions:  Mobility Interventions: Patient to call before getting OOB    Mentation Interventions: Increase mobility    Medication Interventions: Patient to call before getting OOB    Elimination Interventions: Patient to call for help with toileting needs    History of Falls Interventions: Consult care management for discharge planning

## 2017-10-29 NOTE — PROGRESS NOTES
Physical Medicine and Rehabilitation Progress Note    Krzysztof Garber  Admit Date: 10/20/2017  Admit Diagnosis: subdural hematoma;Subdural hemorrhage following injury [facility]*  Chief Complaint : Gait dysfunction secondary to below. Admit Diagnosis: Subdural hematoma ([address] [I62.00]  bilateral SDH  Bilateral subdural fluid collections   Bilateral bur hole evacuation of subdural hygroma 10/18/2017  Essential hypertension, benign  Diabetes mellitus (HCC)  Atrial fibrillation (HCC)  Tension pneumothorax, treated, resolved  Pain  DVT risk  Acute Rehab Dx:  Gait impairment/ gait dysfunction  Debility    Mobility and ambulation deficits  Self Care/ADL deficits    Subjective:      Patient seen and examined. vss afebrile. Occasional headaches controlled fairly well with fioricet. No seizures, intact neurologic exam. Continent of B/B. appetite good.      Objective:     Current Facility-Administered Medications   Medication Dose Route Frequency    bacitracin zinc (BACITRACIN) 500 unit/gram ointment   Topical DAILY    insulin lispro (HUMALOG) injection   SubCUTAneous ACB&D    oxyCODONE-acetaminophen (PERCOCET) 5-325 mg per tablet 1 Tab  1 Tab Oral Q4H PRN    guaiFENesin ER (MUCINEX) tablet 600 mg  600 mg Oral Q12H    acetaminophen (TYLENOL) tablet 650 mg  650 mg Oral Q4H PRN    sodium chloride (NS) flush 5-10 mL  5-10 mL IntraVENous PRN    alum-mag hydroxide-simeth (MYLANTA) oral suspension 30 mL  30 mL Oral Q4H PRN    butalbital-acetaminophen-caffeine (FIORICET, ESGIC) -40 mg per tablet 1 Tab  1 Tab Oral Q6H PRN    carvedilol (COREG) tablet 12.5 mg  12.5 mg Oral BID WITH MEALS    enalapril (VASOTEC) tablet 10 mg  10 mg Oral BID    fluticasone (FLONASE) 50 mcg/actuation nasal spray 1 Spray  1 Spray Both Nostrils DAILY    hydroCHLOROthiazide (HYDRODIURIL) tablet 25 mg  25 mg Oral DAILY    levETIRAcetam (KEPPRA) tablet 500 mg  500 mg Oral BID    linaclotide (LINZESS) capsule 145 mcg (Patient Supplied)  145 mcg Oral DAILY    loratadine (CLARITIN) tablet 10 mg  10 mg Oral DAILY    metFORMIN (GLUCOPHAGE) tablet 500 mg  500 mg Oral BID WITH MEALS    polyethylene glycol (MIRALAX) packet 17 g  17 g Oral BID    simvastatin (ZOCOR) tablet 40 mg  40 mg Oral QHS    spironolactone (ALDACTONE) tablet 25 mg  25 mg Oral DAILY     Allergies   Allergen Reactions    Zithromax [Azithromycin] Other (comments)     Skin dryness/peeling       Visit Vitals    /71 (BP 1 Location: Right arm, BP Patient Position: Sitting)    Pulse 95    Temp 97.8 °F (36.6 °C)    Resp 18    SpO2 92%      Intake and Output:  10/27 1901 - 10/29 0700  In: -   Out: 900 [Urine:900]    Physical Exam:   General: Alert, NAD, ambulating in room with RW       Lungs: Clear to auscultation  bilaterally. Heart: Regular rate and rhythm, no  murmurs    Abdomen: Soft, bowel sounds present. Neuromuscular:      Speech clear. follows simple commands well, consistently. LUE     Shoulder abduction   5-/5              Elbow flexion:   5-/5               Wrist extension:  5 /5              Finger flexion;   5/5                       RUE    Shoulder abduction: 5 /5                Elbow flexion: 5  /5                         Wrist extension:  5/5                        Finger flexion:  5 /5                        LLE     Hip flexion:  5- /5              Knee extension:   5/5                         Ankle dorsiflexion: 5 /5                        Ankle plantarflexion:5/5                                                              RLE     Hip flexion: 5  /5                        Knee extension:  5 /5                         Ankle dorsiflexion:   5/5                        Ankle plantarflexion:  5 /5  Sensory - intact grossly   Skin/extremity: No rashes, no erythema.   Wound c/d/i     Functional Assessment:         Blondie Hover Fall Risk Assessment:  [place] Hover Fall Risk  Mobility: Ambulates or transfers with assist devices or assistance/unsteady gait (10/29/17 1437)  Mobility Interventions: Patient to call before getting OOB (10/29/17 1437)  Mentation: Alert, oriented x 3 (10/29/17 1437)  Mentation Interventions: Increase mobility (10/29/17 1437)  Medication: Patient receiving anticonvulsants, sedatives(tranquilizers), psychotropics or hypnotics, hypoglycemics, narcotics, sleep aids, antihypertensives, laxatives, or diuretics (10/29/17 1437)  Medication Interventions: Patient to call before getting OOB (10/29/17 1437)  Elimination: Needs assistance with toileting (10/29/17 1437)  Elimination Interventions: Patient to call for help with toileting needs (10/29/17 1437)  Prior Fall History: Before admission in past 12 months _home or previous inpatient care) (10/29/17 1437)  History of Falls Interventions: Consult care management for discharge planning (10/29/17 1437)  Total Score: 4 (10/29/17 1437)  Standard Fall Precautions: Yes (10/26/17 2013)  High Fall Risk: Yes (10/29/17 1437)     Speech Assessment:         Ambulation:  Gait  Distance (ft): 150 Feet (ft) (10/28/17 1300)  Assistive Device: Walker, rolling (10/28/17 1300)  Rail Use: Both (10/28/17 1300)     Labs/Studies:  Recent Results (from the past 72 hour(s))   GLUCOSE, POC    Collection Time: 10/26/17  8:33 PM   Result Value Ref Range    Glucose (POC) 131 (H) 65 - 100 mg/dL   GLUCOSE, POC    Collection Time: 10/27/17  6:55 AM   Result Value Ref Range    Glucose (POC) 138 (H) 65 - 100 mg/dL   GLUCOSE, POC    Collection Time: 10/27/17 11:36 AM   Result Value Ref Range    Glucose (POC) 114 (H) 65 - 100 mg/dL   GLUCOSE, POC    Collection Time: 10/27/17  4:12 PM   Result Value Ref Range    Glucose (POC) 113 (H) 65 - 100 mg/dL   GLUCOSE, POC    Collection Time: 10/27/17  8:52 PM   Result Value Ref Range    Glucose (POC) 155 (H) 65 - 100 mg/dL   GLUCOSE, POC    Collection Time: 10/28/17  7:21 AM   Result Value Ref Range    Glucose (POC) 115 (H) 65 - 100 mg/dL   GLUCOSE, POC    Collection Time: 10/28/17 11:12 AM   Result Value Ref Range Glucose (POC) 120 (H) 65 - 100 mg/dL   GLUCOSE, POC    Collection Time: 10/28/17  4:07 PM   Result Value Ref Range    Glucose (POC) 141 (H) 65 - 100 mg/dL   GLUCOSE, POC    Collection Time: 10/28/17  8:41 PM   Result Value Ref Range    Glucose (POC) 133 (H) 65 - 100 mg/dL   GLUCOSE, POC    Collection Time: 10/29/17  7:47 AM   Result Value Ref Range    Glucose (POC) 184 (H) 65 - 100 mg/dL   GLUCOSE, POC    Collection Time: 10/29/17 11:05 AM   Result Value Ref Range    Glucose (POC) 144 (H) 65 - 100 mg/dL   GLUCOSE, POC    Collection Time: 10/29/17  3:58 PM   Result Value Ref Range    Glucose (POC) 101 (H) 65 - 100 mg/dL     Assessment: This is a 57 y. o. who fell striking his head several weeks ago and found to have R SDH, s/p cailin hole evacuation and d/[place] home. Now readmitted on 10/17 d/t subdural fluid collection and R PTX. He continues with mobility and self care impairments. Rehabilitation Plan  Continue PT for a minimum of 1.5 hours a day, at least 5 out of 7 days per week to address bed mobility, transfers, ambulation, strengthening, balance, and endurance. Continue OT for a minimum of 1.5 hours a day, at least 5 out of 7 days per week to address ADL ( bathing, LE dressing, toileting) and adaptive equipment as needed.     Continue 24-hour skilled rehabilitation nursing for bowel and bladder management, skin care for decubitus ulcer prevention , pain management and ongoing medication administration      Continue daily physician medical management:     bilateral SDH/ Bilateral subdural fluid collections   - s/p Bilateral bur hole evacuation of subdural hygroma 10/18/2017  - monitor for recurrent s/s of increased ICP. Monitor for neuroogic deficits. - seizure prophylaxis -  Continue keppra  - Hgb- 13.3 - > 13.3 (10/21)   - 10/23 wound healing well. Benign. No signs of increased ICP. - 10/29 - wound healing well.      Essential hypertension / Atrial fibrillation   - anticoagulation held due to recent SDH. - 10/29 - -120s. Good control. HCTZ, aldactone, vasotec at current dose.      Tension pneumothorax, treated, resolved  - monitor for s/s.   - holding CPAP due to concern for recurrence.      Pneumonia prophylaxis- Insentive spirometer every hour while awake     DVT risk / DVT Prophylaxis- Will require daily physician exam to assess for signs and symptoms as patient is at increased risk for of thromboembolism. Mobilization as tolerated. Intermittent pneumatic compression devices when in bed Thigh-high or knee-high thromboembolic deterrent hose when out of bed.   - no anticoagulation due to SDH.     Pain Control: no current joint or muscle symptoms, essentially pain-free. Will require regular pain assessment and comprenhensive pain management. - prn perccet, fioricet. Control headache.      Wound Care: Monitor wound status daily per staff and physician. At risk for failure. Will require 24/7 rehab nursing. Keep wound clean and dry      Diabetes mellitus - Uncontrolled. poor glycemic control. Will require daily, close FSG monitoring and medication adjustment to optimize glycemic control in setting of acute illness and hospitalization.   - metformin, linzess  - SSI lispro  - 10/23 - glycemic control good. No new changes. - 10/29 - excellent control on oral hypoglycemics    Urinary retention/ neurogenic bladder - schedule voids q6-8 hrs. Check post-void residual every shift; In and Out catheterize if post-void residual is more than 400 cc.  - 10/27 voiding well. No signs of retention, incontinence.      bowel program - miralax prn. + BM. [place] on CPAP - holding CPAP at admission due to recent tension pneumothorax. .     Allergic rhinitis/chronic cough - continue mucinex bid     Time spent was 25 minutes with over 1/2 in direct patient care/examination, consultation and coordination of care.     Signed By: Mary Lopez MD     October 29, 2017

## 2017-10-29 NOTE — PROGRESS NOTES
Patient had an uneventful night. Hourly rounds were performed throughout the shift. All needs met at this time. Report given to oncoming nurse.

## 2017-10-29 NOTE — PROGRESS NOTES
End Of Shift Functional Summary, Nursing      TOILETING:  Does patient need assist with clothing management and/or pericare? No    TOILET TRANSFER:  Pt requires standby assistance/setup. Pt uses walker. BLADDER:  Pt does not have a nieves catheter that staff manages. Pt is continent. of bladder and voids in toilet. (An accident is when the episode is not contained in a brief AND/OR the clothing/linen requires changing/cleaning up.)    BOWEL:  Pt does take medication. Pt is continent of bowel and uses toilet. (An accident is when the episode is not contained in a brief AND/OR the clothing/linen requires changing/cleaning up.)    BED/CHAIR TRANSFER  Pt requires standby assistance/setup. Patient requires the assistance of 1 staff member(s). Pt uses walker    BATHING  Pt requires no assistance. SHOWER TRANSFER:  Pt requires standby assistance/setup  Patient requires the assistance of 1 staff member(s). Pt uses shower seat    DRESSING  UPPER BODY:  Pt requires no assistance. LOWER BODY:  Pt requires no assistance. EATING  Pt requires no assistance. Pt does not wear dentures. TUBE FEEDINGS:  Pt does not  receive nutrition through tube feedings. GROOMING  Pt requires no assistance. Documentation reviewed and plan of care discussed/reviewed with   patient assistant during the shift.

## 2017-10-30 LAB
GLUCOSE BLD STRIP.AUTO-MCNC: 113 MG/DL (ref 65–100)
GLUCOSE BLD STRIP.AUTO-MCNC: 126 MG/DL (ref 65–100)
GLUCOSE BLD STRIP.AUTO-MCNC: 137 MG/DL (ref 65–100)
GLUCOSE BLD STRIP.AUTO-MCNC: 149 MG/DL (ref 65–100)

## 2017-10-30 PROCEDURE — 74011250637 HC RX REV CODE- 250/637: Performed by: PHYSICAL MEDICINE & REHABILITATION

## 2017-10-30 PROCEDURE — 97530 THERAPEUTIC ACTIVITIES: CPT

## 2017-10-30 PROCEDURE — 97110 THERAPEUTIC EXERCISES: CPT

## 2017-10-30 PROCEDURE — 97535 SELF CARE MNGMENT TRAINING: CPT

## 2017-10-30 PROCEDURE — 82962 GLUCOSE BLOOD TEST: CPT

## 2017-10-30 PROCEDURE — 97116 GAIT TRAINING THERAPY: CPT

## 2017-10-30 PROCEDURE — 65310000000 HC RM PRIVATE REHAB

## 2017-10-30 RX ADMIN — LEVETIRACETAM 500 MG: 500 TABLET, FILM COATED ORAL at 17:39

## 2017-10-30 RX ADMIN — BACITRACIN ZINC: 500 OINTMENT TOPICAL at 09:00

## 2017-10-30 RX ADMIN — HYDROCHLOROTHIAZIDE 25 MG: 25 TABLET ORAL at 08:35

## 2017-10-30 RX ADMIN — CARVEDILOL 12.5 MG: 6.25 TABLET, FILM COATED ORAL at 16:25

## 2017-10-30 RX ADMIN — ENALAPRIL MALEATE 10 MG: 5 TABLET ORAL at 08:32

## 2017-10-30 RX ADMIN — ENALAPRIL MALEATE 10 MG: 5 TABLET ORAL at 17:38

## 2017-10-30 RX ADMIN — LORATADINE 10 MG: 10 TABLET ORAL at 08:33

## 2017-10-30 RX ADMIN — SPIRONOLACTONE 25 MG: 25 TABLET, FILM COATED ORAL at 08:35

## 2017-10-30 RX ADMIN — POLYETHYLENE GLYCOL 3350 17 G: 17 POWDER, FOR SOLUTION ORAL at 17:39

## 2017-10-30 RX ADMIN — FLUTICASONE PROPIONATE 1 SPRAY: 50 SPRAY, METERED NASAL at 09:00

## 2017-10-30 RX ADMIN — SIMVASTATIN 40 MG: 20 TABLET, FILM COATED ORAL at 21:03

## 2017-10-30 RX ADMIN — BUTALBITAL, ACETAMINOPHEN AND CAFFEINE 1 TABLET: 50; 325; 40 TABLET ORAL at 08:38

## 2017-10-30 RX ADMIN — LEVETIRACETAM 500 MG: 500 TABLET, FILM COATED ORAL at 08:35

## 2017-10-30 RX ADMIN — METFORMIN HYDROCHLORIDE 500 MG: 500 TABLET, FILM COATED ORAL at 16:24

## 2017-10-30 RX ADMIN — METFORMIN HYDROCHLORIDE 500 MG: 500 TABLET, FILM COATED ORAL at 08:34

## 2017-10-30 RX ADMIN — CARVEDILOL 12.5 MG: 6.25 TABLET, FILM COATED ORAL at 08:34

## 2017-10-30 RX ADMIN — BUTALBITAL, ACETAMINOPHEN AND CAFFEINE 1 TABLET: 50; 325; 40 TABLET ORAL at 16:23

## 2017-10-30 RX ADMIN — OXYCODONE HYDROCHLORIDE AND ACETAMINOPHEN 1 TABLET: 5; 325 TABLET ORAL at 21:04

## 2017-10-30 RX ADMIN — GUAIFENESIN 600 MG: 600 TABLET, EXTENDED RELEASE ORAL at 08:35

## 2017-10-30 RX ADMIN — BUTALBITAL, ACETAMINOPHEN AND CAFFEINE 1 TABLET: 50; 325; 40 TABLET ORAL at 21:04

## 2017-10-30 RX ADMIN — GUAIFENESIN 600 MG: 600 TABLET, EXTENDED RELEASE ORAL at 21:04

## 2017-10-30 RX ADMIN — POLYETHYLENE GLYCOL 3350 17 G: 17 POWDER, FOR SOLUTION ORAL at 08:33

## 2017-10-30 NOTE — PROGRESS NOTES
End Of Shift Functional Summary, Nursing      TOILETING:  Does patient need assist with clothing management and/or pericare? No    TOILET TRANSFER:  Pt requires moderate assistance. Pt uses walker. BLADDER:  Pt does not have a nieves catheter that staff manages. Pt does not take medication. Pt is continent. of bladder and voids in toilet   Pt has had 0 bladder accidents during this shift  (An accident is when the episode is not contained in a brief AND/OR the clothing/linen requires changing/cleaning up.)    BOWEL:  Pt does take medication. Pt is continent of bowel and uses toilet. Pt has had 0 bowel accidents during this shift . (An accident is when the episode is not contained in a brief AND/OR the clothing/linen requires changing/cleaning up.)    BED/CHAIR TRANSFER  Pt requires moderate assistance. Patient requires the assistance of 1 staff member(s). Pt uses walker      EATING  Pt requires no assistance. Pt does not wear dentures. TUBE FEEDINGS:  Pt does not  receive nutrition through tube feedings. Documentation reviewed and plan of care discussed/reviewed with   patient assistant during the shift.

## 2017-10-30 NOTE — PROGRESS NOTES
Patient resting up in bed. Alert and oriented with pleasant affect. Moving all extremities. Lung sounds clear. S1S2, bowel sounds active. Light headache, but doesn't want pain medications til later. See MAR. Up to side of bed and recliner. No other verbalized needs made known. See doc flow sheet for further assessments.

## 2017-10-30 NOTE — PROGRESS NOTES
End Of Shift Functional Summary, Nursing      TOILETING:  Does patient need assist with clothing management and/or pericare? Yes: Comment: assistance up to toilet contact guard. TOILET TRANSFER:  Pt requires minimal assistance. Pt uses walker. BLADDER:  Pt does not have a nieves catheter that staff manages. Pt does not take medication. Pt is continent. of bladder and voids in toilet      Pt has had 0 bladder accidents during this shift requiring minimal assistance to clean up.   )    BOWEL:  Pt does take medication. Pt is continent of bowel and uses toilet. ASSISTANCE:[id number]}    BED/CHAIR TRANSFER  Pt requires minimal assistance. Patient requires the assistance of 1 staff member(s). Pt uses walker           EATING  Pt requires no assistance. Pt does not wear dentures. TUBE FEEDINGS:  Pt does not  receive nutrition through tube feedings. Documentation reviewed and plan of care discussed/reviewed with   patient, physician, therapists, oncoming nurse, patient assistant and family/spouse during the shift.

## 2017-10-30 NOTE — PROGRESS NOTES
Problem: Falls - Risk of  Goal: *Absence of Falls  Document Parish Fall Risk and appropriate interventions in the flowsheet.    Outcome: Progressing Towards Goal  Fall Risk Interventions:  Mobility Interventions: Patient to call before getting OOB    Mentation Interventions: Increase mobility    Medication Interventions: Patient to call before getting OOB, Teach patient to arise slowly    Elimination Interventions: Patient to call for help with toileting needs, Toilet paper/wipes in reach, Toileting schedule/hourly rounds, Urinal in reach    History of Falls Interventions: Consult care management for discharge planning

## 2017-10-30 NOTE — PROGRESS NOTES
PHYSICAL THERAPY DAILY NOTE  Time In: 0840  Time Out: 3031  Patient Seen For: AM;Other (see progress notes);Gait training;Transfer training; Therapeutic exercise    Subjective: Patient had a headache. Objective:  Pain meds given  during therapy. Other (comment) (falls)  GROSS ASSESSMENT Daily Assessment            BED/MAT MOBILITY Daily Assessment    Supine to Sit : 0 (Not tested)  Sit to Supine : 0 (Not tested)       TRANSFERS Daily Assessment    Transfer Type: SPT with walker  Transfer Assistance : 5 (Stand-by assistance)  Sit to Stand Assistance: Supervision       GAIT Daily Assessment    Amount of Assistance: 5 (Stand-by assistance)  Distance (ft): 150 Feet (ft)  Assistive Device: Walker, rolling       STEPS or STAIRS Daily Assessment    Level of Assist : 0 (Not tested)       BALANCE Daily Assessment            WHEELCHAIR MOBILITY Daily Assessment            LOWER EXTREMITY EXERCISES Daily Assessment    Extremity: Both  Exercise Type #1: Other (comment) (balance exs in II bars)  Sets Performed: 3  Reps Performed: 20  Level of Assist: Stand-by assistance          Assessment: Patient making progress. Step length improved and hips getting stronger. Still does complain of right knee pain with gait and exs. Plan of Care: Continue with plan of care to reach PT goals. Returned to room with call edmonds at [facility].     Lyly Pantoja, PTA  10/30/2017

## 2017-10-30 NOTE — PROGRESS NOTES
End Of Shift Functional Summary, Nursing      TOILETING:  Does patient need assist with clothing management and/or brannon care? No    TOILET TRANSFER:  Pt requires standby assistance/setup. Pt uses walker. BLADDER:  Pt does not have a nieves catheter that staff manages. Pt does not take medication. Pt is continent. of bladder and voids in toilet or urinal  Pt requires staff to empty device Pt has had 0 bladder accidents during this shift .  (An accident is when the episode is not contained in a brief AND/OR the clothing/linen requires changing/cleaning up.)    BOWEL:  Pt does not take medication. . (An accident is when the episode is not contained in a brief AND/OR the clothing/linen requires changing/cleaning up.)    BED/CHAIR TRANSFER  Pt requires standby assistance/setup. Patient requires the assistance of 1 staff member(s).   Pt uses walker

## 2017-10-30 NOTE — PROGRESS NOTES
OT DISCHARGE REPORT    Time In: 0700  Time Out: 9979    COMPREHENSION MODE Initial Assessment Discharge Assessment 10/30/2017   Score 7 6     EXPRESSION Initial Assessment Discharge Assessment 10/30/2017   Primary Mode of Expression Verbal Verbal   Score 7 7   Comments no difficulty expressing complex thoughts       SOCIAL INTERACTION/ PRAGMATICS Initial Assessment Discharge Assessment 10/30/2017   Score 7 7   Comments pleasant and cooperative       PROBLEM SOLVING Initial Assessment Discharge Assessment 10/30/2017   Score 6 7   Comments extra time for solving more complex problems       MEMORY Initial Assessment Discharge Assessment 10/30/2017   Score 7 7   Comments no difficulty noted with memory       EATING Initial Assessment Discharge Assessment 10/30/2017   Functional Level 7 7   Comments         GROOMING Initial Assessment Discharge Assessment 10/30/2017   Functional Level 5 7   Tasks completed by patient Washed face, Washed hands, Brushed hair Brushed hair;Brushed teeth; Shaved/applied makeup (optional); Washed face; Washed hands   Comments set up assistance       BATHING Initial Assessment Discharge Assessment 10/30/2017   Functional Level 4 6   Body parts patient bathed Abdomen, Arm, left, Arm, right, Buttocks, Chest, Lower leg and foot, left, Lower leg and foot, right, Opal area, Thigh, left, Thigh, right Abdomen;Arm, left;Arm, right;Buttocks; Lower leg and foot, left; Chest;Lower leg and foot, right;Opal area; Thigh, left; Thigh, right   Comments steadying assistance when standing for safety tub bench to wash feet     TUB/SHOWER TRANSFER INDEPENDENCE Initial Assessment Discharge Assessment 10/30/2017   Score 4 6  Type of Shower: Shower  Adaptive  Equipment:Tub transfer bench, Grab bars and Walker   Comments CGA using rolling walker, TTB and grab bars grab bars     UPPER BODY DRESSING/UNDRESSING Initial Assessment Discharge Assessment 10/30/2017   Functional Level 4 7   Items applied/Steps completed Pullover (4 steps) Pullover (4 steps)   Comments assistance to fully pull shirt down in back       LOWER BODY DRESSING/UNDRESSING Initial Assessment Discharge Assessment 10/30/2017   Functional Level 2 6   Items applied/Steps completed Fasten shoe (1 step), Underpants (3 steps), Elastic waist pants (3 steps) Shoe, left (1 step); Shoe, right (1 step); Underpants (3 steps); Elastic waist pants (3 steps); Fasten shoe (1 step); Sock, left (1 step); Sock, right (1 step)   Comments patient completed 50% of task Elastic laces. TOILETING Initial Assessment Discharge Assessment 10/30/2017   Functional Level 4 7   Comments steadying assistance for pants management       TOILET TRANSFER INDEPENDENCE Initial Assessment Discharge Assessment 10/30/2017   Transfer score 4 6   Comments CGA, rolling walker grab bars     Plan of Care: Please see Care Plan for progress towards goals during stay  Precautions at Discharge: Falls  Discharge Location: Home with spouse  Family Training: To be completed tomorrow at 9:15 with PT, may also complete OT ADL's at patients request.    Recommended Continuing Therapy: Home Health OT  Residual Deficits: Decreased balance and activity tolerance    Summary of Session: S: \"I am feeling good, long weekend though. \" Agreeable to therapy. Focus of session was on morning ADL routine and discharge assessment. Patient was able to ambulate ~25 feet using a RW with modified independence. Pain not indicated during session. Patient tolerated session well. Patient ended session in recliner with call remote, breakfast, and phone within reach.      Semaj Elkins OTR

## 2017-10-30 NOTE — PROGRESS NOTES
Problem: Falls - Risk of  Goal: *Absence of Falls  Document [place] Fall Risk and appropriate interventions in the flowsheet.    Outcome: Progressing Towards Goal  Fall Risk Interventions:  Mobility Interventions: Patient to call before getting OOB    Mentation Interventions: Increase mobility, Door open when patient unattended, Evaluate medications/consider consulting pharmacy    Medication Interventions: Patient to call before getting OOB, Evaluate medications/consider consulting pharmacy    Elimination Interventions: Patient to call for help with toileting needs, Call light in reach    History of Falls Interventions: Consult care management for discharge planning, Door open when patient unattended, Evaluate medications/consider consulting pharmacy

## 2017-10-30 NOTE — PROGRESS NOTES
PHYSICAL THERAPY DISCHARGE SUMMARY    Time in: 1300  Time out: 1345     Precautions at discharge: Other (comment) (fall risk)    Problem List:    Decreased strength B LE  [x]     Decreased strength trunk/core  [x]     Decreased AROM   []     Decreased PROM  []     Decreased balance sitting  []     Decreased balance standing  [x]     Decreased endurance  [x]     Pain  []       Functional Limitations:   Decreased independence with bed mobility  []     Decreased independence with functional transfers  []     Decreased independence with ambulation  []     Decreased independence with stair negotiation  []            Outcome Measures: Vital Signs:   Patient Vitals for the past 8 hrs:   Temp Pulse Resp BP SpO2   10/30/17 0741 97.5 °F (36.4 °C) 76 18 136/79 94 %         Pain level: No pain reported. Pain location: n/a  Pain interventions: n/a    Patient education: Educated patient on safety in home environment. Interdisciplinary Communication: Communicated with Sydni Preciado regarding patient's POC.        MMT Initial Asssessment   Right Lower Extremity Left Lower Extremity   Hip Flexion 4 4   Knee Extension 4+ 4+   Knee Flexion 4+ 4+   Ankle Dorsiflexion 4+ 4+      MMT Discharge Assessment   Right Lower Extremity Left Lower Extremity   Hip Flexion 4+ 4+   Knee Extension 4+ 4+   Knee Flexion 4+ 4+   Ankle Dorsiflexion 4+ 4+   0/5 No palpable muscle contraction  1/5 Palpable muscle contraction, no joint movement  2-/5 Less than full range of motion in gravity eliminated position  2/5 Able to complete full range of motion in gravity eliminated position  2+/5 Able to initiate movement against gravity  3-/5 More than half but not full range of motion against gravity  3/5 Able to complete full range of motion against gravity  3+/5 Completes full range of motion against gravity with minimal resistance  4-/5 Completes full range of motion against gravity with minimal-moderate resistance  4/5 Completes full range of motion against gravity with moderate resistance  4+/5 Completes full range of motion against gravity with moderate-maximum resistance  5/5 Completes full range of motion against gravity with maximum resistance     AROM: WFL    FIM SCORES Initial Assessment Discharge Assessment   Bed/Chair/Wheelchair Transfers 5 6   Wheelchair Mobility 0  Not tested secondary to patient's primary mode of locomotion is ambulation   Walking [place] 5 6   Steps/Stairs 5 6   PRIMARY MODE OF LOCOMOTION: Ambulation  Please see IRC Interdisciplinary Eval: Coordination/Balance Section for details regarding FIM score description.     BED/CHAIR/WHEELCHAIR TRANSFERS Initial Assessment Discharge Assessment   Rolling Right 6 (Modified independent) 0 (Not tested)   Rolling Left 6 (Modified independent) 0 (Not tested)   Supine to Sit 5 (Supervision) 7 (Independent)   Sit to Stand Modified independent Modified independent   Sit to Supine 5 (Supervision) 7 (Independent)   Transfer Assist Score 5 6   Transfer Type SPT with walker SPT with walker   Comments       Car Transfer Not tested Not tested   Car Type           WHEELCHAIR MOBILITY/MANAGEMENT Initial Assessment Discharge Assessment   Able to Prop[place]       Functional Level 0     Curbs/ramps assistance required 0 (Not tested)  0 (Not tested- patient's primary mode of locomotion is ambulation.)   Wheelchair set up assistance required       Wheelchair management Manages left brake, Manages right brake         WALKING INDEPENDENCE Initial Assessment Discharge Assessment   Assistive device Walker, rolling Walker, rolling   Ambulation assistance - level surface 5 (Supervision) 6 (Modified Independent)   Distance 150 Feet (ft) 250 Feet (ft) (x1, 150' x1)   Functional Level 5 6   Comments Patient ambulated with decreased step clearance B LE Patient ambulated with decreased step clearance B LE   Ambulation assistance - unlevel surface 0 (Not tested) 6 (Modified Independent)       STEPS/STAIRS Initial Assessment Discharge Assessment   Steps/Stairs ambulated  (4) 12   Rail Use Both Both   Functional Level 5 6   Comments Patient ascended stairs with reciprocal pattern, descended steps with one step at a time pattern, leading with R LE. Patient ascended stairs with reciprocal pattern, descended steps with one step at a time pattern, leading with R LE. Curbs/Ramps 0 (Not tested) 6 (Modified independent)       QUALITY INDICATOR ASSIST COMMENTS   Walk 10 feet MOD I    Walk 50 feet with 2 turns MOD I    Walk 150 feet MOD I    Walk 10 feet on uneven  MOD I    1 step/curb MOD I    4 steps MOD I    12 steps MOD I     object MOD I    Wheel 50' w/2 turns NT Patient's primary mode of locomotion is ambulation. Wheel 150' NT      Patient returned to room sitting in recliner with all needs in reach. PHYSICAL THERAPY PLAN OF CARE    LTGs: For updated LTG's please see Care Plan. Pt would benefit from continued skilled physical therapy in order to improve independent functional mobility within the home with use of least restrictive device. Interventions may include range of motion (AROM, PROM B LE/trunk), motor function (B LE/trunk strengthening/coordination), activity tolerance (vitals, oxygen saturation levels), bed mobility training, balance activities, gait training (progressive ambulation program), and functional transfer training. HEP handout: Given to patient on 10/30/17. Therapy Recommendations upon discharge: Summer Avalos needs at discharge:    None. Please see IRC; Interdisciplinary Eval, Care Plan, and Patient Education for further information regarding physical therapy discharge summary and plan of care.      Burak Turcios  10/30/2017

## 2017-10-30 NOTE — PROGRESS NOTES
OT Daily Note  Time In 1031   Time Out 1115     Subjective: Patient said that he is looking forward to going home tomorrow. Pain: none  Education: Education on technique with therapy tasks  Interdisciplinary Communication: PT  Precautions:  (falls)    Activity Tolerance    Patient tolerated UBE x 15 minutes x minimal resistance, going in fwd/bkwd direction(s) with 0 rest breaks, working on B UE strength/endurance. Patient performed workbench activity, fastening/unfastening nuts/botls with min verbal cues for technique, working on B UE reach/endurance, cognition, and visual motor skills. Pt to room via wheelchair. Patient performed wheelchair to chair transfer with modified independence. Assessment: Patient has steadily progressed with therapy tasks.   Plan: Cont OT per tx plan  Harry Winters, OT

## 2017-10-31 ENCOUNTER — HOME HEALTH ADMISSION (OUTPATIENT)
Dept: HOME HEALTH SERVICES | Facility: HOME HEALTH | Age: 62
End: 2017-10-31
Payer: COMMERCIAL

## 2017-10-31 VITALS
HEART RATE: 99 BPM | SYSTOLIC BLOOD PRESSURE: 152 MMHG | DIASTOLIC BLOOD PRESSURE: 102 MMHG | OXYGEN SATURATION: 96 % | RESPIRATION RATE: 18 BRPM | TEMPERATURE: 97.6 F

## 2017-10-31 LAB
GLUCOSE BLD STRIP.AUTO-MCNC: 124 MG/DL (ref 65–100)
GLUCOSE BLD STRIP.AUTO-MCNC: 146 MG/DL (ref 65–100)

## 2017-10-31 PROCEDURE — 74011250637 HC RX REV CODE- 250/637: Performed by: PHYSICAL MEDICINE & REHABILITATION

## 2017-10-31 PROCEDURE — 97530 THERAPEUTIC ACTIVITIES: CPT

## 2017-10-31 PROCEDURE — 97116 GAIT TRAINING THERAPY: CPT

## 2017-10-31 PROCEDURE — 82962 GLUCOSE BLOOD TEST: CPT

## 2017-10-31 PROCEDURE — 97535 SELF CARE MNGMENT TRAINING: CPT

## 2017-10-31 PROCEDURE — 97110 THERAPEUTIC EXERCISES: CPT

## 2017-10-31 RX ORDER — BUTALBITAL, ACETAMINOPHEN AND CAFFEINE 50; 325; 40 MG/1; MG/1; MG/1
1 TABLET ORAL
Qty: 40 TAB | Refills: 0 | Status: SHIPPED | OUTPATIENT
Start: 2017-10-31 | End: 2017-12-12

## 2017-10-31 RX ORDER — LORATADINE 10 MG/1
10 TABLET ORAL DAILY
Qty: 30 TAB | Refills: 0 | Status: SHIPPED | OUTPATIENT
Start: 2017-11-01 | End: 2019-10-02

## 2017-10-31 RX ORDER — OXYCODONE AND ACETAMINOPHEN 5; 325 MG/1; MG/1
1 TABLET ORAL
Qty: 40 TAB | Refills: 0 | Status: SHIPPED | OUTPATIENT
Start: 2017-10-31 | End: 2017-11-10

## 2017-10-31 RX ORDER — LEVETIRACETAM 500 MG/1
500 TABLET ORAL 2 TIMES DAILY
Qty: 60 TAB | Refills: 1 | Status: SHIPPED | OUTPATIENT
Start: 2017-10-31 | End: 2017-12-12

## 2017-10-31 RX ADMIN — SPIRONOLACTONE 25 MG: 25 TABLET, FILM COATED ORAL at 08:15

## 2017-10-31 RX ADMIN — FLUTICASONE PROPIONATE 1 SPRAY: 50 SPRAY, METERED NASAL at 08:17

## 2017-10-31 RX ADMIN — HYDROCHLOROTHIAZIDE 25 MG: 25 TABLET ORAL at 08:15

## 2017-10-31 RX ADMIN — GUAIFENESIN 600 MG: 600 TABLET, EXTENDED RELEASE ORAL at 08:14

## 2017-10-31 RX ADMIN — ENALAPRIL MALEATE 10 MG: 5 TABLET ORAL at 08:16

## 2017-10-31 RX ADMIN — METFORMIN HYDROCHLORIDE 500 MG: 500 TABLET, FILM COATED ORAL at 08:15

## 2017-10-31 RX ADMIN — BACITRACIN ZINC: 500 OINTMENT TOPICAL at 08:18

## 2017-10-31 RX ADMIN — CARVEDILOL 12.5 MG: 6.25 TABLET, FILM COATED ORAL at 08:14

## 2017-10-31 RX ADMIN — POLYETHYLENE GLYCOL 3350 17 G: 17 POWDER, FOR SOLUTION ORAL at 08:13

## 2017-10-31 RX ADMIN — LEVETIRACETAM 500 MG: 500 TABLET, FILM COATED ORAL at 08:14

## 2017-10-31 RX ADMIN — LORATADINE 10 MG: 10 TABLET ORAL at 08:16

## 2017-10-31 NOTE — PROGRESS NOTES
10/31/17 0909   Time Spent With Patient   Time In 0814   Time Out 0901   Patient Seen For: AM;ADLs   Grooming   Grooming Assistance  Mod I   Upper Body Bathing   Bathing Assistance, Upper Mod I   Lower Body Bathing   Bathing Assistance, Lower  Mod I   Upper Body Dressing    Dressing Assistance  Mod I   Lower Body Dressing    Dressing Assistance  Mod I   Functional Transfers   Tub or Shower Type Shower   Amount of Assistance Required Mod I   Adaptive Equipment Tub transfer bench;Walker (comment);Grab bars   S: \"We may end up replacing that tub anyway. \"   O: Patient presented seated up in recliner on arrival to room. Agreeable to treatment. Patient completed ADL; see above for FIMs. Discussed home environment considerations including shower chair in large jacuzzi tub, grab bar installation, non-skid mat, removal of throw rugs, and use of night lights. Patient reports he may end up replacing large jacuzzi tub with a walk-in shower. Wife present at end of session and verbalized/demonstrated understanding of all education. A: Patient is on track for DC. Patient tolerated session well with no complaint of pain. P: DC to home today with supervision/assistance from wife as necessary. Patient was left seated up in recliner with call bell/all other needs in reach. Wife present. Interdisciplinary communication: Collaborated with PT and confirmed that patient is on track for DC.       Sarah Dill MS, OTR/L  10/31/2017

## 2017-10-31 NOTE — PROGRESS NOTES
[address]  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     1. I certify that the patient needs intermittent care as follows: physical therapy: strengthening, balance training and pt/caregiver education  occupational therapy:  ADL safety (ie. cooking, bathing, dressing) and pt/caregiver education    2. I certify that this patient is homebound, that is: 1) patient requires the use of a walker device, special transportation, or assistance of another to leave the home; or 2) patient's condition makes leaving the home medically contraindicated; and 3) patient has a normal inability to leave the home and leaving the home requires considerable and taxing effort. Patient may leave the home for infrequent and short duration for medical reasons, and occasional absences for non-medical reasons. Homebound status is due to the following functional limitations:     3. I certify that this patient is under my care and that I, or a nurse practitioner or  [id number], or clinical nurse specialist, or certified nurse midwife, working with me, had a Face-to-Face Encounter that meets the physician Face-to-Face Encounter requirements.   The following are the clinical findings from the [address] [place] [address] encounter that support the need for skilled services and is a summary of the encounter: SEE MEDICAL RECORD    See attached progess note      JENNIFER Pineda  10/31/2017      THE FOLLOWING TO BE COMPLETED BY THE COMMUNITY PHYSICIAN:    I concur with the findings described above from the [facility] encounter that this patient is homebound and in need of a skilled service.     Certifying Physician: _____________________________________      Printed Certifying Physician Name: _____________________________________    Date: _________________

## 2017-10-31 NOTE — PROGRESS NOTES
Pt D/C summary completed on 10/31/17. Please see for specifics in [address] Patient expected to be d/c'd to home on this date.   Dillon Hernandez, CTRS

## 2017-10-31 NOTE — PROGRESS NOTES
PHYSICAL THERAPY DAILY NOTE  Time In: 3822  Time Out: 1006  Patient Seen For: AM;Gait training; Other (see progress notes); Family training;Transfer training; Therapeutic exercise    Subjective: Patient had no complaints. Objective: Wife present for training and seemed very pleased with his progress. Other (comment) (falls)  GROSS ASSESSMENT Daily Assessment            BED/MAT MOBILITY Daily Assessment    Supine to Sit : 7 (Independent)  Sit to Supine : 7 (Independent)       TRANSFERS Daily Assessment    Transfer Type: SPT with walker  Transfer Assistance : 5 (Stand-by assistance)  Sit to Stand Assistance: Modified independent  Car Type: demonstrated to wife and patient       GAIT Daily Assessment    Amount of Assistance: 5 (Stand-by assistance)  Distance (ft): 250 Feet (ft)  Assistive Device: Walker, rolling;Gait belt       STEPS or STAIRS Daily Assessment    Steps/Stairs Ambulated (#): 12  Level of Assist : 4 (Contact guard assistance)  Rail Use: Both       BALANCE Daily Assessment            WHEELCHAIR MOBILITY Daily Assessment            LOWER EXTREMITY EXERCISES Daily Assessment    Extremity: Both  Exercise Type #1: Standing lower extremity strengthening  Sets Performed: 1  Reps Performed: 20  Level of Assist: Stand-by assistance          Assessment: Patient making good progress. Plan of Care: Continue with plan of care with follow up homehealth PT .     Lucia Middleton, PTA  10/31/2017

## 2017-10-31 NOTE — PROGRESS NOTES
Patient resting up in recliner. Patient states he slept in recliner most of the night. Complaint of light headache. See MAR. Lung sounds clear. S1S2, bowel sounds active. Sutures to scalp intact and open to air. Assisted up to toilet with walker. Tolerated well. Plans for discharge today. Assisted back to recliner for breakfast tray. No other verbalized needs made known at present time. See doc flow sheet for further assessments.

## 2017-10-31 NOTE — PROGRESS NOTES
Discharge instructions completed with patient and patient's  wife. Follow up appointments and prescriptions given to patient. Patient shows verbal understanding of discharge instructions. No other verbalized needs made known at present time. Sutures removed from scalp by Dr. Geronimo Rivero prior to discharge.

## 2017-10-31 NOTE — PROGRESS NOTES
Patient transferred from the bed to the recliner with assistance during the night and slept intermittently without complaint. Hourly rounds were performed throughout the shift. All needs met at this time. Report given to oncoming nurse.

## 2017-10-31 NOTE — PROGRESS NOTES
Problem: Falls - Risk of  Goal: *Absence of Falls  Document [place] Fall Risk and appropriate interventions in the flowsheet.    Outcome: Progressing Towards Goal  Fall Risk Interventions:  Mobility Interventions: Patient to call before getting OOB    Mentation Interventions: Increase mobility    Medication Interventions: Patient to call before getting OOB, Evaluate medications/consider consulting pharmacy    Elimination Interventions: Patient to call for help with toileting needs    History of Falls Interventions: Consult care management for discharge planning

## 2017-10-31 NOTE — PROGRESS NOTES
Care Management Interventions  PCP Verified by CM: Yes Shantal Pérez MD)  Mode of Transport at Discharge: Other (see comment) (FAMILY CAR)  Transition of Care Consult (CM Consult): Home Health (PT AND OT)  [address]: Yes  Discharge Durable Medical Equipment: No  Physical Therapy Consult: Yes  Occupational Therapy Consult: Yes  Speech Therapy Consult: No  Current Support Network: Lives with Spouse, Own Home ([address]en [phone])  Confirm Follow Up Transport: Family  Plan discussed with Pt/Family/Caregiver: Yes  Freedom of Choice Offered: Yes  Discharge Location  Discharge Placement: Home with home health [facility])    [address]. REFERRAL MADE TO [facility].   FAMILY TRAINING THIS AM.

## 2017-10-31 NOTE — DISCHARGE SUMMARY
Jessica Castillo MD  Medical Director  [address] [place] [address], [address] [facility]  Tel: [phone]       REHABILITATION DISCHARGE SUMMARY     Patient: Rudy Samano MRN: [id number]  SSN: [id number]    YOB: 1955  Age: 57 y.o. Sex: male      Date: 10/31/2017  Admit Date: 10/20/2017  Discharge Date: 10/31/2017    Admitting Physician: John Desir MD   Primary Care Physician: Aiden Truong MD     Admission Condition: good    Chief Complaint : Gait dysfunction secondary to below. Admit Diagnosis: Subdural hematoma ([address] [I62.00]  bilateral SDH  Bilateral subdural fluid collections   Bilateral bur hole evacuation of subdural hygroma 10/18/2017  Essential hypertension, benign  Diabetes mellitus (HCC)  Atrial fibrillation (HCC)  Tension pneumothorax, treated, resolved  Pain  DVT risk  Acute Rehab Dx:  Gait impairment/ gait dysfunction  Debility    Mobility and ambulation deficits  Self Care/ADL deficits    HPI: [facility] a 57 y. o. male patient at [address] [place] [address] was admitted on 10/17/2017  by Fermín Pina below mentioned medical history ,is being seen for Physical Medicine and Rehabilitation.    Yuri Fairmount fell and hit his head several weeks ago has had fell confusion, difficulty with balance, walking . He had a CT brain scan confirmed a large right-sided subacute subdural hematoma with acute on chronic features with mass effect and shift. A smaller fluid collection was present on the left. He was taken to the [facility] underwent cailin hole evacuation of his right-sided subdural hematoma with reexpansion of the right hemisphere and was discharged home. He was again brought to the ER with shortness of breath and chest pain subsequently, was found to have a large right tension pneumothorax which required chest tube placement and was discharged again. He is again brought to the ER on 10/17/2017 with ataxia and difficulty walking.  He was found to have on CT bilateral fluid collection on the subdural space with enlargement of the left-sided collection. He was admitted and underwent a bilateral bur hole evacuation of subdural hygroma per Dr. Екатерина Haskins. Louana Osler, MD. On 10/18/2017. The patient's post operative course has been fairly tolerated at ICU. Patient still requiring a drain. Noted pneumocephalus, most pronounced in the anterior frontal lobes in follow up CT. Post operatively patient still noted to have difficulty with balance and gait. Patient continuing to work with acute PT, OT.  Patient requires min/ mod assist for mobility and transfers at his time. Sanjay Burr seen and examined today. Medical Records reviewed. Patient is independent and active at his baseline without any major debilities.    Physical therapy was initiated and patient was starting to mobilize. However, she shows significant functional deficits due to weakness, impaired balance. Our service was consulted for rehab needs and we recommended inpatient hospital rehabilitation is reasonable and necessary due to ongoing acute medical issues which have not changed since initial pre-admission evaluation. and rehab needs still likely best managed in IRU setting. The patient was evaluated by [facility] admissions coordinators. I reviewed the preadmission screening and have approved for admission to the [facility]. The patient was cleared for transfer to rehab today. Patient continues to have ongoing  medical issues outlined above requiring physician medical supervision and functional deficits which would benefit from continued intensive therapies. Patient did well in acute rehab floor as his functional mobility, balance, motor strength improved. He continued to have mild daily headaches needing control. Patient showed no clinical signs of recurrent increase in ICP or new bleed, stroke. He had a follow up CT which showed stable residual post op changes.  He has reached his short term rehab goals a dn is discharged to [facility] supervision. Rehabiitation Course:     Home Architecture: Home Situation  Home Environment: Private residence (10/21/17 1256)  # Steps to Enter: 7 (10/21/17 135)  Rails to Enter: Yes (10/21/17 135)  Hand Rails : Bilateral (10/21/17 135)  One/Two Story Residence: Two story, live on 1st floor (10/21/17 1256)  # of Interior Steps: 10 (10/21/17 135)  Height of Each Step (in): 8 inches (10/21/17 135)  Interior Rails: Both (10/21/17 135)  Living Alone: No (10/21/17 1351)  Support Systems: Child(titi); Spouse/Significant Other/Partner (10/21/17 125)  Patient Expects to be Discharged to[de-identified] Private residence (10/21/17 125)  Current DME Used/Available at Home: Tub transfer bench;Walker, rolling (10/21/17 125)  Tub or Shower Type: Shower (10/31/17 0909)     Past Medical History:   Diagnosis Date    A-fib [facility])      cardioversion    Arteriosclerosis     Atrial fibrillation ([facility] 1/3/2012    Diabetes mellitus     pharmacologically controlled    Diabetes mellitus ([facility] 1/3/2012    Essential hypertension, benign 2014    Family history of malignant neoplasm of prostate 2014    Hemochromatosis     HTN (hypertension) 2014    Hypercholesteremia     Nodular prostate without urinary obstruction 2014    Obesity 2014      Past Surgical History:   Procedure Laterality Date    HX UROLOGICAL      prostate u/s and BX    KNEE ARTHROSCP HARV      right      Family History   Problem Relation Age of Onset    Heart Attack Father 61         Diabetes Father     Heart Disease Father     Hypertension Father     Cancer Father      lung    Other Mother      Sarcoidosis    Stroke Brother     Psychiatric Disorder Brother     Heart Disease Paternal Grandmother       Social History   Substance Use Topics    Smoking status: Former Smoker     Packs/day: 1.50     Years: 18.00     Types: Cigarettes     Quit date: 1989    Smokeless tobacco: Former User     Quit date: 12/29/2016    Alcohol use 0.0 oz/week      Comment: has lessened his alcohol intake since learning about his abnormal liver labs       Allergies   Allergen Reactions    Zithromax [Azithromycin] Other (comments)     Skin dryness/peeling       Prior to Admission medications    Medication Sig Start Date End Date Taking? Authorizing Provider   dabigatran etexilate (PRADAXA) 150 mg capsule Take 150 mg by mouth two (2) times a day. Historical Provider   traMADol (ULTRAM) 50 mg tablet Take 50 mg by mouth every six (6) hours as needed for Pain. Daisy Kramer MD   linaclotide Brandan Mary Ellen) 145 mcg cap capsule Take 1 Cap by mouth Daily (before breakfast). 9/25/17   HAWA Luna   HYDROcodone-acetaminophen (NORCO) 7.5-325 mg per tablet Take 1 Tab by mouth every eight (8) hours as needed (breakthrough pain). Max Daily Amount: 3 Tabs. 9/14/17   Tim Willis DO   polyethylene glycol (MIRALAX) 17 gram/dose powder Take 17 g by mouth two (2) times a day. Historical Provider   simvastatin (ZOCOR) 40 mg tablet Take 1 Tab by mouth nightly. 5/1/17   Sanjeev Gongora MD   enalapril (VASOTEC) 10 mg tablet Take 1 Tab by mouth two (2) times a day. 4/6/17   Sanjeev Gongora MD   metFORMIN (GLUCOPHAGE) 500 mg tablet Take 1 Tab by mouth two (2) times daily (with meals). 4/6/17   Sanjeev Gongora MD   hydroCHLOROthiazide (HYDRODIURIL) 25 mg tablet Take 1 Tab by mouth daily. 4/6/17   Sanjeev Gongora MD   triamcinolone (NASACORT AQ) 55 mcg nasal inhaler 2 Sprays daily. Historical Provider   carvedilol (COREG) 25 mg tablet Take 1 Tab by mouth two (2) times daily (with meals). 1/31/17   Timothy Aden MD   spironolactone (ALDACTONE) 25 mg tablet Take 1 Tab by mouth daily. 1/31/17   Timothy Aden MD   loratadine (CLARITIN) 10 mg tablet Take 10 mg by mouth. Historical Provider   FLUTICASONE PROPIONATE (FLONASE NA) 2 Sprays by Nasal route daily.     Historical Provider B.infantis-B.ani-B.long-B.bifi (PROBIOTIC 4X) 10-15 mg TbEC Take 2 Caps by mouth daily. Historical Provider   clotrimazole-betamethasone (LOTRISONE) topical cream Apply to affected area bid as directed 3/5/15   Viji Diamond MD   MULTIVITS,CA,MIN/IRON/FA/LYCOP (CENTRUM ULTRA MEN'S PO) Take 1 Tab by mouth daily. Historical Provider   omega-3 fatty acids-vitamin e (FISH OIL) 1,000 mg cap Take 1 Cap by mouth two (2) times a day. 1200 mg daily twice daily    Historical Provider   cpap machine kit nightly.  14-18 cm pressure     Historical Provider       Current Medications:  Current Facility-Administered Medications   Medication Dose Route Frequency Provider Last Rate Last Dose    bacitracin zinc (BACITRACIN) 500 unit/gram ointment   Topical DAILY Princess Obando MD        insulin lispro (HUMALOG) injection   SubCUTAneous ACB&D Thelma Mckeon MD   Stopped at 10/29/17 1814    oxyCODONE-acetaminophen (PERCOCET) 5-325 mg per tablet 1 Tab  1 Tab Oral Q4H PRN Thelma Mckeon MD   1 Tab at 10/30/17 2104    guaiFENesin ER (MUCINEX) tablet 600 mg  600 mg Oral Q12H Thelma Mcekon MD   600 mg at 10/31/17 0814    acetaminophen (TYLENOL) tablet 650 mg  650 mg Oral Q4H PRN Princess Obando MD        sodium chloride (NS) flush 5-10 mL  5-10 mL IntraVENous PRN Princess Obando MD        alum-mag hydroxide-simeth (MYLANTA) oral suspension 30 mL  30 mL Oral Q4H PRN Princess Obando MD   30 mL at 10/26/17 0552    butalbital-acetaminophen-caffeine (FIORICET, ESGIC) -40 mg per tablet 1 Tab  1 Tab Oral Q6H PRN Princess Obando MD   1 Tab at 10/30/17 2104    carvedilol (COREG) tablet 12.5 mg  12.5 mg Oral BID WITH MEALS Princess Obando MD   12.5 mg at 10/31/17 0814    enalapril (VASOTEC) tablet 10 mg  10 mg Oral BID Princess Obando MD   10 mg at 10/31/17 0816    fluticasone (FLONASE) 50 mcg/actuation nasal spray 1 Spray  1 Spray Both Nostrils DAILY Princess Obando MD   1 [place] at 10/31/17 7698    hydroCHLOROthiazide (HYDRODIURIL) tablet 25 mg  25 mg Oral DAILY Imer Lunsford MD   25 mg at 10/31/17 0815    levETIRAcetam (KEPPRA) tablet 500 mg  500 mg Oral BID Imer Lunsford MD   500 mg at 10/31/17 3582    linaclotide (LINZESS) capsule 145 mcg (Patient Supplied)  145 mcg Oral DAILY Imer Lunsford MD   145 mcg at 10/31/17 0818    loratadine (CLARITIN) tablet 10 mg  10 mg Oral DAILY Imer Lunsford MD   10 mg at 10/31/17 0816    metFORMIN (GLUCOPHAGE) tablet 500 mg  500 mg Oral BID WITH MEALS Imer Lunsford MD   500 mg at 10/31/17 0815    polyethylene glycol (MIRALAX) packet 17 g  17 g Oral BID Imer Lunsford MD   17 g at 10/31/17 0813    simvastatin (ZOCOR) tablet 40 mg  40 mg Oral QHS Imer Lunsford MD   40 mg at 10/30/17 2103    spironolactone (ALDACTONE) tablet 25 mg  25 mg Oral DAILY Imer Lunsford MD   25 mg at 10/31/17 0815        Review of Systems: Denies chest pain, shortness of breath, cough, headache, visual problems, abdominal pain, dysurea, calf pain. Pertinent positives are as noted in the medical records and unremarkable otherwise. Vital Signs: Patient Vitals for the past 8 hrs:   BP Temp Pulse Resp SpO2   10/31/17 0752 (!) 152/102 97.6 °F (36.4 °C) 99 18 96 %      Physical Exam:   General: Alert, NAD, ambulating in room with RW         Lungs: Clear to auscultation  bilaterally. Heart: Regular rate and rhythm, no  murmurs    Abdomen: Soft, bowel sounds present.         Neuromuscular:      Speech clear. follows simple commands well, consistently.   LUE     Shoulder abduction   5-/5              Elbow flexion:   5-/5               Wrist extension:  5 /5              Finger flexion;   5/5      RUE    Shoulder abduction: 5 /5                Elbow flexion: 5  /5                         Wrist extension:  5/5                        Finger flexion:  5 /5      LLE     Hip flexion:  5- /5              Knee extension:   5/5                         Ankle dorsiflexion: 5 /5  Duey Sheer plantarflexion:5/5                                                              RLE     Hip flexion: 5  /5                        Knee extension:  5 /5                         Ankle dorsiflexion:   5/5                        Ankle plantarflexion:  5 /5  Sensory - intact grossly   Skin/extremity: No rashes, no erythema. Wound c/d/i, sutures intact.          Skin Incision(s)/Wound(s):        Lab Review:   Recent Results (from the past 72 hour(s))   GLUCOSE, POC    Collection Time: 10/28/17 11:12 AM   Result Value Ref Range    Glucose (POC) 120 (H) 65 - 100 mg/dL   GLUCOSE, POC    Collection Time: 10/28/17  4:07 PM   Result Value Ref Range    Glucose (POC) 141 (H) 65 - 100 mg/dL   GLUCOSE, POC    Collection Time: 10/28/17  8:41 PM   Result Value Ref Range    Glucose (POC) 133 (H) 65 - 100 mg/dL   GLUCOSE, POC    Collection Time: 10/29/17  7:47 AM   Result Value Ref Range    Glucose (POC) 184 (H) 65 - 100 mg/dL   GLUCOSE, POC    Collection Time: 10/29/17 11:05 AM   Result Value Ref Range    Glucose (POC) 144 (H) 65 - 100 mg/dL   GLUCOSE, POC    Collection Time: 10/29/17  3:58 PM   Result Value Ref Range    Glucose (POC) 101 (H) 65 - 100 mg/dL   GLUCOSE, POC    Collection Time: 10/29/17  9:28 PM   Result Value Ref Range    Glucose (POC) 139 (H) 65 - 100 mg/dL   GLUCOSE, POC    Collection Time: 10/30/17  6:50 AM   Result Value Ref Range    Glucose (POC) 137 (H) 65 - 100 mg/dL   GLUCOSE, POC    Collection Time: 10/30/17 11:22 AM   Result Value Ref Range    Glucose (POC) 149 (H) 65 - 100 mg/dL   GLUCOSE, POC    Collection Time: 10/30/17  3:48 PM   Result Value Ref Range    Glucose (POC) 126 (H) 65 - 100 mg/dL   GLUCOSE, POC    Collection Time: 10/30/17  9:13 PM   Result Value Ref Range    Glucose (POC) 113 (H) 65 - 100 mg/dL   GLUCOSE, POC    Collection Time: 10/31/17  7:47 AM   Result Value Ref Range    Glucose (POC) 124 (H) 65 - 100 mg/dL       PT Initial  PT Most Recent                                       Amount of Assistance: 5 (Supervision/setup) (10/22/17 1000) 5 (Stand-by assistance) (10/30/17 1106)   Distance (ft): 150 Feet (ft) (10/21/17 1351) 250 Feet (ft) (x1, 150' x1) (10/30/17 1500)   Assistive Device: Walker, rolling (10/21/17 1351) Walker, rolling (10/30/17 1500)       OT Initial OT Most Recent         Grooming Assistance : 5 (Stand-by assistance) (10/24/17 1033) 6 (Modified independent) (10/31/17 0909)   Bathing Assistance, Upper: 5 (Supervision) (10/24/17 1033) 6 (Modified independent) (10/31/17 0909)   Bathing Assistance, Lower : 4 (Minimal assistance) (10/24/17 1033) 6 (Modified independent) (10/31/17 0909)   Toileting Assistance (FIM Score): 4 (Minimal assistance) (10/24/17 1033) 5 (Supervision) (10/27/17 0958)   Dressing Assistance : 5 (Supervision) (10/24/17 1033) 6 (Modified independent) (10/31/17 0909)   Dressing Assistance : 3 (Moderate assistance) (10/24/17 1033) 6 (Modified independent) (10/31/17 0909)     ST Initial ST Most Recent                        Active Problems:    Subdural hemorrhage following injury ([facility] [address] (10/20/2017)      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 Monitor for neuroogic deficits. - seizure prophylaxis -  Continue keppra. No new seizures.   - CT 10/25 - persistent mixed density bilateral subdural hematomas, stable to slightly smaller when compared with the prior exam.      Essential hypertension / Atrial fibrillation   - anticoagulation held due to recent SDH.   - HR/ BOP Good control. HCTZ, aldactone, vasotec at current dose. Tension pneumothorax, treated, resolved  - monitor for s/s.   - holding CPAP due to concern for recurrence. Pain Control: intermittent headaches, 2-3/10 in intensity per pt. Will require regular pain assessment and comprenhensive pain management. - prn perccet, fioricet to control headache.       Wound Care: scalp incisions benign, remove sutures prior to d/c POD #14.       Diabetes mellitus - Uncontrolled. poor glycemic control. Will require daily, close FSG monitoring and medication adjustment to optimize glycemic control in setting of acute illness and hospitalization. - discharge on metformin, linzess  - excellent control on oral hypoglycemics. No SSI coverage needed.      Potential urinary retention/ neurogenic bladder -   - at d/c  voiding well. No signs of retention, incontinence.       bowel program - miralax prn. + BM.      [place] on CPAP - holding CPAP at admission due to recent tension pneumothorax. .         Follow up with neurosurgery, Dr. Sandy Holliday MD  per instructions. Follow up with Dr Bekah Humphrey  2 weeks after discharge from rehab. [phone]. Transfer Medications:      oxyCODONE-acetaminophen (PERCOCET) 5-325 mg per tablet 1 Tab Ordered Dose: 5/325 mg Route: Oral Frequency: EVERY 4 HOURS  as needed for pain             levETIRAcetam (KEPPRA) tablet 500 mg Ordered Dose: 500 mg Route: Oral Frequency: 2 TIMES DAILY        butalbital-acetaminophen-caffeine (FIORICET, ESGIC) -40 mg per tablet 1 Tab  Ordered Dose: -40 mg  Route: Oral Frequency: EVERY 6 HOURS  as needed for headache            senna-docusate (PERICOLACE) 8.6-50 mg per tablet 2 Tab Ordered Dose: 2 Tab Route: Oral Frequency: DAILY       Current Discharge Medication List      CONTINUE these medications which have NOT CHANGED    Details   dabigatran etexilate (PRADAXA) 150 mg capsule Take 150 mg by mouth two (2) times a day. HOLD. HOLD.       traMADol (ULTRAM) 50 mg tablet Take 50 mg by mouth every six (6) hours as needed for Pain. HOLD.       linaclotide (LINZESS) 145 mcg cap capsule Take 1 Cap by mouth Daily (before breakfast).                  polyethylene glycol (MIRALAX) 17 gram/dose powder Take 17 g by mouth two (2) times a day. simvastatin (ZOCOR) 40 mg tablet Take 1 Tab by mouth nightly. enalapril (VASOTEC) 10 mg tablet Take 1 Tab by mouth two (2) times a day. Associated Diagnoses: Essential hypertension      metFORMIN (GLUCOPHAGE) 500 mg tablet Take 1 Tab by mouth two (2) times daily (with meals). Associated Diagnoses: Diabetes mellitus without complication (HCC)      hydroCHLOROthiazide (HYDRODIURIL) 25 mg tablet Take 1 Tab by mouth daily. Associated Diagnoses: Essential hypertension      triamcinolone (NASACORT AQ) 55 mcg nasal inhaler 2 Sprays daily. carvedilol (COREG) 25 mg tablet Take 1 Tab by mouth two (2) times daily (with meals). spironolactone (ALDACTONE) 25 mg tablet Take 1 Tab by mouth daily. loratadine (CLARITIN) 10 mg tablet Take 10 mg by mouth. FLUTICASONE PROPIONATE (FLONASE NA) 2 Sprays by Nasal route daily. B.infantis-B.ani-B.long-B.bifi (PROBIOTIC 4X) 10-15 mg TbEC Take 2 Caps by mouth daily. clotrimazole-betamethasone (LOTRISONE) topical cream Apply to affected area bid as directed       Associated Diagnoses: Candidiasis, cutaneous      MULTIVITS,CA,MIN/IRON/FA/LYCOP (CENTRUM ULTRA MEN'S PO) Take 1 Tab by mouth daily. omega-3 fatty acids-vitamin e (FISH OIL) 1,000 mg cap Take 1 Cap by mouth two (2) times a day. 1200 mg daily twice daily      cpap machine kit nightly. 14-18 cm pressure            O.K. Admit to sub acute rehab today. Discharge time: 35 minutes.     Signed By: Charity Jara MD     October 31, 2017

## 2017-10-31 NOTE — PROGRESS NOTES
Physical Medicine and Rehabilitation Progress Note    Issac Erickson  Admit Date: 10/20/2017  Admit Diagnosis: subdural hematoma;Subdural hemorrhage following injury [facility]*  Chief Complaint : Gait dysfunction secondary to below. Admit Diagnosis: Subdural hematoma ([address] [I62.00]  bilateral SDH  Bilateral subdural fluid collections   Bilateral bur hole evacuation of subdural hygroma 10/18/2017  Essential hypertension, benign  Diabetes mellitus (HCC)  Atrial fibrillation (HCC)  Tension pneumothorax, treated, resolved  Pain  DVT risk  Acute Rehab Dx:  Gait impairment/ gait dysfunction  Debility    Mobility and ambulation deficits  Self Care/ADL deficits    Subjective:      Patient seen and examined. vss, afebrile. No new seizures. No new weakness. + headache, 3/10 in intensity , improved with foiricet/ percocet.     Objective:     Current Facility-Administered Medications   Medication Dose Route Frequency    bacitracin zinc (BACITRACIN) 500 unit/gram ointment   Topical DAILY    insulin lispro (HUMALOG) injection   SubCUTAneous ACB&D    oxyCODONE-acetaminophen (PERCOCET) 5-325 mg per tablet 1 Tab  1 Tab Oral Q4H PRN    guaiFENesin ER (MUCINEX) tablet 600 mg  600 mg Oral Q12H    acetaminophen (TYLENOL) tablet 650 mg  650 mg Oral Q4H PRN    sodium chloride (NS) flush 5-10 mL  5-10 mL IntraVENous PRN    alum-mag hydroxide-simeth (MYLANTA) oral suspension 30 mL  30 mL Oral Q4H PRN    butalbital-acetaminophen-caffeine (FIORICET, ESGIC) -40 mg per tablet 1 Tab  1 Tab Oral Q6H PRN    carvedilol (COREG) tablet 12.5 mg  12.5 mg Oral BID WITH MEALS    enalapril (VASOTEC) tablet 10 mg  10 mg Oral BID    fluticasone (FLONASE) 50 mcg/actuation nasal spray 1 Spray  1 Spray Both Nostrils DAILY    hydroCHLOROthiazide (HYDRODIURIL) tablet 25 mg  25 mg Oral DAILY    levETIRAcetam (KEPPRA) tablet 500 mg  500 mg Oral BID    linaclotide (LINZESS) capsule 145 mcg (Patient Supplied)  145 mcg Oral DAILY    loratadine (CLARITIN) tablet 10 mg  10 mg Oral DAILY    metFORMIN (GLUCOPHAGE) tablet 500 mg  500 mg Oral BID WITH MEALS    polyethylene glycol (MIRALAX) packet 17 g  17 g Oral BID    simvastatin (ZOCOR) tablet 40 mg  40 mg Oral QHS    spironolactone (ALDACTONE) tablet 25 mg  25 mg Oral DAILY     Allergies   Allergen Reactions    Zithromax [Azithromycin] Other (comments)     Skin dryness/peeling       Visit Vitals    /73    Pulse 92    Temp 98.3 °F (36.8 °C)    Resp 17    SpO2 95%      Intake and Output:  10/29 0701 - 10/30 1900  In: 960 [P.O.:960]  Out: 750 [Urine:750]    Physical Exam:   General: Alert, NAD, ambulating in room with RW       Lungs: Clear to auscultation  bilaterally. Heart: Regular rate and rhythm, no  murmurs    Abdomen: Soft, bowel sounds present. Neuromuscular:      Speech clear. follows simple commands well, consistently. LUE     Shoulder abduction   5-/5              Elbow flexion:   5-/5               Wrist extension:  5 /5              Finger flexion;   5/5                       RUE    Shoulder abduction: 5 /5                Elbow flexion: 5  /5                         Wrist extension:  5/5                        Finger flexion:  5 /5                        LLE     Hip flexion:  5- /5              Knee extension:   5/5                         Ankle dorsiflexion: 5 /5                        Ankle plantarflexion:5/5                                                              RLE     Hip flexion: 5  /5                        Knee extension:  5 /5                         Ankle dorsiflexion:   5/5                        Ankle plantarflexion:  5 /5  Sensory - intact grossly   Skin/extremity: No rashes, no erythema. Wound c/d/i, sutures intact.      Functional Assessment:         Saljuniann Padilla Fall Risk Assessment:  Sallyann Padilla Fall Risk  Mobility: Ambulates or transfers with assist devices or assistance/unsteady gait (10/30/17 1013)  Mobility Interventions: Patient to call before getting OOB (10/30/17 1013)  Mentation: Alert, oriented x 3 (10/30/17 1013)  Mentation Interventions: Increase mobility; Door open when patient unattended;Evaluate medications/consider consulting pharmacy (10/30/17 1013)  Medication: Patient receiving anticonvulsants, sedatives(tranquilizers), psychotropics or hypnotics, hypoglycemics, narcotics, sleep aids, antihypertensives, laxatives, or diuretics (10/30/17 1013)  Medication Interventions: Patient to call before getting OOB; Evaluate medications/consider consulting pharmacy (10/30/17 1013)  Elimination: Needs assistance with toileting (10/30/17 1013)  Elimination Interventions: Patient to call for help with toileting needs;Call light in reach (10/30/17 1013)  Prior Fall History: Before admission in past 12 months _home or previous inpatient care) (10/30/17 1013)  History of Falls Interventions: Consult care management for discharge planning;Door open when patient unattended;Evaluate medications/consider consulting pharmacy (10/30/17 1013)  Total Score: 4 (10/30/17 1013)  Standard Fall Precautions: Yes (10/30/17 1013)  High Fall Risk: Yes (10/29/17 2014)     Speech Assessment:         Ambulation:  Gait  Distance (ft): 250 Feet (ft) (x1, 150' x1) (10/30/17 1500)  Assistive Device: Walker, rolling (10/30/17 1500)  Rail Use: Both (10/30/17 1500)     Labs/Studies:  Recent Results (from the past 72 hour(s))   GLUCOSE, POC    Collection Time: 10/28/17  7:21 AM   Result Value Ref Range    Glucose (POC) 115 (H) 65 - 100 mg/dL   GLUCOSE, POC    Collection Time: 10/28/17 11:12 AM   Result Value Ref Range    Glucose (POC) 120 (H) 65 - 100 mg/dL   GLUCOSE, POC    Collection Time: 10/28/17  4:07 PM   Result Value Ref Range    Glucose (POC) 141 (H) 65 - 100 mg/dL   GLUCOSE, POC    Collection Time: 10/28/17  8:41 PM   Result Value Ref Range    Glucose (POC) 133 (H) 65 - 100 mg/dL   GLUCOSE, POC    Collection Time: 10/29/17  7:47 AM   Result Value Ref Range    Glucose (POC) 184 (H) 65 - 100 mg/dL   GLUCOSE, POC    Collection Time: 10/29/17 11:05 AM   Result Value Ref Range    Glucose (POC) 144 (H) 65 - 100 mg/dL   GLUCOSE, POC    Collection Time: 10/29/17  3:58 PM   Result Value Ref Range    Glucose (POC) 101 (H) 65 - 100 mg/dL   GLUCOSE, POC    Collection Time: 10/29/17  9:28 PM   Result Value Ref Range    Glucose (POC) 139 (H) 65 - 100 mg/dL   GLUCOSE, POC    Collection Time: 10/30/17  6:50 AM   Result Value Ref Range    Glucose (POC) 137 (H) 65 - 100 mg/dL   GLUCOSE, POC    Collection Time: 10/30/17 11:22 AM   Result Value Ref Range    Glucose (POC) 149 (H) 65 - 100 mg/dL   GLUCOSE, POC    Collection Time: 10/30/17  3:48 PM   Result Value Ref Range    Glucose (POC) 126 (H) 65 - 100 mg/dL     Assessment: This is a 57 y. o. who fell striking his head several weeks ago and found to have R SDH, s/p cailin hole evacuation and d/[place] home. Now readmitted on 10/17 d/t subdural fluid collection and R PTX. He continues with mobility and self care impairments. Rehabilitation Plan  Continue PT for a minimum of 1.5 hours a day, at least 5 out of 7 days per week to address bed mobility, transfers, ambulation, strengthening, balance, and endurance. Continue OT for a minimum of 1.5 hours a day, at least 5 out of 7 days per week to address ADL ( bathing, LE dressing, toileting) and adaptive equipment as needed.     Continue 24-hour skilled rehabilitation nursing for bowel and bladder management, skin care for decubitus ulcer prevention , pain management and ongoing medication administration      Continue daily physician medical management:     bilateral SDH/ Bilateral subdural fluid collections   - s/p Bilateral bur hole evacuation of subdural hygroma 10/18/2017  - monitor for recurrent s/s of increased ICP. Monitor for neuroogic deficits. - seizure prophylaxis -  Continue keppra  - Hgb- 13.3 - > 13.3 (10/21)   - 10/23 wound healing well. Benign. No signs of increased ICP. - 10/30 - wound healing well.  No new seizures.     Essential hypertension / Atrial fibrillation   - anticoagulation held due to recent SDH.   - 10/29 - -120s. Good control. HCTZ, aldactone, vasotec at current dose.   - 10/30 - -130s. No new adjustment.     Tension pneumothorax, treated, resolved  - monitor for s/s.   - holding CPAP due to concern for recurrence.      Pneumonia prophylaxis- Insentive spirometer every hour while awake     DVT risk / DVT Prophylaxis- Will require daily physician exam to assess for signs and symptoms as patient is at increased risk for of thromboembolism. Mobilization as tolerated. Intermittent pneumatic compression devices when in bed. Thigh-high or knee-high thromboembolic deterrent hose when out of bed.   - no anticoagulation due to SDH.     Pain Control: intermittent headaches, 2-3/10 in intensity per pt. Will require regular pain assessment and comprenhensive pain management. - prn perccet, fioricet to control headache.      Wound Care: Monitor wound status daily per staff and physician. At risk for failure. Will require 24/7 rehab nursing. Keep wound clean and dry      Diabetes mellitus - Uncontrolled. poor glycemic control. Will require daily, close FSG monitoring and medication adjustment to optimize glycemic control in setting of acute illness and hospitalization.   - metformin, linzess  - SSI lispro  - 10/23 - glycemic control good. No new changes. - 10/30- excellent control on oral hypoglycemics. No SSI coverage needed. Urinary retention/ neurogenic bladder - schedule voids q6-8 hrs. Check post-void residual every shift; In and Out catheterize if post-void residual is more than 400 cc.  - 10/27 voiding well. No signs of retention, incontinence.      bowel program - miralax prn. + BM. [place] on CPAP - holding CPAP at admission due to recent tension pneumothorax.  .     Allergic rhinitis/chronic cough - continue mucinex bid     Time spent was 25 minutes with over 1/2 in direct patient care/examination, consultation and coordination of care.     Signed By: Regine Marshall MD     October 30, 2017

## 2017-11-01 ENCOUNTER — HOME CARE VISIT (OUTPATIENT)
Dept: SCHEDULING | Facility: HOME HEALTH | Age: 62
End: 2017-11-01
Payer: COMMERCIAL

## 2017-11-01 VITALS
TEMPERATURE: 96.8 F | HEART RATE: 71 BPM | RESPIRATION RATE: 18 BRPM | SYSTOLIC BLOOD PRESSURE: 127 MMHG | DIASTOLIC BLOOD PRESSURE: 69 MMHG | OXYGEN SATURATION: 97 %

## 2017-11-01 PROCEDURE — G0151 HHCP-SERV OF PT,EA 15 MIN: HCPCS

## 2017-11-01 PROCEDURE — 400013 HH SOC

## 2017-11-02 ENCOUNTER — HOME CARE VISIT (OUTPATIENT)
Dept: HOME HEALTH SERVICES | Facility: HOME HEALTH | Age: 62
End: 2017-11-02
Payer: COMMERCIAL

## 2017-11-03 ENCOUNTER — HOME CARE VISIT (OUTPATIENT)
Dept: SCHEDULING | Facility: HOME HEALTH | Age: 62
End: 2017-11-03
Payer: COMMERCIAL

## 2017-11-03 VITALS
RESPIRATION RATE: 18 BRPM | DIASTOLIC BLOOD PRESSURE: 78 MMHG | HEART RATE: 76 BPM | TEMPERATURE: 96.6 F | SYSTOLIC BLOOD PRESSURE: 136 MMHG

## 2017-11-03 PROCEDURE — G0157 HHC PT ASSISTANT EA 15: HCPCS

## 2017-11-06 ENCOUNTER — HOME CARE VISIT (OUTPATIENT)
Dept: HOME HEALTH SERVICES | Facility: HOME HEALTH | Age: 62
End: 2017-11-06
Payer: COMMERCIAL

## 2017-11-06 ENCOUNTER — HOME CARE VISIT (OUTPATIENT)
Dept: SCHEDULING | Facility: HOME HEALTH | Age: 62
End: 2017-11-06
Payer: COMMERCIAL

## 2017-11-06 PROCEDURE — G0157 HHC PT ASSISTANT EA 15: HCPCS

## 2017-11-07 VITALS
HEART RATE: 72 BPM | DIASTOLIC BLOOD PRESSURE: 84 MMHG | TEMPERATURE: 97.1 F | RESPIRATION RATE: 18 BRPM | SYSTOLIC BLOOD PRESSURE: 142 MMHG

## 2017-11-08 ENCOUNTER — HOME CARE VISIT (OUTPATIENT)
Dept: SCHEDULING | Facility: HOME HEALTH | Age: 62
End: 2017-11-08
Payer: COMMERCIAL

## 2017-11-08 PROCEDURE — G0157 HHC PT ASSISTANT EA 15: HCPCS

## 2017-11-09 VITALS
DIASTOLIC BLOOD PRESSURE: 70 MMHG | TEMPERATURE: 96.8 F | HEART RATE: 64 BPM | SYSTOLIC BLOOD PRESSURE: 124 MMHG | RESPIRATION RATE: 18 BRPM

## 2017-11-10 PROBLEM — J93.9 PNEUMOTHORAX, RIGHT: Status: RESOLVED | Noted: 2017-10-08 | Resolved: 2017-11-10

## 2017-11-10 PROBLEM — R93.5 ABNORMAL X-RAY OF ABDOMEN: Status: RESOLVED | Noted: 2017-06-22 | Resolved: 2017-11-10

## 2017-11-10 PROBLEM — J96.01 ACUTE RESPIRATORY FAILURE WITH HYPOXIA (HCC): Status: RESOLVED | Noted: 2017-10-08 | Resolved: 2017-11-10

## 2017-11-14 ENCOUNTER — HOME CARE VISIT (OUTPATIENT)
Dept: SCHEDULING | Facility: HOME HEALTH | Age: 62
End: 2017-11-14
Payer: COMMERCIAL

## 2017-11-14 VITALS
HEART RATE: 76 BPM | SYSTOLIC BLOOD PRESSURE: 122 MMHG | RESPIRATION RATE: 17 BRPM | DIASTOLIC BLOOD PRESSURE: 60 MMHG | TEMPERATURE: 96.7 F

## 2017-11-14 PROCEDURE — G0157 HHC PT ASSISTANT EA 15: HCPCS

## 2017-11-16 ENCOUNTER — HOME CARE VISIT (OUTPATIENT)
Dept: HOME HEALTH SERVICES | Facility: HOME HEALTH | Age: 62
End: 2017-11-16
Payer: COMMERCIAL

## 2017-11-16 ENCOUNTER — HOME CARE VISIT (OUTPATIENT)
Dept: SCHEDULING | Facility: HOME HEALTH | Age: 62
End: 2017-11-16
Payer: COMMERCIAL

## 2017-11-16 VITALS
WEIGHT: 315 LBS | RESPIRATION RATE: 18 BRPM | DIASTOLIC BLOOD PRESSURE: 71 MMHG | BODY MASS INDEX: 39.17 KG/M2 | HEART RATE: 76 BPM | SYSTOLIC BLOOD PRESSURE: 135 MMHG | HEIGHT: 75 IN | TEMPERATURE: 87.6 F

## 2017-11-16 PROCEDURE — G0151 HHCP-SERV OF PT,EA 15 MIN: HCPCS

## 2017-11-17 PROBLEM — F17.211 CIGARETTE NICOTINE DEPENDENCE IN REMISSION: Status: ACTIVE | Noted: 2017-11-17

## 2017-11-17 PROBLEM — J43.9 BULLOUS EMPHYSEMA (HCC): Status: ACTIVE | Noted: 2017-11-17

## 2017-11-20 ENCOUNTER — HOSPITAL ENCOUNTER (OUTPATIENT)
Dept: PHYSICAL THERAPY | Age: 62
Discharge: HOME | End: 2017-11-20
Payer: COMMERCIAL

## 2017-11-20 DIAGNOSIS — S06.5X0D TRAUMATIC SUBDURAL HEMORRHAGE WITHOUT LOSS OF CONSCIOUSNESS, SUBSEQUENT ENCOUNTER: ICD-10-CM

## 2017-11-20 PROCEDURE — 97166 OT EVAL MOD COMPLEX 45 MIN: CPT

## 2017-11-20 PROCEDURE — 97530 THERAPEUTIC ACTIVITIES: CPT

## 2017-11-20 NOTE — PROGRESS NOTES
Ambulatory/Rehab Services H2 Model Falls Risk Assessment    Risk Factor Pts. ·   Confusion/Disorientation/Impulsivity  []    4 ·   Symptomatic Depression  []   2 ·   Altered Elimination  []   1 ·   Dizziness/Vertigo  []   1 ·   Gender (Male)  [x]   1 ·   Any administered antiepileptics (anticonvulsants):  []   2 ·   Any administered benzodiazepines:  []   1 ·   Visual Impairment (specify):  []   1 ·   Portable Oxygen Use  []   1 ·   Orthostatic ? BP  []   1 ·   History of Recent Falls (within 3 mos.)  [x]   5     Ability to Rise from Chair (choose one) Pts. ·   Ability to rise in a single movement  []   0 ·   Pushes up, successful in one attempt  [x]   1 ·   Multiple attempts, but successful  []   3 ·   Unable to rise without assistance  []   4   Total: (5 or greater = High Risk) 7     Falls Prevention Plan:   []                Physical Limitations to Exercise (specify):   []                Mobility Assistance Device (type):   [x]                Exercise/Equipment Adaptation (specify):    ©2010 [facility] [address] [place] [address] Patent #[id number].  Federal Law prohibits the replication, distribution or use without written permission from [facility]

## 2017-11-20 NOTE — THERAPY EVALUATION
Eneida Mccartney  : 1955  Primary: [facility]  Secondary:  [facility] at [address], [address]  [phone]   [phone]         OUTPATIENT OCCUPATIONAL THERAPY: Initial Assessment and Daily Note 2017    ICD-10: Treatment Diagnosis: R27.8, lack of coordination  Precautions/Allergies:   Zithromax [azithromycin]   Fall Risk Score: 7 (? 5 = High Risk)  MD Orders: Evaluate and treat fine motor retraining and cognitive recall MEDICAL/REFERRING DIAGNOSIS:   Traumatic subdural hemorrhage without loss of consciousness, subsequent encounter [S06.5X0D]   DATE OF ONSET: 9/15/17   REFERRING PHYSICIAN: Leanna Wolf,*  RETURN PHYSICIAN APPOINTMENT: [place]     INITIAL ASSESSMENT:  Mr. Johan Tillman is a 56 yo male who fell at home in September. He sustained L rib fractures and a concussion. Two week later, he was found to have 2 subdural hematomas with resultant surgery. Two day following discharge from hospital, he was readmitted with a collapsed lung and had chest tube surgery. One and a half weeks later, he was having difficulty walking and had a second surgery to relieve cranial pressure bilaterally. He completed home health physical therapy and is here for occupational therapy for fine motor retraining and cognitive recall difficulties. 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 He is having difficulty typing, thinking organizing his work. He reports having headaches daily for 6 weeks, being controlled with Tylenol 1000 mg twice daily. Pt is independent with ADLs. He has not returned to driving to date. PLAN OF CARE:   PROBLEM LIST:  1. Decreased ADL/Functional Activities  2. Decreased Balance  3. Decreased Cognition  4. Decreased coordination/visual perception  INTERVENTIONS PLANNED:  1. Cognitive training  2. Re-evaluation  3. Therapeutic activity  4. Therapeutic exercise  5.  Neuromuscular retraining  6. Visual perceptual retraining   TREATMENT PLAN:  Effective Dates: 11/20/17 TO 2/12/17. Frequency/Duration:  Two times a week for 12 weeks; upon reassessment will adjust frequency and duration as necessary. GOALS: (Goals have been discussed and agreed upon with patient.)  Short-Term Functional Goals: Time Frame: 6 weeks  1. Pt will improve bilateral hand fine motor coordination as evidenced by improved 9-Hole Peg Test Score  2. Pt will demonstrate improved visual processing with improvement in visual scanning tasks  3. Pt will demonstrate improved dynamic balance to visually scan while ambulating and reaching without loss of balance  Discharge Goals: Time Frame: 12 weeks  1. Pt will compose, type and send emails at work without difficulty  2. Pt will demonstrate improved auditory memory recall of 8-10 items when read a paragraph that contains 15 items to recall  3. Pt will demonstrate improved integration of details with a quick, accurate interpretation of an abstract action picture, providing details without verbal cues. 4. Pt will demonstrate improved complex graphomotor skills with accurate and timely copying of two 3-dimensional figures  Rehabilitation Potential For Stated Goals: Good  Regarding Jose Cruz Horvath's therapy, I certify that the treatment plan above will be carried out by a therapist or under their direction. Thank you for this referral,  Eduardo Smith, OT     Referring Physician Signature: Javy Pandya,* _________________________  Date _________            The information in this section was collected on 11/20/2017   (except where otherwise noted). OCCUPATIONAL PROFILE & HISTORY:   History of Present Injury/Illness (Reason for Referral):  Mr. Ganesh Redman is a 58 yo male who fell at home in September. He sustained L rib fractures and a concussion. Two week later, he was found to have 2 subdural hematomas with resultant surgery.   Two day following discharge from hospital, he was readmitted with a collapsed lung and had chest tube surgery. One and a half weeks later, he was having difficulty walking and had a second surgery to relieve cranial pressure bilaterally. He completed home health physical therapy and is here for occupational therapy for fine motor retraining and cognitive recall difficulties. 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 He is having difficulty typing, thinking organizing his work. He reports having headaches daily for 6 weeks, being controlled with Tylenol 1000 mg twice daily. Pt is independent with ADLs. He has not returned to driving to date. Past Medical History/Comorbidities:   Mr. Socrates Servin  has a past medical history of A-fib ([facility] [address] Arteriosclerosis; Atrial fibrillation ([facility] [address] (1/3/2012); Diabetes mellitus; Diabetes mellitus ([facility] [address] (1/3/2012); Essential hypertension, benign (2/6/2014); Family history of malignant neoplasm of prostate (2/6/2014); Hemochromatosis; HTN (hypertension) (2/6/2014); Hypercholesteremia; Nodular prostate without urinary obstruction (2/6/2014); Obesity (2/6/2014); and Pneumothorax, right (10/8/2017). Mr. Socrates Servin  has a past surgical history that includes knee arthroscp harv and urological.  Social History/Living Environment:    Pt lives with his wife. He is self employed and works out of his home. Prior Level of Function/Work/Activity:  Independent ADLs/IADLs  Dominant Side:         RIGHT    Current Medications:    Current Outpatient Prescriptions:     cpap machine kit, by Does Not Apply route., Disp: , Rfl:     guaiFENesin ER (MUCINEX) 600 mg ER tablet, Take 600 mg by mouth two (2) times a day., Disp: , Rfl:     cyanocobalamin (VITAMIN B12) 500 mcg tablet, Take 500 mcg by mouth daily. , Disp: , Rfl:     enalapril (VASOTEC) 10 mg tablet, Take 10 mg by mouth two (2) times a day., Disp: , Rfl:     loratadine (CLARITIN) 10 mg tablet, Take 1 Tab by mouth daily. , Disp: 30 Tab, Rfl: 0    butalbital-acetaminophen-caffeine (FIORICET, ESGIC) -40 mg per tablet, Take 1 Tab by mouth every six (6) hours as needed for Headache. Max Daily Amount: 4 Tabs., Disp: 40 Tab, Rfl: 0    levETIRAcetam (KEPPRA) 500 mg tablet, Take 1 Tab by mouth two (2) times a day. Indications: seizure prophylaxis, Disp: 60 Tab, Rfl: 1    linaclotide (LINZESS) 145 mcg cap capsule, Take 1 Cap by mouth Daily (before breakfast). , Disp: 90 Cap, Rfl: 3    polyethylene glycol (MIRALAX) 17 gram/dose powder, Take 17 g by mouth two (2) times a day., Disp: , Rfl:     simvastatin (ZOCOR) 40 mg tablet, Take 1 Tab by mouth nightly., Disp: 30 Tab, Rfl: 0    metFORMIN (GLUCOPHAGE) 500 mg tablet, Take 1 Tab by mouth two (2) times daily (with meals). , Disp: 180 Tab, Rfl: 3    hydroCHLOROthiazide (HYDRODIURIL) 25 mg tablet, Take 1 Tab by mouth daily. , Disp: 90 Tab, Rfl: 3    carvedilol (COREG) 25 mg tablet, Take 1 Tab by mouth two (2) times daily (with meals). , Disp: 180 Tab, Rfl: 3    spironolactone (ALDACTONE) 25 mg tablet, Take 1 Tab by mouth daily. , Disp: 90 Tab, Rfl: 3    FLUTICASONE PROPIONATE (FLONASE NA), 2 Sprays by Nasal route daily. , Disp: , Rfl:     B.infantis-B.ani-B.long-B.bifi (PROBIOTIC 4X) 10-15 mg TbEC, Take 2 Caps by mouth daily. , Disp: , Rfl:     clotrimazole-betamethasone (LOTRISONE) topical cream, Apply to affected area bid as directed, Disp: 15 g, Rfl: 0    MULTIVITS,CA,MIN/IRON/FA/LYCOP (CENTRUM ULTRA MEN'S PO), Take 1 Tab by mouth daily. , Disp: , Rfl:             Date Last Reviewed:  11/20/2017     Complexity Level : Expanded review of therapy/medical records (1-2):  MODERATE COMPLEXITY   ASSESSMENT OF OCCUPATIONAL PERFORMANCE:   ROM:          B UE AROM WNL  Strength:          R shoulder 3+/5, L shoulder 4+/5        Bilateral elbow, forearm, wrist 4+/5        Gross :  R 80 lbs      L 73 lbs        3 point pinch:  R  22 lbs  L  17 lbs        Key Pinch:   R 25 lbs     L 21 lbs        Tip Pinch:   R 17 lbs      L:  14 lbs          Special Tests: Visual Scannin:54.75  Cancel O's        Auditory Memory:  5/15 (8 norm)        Abstract Interpretation:  Provided 1 statement        Complex Graphomotor Skills:  Poor copying of 2 three dimensional figures        Serial Digit Recall:  12 trials to achieve 2 consecutive accurate recalls    Coordination:          9-Hole Peg Test:  R:  15:40 seconds     L:  26:56 seconds  Vision:          Needs further evaluation of visual spatial/visual motor skills     Physical Skills Involved:  1. Balance  2. Fine Motor Control  3. Visual motor Cognitive Skills Affected (resulting in the inability to perform in a timely and safe manner):  1. Perception  2. Executive Function  3. Immediate Memory  4. Short Term Recall Psychosocial Skills Affected:  1. Habits/Routines  2. Environmental Adaptation   Number of elements that affect the Plan of Care: 3-5:  MODERATE COMPLEXITY   CLINICAL DECISION MAKING:   Outcome Measure: Tool Used: Disabilities of the Arm, Shoulder and Hand (DASH) Questionnaire - Quick Version  Score:  Initial:   Most Recent:  (Date: -- )   Interpretation of Score: The DASH is designed to measure the activities of daily living in person's with upper extremity dysfunction or pain. Each section is scored on a 1-5 scale, 5 representing the greatest disability. The scores of each section are added together for a total score of 55. Score 11 12-19 20-28 29-37 38-45 46-54 55   Modifier CH CI CJ CK CL CM CN     ? Carrying, Moving, and Handling Objects:     - CURRENT STATUS: CI - 1%-19% impaired, limited or restricted    - GOAL STATUS: CH - 0% impaired, limited or restricted    - D/C STATUS:  ---------------To be determined---------------        Medical Necessity:   · Patient demonstrates good rehab potential due to higher previous functional level.   Reason for Services/Other Comments:  · Patient continues to require skilled intervention due to cognitive and motor dysfunction s/p bilateral subdural hematoma. Assessment process, impact of co-morbidities, assessment modification\need for assistance, and selection of interventions: Analytical Complexity:MODERATE COMPLEXITY   TREATMENT:   (In addition to Assessment/Re-Assessment sessions the following treatments were rendered)    Pre-treatment Symptoms/Complaints:  Inability to type and organize executive functions at work  Pain: Initial: Pain Intensity 1: 4  Post Session:  4   OT EVAL:  (x)  OT eval completed. Therapeutic Activity: (30 minutes): Instructed in home program computer activities for visual memory, coordination, visual perception, visual spatial activities using Games [employer] and typing programs to improve coordination. Therapeutic Exercise: ():      Cognitive Skills Development: ():      Treatment/Session Assessment:    · Response to Treatment:  Pt receptive and tolerated treatment without complication. · Compliance with Program/Exercises: Will assess as treatment progresses. · Recommendations/Intent for next treatment session: \"Next visit will focus on advancements to more challenging activities\".   Total Treatment Duration:OT Patient Time In/Time Out  Time In: 0200  Time Out: 119 Krysta Smith, OT

## 2017-11-21 ENCOUNTER — HOSPITAL ENCOUNTER (OUTPATIENT)
Dept: LAB | Age: 62
Discharge: HOME | End: 2017-11-21
Payer: COMMERCIAL

## 2017-11-21 DIAGNOSIS — E83.119 HEMOCHROMATOSIS, UNSPECIFIED HEMOCHROMATOSIS TYPE: ICD-10-CM

## 2017-11-21 LAB
ALBUMIN SERPL-MCNC: 3.5 G/DL (ref 3.2–4.6)
ALBUMIN/GLOB SERPL: 0.9 {RATIO} (ref 1.2–3.5)
ALP SERPL-CCNC: 124 U/L (ref 50–136)
ALT SERPL-CCNC: 41 U/L (ref 12–65)
ANION GAP SERPL CALC-SCNC: 10 MMOL/L (ref 7–16)
AST SERPL-CCNC: 24 U/L (ref 15–37)
BASOPHILS # BLD: 0 K/UL (ref 0–0.2)
BASOPHILS NFR BLD: 0 % (ref 0–2)
BILIRUB SERPL-MCNC: 0.6 MG/DL (ref 0.2–1.1)
BUN SERPL-MCNC: 11 MG/DL (ref 8–23)
CALCIUM SERPL-MCNC: 9.5 MG/DL (ref 8.3–10.4)
CHLORIDE SERPL-SCNC: 99 MMOL/L (ref 98–107)
CO2 SERPL-SCNC: 28 MMOL/L (ref 21–32)
CREAT SERPL-MCNC: 0.87 MG/DL (ref 0.8–1.5)
DIFFERENTIAL METHOD BLD: ABNORMAL
EOSINOPHIL # BLD: 0.2 K/UL (ref 0–0.8)
EOSINOPHIL NFR BLD: 2 % (ref 0.5–7.8)
ERYTHROCYTE [DISTWIDTH] IN BLOOD BY AUTOMATED COUNT: 12.8 % (ref 11.9–14.6)
FERRITIN SERPL-MCNC: 131 NG/ML (ref 8–388)
GLOBULIN SER CALC-MCNC: 3.9 G/DL (ref 2.3–3.5)
GLUCOSE SERPL-MCNC: 121 MG/DL (ref 65–100)
HCT VFR BLD AUTO: 43.4 % (ref 41.1–50.3)
HGB BLD-MCNC: 14.5 G/DL (ref 13.6–17.2)
IRON SATN MFR SERPL: 23 %
IRON SERPL-MCNC: 72 UG/DL (ref 35–150)
LYMPHOCYTES # BLD: 1.1 K/UL (ref 0.5–4.6)
LYMPHOCYTES NFR BLD: 14 % (ref 13–44)
MCH RBC QN AUTO: 32.4 PG (ref 26.1–32.9)
MCHC RBC AUTO-ENTMCNC: 33.4 G/DL (ref 31.4–35)
MCV RBC AUTO: 97.1 FL (ref 79.6–97.8)
MONOCYTES # BLD: 0.5 K/UL (ref 0.1–1.3)
MONOCYTES NFR BLD: 6 % (ref 4–12)
NEUTS SEG # BLD: 6.1 K/UL (ref 1.7–8.2)
NEUTS SEG NFR BLD: 78 % (ref 43–78)
NRBC # BLD: 0 K/UL (ref 0–0.2)
PLATELET # BLD AUTO: 208 K/UL (ref 150–450)
PMV BLD AUTO: 9.9 FL (ref 10.8–14.1)
POTASSIUM SERPL-SCNC: 4.3 MMOL/L (ref 3.5–5.1)
PROT SERPL-MCNC: 7.4 G/DL (ref 6.3–8.2)
RBC # BLD AUTO: 4.47 M/UL (ref 4.23–5.67)
SODIUM SERPL-SCNC: 137 MMOL/L (ref 136–145)
TIBC SERPL-MCNC: 316 UG/DL (ref 250–450)
WBC # BLD AUTO: 7.8 K/UL (ref 4.3–11.1)

## 2017-11-21 PROCEDURE — 82728 ASSAY OF FERRITIN: CPT | Performed by: INTERNAL MEDICINE

## 2017-11-21 PROCEDURE — 85025 COMPLETE CBC W/AUTO DIFF WBC: CPT | Performed by: INTERNAL MEDICINE

## 2017-11-21 PROCEDURE — 80053 COMPREHEN METABOLIC PANEL: CPT | Performed by: INTERNAL MEDICINE

## 2017-11-21 PROCEDURE — 36415 COLL VENOUS BLD VENIPUNCTURE: CPT | Performed by: INTERNAL MEDICINE

## 2017-11-21 PROCEDURE — 83540 ASSAY OF IRON: CPT | Performed by: INTERNAL MEDICINE

## 2017-11-27 ENCOUNTER — HOSPITAL ENCOUNTER (OUTPATIENT)
Dept: PHYSICAL THERAPY | Age: 62
Discharge: HOME | End: 2017-11-27
Payer: COMMERCIAL

## 2017-11-27 PROCEDURE — 97532 HC OT COGNITIVE SKILL DEV 15 MIN: CPT

## 2017-11-27 NOTE — PROGRESS NOTES
Eneida Mccartney  : 1955  Primary: [facility]*  Secondary:  [facility] at [address], [address]  [phone]   [phone]         OUTPATIENT OCCUPATIONAL THERAPY: Daily Note 2017     ICD-10: Treatment Diagnosis: R27.8, lack of coordination  Precautions/Allergies:   Zithromax [azithromycin]   Fall Risk Score: 7 (? 5 = High Risk)  MD Orders: Evaluate and treat fine motor retraining and cognitive recall MEDICAL/REFERRING DIAGNOSIS:   Traumatic subdural hemorrhage without loss of consciousness, subsequent encounter [S06.5X0D]   DATE OF ONSET: 9/15/17   REFERRING PHYSICIAN: Leanna Wolf,*  RETURN PHYSICIAN APPOINTMENT: [facility]     INITIAL ASSESSMENT:  Mr. Johan Tillman is a 56 yo male who fell at home in September. He sustained L rib fractures and a concussion. Two week later, he was found to have 2 subdural hematomas with resultant surgery. Two day following discharge from hospital, he was readmitted with a collapsed lung and had chest tube surgery. One and a half weeks later, he was having difficulty walking and had a second surgery to relieve cranial pressure bilaterally. He completed home health physical therapy and is here for occupational therapy for fine motor retraining and cognitive recall difficulties. 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 He is having difficulty typing, thinking organizing his work. He reports having headaches daily for 6 weeks, being controlled with Tylenol 1000 mg twice daily. Pt is independent with ADLs. He has not returned to driving to date. PLAN OF CARE:   PROBLEM LIST:  1. Decreased ADL/Functional Activities  2. Decreased Balance  3. Decreased Cognition  4. Decreased coordination/visual perception  INTERVENTIONS PLANNED:  1. Cognitive training  2. Re-evaluation  3. Therapeutic activity  4. Therapeutic exercise  5. Neuromuscular retraining  6.  Visual perceptual retraining   TREATMENT PLAN:  Effective Dates: 11/20/17 TO 2/12/17. Frequency/Duration:  Two times a week for 12 weeks; upon reassessment will adjust frequency and duration as necessary. GOALS: (Goals have been discussed and agreed upon with patient.)  Short-Term Functional Goals: Time Frame: 6 weeks  1. Pt will improve bilateral hand fine motor coordination as evidenced by improved 9-Hole Peg Test Score  2. Pt will demonstrate improved visual processing with improvement in visual scanning tasks  3. Pt will demonstrate improved dynamic balance to visually scan while ambulating and reaching without loss of balance  Discharge Goals: Time Frame: 12 weeks  1. Pt will compose, type and send emails at work without difficulty  2. Pt will demonstrate improved auditory memory recall of 8-10 items when read a paragraph that contains 15 items to recall  3. Pt will demonstrate improved integration of details with a quick, accurate interpretation of an abstract action picture, providing details without verbal cues. 4. Pt will demonstrate improved complex graphomotor skills with accurate and timely copying of two 3-dimensional figures  Rehabilitation Potential For Stated Goals: Good  Regarding Jose Cruz Horvath's therapy, I certify that the treatment plan above will be carried out by a therapist or under their direction. Thank you for this referral,  Star Husbands, OT     Referring Physician Signature: Nohelia Mckeon,* _________________________  Date _________            The information in this section was collected on 11/20/2017   (except where otherwise noted). OCCUPATIONAL PROFILE & HISTORY:   History of Present Injury/Illness (Reason for Referral):  Mr. Diane Donaldson is a 58 yo male who fell at home in September. He sustained L rib fractures and a concussion. Two week later, he was found to have 2 subdural hematomas with resultant surgery.   Two day following discharge from hospital, he was readmitted with a collapsed lung and had chest tube surgery. One and a half weeks later, he was having difficulty walking and had a second surgery to relieve cranial pressure bilaterally. He completed home health physical therapy and is here for occupational therapy for fine motor retraining and cognitive recall difficulties. 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 He is having difficulty typing, thinking organizing his work. He reports having headaches daily for 6 weeks, being controlled with Tylenol 1000 mg twice daily. Pt is independent with ADLs. He has not returned to driving to date. Past Medical History/Comorbidities:   Mr. Laura Manrique  has a past medical history of A-fib ([facility] [address] Arteriosclerosis; Atrial fibrillation ([facility] [address] (1/3/2012); Diabetes mellitus; Diabetes mellitus ([facility] [address] (1/3/2012); Essential hypertension, benign (2/6/2014); Family history of malignant neoplasm of prostate (2/6/2014); Hemochromatosis; HTN (hypertension) (2/6/2014); Hypercholesteremia; Nodular prostate without urinary obstruction (2/6/2014); Obesity (2/6/2014); and Pneumothorax, right (10/8/2017). Mr. Laura Manrique  has a past surgical history that includes knee arthroscp harv and urological.  Social History/Living Environment:    Pt lives with his wife. He is self employed and works out of his home. Prior Level of Function/Work/Activity:  Independent ADLs/IADLs  Dominant Side:         RIGHT    Current Medications:    Current Outpatient Prescriptions:     cpap machine kit, by Does Not Apply route., Disp: , Rfl:     guaiFENesin ER (MUCINEX) 600 mg ER tablet, Take 600 mg by mouth two (2) times a day., Disp: , Rfl:     cyanocobalamin (VITAMIN B12) 500 mcg tablet, Take 500 mcg by mouth daily. , Disp: , Rfl:     enalapril (VASOTEC) 10 mg tablet, Take 10 mg by mouth two (2) times a day., Disp: , Rfl:     loratadine (CLARITIN) 10 mg tablet, Take 1 Tab by mouth daily. , Disp: 30 Tab, Rfl: 0    butalbital-acetaminophen-caffeine (FIORICET, ESGIC) -40 mg per tablet, Take 1 Tab by mouth every six (6) hours as needed for Headache. Max Daily Amount: 4 Tabs., Disp: 40 Tab, Rfl: 0    levETIRAcetam (KEPPRA) 500 mg tablet, Take 1 Tab by mouth two (2) times a day. Indications: seizure prophylaxis, Disp: 60 Tab, Rfl: 1    linaclotide (LINZESS) 145 mcg cap capsule, Take 1 Cap by mouth Daily (before breakfast). , Disp: 90 Cap, Rfl: 3    polyethylene glycol (MIRALAX) 17 gram/dose powder, Take 17 g by mouth two (2) times a day., Disp: , Rfl:     simvastatin (ZOCOR) 40 mg tablet, Take 1 Tab by mouth nightly., Disp: 30 Tab, Rfl: 0    metFORMIN (GLUCOPHAGE) 500 mg tablet, Take 1 Tab by mouth two (2) times daily (with meals). , Disp: 180 Tab, Rfl: 3    hydroCHLOROthiazide (HYDRODIURIL) 25 mg tablet, Take 1 Tab by mouth daily. , Disp: 90 Tab, Rfl: 3    carvedilol (COREG) 25 mg tablet, Take 1 Tab by mouth two (2) times daily (with meals). , Disp: 180 Tab, Rfl: 3    spironolactone (ALDACTONE) 25 mg tablet, Take 1 Tab by mouth daily. , Disp: 90 Tab, Rfl: 3    FLUTICASONE PROPIONATE (FLONASE NA), 2 Sprays by Nasal route daily. , Disp: , Rfl:     B.infantis-B.ani-B.long-B.bifi (PROBIOTIC 4X) 10-15 mg TbEC, Take 2 Caps by mouth daily. , Disp: , Rfl:     clotrimazole-betamethasone (LOTRISONE) topical cream, Apply to affected area bid as directed, Disp: 15 g, Rfl: 0    MULTIVITS,CA,MIN/IRON/FA/LYCOP (CENTRUM ULTRA MEN'S PO), Take 1 Tab by mouth daily. , Disp: , Rfl:             Date Last Reviewed:  11/20/2017     Complexity Level : Expanded review of therapy/medical records (1-2):  MODERATE COMPLEXITY   ASSESSMENT OF OCCUPATIONAL PERFORMANCE:   ROM:          B UE AROM WNL  Strength:          R shoulder 3+/5, L shoulder 4+/5        Bilateral elbow, forearm, wrist 4+/5        Gross :  R 80 lbs      L 73 lbs        3 point pinch:  R  22 lbs  L  17 lbs        Key Pinch:   R 25 lbs     L 21 lbs        Tip Pinch:   R 17 lbs      L:  14 lbs          Special Tests: Visual Scannin:54.75  Cancel O's        Auditory Memory:  5/15 (8 norm)        Abstract Interpretation:  Provided 1 statement        Complex Graphomotor Skills:  Poor copying of 2 three dimensional figures        Serial Digit Recall:  12 trials to achieve 2 consecutive accurate recalls    Coordination:          9-Hole Peg Test:  R:  15:40 seconds     L:  26:56 seconds  Vision:          Needs further evaluation of visual spatial/visual motor skills     Physical Skills Involved:  1. Balance  2. Fine Motor Control  3. Visual motor Cognitive Skills Affected (resulting in the inability to perform in a timely and safe manner):  1. Perception  2. Executive Function  3. Immediate Memory  4. Short Term Recall Psychosocial Skills Affected:  1. Habits/Routines  2. Environmental Adaptation   Number of elements that affect the Plan of Care: 3-5:  MODERATE COMPLEXITY   CLINICAL DECISION MAKING:   Outcome Measure: Tool Used: Disabilities of the Arm, Shoulder and Hand (DASH) Questionnaire - Quick Version  Score:  Initial:   Most Recent:  (Date: -- )   Interpretation of Score: The DASH is designed to measure the activities of daily living in person's with upper extremity dysfunction or pain. Each section is scored on a 1-5 scale, 5 representing the greatest disability. The scores of each section are added together for a total score of 55. Score 11 12-19 20-28 29-37 38-45 46-54 55   Modifier CH CI CJ CK CL CM CN     ? Carrying, Moving, and Handling Objects:     - CURRENT STATUS: CI - 1%-19% impaired, limited or restricted    - GOAL STATUS: CH - 0% impaired, limited or restricted    - D/C STATUS:  ---------------To be determined---------------        Medical Necessity:   · Patient demonstrates good rehab potential due to higher previous functional level.   Reason for Services/Other Comments:  · Patient continues to require skilled intervention due to cognitive and motor dysfunction s/p bilateral subdural hematoma. Assessment process, impact of co-morbidities, assessment modification\need for assistance, and selection of interventions: Analytical Complexity:MODERATE COMPLEXITY   TREATMENT:   (In addition to Assessment/Re-Assessment sessions the following treatments were rendered)    Pre-treatment Symptoms/Complaints:  Inability to type and organize executive functions at work  Pain: Initial: Pain Intensity 1: 0  Post Session:  0   OT EVAL:  (x)  OT eval completed. Therapeutic Activity: ( minutes): Therapeutic Exercise: ():      Cognitive Skills Development: (60):  Pt completed cognitive skills development activities:  Financial Management paper and pencil exercise with 100% accuracy. Computer activities to follow through at home with [employer] for the brain. com visual spatial, organization, scanning with  (parts to whole), Counterfiet (finding differences in pictures), Rotate ( parts to whole), Crime Scene (visual scanning and memory), Masterpieces (visual memory). Pt required mod verbal cues for organizational techniques to approach the tasks. Also provided instruction in typing exercises using [email or web address] com activities. Treatment/Session Assessment:    · Response to Treatment:  Pt receptive and tolerated treatment without complication. · Compliance with Program/Exercises: Pt reports he did not follow through with suggested activities over the holiday weekend. · Recommendations/Intent for next treatment session: \"Next visit will focus on advancements to more challenging activities\".   Total Treatment Duration:OT Patient Time In/Time Out  Time In: 0345  Time Out: 550 [facility]

## 2017-11-29 PROBLEM — E66.01 OBESITY, MORBID (HCC): Status: ACTIVE | Noted: 2017-11-29

## 2017-11-30 ENCOUNTER — HOSPITAL ENCOUNTER (OUTPATIENT)
Dept: PHYSICAL THERAPY | Age: 62
Discharge: HOME | End: 2017-11-30
Payer: COMMERCIAL

## 2017-11-30 PROCEDURE — 97532 HC OT COGNITIVE SKILL DEV 15 MIN: CPT

## 2017-11-30 PROCEDURE — 97530 THERAPEUTIC ACTIVITIES: CPT

## 2017-11-30 NOTE — THERAPY DISCHARGE
Dio Hunter  : 1955  Primary: Vernell [facility]*  Secondary:  [facility]  [address], [facility], [address]  [phone]   [phone]         OUTPATIENT OCCUPATIONAL THERAPY: Discharge &Daily Note 2017     ICD-10: Treatment Diagnosis: R27.8, lack of coordination  Precautions/Allergies:   Zithromax [azithromycin]   Fall Risk Score: 7 (? 5 = High Risk)  MD Orders: Evaluate and treat fine motor retraining and cognitive recall MEDICAL/REFERRING DIAGNOSIS:   Traumatic subdural hemorrhage without loss of consciousness, subsequent encounter [S06.5X0D]   DATE OF ONSET: 9/15/17   REFERRING PHYSICIAN: Milagros Gray,*  RETURN PHYSICIAN APPOINTMENT: [facility]     INITIAL ASSESSMENT:  Mr. Cruz Soriano is a 56 yo male who fell at home in September. He sustained L rib fractures and a concussion. Two week later, he was found to have 2 subdural hematomas with resultant surgery. Two day following discharge from hospital, he was readmitted with a collapsed lung and had chest tube surgery. One and a half weeks later, he was having difficulty walking and had a second surgery to relieve cranial pressure bilaterally. He completed home health physical therapy and is here for occupational therapy for fine motor retraining and cognitive recall difficulties. 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 He is having difficulty typing, thinking organizing his work. He reports having headaches daily for 6 weeks, being controlled with Tylenol 1000 mg twice daily. Pt is independent with ADLs. He has not returned to driving to date. DISCHARGE:  Mr. Cruz Soriano completed 3 therapy sessions. He demonstrated improved processing of visual perceptual, visual scanning, auditory memory and graphomotor skills. He has been working at home and was able to complete his monthly financials independently.   He has been provided home program cognitive activities including typing exercises, visual spatial, organization, scanning with  (parts to whole), Counterfiet (finding differences in pictures), Rotate ( parts to whole), Crime Scene (visual scanning and memory), Masterpieces (visual memory). Pt scored 98% on motor free visual perceptual testing (MVPT-R). Pt in agreement with discharge as he feels he is thinking more clearly every day, and is able to complete his work-related tasks efficiently. PLAN OF CARE:   PROBLEM LIST:  1. Decreased ADL/Functional Activities  2. Decreased Balance  3. Decreased Cognition  4. Decreased coordination/visual perception  INTERVENTIONS PLANNED:  1. Cognitive training  2. Re-evaluation  3. Therapeutic activity  4. Therapeutic exercise  5. Neuromuscular retraining  6. Visual perceptual retraining   TREATMENT PLAN:  Effective Dates: 11/20/17 TO 2/12/17. Frequency/Duration:  Two times a week for 12 weeks; upon reassessment will adjust frequency and duration as necessary. GOALS: (Goals have been discussed and agreed upon with patient.)  Short-Term Functional Goals: Time Frame: 6 weeks  1. Pt will improve bilateral hand fine motor coordination as evidenced by improved 9-Hole Peg Test Score; not tested  2. Pt will demonstrate improved visual processing with improvement in visual scanning tasks; goal met  3. Pt will demonstrate improved dynamic balance to visually scan while ambulating and reaching without loss of balance; not met   Discharge Goals: Time Frame: 12 weeks  1. Pt will compose, type and send emails at work without difficulty; goal met  2. Pt will demonstrate improved auditory memory recall of 8-10 items when read a paragraph that contains 15 items to recall; goal met  3. Pt will demonstrate improved integration of details with a quick, accurate interpretation of an abstract action picture, providing details without verbal cues; goal met  4.  Pt will demonstrate improved complex graphomotor skills with accurate and timely copying of two 3-dimensional figures; goal met  Rehabilitation Potential For Stated Goals: Good  Regarding Jose Cruz Horvath's therapy, I certify that the treatment plan above will be carried out by a therapist or under their direction. Thank you for this referral,  Nick Cardenas, OT     Referring Physician Signature: Dema Angelucci,* _________________________  Date _________            The information in this section was collected on 11/20/2017   (except where otherwise noted). OCCUPATIONAL PROFILE & HISTORY:   History of Present Injury/Illness (Reason for Referral):  Mr. Laura Manrique is a 56 yo male who fell at home in September. He sustained L rib fractures and a concussion. Two week later, he was found to have 2 subdural hematomas with resultant surgery. Two day following discharge from hospital, he was readmitted with a collapsed lung and had chest tube surgery. One and a half weeks later, he was having difficulty walking and had a second surgery to relieve cranial pressure bilaterally. He completed home health physical therapy and is here for occupational therapy for fine motor retraining and cognitive recall difficulties. 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 He is having difficulty typing, thinking organizing his work. He reports having headaches daily for 6 weeks, being controlled with Tylenol 1000 mg twice daily. Pt is independent with ADLs. He has not returned to driving to date. Past Medical History/Comorbidities:   Mr. Laura Manrique  has a past medical history of A-fib ([address] Arteriosclerosis; Atrial fibrillation ([address] (1/3/2012); Diabetes mellitus; Diabetes mellitus ([address] (1/3/2012); Essential hypertension, benign (2/6/2014); Family history of malignant neoplasm of prostate (2/6/2014); Hemochromatosis; HTN (hypertension) (2/6/2014); Hypercholesteremia; Nodular prostate without urinary obstruction (2/6/2014); Obesity (2/6/2014); and Pneumothorax, right (10/8/2017).    Diane Donaldson  has a past surgical history that includes knee arthroscp harv and urological.  Social History/Living Environment:    Pt lives with his wife. He is self employed and works out of his home. Prior Level of Function/Work/Activity:  Independent ADLs/IADLs  Dominant Side:         RIGHT    Current Medications:    Current Outpatient Prescriptions:     cpap machine kit, by Does Not Apply route., Disp: , Rfl:     guaiFENesin ER (MUCINEX) 600 mg ER tablet, Take 600 mg by mouth two (2) times a day., Disp: , Rfl:     cyanocobalamin (VITAMIN B12) 500 mcg tablet, Take 500 mcg by mouth daily. , Disp: , Rfl:     enalapril (VASOTEC) 10 mg tablet, Take 10 mg by mouth two (2) times a day., Disp: , Rfl:     loratadine (CLARITIN) 10 mg tablet, Take 1 Tab by mouth daily. , Disp: 30 Tab, Rfl: 0    butalbital-acetaminophen-caffeine (FIORICET, ESGIC) -40 mg per tablet, Take 1 Tab by mouth every six (6) hours as needed for Headache. Max Daily Amount: 4 Tabs., Disp: 40 Tab, Rfl: 0    levETIRAcetam (KEPPRA) 500 mg tablet, Take 1 Tab by mouth two (2) times a day. Indications: seizure prophylaxis, Disp: 60 Tab, Rfl: 1    linaclotide (LINZESS) 145 mcg cap capsule, Take 1 Cap by mouth Daily (before breakfast). , Disp: 90 Cap, Rfl: 3    polyethylene glycol (MIRALAX) 17 gram/dose powder, Take 17 g by mouth two (2) times a day., Disp: , Rfl:     simvastatin (ZOCOR) 40 mg tablet, Take 1 Tab by mouth nightly., Disp: 30 Tab, Rfl: 0    metFORMIN (GLUCOPHAGE) 500 mg tablet, Take 1 Tab by mouth two (2) times daily (with meals). , Disp: 180 Tab, Rfl: 3    hydroCHLOROthiazide (HYDRODIURIL) 25 mg tablet, Take 1 Tab by mouth daily. , Disp: 90 Tab, Rfl: 3    carvedilol (COREG) 25 mg tablet, Take 1 Tab by mouth two (2) times daily (with meals). , Disp: 180 Tab, Rfl: 3    spironolactone (ALDACTONE) 25 mg tablet, Take 1 Tab by mouth daily. , Disp: 90 Tab, Rfl: 3    FLUTICASONE PROPIONATE (FLONASE NA), 2 Sprays by Nasal route daily. , Disp: , Rfl:     B.infantis-B.ani-B.long-B.bifi (PROBIOTIC 4X) 10-15 mg TbEC, Take 2 Caps by mouth daily. , Disp: , Rfl:     clotrimazole-betamethasone (LOTRISONE) topical cream, Apply to affected area bid as directed, Disp: 15 g, Rfl: 0    MULTIVITS,CA,MIN/IRON/FA/LYCOP (CENTRUM ULTRA MEN'S PO), Take 1 Tab by mouth daily. , Disp: , Rfl:             Date Last Reviewed:  2017     Complexity Level : Expanded review of therapy/medical records (1-2):  MODERATE COMPLEXITY   ASSESSMENT OF OCCUPATIONAL PERFORMANCE:   ROM:          B UE AROM WNL  Strength:          R shoulder 3+/5, L shoulder 4+/5        Bilateral elbow, forearm, wrist 4+/5        Gross :  R 80 lbs      L 73 lbs        3 point pinch:  R  22 lbs  L  17 lbs        Key Pinch:   R 25 lbs     L 21 lbs        Tip Pinch:   R 17 lbs      L:  14 lbs          Special Tests:          Visual Scannin:54.75  Cancel O's        Auditory Memory:  5/15 (8 norm)        Abstract Interpretation:  Provided 1 statement        Complex Graphomotor Skills:  Poor copying of 2 three dimensional figures        Serial Digit Recall:  12 trials to achieve 2 consecutive accurate recalls    Coordination:          9-Hole Peg Test:  R:  15:40 seconds     L:  26:56 seconds  Vision:          Needs further evaluation of visual spatial/visual motor skills     Physical Skills Involved:  1. Balance  2. Fine Motor Control  3. Visual motor Cognitive Skills Affected (resulting in the inability to perform in a timely and safe manner):  1. Perception  2. Executive Function  3. Immediate Memory  4. Short Term Recall Psychosocial Skills Affected:  1. Habits/Routines  2. Environmental Adaptation   Number of elements that affect the Plan of Care: 3-5:  MODERATE COMPLEXITY   CLINICAL DECISION MAKING:   Outcome Measure: Tool Used: Disabilities of the Arm, Shoulder and Hand (DASH) Questionnaire - Quick Version  Score:  Initial:   Most Recent:  (Date: -- )   Interpretation of Score:  The DASH is designed to measure the activities of daily living in person's with upper extremity dysfunction or pain. Each section is scored on a 1-5 scale, 5 representing the greatest disability. The scores of each section are added together for a total score of 55. Score 11 12-19 20-28 29-37 38-45 46-54 55   Modifier CH CI CJ CK CL CM CN     ? Carrying, Moving, and Handling Objects:     - CURRENT STATUS: CI - 1%-19% impaired, limited or restricted    - GOAL STATUS: CH - 0% impaired, limited or restricted    - D/C STATUS:  CI - 1%-19% impaired, limited or restricted        Medical Necessity:   · Patient demonstrates good rehab potential due to higher previous functional level. Reason for Services/Other Comments:  · Patient continues to require skilled intervention due to cognitive and motor dysfunction s/p bilateral subdural hematoma. Assessment process, impact of co-morbidities, assessment modification\need for assistance, and selection of interventions: Analytical Complexity:MODERATE COMPLEXITY   TREATMENT:   (In addition to Assessment/Re-Assessment sessions the following treatments were rendered)    Pre-treatment Symptoms/Complaints:  Inability to type and organize executive functions at work  Pain: Initial: Pain Intensity 1: 0  Post Session:  0   OT EVAL:  (x)  OT eval completed. Therapeutic Activity: ( minutes): Therapeutic Exercise: (15 minutes):  Reviewed home program activities for continued challenges and self-training. Cognitive Skills Development: (30 minutes):  Pt completed cognitive skills development activities:  scored 98% on motor free visual perceptual testing (MVPT-R).  auditory memory recall of 12 items when read a paragraph that contains 15 items to recall,  improved integration of details with an accurate interpretation of an abstract action picture, providing details without verbal cues; improved complex graphomotor skills with accurate and timely copying of two 3-dimensional figures and analog clock. Treatment/Session Assessment:    · Response to Treatment:  Pt in agreement with discharge; is working at home and reports clearer cognition and organizational skills. .  · Compliance with Program/Exercises:  Good  · Recommendations/Intent for next treatment session: Discharge therapy today    Total Treatment Duration:OT Patient Time In/Time Out  Time In:4:00  Time Out: [facility], OT

## 2017-12-04 ENCOUNTER — HOSPITAL ENCOUNTER (OUTPATIENT)
Dept: CT IMAGING | Age: 62
Discharge: HOME | End: 2017-12-04
Attending: NEUROLOGICAL SURGERY
Payer: COMMERCIAL

## 2017-12-04 DIAGNOSIS — S06.5XAA SUBDURAL HEMATOMA: ICD-10-CM

## 2017-12-04 PROCEDURE — 70450 CT HEAD/BRAIN W/O DYE: CPT

## 2017-12-12 PROBLEM — S06.5XAA SUBDURAL HEMATOMA: Status: RESOLVED | Noted: 2017-10-08 | Resolved: 2017-12-12

## 2018-02-02 ENCOUNTER — HOSPITAL ENCOUNTER (OUTPATIENT)
Dept: CT IMAGING | Age: 63
Discharge: HOME | End: 2018-02-02
Attending: NEUROLOGICAL SURGERY
Payer: COMMERCIAL

## 2018-02-02 DIAGNOSIS — S06.5XAA SUBDURAL HEMATOMA: ICD-10-CM

## 2018-02-02 PROCEDURE — 70450 CT HEAD/BRAIN W/O DYE: CPT

## 2018-02-22 ENCOUNTER — HOSPITAL ENCOUNTER (OUTPATIENT)
Dept: LAB | Age: 63
Discharge: HOME | End: 2018-02-22
Payer: COMMERCIAL

## 2018-02-22 DIAGNOSIS — E83.19 IRON EXCESS: ICD-10-CM

## 2018-02-22 DIAGNOSIS — E83.119 HEMOCHROMATOSIS, UNSPECIFIED HEMOCHROMATOSIS TYPE: ICD-10-CM

## 2018-02-22 LAB
ALBUMIN SERPL-MCNC: 3.6 G/DL (ref 3.2–4.6)
ALBUMIN/GLOB SERPL: 1 {RATIO} (ref 1.2–3.5)
ALP SERPL-CCNC: 108 U/L (ref 50–136)
ALT SERPL-CCNC: 61 U/L (ref 12–65)
ANION GAP SERPL CALC-SCNC: 6 MMOL/L (ref 7–16)
AST SERPL-CCNC: 37 U/L (ref 15–37)
BASOPHILS # BLD: 0 K/UL (ref 0–0.2)
BASOPHILS NFR BLD: 0 % (ref 0–2)
BILIRUB SERPL-MCNC: 0.7 MG/DL (ref 0.2–1.1)
BUN SERPL-MCNC: 12 MG/DL (ref 8–23)
CALCIUM SERPL-MCNC: 9.4 MG/DL (ref 8.3–10.4)
CHLORIDE SERPL-SCNC: 102 MMOL/L (ref 98–107)
CO2 SERPL-SCNC: 26 MMOL/L (ref 21–32)
CREAT SERPL-MCNC: 0.85 MG/DL (ref 0.8–1.5)
DIFFERENTIAL METHOD BLD: ABNORMAL
EOSINOPHIL # BLD: 0.1 K/UL (ref 0–0.8)
EOSINOPHIL NFR BLD: 2 % (ref 0.5–7.8)
ERYTHROCYTE [DISTWIDTH] IN BLOOD BY AUTOMATED COUNT: 13.4 % (ref 11.9–14.6)
FERRITIN SERPL-MCNC: 247 NG/ML (ref 8–388)
GLOBULIN SER CALC-MCNC: 3.6 G/DL (ref 2.3–3.5)
GLUCOSE SERPL-MCNC: 155 MG/DL (ref 65–100)
HCT VFR BLD AUTO: 41.9 % (ref 41.1–50.3)
HGB BLD-MCNC: 14.5 G/DL (ref 13.6–17.2)
IRON SATN MFR SERPL: 27 %
IRON SERPL-MCNC: 81 UG/DL (ref 35–150)
LYMPHOCYTES # BLD: 1.2 K/UL (ref 0.5–4.6)
LYMPHOCYTES NFR BLD: 21 % (ref 13–44)
MCH RBC QN AUTO: 33.4 PG (ref 26.1–32.9)
MCHC RBC AUTO-ENTMCNC: 34.6 G/DL (ref 31.4–35)
MCV RBC AUTO: 96.5 FL (ref 79.6–97.8)
MONOCYTES # BLD: 0.4 K/UL (ref 0.1–1.3)
MONOCYTES NFR BLD: 7 % (ref 4–12)
NEUTS SEG # BLD: 4.2 K/UL (ref 1.7–8.2)
NEUTS SEG NFR BLD: 71 % (ref 43–78)
NRBC # BLD: 0 K/UL (ref 0–0.2)
PLATELET # BLD AUTO: 180 K/UL (ref 150–450)
PMV BLD AUTO: 9.6 FL (ref 10.8–14.1)
POTASSIUM SERPL-SCNC: 4 MMOL/L (ref 3.5–5.1)
PROT SERPL-MCNC: 7.2 G/DL (ref 6.3–8.2)
RBC # BLD AUTO: 4.34 M/UL (ref 4.23–5.67)
SODIUM SERPL-SCNC: 134 MMOL/L (ref 136–145)
TIBC SERPL-MCNC: 296 UG/DL (ref 250–450)
WBC # BLD AUTO: 5.9 K/UL (ref 4.3–11.1)

## 2018-02-22 PROCEDURE — 83540 ASSAY OF IRON: CPT | Performed by: INTERNAL MEDICINE

## 2018-02-22 PROCEDURE — 80053 COMPREHEN METABOLIC PANEL: CPT | Performed by: INTERNAL MEDICINE

## 2018-02-22 PROCEDURE — 82728 ASSAY OF FERRITIN: CPT | Performed by: INTERNAL MEDICINE

## 2018-02-22 PROCEDURE — 36415 COLL VENOUS BLD VENIPUNCTURE: CPT | Performed by: INTERNAL MEDICINE

## 2018-02-22 PROCEDURE — 85025 COMPLETE CBC W/AUTO DIFF WBC: CPT | Performed by: INTERNAL MEDICINE

## 2018-02-28 ENCOUNTER — HOSPITAL ENCOUNTER (OUTPATIENT)
Dept: INFUSION THERAPY | Age: 63
Discharge: HOME | End: 2018-02-28
Payer: COMMERCIAL

## 2018-02-28 VITALS
BODY MASS INDEX: 44.48 KG/M2 | SYSTOLIC BLOOD PRESSURE: 103 MMHG | TEMPERATURE: 97.5 F | HEART RATE: 104 BPM | RESPIRATION RATE: 18 BRPM | WEIGHT: 315 LBS | DIASTOLIC BLOOD PRESSURE: 67 MMHG | OXYGEN SATURATION: 95 %

## 2018-02-28 DIAGNOSIS — E83.110 HEREDITARY HEMOCHROMATOSIS (HCC): ICD-10-CM

## 2018-02-28 PROCEDURE — 99195 PHLEBOTOMY: CPT

## 2018-02-28 NOTE — PROGRESS NOTES
Arrived to the [facility]. 450mL collected via phlebotomy. Patient tolerated well. Monitored post and given water and crackers. Any issues or concerns during appointment: no.  Patient has f/u with doctor but does not require next infusion appointment at this time. Discharged ambulatory.

## 2018-04-17 NOTE — HPI: SKIN LESIONS
How Severe Is Your Skin Lesion?: moderate
Have Your Skin Lesions Been Treated?: not been treated
Is This A New Presentation, Or A Follow-Up?: Skin Lesions
How Severe Is Your Skin Lesion?: mild
Is This A New Presentation, Or A Follow-Up?: Skin Lesion
Pt wishes to get home

## 2018-05-17 ENCOUNTER — HOSPITAL ENCOUNTER (OUTPATIENT)
Dept: LAB | Age: 63
Discharge: HOME | End: 2018-05-17
Payer: COMMERCIAL

## 2018-05-17 DIAGNOSIS — E83.119 HEMOCHROMATOSIS, UNSPECIFIED HEMOCHROMATOSIS TYPE: ICD-10-CM

## 2018-05-17 LAB
ALBUMIN SERPL-MCNC: 3.8 G/DL (ref 3.2–4.6)
ALBUMIN/GLOB SERPL: 1 {RATIO} (ref 1.2–3.5)
ALP SERPL-CCNC: 87 U/L (ref 50–136)
ALT SERPL-CCNC: 55 U/L (ref 12–65)
ANION GAP SERPL CALC-SCNC: 6 MMOL/L (ref 7–16)
AST SERPL-CCNC: 32 U/L (ref 15–37)
BASOPHILS # BLD: 0 K/UL (ref 0–0.2)
BASOPHILS NFR BLD: 0 % (ref 0–2)
BILIRUB SERPL-MCNC: 1 MG/DL (ref 0.2–1.1)
BUN SERPL-MCNC: 16 MG/DL (ref 8–23)
CALCIUM SERPL-MCNC: 9.5 MG/DL (ref 8.3–10.4)
CHLORIDE SERPL-SCNC: 104 MMOL/L (ref 98–107)
CO2 SERPL-SCNC: 27 MMOL/L (ref 21–32)
CREAT SERPL-MCNC: 0.96 MG/DL (ref 0.8–1.5)
DIFFERENTIAL METHOD BLD: ABNORMAL
EOSINOPHIL # BLD: 0.1 K/UL (ref 0–0.8)
EOSINOPHIL NFR BLD: 1 % (ref 0.5–7.8)
ERYTHROCYTE [DISTWIDTH] IN BLOOD BY AUTOMATED COUNT: 12.6 % (ref 11.9–14.6)
FERRITIN SERPL-MCNC: 138 NG/ML (ref 8–388)
GLOBULIN SER CALC-MCNC: 3.7 G/DL (ref 2.3–3.5)
GLUCOSE SERPL-MCNC: 122 MG/DL (ref 65–100)
HCT VFR BLD AUTO: 42.7 % (ref 41.1–50.3)
HGB BLD-MCNC: 14.6 G/DL (ref 13.6–17.2)
IRON SATN MFR SERPL: 38 %
IRON SERPL-MCNC: 124 UG/DL (ref 35–150)
LYMPHOCYTES # BLD: 1 K/UL (ref 0.5–4.6)
LYMPHOCYTES NFR BLD: 13 % (ref 13–44)
MCH RBC QN AUTO: 33.8 PG (ref 26.1–32.9)
MCHC RBC AUTO-ENTMCNC: 34.2 G/DL (ref 31.4–35)
MCV RBC AUTO: 98.8 FL (ref 79.6–97.8)
MONOCYTES # BLD: 0.5 K/UL (ref 0.1–1.3)
MONOCYTES NFR BLD: 7 % (ref 4–12)
NEUTS SEG # BLD: 6 K/UL (ref 1.7–8.2)
NEUTS SEG NFR BLD: 79 % (ref 43–78)
NRBC # BLD: 0 K/UL (ref 0–0.2)
PLATELET # BLD AUTO: 189 K/UL (ref 150–450)
PMV BLD AUTO: 10.1 FL (ref 10.8–14.1)
POTASSIUM SERPL-SCNC: 4.4 MMOL/L (ref 3.5–5.1)
PROT SERPL-MCNC: 7.5 G/DL (ref 6.3–8.2)
RBC # BLD AUTO: 4.32 M/UL (ref 4.23–5.67)
SODIUM SERPL-SCNC: 137 MMOL/L (ref 136–145)
TIBC SERPL-MCNC: 328 UG/DL (ref 250–450)
WBC # BLD AUTO: 7.7 K/UL (ref 4.3–11.1)

## 2018-05-17 PROCEDURE — 85025 COMPLETE CBC W/AUTO DIFF WBC: CPT | Performed by: INTERNAL MEDICINE

## 2018-05-17 PROCEDURE — 80053 COMPREHEN METABOLIC PANEL: CPT | Performed by: INTERNAL MEDICINE

## 2018-05-17 PROCEDURE — 83540 ASSAY OF IRON: CPT | Performed by: INTERNAL MEDICINE

## 2018-05-17 PROCEDURE — 82728 ASSAY OF FERRITIN: CPT | Performed by: INTERNAL MEDICINE

## 2018-05-17 PROCEDURE — 36415 COLL VENOUS BLD VENIPUNCTURE: CPT | Performed by: INTERNAL MEDICINE

## 2018-05-23 ENCOUNTER — HOSPITAL ENCOUNTER (OUTPATIENT)
Dept: INFUSION THERAPY | Age: 63
Discharge: HOME | End: 2018-05-23
Payer: COMMERCIAL

## 2018-05-23 VITALS
HEART RATE: 92 BPM | TEMPERATURE: 97.6 F | OXYGEN SATURATION: 96 % | SYSTOLIC BLOOD PRESSURE: 135 MMHG | RESPIRATION RATE: 18 BRPM | DIASTOLIC BLOOD PRESSURE: 65 MMHG

## 2018-05-23 DIAGNOSIS — E83.110 HEREDITARY HEMOCHROMATOSIS (HCC): ICD-10-CM

## 2018-05-23 PROCEDURE — 74011250636 HC RX REV CODE- 250/636: Performed by: NURSE PRACTITIONER

## 2018-05-23 PROCEDURE — 99195 PHLEBOTOMY: CPT

## 2018-05-23 PROCEDURE — 96360 HYDRATION IV INFUSION INIT: CPT

## 2018-05-23 RX ORDER — SODIUM CHLORIDE 9 MG/ML
500 INJECTION, SOLUTION INTRAVENOUS ONCE
Status: COMPLETED | OUTPATIENT
Start: 2018-05-23 | End: 2018-05-23

## 2018-05-23 RX ADMIN — SODIUM CHLORIDE 500 ML: 900 INJECTION, SOLUTION INTRAVENOUS at 09:45

## 2018-05-23 NOTE — PROGRESS NOTES
Arrived to the [facility] ambulatory. Phlebotomy and 500ccns bolus completed. Patient tolerated well. Any issues or concerns during appointment: 450cc removed via phlebotomy, when pt was walking out he became unsteady  And stated he \"felt squirrelly\" pt brought back to infusion and given 500ccns bolus per Tyler Mckeon NP with good results. Patient aware of next infusion appointment on  7/23 at 0800. Discharged to home ambulatory.

## 2018-07-20 ENCOUNTER — APPOINTMENT (RX ONLY)
Dept: URBAN - METROPOLITAN AREA CLINIC 24 | Facility: CLINIC | Age: 63
Setting detail: DERMATOLOGY
End: 2018-07-20

## 2018-07-20 DIAGNOSIS — D485 NEOPLASM OF UNCERTAIN BEHAVIOR OF SKIN: ICD-10-CM

## 2018-07-20 DIAGNOSIS — L81.4 OTHER MELANIN HYPERPIGMENTATION: ICD-10-CM

## 2018-07-20 DIAGNOSIS — L82.1 OTHER SEBORRHEIC KERATOSIS: ICD-10-CM

## 2018-07-20 DIAGNOSIS — L73.9 FOLLICULAR DISORDER, UNSPECIFIED: ICD-10-CM

## 2018-07-20 DIAGNOSIS — L663 OTHER SPECIFIED DISEASES OF HAIR AND HAIR FOLLICLES: ICD-10-CM

## 2018-07-20 DIAGNOSIS — L738 OTHER SPECIFIED DISEASES OF HAIR AND HAIR FOLLICLES: ICD-10-CM

## 2018-07-20 DIAGNOSIS — D18.0 HEMANGIOMA: ICD-10-CM

## 2018-07-20 DIAGNOSIS — L72.8 OTHER FOLLICULAR CYSTS OF THE SKIN AND SUBCUTANEOUS TISSUE: ICD-10-CM

## 2018-07-20 PROBLEM — D48.5 NEOPLASM OF UNCERTAIN BEHAVIOR OF SKIN: Status: ACTIVE | Noted: 2018-07-20

## 2018-07-20 PROBLEM — D18.01 HEMANGIOMA OF SKIN AND SUBCUTANEOUS TISSUE: Status: ACTIVE | Noted: 2018-07-20

## 2018-07-20 PROBLEM — L02.222 FURUNCLE OF BACK [ANY PART, EXCEPT BUTTOCK]: Status: ACTIVE | Noted: 2018-07-20

## 2018-07-20 PROCEDURE — 99213 OFFICE O/P EST LOW 20 MIN: CPT

## 2018-07-20 PROCEDURE — ? COUNSELING

## 2018-07-20 PROCEDURE — ? OTHER

## 2018-07-20 ASSESSMENT — LOCATION SIMPLE DESCRIPTION DERM
LOCATION SIMPLE: LEFT LOWER BACK
LOCATION SIMPLE: LEFT SHOULDER
LOCATION SIMPLE: LEFT EAR
LOCATION SIMPLE: ABDOMEN
LOCATION SIMPLE: RIGHT SHOULDER
LOCATION SIMPLE: RIGHT ANKLE
LOCATION SIMPLE: RIGHT LOWER BACK
LOCATION SIMPLE: LEFT UPPER BACK
LOCATION SIMPLE: CHEST

## 2018-07-20 ASSESSMENT — LOCATION ZONE DERM
LOCATION ZONE: EAR
LOCATION ZONE: ARM
LOCATION ZONE: TRUNK
LOCATION ZONE: LEG

## 2018-07-20 ASSESSMENT — LOCATION DETAILED DESCRIPTION DERM
LOCATION DETAILED: RIGHT LATERAL SUPERIOR CHEST
LOCATION DETAILED: RIGHT POSTERIOR SHOULDER
LOCATION DETAILED: LEFT INFERIOR UPPER BACK
LOCATION DETAILED: LEFT POSTERIOR SHOULDER
LOCATION DETAILED: LEFT POSTERIOR EAR
LOCATION DETAILED: RIGHT MEDIAL POSTERIOR ANKLE
LOCATION DETAILED: LEFT INFERIOR LATERAL MIDBACK
LOCATION DETAILED: RIGHT SUPERIOR LATERAL MIDBACK
LOCATION DETAILED: LEFT SUPERIOR UPPER BACK
LOCATION DETAILED: EPIGASTRIC SKIN

## 2018-07-20 NOTE — PROCEDURE: OTHER
Other (Free Text): Will keep covered with Vaseline and discontinue picking. Suggested Urea moisturizer.
Detail Level: Simple
Note Text (......Xxx Chief Complaint.): This diagnosis correlates with the
Other (Free Text): Declines treatment today

## 2018-07-23 ENCOUNTER — HOSPITAL ENCOUNTER (OUTPATIENT)
Dept: INFUSION THERAPY | Age: 63
Discharge: HOME | End: 2018-07-23
Payer: COMMERCIAL

## 2018-07-23 VITALS
BODY MASS INDEX: 42.19 KG/M2 | DIASTOLIC BLOOD PRESSURE: 63 MMHG | SYSTOLIC BLOOD PRESSURE: 104 MMHG | WEIGHT: 315 LBS | OXYGEN SATURATION: 97 % | TEMPERATURE: 97.7 F | HEART RATE: 79 BPM

## 2018-07-23 DIAGNOSIS — E83.110 HEREDITARY HEMOCHROMATOSIS (HCC): ICD-10-CM

## 2018-07-23 LAB — FERRITIN SERPL-MCNC: 155 NG/ML (ref 8–388)

## 2018-07-23 PROCEDURE — 99195 PHLEBOTOMY: CPT

## 2018-07-23 PROCEDURE — 82728 ASSAY OF FERRITIN: CPT | Performed by: INTERNAL MEDICINE

## 2018-07-23 PROCEDURE — 36415 COLL VENOUS BLD VENIPUNCTURE: CPT | Performed by: INTERNAL MEDICINE

## 2018-07-23 NOTE — PROGRESS NOTES
Arrived to the [facility]. Phlebotomy 500 ml drawn from left AC completed. Patient tolerated well.    Any issues or concerns during appointment:no   Patient aware of next infusion appointment on 09/24 08:00   Discharged ambulatory

## 2018-09-24 ENCOUNTER — HOSPITAL ENCOUNTER (OUTPATIENT)
Dept: INFUSION THERAPY | Age: 63
Discharge: HOME | End: 2018-09-24
Payer: COMMERCIAL

## 2018-09-24 VITALS
OXYGEN SATURATION: 97 % | WEIGHT: 315 LBS | DIASTOLIC BLOOD PRESSURE: 73 MMHG | BODY MASS INDEX: 42.42 KG/M2 | SYSTOLIC BLOOD PRESSURE: 121 MMHG | HEART RATE: 77 BPM | TEMPERATURE: 98 F

## 2018-09-24 DIAGNOSIS — E83.110 HEREDITARY HEMOCHROMATOSIS (HCC): ICD-10-CM

## 2018-09-24 LAB
FERRITIN SERPL-MCNC: 108 NG/ML (ref 8–388)
HCT VFR BLD AUTO: 40.1 % (ref 41.1–50.3)
HGB BLD-MCNC: 13.8 G/DL (ref 13.6–17.2)

## 2018-09-24 PROCEDURE — 85018 HEMOGLOBIN: CPT

## 2018-09-24 PROCEDURE — 85014 HEMATOCRIT: CPT

## 2018-09-24 PROCEDURE — 36415 COLL VENOUS BLD VENIPUNCTURE: CPT

## 2018-09-24 PROCEDURE — 99195 PHLEBOTOMY: CPT

## 2018-09-24 PROCEDURE — 82728 ASSAY OF FERRITIN: CPT

## 2018-09-24 RX ORDER — SODIUM CHLORIDE 0.9 % (FLUSH) 0.9 %
5-10 SYRINGE (ML) INJECTION EVERY 8 HOURS
Status: DISCONTINUED | OUTPATIENT
Start: 2018-09-24 | End: 2018-09-28 | Stop reason: HOSPADM

## 2018-09-24 RX ADMIN — Medication 10 ML: at 08:30

## 2018-09-24 NOTE — PROGRESS NOTES
Pt. Discharged ambulatory after eating a snack. No distress noted. Vital signs stable. To call physician with any problems or concerns. Understanding voiced. To return to Infusions on 11/15/18.

## 2018-09-28 PROBLEM — J43.9 BULLOUS EMPHYSEMA (HCC): Status: RESOLVED | Noted: 2017-11-17 | Resolved: 2018-09-28

## 2018-09-28 PROBLEM — S06.5XAA SUBDURAL HEMORRHAGE FOLLOWING INJURY: Status: RESOLVED | Noted: 2017-10-20 | Resolved: 2018-09-28

## 2018-11-12 ENCOUNTER — ANESTHESIA EVENT (OUTPATIENT)
Dept: ENDOSCOPY | Age: 63
End: 2018-11-12
Payer: COMMERCIAL

## 2018-11-12 RX ORDER — SODIUM CHLORIDE, SODIUM LACTATE, POTASSIUM CHLORIDE, CALCIUM CHLORIDE 600; 310; 30; 20 MG/100ML; MG/100ML; MG/100ML; MG/100ML
100 INJECTION, SOLUTION INTRAVENOUS CONTINUOUS
Status: CANCELLED | OUTPATIENT
Start: 2018-11-12

## 2018-11-12 RX ORDER — SODIUM CHLORIDE 0.9 % (FLUSH) 0.9 %
5-10 SYRINGE (ML) INJECTION AS NEEDED
Status: CANCELLED | OUTPATIENT
Start: 2018-11-12

## 2018-11-13 ENCOUNTER — ANESTHESIA (OUTPATIENT)
Dept: ENDOSCOPY | Age: 63
End: 2018-11-13
Payer: COMMERCIAL

## 2018-11-13 ENCOUNTER — HOSPITAL ENCOUNTER (OUTPATIENT)
Age: 63
Setting detail: OUTPATIENT SURGERY
Discharge: HOME | End: 2018-11-13
Attending: INTERNAL MEDICINE | Admitting: INTERNAL MEDICINE
Payer: COMMERCIAL

## 2018-11-13 VITALS
HEIGHT: 75 IN | OXYGEN SATURATION: 97 % | RESPIRATION RATE: 20 BRPM | BODY MASS INDEX: 39.17 KG/M2 | DIASTOLIC BLOOD PRESSURE: 70 MMHG | TEMPERATURE: 97.8 F | SYSTOLIC BLOOD PRESSURE: 138 MMHG | HEART RATE: 65 BPM | WEIGHT: 315 LBS

## 2018-11-13 LAB — GLUCOSE BLD STRIP.AUTO-MCNC: 132 MG/DL (ref 65–100)

## 2018-11-13 PROCEDURE — 82962 GLUCOSE BLOOD TEST: CPT

## 2018-11-13 PROCEDURE — 74011250636 HC RX REV CODE- 250/636

## 2018-11-13 PROCEDURE — 76060000032 HC ANESTHESIA 0.5 TO 1 HR: Performed by: INTERNAL MEDICINE

## 2018-11-13 PROCEDURE — 74011250636 HC RX REV CODE- 250/636: Performed by: ANESTHESIOLOGY

## 2018-11-13 PROCEDURE — 76040000026: Performed by: INTERNAL MEDICINE

## 2018-11-13 PROCEDURE — 74011000250 HC RX REV CODE- 250: Performed by: ANESTHESIOLOGY

## 2018-11-13 PROCEDURE — 77030009426 HC FCPS BIOP ENDOSC BSC -B: Performed by: INTERNAL MEDICINE

## 2018-11-13 PROCEDURE — 88305 TISSUE EXAM BY PATHOLOGIST: CPT

## 2018-11-13 RX ORDER — PROPOFOL 10 MG/ML
INJECTION, EMULSION INTRAVENOUS
Status: DISCONTINUED | OUTPATIENT
Start: 2018-11-13 | End: 2018-11-13 | Stop reason: HOSPADM

## 2018-11-13 RX ORDER — SODIUM CHLORIDE 9 MG/ML
10 INJECTION, SOLUTION INTRAVENOUS CONTINUOUS
Status: DISCONTINUED | OUTPATIENT
Start: 2018-11-13 | End: 2018-11-13 | Stop reason: HOSPADM

## 2018-11-13 RX ORDER — LIDOCAINE HYDROCHLORIDE 20 MG/ML
INJECTION, SOLUTION EPIDURAL; INFILTRATION; INTRACAUDAL; PERINEURAL AS NEEDED
Status: DISCONTINUED | OUTPATIENT
Start: 2018-11-13 | End: 2018-11-13 | Stop reason: HOSPADM

## 2018-11-13 RX ORDER — PROPOFOL 10 MG/ML
INJECTION, EMULSION INTRAVENOUS AS NEEDED
Status: DISCONTINUED | OUTPATIENT
Start: 2018-11-13 | End: 2018-11-13 | Stop reason: HOSPADM

## 2018-11-13 RX ORDER — SODIUM CHLORIDE, SODIUM LACTATE, POTASSIUM CHLORIDE, CALCIUM CHLORIDE 600; 310; 30; 20 MG/100ML; MG/100ML; MG/100ML; MG/100ML
100 INJECTION, SOLUTION INTRAVENOUS CONTINUOUS
Status: DISCONTINUED | OUTPATIENT
Start: 2018-11-13 | End: 2018-11-13 | Stop reason: HOSPADM

## 2018-11-13 RX ADMIN — PROPOFOL 50 MG: 10 INJECTION, EMULSION INTRAVENOUS at 11:42

## 2018-11-13 RX ADMIN — LIDOCAINE HYDROCHLORIDE 40 MG: 20 INJECTION, SOLUTION EPIDURAL; INFILTRATION; INTRACAUDAL; PERINEURAL at 11:42

## 2018-11-13 RX ADMIN — SODIUM CHLORIDE, SODIUM LACTATE, POTASSIUM CHLORIDE, AND CALCIUM CHLORIDE 100 ML/HR: 600; 310; 30; 20 INJECTION, SOLUTION INTRAVENOUS at 11:32

## 2018-11-13 RX ADMIN — PROPOFOL 180 MCG/KG/MIN: 10 INJECTION, EMULSION INTRAVENOUS at 11:42

## 2018-11-13 RX ADMIN — FAMOTIDINE 20 MG: 10 INJECTION, SOLUTION INTRAVENOUS at 11:36

## 2018-11-13 NOTE — PROCEDURES
COLONOSCOPY    DATE of PROCEDURE: 11/13/2018    INDICATIONS:  1. Positive Cologuard  2. History of Tubular Adenoma of the Colon    MEDICATION:  MAC      INSTRUMENT: PCF    PROCEDURE: After obtaining informed consent, the patient was placed in the left lateral position and sedated. The endoscope was advanced to the cecum where the appendiceal orifice and ileocecal valve were identified. On withdrawal, the colon was carefully visualized in a 360 degree fashion. Retroflexion was performed in the rectum. The patient was taken to the recovery area in stable condition. FINDINGS:  Anus: normal exam  Rectum: internal hemorrhoids noted on retroflexion  Sigmoid: a single, 2 mm sessile polyp removed using large capacity forceps and sent for histology. Severe diverticulosis  Descending Colon: normal mucosa other than moderate diverticulosis. Transverse Colon: normal mucosa other than moderate diverticulosis. Ascending Colon: three small, ranging from 1-3 mm, sessile polyps removed using cold large capacity forceps and sent for histology. Mild diverticulosis. Cecum: normal  Terminal ileum: not entered    Estimated blood loss: 0-minimal     ASSESSMENT/PLAN:    1. Four small, sessile polyps ranging from 1-3 mm in size were removed using large capacity forceps and sent for histology  2. Severe diverticulosis. Tortuous colon. 3. Internal hemorrhoids    - Follow up pathology  - Will plan for repeat colonoscopy in 3 years  - Discussed with patient and his wife following the procedure. Can restart Pradaxa tonight.       Trinidda Caicedo MD  [facility], [place]

## 2018-11-13 NOTE — ANESTHESIA PREPROCEDURE EVALUATION
Anesthetic History No history of anesthetic complications Review of Systems / Medical History Patient summary reviewed and pertinent labs reviewed Pulmonary COPD: moderate Sleep apnea (has ASV for apneic events) Smoker (ex) Neuro/Psych Comments: Pt had fall and sustained Subdural hemorrhage;( pt was on blood thinners when he fell); he also had rib fx and pneumothorax Cardiovascular Hypertension: well controlled Dysrhythmias : atrial fibrillation Hyperlipidemia Comments: See Dr. Mirta Hawk 
EKG--AF 
ECHO 12/17; nl EF, LAE with LA vol index 59 GI/Hepatic/Renal 
  
 
 
 
 
 
 Endo/Other Diabetes: well controlled, type 2 Morbid obesity Other Findings Comments: hemochromatosis Physical Exam 
 
Airway Mallampati: III 
TM Distance: 4 - 6 cm Neck ROM: normal range of motion Mouth opening: Normal 
 
Comments: Receeding chin Cardiovascular Rhythm: regular Rate: normal 
 
 
 
 Dental 
 
Dentition: Bridges and Caps/crowns Comments: Upper bridge Pulmonary Breath sounds clear to auscultation Abdominal 
Abdominal exam normal 
 
 
 Other Findings Anesthetic Plan ASA: 3 Anesthesia type: total IV anesthesia Induction: Inhalational

## 2018-11-13 NOTE — DISCHARGE INSTRUCTIONS
Gastrointestinal Colonoscopy/Flexible Sigmoidoscopy - Lower Exam Discharge Instructions  1. Call Dr. Yeyo John at [phone] for any problems or questions. 2. Contact the doctors office for follow up appointment as directed  3. Medication may cause drowsiness for several hours, therefore, do not drive or operate machinery for remainder of the day. 4. No alcohol today. 5. Ordinarily, you may resume regular diet and activity after exam unless otherwise specified by your physician. 6. Because of air put into your colon during exam, you may experience some abdominal distension, relieved by the passage of gas, for several hours. 7. Contact your physician if you have any of the following:  a. Excessive amount of bleeding - large amount when having a bowel movement. Occasional specks of blood with bowel movement would not be unusual.  b. Severe abdominal pain  c. Fever or Chills    Any additional instructions: Follow up with pathology results, high fiber diet, depend on pathology results may recommend colonoscopy in 3 years      Instructions given to Tino Mcclure and other family members.

## 2018-11-13 NOTE — ANESTHESIA POSTPROCEDURE EVALUATION
Procedure(s): 
COLONOSCOPY  BMI 41 ENDOSCOPIC POLYPECTOMY. Anesthesia Post Evaluation Multimodal analgesia: multimodal analgesia used between 6 hours prior to anesthesia start to PACU discharge Patient location during evaluation: bedside Patient participation: complete - patient participated Level of consciousness: awake Pain management: adequate Airway patency: patent Anesthetic complications: no 
Cardiovascular status: acceptable and stable Respiratory status: acceptable and room air Hydration status: acceptable Visit Vitals /65 Pulse 82 Temp 36.6 °C (97.8 °F) Resp 20 Ht 6' 3\" (1.905 m) Wt 147.4 kg (325 lb) SpO2 96% BMI 40.62 kg/m²

## 2018-11-13 NOTE — H&P
[facility] H&P (Admission) Date:  11/13/2018 Chief Complaint:  Positive Cologuard Subjective:  
 
History of Present Illness:   
 
Patient is a 70 y.o. M with extensive PMH as listed below who presents for a colonoscopy for further evaluation of a positive Cologuard. Patient had an incomplete colonocopy on 1/18/11. He did have a tubular adenoma in the ascending colon at that time. PMH: 
Past Medical History:  
Diagnosis Date  A-fib [facility])   
  cardioversion  Arteriosclerosis  Atrial fibrillation ([address] 1/3/2012  Bullous emphysema ([address] 11/17/2017  
 very mild in lung apices, noted on calcium scoring CT 2009.  Diabetes mellitus   
 type 2. AVG -130. Last A1C 9/18 was 6 - per pt   11/18  Diabetes mellitus ([facility] 1/3/2012  Essential hypertension, benign 2/6/2014  Family history of malignant neoplasm of prostate 2/6/2014  Hemochromatosis  HTN (hypertension) 2/6/2014  Hypercholesteremia  Nodular prostate without urinary obstruction 2/6/2014  Obesity 2/6/2014  Pneumothorax, right 10/8/2017  Sleep apnea 10/12/2016  
 uses ASV instead of CPAP machine - per pt 11/18  Subdural hemorrhage following injury ([address] 10/20/2017 PSH: 
Past Surgical History:  
Procedure Laterality Date  HX UROLOGICAL    
 prostate u/s and BX  KNEE ARTHROSCP HARV    
 right Allergies: Allergies Allergen Reactions  Zithromax [Azithromycin] Other (comments) Skin dryness/peeling Home Medications: 
Prior to Admission medications Medication Sig Start Date End Date Taking? Authorizing Provider  
linaclotide Alayna Horne) 145 mcg cap capsule Take 1 Cap by mouth Daily (before breakfast). 9/28/18  Yes Hank Del Rosario MD  
simvastatin (ZOCOR) 40 mg tablet Take 1 Tab by mouth nightly. 9/28/18  Yes Hank Del Rosraio MD  
hydroCHLOROthiazide (HYDRODIURIL) 25 mg tablet Take 1 Tab by mouth daily. 18  Yes Juan Wilson MD  
metFORMIN (GLUCOPHAGE) 500 mg tablet Take 1 Tab by mouth two (2) times daily (with meals). 18  Yes Juan Wilson MD  
spironolactone (ALDACTONE) 25 mg tablet Take 1 Tab by mouth daily. 18  Yes Juan Wilson MD  
enalapril (VASOTEC) 10 mg tablet Take 1 Tab by mouth two (2) times a day. 18  Yes Juan Wilson MD  
carvedilol (COREG) 25 mg tablet Take 1 Tab by mouth two (2) times daily (with meals). 18  Yes Juan Wilson MD  
dabigatran etexilate (PRADAXA) 150 mg capsule Take 1 Cap by mouth every twelve (12) hours. 18  Yes Steph Mancia MD  
Omega-3 Fatty Acids (FISH OIL) 500 mg cap Take 1,000 mg by mouth daily. Provider, Historical  
cpap machine kit by Does Not Apply route. Indications: ASV    Provider, Historical  
guaiFENesin ER (MUCINEX) 600 mg ER tablet Take 600 mg by mouth two (2) times a day. Provider, Historical  
loratadine (CLARITIN) 10 mg tablet Take 1 Tab by mouth daily. 17   Nica Davila MD  
FLUTICASONE PROPIONATE (FLONASE NA) 2 Sprays by Nasal route daily. Provider, Historical  
clotrimazole-betamethasone (LOTRISONE) topical cream Apply to affected area bid as directed 3/5/15   Juan Wilson MD  
MULTIVITS,CA,MIN/IRON/FA/LYCOP (CENTRUM ULTRA MEN'S PO) Take 1 Tab by mouth daily. Provider, Historical  
 
 
Hospital Medications: 
Current Facility-Administered Medications Medication Dose Route Frequency  lactated Ringers infusion  100 mL/hr IntraVENous CONTINUOUS  
 0.9% sodium chloride infusion  10 mL/hr IntraVENous CONTINUOUS  
 famotidine (PF) (PEPCID) 20 mg in sodium chloride 0.9% 10 mL injection  20 mg IntraVENous ONCE Social History: 
Social History Tobacco Use  Smoking status: Former Smoker Packs/day: 1.50 Years: 18.00 Pack years: 27.00 Types: Cigarettes Last attempt to quit: 1989 Years since quittin.3  Smokeless tobacco: Former User Quit date: 2016 Substance Use Topics  Alcohol use: Yes Alcohol/week: 4.8 - 5.4 oz Types: 2 - 3 Glasses of wine, 6 Cans of beer per week Comment: has lessened his alcohol intake since learning about his abnormal liver labs Pt denies any history of IV drug use, blood transfusions, tattoos, prophylactic antibiotics prior to dental work, cardiac stents, pacemaker placement, or vaccinations for Hep A or B. Family History: 
Family History Problem Relation Age of Onset  Heart Attack Father 61  
       Diabetes Father  Heart Disease Father  Hypertension Father  Cancer Father   
     lung [facility] Other Mother Sarcoidosis  Stroke Brother  Psychiatric Disorder Brother  Heart Disease Paternal Grandmother Review of Systems: A detailed 10 system ROS is obtained, with pertinent positives as listed above. All others are negative. Objective:  
 
Physical Exam: 
Vitals: 
Visit Vitals /72 Pulse 75 Temp 97.8 °F (36.6 °C) Resp 18 Ht 6' 3\" (1.905 m) Wt 147.4 kg (325 lb) SpO2 96% BMI 40.62 kg/m² Gen:  Pt is alert, cooperative, no acute distress Skin:  Extremities and face reveal no rashes. No palmar erythema. No telangiectasias on the chest wall. HEENT: Sclerae anicteric. Atraumatic head. Extra-occular muscles are intact. No oral ulcers. No abnormal pigmentation of the lips. The neck is supple and symmetric. Cardiovascular: Regular rate and rhythm. No murmurs, gallops, or rubs. Skin: no obvious lesions Respiratory:  Comfortable breathing with no accessory muscle use. Clear breath sounds with no wheezes, rales, or rhonchi. GI:  Abdomen obese, nondistended, soft, and nontender. Normal active bowel sounds. No enlargement of the liver or spleen. No masses palpable. Rectal:  Deferred Musculoskeletal:  No pitting edema of the lower legs. Extremities have good range of motion. Neurological:  Gross memory appears intact. Patient is alert and oriented. Psychiatric:  Mood appears appropriate with judgement intact. Lymphatic:  No cervical or supraclavicular adenopathy. Laboratory: No results for input(s): WBC, HGB, HCT, PLT, MCV, NA, K, CL, CO2, BUN, CREA, CA, MG, GLU, AP, SGOT, GPT, TBIL, CBIL, ALB, TP, AML, LPSE, PTP, INR, APTT, HGBEXT, HCTEXT, PLTEXT in the last 72 hours. No lab exists for component: DBIL, INREXT Assessment: 
  
 
Active Problems: * No active hospital problems. * 
 
 
Plan: 
  
 
Patient agreeable for a colonoscopy for assessment of a positive Cologuard. We went over the procedure, risks, benefits and alternatives.  
 
Savanna Friedman MD 
[facility] [place]

## 2018-11-15 ENCOUNTER — APPOINTMENT (OUTPATIENT)
Dept: INFUSION THERAPY | Age: 63
End: 2018-11-15

## 2018-11-21 ENCOUNTER — HOSPITAL ENCOUNTER (OUTPATIENT)
Dept: INFUSION THERAPY | Age: 63
Discharge: HOME | End: 2018-11-21
Payer: COMMERCIAL

## 2018-11-21 ENCOUNTER — HOSPITAL ENCOUNTER (OUTPATIENT)
Dept: LAB | Age: 63
Discharge: HOME | End: 2018-11-21
Payer: COMMERCIAL

## 2018-11-21 VITALS — SYSTOLIC BLOOD PRESSURE: 116 MMHG | HEART RATE: 89 BPM | RESPIRATION RATE: 18 BRPM | DIASTOLIC BLOOD PRESSURE: 61 MMHG

## 2018-11-21 DIAGNOSIS — E83.19 IRON EXCESS: ICD-10-CM

## 2018-11-21 DIAGNOSIS — E83.110 HEREDITARY HEMOCHROMATOSIS (HCC): Primary | ICD-10-CM

## 2018-11-21 LAB
ALBUMIN SERPL-MCNC: 3.7 G/DL (ref 3.2–4.6)
ALBUMIN/GLOB SERPL: 1 {RATIO} (ref 1.2–3.5)
ALP SERPL-CCNC: 89 U/L (ref 50–136)
ALT SERPL-CCNC: 38 U/L (ref 12–65)
ANION GAP SERPL CALC-SCNC: 5 MMOL/L (ref 7–16)
AST SERPL-CCNC: 16 U/L (ref 15–37)
BASOPHILS # BLD: 0 K/UL (ref 0–0.2)
BASOPHILS NFR BLD: 0 % (ref 0–2)
BILIRUB SERPL-MCNC: 1 MG/DL (ref 0.2–1.1)
BUN SERPL-MCNC: 21 MG/DL (ref 8–23)
CALCIUM SERPL-MCNC: 9.9 MG/DL (ref 8.3–10.4)
CHLORIDE SERPL-SCNC: 103 MMOL/L (ref 98–107)
CO2 SERPL-SCNC: 28 MMOL/L (ref 21–32)
CREAT SERPL-MCNC: 1.05 MG/DL (ref 0.8–1.5)
DIFFERENTIAL METHOD BLD: NORMAL
EOSINOPHIL # BLD: 0.1 K/UL (ref 0–0.8)
EOSINOPHIL NFR BLD: 2 % (ref 0.5–7.8)
ERYTHROCYTE [DISTWIDTH] IN BLOOD BY AUTOMATED COUNT: 12.8 % (ref 11.9–14.6)
FERRITIN SERPL-MCNC: 128 NG/ML (ref 8–388)
GLOBULIN SER CALC-MCNC: 3.6 G/DL (ref 2.3–3.5)
GLUCOSE SERPL-MCNC: 169 MG/DL (ref 65–100)
HCT VFR BLD AUTO: 41.2 % (ref 41.1–50.3)
HGB BLD-MCNC: 13.8 G/DL (ref 13.6–17.2)
IMM GRANULOCYTES # BLD: 0 K/UL (ref 0–0.5)
IMM GRANULOCYTES NFR BLD AUTO: 0 % (ref 0–5)
LYMPHOCYTES # BLD: 1 K/UL (ref 0.5–4.6)
LYMPHOCYTES NFR BLD: 16 % (ref 13–44)
MCH RBC QN AUTO: 32.6 PG (ref 26.1–32.9)
MCHC RBC AUTO-ENTMCNC: 33.5 G/DL (ref 31.4–35)
MCV RBC AUTO: 97.4 FL (ref 79.6–97.8)
MONOCYTES # BLD: 0.4 K/UL (ref 0.1–1.3)
MONOCYTES NFR BLD: 6 % (ref 4–12)
NEUTS SEG # BLD: 5 K/UL (ref 1.7–8.2)
NEUTS SEG NFR BLD: 76 % (ref 43–78)
NRBC # BLD: 0 K/UL (ref 0–0.2)
PLATELET # BLD AUTO: 175 K/UL (ref 150–450)
PMV BLD AUTO: 10.1 FL (ref 9.4–12.3)
POTASSIUM SERPL-SCNC: 4.1 MMOL/L (ref 3.5–5.1)
PROT SERPL-MCNC: 7.3 G/DL (ref 6.3–8.2)
RBC # BLD AUTO: 4.23 M/UL (ref 4.23–5.67)
SODIUM SERPL-SCNC: 136 MMOL/L (ref 136–145)
WBC # BLD AUTO: 6.6 K/UL (ref 4.3–11.1)

## 2018-11-21 PROCEDURE — 36415 COLL VENOUS BLD VENIPUNCTURE: CPT

## 2018-11-21 PROCEDURE — 80053 COMPREHEN METABOLIC PANEL: CPT

## 2018-11-21 PROCEDURE — 99195 PHLEBOTOMY: CPT

## 2018-11-21 PROCEDURE — 85025 COMPLETE CBC W/AUTO DIFF WBC: CPT

## 2018-11-21 PROCEDURE — 82728 ASSAY OF FERRITIN: CPT

## 2018-11-21 NOTE — PROGRESS NOTES
Arrived to the [address] [place] [address] collected via phlebotomy. Patient tolerated well. Monitored post and given water and crackers. BP wnl. Any issues or concerns during appointment: no. 
Next infusion appointment is 1/16/19 at 3pm. Discharged ambulatory.

## 2019-01-16 ENCOUNTER — HOSPITAL ENCOUNTER (OUTPATIENT)
Dept: INFUSION THERAPY | Age: 64
Discharge: HOME | End: 2019-01-16
Payer: COMMERCIAL

## 2019-01-16 VITALS
BODY MASS INDEX: 41.5 KG/M2 | OXYGEN SATURATION: 94 % | TEMPERATURE: 98.1 F | HEART RATE: 90 BPM | RESPIRATION RATE: 18 BRPM | SYSTOLIC BLOOD PRESSURE: 134 MMHG | WEIGHT: 315 LBS | DIASTOLIC BLOOD PRESSURE: 85 MMHG

## 2019-01-16 DIAGNOSIS — E83.110 HEREDITARY HEMOCHROMATOSIS (HCC): Primary | ICD-10-CM

## 2019-01-16 DIAGNOSIS — E83.19 IRON EXCESS: ICD-10-CM

## 2019-01-16 LAB — FERRITIN SERPL-MCNC: 91 NG/ML (ref 8–388)

## 2019-01-16 PROCEDURE — 82728 ASSAY OF FERRITIN: CPT

## 2019-01-16 NOTE — PROGRESS NOTES
No treatment today per standing orders. Recent Results (from the past 12 hour(s)) FERRITIN Collection Time: 01/16/19  2:58 PM  
Result Value Ref Range Ferritin 91 8 - 388 NG/ML Griselda Berry RN

## 2019-03-13 ENCOUNTER — HOSPITAL ENCOUNTER (OUTPATIENT)
Dept: INFUSION THERAPY | Age: 64
Discharge: HOME | End: 2019-03-13
Payer: COMMERCIAL

## 2019-03-13 VITALS
WEIGHT: 315 LBS | RESPIRATION RATE: 18 BRPM | OXYGEN SATURATION: 93 % | HEART RATE: 93 BPM | TEMPERATURE: 98.5 F | DIASTOLIC BLOOD PRESSURE: 57 MMHG | BODY MASS INDEX: 42.12 KG/M2 | SYSTOLIC BLOOD PRESSURE: 125 MMHG

## 2019-03-13 DIAGNOSIS — E83.19 IRON EXCESS: ICD-10-CM

## 2019-03-13 DIAGNOSIS — E83.110 HEREDITARY HEMOCHROMATOSIS (HCC): Primary | ICD-10-CM

## 2019-03-13 LAB — FERRITIN SERPL-MCNC: 120 NG/ML (ref 8–388)

## 2019-03-13 PROCEDURE — 82728 ASSAY OF FERRITIN: CPT

## 2019-03-13 PROCEDURE — 36415 COLL VENOUS BLD VENIPUNCTURE: CPT

## 2019-03-13 PROCEDURE — 99195 PHLEBOTOMY: CPT

## 2019-05-21 ENCOUNTER — HOSPITAL ENCOUNTER (OUTPATIENT)
Dept: LAB | Age: 64
Discharge: HOME | End: 2019-05-21
Payer: COMMERCIAL

## 2019-05-21 ENCOUNTER — HOSPITAL ENCOUNTER (OUTPATIENT)
Dept: INFUSION THERAPY | Age: 64
Discharge: HOME | End: 2019-05-21

## 2019-05-21 DIAGNOSIS — E83.119 HEMOCHROMATOSIS, UNSPECIFIED HEMOCHROMATOSIS TYPE: ICD-10-CM

## 2019-05-21 LAB
ALBUMIN SERPL-MCNC: 3.6 G/DL (ref 3.2–4.6)
ALBUMIN/GLOB SERPL: 1 {RATIO} (ref 1.2–3.5)
ALP SERPL-CCNC: 87 U/L (ref 50–136)
ALT SERPL-CCNC: 31 U/L (ref 12–65)
ANION GAP SERPL CALC-SCNC: 7 MMOL/L (ref 7–16)
AST SERPL-CCNC: 19 U/L (ref 15–37)
BASOPHILS # BLD: 0 K/UL (ref 0–0.2)
BASOPHILS NFR BLD: 0 % (ref 0–2)
BILIRUB SERPL-MCNC: 1.1 MG/DL (ref 0.2–1.1)
BUN SERPL-MCNC: 15 MG/DL (ref 8–23)
CALCIUM SERPL-MCNC: 9.4 MG/DL (ref 8.3–10.4)
CHLORIDE SERPL-SCNC: 103 MMOL/L (ref 98–107)
CO2 SERPL-SCNC: 25 MMOL/L (ref 21–32)
CREAT SERPL-MCNC: 0.91 MG/DL (ref 0.8–1.5)
DIFFERENTIAL METHOD BLD: ABNORMAL
EOSINOPHIL # BLD: 0.1 K/UL (ref 0–0.8)
EOSINOPHIL NFR BLD: 2 % (ref 0.5–7.8)
ERYTHROCYTE [DISTWIDTH] IN BLOOD BY AUTOMATED COUNT: 12.8 % (ref 11.9–14.6)
FERRITIN SERPL-MCNC: 74 NG/ML (ref 8–388)
GLOBULIN SER CALC-MCNC: 3.5 G/DL (ref 2.3–3.5)
GLUCOSE SERPL-MCNC: 131 MG/DL (ref 65–100)
HCT VFR BLD AUTO: 41.7 % (ref 41.1–50.3)
HGB BLD-MCNC: 14.1 G/DL (ref 13.6–17.2)
IMM GRANULOCYTES # BLD AUTO: 0.1 K/UL (ref 0–0.5)
IMM GRANULOCYTES NFR BLD AUTO: 1 % (ref 0–5)
IRON SATN MFR SERPL: 36 %
IRON SERPL-MCNC: 118 UG/DL (ref 35–150)
LYMPHOCYTES # BLD: 1.2 K/UL (ref 0.5–4.6)
LYMPHOCYTES NFR BLD: 14 % (ref 13–44)
MCH RBC QN AUTO: 33.6 PG (ref 26.1–32.9)
MCHC RBC AUTO-ENTMCNC: 33.8 G/DL (ref 31.4–35)
MCV RBC AUTO: 99.3 FL (ref 79.6–97.8)
MONOCYTES # BLD: 0.5 K/UL (ref 0.1–1.3)
MONOCYTES NFR BLD: 6 % (ref 4–12)
NEUTS SEG # BLD: 6.3 K/UL (ref 1.7–8.2)
NEUTS SEG NFR BLD: 77 % (ref 43–78)
NRBC # BLD: 0 K/UL (ref 0–0.2)
PLATELET # BLD AUTO: 172 K/UL (ref 150–450)
PMV BLD AUTO: 10 FL (ref 9.4–12.3)
POTASSIUM SERPL-SCNC: 4.5 MMOL/L (ref 3.5–5.1)
PROT SERPL-MCNC: 7.1 G/DL (ref 6.3–8.2)
RBC # BLD AUTO: 4.2 M/UL (ref 4.23–5.67)
SODIUM SERPL-SCNC: 135 MMOL/L (ref 136–145)
TIBC SERPL-MCNC: 329 UG/DL (ref 250–450)
WBC # BLD AUTO: 8.2 K/UL (ref 4.3–11.1)

## 2019-05-21 PROCEDURE — 85025 COMPLETE CBC W/AUTO DIFF WBC: CPT

## 2019-05-21 PROCEDURE — 36415 COLL VENOUS BLD VENIPUNCTURE: CPT

## 2019-05-21 PROCEDURE — 80053 COMPREHEN METABOLIC PANEL: CPT

## 2019-05-21 PROCEDURE — 82728 ASSAY OF FERRITIN: CPT

## 2019-05-21 PROCEDURE — 83540 ASSAY OF IRON: CPT

## 2019-07-19 ENCOUNTER — APPOINTMENT (RX ONLY)
Dept: URBAN - METROPOLITAN AREA CLINIC 24 | Facility: CLINIC | Age: 64
Setting detail: DERMATOLOGY
End: 2019-07-19

## 2019-07-19 DIAGNOSIS — Z71.89 OTHER SPECIFIED COUNSELING: ICD-10-CM

## 2019-07-19 DIAGNOSIS — L82.1 OTHER SEBORRHEIC KERATOSIS: ICD-10-CM

## 2019-07-19 DIAGNOSIS — D22 MELANOCYTIC NEVI: ICD-10-CM

## 2019-07-19 DIAGNOSIS — L81.4 OTHER MELANIN HYPERPIGMENTATION: ICD-10-CM

## 2019-07-19 DIAGNOSIS — D18.0 HEMANGIOMA: ICD-10-CM

## 2019-07-19 PROBLEM — D23.71 OTHER BENIGN NEOPLASM OF SKIN OF RIGHT LOWER LIMB, INCLUDING HIP: Status: ACTIVE | Noted: 2019-07-19

## 2019-07-19 PROBLEM — D22.5 MELANOCYTIC NEVI OF TRUNK: Status: ACTIVE | Noted: 2019-07-19

## 2019-07-19 PROBLEM — D18.01 HEMANGIOMA OF SKIN AND SUBCUTANEOUS TISSUE: Status: ACTIVE | Noted: 2019-07-19

## 2019-07-19 PROCEDURE — ? COUNSELING

## 2019-07-19 PROCEDURE — 99213 OFFICE O/P EST LOW 20 MIN: CPT

## 2019-07-19 ASSESSMENT — LOCATION SIMPLE DESCRIPTION DERM
LOCATION SIMPLE: RIGHT UPPER BACK
LOCATION SIMPLE: CHEST
LOCATION SIMPLE: LEFT POSTERIOR THIGH
LOCATION SIMPLE: UPPER BACK
LOCATION SIMPLE: ABDOMEN
LOCATION SIMPLE: RIGHT SHOULDER
LOCATION SIMPLE: LEFT UPPER BACK

## 2019-07-19 ASSESSMENT — LOCATION DETAILED DESCRIPTION DERM
LOCATION DETAILED: LEFT DISTAL POSTERIOR THIGH
LOCATION DETAILED: SUPERIOR THORACIC SPINE
LOCATION DETAILED: LEFT MID-UPPER BACK
LOCATION DETAILED: LEFT LATERAL SUPERIOR CHEST
LOCATION DETAILED: RIGHT POSTERIOR SHOULDER
LOCATION DETAILED: PERIUMBILICAL SKIN
LOCATION DETAILED: RIGHT SUPERIOR LATERAL UPPER BACK

## 2019-07-19 ASSESSMENT — LOCATION ZONE DERM
LOCATION ZONE: LEG
LOCATION ZONE: TRUNK
LOCATION ZONE: ARM

## 2019-08-21 ENCOUNTER — HOSPITAL ENCOUNTER (OUTPATIENT)
Dept: INFUSION THERAPY | Age: 64
Discharge: HOME | End: 2019-08-21
Payer: COMMERCIAL

## 2019-08-21 VITALS
SYSTOLIC BLOOD PRESSURE: 109 MMHG | HEART RATE: 90 BPM | DIASTOLIC BLOOD PRESSURE: 64 MMHG | RESPIRATION RATE: 18 BRPM | TEMPERATURE: 97.6 F

## 2019-08-21 DIAGNOSIS — E83.110 HEREDITARY HEMOCHROMATOSIS (HCC): Primary | ICD-10-CM

## 2019-08-21 DIAGNOSIS — E83.19 IRON EXCESS: ICD-10-CM

## 2019-08-21 LAB — FERRITIN SERPL-MCNC: 133 NG/ML (ref 8–388)

## 2019-08-21 PROCEDURE — 99195 PHLEBOTOMY: CPT

## 2019-08-21 PROCEDURE — 82728 ASSAY OF FERRITIN: CPT

## 2019-08-21 PROCEDURE — 36415 COLL VENOUS BLD VENIPUNCTURE: CPT

## 2019-08-21 NOTE — PROGRESS NOTES
Arrived to the [facility]. Phlebotomy completed. Patient tolerated well. Given water and crackers afterwards. Any issues or concerns during appointment: none  Patient aware of next infusion appointment on  11/19/19 at [address] [place] [address]. Discharged ambulatory with self.

## 2019-09-04 ENCOUNTER — HOSPITAL ENCOUNTER (EMERGENCY)
Age: 64
Discharge: HOME | End: 2019-09-04
Attending: EMERGENCY MEDICINE
Payer: MEDICARE

## 2019-09-04 VITALS
SYSTOLIC BLOOD PRESSURE: 127 MMHG | OXYGEN SATURATION: 99 % | DIASTOLIC BLOOD PRESSURE: 81 MMHG | RESPIRATION RATE: 16 BRPM | WEIGHT: 315 LBS | BODY MASS INDEX: 39.17 KG/M2 | HEIGHT: 75 IN | TEMPERATURE: 98.4 F | HEART RATE: 89 BPM

## 2019-09-04 DIAGNOSIS — N39.0 URINARY TRACT INFECTION WITHOUT HEMATURIA, SITE UNSPECIFIED: Primary | ICD-10-CM

## 2019-09-04 DIAGNOSIS — R33.9 URINARY RETENTION: ICD-10-CM

## 2019-09-04 LAB
ANION GAP SERPL CALC-SCNC: 10 MMOL/L (ref 7–16)
BACTERIA URNS QL MICRO: ABNORMAL /HPF
BASOPHILS # BLD: 0 K/UL (ref 0–0.2)
BASOPHILS NFR BLD: 0 % (ref 0–2)
BUN SERPL-MCNC: 9 MG/DL (ref 8–23)
CALCIUM SERPL-MCNC: 9.4 MG/DL (ref 8.3–10.4)
CASTS URNS QL MICRO: ABNORMAL /LPF
CHLORIDE SERPL-SCNC: 95 MMOL/L (ref 98–107)
CO2 SERPL-SCNC: 27 MMOL/L (ref 21–32)
CREAT SERPL-MCNC: 0.98 MG/DL (ref 0.8–1.5)
DIFFERENTIAL METHOD BLD: ABNORMAL
EOSINOPHIL # BLD: 0 K/UL (ref 0–0.8)
EOSINOPHIL NFR BLD: 0 % (ref 0.5–7.8)
EPI CELLS #/AREA URNS HPF: ABNORMAL /HPF
ERYTHROCYTE [DISTWIDTH] IN BLOOD BY AUTOMATED COUNT: 13.2 % (ref 11.9–14.6)
GLUCOSE SERPL-MCNC: 149 MG/DL (ref 65–100)
HCT VFR BLD AUTO: 38.7 % (ref 41.1–50.3)
HGB BLD-MCNC: 13 G/DL (ref 13.6–17.2)
IMM GRANULOCYTES # BLD AUTO: 0.1 K/UL (ref 0–0.5)
IMM GRANULOCYTES NFR BLD AUTO: 1 % (ref 0–5)
LACTATE BLD-SCNC: 1.49 MMOL/L (ref 0.5–1.9)
LYMPHOCYTES # BLD: 0.8 K/UL (ref 0.5–4.6)
LYMPHOCYTES NFR BLD: 5 % (ref 13–44)
MCH RBC QN AUTO: 33.1 PG (ref 26.1–32.9)
MCHC RBC AUTO-ENTMCNC: 33.6 G/DL (ref 31.4–35)
MCV RBC AUTO: 98.5 FL (ref 79.6–97.8)
MONOCYTES # BLD: 1.1 K/UL (ref 0.1–1.3)
MONOCYTES NFR BLD: 6 % (ref 4–12)
NEUTS SEG # BLD: 15.7 K/UL (ref 1.7–8.2)
NEUTS SEG NFR BLD: 88 % (ref 43–78)
NRBC # BLD: 0 K/UL (ref 0–0.2)
PLATELET # BLD AUTO: 153 K/UL (ref 150–450)
PMV BLD AUTO: 10.1 FL (ref 9.4–12.3)
POTASSIUM SERPL-SCNC: 4.1 MMOL/L (ref 3.5–5.1)
RBC # BLD AUTO: 3.93 M/UL (ref 4.23–5.6)
RBC #/AREA URNS HPF: ABNORMAL /HPF
SODIUM SERPL-SCNC: 132 MMOL/L (ref 136–145)
WBC # BLD AUTO: 17.8 K/UL (ref 4.3–11.1)
WBC URNS QL MICRO: >100 /HPF

## 2019-09-04 PROCEDURE — 74011000258 HC RX REV CODE- 258: Performed by: EMERGENCY MEDICINE

## 2019-09-04 PROCEDURE — 80048 BASIC METABOLIC PNL TOTAL CA: CPT

## 2019-09-04 PROCEDURE — 85025 COMPLETE CBC W/AUTO DIFF WBC: CPT

## 2019-09-04 PROCEDURE — 83605 ASSAY OF LACTIC ACID: CPT

## 2019-09-04 PROCEDURE — 99284 EMERGENCY DEPT VISIT MOD MDM: CPT | Performed by: EMERGENCY MEDICINE

## 2019-09-04 PROCEDURE — 96365 THER/PROPH/DIAG IV INF INIT: CPT | Performed by: EMERGENCY MEDICINE

## 2019-09-04 PROCEDURE — 51702 INSERT TEMP BLADDER CATH: CPT | Performed by: EMERGENCY MEDICINE

## 2019-09-04 PROCEDURE — 77030005518 HC CATH URETH FOL 2W BARD -B: Performed by: EMERGENCY MEDICINE

## 2019-09-04 PROCEDURE — 77030012862 HC BG URIN LEG BARD -A: Performed by: EMERGENCY MEDICINE

## 2019-09-04 PROCEDURE — 81015 MICROSCOPIC EXAM OF URINE: CPT

## 2019-09-04 PROCEDURE — 74011250636 HC RX REV CODE- 250/636: Performed by: EMERGENCY MEDICINE

## 2019-09-04 RX ORDER — CEPHALEXIN 500 MG/1
500 CAPSULE ORAL 4 TIMES DAILY
Qty: 120 CAP | Refills: 0 | Status: SHIPPED | OUTPATIENT
Start: 2019-09-04 | End: 2019-10-04

## 2019-09-04 RX ADMIN — CEFTRIAXONE 1 G: 1 INJECTION, POWDER, FOR SOLUTION INTRAMUSCULAR; INTRAVENOUS at 15:30

## 2019-09-04 NOTE — ED NOTES
I have reviewed discharge instructions with the patient. The patient verbalized understanding. Patient left ED via Discharge Method: ambulatory to Home with spouse  Opportunity for questions and clarification provided. Patient given 1 scripts. To continue your aftercare when you leave the hospital, you may receive an automated call from our care team to check in on how you are doing. This is a free service and part of our promise to provide the best care and service to meet your aftercare needs.  If you have questions, or wish to unsubscribe from this service please call [phone]. Thank you for Choosing our [facility] Emergency Department.

## 2019-09-04 NOTE — ED TRIAGE NOTES
Pt in states he has had difficulty urinating and was started on flomax. [facility] medication was working initially but now he is unable to urinate. [facility] fever and chills was told he had normal prostate on exam on 8/23/19 at pcp.

## 2019-09-04 NOTE — ED PROVIDER NOTES
Patient is a 76 yo male who presents with urinary incontinence. States he has had difficulty urinating and pain with urination and now with incontinence. States history of prostate problems however told his prostate was \"normal\"  A few weeks back by his PCP. Denies any back pain, no leg weakness, no fevers or chills, no nausea or vomiting, no abdominal pain, no further complaints. Overall patient is very well appearing and in NAD. Past Medical History:   Diagnosis Date    A-fib [facility])      cardioversion    Arteriosclerosis     Atrial fibrillation ([address] 1/3/2012    Bullous emphysema ([address] 2017    very mild in lung apices, noted on calcium scoring CT .  Diabetes mellitus     type 2. AVG -130.  Last A1C  was 6 - per pt       Diabetes mellitus ([address] 1/3/2012    Essential hypertension, benign 2014    Family history of malignant neoplasm of prostate 2014    Hemochromatosis     HTN (hypertension) 2014    Hypercholesteremia     Nodular prostate without urinary obstruction 2014    Obesity 2014    Pneumothorax, right 10/8/2017    Sleep apnea 10/12/2016    uses ASV instead of CPAP machine - per pt     Subdural hemorrhage following injury ([address] 10/20/2017       Past Surgical History:   Procedure Laterality Date    COLONOSCOPY N/A 2018    COLONOSCOPY  BMI 41 performed by [facility]alene Dubin, [facility] at [facility] ENDOSCOPY    HX UROLOGICAL      prostate u/s and BX    KNEE ARTHROSCP HARV      right         Family History:   Problem Relation Age of Onset    Heart Attack Father 61            Diabetes Father     Heart Disease Father     Hypertension Father     Cancer Father         lung    Other Mother         Sarcoidosis    Stroke Brother     Psychiatric Disorder Brother     Heart Disease Paternal Grandmother        Social History     Socioeconomic History    Marital status:      Spouse name: Not on file    Number of children: Not on file    Years of education: Not on file    Highest education level: Not on file   Occupational History    Not on file   Social Needs    Financial resource strain: Not on file    Food insecurity:     Worry: Not on file     Inability: Not on file    Transportation needs:     Medical: Not on file     Non-medical: Not on file   Tobacco Use    Smoking status: Former Smoker     Packs/day: 1.50     Years: 18.00     Pack years: 27.00     Types: Cigarettes     Last attempt to quit: 1989     Years since quittin.1    Smokeless tobacco: Former User     Quit date: 2016   Substance and Sexual Activity    Alcohol use:  Yes     Alcohol/week: 8.0 - 9.0 standard drinks     Types: 2 - 3 Glasses of wine, 6 Cans of beer per week     Comment: has lessened his alcohol intake since learning about his abnormal liver labs    Drug use: No    Sexual activity: Yes     Partners: Female     Birth control/protection: None   Lifestyle    Physical activity:     Days per week: Not on file     Minutes per session: Not on file    Stress: Not on file   Relationships    Social connections:     Talks on phone: Not on file     Gets together: Not on file     Attends Restoration service: Not on file     Active member of club or organization: Not on file     Attends meetings of clubs or organizations: Not on file     Relationship status: Not on file    Intimate partner violence:     Fear of current or ex partner: Not on file     Emotionally abused: Not on file     Physically abused: Not on file     Forced sexual activity: Not on file   Other Topics Concern     Service Not Asked    Blood Transfusions Not Asked    Caffeine Concern Not Asked    Occupational Exposure Not Asked    Hobby Hazards Not Asked    Sleep Concern Not Asked    Stress Concern Yes    Weight Concern Yes    Special Diet No    Back Care Not Asked    Exercise No    Bike Helmet Not Asked    Seat Belt Yes    Self-Exams No   Social History Narrative  Not on file         ALLERGIES: Zithromax [azithromycin]    Review of Systems   Constitutional: Negative for chills and fever. HENT: Negative for rhinorrhea and sore throat. Eyes: Negative for visual disturbance. Respiratory: Negative for cough and shortness of breath. Cardiovascular: Negative for chest pain and leg swelling. Gastrointestinal: Negative for abdominal pain, diarrhea, nausea and vomiting. Genitourinary: Positive for difficulty urinating and enuresis. Musculoskeletal: Negative for back pain and neck pain. Skin: Negative for rash. Neurological: Negative for weakness and headaches. Psychiatric/Behavioral: The patient is not nervous/anxious. Vitals:    09/04/19 1211   BP: 121/72   Pulse: 86   Resp: 16   Temp: 98.4 °F (36.9 °C)   SpO2: 98%   Weight: 152 kg (335 lb)   Height: 6' 3\" (1.905 m)            Physical Exam   Constitutional: He is oriented to person, place, and time. He appears well-developed and well-nourished. HENT:   Head: Normocephalic. Right Ear: External ear normal.   Left Ear: External ear normal.   Eyes: Pupils are equal, round, and reactive to light. Conjunctivae and EOM are normal.   Neck: Normal range of motion. Neck supple. No tracheal deviation present. Cardiovascular: Normal rate, regular rhythm, normal heart sounds and intact distal pulses. No murmur heard. Pulmonary/Chest: Effort normal and breath sounds normal. No respiratory distress. Abdominal: Soft. He exhibits no distension. There is no tenderness. There is no guarding. Musculoskeletal: Normal range of motion. Non-tender to palpation of C, T and L spine. Radial pulses 2+ bilaterally, DP pulses 2+ bilaterally, strength 5/5 in all extremities including to dorsiflexion/plantar flexion of feet bilaterally and extension of lower leg at knees bilaterally and flexion of leg at hips bilaterally, sensation 5/5 in all extremities, DTR 2+ bilaterally.  Ambulatory with antalgic gait Neurological: He is alert and oriented to person, place, and time. No cranial nerve deficit. Skin: No rash noted. Nursing note and vitals reviewed. MDM  Number of Diagnoses or Management Options     Amount and/or Complexity of Data Reviewed  Clinical lab tests: ordered and reviewed  Tests in the radiology section of CPT®: ordered and reviewed  Review and summarize past medical records: yes    Risk of Complications, Morbidity, and/or Mortality  Presenting problems: high  Diagnostic procedures: high  Management options: high    Patient Progress  Patient progress: stable         Procedures    Recent Results (from the past 12 hour(s))   URINE MICROSCOPIC    Collection Time: 09/04/19  2:16 PM   Result Value Ref Range    WBC >100 (H) 0 /hpf    RBC 0-3 0 /hpf    Epithelial cells 0-3 0 /hpf    Bacteria 4+ (H) 0 /hpf    Casts 0-3 0 /lpf   CBC WITH AUTOMATED DIFF    Collection Time: 09/04/19  2:30 PM   Result Value Ref Range    WBC 17.8 (H) 4.3 - 11.1 K/uL    RBC 3.93 (L) 4.23 - 5.6 M/uL    HGB 13.0 (L) 13.6 - 17.2 g/dL    HCT 38.7 (L) 41.1 - 50.3 %    MCV 98.5 (H) 79.6 - 97.8 FL    MCH 33.1 (H) 26.1 - 32.9 PG    MCHC 33.6 31.4 - 35.0 g/dL    RDW 13.2 11.9 - 14.6 %    PLATELET 926 441 - 859 K/uL    MPV 10.1 9.4 - 12.3 FL    ABSOLUTE NRBC 0.00 0.0 - 0.2 K/uL    DF AUTOMATED      NEUTROPHILS 88 (H) 43 - 78 %    LYMPHOCYTES 5 (L) 13 - 44 %    MONOCYTES 6 4.0 - 12.0 %    EOSINOPHILS 0 (L) 0.5 - 7.8 %    BASOPHILS 0 0.0 - 2.0 %    IMMATURE GRANULOCYTES 1 0.0 - 5.0 %    ABS. NEUTROPHILS 15.7 (H) 1.7 - 8.2 K/UL    ABS. LYMPHOCYTES 0.8 0.5 - 4.6 K/UL    ABS. MONOCYTES 1.1 0.1 - 1.3 K/UL    ABS. EOSINOPHILS 0.0 0.0 - 0.8 K/UL    ABS. BASOPHILS 0.0 0.0 - 0.2 K/UL    ABS. IMM.  GRANS. 0.1 0.0 - 0.5 K/UL   METABOLIC PANEL, BASIC    Collection Time: 09/04/19  2:30 PM   Result Value Ref Range    Sodium 132 (L) 136 - 145 mmol/L    Potassium 4.1 3.5 - 5.1 mmol/L    Chloride 95 (L) 98 - 107 mmol/L    CO2 27 21 - 32 mmol/L    Anion gap 10 7 - 16 mmol/L    Glucose 149 (H) 65 - 100 mg/dL    BUN 9 8 - 23 MG/DL    Creatinine 0.98 0.8 - 1.5 MG/DL    GFR est AA >60 >60 ml/min/1.73m2    GFR est non-AA >60 >60 ml/min/1.73m2    Calcium 9.4 8.3 - 10.4 MG/DL   POC LACTIC ACID    Collection Time: 09/04/19  3:17 PM   Result Value Ref Range    Lactic Acid (POC) 1.49 0.5 - 1.9 mmol/L       76 yo male with urinary retention and UTI concerning for likely prostatitis.:    Patient is VERY well appearing, in NAD, vital signs stable and although does have elevated WBC no signs of sepsis so will give dose of IV rocephin in ED and home on very long course (30 days) of keflex per recommendation of Dr. Sherin Haines for likely prostatitis. Patient will be seen by Dr. Sherin Haines or Dr. Kennedy Watson (whom the patient has seen in the past) for follow up and further management and I discussed VERY strict return precautions for any fevers or chills, any nausea or vomiting, or any further concerns. Patient and wife at bedside in full agreement with plan.

## 2019-10-02 PROBLEM — F17.211 CIGARETTE NICOTINE DEPENDENCE IN REMISSION: Status: RESOLVED | Noted: 2017-11-17 | Resolved: 2019-10-02

## 2019-10-02 PROBLEM — I69.093: Status: RESOLVED | Noted: 2017-10-20 | Resolved: 2019-10-02

## 2019-10-02 PROBLEM — R35.1 BENIGN PROSTATIC HYPERPLASIA WITH NOCTURIA: Status: ACTIVE | Noted: 2019-10-02

## 2019-10-02 PROBLEM — N40.1 BENIGN PROSTATIC HYPERPLASIA WITH NOCTURIA: Status: ACTIVE | Noted: 2019-10-02

## 2019-10-02 PROBLEM — E11.9 DIABETES MELLITUS WITHOUT COMPLICATION (HCC): Status: ACTIVE | Noted: 2019-10-02

## 2019-10-02 PROBLEM — E78.2 MIXED DYSLIPIDEMIA: Status: ACTIVE | Noted: 2019-10-02

## 2019-10-31 ENCOUNTER — HOSPITAL ENCOUNTER (OUTPATIENT)
Dept: ULTRASOUND IMAGING | Age: 64
Discharge: HOME | End: 2019-10-31
Attending: UROLOGY
Payer: COMMERCIAL

## 2019-10-31 DIAGNOSIS — N39.0 URINARY TRACT INFECTION WITHOUT HEMATURIA, SITE UNSPECIFIED: ICD-10-CM

## 2019-10-31 PROCEDURE — 76770 US EXAM ABDO BACK WALL COMP: CPT

## 2019-11-05 ENCOUNTER — HOSPITAL ENCOUNTER (INPATIENT)
Age: 64
LOS: 3 days | Discharge: HOME | DRG: 872 | End: 2019-11-08
Attending: EMERGENCY MEDICINE | Admitting: HOSPITALIST
Payer: COMMERCIAL

## 2019-11-05 DIAGNOSIS — N39.0 URINARY TRACT INFECTION WITH HEMATURIA, SITE UNSPECIFIED: Primary | ICD-10-CM

## 2019-11-05 DIAGNOSIS — R31.9 URINARY TRACT INFECTION WITH HEMATURIA, SITE UNSPECIFIED: Primary | ICD-10-CM

## 2019-11-05 PROBLEM — R50.9 FEVER: Status: ACTIVE | Noted: 2019-11-05

## 2019-11-05 PROBLEM — A41.9 SEPSIS DUE TO URINARY TRACT INFECTION (HCC): Status: ACTIVE | Noted: 2019-11-05

## 2019-11-05 LAB
ALBUMIN SERPL-MCNC: 3.6 G/DL (ref 3.2–4.6)
ALBUMIN/GLOB SERPL: 0.8 {RATIO} (ref 1.2–3.5)
ALP SERPL-CCNC: 101 U/L (ref 50–136)
ALT SERPL-CCNC: 34 U/L (ref 12–65)
ANION GAP SERPL CALC-SCNC: 9 MMOL/L (ref 7–16)
AST SERPL-CCNC: 15 U/L (ref 15–37)
BACTERIA URNS QL MICRO: ABNORMAL /HPF
BASOPHILS # BLD: 0 K/UL (ref 0–0.2)
BASOPHILS NFR BLD: 0 % (ref 0–2)
BILIRUB SERPL-MCNC: 1.8 MG/DL (ref 0.2–1.1)
BUN SERPL-MCNC: 13 MG/DL (ref 8–23)
CALCIUM SERPL-MCNC: 9.6 MG/DL (ref 8.3–10.4)
CASTS URNS QL MICRO: ABNORMAL /LPF
CHLORIDE SERPL-SCNC: 96 MMOL/L (ref 98–107)
CO2 SERPL-SCNC: 25 MMOL/L (ref 21–32)
CREAT SERPL-MCNC: 1.04 MG/DL (ref 0.8–1.5)
DIFFERENTIAL METHOD BLD: ABNORMAL
EOSINOPHIL # BLD: 0 K/UL (ref 0–0.8)
EOSINOPHIL NFR BLD: 0 % (ref 0.5–7.8)
EPI CELLS #/AREA URNS HPF: ABNORMAL /HPF
ERYTHROCYTE [DISTWIDTH] IN BLOOD BY AUTOMATED COUNT: 13.2 % (ref 11.9–14.6)
GLOBULIN SER CALC-MCNC: 4.4 G/DL (ref 2.3–3.5)
GLUCOSE BLD STRIP.AUTO-MCNC: 186 MG/DL (ref 65–100)
GLUCOSE SERPL-MCNC: 177 MG/DL (ref 65–100)
HCT VFR BLD AUTO: 42.3 % (ref 41.1–50.3)
HGB BLD-MCNC: 14.3 G/DL (ref 13.6–17.2)
IMM GRANULOCYTES # BLD AUTO: 0.1 K/UL (ref 0–0.5)
IMM GRANULOCYTES NFR BLD AUTO: 1 % (ref 0–5)
LACTATE BLD-SCNC: 1.81 MMOL/L (ref 0.5–1.9)
LYMPHOCYTES # BLD: 0.5 K/UL (ref 0.5–4.6)
LYMPHOCYTES NFR BLD: 4 % (ref 13–44)
MCH RBC QN AUTO: 32.6 PG (ref 26.1–32.9)
MCHC RBC AUTO-ENTMCNC: 33.8 G/DL (ref 31.4–35)
MCV RBC AUTO: 96.6 FL (ref 79.6–97.8)
MONOCYTES # BLD: 0.5 K/UL (ref 0.1–1.3)
MONOCYTES NFR BLD: 4 % (ref 4–12)
NEUTS SEG # BLD: 11.8 K/UL (ref 1.7–8.2)
NEUTS SEG NFR BLD: 91 % (ref 43–78)
NRBC # BLD: 0 K/UL (ref 0–0.2)
PLATELET # BLD AUTO: 170 K/UL (ref 150–450)
PMV BLD AUTO: 9.9 FL (ref 9.4–12.3)
POTASSIUM SERPL-SCNC: 4.2 MMOL/L (ref 3.5–5.1)
PROT SERPL-MCNC: 8 G/DL (ref 6.3–8.2)
RBC # BLD AUTO: 4.38 M/UL (ref 4.23–5.6)
RBC #/AREA URNS HPF: ABNORMAL /HPF
SODIUM SERPL-SCNC: 130 MMOL/L (ref 136–145)
WBC # BLD AUTO: 12.9 K/UL (ref 4.3–11.1)
WBC URNS QL MICRO: >100 /HPF

## 2019-11-05 PROCEDURE — 74011636637 HC RX REV CODE- 636/637: Performed by: HOSPITALIST

## 2019-11-05 PROCEDURE — 74011250636 HC RX REV CODE- 250/636: Performed by: HOSPITALIST

## 2019-11-05 PROCEDURE — 80053 COMPREHEN METABOLIC PANEL: CPT

## 2019-11-05 PROCEDURE — 87086 URINE CULTURE/COLONY COUNT: CPT

## 2019-11-05 PROCEDURE — 74011250636 HC RX REV CODE- 250/636: Performed by: EMERGENCY MEDICINE

## 2019-11-05 PROCEDURE — 87186 SC STD MICRODIL/AGAR DIL: CPT

## 2019-11-05 PROCEDURE — 87205 SMEAR GRAM STAIN: CPT

## 2019-11-05 PROCEDURE — 81015 MICROSCOPIC EXAM OF URINE: CPT

## 2019-11-05 PROCEDURE — 81003 URINALYSIS AUTO W/O SCOPE: CPT | Performed by: EMERGENCY MEDICINE

## 2019-11-05 PROCEDURE — 74011250637 HC RX REV CODE- 250/637: Performed by: HOSPITALIST

## 2019-11-05 PROCEDURE — 87088 URINE BACTERIA CULTURE: CPT

## 2019-11-05 PROCEDURE — 77010033678 HC OXYGEN DAILY

## 2019-11-05 PROCEDURE — 74011000258 HC RX REV CODE- 258: Performed by: HOSPITALIST

## 2019-11-05 PROCEDURE — 85025 COMPLETE CBC W/AUTO DIFF WBC: CPT

## 2019-11-05 PROCEDURE — 87040 BLOOD CULTURE FOR BACTERIA: CPT

## 2019-11-05 PROCEDURE — 99284 EMERGENCY DEPT VISIT MOD MDM: CPT | Performed by: EMERGENCY MEDICINE

## 2019-11-05 PROCEDURE — 65270000029 HC RM PRIVATE

## 2019-11-05 PROCEDURE — 83605 ASSAY OF LACTIC ACID: CPT

## 2019-11-05 PROCEDURE — 96374 THER/PROPH/DIAG INJ IV PUSH: CPT | Performed by: EMERGENCY MEDICINE

## 2019-11-05 PROCEDURE — 82962 GLUCOSE BLOOD TEST: CPT

## 2019-11-05 PROCEDURE — 77030021668 HC NEB PREFIL KT VYRM -A

## 2019-11-05 PROCEDURE — 74011250637 HC RX REV CODE- 250/637: Performed by: EMERGENCY MEDICINE

## 2019-11-05 PROCEDURE — 87077 CULTURE AEROBIC IDENTIFY: CPT

## 2019-11-05 PROCEDURE — 87150 DNA/RNA AMPLIFIED PROBE: CPT

## 2019-11-05 RX ORDER — ONDANSETRON 2 MG/ML
4 INJECTION INTRAMUSCULAR; INTRAVENOUS
Status: DISCONTINUED | OUTPATIENT
Start: 2019-11-05 | End: 2019-11-08 | Stop reason: HOSPADM

## 2019-11-05 RX ORDER — CARVEDILOL 25 MG/1
25 TABLET ORAL 2 TIMES DAILY WITH MEALS
Status: DISCONTINUED | OUTPATIENT
Start: 2019-11-06 | End: 2019-11-08 | Stop reason: HOSPADM

## 2019-11-05 RX ORDER — TAMSULOSIN HYDROCHLORIDE 0.4 MG/1
0.8 CAPSULE ORAL
Status: DISCONTINUED | OUTPATIENT
Start: 2019-11-05 | End: 2019-11-08 | Stop reason: HOSPADM

## 2019-11-05 RX ORDER — SODIUM CHLORIDE 0.9 % (FLUSH) 0.9 %
5-40 SYRINGE (ML) INJECTION AS NEEDED
Status: DISCONTINUED | OUTPATIENT
Start: 2019-11-05 | End: 2019-11-08 | Stop reason: HOSPADM

## 2019-11-05 RX ORDER — INSULIN LISPRO 100 [IU]/ML
INJECTION, SOLUTION INTRAVENOUS; SUBCUTANEOUS
Status: DISCONTINUED | OUTPATIENT
Start: 2019-11-05 | End: 2019-11-08 | Stop reason: HOSPADM

## 2019-11-05 RX ORDER — CALCIUM CARBONATE 200(500)MG
400 TABLET,CHEWABLE ORAL
Status: DISCONTINUED | OUTPATIENT
Start: 2019-11-05 | End: 2019-11-08 | Stop reason: HOSPADM

## 2019-11-05 RX ORDER — DABIGATRAN ETEXILATE 150 MG/1
150 CAPSULE ORAL EVERY 12 HOURS
Status: DISCONTINUED | OUTPATIENT
Start: 2019-11-05 | End: 2019-11-08 | Stop reason: HOSPADM

## 2019-11-05 RX ORDER — SODIUM CHLORIDE 0.9 % (FLUSH) 0.9 %
5-40 SYRINGE (ML) INJECTION EVERY 8 HOURS
Status: DISCONTINUED | OUTPATIENT
Start: 2019-11-05 | End: 2019-11-08 | Stop reason: HOSPADM

## 2019-11-05 RX ORDER — ACETAMINOPHEN 500 MG
1000 TABLET ORAL
Status: COMPLETED | OUTPATIENT
Start: 2019-11-05 | End: 2019-11-05

## 2019-11-05 RX ORDER — ACETAMINOPHEN 325 MG/1
650 TABLET ORAL
Status: DISCONTINUED | OUTPATIENT
Start: 2019-11-05 | End: 2019-11-08 | Stop reason: HOSPADM

## 2019-11-05 RX ORDER — DEXTROSE 50 % IN WATER (D50W) INTRAVENOUS SYRINGE
25-50 AS NEEDED
Status: DISCONTINUED | OUTPATIENT
Start: 2019-11-05 | End: 2019-11-08 | Stop reason: HOSPADM

## 2019-11-05 RX ORDER — TAMSULOSIN HYDROCHLORIDE 0.4 MG/1
0.8 CAPSULE ORAL DAILY
Status: DISCONTINUED | OUTPATIENT
Start: 2019-11-06 | End: 2019-11-05

## 2019-11-05 RX ORDER — DEXTROSE 40 %
15 GEL (GRAM) ORAL AS NEEDED
Status: DISCONTINUED | OUTPATIENT
Start: 2019-11-05 | End: 2019-11-08 | Stop reason: HOSPADM

## 2019-11-05 RX ORDER — SIMVASTATIN 40 MG/1
40 TABLET, FILM COATED ORAL
Status: DISCONTINUED | OUTPATIENT
Start: 2019-11-05 | End: 2019-11-08 | Stop reason: HOSPADM

## 2019-11-05 RX ORDER — CALCIUM CARBONATE 200(500)MG
400 TABLET,CHEWABLE ORAL AS NEEDED
Status: DISCONTINUED | OUTPATIENT
Start: 2019-11-05 | End: 2019-11-05

## 2019-11-05 RX ORDER — LISINOPRIL 5 MG/1
10 TABLET ORAL DAILY
Status: DISCONTINUED | OUTPATIENT
Start: 2019-11-06 | End: 2019-11-08 | Stop reason: HOSPADM

## 2019-11-05 RX ORDER — SODIUM CHLORIDE 9 MG/ML
75 INJECTION, SOLUTION INTRAVENOUS CONTINUOUS
Status: DISCONTINUED | OUTPATIENT
Start: 2019-11-05 | End: 2019-11-08

## 2019-11-05 RX ORDER — FLUTICASONE PROPIONATE 50 MCG
2 SPRAY, SUSPENSION (ML) NASAL DAILY
Status: DISCONTINUED | OUTPATIENT
Start: 2019-11-06 | End: 2019-11-08 | Stop reason: HOSPADM

## 2019-11-05 RX ORDER — LORATADINE 10 MG/1
10 TABLET ORAL DAILY
Status: DISCONTINUED | OUTPATIENT
Start: 2019-11-06 | End: 2019-11-08 | Stop reason: HOSPADM

## 2019-11-05 RX ORDER — LANOLIN ALCOHOL/MO/W.PET/CERES
1000 CREAM (GRAM) TOPICAL DAILY
Status: DISCONTINUED | OUTPATIENT
Start: 2019-11-06 | End: 2019-11-08 | Stop reason: HOSPADM

## 2019-11-05 RX ADMIN — PIPERACILLIN AND TAZOBACTAM 4.5 G: 4; .5 INJECTION, POWDER, LYOPHILIZED, FOR SOLUTION INTRAVENOUS; PARENTERAL at 21:54

## 2019-11-05 RX ADMIN — SODIUM CHLORIDE 125 ML/HR: 900 INJECTION, SOLUTION INTRAVENOUS at 20:30

## 2019-11-05 RX ADMIN — ACETAMINOPHEN 1000 MG: 500 TABLET, FILM COATED ORAL at 19:48

## 2019-11-05 RX ADMIN — VANCOMYCIN HYDROCHLORIDE 2500 MG: 10 INJECTION, POWDER, LYOPHILIZED, FOR SOLUTION INTRAVENOUS at 19:26

## 2019-11-05 RX ADMIN — TAMSULOSIN HYDROCHLORIDE 0.8 MG: 0.4 CAPSULE ORAL at 22:50

## 2019-11-05 RX ADMIN — INSULIN LISPRO 2 UNITS: 100 INJECTION, SOLUTION INTRAVENOUS; SUBCUTANEOUS at 22:07

## 2019-11-05 RX ADMIN — SODIUM CHLORIDE 1000 ML: 900 INJECTION, SOLUTION INTRAVENOUS at 19:35

## 2019-11-05 NOTE — ED TRIAGE NOTES
Patient co urinary retention, fever and having episodes of confusion. Patient states he has been having urinary problems for about 2 months.   Patient states he went to urologist today  Due to his urine retention and fever, pt states he had a moment of confusion on the way back from urologist.  Patient tachycardic in triage but not running fever

## 2019-11-05 NOTE — ED PROVIDER NOTES
Patient is a 56-year-old male who presents with urinary retention and UTI. States that last night he had a fever to 101, went to his urologist today who put him on doxycycline for a UTI diagnosed in the office states on the way home has had some confusion some fevers some chills and was \"worried about sepsis\". Patient denies any abdominal pain, states he has had some mild back pain for the past few days in his lower back on the sides however no nausea or vomiting, no further complaints. Overall patient is very well-appearing, no acute distress. Past Medical History:   Diagnosis Date    A-fib [facility])      cardioversion    Arteriosclerosis     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 10/20/2017    Atrial fibrillation ([address] 1/3/2012    Bullous emphysema ([address] 2017    very mild in lung apices, noted on calcium scoring CT .  Cigarette nicotine dependence in remission 2017    Diabetes mellitus     type 2. AVG -130.  Last A1C  was 6 - per pt       Diabetes mellitus ([address] 1/3/2012    Essential hypertension, benign 2014    Family history of malignant neoplasm of prostate 2014    Hemochromatosis     HTN (hypertension) 2014    Hypercholesteremia     Nodular prostate without urinary obstruction 2014    Obesity 2014    Pneumothorax, right 10/8/2017    Sleep apnea 10/12/2016    uses ASV instead of CPAP machine - per pt     Subdural hemorrhage following injury ([address] 10/20/2017       Past Surgical History:   Procedure Laterality Date    COLONOSCOPY N/A 2018    COLONOSCOPY  BMI 41 performed by Matilde Lai [facility] ENDOSCOPY    HX UROLOGICAL      prostate u/s and BX    KNEE ARTHROSCP HARV      right         Family History:   Problem Relation Age of Onset    Heart Attack Father 61            Diabetes Father     Heart Disease Father     Hypertension Father     Cancer Father         lung    Other Mother         Sarcoidosis  Stroke Brother     Psychiatric Disorder Brother     Heart Disease Paternal Grandmother        Social History     Socioeconomic History    Marital status:      Spouse name: Not on file    Number of children: Not on file    Years of education: Not on file    Highest education level: Not on file   Occupational History    Not on file   Social Needs    Financial resource strain: Not on file    Food insecurity:     Worry: Not on file     Inability: Not on file    Transportation needs:     Medical: Not on file     Non-medical: Not on file   Tobacco Use    Smoking status: Former Smoker     Packs/day: 1.50     Years: 18.00     Pack years: 27.00     Types: Cigarettes     Last attempt to quit: 1989     Years since quittin.2    Smokeless tobacco: Former User     Quit date: 2016   Substance and Sexual Activity    Alcohol use:  Yes     Alcohol/week: 8.0 - 9.0 standard drinks     Types: 2 - 3 Glasses of wine, 6 Cans of beer per week     Comment: has lessened his alcohol intake since learning about his abnormal liver labs    Drug use: No    Sexual activity: Yes     Partners: Female     Birth control/protection: None   Lifestyle    Physical activity:     Days per week: Not on file     Minutes per session: Not on file    Stress: Not on file   Relationships    Social connections:     Talks on phone: Not on file     Gets together: Not on file     Attends Yazidi service: Not on file     Active member of club or organization: Not on file     Attends meetings of clubs or organizations: Not on file     Relationship status: Not on file    Intimate partner violence:     Fear of current or ex partner: Not on file     Emotionally abused: Not on file     Physically abused: Not on file     Forced sexual activity: Not on file   Other Topics Concern     Service Not Asked    Blood Transfusions Not Asked    Caffeine Concern Not Asked    Occupational Exposure Not Asked   [place] Hazards Not Asked    Sleep Concern Not Asked    Stress Concern Yes    Weight Concern Yes    Special Diet No    Back Care Not Asked    Exercise No    Bike Helmet Not Asked    Seat Belt Yes    Self-Exams No   Social History Narrative    Not on file         ALLERGIES: Zithromax [azithromycin]    Review of Systems   Constitutional: Negative for chills and fever. HENT: Negative for rhinorrhea and sore throat. Eyes: Negative for visual disturbance. Respiratory: Negative for cough and shortness of breath. Cardiovascular: Negative for chest pain and leg swelling. Gastrointestinal: Negative for abdominal pain, diarrhea, nausea and vomiting. Genitourinary: Positive for dysuria and flank pain. Musculoskeletal: Negative for back pain and neck pain. Skin: Negative for rash. Neurological: Negative for weakness and headaches. Psychiatric/Behavioral: The patient is not nervous/anxious. Vitals:    11/05/19 1757   BP: (!) 145/91   Pulse: (!) 110   Resp: 16   Temp: 98.4 °F (36.9 °C)   SpO2: 94%   Weight: 152 kg (335 lb)   Height: 6' 3\" (1.905 m)            Physical Exam   Constitutional: He is oriented to person, place, and time. He appears well-developed and well-nourished. HENT:   Head: Normocephalic. Right Ear: External ear normal.   Left Ear: External ear normal.   Eyes: Pupils are equal, round, and reactive to light. Conjunctivae and EOM are normal.   Neck: Normal range of motion. Neck supple. No tracheal deviation present. Cardiovascular: Normal rate, regular rhythm, normal heart sounds and intact distal pulses. No murmur heard. Pulmonary/Chest: Effort normal and breath sounds normal. No respiratory distress. Abdominal: Soft. He exhibits no distension. There is no tenderness. There is no guarding. Non-tender to deep palpation through-out entire abdomen. Very benign abdominal exam.    Negative for CVA tenderness   Musculoskeletal: Normal range of motion.    Neurological: He is alert and oriented to person, place, and time. No cranial nerve deficit. Skin: No rash noted. Nursing note and vitals reviewed. MDM  Number of Diagnoses or Management Options     Amount and/or Complexity of Data Reviewed  Clinical lab tests: ordered and reviewed  Tests in the radiology section of CPT®: ordered and reviewed  Tests in the medicine section of CPT®: ordered and reviewed  Review and summarize past medical records: yes    Risk of Complications, Morbidity, and/or Mortality  Presenting problems: high  Diagnostic procedures: high  Management options: high    Patient Progress  Patient progress: stable         Procedures  Recent Results (from the past 12 hour(s))   AMB POC URINALYSIS DIP STICK AUTO W/ MICRO (PGU)    Collection Time: 11/05/19  2:20 PM   Result Value Ref Range    Glucose (UA POC) Negative Negative mg/dL    Bilirubin (UA POC) Negative Negative    Ketones (UA POC) Negative Negative    Specific gravity (UA POC) 1.015 1.001 - 1.035    Blood (UA POC) Moderate Negative    pH (UA POC) 6.0 4.6 - 8.0    Protein (UA POC) 30  Negative    Urobilinogen (POC) 0.2 mg/dL     Nitrites (UA POC) Positive Negative    Leukocyte esterase (UA POC) Large Negative   CBC WITH AUTOMATED DIFF    Collection Time: 11/05/19  6:53 PM   Result Value Ref Range    WBC 12.9 (H) 4.3 - 11.1 K/uL    RBC 4.38 4.23 - 5.6 M/uL    HGB 14.3 13.6 - 17.2 g/dL    HCT 42.3 41.1 - 50.3 %    MCV 96.6 79.6 - 97.8 FL    MCH 32.6 26.1 - 32.9 PG    MCHC 33.8 31.4 - 35.0 g/dL    RDW 13.2 11.9 - 14.6 %    PLATELET 048 891 - 807 K/uL    MPV 9.9 9.4 - 12.3 FL    ABSOLUTE NRBC 0.00 0.0 - 0.2 K/uL    DF AUTOMATED      NEUTROPHILS 91 (H) 43 - 78 %    LYMPHOCYTES 4 (L) 13 - 44 %    MONOCYTES 4 4.0 - 12.0 %    EOSINOPHILS 0 (L) 0.5 - 7.8 %    BASOPHILS 0 0.0 - 2.0 %    IMMATURE GRANULOCYTES 1 0.0 - 5.0 %    ABS. NEUTROPHILS 11.8 (H) 1.7 - 8.2 K/UL    ABS. LYMPHOCYTES 0.5 0.5 - 4.6 K/UL    ABS. MONOCYTES 0.5 0.1 - 1.3 K/UL    ABS.  EOSINOPHILS 0.0 0.0 - 0.8 K/UL    ABS. BASOPHILS 0.0 0.0 - 0.2 K/UL    ABS. IMM. GRANS. 0.1 0.0 - 0.5 K/UL   METABOLIC PANEL, COMPREHENSIVE    Collection Time: 11/05/19  6:53 PM   Result Value Ref Range    Sodium 130 (L) 136 - 145 mmol/L    Potassium 4.2 3.5 - 5.1 mmol/L    Chloride 96 (L) 98 - 107 mmol/L    CO2 25 21 - 32 mmol/L    Anion gap 9 7 - 16 mmol/L    Glucose 177 (H) 65 - 100 mg/dL    BUN 13 8 - 23 MG/DL    Creatinine 1.04 0.8 - 1.5 MG/DL    GFR est AA >60 >60 ml/min/1.73m2    GFR est non-AA >60 >60 ml/min/1.73m2    Calcium 9.6 8.3 - 10.4 MG/DL    Bilirubin, total 1.8 (H) 0.2 - 1.1 MG/DL    ALT (SGPT) 34 12 - 65 U/L    AST (SGOT) 15 15 - 37 U/L    Alk. phosphatase 101 50 - 136 U/L    Protein, total 8.0 6.3 - 8.2 g/dL    Albumin 3.6 3.2 - 4.6 g/dL    Globulin 4.4 (H) 2.3 - 3.5 g/dL    A-G Ratio 0.8 (L) 1.2 - 3.5     POC LACTIC ACID    Collection Time: 11/05/19  7:01 PM   Result Value Ref Range    Lactic Acid (POC) 1.81 0.5 - 1.9 mmol/L   URINE MICROSCOPIC    Collection Time: 11/05/19  7:23 PM   Result Value Ref Range    WBC >100 (H) 0 /hpf    RBC 5-10 0 /hpf    Epithelial cells 0-3 0 /hpf    Bacteria 4+ (H) 0 /hpf    Casts 0-3 0 /lpf   GLUCOSE, POC    Collection Time: 11/05/19  9:36 PM   Result Value Ref Range    Glucose (POC) 186 (H) 65 - 100 mg/dL     Us Retroperitoneum Comp    Result Date: 10/31/2019  INDICATION:  Urinary tract infections TECHNIQUE: Real-time sonography of the kidneys, retroperitoneum and bladder was performed with multiple static images obtained. FINDINGS: The right kidney measures 12.4 cm and the left kidney measures 13.5 cm in length. The kidneys have a normal echotexture. There is no hydronephrosis. There are small bilateral renal cysts. Largest is right lower pole, 4.3 cm in diameter. The aorta and IVC are normal in caliber. The urinary bladder is unremarkable. Incidentally noted during the exam, the liver is enlarged and hyperechoic suggesting steatosis. IMPRESSION: 1. Small bilateral renal cysts. 2.  Hepatomegaly and steatosis.        76-year-old male with UTI:    Admit for complicated UTI

## 2019-11-06 ENCOUNTER — APPOINTMENT (OUTPATIENT)
Dept: CT IMAGING | Age: 64
DRG: 872 | End: 2019-11-06
Attending: HOSPITALIST
Payer: COMMERCIAL

## 2019-11-06 LAB
ACC. NO. FROM MICRO ORDER, ACCP: ABNORMAL
ANION GAP SERPL CALC-SCNC: 8 MMOL/L (ref 7–16)
BUN SERPL-MCNC: 12 MG/DL (ref 8–23)
CALCIUM SERPL-MCNC: 9.3 MG/DL (ref 8.3–10.4)
CHLORIDE SERPL-SCNC: 99 MMOL/L (ref 98–107)
CO2 SERPL-SCNC: 25 MMOL/L (ref 21–32)
CREAT SERPL-MCNC: 1.01 MG/DL (ref 0.8–1.5)
CRP SERPL-MCNC: 11.7 MG/DL (ref 0–0.9)
ERYTHROCYTE [DISTWIDTH] IN BLOOD BY AUTOMATED COUNT: 13.2 % (ref 11.9–14.6)
ESCHERICHIA COLI: DETECTED
GLUCOSE BLD STRIP.AUTO-MCNC: 179 MG/DL (ref 65–100)
GLUCOSE BLD STRIP.AUTO-MCNC: 194 MG/DL (ref 65–100)
GLUCOSE BLD STRIP.AUTO-MCNC: 207 MG/DL (ref 65–100)
GLUCOSE BLD STRIP.AUTO-MCNC: 228 MG/DL (ref 65–100)
GLUCOSE SERPL-MCNC: 186 MG/DL (ref 65–100)
HCT VFR BLD AUTO: 39.3 % (ref 41.1–50.3)
HGB BLD-MCNC: 13.2 G/DL (ref 13.6–17.2)
INTERPRETATION: ABNORMAL
KPC (CARBAPENEM RESISTANCE GENE): NOT DETECTED
MAGNESIUM SERPL-MCNC: 2 MG/DL (ref 1.8–2.4)
MCH RBC QN AUTO: 32.4 PG (ref 26.1–32.9)
MCHC RBC AUTO-ENTMCNC: 33.6 G/DL (ref 31.4–35)
MCV RBC AUTO: 96.3 FL (ref 79.6–97.8)
NRBC # BLD: 0 K/UL (ref 0–0.2)
PLATELET # BLD AUTO: 138 K/UL (ref 150–450)
PMV BLD AUTO: 10 FL (ref 9.4–12.3)
POTASSIUM SERPL-SCNC: 3.6 MMOL/L (ref 3.5–5.1)
RBC # BLD AUTO: 4.08 M/UL (ref 4.23–5.6)
SODIUM SERPL-SCNC: 132 MMOL/L (ref 136–145)
WBC # BLD AUTO: 11.3 K/UL (ref 4.3–11.1)

## 2019-11-06 PROCEDURE — 74011000258 HC RX REV CODE- 258: Performed by: HOSPITALIST

## 2019-11-06 PROCEDURE — 74011250636 HC RX REV CODE- 250/636: Performed by: HOSPITALIST

## 2019-11-06 PROCEDURE — 94760 N-INVAS EAR/PLS OXIMETRY 1: CPT

## 2019-11-06 PROCEDURE — 86140 C-REACTIVE PROTEIN: CPT

## 2019-11-06 PROCEDURE — 65270000029 HC RM PRIVATE

## 2019-11-06 PROCEDURE — 74011636637 HC RX REV CODE- 636/637: Performed by: HOSPITALIST

## 2019-11-06 PROCEDURE — 74011636320 HC RX REV CODE- 636/320: Performed by: HOSPITALIST

## 2019-11-06 PROCEDURE — 74011250637 HC RX REV CODE- 250/637: Performed by: HOSPITALIST

## 2019-11-06 PROCEDURE — 51798 US URINE CAPACITY MEASURE: CPT

## 2019-11-06 PROCEDURE — 77010033678 HC OXYGEN DAILY

## 2019-11-06 PROCEDURE — 74177 CT ABD & PELVIS W/CONTRAST: CPT

## 2019-11-06 PROCEDURE — 83735 ASSAY OF MAGNESIUM: CPT

## 2019-11-06 PROCEDURE — 80048 BASIC METABOLIC PNL TOTAL CA: CPT

## 2019-11-06 PROCEDURE — 36415 COLL VENOUS BLD VENIPUNCTURE: CPT

## 2019-11-06 PROCEDURE — 82962 GLUCOSE BLOOD TEST: CPT

## 2019-11-06 PROCEDURE — 85027 COMPLETE CBC AUTOMATED: CPT

## 2019-11-06 RX ORDER — SODIUM CHLORIDE 0.9 % (FLUSH) 0.9 %
10 SYRINGE (ML) INJECTION
Status: COMPLETED | OUTPATIENT
Start: 2019-11-06 | End: 2019-11-06

## 2019-11-06 RX ADMIN — INSULIN LISPRO 4 UNITS: 100 INJECTION, SOLUTION INTRAVENOUS; SUBCUTANEOUS at 13:08

## 2019-11-06 RX ADMIN — SODIUM CHLORIDE 125 ML/HR: 900 INJECTION, SOLUTION INTRAVENOUS at 21:42

## 2019-11-06 RX ADMIN — SODIUM CHLORIDE 125 ML/HR: 900 INJECTION, SOLUTION INTRAVENOUS at 04:43

## 2019-11-06 RX ADMIN — Medication 10 ML: at 11:56

## 2019-11-06 RX ADMIN — SODIUM CHLORIDE 125 ML/HR: 900 INJECTION, SOLUTION INTRAVENOUS at 13:39

## 2019-11-06 RX ADMIN — LORATADINE 10 MG: 10 TABLET ORAL at 08:28

## 2019-11-06 RX ADMIN — CARVEDILOL 25 MG: 25 TABLET, FILM COATED ORAL at 18:10

## 2019-11-06 RX ADMIN — INSULIN LISPRO 2 UNITS: 100 INJECTION, SOLUTION INTRAVENOUS; SUBCUTANEOUS at 18:10

## 2019-11-06 RX ADMIN — PIPERACILLIN AND TAZOBACTAM 4.5 G: 4; .5 INJECTION, POWDER, LYOPHILIZED, FOR SOLUTION INTRAVENOUS; PARENTERAL at 08:29

## 2019-11-06 RX ADMIN — LISINOPRIL 10 MG: 5 TABLET ORAL at 08:28

## 2019-11-06 RX ADMIN — SIMVASTATIN 40 MG: 40 TABLET, FILM COATED ORAL at 21:37

## 2019-11-06 RX ADMIN — INSULIN LISPRO 2 UNITS: 100 INJECTION, SOLUTION INTRAVENOUS; SUBCUTANEOUS at 21:50

## 2019-11-06 RX ADMIN — CEFTRIAXONE SODIUM 2 G: 2 INJECTION, POWDER, FOR SOLUTION INTRAMUSCULAR; INTRAVENOUS at 15:11

## 2019-11-06 RX ADMIN — CARVEDILOL 25 MG: 25 TABLET, FILM COATED ORAL at 08:28

## 2019-11-06 RX ADMIN — TAMSULOSIN HYDROCHLORIDE 0.8 MG: 0.4 CAPSULE ORAL at 21:37

## 2019-11-06 RX ADMIN — SODIUM CHLORIDE 100 ML: 900 INJECTION, SOLUTION INTRAVENOUS at 11:57

## 2019-11-06 RX ADMIN — DIATRIZOATE MEGLUMINE AND DIATRIZOATE SODIUM 15 ML: 660; 100 LIQUID ORAL; RECTAL at 09:52

## 2019-11-06 RX ADMIN — DABIGATRAN ETEXILATE MESYLATE 150 MG: 150 CAPSULE ORAL at 21:37

## 2019-11-06 RX ADMIN — ACETAMINOPHEN 650 MG: 325 TABLET, FILM COATED ORAL at 04:42

## 2019-11-06 RX ADMIN — Medication 10 ML: at 05:07

## 2019-11-06 RX ADMIN — PIPERACILLIN AND TAZOBACTAM 4.5 G: 4; .5 INJECTION, POWDER, LYOPHILIZED, FOR SOLUTION INTRAVENOUS; PARENTERAL at 02:53

## 2019-11-06 RX ADMIN — IOPAMIDOL 100 ML: 755 INJECTION, SOLUTION INTRAVENOUS at 11:55

## 2019-11-06 RX ADMIN — ACETAMINOPHEN 650 MG: 325 TABLET, FILM COATED ORAL at 00:23

## 2019-11-06 RX ADMIN — CYANOCOBALAMIN TAB 1000 MCG 1000 MCG: 1000 TAB at 08:29

## 2019-11-06 RX ADMIN — INSULIN LISPRO 4 UNITS: 100 INJECTION, SOLUTION INTRAVENOUS; SUBCUTANEOUS at 08:28

## 2019-11-06 RX ADMIN — DABIGATRAN ETEXILATE MESYLATE 150 MG: 150 CAPSULE ORAL at 08:29

## 2019-11-06 RX ADMIN — FLUTICASONE PROPIONATE 2 SPRAY: 50 SPRAY, METERED NASAL at 08:28

## 2019-11-06 NOTE — CONSULTS
Infectious Disease Consult    Today's Date: 11/6/2019   Admit Date: 11/5/2019    Impression:   · E coli UTI with pyelonephritis  · E coli bacteremia with sepsis  · BPH with urinary retention  · DMII  · AF on pradaxa    Plan:   ·  I agree with ceftriaxone. I anticipate 7-14 days of antibiotics depending on response  · Follow up E coli susceptibility report  · More importantly, he needs a plan to improve urine flow and prevent the next event. Urology is involved. Anti-infectives:   · CTX (11/5-  · Vanc x 1 dose    Subjective:   Date of Consultation:  November 6, 2019  Referring Physician: Devin Mortensen     Mr. Michelle Elias is a 75 y.o. male with significant PMH for BPH with urinary retention, DM2, morbid obesity, AF on pradaxa, who presents to [facility] ED for fever (102.4F) and confusion. He is followed by urology for BPH and was last seen in office several days ago for UTI via UA (4+ bacteria, > 100WBC), UC not done. Doxycycline prescribed per [facility]. Sx continued with development of confusion. Renal US benign on 10/31. While in ED, [facility] 4/4 bottles + GNR, E coli per biofire. CTX started.      Patient Active Problem List   Diagnosis Code    Hepatic steatosis K76.0    Iron excess E83.19    Hemochromatosis E83.119    Atrial fibrillation ([address] I48.91    Obesity E66.9    HTN (hypertension) I10    Nodular prostate without urinary obstruction N40.2    Mitral valve regurgitation I34.0    Seasonal allergic rhinitis due to pollen J30.1    [place] on CPAP G47.33, Z99.89    Subdural hygroma G96.0    Obesity, morbid (HCC) E66.01    Mixed dyslipidemia E78.2    Benign prostatic hyperplasia with nocturia N40.1, R35.1    Diabetes mellitus without complication (HCC) D86.4    Sepsis due to urinary tract infection (HCC) A41.9, N39.0    Fever R50.9     Past Medical History:   Diagnosis Date    A-fib [facility])      cardioversion    Arteriosclerosis     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 10/20/2017    Atrial fibrillation ([facility] 1/3/2012  Bullous emphysema [address] 2017    very mild in lung apices, noted on calcium scoring CT .  Cigarette nicotine dependence in remission 2017    Diabetes mellitus     type 2. AVG -130. Last A1C  was 6 - per pt       Diabetes mellitus ([facility] 1/3/2012    Essential hypertension, benign 2014    Family history of malignant neoplasm of prostate 2014    Hemochromatosis     HTN (hypertension) 2014    Hypercholesteremia     Nodular prostate without urinary obstruction 2014    Obesity 2014    Pneumothorax, right 10/8/2017    Sleep apnea 10/12/2016    uses ASV instead of CPAP machine - per pt     Subdural hemorrhage following injury ([facility] [address] 10/20/2017      Family History   Problem Relation Age of Onset    Heart Attack Father 61            Diabetes Father     Heart Disease Father     Hypertension Father     Cancer Father         lung    Other Mother         Sarcoidosis    Stroke Brother     Psychiatric Disorder Brother     Heart Disease Paternal Grandmother       Social History     Tobacco Use    Smoking status: Former Smoker     Packs/day: 1.50     Years: 18.00     Pack years: 27.00     Types: Cigarettes     Last attempt to quit: 1989     Years since quittin.2    Smokeless tobacco: Former User     Quit date: 2016   Substance Use Topics    Alcohol use: Yes     Alcohol/week: 8.0 - 9.0 standard drinks     Types: 2 - 3 Glasses of wine, 6 Cans of beer per week     Comment: has lessened his alcohol intake since learning about his abnormal liver labs     Past Surgical History:   Procedure Laterality Date    COLONOSCOPY N/A 2018    COLONOSCOPY  BMI 41 performed by Francesca Scott MD at [facility] ENDOSCOPY    HX UROLOGICAL      prostate u/s and BX    KNEE ARTHROSCP HARV      right      Prior to Admission medications    Medication Sig Start Date End Date Taking?  Authorizing Provider   doxycycline (MONODOX) 100 mg capsule Take 1 Cap by mouth two (2) times a day for 10 days. 11/5/19 11/15/19  Naya Mane, VLADISLAV   tamsulosin (FLOMAX) 0.4 mg capsule Take 2 Caps by mouth daily. 10/23/19   Chino Dumont MD   cetirizine (ZYRTEC) 10 mg tablet Take  by mouth. Provider, Historical   enalapril (VASOTEC) 10 mg tablet Take 1 Tab by mouth daily. 10/2/19   Tia Shook MD   metFORMIN (GLUCOPHAGE) 500 mg tablet Take 1 Tab by mouth two (2) times daily (with meals). 10/2/19   Tia Shook MD   hydroCHLOROthiazide (HYDRODIURIL) 25 mg tablet Take 1 Tab by mouth daily. 10/2/19   Tia Shook MD   linaCLOtide Belenda Better) 145 mcg cap capsule Take 1 Cap by mouth Daily (before breakfast). 10/2/19   Tia Shook MD   simvastatin (ZOCOR) 40 mg tablet Take 1 Tab by mouth nightly. 10/2/19   Tia Shook MD   spironolactone (ALDACTONE) 25 mg tablet Take 1 Tab by mouth daily. 10/2/19   Tia Shook MD   carvedilol (COREG) 25 mg tablet Take 1 Tab by mouth two (2) times daily (with meals). 10/2/19   Tia Shook MD   rhdii-qk-2-dha-epa-phospho-ast [id number] mg cap Take  by mouth. Provider, Historical   dabigatran etexilate (PRADAXA) 150 mg capsule Take 1 Cap by mouth every twelve (12) hours. 6/19/19   Lisette Crane MD   cyanocobalamin 1,000 mcg tablet Take 1,000 mcg by mouth daily. Provider, Historical   cpap machine kit by Does Not Apply route. Indications: ASV    Provider, Historical   guaiFENesin ER (MUCINEX) 600 mg ER tablet Take 600 mg by mouth two (2) times a day. Provider, Historical   FLUTICASONE PROPIONATE (FLONASE NA) 2 Sprays by Nasal route daily.     Provider, Historical   clotrimazole-betamethasone (LOTRISONE) topical cream Apply to affected area bid as directed 3/5/15   Tia Shook MD       Allergies   Allergen Reactions    Zithromax [Azithromycin] Other (comments)     Skin dryness/peeling        Review of Systems:  A comprehensive review of systems was negative except for that written in the History of Present Illness. Objective:     Visit Vitals  /61 (BP 1 Location: Right arm, BP Patient Position: At rest;Sitting)   Pulse (!) 103   Temp 99.5 °F (37.5 °C)   Resp 18   Ht 6' 3\" (1.905 m)   Wt 152 kg (335 lb)   SpO2 94%   BMI 41.87 kg/m²     Temp (24hrs), Av.8 °F (37.7 °C), Min:97.9 °F (36.6 °C), Max:102.4 °F (39.1 °C)       Lines:  Peripheral IV:       Physical Exam:    General:  Alert, cooperative, well nourished, well developed, appears stated age; morbidly obese   Eyes:  Sclera anicteric. Pupils equally round and reactive to light. Mouth/Throat: Mucous membranes normal, oral pharynx clear   Neck: Supple   Lungs:   Clear to auscultation bilaterally, good effort   CV:  Regular rate and rhythm,no murmur, click, rub or gallop   Abdomen:   Soft, non-tender.  bowel sounds normal. non-distended   Extremities: No cyanosis or edema   Skin: Skin color, texture, turgor normal. no acute rash or lesions   Lymph nodes: Cervical and supraclavicular normal   Musculoskeletal: No swelling or deformity   Lines/Devices:  Intact, no erythema, drainage or tenderness   Psych: Alert and oriented, normal mood affect given the setting       Data Review:     CBC:  Recent Labs     19   WBC 11.3* 12.9*   GRANS  --  91*   MONOS  --  4   EOS  --  0*   ANEU  --  11.8*   ABL  --  0.5   HGB 13.2* 14.3   HCT 39.3* 42.3   * 170       BMP:  Recent Labs     19   CREA 1.01 1.04   BUN 12 13   * 130*   K 3.6 4.2   CL 99 96*   CO2 25 25   AGAP 8 9   * 177*       LFTS:  Recent Labs     19   TBILI 1.8*   ALT 34   SGOT 15      TP 8.0   ALB 3.6       Microbiology:     All Micro Results     Procedure Component Value Units Date/Time    CULTURE, URINE [[id number]] Collected:  11/05/19 1923    Order Status:  Completed Specimen:  Urine from Clean catch Updated:  19 1518    BLOOD CULTURE ID PANEL [[id number]]  (Abnormal) Collected:  19 1853    Order Status:  Completed Specimen:  Blood Updated:  11/06/19 1425     Acc. no. from Micro Order [id number]     Escherichia coli DETECTED        Comment: RESULTS VERIFIED, PHONED TO AND READ BACK BY  LEXIE MELARA RN @1354 11/6/19           KPC (Carbapenem Resistance Gene) NOT DETECTED        Comment: WARNING:  A Not Detected result for the KPC gene does not indicate susceptibility to carbapenems. Gram negative bacteria can be resistant to carbapenems by mechanisms other than carrying the KPC gene. INTERPRETATION       Gram negative litzy. Identified by realtime PCR as E. coli          CULTURE, BLOOD [[id number]] Collected:  11/05/19 1853    Order Status:  Completed Specimen:  Blood Updated:  11/06/19 1415     Special Requests: --        LEFT  Antecubital       GRAM STAIN GRAM NEGATIVE RODS               AEROBIC AND ANAEROBIC BOTTLES                  RESULTS VERIFIED, PHONED TO AND READ BACK BY FREDERICK LEAL RN AT 0639 11/06/2019 BY CDTAYLOR           Culture result:       CULTURE IN [facility] UPDATES TO FOLLOW            REFER TO [facility] Eric Guerrier PANEL [address] [id number]    CULTURE, BLOOD [[id number]] Collected:  11/05/19 1853    Order Status:  Completed Specimen:  Blood Updated:  11/06/19 0923     Special Requests: --        RIGHT  Antecubital       GRAM STAIN GRAM NEGATIVE RODS               AEROBIC AND ANAEROBIC BOTTLES                  CRITICAL RESULT NOT CALLED DUE TO PREVIOUS NOTIFICATION OF CRITICAL RESULT WITHIN THE LAST 24 HOURS. Culture result:       CULTURE IN PROGRESS,FURTHER UPDATES TO FOLLOW                Imaging:   CT abdomen/pelvis (11/6/19)  IMPRESSION:  1. Minimal left perinephric stranding and scattered foci decreased renal cortical enhancement suggesting pyelonephritis. No abscess.     Signed By: Rosa Vega NP     November 6, 2019

## 2019-11-06 NOTE — CONSULTS
Urology Consult    Subjective:     Date of Consultation:  November 6, 2019    Referring Physician:  Dagmar Norman MD    Reason for Consultation:  UTI with sepsis, urinary retention, hesitancy     History of Present Illness:   Frank Frost is a 75 y.o.  male with hx of BPH with obstruction/LUTS, on flomax, UTI, a-fib (on pradaxa), DM, [place]. He was admitted to the hospital for Sepsis due to urinary tract infection ([facility] [address] [A41.9, N39.0]. He is known to Dr. Lisa Coon. He was last seen by him on 10/23/19 due to worsening LUTS. He was in the ER prior to that appt with fever/urinary retention and treated for prostatitis. No urine culture sent at the time, he was started on PO keflex x 30 days. He stopped flomax. He returned for nieves removal and failed VT. Nieves replaced and restarted flomax. Nieves removed again with PVR of 180 ml. Continued on flomax. TURP or bipolar vaporization were discussed at office visit, he would need clearance for pradaxa. Renal US obtained which showed bilateral renal cysts, no hydronephrosis. Pt called office to report difficulty voiding, low back pain and fever. It was recommended for him to come in for PVR/specimen check to which he did yesterday. PVR was 190 ml, urine appeared infected, urine culture sent off, doxycycline started based on previous urine culture. He was advised to go to ER if symptoms worsened, to which he did, d/t confusion/fever. In the ER, he was found to have temp of 102.4, WBC 12.9, lactic acid normal with tachycardia. He was started on IV zosyn, given IVF and admitted by hospitalist team.  UA with >100 WBC, 5-10 RBC, +4 bacteria. BC x2 with GNR. Urine culture not sent.      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  Cigarette nicotine dependence in remission 2017    Diabetes mellitus     type 2. AVG -130. Last A1C  was 6 - per pt       Diabetes mellitus ([facility] [address] 1/3/2012    Essential hypertension, benign 2014    Family history of malignant neoplasm of prostate 2014    Hemochromatosis     HTN (hypertension) 2014    Hypercholesteremia     Nodular prostate without urinary obstruction 2014    Obesity 2014    Pneumothorax, right 10/8/2017    Sleep apnea 10/12/2016    uses ASV instead of CPAP machine - per pt     Subdural hemorrhage following injury ([facility] [address] 10/20/2017      Past Surgical History:   Procedure Laterality Date    COLONOSCOPY N/A 2018    COLONOSCOPY  BMI 41 performed by Rosa Bradford MD at [facility] ENDOSCOPY    HX UROLOGICAL      prostate u/s and BX    KNEE ARTHROSCP HARV      right      Family History   Problem Relation Age of Onset    Heart Attack Father 61            Diabetes Father     Heart Disease Father     Hypertension Father     Cancer Father         lung    Other Mother         Sarcoidosis    Stroke Brother     Psychiatric Disorder Brother     Heart Disease Paternal Grandmother       Social History     Tobacco Use    Smoking status: Former Smoker     Packs/day: 1.50     Years: 18.00     Pack years: 27.00     Types: Cigarettes     Last attempt to quit: 1989     Years since quittin.2    Smokeless tobacco: Former User     Quit date: 2016   Substance Use Topics    Alcohol use: Yes     Alcohol/week: 8.0 - 9.0 standard drinks     Types: 2 - 3 Glasses of wine, 6 Cans of beer per week     Comment: has lessened his alcohol intake since learning about his abnormal liver labs     Allergies   Allergen Reactions    Zithromax [Azithromycin] Other (comments)     Skin dryness/peeling      Prior to Admission medications    Medication Sig Start Date End Date Taking?  Authorizing Provider   doxycycline (MONODOX) 100 mg capsule Take 1 Cap by mouth two (2) times a day for 10 days. 11/5/19 11/15/19  VLADISLAV Escobar   tamsulosin (FLOMAX) 0.4 mg capsule Take 2 Caps by mouth daily. 10/23/19   Gertrude Bingham MD   cetirizine (ZYRTEC) 10 mg tablet Take  by mouth. Provider, Historical   enalapril (VASOTEC) 10 mg tablet Take 1 Tab by mouth daily. 10/2/19   Yaquelin Porter MD   tamsulosin (FLOMAX) 0.4 mg capsule Take 1 Cap by mouth daily. 10/2/19   Yaquelin Porter MD   metFORMIN (GLUCOPHAGE) 500 mg tablet Take 1 Tab by mouth two (2) times daily (with meals). 10/2/19   Yaquelin Porter MD   hydroCHLOROthiazide (HYDRODIURIL) 25 mg tablet Take 1 Tab by mouth daily. 10/2/19   Yaquelin Porter MD   linaCLOtide Sherl Milling) 145 mcg cap capsule Take 1 Cap by mouth Daily (before breakfast). 10/2/19   Yaquelin Porter MD   simvastatin (ZOCOR) 40 mg tablet Take 1 Tab by mouth nightly. 10/2/19   Yaquelin Porter MD   spironolactone (ALDACTONE) 25 mg tablet Take 1 Tab by mouth daily. 10/2/19   Yaquelin Porter MD   carvedilol (COREG) 25 mg tablet Take 1 Tab by mouth two (2) times daily (with meals). 10/2/19   Yaquelin Porter MD   xneab-pf-2-dha-epa-phospho-ast 203-137-68-60 mg cap Take  by mouth. Provider, Historical   dabigatran etexilate (PRADAXA) 150 mg capsule Take 1 Cap by mouth every twelve (12) hours. 6/19/19   Darlene Malik MD   cyanocobalamin 1,000 mcg tablet Take 1,000 mcg by mouth daily. Provider, Historical   cpap machine kit by Does Not Apply route. Indications: ASV    Provider, Historical   guaiFENesin ER (MUCINEX) 600 mg ER tablet Take 600 mg by mouth two (2) times a day. Provider, Historical   FLUTICASONE PROPIONATE (FLONASE NA) 2 Sprays by Nasal route daily.     Provider, Historical   clotrimazole-betamethasone (LOTRISONE) topical cream Apply to affected area bid as directed 3/5/15   Yaquelin Porter MD         Objective:     Patient Vitals for the past 8 hrs:   BP Temp Pulse Resp SpO2   11/06/19 0834     95 % 19 0814     96 %   19 0744 109/65 98.5 °F (36.9 °C) (!) 101 18 95 %   19 0430 106/57 (!) 100.6 °F (38.1 °C) 96 20 97 %     Temp (24hrs), Av.9 °F (37.7 °C), Min:97.9 °F (36.6 °C), Max:102.4 °F (39.1 °C)      Intake and Output:   1901 - 700  In: -   Out:  [Urine:]    Physical Exam:    Assessment:     Principal Problem:    Sepsis due to urinary tract infection ([facility] [address] (2019)    Active Problems:    Atrial fibrillation ([address] (10/8/2017)      HTN (hypertension) (2014)      Nodular prostate without urinary obstruction (2014)      [place] on CPAP (10/13/2017)      Obesity, morbid ([address] (2017)      Diabetes mellitus without complication ([address] (9437)      Fever (2019)      GNR bacteremia. Prostatitis. Plan:     Continue treatment for prostatitis/bacteremia with broad-spectrum IV abx and continue to follow cultures until final.  Called lab and they have added on urine culture from UA yesterday. Pt ultimately needs TURP in the near future once infection treated. This will be done as outpatient. He will need clearance to be off pradaxa prior to surgery. We will go ahead and send for this. RN to check PVR and record in note. She says he is voiding consistently. If PVR elevated, will place nieves catheter. Pt not seen today, chart reviewed and plan is per Dr. Alfredo Lester. Thank you for the opportunity to assist in the care of this patient. Signed By: Deepali Bolivar NP   Saw pt in room on 19. He feels better. BC with GNR. PVR's low, 0 ml. Recommend treatment of UTI, then bipolar TURP after stopping Pradaxa. I have reviewed the above note and examined the patient. I agree with the exam, assessment and plan.     Aubrie Palomino MD

## 2019-11-06 NOTE — PROGRESS NOTES
Care Management Interventions  PCP Verified by CM: Yes  Mode of Transport at Discharge: Other (see comment)  Transition of Care Consult (CM Consult): Other  Current Support Network: Lives with Spouse  Confirm Follow Up Transport: Family  Plan discussed with Pt/Family/Caregiver: Yes  Freedom of Choice Offered: Yes  Discharge Location  Discharge Placement: Home  Visited with pt regarding plans for discharge, pt lives at home with wife, he is retired, inde with ADL's, sees  and is current with him. No further needs at this time, will continue to follow. Saw pt in interdisciplinarily rounds with the following disciplines: Physician, Case Management, Nursing, Dietary,Therapy and Pharmacy. The plan of care was discussed along with discharge date/ location.  Pt may d/c in 3 days per MD.

## 2019-11-06 NOTE — PROGRESS NOTES
11/06/19 0834   Oxygen Therapy   O2 Sat (%) 95 %   Pulse via Oximetry 88 beats per minute   O2 Device Room air   O2 Flow Rate (L/min) 0 l/min

## 2019-11-06 NOTE — PROGRESS NOTES
11/05/19 2030   Dual Skin Pressure Injury Assessment   Dual Skin Pressure Injury Assessment WDL   Second Care Provider (Based on Facility Policy) LIDIA Clay   Skin Integumentary   Skin Integumentary (WDL) WDL   Skin Color Appropriate for ethnicity   Skin Condition/Temp Warm;Dry   Skin Integrity Intact

## 2019-11-06 NOTE — PROGRESS NOTES
11/06/19 0814   Oxygen Therapy   O2 Sat (%) 96 %   Pulse via Oximetry 90 beats per minute   O2 Device Nasal cannula   O2 Flow Rate (L/min) 3 l/min  (pt. placed on RA)

## 2019-11-06 NOTE — PROGRESS NOTES
Hospitalist Note     Admit Date:  2019  6:18 PM   Name:  Melissa Blackwell   Age:  75 y.o.  :  1955   MRN:  [id number]   PCP:  Alisa Ortiz MD  Treatment Team: Attending Provider: Jason Kramer MD; Consulting Provider: Edwina Rodriguez MD; Consulting Provider: Nancy Sharma DO; Consulting Provider: Howie Olszewski, MD; Care Manager: Archie Turner RN    HPI/Subjective:   Patient is a 74 y/o male with hx nodular prostate, negative bx x 2 and normal PSA, urinary hesitancy and retention, diabetes, HTN, afib on Pradaxa, obesity, [place]-CPAP who presents from home with fever, confusion and UTI. He has had some fever the past 2-3 days. Was seen at the Urology office earlier today. He had a post-void residual of 191 ml. He takes 0.8 mg flomax every pm. He was prescribed oral antibiotics and was sent home. While driving back he states he was very confused. When he talked to his daughter by phone he told her of this. She works as a PA and was concerned that he may be developing sepsis so told him to go to ED. Here he was tachycardic. Had a fever to 102.4. WBC ct 12.9. Lactic acid normal. He recently had a retroperitoneal US which was normal (10/31). He was started on IV antibiotics and Hospitalist service consulted for admission.      10 systems reviewed and negative except as noted in HPI.     patient stated that he feels better this morning. Fever with the maximum of 102.4 last night. No nausea no vomiting. No abdominal pain.     Objective:     Patient Vitals for the past 24 hrs:   Temp Pulse Resp BP SpO2   19 1140 99.5 °F (37.5 °C) (!) 103 18 123/61 94 %   19 0834     95 %   19 0814     96 %   19 0744 98.5 °F (36.9 °C) (!) 101 18 109/65 95 %   19 0430 (!) 100.6 °F (38.1 °C) 96 20 106/57 97 %   19 0020 97.9 °F (36.6 °C) 92 18 98/57 99 %   19 98.9 °F (37.2 °C) (!) 102 18 139/78 94 %   19  100      19  (!) 101   94 %   11/05/19 1950 (!) 102.4 °F (39.1 °C) (!) 108 17 150/75 98 %   11/05/19 1949    155/78    11/05/19 1933 (!) 102.4 °F (39.1 °C)       11/05/19 1757 98.4 °F (36.9 °C) (!) 110 16 (!) 145/91 94 %     Oxygen Therapy  O2 Sat (%): 94 % (11/06/19 1140)  Pulse via Oximetry: 88 beats per minute (11/06/19 0834)  O2 Device: Room air (11/06/19 0834)  O2 Flow Rate (L/min): 0 l/min (11/06/19 0834)      Intake/Output Summary (Last 24 hours) at 11/6/2019 1408  Last data filed at 11/6/2019 1255  Gross per 24 hour   Intake    Output 2430 ml   Net -2430 ml       *Note that automatically entered I/Os may not be accurate; dependent on patient compliance with collection and accurate  by [employer]. General:    Well nourished. Alert. CV:   RRR. No murmur, rub, or gallop. Lungs:   CTAB. No wheezing, rhonchi, or rales. Abdomen:   Soft, nontender, nondistended. No flank tenderness. Bowel sounds active. No organomegaly. Extremities: Warm and dry. No cyanosis or edema. Skin:     No rashes or jaundice.    Neuro:  No gross focal deficits    Data Review:  I have reviewed all labs, meds, and studies from the last 24 hours:    Recent Results (from the past 24 hour(s))   AMB POC URINALYSIS DIP STICK AUTO W/ MICRO (PGU)    Collection Time: 11/05/19  2:20 PM   Result Value Ref Range    Glucose (UA POC) Negative Negative mg/dL    Bilirubin (UA POC) Negative Negative    Ketones (UA POC) Negative Negative    Specific gravity (UA POC) 1.015 1.001 - 1.035    Blood (UA POC) Moderate Negative    pH (UA POC) 6.0 4.6 - 8.0    Protein (UA POC) 30  Negative    Urobilinogen (POC) 0.2 mg/dL     Nitrites (UA POC) Positive Negative    Leukocyte esterase (UA POC) Large Negative   CBC WITH AUTOMATED DIFF    Collection Time: 11/05/19  6:53 PM   Result Value Ref Range    WBC 12.9 (H) 4.3 - 11.1 K/uL    RBC 4.38 4.23 - 5.6 M/uL    HGB 14.3 13.6 - 17.2 g/dL    HCT 42.3 41.1 - 50.3 %    MCV 96.6 79.6 - 97.8 FL    MCH 32.6 26.1 - 32.9 PG    MCHC 33.8 31.4 - 35.0 g/dL    RDW 13.2 11.9 - 14.6 %    PLATELET 877 052 - 223 K/uL    MPV 9.9 9.4 - 12.3 FL    ABSOLUTE NRBC 0.00 0.0 - 0.2 K/uL    DF AUTOMATED      NEUTROPHILS 91 (H) 43 - 78 %    LYMPHOCYTES 4 (L) 13 - 44 %    MONOCYTES 4 4.0 - 12.0 %    EOSINOPHILS 0 (L) 0.5 - 7.8 %    BASOPHILS 0 0.0 - 2.0 %    IMMATURE GRANULOCYTES 1 0.0 - 5.0 %    ABS. NEUTROPHILS 11.8 (H) 1.7 - 8.2 K/UL    ABS. LYMPHOCYTES 0.5 0.5 - 4.6 K/UL    ABS. MONOCYTES 0.5 0.1 - 1.3 K/UL    ABS. EOSINOPHILS 0.0 0.0 - 0.8 K/UL    ABS. BASOPHILS 0.0 0.0 - 0.2 K/UL    ABS. IMM. GRANS. 0.1 0.0 - 0.5 K/UL   METABOLIC PANEL, COMPREHENSIVE    Collection Time: 11/05/19  6:53 PM   Result Value Ref Range    Sodium 130 (L) 136 - 145 mmol/L    Potassium 4.2 3.5 - 5.1 mmol/L    Chloride 96 (L) 98 - 107 mmol/L    CO2 25 21 - 32 mmol/L    Anion gap 9 7 - 16 mmol/L    Glucose 177 (H) 65 - 100 mg/dL    BUN 13 8 - 23 MG/DL    Creatinine 1.04 0.8 - 1.5 MG/DL    GFR est AA >60 >60 ml/min/1.73m2    GFR est non-AA >60 >60 ml/min/1.73m2    Calcium 9.6 8.3 - 10.4 MG/DL    Bilirubin, total 1.8 (H) 0.2 - 1.1 MG/DL    ALT (SGPT) 34 12 - 65 U/L    AST (SGOT) 15 15 - 37 U/L    Alk.  phosphatase 101 50 - 136 U/L    Protein, total 8.0 6.3 - 8.2 g/dL    Albumin 3.6 3.2 - 4.6 g/dL    Globulin 4.4 (H) 2.3 - 3.5 g/dL    A-G Ratio 0.8 (L) 1.2 - 3.5     CULTURE, BLOOD    Collection Time: 11/05/19  6:53 PM   Result Value Ref Range    Special Requests: LEFT  Antecubital        GRAM STAIN GRAM NEGATIVE RODS      GRAM STAIN AEROBIC AND ANAEROBIC BOTTLES      GRAM STAIN        RESULTS VERIFIED, PHONED TO AND READ BACK BY FREDERICK LEAL RN AT 6397 11/06/2019 BY CDTAYLOR    Culture result: CULTURE IN [address] UPDATES TO FOLLOW     CULTURE, BLOOD    Collection Time: 11/05/19  6:53 PM   Result Value Ref Range    Special Requests: RIGHT  Antecubital        GRAM STAIN GRAM NEGATIVE RODS      GRAM STAIN AEROBIC AND ANAEROBIC BOTTLES      GRAM STAIN        CRITICAL RESULT NOT CALLED DUE TO PREVIOUS NOTIFICATION OF CRITICAL RESULT WITHIN THE LAST 24 HOURS.     Culture result: CULTURE IN PROGRESS,FURTHER UPDATES TO FOLLOW     POC LACTIC ACID    Collection Time: 11/05/19  7:01 PM   Result Value Ref Range    Lactic Acid (POC) 1.81 0.5 - 1.9 mmol/L   URINE MICROSCOPIC    Collection Time: 11/05/19  7:23 PM   Result Value Ref Range    WBC >100 (H) 0 /hpf    RBC 5-10 0 /hpf    Epithelial cells 0-3 0 /hpf    Bacteria 4+ (H) 0 /hpf    Casts 0-3 0 /lpf   GLUCOSE, POC    Collection Time: 11/05/19  9:36 PM   Result Value Ref Range    Glucose (POC) 186 (H) 65 - 723 mg/dL   METABOLIC PANEL, BASIC    Collection Time: 11/06/19  4:35 AM   Result Value Ref Range    Sodium 132 (L) 136 - 145 mmol/L    Potassium 3.6 3.5 - 5.1 mmol/L    Chloride 99 98 - 107 mmol/L    CO2 25 21 - 32 mmol/L    Anion gap 8 7 - 16 mmol/L    Glucose 186 (H) 65 - 100 mg/dL    BUN 12 8 - 23 MG/DL    Creatinine 1.01 0.8 - 1.5 MG/DL    GFR est AA >60 >60 ml/min/1.73m2    GFR est non-AA >60 >60 ml/min/1.73m2    Calcium 9.3 8.3 - 10.4 MG/DL   CBC W/O DIFF    Collection Time: 11/06/19  4:35 AM   Result Value Ref Range    WBC 11.3 (H) 4.3 - 11.1 K/uL    RBC 4.08 (L) 4.23 - 5.6 M/uL    HGB 13.2 (L) 13.6 - 17.2 g/dL    HCT 39.3 (L) 41.1 - 50.3 %    MCV 96.3 79.6 - 97.8 FL    MCH 32.4 26.1 - 32.9 PG    MCHC 33.6 31.4 - 35.0 g/dL    RDW 13.2 11.9 - 14.6 %    PLATELET 614 (L) 478 - 450 K/uL    MPV 10.0 9.4 - 12.3 FL    ABSOLUTE NRBC 0.00 0.0 - 0.2 K/uL   C REACTIVE PROTEIN, QT    Collection Time: 11/06/19  4:35 AM   Result Value Ref Range    C-Reactive protein 11.7 (H) 0.0 - 0.9 mg/dL   MAGNESIUM    Collection Time: 11/06/19  4:35 AM   Result Value Ref Range    Magnesium 2.0 1.8 - 2.4 mg/dL   GLUCOSE, POC    Collection Time: 11/06/19  7:51 AM   Result Value Ref Range    Glucose (POC) 207 (H) 65 - 100 mg/dL   GLUCOSE, POC    Collection Time: 11/06/19 11:45 AM   Result Value Ref Range    Glucose (POC) 228 (H) 65 - 100 mg/dL        All Micro Results     Procedure Component Value Units Date/Time    BLOOD CULTURE ID PANEL [[id number]] Collected:  11/05/19 1853    Order Status:  Completed Updated:  11/06/19 1242    CULTURE, URINE [[id number]] Collected:  11/05/19 1923    Order Status:  Completed Specimen:  Urine from Clean catch Updated:  11/06/19 1017    CULTURE, BLOOD [[id number]] Collected:  11/05/19 1853    Order Status:  Completed Specimen:  Blood Updated:  11/06/19 0923     Special Requests: --        RIGHT  Antecubital       GRAM STAIN GRAM NEGATIVE RODS               AEROBIC AND ANAEROBIC BOTTLES                  CRITICAL RESULT NOT CALLED DUE TO PREVIOUS NOTIFICATION OF CRITICAL RESULT WITHIN THE LAST 24 HOURS. Culture result:       CULTURE IN PROGRESS,FURTHER UPDATES TO FOLLOW          CULTURE, BLOOD [[id number]] Collected:  11/05/19 1853    Order Status:  Completed Specimen:  Blood Updated:  11/06/19 0855     Special Requests: --        LEFT  Antecubital       GRAM STAIN GRAM NEGATIVE RODS               AEROBIC AND ANAEROBIC BOTTLES                  RESULTS VERIFIED, PHONED TO AND READ BACK BY FREDERICK LEAL RN AT 9819 11/06/2019 BY HUMPHREYTAYLOR           Culture result:       CULTURE IN PROGRESS,FURTHER UPDATES TO FOLLOW                No results found for this visit on 11/05/19.     Current Meds:  Current Facility-Administered Medications   Medication Dose Route Frequency    piperacillin-tazobactam (ZOSYN) 3.375 g in 0.9% sodium chloride (MBP/ADV) 100 mL  3.375 g IntraVENous Q8H    carvedilol (COREG) tablet 25 mg  25 mg Oral BID WITH MEALS    dabigatran etexilate (PRADAXA) capsule 150 mg  150 mg Oral Q12H    cyanocobalamin tablet 1,000 mcg  1,000 mcg Oral DAILY    simvastatin (ZOCOR) tablet 40 mg  40 mg Oral QHS    dextrose 40% (GLUTOSE) oral gel 1 Tube  15 g Oral PRN    glucagon (GLUCAGEN) injection 1 mg  1 mg IntraMUSCular PRN    dextrose (D50W) injection syrg 12.5-25 g  25-50 mL IntraVENous PRN    insulin lispro (HUMALOG) injection   SubCUTAneous AC&HS    sodium chloride (NS) flush 5-40 mL  5-40 mL IntraVENous Q8H    sodium chloride (NS) flush 5-40 mL  5-40 mL IntraVENous PRN    acetaminophen (TYLENOL) tablet 650 mg  650 mg Oral Q4H PRN    ondansetron (ZOFRAN) injection 4 mg  4 mg IntraVENous Q4H PRN    0.9% sodium chloride infusion  125 mL/hr IntraVENous CONTINUOUS    loratadine (CLARITIN) tablet 10 mg  10 mg Oral DAILY    fluticasone propionate (FLONASE) 50 mcg/actuation nasal spray 2 Spray  2 Spray Both Nostrils DAILY    lisinopril (PRINIVIL, ZESTRIL) tablet 10 mg  10 mg Oral DAILY    linaCLOtide (LINZESS) capsule 145 mcg (Patient Supplied)  145 mcg Oral ACB    tamsulosin (FLOMAX) capsule 0.8 mg  0.8 mg Oral QHS    calcium carbonate (TUMS) chewable tablet 400 mg [elemental]  400 mg Oral TID PRN       Other Studies (last 24 hours):  Ct Abd Pelv W Cont    Result Date: 11/6/2019  CT ABDOMEN AND PELVIS WITH CONTRAST 11/6/2019 COMPARISON: CT abdomen pelvis 6/20/2017. INDICATION: Positive blood cultures, urinary tract infection. Evaluate for renal abscess. TECHNIQUE:  CT imaging was performed of the abdomen and pelvis following the uncomplicated administration of intravenous contrast (Isovue 370, 100 mL). Intravenous contrast was used for better evaluation of solid organs and vascular structures. Oral contrast was used for bowel opacification. Radiation dose reduction techniques were used for this study:  Our CT scanners use one or all of the following: Automated exposure control, adjustment of the mA and/or kVp according to patient's size, iterative reconstruction. FINDINGS: ABDOMEN CT: Partial visualization cardiac enlargement. Lung bases clear. Extensive diffuse hepatic steatosis without focal aggressive liver lesion similar in appearance previous examination. The portal and hepatic veins are patent. Right renal lower pole simple appearing cyst measures 4.4 cm. No hydronephrosis.  There is mild left perinephric stranding with scattered foci patchy decreased renal cortical enhancement more pronounced series 2 image 35 involving the medial interpolar cortex. No associated abscess. No biliary ductal dilatation. The gallbladder, adrenal glands, pancreas within normal limits. Splenomegaly at 16.4 cm. Upper abdominal vasculature grossly patent. PELVIS CT: The bowel is normal in caliber, and there is no focal or diffuse bowel wall thickening. Extensive diffuse colonic diverticulosis without CT evidence of acute diverticulitis. There is no free air or free fluid in the abdomen or pelvis. There is no significant retroperitoneal, pelvic, mesenteric, or inguinal lymphadenopathy. Small bladder diverticula stable. Prostate calcifications are evident. There are no aggressive appearing osseous lesions. IMPRESSION: 1. Minimal left perinephric stranding and scattered foci decreased renal cortical enhancement suggesting pyelonephritis. No abscess. Assessment and Plan:     Hospital Problems as of 11/6/2019 Date Reviewed: 10/2/2019          Codes Class Noted - Resolved POA    * (Principal) Sepsis due to urinary tract infection ([facility] ICD-10-CM: A41.9, N39.0  ICD-9-CM: 038.9, 995.91, 599.0  11/5/2019 - Present Yes        Fever ICD-10-CM: R50.9  ICD-9-CM: 780.60  11/5/2019 - Present Yes        Diabetes mellitus without complication ([facility] ASP-36-UL: E11.9  ICD-9-CM: 250.00  10/2/2019 - Present Yes        Obesity, morbid ([facility] ICD-10-CM: E66.01  ICD-9-CM: 278.01  11/29/2017 - Present Yes        [place] on CPAP ICD-10-CM: G47.33, Z99.89  ICD-9-CM: 327.23, V46.8  10/13/2017 - Present Yes        Atrial fibrillation ([facility] ICD-10-CM: I48.91  ICD-9-CM: 427.31  10/8/2017 - Present Yes        HTN (hypertension) ICD-10-CM: I10  ICD-9-CM: 401.9  2/6/2014 - Present Yes        Nodular prostate without urinary obstruction ICD-10-CM: N40.2  ICD-9-CM: 600.10  2/6/2014 - Present Yes              Plan:     This is a 68-year-old male with    Sepsis POA secondary to gram-negative bacteremia/ complicated UTI/pyelonephritis: POA  Currently on Zosyn. Switch to ceftriaxone as blood culture is positive for E. coli  urine culture pending. Continue IV antibiotics and IV fluids pending culture results. Plan to repeat blood cultures in a.m. ID consulted for positive blood cultures. Urology consulted. Appreciate recommendations  CT abdomen and pelvis with contrast showed findings concerning for pyelonephritis. No renal abscess. Diabetes mellitus type 2  Sliding scale insulin    Obstructive sleep apnea  CPAP at bedtime    Chronic atrial fibrillation POA  Pradaxa    DC planning/Dispo: Anticipate inpatient hospital stay for at least 48 to 72 hours. Home when medically cleared.       Diet:  DIET DIABETIC CONSISTENT CARB  DVT ppx: Pradaxa    Signed:  Keturah Flower MD

## 2019-11-06 NOTE — H&P
Hospitalist H&P/Consult Note     Admit Date:  2019  6:18 PM   Name:  Charly Salomon   Age:  75 y.o.  :  1955   MRN:  [id number]   PCP:  Kendra Dunne MD  Treatment Team: Attending Provider: Cece Felix MD    HPI:   Patient is a 74 y/o male with hx nodular prostate, negative bx x 2 and normal PSA, urinary hesitancy and retention, diabetes, HTN, afib on Pradaxa, obesity, [place]-CPAP who presents from home with fever, confusion and UTI. He has had some fever the past 2-3 days. Was seen at the Urology office earlier today. He had a post-void residual of 191 ml. He takes 0.8 mg flomax every pm. He was prescribed oral antibiotics and was sent home. While driving back he states he was very confused. When he talked to his daughter by phone he told her of this. She works as a PA and was concerned that he may be developing sepsis so told him to go to ED. Here he was tachycardic. Had a fever to 102.4. WBC ct 12.9. Lactic acid normal. He recently had a retroperitoneal US which was normal (10/31). He was started on IV antibiotics and Hospitalist service consulted for admission. 10 systems reviewed and negative except as noted in HPI. 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  Cigarette nicotine dependence in remission 2017    Diabetes mellitus     type 2. AVG -130.  Last A1C  was 6 - per pt       Diabetes mellitus ([address] 1/3/2012    Essential hypertension, benign 2014    Family history of malignant neoplasm of prostate 2014    Hemochromatosis     HTN (hypertension) 2014    Hypercholesteremia     Nodular prostate without urinary obstruction 2014    Obesity 2014    Pneumothorax, right 10/8/2017    Sleep apnea 10/12/2016    uses ASV instead of CPAP machine - per pt     Subdural hemorrhage following injury ([address] 10/20/2017      Past Surgical History:   Procedure Laterality Date    COLONOSCOPY N/A 2018    COLONOSCOPY  BMI 41 performed by Xiao Hernández MD at [place] [address]      prostate u/s and BX    KNEE ARTHROSCP HARV      right      Prior to Admission Medications   Prescriptions Last Dose Informant Patient Reported? Taking? FLUTICASONE PROPIONATE (FLONASE NA)   Yes No   Si Sprays by Nasal route daily. carvedilol (COREG) 25 mg tablet   No No   Sig: Take 1 Tab by mouth two (2) times daily (with meals). cetirizine (ZYRTEC) 10 mg tablet   Yes No   Sig: Take  by mouth. clotrimazole-betamethasone (LOTRISONE) topical cream   No No   Sig: Apply to affected area bid as directed   cpap machine kit   Yes No   Sig: by Does Not Apply route. Indications: ASV   cyanocobalamin 1,000 mcg tablet   Yes No   Sig: Take 1,000 mcg by mouth daily. dabigatran etexilate (PRADAXA) 150 mg capsule   No No   Sig: Take 1 Cap by mouth every twelve (12) hours. doxycycline (MONODOX) 100 mg capsule   No No   Sig: Take 1 Cap by mouth two (2) times a day for 10 days. enalapril (VASOTEC) 10 mg tablet   No No   Sig: Take 1 Tab by mouth daily. guaiFENesin ER (MUCINEX) 600 mg ER tablet   Yes No   Sig: Take 600 mg by mouth two (2) times a day. hydroCHLOROthiazide (HYDRODIURIL) 25 mg tablet   No No   Sig: Take 1 Tab by mouth daily. krill-om-3-dha-epa-phospho-ast 988-691-30-60 mg cap   Yes No   Sig: Take  by mouth.   linaCLOtide (LINZESS) 145 mcg cap capsule   No No   Sig: Take 1 Cap by mouth Daily (before breakfast). metFORMIN (GLUCOPHAGE) 500 mg tablet   No No   Sig: Take 1 Tab by mouth two (2) times daily (with meals). simvastatin (ZOCOR) 40 mg tablet   No No   Sig: Take 1 Tab by mouth nightly. spironolactone (ALDACTONE) 25 mg tablet   No No   Sig: Take 1 Tab by mouth daily.    tamsulosin (FLOMAX) 0.4 mg capsule   No No   Sig: Take 1 Cap by mouth daily. tamsulosin (FLOMAX) 0.4 mg capsule   No No   Sig: Take 2 Caps by mouth daily. Facility-Administered Medications: None     Allergies   Allergen Reactions    Zithromax [Azithromycin] Other (comments)     Skin dryness/peeling      Social History     Tobacco Use    Smoking status: Former Smoker     Packs/day: 1.50     Years: 18.00     Pack years: 27.00     Types: Cigarettes     Last attempt to quit: 1989     Years since quittin.2    Smokeless tobacco: Former User     Quit date: 2016   Substance Use Topics    Alcohol use:  Yes     Alcohol/week: 8.0 - 9.0 standard drinks     Types: 2 - 3 Glasses of wine, 6 Cans of beer per week     Comment: has lessened his alcohol intake since learning about his abnormal liver labs      Family History   Problem Relation Age of Onset    Heart Attack Father 61            Diabetes Father     Heart Disease Father     Hypertension Father     Cancer Father         lung    Other Mother         Sarcoidosis    Stroke Brother     Psychiatric Disorder Brother     Heart Disease Paternal Grandmother       Immunization History   Administered Date(s) Administered    Influenza High Dose Vaccine PF 10/05/2016, 10/05/2018    Influenza Vaccine 2013    Influenza Vaccine (Quad) PF 10/05/2017    Influenza Vaccine (Tri) Adjuvanted 10/02/2019    Influenza Vaccine Intradermal PF 2015    Pneumococcal Conjugate (PCV-13) 2018    Pneumococcal Polysaccharide (PPSV-23) 10/02/2019    Td 2015    Tdap 2015, 2015    Zoster Recombinant 2019, 2019       Objective:     Patient Vitals for the past 24 hrs:   Temp Pulse Resp BP SpO2   19 98.9 °F (37.2 °C) (!) 102 18 139/78 94 %   19  100      19  (!) 101   94 %   19 1950 (!) 102.4 °F (39.1 °C) (!) 108 17 150/75 98 %   19 1949    155/78    19 (!) 102.4 °F (39.1 °C)       11/05/19 1757 98.4 °F (36.9 °C) (!) 110 16 (!) 145/91 94 %     Oxygen Therapy  O2 Sat (%): 94 % (11/05/19 2030)  Pulse via Oximetry: 100 beats per minute (11/05/19 2011)  O2 Device: Room air (11/05/19 1950)    Intake/Output Summary (Last 24 hours) at 11/5/2019 2209  Last data filed at 11/5/2019 2030  Gross per 24 hour   Intake    Output 150 ml   Net -150 ml       Physical Exam:  General:    Obese. Alert. Febrile, oriented  Eyes:   Normal sclera. Extraocular movements intact. ENT:  Normocephalic, atraumatic. Moist mucous membranes  CV:   Irreg, irreg, tachycardic  Lungs:  CTAB. No wheezing, rhonchi, or rales. Abdomen: Soft, nontender, nondistended. Bowel sounds normal.   No flank or suprapubic tenderness  Extremities: Warm and dry. No cyanosis or edema. Neurologic: CN II-XII grossly intact. Sensation intact. Skin:     No rashes or jaundice. No wounds. Psych:  Normal mood and affect. I reviewed the labs, imaging, EKGs, telemetry, and other studies done this admission.   Data Review:   Recent Results (from the past 24 hour(s))   AMB POC URINALYSIS DIP STICK AUTO W/ MICRO (PGU)    Collection Time: 11/05/19  2:20 PM   Result Value Ref Range    Glucose (UA POC) Negative Negative mg/dL    Bilirubin (UA POC) Negative Negative    Ketones (UA POC) Negative Negative    Specific gravity (UA POC) 1.015 1.001 - 1.035    Blood (UA POC) Moderate Negative    pH (UA POC) 6.0 4.6 - 8.0    Protein (UA POC) 30  Negative    Urobilinogen (POC) 0.2 mg/dL     Nitrites (UA POC) Positive Negative    Leukocyte esterase (UA POC) Large Negative   CBC WITH AUTOMATED DIFF    Collection Time: 11/05/19  6:53 PM   Result Value Ref Range    WBC 12.9 (H) 4.3 - 11.1 K/uL    RBC 4.38 4.23 - 5.6 M/uL    HGB 14.3 13.6 - 17.2 g/dL    HCT 42.3 41.1 - 50.3 %    MCV 96.6 79.6 - 97.8 FL    MCH 32.6 26.1 - 32.9 PG    MCHC 33.8 31.4 - 35.0 g/dL    RDW 13.2 11.9 - 14.6 %    PLATELET 813 920 - 250 K/uL    MPV 9.9 9.4 - 12.3 FL    ABSOLUTE NRBC 0.00 0.0 - 0.2 K/uL    DF AUTOMATED      NEUTROPHILS 91 (H) 43 - 78 %    LYMPHOCYTES 4 (L) 13 - 44 %    MONOCYTES 4 4.0 - 12.0 %    EOSINOPHILS 0 (L) 0.5 - 7.8 %    BASOPHILS 0 0.0 - 2.0 %    IMMATURE GRANULOCYTES 1 0.0 - 5.0 %    ABS. NEUTROPHILS 11.8 (H) 1.7 - 8.2 K/UL    ABS. LYMPHOCYTES 0.5 0.5 - 4.6 K/UL    ABS. MONOCYTES 0.5 0.1 - 1.3 K/UL    ABS. EOSINOPHILS 0.0 0.0 - 0.8 K/UL    ABS. BASOPHILS 0.0 0.0 - 0.2 K/UL    ABS. IMM. GRANS. 0.1 0.0 - 0.5 K/UL   METABOLIC PANEL, COMPREHENSIVE    Collection Time: 11/05/19  6:53 PM   Result Value Ref Range    Sodium 130 (L) 136 - 145 mmol/L    Potassium 4.2 3.5 - 5.1 mmol/L    Chloride 96 (L) 98 - 107 mmol/L    CO2 25 21 - 32 mmol/L    Anion gap 9 7 - 16 mmol/L    Glucose 177 (H) 65 - 100 mg/dL    BUN 13 8 - 23 MG/DL    Creatinine 1.04 0.8 - 1.5 MG/DL    GFR est AA >60 >60 ml/min/1.73m2    GFR est non-AA >60 >60 ml/min/1.73m2    Calcium 9.6 8.3 - 10.4 MG/DL    Bilirubin, total 1.8 (H) 0.2 - 1.1 MG/DL    ALT (SGPT) 34 12 - 65 U/L    AST (SGOT) 15 15 - 37 U/L    Alk.  phosphatase 101 50 - 136 U/L    Protein, total 8.0 6.3 - 8.2 g/dL    Albumin 3.6 3.2 - 4.6 g/dL    Globulin 4.4 (H) 2.3 - 3.5 g/dL    A-G Ratio 0.8 (L) 1.2 - 3.5     POC LACTIC ACID    Collection Time: 11/05/19  7:01 PM   Result Value Ref Range    Lactic Acid (POC) 1.81 0.5 - 1.9 mmol/L   URINE MICROSCOPIC    Collection Time: 11/05/19  7:23 PM   Result Value Ref Range    WBC >100 (H) 0 /hpf    RBC 5-10 0 /hpf    Epithelial cells 0-3 0 /hpf    Bacteria 4+ (H) 0 /hpf    Casts 0-3 0 /lpf   GLUCOSE, POC    Collection Time: 11/05/19  9:36 PM   Result Value Ref Range    Glucose (POC) 186 (H) 65 - 100 mg/dL       Imaging Studies:  CXR Results  (Last 48 hours)    None        CT Results  (Last 48 hours)    None          Assessment and Plan:     Hospital Problems as of 11/5/2019 Date Reviewed: 10/2/2019          Codes Class Noted - Resolved POA    * (Principal) Sepsis due to urinary tract infection ([facility] ICD-10-CM: A41.9, N39.0  ICD-9-CM: 038.9, 995.91, 599.0  11/5/2019 - Present Yes        Fever ICD-10-CM: R50.9  ICD-9-CM: 780.60  11/5/2019 - Present Yes        Diabetes mellitus without complication ([place] [address] [id number]-85-MW: E11.9  ICD-9-CM: 250.00  10/2/2019 - Present Yes        Obesity, morbid ([facility] ICD-10-CM: E66.01  ICD-9-CM: 278.01  11/29/2017 - Present Yes        [place] on CPAP ICD-10-CM: G47.33, Z99.89  ICD-9-CM: 327.23, V46.8  10/13/2017 - Present Yes        Atrial fibrillation ([facility] ICD-10-CM: I48.91  ICD-9-CM: 427.31  10/8/2017 - Present Yes        HTN (hypertension) ICD-10-CM: I10  ICD-9-CM: 401.9  2/6/2014 - Present Yes        Nodular prostate without urinary obstruction ICD-10-CM: N40.2  ICD-9-CM: 600.10  2/6/2014 - Present Yes              PLAN:  · Admit inpatient to medical bed  · Follow-up urine and blood cultures  · IV zosyn for now.  Adjust abx based on cultures  · flomax 0.8 mg Q pm  · Monitor post-void residuals  · Consult Urology in am  · Add sliding scale coverage for diabetes  · CPAp @ HS for CPAP  · Anticoagulated with Pradaxa for afib  · Discussed with patient's wife at bedside      Estimated LOS:  2 midnights    Signed:  Giovanna Brunson MD

## 2019-11-06 NOTE — PROGRESS NOTES
Interdisciplinary team rounds were held 11/6/2019 with the following team members:Care Management, Nursing, Nutrition, Pharmacy, Physical Therapy and Physician. Plan of care discussed. See clinical pathway and/or care plan for interventions and desired outcomes.

## 2019-11-06 NOTE — PROGRESS NOTES
Shift assessment completed. Pt alert and oriented x4. Lungs CTA with even and unlabored respirations, on 3L NC at night/RA while awake. Heart sounds irregular. Active bowel sounds with soft and obese abdomen. Skin warm and dry. IV c/d and infusing. Pt reports no pain or nausea at this time. Notified patient to bring in home medication of Linzess. No other needs at this time. Resting quietly in bed locked in lowest position with call light in reach and wife at bedside.

## 2019-11-06 NOTE — PROGRESS NOTES
Pt urinated x2 within 30 minutes for total of 120 cc yellow, clear urine. Post void bladder scan showed 0 mL in bladder.  Will continue post-void bladder scans

## 2019-11-06 NOTE — PROGRESS NOTES
Pt does not have home cpap with him so he was placed on a 3L NC. Wife will bring home cpap tomorrow.

## 2019-11-06 NOTE — PROGRESS NOTES
TRANSFER - IN REPORT:    Verbal report received from Mikey(name) on Evelina Cervantes  being received from ED(unit) for routine progression of care      Report consisted of patients Situation, Background, Assessment and   Recommendations(SBAR). Information from the following report(s) SBAR, ED Summary, [facility] and Recent Results was reviewed with the receiving nurse. Opportunity for questions and clarification was provided. Assessment completed upon patients arrival to unit and care assumed.

## 2019-11-06 NOTE — ED NOTES
TRANSFER - OUT REPORT:    Verbal report given to  Diaz Benites (name) on Donalynn Nyhan  being transferred to [facility] (unit) for routine progression of care       Report consisted of patients Situation, Background, Assessment and   Recommendations(SBAR). Information from the following report(s) SBAR, ED Summary, [facility] and Recent Results was reviewed with the receiving nurse. Lines:   Peripheral IV 11/05/19 Left Antecubital (Active)       Peripheral IV 11/05/19 Right Antecubital (Active)   Site Assessment Clean, dry, & intact 11/5/2019  7:03 PM   Phlebitis Assessment 0 11/5/2019  7:03 PM   Infiltration Assessment 0 11/5/2019  7:03 PM   Dressing Status Clean, dry, & intact 11/5/2019  7:03 PM        Opportunity for questions and clarification was provided.       Patient transported with:   this rn

## 2019-11-06 NOTE — PROGRESS NOTES
Pt blood cultures positive for gram negative rods in anaerobic bottle. MD notified. Pt is already on Zosyn.

## 2019-11-07 LAB
ANION GAP SERPL CALC-SCNC: 7 MMOL/L (ref 7–16)
BASOPHILS # BLD: 0 K/UL (ref 0–0.2)
BASOPHILS NFR BLD: 0 % (ref 0–2)
BUN SERPL-MCNC: 13 MG/DL (ref 8–23)
CALCIUM SERPL-MCNC: 8.4 MG/DL (ref 8.3–10.4)
CHLORIDE SERPL-SCNC: 105 MMOL/L (ref 98–107)
CO2 SERPL-SCNC: 23 MMOL/L (ref 21–32)
CREAT SERPL-MCNC: 0.83 MG/DL (ref 0.8–1.5)
DIFFERENTIAL METHOD BLD: ABNORMAL
EOSINOPHIL # BLD: 0 K/UL (ref 0–0.8)
EOSINOPHIL NFR BLD: 0 % (ref 0.5–7.8)
ERYTHROCYTE [DISTWIDTH] IN BLOOD BY AUTOMATED COUNT: 13.4 % (ref 11.9–14.6)
GLUCOSE BLD STRIP.AUTO-MCNC: 159 MG/DL (ref 65–100)
GLUCOSE BLD STRIP.AUTO-MCNC: 206 MG/DL (ref 65–100)
GLUCOSE BLD STRIP.AUTO-MCNC: 249 MG/DL (ref 65–100)
GLUCOSE BLD STRIP.AUTO-MCNC: 301 MG/DL (ref 65–100)
GLUCOSE SERPL-MCNC: 172 MG/DL (ref 65–100)
HCT VFR BLD AUTO: 34.8 % (ref 41.1–50.3)
HGB BLD-MCNC: 11.5 G/DL (ref 13.6–17.2)
IMM GRANULOCYTES # BLD AUTO: 0.1 K/UL (ref 0–0.5)
IMM GRANULOCYTES NFR BLD AUTO: 1 % (ref 0–5)
LYMPHOCYTES # BLD: 0.8 K/UL (ref 0.5–4.6)
LYMPHOCYTES NFR BLD: 7 % (ref 13–44)
MCH RBC QN AUTO: 31.9 PG (ref 26.1–32.9)
MCHC RBC AUTO-ENTMCNC: 33 G/DL (ref 31.4–35)
MCV RBC AUTO: 96.7 FL (ref 79.6–97.8)
MONOCYTES # BLD: 1 K/UL (ref 0.1–1.3)
MONOCYTES NFR BLD: 9 % (ref 4–12)
NEUTS SEG # BLD: 9.5 K/UL (ref 1.7–8.2)
NEUTS SEG NFR BLD: 84 % (ref 43–78)
NRBC # BLD: 0 K/UL (ref 0–0.2)
PLATELET # BLD AUTO: 131 K/UL (ref 150–450)
PMV BLD AUTO: 9.9 FL (ref 9.4–12.3)
POTASSIUM SERPL-SCNC: 3.2 MMOL/L (ref 3.5–5.1)
RBC # BLD AUTO: 3.6 M/UL (ref 4.23–5.6)
SODIUM SERPL-SCNC: 135 MMOL/L (ref 136–145)
WBC # BLD AUTO: 11.3 K/UL (ref 4.3–11.1)

## 2019-11-07 PROCEDURE — 82962 GLUCOSE BLOOD TEST: CPT

## 2019-11-07 PROCEDURE — 74011250637 HC RX REV CODE- 250/637: Performed by: HOSPITALIST

## 2019-11-07 PROCEDURE — 87040 BLOOD CULTURE FOR BACTERIA: CPT

## 2019-11-07 PROCEDURE — 74011636637 HC RX REV CODE- 636/637: Performed by: HOSPITALIST

## 2019-11-07 PROCEDURE — 74011250636 HC RX REV CODE- 250/636: Performed by: HOSPITALIST

## 2019-11-07 PROCEDURE — 36415 COLL VENOUS BLD VENIPUNCTURE: CPT

## 2019-11-07 PROCEDURE — 74011000258 HC RX REV CODE- 258: Performed by: HOSPITALIST

## 2019-11-07 PROCEDURE — 51798 US URINE CAPACITY MEASURE: CPT

## 2019-11-07 PROCEDURE — 74011000258 HC RX REV CODE- 258: Performed by: INTERNAL MEDICINE

## 2019-11-07 PROCEDURE — 74011250636 HC RX REV CODE- 250/636: Performed by: INTERNAL MEDICINE

## 2019-11-07 PROCEDURE — 65270000029 HC RM PRIVATE

## 2019-11-07 PROCEDURE — 80048 BASIC METABOLIC PNL TOTAL CA: CPT

## 2019-11-07 PROCEDURE — 85025 COMPLETE CBC W/AUTO DIFF WBC: CPT

## 2019-11-07 RX ORDER — POTASSIUM CHLORIDE 20 MEQ/1
40 TABLET, EXTENDED RELEASE ORAL EVERY 4 HOURS
Status: COMPLETED | OUTPATIENT
Start: 2019-11-07 | End: 2019-11-07

## 2019-11-07 RX ADMIN — ACETAMINOPHEN 650 MG: 325 TABLET, FILM COATED ORAL at 14:47

## 2019-11-07 RX ADMIN — CYANOCOBALAMIN TAB 1000 MCG 1000 MCG: 1000 TAB at 08:07

## 2019-11-07 RX ADMIN — SIMVASTATIN 40 MG: 40 TABLET, FILM COATED ORAL at 22:28

## 2019-11-07 RX ADMIN — INSULIN LISPRO 6 UNITS: 100 INJECTION, SOLUTION INTRAVENOUS; SUBCUTANEOUS at 12:27

## 2019-11-07 RX ADMIN — LISINOPRIL 10 MG: 5 TABLET ORAL at 08:07

## 2019-11-07 RX ADMIN — POTASSIUM CHLORIDE 40 MEQ: 20 TABLET, EXTENDED RELEASE ORAL at 08:24

## 2019-11-07 RX ADMIN — INSULIN LISPRO 4 UNITS: 100 INJECTION, SOLUTION INTRAVENOUS; SUBCUTANEOUS at 16:38

## 2019-11-07 RX ADMIN — DABIGATRAN ETEXILATE MESYLATE 150 MG: 150 CAPSULE ORAL at 08:07

## 2019-11-07 RX ADMIN — FLUTICASONE PROPIONATE 2 SPRAY: 50 SPRAY, METERED NASAL at 08:10

## 2019-11-07 RX ADMIN — CEFEPIME HYDROCHLORIDE 2 G: 2 INJECTION, POWDER, FOR SOLUTION INTRAVENOUS at 18:23

## 2019-11-07 RX ADMIN — LORATADINE 10 MG: 10 TABLET ORAL at 08:07

## 2019-11-07 RX ADMIN — SODIUM CHLORIDE 75 ML/HR: 900 INJECTION, SOLUTION INTRAVENOUS at 12:51

## 2019-11-07 RX ADMIN — POTASSIUM CHLORIDE 40 MEQ: 20 TABLET, EXTENDED RELEASE ORAL at 11:01

## 2019-11-07 RX ADMIN — CARVEDILOL 25 MG: 25 TABLET, FILM COATED ORAL at 08:07

## 2019-11-07 RX ADMIN — Medication 10 ML: at 22:30

## 2019-11-07 RX ADMIN — INSULIN LISPRO 8 UNITS: 100 INJECTION, SOLUTION INTRAVENOUS; SUBCUTANEOUS at 08:23

## 2019-11-07 RX ADMIN — SODIUM CHLORIDE 125 ML/HR: 900 INJECTION, SOLUTION INTRAVENOUS at 05:13

## 2019-11-07 RX ADMIN — DABIGATRAN ETEXILATE MESYLATE 150 MG: 150 CAPSULE ORAL at 22:28

## 2019-11-07 RX ADMIN — CARVEDILOL 25 MG: 25 TABLET, FILM COATED ORAL at 17:35

## 2019-11-07 RX ADMIN — TAMSULOSIN HYDROCHLORIDE 0.8 MG: 0.4 CAPSULE ORAL at 22:28

## 2019-11-07 RX ADMIN — INSULIN LISPRO 2 UNITS: 100 INJECTION, SOLUTION INTRAVENOUS; SUBCUTANEOUS at 22:28

## 2019-11-07 RX ADMIN — CEFTRIAXONE SODIUM 2 G: 2 INJECTION, POWDER, FOR SOLUTION INTRAMUSCULAR; INTRAVENOUS at 14:47

## 2019-11-07 NOTE — PROGRESS NOTES
Shift Assessment - patient is alert and oriented x4. Up ad devon. Resp even and unlabored. Skin warm, dry, intact. Patient urinating without issues. Family at bedside. Currently resting in a low, locked bed. Call light in reach.

## 2019-11-07 NOTE — PROGRESS NOTES
At time of d/c recommend 10 days of PO abx. He will need to f/u with Dr. Sekou Cool in 2 weeks at [facility] office, this is being scheduled and we will notify him of appt time/date.

## 2019-11-07 NOTE — PROGRESS NOTES
Saw pt in interdisciplinarily rounds with the following disciplines: Physician, Case Management, Nursing, Dietary,Therapy and Pharmacy. The plan of care was discussed along with discharge date/ location. Pt d/c is anticipated for Saturday, once sensibilities come back on blood cultures to determine proper course of abx.

## 2019-11-07 NOTE — PROGRESS NOTES
Infectious Disease Consult    Today's Date: 2019   Admit Date: 2019    Impression:   · E coli UTI with pyelonephritis  · E coli bacteremia with sepsis  · BPH with urinary retention  · DMII  · AF on pradaxa    Plan:   · Not surprising to still have fever with pyelonephritis but I will expand to cefepime until antibiotic susceptibility resulted. · Follow up E coli susceptibility report  · More importantly, he needs a plan to improve urine flow and prevent the next event. Urology is involved and will followup outpatient after infection is treated. Anti-infectives:   · CTX (-  · Vanc x 1 dose    Subjective:   Sitting up and eating dinner. Passing urine well. Fever has gone back up. Allergies   Allergen Reactions    Zithromax [Azithromycin] Other (comments)     Skin dryness/peeling        Review of Systems:  A comprehensive review of systems was negative except for that written in the History of Present Illness. Objective:     Visit Vitals  /72 (BP 1 Location: Right arm, BP Patient Position: At rest;Sitting)   Pulse (!) 105   Temp (!) 101.3 °F (38.5 °C)   Resp 18   Ht 6' 3\" (1.905 m)   Wt 152 kg (335 lb)   SpO2 92%   BMI 41.87 kg/m²     Temp (24hrs), Av.7 °F (37.6 °C), Min:98.3 °F (36.8 °C), Max:101.3 °F (38.5 °C)       Lines:  Peripheral IV:       Physical Exam:    General:  Alert, cooperative, well nourished, well developed, appears stated age; morbidly obese   Eyes:  Sclera anicteric. Pupils equally round and reactive to light. Mouth/Throat: Mucous membranes normal, oral pharynx clear   Neck: Supple   Lungs:   Clear to auscultation bilaterally, good effort   CV:  Regular rate and rhythm,no murmur, click, rub or gallop   Abdomen:   Soft, non-tender.  bowel sounds normal. non-distended   Extremities: No cyanosis or edema   Skin: Skin color, texture, turgor normal. no acute rash or lesions   Lymph nodes: Cervical and supraclavicular normal   Musculoskeletal: No swelling or deformity   Lines/Devices:  Intact, no erythema, drainage or tenderness   Psych: Alert and oriented, normal mood affect given the setting       Data Review:     CBC:  Recent Labs     11/07/19 0431 11/06/19 0435 11/05/19 1853   WBC 11.3* 11.3* 12.9*   GRANS 84*  --  91*   MONOS 9  --  4   EOS 0*  --  0*   ANEU 9.5*  --  11.8*   ABL 0.8  --  0.5   HGB 11.5* 13.2* 14.3   HCT 34.8* 39.3* 42.3   * 138* 170       BMP:  Recent Labs     11/07/19 0431 11/06/19 0435 11/05/19 1853   CREA 0.83 1.01 1.04   BUN 13 12 13   * 132* 130*   K 3.2* 3.6 4.2    99 96*   CO2 23 25 25   AGAP 7 8 9   * 186* 177*       LFTS:  Recent Labs     11/05/19 1853   TBILI 1.8*   ALT 34   SGOT 15      TP 8.0   ALB 3.6       Microbiology:     All Micro Results     Procedure Component Value Units Date/Time    CULTURE, URINE [[id number]]  (Abnormal) Collected:  11/05/19 1923    Order Status:  Completed Specimen:  Urine from Clean catch Updated:  11/07/19 0800     Special Requests: NO SPECIAL REQUESTS        Culture result:       >100,000 COLONIES/mL GRAM NEGATIVE RODS SUBCULTURE IN PROGRESS          CULTURE, BLOOD [[id number]]  (Abnormal) Collected:  11/05/19 1853    Order Status:  Completed Specimen:  Blood Updated:  11/07/19 0705     Special Requests: --        RIGHT  Antecubital       GRAM STAIN GRAM NEGATIVE RODS               AEROBIC AND ANAEROBIC BOTTLES                  CRITICAL RESULT NOT CALLED DUE TO PREVIOUS NOTIFICATION OF CRITICAL RESULT WITHIN THE LAST 24 HOURS.            Culture result: GRAM NEGATIVE RODS         REFER TO ACC [id number] FOR ID AND SUSCEPTIBILITY    CULTURE, BLOOD [[id number]]  (Abnormal) Collected:  11/05/19 1853    Order Status:  Completed Specimen:  Blood Updated:  11/07/19 0704     Special Requests: --        LEFT  Antecubital       GRAM STAIN GRAM NEGATIVE RODS               AEROBIC AND ANAEROBIC BOTTLES                  RESULTS VERIFIED, PHONED TO AND READ BACK BY FREDERICK LEAL RN AT 8530 11/06/2019 BY INMAN           Culture result: GRAM NEGATIVE RODS         REFER TO BIOFIRE PANEL ACC [id number]            IDENTIFICATION AND SUSCEPTIBILITY TO FOLLOW          CULTURE, BLOOD [[id number]] Collected:  11/07/19 0431    Order Status:  Completed Specimen:  Blood Updated:  11/07/19 0454    CULTURE, BLOOD [[id number]] Collected:  11/07/19 0438    Order Status:  Completed Specimen:  Blood Updated:  11/07/19 0454    BLOOD CULTURE ID PANEL [[id number]]  (Abnormal) Collected:  11/05/19 1853    Order Status:  Completed Specimen:  Blood Updated:  11/06/19 1425     Acc. no. from Micro Order [id number]     Escherichia coli DETECTED        Comment: RESULTS VERIFIED, PHONED TO AND READ BACK BY  LEXIE MELARA RN @5799 11/6/19 HF          KPC (Carbapenem Resistance Gene) NOT DETECTED        Comment: WARNING:  A Not Detected result for the KPC gene does not indicate susceptibility to carbapenems. Gram negative bacteria can be resistant to carbapenems by mechanisms other than carrying the KPC gene. INTERPRETATION       Gram negative litzy. Identified by realtime PCR as E. coli                Imaging:   CT abdomen/pelvis (11/6/19)  IMPRESSION:  1. Minimal left perinephric stranding and scattered foci decreased renal cortical enhancement suggesting pyelonephritis. No abscess.     Signed By: Carlitos Mortensen MD     November 7, 2019

## 2019-11-07 NOTE — PROGRESS NOTES
Hospitalist Note     Admit Date:  2019  6:18 PM   Name:  Lizette Oglesby   Age:  75 y.o.  :  1955   MRN:  [id number]   PCP:  Castillo Barton MD  Treatment Team: Attending Provider: Yeni Cuenca MD; Consulting Provider: Loan Wells MD; Consulting Provider: Merline Comes, DO; Consulting Provider: Devante Crews MD; Care Manager: Gavino Stoddard RN    HPI/Subjective:   Patient is a 76 y/o male with hx nodular prostate, negative bx x 2 and normal PSA, urinary hesitancy and retention, diabetes, HTN, afib on Pradaxa, obesity, [place]-CPAP who presents from home with fever, confusion and UTI. He has had some fever the past 2-3 days. Was seen at the Urology office earlier today. He had a post-void residual of 191 ml. He takes 0.8 mg flomax every pm. He was prescribed oral antibiotics and was sent home. While driving back he states he was very confused. When he talked to his daughter by phone he told her of this. She works as a PA and was concerned that he may be developing sepsis so told him to go to ED. Here he was tachycardic. Had a fever to 102.4. WBC ct 12.9. Lactic acid normal. He recently had a retroperitoneal US which was normal (10/31). He was started on IV antibiotics and Hospitalist service consulted for admission.      10 systems reviewed and negative except as noted in HPI.  Pt states he is feeling better. No nausea no vomiting. No abdominal pain. No back ache.     Objective:     Patient Vitals for the past 24 hrs:   Temp Pulse Resp BP SpO2   19 1227 (!) 101.2 °F (38.4 °C) (!) 101 18 130/70 94 %   19 0805 98.3 °F (36.8 °C) 91 18 124/78 95 %   19 0305 98.3 °F (36.8 °C) 94 18 130/72 93 %   19 0002 99.4 °F (37.4 °C) 96 18 123/72 93 %   19 1922     93 %   19 1920 99.5 °F (37.5 °C) 95 18 104/66 93 %   19 1545 98.9 °F (37.2 °C) (!) 107  127/68 94 %     Oxygen Therapy  O2 Sat (%): 94 % (19 1227)  Pulse via Oximetry: 88 beats per minute (11/06/19 0834)  O2 Device: Room air(pt wants to wear o2 tonight in lieu of cpap) (11/06/19 1922)  O2 Flow Rate (L/min): 0 l/min (11/06/19 0834)      Intake/Output Summary (Last 24 hours) at 11/7/2019 1239  Last data filed at 11/7/2019 1022  Gross per 24 hour   Intake    Output 1760 ml   Net -1760 ml       *Note that automatically entered I/Os may not be accurate; dependent on patient compliance with collection and accurate  by techs. General:    Well nourished. Alert. CV:   RRR. No murmur, rub, or gallop. Lungs:   CTAB. No wheezing, rhonchi, or rales. Abdomen:   Soft, nontender, nondistended. No flank tenderness. Bowel sounds active. No organomegaly. Extremities: Warm and dry. No cyanosis or edema. Skin:     No rashes or jaundice. Neuro:  No gross focal deficits    Data Review:  I have reviewed all labs, meds, and studies from the last 24 hours:    Recent Results (from the past 24 hour(s))   AMB POC PVR, ARTI,POST-VOID RES,US,NON-IMAGING    Collection Time: 11/06/19  3:58 PM   Result Value Ref Range     cc   GLUCOSE, POC    Collection Time: 11/06/19  4:28 PM   Result Value Ref Range    Glucose (POC) 179 (H) 65 - 100 mg/dL   GLUCOSE, POC    Collection Time: 11/06/19  9:46 PM   Result Value Ref Range    Glucose (POC) 194 (H) 65 - 100 mg/dL   CBC WITH AUTOMATED DIFF    Collection Time: 11/07/19  4:31 AM   Result Value Ref Range    WBC 11.3 (H) 4.3 - 11.1 K/uL    RBC 3.60 (L) 4.23 - 5.6 M/uL    HGB 11.5 (L) 13.6 - 17.2 g/dL    HCT 34.8 (L) 41.1 - 50.3 %    MCV 96.7 79.6 - 97.8 FL    MCH 31.9 26.1 - 32.9 PG    MCHC 33.0 31.4 - 35.0 g/dL    RDW 13.4 11.9 - 14.6 %    PLATELET 192 (L) 083 - 450 K/uL    MPV 9.9 9.4 - 12.3 FL    ABSOLUTE NRBC 0.00 0.0 - 0.2 K/uL    DF AUTOMATED      NEUTROPHILS 84 (H) 43 - 78 %    LYMPHOCYTES 7 (L) 13 - 44 %    MONOCYTES 9 4.0 - 12.0 %    EOSINOPHILS 0 (L) 0.5 - 7.8 %    BASOPHILS 0 0.0 - 2.0 %    IMMATURE GRANULOCYTES 1 0.0 - 5.0 %    ABS. NEUTROPHILS 9.5 (H) 1.7 - 8.2 K/UL    ABS. LYMPHOCYTES 0.8 0.5 - 4.6 K/UL    ABS. MONOCYTES 1.0 0.1 - 1.3 K/UL    ABS. EOSINOPHILS 0.0 0.0 - 0.8 K/UL    ABS. BASOPHILS 0.0 0.0 - 0.2 K/UL    ABS. IMM. GRANS. 0.1 0.0 - 0.5 K/UL   METABOLIC PANEL, BASIC    Collection Time: 11/07/19  4:31 AM   Result Value Ref Range    Sodium 135 (L) 136 - 145 mmol/L    Potassium 3.2 (L) 3.5 - 5.1 mmol/L    Chloride 105 98 - 107 mmol/L    CO2 23 21 - 32 mmol/L    Anion gap 7 7 - 16 mmol/L    Glucose 172 (H) 65 - 100 mg/dL    BUN 13 8 - 23 MG/DL    Creatinine 0.83 0.8 - 1.5 MG/DL    GFR est AA >60 >60 ml/min/1.73m2    GFR est non-AA >60 >60 ml/min/1.73m2    Calcium 8.4 8.3 - 10.4 MG/DL   GLUCOSE, POC    Collection Time: 11/07/19  8:08 AM   Result Value Ref Range    Glucose (POC) 301 (H) 65 - 100 mg/dL   GLUCOSE, POC    Collection Time: 11/07/19 11:43 AM   Result Value Ref Range    Glucose (POC) 249 (H) 65 - 100 mg/dL        All Micro Results     Procedure Component Value Units Date/Time    CULTURE, URINE [[id number]]  (Abnormal) Collected:  11/05/19 1923    Order Status:  Completed Specimen:  Urine from Clean catch Updated:  11/07/19 0800     Special Requests: NO SPECIAL REQUESTS        Culture result:       >100,000 COLONIES/mL GRAM NEGATIVE RODS SUBCULTURE IN PROGRESS          CULTURE, BLOOD [[id number]]  (Abnormal) Collected:  11/05/19 1853    Order Status:  Completed Specimen:  Blood Updated:  11/07/19 0705     Special Requests: --        RIGHT  Antecubital       GRAM STAIN GRAM NEGATIVE RODS               AEROBIC AND ANAEROBIC BOTTLES                  CRITICAL RESULT NOT CALLED DUE TO PREVIOUS NOTIFICATION OF CRITICAL RESULT WITHIN THE LAST 24 HOURS.            Culture result: GRAM NEGATIVE RODS         REFER TO ACC [id number] FOR ID AND SUSCEPTIBILITY    CULTURE, BLOOD [[id number]]  (Abnormal) Collected:  11/05/19 1853    Order Status:  Completed Specimen:  Blood Updated:  11/07/19 0704     Special Requests: --        LEFT  Antecubital GRAM STAIN GRAM NEGATIVE RODS               AEROBIC AND ANAEROBIC BOTTLES                  RESULTS VERIFIED, PHONED TO AND READ BACK BY FREDERICK LEAL RN AT 1764 11/06/2019 BY [email or web address]           Culture result: GRAM NEGATIVE RODS         REFER TO Fady Reyes Dr PANEL [facility] [id number]            IDENTIFICATION AND SUSCEPTIBILITY TO FOLLOW          CULTURE, BLOOD [[id number]] Collected:  11/07/19 0431    Order Status:  Completed Specimen:  Blood Updated:  11/07/19 0454    CULTURE, BLOOD [[id number]] Collected:  11/07/19 0438    Order Status:  Completed Specimen:  Blood Updated:  11/07/19 0454    BLOOD CULTURE ID PANEL [[id number]]  (Abnormal) Collected:  11/05/19 1853    Order Status:  Completed Specimen:  Blood Updated:  11/06/19 1425     Acc. no. from Micro Order [id number]     Escherichia coli DETECTED        Comment: RESULTS VERIFIED, PHONED TO AND READ BACK BY  LEXIE MELARA RN @9910 11/6/19           KPC (Carbapenem Resistance Gene) NOT DETECTED        Comment: WARNING:  A Not Detected result for the KPC gene does not indicate susceptibility to carbapenems. Gram negative bacteria can be resistant to carbapenems by mechanisms other than carrying the KPC gene. INTERPRETATION       Gram negative litzy. Identified by realtime PCR as E. coli                No results found for this visit on 11/05/19.     Current Meds:  Current Facility-Administered Medications   Medication Dose Route Frequency    cefTRIAXone (ROCEPHIN) 2 g in 0.9% sodium chloride (MBP/ADV) 50 mL  2 g IntraVENous Q24H    carvedilol (COREG) tablet 25 mg  25 mg Oral BID WITH MEALS    dabigatran etexilate (PRADAXA) capsule 150 mg  150 mg Oral Q12H    cyanocobalamin tablet 1,000 mcg  1,000 mcg Oral DAILY    simvastatin (ZOCOR) tablet 40 mg  40 mg Oral QHS    dextrose 40% (GLUTOSE) oral gel 1 Tube  15 g Oral PRN    glucagon (GLUCAGEN) injection 1 mg  1 mg IntraMUSCular PRN    dextrose (D50W) injection syrg 12.5-25 g  25-50 mL IntraVENous PRN    insulin lispro (HUMALOG) injection   SubCUTAneous AC&HS    sodium chloride (NS) flush 5-40 mL  5-40 mL IntraVENous Q8H    sodium chloride (NS) flush 5-40 mL  5-40 mL IntraVENous PRN    acetaminophen (TYLENOL) tablet 650 mg  650 mg Oral Q4H PRN    ondansetron (ZOFRAN) injection 4 mg  4 mg IntraVENous Q4H PRN    0.9% sodium chloride infusion  75 mL/hr IntraVENous CONTINUOUS    loratadine (CLARITIN) tablet 10 mg  10 mg Oral DAILY    fluticasone propionate (FLONASE) 50 mcg/actuation nasal spray 2 Spray  2 Spray Both Nostrils DAILY    lisinopril (PRINIVIL, ZESTRIL) tablet 10 mg  10 mg Oral DAILY    linaCLOtide (LINZESS) capsule 145 mcg (Patient Supplied)  145 mcg Oral ACB    tamsulosin (FLOMAX) capsule 0.8 mg  0.8 mg Oral QHS    calcium carbonate (TUMS) chewable tablet 400 mg [elemental]  400 mg Oral TID PRN       Other Studies (last 24 hours):  No results found. Assessment and Plan:     Hospital Problems as of 11/7/2019 Date Reviewed: 10/2/2019          Codes Class Noted - Resolved POA    * (Principal) Sepsis due to urinary tract infection ([facility] ICD-10-CM: A41.9, N39.0  ICD-9-CM: 038.9, 995.91, 599.0  11/5/2019 - Present Yes        Fever ICD-10-CM: R50.9  ICD-9-CM: 780.60  11/5/2019 - Present Yes        Diabetes mellitus without complication ([facility] YHN-67-DK: E11.9  ICD-9-CM: 250.00  10/2/2019 - Present Yes        Obesity, morbid ([facility] ICD-10-CM: E66.01  ICD-9-CM: 278.01  11/29/2017 - Present Yes        [place] on CPAP ICD-10-CM: G47.33, Z99.89  ICD-9-CM: 327.23, V46.8  10/13/2017 - Present Yes        Atrial fibrillation ([facility] ICD-10-CM: I48.91  ICD-9-CM: 427.31  10/8/2017 - Present Yes        HTN (hypertension) ICD-10-CM: I10  ICD-9-CM: 401.9  2/6/2014 - Present Yes        Nodular prostate without urinary obstruction ICD-10-CM: N40.2  ICD-9-CM: 600.10  2/6/2014 - Present Yes              Plan:     This is a 76-year-old male with    Sepsis POA secondary to E coli bacteremia/ complicated UTI/pyelonephritis: POA  Currently on ceftriaxone day 2 as blood culture is positive for E. coli  urine culture positive for gram negative rods. .  Continue IV antibiotics and IV fluids pending culture results. Plan to repeat blood cultures in a.m. ID consulted. Recommendations for Ceftriaxone for 1-2 weeks. Urology consulted. Appreciate recommendations. Recs for out pt TURP. CT abdomen and pelvis with contrast showed findings concerning for pyelonephritis. No renal abscess. Diabetes mellitus type 2  Sliding scale insulin    Obstructive sleep apnea  CPAP at bedtime    Chronic atrial fibrillation POA  Pradaxa    DC planning/Dispo: Anticipate inpatient hospital stay for at least 48 to 72 hours. Home when medically cleared.       Diet:  DIET DIABETIC CONSISTENT CARB  DVT ppx: Pradaxa    Signed:  Elizabeth Lee MD

## 2019-11-07 NOTE — PROGRESS NOTES
Visited pt room due to mews score of 4. Pt is alert and sitting up in chair eating supper. Smiling. Says he feels good.   Dr. Yael Jane was just here to see the pt

## 2019-11-07 NOTE — PROGRESS NOTES
Interdisciplinary team rounds were held 11/7/2019 with the following team members:Care Management, Nursing, Nutrition, Pharmacy, Physical Therapy and Physician. Plan of care discussed. See clinical pathway and/or care plan for interventions and desired outcomes.

## 2019-11-08 VITALS
HEIGHT: 75 IN | SYSTOLIC BLOOD PRESSURE: 110 MMHG | OXYGEN SATURATION: 95 % | BODY MASS INDEX: 39.17 KG/M2 | RESPIRATION RATE: 18 BRPM | DIASTOLIC BLOOD PRESSURE: 72 MMHG | HEART RATE: 95 BPM | WEIGHT: 315 LBS | TEMPERATURE: 98.6 F

## 2019-11-08 LAB
ANION GAP SERPL CALC-SCNC: 7 MMOL/L (ref 7–16)
BACTERIA SPEC CULT: ABNORMAL
BASOPHILS # BLD: 0 K/UL (ref 0–0.2)
BASOPHILS NFR BLD: 0 % (ref 0–2)
BUN SERPL-MCNC: 12 MG/DL (ref 8–23)
CALCIUM SERPL-MCNC: 7.7 MG/DL (ref 8.3–10.4)
CHLORIDE SERPL-SCNC: 107 MMOL/L (ref 98–107)
CO2 SERPL-SCNC: 21 MMOL/L (ref 21–32)
CREAT SERPL-MCNC: 0.72 MG/DL (ref 0.8–1.5)
DIFFERENTIAL METHOD BLD: ABNORMAL
EOSINOPHIL # BLD: 0 K/UL (ref 0–0.8)
EOSINOPHIL NFR BLD: 1 % (ref 0.5–7.8)
ERYTHROCYTE [DISTWIDTH] IN BLOOD BY AUTOMATED COUNT: 13.5 % (ref 11.9–14.6)
GLUCOSE BLD STRIP.AUTO-MCNC: 186 MG/DL (ref 65–100)
GLUCOSE SERPL-MCNC: 159 MG/DL (ref 65–100)
GRAM STN SPEC: ABNORMAL
HCT VFR BLD AUTO: 33.9 % (ref 41.1–50.3)
HGB BLD-MCNC: 11.1 G/DL (ref 13.6–17.2)
IMM GRANULOCYTES # BLD AUTO: 0 K/UL (ref 0–0.5)
IMM GRANULOCYTES NFR BLD AUTO: 0 % (ref 0–5)
LYMPHOCYTES # BLD: 0.7 K/UL (ref 0.5–4.6)
LYMPHOCYTES NFR BLD: 11 % (ref 13–44)
MCH RBC QN AUTO: 31.9 PG (ref 26.1–32.9)
MCHC RBC AUTO-ENTMCNC: 32.7 G/DL (ref 31.4–35)
MCV RBC AUTO: 97.4 FL (ref 79.6–97.8)
MONOCYTES # BLD: 0.6 K/UL (ref 0.1–1.3)
MONOCYTES NFR BLD: 9 % (ref 4–12)
NEUTS SEG # BLD: 5.3 K/UL (ref 1.7–8.2)
NEUTS SEG NFR BLD: 80 % (ref 43–78)
NRBC # BLD: 0 K/UL (ref 0–0.2)
PLATELET # BLD AUTO: 130 K/UL (ref 150–450)
PMV BLD AUTO: 9.9 FL (ref 9.4–12.3)
POTASSIUM SERPL-SCNC: 3.7 MMOL/L (ref 3.5–5.1)
RBC # BLD AUTO: 3.48 M/UL (ref 4.23–5.6)
SERVICE CMNT-IMP: ABNORMAL
SODIUM SERPL-SCNC: 135 MMOL/L (ref 136–145)
WBC # BLD AUTO: 6.7 K/UL (ref 4.3–11.1)

## 2019-11-08 PROCEDURE — 74011000258 HC RX REV CODE- 258: Performed by: INTERNAL MEDICINE

## 2019-11-08 PROCEDURE — 77030020263 HC SOL INJ SOD CL0.9% LFCR 1000ML

## 2019-11-08 PROCEDURE — 80048 BASIC METABOLIC PNL TOTAL CA: CPT

## 2019-11-08 PROCEDURE — 85025 COMPLETE CBC W/AUTO DIFF WBC: CPT

## 2019-11-08 PROCEDURE — 74011250637 HC RX REV CODE- 250/637: Performed by: HOSPITALIST

## 2019-11-08 PROCEDURE — 82962 GLUCOSE BLOOD TEST: CPT

## 2019-11-08 PROCEDURE — 36415 COLL VENOUS BLD VENIPUNCTURE: CPT

## 2019-11-08 PROCEDURE — 74011250636 HC RX REV CODE- 250/636: Performed by: INTERNAL MEDICINE

## 2019-11-08 PROCEDURE — 74011636637 HC RX REV CODE- 636/637: Performed by: HOSPITALIST

## 2019-11-08 RX ORDER — CIPROFLOXACIN 500 MG/1
500 TABLET ORAL 2 TIMES DAILY
Qty: 20 TAB | Refills: 0 | Status: SHIPPED | OUTPATIENT
Start: 2019-11-08 | End: 2019-11-18

## 2019-11-08 RX ADMIN — CARVEDILOL 25 MG: 25 TABLET, FILM COATED ORAL at 08:29

## 2019-11-08 RX ADMIN — CEFEPIME HYDROCHLORIDE 2 G: 2 INJECTION, POWDER, FOR SOLUTION INTRAVENOUS at 05:48

## 2019-11-08 RX ADMIN — LISINOPRIL 10 MG: 5 TABLET ORAL at 08:29

## 2019-11-08 RX ADMIN — CYANOCOBALAMIN TAB 1000 MCG 1000 MCG: 1000 TAB at 08:29

## 2019-11-08 RX ADMIN — INSULIN LISPRO 2 UNITS: 100 INJECTION, SOLUTION INTRAVENOUS; SUBCUTANEOUS at 08:28

## 2019-11-08 RX ADMIN — DABIGATRAN ETEXILATE MESYLATE 150 MG: 150 CAPSULE ORAL at 08:29

## 2019-11-08 RX ADMIN — LORATADINE 10 MG: 10 TABLET ORAL at 08:29

## 2019-11-08 NOTE — DISCHARGE INSTRUCTIONS
DISCHARGE SUMMARY from Nurse    PATIENT INSTRUCTIONS:    After general anesthesia or intravenous sedation, for 24 hours or while taking prescription Narcotics:  · Limit your activities  · Do not drive and operate hazardous machinery  · Do not make important personal or business decisions  · Do  not drink alcoholic beverages  · If you have not urinated within 8 hours after discharge, please contact your surgeon on call. Report the following to your surgeon:  · Excessive pain, swelling, redness or odor of or around the surgical area  · Temperature over 100.5  · Nausea and vomiting lasting longer than 4 hours or if unable to take medications  · Any signs of decreased circulation or nerve impairment to extremity: change in color, persistent  numbness, tingling, coldness or increase pain  · Any questions    What to do at Home:  Recommended activity: Activity as tolerated, diabetic diet, complete antibiotics and follow up with urology and PCP if needed    If you experience any of the following symptoms: fever, chills, severe abdominal pain/nausea/vomiting, confusion, other concerning s/s, please follow up with urology, PCP or return to ER. *  Please give a list of your current medications to your Primary Care Provider. *  Please update this list whenever your medications are discontinued, doses are      changed, or new medications (including over-the-counter products) are added. *  Please carry medication information at all times in case of emergency situations. These are general instructions for a healthy lifestyle:    No smoking/ No tobacco products/ Avoid exposure to second hand smoke  Surgeon General's Warning:  Quitting smoking now greatly reduces serious risk to your health.     Obesity, smoking, and sedentary lifestyle greatly increases your risk for illness    A healthy diet, regular physical exercise & weight monitoring are important for maintaining a healthy lifestyle    You may be retaining fluid if you have a history of heart failure or if you experience any of the following symptoms:  Weight gain of 3 pounds or more overnight or 5 pounds in a week, increased swelling in our hands or feet or shortness of breath while lying flat in bed. Please call your doctor as soon as you notice any of these symptoms; do not wait until your next office visit. The discharge information has been reviewed with the patient. The patient verbalized understanding. Discharge medications reviewed with the patient and appropriate educational materials and side effects teaching were provided. ___________________________________________________________________________________________________________________________________  Patient Education        Kidney Infection: Care Instructions  Your Care Instructions    A kidney infection (pyelonephritis) is a type of urinary tract infection, or UTI. Most UTIs are bladder infections. Kidney infections tend to make people much sicker than bladder infections do. A kidney infection is also more serious because it can cause lasting damage if it is not treated quickly. Follow-up care is a key part of your treatment and safety. Be sure to make and go to all appointments, and call your doctor if you are having problems. It's also a good idea to know your test results and keep a list of the medicines you take. How can you care for yourself at home? · Take your antibiotics as directed. Do not stop taking them just because you feel better. You need to take the full course of antibiotics. · Drink plenty of water, enough so that your urine is light yellow or clear like water. This may help wash out bacteria that are causing the infection. If you have kidney, heart, or liver disease and have to limit fluids, talk with your doctor before you increase the amount of fluids you drink. · Urinate often. Try to empty your bladder each time.   · To relieve pain, take a hot shower or lay a heating pad (set on low) over your lower belly. Never go to sleep with a heating pad in place. Put a thin cloth between the heating pad and your skin. To help prevent kidney infections  · Drink plenty of water each day. This helps you urinate often, which clears bacteria from your system. If you have kidney, heart, or liver disease and have to limit fluids, talk with your doctor before you increase the amount of fluids you drink. · Urinate when you have the urge. Do not hold your urine for a long time. Urinate before you go to sleep. · If you have symptoms of a bladder infection, such as burning when you urinate or having to urinate often, call your doctor so you can treat the problem before it gets worse. If you do not treat a bladder infection quickly, it can spread to the kidney. · Men should keep the tip of the penis clean. If you are a woman, keep these ideas in mind:  · Urinate right after you have sex. · Change sanitary pads often. Avoid douches, feminine hygiene sprays, and other feminine hygiene products that have deodorants. · After going to the bathroom, wipe from front to back. When should you call for help? Call your doctor now or seek immediate medical care if:    · You have symptoms that a kidney infection is getting worse. These may include:  ? Pain or burning when you urinate. ? A frequent need to urinate without being able to pass much urine. ? Pain in the flank, which is just below the rib cage and above the waist on either side of the back. ? Blood in the urine. ? A fever.     · You are vomiting or nauseated.    Watch closely for changes in your health, and be sure to contact your doctor if:    · You do not get better as expected. Where can you learn more? Go to http://shital-javi.info/. Enter D763 in the search box to learn more about \"Kidney Infection: Care Instructions. \"  Current as of: October 31, 2018  Content Version: 12.2  © 6118-4628 [employer]. Care instructions adapted under license by [employer] (which disclaims liability or warranty for this information). If you have questions about a medical condition or this instruction, always ask your healthcare professional. [address] any warranty or liability for your use of this information.

## 2019-11-08 NOTE — PROGRESS NOTES
Report received from Roxanna Jang  Pt is resting quietly in recliner with wife at bedside. Bed is low and locked with call light in reach. resp is even and unlabored. Pt is A&O x4. Swelling and slight edema in BLE. No needs are voiced at this time.

## 2019-11-08 NOTE — PROGRESS NOTES
Problem: Falls - Risk of  Goal: *Absence of Falls  Description  [facility] and appropriate interventions in the flowsheet.   Outcome: Progressing Towards Goal  Note:   Fall Risk Interventions:            Medication Interventions: Teach patient to arise slowly    Elimination Interventions: Patient to call for help with toileting needs, Call light in reach              Problem: Urinary Tract Infection - Adult  Goal: *Absence of infection signs and symptoms  Outcome: Progressing Towards Goal

## 2019-11-08 NOTE — PROGRESS NOTES
Assessment done via doc flow sheet. Pt sitting in recliner at bedside, A&O x4, resp easy & regular, lungs CTAB. Denies pain. No needs voiced at this time. Pt is being discharged.

## 2019-11-08 NOTE — DISCHARGE SUMMARY
Hospitalist Discharge Summary     Admit Date:  2019  6:18 PM   Name:  Miranda Thomas   Age:  75 y.o.  :  1955   MRN:  [id number]   PCP:  Grzegorz Vaughan MD  Treatment Team: Attending Provider: Dianna Reyes MD; Consulting Provider: Raven Mathias MD; Consulting Provider: Tian Barker DO; Consulting Provider: Patricia Delacruz MD; Care Manager: Virgen Cui RN    Problem List for this Hospitalization:  Hospital Problems as of 2019 Date Reviewed: 10/2/2019          Codes Class Noted - Resolved POA    * (Principal) Sepsis due to urinary tract infection ([facility] ICD-10-CM: A41.9, N39.0  ICD-9-CM: 038.9, 995.91, 599.0  2019 - Present Yes        Fever ICD-10-CM: R50.9  ICD-9-CM: 780.60  2019 - Present Yes        Diabetes mellitus without complication ([facility] NAA-95-BT: E11.9  ICD-9-CM: 250.00  10/2/2019 - Present Yes        Obesity, morbid ([facility] ICD-10-CM: E66.01  ICD-9-CM: 278.01  2017 - Present Yes        [place] on CPAP ICD-10-CM: G47.33, Z99.89  ICD-9-CM: 327.23, V46.8  10/13/2017 - Present Yes        Atrial fibrillation ([facility] ICD-10-CM: I48.91  ICD-9-CM: 427.31  10/8/2017 - Present Yes        HTN (hypertension) ICD-10-CM: I10  ICD-9-CM: 401.9  2014 - Present Yes        Nodular prostate without urinary obstruction ICD-10-CM: N40.2  ICD-9-CM: 600.10  2014 - Present Yes                Admission HPI from 2019:    \" Patient is a 76 y/o male with hx nodular prostate, negative bx x 2 and normal PSA, urinary hesitancy and retention, diabetes, HTN, afib on Pradaxa, obesity, [place]-CPAP who presents from home with fever, confusion and UTI. He has had some fever the past 2-3 days. Was seen at the Urology office earlier today. He had a post-void residual of 191 ml. He takes 0.8 mg flomax every pm. He was prescribed oral antibiotics and was sent home. While driving back he states he was very confused. When he talked to his daughter by phone he told her of this.  She works as a PA and was concerned that he may be developing sepsis so told him to go to ED. Here he was tachycardic. Had a fever to 102.4. WBC ct 12.9. Lactic acid normal. He recently had a retroperitoneal US which was normal (10/31). He was started on IV antibiotics and Hospitalist service consulted for admission. \"    Hospital Course:  Pt admitted with pyelonephritis with bacteremia.  initially placed on broad spectrum abx including vanc.  switched to rocephin. Cultures grew e coli, sensitivities below. ID consulted, recommending 10 days of cipro going forward. No drug interactions. Urology will do TURP after infection cleared which will hopefully reduce risk of UTI recurrence; they will call him with appointment time for follow up. Disposition: Home or Self Care  Activity: Activity as tolerated  Diet: DIET DIABETIC CONSISTENT CARB Regular  Code Status: Full Code      Follow up instructions, discharge meds at bottom of this note. Plan was discussed with pt. All questions answered. Patient was stable at time of discharge. Patient will call a physician or return if any concerns. Diagnostic Imaging/Tests:   Ct Abd Pelv W Cont    Result Date: 11/6/2019  CT ABDOMEN AND PELVIS WITH CONTRAST 11/6/2019 COMPARISON: CT abdomen pelvis 6/20/2017. INDICATION: Positive blood cultures, urinary tract infection. Evaluate for renal abscess. TECHNIQUE:  CT imaging was performed of the abdomen and pelvis following the uncomplicated administration of intravenous contrast (Isovue 370, 100 mL). Intravenous contrast was used for better evaluation of solid organs and vascular structures. Oral contrast was used for bowel opacification. Radiation dose reduction techniques were used for this study:  Our CT scanners use one or all of the following: Automated exposure control, adjustment of the mA and/or kVp according to patient's size, iterative reconstruction. FINDINGS: ABDOMEN CT: Partial visualization cardiac enlargement.  Lung bases clear. Extensive diffuse hepatic steatosis without focal aggressive liver lesion similar in appearance previous examination. The portal and hepatic veins are patent. Right renal lower pole simple appearing cyst measures 4.4 cm. No hydronephrosis. There is mild left perinephric stranding with scattered foci patchy decreased renal cortical enhancement more pronounced series 2 image 35 involving the medial interpolar cortex. No associated abscess. No biliary ductal dilatation. The gallbladder, adrenal glands, pancreas within normal limits. Splenomegaly at 16.4 cm. Upper abdominal vasculature grossly patent. PELVIS CT: The bowel is normal in caliber, and there is no focal or diffuse bowel wall thickening. Extensive diffuse colonic diverticulosis without CT evidence of acute diverticulitis. There is no free air or free fluid in the abdomen or pelvis. There is no significant retroperitoneal, pelvic, mesenteric, or inguinal lymphadenopathy. Small bladder diverticula stable. Prostate calcifications are evident. There are no aggressive appearing osseous lesions. IMPRESSION: 1. Minimal left perinephric stranding and scattered foci decreased renal cortical enhancement suggesting pyelonephritis. No abscess. Echocardiogram results:  No results found for this visit on 11/05/19. Procedures done this admission:  * No surgery found *    All Micro Results     Procedure Component Value Units Date/Time    CULTURE, BLOOD [[id number]]  (Abnormal) Collected:  11/05/19 1853    Order Status:  Completed Specimen:  Blood Updated:  11/08/19 0783     Special Requests: --        RIGHT  Antecubital       GRAM STAIN GRAM NEGATIVE RODS               AEROBIC AND ANAEROBIC BOTTLES                  CRITICAL RESULT NOT CALLED DUE TO PREVIOUS NOTIFICATION OF CRITICAL RESULT WITHIN THE LAST 24 HOURS.            Culture result: GRAM NEGATIVE RODS         REFER TO [address] [id number] FOR ID AND SUSCEPTIBILITY    CULTURE, BLOOD [[id number]] (Abnormal)  (Susceptibility) Collected:  11/05/19 1853    Order Status:  Completed Specimen:  Blood Updated:  11/08/19 0727     Special Requests: --        LEFT  Antecubital       GRAM STAIN GRAM NEGATIVE RODS               AEROBIC AND ANAEROBIC BOTTLES                  RESULTS VERIFIED, PHONED TO AND READ BACK BY FREDERICK LEAL RN AT 2943 11/06/2019 BY CDTAYLOR           Culture result: ESCHERICHIA COLI         REFER TO BLOOD CULTURE IDENTIFICATION PANEL [address] [id number]    CULTURE, BLOOD [[id number]] Collected:  11/07/19 0431    Order Status:  Completed Specimen:  Blood Updated:  11/08/19 0726     Special Requests: --        LEFT  HAND       Culture result: NO GROWTH 1 DAY       CULTURE, BLOOD [[id number]] Collected:  11/07/19 0438    Order Status:  Completed Specimen:  Blood Updated:  11/08/19 0726     Special Requests: --        RIGHT  HAND       Culture result: NO GROWTH 1 DAY       CULTURE, URINE [[id number]]  (Abnormal)  (Susceptibility) Collected:  11/05/19 1923    Order Status:  Completed Specimen:  Urine from Clean catch Updated:  11/08/19 0718     Special Requests: NO SPECIAL REQUESTS        Culture result:       >100,000 COLONIES/mL ESCHERICHIA COLI                  <10,000 COLONIES/mL MIXED SKIN CRISTAL ISOLATED          BLOOD CULTURE ID PANEL [[id number]]  (Abnormal) Collected:  11/05/19 1853    Order Status:  Completed Specimen:  Blood Updated:  11/06/19 1425     Acc. no. from Micro Order [id number]     Escherichia coli DETECTED        Comment: RESULTS VERIFIED, PHONED TO AND READ BACK BY  LEXIE MELARA RN @7874 11/6/19           KPC (Carbapenem Resistance Gene) NOT DETECTED        Comment: WARNING:  A Not Detected result for the KPC gene does not indicate susceptibility to carbapenems. Gram negative bacteria can be resistant to carbapenems by mechanisms other than carrying the KPC gene. INTERPRETATION       Gram negative litzy.  Identified by realtime PCR as E. coli            Susceptibility     Escherichia coli     Antibiotic Interpretation Value Method Comment   Trimeth-Sulfamethoxa Resistant >2/38 ug/mL MITCH    Ampicillin ($) Resistant >16 ug/mL MITCH    Ciprofloxacin ($) Susceptible <=0.5 ug/mL MITCH    Gentamicin ($) Susceptible <=1 ug/mL MITCH    Tobramycin ($) Susceptible 1 ug/mL MITCH    Cefazolin ($) Susceptible 2 ug/mL MITCH    Ceftriaxone ($) Susceptible <=0.5 ug/mL MITCH    Cefoxitin Susceptible <=4 ug/mL MITCH    Levofloxacin ($) Susceptible <=1 ug/mL MITCH    Aztreonam ($$$$) Susceptible <=1 ug/mL MITCH    Cefepime ($$) Susceptible <=0.5 ug/mL MITCH    Piperacillin/Tazobac ($) Susceptible 4/4 ug/mL MITCH    Meropenem ($$) Susceptible <=0.125 ug/mL MITCH          Labs: Results:       BMP, Mg, Phos Recent Labs     11/08/19 0520 11/07/19 0431 11/06/19 0435   * 135* 132*   K 3.7 3.2* 3.6    105 99   CO2 21 23 25   AGAP 7 7 8   BUN 12 13 12   CREA 0.72* 0.83 1.01   CA 7.7* 8.4 9.3   * 172* 186*   MG  --   --  2.0      CBC Recent Labs     11/08/19 0520 11/07/19 0431 11/06/19 0435 11/05/19  1853   WBC 6.7 11.3* 11.3* 12.9*   RBC 3.48* 3.60* 4.08* 4.38   HGB 11.1* 11.5* 13.2* 14.3   HCT 33.9* 34.8* 39.3* 42.3   * 131* 138* 170   GRANS 80* 84*  --  91*   LYMPH 11* 7*  --  4*   EOS 1 0*  --  0*   MONOS 9 9  --  4   BASOS 0 0  --  0   IG 0 1  --  1   ANEU 5.3 9.5*  --  11.8*   ABL 0.7 0.8  --  0.5   WHITNEY 0.0 0.0  --  0.0   ABM 0.6 1.0  --  0.5   ABB 0.0 0.0  --  0.0   AIG 0.0 0.1  --  0.1      LFT Recent Labs     11/05/19  1853   SGOT 15   ALT 34      TP 8.0   ALB 3.6   GLOB 4.4*   AGRAT 0.8*      Cardiac Testing Lab Results   Component Value Date/Time    BNP 28 10/08/2017 12:01 AM    Troponin-I, Qt. <0.02 (L) 10/08/2017 12:01 AM      Coagulation Tests Lab Results   Component Value Date/Time    Prothrombin time 10.9 10/16/2017 10:04 PM    Prothrombin time 10.7 10/08/2017 12:01 AM    INR 1.0 10/16/2017 10:04 PM    INR 1.0 10/08/2017 12:01 AM    aPTT 25.0 10/16/2017 10:04 PM    aPTT 25.7 10/08/2017 12:01 AM      A1c Lab Results   Component Value Date/Time    Hemoglobin A1c 6.5 (H) 09/26/2019 07:50 AM    Hemoglobin A1c 6.0 (H) 09/21/2018 07:54 AM    Hemoglobin A1c 6.3 (H) 03/31/2017 07:43 AM      Lipid Panel Lab Results   Component Value Date/Time    Cholesterol, total 138 09/26/2019 07:50 AM    HDL Cholesterol 35 (L) 09/26/2019 07:50 AM    LDL, calculated 78 09/26/2019 07:50 AM    VLDL, calculated 25 09/26/2019 07:50 AM    Triglyceride 125 09/26/2019 07:50 AM    CHOL/HDL Ratio 3.9 01/17/2012 12:22 PM      Thyroid Panel Lab Results   Component Value Date/Time    TSH 2.420 09/26/2019 07:50 AM    TSH 1.970 03/20/2019 07:58 AM        Most Recent UA Lab Results   Component Value Date/Time    Color Yellow 09/26/2019 11:23 AM    Appearance Clear 09/26/2019 11:23 AM    pH (UA) 6.0 09/26/2019 11:23 AM    Ketone Negative 09/26/2019 11:23 AM    Bilirubin Negative 09/26/2019 11:23 AM    Blood Negative 09/26/2019 11:23 AM    Nitrites Negative 09/26/2019 11:23 AM    Leukocyte Esterase Negative 09/26/2019 11:23 AM    WBC >100 (H) 11/05/2019 07:23 PM    RBC 5-10 11/05/2019 07:23 PM    Epithelial cells 0-3 11/05/2019 07:23 PM    Bacteria 4+ (H) 11/05/2019 07:23 PM    Casts 0-3 11/05/2019 07:23 PM    Mucus Present 03/20/2019 07:58 AM        Allergies   Allergen Reactions    Zithromax [Azithromycin] Other (comments)     Skin dryness/peeling     Immunization History   Administered Date(s) Administered    Influenza High Dose Vaccine PF 10/05/2016, 10/05/2018    Influenza Vaccine 11/07/2013    Influenza Vaccine (Quad) PF 10/05/2017    Influenza Vaccine (Tri) Adjuvanted 10/02/2019    Influenza Vaccine Intradermal PF 09/29/2015    Pneumococcal Conjugate (PCV-13) 09/28/2018    Pneumococcal Polysaccharide (PPSV-23) 10/02/2019    Td 09/29/2015    Tdap 03/07/2015, 09/29/2015    Zoster Recombinant 06/13/2019, 08/17/2019       All Labs from Last 24 Hrs:  Recent Results (from the past 24 hour(s))   GLUCOSE, POC    Collection Time: 11/07/19 11:43 AM   Result Value Ref Range    Glucose (POC) 249 (H) 65 - 100 mg/dL   GLUCOSE, POC    Collection Time: 11/07/19  4:10 PM   Result Value Ref Range    Glucose (POC) 206 (H) 65 - 100 mg/dL   GLUCOSE, POC    Collection Time: 11/07/19  9:43 PM   Result Value Ref Range    Glucose (POC) 159 (H) 65 - 100 mg/dL   CBC WITH AUTOMATED DIFF    Collection Time: 11/08/19  5:20 AM   Result Value Ref Range    WBC 6.7 4.3 - 11.1 K/uL    RBC 3.48 (L) 4.23 - 5.6 M/uL    HGB 11.1 (L) 13.6 - 17.2 g/dL    HCT 33.9 (L) 41.1 - 50.3 %    MCV 97.4 79.6 - 97.8 FL    MCH 31.9 26.1 - 32.9 PG    MCHC 32.7 31.4 - 35.0 g/dL    RDW 13.5 11.9 - 14.6 %    PLATELET 758 (L) 272 - 450 K/uL    MPV 9.9 9.4 - 12.3 FL    ABSOLUTE NRBC 0.00 0.0 - 0.2 K/uL    DF AUTOMATED      NEUTROPHILS 80 (H) 43 - 78 %    LYMPHOCYTES 11 (L) 13 - 44 %    MONOCYTES 9 4.0 - 12.0 %    EOSINOPHILS 1 0.5 - 7.8 %    BASOPHILS 0 0.0 - 2.0 %    IMMATURE GRANULOCYTES 0 0.0 - 5.0 %    ABS. NEUTROPHILS 5.3 1.7 - 8.2 K/UL    ABS. LYMPHOCYTES 0.7 0.5 - 4.6 K/UL    ABS. MONOCYTES 0.6 0.1 - 1.3 K/UL    ABS. EOSINOPHILS 0.0 0.0 - 0.8 K/UL    ABS. BASOPHILS 0.0 0.0 - 0.2 K/UL    ABS. IMM.  GRANS. 0.0 0.0 - 0.5 K/UL   METABOLIC PANEL, BASIC    Collection Time: 11/08/19  5:20 AM   Result Value Ref Range    Sodium 135 (L) 136 - 145 mmol/L    Potassium 3.7 3.5 - 5.1 mmol/L    Chloride 107 98 - 107 mmol/L    CO2 21 21 - 32 mmol/L    Anion gap 7 7 - 16 mmol/L    Glucose 159 (H) 65 - 100 mg/dL    BUN 12 8 - 23 MG/DL    Creatinine 0.72 (L) 0.8 - 1.5 MG/DL    GFR est AA >60 >60 ml/min/1.73m2    GFR est non-AA >60 >60 ml/min/1.73m2    Calcium 7.7 (L) 8.3 - 10.4 MG/DL       Current Med List in Hospital:   Current Facility-Administered Medications   Medication Dose Route Frequency    cefTRIAXone (ROCEPHIN) 1 g in 0.9% sodium chloride (MBP/ADV) 50 mL  1 g IntraVENous Q24H    carvedilol (COREG) tablet 25 mg  25 mg Oral BID WITH MEALS    dabigatran etexilate (PRADAXA) capsule 150 mg  150 mg Oral Q12H    cyanocobalamin tablet 1,000 mcg  1,000 mcg Oral DAILY    simvastatin (ZOCOR) tablet 40 mg  40 mg Oral QHS    dextrose 40% (GLUTOSE) oral gel 1 Tube  15 g Oral PRN    glucagon (GLUCAGEN) injection 1 mg  1 mg IntraMUSCular PRN    dextrose (D50W) injection syrg 12.5-25 g  25-50 mL IntraVENous PRN    insulin lispro (HUMALOG) injection   SubCUTAneous AC&HS    sodium chloride (NS) flush 5-40 mL  5-40 mL IntraVENous Q8H    sodium chloride (NS) flush 5-40 mL  5-40 mL IntraVENous PRN    acetaminophen (TYLENOL) tablet 650 mg  650 mg Oral Q4H PRN    ondansetron (ZOFRAN) injection 4 mg  4 mg IntraVENous Q4H PRN    loratadine (CLARITIN) tablet 10 mg  10 mg Oral DAILY    fluticasone propionate (FLONASE) 50 mcg/actuation nasal spray 2 Spray  2 Spray Both Nostrils DAILY    lisinopril (PRINIVIL, ZESTRIL) tablet 10 mg  10 mg Oral DAILY    linaCLOtide (LINZESS) capsule 145 mcg (Patient Supplied)  145 mcg Oral ACB    tamsulosin (FLOMAX) capsule 0.8 mg  0.8 mg Oral QHS    calcium carbonate (TUMS) chewable tablet 400 mg [elemental]  400 mg Oral TID PRN       Discharge Exam:  Patient Vitals for the past 24 hrs:   Temp Pulse Resp BP SpO2   11/08/19 0320 98.8 °F (37.1 °C) 98 18 122/73 94 %   11/07/19 2230 97.2 °F (36.2 °C) 97 18 129/86 96 %   11/07/19 2000 98.5 °F (36.9 °C) 90 16 117/74 94 %   11/07/19 1813 99.6 °F (37.6 °C) 99 18 102/57 94 %   11/07/19 1605 (!) 101.3 °F (38.5 °C) (!) 105 18 116/72 92 %   11/07/19 1227 (!) 101.2 °F (38.4 °C) 94 18 130/70 94 %     Oxygen Therapy  O2 Sat (%): 94 % (11/08/19 0320)  Pulse via Oximetry: 88 beats per minute (11/06/19 0834)  O2 Device: Room air(pt wants to wear o2 tonight in lieu of cpap) (11/06/19 1922)  O2 Flow Rate (L/min): 0 l/min (11/06/19 0834)    Estimated body mass index is 41.87 kg/m² as calculated from the following:    Height as of this encounter: 6' 3\" (1.905 m). Weight as of this encounter: 152 kg (335 lb).       Intake/Output Summary (Last 24 hours) at 11/8/2019 0823  Last data filed at 11/8/2019 7517  Gross per 24 hour   Intake 1800 ml   Output 2275 ml   Net -475 ml       *Note that automatically entered I/Os may not be accurate; dependent on patient compliance with collection and accurate  by assistants. General:    Well nourished. Alert. Eyes:   Normal sclerae. Extraocular movements intact. ENT:  Normocephalic, atraumatic. Moist mucous membranes  CV:   Regular rate and rhythm. No murmur, rub, or gallop. Lungs:  Clear to auscultation bilaterally. No wheezing, rhonchi, or rales. Abdomen: Soft, nontender, nondistended. Bowel sounds normal.   Extremities: Warm and dry. No cyanosis or edema. Neurologic: CN II-XII grossly intact. Sensation intact. Skin:     No rashes or jaundice. Psych:  Normal mood and affect. Discharge Info:   Current Discharge Medication List      START taking these medications    Details   ciprofloxacin HCl (CIPRO) 500 mg tablet Take 1 Tab by mouth two (2) times a day for 10 days. Qty: 20 Tab, Refills: 0         CONTINUE these medications which have NOT CHANGED    Details   tamsulosin (FLOMAX) 0.4 mg capsule Take 2 Caps by mouth daily. Qty: 180 Cap, Refills: 3    Associated Diagnoses: BPH with obstruction/lower urinary tract symptoms      cetirizine (ZYRTEC) 10 mg tablet Take  by mouth.      enalapril (VASOTEC) 10 mg tablet Take 1 Tab by mouth daily. Qty: 90 Tab, Refills: 3    Associated Diagnoses: Essential hypertension      metFORMIN (GLUCOPHAGE) 500 mg tablet Take 1 Tab by mouth two (2) times daily (with meals). Qty: 180 Tab, Refills: 3    Associated Diagnoses: Diabetes mellitus without complication (HCC)      hydroCHLOROthiazide (HYDRODIURIL) 25 mg tablet Take 1 Tab by mouth daily. Qty: 90 Tab, Refills: 3    Associated Diagnoses: Essential hypertension      linaCLOtide (LINZESS) 145 mcg cap capsule Take 1 Cap by mouth Daily (before breakfast).   Qty: 90 Cap, Refills: 3      simvastatin (ZOCOR) 40 mg tablet Take 1 Tab by mouth nightly. Qty: 90 Tab, Refills: 3    Associated Diagnoses: Mixed dyslipidemia      spironolactone (ALDACTONE) 25 mg tablet Take 1 Tab by mouth daily. Qty: 90 Tab, Refills: 3    Associated Diagnoses: Longstanding persistent atrial fibrillation      carvedilol (COREG) 25 mg tablet Take 1 Tab by mouth two (2) times daily (with meals). Qty: 180 Tab, Refills: 3    Associated Diagnoses: Longstanding persistent atrial fibrillation      krill-om-3-dha-epa-phospho-ast 634-789-17-60 mg cap Take  by mouth. dabigatran etexilate (PRADAXA) 150 mg capsule Take 1 Cap by mouth every twelve (12) hours. Qty: 180 Cap, Refills: 3      cyanocobalamin 1,000 mcg tablet Take 1,000 mcg by mouth daily. cpap machine kit by Does Not Apply route. Indications: ASV      guaiFENesin ER (MUCINEX) 600 mg ER tablet Take 600 mg by mouth two (2) times a day. FLUTICASONE PROPIONATE (FLONASE NA) 2 Sprays by Nasal route daily. clotrimazole-betamethasone (LOTRISONE) topical cream Apply to affected area bid as directed  Qty: 15 g, Refills: 0    Associated Diagnoses: Candidiasis, cutaneous         STOP taking these medications       doxycycline (MONODOX) 100 mg capsule Comments:   Reason for Stopping: Follow Up Orders:  No orders of the defined types were placed in this encounter. Follow-up Information     Follow up With Specialties Details Why Contact Info    Genevieve Lundberg MD Internal Medicine Call As needed [phone] [facility] [phone]            Time spent in patient discharge planning and coordination 35 minutes.     Signed:  Madhavi Troy MD

## 2019-11-08 NOTE — PROGRESS NOTES
Discharge instructions were reviewed with the patient. Opportunity for questions given. Patient verbalized understanding of discharge and follow up instructions, as well as S/S to report to MD or return to ER for. PIV was removed. Prescription at pt's pharmacy. Patient will D/C to home. Calling his wife for ride. Will let staff know when ready to be taken out.

## 2019-11-12 LAB
BACTERIA SPEC CULT: NORMAL
BACTERIA SPEC CULT: NORMAL
SERVICE CMNT-IMP: NORMAL
SERVICE CMNT-IMP: NORMAL

## 2019-11-19 ENCOUNTER — HOSPITAL ENCOUNTER (OUTPATIENT)
Dept: INFUSION THERAPY | Age: 64
Discharge: HOME | End: 2019-11-19

## 2019-11-19 ENCOUNTER — HOSPITAL ENCOUNTER (OUTPATIENT)
Dept: LAB | Age: 64
Discharge: HOME | End: 2019-11-19
Payer: COMMERCIAL

## 2019-11-19 DIAGNOSIS — E83.119 HEMOCHROMATOSIS, UNSPECIFIED HEMOCHROMATOSIS TYPE: ICD-10-CM

## 2019-11-19 LAB
ALBUMIN SERPL-MCNC: 3.4 G/DL (ref 3.2–4.6)
ALBUMIN/GLOB SERPL: 0.8 {RATIO} (ref 1.2–3.5)
ALP SERPL-CCNC: 107 U/L (ref 50–136)
ALT SERPL-CCNC: 52 U/L (ref 12–65)
ANION GAP SERPL CALC-SCNC: 8 MMOL/L (ref 7–16)
AST SERPL-CCNC: 21 U/L (ref 15–37)
BASOPHILS # BLD: 0 K/UL (ref 0–0.2)
BASOPHILS NFR BLD: 0 % (ref 0–2)
BILIRUB SERPL-MCNC: 0.6 MG/DL (ref 0.2–1.1)
BUN SERPL-MCNC: 19 MG/DL (ref 8–23)
CALCIUM SERPL-MCNC: 8.6 MG/DL (ref 8.3–10.4)
CHLORIDE SERPL-SCNC: 104 MMOL/L (ref 98–107)
CO2 SERPL-SCNC: 21 MMOL/L (ref 21–32)
CREAT SERPL-MCNC: 1.12 MG/DL (ref 0.8–1.5)
DIFFERENTIAL METHOD BLD: ABNORMAL
EOSINOPHIL # BLD: 0.2 K/UL (ref 0–0.8)
EOSINOPHIL NFR BLD: 2 % (ref 0.5–7.8)
ERYTHROCYTE [DISTWIDTH] IN BLOOD BY AUTOMATED COUNT: 12.9 % (ref 11.9–14.6)
FERRITIN SERPL-MCNC: 146 NG/ML (ref 8–388)
GLOBULIN SER CALC-MCNC: 4.2 G/DL (ref 2.3–3.5)
GLUCOSE SERPL-MCNC: 202 MG/DL (ref 65–100)
HCT VFR BLD AUTO: 42.8 % (ref 41.1–50.3)
HGB BLD-MCNC: 14.1 G/DL (ref 13.6–17.2)
IMM GRANULOCYTES # BLD AUTO: 0 K/UL (ref 0–0.5)
IMM GRANULOCYTES NFR BLD AUTO: 1 % (ref 0–5)
IRON SATN MFR SERPL: 23 %
IRON SERPL-MCNC: 74 UG/DL (ref 35–150)
LYMPHOCYTES # BLD: 1 K/UL (ref 0.5–4.6)
LYMPHOCYTES NFR BLD: 14 % (ref 13–44)
MCH RBC QN AUTO: 31.5 PG (ref 26.1–32.9)
MCHC RBC AUTO-ENTMCNC: 32.9 G/DL (ref 31.4–35)
MCV RBC AUTO: 95.7 FL (ref 79.6–97.8)
MONOCYTES # BLD: 0.4 K/UL (ref 0.1–1.3)
MONOCYTES NFR BLD: 6 % (ref 4–12)
NEUTS SEG # BLD: 5.2 K/UL (ref 1.7–8.2)
NEUTS SEG NFR BLD: 77 % (ref 43–78)
NRBC # BLD: 0 K/UL (ref 0–0.2)
PLATELET # BLD AUTO: 227 K/UL (ref 150–450)
PMV BLD AUTO: 9.3 FL (ref 9.4–12.3)
POTASSIUM SERPL-SCNC: 4.5 MMOL/L (ref 3.5–5.1)
PROT SERPL-MCNC: 7.6 G/DL (ref 6.3–8.2)
RBC # BLD AUTO: 4.47 M/UL (ref 4.23–5.67)
SODIUM SERPL-SCNC: 133 MMOL/L (ref 136–145)
TIBC SERPL-MCNC: 323 UG/DL (ref 250–450)
WBC # BLD AUTO: 6.8 K/UL (ref 4.3–11.1)

## 2019-11-19 PROCEDURE — 85025 COMPLETE CBC W/AUTO DIFF WBC: CPT

## 2019-11-19 PROCEDURE — 36415 COLL VENOUS BLD VENIPUNCTURE: CPT

## 2019-11-19 PROCEDURE — 83540 ASSAY OF IRON: CPT

## 2019-11-19 PROCEDURE — 82728 ASSAY OF FERRITIN: CPT

## 2019-11-19 PROCEDURE — 80053 COMPREHEN METABOLIC PANEL: CPT

## 2019-11-27 RX ORDER — CEFAZOLIN SODIUM/WATER 2 G/20 ML
2 SYRINGE (ML) INTRAVENOUS
Status: CANCELLED | OUTPATIENT
Start: 2019-11-27 | End: 2019-11-27

## 2019-12-02 ENCOUNTER — HOSPITAL ENCOUNTER (OUTPATIENT)
Dept: SURGERY | Age: 64
Discharge: HOME | End: 2019-12-02
Payer: COMMERCIAL

## 2019-12-02 ENCOUNTER — ANESTHESIA EVENT (OUTPATIENT)
Dept: SURGERY | Age: 64
End: 2019-12-02
Payer: COMMERCIAL

## 2019-12-02 VITALS
SYSTOLIC BLOOD PRESSURE: 143 MMHG | HEART RATE: 88 BPM | RESPIRATION RATE: 18 BRPM | BODY MASS INDEX: 39.17 KG/M2 | HEIGHT: 75 IN | WEIGHT: 315 LBS | OXYGEN SATURATION: 96 % | TEMPERATURE: 97.8 F | DIASTOLIC BLOOD PRESSURE: 92 MMHG

## 2019-12-02 LAB
ANION GAP SERPL CALC-SCNC: 9 MMOL/L (ref 7–16)
BUN SERPL-MCNC: 17 MG/DL (ref 8–23)
CALCIUM SERPL-MCNC: 9.4 MG/DL (ref 8.3–10.4)
CHLORIDE SERPL-SCNC: 103 MMOL/L (ref 98–107)
CO2 SERPL-SCNC: 26 MMOL/L (ref 21–32)
CREAT SERPL-MCNC: 0.91 MG/DL (ref 0.8–1.5)
ERYTHROCYTE [DISTWIDTH] IN BLOOD BY AUTOMATED COUNT: 13.4 % (ref 11.9–14.6)
GLUCOSE SERPL-MCNC: 132 MG/DL (ref 65–100)
HCT VFR BLD AUTO: 42.5 % (ref 41.1–50.3)
HGB BLD-MCNC: 14.1 G/DL (ref 13.6–17.2)
MCH RBC QN AUTO: 32.2 PG (ref 26.1–32.9)
MCHC RBC AUTO-ENTMCNC: 33.2 G/DL (ref 31.4–35)
MCV RBC AUTO: 97 FL (ref 79.6–97.8)
NRBC # BLD: 0 K/UL (ref 0–0.2)
PLATELET # BLD AUTO: 202 K/UL (ref 150–450)
PMV BLD AUTO: 10.5 FL (ref 9.4–12.3)
POTASSIUM SERPL-SCNC: 4.2 MMOL/L (ref 3.5–5.1)
RBC # BLD AUTO: 4.38 M/UL (ref 4.23–5.6)
SODIUM SERPL-SCNC: 138 MMOL/L (ref 136–145)
WBC # BLD AUTO: 7.9 K/UL (ref 4.3–11.1)

## 2019-12-02 PROCEDURE — 80048 BASIC METABOLIC PNL TOTAL CA: CPT

## 2019-12-02 PROCEDURE — 85027 COMPLETE CBC AUTOMATED: CPT

## 2019-12-02 NOTE — PERIOP NOTES
Patient verified name and     Order for consent was found in EHR and matches case posting; patient verified. Type 11 surgery, walk in assessment complete. Labs per surgeon: cbc and bmp collected and results are in connect care; orders noted in EHR  Labs per anesthesia protocol: POC glucose day of surgery  EKG: EKG noted in [facility] from 2019. Cardiac clearance noted in [facility]. Patient denies any chest pain or [facility] approved surgical skin cleanser and instructions given per hospital policy. Patient provided with and instructed on educational handouts including Guide to Surgery, Pain Management, Hand Hygiene, Blood Transfusion Education, and [place] [facility] Brochure. Patient answered medical/surgical history questions at their best of ability. All prior to admission medications documented in [facility]. Original medication prescription bottle was visualized during patient appointment. Patient instructed to hold all vitamins 7 days prior to surgery and NSAIDS 5 days prior to surgery, patient verbalized understanding. Prescription medications to hold: Metformin, vasotec,Linzess hydrochlorothiazide, and spironolactone day of surgery    Patient instructed to continue previous medications as prescribed prior to surgery and to take the following medications the day of surgery according to anesthesia guidelines with a small sip of water: carvedilolol, mucinex, flomax, Flonase if needed, zyrtec if needed,    Patient teach back successful and patient demonstrates knowledge of instructions.

## 2019-12-03 ENCOUNTER — HOSPITAL ENCOUNTER (OUTPATIENT)
Age: 64
Discharge: HOME | End: 2019-12-05
Attending: UROLOGY | Admitting: UROLOGY
Payer: COMMERCIAL

## 2019-12-03 ENCOUNTER — ANESTHESIA (OUTPATIENT)
Dept: SURGERY | Age: 64
End: 2019-12-03
Payer: COMMERCIAL

## 2019-12-03 PROBLEM — N40.0 BPH (BENIGN PROSTATIC HYPERPLASIA): Status: ACTIVE | Noted: 2019-12-03

## 2019-12-03 LAB
ABO + RH BLD: NORMAL
APPEARANCE UR: CLEAR
BILIRUB UR QL: NEGATIVE
BLOOD GROUP ANTIBODIES SERPL: NORMAL
COLOR UR: YELLOW
GLUCOSE BLD STRIP.AUTO-MCNC: 196 MG/DL (ref 65–100)
GLUCOSE BLD STRIP.AUTO-MCNC: 215 MG/DL (ref 65–100)
GLUCOSE BLD STRIP.AUTO-MCNC: 220 MG/DL (ref 65–100)
GLUCOSE BLD STRIP.AUTO-MCNC: 243 MG/DL (ref 65–100)
GLUCOSE BLD STRIP.AUTO-MCNC: 247 MG/DL (ref 65–100)
GLUCOSE BLD STRIP.AUTO-MCNC: 285 MG/DL (ref 65–100)
GLUCOSE UR STRIP.AUTO-MCNC: 250 MG/DL
HCT VFR BLD AUTO: 42 % (ref 41.1–50.3)
HGB BLD-MCNC: 13.9 G/DL (ref 13.6–17.2)
HGB UR QL STRIP: NEGATIVE
KETONES UR QL STRIP.AUTO: NEGATIVE MG/DL
LEUKOCYTE ESTERASE UR QL STRIP.AUTO: NEGATIVE
NITRITE UR QL STRIP.AUTO: NEGATIVE
PH UR STRIP: 6.5 [PH] (ref 5–9)
PROT UR STRIP-MCNC: NEGATIVE MG/DL
SP GR UR REFRACTOMETRY: 1.01 (ref 1–1.02)
SPECIMEN EXP DATE BLD: NORMAL
UROBILINOGEN UR QL STRIP.AUTO: 1 EU/DL (ref 0.2–1)

## 2019-12-03 PROCEDURE — 74011000250 HC RX REV CODE- 250: Performed by: NURSE ANESTHETIST, CERTIFIED REGISTERED

## 2019-12-03 PROCEDURE — 77030040922 HC BLNKT HYPOTHRM STRY -A: Performed by: ANESTHESIOLOGY

## 2019-12-03 PROCEDURE — 94760 N-INVAS EAR/PLS OXIMETRY 1: CPT

## 2019-12-03 PROCEDURE — 77030005206: Performed by: UROLOGY

## 2019-12-03 PROCEDURE — 77030018846 HC SOL IRR STRL H20 ICUM -A

## 2019-12-03 PROCEDURE — 74011250636 HC RX REV CODE- 250/636: Performed by: ANESTHESIOLOGY

## 2019-12-03 PROCEDURE — 77030040361 HC SLV COMPR DVT MDII -B: Performed by: UROLOGY

## 2019-12-03 PROCEDURE — 77030005546 HC CATH URETH FOL 3W BARD -A: Performed by: UROLOGY

## 2019-12-03 PROCEDURE — 77030031458 HC ELECTRD TUR BTTN OCOA -F: Performed by: UROLOGY

## 2019-12-03 PROCEDURE — 74011636637 HC RX REV CODE- 636/637: Performed by: ANESTHESIOLOGY

## 2019-12-03 PROCEDURE — 74011250636 HC RX REV CODE- 250/636: Performed by: NURSE ANESTHETIST, CERTIFIED REGISTERED

## 2019-12-03 PROCEDURE — 77030018846 HC SOL IRR STRL H20 ICUM -A: Performed by: UROLOGY

## 2019-12-03 PROCEDURE — 77030019927 HC TBNG IRR CYSTO BAXT -A: Performed by: UROLOGY

## 2019-12-03 PROCEDURE — 77030029290 HC ELECTRD LP CUT OCOA -F: Performed by: UROLOGY

## 2019-12-03 PROCEDURE — 77030018836 HC SOL IRR NACL ICUM -A: Performed by: UROLOGY

## 2019-12-03 PROCEDURE — 99218 HC RM OBSERVATION: CPT

## 2019-12-03 PROCEDURE — 76060000034 HC ANESTHESIA 1.5 TO 2 HR: Performed by: UROLOGY

## 2019-12-03 PROCEDURE — 77030021678 HC GLIDESCP STAT DISP VERT -B: Performed by: ANESTHESIOLOGY

## 2019-12-03 PROCEDURE — 86900 BLOOD TYPING SEROLOGIC ABO: CPT

## 2019-12-03 PROCEDURE — 74011250636 HC RX REV CODE- 250/636: Performed by: UROLOGY

## 2019-12-03 PROCEDURE — 77030037088 HC TUBE ENDOTRACH ORAL NSL COVD-A: Performed by: ANESTHESIOLOGY

## 2019-12-03 PROCEDURE — 77030018836 HC SOL IRR NACL ICUM -A

## 2019-12-03 PROCEDURE — 74011000258 HC RX REV CODE- 258: Performed by: UROLOGY

## 2019-12-03 PROCEDURE — 82962 GLUCOSE BLOOD TEST: CPT

## 2019-12-03 PROCEDURE — 74011250637 HC RX REV CODE- 250/637: Performed by: UROLOGY

## 2019-12-03 PROCEDURE — 85018 HEMOGLOBIN: CPT

## 2019-12-03 PROCEDURE — 76210000006 HC OR PH I REC 0.5 TO 1 HR: Performed by: UROLOGY

## 2019-12-03 PROCEDURE — 88305 TISSUE EXAM BY PATHOLOGIST: CPT

## 2019-12-03 PROCEDURE — 81003 URINALYSIS AUTO W/O SCOPE: CPT

## 2019-12-03 PROCEDURE — 77010033678 HC OXYGEN DAILY

## 2019-12-03 PROCEDURE — 76010000162 HC OR TIME 1.5 TO 2 HR INTENSV-TIER 1: Performed by: UROLOGY

## 2019-12-03 PROCEDURE — 77030010545: Performed by: UROLOGY

## 2019-12-03 RX ORDER — SODIUM CHLORIDE 9 MG/ML
75 INJECTION, SOLUTION INTRAVENOUS CONTINUOUS
Status: DISCONTINUED | OUTPATIENT
Start: 2019-12-03 | End: 2019-12-04

## 2019-12-03 RX ORDER — OXYCODONE AND ACETAMINOPHEN 5; 325 MG/1; MG/1
1 TABLET ORAL AS NEEDED
Status: DISCONTINUED | OUTPATIENT
Start: 2019-12-03 | End: 2019-12-03 | Stop reason: HOSPADM

## 2019-12-03 RX ORDER — HYDROMORPHONE HYDROCHLORIDE 1 MG/ML
1 INJECTION, SOLUTION INTRAMUSCULAR; INTRAVENOUS; SUBCUTANEOUS
Status: DISCONTINUED | OUTPATIENT
Start: 2019-12-03 | End: 2019-12-05 | Stop reason: HOSPADM

## 2019-12-03 RX ORDER — NALOXONE HYDROCHLORIDE 0.4 MG/ML
0.4 INJECTION, SOLUTION INTRAMUSCULAR; INTRAVENOUS; SUBCUTANEOUS AS NEEDED
Status: DISCONTINUED | OUTPATIENT
Start: 2019-12-03 | End: 2019-12-05 | Stop reason: HOSPADM

## 2019-12-03 RX ORDER — HYDROCODONE BITARTRATE AND ACETAMINOPHEN 5; 325 MG/1; MG/1
1 TABLET ORAL
Status: DISCONTINUED | OUTPATIENT
Start: 2019-12-03 | End: 2019-12-05 | Stop reason: HOSPADM

## 2019-12-03 RX ORDER — DEXAMETHASONE SODIUM PHOSPHATE 4 MG/ML
INJECTION, SOLUTION INTRA-ARTICULAR; INTRALESIONAL; INTRAMUSCULAR; INTRAVENOUS; SOFT TISSUE AS NEEDED
Status: DISCONTINUED | OUTPATIENT
Start: 2019-12-03 | End: 2019-12-03 | Stop reason: HOSPADM

## 2019-12-03 RX ORDER — NALOXONE HYDROCHLORIDE 0.4 MG/ML
0.2 INJECTION, SOLUTION INTRAMUSCULAR; INTRAVENOUS; SUBCUTANEOUS AS NEEDED
Status: DISCONTINUED | OUTPATIENT
Start: 2019-12-03 | End: 2019-12-03 | Stop reason: HOSPADM

## 2019-12-03 RX ORDER — ONDANSETRON 2 MG/ML
INJECTION INTRAMUSCULAR; INTRAVENOUS AS NEEDED
Status: DISCONTINUED | OUTPATIENT
Start: 2019-12-03 | End: 2019-12-03 | Stop reason: HOSPADM

## 2019-12-03 RX ORDER — ACETAMINOPHEN 325 MG/1
650 TABLET ORAL
Status: DISCONTINUED | OUTPATIENT
Start: 2019-12-03 | End: 2019-12-05 | Stop reason: HOSPADM

## 2019-12-03 RX ORDER — SPIRONOLACTONE 25 MG/1
25 TABLET ORAL DAILY
Status: DISCONTINUED | OUTPATIENT
Start: 2019-12-04 | End: 2019-12-05 | Stop reason: HOSPADM

## 2019-12-03 RX ORDER — MIDAZOLAM HYDROCHLORIDE 1 MG/ML
2 INJECTION, SOLUTION INTRAMUSCULAR; INTRAVENOUS
Status: DISCONTINUED | OUTPATIENT
Start: 2019-12-03 | End: 2019-12-03 | Stop reason: HOSPADM

## 2019-12-03 RX ORDER — SODIUM CHLORIDE, SODIUM LACTATE, POTASSIUM CHLORIDE, CALCIUM CHLORIDE 600; 310; 30; 20 MG/100ML; MG/100ML; MG/100ML; MG/100ML
75 INJECTION, SOLUTION INTRAVENOUS CONTINUOUS
Status: DISCONTINUED | OUTPATIENT
Start: 2019-12-03 | End: 2019-12-03 | Stop reason: HOSPADM

## 2019-12-03 RX ORDER — PROPOFOL 10 MG/ML
INJECTION, EMULSION INTRAVENOUS AS NEEDED
Status: DISCONTINUED | OUTPATIENT
Start: 2019-12-03 | End: 2019-12-03 | Stop reason: HOSPADM

## 2019-12-03 RX ORDER — SODIUM CHLORIDE 0.9 % (FLUSH) 0.9 %
5-40 SYRINGE (ML) INJECTION EVERY 8 HOURS
Status: DISCONTINUED | OUTPATIENT
Start: 2019-12-03 | End: 2019-12-03 | Stop reason: HOSPADM

## 2019-12-03 RX ORDER — ONDANSETRON 2 MG/ML
4 INJECTION INTRAMUSCULAR; INTRAVENOUS
Status: DISCONTINUED | OUTPATIENT
Start: 2019-12-03 | End: 2019-12-05 | Stop reason: HOSPADM

## 2019-12-03 RX ORDER — ROCURONIUM BROMIDE 10 MG/ML
INJECTION, SOLUTION INTRAVENOUS AS NEEDED
Status: DISCONTINUED | OUTPATIENT
Start: 2019-12-03 | End: 2019-12-03 | Stop reason: HOSPADM

## 2019-12-03 RX ORDER — INSULIN LISPRO 100 [IU]/ML
5 INJECTION, SOLUTION INTRAVENOUS; SUBCUTANEOUS
Status: COMPLETED | OUTPATIENT
Start: 2019-12-03 | End: 2019-12-03

## 2019-12-03 RX ORDER — METOPROLOL TARTRATE 5 MG/5ML
INJECTION INTRAVENOUS AS NEEDED
Status: DISCONTINUED | OUTPATIENT
Start: 2019-12-03 | End: 2019-12-03 | Stop reason: HOSPADM

## 2019-12-03 RX ORDER — METFORMIN HYDROCHLORIDE 500 MG/1
500 TABLET ORAL 2 TIMES DAILY WITH MEALS
Status: DISCONTINUED | OUTPATIENT
Start: 2019-12-03 | End: 2019-12-05 | Stop reason: HOSPADM

## 2019-12-03 RX ORDER — SODIUM CHLORIDE 0.9 % (FLUSH) 0.9 %
5-40 SYRINGE (ML) INJECTION AS NEEDED
Status: DISCONTINUED | OUTPATIENT
Start: 2019-12-03 | End: 2019-12-05 | Stop reason: HOSPADM

## 2019-12-03 RX ORDER — HYDROMORPHONE HYDROCHLORIDE 2 MG/ML
0.5 INJECTION, SOLUTION INTRAMUSCULAR; INTRAVENOUS; SUBCUTANEOUS
Status: DISCONTINUED | OUTPATIENT
Start: 2019-12-03 | End: 2019-12-03 | Stop reason: HOSPADM

## 2019-12-03 RX ORDER — LIDOCAINE HYDROCHLORIDE 20 MG/ML
INJECTION, SOLUTION EPIDURAL; INFILTRATION; INTRACAUDAL; PERINEURAL AS NEEDED
Status: DISCONTINUED | OUTPATIENT
Start: 2019-12-03 | End: 2019-12-03 | Stop reason: HOSPADM

## 2019-12-03 RX ORDER — ESMOLOL HYDROCHLORIDE 10 MG/ML
INJECTION INTRAVENOUS AS NEEDED
Status: DISCONTINUED | OUTPATIENT
Start: 2019-12-03 | End: 2019-12-03 | Stop reason: HOSPADM

## 2019-12-03 RX ORDER — SODIUM CHLORIDE 0.9 % (FLUSH) 0.9 %
5-40 SYRINGE (ML) INJECTION EVERY 8 HOURS
Status: DISCONTINUED | OUTPATIENT
Start: 2019-12-03 | End: 2019-12-05 | Stop reason: HOSPADM

## 2019-12-03 RX ORDER — LISINOPRIL 5 MG/1
10 TABLET ORAL DAILY
Status: DISCONTINUED | OUTPATIENT
Start: 2019-12-04 | End: 2019-12-05 | Stop reason: HOSPADM

## 2019-12-03 RX ORDER — LIDOCAINE HYDROCHLORIDE 10 MG/ML
0.1 INJECTION INFILTRATION; PERINEURAL AS NEEDED
Status: DISCONTINUED | OUTPATIENT
Start: 2019-12-03 | End: 2019-12-03 | Stop reason: HOSPADM

## 2019-12-03 RX ORDER — SODIUM CHLORIDE 0.9 % (FLUSH) 0.9 %
5-40 SYRINGE (ML) INJECTION AS NEEDED
Status: DISCONTINUED | OUTPATIENT
Start: 2019-12-03 | End: 2019-12-03 | Stop reason: HOSPADM

## 2019-12-03 RX ORDER — HYDROCHLOROTHIAZIDE 25 MG/1
25 TABLET ORAL DAILY
Status: DISCONTINUED | OUTPATIENT
Start: 2019-12-04 | End: 2019-12-05 | Stop reason: HOSPADM

## 2019-12-03 RX ORDER — FENTANYL CITRATE 50 UG/ML
INJECTION, SOLUTION INTRAMUSCULAR; INTRAVENOUS AS NEEDED
Status: DISCONTINUED | OUTPATIENT
Start: 2019-12-03 | End: 2019-12-03 | Stop reason: HOSPADM

## 2019-12-03 RX ORDER — CARVEDILOL 12.5 MG/1
25 TABLET ORAL 2 TIMES DAILY WITH MEALS
Status: DISCONTINUED | OUTPATIENT
Start: 2019-12-03 | End: 2019-12-05 | Stop reason: HOSPADM

## 2019-12-03 RX ADMIN — ROCURONIUM BROMIDE 20 MG: 10 INJECTION, SOLUTION INTRAVENOUS at 09:02

## 2019-12-03 RX ADMIN — ONDANSETRON 4 MG: 2 INJECTION INTRAMUSCULAR; INTRAVENOUS at 08:35

## 2019-12-03 RX ADMIN — METOPROLOL TARTRATE 1 MG: 1 INJECTION, SOLUTION INTRAVENOUS at 08:40

## 2019-12-03 RX ADMIN — FENTANYL CITRATE 25 MCG: 50 INJECTION INTRAMUSCULAR; INTRAVENOUS at 09:38

## 2019-12-03 RX ADMIN — SUGAMMADEX 400 MG: 100 INJECTION, SOLUTION INTRAVENOUS at 09:51

## 2019-12-03 RX ADMIN — SODIUM CHLORIDE, SODIUM LACTATE, POTASSIUM CHLORIDE, AND CALCIUM CHLORIDE: 600; 310; 30; 20 INJECTION, SOLUTION INTRAVENOUS at 09:27

## 2019-12-03 RX ADMIN — METOPROLOL TARTRATE 1 MG: 1 INJECTION, SOLUTION INTRAVENOUS at 09:18

## 2019-12-03 RX ADMIN — FENTANYL CITRATE 25 MCG: 50 INJECTION INTRAMUSCULAR; INTRAVENOUS at 09:35

## 2019-12-03 RX ADMIN — Medication 10 ML: at 14:00

## 2019-12-03 RX ADMIN — SODIUM CHLORIDE, SODIUM LACTATE, POTASSIUM CHLORIDE, AND CALCIUM CHLORIDE 75 ML/HR: 600; 310; 30; 20 INJECTION, SOLUTION INTRAVENOUS at 07:16

## 2019-12-03 RX ADMIN — FENTANYL CITRATE 50 MCG: 50 INJECTION INTRAMUSCULAR; INTRAVENOUS at 08:30

## 2019-12-03 RX ADMIN — CARVEDILOL 25 MG: 12.5 TABLET, FILM COATED ORAL at 16:53

## 2019-12-03 RX ADMIN — DEXAMETHASONE SODIUM PHOSPHATE 4 MG: 4 INJECTION, SOLUTION INTRAMUSCULAR; INTRAVENOUS at 08:35

## 2019-12-03 RX ADMIN — CEFAZOLIN 3 G: 1 INJECTION, POWDER, FOR SOLUTION INTRAVENOUS at 08:33

## 2019-12-03 RX ADMIN — SODIUM CHLORIDE 75 ML/HR: 900 INJECTION, SOLUTION INTRAVENOUS at 14:00

## 2019-12-03 RX ADMIN — FENTANYL CITRATE 25 MCG: 50 INJECTION INTRAMUSCULAR; INTRAVENOUS at 09:09

## 2019-12-03 RX ADMIN — ESMOLOL HYDROCHLORIDE 30 MG: 10 INJECTION, SOLUTION INTRAVENOUS at 09:49

## 2019-12-03 RX ADMIN — FENTANYL CITRATE 25 MCG: 50 INJECTION INTRAMUSCULAR; INTRAVENOUS at 09:07

## 2019-12-03 RX ADMIN — SODIUM CHLORIDE 1000 MG: 900 INJECTION, SOLUTION INTRAVENOUS at 16:53

## 2019-12-03 RX ADMIN — METFORMIN HYDROCHLORIDE 500 MG: 500 TABLET ORAL at 16:53

## 2019-12-03 RX ADMIN — ACETAMINOPHEN 650 MG: 325 TABLET, FILM COATED ORAL at 21:00

## 2019-12-03 RX ADMIN — INSULIN LISPRO 5 UNITS: 100 INJECTION, SOLUTION INTRAVENOUS; SUBCUTANEOUS at 07:45

## 2019-12-03 RX ADMIN — SODIUM CHLORIDE 1000 MG: 900 INJECTION, SOLUTION INTRAVENOUS at 23:01

## 2019-12-03 RX ADMIN — LIDOCAINE HYDROCHLORIDE 60 MG: 20 INJECTION, SOLUTION EPIDURAL; INFILTRATION; INTRACAUDAL; PERINEURAL at 08:26

## 2019-12-03 RX ADMIN — PROPOFOL 200 MG: 10 INJECTION, EMULSION INTRAVENOUS at 08:26

## 2019-12-03 RX ADMIN — ROCURONIUM BROMIDE 40 MG: 10 INJECTION, SOLUTION INTRAVENOUS at 08:26

## 2019-12-03 RX ADMIN — ROCURONIUM BROMIDE 10 MG: 10 INJECTION, SOLUTION INTRAVENOUS at 08:47

## 2019-12-03 RX ADMIN — FENTANYL CITRATE 50 MCG: 50 INJECTION INTRAMUSCULAR; INTRAVENOUS at 08:26

## 2019-12-03 NOTE — BRIEF OP NOTE
BRIEF OPERATIVE NOTE    Date of Procedure: 12/3/2019   Preoperative Diagnosis: BPH with urinary obstruction [N40.1, N13.8]  Postoperative Diagnosis: BPH with urinary obstruction [N40.1, N13.8]    Procedure(s):  CYSTOSCOPY TRANSURETHRAL RESECTION OF PROSTATE WITH BIPOLAR, also used button for hemostasis.    Surgeon(s) and Role:     * Rosita Hicks MD - Primary         Surgical Assistant: none    Surgical Staff:  Circ-1: Marion Lofton RN  Event Time In Time Out   Incision Start 5369    Incision Close 5156      Anesthesia: General   Estimated Blood Loss: 50 ml  Specimens:   ID Type Source Tests Collected by Time Destination   1 : prostate chips Preservative Prostate  Rosita Hicks MD 12/3/2019 9347 Pathology      Findings: see op note   Complications: none  Implants: * No implants in log *

## 2019-12-03 NOTE — PROGRESS NOTES
Care Management Interventions  PCP Verified by CM: Yes(Dr. Lavinia Durant)  Mode of Transport at Discharge: Self  Transition of Care Consult (CM Consult): Discharge Planning  Discharge Durable Medical Equipment: No  Physical Therapy Consult: No  Occupational Therapy Consult: No  Speech Therapy Consult: No  Current Support Network: Own Home, Lives with Spouse, Family Lives Nearby  Confirm Follow Up Transport: Self  Plan discussed with Pt/Family/Caregiver: Yes  Freedom of Choice Offered: Yes  Discharge Location  Discharge Placement: Home    CM met with patient in room, patient wife at bedside. Patient alert and orient and verified demographic information to be correct. Patient stated he and his wife live in a 3 story home but live on the main level. Patient stated there are 2 to 3 steps to enter the residence. DME: none  O2; n/a  CPAP: yes  HD: n/a  ADL: patient stated he is independent at baseline and drives daily. Patient stated he is able to obtain and afford his needed medications. Patient stated he has used [facility] in the past and was on the 9th floor in IRU in Oct 2017 after a fall that resulted in injury. Patient stated he would like to return home at discharge. Patient will likely discharge home with no CM needs. CM will continue to follow for discharge planning and needs. Please consult CM for new needs.

## 2019-12-03 NOTE — PROGRESS NOTES
12/03/19 1400   Dual Skin Pressure Injury Assessment   Dual Skin Pressure Injury Assessment WDL   Second Care Provider (Based on [address] [place] [address]) Hong Amato RN   Skin Integumentary   Skin Integumentary (WDL) WDL   Wound Prevention and Protection Methods   Orientation of Wound Prevention Posterior   Location of Wound Prevention Sacrum/Coccyx   Dressing Present  No   Wound Offloading (Prevention Methods) Adaptive equipment;Bed, pressure redistribution/air;Bed, pressure reduction mattress

## 2019-12-03 NOTE — PROGRESS NOTES
TRANSFER - IN REPORT:    Verbal report received from [place] on Birtha Delfino  being received from PACU for routine progression of care. Report consisted of patients Situation, Background, Assessment and   Recommendations(SBAR). Information from the following report(s) OR Summary was reviewed with the receiving nurse. Opportunity for questions and clarification was provided. Assessment completed upon patients arrival to unit and care assumed.

## 2019-12-03 NOTE — PERIOP NOTES
TRANSFER - OUT REPORT:    [facility] Pavan Villeda  being transferred to [address] [place] [address] for routine post - op       Report consisted of patients Situation, Background, Assessment and   Recommendations(SBAR). Information from the following report(s) OR Summary, Procedure Summary, Intake/Output and MAR was reviewed with the receiving nurse. Lines:   Peripheral IV 12/03/19 Right Hand (Active)   Site Assessment Clean, dry, & intact 12/3/2019 10:37 AM   Phlebitis Assessment 0 12/3/2019 10:37 AM   Infiltration Assessment 0 12/3/2019 10:37 AM   Dressing Status Clean, dry, & intact 12/3/2019 10:37 AM   Dressing Type Tape;Transparent 12/3/2019 10:37 AM   Hub Color/Line Status Green; Infusing;Patent 12/3/2019 10:37 AM   Alcohol Cap Used No 12/3/2019 10:37 AM        Opportunity for questions and clarification was provided. VTE prophylaxis orders have been written for Pavan Villeda. Patient and family given floor number and nurses name. Family updated re: pt status after security code verified.

## 2019-12-03 NOTE — OP NOTES
[facility]  OPERATIVE REPORT    Name:  Jorge Ellis  MR#:  [id number]  :  1955  ACCOUNT #:  [de-identified]  DATE OF SERVICE:  2019    PREOPERATIVE DIAGNOSIS:  Benign prostatic hyperplasia. POSTOPERATIVE DIAGNOSIS:  Benign prostatic hyperplasia. PROCEDURE PERFORMED:  Bipolar transurethral resection of the prostate. SURGEON:  Michelle Singh MD    ASSISTANT:  None. ANESTHESIA:  General.    COMPLICATIONS:  None. SPECIMENS REMOVED:  Prostate chips. IMPLANTS:  None. ESTIMATED BLOOD LOSS:  About 50 mL. FINDINGS:  Moderate lateral lobe BPH with obstructing prostate, moderate prostatic calculi. DESCRIPTION OF PROCEDURE:  The patient was given a general anesthetic, placed in the dorsal lithotomy position. He was prepped and draped in sterile fashion. We dilated the distal urethra to 30-[place]izen of the [place] with Deloras Lusty sounds. This dilated without difficulty. I then passed a 26-Citizen of the [place] continuous flow resectoscope sheath for the bipolar resectoscope. The loop was used with a 30 degrees lens and video camera. There was a problem with the camera box. It would not work. Another camera device was brought into the OR and we used this. However, it had an issue where it showed red images and a shade of green. This could not be repaired during the procedure. No other replacement could be located. We did the entire procedure with the color red showing up as green on the screen. The bladder shows a large capacity. There are no stones or tumors. Both ureteral orifices were noted to be in normal position. There is moderate lateral lobe hypertrophy producing obstruction in the midline. The bipolar loop was then used to resect initially posteriorly beginning at the bladder neck and resecting down to the verumontanum. The lateral lobes were resected down to the level of the surgical capsule as well as some small amount of anterior tissue.   The distal extent of the resection was verumontanum. Hemostasis was achieved using the coag loop. At the end of procedure there was good open channel. There were moderate calculi at the surgical capsule in the prostate. These were washed out and also the chips of the prostate were irrigated out with Oscar syringe. These were sent to pathology in formalin. A 24-[place] three-way Tirado was inserted over a catheter guide into the bladder. The balloon was inflated with 30 mL of water and connected to continuous bladder irrigation and ran clear. He was taken to recovery in stable condition. PLAN:  He will be admitted for observation.         Magdalena Camejo MD      [id number]  D:  12/03/2019 10:19  T:  12/03/2019 12:17  JOB #:  [id number]

## 2019-12-03 NOTE — ANESTHESIA PREPROCEDURE EVALUATION
Anesthetic History   No history of anesthetic complications            Review of Systems / Medical History  Patient summary reviewed and pertinent labs reviewed    Pulmonary    COPD: moderate    Sleep apnea (has ASV for apneic events)  Smoker (ex)         Neuro/Psych             Comments: Pt had fall and sustained Subdural hemorrhage;( pt was on blood thinners when he fell); he also had rib fx and pneumothorax Cardiovascular    Hypertension: well controlled        Dysrhythmias : atrial fibrillation  Hyperlipidemia      Comments: See Dr. Bronwyn Jimenez  EKG--AF  ECHO 12/17; nl EF, LAE with LA vol index 59     Stopped Pradaxa three days ago   GI/Hepatic/Renal           Liver disease     Endo/Other    Diabetes: well controlled, type 2    Morbid obesity     Other Findings   Comments: hemochromatosis           Physical Exam    Airway  Mallampati: III  TM Distance: 4 - 6 cm  Neck ROM: normal range of motion   Mouth opening: Normal    Comments: Receeding chin Cardiovascular    Rhythm: regular  Rate: normal         Dental    Dentition: Bridges and Caps/crowns  Comments: Upper bridge   Pulmonary  Breath sounds clear to auscultation               Abdominal  Abdominal exam normal       Other Findings            Anesthetic Plan    ASA: 3  Anesthesia type: general          Induction: Inhalational  Anesthetic plan and risks discussed with: Patient

## 2019-12-03 NOTE — ANESTHESIA POSTPROCEDURE EVALUATION
Procedure(s):  CYSTOSCOPY TRANSURETHRAL RESECTION OF PROSTATE WITH BIPOLAR. general    Anesthesia Post Evaluation      Multimodal analgesia: multimodal analgesia used between 6 hours prior to anesthesia start to PACU discharge  Patient location during evaluation: PACU  Patient participation: complete - patient participated  Level of consciousness: awake and alert  Pain management: adequate  Airway patency: patent  Anesthetic complications: no  Cardiovascular status: acceptable  Respiratory status: acceptable  Hydration status: acceptable  Post anesthesia nausea and vomiting:  none      Vitals Value Taken Time   /66 12/3/2019 12:36 PM   Temp 36.6 °C (97.8 °F) 12/3/2019 10:37 AM   Pulse 86 12/3/2019 12:48 PM   Resp 14 12/3/2019 12:48 PM   SpO2 92 % 12/3/2019 12:47 PM   Vitals shown include unvalidated device data.

## 2019-12-04 LAB
GLUCOSE BLD STRIP.AUTO-MCNC: 183 MG/DL (ref 65–100)
GLUCOSE BLD STRIP.AUTO-MCNC: 208 MG/DL (ref 65–100)
GLUCOSE BLD STRIP.AUTO-MCNC: 236 MG/DL (ref 65–100)
GLUCOSE BLD STRIP.AUTO-MCNC: 288 MG/DL (ref 65–100)

## 2019-12-04 PROCEDURE — 74011250637 HC RX REV CODE- 250/637: Performed by: UROLOGY

## 2019-12-04 PROCEDURE — 74011636637 HC RX REV CODE- 636/637: Performed by: UROLOGY

## 2019-12-04 PROCEDURE — 77030018836 HC SOL IRR NACL ICUM -A

## 2019-12-04 PROCEDURE — 74011250636 HC RX REV CODE- 250/636: Performed by: UROLOGY

## 2019-12-04 PROCEDURE — 82962 GLUCOSE BLOOD TEST: CPT

## 2019-12-04 PROCEDURE — 77030038269 HC DRN EXT URIN PURWCK BARD -A

## 2019-12-04 RX ORDER — INSULIN LISPRO 100 [IU]/ML
0-10 INJECTION, SOLUTION INTRAVENOUS; SUBCUTANEOUS
Status: DISCONTINUED | OUTPATIENT
Start: 2019-12-04 | End: 2019-12-05 | Stop reason: HOSPADM

## 2019-12-04 RX ORDER — CALCIUM CARBONATE 200(500)MG
400 TABLET,CHEWABLE ORAL
Status: DISCONTINUED | OUTPATIENT
Start: 2019-12-04 | End: 2019-12-05 | Stop reason: HOSPADM

## 2019-12-04 RX ORDER — GUAIFENESIN 600 MG/1
600 TABLET, EXTENDED RELEASE ORAL EVERY 12 HOURS
Status: DISCONTINUED | OUTPATIENT
Start: 2019-12-04 | End: 2019-12-05 | Stop reason: HOSPADM

## 2019-12-04 RX ADMIN — CARVEDILOL 25 MG: 12.5 TABLET, FILM COATED ORAL at 18:25

## 2019-12-04 RX ADMIN — SODIUM CHLORIDE 75 ML/HR: 900 INJECTION, SOLUTION INTRAVENOUS at 04:28

## 2019-12-04 RX ADMIN — INSULIN LISPRO 6 UNITS: 100 INJECTION, SOLUTION INTRAVENOUS; SUBCUTANEOUS at 21:59

## 2019-12-04 RX ADMIN — GUAIFENESIN 600 MG: 600 TABLET ORAL at 14:13

## 2019-12-04 RX ADMIN — HYDROCHLOROTHIAZIDE 25 MG: 25 TABLET ORAL at 08:39

## 2019-12-04 RX ADMIN — METFORMIN HYDROCHLORIDE 500 MG: 500 TABLET ORAL at 18:25

## 2019-12-04 RX ADMIN — LISINOPRIL 10 MG: 5 TABLET ORAL at 08:44

## 2019-12-04 RX ADMIN — CARVEDILOL 25 MG: 12.5 TABLET, FILM COATED ORAL at 08:39

## 2019-12-04 RX ADMIN — Medication 10 ML: at 14:00

## 2019-12-04 RX ADMIN — GUAIFENESIN 600 MG: 600 TABLET ORAL at 21:11

## 2019-12-04 RX ADMIN — Medication 10 ML: at 21:12

## 2019-12-04 RX ADMIN — CALCIUM CARBONATE (ANTACID) CHEW TAB 500 MG 400 MG: 500 CHEW TAB at 09:37

## 2019-12-04 RX ADMIN — METFORMIN HYDROCHLORIDE 500 MG: 500 TABLET ORAL at 08:39

## 2019-12-04 RX ADMIN — SPIRONOLACTONE 25 MG: 25 TABLET ORAL at 08:39

## 2019-12-04 NOTE — PROGRESS NOTES
Hourly rounds completed this shift. Patient resting in bed. CBI draining clear/yellow urine at slow gtt. Will continue to monitor and give report to oncoming RN.

## 2019-12-04 NOTE — PROGRESS NOTES
Admit Date: 12/3/2019    Subjective:     Billy Wyman is doing well, no complaints. CBI at moderate drip, urine is clear. Objective:     Patient Vitals for the past 8 hrs:   BP Temp Pulse Resp SpO2   12/04/19 0751 127/81 98.3 °F (36.8 °C) 91 20 92 %   12/04/19 0413 113/74 98.6 °F (37 °C) 87 18 93 %     No intake/output data recorded. 12/02 1901 - 12/04 0700  In: 69312 [P.O.:20; I.V.:1350]  Out: 13215 [Urine:64414]    Physical Exam:  GENERAL: alert, cooperative, no distress  LUNG: clear to auscultation bilaterally  HEART: regular rate and rhythm, S1, S2   ABDOMEN: soft, non-tender  NEUROLOGIC: AOx3      Data Review   Recent Results (from the past 24 hour(s))   GLUCOSE, POC    Collection Time: 12/03/19 10:10 AM   Result Value Ref Range    Glucose (POC) 196 (H) 65 - 100 mg/dL   GLUCOSE, POC    Collection Time: 12/03/19  1:48 PM   Result Value Ref Range    Glucose (POC) 247 (H) 65 - 100 mg/dL   GLUCOSE, POC    Collection Time: 12/03/19  4:08 PM   Result Value Ref Range    Glucose (POC) 285 (H) 65 - 100 mg/dL   GLUCOSE, POC    Collection Time: 12/03/19  8:44 PM   Result Value Ref Range    Glucose (POC) 243 (H) 65 - 100 mg/dL   GLUCOSE, POC    Collection Time: 12/04/19  7:48 AM   Result Value Ref Range    Glucose (POC) 183 (H) 65 - 100 mg/dL       Assessment:     Active Problems:    BPH (benign prostatic hyperplasia) (12/3/2019)      POD 1:    POSTOPERATIVE DIAGNOSIS:  Benign prostatic hyperplasia.     PROCEDURE PERFORMED:  Bipolar transurethral resection of the prostate. Afebrile, VSS    Plan:     Continue CBI. Titrate drip to keep clear and wean as tolerated. Manually irrigate as needed.     Saline-lock IV.     Ambulate.     Continue incentive spirometer.              Tigre Sands NP  [facility]

## 2019-12-05 VITALS
WEIGHT: 315 LBS | DIASTOLIC BLOOD PRESSURE: 75 MMHG | RESPIRATION RATE: 18 BRPM | TEMPERATURE: 99 F | HEART RATE: 87 BPM | BODY MASS INDEX: 39.17 KG/M2 | OXYGEN SATURATION: 96 % | SYSTOLIC BLOOD PRESSURE: 124 MMHG | HEIGHT: 75 IN

## 2019-12-05 LAB — GLUCOSE BLD STRIP.AUTO-MCNC: 169 MG/DL (ref 65–100)

## 2019-12-05 PROCEDURE — 74011250637 HC RX REV CODE- 250/637: Performed by: UROLOGY

## 2019-12-05 PROCEDURE — 74011636637 HC RX REV CODE- 636/637: Performed by: UROLOGY

## 2019-12-05 PROCEDURE — 82962 GLUCOSE BLOOD TEST: CPT

## 2019-12-05 RX ADMIN — INSULIN LISPRO 2 UNITS: 100 INJECTION, SOLUTION INTRAVENOUS; SUBCUTANEOUS at 08:07

## 2019-12-05 RX ADMIN — GUAIFENESIN 600 MG: 600 TABLET ORAL at 08:08

## 2019-12-05 RX ADMIN — Medication 10 ML: at 05:03

## 2019-12-05 RX ADMIN — SPIRONOLACTONE 25 MG: 25 TABLET ORAL at 08:08

## 2019-12-05 RX ADMIN — METFORMIN HYDROCHLORIDE 500 MG: 500 TABLET ORAL at 08:08

## 2019-12-05 RX ADMIN — CARVEDILOL 25 MG: 12.5 TABLET, FILM COATED ORAL at 08:08

## 2019-12-05 RX ADMIN — HYDROCHLOROTHIAZIDE 25 MG: 25 TABLET ORAL at 08:08

## 2019-12-05 RX ADMIN — LISINOPRIL 10 MG: 5 TABLET ORAL at 08:07

## 2019-12-05 NOTE — DISCHARGE SUMMARY
Discharge Summary     Patient: Afshin Obando MRN: [id number]  SSN: [id number]    YOB: 1955  Age: 75 y.o. Sex: male      Allergies: Zithromax [azithromycin]    Admit Date: 12/3/2019    Discharge Date: 12/5/2019     * Admission Diagnoses:  BPH with urinary obstruction [N40.1, N13.8]  BPH (benign prostatic hyperplasia) [N40.0]     * Discharge Diagnoses:   Hospital Problems as of 12/5/2019 Date Reviewed: 11/25/2019          Codes Class Noted - Resolved POA    BPH (benign prostatic hyperplasia) ICD-10-CM: N40.0  ICD-9-CM: 600.00  12/3/2019 - Present Unknown               * Procedures for this admission:   Procedure(s):  CYSTOSCOPY TRANSURETHRAL RESECTION OF PROSTATE WITH BIPOLAR      * Disposition: Home    * Discharged Condition: improved    * Hospital Course:      Mr. Green Guardikatia is a 68-year-old male with hx of BPH and is s/p bipolar TURP on 12/3/19 by Dr. Rupa Thomas. By POD 1, urine cleared and by POD 2, urine remained clear. Tirado removed and pt has voided consistently well. He will d/c home and RTO in 2 weeks for f/u appt.       Pathology:  DIAGNOSIS   A: \"PROSTATE CHIPS\":   BENIGN PROSTATIC HYPERPLASIA WITH FOCAL CHRONIC PROSTATITIS     Patient Active Problem List   Diagnosis Code    Hepatic steatosis K76.0    Iron excess E83.19    Hemochromatosis E83.119    Atrial fibrillation ([facility] [address] I48.91    Obesity E66.9    HTN (hypertension) I10    Nodular prostate without urinary obstruction N40.2    Mitral valve regurgitation I34.0    Seasonal allergic rhinitis due to pollen J30.1    [place] on CPAP G47.33, Z99.89    Subdural hygroma G96.0    Obesity, morbid (HCC) E66.01    Mixed dyslipidemia E78.2    Benign prostatic hyperplasia with nocturia N40.1, R35.1    Diabetes mellitus without complication (HCC) E44.9    Sepsis due to urinary tract infection (HCC) A41.9, N39.0    Fever R50.9    BPH (benign prostatic hyperplasia) N40.0     Discharge Medications:   Current Discharge Medication List CONTINUE these medications which have NOT CHANGED    Details   tamsulosin (FLOMAX) 0.4 mg capsule Take 2 Caps by mouth daily. Qty: 180 Cap, Refills: 3    Associated Diagnoses: BPH with obstruction/lower urinary tract symptoms      cetirizine (ZYRTEC) 10 mg tablet Take  by mouth daily as needed. enalapril (VASOTEC) 10 mg tablet Take 1 Tab by mouth daily. Qty: 90 Tab, Refills: 3    Associated Diagnoses: Essential hypertension      metFORMIN (GLUCOPHAGE) 500 mg tablet Take 1 Tab by mouth two (2) times daily (with meals). Qty: 180 Tab, Refills: 3    Associated Diagnoses: Diabetes mellitus without complication (HCC)      hydroCHLOROthiazide (HYDRODIURIL) 25 mg tablet Take 1 Tab by mouth daily. Qty: 90 Tab, Refills: 3    Associated Diagnoses: Essential hypertension      linaCLOtide (LINZESS) 145 mcg cap capsule Take 1 Cap by mouth Daily (before breakfast). Qty: 90 Cap, Refills: 3      simvastatin (ZOCOR) 40 mg tablet Take 1 Tab by mouth nightly. Qty: 90 Tab, Refills: 3    Associated Diagnoses: Mixed dyslipidemia      spironolactone (ALDACTONE) 25 mg tablet Take 1 Tab by mouth daily. Qty: 90 Tab, Refills: 3    Associated Diagnoses: Longstanding persistent atrial fibrillation      carvedilol (COREG) 25 mg tablet Take 1 Tab by mouth two (2) times daily (with meals). Qty: 180 Tab, Refills: 3    Associated Diagnoses: Longstanding persistent atrial fibrillation      krill-om-3-dha-epa-phospho-ast [id number] mg cap Take  by mouth.      cyanocobalamin 1,000 mcg tablet Take 1,000 mcg by mouth daily. cpap machine kit by Does Not Apply route. Indications: ASV      guaiFENesin ER (MUCINEX) 600 mg ER tablet Take 600 mg by mouth two (2) times a day. FLUTICASONE PROPIONATE (FLONASE NA) 2 Sprays by Nasal route daily.       clotrimazole-betamethasone (LOTRISONE) topical cream Apply to affected area bid as directed  Qty: 15 g, Refills: 0    Associated Diagnoses: Candidiasis, cutaneous         STOP taking these medications       dabigatran etexilate (PRADAXA) 150 mg capsule Comments:   Reason for Stopping:              Resume pradaxa in 1 week. * Follow-up Care/Patient Instructions: Activity:   No heavy lifting, pushing, pulling, avoid straining, avoid yard work, cutting grass, strenuous activity, etc., until at least 3-4 weeks. Avoid driving while taking pain medication. Recommend colace daily. Pt aware that he may have clear yellow urine and blood-tinged urine alternating for several weeks after surgery.       Diet: Regular Diet  Wound Care: None needed    Follow-up Information     Follow up With Specialties Details Why Contact Info    Grzegorz Vaughan MD Internal Medicine   [address] [place] [address]  [phone]      Raffy NANCY [facility], Nurse Practitioner On 12/19/2019 at 2:15 pm  [address]  [address] [place] [address]  [phone]

## 2019-12-05 NOTE — PROGRESS NOTES
Admit Date: 12/3/2019    Subjective:     Felicity Taylor is ambulating in the room. CBI weaned to off, urine is clear. Objective:     Patient Vitals for the past 8 hrs:   BP Temp Pulse Resp SpO2   12/05/19 0457 128/90 98.1 °F (36.7 °C) (!) 102 20 96 %   12/05/19 0001 129/75 98.3 °F (36.8 °C) 92 20 96 %     No intake/output data recorded. 12/03 1901 - 12/05 0700  In: 00734 [P.O.:600]  Out: 18626 [Urine:56408]    Physical Exam:  GENERAL: alert, cooperative, no distress  LUNG: clear to auscultation bilaterally  HEART: regular rate and rhythm, S1, S2   ABDOMEN: soft, non-tender  NEUROLOGIC: AOx3    Data Review   Recent Results (from the past 24 hour(s))   GLUCOSE, POC    Collection Time: 12/04/19  7:48 AM   Result Value Ref Range    Glucose (POC) 183 (H) 65 - 100 mg/dL   GLUCOSE, POC    Collection Time: 12/04/19 11:13 AM   Result Value Ref Range    Glucose (POC) 236 (H) 65 - 100 mg/dL   GLUCOSE, POC    Collection Time: 12/04/19  4:03 PM   Result Value Ref Range    Glucose (POC) 208 (H) 65 - 100 mg/dL   GLUCOSE, POC    Collection Time: 12/04/19  8:32 PM   Result Value Ref Range    Glucose (POC) 288 (H) 65 - 100 mg/dL       Assessment:     Active Problems:    BPH (benign prostatic hyperplasia) (12/3/2019)      POD 2:     POSTOPERATIVE DIAGNOSIS:  Benign prostatic hyperplasia.     PROCEDURE PERFORMED:  Bipolar transurethral resection of the prostate.     Afebrile, VSS      Pathology:  DIAGNOSIS   A: \"PROSTATE CHIPS\":   BENIGN PROSTATIC HYPERPLASIA WITH FOCAL CHRONIC PROSTATITIS     Plan:     Remove nieves. Monitor voiding. Ambulate pt. Home later when voiding consistently. RTO in 2 weeks for f/u appt, we will notify pt of appt time/date.              Samson Temple NP  [facility]

## 2019-12-05 NOTE — PROGRESS NOTES
Tirado removed per Carson Broussard NP. Urinal given to pt-instructed to keep voided urine. Will monitor output.

## 2019-12-05 NOTE — DISCHARGE INSTRUCTIONS
Patient Education        DISCHARGE SUMMARY from Nurse    PATIENT INSTRUCTIONS:    After general anesthesia or intravenous sedation, for 24 hours or while taking prescription Narcotics:  · Limit your activities  · Do not drive and operate hazardous machinery  · Do not make important personal or business decisions  · Do  not drink alcoholic beverages  · If you have not urinated within 8 hours after discharge, please contact your surgeon on call. Report the following to your surgeon:  · Excessive pain, swelling, redness or odor of or around the surgical area  · Temperature over 100.5  · Nausea and vomiting lasting longer than 4 hours or if unable to take medications  · Any signs of decreased circulation or nerve impairment to extremity: change in color, persistent  numbness, tingling, coldness or increase pain  · Any questions    What to do at Home:  Recommended activity: No lifting, Driving, or Strenuous exercise for 2 weeks. If you experience any of the following symptoms . Difficulty urinating, fever greater than 101, increased blood in urine, or pain, please follow up with Dr. Chris Rose. *  Please give a list of your current medications to your Primary Care Provider. *  Please update this list whenever your medications are discontinued, doses are      changed, or new medications (including over-the-counter products) are added. *  Please carry medication information at all times in case of emergency situations. These are general instructions for a healthy lifestyle:    No smoking/ No tobacco products/ Avoid exposure to second hand smoke  Surgeon General's Warning:  Quitting smoking now greatly reduces serious risk to your health.     Obesity, smoking, and sedentary lifestyle greatly increases your risk for illness    A healthy diet, regular physical exercise & weight monitoring are important for maintaining a healthy lifestyle    You may be retaining fluid if you have a history of heart failure or if you experience any of the following symptoms:  Weight gain of 3 pounds or more overnight or 5 pounds in a week, increased swelling in our hands or feet or shortness of breath while lying flat in bed. Please call your doctor as soon as you notice any of these symptoms; do not wait until your next office visit. The discharge information has been reviewed with the patient. The patient verbalized understanding. Discharge medications reviewed with the patient and appropriate educational materials and side effects teaching were provided. ___________________________________________________________________________________________________________________________________  Transurethral Resection of the Prostate (TURP): What to Expect at [facility]    Transurethral resection of the prostate (TURP) is surgery to remove prostate tissue. It is done when an overgrown prostate gland is pressing on the urethra and making it hard for a man to urinate. You may need a urinary catheter for a short time. It is a flexible plastic tube used to drain urine from your bladder when you can't urinate on your own. If it is still in place when you go home, your doctor will give you instructions on how to care for your catheter. For several days after surgery, you may feel burning when you urinate. Your urine may be pink for 1 to 3 weeks after surgery. You also may have bladder cramps, or spasms. Your doctor may give you medicine to help control the spasms. You may still feel like you need to urinate often in the weeks after your surgery. It often takes up to 6 weeks for this to get better. After you have healed, you may have less trouble urinating. You may have better control over starting and stopping your urine stream. And you may feel like you get more relief when you urinate. Most men can return to work or many of their usual tasks in 1 to 3 weeks.  But for about 6 weeks, try to avoid heavy lifting and strenuous activities that might put extra pressure on your bladder. Most men still can have erections after surgery (if they were able to have them before surgery). But they may not ejaculate when they have an orgasm. Semen may go into the bladder instead of out through the penis. This is called retrograde ejaculation. It does not hurt and is not harmful to your health. This care sheet gives you a general idea about how long it will take for you to recover. But each person recovers at a different pace. Follow the steps below to get better as quickly as possible. How can you care for yourself at home? Activity    · Rest when you feel tired.     · Be active. Walking is a good choice.     · Allow your body to heal. Don't move quickly or lift anything heavy until you are feeling better.     · Ask your doctor when you can drive again.     · Many people are able to return to work within 1 to 3 weeks after surgery. It depends on the type of work you do and how you feel.     · Do not put anything in your rectum, such as an enema or suppository, for 4 to 6 weeks after the surgery.     · You may shower and take baths when your doctor says it is okay.     · Ask your doctor when it is okay for you to have sex. Diet    · You can eat your normal diet. If your stomach is upset, try bland, low-fat foods like plain rice, broiled chicken, toast, and yogurt.     · If your bowel movements are not regular right after surgery, try to avoid constipation and straining. Drink plenty of water. Your doctor may suggest fiber, a stool softener, or a mild laxative. Medicines    · Your doctor will tell you if and when you can restart your medicines. He or she will also give you instructions about taking any new medicines.     · If you take aspirin or some other blood thinner, be sure to talk to your doctor. He or she will tell you if and when to start taking those medicines again.  Make sure that you understand exactly what your doctor wants you to do.     · Be safe with medicines. Read and follow all instructions on the label. ? If the doctor gave you a prescription medicine for pain, take it as prescribed. ? If you are not taking a prescription pain medicine, ask your doctor if you can take an over-the-counter medicine.     · Take your antibiotics as directed. Do not stop taking them just because you feel better. You need to take the full course of antibiotics. Follow-up care is a key part of your treatment and safety. Be sure to make and go to all appointments, and call your doctor if you are having problems. It's also a good idea to know your test results and keep a list of the medicines you take. When should you call for help? Call 911anytime you think you may need emergency care. For example, call if:    · You passed out (lost consciousness).     · You have chest pain, are short of breath, or cough up blood.    Call your doctor now or seek immediate medical care if:    · You have pain that does not get better after you take pain medicine.     · You have new or more blood clots in your urine. (It is normal for the urine to be pink for a few days.)     · You can't pass urine.     · You have symptoms of a urinary tract infection. These may include:  ? Pain or burning when you urinate. ? A frequent need to urinate without being able to pass much urine. ? Pain in the flank, which is just below the rib cage and above the waist on either side of the back. ? Blood in your urine. ? A fever.     · You are sick to your stomach or can't keep down fluids.     · You have signs of a blood clot in your leg (called a deep vein thrombosis), such as:  ? Pain in your calf, back of the knee, thigh, or groin. ? Redness or swelling in your leg.    Watch closely for changes in your health, and be sure to contact your doctor if you have problems. Where can you learn more? Go to http://shital-javi.info/.   Enter Z223 in the search box to learn more about \"Transurethral Resection of the Prostate (TURP): What to Expect at Home. \"  Current as of: May 28, 2019  Content Version: 12.2  © 7821-2727 [employer]. Care instructions adapted under license by [employer] (which disclaims liability or warranty for this information). If you have questions about a medical condition or this instruction, always ask your healthcare professional. [place] [address] any warranty or liability for your use of this information.

## 2019-12-05 NOTE — PROGRESS NOTES
Discharge instructions and prescriptions given and reviewed with pt and wife,  verbalizes understanding, pt discharged home with family.

## 2019-12-05 NOTE — PROGRESS NOTES
Pt requiring less than one full bag of CBI fl;uid this evening. Catheter still leaking a bit of pink-tinged urine. Pt expressed no discomfort, wore home CPAP this pm, and was ambulating with ease. 4500 ml emptied from catheter. Hourly rounds performed through shift, pt denies needs at this time. Bed in low position and call light/ personal items within reach. Will continue to monitor and report to day shift nurse.

## 2019-12-05 NOTE — PROGRESS NOTES
Care Management Interventions  PCP Verified by CM: Yes(Dr. Fuad Barillas)  Mode of Transport at Discharge: Self  Transition of Care Consult (CM Consult): Discharge Planning  Discharge Durable Medical Equipment: No  Physical Therapy Consult: No  Occupational Therapy Consult: No  Speech Therapy Consult: No  Current Support Network: Own Home, Lives with Spouse, Family Lives Nearby  Confirm Follow Up Transport: Self  Plan discussed with Pt/Family/Caregiver: Yes  Freedom of Choice Offered: Yes  Discharge Location  Discharge Placement: Home    Patient discharged home with no CM needs. Patient transported home by family.

## 2020-01-15 ENCOUNTER — HOSPITAL ENCOUNTER (OUTPATIENT)
Dept: GENERAL RADIOLOGY | Age: 65
Discharge: HOME | End: 2020-01-15
Attending: INTERNAL MEDICINE
Payer: MEDICARE

## 2020-01-15 ENCOUNTER — HOSPITAL ENCOUNTER (OUTPATIENT)
Dept: ULTRASOUND IMAGING | Age: 65
Discharge: HOME | End: 2020-01-15
Attending: INTERNAL MEDICINE
Payer: MEDICARE

## 2020-01-15 DIAGNOSIS — E83.19 IRON EXCESS: ICD-10-CM

## 2020-01-15 DIAGNOSIS — E83.119 HEMOCHROMATOSIS, UNSPECIFIED HEMOCHROMATOSIS TYPE: ICD-10-CM

## 2020-01-15 DIAGNOSIS — R19.7 DIARRHEA, UNSPECIFIED TYPE: ICD-10-CM

## 2020-01-15 DIAGNOSIS — K59.00 CONSTIPATION, UNSPECIFIED CONSTIPATION TYPE: ICD-10-CM

## 2020-01-15 PROCEDURE — 76705 ECHO EXAM OF ABDOMEN: CPT

## 2020-01-15 PROCEDURE — 74018 RADEX ABDOMEN 1 VIEW: CPT

## 2020-01-15 NOTE — PROGRESS NOTES
Please call patient, gallbladder looks fine. Fatty liver, known, unchanged. No liver masses. Kidney cyst right side. Benign.

## 2020-02-18 ENCOUNTER — HOSPITAL ENCOUNTER (OUTPATIENT)
Dept: INFUSION THERAPY | Age: 65
Discharge: HOME | End: 2020-02-18
Payer: MEDICARE

## 2020-02-18 VITALS
OXYGEN SATURATION: 94 % | HEART RATE: 93 BPM | BODY MASS INDEX: 43.32 KG/M2 | DIASTOLIC BLOOD PRESSURE: 78 MMHG | RESPIRATION RATE: 18 BRPM | TEMPERATURE: 98.5 F | SYSTOLIC BLOOD PRESSURE: 110 MMHG | WEIGHT: 315 LBS

## 2020-02-18 LAB
FERRITIN SERPL-MCNC: 82 NG/ML (ref 8–388)
HCT VFR BLD AUTO: 42.4 % (ref 41.1–50.3)
HGB BLD-MCNC: 14.1 G/DL (ref 13.6–17.2)

## 2020-02-18 PROCEDURE — 85018 HEMOGLOBIN: CPT

## 2020-02-18 PROCEDURE — 82728 ASSAY OF FERRITIN: CPT

## 2020-02-18 PROCEDURE — 36415 COLL VENOUS BLD VENIPUNCTURE: CPT

## 2020-05-19 ENCOUNTER — APPOINTMENT (OUTPATIENT)
Dept: INFUSION THERAPY | Age: 65
End: 2020-05-19

## 2020-05-29 ENCOUNTER — HOSPITAL ENCOUNTER (OUTPATIENT)
Dept: LAB | Age: 65
Discharge: HOME | End: 2020-05-29
Payer: MEDICARE

## 2020-05-29 ENCOUNTER — HOSPITAL ENCOUNTER (OUTPATIENT)
Dept: INFUSION THERAPY | Age: 65
Discharge: HOME | End: 2020-05-29
Payer: MEDICARE

## 2020-05-29 VITALS
SYSTOLIC BLOOD PRESSURE: 108 MMHG | RESPIRATION RATE: 16 BRPM | TEMPERATURE: 97.9 F | HEART RATE: 86 BPM | OXYGEN SATURATION: 94 % | DIASTOLIC BLOOD PRESSURE: 63 MMHG

## 2020-05-29 DIAGNOSIS — E83.110 HEREDITARY HEMOCHROMATOSIS (HCC): Primary | ICD-10-CM

## 2020-05-29 DIAGNOSIS — E83.119 HEMOCHROMATOSIS, UNSPECIFIED HEMOCHROMATOSIS TYPE: ICD-10-CM

## 2020-05-29 DIAGNOSIS — E83.19 IRON EXCESS: ICD-10-CM

## 2020-05-29 LAB
ALBUMIN SERPL-MCNC: 3.8 G/DL (ref 3.2–4.6)
ALBUMIN/GLOB SERPL: 1 {RATIO} (ref 1.2–3.5)
ALP SERPL-CCNC: 99 U/L (ref 50–136)
ALT SERPL-CCNC: 49 U/L (ref 12–65)
ANION GAP SERPL CALC-SCNC: 7 MMOL/L (ref 7–16)
AST SERPL-CCNC: 35 U/L (ref 15–37)
BASOPHILS # BLD: 0 K/UL (ref 0–0.2)
BASOPHILS NFR BLD: 0 % (ref 0–2)
BILIRUB SERPL-MCNC: 1.3 MG/DL (ref 0.2–1.1)
BUN SERPL-MCNC: 14 MG/DL (ref 8–23)
CALCIUM SERPL-MCNC: 10.1 MG/DL (ref 8.3–10.4)
CHLORIDE SERPL-SCNC: 102 MMOL/L (ref 98–107)
CO2 SERPL-SCNC: 24 MMOL/L (ref 21–32)
CREAT SERPL-MCNC: 1 MG/DL (ref 0.8–1.5)
DIFFERENTIAL METHOD BLD: NORMAL
EOSINOPHIL # BLD: 0.2 K/UL (ref 0–0.8)
EOSINOPHIL NFR BLD: 2 % (ref 0.5–7.8)
ERYTHROCYTE [DISTWIDTH] IN BLOOD BY AUTOMATED COUNT: 12.9 % (ref 11.9–14.6)
FERRITIN SERPL-MCNC: 156 NG/ML (ref 8–388)
GLOBULIN SER CALC-MCNC: 3.7 G/DL (ref 2.3–3.5)
GLUCOSE SERPL-MCNC: 173 MG/DL (ref 65–100)
HCT VFR BLD AUTO: 44.1 % (ref 41.1–50.3)
HGB BLD-MCNC: 14.9 G/DL (ref 13.6–17.2)
IMM GRANULOCYTES # BLD AUTO: 0.1 K/UL (ref 0–0.5)
IMM GRANULOCYTES NFR BLD AUTO: 1 % (ref 0–5)
LYMPHOCYTES # BLD: 1.1 K/UL (ref 0.5–4.6)
LYMPHOCYTES NFR BLD: 15 % (ref 13–44)
MCH RBC QN AUTO: 32.7 PG (ref 26.1–32.9)
MCHC RBC AUTO-ENTMCNC: 33.8 G/DL (ref 31.4–35)
MCV RBC AUTO: 96.7 FL (ref 79.6–97.8)
MONOCYTES # BLD: 0.5 K/UL (ref 0.1–1.3)
MONOCYTES NFR BLD: 6 % (ref 4–12)
NEUTS SEG # BLD: 5.8 K/UL (ref 1.7–8.2)
NEUTS SEG NFR BLD: 76 % (ref 43–78)
NRBC # BLD: 0 K/UL (ref 0–0.2)
PLATELET # BLD AUTO: 181 K/UL (ref 150–450)
PMV BLD AUTO: 10.1 FL (ref 9.4–12.3)
POTASSIUM SERPL-SCNC: 4.6 MMOL/L (ref 3.5–5.1)
PROT SERPL-MCNC: 7.5 G/DL (ref 6.3–8.2)
RBC # BLD AUTO: 4.56 M/UL (ref 4.23–5.67)
SODIUM SERPL-SCNC: 133 MMOL/L (ref 136–145)
WBC # BLD AUTO: 7.6 K/UL (ref 4.3–11.1)

## 2020-05-29 PROCEDURE — 36415 COLL VENOUS BLD VENIPUNCTURE: CPT

## 2020-05-29 PROCEDURE — 82728 ASSAY OF FERRITIN: CPT

## 2020-05-29 PROCEDURE — 99195 PHLEBOTOMY: CPT

## 2020-05-29 PROCEDURE — 80053 COMPREHEN METABOLIC PANEL: CPT

## 2020-05-29 PROCEDURE — 85025 COMPLETE CBC W/AUTO DIFF WBC: CPT

## 2020-05-29 RX ORDER — SODIUM CHLORIDE 0.9 % (FLUSH) 0.9 %
10 SYRINGE (ML) INJECTION AS NEEDED
Status: DISCONTINUED | OUTPATIENT
Start: 2020-05-29 | End: 2020-06-02 | Stop reason: HOSPADM

## 2020-05-29 RX ADMIN — Medication 10 ML: at 10:55

## 2020-05-29 RX ADMIN — Medication 10 ML: at 10:41

## 2020-05-29 NOTE — PROGRESS NOTES
Arrived to the [facility]. Assessment complete, labs reviewed. Phlebotomy completed for ferritin of 156 completed. Patient tolerated without problems. Any issues or concerns during appointment: None. Patient aware of next infusion appointment on 8/28/20(date) at 10 30 AM (time).   Discharged ambulatory

## 2020-07-27 PROBLEM — K59.09 CHRONIC CONSTIPATION: Status: ACTIVE | Noted: 2020-07-27

## 2020-07-27 PROBLEM — Z87.828 HISTORY OF SUBDURAL HEMATOMA (POST TRAUMATIC): Status: ACTIVE | Noted: 2020-07-27

## 2020-07-27 PROBLEM — I25.10 CORONARY ARTERY DISEASE DUE TO LIPID RICH PLAQUE: Status: ACTIVE | Noted: 2020-07-27

## 2020-07-27 PROBLEM — I25.83 CORONARY ARTERY DISEASE DUE TO LIPID RICH PLAQUE: Status: ACTIVE | Noted: 2020-07-27

## 2020-07-27 PROBLEM — G63 POLYNEUROPATHY ASSOCIATED WITH UNDERLYING DISEASE (HCC): Status: ACTIVE | Noted: 2020-07-27

## 2020-08-28 ENCOUNTER — HOSPITAL ENCOUNTER (OUTPATIENT)
Dept: LAB | Age: 65
Discharge: HOME | End: 2020-08-28

## 2020-08-28 ENCOUNTER — HOSPITAL ENCOUNTER (OUTPATIENT)
Dept: INFUSION THERAPY | Age: 65
Discharge: HOME | End: 2020-08-28
Payer: MEDICARE

## 2020-08-28 VITALS
DIASTOLIC BLOOD PRESSURE: 63 MMHG | HEART RATE: 96 BPM | SYSTOLIC BLOOD PRESSURE: 90 MMHG | RESPIRATION RATE: 16 BRPM | OXYGEN SATURATION: 96 % | TEMPERATURE: 97.6 F

## 2020-08-28 DIAGNOSIS — E83.19 IRON EXCESS: ICD-10-CM

## 2020-08-28 DIAGNOSIS — E83.110 HEREDITARY HEMOCHROMATOSIS (HCC): Primary | ICD-10-CM

## 2020-08-28 LAB — FERRITIN SERPL-MCNC: 108 NG/ML (ref 8–388)

## 2020-08-28 PROCEDURE — 82728 ASSAY OF FERRITIN: CPT

## 2020-08-28 PROCEDURE — 36415 COLL VENOUS BLD VENIPUNCTURE: CPT

## 2020-08-28 PROCEDURE — 99195 PHLEBOTOMY: CPT

## 2020-08-28 NOTE — PROGRESS NOTES
Arrived to the Infusion Center.  Assessment complete, labs reviewed. Phlebotomy completed for ferritin of 108 completed. Patient tolerated without problems. Any issues or concerns during appointment: None.   Patient aware of next infusion appointment on 11/30/20 at 1130am.  Discharged ambulatory

## 2020-09-14 NOTE — PROGRESS NOTES
1400: Spoke with pt son Neeta Stearns. He is requesting pt be transferred to [facility] as all of his physicians are located there. Received call from [address] MD regarding transfer. Dr. Raj Canales notified and contact information provided. 1515: Dr. Raj Canales at bedside. Pt denying transfer at this time. Dr. Raj Canales to contact [facility] MD. Pt now PCU status, will continue to monitor.      Electronically signed by Jimenez Caldwell RN on 9/14/2020 at 7:09 PM Hospitalist Progress Note    10/19/2017  Admit Date: 10/17/2017  1:55 PM   NAME: Issac Erickson   :  1955   MRN:  [id number]   Attending: Stuart Nava MD  PCP:  Tuan Cleveland MD    SUBJECTIVE:   56 yo male with PMH of right subdural hematoma s/p surgery/cailin holes on 10/2/17, right pneumohorax s/p chest tube 10/7/17, atrial fibrillation off pradaxa at the moment, Obesity, [place] on  CPAP, hyperferritinemia with phlebotomies admitted for AMS, gait disturbances for 1-2 days PTA. Patient was evaluated by Dr. Korey Murray after brain ct scan showed bilateral subdural effusions with mass effect. Brain MRI done today compatible with increased subdural effusions: left side subdural 14mm ( was 9 mm on prior scan) and mass effect. Acute on chronic right sided subdural collection 11 mm ( before 5 mm ). He is s/p Echovox and evacuation of Hygromas. Hospitalist was consulted for confusion. : Pt feels better, not confused, no headache visual changes, ate breakfast.     Nursing notes and chart reviewed. Review of Systems negative with exception of pertinent positives noted above. PHYSICAL EXAM     Visit Vitals    /63    Pulse 76    Temp 97.7 °F (36.5 °C)    Resp (!) 31    Ht 6' 3\" (1.905 m)    Wt 152.9 kg (337 lb 1.3 oz)    SpO2 93%    BMI 42.13 kg/m2      Temp (24hrs), Av.9 °F (36.6 °C), Min:97.4 °F (36.3 °C), Max:98.8 °F (37.1 °C)    Oxygen Therapy  O2 Sat (%): 93 % (10/19/17 0727)  Pulse via Oximetry: 78 beats per minute (10/19/17 0727)  O2 Device: Nasal cannula (10/18/17 1513)  O2 Flow Rate (L/min): 4 l/min (10/18/17 1513)    Intake/Output Summary (Last 24 hours) at 10/19/17 0908  Last data filed at 10/19/17 0599   Gross per 24 hour   Intake             3859 ml   Output             1920 ml   Net             1939 ml       General: No acute distress. Alert.  RON drains in place  Lungs:  CTABL.    Heart:  RRR, no murmur, rub, or gallop  Abdomen: Soft, non-distended, non-tender, +bs  Neurologic:  No focal deficits. Moves all extremities. LABS AND STUDIES:  Personally reviewed all labs, meds, and studies for past 24hrs. 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 monitor sugars. · SDH care per primary team neurosurgery.    · Resume pradaxa when ok with neurosurgery  · BP at goal    Will sign off, please call with questions    Signed By: Gabriel Dawson MD     October 19, 2017

## 2020-11-30 ENCOUNTER — HOSPITAL ENCOUNTER (OUTPATIENT)
Dept: LAB | Age: 65
Discharge: HOME | End: 2020-11-30
Payer: MEDICARE

## 2020-11-30 ENCOUNTER — HOSPITAL ENCOUNTER (OUTPATIENT)
Dept: INFUSION THERAPY | Age: 65
Discharge: HOME | End: 2020-11-30

## 2020-11-30 DIAGNOSIS — E83.119 HEMOCHROMATOSIS, UNSPECIFIED HEMOCHROMATOSIS TYPE: ICD-10-CM

## 2020-11-30 LAB
ALBUMIN SERPL-MCNC: 3.5 G/DL (ref 3.2–4.6)
ALBUMIN/GLOB SERPL: 0.9 {RATIO} (ref 1.2–3.5)
ALP SERPL-CCNC: 103 U/L (ref 50–136)
ALT SERPL-CCNC: 27 U/L (ref 12–65)
ANION GAP SERPL CALC-SCNC: 6 MMOL/L (ref 7–16)
AST SERPL-CCNC: 16 U/L (ref 15–37)
BASOPHILS # BLD: 0 K/UL (ref 0–0.2)
BASOPHILS NFR BLD: 0 % (ref 0–2)
BILIRUB SERPL-MCNC: 0.7 MG/DL (ref 0.2–1.1)
BUN SERPL-MCNC: 18 MG/DL (ref 8–23)
CALCIUM SERPL-MCNC: 9.4 MG/DL (ref 8.3–10.4)
CHLORIDE SERPL-SCNC: 104 MMOL/L (ref 98–107)
CO2 SERPL-SCNC: 25 MMOL/L (ref 21–32)
CREAT SERPL-MCNC: 0.9 MG/DL (ref 0.8–1.5)
DIFFERENTIAL METHOD BLD: NORMAL
EOSINOPHIL # BLD: 0.1 K/UL (ref 0–0.8)
EOSINOPHIL NFR BLD: 2 % (ref 0.5–7.8)
ERYTHROCYTE [DISTWIDTH] IN BLOOD BY AUTOMATED COUNT: 12.9 % (ref 11.9–14.6)
FERRITIN SERPL-MCNC: 83 NG/ML (ref 8–388)
GLOBULIN SER CALC-MCNC: 3.8 G/DL (ref 2.3–3.5)
GLUCOSE SERPL-MCNC: 186 MG/DL (ref 65–100)
HCT VFR BLD AUTO: 42.2 % (ref 41.1–50.3)
HGB BLD-MCNC: 14.3 G/DL (ref 13.6–17.2)
IMM GRANULOCYTES # BLD AUTO: 0 K/UL (ref 0–0.5)
IMM GRANULOCYTES NFR BLD AUTO: 0 % (ref 0–5)
LYMPHOCYTES # BLD: 1.1 K/UL (ref 0.5–4.6)
LYMPHOCYTES NFR BLD: 15 % (ref 13–44)
MCH RBC QN AUTO: 32.7 PG (ref 26.1–32.9)
MCHC RBC AUTO-ENTMCNC: 33.9 G/DL (ref 31.4–35)
MCV RBC AUTO: 96.6 FL (ref 79.6–97.8)
MONOCYTES # BLD: 0.4 K/UL (ref 0.1–1.3)
MONOCYTES NFR BLD: 5 % (ref 4–12)
NEUTS SEG # BLD: 5.4 K/UL (ref 1.7–8.2)
NEUTS SEG NFR BLD: 77 % (ref 43–78)
NRBC # BLD: 0 K/UL (ref 0–0.2)
PLATELET # BLD AUTO: 176 K/UL (ref 150–450)
PMV BLD AUTO: 9.9 FL (ref 9.4–12.3)
POTASSIUM SERPL-SCNC: 4.2 MMOL/L (ref 3.5–5.1)
PROT SERPL-MCNC: 7.3 G/DL (ref 6.3–8.2)
RBC # BLD AUTO: 4.37 M/UL (ref 4.23–5.67)
SODIUM SERPL-SCNC: 135 MMOL/L (ref 136–145)
WBC # BLD AUTO: 7 K/UL (ref 4.3–11.1)

## 2020-11-30 PROCEDURE — 36415 COLL VENOUS BLD VENIPUNCTURE: CPT

## 2020-11-30 PROCEDURE — 80053 COMPREHEN METABOLIC PANEL: CPT

## 2020-11-30 PROCEDURE — 82728 ASSAY OF FERRITIN: CPT

## 2020-11-30 PROCEDURE — 85025 COMPLETE CBC W/AUTO DIFF WBC: CPT

## 2021-01-20 PROBLEM — N39.0 SEPSIS DUE TO URINARY TRACT INFECTION (HCC): Status: RESOLVED | Noted: 2019-11-05 | Resolved: 2021-01-20

## 2021-01-20 PROBLEM — A41.9 SEPSIS DUE TO URINARY TRACT INFECTION (HCC): Status: RESOLVED | Noted: 2019-11-05 | Resolved: 2021-01-20

## 2021-01-20 PROBLEM — R50.9 FEVER: Status: RESOLVED | Noted: 2019-11-05 | Resolved: 2021-01-20

## 2021-02-08 ENCOUNTER — APPOINTMENT (RX ONLY)
Dept: URBAN - METROPOLITAN AREA CLINIC 24 | Facility: CLINIC | Age: 66
Setting detail: DERMATOLOGY
End: 2021-02-08

## 2021-02-08 DIAGNOSIS — Z71.89 OTHER SPECIFIED COUNSELING: ICD-10-CM

## 2021-02-08 DIAGNOSIS — L81.4 OTHER MELANIN HYPERPIGMENTATION: ICD-10-CM

## 2021-02-08 DIAGNOSIS — L98.8 OTHER SPECIFIED DISORDERS OF THE SKIN AND SUBCUTANEOUS TISSUE: ICD-10-CM

## 2021-02-08 DIAGNOSIS — L57.8 OTHER SKIN CHANGES DUE TO CHRONIC EXPOSURE TO NONIONIZING RADIATION: ICD-10-CM | Status: STABLE

## 2021-02-08 DIAGNOSIS — L20.89 OTHER ATOPIC DERMATITIS: ICD-10-CM | Status: STABLE

## 2021-02-08 PROBLEM — D23.71 OTHER BENIGN NEOPLASM OF SKIN OF RIGHT LOWER LIMB, INCLUDING HIP: Status: ACTIVE | Noted: 2021-02-08

## 2021-02-08 PROBLEM — L20.84 INTRINSIC (ALLERGIC) ECZEMA: Status: ACTIVE | Noted: 2021-02-08

## 2021-02-08 PROCEDURE — 99214 OFFICE O/P EST MOD 30 MIN: CPT

## 2021-02-08 PROCEDURE — ? PRESCRIPTION

## 2021-02-08 PROCEDURE — ? COUNSELING

## 2021-02-08 RX ORDER — TRIAMCINOLONE ACETONIDE 1 MG/G
OINTMENT TOPICAL
Qty: 1 | Refills: 6 | Status: ERX | COMMUNITY
Start: 2021-02-08

## 2021-02-08 RX ADMIN — TRIAMCINOLONE ACETONIDE: 1 OINTMENT TOPICAL at 00:00

## 2021-02-08 ASSESSMENT — LOCATION DETAILED DESCRIPTION DERM
LOCATION DETAILED: RIGHT INFERIOR MEDIAL MIDBACK
LOCATION DETAILED: RIGHT DISTAL DORSAL FOREARM
LOCATION DETAILED: LEFT POSTERIOR EAR
LOCATION DETAILED: RIGHT POSTERIOR SHOULDER
LOCATION DETAILED: MID POSTERIOR NECK
LOCATION DETAILED: LEFT PROXIMAL DORSAL FOREARM
LOCATION DETAILED: SUPERIOR THORACIC SPINE

## 2021-02-08 ASSESSMENT — LOCATION SIMPLE DESCRIPTION DERM
LOCATION SIMPLE: RIGHT FOREARM
LOCATION SIMPLE: LEFT EAR
LOCATION SIMPLE: POSTERIOR NECK
LOCATION SIMPLE: RIGHT SHOULDER
LOCATION SIMPLE: RIGHT LOWER BACK
LOCATION SIMPLE: UPPER BACK
LOCATION SIMPLE: LEFT FOREARM

## 2021-02-08 ASSESSMENT — LOCATION ZONE DERM
LOCATION ZONE: EAR
LOCATION ZONE: NECK
LOCATION ZONE: TRUNK
LOCATION ZONE: ARM

## 2021-03-08 ENCOUNTER — HOSPITAL ENCOUNTER (OUTPATIENT)
Dept: INFUSION THERAPY | Age: 66
Discharge: HOME | End: 2021-03-08
Payer: MEDICARE

## 2021-03-08 VITALS
TEMPERATURE: 97.6 F | OXYGEN SATURATION: 95 % | SYSTOLIC BLOOD PRESSURE: 103 MMHG | WEIGHT: 315 LBS | BODY MASS INDEX: 46.97 KG/M2 | HEART RATE: 90 BPM | DIASTOLIC BLOOD PRESSURE: 54 MMHG | RESPIRATION RATE: 16 BRPM

## 2021-03-08 DIAGNOSIS — E83.110 HEREDITARY HEMOCHROMATOSIS (HCC): ICD-10-CM

## 2021-03-08 DIAGNOSIS — E83.119 HEMOCHROMATOSIS, UNSPECIFIED HEMOCHROMATOSIS TYPE: Primary | ICD-10-CM

## 2021-03-08 DIAGNOSIS — E83.19 IRON EXCESS: ICD-10-CM

## 2021-03-08 LAB
BASOPHILS # BLD: 0 K/UL (ref 0–0.2)
BASOPHILS NFR BLD: 0 % (ref 0–2)
DIFFERENTIAL METHOD BLD: ABNORMAL
EOSINOPHIL # BLD: 0.1 K/UL (ref 0–0.8)
EOSINOPHIL NFR BLD: 2 % (ref 0.5–7.8)
ERYTHROCYTE [DISTWIDTH] IN BLOOD BY AUTOMATED COUNT: 12.9 % (ref 11.9–14.6)
FERRITIN SERPL-MCNC: 146 NG/ML (ref 8–388)
HCT VFR BLD AUTO: 41.8 % (ref 41.1–50.3)
HGB BLD-MCNC: 14 G/DL (ref 13.6–17.2)
IMM GRANULOCYTES # BLD AUTO: 0.1 K/UL (ref 0–0.5)
IMM GRANULOCYTES NFR BLD AUTO: 1 % (ref 0–5)
LYMPHOCYTES # BLD: 1 K/UL (ref 0.5–4.6)
LYMPHOCYTES NFR BLD: 13 % (ref 13–44)
MCH RBC QN AUTO: 32.9 PG (ref 26.1–32.9)
MCHC RBC AUTO-ENTMCNC: 33.5 G/DL (ref 31.4–35)
MCV RBC AUTO: 98.4 FL (ref 79.6–97.8)
MONOCYTES # BLD: 0.4 K/UL (ref 0.1–1.3)
MONOCYTES NFR BLD: 6 % (ref 4–12)
NEUTS SEG # BLD: 5.7 K/UL (ref 1.7–8.2)
NEUTS SEG NFR BLD: 79 % (ref 43–78)
NRBC # BLD: 0 K/UL (ref 0–0.2)
PLATELET # BLD AUTO: 169 K/UL (ref 150–450)
PMV BLD AUTO: 10.3 FL (ref 9.4–12.3)
RBC # BLD AUTO: 4.25 M/UL (ref 4.23–5.67)
WBC # BLD AUTO: 7.3 K/UL (ref 4.3–11.1)

## 2021-03-08 PROCEDURE — 99195 PHLEBOTOMY: CPT

## 2021-03-08 PROCEDURE — 82728 ASSAY OF FERRITIN: CPT

## 2021-03-08 PROCEDURE — 85025 COMPLETE CBC W/AUTO DIFF WBC: CPT

## 2021-03-08 NOTE — PROGRESS NOTES
Arrived to the [facility]. Phlebotomy completed for ferritin 146 . Patient tolerated well. Any issues or concerns during appointment: none. Patient aware of next infusion appointment on 6-7-21 (date) at 36 (time). Discharged via ambulatory.

## 2021-04-25 ENCOUNTER — APPOINTMENT (OUTPATIENT)
Dept: CT IMAGING | Age: 66
End: 2021-04-25
Attending: EMERGENCY MEDICINE
Payer: MEDICARE

## 2021-04-25 ENCOUNTER — HOSPITAL ENCOUNTER (EMERGENCY)
Age: 66
Discharge: HOME | End: 2021-04-25
Attending: EMERGENCY MEDICINE
Payer: MEDICARE

## 2021-04-25 VITALS
HEART RATE: 95 BPM | OXYGEN SATURATION: 96 % | WEIGHT: 315 LBS | TEMPERATURE: 98.6 F | DIASTOLIC BLOOD PRESSURE: 78 MMHG | HEIGHT: 75 IN | BODY MASS INDEX: 39.17 KG/M2 | RESPIRATION RATE: 16 BRPM | SYSTOLIC BLOOD PRESSURE: 148 MMHG

## 2021-04-25 DIAGNOSIS — R22.0 FACIAL SWELLING: ICD-10-CM

## 2021-04-25 DIAGNOSIS — J01.00 ACUTE NON-RECURRENT MAXILLARY SINUSITIS: Primary | ICD-10-CM

## 2021-04-25 LAB
ANION GAP SERPL CALC-SCNC: 6 MMOL/L (ref 7–16)
BASOPHILS # BLD: 0 K/UL (ref 0–0.2)
BASOPHILS NFR BLD: 0 % (ref 0–2)
BUN SERPL-MCNC: 9 MG/DL (ref 8–23)
CALCIUM SERPL-MCNC: 9.8 MG/DL (ref 8.3–10.4)
CHLORIDE SERPL-SCNC: 100 MMOL/L (ref 98–107)
CO2 SERPL-SCNC: 29 MMOL/L (ref 21–32)
CREAT SERPL-MCNC: 0.88 MG/DL (ref 0.8–1.5)
DIFFERENTIAL METHOD BLD: ABNORMAL
EOSINOPHIL # BLD: 0.1 K/UL (ref 0–0.8)
EOSINOPHIL NFR BLD: 1 % (ref 0.5–7.8)
ERYTHROCYTE [DISTWIDTH] IN BLOOD BY AUTOMATED COUNT: 12.9 % (ref 11.9–14.6)
GLUCOSE SERPL-MCNC: 189 MG/DL (ref 65–100)
HCT VFR BLD AUTO: 41.3 % (ref 41.1–50.3)
HGB BLD-MCNC: 13.9 G/DL (ref 13.6–17.2)
IMM GRANULOCYTES # BLD AUTO: 0 K/UL (ref 0–0.5)
IMM GRANULOCYTES NFR BLD AUTO: 1 % (ref 0–5)
LYMPHOCYTES # BLD: 0.9 K/UL (ref 0.5–4.6)
LYMPHOCYTES NFR BLD: 10 % (ref 13–44)
MCH RBC QN AUTO: 33.3 PG (ref 26.1–32.9)
MCHC RBC AUTO-ENTMCNC: 33.7 G/DL (ref 31.4–35)
MCV RBC AUTO: 99 FL (ref 79.6–97.8)
MONOCYTES # BLD: 0.6 K/UL (ref 0.1–1.3)
MONOCYTES NFR BLD: 7 % (ref 4–12)
NEUTS SEG # BLD: 6.9 K/UL (ref 1.7–8.2)
NEUTS SEG NFR BLD: 80 % (ref 43–78)
NRBC # BLD: 0 K/UL (ref 0–0.2)
PLATELET # BLD AUTO: 187 K/UL (ref 150–450)
PMV BLD AUTO: 10.2 FL (ref 9.4–12.3)
POTASSIUM SERPL-SCNC: 3.8 MMOL/L (ref 3.5–5.1)
RBC # BLD AUTO: 4.17 M/UL (ref 4.23–5.6)
SODIUM SERPL-SCNC: 135 MMOL/L (ref 136–145)
WBC # BLD AUTO: 8.6 K/UL (ref 4.3–11.1)

## 2021-04-25 PROCEDURE — 70487 CT MAXILLOFACIAL W/DYE: CPT

## 2021-04-25 PROCEDURE — 99282 EMERGENCY DEPT VISIT SF MDM: CPT

## 2021-04-25 PROCEDURE — 74011000258 HC RX REV CODE- 258: Performed by: EMERGENCY MEDICINE

## 2021-04-25 PROCEDURE — 96365 THER/PROPH/DIAG IV INF INIT: CPT

## 2021-04-25 PROCEDURE — 80048 BASIC METABOLIC PNL TOTAL CA: CPT

## 2021-04-25 PROCEDURE — 74011000636 HC RX REV CODE- 636: Performed by: EMERGENCY MEDICINE

## 2021-04-25 PROCEDURE — 74011250636 HC RX REV CODE- 250/636: Performed by: NURSE PRACTITIONER

## 2021-04-25 PROCEDURE — 96366 THER/PROPH/DIAG IV INF ADDON: CPT

## 2021-04-25 PROCEDURE — 85025 COMPLETE CBC W/AUTO DIFF WBC: CPT

## 2021-04-25 RX ORDER — CLINDAMYCIN HYDROCHLORIDE 150 MG/1
300 CAPSULE ORAL 4 TIMES DAILY
Qty: 56 CAP | Refills: 0 | Status: SHIPPED | OUTPATIENT
Start: 2021-04-25 | End: 2021-04-25 | Stop reason: SDUPTHER

## 2021-04-25 RX ORDER — CLINDAMYCIN PHOSPHATE 900 MG/50ML
900 INJECTION INTRAVENOUS
Status: COMPLETED | OUTPATIENT
Start: 2021-04-25 | End: 2021-04-25

## 2021-04-25 RX ORDER — CLINDAMYCIN HYDROCHLORIDE 150 MG/1
300 CAPSULE ORAL 4 TIMES DAILY
Qty: 56 CAP | Refills: 0 | Status: SHIPPED | OUTPATIENT
Start: 2021-04-25 | End: 2021-05-02

## 2021-04-25 RX ORDER — SODIUM CHLORIDE 0.9 % (FLUSH) 0.9 %
10 SYRINGE (ML) INJECTION
Status: COMPLETED | OUTPATIENT
Start: 2021-04-25 | End: 2021-04-25

## 2021-04-25 RX ADMIN — Medication 10 ML: at 17:41

## 2021-04-25 RX ADMIN — SODIUM CHLORIDE 100 ML: 900 INJECTION, SOLUTION INTRAVENOUS at 17:41

## 2021-04-25 RX ADMIN — CLINDAMYCIN PHOSPHATE 900 MG: 900 INJECTION, SOLUTION INTRAVENOUS at 16:31

## 2021-04-25 RX ADMIN — IOPAMIDOL 100 ML: 755 INJECTION, SOLUTION INTRAVENOUS at 17:40

## 2021-04-25 NOTE — ED TRIAGE NOTES
States he has been on Keflex since yesterday but the swelling is getting worse. States his vision is the same and has not been affected.

## 2021-04-25 NOTE — ED TRIAGE NOTES
Pt states left side of face has been swelling since Friday. States he thinks it may be from an abscessed tooth. Swelling is into left lower eyelid. Masked for triage.

## 2021-04-25 NOTE — ED PROVIDER NOTES
74year old male who presents today with complaint of swelling in left side of face around cheek and lower eyelid that began on Friday. He states he thought it was an abscess tooth on the left upper jaw and called his dentist yesterday who prescribed Keflex. He states he has began keflex but feels the swelling worsened today. He reports intermittent frontal headaches; he thought this was due to allergies and has been using Flonase. He denies fever, nausea, vomiting, diarrhea, abdominal pain, chest pain, or shortness of breath. He states his daughter is a ER PA in [place] and she recommended he get checked in the ER today. The history is provided by the patient and the spouse. Facial Swelling   The incident occurred more than 2 days ago. The quality of the pain is described as dull. The pain is at a severity of 7/10. The pain is moderate. The pain has been constant since the injury. There was no loss of consciousness. 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     Diabetes mellitus ([address] 1/3/2012    Essential hypertension, benign 2/6/2014    Hemochromatosis     Hypercholesteremia     Nodular prostate without urinary obstruction 2/6/2014    Obesity 2/6/2014    Pneumothorax, right 10/8/2017    Sepsis due to urinary tract infection ([address] 11/5/2019    Sleep apnea 10/12/2016    uses ASV instead of CPAP machine - per pt 11/18    Subdural hemorrhage following injury ([address] 10/20/2017       Past Surgical History:   Procedure Laterality Date    COLONOSCOPY N/A 11/13/2018    COLONOSCOPY  BMI 41 performed by Arpan Mccabe MD at [facility] HX CRANIOTOMY Right 10/02/2017    [facility] for Subdural Hematoma    HX KNEE ARTHROSCOPY Right     HX OTHER SURGICAL Bilateral 10/18/2017    Sajiperla Narayan for Subdural Hematoma    HX TURP  2019    HX UROLOGICAL      prostate u/s and BX    AK CHEST SURGERY PROCEDURE UNLISTED  10/2017    pneumothorax after fall         Family History:   Problem Relation Age of Onset    Diabetes Father     Heart Disease Father 61            Hypertension Father     Lung Cancer Father     Atrial Fibrillation Father     Other Mother         Sarcoidosis    Psychiatric Disorder Mother     Stroke Brother     Psychiatric Disorder Brother     Heart Failure Brother     Alcohol abuse Brother     Heart Disease Paternal Grandmother     Prostate Cancer Paternal Grandfather        Social History     Socioeconomic History    Marital status:      Spouse name: Not on file    Number of children: Not on file    Years of education: Not on file    Highest education level: Not on file   Occupational History    Not on file   Social Needs    Financial resource strain: Not on file    Food insecurity     Worry: Not on file     Inability: Not on file    Transportation needs     Medical: Not on file     Non-medical: Not on file   Tobacco Use    Smoking status: Former Smoker     Packs/day: 1.50     Years: 18.00     Pack years: 27.00     Types: Cigarettes     Quit date: 1989     Years since quittin.7    Smokeless tobacco: Former User     Types: Chew     Quit date: 2016   Substance and Sexual Activity    Alcohol use:  Yes     Alcohol/week: 8.0 standard drinks     Types: 6 Cans of beer, 2 Standard drinks or equivalent per week    Drug use: No    Sexual activity: Yes     Partners: Female     Birth control/protection: None   Lifestyle    Physical activity     Days per week: Not on file     Minutes per session: Not on file    Stress: Not on file   Relationships    Social connections     Talks on phone: Not on file     Gets together: Not on file     Attends Restoration service: Not on file Active member of club or organization: Not on file     Attends meetings of clubs or organizations: Not on file     Relationship status: Not on file    Intimate partner violence     Fear of current or ex partner: Not on file     Emotionally abused: Not on file     Physically abused: Not on file     Forced sexual activity: Not on file   Other Topics Concern     Service Not Asked    Blood Transfusions Not Asked    Caffeine Concern Not Asked    Occupational Exposure Not Asked    Hobby Hazards Not Asked    Sleep Concern Not Asked    Stress Concern Yes    Weight Concern Yes    Special Diet No    Back Care Not Asked    Exercise No    Bike Helmet Not Asked    Seat Belt Yes    Self-Exams No   Social History Narrative    Not on file         ALLERGIES: Zithromax [azithromycin]    Review of Systems   Constitutional: Negative for chills, fatigue and fever. HENT: Positive for dental problem and facial swelling. Left sided facial pain   Neurological: Positive for headaches. Negative for dizziness, syncope, speech difficulty and light-headedness. All other systems reviewed and are negative. Vitals:    04/25/21 1536   BP: (!) 148/78   Pulse: 95   Resp: 16   Temp: 98.6 °F (37 °C)   SpO2: 96%   Weight: 152 kg (335 lb)   Height: 6' 3\" (1.905 m)            Physical Exam  Vitals signs and nursing note reviewed. Constitutional:       General: He is not in acute distress. Appearance: Normal appearance. He is obese. He is not ill-appearing, toxic-appearing or diaphoretic. HENT:      Head: Normocephalic and atraumatic. Comments: Tenderness to left cheek/maxillary area     Right Ear: Ear canal and external ear normal. There is no impacted cerumen. Left Ear: Ear canal and external ear normal. There is no impacted cerumen. Nose: Nose normal. No congestion or rhinorrhea. Mouth/Throat:      Mouth: Mucous membranes are moist.      Pharynx: Oropharynx is clear.  No oropharyngeal exudate or posterior oropharyngeal erythema. Eyes:      General: No scleral icterus. Conjunctiva/sclera: Conjunctivae normal.      Pupils: Pupils are equal, round, and reactive to light. Neck:      Musculoskeletal: Normal range of motion and neck supple. Cardiovascular:      Rate and Rhythm: Normal rate. Pulses: Normal pulses. Heart sounds: Normal heart sounds. Pulmonary:      Effort: Pulmonary effort is normal. No respiratory distress. Breath sounds: Normal breath sounds. No wheezing. Abdominal:      General: Bowel sounds are normal. There is no distension. Palpations: Abdomen is soft. Tenderness: There is no abdominal tenderness. Musculoskeletal: Normal range of motion. Skin:     General: Skin is warm and dry. Capillary Refill: Capillary refill takes less than 2 seconds. Neurological:      General: No focal deficit present. Mental Status: He is alert and oriented to person, place, and time. GCS: GCS eye subscore is 4. GCS verbal subscore is 5. GCS motor subscore is 6. Comments: Abnormal left nasolabial movement; appears drooping; patient with swelling to left maxillary area and under left eye. Patient has otherwise normal facial symmetry and movement. Sensation intact. Gait and coordination intact and normal. No signs of stroke or neurological issue. Psychiatric:         Mood and Affect: Mood normal.         Behavior: Behavior normal.          MDM  Number of Diagnoses or Management Options  Acute non-recurrent maxillary sinusitis: new and requires workup  Facial swelling: new and requires workup  Diagnosis management comments: Labs negative for acute process; glucose 186, I have encouraged patient to monitor glucose at home. CT maxillofacial showed mucosal thickening to maxillary sinus and sphenoid sinus. I have discussed results with the patient and spouse. I have advised to stop keflex given by his dentist and begin taking clindamycin tomorrow. I have sent clindamycin prescription to [facility] pharmacy, as requested by patient. Clindamycin IV given in the ER. Swelling below left eye appears to have decreased at time of discharge. Vital signs stable. No signs of stroke, sepsis, or orbital cellulitis. Patient is stable for discharge. I have encouraged him to follow-up with his PCP on Tuesday or Wednesday for reevaluation and recheck of his blood sugar. Strict return precautions discussed. Patient and spouse verbalize understanding agreement with instructions, follow-up, treatment plan, and discharge. Amount and/or Complexity of Data Reviewed  Clinical lab tests: reviewed  Tests in the radiology section of CPT®: reviewed  Discuss the patient with other providers: yes (Dr. Alfreda Whyte. HAWA Chen.)    Risk of Complications, Morbidity, and/or Mortality  Presenting problems: moderate  Diagnostic procedures: moderate  Management options: moderate    Patient Progress  Patient progress: improved    ED Course as of Apr 25 1815   Sun Apr 25, 2021   1630 Case discussed with HAWA Chen who has also seen the patient with me. [BC]   1636 Case discussed with Dr. Alfreda Whyte.    [BC]   7638 IMPRESSION  1. Mucosal thickening of the left maxillary sinus with mucosal thickening also  seen involving the sphenoid sinus. 2. No abnormality seen subjacent to the marker.    CT MAXILLOFACIAL W CONT [BC]      ED Course User Index  [BC] Rosita Cortés, NANCY     Recent Results (from the past 8 hour(s))   CBC WITH AUTOMATED DIFF    Collection Time: 04/25/21  4:13 PM   Result Value Ref Range    WBC 8.6 4.3 - 11.1 K/uL    RBC 4.17 (L) 4.23 - 5.6 M/uL    HGB 13.9 13.6 - 17.2 g/dL    HCT 41.3 41.1 - 50.3 %    MCV 99.0 (H) 79.6 - 97.8 FL    MCH 33.3 (H) 26.1 - 32.9 PG    MCHC 33.7 31.4 - 35.0 g/dL    RDW 12.9 11.9 - 14.6 %    PLATELET 429 758 - 325 K/uL    MPV 10.2 9.4 - 12.3 FL    ABSOLUTE NRBC 0.00 0.0 - 0.2 K/uL    DF AUTOMATED      NEUTROPHILS 80 (H) 43 - 78 % LYMPHOCYTES 10 (L) 13 - 44 %    MONOCYTES 7 4.0 - 12.0 %    EOSINOPHILS 1 0.5 - 7.8 %    BASOPHILS 0 0.0 - 2.0 %    IMMATURE GRANULOCYTES 1 0.0 - 5.0 %    ABS. NEUTROPHILS 6.9 1.7 - 8.2 K/UL    ABS. LYMPHOCYTES 0.9 0.5 - 4.6 K/UL    ABS. MONOCYTES 0.6 0.1 - 1.3 K/UL    ABS. EOSINOPHILS 0.1 0.0 - 0.8 K/UL    ABS. BASOPHILS 0.0 0.0 - 0.2 K/UL    ABS. IMM.  GRANS. 0.0 0.0 - 0.5 K/UL   METABOLIC PANEL, BASIC    Collection Time: 04/25/21  4:13 PM   Result Value Ref Range    Sodium 135 (L) 136 - 145 mmol/L    Potassium 3.8 3.5 - 5.1 mmol/L    Chloride 100 98 - 107 mmol/L    CO2 29 21 - 32 mmol/L    Anion gap 6 (L) 7 - 16 mmol/L    Glucose 189 (H) 65 - 100 mg/dL    BUN 9 8 - 23 MG/DL    Creatinine 0.88 0.8 - 1.5 MG/DL    GFR est AA >60 >60 ml/min/1.73m2    GFR est non-AA >60 >60 ml/min/1.73m2    Calcium 9.8 8.3 - 10.4 MG/DL       Procedures

## 2021-04-25 NOTE — DISCHARGE INSTRUCTIONS
Stop taking Keflex. Begin Clindamycin tomorrow morning. Follow-up with your primary care physician recheck and blood sugar check. Return to the ER for any new, worsening, or concerning symptoms.

## 2021-06-10 ENCOUNTER — HOSPITAL ENCOUNTER (OUTPATIENT)
Dept: LAB | Age: 66
Discharge: HOME | End: 2021-06-10
Payer: MEDICARE

## 2021-06-10 ENCOUNTER — HOSPITAL ENCOUNTER (OUTPATIENT)
Dept: INFUSION THERAPY | Age: 66
Discharge: HOME | End: 2021-06-10
Payer: MEDICARE

## 2021-06-10 VITALS — SYSTOLIC BLOOD PRESSURE: 116 MMHG | DIASTOLIC BLOOD PRESSURE: 70 MMHG

## 2021-06-10 DIAGNOSIS — E83.110 HEREDITARY HEMOCHROMATOSIS (HCC): ICD-10-CM

## 2021-06-10 LAB
ALBUMIN SERPL-MCNC: 3.5 G/DL (ref 3.2–4.6)
ALBUMIN/GLOB SERPL: 1 {RATIO} (ref 1.2–3.5)
ALP SERPL-CCNC: 91 U/L (ref 50–136)
ALT SERPL-CCNC: 49 U/L (ref 12–65)
ANION GAP SERPL CALC-SCNC: 10 MMOL/L (ref 7–16)
AST SERPL-CCNC: 29 U/L (ref 15–37)
BASOPHILS # BLD: 0 K/UL (ref 0–0.2)
BASOPHILS NFR BLD: 0 % (ref 0–2)
BILIRUB SERPL-MCNC: 0.9 MG/DL (ref 0.2–1.1)
BUN SERPL-MCNC: 12 MG/DL (ref 8–23)
CALCIUM SERPL-MCNC: 9 MG/DL (ref 8.3–10.4)
CHLORIDE SERPL-SCNC: 101 MMOL/L (ref 98–107)
CO2 SERPL-SCNC: 23 MMOL/L (ref 21–32)
CREAT SERPL-MCNC: 0.9 MG/DL (ref 0.8–1.5)
DIFFERENTIAL METHOD BLD: ABNORMAL
EOSINOPHIL # BLD: 0.4 K/UL (ref 0–0.8)
EOSINOPHIL NFR BLD: 8 % (ref 0.5–7.8)
ERYTHROCYTE [DISTWIDTH] IN BLOOD BY AUTOMATED COUNT: 13 % (ref 11.9–14.6)
FERRITIN SERPL-MCNC: 122 NG/ML (ref 8–388)
GLOBULIN SER CALC-MCNC: 3.5 G/DL (ref 2.3–3.5)
GLUCOSE SERPL-MCNC: 221 MG/DL (ref 65–100)
HCT VFR BLD AUTO: 40.9 %
HGB BLD-MCNC: 14.1 G/DL (ref 13.6–17.2)
IMM GRANULOCYTES # BLD AUTO: 0 K/UL (ref 0–0.5)
IMM GRANULOCYTES NFR BLD AUTO: 1 % (ref 0–5)
LYMPHOCYTES # BLD: 0.9 K/UL (ref 0.5–4.6)
LYMPHOCYTES NFR BLD: 16 % (ref 13–44)
MCH RBC QN AUTO: 33.3 PG (ref 26.1–32.9)
MCHC RBC AUTO-ENTMCNC: 34.5 G/DL (ref 31.4–35)
MCV RBC AUTO: 96.7 FL (ref 79.6–97.8)
MONOCYTES # BLD: 0.3 K/UL (ref 0.1–1.3)
MONOCYTES NFR BLD: 6 % (ref 4–12)
NEUTS SEG # BLD: 3.7 K/UL (ref 1.7–8.2)
NEUTS SEG NFR BLD: 69 % (ref 43–78)
NRBC # BLD: 0 K/UL (ref 0–0.2)
PLATELET # BLD AUTO: 159 K/UL (ref 150–450)
PMV BLD AUTO: 10 FL (ref 9.4–12.3)
POTASSIUM SERPL-SCNC: 4.3 MMOL/L (ref 3.5–5.1)
PROT SERPL-MCNC: 7 G/DL (ref 6.3–8.2)
RBC # BLD AUTO: 4.23 M/UL (ref 4.23–5.6)
SODIUM SERPL-SCNC: 134 MMOL/L (ref 136–145)
WBC # BLD AUTO: 5.4 K/UL (ref 4.3–11.1)

## 2021-06-10 PROCEDURE — 80053 COMPREHEN METABOLIC PANEL: CPT

## 2021-06-10 PROCEDURE — 82728 ASSAY OF FERRITIN: CPT

## 2021-06-10 PROCEDURE — 85025 COMPLETE CBC W/AUTO DIFF WBC: CPT

## 2021-06-10 PROCEDURE — 36415 COLL VENOUS BLD VENIPUNCTURE: CPT

## 2021-06-10 PROCEDURE — 99195 PHLEBOTOMY: CPT

## 2021-07-27 PROBLEM — M47.817 OSTEOARTHRITIS OF LUMBOSACRAL SPINE: Status: ACTIVE | Noted: 2021-07-27

## 2021-07-27 PROBLEM — E11.9 DIABETES MELLITUS WITHOUT COMPLICATION (HCC): Status: RESOLVED | Noted: 2019-10-02 | Resolved: 2021-07-27

## 2021-07-27 PROBLEM — E11.40 TYPE 2 DIABETES MELLITUS WITH DIABETIC NEUROPATHY (HCC): Status: ACTIVE | Noted: 2021-07-27

## 2021-08-16 ENCOUNTER — HOSPITAL ENCOUNTER (OUTPATIENT)
Dept: MRI IMAGING | Age: 66
Discharge: HOME | End: 2021-08-16
Attending: INTERNAL MEDICINE
Payer: MEDICARE

## 2021-08-16 DIAGNOSIS — E66.01 OBESITY, MORBID (HCC): ICD-10-CM

## 2021-08-16 DIAGNOSIS — M47.817 OSTEOARTHRITIS OF LUMBOSACRAL SPINE: ICD-10-CM

## 2021-08-16 DIAGNOSIS — G63 POLYNEUROPATHY ASSOCIATED WITH UNDERLYING DISEASE (HCC): ICD-10-CM

## 2021-08-16 PROCEDURE — 72148 MRI LUMBAR SPINE W/O DYE: CPT

## 2021-09-15 NOTE — PROGRESS NOTES
Problem: Falls - Risk of  Goal: *Absence of Falls  Document Parish Fall Risk and appropriate interventions in the flowsheet.    Outcome: Progressing Towards Goal  Fall Risk Interventions:  Mobility Interventions: Patient to call before getting OOB    Mentation Interventions: Door open when patient unattended    Medication Interventions: Patient to call before getting OOB, Teach patient to arise slowly    Elimination Interventions: Call light in reach, Patient to call for help with toileting needs, Toilet paper/wipes in reach, Toileting schedule/hourly rounds    History of Falls Interventions: Consult care management for discharge planning none

## 2021-09-23 ENCOUNTER — HOSPITAL ENCOUNTER (OUTPATIENT)
Dept: INFUSION THERAPY | Age: 66
Discharge: HOME | End: 2021-09-23
Payer: MEDICARE

## 2021-09-23 ENCOUNTER — HOSPITAL ENCOUNTER (OUTPATIENT)
Dept: LAB | Age: 66
Discharge: HOME | End: 2021-09-23

## 2021-09-23 VITALS
HEART RATE: 85 BPM | TEMPERATURE: 97.5 F | OXYGEN SATURATION: 94 % | DIASTOLIC BLOOD PRESSURE: 61 MMHG | RESPIRATION RATE: 16 BRPM | SYSTOLIC BLOOD PRESSURE: 115 MMHG

## 2021-09-23 LAB
BASOPHILS # BLD: 0 K/UL (ref 0–0.2)
BASOPHILS NFR BLD: 0 % (ref 0–2)
DIFFERENTIAL METHOD BLD: ABNORMAL
EOSINOPHIL # BLD: 0.1 K/UL (ref 0–0.8)
EOSINOPHIL NFR BLD: 2 % (ref 0.5–7.8)
ERYTHROCYTE [DISTWIDTH] IN BLOOD BY AUTOMATED COUNT: 13.1 % (ref 11.9–14.6)
FERRITIN SERPL-MCNC: 120 NG/ML (ref 8–388)
HCT VFR BLD AUTO: 41.8 %
HGB BLD-MCNC: 14 G/DL (ref 13.6–17.2)
IMM GRANULOCYTES # BLD AUTO: 0 K/UL (ref 0–0.5)
IMM GRANULOCYTES NFR BLD AUTO: 0 % (ref 0–5)
LYMPHOCYTES # BLD: 0.9 K/UL (ref 0.5–4.6)
LYMPHOCYTES NFR BLD: 14 % (ref 13–44)
MCH RBC QN AUTO: 32.8 PG (ref 26.1–32.9)
MCHC RBC AUTO-ENTMCNC: 33.5 G/DL (ref 31.4–35)
MCV RBC AUTO: 97.9 FL (ref 79.6–97.8)
MONOCYTES # BLD: 0.3 K/UL (ref 0.1–1.3)
MONOCYTES NFR BLD: 6 % (ref 4–12)
NEUTS SEG # BLD: 4.7 K/UL (ref 1.7–8.2)
NEUTS SEG NFR BLD: 78 % (ref 43–78)
NRBC # BLD: 0 K/UL (ref 0–0.2)
PLATELET # BLD AUTO: 157 K/UL (ref 150–450)
PMV BLD AUTO: 10.3 FL (ref 9.4–12.3)
RBC # BLD AUTO: 4.27 M/UL (ref 4.23–5.6)
WBC # BLD AUTO: 6.1 K/UL (ref 4.3–11.1)

## 2021-09-23 PROCEDURE — 85025 COMPLETE CBC W/AUTO DIFF WBC: CPT

## 2021-09-23 PROCEDURE — 82728 ASSAY OF FERRITIN: CPT

## 2021-09-23 PROCEDURE — 99195 PHLEBOTOMY: CPT

## 2021-09-23 PROCEDURE — 36415 COLL VENOUS BLD VENIPUNCTURE: CPT

## 2021-09-23 NOTE — PROGRESS NOTES
Arrived to the Infusion Center.  500mL blood removed via therapeutic phlebotomy. Ferritin- 120 today.   Patient tolerated well. Monitored post and given water.  BP wnl.   Any issues or concerns during appointment: no.  Next infusion appointment is 12/9/21 at 1130am. Discharged ambulatory.

## 2021-12-08 ENCOUNTER — HOSPITAL ENCOUNTER (OUTPATIENT)
Dept: INFUSION THERAPY | Age: 66
Discharge: HOME | End: 2021-12-08

## 2021-12-08 ENCOUNTER — HOSPITAL ENCOUNTER (OUTPATIENT)
Dept: LAB | Age: 66
Discharge: HOME | End: 2021-12-08
Payer: MEDICARE

## 2021-12-08 DIAGNOSIS — E83.119 HEMOCHROMATOSIS, UNSPECIFIED HEMOCHROMATOSIS TYPE: ICD-10-CM

## 2021-12-08 LAB
ALBUMIN SERPL-MCNC: 3.4 G/DL (ref 3.2–4.6)
ALBUMIN/GLOB SERPL: 0.9 {RATIO} (ref 1.2–3.5)
ALP SERPL-CCNC: 98 U/L (ref 50–136)
ALT SERPL-CCNC: 50 U/L (ref 12–65)
ANION GAP SERPL CALC-SCNC: 7 MMOL/L (ref 7–16)
AST SERPL-CCNC: 36 U/L (ref 15–37)
BASOPHILS # BLD: 0 K/UL (ref 0–0.2)
BASOPHILS NFR BLD: 0 % (ref 0–2)
BILIRUB SERPL-MCNC: 0.8 MG/DL (ref 0.2–1.1)
BUN SERPL-MCNC: 16 MG/DL (ref 8–23)
CALCIUM SERPL-MCNC: 9.2 MG/DL (ref 8.3–10.4)
CHLORIDE SERPL-SCNC: 101 MMOL/L (ref 98–107)
CO2 SERPL-SCNC: 26 MMOL/L (ref 21–32)
CREAT SERPL-MCNC: 1.1 MG/DL (ref 0.8–1.5)
DIFFERENTIAL METHOD BLD: ABNORMAL
EOSINOPHIL # BLD: 0.1 K/UL (ref 0–0.8)
EOSINOPHIL NFR BLD: 2 % (ref 0.5–7.8)
ERYTHROCYTE [DISTWIDTH] IN BLOOD BY AUTOMATED COUNT: 12.6 % (ref 11.9–14.6)
FERRITIN SERPL-MCNC: 106 NG/ML (ref 8–388)
GLOBULIN SER CALC-MCNC: 3.6 G/DL (ref 2.3–3.5)
GLUCOSE SERPL-MCNC: 266 MG/DL (ref 65–100)
HCT VFR BLD AUTO: 40.6 %
HGB BLD-MCNC: 13.9 G/DL (ref 13.6–17.2)
IMM GRANULOCYTES # BLD AUTO: 0 K/UL (ref 0–0.5)
IMM GRANULOCYTES NFR BLD AUTO: 1 % (ref 0–5)
LYMPHOCYTES # BLD: 1.3 K/UL (ref 0.5–4.6)
LYMPHOCYTES NFR BLD: 19 % (ref 13–44)
MCH RBC QN AUTO: 33.1 PG (ref 26.1–32.9)
MCHC RBC AUTO-ENTMCNC: 34.2 G/DL (ref 31.4–35)
MCV RBC AUTO: 96.7 FL (ref 79.6–97.8)
MONOCYTES # BLD: 0.4 K/UL (ref 0.1–1.3)
MONOCYTES NFR BLD: 6 % (ref 4–12)
NEUTS SEG # BLD: 4.9 K/UL (ref 1.7–8.2)
NEUTS SEG NFR BLD: 72 % (ref 43–78)
NRBC # BLD: 0 K/UL (ref 0–0.2)
PLATELET # BLD AUTO: 169 K/UL (ref 150–450)
PMV BLD AUTO: 10.2 FL (ref 9.4–12.3)
POTASSIUM SERPL-SCNC: 4.2 MMOL/L (ref 3.5–5.1)
PROT SERPL-MCNC: 7 G/DL (ref 6.3–8.2)
RBC # BLD AUTO: 4.2 M/UL (ref 4.23–5.6)
SODIUM SERPL-SCNC: 134 MMOL/L (ref 136–145)
WBC # BLD AUTO: 6.9 K/UL (ref 4.3–11.1)

## 2021-12-08 PROCEDURE — 80053 COMPREHEN METABOLIC PANEL: CPT

## 2021-12-08 PROCEDURE — 85025 COMPLETE CBC W/AUTO DIFF WBC: CPT

## 2021-12-08 PROCEDURE — 82728 ASSAY OF FERRITIN: CPT

## 2021-12-08 PROCEDURE — 36415 COLL VENOUS BLD VENIPUNCTURE: CPT

## 2021-12-14 ENCOUNTER — HOSPITAL ENCOUNTER (OUTPATIENT)
Dept: GENERAL RADIOLOGY | Age: 66
Discharge: HOME | End: 2021-12-14
Payer: MEDICARE

## 2021-12-14 DIAGNOSIS — R52 PAIN: ICD-10-CM

## 2021-12-14 PROCEDURE — 73560 X-RAY EXAM OF KNEE 1 OR 2: CPT

## 2022-01-19 ENCOUNTER — HOSPITAL ENCOUNTER (OUTPATIENT)
Dept: CT IMAGING | Age: 67
Discharge: HOME | End: 2022-01-19
Attending: NURSE PRACTITIONER
Payer: MEDICARE

## 2022-01-19 DIAGNOSIS — K57.92 ACUTE DIVERTICULITIS: ICD-10-CM

## 2022-01-19 DIAGNOSIS — R10.31 RIGHT LOWER QUADRANT ABDOMINAL PAIN: ICD-10-CM

## 2022-01-19 LAB — CREAT BLD-MCNC: 0.64 MG/DL (ref 0.8–1.5)

## 2022-01-19 PROCEDURE — 82565 ASSAY OF CREATININE: CPT

## 2022-01-19 PROCEDURE — 74177 CT ABD & PELVIS W/CONTRAST: CPT

## 2022-01-19 PROCEDURE — 74011000636 HC RX REV CODE- 636: Performed by: NURSE PRACTITIONER

## 2022-01-19 PROCEDURE — 74011000258 HC RX REV CODE- 258: Performed by: NURSE PRACTITIONER

## 2022-01-19 RX ORDER — SODIUM CHLORIDE 0.9 % (FLUSH) 0.9 %
10 SYRINGE (ML) INJECTION
Status: COMPLETED | OUTPATIENT
Start: 2022-01-19 | End: 2022-01-19

## 2022-01-19 RX ADMIN — DIATRIZOATE MEGLUMINE AND DIATRIZOATE SODIUM 15 ML: 660; 100 LIQUID ORAL; RECTAL at 13:36

## 2022-01-19 RX ADMIN — SODIUM CHLORIDE 100 ML: 9 INJECTION, SOLUTION INTRAVENOUS at 13:36

## 2022-01-19 RX ADMIN — Medication 10 ML: at 13:36

## 2022-01-19 RX ADMIN — IOPAMIDOL 100 ML: 755 INJECTION, SOLUTION INTRAVENOUS at 13:36

## 2022-01-19 NOTE — PROGRESS NOTES
Please let the patient know that his CT does not show any acute diverticulitis or other acute findings. Finish antibiotics and return if no improvement with symptoms.

## 2022-01-24 NOTE — PROGRESS NOTES
Pt notified that his CT does not show any acute diverticulitis or other acute findings. Finish antibiotics and return if no improvement with symptoms. Pt verbalized understanding.

## 2022-02-01 PROBLEM — E11.51 CONTROLLED TYPE 2 DIABETES MELLITUS WITH PERIPHERAL CIRCULATORY DISORDER (HCC): Status: ACTIVE | Noted: 2022-02-01

## 2022-03-09 ENCOUNTER — HOSPITAL ENCOUNTER (OUTPATIENT)
Dept: LAB | Age: 67
Discharge: HOME | End: 2022-03-09

## 2022-03-09 ENCOUNTER — HOSPITAL ENCOUNTER (OUTPATIENT)
Dept: INFUSION THERAPY | Age: 67
Discharge: HOME | End: 2022-03-09
Payer: MEDICARE

## 2022-03-09 VITALS
RESPIRATION RATE: 18 BRPM | SYSTOLIC BLOOD PRESSURE: 123 MMHG | OXYGEN SATURATION: 94 % | DIASTOLIC BLOOD PRESSURE: 70 MMHG | TEMPERATURE: 98 F | HEART RATE: 91 BPM

## 2022-03-09 DIAGNOSIS — E83.110 HEREDITARY HEMOCHROMATOSIS (HCC): Primary | ICD-10-CM

## 2022-03-09 DIAGNOSIS — E83.19 IRON EXCESS: ICD-10-CM

## 2022-03-09 LAB — FERRITIN SERPL-MCNC: 128 NG/ML (ref 8–388)

## 2022-03-09 PROCEDURE — 99195 PHLEBOTOMY: CPT

## 2022-03-09 PROCEDURE — 36415 COLL VENOUS BLD VENIPUNCTURE: CPT

## 2022-03-09 PROCEDURE — 82728 ASSAY OF FERRITIN: CPT

## 2022-03-09 RX ORDER — SODIUM CHLORIDE 0.9 % (FLUSH) 0.9 %
10 SYRINGE (ML) INJECTION AS NEEDED
Status: DISCONTINUED | OUTPATIENT
Start: 2022-03-09 | End: 2022-03-11 | Stop reason: HOSPADM

## 2022-03-09 RX ADMIN — Medication 10 ML: at 15:30

## 2022-03-09 NOTE — PROGRESS NOTES
Arrived to the [facility]. Labs reviewed and therapeutic phlebotomy completed. 500ml removed. Patient tolerated well. Any issues or concerns during appointment: no.  Patient aware of next infusion appointment on 6/16/2022 (date) at 3:30 pm (time). Patient aware of next lab and [facility] office visit on 6/16/2022 (date) at 2 pm (time). Patient instructed to call provider with temperature of 100.4 or greater or nausea/vomiting/ diarrhea or pain not controlled by medications  Discharged ambulatory with self.

## 2022-03-18 PROBLEM — Z87.828 HISTORY OF SUBDURAL HEMATOMA (POST TRAUMATIC): Status: ACTIVE | Noted: 2020-07-27

## 2022-03-18 PROBLEM — M47.817 OSTEOARTHRITIS OF LUMBOSACRAL SPINE: Status: ACTIVE | Noted: 2021-07-27

## 2022-03-19 PROBLEM — Z99.89 OSA ON CPAP: Status: ACTIVE | Noted: 2017-10-13

## 2022-03-19 PROBLEM — J30.1 SEASONAL ALLERGIC RHINITIS DUE TO POLLEN: Status: ACTIVE | Noted: 2017-06-19

## 2022-03-19 PROBLEM — G47.33 OSA ON CPAP: Status: ACTIVE | Noted: 2017-10-13

## 2022-03-19 PROBLEM — K59.09 CHRONIC CONSTIPATION: Status: ACTIVE | Noted: 2020-07-27

## 2022-03-19 PROBLEM — R35.1 BENIGN PROSTATIC HYPERPLASIA WITH NOCTURIA: Status: ACTIVE | Noted: 2019-10-02

## 2022-03-19 PROBLEM — N40.1 BENIGN PROSTATIC HYPERPLASIA WITH NOCTURIA: Status: ACTIVE | Noted: 2019-10-02

## 2022-03-19 PROBLEM — G63 POLYNEUROPATHY ASSOCIATED WITH UNDERLYING DISEASE (HCC): Status: ACTIVE | Noted: 2020-07-27

## 2022-03-19 PROBLEM — E66.01 OBESITY, MORBID (HCC): Status: ACTIVE | Noted: 2017-11-29

## 2022-03-19 PROBLEM — G96.08 SUBDURAL HYGROMA: Status: ACTIVE | Noted: 2017-10-18

## 2022-03-19 PROBLEM — I25.83 CORONARY ARTERY DISEASE DUE TO LIPID RICH PLAQUE: Status: ACTIVE | Noted: 2020-07-27

## 2022-03-19 PROBLEM — I25.10 CORONARY ARTERY DISEASE DUE TO LIPID RICH PLAQUE: Status: ACTIVE | Noted: 2020-07-27

## 2022-03-19 PROBLEM — N40.0 BPH (BENIGN PROSTATIC HYPERPLASIA): Status: ACTIVE | Noted: 2019-12-03

## 2022-03-19 PROBLEM — I48.91 ATRIAL FIBRILLATION (HCC): Status: ACTIVE | Noted: 2017-10-08

## 2022-03-20 PROBLEM — E78.2 MIXED DYSLIPIDEMIA: Status: ACTIVE | Noted: 2019-10-02

## 2022-03-20 PROBLEM — E11.40 TYPE 2 DIABETES MELLITUS WITH DIABETIC NEUROPATHY (HCC): Status: ACTIVE | Noted: 2021-07-27

## 2022-03-20 PROBLEM — E11.51 CONTROLLED TYPE 2 DIABETES MELLITUS WITH PERIPHERAL CIRCULATORY DISORDER (HCC): Status: ACTIVE | Noted: 2022-02-01

## 2022-05-11 RX ORDER — 0.9 % SODIUM CHLORIDE 0.9 %
500 INTRAVENOUS SOLUTION INTRAVENOUS ONCE
OUTPATIENT
Start: 2022-06-16 | End: 2022-06-16

## 2022-05-11 RX ORDER — 0.9 % SODIUM CHLORIDE 0.9 %
250 INTRAVENOUS SOLUTION INTRAVENOUS ONCE
OUTPATIENT
Start: 2022-06-16 | End: 2022-06-16

## 2022-06-15 DIAGNOSIS — E83.110 HEREDITARY HEMOCHROMATOSIS (HCC): Primary | ICD-10-CM

## 2022-06-15 RX ORDER — SIMVASTATIN 40 MG
TABLET ORAL
Qty: 90 TABLET | Refills: 3 | OUTPATIENT
Start: 2022-06-15

## 2022-06-16 ENCOUNTER — OFFICE VISIT (OUTPATIENT)
Dept: ONCOLOGY | Age: 67
End: 2022-06-16
Payer: MEDICARE

## 2022-06-16 ENCOUNTER — HOSPITAL ENCOUNTER (OUTPATIENT)
Dept: LAB | Age: 67
Discharge: HOME | End: 2022-06-19
Payer: MEDICARE

## 2022-06-16 ENCOUNTER — HOSPITAL ENCOUNTER (OUTPATIENT)
Dept: INFUSION THERAPY | Age: 67
End: 2022-06-16

## 2022-06-16 ENCOUNTER — OFFICE VISIT (OUTPATIENT)
Dept: CARDIOLOGY CLINIC | Age: 67
End: 2022-06-16
Payer: MEDICARE

## 2022-06-16 VITALS
HEIGHT: 75 IN | OXYGEN SATURATION: 90 % | HEART RATE: 91 BPM | SYSTOLIC BLOOD PRESSURE: 125 MMHG | TEMPERATURE: 97.5 F | BODY MASS INDEX: 39.17 KG/M2 | DIASTOLIC BLOOD PRESSURE: 82 MMHG | WEIGHT: 315 LBS | RESPIRATION RATE: 23 BRPM

## 2022-06-16 VITALS
BODY MASS INDEX: 39.17 KG/M2 | DIASTOLIC BLOOD PRESSURE: 70 MMHG | SYSTOLIC BLOOD PRESSURE: 120 MMHG | HEART RATE: 91 BPM | WEIGHT: 315 LBS | HEIGHT: 75 IN

## 2022-06-16 DIAGNOSIS — E83.110 HEREDITARY HEMOCHROMATOSIS (HCC): Primary | ICD-10-CM

## 2022-06-16 DIAGNOSIS — I10 PRIMARY HYPERTENSION: ICD-10-CM

## 2022-06-16 DIAGNOSIS — E83.110 HEREDITARY HEMOCHROMATOSIS (HCC): ICD-10-CM

## 2022-06-16 DIAGNOSIS — I48.11 LONGSTANDING PERSISTENT ATRIAL FIBRILLATION (HCC): Primary | ICD-10-CM

## 2022-06-16 DIAGNOSIS — N40.1 BENIGN PROSTATIC HYPERPLASIA WITH LOWER URINARY TRACT SYMPTOMS, SYMPTOM DETAILS UNSPECIFIED: ICD-10-CM

## 2022-06-16 LAB
ALBUMIN SERPL-MCNC: 3.6 G/DL (ref 3.2–4.6)
ALBUMIN/GLOB SERPL: 1 {RATIO} (ref 1.2–3.5)
ALP SERPL-CCNC: 94 U/L (ref 50–136)
ALT SERPL-CCNC: 44 U/L (ref 12–65)
ANION GAP SERPL CALC-SCNC: 9 MMOL/L (ref 7–16)
AST SERPL-CCNC: 29 U/L (ref 15–37)
BASOPHILS # BLD: 0 K/UL (ref 0–0.2)
BASOPHILS NFR BLD: 0 % (ref 0–2)
BILIRUB SERPL-MCNC: 1 MG/DL (ref 0.2–1.1)
BUN SERPL-MCNC: 16 MG/DL (ref 8–23)
CALCIUM SERPL-MCNC: 9.3 MG/DL (ref 8.3–10.4)
CHLORIDE SERPL-SCNC: 102 MMOL/L (ref 98–107)
CO2 SERPL-SCNC: 24 MMOL/L (ref 21–32)
CREAT SERPL-MCNC: 1 MG/DL (ref 0.8–1.5)
DIFFERENTIAL METHOD BLD: ABNORMAL
EOSINOPHIL # BLD: 0.2 K/UL (ref 0–0.8)
EOSINOPHIL NFR BLD: 3 % (ref 0.5–7.8)
ERYTHROCYTE [DISTWIDTH] IN BLOOD BY AUTOMATED COUNT: 13.2 % (ref 11.9–14.6)
FERRITIN SERPL-MCNC: 105 NG/ML (ref 8–388)
GLOBULIN SER CALC-MCNC: 3.5 G/DL (ref 2.3–3.5)
GLUCOSE SERPL-MCNC: 290 MG/DL (ref 65–100)
HCT VFR BLD AUTO: 42.4 %
HGB BLD-MCNC: 14.3 G/DL (ref 13.6–17.2)
IMM GRANULOCYTES # BLD AUTO: 0.1 K/UL (ref 0–0.5)
IMM GRANULOCYTES NFR BLD AUTO: 1 % (ref 0–5)
LYMPHOCYTES # BLD: 0.8 K/UL (ref 0.5–4.6)
LYMPHOCYTES NFR BLD: 12 % (ref 13–44)
MCH RBC QN AUTO: 32.4 PG (ref 26.1–32.9)
MCHC RBC AUTO-ENTMCNC: 33.7 G/DL (ref 31.4–35)
MCV RBC AUTO: 96.1 FL (ref 79.6–97.8)
MONOCYTES # BLD: 0.4 K/UL (ref 0.1–1.3)
MONOCYTES NFR BLD: 6 % (ref 4–12)
NEUTS SEG # BLD: 5.6 K/UL (ref 1.7–8.2)
NEUTS SEG NFR BLD: 78 % (ref 43–78)
NRBC # BLD: 0 K/UL (ref 0–0.2)
PLATELET # BLD AUTO: 164 K/UL (ref 150–450)
PMV BLD AUTO: 10.6 FL (ref 9.4–12.3)
POTASSIUM SERPL-SCNC: 4.2 MMOL/L (ref 3.5–5.1)
PROT SERPL-MCNC: 7.1 G/DL (ref 6.3–8.2)
RBC # BLD AUTO: 4.41 M/UL (ref 4.23–5.6)
SODIUM SERPL-SCNC: 135 MMOL/L (ref 136–145)
WBC # BLD AUTO: 7.2 K/UL (ref 4.3–11.1)

## 2022-06-16 PROCEDURE — 80053 COMPREHEN METABOLIC PANEL: CPT

## 2022-06-16 PROCEDURE — 1036F TOBACCO NON-USER: CPT | Performed by: INTERNAL MEDICINE

## 2022-06-16 PROCEDURE — 82728 ASSAY OF FERRITIN: CPT

## 2022-06-16 PROCEDURE — 93000 ELECTROCARDIOGRAM COMPLETE: CPT | Performed by: INTERNAL MEDICINE

## 2022-06-16 PROCEDURE — 36415 COLL VENOUS BLD VENIPUNCTURE: CPT

## 2022-06-16 PROCEDURE — 99214 OFFICE O/P EST MOD 30 MIN: CPT | Performed by: INTERNAL MEDICINE

## 2022-06-16 PROCEDURE — 1123F ACP DISCUSS/DSCN MKR DOCD: CPT | Performed by: INTERNAL MEDICINE

## 2022-06-16 PROCEDURE — 3017F COLORECTAL CA SCREEN DOC REV: CPT | Performed by: INTERNAL MEDICINE

## 2022-06-16 PROCEDURE — 85025 COMPLETE CBC W/AUTO DIFF WBC: CPT

## 2022-06-16 PROCEDURE — 99213 OFFICE O/P EST LOW 20 MIN: CPT | Performed by: INTERNAL MEDICINE

## 2022-06-16 PROCEDURE — G8417 CALC BMI ABV UP PARAM F/U: HCPCS | Performed by: INTERNAL MEDICINE

## 2022-06-16 PROCEDURE — G8427 DOCREV CUR MEDS BY ELIG CLIN: HCPCS | Performed by: INTERNAL MEDICINE

## 2022-06-16 RX ORDER — GABAPENTIN 600 MG/1
1 TABLET ORAL 2 TIMES DAILY
COMMUNITY
Start: 2022-04-19

## 2022-06-16 RX ORDER — FUROSEMIDE 20 MG/1
20 TABLET ORAL DAILY PRN
COMMUNITY
Start: 2022-05-09

## 2022-06-16 ASSESSMENT — PATIENT HEALTH QUESTIONNAIRE - PHQ9
SUM OF ALL RESPONSES TO PHQ QUESTIONS 1-9: 0
SUM OF ALL RESPONSES TO PHQ9 QUESTIONS 1 & 2: 0
1. LITTLE INTEREST OR PLEASURE IN DOING THINGS: 0
SUM OF ALL RESPONSES TO PHQ QUESTIONS 1-9: 0
2. FEELING DOWN, DEPRESSED OR HOPELESS: 0

## 2022-06-16 NOTE — PATIENT INSTRUCTIONS
Patient Instructions from Today's Visit    Reason for Visit:  Follow up-Hemachromatosis    Diagnosis Information:  [email or web address] net/about-us/asco-answers-patient-education-materials/qadd-ciyjjzs-sbxi-sheets      Plan: Your Ferritin is 105 today. If you don't want to get phlebotomy today you don't have to.     Follow Up:  Dr Joya Sever in 1 year    Recent Lab Results:  Hospital Outpatient Visit on 06/16/2022   Component Date Value Ref Range Status    WBC 06/16/2022 7.2  4.3 - 11.1 K/uL Final    RBC 06/16/2022 4.41  4.23 - 5.6 M/uL Final    Hemoglobin 06/16/2022 14.3  13.6 - 17.2 g/dL Final    Hematocrit 06/16/2022 42.4  % Final    MCV 06/16/2022 96.1  79.6 - 97.8 FL Final    MCH 06/16/2022 32.4  26.1 - 32.9 PG Final    MCHC 06/16/2022 33.7  31.4 - 35.0 g/dL Final    RDW 06/16/2022 13.2  11.9 - 14.6 % Final    Platelets 91/55/2281 164  150 - 450 K/uL Final    MPV 06/16/2022 10.6  9.4 - 12.3 FL Final    nRBC 06/16/2022 0.00  0.0 - 0.2 K/uL Final    **Note: Absolute NRBC parameter is now reported with Hemogram**    Seg Neutrophils 06/16/2022 78  43 - 78 % Final    Lymphocytes 06/16/2022 12* 13 - 44 % Final    Monocytes 06/16/2022 6  4.0 - 12.0 % Final    Eosinophils % 06/16/2022 3  0.5 - 7.8 % Final    Basophils 06/16/2022 0  0.0 - 2.0 % Final    Immature Granulocytes 06/16/2022 1  0.0 - 5.0 % Final    Segs Absolute 06/16/2022 5.6  1.7 - 8.2 K/UL Final    Absolute Lymph # 06/16/2022 0.8  0.5 - 4.6 K/UL Final    Absolute Mono # 06/16/2022 0.4  0.1 - 1.3 K/UL Final    Absolute Eos # 06/16/2022 0.2  0.0 - 0.8 K/UL Final    Basophils Absolute 06/16/2022 0.0  0.0 - 0.2 K/UL Final    Absolute Immature Granulocyte 06/16/2022 0.1  0.0 - 0.5 K/UL Final    Differential Type 06/16/2022 AUTOMATED    Final    Sodium 06/16/2022 135* 136 - 145 mmol/L Final    Potassium 06/16/2022 4.2  3.5 - 5.1 mmol/L Final    Chloride 06/16/2022 102  98 - 107 mmol/L Final    CO2 06/16/2022 24  21 - 32 mmol/L Final    Anion Gap 06/16/2022 9  7 - 16 mmol/L Final    Glucose 06/16/2022 290* 65 - 100 mg/dL Final    BUN 06/16/2022 16  8 - 23 MG/DL Final    CREATININE 06/16/2022 1.00  0.8 - 1.5 MG/DL Final    GFR  06/16/2022 >60  >60 ml/min/1.73m2 Final    GFR Non- 06/16/2022 >60  >60 ml/min/1.73m2 Final    Comment:      Estimated GFR is calculated using the Modification of Diet in Renal Disease (MDRD) Study equation, reported for both  Americans (GFRAA) and non- Americans (GFRNA), and normalized to 1.73m2 body surface area. The physician must decide which value applies to the patient. The MDRD study equation should only be used in individuals age 25 or older. It has not been validated for the following: pregnant women, patients with serious comorbid conditions,or on certain medications, or persons with extremes of body size, muscle mass, or nutritional status.       Calcium 06/16/2022 9.3  8.3 - 10.4 MG/DL Final    Total Bilirubin 06/16/2022 1.0  0.2 - 1.1 MG/DL Final    ALT 06/16/2022 44  12 - 65 U/L Final    AST 06/16/2022 29  15 - 37 U/L Final    Alk Phosphatase 06/16/2022 94  50 - 136 U/L Final    Total Protein 06/16/2022 7.1  6.3 - 8.2 g/dL Final    Albumin 06/16/2022 3.6  3.2 - 4.6 g/dL Final    Globulin 06/16/2022 3.5  2.3 - 3.5 g/dL Final    Albumin/Globulin Ratio 06/16/2022 1.0* 1.2 - 3.5   Final    Ferritin 06/16/2022 105  8 - 388 NG/ML Final         Treatment Summary has been discussed and given to patient: n/a        -------------------------------------------------------------------------------------------------------------------  Please call our office at [phone] if you have any  of the following symptoms:   · Fever of 100.5 or greater  · Chills  · Shortness of breath  · Swelling or pain in one leg    After office hours an answering service is available and will contact a provider for emergencies or if you are experiencing any of the above symptoms.  Patient does express an interest in My Chart. My Chart log in information explained on the after visit summary printout at the [facility] [address] desk.     Ina Doherty MA

## 2022-06-16 NOTE — PROGRESS NOTES
HEMATOLOGY/ONCOLOGY FOLLOWUP  VISIT      Patient Name: Pamella Frazier             Date of Visit: 2022  : 1955  Age:67 y.o. Presenting Complaint:  Pamella Frazier  is seen in follow-up for hemochromatosis. History of Present Illness: This gentleman was previously under the care of Dr. Lane Vaughan and saw me for the first time in 2014. His history dates back to 2012 when first seen in this office. He had been referred because of an elevated ferritin level specifically 644 which was discovered in the context of abnormal LFTs. Hemachromatosis DNA analysis was performed showing him to be a heterozygote for the H63D mutation. At the time, the patient was drinking relatively heavily. Subsequent to that, he  abstained from alcohol and with that, his ferritin levels dropped to within normal limits. He had been evaluated for slightly elevated hematocrit with a normal PANTERA-2 analysis. After his initial visit with me, he was not seen again until early . He had been managed largely by his primary care physician Dr. Ashanti Moncada. His ferritin drifted up over 400 at which point Dr. Anastasiia Wu initiated a program of phlebotomies. The patient received a total of 3 phlebotomies over the course of approximately one year and his ferritin level continued to hover in the mid 200 range. In addition to the above, he underwent an echocardiogram on 2016. This showed an ejection fraction of 48% (left ventricular). There was concentric left ventricular hypertrophy. He has chronic atrial fibrillation. He had resumed some alcohol intake but he indicated that this was only social and quite occasional.  Regardless of etiology, he did have some elevated iron levels and a course of intermittent phlebotomy was initiated to maintain a ferritin level of 100 or lower.   He has continued on intermittent phlebotomies with the exception of a several month hold following a fall with development of bilateral subdural hematomas. He returns today for follow-up. Overall, he has been well. He has no cough or shortness of breath. His major complaint at the present time is that he has had 3 bouts of urinary tract infection 1 associated with septicemia. He has undergone phlebotomy approximately once every 3 months in an effort to keep his ferritin less than 100. His health otherwise has been stable. He remains in rate controlled atrial fibrillation. Medications:   Current Outpatient Medications   Medication Sig Dispense Refill    furosemide (LASIX) 20 MG tablet Take 20 mg by mouth daily as needed      gabapentin (NEURONTIN) 600 MG tablet Take 1 tablet by mouth 2 times daily.  carvedilol (COREG) 25 MG tablet Take 25 mg by mouth 2 times daily (with meals)      cetirizine (ZYRTEC) 10 MG tablet Take by mouth daily as needed      clotrimazole-betamethasone (LOTRISONE) 1-0.05 % cream Apply to affected area bid as directed      dabigatran (PRADAXA) 150 MG capsule TAKE 1 CAPSULE EVERY 12 HOURS      enalapril (VASOTEC) 10 MG tablet TAKE 1 TABLET DAILY      guaiFENesin (MUCINEX) 600 MG extended release tablet Take 600 mg by mouth 2 times daily      hydroCHLOROthiazide (HYDRODIURIL) 25 MG tablet TAKE 1 TABLET DAILY      metFORMIN (GLUCOPHAGE) 500 MG tablet TAKE 1 TABLET TWICE A DAY WITH A MEAL      simvastatin (ZOCOR) 40 MG tablet Take 40 mg by mouth daily      spironolactone (ALDACTONE) 25 MG tablet TAKE 1 TABLET DAILY       No current facility-administered medications for this visit. Allergies: Allergies   Allergen Reactions    Azithromycin Other (See Comments)     Skin dryness/peeling       Review of Systems: The Review of Systems is documented in full in the internal medical record. All systems are negative other than for those noted above.      Past Medical History:  Documented in electronic medical record    Past Surgical History:  Documented in electronic medical record    Social History:  Documented in electronic medical record    Family History:  Documented in electronic medical record      Physical Examination:  General Appearance: Healthy appearing patient in no acute distress. Vital signs: /82 (Site: Left Upper Arm, Position: Sitting, Cuff Size: Large Adult)   Pulse 91   Temp 97.5 °F (36.4 °C) (Oral)   Resp 23   Ht 6' 3\" (1.905 m)   Wt (!) 336 lb 14.4 oz (152.8 kg)   SpO2 90%   BMI 42.11 kg/m²     Performance status: ECOG Level: 1  Distress  Screening Score: PHQ-9 Total Score: 0 (6/16/2022  2:25 PM)    HEENT: No oral exam performed. Neck: Supple. There is no thyromegaly. Lymph nodes: There is no cervical, supraclavicular, axillary or inguinal adenopathy. Lungs: The lungs are clear to auscultation and percussion. There is no egophony. There is no chest wall tenderness and no  use of accessory respiratory musculature. Heart: There is no jugular venous distention. The rate is normal and rhythm regular. The S1 and S2 are normal and there are no murmurs or rubs. Abdomen: Soft, non-tender, bowel sounds present and normal, no appreciated hepatosplenomegaly. No palpable masses. Skin: No rash, petechiae or ecchymoses. No evidence of malignancy. Extremities: No cyanosis, clubbing or edema. Labs/Imaging:  Lab Results   Component Value Date    WBC 7.2 06/16/2022    HGB 14.3 06/16/2022    HCT 42.4 06/16/2022     06/16/2022    MCV 96.1 06/16/2022       Lab Results   Component Value Date     06/16/2022    K 4.2 06/16/2022     06/16/2022    CO2 24 06/16/2022    BUN 16 06/16/2022    GFRAA >60 06/16/2022    GLOB 3.5 06/16/2022    ALT 44 06/16/2022     Ferritin-105. Above results reviewed with patient. ASSESSMENT:  This gentleman has a history of iron overload with a heterozygous mutation H63D.   This is a mild genetic abnormality often associated with some degree of hyper absorption of iron but rarely a cause of clinical iron overload. Nevertheless, with some degree of cardiac dysfunction, we have opted to try and keep his ferritin level under 100. He is seen every 3 months and has generally undergone a phlebotomy with each visit. His ferritin today is down to 105 and he is asked with skip today's planned phlebotomy. PLAN:  We will skip today's phlebotomy however he will continue to return every 3 months with a phlebotomy scheduled if his ferritin is greater than 100. I will see him again in 1 years time. I have suggested he see Dr. Alanna Leiva for evaluation of his recurrent urinary tract infections. He did undergo a TURP in 2017.       RESUSCITATION DIRECTIVES/HOSPICE CARE;  Full Support           [facility]    Oncology and Hematology   [address] [phone]  F [phone]  [email or web address]

## 2022-06-16 NOTE — PROGRESS NOTES
[facility] CARDIOLOGY  [address], [address]  PHONE: [phone]        22        NAME:  Mayur Rodriguez  : 1955  MRN: [id number]     CHIEF COMPLAINT:    Hypertension         SUBJECTIVE:     Doing well. No cp or palp or dizziness. Sees Pain Tx for back pain and knee pain. Medications were all reviewed with the patient today and updated as necessary. Current Outpatient Medications   Medication Sig    carvedilol (COREG) 25 MG tablet Take 25 mg by mouth 2 times daily (with meals)    cetirizine (ZYRTEC) 10 MG tablet Take by mouth daily as needed    clotrimazole-betamethasone (LOTRISONE) 1-0.05 % cream Apply to affected area bid as directed    dabigatran (PRADAXA) 150 MG capsule TAKE 1 CAPSULE EVERY 12 HOURS    enalapril (VASOTEC) 10 MG tablet TAKE 1 TABLET DAILY    guaiFENesin (MUCINEX) 600 MG extended release tablet Take 600 mg by mouth 2 times daily    hydroCHLOROthiazide (HYDRODIURIL) 25 MG tablet TAKE 1 TABLET DAILY    metFORMIN (GLUCOPHAGE) 500 MG tablet TAKE 1 TABLET TWICE A DAY WITH A MEAL    simvastatin (ZOCOR) 40 MG tablet Take 40 mg by mouth daily    spironolactone (ALDACTONE) 25 MG tablet TAKE 1 TABLET DAILY     No current facility-administered medications for this visit. Allergies   Allergen Reactions    Azithromycin Other (See Comments)     Skin dryness/peeling           PHYSICAL EXAM:     Wt Readings from Last 3 Encounters:   22 (!) 337 lb (152.9 kg)   22 (!) 336 lb (152.4 kg)   21 (!) 348 lb 8 oz (158.1 kg)     BP Readings from Last 3 Encounters:   22 120/70   22 123/70   22 130/80       /70   Pulse 91   Ht 6' 3\" (1.905 m)   Wt (!) 337 lb (152.9 kg)   BMI 42.12 kg/m²     Physical Exam  Vitals reviewed. HENT:      Head: Normocephalic and atraumatic. Eyes:      Extraocular Movements: Extraocular movements intact. Pupils: Pupils are equal, round, and reactive to light. Cardiovascular:      Rate and Rhythm: Normal rate. Rhythm irregular. Heart sounds: Normal heart sounds. Pulmonary:      Effort: Pulmonary effort is normal.      Breath sounds: Normal breath sounds. Abdominal:      General: Abdomen is flat. Palpations: Abdomen is soft. There is no mass. Musculoskeletal:         General: Normal range of motion. Cervical back: Normal range of motion. Skin:     General: Skin is warm and dry. Neurological:      General: No focal deficit present. Mental Status: He is alert and oriented to person, place, and time. Psychiatric:         Mood and Affect: Mood normal.           RECENT LABS AND RECORDS REVIEW    none      ASSESSMENT and Claudene Dixon was seen today for hypertension. Diagnoses and all orders for this visit:    HTN (hypertension)  -     EKG 12 lead    Longstanding persistent atrial fibrillation (HCC)  -     Transthoracic echocardiogram (TTE) complete with contrast, bubble, strain, and 3D PRN; Future       Return in about 1 year (around 6/16/2023).        Harpreet Rabago MD  6/16/2022  9:25 AM

## 2022-07-07 ENCOUNTER — TELEPHONE (OUTPATIENT)
Dept: INTERNAL MEDICINE CLINIC | Facility: CLINIC | Age: 67
End: 2022-07-07

## 2022-07-07 ENCOUNTER — NURSE ONLY (OUTPATIENT)
Dept: INTERNAL MEDICINE CLINIC | Facility: CLINIC | Age: 67
End: 2022-07-07
Payer: MEDICARE

## 2022-07-07 DIAGNOSIS — R30.0 DYSURIA: ICD-10-CM

## 2022-07-07 DIAGNOSIS — R82.90 CLOUDY URINE: ICD-10-CM

## 2022-07-07 DIAGNOSIS — R39.198 VOIDING DIFFICULTY: ICD-10-CM

## 2022-07-07 DIAGNOSIS — R82.90 CLOUDY URINE: Primary | ICD-10-CM

## 2022-07-07 DIAGNOSIS — R82.90 ABNORMAL FINDING ON URINALYSIS: ICD-10-CM

## 2022-07-07 DIAGNOSIS — R82.90 ABNORMAL FINDING ON URINALYSIS: Primary | ICD-10-CM

## 2022-07-07 LAB
BILIRUBIN, URINE, POC: NEGATIVE
BLOOD URINE, POC: ABNORMAL
GLUCOSE URINE, POC: ABNORMAL
KETONES, URINE, POC: NEGATIVE
LEUKOCYTE ESTERASE, URINE, POC: ABNORMAL
NITRITE, URINE, POC: NEGATIVE
PH, URINE, POC: 6 (ref 4.6–8)
PROTEIN,URINE, POC: NEGATIVE
SPECIFIC GRAVITY, URINE, POC: 1.02 (ref 1–1.03)
URINALYSIS CLARITY, POC: ABNORMAL
URINALYSIS COLOR, POC: YELLOW
UROBILINOGEN, POC: 0.2

## 2022-07-07 PROCEDURE — 81003 URINALYSIS AUTO W/O SCOPE: CPT | Performed by: STUDENT IN AN ORGANIZED HEALTH CARE EDUCATION/TRAINING PROGRAM

## 2022-07-07 RX ORDER — NITROFURANTOIN 25; 75 MG/1; MG/1
100 CAPSULE ORAL 2 TIMES DAILY
Qty: 14 CAPSULE | Refills: 0 | Status: SHIPPED | OUTPATIENT
Start: 2022-07-07 | End: 2022-07-14

## 2022-07-07 NOTE — TELEPHONE ENCOUNTER
Urine specimen dropped off today for symptoms of dysuria, voiding difficulty and cloudy urine. Patient with history of UTIs in the past with prior urine cultures reviewed. Prescription for Macrobid sent to pharmacy.     Orders Placed This Encounter    nitrofurantoin, macrocrystal-monohydrate, (MACROBID) 100 MG capsule     Sig: Take 1 capsule by mouth 2 times daily for 7 days     Dispense:  14 capsule     Refill:  0

## 2022-07-09 LAB
BACTERIA SPEC CULT: ABNORMAL
BACTERIA SPEC CULT: ABNORMAL
SERVICE CMNT-IMP: ABNORMAL

## 2022-07-10 DIAGNOSIS — N39.0 ACUTE UTI: Primary | ICD-10-CM

## 2022-07-10 RX ORDER — AMOXICILLIN 500 MG/1
500 CAPSULE ORAL EVERY 8 HOURS
Qty: 21 CAPSULE | Refills: 0 | Status: SHIPPED | OUTPATIENT
Start: 2022-07-10 | End: 2022-07-17

## 2022-07-10 NOTE — TELEPHONE ENCOUNTER
Recent urine culture reviewed showing group B strep requiring antibiotic adjustment. Patient was previously prescribed nitrofurantoin. Attempted to call patient back at [phone] but received voicemail. Left message that I would be sending in amoxicillin in place of nitrofurantoin and if he had any questions to contact provider on call or contact office in the morning. Orders Placed This Encounter    amoxicillin (AMOXIL) 500 MG capsule     Sig: Take 1 capsule by mouth every 8 (eight) hours for 7 days Take this in place of nitrofurantoin previously prescribed.      Dispense:  21 capsule     Refill:  0

## 2022-07-11 RX ORDER — SIMVASTATIN 40 MG
TABLET ORAL
Qty: 90 TABLET | Refills: 3 | OUTPATIENT
Start: 2022-07-11

## 2022-07-20 ENCOUNTER — TELEPHONE (OUTPATIENT)
Dept: CARDIOLOGY CLINIC | Age: 67
End: 2022-07-20

## 2022-07-20 ENCOUNTER — PATIENT MESSAGE (OUTPATIENT)
Dept: CARDIOLOGY CLINIC | Age: 67
End: 2022-07-20

## 2022-07-20 NOTE — TELEPHONE ENCOUNTER
----- Message from Darylene Darner, MD sent at 7/20/2022  8:46 AM EDT -----  Please let patient know echo looks good  ----- Message -----  From: Chuck Mallory MD  Sent: 7/19/2022   5:17 PM EDT  To: Darylene Darner, MD

## 2022-07-22 DIAGNOSIS — Z12.5 ENCOUNTER FOR PROSTATE CANCER SCREENING: ICD-10-CM

## 2022-07-22 DIAGNOSIS — I10 ESSENTIAL HYPERTENSION: ICD-10-CM

## 2022-07-22 DIAGNOSIS — N40.1 BENIGN PROSTATIC HYPERPLASIA WITH LOWER URINARY TRACT SYMPTOMS, SYMPTOM DETAILS UNSPECIFIED: ICD-10-CM

## 2022-07-22 DIAGNOSIS — E11.51 CONTROLLED TYPE 2 DIABETES MELLITUS WITH PERIPHERAL CIRCULATORY DISORDER (HCC): ICD-10-CM

## 2022-07-22 DIAGNOSIS — E78.2 MIXED DYSLIPIDEMIA: Primary | ICD-10-CM

## 2022-07-26 ENCOUNTER — NURSE ONLY (OUTPATIENT)
Dept: INTERNAL MEDICINE CLINIC | Facility: CLINIC | Age: 67
End: 2022-07-26

## 2022-07-26 DIAGNOSIS — I10 ESSENTIAL HYPERTENSION: ICD-10-CM

## 2022-07-26 DIAGNOSIS — N40.1 BENIGN PROSTATIC HYPERPLASIA WITH LOWER URINARY TRACT SYMPTOMS, SYMPTOM DETAILS UNSPECIFIED: ICD-10-CM

## 2022-07-26 DIAGNOSIS — E11.51 CONTROLLED TYPE 2 DIABETES MELLITUS WITH PERIPHERAL CIRCULATORY DISORDER (HCC): ICD-10-CM

## 2022-07-26 DIAGNOSIS — E78.2 MIXED DYSLIPIDEMIA: ICD-10-CM

## 2022-07-26 DIAGNOSIS — Z12.5 ENCOUNTER FOR PROSTATE CANCER SCREENING: ICD-10-CM

## 2022-07-26 LAB
ALBUMIN SERPL-MCNC: 3.7 G/DL (ref 3.2–4.6)
ALBUMIN/GLOB SERPL: 1.1 {RATIO} (ref 1.2–3.5)
ALP SERPL-CCNC: 101 U/L (ref 50–136)
ALT SERPL-CCNC: 53 U/L (ref 12–65)
ANION GAP SERPL CALC-SCNC: 9 MMOL/L (ref 7–16)
APPEARANCE UR: CLEAR
AST SERPL-CCNC: 34 U/L (ref 15–37)
BACTERIA URNS QL MICRO: 0 /HPF
BASOPHILS # BLD: 0 K/UL (ref 0–0.2)
BASOPHILS NFR BLD: 0 % (ref 0–2)
BILIRUB SERPL-MCNC: 1.1 MG/DL (ref 0.2–1.1)
BILIRUB UR QL: NEGATIVE
BUN SERPL-MCNC: 13 MG/DL (ref 8–23)
CALCIUM SERPL-MCNC: 9.1 MG/DL (ref 8.3–10.4)
CASTS URNS QL MICRO: 0 /LPF
CHLORIDE SERPL-SCNC: 106 MMOL/L (ref 98–107)
CHOLEST SERPL-MCNC: 133 MG/DL
CO2 SERPL-SCNC: 24 MMOL/L (ref 21–32)
COLOR UR: ABNORMAL
CREAT SERPL-MCNC: 0.9 MG/DL (ref 0.8–1.5)
CRYSTALS URNS QL MICRO: 0 /LPF
DIFFERENTIAL METHOD BLD: ABNORMAL
EOSINOPHIL # BLD: 0.2 K/UL (ref 0–0.8)
EOSINOPHIL NFR BLD: 3 % (ref 0.5–7.8)
EPI CELLS #/AREA URNS HPF: ABNORMAL /HPF
ERYTHROCYTE [DISTWIDTH] IN BLOOD BY AUTOMATED COUNT: 14.2 % (ref 11.9–14.6)
GLOBULIN SER CALC-MCNC: 3.4 G/DL (ref 2.3–3.5)
GLUCOSE SERPL-MCNC: 202 MG/DL (ref 65–100)
GLUCOSE UR STRIP.AUTO-MCNC: 100 MG/DL
HCT VFR BLD AUTO: 46.9 % (ref 41.1–50.3)
HDLC SERPL-MCNC: 39 MG/DL (ref 40–60)
HDLC SERPL: 3.4 {RATIO}
HGB BLD-MCNC: 14.8 G/DL (ref 13.6–17.2)
HGB UR QL STRIP: NEGATIVE
IMM GRANULOCYTES # BLD AUTO: 0 K/UL (ref 0–0.5)
IMM GRANULOCYTES NFR BLD AUTO: 1 % (ref 0–5)
KETONES UR QL STRIP.AUTO: NEGATIVE MG/DL
LDLC SERPL CALC-MCNC: 66.8 MG/DL
LEUKOCYTE ESTERASE UR QL STRIP.AUTO: ABNORMAL
LYMPHOCYTES # BLD: 1 K/UL (ref 0.5–4.6)
LYMPHOCYTES NFR BLD: 15 % (ref 13–44)
MCH RBC QN AUTO: 32.5 PG (ref 26.1–32.9)
MCHC RBC AUTO-ENTMCNC: 31.6 G/DL (ref 31.4–35)
MCV RBC AUTO: 103.1 FL (ref 79.6–97.8)
MONOCYTES # BLD: 0.3 K/UL (ref 0.1–1.3)
MONOCYTES NFR BLD: 5 % (ref 4–12)
MUCOUS THREADS URNS QL MICRO: 0 /LPF
NEUTS SEG # BLD: 5 K/UL (ref 1.7–8.2)
NEUTS SEG NFR BLD: 76 % (ref 43–78)
NITRITE UR QL STRIP.AUTO: NEGATIVE
NRBC # BLD: 0 K/UL (ref 0–0.2)
OTHER OBSERVATIONS: ABNORMAL
PH UR STRIP: 6 [PH] (ref 5–9)
PLATELET # BLD AUTO: 195 K/UL (ref 150–450)
PMV BLD AUTO: 11.2 FL (ref 9.4–12.3)
POTASSIUM SERPL-SCNC: 4.5 MMOL/L (ref 3.5–5.1)
PROT SERPL-MCNC: 7.1 G/DL (ref 6.3–8.2)
PROT UR STRIP-MCNC: NEGATIVE MG/DL
PSA SERPL-MCNC: 0.1 NG/ML
RBC # BLD AUTO: 4.55 M/UL (ref 4.23–5.6)
RBC #/AREA URNS HPF: 0 /HPF
SODIUM SERPL-SCNC: 139 MMOL/L (ref 136–145)
SP GR UR REFRACTOMETRY: 1.02 (ref 1–1.02)
TRIGL SERPL-MCNC: 136 MG/DL (ref 35–150)
URINE CULTURE IF INDICATED: ABNORMAL
UROBILINOGEN UR QL STRIP.AUTO: 0.2 EU/DL (ref 0.2–1)
VLDLC SERPL CALC-MCNC: 27.2 MG/DL (ref 6–23)
WBC # BLD AUTO: 6.5 K/UL (ref 4.3–11.1)
WBC URNS QL MICRO: ABNORMAL /HPF

## 2022-07-27 LAB
EST. AVERAGE GLUCOSE BLD GHB EST-MCNC: 177 MG/DL
HBA1C MFR BLD: 7.8 % (ref 4.8–5.6)

## 2022-08-02 ENCOUNTER — OFFICE VISIT (OUTPATIENT)
Dept: INTERNAL MEDICINE CLINIC | Facility: CLINIC | Age: 67
End: 2022-08-02
Payer: MEDICARE

## 2022-08-02 VITALS
HEART RATE: 75 BPM | TEMPERATURE: 97.2 F | HEIGHT: 75 IN | WEIGHT: 315 LBS | OXYGEN SATURATION: 97 % | SYSTOLIC BLOOD PRESSURE: 126 MMHG | DIASTOLIC BLOOD PRESSURE: 68 MMHG | BODY MASS INDEX: 39.17 KG/M2

## 2022-08-02 DIAGNOSIS — I48.20 CHRONIC ATRIAL FIBRILLATION (HCC): ICD-10-CM

## 2022-08-02 DIAGNOSIS — M47.817 OSTEOARTHRITIS OF LUMBOSACRAL SPINE: ICD-10-CM

## 2022-08-02 DIAGNOSIS — E11.51 CONTROLLED TYPE 2 DIABETES MELLITUS WITH PERIPHERAL CIRCULATORY DISORDER (HCC): Primary | ICD-10-CM

## 2022-08-02 DIAGNOSIS — E83.110 HEREDITARY HEMOCHROMATOSIS (HCC): ICD-10-CM

## 2022-08-02 DIAGNOSIS — N40.1 BENIGN PROSTATIC HYPERPLASIA WITH LOWER URINARY TRACT SYMPTOMS, SYMPTOM DETAILS UNSPECIFIED: ICD-10-CM

## 2022-08-02 DIAGNOSIS — E66.01 OBESITY, MORBID (HCC): ICD-10-CM

## 2022-08-02 DIAGNOSIS — E78.2 MIXED DYSLIPIDEMIA: ICD-10-CM

## 2022-08-02 PROCEDURE — 3051F HG A1C>EQUAL 7.0%<8.0%: CPT | Performed by: INTERNAL MEDICINE

## 2022-08-02 PROCEDURE — 99214 OFFICE O/P EST MOD 30 MIN: CPT | Performed by: INTERNAL MEDICINE

## 2022-08-02 PROCEDURE — 2022F DILAT RTA XM EVC RTNOPTHY: CPT | Performed by: INTERNAL MEDICINE

## 2022-08-02 PROCEDURE — 1123F ACP DISCUSS/DSCN MKR DOCD: CPT | Performed by: INTERNAL MEDICINE

## 2022-08-02 PROCEDURE — 1036F TOBACCO NON-USER: CPT | Performed by: INTERNAL MEDICINE

## 2022-08-02 PROCEDURE — 3017F COLORECTAL CA SCREEN DOC REV: CPT | Performed by: INTERNAL MEDICINE

## 2022-08-02 PROCEDURE — G8427 DOCREV CUR MEDS BY ELIG CLIN: HCPCS | Performed by: INTERNAL MEDICINE

## 2022-08-02 PROCEDURE — G8417 CALC BMI ABV UP PARAM F/U: HCPCS | Performed by: INTERNAL MEDICINE

## 2022-08-02 RX ORDER — POTASSIUM CHLORIDE 750 MG/1
10 TABLET, EXTENDED RELEASE ORAL DAILY PRN
COMMUNITY
Start: 2022-05-09

## 2022-08-02 RX ORDER — CLOTRIMAZOLE AND BETAMETHASONE DIPROPIONATE 10; .64 MG/G; MG/G
CREAM TOPICAL
Qty: 45 G | Refills: 0 | Status: SHIPPED | OUTPATIENT
Start: 2022-08-02

## 2022-08-02 ASSESSMENT — ENCOUNTER SYMPTOMS
CHEST TIGHTNESS: 0
SHORTNESS OF BREATH: 0

## 2022-08-02 NOTE — PROGRESS NOTES
ASSESSMENT/PLAN:    1. Controlled type 2 diabetes mellitus with peripheral circulatory disorder [facility])  The patient is asked to make an attempt to improve diet and exercise patterns to aid in medical management of this problem. Re check A1c after increased efforts in 3 months  1. Continue chronic medications as prescribed w/ the following Rx changes: none  2. Education: Reviewed ABCs of diabetes management (respective goals in parentheses):  A1C (<7), blood pressure (<130/80), and cholesterol (LDL <100). 3.  Compliance at present is estimated to be good. Efforts to improve compliance (if necessary) will be directed at increased exercise, dietary modifications: A healthy diet to consider is a more mediterranean diet which is abundant in fruits, vegetables, whole grains, legumes and olive oil. It features fish and poultry, lean sources of protein over red meat. The importance of a healthy lifestyle are discussed. Limit high fructose containing foods, moderate portion sizes,  regular cardiovascular exercise (walking, swimming, aerobics) for 30-45 minutes, 3-4 times weekly. , regular blood sugar monitoring: daily. 4.  Monitor blood sugar at home daily and keep record to bring to next appointment. 5.  Discussion/Counseling provided today include: interpretation of lab results, blood sugar goals, complications of diabetes mellitus, nutrition and annual eye exams and diabetic foot care precautions.    -  DIABETES FOOT EXAM    2. Mixed dyslipidemia  Treatment regimen is effective. 3. Chronic atrial fibrillation (HCC)  Treatment regimen is effective. Echo reviewed. Cardiology notes reviewed. 4. Osteoarthritis of lumbosacral spine  S/p ablation. Pain improved. 5. Hereditary hemochromatosis ([address]  Followed by hematology. 6. Obesity, morbid ([address]  Wt loss encouraged. 7. Benign prostatic hyperplasia with lower urinary tract symptoms, symptom details unspecified  Sx chronic. UTIs occ. Hydration and blood sugar control. Prior prostate RF abl. I expect he will always be prone to infections. 8.  Colon cancer screening- Colonoscopy will be due in  for 5 yr follow up      Evaluation and management of the chronic condition(s) delineated. No negative side effects reported. I have reviewed all the lab results. There are some abnormalities that are either expected or not critical to the patient's health, and are discussed in the office today and are addressed. Please refer to the above assessement and plan narrative and orders and follow up plan. Medication discussed and refilled as needed. Physical exam findings are stable unless otherwise indicated and this is addressed. The most recent lab work and imaging and consultant/urgent care visits and imaging are reviewed and discussed and considered during this visit encounter. 1. Controlled type 2 diabetes mellitus with peripheral circulatory disorder (HCC)  -      DIABETES FOOT EXAM  2. Mixed dyslipidemia  3. Chronic atrial fibrillation ([address]  4. Osteoarthritis of lumbosacral spine  5. Hereditary hemochromatosis ([address]  6. Obesity, morbid ([address]  7. Benign prostatic hyperplasia with lower urinary tract symptoms, symptom details unspecified         On this date, 22, I have spent 30 minutes reviewing previous notes, test results and face to face with the patient discussing the diagnosis and importance of compliance with the treatment plan as well as documenting on the day of the visit. An electronic signature was used to authenticate this note. -- Taryn Mclaughlin MD     Return in about 3 months (around 2022) for blood sugar check and a1c.     SUBJECTIVE/OBJECTIVE:  Chief Complaint   Patient presents with    Diabetes     6 month recheck    Hypertension    Cholesterol Problem    Discuss Labs       HPI:   Dominguez Johnson (: 1955 is a 69 y.o. male, here for evaluation of the following chief complaint(s):   Chief Complaint   Patient presents with    Diabetes     6 month recheck    Hypertension    Cholesterol Problem    Discuss Labs       Patient is here for follow-up and management of chronic medical conditions, review of recent labs, review of any imaging completed since our last office visit and discuss any consultants opinions or management changes. Labs reviewed and discussed and medication refilled as needed for chronic medications during ov or adjusted based on lab results and/or our discussion as appropriate. See discussion. The patient's available records and electronic chart records are reviewed. The PMH, PSH, medications, allergies, medications, FH, health maintenance and vaccination status are all reviewed and updated as appropriate. Records from outside providers have been reviewed, summarized, and considered as noted in the history of present illness, past medical history, and objective data of this note and encounter.           Health Maintenance   Topic Date Due    COVID-19 Vaccine (4 - Booster for Pfizer series) 02/05/2022    Flu vaccine (1) 09/01/2022    Colorectal Cancer Screen  11/13/2022    Diabetic microalbuminuria test  01/25/2023    Annual Wellness Visit (AWV)  02/02/2023    Diabetic retinal exam  05/13/2023    Depression Screen  06/16/2023    A1C test (Diabetic or Prediabetic)  07/26/2023    Lipids  07/26/2023    Prostate Specific Antigen (PSA) Screening or Monitoring  07/26/2023    Diabetic foot exam  08/02/2023    DTaP/Tdap/Td vaccine (3 - Td or Tdap) 09/29/2025    Shingles vaccine  Completed    Pneumococcal 65+ years Vaccine  Completed    AAA screen  Completed    Hepatitis A vaccine  Aged Out    Hib vaccine  Aged Out    Meningococcal (ACWY) vaccine  Aged Out    Hepatitis C screen  Discontinued     Patient Active Problem List   Diagnosis    HTN (hypertension)    History of subdural hematoma (post traumatic)    Hepatic steatosis    Osteoarthritis of lumbosacral spine    Benign prostatic 07/26/2022 02:59 PM    GRANULOCYTEABSOLUTECOUNT 0.1 01/25/2022 08:23 AM       Lab Results   Component Value Date/Time     07/26/2022 02:59 PM    K 4.5 07/26/2022 02:59 PM     07/26/2022 02:59 PM    CO2 24 07/26/2022 02:59 PM    ANIONGAP 9 07/26/2022 02:59 PM    GLUCOSE 202 07/26/2022 02:59 PM    BUN 13 07/26/2022 02:59 PM    CREATININE 0.90 07/26/2022 02:59 PM    GFRAA >60 07/26/2022 02:59 PM    LABGLOM >60 07/26/2022 02:59 PM    CALCIUM 9.1 07/26/2022 02:59 PM    BILITOT 1.1 07/26/2022 02:59 PM    ALT 53 07/26/2022 02:59 PM    AST 34 07/26/2022 02:59 PM    ALKPHOS 101 07/26/2022 02:59 PM    ALKPHOS 106 01/25/2022 08:23 AM    PROT 7.1 07/26/2022 02:59 PM    LABALBU 3.7 07/26/2022 02:59 PM    GLOB 3.4 07/26/2022 02:59 PM    ALBUMIN 1.1 07/26/2022 02:59 PM       Lab Results   Component Value Date/Time    CHOL 133 07/26/2022 02:59 PM    HDL 39 07/26/2022 02:59 PM    TRIG 136 07/26/2022 02:59 PM    LDLCALC 66.8 07/26/2022 02:59 PM    VLDL 21 01/25/2022 08:23 AM       Lab Results   Component Value Date/Time    LABA1C 7.8 07/26/2022 02:59 PM    LABA1C 7.8 01/25/2022 08:23 AM    LABA1C 6.9 07/20/2021 08:36 AM    LABA1C 6.8 01/13/2021 09:20 AM    LABA1C 6.7 07/20/2020 10:20 AM       Lab Results   Component Value Date/Time    TSH 2.420 09/26/2019 07:50 AM       Lab Results   Component Value Date/Time    PSA 0.1 07/26/2022 02:59 PM    PSA <0.1 01/25/2022 08:23 AM    PSA 0.1 01/13/2021 09:20 AM       Lab Results   Component Value Date/Time    SPECGRAV 1.016 07/26/2022 02:59 PM    PHUR 6.5 12/03/2019 06:54 AM    COLORU YELLOW/STRAW 07/26/2022 02:59 PM    CLARITYU CLEAR 12/03/2019 06:54 AM    LEUKOCYTESUR SMALL 07/26/2022 02:59 PM    PROTEINU Negative 07/26/2022 02:59 PM    GLUCOSEU 100 07/26/2022 02:59 PM    KETUA Negative 07/26/2022 02:59 PM    BLOODU Negative 07/26/2022 02:59 PM    BILIRUBINUR Negative 07/26/2022 02:59 PM    BILIRUBINUR NEGATIVE  12/03/2019 06:54 AM    UROBILINOGEN 0.2 07/26/2022 02:59 PM    [place] Negative 07/26/2022 02:59 PM    LABMICR Comment 09/26/2019 11:23 AM           Vitals:    08/02/22 0838   BP: 126/68   Site: Right Upper Arm   Position: Sitting   Cuff Size: Large Adult   Pulse: 75   Temp: 97.2 °F (36.2 °C)   TempSrc: Temporal   SpO2: 97%   Weight: (!) 337 lb (152.9 kg)   Height: 6' 3\" (1.905 m)     Wt Readings from Last 3 Encounters:   08/02/22 (!) 337 lb (152.9 kg)   07/19/22 (!) 336 lb (152.4 kg)   06/16/22 (!) 336 lb 14.4 oz (152.8 kg)     BP Readings from Last 3 Encounters:   08/02/22 126/68   06/16/22 125/82   06/16/22 120/70     Physical Exam  Vitals and nursing note reviewed. Constitutional:       Appearance: He is obese. Cardiovascular:      Rate and Rhythm: Normal rate. Rhythm irregular. Pulses:           Dorsalis pedis pulses are 2+ on the right side and 2+ on the left side. Posterior tibial pulses are 2+ on the right side and 2+ on the left side. Pulmonary:      Effort: Pulmonary effort is normal.      Breath sounds: Normal breath sounds. Musculoskeletal:      Right foot: Normal range of motion. No deformity. Left foot: Normal range of motion. No deformity. Feet:      Right foot:      Protective Sensation: 2 sites tested. 2 sites sensed. Skin integrity: Skin integrity normal.      Toenail Condition: Fungal disease present. Left foot:      Protective Sensation: 2 sites tested. 2 sites sensed. Skin integrity: Skin integrity normal.      Toenail Condition: Fungal disease present. Neurological:      General: No focal deficit present. Mental Status: He is oriented to person, place, and time. Mental status is at baseline.    Psychiatric:         Mood and Affect: Mood normal.         Behavior: Behavior normal.

## 2022-08-19 DIAGNOSIS — I10 ESSENTIAL HYPERTENSION: Primary | ICD-10-CM

## 2022-08-19 RX ORDER — ENALAPRIL MALEATE 10 MG/1
10 TABLET ORAL DAILY
Qty: 90 TABLET | Refills: 0 | Status: SHIPPED | OUTPATIENT
Start: 2022-08-19 | End: 2022-11-03 | Stop reason: SDUPTHER

## 2022-09-12 ENCOUNTER — APPOINTMENT (RX ONLY)
Dept: URBAN - METROPOLITAN AREA CLINIC 24 | Facility: CLINIC | Age: 67
Setting detail: DERMATOLOGY
End: 2022-09-12

## 2022-09-12 DIAGNOSIS — Z71.89 OTHER SPECIFIED COUNSELING: ICD-10-CM

## 2022-09-12 DIAGNOSIS — L81.4 OTHER MELANIN HYPERPIGMENTATION: ICD-10-CM

## 2022-09-12 DIAGNOSIS — D18.0 HEMANGIOMA: ICD-10-CM

## 2022-09-12 DIAGNOSIS — L57.8 OTHER SKIN CHANGES DUE TO CHRONIC EXPOSURE TO NONIONIZING RADIATION: ICD-10-CM

## 2022-09-12 DIAGNOSIS — D22 MELANOCYTIC NEVI: ICD-10-CM

## 2022-09-12 DIAGNOSIS — L82.1 OTHER SEBORRHEIC KERATOSIS: ICD-10-CM

## 2022-09-12 PROBLEM — D18.01 HEMANGIOMA OF SKIN AND SUBCUTANEOUS TISSUE: Status: ACTIVE | Noted: 2022-09-12

## 2022-09-12 PROBLEM — D22.5 MELANOCYTIC NEVI OF TRUNK: Status: ACTIVE | Noted: 2022-09-12

## 2022-09-12 PROBLEM — D23.61 OTHER BENIGN NEOPLASM OF SKIN OF RIGHT UPPER LIMB, INCLUDING SHOULDER: Status: ACTIVE | Noted: 2022-09-12

## 2022-09-12 PROBLEM — D23.71 OTHER BENIGN NEOPLASM OF SKIN OF RIGHT LOWER LIMB, INCLUDING HIP: Status: ACTIVE | Noted: 2022-09-12

## 2022-09-12 PROCEDURE — ? COUNSELING

## 2022-09-12 PROCEDURE — 99213 OFFICE O/P EST LOW 20 MIN: CPT

## 2022-09-12 ASSESSMENT — LOCATION SIMPLE DESCRIPTION DERM
LOCATION SIMPLE: LEFT POSTERIOR THIGH
LOCATION SIMPLE: LEFT FOREARM
LOCATION SIMPLE: POSTERIOR NECK
LOCATION SIMPLE: ABDOMEN
LOCATION SIMPLE: CHEST
LOCATION SIMPLE: RIGHT SHOULDER
LOCATION SIMPLE: UPPER BACK
LOCATION SIMPLE: RIGHT UPPER BACK
LOCATION SIMPLE: RIGHT FOREARM
LOCATION SIMPLE: LEFT UPPER BACK

## 2022-09-12 ASSESSMENT — LOCATION ZONE DERM
LOCATION ZONE: ARM
LOCATION ZONE: TRUNK
LOCATION ZONE: NECK
LOCATION ZONE: LEG

## 2022-09-12 ASSESSMENT — LOCATION DETAILED DESCRIPTION DERM
LOCATION DETAILED: MID POSTERIOR NECK
LOCATION DETAILED: SUPERIOR THORACIC SPINE
LOCATION DETAILED: RIGHT SUPERIOR LATERAL UPPER BACK
LOCATION DETAILED: LEFT DISTAL POSTERIOR THIGH
LOCATION DETAILED: RIGHT POSTERIOR SHOULDER
LOCATION DETAILED: LEFT MID-UPPER BACK
LOCATION DETAILED: PERIUMBILICAL SKIN
LOCATION DETAILED: RIGHT DISTAL DORSAL FOREARM
LOCATION DETAILED: LEFT LATERAL SUPERIOR CHEST
LOCATION DETAILED: LEFT PROXIMAL DORSAL FOREARM

## 2022-09-12 NOTE — PROGRESS NOTES
[address] Yogi Olivarez. [address], [address] [facility]  [phone]    Patient Name:  Gray Spence  YOB: 1955      Office Visit 9/15/2022    CHIEF COMPLAINT:    Chief Complaint   Patient presents with    CPAP/BiPAP    Sleep Apnea         HISTORY OF PRESENT ILLNESS:      The patient is seen today in follow-up of obstructive sleep apnea along with central sleep apnea that developed after a subdural hematoma. He initially was treated with CPAP alone but then required adaptive servo ventilation to deal with the central events. He is being treated with an EPAP of 5 and pressure support of 5-15 cm water. The patient's download today indicates 100% compliance over the past year. Average usage is running about 8.5 hours per night and his AHI remains excellent at 1.2. He does have an occasional severe leak up to 120 L/min but his median leak is only 0.8 L/min. Unfortunately, the patient remains severely overweight with a BMI of about 42. He has been try to cut back on his simple carbs without a great deal of benefit. He does have significant degenerative disease of his knees and back and is planning to try to get a pool membership to help exercise. This is encouraged. The patient's [place] score today is normal at 4/24. The patient did have a recent CBC performed which no evidence of polycythemia to suggest significant nocturnal hypoxemia is present. Sleep Medicine 9/15/2022   Sitting and reading 2   Watching TV 2   Sitting, inactive in a public place (e.g. a theatre or a meeting) 0   As a passenger in a car for an hour without a break 0   Lying down to rest in the afternoon when circumstances permit 0   Sitting and talking to someone 0   Sitting quietly after a lunch without alcohol 0   In a car, while stopped for a few minutes in traffic 0   [place] Sleepiness Score 4     his wife can follow.     Component Ref Range & Units 7/26/22 1459 6/16/22 0958 1/25/22 2112 12/8/21 1335 9/23/21 1023 7/20/21 0836 6/10/21 0944   WBC 4.3 - 11.1 K/uL 6.5  7.2  7.0 R  6.9  6.1  7.0 R  5.4    RBC 4.23 - 5.6 M/uL 4.55  4.41  4.45 R  4.20 Low   4.27  4.38 R  4.23    Hemoglobin 13.6 - 17.2 g/dL 14.8  14.3  14.5 R  13.9  14.0  14.5 R  14.1    Hematocrit 41.1 - 50.3 % 46.9  42.4 R  44.0 R  40.6 R  41.8 R  44.1 R  40.9 R    MCV 79.6 - 97.8 .1 High   96.1  99 High  R  96.7  97.9 High   101 High  R  96.7    MCH 26.1 - 32.9 PG 32.5  32.4  32.6 R  33.1 High   32.8  33.1 High  R  33.3 High     MCHC 31.4 - 35.0 g/dL 31.6  33.7  33.0 R  34.2  33.5  32.9 R  34.5    RDW 11.9 - 14.6 % 14.2  13.2  12.5 R  12.6  13.1  12.6 R  13.0    Platelets 464 - 504 K/uL 195  164  231 R  169  157  172 R  159        Past Medical History:   Diagnosis Date    Arteriosclerosis     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     Diabetes mellitus ([address] 1/3/2012    Essential hypertension, benign 2/6/2014    Hemochromatosis     Hypercholesteremia     Nodular prostate without urinary obstruction 2/6/2014    Obesity 2/6/2014    Pneumothorax, right 10/8/2017    Sepsis due to urinary tract infection ([address] 11/5/2019    Sleep apnea 10/12/2016    uses ASV instead of CPAP machine - per pt 11/18    Subdural hemorrhage following injury ([address] 10/20/2017         [unfilled]      Past Surgical History:   Procedure Laterality Date    CHEST SURGERY  10/2017    pneumothorax after fall    COLONOSCOPY N/A 11/13/2018    COLONOSCOPY  BMI 41 performed by Calin Hinson MD at [address] Right 10/02/2017    [facility]    KNEE ARTHROSCOPY Right     OTHER SURGICAL HISTORY Bilateral 10/18/2017    [facility]    TURP  12/03/2019    UROLOGICAL SURGERY      prostate u/s and BX [unfilled]        Social History     Socioeconomic History    Marital status:      Spouse name: Not on file    Number of children: Not on file    Years of education: Not on file    Highest education level: Not on file   Occupational History    Not on file   Tobacco Use    Smoking status: Former     Packs/day: 1.50     Types: Cigarettes     Quit date: 1989     Years since quittin.1    Smokeless tobacco: Former     Quit date: 2016   Vaping Use    Vaping Use: Never used   Substance and Sexual Activity    Alcohol use:  Yes     Alcohol/week: 8.0 standard drinks    Drug use: No    Sexual activity: Not on file   Other Topics Concern    Not on file   Social History Narrative    Not on file     Social Determinants of Health     Financial Resource Strain: Not on file   Food Insecurity: Not on file   Transportation Needs: Not on file   Physical Activity: Not on file   Stress: Not on file   Social Connections: Not on file   Intimate Partner Violence: Not on file   Housing Stability: Not on file         Family History   Problem Relation Age of Onset    Prostate Cancer Paternal Grandfather     Alcohol Abuse Brother     Heart Failure Brother     Psychiatric Disorder Brother     Stroke Brother     Psychiatric Disorder Mother     Other Mother         Sarcoidosis    Atrial Fibrillation Father     Lung Cancer Father     Hypertension Father     Heart Disease Father 61            Diabetes Father     Heart Disease Paternal Grandmother          Allergies   Allergen Reactions    Azithromycin Other (See Comments)     Skin dryness/peeling         Current Outpatient Medications   Medication Sig    enalapril (VASOTEC) 10 MG tablet Take 1 tablet by mouth daily TAKE 1 TABLET DAILY    CPAP Machine MISC by Other route    potassium chloride (KLOR-CON M) 10 MEQ extended release tablet Take 10 mEq by mouth daily as needed    clotrimazole-betamethasone (LOTRISONE) 1-0.05 % cream Apply to affected area bid as directed    simvastatin (ZOCOR) 40 MG tablet Take 1 tablet by mouth daily    furosemide (LASIX) 20 MG tablet Take 20 mg by mouth daily as needed    gabapentin (NEURONTIN) 600 MG tablet Take 1 tablet by mouth 2 times daily. carvedilol (COREG) 25 MG tablet Take 25 mg by mouth 2 times daily (with meals)    cetirizine (ZYRTEC) 10 MG tablet Take by mouth daily as needed    dabigatran (PRADAXA) 150 MG capsule TAKE 1 CAPSULE EVERY 12 HOURS    guaiFENesin (MUCINEX) 600 MG extended release tablet Take 600 mg by mouth 2 times daily    hydroCHLOROthiazide (HYDRODIURIL) 25 MG tablet TAKE 1 TABLET DAILY    metFORMIN (GLUCOPHAGE) 500 MG tablet TAKE 1 TABLET TWICE A DAY WITH A MEAL    spironolactone (ALDACTONE) 25 MG tablet TAKE 1 TABLET DAILY     No current facility-administered medications for this visit. REVIEW OF SYSTEMS:   CONSTITUTIONAL:   There is no history of fever, chills, night sweats, weight loss, weight gain, persistent fatigue, or lethargy/hypersomnolence. CARDIAC:   No chest pain, pressure, discomfort, palpitations, orthopnea, murmurs, or edema. GI:   No dysphagia, heartburn reflux, nausea/vomiting, diarrhea, abdominal pain, or bleeding. NEURO:   There is no history of AMS, persistent headache, decreased level of consciousness, seizures, or motor or sensory deficits. PHYSICAL EXAM:    Vitals:    09/15/22 1050   BP: (!) 146/80   Pulse: 87   Resp: 18   Temp: 96.8 °F (36 °C)   SpO2: 97%        GENERAL APPEARANCE:   The patient is obese and in no respiratory distress. HEENT:   PERRL. Conjunctivae unremarkable. Nasal mucosa is without epistaxis, exudate, or polyps. Gums and dentition are unremarkable. There is moderately severe oropharyngeal narrowing. TMs   NECK/LYMPHATIC:   Symmetrical with no elevation of jugular venous pulsation. Trachea midline. No thyroid enlargement. No cervical adenopathy. LUNGS:   Normal respiratory effort with symmetrical lung expansion.    Breath sounds are mildly decreased bilaterally. Olinda Julio HEART:   There is a regular rate and rhythm. No murmur, rub, or gallop. There is trace edema in the lower extremities. ABDOMEN:   Soft and non-tender. Bowel sounds are normal.     NEURO:   The patient is alert and oriented to person, place, and time. Memory appears intact and mood is normal.  No gross sensorimotor deficits are present. ASSESSMENT:  (Medical Decision Making)       ICD-10-CM    1. CASSY on CPAP  G47.33 The patient sleep apnea is very well controlled at the present time on adaptive servo ventilation. No change in his settings as needed. His mask does have a strap which is nearly broken. I will renew his supplies today so this can be corrected. He continues to benefit from therapy. Z99.89       2. Obesity, morbid ([address]  E66.01 This is ongoing. We discussed being more aggressive and limiting simple carbohydrates while trying to be more active. He is considering joining a pool to be able to swim. PLAN:    Continue adaptive servo ventilation is currently prescribed. New supplies are ordered today. Increase physical activity as able. Continue to limit simple carbohydrates is well as possible. Follow-up will be in 1 year. Orders Placed This Encounter   Procedures    DME - [facility]     [facility]  Phone: [phone] [address]  Dept: [phone]      Patient Name: Manuel Campos  : 1955  Gender: male  Address: [address] [place], [place] [address]  Patient phone: [phone] (home)       Primary Insurance: Payor: MEDICARE / Plan: MEDICARE PART A AND B / Product Type: *No Product type* /   Subscriber ID: [id number] - (Medicare)      AMB Supply Order  Order Details     DME Location: [facility]   Order Date: 9/15/2022   The primary encounter diagnosis was CASSY on CPAP. A diagnosis of Obesity, morbid ([address] was also pertinent to this visit. (  X   )Supplies Needed        Machine   (     ) CPAP Unit  (     ) Auto CPAP Unit  (     ) BiLevel Unit  (     ) Auto BiLevel Unit  (     ) ASV   (     ) Bilevel ST      Length of need: 12 months    Pressure: 5 cmH20 with pressure support 5-15 cmH2O  EPR: 0     Starting Ramp Pressure:  5 cm H20  Ramp Time: min  N/A    Patient had a diagnostic Apnea Hypopnea Index (AHI) of : 67  *SUPPLIES* Replace all as needed, or per coverage guidelines     Masks Type:  ( x   ) -Full Face Mask (1 per 3 mon)  (  x  ) -Full Mask (1 per month) Interface/Cushion      (  ) -Nasal Mask (1 per 3 mon)  (  ) - Nasal Mask (1 per month) Interface/Cushion  (     ) -Pillow (2 per mon)  (     ) -Fwtriphnz (1 per 6 mon)            Other Supplies:    (   X  )-Rbultlbi (1 per 6 mon)  (   X  )-Dxerwr Tubing (1 per 3 mon)  (   X  )- Disposable Filter (2 per mon)  (   x  )-Dyoudj Humidifier (1 per year)     ( x    )-Waffhonhz (sometimes used with Full Face Mask) (1 per 6 mos)  (    )-Tubing-without heat (1 per 3 mos)  (     )-Non-Disposable Filter (1 per 6 mos)  (  x   )-Water Chamber (1 per 6 mos)  (     )-Humidifier non-heated (1 per 5 yrs)      Signed Date: 9/15/2022  Electronically Signed By: Amy Angel MD  Electronically Dated:  9/15/2022        No orders of the defined types were placed in this encounter. Amy Angel MD  Electronically signed    Over 50% of today's office visit was spent in face to face time reviewing test results, prognosis, importance of compliance, education about disease process, benefits of medications, instructions for management of acute flare-ups, and follow up plans. Total face to face time spent with the patient and charting was 17 minutes. Dictated using voice recognition software.   Proof read but unrecognized errors may exist.

## 2022-09-15 ENCOUNTER — HOSPITAL ENCOUNTER (OUTPATIENT)
Dept: INFUSION THERAPY | Age: 67
Discharge: HOME | End: 2022-09-15
Payer: MEDICARE

## 2022-09-15 ENCOUNTER — OFFICE VISIT (OUTPATIENT)
Dept: SLEEP MEDICINE | Age: 67
End: 2022-09-15
Payer: MEDICARE

## 2022-09-15 ENCOUNTER — HOSPITAL ENCOUNTER (OUTPATIENT)
Dept: LAB | Age: 67
Discharge: HOME | End: 2022-09-18
Payer: MEDICARE

## 2022-09-15 VITALS
BODY MASS INDEX: 39.17 KG/M2 | HEART RATE: 87 BPM | RESPIRATION RATE: 18 BRPM | TEMPERATURE: 96.8 F | HEIGHT: 75 IN | OXYGEN SATURATION: 97 % | DIASTOLIC BLOOD PRESSURE: 80 MMHG | WEIGHT: 315 LBS | SYSTOLIC BLOOD PRESSURE: 146 MMHG

## 2022-09-15 VITALS
OXYGEN SATURATION: 94 % | TEMPERATURE: 98 F | HEART RATE: 83 BPM | DIASTOLIC BLOOD PRESSURE: 65 MMHG | RESPIRATION RATE: 18 BRPM | SYSTOLIC BLOOD PRESSURE: 116 MMHG

## 2022-09-15 DIAGNOSIS — G47.33 OSA ON CPAP: Primary | ICD-10-CM

## 2022-09-15 DIAGNOSIS — E83.110 HEREDITARY HEMOCHROMATOSIS (HCC): ICD-10-CM

## 2022-09-15 DIAGNOSIS — E66.01 OBESITY, MORBID (HCC): ICD-10-CM

## 2022-09-15 DIAGNOSIS — Z99.89 OSA ON CPAP: Primary | ICD-10-CM

## 2022-09-15 LAB
ALBUMIN SERPL-MCNC: 3.7 G/DL (ref 3.2–4.6)
ALBUMIN/GLOB SERPL: 1 {RATIO} (ref 1.2–3.5)
ALP SERPL-CCNC: 106 U/L (ref 50–136)
ALT SERPL-CCNC: 45 U/L (ref 12–65)
ANION GAP SERPL CALC-SCNC: 7 MMOL/L (ref 4–13)
AST SERPL-CCNC: 28 U/L (ref 15–37)
BASOPHILS # BLD: 0 K/UL (ref 0–0.2)
BASOPHILS NFR BLD: 0 % (ref 0–2)
BILIRUB SERPL-MCNC: 1.1 MG/DL (ref 0.2–1.1)
BUN SERPL-MCNC: 14 MG/DL (ref 8–23)
CALCIUM SERPL-MCNC: 9.3 MG/DL (ref 8.3–10.4)
CHLORIDE SERPL-SCNC: 102 MMOL/L (ref 101–110)
CO2 SERPL-SCNC: 26 MMOL/L (ref 21–32)
CREAT SERPL-MCNC: 1 MG/DL (ref 0.8–1.5)
DIFFERENTIAL METHOD BLD: NORMAL
EOSINOPHIL # BLD: 0.2 K/UL (ref 0–0.8)
EOSINOPHIL NFR BLD: 2 % (ref 0.5–7.8)
ERYTHROCYTE [DISTWIDTH] IN BLOOD BY AUTOMATED COUNT: 13.1 % (ref 11.9–14.6)
FERRITIN SERPL-MCNC: 120 NG/ML (ref 8–388)
GLOBULIN SER CALC-MCNC: 3.7 G/DL (ref 2.3–3.5)
GLUCOSE SERPL-MCNC: 255 MG/DL (ref 65–100)
HCT VFR BLD AUTO: 44.5 %
HGB BLD-MCNC: 15 G/DL (ref 13.6–17.2)
IMM GRANULOCYTES # BLD AUTO: 0.1 K/UL (ref 0–0.5)
IMM GRANULOCYTES NFR BLD AUTO: 1 % (ref 0–5)
LYMPHOCYTES # BLD: 1.2 K/UL (ref 0.5–4.6)
LYMPHOCYTES NFR BLD: 14 % (ref 13–44)
MCH RBC QN AUTO: 32.9 PG (ref 26.1–32.9)
MCHC RBC AUTO-ENTMCNC: 33.7 G/DL (ref 31.4–35)
MCV RBC AUTO: 97.6 FL (ref 79.6–97.8)
MONOCYTES # BLD: 0.4 K/UL (ref 0.1–1.3)
MONOCYTES NFR BLD: 5 % (ref 4–12)
NEUTS SEG # BLD: 7.1 K/UL (ref 1.7–8.2)
NEUTS SEG NFR BLD: 78 % (ref 43–78)
NRBC # BLD: 0 K/UL (ref 0–0.2)
PLATELET # BLD AUTO: 186 K/UL (ref 150–450)
PMV BLD AUTO: 10.5 FL (ref 9.4–12.3)
POTASSIUM SERPL-SCNC: 4 MMOL/L (ref 3.5–5.1)
PROT SERPL-MCNC: 7.4 G/DL (ref 6.3–8.2)
RBC # BLD AUTO: 4.56 M/UL (ref 4.23–5.6)
SODIUM SERPL-SCNC: 135 MMOL/L (ref 136–145)
WBC # BLD AUTO: 9.1 K/UL (ref 4.3–11.1)

## 2022-09-15 PROCEDURE — 3017F COLORECTAL CA SCREEN DOC REV: CPT | Performed by: INTERNAL MEDICINE

## 2022-09-15 PROCEDURE — 85025 COMPLETE CBC W/AUTO DIFF WBC: CPT

## 2022-09-15 PROCEDURE — 1123F ACP DISCUSS/DSCN MKR DOCD: CPT | Performed by: INTERNAL MEDICINE

## 2022-09-15 PROCEDURE — 1036F TOBACCO NON-USER: CPT | Performed by: INTERNAL MEDICINE

## 2022-09-15 PROCEDURE — G8417 CALC BMI ABV UP PARAM F/U: HCPCS | Performed by: INTERNAL MEDICINE

## 2022-09-15 PROCEDURE — 80053 COMPREHEN METABOLIC PANEL: CPT

## 2022-09-15 PROCEDURE — 99195 PHLEBOTOMY: CPT

## 2022-09-15 PROCEDURE — 82728 ASSAY OF FERRITIN: CPT

## 2022-09-15 PROCEDURE — 36415 COLL VENOUS BLD VENIPUNCTURE: CPT

## 2022-09-15 PROCEDURE — 99214 OFFICE O/P EST MOD 30 MIN: CPT | Performed by: INTERNAL MEDICINE

## 2022-09-15 PROCEDURE — G8427 DOCREV CUR MEDS BY ELIG CLIN: HCPCS | Performed by: INTERNAL MEDICINE

## 2022-09-15 ASSESSMENT — SLEEP AND FATIGUE QUESTIONNAIRES
HOW LIKELY ARE YOU TO NOD OFF OR FALL ASLEEP WHILE SITTING QUIETLY AFTER LUNCH WITHOUT ALCOHOL: 0
HOW LIKELY ARE YOU TO NOD OFF OR FALL ASLEEP IN A CAR, WHILE STOPPED FOR A FEW MINUTES IN TRAFFIC: 0
HOW LIKELY ARE YOU TO NOD OFF OR FALL ASLEEP WHILE SITTING INACTIVE IN A PUBLIC PLACE: 0
HOW LIKELY ARE YOU TO NOD OFF OR FALL ASLEEP WHILE WATCHING TV: 2
HOW LIKELY ARE YOU TO NOD OFF OR FALL ASLEEP WHILE SITTING AND TALKING TO SOMEONE: 0
HOW LIKELY ARE YOU TO NOD OFF OR FALL ASLEEP WHEN YOU ARE A PASSENGER IN A CAR FOR AN HOUR WITHOUT A BREAK: 0
HOW LIKELY ARE YOU TO NOD OFF OR FALL ASLEEP WHILE SITTING AND READING: 2
HOW LIKELY ARE YOU TO NOD OFF OR FALL ASLEEP WHILE LYING DOWN TO REST IN THE AFTERNOON WHEN CIRCUMSTANCES PERMIT: 0
ESS TOTAL SCORE: 4

## 2022-09-15 NOTE — PROGRESS NOTES
Pt. Discharged ambulatory. Tolerated phlebotomy well. No distress noted. To call physician with any problems or concerns. Understanding voiced. To return to [facility] on  12/15/22.  500ml. Of blood drawn for phlebotomy.

## 2022-10-09 ENCOUNTER — PATIENT MESSAGE (OUTPATIENT)
Dept: INTERNAL MEDICINE CLINIC | Facility: CLINIC | Age: 67
End: 2022-10-09

## 2022-10-09 DIAGNOSIS — I48.11 LONGSTANDING PERSISTENT ATRIAL FIBRILLATION (HCC): Primary | ICD-10-CM

## 2022-10-10 RX ORDER — SPIRONOLACTONE 25 MG/1
TABLET ORAL
Qty: 90 TABLET | Refills: 2 | OUTPATIENT
Start: 2022-10-10

## 2022-10-10 NOTE — TELEPHONE ENCOUNTER
The second covid shot have been recorded in pt [employer].     Thank you    Aditi Herman

## 2022-10-10 NOTE — TELEPHONE ENCOUNTER
From: Scar Lugo  To: Dr. Nails Pro: 10/9/2022 8:10 PM EDT  Subject: Floyd Braga, I had my second covid booster on 08/04/22. Just wanted to touch base on your opinion and my timeframe for me getting the 3rd booster (variant) that is now available if you feel I should get it. Thank you sir!    Noam Jain   [phone]

## 2022-10-18 DIAGNOSIS — I48.11 LONGSTANDING PERSISTENT ATRIAL FIBRILLATION (HCC): Primary | ICD-10-CM

## 2022-10-18 RX ORDER — SPIRONOLACTONE 25 MG/1
25 TABLET ORAL DAILY
Qty: 90 TABLET | Refills: 3 | Status: SHIPPED | OUTPATIENT
Start: 2022-10-18

## 2022-10-18 RX ORDER — SPIRONOLACTONE 25 MG/1
25 TABLET ORAL DAILY
Qty: 90 TABLET | Refills: 3 | Status: CANCELLED | OUTPATIENT
Start: 2022-10-18

## 2022-10-18 NOTE — TELEPHONE ENCOUNTER
Pt called, requests refill of Spironolactone, request 90 day supply    [facility]    [phone]

## 2022-10-18 NOTE — TELEPHONE ENCOUNTER
Orders Placed This Encounter    spironolactone (ALDACTONE) 25 MG tablet     Sig: Take 1 tablet by mouth daily     Dispense:  90 tablet     Refill:  3         Perry Sparrow MD

## 2022-10-19 NOTE — TELEPHONE ENCOUNTER
Refills sent to [facility] by Dr. Cameron Roblero on 10/18/22. Pt notified and verbalized understanding.

## 2022-11-03 ENCOUNTER — OFFICE VISIT (OUTPATIENT)
Dept: INTERNAL MEDICINE CLINIC | Facility: CLINIC | Age: 67
End: 2022-11-03
Payer: MEDICARE

## 2022-11-03 VITALS
HEART RATE: 89 BPM | OXYGEN SATURATION: 97 % | DIASTOLIC BLOOD PRESSURE: 74 MMHG | BODY MASS INDEX: 39.17 KG/M2 | WEIGHT: 315 LBS | TEMPERATURE: 97.2 F | HEIGHT: 75 IN | SYSTOLIC BLOOD PRESSURE: 118 MMHG

## 2022-11-03 DIAGNOSIS — I48.21 PERMANENT ATRIAL FIBRILLATION (HCC): ICD-10-CM

## 2022-11-03 DIAGNOSIS — I10 ESSENTIAL HYPERTENSION: ICD-10-CM

## 2022-11-03 DIAGNOSIS — K76.0 HEPATIC STEATOSIS: ICD-10-CM

## 2022-11-03 DIAGNOSIS — E66.01 OBESITY, MORBID (HCC): ICD-10-CM

## 2022-11-03 DIAGNOSIS — E83.110 HEREDITARY HEMOCHROMATOSIS (HCC): ICD-10-CM

## 2022-11-03 DIAGNOSIS — E11.51 CONTROLLED TYPE 2 DIABETES MELLITUS WITH PERIPHERAL CIRCULATORY DISORDER (HCC): Primary | ICD-10-CM

## 2022-11-03 LAB — HBA1C MFR BLD: 7.9 %

## 2022-11-03 PROCEDURE — 3017F COLORECTAL CA SCREEN DOC REV: CPT | Performed by: INTERNAL MEDICINE

## 2022-11-03 PROCEDURE — G8417 CALC BMI ABV UP PARAM F/U: HCPCS | Performed by: INTERNAL MEDICINE

## 2022-11-03 PROCEDURE — G8427 DOCREV CUR MEDS BY ELIG CLIN: HCPCS | Performed by: INTERNAL MEDICINE

## 2022-11-03 PROCEDURE — 1036F TOBACCO NON-USER: CPT | Performed by: INTERNAL MEDICINE

## 2022-11-03 PROCEDURE — 83036 HEMOGLOBIN GLYCOSYLATED A1C: CPT | Performed by: INTERNAL MEDICINE

## 2022-11-03 PROCEDURE — 1123F ACP DISCUSS/DSCN MKR DOCD: CPT | Performed by: INTERNAL MEDICINE

## 2022-11-03 PROCEDURE — 99214 OFFICE O/P EST MOD 30 MIN: CPT | Performed by: INTERNAL MEDICINE

## 2022-11-03 PROCEDURE — 2022F DILAT RTA XM EVC RTNOPTHY: CPT | Performed by: INTERNAL MEDICINE

## 2022-11-03 PROCEDURE — 3074F SYST BP LT 130 MM HG: CPT | Performed by: INTERNAL MEDICINE

## 2022-11-03 PROCEDURE — 3051F HG A1C>EQUAL 7.0%<8.0%: CPT | Performed by: INTERNAL MEDICINE

## 2022-11-03 PROCEDURE — 3078F DIAST BP <80 MM HG: CPT | Performed by: INTERNAL MEDICINE

## 2022-11-03 PROCEDURE — G8484 FLU IMMUNIZE NO ADMIN: HCPCS | Performed by: INTERNAL MEDICINE

## 2022-11-03 RX ORDER — ENALAPRIL MALEATE 10 MG/1
10 TABLET ORAL DAILY
Qty: 90 TABLET | Refills: 3 | Status: SHIPPED | OUTPATIENT
Start: 2022-11-03

## 2022-11-03 ASSESSMENT — ENCOUNTER SYMPTOMS
SHORTNESS OF BREATH: 0
CHEST TIGHTNESS: 0

## 2022-11-03 NOTE — PROGRESS NOTES
ASSESSMENT/PLAN:    1. Controlled type 2 diabetes mellitus with peripheral circulatory disorder ([facility]  1. Rx changes: Add low dose Jardiance 10 mg daily. SE profile and dosing discussed. Hydration emphasized. Call if any UTI sx or yeast rash. May benefit renal and cardiac conditions and bp and promote wt loss? I would like to see A1c improved by 1%  2. Education: Reviewed ABCs of diabetes management (respective goals in parentheses):  A1C (<7), blood pressure (<130/80), and cholesterol (LDL <100). 3.  Compliance at present is estimated to be good. Efforts to improve compliance (if necessary) will be directed at dietary modifications:  , increased exercise, and regular blood sugar monitoring: daily. 4. Follow up: 3 months  - AMB POC HEMOGLOBIN A1C  - empagliflozin (JARDIANCE) 10 MG tablet; Take 1 tablet by mouth daily  Dispense: 30 tablet; Refill: 5  - Basic Metabolic Panel; Future  - Hemoglobin A1C; Future  - Lipid Panel; Future  - Lipid Panel; Future  - Hepatic Function Panel; Future    2. Essential hypertension  Treatment regimen is effective. - enalapril (VASOTEC) 10 MG tablet; Take 1 tablet by mouth daily TAKE 1 TABLET DAILY  Dispense: 90 tablet; Refill: 3  - CBC; Future  - Hepatic Function Panel; Future    3. Obesity, morbid ([facility]  The patient is asked to make an attempt to improve diet and exercise patterns to aid in medical management of this problem. 4. Permanent atrial fibrillation ([facility]  Followed by cardiology  [facility] for stroke prevention  - CBC; Future  - Hepatic Function Panel; Future    5. Hereditary hemochromatosis ([facility]  Periodic phlebotomy. Monitor CBC and Ferritin. Dr. Brittani Diez is retiring.    - CBC; Future  - Hepatic Function Panel; Future  - Ferritin; Future    6. Hepatic steatosis  Monitor. The patient is asked to make an attempt to improve diet and exercise patterns to aid in medical management of this problem. Monitor LFTs periodically.     Avoid excessive etoh  - Lipid Panel; Future        Evaluation and management of the chronic condition(s) delineated. No negative side effects reported. I have reviewed all the lab results. There are some abnormalities that are either expected or not critical to the patient's health, and are discussed in the office today and are addressed. Please refer to the above assessement and plan narrative and orders and follow up plan. Medication discussed and refilled as needed. Physical exam findings are stable unless otherwise indicated and this is addressed. The most recent lab work and imaging and consultant/urgent care visits and imaging are reviewed and discussed and considered during this visit encounter. 1. Controlled type 2 diabetes mellitus with peripheral circulatory disorder (HCC)  -     AMB POC HEMOGLOBIN A1C  -     empagliflozin (JARDIANCE) 10 MG tablet; Take 1 tablet by mouth daily, Disp-30 tablet, R-5Print  -     Basic Metabolic Panel; Future  -     Hemoglobin A1C; Future  -     Lipid Panel; Future  -     Lipid Panel; Future  -     Hepatic Function Panel; Future  2. Essential hypertension  -     enalapril (VASOTEC) 10 MG tablet; Take 1 tablet by mouth daily TAKE 1 TABLET DAILY, Disp-90 tablet, R-3Normal  -     CBC; Future  -     Hepatic Function Panel; Future  3. Obesity, morbid ([facility]  4. Permanent atrial fibrillation (HCC)  -     CBC; Future  -     Hepatic Function Panel; Future  5. Hereditary hemochromatosis ([address]  -     CBC; Future  -     Hepatic Function Panel; Future  -     Ferritin; Future  6. Hepatic steatosis  -     Lipid Panel; Future         On this date, 11/3/22, I have spent 30 minutes reviewing previous notes, test results and face to face with the patient discussing the diagnosis and importance of compliance with the treatment plan as well as documenting on the day of the visit. An electronic signature was used to authenticate this note.   -- Tanner Cole MD     Return in about 3 months (around 2/3/2023). SUBJECTIVE/OBJECTIVE:      HPI:   Shamar Lopez (: 1955 is a 69 y.o. male, here for evaluation of the following chief complaint(s):   Chief Complaint   Patient presents with    Diabetes     3 month recheck       Patient is here for follow-up and management of chronic medical conditions, review of recent labs, review of any imaging completed since our last office visit and discuss any consultants opinions or management changes. Diabetes  He presents for his follow-up diabetic visit. He has type 2 diabetes mellitus. Onset time: years. There are no hypoglycemic associated symptoms. Pertinent negatives for hypoglycemia include no headaches. Associated symptoms include polyuria. Pertinent negatives for diabetes include no chest pain, no fatigue and no polydipsia. Current diabetic treatment includes oral agent (monotherapy). Home blood sugar record trend: about the same. Diabetes Mellitus Type 2: Current symptoms/problems include polyuria. Medication compliance:  compliant most of the time  Diabetic diet compliance:  noncompliant: some of the time ,  Weight trend: stable  Current exercise: no regular exercise  Barriers: lack of motivation    Home blood sugar records: patient does not test     Eye exam current (within one year): yes   reports that he quit smoking about 33 years ago. His smoking use included cigarettes. He smoked an average of 1.5 packs per day. He quit smokeless tobacco use about 5 years ago.        Lab Results   Component Value Date    LABA1C 7.8 (H) 2022    LABA1C 7.8 (H) 2022    LABA1C 6.9 (H) 2021     Lab Results   Component Value Date    LABMICR Comment 2019    CREATININE 1.00 09/15/2022     Lab Results   Component Value Date    ALT 45 09/15/2022    AST 28 09/15/2022     Lab Results   Component Value Date    CHOL 133 2022    TRIG 136 2022    HDL 39 (L) 2022    LDLCALC 66.8 2022      Mixed dyslipidemia  Treatment regimen is effective. Chronic atrial fibrillation (HCC)  Treatment regimen is effective. Echo reviewed. Cardiology notes reviewed. Tolerating Pradaxa for stroke prevention. Key CAD CHF Meds            enalapril (VASOTEC) 10 MG tablet (Taking)    Sig - Route: Take 1 tablet by mouth daily TAKE 1 TABLET DAILY - Oral    spironolactone (ALDACTONE) 25 MG tablet (Taking)    Sig - Route: Take 1 tablet by mouth daily - Oral    simvastatin (ZOCOR) 40 MG tablet (Taking)    Sig - Route: Take 1 tablet by mouth daily - Oral    Cosign for Ordering: Accepted by Eric Ramirez MD on 7/11/2022  2:27 PM    furosemide (LASIX) 20 MG tablet (Taking)    Class: Historical Med    carvedilol (COREG) 25 MG tablet (Taking)    Class: Historical Med    dabigatran (PRADAXA) 150 MG capsule (Taking)    Class: Historical Med    hydroCHLOROthiazide (HYDRODIURIL) 25 MG tablet (Taking)    Class: Historical Med            Osteoarthritis of lumbosacral spine  S/p ablation. Pain improved. Hereditary hemochromatosis ([address]  Followed by hematology. Obesity, morbid ([address]  Wt loss encouraged. Benign prostatic hyperplasia with lower urinary tract symptoms, symptom details unspecified  Sx chronic. UTIs occ. Hydration and blood sugar control. Prior prostate RF abl. Colon cancer screening- Colonoscopy will be due in 2023 for 5 yr follow up    Labs reviewed and discussed and medication refilled as needed for chronic medications during ov or adjusted based on lab results and/or our discussion as appropriate. See discussion. The patient's available records and electronic chart records are reviewed. The PMH, PSH, medications, allergies, medications, FH, health maintenance and vaccination status are all reviewed and updated as appropriate.     Records from outside providers have been reviewed, summarized, and considered as noted in the history of present illness, past medical history, and objective data of this note and encounter. Health Maintenance   Topic Date Due    COVID-19 Vaccine (5 - Booster for Pfizer series) 09/29/2022    Colorectal Cancer Screen  11/13/2022    Diabetic microalbuminuria test  01/25/2023    Annual Wellness Visit (AWV)  02/02/2023    Diabetic retinal exam  05/13/2023    Depression Screen  06/16/2023    Lipids  07/26/2023    Prostate Specific Antigen (PSA) Screening or Monitoring  07/26/2023    Diabetic foot exam  08/02/2023    A1C test (Diabetic or Prediabetic)  11/03/2023    DTaP/Tdap/Td vaccine (3 - Td or Tdap) 09/29/2025    Flu vaccine  Completed    Shingles vaccine  Completed    Pneumococcal 65+ years Vaccine  Completed    Hepatitis A vaccine  Aged Out    Hib vaccine  Aged Out    Meningococcal (ACWY) vaccine  Aged Out    AAA screen  Discontinued    Hepatitis C screen  Discontinued     Patient Active Problem List   Diagnosis    HTN (hypertension)    History of subdural hematoma (post traumatic)    Hepatic steatosis    Osteoarthritis of lumbosacral spine    Benign prostatic hyperplasia with nocturia    Obesity    Nodular prostate without urinary obstruction    Seasonal allergic rhinitis due to pollen    Obesity, morbid ([address]    Subdural hygroma    Mitral valve regurgitation    Iron excess    Atrial fibrillation (HCC)    CASSY on CPAP    Hereditary hemochromatosis (HCC)    Polyneuropathy associated with underlying disease (HCC)    BPH (benign prostatic hyperplasia)    Coronary artery disease due to lipid rich plaque    Chronic constipation    Mixed dyslipidemia    Type 2 diabetes mellitus with diabetic neuropathy ([address]    Controlled type 2 diabetes mellitus with peripheral circulatory disorder ([address]       Review of Systems   Constitutional:  Negative for activity change and fatigue. Eyes:  Negative for visual disturbance. Respiratory:  Negative for chest tightness and shortness of breath. Cardiovascular:  Negative for chest pain, palpitations and leg swelling.    Endocrine: Positive for polyuria. Negative for polydipsia. Genitourinary:  Negative for difficulty urinating, dysuria and genital sores. Musculoskeletal:  Negative for myalgias. Skin:  Negative for rash (no groin yeast rash). Neurological:  Negative for headaches. Psychiatric/Behavioral:  Negative for dysphoric mood.       Lab Results   Component Value Date/Time    WBC 9.1 09/15/2022 02:25 PM    HGB 15.0 09/15/2022 02:25 PM    HCT 44.5 09/15/2022 02:25 PM    MCV 97.6 09/15/2022 02:25 PM    RDW 13.1 09/15/2022 02:25 PM     09/15/2022 02:25 PM    NEUTOPHILPCT 75 01/25/2022 08:23 AM    LYMPHOPCT 14 09/15/2022 02:25 PM    LYMPHOPCT 16 01/25/2022 08:23 AM    MONOPCT 5 09/15/2022 02:25 PM    MONOPCT 6 01/25/2022 08:23 AM    EOSRELPCT 2 09/15/2022 02:25 PM    BASOPCT 0 09/15/2022 02:25 PM    BASOPCT 1 01/25/2022 08:23 AM    LYMPHSABS 1.2 09/15/2022 02:25 PM    LYMPHSABS 1.2 01/25/2022 08:23 AM    MONOSABS 0.4 09/15/2022 02:25 PM    MONOSABS 0.4 01/25/2022 08:23 AM    EOSABS 0.2 09/15/2022 02:25 PM    EOSABS 0.1 01/25/2022 08:23 AM    BASOSABS 0.0 09/15/2022 02:25 PM    IMMGRAN 1 09/15/2022 02:25 PM    GRANULOCYTEABSOLUTECOUNT 0.1 01/25/2022 08:23 AM       Lab Results   Component Value Date/Time     09/15/2022 02:25 PM    K 4.0 09/15/2022 02:25 PM     09/15/2022 02:25 PM    CO2 26 09/15/2022 02:25 PM    ANIONGAP 7 09/15/2022 02:25 PM    GLUCOSE 255 09/15/2022 02:25 PM    BUN 14 09/15/2022 02:25 PM    CREATININE 1.00 09/15/2022 02:25 PM    GFRAA >60 09/15/2022 02:25 PM    LABGLOM >60 09/15/2022 02:25 PM    CALCIUM 9.3 09/15/2022 02:25 PM    BILITOT 1.1 09/15/2022 02:25 PM    ALT 45 09/15/2022 02:25 PM    AST 28 09/15/2022 02:25 PM    ALKPHOS 106 09/15/2022 02:25 PM    ALKPHOS 106 01/25/2022 08:23 AM    PROT 7.4 09/15/2022 02:25 PM    LABALBU 3.7 09/15/2022 02:25 PM    GLOB 3.7 09/15/2022 02:25 PM    ALBUMIN 1.0 09/15/2022 02:25 PM       Lab Results   Component Value Date/Time    CHOL 133 07/26/2022 02:59 PM    HDL 39 07/26/2022 02:59 PM    TRIG 136 07/26/2022 02:59 PM    LDLCALC 66.8 07/26/2022 02:59 PM    VLDL 21 01/25/2022 08:23 AM       Lab Results   Component Value Date/Time    LABA1C 7.8 07/26/2022 02:59 PM    LABA1C 7.8 01/25/2022 08:23 AM    LABA1C 6.9 07/20/2021 08:36 AM    LABA1C 6.8 01/13/2021 09:20 AM    LABA1C 6.7 07/20/2020 10:20 AM       Lab Results   Component Value Date/Time    TSH 2.420 09/26/2019 07:50 AM       Lab Results   Component Value Date/Time    PSA 0.1 07/26/2022 02:59 PM    PSA <0.1 01/25/2022 08:23 AM    PSA 0.1 01/13/2021 09:20 AM       Lab Results   Component Value Date/Time    SPECGRAV 1.016 07/26/2022 02:59 PM    PHUR 6.5 12/03/2019 06:54 AM    COLORU YELLOW/STRAW 07/26/2022 02:59 PM    CLARITYU CLEAR 12/03/2019 06:54 AM    LEUKOCYTESUR SMALL 07/26/2022 02:59 PM    PROTEINU Negative 07/26/2022 02:59 PM    GLUCOSEU 100 07/26/2022 02:59 PM    KETUA Negative 07/26/2022 02:59 PM    BLOODU Negative 07/26/2022 02:59 PM    BILIRUBINUR Negative 07/26/2022 02:59 PM    BILIRUBINUR NEGATIVE 12/03/2019 06:54 AM    UROBILINOGEN 0.2 07/26/2022 02:59 PM    NITRU Negative 07/26/2022 02:59 PM    LABMICR Comment 09/26/2019 11:23 AM           Vitals:    11/03/22 0839   BP: 118/74   Site: Left Upper Arm   Position: Sitting   Cuff Size: Large Adult   Pulse: 89   Temp: 97.2 °F (36.2 °C)   TempSrc: Skin   SpO2: 97%   Weight: (!) 331 lb (150.1 kg)   Height: 6' 3\" (1.905 m)     Wt Readings from Last 3 Encounters:   11/03/22 (!) 331 lb (150.1 kg)   09/15/22 (!) 335 lb 8 oz (152.2 kg)   08/02/22 (!) 337 lb (152.9 kg)     BP Readings from Last 3 Encounters:   11/03/22 118/74   09/15/22 116/65   09/15/22 (!) 146/80     Physical Exam  Vitals and nursing note reviewed. Constitutional:       Appearance: Normal appearance. He is obese. He is not ill-appearing. HENT:      Head: Normocephalic and atraumatic. Eyes:      Extraocular Movements: Extraocular movements intact.       Conjunctiva/sclera: Conjunctivae normal. Cardiovascular:      Rate and Rhythm: Normal rate. Rhythm irregular. Pulses:           Dorsalis pedis pulses are 2+ on the right side and 2+ on the left side. Posterior tibial pulses are 2+ on the right side and 2+ on the left side. Pulmonary:      Effort: Pulmonary effort is normal.      Breath sounds: Normal breath sounds. Musculoskeletal:      Right foot: Normal range of motion. No deformity. Left foot: Normal range of motion. No deformity. Feet:      Right foot:      Protective Sensation: 2 sites tested. 2 sites sensed. Skin integrity: Skin integrity normal.      Toenail Condition: Fungal disease present. Left foot:      Protective Sensation: 2 sites tested. 2 sites sensed. Skin integrity: Skin integrity normal.      Toenail Condition: Fungal disease present. Neurological:      General: No focal deficit present. Mental Status: He is alert and oriented to person, place, and time. Mental status is at baseline.    Psychiatric:         Mood and Affect: Mood normal.         Behavior: Behavior normal.

## 2022-11-11 ENCOUNTER — TELEPHONE (OUTPATIENT)
Dept: INTERNAL MEDICINE CLINIC | Facility: CLINIC | Age: 67
End: 2022-11-11

## 2022-11-11 NOTE — TELEPHONE ENCOUNTER
Patient states that [place]  was added to medication regimen at last OV. Patient states he has developed severe itching on face,feet and hands. Patient was told to discontinue Jardiance and Dr Lizz Davis would address on Monday.  Patient voiced understanding

## 2022-12-01 RX ORDER — HYDROCHLOROTHIAZIDE 25 MG/1
TABLET ORAL
Qty: 90 TABLET | Refills: 3 | OUTPATIENT
Start: 2022-12-01

## 2022-12-15 ENCOUNTER — HOSPITAL ENCOUNTER (OUTPATIENT)
Dept: INFUSION THERAPY | Age: 67
Discharge: HOME | End: 2022-12-15
Payer: MEDICARE

## 2022-12-15 ENCOUNTER — HOSPITAL ENCOUNTER (OUTPATIENT)
Dept: LAB | Age: 67
Discharge: HOME | End: 2022-12-18

## 2022-12-15 VITALS
OXYGEN SATURATION: 95 % | HEART RATE: 86 BPM | DIASTOLIC BLOOD PRESSURE: 82 MMHG | RESPIRATION RATE: 16 BRPM | TEMPERATURE: 97.4 F | SYSTOLIC BLOOD PRESSURE: 134 MMHG

## 2022-12-15 DIAGNOSIS — E83.110 HEREDITARY HEMOCHROMATOSIS (HCC): Primary | ICD-10-CM

## 2022-12-15 LAB — FERRITIN SERPL-MCNC: 120 NG/ML (ref 8–388)

## 2022-12-15 PROCEDURE — 36415 COLL VENOUS BLD VENIPUNCTURE: CPT

## 2022-12-15 PROCEDURE — 82728 ASSAY OF FERRITIN: CPT

## 2022-12-15 PROCEDURE — 99195 PHLEBOTOMY: CPT

## 2022-12-15 RX ORDER — 0.9 % SODIUM CHLORIDE 0.9 %
500 INTRAVENOUS SOLUTION INTRAVENOUS ONCE
OUTPATIENT
Start: 2022-12-15 | End: 2022-12-15

## 2022-12-15 RX ORDER — 0.9 % SODIUM CHLORIDE 0.9 %
250 INTRAVENOUS SOLUTION INTRAVENOUS ONCE
OUTPATIENT
Start: 2022-12-15 | End: 2022-12-15

## 2022-12-15 NOTE — PROGRESS NOTES
Arrived to the [facility]. Therapeutic phlebotomy completed. Patient tolerated well. Any issues or concerns during appointment: none. Patient aware of next infusion appointment on 3/16 (date) at 3:00 PM (time). Patient instructed to call provider with temperature of 100.4 or greater or nausea/vomiting/ diarrhea or pain not controlled by medications  Discharged ambulatory.

## 2022-12-22 DIAGNOSIS — I10 ESSENTIAL HYPERTENSION: Primary | ICD-10-CM

## 2022-12-22 RX ORDER — HYDROCHLOROTHIAZIDE 25 MG/1
25 TABLET ORAL DAILY
Qty: 90 TABLET | Refills: 0 | Status: SHIPPED | OUTPATIENT
Start: 2022-12-22

## 2023-01-04 ENCOUNTER — APPOINTMENT (OUTPATIENT)
Dept: GENERAL RADIOLOGY | Age: 68
DRG: 163 | End: 2023-01-04
Payer: MEDICARE

## 2023-01-04 ENCOUNTER — HOSPITAL ENCOUNTER (INPATIENT)
Age: 68
LOS: 15 days | Discharge: HOME HEALTH | DRG: 163 | End: 2023-01-19
Attending: EMERGENCY MEDICINE | Admitting: FAMILY MEDICINE
Payer: MEDICARE

## 2023-01-04 ENCOUNTER — APPOINTMENT (OUTPATIENT)
Dept: CT IMAGING | Age: 68
DRG: 163 | End: 2023-01-04
Payer: MEDICARE

## 2023-01-04 DIAGNOSIS — B95.62 BACTEREMIA DUE TO METHICILLIN RESISTANT STAPHYLOCOCCUS AUREUS: ICD-10-CM

## 2023-01-04 DIAGNOSIS — R78.81 BACTEREMIA DUE TO METHICILLIN RESISTANT STAPHYLOCOCCUS AUREUS: ICD-10-CM

## 2023-01-04 DIAGNOSIS — J93.83 SPONTANEOUS PNEUMOTHORAX: ICD-10-CM

## 2023-01-04 DIAGNOSIS — J96.01 ACUTE RESPIRATORY FAILURE WITH HYPOXIA (HCC): ICD-10-CM

## 2023-01-04 DIAGNOSIS — Z09 HOSPITAL DISCHARGE FOLLOW-UP: Primary | ICD-10-CM

## 2023-01-04 DIAGNOSIS — E11.65 TYPE 2 DIABETES MELLITUS WITH HYPERGLYCEMIA, WITHOUT LONG-TERM CURRENT USE OF INSULIN (HCC): ICD-10-CM

## 2023-01-04 DIAGNOSIS — A41.02 SEPSIS DUE TO METHICILLIN RESISTANT STAPHYLOCOCCUS AUREUS (MRSA), UNSPECIFIED WHETHER ACUTE ORGAN DYSFUNCTION PRESENT (HCC): ICD-10-CM

## 2023-01-04 DIAGNOSIS — J93.11 PRIMARY SPONTANEOUS PNEUMOTHORAX: ICD-10-CM

## 2023-01-04 PROBLEM — N40.1 BENIGN PROSTATIC HYPERPLASIA WITH NOCTURIA: Status: ACTIVE | Noted: 2019-10-02

## 2023-01-04 PROBLEM — Z99.89 OSA ON CPAP: Status: ACTIVE | Noted: 2017-10-13

## 2023-01-04 PROBLEM — G47.33 OSA ON CPAP: Status: ACTIVE | Noted: 2017-10-13

## 2023-01-04 PROBLEM — Z79.01 ANTICOAGULANT LONG-TERM USE: Status: ACTIVE | Noted: 2023-01-04

## 2023-01-04 PROBLEM — I48.91 ATRIAL FIBRILLATION (HCC): Status: ACTIVE | Noted: 2017-10-08

## 2023-01-04 PROBLEM — R35.1 BENIGN PROSTATIC HYPERPLASIA WITH NOCTURIA: Status: ACTIVE | Noted: 2019-10-02

## 2023-01-04 LAB
ALBUMIN SERPL-MCNC: 3.6 G/DL (ref 3.2–4.6)
ALBUMIN/GLOB SERPL: 1 (ref 0.4–1.6)
ALP SERPL-CCNC: 119 U/L (ref 50–136)
ALT SERPL-CCNC: 72 U/L (ref 12–65)
ANION GAP SERPL CALC-SCNC: 5 MMOL/L (ref 2–11)
AST SERPL-CCNC: 37 U/L (ref 15–37)
BILIRUB SERPL-MCNC: 1 MG/DL (ref 0.2–1.1)
BUN SERPL-MCNC: 11 MG/DL (ref 8–23)
CALCIUM SERPL-MCNC: 9.4 MG/DL (ref 8.3–10.4)
CHLORIDE SERPL-SCNC: 104 MMOL/L (ref 101–110)
CO2 SERPL-SCNC: 24 MMOL/L (ref 21–32)
CREAT SERPL-MCNC: 0.9 MG/DL (ref 0.8–1.5)
ERYTHROCYTE [DISTWIDTH] IN BLOOD BY AUTOMATED COUNT: 13 % (ref 11.9–14.6)
FLUAV AG NPH QL IA: NEGATIVE
FLUBV AG NPH QL IA: NEGATIVE
GLOBULIN SER CALC-MCNC: 3.5 G/DL (ref 2.8–4.5)
GLUCOSE BLD STRIP.AUTO-MCNC: 196 MG/DL (ref 65–100)
GLUCOSE SERPL-MCNC: 196 MG/DL (ref 65–100)
HCT VFR BLD AUTO: 42.9 % (ref 41.1–50.3)
HGB BLD-MCNC: 14.6 G/DL (ref 13.6–17.2)
MAGNESIUM SERPL-MCNC: 2.2 MG/DL (ref 1.8–2.4)
MCH RBC QN AUTO: 33.1 PG (ref 26.1–32.9)
MCHC RBC AUTO-ENTMCNC: 34 G/DL (ref 31.4–35)
MCV RBC AUTO: 97.3 FL (ref 82–102)
NRBC # BLD: 0 K/UL (ref 0–0.2)
NT PRO BNP: 463 PG/ML (ref 5–125)
PLATELET # BLD AUTO: 178 K/UL (ref 150–450)
PMV BLD AUTO: 10.5 FL (ref 9.4–12.3)
POTASSIUM SERPL-SCNC: 3.9 MMOL/L (ref 3.5–5.1)
PROT SERPL-MCNC: 7.1 G/DL (ref 6.3–8.2)
RBC # BLD AUTO: 4.41 M/UL (ref 4.23–5.6)
SARS-COV-2 RDRP RESP QL NAA+PROBE: NOT DETECTED
SERVICE CMNT-IMP: ABNORMAL
SODIUM SERPL-SCNC: 133 MMOL/L (ref 133–143)
SOURCE: NORMAL
SPECIMEN SOURCE: NORMAL
TROPONIN I SERPL HS-MCNC: 3.9 PG/ML (ref 0–14)
TROPONIN I SERPL HS-MCNC: 4.7 PG/ML (ref 0–14)
WBC # BLD AUTO: 9 K/UL (ref 4.3–11.1)

## 2023-01-04 PROCEDURE — 0W9930Z DRAINAGE OF RIGHT PLEURAL CAVITY WITH DRAINAGE DEVICE, PERCUTANEOUS APPROACH: ICD-10-PCS | Performed by: EMERGENCY MEDICINE

## 2023-01-04 PROCEDURE — 94761 N-INVAS EAR/PLS OXIMETRY MLT: CPT

## 2023-01-04 PROCEDURE — 84484 ASSAY OF TROPONIN QUANT: CPT

## 2023-01-04 PROCEDURE — 96376 TX/PRO/DX INJ SAME DRUG ADON: CPT

## 2023-01-04 PROCEDURE — 6360000002 HC RX W HCPCS: Performed by: FAMILY MEDICINE

## 2023-01-04 PROCEDURE — 87635 SARS-COV-2 COVID-19 AMP PRB: CPT

## 2023-01-04 PROCEDURE — 6370000000 HC RX 637 (ALT 250 FOR IP): Performed by: FAMILY MEDICINE

## 2023-01-04 PROCEDURE — 83880 ASSAY OF NATRIURETIC PEPTIDE: CPT

## 2023-01-04 PROCEDURE — 6360000002 HC RX W HCPCS

## 2023-01-04 PROCEDURE — 71250 CT THORAX DX C-: CPT

## 2023-01-04 PROCEDURE — 80053 COMPREHEN METABOLIC PANEL: CPT

## 2023-01-04 PROCEDURE — 82962 GLUCOSE BLOOD TEST: CPT

## 2023-01-04 PROCEDURE — 87804 INFLUENZA ASSAY W/OPTIC: CPT

## 2023-01-04 PROCEDURE — 85027 COMPLETE CBC AUTOMATED: CPT

## 2023-01-04 PROCEDURE — 6360000002 HC RX W HCPCS: Performed by: NURSE PRACTITIONER

## 2023-01-04 PROCEDURE — 99285 EMERGENCY DEPT VISIT HI MDM: CPT

## 2023-01-04 PROCEDURE — 71045 X-RAY EXAM CHEST 1 VIEW: CPT

## 2023-01-04 PROCEDURE — 1100000000 HC RM PRIVATE

## 2023-01-04 PROCEDURE — 93005 ELECTROCARDIOGRAM TRACING: CPT | Performed by: EMERGENCY MEDICINE

## 2023-01-04 PROCEDURE — 96374 THER/PROPH/DIAG INJ IV PUSH: CPT

## 2023-01-04 PROCEDURE — 83735 ASSAY OF MAGNESIUM: CPT

## 2023-01-04 PROCEDURE — 99223 1ST HOSP IP/OBS HIGH 75: CPT | Performed by: INTERNAL MEDICINE

## 2023-01-04 PROCEDURE — 2580000003 HC RX 258: Performed by: FAMILY MEDICINE

## 2023-01-04 PROCEDURE — 32551 INSERTION OF CHEST TUBE: CPT

## 2023-01-04 RX ORDER — MORPHINE SULFATE 4 MG/ML
4 INJECTION INTRAVENOUS ONCE
Status: COMPLETED | OUTPATIENT
Start: 2023-01-04 | End: 2023-01-04

## 2023-01-04 RX ORDER — POLYETHYLENE GLYCOL 3350 17 G/17G
17 POWDER, FOR SOLUTION ORAL DAILY PRN
Status: DISCONTINUED | OUTPATIENT
Start: 2023-01-04 | End: 2023-01-06 | Stop reason: SDUPTHER

## 2023-01-04 RX ORDER — SODIUM CHLORIDE 0.9 % (FLUSH) 0.9 %
5-40 SYRINGE (ML) INJECTION EVERY 12 HOURS SCHEDULED
Status: DISCONTINUED | OUTPATIENT
Start: 2023-01-04 | End: 2023-01-19 | Stop reason: HOSPADM

## 2023-01-04 RX ORDER — LISINOPRIL 5 MG/1
10 TABLET ORAL DAILY
Status: DISCONTINUED | OUTPATIENT
Start: 2023-01-05 | End: 2023-01-19 | Stop reason: HOSPADM

## 2023-01-04 RX ORDER — ACETAMINOPHEN 650 MG/1
650 SUPPOSITORY RECTAL EVERY 6 HOURS PRN
Status: DISCONTINUED | OUTPATIENT
Start: 2023-01-04 | End: 2023-01-19 | Stop reason: HOSPADM

## 2023-01-04 RX ORDER — MORPHINE SULFATE 2 MG/ML
2 INJECTION, SOLUTION INTRAMUSCULAR; INTRAVENOUS
Status: COMPLETED | OUTPATIENT
Start: 2023-01-04 | End: 2023-01-04

## 2023-01-04 RX ORDER — ACETAMINOPHEN 325 MG/1
650 TABLET ORAL EVERY 6 HOURS PRN
Status: DISCONTINUED | OUTPATIENT
Start: 2023-01-04 | End: 2023-01-19 | Stop reason: HOSPADM

## 2023-01-04 RX ORDER — GUAIFENESIN 600 MG/1
600 TABLET, EXTENDED RELEASE ORAL 2 TIMES DAILY
Status: COMPLETED | OUTPATIENT
Start: 2023-01-04 | End: 2023-01-16

## 2023-01-04 RX ORDER — DABIGATRAN ETEXILATE 150 MG/1
150 CAPSULE ORAL 2 TIMES DAILY
Status: DISCONTINUED | OUTPATIENT
Start: 2023-01-04 | End: 2023-01-19 | Stop reason: HOSPADM

## 2023-01-04 RX ORDER — OXYCODONE HYDROCHLORIDE 5 MG/1
5 TABLET ORAL EVERY 4 HOURS PRN
Status: DISCONTINUED | OUTPATIENT
Start: 2023-01-04 | End: 2023-01-10

## 2023-01-04 RX ORDER — GABAPENTIN 300 MG/1
600 CAPSULE ORAL 2 TIMES DAILY
Status: DISCONTINUED | OUTPATIENT
Start: 2023-01-04 | End: 2023-01-19 | Stop reason: HOSPADM

## 2023-01-04 RX ORDER — SODIUM CHLORIDE 0.9 % (FLUSH) 0.9 %
5-40 SYRINGE (ML) INJECTION PRN
Status: DISCONTINUED | OUTPATIENT
Start: 2023-01-04 | End: 2023-01-19 | Stop reason: HOSPADM

## 2023-01-04 RX ORDER — HYDROCHLOROTHIAZIDE 25 MG/1
25 TABLET ORAL DAILY
Status: DISCONTINUED | OUTPATIENT
Start: 2023-01-05 | End: 2023-01-19 | Stop reason: HOSPADM

## 2023-01-04 RX ORDER — ATORVASTATIN CALCIUM 20 MG/1
20 TABLET, FILM COATED ORAL DAILY
Status: DISCONTINUED | OUTPATIENT
Start: 2023-01-05 | End: 2023-01-19 | Stop reason: HOSPADM

## 2023-01-04 RX ORDER — SODIUM CHLORIDE 9 MG/ML
INJECTION, SOLUTION INTRAVENOUS PRN
Status: DISCONTINUED | OUTPATIENT
Start: 2023-01-04 | End: 2023-01-19 | Stop reason: HOSPADM

## 2023-01-04 RX ORDER — SPIRONOLACTONE 25 MG/1
25 TABLET ORAL DAILY
Status: DISCONTINUED | OUTPATIENT
Start: 2023-01-04 | End: 2023-01-19 | Stop reason: HOSPADM

## 2023-01-04 RX ORDER — MORPHINE SULFATE 2 MG/ML
2 INJECTION, SOLUTION INTRAMUSCULAR; INTRAVENOUS EVERY 4 HOURS PRN
Status: DISCONTINUED | OUTPATIENT
Start: 2023-01-04 | End: 2023-01-10

## 2023-01-04 RX ORDER — INSULIN LISPRO 100 [IU]/ML
0-8 INJECTION, SOLUTION INTRAVENOUS; SUBCUTANEOUS
Status: DISCONTINUED | OUTPATIENT
Start: 2023-01-04 | End: 2023-01-19 | Stop reason: HOSPADM

## 2023-01-04 RX ORDER — MORPHINE SULFATE 4 MG/ML
4 INJECTION INTRAVENOUS EVERY 4 HOURS PRN
Status: DISPENSED | OUTPATIENT
Start: 2023-01-04 | End: 2023-01-06

## 2023-01-04 RX ORDER — ONDANSETRON 2 MG/ML
4 INJECTION INTRAMUSCULAR; INTRAVENOUS EVERY 6 HOURS PRN
Status: DISCONTINUED | OUTPATIENT
Start: 2023-01-04 | End: 2023-01-19 | Stop reason: HOSPADM

## 2023-01-04 RX ORDER — INSULIN LISPRO 100 [IU]/ML
0-4 INJECTION, SOLUTION INTRAVENOUS; SUBCUTANEOUS NIGHTLY
Status: DISCONTINUED | OUTPATIENT
Start: 2023-01-04 | End: 2023-01-19 | Stop reason: HOSPADM

## 2023-01-04 RX ORDER — ONDANSETRON 4 MG/1
4 TABLET, ORALLY DISINTEGRATING ORAL EVERY 8 HOURS PRN
Status: DISCONTINUED | OUTPATIENT
Start: 2023-01-04 | End: 2023-01-19 | Stop reason: HOSPADM

## 2023-01-04 RX ORDER — MORPHINE SULFATE 2 MG/ML
INJECTION, SOLUTION INTRAMUSCULAR; INTRAVENOUS
Status: COMPLETED
Start: 2023-01-04 | End: 2023-01-04

## 2023-01-04 RX ORDER — ACETAMINOPHEN 500 MG
500 TABLET ORAL EVERY 4 HOURS
Status: DISCONTINUED | OUTPATIENT
Start: 2023-01-04 | End: 2023-01-19 | Stop reason: HOSPADM

## 2023-01-04 RX ORDER — CARVEDILOL 25 MG/1
25 TABLET ORAL 2 TIMES DAILY WITH MEALS
Status: DISCONTINUED | OUTPATIENT
Start: 2023-01-04 | End: 2023-01-19 | Stop reason: HOSPADM

## 2023-01-04 RX ADMIN — MORPHINE SULFATE 2 MG: 2 INJECTION, SOLUTION INTRAMUSCULAR; INTRAVENOUS at 18:38

## 2023-01-04 RX ADMIN — SODIUM CHLORIDE, PRESERVATIVE FREE 10 ML: 5 INJECTION INTRAVENOUS at 22:00

## 2023-01-04 RX ADMIN — CARVEDILOL 25 MG: 25 TABLET, FILM COATED ORAL at 21:47

## 2023-01-04 RX ADMIN — MORPHINE SULFATE 4 MG: 4 INJECTION INTRAVENOUS at 21:49

## 2023-01-04 RX ADMIN — OXYCODONE 5 MG: 5 TABLET ORAL at 22:57

## 2023-01-04 RX ADMIN — MORPHINE SULFATE 4 MG: 4 INJECTION INTRAVENOUS at 18:05

## 2023-01-04 RX ADMIN — GUAIFENESIN 600 MG: 600 TABLET ORAL at 21:47

## 2023-01-04 RX ADMIN — GABAPENTIN 600 MG: 300 CAPSULE ORAL at 21:49

## 2023-01-04 ASSESSMENT — ENCOUNTER SYMPTOMS
EYE DISCHARGE: 0
BACK PAIN: 0
NAUSEA: 0
SHORTNESS OF BREATH: 1
COLOR CHANGE: 0
EYE REDNESS: 0
DIARRHEA: 0
FACIAL SWELLING: 0
ABDOMINAL PAIN: 0
RHINORRHEA: 0
COUGH: 0
VOMITING: 0

## 2023-01-04 ASSESSMENT — PAIN DESCRIPTION - LOCATION
LOCATION: CHEST
LOCATION: CHEST

## 2023-01-04 ASSESSMENT — PAIN SCALES - GENERAL
PAINLEVEL_OUTOF10: 6
PAINLEVEL_OUTOF10: 4
PAINLEVEL_OUTOF10: 8
PAINLEVEL_OUTOF10: 4

## 2023-01-04 NOTE — CONSULTS
HISTORY AND PHYSICAL  Roselia Montalvo  1/4/2023   Date of Admission:  1/4/2023    The patient's chart is reviewed and the patient is discussed with the staff. Subjective:     Patient is a 69 y.o. male presents with right sided chest pressure and SOB. Pt has history of spontaneous Ptx on left in 2017 which required large bore chest tube at the time. Did not require surgical intervention. This was the same time he had bilateral subdural hematomas and was wearing CPAP. Pt came to ED for further evaluation and CXR showed moderate to large PTX. He is currently on 6L NC with sat 90-92%. He still has some chest discomfort. Denies SOB. Denies any trauma. Mild cough, afebrile. He is COVID negative. He does have a history of CASSY on ASV for central apneas following subdural hematomas. He also has a history of chronic Afib on Pradaxa. His wife is at bedside. Former smoker, but never officially diagnosed with COPD. No inhaler or O2 at home. CT chest in 2009 with mild bullous emphysema noted. Review of Systems  Pertinent items are noted in HPI.     Current Outpatient Medications   Medication Instructions    carvedilol (COREG) 25 mg, Oral, 2 TIMES DAILY WITH MEALS    cetirizine (ZYRTEC) 10 MG tablet Oral, DAILY PRN    clotrimazole-betamethasone (LOTRISONE) 1-0.05 % cream Apply to affected area bid as directed    CPAP Machine MISC Other    dabigatran (PRADAXA) 150 MG capsule TAKE 1 CAPSULE EVERY 12 HOURS    empagliflozin (JARDIANCE) 10 mg, Oral, DAILY    enalapril (VASOTEC) 10 mg, Oral, DAILY, TAKE 1 TABLET DAILY    furosemide (LASIX) 20 mg, Oral, DAILY PRN    gabapentin (NEURONTIN) 600 MG tablet 1 tablet, Oral, 2 TIMES DAILY    guaiFENesin (MUCINEX) 600 mg, Oral, 2 TIMES DAILY    hydroCHLOROthiazide (HYDRODIURIL) 25 mg, Oral, DAILY    metFORMIN (GLUCOPHAGE) 500 MG tablet TAKE 1 TABLET TWICE A DAY WITH A MEAL    potassium chloride (KLOR-CON M) 10 MEQ extended release tablet 10 mEq, Oral, DAILY PRN    simvastatin (ZOCOR) 40 mg, Oral, DAILY    spironolactone (ALDACTONE) 25 mg, Oral, DAILY      Past Medical History:   Diagnosis Date    Arteriosclerosis     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 Diabetes mellitus ([address] 1/3/2012    Essential hypertension, benign 2014    Hemochromatosis     Hypercholesteremia     Nodular prostate without urinary obstruction 2014    Obesity 2014    Pneumothorax, right 10/8/2017    Sepsis due to urinary tract infection ([address] 2019    Sleep apnea 10/12/2016    uses ASV instead of CPAP machine - per pt     Subdural hemorrhage following injury 10/20/2017     Past Surgical History:   Procedure Laterality Date    CHEST SURGERY  10/2017    pneumothorax after fall    COLONOSCOPY N/A 2018    COLONOSCOPY  BMI 41 performed by Diana Garcia MD at [address] [place] [address] Right 10/02/2017    Lilian Idol for Subdural Hematoma    KNEE ARTHROSCOPY Right     OTHER SURGICAL HISTORY Bilateral 10/18/2017    Lilian Idol for Subdural Hematoma    TURP  2019    UROLOGICAL SURGERY      prostate u/s and BX     Social History     Socioeconomic History    Marital status:      Spouse name: Not on file    Number of children: Not on file    Years of education: Not on file    Highest education level: Not on file   Occupational History    Not on file   Tobacco Use    Smoking status: Former     Packs/day: 1.50     Types: Cigarettes     Quit date: 1989     Years since quittin.4    Smokeless tobacco: Former     Quit date: 2016   Vaping Use    Vaping Use: Never used   Substance and Sexual Activity    Alcohol use:  Yes     Alcohol/week: 8.0 standard drinks    Drug use: No    Sexual activity: Not on file   Other Topics Concern    Not on file   Social History Narrative    Not on file     Social Determinants of Health     Financial Resource Strain: Not on file   Food Insecurity: Not on file   Transportation Needs: Not on file   Physical Activity: Not on file   Stress: Not on file   Social Connections: Not on file   Intimate Partner Violence: Not on file   Housing Stability: Not on file     Family History   Problem Relation Age of Onset    Prostate Cancer Paternal Grandfather     Alcohol Abuse Brother     Heart Failure Brother     Psychiatric Disorder Brother     Stroke Brother     Psychiatric Disorder Mother     Other Mother         Sarcoidosis    Atrial Fibrillation Father     Lung Cancer Father     Hypertension Father     Heart Disease Father 61            Diabetes Father     Heart Disease Paternal Grandmother      Allergies   Allergen Reactions    Azithromycin Other (See Comments)     Skin dryness/peeling     Objective:     Vitals:    23 1601 23 1606   BP: 126/73    Pulse: 87    Resp: 21    SpO2: 93%    Weight:  (!) 325 lb (147.4 kg)   Height:  6' 2\" (1.88 m)     PHYSICAL EXAM   Constitutional:  the patient is well developed and in no acute distress  EENMT:  Sclera clear, pupils equal, oral mucosa moist  Respiratory: symmetric chest rise. Right lung sounds diminished, trachea is midline, no subQ emphysema, currently on 6L NC. Right lung clear. Cardiovascular:  RRR without M,G,R. There is 1+ lower extremity edema. Gastrointestinal: soft and non-tender; with positive bowel sounds. Musculoskeletal: warm without cyanosis. Normal muscle tone. Skin:  no jaundice or rashes, no wounds   Neurologic: symmetric strength, fluent speech  Psychiatric:  calm, appropriate, oriented x 4    Imaging: I performed an independent interpretation of the patient's images.   CXR: 23        CT calcium score       CT on   LAB:  Recent Labs     23  1545   WBC 9.0   HGB 14.6   HCT 42.9        Recent Labs     01/04/23  1545      K 3.9      CO2 24   BUN 11   CREATININE 0.90   MG 2.2   BILITOT 1.0   AST 37   ALT 72*   ALKPHOS 119     Recent Labs     01/04/23  1545   TROPHS 3.9   NTPROBNP 463*     No results for input(s): PHAPOC, YQR2XEPH, HO3ZPKK, CFA7TKQ, BE in the last 72 hours. Microbiology:   No results for input(s): CULTURE in the last 72 hours. Assessment and Plan:  (Medical Decision Making)   Impression: 68 yo male with history of L spontaneous PTX in 2017 reports to ED with chest pressure and SOB found to have R spontaneous Ptx. History of chronic Afib on pradaxa. Active Problems:    Active Problems:    Acute respiratory failure with hypoxia (HCC)  Plan: Currently on 6L NC, possibly for nitrogen washout. Once CT placed, can address o2 demands further. Spontaneous pneumothorax  Plan: now 2nd spontaneous Ptx, opposite side. --plan for uresil placement by Dr Prateek Mota. Pt made aware. --will plan for CT scan after CT placement. History of bullous emphysema, so may need surgical evaluation. --premedicate with morphine 4mg. CASSY on CPAP  Plan: No PAP therapy while ongoing Ptx. Can use O2 at night only. More than 50% of the time documented was spent in face-to-face contact with the patient and in the care of the patient on the floor/unit where the patient is located. In this split/shared evaluation I performed performed a medically appropriate history and exam, counseled and educated the patient and/or family member, ordered medications, tests or procedures, documented information in EMR, and coordinated care. which accounted for 18 clinical time. CARA Thomas CNP     In this split/shared evaluation I performed reviewed the patients's H&P, available images, labs, cultures. , discussed case in detail with NPP, performed a medically appropriate history and exam, counseled and educated the patient and/or family member, ordered and/or reviewed medications, tests or procedures, documented information in EMR, independently interpreted images, and coordinated care. which accounted for 22 minutes clinical time. Impression: Mr. Tori Tovar is a very pleasant gentleman with a spontaneous pneumothorax, his second (first was on left, however). He is on pradaxa for a fib, has DM2, prior subdural hematoma. He has a history of mild emphysema/blebs on prior imaging. We discussed the options as far as type of tube thoracostomy and a 14Fr chest tube was placed by Dr. Vic Childs while I was at bedside.     Follow up CXR stat to confirm  Continue chest tube to suction  Daily CXR      Barbara Manzo MD

## 2023-01-04 NOTE — ED PROVIDER NOTES
[facility] Emergency Department Provider Note                   PCP:                Mainor Benz MD               Age: 69 y.o. Sex: male       ICD-10-CM    1. Primary spontaneous pneumothorax  J93.11           DISPOSITION         New Prescriptions    No medications on file       Orders Placed This Encounter   Procedures    Rapid influenza A/B antigens    COVID-19, Rapid    XR CHEST PORTABLE    CBC    Comprehensive Metabolic Panel    Troponin    Magnesium    Brain Natriuretic Peptide    Cardiac Monitor    Pulse Oximetry    EKG 12 Lead    Saline lock IV         Danilo Elkins is a 69 y.o. male who presents to the Emergency Department with chief complaint of  No chief complaint on file. Chief complaint : Shortness of breath and chest pain    HISTORY OF PRESENT ILLNESS :  Location : Globally dyspneic    Quality : Sharp chest pressure just to the right of sternal border    Quantity : Constant    Timing : Shortly before 2:00 PM    Severity : Moderate    Context : Distant history of left-sided pneumothorax around 2019 to the time that he also had 2 subdural hematomas, subdural hematomas were traumatic, though delayed in discovery, and the pneumothorax developed while in the hospital weeks later, presumably not traumatic seemed to start shortly after putting on his CPAP for the night while inpatient    Alleviating / exacerbating factors : Dyspnea worse with exertion    Associated Symptoms : No abdominal pain, nausea, vomiting        Review of Systems   Constitutional:  Negative for chills and fever. HENT:  Negative for congestion, facial swelling and rhinorrhea. Eyes:  Negative for discharge and redness. Respiratory:  Positive for shortness of breath. Negative for cough. Cardiovascular:  Positive for chest pain. Negative for palpitations. Gastrointestinal:  Negative for abdominal pain, diarrhea, nausea and vomiting. Genitourinary:  Negative for difficulty urinating, dysuria and frequency. Musculoskeletal:  Negative for arthralgias, back pain and myalgias. Skin:  Negative for color change and pallor. Neurological:  Negative for dizziness, light-headedness and headaches. All other systems reviewed and are negative. All other systems reviewed and are negative. 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     Diabetes mellitus ([address] 1/3/2012    Essential hypertension, benign 2014    Hemochromatosis     Hypercholesteremia     Nodular prostate without urinary obstruction 2014    Obesity 2014    Pneumothorax, right 10/8/2017    Sepsis due to urinary tract infection ([address] 2019    Sleep apnea 10/12/2016    uses ASV instead of CPAP machine - per pt     Subdural hemorrhage following injury 10/20/2017        Past Surgical History:   Procedure Laterality Date    CHEST SURGERY  10/2017    pneumothorax after fall    COLONOSCOPY N/A 2018    COLONOSCOPY  BMI 41 performed by Saeed Webb MD at [address] Right 10/02/2017    [facility] for Subdural Hematoma    KNEE ARTHROSCOPY Right     OTHER SURGICAL HISTORY Bilateral 10/18/2017    [facility] for Subdural Hematoma    TURP  2019    UROLOGICAL SURGERY      prostate u/s and BX        Family History   Problem Relation Age of Onset    Prostate Cancer Paternal Grandfather     Alcohol Abuse Brother     Heart Failure Brother     Psychiatric Disorder Brother     Stroke Brother     Psychiatric Disorder Mother     Other Mother         Sarcoidosis    Atrial Fibrillation Father     Lung Cancer Father     Hypertension Father     Heart Disease Father 61            Diabetes Father     Heart Disease Paternal Grandmother         Social Connections: Not on file        Allergies   Allergen Reactions    Azithromycin Other (See Comments)     Skin dryness/peeling        Vitals signs and nursing note reviewed. Patient Vitals for the past 4 hrs:   Pulse Resp BP SpO2   01/04/23 1601 87 21 126/73 93 %          Physical Exam  Vitals and nursing note reviewed. Constitutional:       General: He is not in acute distress. Appearance: Normal appearance. He is not ill-appearing, toxic-appearing or diaphoretic. Comments: Hypoxic on arrival, oxygen saturations in the 80s, easily corrects with 4 L nasal cannula   HENT:      Head: Normocephalic and atraumatic. Right Ear: External ear normal.      Left Ear: External ear normal.      Nose: Nose normal. No rhinorrhea. Eyes:      General: No scleral icterus. Right eye: No discharge. Left eye: No discharge. Extraocular Movements: Extraocular movements intact. Conjunctiva/sclera: Conjunctivae normal.   Cardiovascular:      Rate and Rhythm: Normal rate. Rhythm irregular. Pulmonary:      Effort: Pulmonary effort is normal. No respiratory distress. Breath sounds: Examination of the right-upper field reveals decreased breath sounds. Examination of the right-middle field reveals decreased breath sounds. Examination of the right-lower field reveals decreased breath sounds. Decreased breath sounds present. No wheezing or rales. Comments: Diminution more prominent anteriorly than posteriorly on the right    No crepitance to chest wall area  Abdominal:      General: Abdomen is flat. Palpations: Abdomen is soft. Tenderness: There is no abdominal tenderness. There is no guarding or rebound. Musculoskeletal:         General: No swelling, tenderness or signs of injury. Normal range of motion. Cervical back: Normal range of motion and neck supple. No rigidity. Skin:     General: Skin is warm and dry. Coloration: Skin is not jaundiced or pale.    Neurological:      General: No focal deficit present. Mental Status: He is alert and oriented to person, place, and time. Gait: Gait normal.   Psychiatric:         Behavior: Behavior normal.        MDM  Number of Diagnoses or Management Options  Acute respiratory failure with hypoxia (HCC)  Primary spontaneous pneumothorax: new, needed workup  Diagnosis management comments: 57-year-old gentleman with with dyspnea, differential diagnosis to include pneumonia, CHF, pneumothorax, MI, A. fib with RVR. Chest x-ray reveals a recurrent spontaneous pneumothorax, this time from a coughing spell, right-sided moderate pneumothorax seen, with slight tension sats are low but improved on oxygen. We will place a chest tube.   Case and procedure complicated by the fact that he is on Pradaxa, a blood thinner for his comorbid condition of atrial fibrillation which makes the chest tube placement somewhat riskier    Chest tube successfully and reduced, lung reinflated, repeat chest x-ray looks good patient will be admitted to the hospitalist service       Amount and/or Complexity of Data Reviewed  Clinical lab tests: ordered and reviewed  Tests in the radiology section of CPT®: ordered and reviewed (MY independent review of chest x-ray reveals a moderate right-sided pneumothorax)  Decide to obtain previous medical records or to obtain history from someone other than the patient: yes  Obtain history from someone other than the patient: yes (Wife at bedside-Interviewed as an independent historian  )  Discuss the patient with other providers: yes (Pulmonary    Hospitalist)  Independent visualization of images, tracings, or specimens: yes (Review of EKG shows rate controlled atrial fibrillation with no ACS)    Risk of Complications, Morbidity, and/or Mortality  Presenting problems: high  Diagnostic procedures: low  Management options: moderate  General comments: ===================================================================  This patient is critically ill and there is a high probability of of imminent or life threatening deterioration in the patient's condition without immediate management. The nature of the patient's clinical problem is: Acute respiratory failure with hypoxia and tension pneumothorax requiring ER insertion of chest tube    I have spent 31 minutes in direct patient care, documentation, review of labs/xrays/old records, discussion with Family, Staff, Colleague, Nursing . The time involved in the performance of separately reportable procedures was not counted toward critical care time. Gwendolyn Yun MD; 1/4/2023 @9:52 PM  ===================================================================            Critical Care  Total time providing critical care: 30-74 minutes    Patient Progress  Patient progress: improved      Chest Tube    Date/Time: 1/4/2023 6:00 PM  Performed by: Gwendolyn Yun MD  Authorized by: Tyrone Martini MD     Consent:     Consent obtained:  Written    Consent given by:  Patient    Risks, benefits, and alternatives were discussed: yes      Risks discussed:  Bleeding, infection, pain and nerve damage    Alternatives discussed:  No treatment  Universal protocol:     Procedure explained and questions answered to patient or proxy's satisfaction: yes      Relevant documents present and verified: yes      Test results available: yes      Imaging studies available: yes      Required blood products, implants, devices, and special equipment available: yes      Site/side marked: yes      Immediately prior to procedure, a time out was called: yes      Patient identity confirmed:  Verbally with patient and arm band  Pre-procedure details:     Skin preparation:  Chlorhexidine  Sedation:     Sedation type:   Anxiolysis  Anesthesia:     Anesthesia method:  Local infiltration    Local anesthetic:  Lidocaine 1% w/o epi  Procedure details:     Placement location:  R anterior    Scalpel size:  11    Tube size (Fr):  Minicatheter Ultrasound guidance: no      Tension pneumothorax: yes      Tube connected to:  Suction    Drainage characteristics:  Air only    Suture material:  0 silk    Dressing:  4x4 sterile gauze  Post-procedure details:     Post-insertion x-ray findings: tube in good position      Procedure completion:  Tolerated  EKG 12 Lead    Date/Time: 1/4/2023 4:00 PM  Performed by: Vicente Hernandez MD  Authorized by: Vicente Hernandez MD     ECG reviewed by ED Physician in the absence of a cardiologist: yes    Previous ECG:     Previous ECG:  Unavailable  Interpretation:     Interpretation: abnormal    Rate:     ECG rate:  82    ECG rate assessment: normal    Rhythm:     Rhythm: atrial fibrillation    Ectopy:     Ectopy: PVCs      PVCs:  Unifocal and infrequent  QRS:     QRS axis:  Normal    QRS intervals:  Normal    QRS conduction: normal    ST segments:     ST segments:  Normal  T waves:     T waves: normal    Q waves:     Abnormal Q-waves: not present      Labs Reviewed   CBC - Abnormal; Notable for the following components:       Result Value    MCH 33.1 (*)     All other components within normal limits   RAPID INFLUENZA A/B ANTIGENS   COVID-19, RAPID   COMPREHENSIVE METABOLIC PANEL   TROPONIN   TROPONIN   MAGNESIUM   BRAIN NATRIURETIC PEPTIDE        XR CHEST PORTABLE    (Results Pending)                                  Voice dictation software was used during the making of this note.  This software is not perfect and grammatical and other typographical errors may be present.  This note has not been completely proofread for errors.        Vicente Hernandez MD  01/04/23 1636       Vicente Hernandez MD  01/04/23 9790       Vicente Hernandez MD  01/04/23 1947

## 2023-01-04 NOTE — ED TRIAGE NOTES
Patient arrives to ED via EMS from home. Patient complains of chest pressure and SOB. Patient reports it is worse with exertion. Patient's O2 was 70% on RA. Patient has history of Afib. Patient is placed on on 6L via nc. With improvement to 94%.

## 2023-01-05 ENCOUNTER — PREP FOR PROCEDURE (OUTPATIENT)
Dept: SURGERY | Age: 68
End: 2023-01-05

## 2023-01-05 ENCOUNTER — APPOINTMENT (OUTPATIENT)
Dept: GENERAL RADIOLOGY | Age: 68
DRG: 163 | End: 2023-01-05
Payer: MEDICARE

## 2023-01-05 LAB
ANION GAP SERPL CALC-SCNC: 2 MMOL/L (ref 2–11)
BASOPHILS # BLD: 0 K/UL (ref 0–0.2)
BASOPHILS NFR BLD: 0 % (ref 0–2)
BUN SERPL-MCNC: 10 MG/DL (ref 8–23)
CALCIUM SERPL-MCNC: 9.2 MG/DL (ref 8.3–10.4)
CHLORIDE SERPL-SCNC: 104 MMOL/L (ref 101–110)
CO2 SERPL-SCNC: 29 MMOL/L (ref 21–32)
CREAT SERPL-MCNC: 0.8 MG/DL (ref 0.8–1.5)
DIFFERENTIAL METHOD BLD: ABNORMAL
EKG ATRIAL RATE: 0 BPM
EKG DIAGNOSIS: NORMAL
EKG Q-T INTERVAL: 379 MS
EKG QRS DURATION: 96 MS
EKG QTC CALCULATION (BAZETT): 443 MS
EKG R AXIS: 75 DEGREES
EKG T AXIS: 30 DEGREES
EKG VENTRICULAR RATE: 82 BPM
EOSINOPHIL # BLD: 0.2 K/UL (ref 0–0.8)
EOSINOPHIL NFR BLD: 3 % (ref 0.5–7.8)
ERYTHROCYTE [DISTWIDTH] IN BLOOD BY AUTOMATED COUNT: 13 % (ref 11.9–14.6)
GLUCOSE BLD STRIP.AUTO-MCNC: 237 MG/DL (ref 65–100)
GLUCOSE BLD STRIP.AUTO-MCNC: 253 MG/DL (ref 65–100)
GLUCOSE BLD STRIP.AUTO-MCNC: 266 MG/DL (ref 65–100)
GLUCOSE BLD STRIP.AUTO-MCNC: 268 MG/DL (ref 65–100)
GLUCOSE SERPL-MCNC: 221 MG/DL (ref 65–100)
HCT VFR BLD AUTO: 42.3 % (ref 41.1–50.3)
HGB BLD-MCNC: 14.2 G/DL (ref 13.6–17.2)
IMM GRANULOCYTES # BLD AUTO: 0.1 K/UL (ref 0–0.5)
IMM GRANULOCYTES NFR BLD AUTO: 1 % (ref 0–5)
LYMPHOCYTES # BLD: 0.9 K/UL (ref 0.5–4.6)
LYMPHOCYTES NFR BLD: 12 % (ref 13–44)
MCH RBC QN AUTO: 32.6 PG (ref 26.1–32.9)
MCHC RBC AUTO-ENTMCNC: 33.6 G/DL (ref 31.4–35)
MCV RBC AUTO: 97 FL (ref 82–102)
MONOCYTES # BLD: 0.4 K/UL (ref 0.1–1.3)
MONOCYTES NFR BLD: 6 % (ref 4–12)
NEUTS SEG # BLD: 6 K/UL (ref 1.7–8.2)
NEUTS SEG NFR BLD: 78 % (ref 43–78)
NRBC # BLD: 0 K/UL (ref 0–0.2)
PLATELET # BLD AUTO: 161 K/UL (ref 150–450)
PMV BLD AUTO: 10.1 FL (ref 9.4–12.3)
POTASSIUM SERPL-SCNC: 3.9 MMOL/L (ref 3.5–5.1)
RBC # BLD AUTO: 4.36 M/UL (ref 4.23–5.6)
SERVICE CMNT-IMP: ABNORMAL
SODIUM SERPL-SCNC: 135 MMOL/L (ref 133–143)
WBC # BLD AUTO: 7.6 K/UL (ref 4.3–11.1)

## 2023-01-05 PROCEDURE — 6360000002 HC RX W HCPCS: Performed by: FAMILY MEDICINE

## 2023-01-05 PROCEDURE — 6370000000 HC RX 637 (ALT 250 FOR IP): Performed by: FAMILY MEDICINE

## 2023-01-05 PROCEDURE — 1100000000 HC RM PRIVATE

## 2023-01-05 PROCEDURE — 71045 X-RAY EXAM CHEST 1 VIEW: CPT

## 2023-01-05 PROCEDURE — 97530 THERAPEUTIC ACTIVITIES: CPT

## 2023-01-05 PROCEDURE — 85025 COMPLETE CBC W/AUTO DIFF WBC: CPT

## 2023-01-05 PROCEDURE — 99232 SBSQ HOSP IP/OBS MODERATE 35: CPT | Performed by: INTERNAL MEDICINE

## 2023-01-05 PROCEDURE — 97165 OT EVAL LOW COMPLEX 30 MIN: CPT

## 2023-01-05 PROCEDURE — 80048 BASIC METABOLIC PNL TOTAL CA: CPT

## 2023-01-05 PROCEDURE — 97112 NEUROMUSCULAR REEDUCATION: CPT

## 2023-01-05 PROCEDURE — 97535 SELF CARE MNGMENT TRAINING: CPT

## 2023-01-05 PROCEDURE — 97161 PT EVAL LOW COMPLEX 20 MIN: CPT

## 2023-01-05 PROCEDURE — 2580000003 HC RX 258: Performed by: FAMILY MEDICINE

## 2023-01-05 PROCEDURE — 82962 GLUCOSE BLOOD TEST: CPT

## 2023-01-05 RX ORDER — METOPROLOL TARTRATE 5 MG/5ML
5 INJECTION INTRAVENOUS EVERY 6 HOURS PRN
Status: DISCONTINUED | OUTPATIENT
Start: 2023-01-05 | End: 2023-01-19 | Stop reason: HOSPADM

## 2023-01-05 RX ADMIN — INSULIN LISPRO 4 UNITS: 100 INJECTION, SOLUTION INTRAVENOUS; SUBCUTANEOUS at 18:02

## 2023-01-05 RX ADMIN — SODIUM CHLORIDE, PRESERVATIVE FREE 5 ML: 5 INJECTION INTRAVENOUS at 09:13

## 2023-01-05 RX ADMIN — MORPHINE SULFATE 2 MG: 2 INJECTION, SOLUTION INTRAMUSCULAR; INTRAVENOUS at 23:50

## 2023-01-05 RX ADMIN — ONDANSETRON 4 MG: 2 INJECTION INTRAMUSCULAR; INTRAVENOUS at 04:17

## 2023-01-05 RX ADMIN — DABIGATRAN ETEXILATE MESYLATE 150 MG: 150 CAPSULE ORAL at 20:29

## 2023-01-05 RX ADMIN — SODIUM CHLORIDE, PRESERVATIVE FREE 10 ML: 5 INJECTION INTRAVENOUS at 20:29

## 2023-01-05 RX ADMIN — CARVEDILOL 25 MG: 25 TABLET, FILM COATED ORAL at 17:22

## 2023-01-05 RX ADMIN — ACETAMINOPHEN 500 MG: 500 TABLET, FILM COATED ORAL at 04:09

## 2023-01-05 RX ADMIN — INSULIN LISPRO 4 UNITS: 100 INJECTION, SOLUTION INTRAVENOUS; SUBCUTANEOUS at 12:45

## 2023-01-05 RX ADMIN — GUAIFENESIN 600 MG: 600 TABLET ORAL at 08:56

## 2023-01-05 RX ADMIN — GUAIFENESIN 600 MG: 600 TABLET ORAL at 20:29

## 2023-01-05 RX ADMIN — INSULIN LISPRO 2 UNITS: 100 INJECTION, SOLUTION INTRAVENOUS; SUBCUTANEOUS at 09:15

## 2023-01-05 RX ADMIN — MORPHINE SULFATE 2 MG: 2 INJECTION, SOLUTION INTRAMUSCULAR; INTRAVENOUS at 17:22

## 2023-01-05 RX ADMIN — MORPHINE SULFATE 4 MG: 4 INJECTION INTRAVENOUS at 04:06

## 2023-01-05 RX ADMIN — ACETAMINOPHEN 500 MG: 500 TABLET, FILM COATED ORAL at 12:44

## 2023-01-05 RX ADMIN — DABIGATRAN ETEXILATE MESYLATE 150 MG: 150 CAPSULE ORAL at 09:00

## 2023-01-05 RX ADMIN — ACETAMINOPHEN 500 MG: 500 TABLET, FILM COATED ORAL at 20:29

## 2023-01-05 RX ADMIN — GABAPENTIN 600 MG: 300 CAPSULE ORAL at 20:29

## 2023-01-05 RX ADMIN — ATORVASTATIN CALCIUM 20 MG: 20 TABLET, FILM COATED ORAL at 09:03

## 2023-01-05 RX ADMIN — SPIRONOLACTONE 25 MG: 25 TABLET ORAL at 08:58

## 2023-01-05 RX ADMIN — CARVEDILOL 25 MG: 25 TABLET, FILM COATED ORAL at 08:58

## 2023-01-05 RX ADMIN — LISINOPRIL 10 MG: 5 TABLET ORAL at 09:12

## 2023-01-05 RX ADMIN — GABAPENTIN 600 MG: 300 CAPSULE ORAL at 08:56

## 2023-01-05 RX ADMIN — ACETAMINOPHEN 500 MG: 500 TABLET, FILM COATED ORAL at 08:56

## 2023-01-05 RX ADMIN — ACETAMINOPHEN 500 MG: 500 TABLET, FILM COATED ORAL at 17:24

## 2023-01-05 RX ADMIN — HYDROCHLOROTHIAZIDE 25 MG: 25 TABLET ORAL at 08:59

## 2023-01-05 RX ADMIN — ONDANSETRON 4 MG: 2 INJECTION INTRAMUSCULAR; INTRAVENOUS at 09:11

## 2023-01-05 ASSESSMENT — PAIN SCALES - GENERAL
PAINLEVEL_OUTOF10: 9
PAINLEVEL_OUTOF10: 3
PAINLEVEL_OUTOF10: 0
PAINLEVEL_OUTOF10: 4
PAINLEVEL_OUTOF10: 4
PAINLEVEL_OUTOF10: 0
PAINLEVEL_OUTOF10: 6
PAINLEVEL_OUTOF10: 3
PAINLEVEL_OUTOF10: 6
PAINLEVEL_OUTOF10: 6
PAINLEVEL_OUTOF10: 9
PAINLEVEL_OUTOF10: 3

## 2023-01-05 ASSESSMENT — PAIN DESCRIPTION - ORIENTATION
ORIENTATION: RIGHT

## 2023-01-05 ASSESSMENT — PAIN DESCRIPTION - LOCATION
LOCATION: CHEST

## 2023-01-05 ASSESSMENT — PAIN DESCRIPTION - DESCRIPTORS
DESCRIPTORS: SORE
DESCRIPTORS: DISCOMFORT
DESCRIPTORS: ACHING;DISCOMFORT
DESCRIPTORS: DISCOMFORT;ACHING

## 2023-01-05 ASSESSMENT — PAIN - FUNCTIONAL ASSESSMENT: PAIN_FUNCTIONAL_ASSESSMENT: 0-10

## 2023-01-05 NOTE — ED NOTES
TRANSFER - OUT REPORT:    Verbal report given to [facility] on Huyen Garcia  being transferred to [id number] for routine progression of patient care       Report consisted of patient's Situation, Background, Assessment and   Recommendations(SBAR). Information from the following report(s) Nurse Handoff Report was reviewed with the receiving nurse. Lines:   Peripheral IV 01/04/23 Antecubital (Active)   Site Assessment Clean, dry & intact 01/04/23 1552   Line Status Blood return noted 01/04/23 1552   Phlebitis Assessment No symptoms 01/04/23 1552   Infiltration Assessment 0 01/04/23 1552        Opportunity for questions and clarification was provided.       Patient transported with:  Monitor           Nehemias Cabezas RN  01/05/23 Tay Bacon RN  01/05/23 0374

## 2023-01-05 NOTE — PROGRESS NOTES
Hospitalist Progress Note   Admit Date:  2023  3:40 PM   Name:  Silvano Avila   Age:  69 y.o. Sex:  male  :  1955   MRN:  [id number]   Room:  ER01/01    Presenting Complaint: Chest Pain and Shortness of Breath     Reason(s) for Admission: Spontaneous pneumothorax [J93.83]     Hospital Course:   Silvano Avila is a 69 y.o. male with medical history hypertension, A. fib on Pradaxa, CASSY, CPAP at night, bullous emphysema, history of bilateral subdural hematomas 2017 and diabetes mellitus presented to ED with acute onset right-sided chest pain. Patient reports he was talking to his neighbor when he developed sharp severe right-sided chest pain, associated with shortness of breath. Reports he has history of spontaneous left pneumothorax in , shortly after putting on his CPAP while hospitalized for subdural hematoma. He is a former smoker, quit years ago. Denies nausea, vomiting, fever, chills or abdominal pain. Subjective & 24hr Events (23): Patient feeling a little nauseated however has not vomited and just received zofran. Chest tube placed overnight. Oxygen demand has decreased      Assessment & Plan:   Acute hypoxic respiratory failure: 2/2 to spontaneous pneumothorax due to bolus emphysema. S/p Chest Tube in the ER. Management as per ER    HTN: therapeutically controlled    Chronic afib: rate controlled. dabigatran    DM2: fsbs wnl. Prn csi    CASSY: no cpap given chest tube placement    Neuropathy: gabapentin    HLD: statin    Morbid obesity:  complicates care, lifestyle modifications encouraged        Anticipated discharge needs:      Pending hospital course    Diet:  ADULT DIET;  Regular  DVT PPx: Dabigatran  Code status: Full Code    Hospital Problems:  Principal Problem:    Spontaneous pneumothorax  Active Problems:    Acute respiratory failure with hypoxia (HCC)    Anticoagulant long-term use    Primary spontaneous pneumothorax    Benign prostatic hyperplasia with nocturia    Atrial fibrillation (HCC)    CASSY on CPAP  Resolved Problems:    * No resolved hospital problems.  *      Objective:   Patient Vitals for the past 24 hrs:   Temp Pulse Resp BP SpO2   01/05/23 1300 -- 76 16 110/74 94 %   01/05/23 1230 -- 83 14 122/74 94 %   01/05/23 1116 -- 86 15 134/83 96 %   01/05/23 0930 -- -- -- 128/84 95 %   01/05/23 0915 -- 80 -- 134/79 93 %   01/05/23 0853 97.6 °F (36.4 °C) -- -- -- --   01/05/23 0845 -- 79 15 133/73 95 %   01/05/23 0830 -- 74 12 121/66 --   01/05/23 0745 -- 76 16 121/79 94 %   01/05/23 0715 -- 73 12 107/83 94 %   01/05/23 0646 -- 86 13 112/73 94 %   01/05/23 0636 -- 85 15 119/75 96 %   01/05/23 0615 -- 79 13 134/79 95 %   01/05/23 0600 -- 72 15 113/79 95 %   01/05/23 0540 -- 80 15 117/84 94 %   01/05/23 0528 -- 88 15 130/79 94 %   01/05/23 0518 -- 85 14 134/77 95 %   01/05/23 0459 -- 77 13 129/85 94 %   01/05/23 0441 -- 75 13 132/75 94 %   01/05/23 0429 -- 80 15 (!) 116/99 96 %   01/05/23 0413 -- 82 14 121/80 95 %   01/05/23 0400 -- 78 15 133/78 96 %   01/05/23 0342 -- 82 14 131/77 95 %   01/05/23 0332 -- 75 14 124/80 93 %   01/05/23 0312 -- 82 14 122/84 96 %   01/05/23 0302 -- 79 16 127/81 95 %   01/05/23 0247 -- 80 24 126/68 95 %   01/05/23 0228 -- 73 17 116/73 94 %   01/05/23 0215 -- 82 16 (!) 140/79 93 %   01/05/23 0204 -- 76 16 113/71 95 %   01/05/23 0144 -- 83 18 123/77 95 %   01/05/23 0134 -- 76 14 124/81 95 %   01/05/23 0119 -- 73 17 103/75 95 %   01/05/23 0059 -- 71 18 126/71 94 %   01/04/23 2347 -- 82 13 119/69 96 %   01/04/23 2333 -- 82 16 122/67 96 %   01/04/23 2312 -- 74 15 132/81 96 %   01/04/23 2257 -- 79 25 (!) 140/92 96 %   01/04/23 2242 -- 83 18 134/84 96 %   01/04/23 2232 -- 80 16 126/80 95 %   01/04/23 2217 -- 78 25 128/74 95 %   01/04/23 2202 -- 73 16 117/72 95 %   01/04/23 2147 -- 87 13 128/76 96 %   01/04/23 2130 -- 76 13 125/77 95 %   01/04/23 2117 -- 90 14 131/81 95 %   01/04/23 2102 -- 90 13 136/84 96 %   01/04/23 2045 -- 82 22 129/75 95 %   01/04/23 2000 -- 81 13 133/77 95 %   01/04/23 1946 -- 79 17 137/73 95 %   01/04/23 1931 -- 95 12 136/89 97 %   01/04/23 1646 -- 86 23 126/72 95 %   01/04/23 1631 -- 81 18 131/78 94 %   01/04/23 1616 -- 87 17 112/70 93 %   01/04/23 1601 -- 87 21 126/73 93 %       Oxygen Therapy  SpO2: 94 %  O2 Device: Nasal cannula  O2 Flow Rate (L/min): 2 L/min    Estimated body mass index is 41.73 kg/m² as calculated from the following:    Height as of this encounter: 6' 2\" (1.88 m). Weight as of this encounter: 325 lb (147.4 kg). Intake/Output Summary (Last 24 hours) at 1/5/2023 1443  Last data filed at 1/5/2023 1114  Gross per 24 hour   Intake --   Output 1590 ml   Net -1590 ml         Physical Exam:     Blood pressure 110/74, pulse 76, temperature 97.6 °F (36.4 °C), temperature source Oral, resp. rate 16, height 6' 2\" (1.88 m), weight (!) 325 lb (147.4 kg), SpO2 94 %. General:    Well nourished. Morbidly obese  Head:  Normocephalic, atraumatic  Eyes:  Sclerae appear normal.  Pupils equally round. ENT:  Nares appear normal.  Moist oral mucosa  Neck:  No restricted ROM. Trachea midline   CV:   RRR. No m/r/g. No jugular venous distension. Lungs:   Decreased bs on right. Chest tube in place  Abdomen:   Soft, nontender, nondistended. Extremities: No cyanosis or clubbing. No edema  Skin:     No rashes and normal coloration. Warm and dry. Neuro:  CN II-XII grossly intact. Sensation intact. Psych:  Normal mood and affect.       I have personally reviewed labs and tests showing:  Recent Labs:  Recent Results (from the past 48 hour(s))   CBC    Collection Time: 01/04/23  3:45 PM   Result Value Ref Range    WBC 9.0 4.3 - 11.1 K/uL    RBC 4.41 4.23 - 5.6 M/uL    Hemoglobin 14.6 13.6 - 17.2 g/dL    Hematocrit 42.9 41.1 - 50.3 %    MCV 97.3 82 - 102 FL    MCH 33.1 (H) 26.1 - 32.9 PG    MCHC 34.0 31.4 - 35.0 g/dL    RDW 13.0 11.9 - 14.6 %    Platelets 817 682 - 811 K/uL    MPV 10.5 9.4 - 12.3 FL    nRBC 0.00 0.0 - 0.2 K/uL   Comprehensive Metabolic Panel    Collection Time: 01/04/23  3:45 PM   Result Value Ref Range    Sodium 133 133 - 143 mmol/L    Potassium 3.9 3.5 - 5.1 mmol/L    Chloride 104 101 - 110 mmol/L    CO2 24 21 - 32 mmol/L    Anion Gap 5 2 - 11 mmol/L    Glucose 196 (H) 65 - 100 mg/dL    BUN 11 8 - 23 MG/DL    Creatinine 0.90 0.8 - 1.5 MG/DL    Est, Glom Filt Rate >60 >60 ml/min/1.73m2    Calcium 9.4 8.3 - 10.4 MG/DL    Total Bilirubin 1.0 0.2 - 1.1 MG/DL    ALT 72 (H) 12 - 65 U/L    AST 37 15 - 37 U/L    Alk Phosphatase 119 50 - 136 U/L    Total Protein 7.1 6.3 - 8.2 g/dL    Albumin 3.6 3.2 - 4.6 g/dL    Globulin 3.5 2.8 - 4.5 g/dL    Albumin/Globulin Ratio 1.0 0.4 - 1.6     Troponin    Collection Time: 01/04/23  3:45 PM   Result Value Ref Range    Troponin, High Sensitivity 3.9 0 - 14 pg/mL   Magnesium    Collection Time: 01/04/23  3:45 PM   Result Value Ref Range    Magnesium 2.2 1.8 - 2.4 mg/dL   Brain Natriuretic Peptide    Collection Time: 01/04/23  3:45 PM   Result Value Ref Range    NT Pro- (H) 5 - 125 PG/ML   EKG 12 Lead    Collection Time: 01/04/23  4:00 PM   Result Value Ref Range    Ventricular Rate 82 BPM    Atrial Rate 0 BPM    QRS Duration 96 ms    Q-T Interval 379 ms    QTc Calculation (Bazett) 443 ms    R Axis 75 degrees    T Axis 30 degrees    Diagnosis Atrial fibrillation    Rapid influenza A/B antigens    Collection Time: 01/04/23  4:03 PM    Specimen: Nasal Washing   Result Value Ref Range    Influenza A Ag Negative NEG      Influenza B Ag Negative NEG      Source Nasopharyngeal     COVID-19, Rapid    Collection Time: 01/04/23  4:03 PM    Specimen: Nasopharyngeal   Result Value Ref Range    Source NASAL      SARS-CoV-2, Rapid Not detected NOTD     Troponin    Collection Time: 01/04/23  6:06 PM   Result Value Ref Range    Troponin, High Sensitivity 4.7 0 - 14 pg/mL   POCT Glucose    Collection Time: 01/04/23  9:45 PM   Result Value Ref Range    POC Glucose 196 (H) 65 - 100 mg/dL Performed by: Román Faust Metabolic Panel w/ Reflex to MG    Collection Time: 01/05/23  5:36 AM   Result Value Ref Range    Sodium 135 133 - 143 mmol/L    Potassium 3.9 3.5 - 5.1 mmol/L    Chloride 104 101 - 110 mmol/L    CO2 29 21 - 32 mmol/L    Anion Gap 2 2 - 11 mmol/L    Glucose 221 (H) 65 - 100 mg/dL    BUN 10 8 - 23 MG/DL    Creatinine 0.80 0.8 - 1.5 MG/DL    Est, Glom Filt Rate >60 >60 ml/min/1.73m2    Calcium 9.2 8.3 - 10.4 MG/DL   CBC with Auto Differential    Collection Time: 01/05/23  5:36 AM   Result Value Ref Range    WBC 7.6 4.3 - 11.1 K/uL    RBC 4.36 4.23 - 5.6 M/uL    Hemoglobin 14.2 13.6 - 17.2 g/dL    Hematocrit 42.3 41.1 - 50.3 %    MCV 97.0 82 - 102 FL    MCH 32.6 26.1 - 32.9 PG    MCHC 33.6 31.4 - 35.0 g/dL    RDW 13.0 11.9 - 14.6 %    Platelets 408 958 - 545 K/uL    MPV 10.1 9.4 - 12.3 FL    nRBC 0.00 0.0 - 0.2 K/uL    Differential Type AUTOMATED      Seg Neutrophils 78 43 - 78 %    Lymphocytes 12 (L) 13 - 44 %    Monocytes 6 4.0 - 12.0 %    Eosinophils % 3 0.5 - 7.8 %    Basophils 0 0.0 - 2.0 %    Immature Granulocytes 1 0.0 - 5.0 %    Segs Absolute 6.0 1.7 - 8.2 K/UL    Absolute Lymph # 0.9 0.5 - 4.6 K/UL    Absolute Mono # 0.4 0.1 - 1.3 K/UL    Absolute Eos # 0.2 0.0 - 0.8 K/UL    Basophils Absolute 0.0 0.0 - 0.2 K/UL    Absolute Immature Granulocyte 0.1 0.0 - 0.5 K/UL   POCT Glucose    Collection Time: 01/05/23  9:06 AM   Result Value Ref Range    POC Glucose 237 (H) 65 - 100 mg/dL    Performed by: Kvng Htafield    POCT Glucose    Collection Time: 01/05/23 12:40 PM   Result Value Ref Range    POC Glucose 266 (H) 65 - 100 mg/dL    Performed by: Kvng Hatfield        I have personally reviewed imaging studies showing: Other Studies:  XR CHEST PORTABLE   Final Result   1. Resolved right pneumothorax. 2. Mild bibasilar lung atelectasis. XR CHEST PORTABLE   Final Result   1. Right chest tube placed, with markedly smaller pneumothorax.    2.  Resolution of the previous tension component. 3.  Mild bibasilar atelectasis. CT CHEST WO CONTRAST   Final Result   1. Persisting large right pneumothorax. 2.  Leftward deviation of mediastinal contours concerning for tension component. 3.  Partial atelectasis of right lung. 4.  Emphysema. 5.  Vascular disease. Urgent findings communicated to the ordering NP Esperanza Umaña by the secure text   messaging system at 6:30 PM.      XR CHEST PORTABLE   Final Result   1. Large right pneumothorax. 2.  Pulmonary vascular congestion.                Findings discussed with Dr. Mecca Navarrete by myself at 4:42 PM.         XR CHEST PORTABLE    (Results Pending)       Current Meds:  Current Facility-Administered Medications   Medication Dose Route Frequency    carvedilol (COREG) tablet 25 mg  25 mg Oral BID WC    dabigatran (PRADAXA) capsule 150 mg  150 mg Oral BID    lisinopril (PRINIVIL;ZESTRIL) tablet 10 mg  10 mg Oral Daily    gabapentin (NEURONTIN) capsule 600 mg  600 mg Oral BID    guaiFENesin (MUCINEX) extended release tablet 600 mg  600 mg Oral BID    hydroCHLOROthiazide (HYDRODIURIL) tablet 25 mg  25 mg Oral Daily    atorvastatin (LIPITOR) tablet 20 mg  20 mg Oral Daily    spironolactone (ALDACTONE) tablet 25 mg  25 mg Oral Daily    sodium chloride flush 0.9 % injection 5-40 mL  5-40 mL IntraVENous 2 times per day    sodium chloride flush 0.9 % injection 5-40 mL  5-40 mL IntraVENous PRN    0.9 % sodium chloride infusion   IntraVENous PRN    ondansetron (ZOFRAN-ODT) disintegrating tablet 4 mg  4 mg Oral Q8H PRN    Or    ondansetron (ZOFRAN) injection 4 mg  4 mg IntraVENous Q6H PRN    polyethylene glycol (GLYCOLAX) packet 17 g  17 g Oral Daily PRN    acetaminophen (TYLENOL) tablet 650 mg  650 mg Oral Q6H PRN    Or    acetaminophen (TYLENOL) suppository 650 mg  650 mg Rectal Q6H PRN    morphine injection 2 mg  2 mg IntraVENous Q4H PRN    acetaminophen (TYLENOL) tablet 500 mg  500 mg Oral Q4H    oxyCODONE (ROXICODONE) immediate release tablet 5 mg  5 mg Oral Q4H PRN    morphine injection 4 mg  4 mg IntraVENous Q4H PRN    insulin lispro (HUMALOG) injection vial 0-8 Units  0-8 Units SubCUTAneous TID WC    insulin lispro (HUMALOG) injection vial 0-4 Units  0-4 Units SubCUTAneous Nightly     Current Outpatient Medications   Medication Sig    hydroCHLOROthiazide (HYDRODIURIL) 25 MG tablet Take 1 tablet by mouth daily    enalapril (VASOTEC) 10 MG tablet Take 1 tablet by mouth daily TAKE 1 TABLET DAILY    empagliflozin (JARDIANCE) 10 MG tablet Take 1 tablet by mouth daily    spironolactone (ALDACTONE) 25 MG tablet Take 1 tablet by mouth daily    CPAP Machine MISC by Other route    potassium chloride (KLOR-CON M) 10 MEQ extended release tablet Take 10 mEq by mouth daily as needed    clotrimazole-betamethasone (LOTRISONE) 1-0.05 % cream Apply to affected area bid as directed    simvastatin (ZOCOR) 40 MG tablet Take 1 tablet by mouth daily    furosemide (LASIX) 20 MG tablet Take 20 mg by mouth daily as needed    gabapentin (NEURONTIN) 600 MG tablet Take 1 tablet by mouth 2 times daily. carvedilol (COREG) 25 MG tablet Take 25 mg by mouth 2 times daily (with meals)    cetirizine (ZYRTEC) 10 MG tablet Take by mouth daily as needed    dabigatran (PRADAXA) 150 MG capsule TAKE 1 CAPSULE EVERY 12 HOURS    guaiFENesin (MUCINEX) 600 MG extended release tablet Take 600 mg by mouth 2 times daily    metFORMIN (GLUCOPHAGE) 500 MG tablet TAKE 1 TABLET TWICE A DAY WITH A MEAL       Signed:  Pierce Cabrales MD    Part of this note may have been written by using a voice dictation software. The note has been proof read but may still contain some grammatical/other typographical errors.

## 2023-01-05 NOTE — H&P
Hospitalist History and Physical   Admit Date:  2023  3:40 PM   Name:  Lali Kulkarni   Age:  69 y.o. Sex:  male  :  1955   MRN:  [id number]   Room:  [place] [address]    Presenting Complaint: No chief complaint on file. Reason(s) for Admission: Spontaneous pneumothorax [J93.83]     History of Present Illness:   Lali Kulkarni is a 69 y.o. male with medical history hypertension, A. fib on Pradaxa, CASSY, CPAP at night, bullous emphysema, history of bilateral subdural hematomas 2017 and diabetes mellitus presented to ED with acute onset right-sided chest pain. Patient reports he was talking to his neighbor when he developed sharp severe right-sided chest pain, associated with shortness of breath. Reports he has history of spontaneous left pneumothorax in , shortly after putting on his CPAP while hospitalized for subdural hematoma. He is a former smoker, quit years ago. Denies nausea, vomiting, fever, chills or abdominal pain. In the ED patient was noted hypoxic, required 4-6 L nasal cannula to keep oxygen saturation above 90%. Labs unremarkable. Chest x-ray with right large pneumothorax. Chest tube was placed in the ED. Pulmonology consulted. Hospitalist consulted for admission. Assessment & Plan:     Acute hypoxic respiratory failure secondary to right spontaneous pneumothorax:  History of spontaneous pneumothorax in 2017 and bullous emphysema  Hypoxic in low 80s as per ED.  Currently on 6 L nasal cannula with oxygen saturation 90-92%  Pulmonology consulted and patient status post chest tube placement in the ED  Continue chest tube to suction, defer CT management to pulmonology  Daily chest x-rays  Pain control    Hypertension/hyperlipidemia:  Chronic A. fib:  Continue carvedilol, enalapril, hydrochlorothiazide, spironolactone, atorvastatin, and dabigatran  Heart rate optimally controlled    History of subdural hematomas:  Noted    Diabetes mellitus:  Hold oral hypoglycemic  Start patient on sliding scale    CASSY:  Hold off on CPAP as patient has chest tube    Neuropathy:  Continue gabapentin      Anticipated discharge needs: Home on discharge    Diet: ADULT DIET; Regular  VTE ppx: Dabigatran  Code status: Full Code    Hospital Problems:  Active Problems:    Acute respiratory failure with hypoxia (HCC)    Spontaneous pneumothorax    Anticoagulant long-term use    Primary spontaneous pneumothorax    CASSY on CPAP  Resolved Problems:    * No resolved hospital problems. 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     Diabetes mellitus ([address] 1/3/2012    Essential hypertension, benign 2014    Hemochromatosis     Hypercholesteremia     Nodular prostate without urinary obstruction 2014    Obesity 2014    Pneumothorax, right 10/8/2017    Sepsis due to urinary tract infection ([address] 2019    Sleep apnea 10/12/2016    uses ASV instead of CPAP machine - per pt     Subdural hemorrhage following injury 10/20/2017       Past Surgical History:   Procedure Laterality Date    CHEST SURGERY  10/2017    pneumothorax after fall    COLONOSCOPY N/A 2018    COLONOSCOPY  BMI 41 performed by Kirit Elias MD at  [facility] Right 10/02/2017    [facility] for Subdural Hematoma    KNEE ARTHROSCOPY Right     OTHER SURGICAL HISTORY Bilateral 10/18/2017    [facility] for Subdural Hematoma    TURP  2019    UROLOGICAL SURGERY      prostate u/s and BX        Social History     Tobacco Use    Smoking status: Former     Packs/day: 1.50     Types: Cigarettes     Quit date: 1989     Years since quittin.4    Smokeless tobacco: Former     Quit date: 2016   Substance Use Topics Alcohol use:  Yes     Alcohol/week: 8.0 standard drinks      Social History     Substance and Sexual Activity   Drug Use No       Family History   Problem Relation Age of Onset    Prostate Cancer Paternal Grandfather     Alcohol Abuse Brother     Heart Failure Brother     Psychiatric Disorder Brother     Stroke Brother     Psychiatric Disorder Mother     Other Mother         Sarcoidosis    Atrial Fibrillation Father     Lung Cancer Father     Hypertension Father     Heart Disease Father 61            Diabetes Father     Heart Disease Paternal Grandmother         Immunization History   Administered Date(s) Administered    COVID-19, PFIZER GRAY top, DO NOT Dilute, (age 15 y+), IM, 30 mcg/0.3 mL 2022    COVID-19, PFIZER PURPLE top, DILUTE for use, (age 15 y+), 30mcg/0.3mL 2021, 2021, 10/05/2021    Influenza Trivalent 2013    Influenza, FLUAD, (age 72 y+), Adjuvanted, 0.5mL 2022    Influenza, FLUARIX, FLULAVAL, FLUZONE (age 10 mo+) AND AFLURIA, (age 1 y+), PF, 0.5mL 10/05/2017    Influenza, High Dose (Fluzone 65 yrs and older) 10/05/2016, 10/05/2018, 10/20/2020, 10/01/2021    Influenza, Intradermal, Preservative free 2015    Influenza, Triv, inactivated, subunit, adjuvanted, IM (Fluad 65 yrs and older) 10/02/2019    Pneumococcal Conjugate 13-valent ([id number]) 2018    Pneumococcal Polysaccharide ([id number]) 10/02/2019    Td vaccine (adult) 2015    Tdap (Boostrix, Adacel) 2015, 2015    Zoster Recombinant (Shingrix) 2019, 2019     Allergies   Allergen Reactions    Azithromycin Other (See Comments)     Skin dryness/peeling     Prior to Admit Medications:  Current Outpatient Medications   Medication Instructions    carvedilol (COREG) 25 mg, Oral, 2 TIMES DAILY WITH MEALS    cetirizine (ZYRTEC) 10 MG tablet Oral, DAILY PRN    clotrimazole-betamethasone (LOTRISONE) 1-0.05 % cream Apply to affected area bid as directed    CPAP Machine MISC Other dabigatran (PRADAXA) 150 MG capsule TAKE 1 CAPSULE EVERY 12 HOURS    empagliflozin (JARDIANCE) 10 mg, Oral, DAILY    enalapril (VASOTEC) 10 mg, Oral, DAILY, TAKE 1 TABLET DAILY    furosemide (LASIX) 20 mg, Oral, DAILY PRN    gabapentin (NEURONTIN) 600 MG tablet 1 tablet, Oral, 2 TIMES DAILY    guaiFENesin (MUCINEX) 600 mg, Oral, 2 TIMES DAILY    hydroCHLOROthiazide (HYDRODIURIL) 25 mg, Oral, DAILY    metFORMIN (GLUCOPHAGE) 500 MG tablet TAKE 1 TABLET TWICE A DAY WITH A MEAL    potassium chloride (KLOR-CON M) 10 MEQ extended release tablet 10 mEq, Oral, DAILY PRN    simvastatin (ZOCOR) 40 mg, Oral, DAILY    spironolactone (ALDACTONE) 25 mg, Oral, DAILY         Objective:   Patient Vitals for the past 24 hrs:   Pulse Resp BP SpO2   01/04/23 1646 86 23 126/72 95 %   01/04/23 1631 81 18 131/78 94 %   01/04/23 1616 87 17 112/70 93 %   01/04/23 1601 87 21 126/73 93 %       Oxygen Therapy  SpO2: 95 %    Estimated body mass index is 41.73 kg/m² as calculated from the following:    Height as of this encounter: 6' 2\" (1.88 m). Weight as of this encounter: 325 lb (147.4 kg). Intake/Output Summary (Last 24 hours) at 1/4/2023 1945  Last data filed at 1/4/2023 1837  Gross per 24 hour   Intake --   Output 0 ml   Net 0 ml         Physical Exam:    Blood pressure 126/72, pulse 86, resp. rate 23, height 6' 2\" (1.88 m), weight (!) 325 lb (147.4 kg), SpO2 95 %. General:    Well nourished. Head:  Normocephalic, atraumatic  Eyes:  Sclerae appear normal.  Pupils equally round. ENT:  Nares appear normal.  Moist oral mucosa  Neck:  No restricted ROM. Trachea midline   CV:   Irregularly irregular, no m/r/g. No jugular venous distension. Lungs:   Right chest tube in place, decreased breath sounds at the right side, on 6 L nasal cannula  Abdomen:   Soft, nontender, nondistended. Extremities: No cyanosis or clubbing. No edema  Skin:     1+ lower extremity edema  Neuro:  CN II-XII grossly intact. Sensation intact. Psych:  Normal mood and affect.       I have personally reviewed labs and tests showing:  Recent Labs:  Recent Results (from the past 24 hour(s))   CBC    Collection Time: 01/04/23  3:45 PM   Result Value Ref Range    WBC 9.0 4.3 - 11.1 K/uL    RBC 4.41 4.23 - 5.6 M/uL    Hemoglobin 14.6 13.6 - 17.2 g/dL    Hematocrit 42.9 41.1 - 50.3 %    MCV 97.3 82 - 102 FL    MCH 33.1 (H) 26.1 - 32.9 PG    MCHC 34.0 31.4 - 35.0 g/dL    RDW 13.0 11.9 - 14.6 %    Platelets 433 475 - 742 K/uL    MPV 10.5 9.4 - 12.3 FL    nRBC 0.00 0.0 - 0.2 K/uL   Comprehensive Metabolic Panel    Collection Time: 01/04/23  3:45 PM   Result Value Ref Range    Sodium 133 133 - 143 mmol/L    Potassium 3.9 3.5 - 5.1 mmol/L    Chloride 104 101 - 110 mmol/L    CO2 24 21 - 32 mmol/L    Anion Gap 5 2 - 11 mmol/L    Glucose 196 (H) 65 - 100 mg/dL    BUN 11 8 - 23 MG/DL    Creatinine 0.90 0.8 - 1.5 MG/DL    Est, Glom Filt Rate >60 >60 ml/min/1.73m2    Calcium 9.4 8.3 - 10.4 MG/DL    Total Bilirubin 1.0 0.2 - 1.1 MG/DL    ALT 72 (H) 12 - 65 U/L    AST 37 15 - 37 U/L    Alk Phosphatase 119 50 - 136 U/L    Total Protein 7.1 6.3 - 8.2 g/dL    Albumin 3.6 3.2 - 4.6 g/dL    Globulin 3.5 2.8 - 4.5 g/dL    Albumin/Globulin Ratio 1.0 0.4 - 1.6     Troponin    Collection Time: 01/04/23  3:45 PM   Result Value Ref Range    Troponin, High Sensitivity 3.9 0 - 14 pg/mL   Magnesium    Collection Time: 01/04/23  3:45 PM   Result Value Ref Range    Magnesium 2.2 1.8 - 2.4 mg/dL   Brain Natriuretic Peptide    Collection Time: 01/04/23  3:45 PM   Result Value Ref Range    NT Pro- (H) 5 - 125 PG/ML   EKG 12 Lead    Collection Time: 01/04/23  4:00 PM   Result Value Ref Range    Ventricular Rate 82 BPM    Atrial Rate 0 BPM    QRS Duration 96 ms    Q-T Interval 379 ms    QTc Calculation (Bazett) 443 ms    R Axis 75 degrees    T Axis 30 degrees    Diagnosis Atrial fibrillation    Rapid influenza A/B antigens    Collection Time: 01/04/23  4:03 PM    Specimen: Nasal Washing Result Value Ref Range    Influenza A Ag Negative NEG      Influenza B Ag Negative NEG      Source Nasopharyngeal     COVID-19, Rapid    Collection Time: 01/04/23  4:03 PM    Specimen: Nasopharyngeal   Result Value Ref Range    Source NASAL      SARS-CoV-2, Rapid Not detected NOTD     Troponin    Collection Time: 01/04/23  6:06 PM   Result Value Ref Range    Troponin, High Sensitivity 4.7 0 - 14 pg/mL       I have personally reviewed imaging studies showing:  CT CHEST WO CONTRAST    Result Date: 1/4/2023  CT Chest without contrast Clinical Indication:  Severe right chest pain, follow-up of pneumothorax Comparison:  Radiograph earlier today, also prior CT abdomen 1/19/2018 and CT calcium score 422 9 Technique: Dose reduction technique used: Automated exposure Control and/or adjustment of mA and kV according to patient size. Contiguous axial images were obtained from the neck base through the upper abdomen without contrast. Coronal reformats were done to further evaluate lungs, bones. Findings: There is no pleural or pericardial effusion, or thoracic lymphadenopathy. Normal caliber of the aorta and esophagus. Scattered calcifications of the aorta, its branches, coronary arteries. Mild bilateral gynecomastia noted. Limited upper abdominal views and included thoracic bones demonstrate no acute abnormalities. Lungs are well-inflated. Central airways are patent, normal in caliber. The large right pneumothorax has not significantly changed. There is partial atelectasis of right upper, middle, lower lobes. Bilateral upper lobe predominant chronic emphysematous changes are again evident. No discretely visible lung mass or dense consolidation. There is slight leftward deviation of mediastinal structures. No chest tube is visualized at this time. 1.  Persisting large right pneumothorax. 2.  Leftward deviation of mediastinal contours concerning for tension component. 3.  Partial atelectasis of right lung.  4. Emphysema. 5.  Vascular disease. Urgent findings communicated to the ordering NP Lupillo Cannon by the secure text messaging system at 6:30 PM.    XR CHEST PORTABLE    Result Date: 1/4/2023  CHEST X-RAY, 1 VIEW INDICATION: Chest pain COMPARISON: Chest x-ray 10/21/2017 TECHNIQUE: Single AP view of the chest was obtained. FINDINGS: Lungs and pleural spaces: Large right pneumothorax. Pulmonary vascular congestion. Cardiomediastinal silhouette: Normal. Osseous structures: Normal.     1.  Large right pneumothorax. 2.  Pulmonary vascular congestion. Findings discussed with Dr. Mark Victoria by myself at 4:42 PM.       Echocardiogram:  07/19/22    TRANSTHORACIC ECHOCARDIOGRAM (TTE) COMPLETE (CONTRAST/BUBBLE/3D PRN) 07/19/2022  5:17 PM, 07/19/2022 12:00 AM (Final)    Interpretation Summary    Left Ventricle: Normal left ventricular systolic function with a visually estimated EF of 60 - 65%. Left ventricle size is normal. Mildly increased wall thickness. Normal wall motion. Mitral Valve: Mild regurgitation. Tricuspid Valve: Mild regurgitation. Left Atrium: Left atrium is severely dilated. LA Vol Index is  65 ml/m2. Right Atrium: Right atrium is moderately dilated. Technical qualifiers: Technically difficult study, color flow Doppler was performed and pulse wave and/or continuous wave Doppler was performed.     Signed by: Bari Wheeler MD on 7/19/2022  5:17 PM, Signed by: Unknown Provider Result on 7/19/2022 12:00 AM        Orders Placed This Encounter   Medications    morphine injection 4 mg    morphine (PF) injection 2 mg    morphine 2 MG/ML injection     Karol Bodily: cabinet override    carvedilol (COREG) tablet 25 mg    dabigatran (PRADAXA) capsule 150 mg     Order Specific Question:   Indication of Use     Answer:   A Fib/A Flutter    enalapril (VASOTEC) tablet 10 mg    gabapentin (NEURONTIN) tablet 600 mg    guaiFENesin (MUCINEX) extended release tablet 600 mg    hydroCHLOROthiazide (HYDRODIURIL) tablet 25 mg atorvastatin (LIPITOR) tablet 20 mg    spironolactone (ALDACTONE) tablet 25 mg    sodium chloride flush 0.9 % injection 5-40 mL    sodium chloride flush 0.9 % injection 5-40 mL    0.9 % sodium chloride infusion    OR Linked Order Group     ondansetron (ZOFRAN-ODT) disintegrating tablet 4 mg     ondansetron (ZOFRAN) injection 4 mg    polyethylene glycol (GLYCOLAX) packet 17 g    OR Linked Order Group     acetaminophen (TYLENOL) tablet 650 mg     acetaminophen (TYLENOL) suppository 650 mg    morphine injection 2 mg    acetaminophen (TYLENOL) tablet 500 mg    oxyCODONE (ROXICODONE) immediate release tablet 5 mg    morphine injection 4 mg    insulin lispro (HUMALOG) injection vial 0-8 Units    insulin lispro (HUMALOG) injection vial 0-4 Units         Signed:  Emma Gamble MD    Part of this note may have been written by using a voice dictation software. The note has been proof read but may still contain some grammatical/other typographical errors.

## 2023-01-05 NOTE — PROGRESS NOTES
TRANSFER - IN REPORT:    Verbal report received from [facility] on Keo Guevara being received from ED () for routine progression of care. Report consisted of patients Situation, Background, Assessment and Recommendations(SBAR). Information from the following report(s) SBAR, Kardex, ED Summary, and Recent Results was reviewed. Opportunity for questions and clarification was provided. Assessment completed upon patients arrival to unit and care assumed. Patient received to room 301. Patient connected to monitor and assessment completed. Plan of care reviewed. Patient oriented to room and call light. Patient aware to use call light to communicate any chest pain or needs. Admission skin assessment completed with second RN and reveals the following: sacrum intact and free of redness. Heels intact with some redness but blanchable. Pt presents to unit with right chest tube. Dual  Skin Assessment completed with Art RN.

## 2023-01-05 NOTE — ED NOTES
Dr. Tosha Abebe with pulmonology at bedside states that she disconnected suction at this time and orders to keep patient off suction. Provider states to repeat Xray in approx 6 hours to determine removal eligibility for tomorrow AM. Dr. Tosha Abebe at bedside states that patient and family at bedside agree with the plan of care.      Simba Gomez RN  01/05/23 0800

## 2023-01-05 NOTE — PROGRESS NOTES
Roselia Montalvo  Admission Date: 1/4/2023         Daily Progress Note: 1/5/2023    The patient's chart is reviewed and the patient is discussed with the staff. Background: Patient is a 69 y.o. male presents with right sided chest pressure and SOB. Pt has history of spontaneous Ptx on left in 2017 which required large bore chest tube at the time. Did not require surgical intervention. This was the same time he had bilateral subdural hematomas and was wearing CPAP. Pt came to ED for further evaluation and CXR showed moderate to large PTX. He is currently on 6L NC with sat 90-92%. He still has some chest discomfort. Denies SOB. Denies any trauma. Mild cough, afebrile. He is COVID negative. He does have a history of CASSY on ASV for central apneas following subdural hematomas. He also has a history of chronic Afib on Pradaxa. His wife is at bedside. Former smoker, but never officially diagnosed with COPD. No inhaler or O2 at home. CT chest in 2009 with mild bullous emphysema noted. Subjective:   Having some chest wall pain.       Current Facility-Administered Medications   Medication Dose Route Frequency    carvedilol (COREG) tablet 25 mg  25 mg Oral BID WC    dabigatran (PRADAXA) capsule 150 mg  150 mg Oral BID    lisinopril (PRINIVIL;ZESTRIL) tablet 10 mg  10 mg Oral Daily    gabapentin (NEURONTIN) capsule 600 mg  600 mg Oral BID    guaiFENesin (MUCINEX) extended release tablet 600 mg  600 mg Oral BID    hydroCHLOROthiazide (HYDRODIURIL) tablet 25 mg  25 mg Oral Daily    atorvastatin (LIPITOR) tablet 20 mg  20 mg Oral Daily    spironolactone (ALDACTONE) tablet 25 mg  25 mg Oral Daily    sodium chloride flush 0.9 % injection 5-40 mL  5-40 mL IntraVENous 2 times per day    sodium chloride flush 0.9 % injection 5-40 mL  5-40 mL IntraVENous PRN    0.9 % sodium chloride infusion   IntraVENous PRN    ondansetron (ZOFRAN-ODT) disintegrating tablet 4 mg  4 mg Oral Q8H PRN    Or ondansetron (ZOFRAN) injection 4 mg  4 mg IntraVENous Q6H PRN    polyethylene glycol (GLYCOLAX) packet 17 g  17 g Oral Daily PRN    acetaminophen (TYLENOL) tablet 650 mg  650 mg Oral Q6H PRN    Or    acetaminophen (TYLENOL) suppository 650 mg  650 mg Rectal Q6H PRN    morphine injection 2 mg  2 mg IntraVENous Q4H PRN    acetaminophen (TYLENOL) tablet 500 mg  500 mg Oral Q4H    oxyCODONE (ROXICODONE) immediate release tablet 5 mg  5 mg Oral Q4H PRN    morphine injection 4 mg  4 mg IntraVENous Q4H PRN    insulin lispro (HUMALOG) injection vial 0-8 Units  0-8 Units SubCUTAneous TID WC    insulin lispro (HUMALOG) injection vial 0-4 Units  0-4 Units SubCUTAneous Nightly     Current Outpatient Medications   Medication Sig    hydroCHLOROthiazide (HYDRODIURIL) 25 MG tablet Take 1 tablet by mouth daily    enalapril (VASOTEC) 10 MG tablet Take 1 tablet by mouth daily TAKE 1 TABLET DAILY    empagliflozin (JARDIANCE) 10 MG tablet Take 1 tablet by mouth daily    spironolactone (ALDACTONE) 25 MG tablet Take 1 tablet by mouth daily    CPAP Machine MISC by Other route    potassium chloride (KLOR-CON M) 10 MEQ extended release tablet Take 10 mEq by mouth daily as needed    clotrimazole-betamethasone (LOTRISONE) 1-0.05 % cream Apply to affected area bid as directed    simvastatin (ZOCOR) 40 MG tablet Take 1 tablet by mouth daily    furosemide (LASIX) 20 MG tablet Take 20 mg by mouth daily as needed    gabapentin (NEURONTIN) 600 MG tablet Take 1 tablet by mouth 2 times daily. carvedilol (COREG) 25 MG tablet Take 25 mg by mouth 2 times daily (with meals)    cetirizine (ZYRTEC) 10 MG tablet Take by mouth daily as needed    dabigatran (PRADAXA) 150 MG capsule TAKE 1 CAPSULE EVERY 12 HOURS    guaiFENesin (MUCINEX) 600 MG extended release tablet Take 600 mg by mouth 2 times daily    metFORMIN (GLUCOPHAGE) 500 MG tablet TAKE 1 TABLET TWICE A DAY WITH A MEAL     Review of Systems: Unable to obtain due to patient factors.    Objective: Blood pressure 110/74, pulse 76, temperature 97.6 °F (36.4 °C), temperature source Oral, resp. rate 16, height 6' 2\" (1.88 m), weight (!) 325 lb (147.4 kg), SpO2 94 %. Intake/Output Summary (Last 24 hours) at 1/5/2023 1347  Last data filed at 1/5/2023 1114  Gross per 24 hour   Intake --   Output 1590 ml   Net -1590 ml     Physical Exam:   Constitutional:  the patient is well developed and in no acute distress  EENMT:  Sclera clear, pupils equal, oral mucosa moist  Respiratory: symmetric chest rise. CTAB  Cardiovascular:  RRR without M,G,R. There is no lower extremity edema. Gastrointestinal: soft and non-tender; with positive bowel sounds. Musculoskeletal: warm without cyanosis. Normal muscle tone. Skin:  no jaundice or rashes, no wounds   Neurologic: symmetric strength, fluent speech  Psychiatric:  calm, appropriate, oriented x 4    Imaging: I performed an independent interpretation of the patient's images. CXR: 1/5/23 1/4/23      CT chest 1/4/22    LAB:  Recent Labs     01/04/23  1545 01/05/23  0536   WBC 9.0 7.6   HGB 14.6 14.2   HCT 42.9 42.3    161     Recent Labs     01/04/23  1545 01/05/23  0536    135   K 3.9 3.9    104   CO2 24 29   BUN 11 10   CREATININE 0.90 0.80   MG 2.2  --    BILITOT 1.0  --    AST 37  --    ALT 72*  --    ALKPHOS 119  --      Recent Labs     01/04/23  1545 01/04/23  1806   TROPHS 3.9 4.7   NTPROBNP 463*  --      Recent Labs     01/04/23  1545 01/05/23  0536   GLUCOSE 196* 221*      Microbiology:   No results for input(s): CULTURE in the last 72 hours. ECHO: 07/19/22    TRANSTHORACIC ECHOCARDIOGRAM (TTE) COMPLETE (CONTRAST/BUBBLE/3D PRN) 07/19/2022  5:17 PM, 07/19/2022 12:00 AM (Final)    Interpretation Summary    Left Ventricle: Normal left ventricular systolic function with a visually estimated EF of 60 - 65%. Left ventricle size is normal. Mildly increased wall thickness. Normal wall motion. Mitral Valve: Mild regurgitation.     Tricuspid Valve: Mild regurgitation. Left Atrium: Left atrium is severely dilated. LA Vol Index is  65 ml/m2. Right Atrium: Right atrium is moderately dilated. Technical qualifiers: Technically difficult study, color flow Doppler was performed and pulse wave and/or continuous wave Doppler was performed. Signed by: Ang Hawkins MD on 7/19/2022  5:17 PM, Signed by: Unknown Provider Result on 7/19/2022 12:00 AM    Assessment and Plan:  (Medical Decision Making)   Impression: 70 yo male with history of L spontaneous PTX in 2017 reports to ED with chest pressure and SOB found to have R spontaneous Ptx. History of chronic Afib on pradaxa. Active Problems:    Active Problems:    Acute respiratory failure with hypoxia (HCC)  Plan: Currently on 2L NC.    --can wean as able      Spontaneous pneumothorax  Plan: now 2nd spontaneous Ptx, opposite side. --with pigtail CT in place. CXR much improved after placed. Pt made aware. --CT shows some bullae. CASSY on CPAP  Plan: No PAP therapy while ongoing Ptx. Can use O2 at night only. More than 50% of the time documented was spent in face-to-face contact with the patient and in the care of the patient on the floor/unit where the patient is located. In this split/shared evaluation I performed performed a medically appropriate history and exam, counseled and educated the patient and/or family member, ordered medications, tests or procedures, documented information in EMR, and coordinated care. which accounted for 11 minutes of clinical time. CARA Kincaid - CNP     In this split/shared evaluation I performed reviewed the patients's H&P, available images, labs, cultures. , discussed case in detail with NPP, performed a medically appropriate history and exam, counseled and educated the patient and/or family member, ordered and/or reviewed medications, tests or procedures, documented information in EMR, independently interpreted images, and coordinated care. which accounted for 18 minutes clinical time. Impression: Mr. Leena Morelos is a [id number] with pneumothorax treated with tube thoracostomy on the right. He has had pneumo on the lef tin the past.  Now nonsmoking. Will change to water seal and repeat CXR at 6pm.  If no worsening pneumo on CXR, will clamp overnight.     Francisca Batista MD

## 2023-01-05 NOTE — CARE COORDINATION
01/05/23 1018   Service Assessment   Patient Orientation Alert and Oriented   Cognition Alert   History Provided By Patient   Primary Caregiver Self   Support Systems Spouse/Significant Other   Patient's Healthcare Decision Maker is: Legal Next of Kin   PCP Verified by CM Yes   Last Visit to PCP Within last 3 months   Prior Functional Level Independent in ADLs/IADLs   Current Functional Level Independent in ADLs/IADLs   Can patient return to prior living arrangement Yes   Ability to make needs known: Good   Family able to assist with home care needs: Yes   Would you like for me to discuss the discharge plan with any other family members/significant others, and if so, who? Yes   Financial Resources Medicare   Social/Functional History   Lives With Spouse   Type of [address] One level   Bathroom Toilet Standard   ADL Assistance Independent   Homemaking Assistance Independent   Transfer Assistance Independent   Active  Yes   Mode of Transportation Car   Occupation Retired   Discharge Planning   Type of [address] Prior To Admission C-pap   Patient expects to be discharged to: [address] Discharge   Transition of [address] (CM Consult) Discharge Planning   Mode of Transport at Discharge Self   Confirm Follow Up Transport Family   Condition of Participation: Discharge [address] for Transition of Care is related to the following treatment goals: Home with family   The Patient and/or Patient Representative was provided with a Choice of Provider? Patient   The Patient and/Or Patient Representative agree with the Discharge Plan? Yes   Freedom of Choice list was provided with basic dialogue that supports the patient's individualized plan of care/goals, treatment preferences, and shares the quality data associated with the providers? Yes         Pt awaiting bed assignment for admission.  ESTELA met with pt to discuss CM needs & DCP. Pt is A&Ox4. Pt is indep at home with all ADLS. Pt lives with spouse. Pt has CPAP; no further DME needs. Pt has no difficulty with obtaining medications or transport. Patient denies hx of HH. Patient has been to [facility] in the past. DCP home with spouse. CM to continue to monitor.

## 2023-01-05 NOTE — PROGRESS NOTES
ACUTE OCCUPATIONAL THERAPY GOALS:   (Developed with and agreed upon by patient and/or caregiver.)  1. Patient will complete lower body bathing and dressing with SUPERVISION and adaptive equipment as needed. 2. Patient will complete toileting with MINIMAL ASSIST. 3. Patient will complete grooming ADL standing at sink with SUPERVISION. 4. Patient will tolerate 25 minutes of OT treatment with 1-2 rest breaks to increase activity tolerance for ADLs. 5. Patient will complete functional transfers with SUPERVISION and adaptive equipment as needed. 6. Patient will tolerate 10 minutes BUE exercises to increase strength for safe, functional transfers. Timeframe: 7 visits     OCCUPATIONAL THERAPY Initial Assessment and Daily Note       OT Visit Days: 1  Acknowledge Orders  Time  OT Charge Capture  Rehab Caseload Tracker      Olena Munoz is a 69 y.o. male   PRIMARY DIAGNOSIS: Spontaneous pneumothorax  Spontaneous pneumothorax [J93.83]       Reason for Referral: Generalized Muscle Weakness (M62.81)  Difficulty in walking, Not elsewhere classified (R26.2)  Other abnormalities of gait and mobility (R26.89)  Inpatient: Payor: MEDICARE / Plan: MEDICARE PART A AND B / Product Type: *No Product type* /     ASSESSMENT:     REHAB RECOMMENDATIONS:   Recommendation to date pending progress:  Setting:  Home Health Therapy    Equipment:    Rolling Walker     ASSESSMENT:  Mr. Margarito Simmons is a 68 y/o male presents with spontaneous pneumothorax and now has chest tube on R side. This has happened to this patient before on the left side a few years ago. At baseline pt lives with his wife, is independent all ADLs except needing min A for BM hygiene at home and drives. Pt does not wear home O2 and is currently on 1.5L O2 NC--pt sats >90% throughout with and without O2 today. Today pt presents with decreased activity tolerance, balance and mobility impacting ADLs.  Pt overall CGA-min A HHA for functional transfers and in-room mobility in ER. Pt assisted onto [facility] with CGA and required min A from wife for maggie care. Pt required max A for LE dressing due to pain at chest tube site. Pt is currently functioning below baseline and would benefit from skilled OT services to address OT goals and plan of care. Rec HHOT at d/c.       [address] [facility] [address] [address] AM-PAC 6 Clicks Daily Activity Inpatient Short Form:    AM-PAC Daily Activity Inpatient   How much help for putting on and taking off regular lower body clothing?: A Lot  How much help for Bathing?: A Little  How much help for Toileting?: A Little  How much help for putting on and taking off regular upper body clothing?: A Little  How much help for taking care of personal grooming?: None  How much help for eating meals?: None  AM-PAC Inpatient Daily Activity Raw Score: 19  AM-PAC Inpatient ADL T-Scale Score : 40.22  ADL Inpatient CMS 0-100% Score: 42.8  ADL Inpatient CMS G-Code Modifier : CK           SUBJECTIVE:     Mr. Mcgovern Ours states, \"I was going to take a shower and Lo and behold my lung collapsed\"     Social/Functional Lives With: Spouse  Type of Home: Condo  Home Layout: One level  Bathroom Toilet: Standard  ADL Assistance: Independent  Homemaking Assistance: Independent  Transfer Assistance: Independent  Active : Yes  Mode of Transportation: Car  Occupation: Retired    OBJECTIVE:     Mac Langleyr / Eder Esposito / Ross Charlene: Chest Tube    RESTRICTIONS/PRECAUTIONS:  Restrictions/Precautions: Fall Risk    PAIN: VITALS / O2:   Pre Treatment:   Pain Assessment: 0-10  Pain Level: 3  Pain Location: Chest  Pain Orientation: Right  Non-Pharmaceutical Pain Intervention(s): Ambulation/Increased Activity      Post Treatment: no c/o pain, resting comfortably sitting EOB       Vitals          Oxygen   Stable on RA and 1.5L O2 NC         GROSS EVALUATION: INTACT IMPAIRED   (See Comments)   UE AROM [] []Limited due to pain at chest tube site   UE PROM [] []Limited due to pain at chest tube site   Strength [x] Posture / Balance [] Posture: Fair  Sitting - Static: Good  Sitting - Dynamic: Good  Standing - Static: Fair, +  Standing - Dynamic: Fair   Sensation [x]     Coordination [x]       Tone [x]       Edema [x]    Activity Tolerance [] Patient limited by fatigue, Patient Tolerated treatment well     Hand Dominance R [] L []      COGNITION/  PERCEPTION: INTACT IMPAIRED   (See Comments)   Orientation [x]     Vision [x]     Hearing [x]     Cognition  [x]     Perception [x]       MOBILITY: I Mod I S SBA CGA Min Mod Max Total  NT x2 Comments:   Bed Mobility    Rolling [] [] [] [] [] [] [] [] [] [x] []    Supine to Sit [] [] [] [] [] [x] [] [] [] [] [x]    Scooting [] [] [] [] [] [x] [] [] [] [] []    Sit to Supine [] [] [] [] [] [] [] [] [] [x] [] Left sitting EOB   Transfers    Sit to Stand [] [] [] [] [x] [x] [] [] [] [] [] HHA   Bed to Chair [] [] [] [] [] [] [] [] [] [x] []    Stand to Sit [] [] [] [] [x] [x] [] [] [] [] [] HHA   Tub/Shower [] [] [] [] [] [] [] [] [] [x] []     Toilet [] [] [] [] [x] [x] [] [] [] [] []  HHA to [facility]    [] [] [] [] [] [] [] [] [] [x] []    I=Independent, Mod I=Modified Independent, S=Supervision/Setup, SBA=Standby Assistance, CGA=Contact Guard Assistance, Min=Minimal Assistance, Mod=Moderate Assistance, Max=Maximal Assistance, Total=Total Assistance, NT=Not Tested    ACTIVITIES OF DAILY LIVING: I Mod I S SBA CGA Min Mod Max Total NT Comments   BASIC ADLs:              Upper Body Bathing  [] [] [] [] [] [] [] [] [] [x]    Lower Body Bathing [] [] [] [] [] [] [] [] [] [x]    Toileting [] [] [] [] [] [x] [] [] [] [] From wife for maggie care   Upper Body Dressing [] [] [] [] [] [x] [] [] [] [] Donning gown EOB--pain at chest tube site   Lower Body Dressing [] [] [] [] [] [] [] [x] [] [] Donning socks and underwear mgmt for toileting in standing    Feeding [] [] [] [] [] [] [] [] [] [x]    Grooming [] [] [] [] [] [] [] [] [] [x]    Personal Device Care [] [] [] [] [] [] [] [] [] [x] Functional Mobility [] [] [] [] [x] [x] [] [] [] [] HHA   I=Independent, Mod I=Modified Independent, S=Supervision/Setup, SBA=Standby Assistance, CGA=Contact Guard Assistance, Min=Minimal Assistance, Mod=Moderate Assistance, Max=Maximal Assistance, Total=Total Assistance, NT=Not Tested    PLAN:   [address] [place] [address] of Care: 3 times/week for duration of hospital stay or until stated goals are met, whichever comes first.    PROBLEM LIST:   (Skilled intervention is medically necessary to address:)  Decreased ADL/Functional Activities  Decreased Activity Tolerance  Decreased AROM/PROM  Decreased Balance  Decreased Transfer Abilities  Increased Pain   INTERVENTIONS PLANNED:  (Benefits and precautions of occupational therapy have been discussed with the patient.)  Self Care Training  Therapeutic Activity  Therapeutic Exercise/HEP  Neuromuscular Re-education  Manual Therapy  Education         TREATMENT:     EVALUATION: LOW COMPLEXITY: (Untimed Charge)    TREATMENT:   Co-Treatment PT/OT necessary due to patient's decreased overall endurance/tolerance levels, as well as need for high level skilled assistance to complete functional transfers/mobility and functional tasks  Neuromuscular Re-education (15 Minutes): Patient participated in neuromuscular re-education including functional reaching, weight shifting, postural training, standing tolerance activity , and sitting balance activity   with minimal verbal cues and tactile cues to improve sitting balance, standing balance, proprioception, posture, and coordination in order to prepare for functional task, prepare for seated ADLs, prepare for standing ADLs, prepare for functional transfer, increase safety awareness, and prepare for discharge home .    Self Care (13 minutes): Patient participated in toileting, upper body dressing, and lower body dressing ADLs in unsupported sitting and standing with minimal verbal, manual, and tactile cueing to increase independence, decrease assistance required, and increase activity tolerance. Patient also participated in functional mobility and functional transfer training to increase independence, decrease assistance required, increase activity tolerance, and increase safety awareness. TREATMENT GRID:  N/A    AFTER TREATMENT PRECAUTIONS: Bed, Call light within reach, Needs within reach, RN notified, and Visitors at bedside    INTERDISCIPLINARY COLLABORATION:  RN/ PCT, PT/ PTA, and OT/ SCOTT    EDUCATION:  Education Given To: Patient; Family  Education Provided: Role of Therapy;Plan of Care  Education Method: Verbal  Barriers to Learning: None  Education Outcome: Verbalized understanding    TOTAL TREATMENT DURATION AND TIME:  Time In: [address]  Time Out: [address] [employer]  Minutes: [address], OT

## 2023-01-05 NOTE — PROGRESS NOTES
ACUTE PHYSICAL THERAPY GOALS:   (Developed with and agreed upon by patient and/or caregiver.)    (1.) Mike Rangel  will move from supine to sit and sit to supine  with INDEPENDENCE within 7 treatment day(s). (2.) Mike Rangel will transfer from bed to chair and chair to bed with MODIFIED INDEPENDENCE using the least restrictive device within 7 treatment day(s). (3.) Mike Rangel will ambulate with MODIFIED INDEPENDENCE for 250 feet with the least restrictive device within 7 treatment day(s). (4.) Mike Rangel will perform standing static and dynamic balance activities x 20 minutes with SUPERVISION to improve safety within 7 treatment day(s). (5.) Mike Rangel will perform bilateral lower extremity exercises x 20 min for HEP with INDEPENDENCE to improve strength, endurance, and functional mobility within 7 treatment day(s). PHYSICAL THERAPY Initial Assessment, Daily Note, and PM  (Link to Caseload Tracking: PT Visit Days : 1  Acknowledge Orders  Time In/Out  PT Charge Capture  Rehab Caseload Tracker    Mike Rangel is a 69 y.o. male   PRIMARY DIAGNOSIS: Spontaneous pneumothorax  Spontaneous pneumothorax [J93.83]       Reason for Referral: Generalized Muscle Weakness (M62.81)  Difficulty in walking, Not elsewhere classified (R26.2)  Inpatient: Payor: Randi Santiago / Plan: MEDICARE PART A AND B / Product Type: *No Product type* /     ASSESSMENT:     REHAB RECOMMENDATIONS:   Recommendation to date pending progress:  Setting:  Home Health Therapy    Equipment:    To Be Determined     ASSESSMENT:  Mr. Tariq Mcfarlane  is a 69year old M who presents with spontaneous pneumothorax. At baseline, pt is independent, does not wear O2 or use DME. This date pt performs mobility including bed mobility with Erick. He was able to ambulate 5 ft and perform transfer to [facility] with CGA of 2. Minor unsteadiness and discomfort at chest tube site noted.  He does repot chronic back and knee pain that causes balance deficits but denies any fall history. SpO2 90% post mobility on room air. Overall pt moving well, anticipate d/c with HH PT. Further ambulation distance limited by ED, chest tube suction. Pt presents as functioning below his baseline, with deficits in mobility including transfers, gait, balance, and activity tolerance. Pt will benefit from skilled therapy services to address stated deficits to promote return to highest level of function, independence, and safety. Will continue to follow.      [address] [facility] [address] [address] AM-PAC 6 Clicks Basic Mobility Inpatient Short Form  AM-PAC Mobility Inpatient   How much difficulty turning over in bed?: A Little  How much difficulty sitting down on / standing up from a chair with arms?: A Little  How much difficulty moving from lying on back to sitting on side of bed?: A Little  How much help from another person moving to and from a bed to a chair?: A Little  How much help from another person needed to walk in hospital room?: A Little  How much help from another person for climbing 3-5 steps with a railing?: A Little  [facility] Inpatient Mobility Raw Score : 18  AM-PAC Inpatient T-Scale Score : 43.63  Mobility Inpatient CMS 0-100% Score: 46.58  Mobility Inpatient CMS G-Code Modifier : CK    SUBJECTIVE:   Mr. Teran Files states, \"\"     Social/Functional Lives With: Spouse  Type of Home: Condo  Home Layout: One level  Bathroom Toilet: Standard  ADL Assistance: Independent  Homemaking Assistance: Independent  Transfer Assistance: Independent  Active : Yes  Mode of Transportation: Car  Occupation: Retired    OBJECTIVE:     PAIN: Veleria Caroli / O2: PRECAUTION / Mitchel Andra / Jannetta Hun:   Pre Treatment:   Pain Assessment: 0-10  Pain Level: 3  Pain Location: Chest  Non-Pharmaceutical Pain Intervention(s): Ambulation/Increased Activity      Post Treatment: 3 Vitals        Oxygen     See above Chest Tube, Continuous Pulse Oximetry, and Telemetry     RESTRICTIONS/PRECAUTIONS: GROSS EVALUATION: B LE Intact Impaired (Comments):   AROM [x]     PROM [x]    Strength []  Generalized weakness   Balance [] Posture: Fair  Sitting - Static: Good  Sitting - Dynamic: Good  Standing - Static: Fair  Standing - Dynamic: Fair   Posture [] Forward Head  Rounded Shoulders   Sensation []     Coordination []      Tone []     Edema []    Activity Tolerance [] Patient limited by fatigue, Patient limited by pain    []      COGNITION/  PERCEPTION: Intact Impaired (Comments):   Orientation [x]     Vision [x]     Hearing [x]     Cognition  [x]       MOBILITY: I Mod I S SBA CGA Min Mod Max Total  NT x2 Comments:   Bed Mobility    Rolling [] [] [] [] [] [x] [] [] [] [] []    Supine to Sit [] [] [] [] [] [x] [] [] [] [] []    Scooting [] [] [] [] [] [x] [] [] [] [] []    Sit to Supine [] [] [] [] [] [] [] [] [] [x] []    Transfers    Sit to Stand [] [] [] [] [x] [] [] [] [] [] [x]    Bed to Chair [] [] [] [] [x] [] [] [] [] [] [x]    Stand to Sit [] [] [] [] [x] [] [] [] [] [] [x]     [] [] [] [] [] [] [] [] [] [] []    I=Independent, Mod I=Modified Independent, S=Supervision, SBA=Standby Assistance, CGA=Contact Guard Assistance,   Min=Minimal Assistance, Mod=Moderate Assistance, Max=Maximal Assistance, Total=Total Assistance, NT=Not Tested    GAIT: I Mod I S SBA CGA Min Mod Max Total  NT x2 Comments:   Level of Assistance [] [] [] [] [x] [] [] [] [] [] [x]    Distance 5 feet    DME None    Gait Quality Decreased cj , Decreased step length, and Trunk sway increased    Weightbearing Status      Stairs      I=Independent, Mod I=Modified Independent, S=Supervision, SBA=Standby Assistance, CGA=Contact Guard Assistance,   Min=Minimal Assistance, Mod=Moderate Assistance, Max=Maximal Assistance, Total=Total Assistance, NT=Not Tested    PLAN:   FREQUENCY AND DURATION: 3 times/week for duration of hospital stay or until stated goals are met, whichever comes first.    THERAPY PROGNOSIS: Good    PROBLEM LIST: (Skilled intervention is medically necessary to address:)  Decreased ADL/Functional Activities  Decreased Activity Tolerance  Decreased Balance  Decreased Gait Ability  Decreased Strength  Decreased Transfer Abilities INTERVENTIONS PLANNED:   (Benefits and precautions of physical therapy have been discussed with the patient.)  Self Care Training  Therapeutic Activity  Therapeutic Exercise/HEP  Neuromuscular Re-education  Gait Training  Education       TREATMENT:   EVALUATION: LOW COMPLEXITY: (Untimed Charge)    TREATMENT:   Co-Treatment PT/OT necessary due to patient's decreased overall endurance/tolerance levels, as well as need for high level skilled assistance to complete functional transfers/mobility and functional tasks  Therapeutic Activity (27 Minutes): Therapeutic activity included Rolling, Supine to Sit, Scooting, Transfer Training, Ambulation on level ground, Sitting balance , and Standing balance to improve functional Activity tolerance, Balance, Mobility, and Strength. TREATMENT GRID:  N/A    AFTER TREATMENT PRECAUTIONS: Bed, Bed/Chair Locked, Call light within reach, Needs within reach, RN notified, and Visitors at bedside    INTERDISCIPLINARY COLLABORATION:  RN/ PCT and OT/ SCOTT    EDUCATION: Education Given To: Patient; Family  Education Provided: Role of Therapy;Plan of Care; Fall Prevention Strategies; Energy Conservation;Transfer Training  Education Method: Verbal  Barriers to Learning: None  Education Outcome: Verbalized understanding    TIME IN/OUT:  Time In: 1304  Time Out: [address]  Minutes: Cece LEO PT

## 2023-01-06 ENCOUNTER — TELEPHONE (OUTPATIENT)
Dept: CARDIOLOGY CLINIC | Age: 68
End: 2023-01-06

## 2023-01-06 ENCOUNTER — APPOINTMENT (OUTPATIENT)
Dept: GENERAL RADIOLOGY | Age: 68
DRG: 163 | End: 2023-01-06
Payer: MEDICARE

## 2023-01-06 LAB
ANION GAP SERPL CALC-SCNC: 3 MMOL/L (ref 2–11)
BASOPHILS # BLD: 0 K/UL (ref 0–0.2)
BASOPHILS NFR BLD: 0 % (ref 0–2)
BUN SERPL-MCNC: 12 MG/DL (ref 8–23)
CALCIUM SERPL-MCNC: 9.2 MG/DL (ref 8.3–10.4)
CHLORIDE SERPL-SCNC: 103 MMOL/L (ref 101–110)
CO2 SERPL-SCNC: 28 MMOL/L (ref 21–32)
CREAT SERPL-MCNC: 0.9 MG/DL (ref 0.8–1.5)
DIFFERENTIAL METHOD BLD: ABNORMAL
EOSINOPHIL # BLD: 0.2 K/UL (ref 0–0.8)
EOSINOPHIL NFR BLD: 2 % (ref 0.5–7.8)
ERYTHROCYTE [DISTWIDTH] IN BLOOD BY AUTOMATED COUNT: 13.1 % (ref 11.9–14.6)
GLUCOSE BLD STRIP.AUTO-MCNC: 176 MG/DL (ref 65–100)
GLUCOSE BLD STRIP.AUTO-MCNC: 195 MG/DL (ref 65–100)
GLUCOSE BLD STRIP.AUTO-MCNC: 258 MG/DL (ref 65–100)
GLUCOSE BLD STRIP.AUTO-MCNC: 279 MG/DL (ref 65–100)
GLUCOSE SERPL-MCNC: 229 MG/DL (ref 65–100)
HCT VFR BLD AUTO: 43.5 % (ref 41.1–50.3)
HGB BLD-MCNC: 14.7 G/DL (ref 13.6–17.2)
IMM GRANULOCYTES # BLD AUTO: 0 K/UL (ref 0–0.5)
IMM GRANULOCYTES NFR BLD AUTO: 1 % (ref 0–5)
LYMPHOCYTES # BLD: 1.4 K/UL (ref 0.5–4.6)
LYMPHOCYTES NFR BLD: 16 % (ref 13–44)
MCH RBC QN AUTO: 33.8 PG (ref 26.1–32.9)
MCHC RBC AUTO-ENTMCNC: 33.8 G/DL (ref 31.4–35)
MCV RBC AUTO: 100 FL (ref 82–102)
MONOCYTES # BLD: 0.5 K/UL (ref 0.1–1.3)
MONOCYTES NFR BLD: 6 % (ref 4–12)
NEUTS SEG # BLD: 6.7 K/UL (ref 1.7–8.2)
NEUTS SEG NFR BLD: 75 % (ref 43–78)
NRBC # BLD: 0 K/UL (ref 0–0.2)
PLATELET # BLD AUTO: 177 K/UL (ref 150–450)
PMV BLD AUTO: 10.2 FL (ref 9.4–12.3)
POTASSIUM SERPL-SCNC: 4.2 MMOL/L (ref 3.5–5.1)
RBC # BLD AUTO: 4.35 M/UL (ref 4.23–5.6)
SERVICE CMNT-IMP: ABNORMAL
SODIUM SERPL-SCNC: 134 MMOL/L (ref 133–143)
WBC # BLD AUTO: 8.8 K/UL (ref 4.3–11.1)

## 2023-01-06 PROCEDURE — 82962 GLUCOSE BLOOD TEST: CPT

## 2023-01-06 PROCEDURE — 85025 COMPLETE CBC W/AUTO DIFF WBC: CPT

## 2023-01-06 PROCEDURE — 99232 SBSQ HOSP IP/OBS MODERATE 35: CPT | Performed by: INTERNAL MEDICINE

## 2023-01-06 PROCEDURE — 71045 X-RAY EXAM CHEST 1 VIEW: CPT

## 2023-01-06 PROCEDURE — 6360000002 HC RX W HCPCS: Performed by: FAMILY MEDICINE

## 2023-01-06 PROCEDURE — 80048 BASIC METABOLIC PNL TOTAL CA: CPT

## 2023-01-06 PROCEDURE — 2580000003 HC RX 258: Performed by: FAMILY MEDICINE

## 2023-01-06 PROCEDURE — 6370000000 HC RX 637 (ALT 250 FOR IP): Performed by: HOSPITALIST

## 2023-01-06 PROCEDURE — 1100000000 HC RM PRIVATE

## 2023-01-06 PROCEDURE — 6370000000 HC RX 637 (ALT 250 FOR IP): Performed by: FAMILY MEDICINE

## 2023-01-06 PROCEDURE — 36415 COLL VENOUS BLD VENIPUNCTURE: CPT

## 2023-01-06 RX ORDER — DEXTROSE MONOHYDRATE 100 MG/ML
INJECTION, SOLUTION INTRAVENOUS CONTINUOUS PRN
Status: DISCONTINUED | OUTPATIENT
Start: 2023-01-06 | End: 2023-01-19 | Stop reason: HOSPADM

## 2023-01-06 RX ORDER — SENNA AND DOCUSATE SODIUM 50; 8.6 MG/1; MG/1
2 TABLET, FILM COATED ORAL ONCE
Status: COMPLETED | OUTPATIENT
Start: 2023-01-06 | End: 2023-01-06

## 2023-01-06 RX ORDER — POLYETHYLENE GLYCOL 3350 17 G/17G
17 POWDER, FOR SOLUTION ORAL DAILY
Status: DISCONTINUED | OUTPATIENT
Start: 2023-01-06 | End: 2023-01-10

## 2023-01-06 RX ADMIN — ACETAMINOPHEN 500 MG: 500 TABLET, FILM COATED ORAL at 06:02

## 2023-01-06 RX ADMIN — HYDROCHLOROTHIAZIDE 25 MG: 25 TABLET ORAL at 08:14

## 2023-01-06 RX ADMIN — POLYETHYLENE GLYCOL 3350 17 G: 17 POWDER, FOR SOLUTION ORAL at 18:47

## 2023-01-06 RX ADMIN — ACETAMINOPHEN 500 MG: 500 TABLET, FILM COATED ORAL at 00:39

## 2023-01-06 RX ADMIN — GABAPENTIN 600 MG: 300 CAPSULE ORAL at 08:13

## 2023-01-06 RX ADMIN — LISINOPRIL 10 MG: 5 TABLET ORAL at 08:12

## 2023-01-06 RX ADMIN — POLYETHYLENE GLYCOL 3350 17 G: 17 POWDER, FOR SOLUTION ORAL at 08:21

## 2023-01-06 RX ADMIN — CARVEDILOL 25 MG: 25 TABLET, FILM COATED ORAL at 08:12

## 2023-01-06 RX ADMIN — MORPHINE SULFATE 4 MG: 4 INJECTION INTRAVENOUS at 15:36

## 2023-01-06 RX ADMIN — SENNOSIDES AND DOCUSATE SODIUM 2 TABLET: 8.6; 5 TABLET ORAL at 11:44

## 2023-01-06 RX ADMIN — ACETAMINOPHEN 500 MG: 500 TABLET, FILM COATED ORAL at 16:53

## 2023-01-06 RX ADMIN — SODIUM CHLORIDE, PRESERVATIVE FREE 5 ML: 5 INJECTION INTRAVENOUS at 21:04

## 2023-01-06 RX ADMIN — GUAIFENESIN 600 MG: 600 TABLET ORAL at 21:03

## 2023-01-06 RX ADMIN — MORPHINE SULFATE 2 MG: 2 INJECTION, SOLUTION INTRAMUSCULAR; INTRAVENOUS at 06:05

## 2023-01-06 RX ADMIN — ATORVASTATIN CALCIUM 20 MG: 20 TABLET, FILM COATED ORAL at 08:11

## 2023-01-06 RX ADMIN — ACETAMINOPHEN 500 MG: 500 TABLET, FILM COATED ORAL at 21:02

## 2023-01-06 RX ADMIN — GABAPENTIN 600 MG: 300 CAPSULE ORAL at 21:02

## 2023-01-06 RX ADMIN — SPIRONOLACTONE 25 MG: 25 TABLET ORAL at 08:12

## 2023-01-06 RX ADMIN — GUAIFENESIN 600 MG: 600 TABLET ORAL at 08:13

## 2023-01-06 RX ADMIN — ACETAMINOPHEN 500 MG: 500 TABLET, FILM COATED ORAL at 11:44

## 2023-01-06 RX ADMIN — INSULIN LISPRO 4 UNITS: 100 INJECTION, SOLUTION INTRAVENOUS; SUBCUTANEOUS at 11:57

## 2023-01-06 RX ADMIN — CARVEDILOL 25 MG: 25 TABLET, FILM COATED ORAL at 16:53

## 2023-01-06 RX ADMIN — DABIGATRAN ETEXILATE MESYLATE 150 MG: 150 CAPSULE ORAL at 08:10

## 2023-01-06 RX ADMIN — SODIUM CHLORIDE, PRESERVATIVE FREE 10 ML: 5 INJECTION INTRAVENOUS at 08:15

## 2023-01-06 ASSESSMENT — PAIN DESCRIPTION - LOCATION
LOCATION: CHEST

## 2023-01-06 ASSESSMENT — PAIN SCALES - GENERAL
PAINLEVEL_OUTOF10: 6
PAINLEVEL_OUTOF10: 8
PAINLEVEL_OUTOF10: 0
PAINLEVEL_OUTOF10: 4
PAINLEVEL_OUTOF10: 3
PAINLEVEL_OUTOF10: 2
PAINLEVEL_OUTOF10: 4
PAINLEVEL_OUTOF10: 0

## 2023-01-06 ASSESSMENT — PAIN DESCRIPTION - DESCRIPTORS
DESCRIPTORS: ACHING;SHARP
DESCRIPTORS: ACHING;SORE
DESCRIPTORS: ACHING
DESCRIPTORS: SORE
DESCRIPTORS: SORE;ACHING
DESCRIPTORS: ACHING

## 2023-01-06 ASSESSMENT — PAIN DESCRIPTION - ORIENTATION
ORIENTATION: RIGHT

## 2023-01-06 ASSESSMENT — PAIN DESCRIPTION - PAIN TYPE
TYPE: SURGICAL PAIN
TYPE: ACUTE PAIN

## 2023-01-06 ASSESSMENT — PAIN DESCRIPTION - FREQUENCY: FREQUENCY: CONTINUOUS

## 2023-01-06 ASSESSMENT — PAIN - FUNCTIONAL ASSESSMENT: PAIN_FUNCTIONAL_ASSESSMENT: PREVENTS OR INTERFERES SOME ACTIVE ACTIVITIES AND ADLS

## 2023-01-06 ASSESSMENT — PAIN DESCRIPTION - ONSET: ONSET: ON-GOING

## 2023-01-06 NOTE — TELEPHONE ENCOUNTER
----- Message from Jonathon Reese sent at 1/6/2023  2:52 PM EST -----  Regarding: FW: Planned Thoracic Surgery for Floresita Jacobson     ----- Message -----  From: Gray Spence  Sent: 1/6/2023   2:39 PM EST  To: [facility] Clinical Staff  Subject: Planned Thoracic Surgery for Floresita Jacobson          Dr Honor Epley, I wanted to give you a heads up on why you saw me in [address] on Wednesday at [facility]. I had a pneumothorax  in my right lung which is second one in that lung. First time was in October 2017. Had tube inserted and lung reinflated but  Long story short, looks like Dr Allan Galaviz will plan on doing surgery on that lung either 1/9 or 1/10/2023. Pulmonology  will be [facility] Pulmonology. I am still in hospital based on their recommendation. We stopped Pradaxa night of 1/6/2023. I wanted to be sure you are ok with this decision. I am will copy Dr Rosetta Anaya on this as well. I have the utmost respect for the both of you. Prayers appreciated!     My Moiz Corona [phone]

## 2023-01-06 NOTE — CONSULTS
Consult Note  Silvano Avila  MRN: [id number]  :1955  Age:67 y.o.    HPI: Silvano Avila is a 69 y.o. male who we are asked in consultation by Dr. Mary Rhodes to see for question of role for bleb resection in the setting of recurrent right pneumothorax. 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 Pt has history of spontaneous PTX on right in 2017 which required large bore chest tube at the time. Did not require surgical intervention. This was the same time he had bilateral subdural hematomas and was wearing CPAP with approximate pressures 13-18 cm H2o. He had a sleep study following his R PTX in  and was found with central apneas=currently on ASV. He presented 23 with right sided chest pressure and SOB. In the ED 23, patient was noted hypoxic, required 4-6 L nasal cannula to keep oxygen saturation above 90%. Labs unremarkable. Chest x-ray with right large pneumothorax. Chest tube was placed in the ED. Pulmonology consulted. Hospitalist consulted for admission. CXR 23 revealed:   1. Large right pneumothorax. 2.  Pulmonary vascular congestion. He was admitted on 2023 for acute respiratory failure with hypoxia and spontaneous right pneumothorax. On pradaxa for afib-last dose 23 AM  Pt has regular diet at time of consult  Pt has no previous thoracic surgeries. 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     Diabetes mellitus ([address] 1/3/2012    Essential hypertension, benign 2014    Hemochromatosis     Hypercholesteremia     Nodular prostate without urinary obstruction 2014 Obesity 2/6/2014    Pneumothorax, right 10/8/2017    Sepsis due to urinary tract infection ([address] 11/5/2019    Sleep apnea 10/12/2016    uses ASV instead of CPAP machine - per pt 11/18    Subdural hemorrhage following injury 10/20/2017     Past Surgical History:   Procedure Laterality Date    CHEST SURGERY  10/2017    pneumothorax after fall    COLONOSCOPY N/A 11/13/2018    COLONOSCOPY  BMI 41 performed by Mera Woodard MD at [address] Right 10/02/2017    [facility]    KNEE ARTHROSCOPY Right     OTHER SURGICAL HISTORY Bilateral 10/18/2017    [facility]    TURP  12/03/2019    UROLOGICAL SURGERY      prostate u/s and BX     Current Facility-Administered Medications   Medication Dose Route Frequency    metoprolol (LOPRESSOR) injection 5 mg  5 mg IntraVENous Q6H PRN    carvedilol (COREG) tablet 25 mg  25 mg Oral BID WC    dabigatran (PRADAXA) capsule 150 mg  150 mg Oral BID    lisinopril (PRINIVIL;ZESTRIL) tablet 10 mg  10 mg Oral Daily    gabapentin (NEURONTIN) capsule 600 mg  600 mg Oral BID    guaiFENesin (MUCINEX) extended release tablet 600 mg  600 mg Oral BID    hydroCHLOROthiazide (HYDRODIURIL) tablet 25 mg  25 mg Oral Daily    atorvastatin (LIPITOR) tablet 20 mg  20 mg Oral Daily    spironolactone (ALDACTONE) tablet 25 mg  25 mg Oral Daily    sodium chloride flush 0.9 % injection 5-40 mL  5-40 mL IntraVENous 2 times per day    sodium chloride flush 0.9 % injection 5-40 mL  5-40 mL IntraVENous PRN    0.9 % sodium chloride infusion   IntraVENous PRN    ondansetron (ZOFRAN-ODT) disintegrating tablet 4 mg  4 mg Oral Q8H PRN    Or    ondansetron (ZOFRAN) injection 4 mg  4 mg IntraVENous Q6H PRN    polyethylene glycol (GLYCOLAX) packet 17 g  17 g Oral Daily PRN    acetaminophen (TYLENOL) tablet 650 mg  650 mg Oral Q6H PRN    Or    acetaminophen (TYLENOL) suppository 650 mg  650 mg Rectal Q6H PRN    morphine injection 2 mg  2 mg IntraVENous Q4H PRN acetaminophen (TYLENOL) tablet 500 mg  500 mg Oral Q4H    oxyCODONE (ROXICODONE) immediate release tablet 5 mg  5 mg Oral Q4H PRN    morphine injection 4 mg  4 mg IntraVENous Q4H PRN    insulin lispro (HUMALOG) injection vial 0-8 Units  0-8 Units SubCUTAneous TID WC    insulin lispro (HUMALOG) injection vial 0-4 Units  0-4 Units SubCUTAneous Nightly     Azithromycin  Social History     Socioeconomic History    Marital status:    Tobacco Use    Smoking status: Former     Packs/day: 1.50     Types: Cigarettes     Quit date: 1989     Years since quittin.4    Smokeless tobacco: Former     Quit date: 2016   Vaping Use    Vaping Use: Never used   Substance and Sexual Activity    Alcohol use: Yes     Alcohol/week: 8.0 standard drinks    Drug use: No     Social History     Tobacco Use   Smoking Status Former    Packs/day: 1.50    Types: Cigarettes    Quit date: 1989    Years since quittin.4   Smokeless Tobacco Former    Quit date: 2016     Family History   Problem Relation Age of Onset    Prostate Cancer Paternal Grandfather     Alcohol Abuse Brother     Heart Failure Brother     Psychiatric Disorder Brother     Stroke Brother     Psychiatric Disorder Mother     Other Mother         Sarcoidosis    Atrial Fibrillation Father     Lung Cancer Father     Hypertension Father     Heart Disease Father 61            Diabetes Father     Heart Disease Paternal Grandmother      ROS:   No fever or chills. Comprehensive review of systems was otherwise unremarkable except as noted above. Physical Exam:   /72   Pulse 91   Temp 97.9 °F (36.6 °C) (Temporal)   Resp 20   Ht 6' 2\" (1.88 m)   Wt (!) 322 lb 6.4 oz (146.2 kg)   SpO2 94%   BMI 41.39 kg/m²   Constitutional: Alert, oriented, cooperative patient in no acute distress; appears stated age    Eyes:Sclera are clear.  EOMs intact  ENMT: no external lesions gross hearing normal; no obvious neck masses, no ear or lip lesions, nares normal  CV: RRR. Normal perfusion  Resp: No JVD. Breathing is  non-labored; no audible wheezing. GI: soft and non-distended     Musculoskeletal: unremarkable with normal function. No embolic signs or cyanosis. Neuro:  Oriented; moves all 4; no focal deficits  Psychiatric: normal affect and mood, no memory impairment    Recent vitals (if inpt):  Patient Vitals for the past 24 hrs:   BP Temp Temp src Pulse Resp SpO2 Height Weight   01/06/23 0400 127/72 97.9 °F (36.6 °C) Temporal 91 20 94 % -- (!) 322 lb 6.4 oz (146.2 kg)   01/06/23 0000 100/63 97.3 °F (36.3 °C) Temporal 92 16 95 % -- --   01/05/23 2000 113/71 98.6 °F (37 °C) Temporal 87 20 96 % -- --   01/05/23 1803 129/73 98.2 °F (36.8 °C) Oral 78 18 95 % 6' 2\" (1.88 m) (!) 324 lb (147 kg)   01/05/23 1700 123/69 -- -- 87 17 94 % -- --   01/05/23 1630 -- 97.5 °F (36.4 °C) Oral -- -- -- -- --   01/05/23 1628 120/69 -- -- 80 16 94 % -- --   01/05/23 1300 110/74 -- -- 76 16 94 % -- --   01/05/23 1245 113/78 -- -- 79 14 94 % -- --   01/05/23 1230 122/74 -- -- 83 14 94 % -- --   01/05/23 1116 134/83 -- -- 86 15 96 % -- --   01/05/23 0930 128/84 -- -- -- -- 95 % -- --   01/05/23 0915 134/79 -- -- 80 -- 93 % -- --   01/05/23 0853 -- 97.6 °F (36.4 °C) Oral -- -- -- -- --   01/05/23 0845 133/73 -- -- 79 15 95 % -- --       Labs:  Recent Labs     01/04/23  1545 01/05/23  0536 01/06/23  0315   WBC 9.0   < > 8.8   HGB 14.6   < > 14.7      < > 177      < > 134   K 3.9   < > 4.2      < > 103   CO2 24   < > 28   BUN 11   < > 12   ALT 72*  --   --     < > = values in this interval not displayed.        Lab Results   Component Value Date/Time    WBC 8.8 01/06/2023 03:15 AM    HGB 14.7 01/06/2023 03:15 AM     01/06/2023 03:15 AM     01/06/2023 03:15 AM    K 4.2 01/06/2023 03:15 AM     01/06/2023 03:15 AM    CO2 28 01/06/2023 03:15 AM    BUN 12 01/06/2023 03:15 AM    ALT 72 01/04/2023 03:45 PM   CXR 1/6/23 04:16 S/P 4h clamp trial   EXAM: Chest x-ray.       INDICATION: Dyspnea. COMPARISON: Yesterday's chest x-ray. TECHNIQUE: Frontal view chest x-ray. FINDINGS: Bibasilar lung atelectasis is unchanged. A right-sided chest tube   remains in place, with no pneumothorax or pleural effusion. The heart size is   stable. Impression   Unchanged bibasilar lung atelectasis. CXR 1/4/23 16:42   CHEST X-RAY, 1 VIEW       INDICATION: Chest pain       COMPARISON: Chest x-ray 10/21/2017       TECHNIQUE: Single AP view of the chest was obtained. FINDINGS:        Lungs and pleural spaces: Large right pneumothorax. Pulmonary vascular   congestion. Cardiomediastinal silhouette: Normal.       Osseous structures: Normal.               Impression   1. Large right pneumothorax. 2.  Pulmonary vascular congestion. Findings discussed with Dr. Kassi Young by myself at 4:42 PM.     I reviewed recent labs and recent radiologic studies. ASSESSMENT/PLAN:    Principal Problem:    Spontaneous pneumothorax  Active Problems:    Acute respiratory failure with hypoxia (HCC)    Anticoagulant long-term use    Primary spontaneous pneumothorax    Benign prostatic hyperplasia with nocturia    Atrial fibrillation (HCC)    CASSY on CPAP  Resolved Problems:    * No resolved hospital problems. *       Pt has passed clamping trial of right CT and will likely have right CT removed today 1/6/23. Pt is on room air with no respiratory distress Pt with recurrence of Right  PTX . Currently with resolved right PTX.    Further input per attending surgeon    Signed:  CARA Hayward NP

## 2023-01-06 NOTE — TELEPHONE ENCOUNTER
----- Message from [place], [place] sent at 1/6/2023  2:52 PM EST -----  Regarding: FW: Planned Thoracic Surgery for Justin Toussaint     ----- Message -----  From: Dominguez Johnson  Sent: 1/6/2023   2:39 PM EST  To: [facility] Clinical [facility]  Subject: Planned Thoracic Surgery for Justin Toussaint          Dr Praveena Arauz, I wanted to give you a heads up on why you saw me in [address] on Wednesday at [facility]. I had a pneumothorax  in my right lung which is second one in that lung. First time was in October 2017. Had tube inserted and lung reinflated but  Long story short, looks like Dr Tahira Schaefer will plan on doing surgery on that lung either 1/9 or 1/10/2023. Pulmonology  will be [facility]. I am still in hospital based on their recommendation. We stopped Pradaxa night of 1/6/2023. I wanted to be sure you are ok with this decision. I am will copy Dr Lizy Andrew on this as well. I have the utmost respect for the both of you. Prayers appreciated!     My Julieanne Court [phone]

## 2023-01-06 NOTE — PROGRESS NOTES
Hospitalist Progress Note   Admit Date:  2023  3:40 PM   Name:  Conrad Stephenson   Age:  67 y.o.  Sex:  male  :  1955   MRN:  [id number]   Room:  301/    Presenting Complaint: Chest Pain and Shortness of Breath     Reason(s) for Admission: Acute respiratory failure with hypoxia (HCC) [J96.01]  Primary spontaneous pneumothorax [J93.11]  Spontaneous pneumothorax [J93.83]     Hospital Course:   Conrad Stephenson is a 67 y.o. male with medical history hypertension, A. fib on Pradaxa, CASSY, CPAP at night, bullous emphysema, history of bilateral subdural hematomas 2017 and diabetes mellitus presented to ED with acute onset right-sided chest pain.  Patient reports he was talking to his neighbor when he developed sharp severe right-sided chest pain, associated with shortness of breath.  Reports he has history of spontaneous left pneumothorax in 2017, shortly after putting on his CPAP while hospitalized for subdural hematoma.  He is a former smoker, quit years ago.  Denies nausea, vomiting, fever, chills or abdominal pain.      Subjective & 24hr Events (23):   Patient still has some air leak after clamping chest tube.  Recommendations for VATS.  Chest x-ray repeated this morning showed no pneumothorax.    Assessment & Plan:     This is a 67-year-old male with    Acute hypoxic respiratory failure: 2/2 to spontaneous pneumothorax due to bolus emphysema.  S/p Chest Tube in the ER.   Pulmonary on board.  Appreciate recommendations.  Chest tube clamped but unfortunately still has a leak.  Chest x-ray repeated this morning was reviewed by me showed no pneumothorax.  General surgery consulted.  Patient may need VATS.  He is currently on Pradaxa which was held.  If patient agreeable, plans for VATS on 1/10/2023    HTN:   Blood pressure fairly well controlled.    Chronic afib:   rate controlled.  Plavix on hold for possible VATS early next week.     DM2:   Sliding scale insulin    CASSY:   no cpap given chest  tube placement    Neuropathy:   gabapentin    HLD:   statin    Morbid obesity:  complicates care, lifestyle modifications encouraged    Constipation  MiraLAX. Anticipated discharge needs:    Anticipate inpatient hospital stay until next week as he is planned to have VATS early next week. Diet:  ADULT DIET; Regular; 4 carb choices (60 gm/meal); Low Fat/Low Chol/High Fiber/2 gm Na  DVT PPx: Dabigatran- held. SCDs  Code status: Full Code    Hospital Problems:  Principal Problem:    Spontaneous pneumothorax  Active Problems:    Acute respiratory failure with hypoxia (HCC)    Anticoagulant long-term use    Primary spontaneous pneumothorax    Benign prostatic hyperplasia with nocturia    Atrial fibrillation (HCC)    CASSY on CPAP  Resolved Problems:    * No resolved hospital problems. *      Objective:   Patient Vitals for the past 24 hrs:   Temp Pulse Resp BP SpO2   01/06/23 1144 98.3 °F (36.8 °C) 83 16 (!) 132/57 94 %   01/06/23 0810 97.8 °F (36.6 °C) 79 16 122/68 93 %   01/06/23 0400 97.9 °F (36.6 °C) 91 20 127/72 94 %   01/06/23 0000 97.3 °F (36.3 °C) 92 16 100/63 95 %   01/05/23 2000 98.6 °F (37 °C) 87 20 113/71 96 %   01/05/23 1803 98.2 °F (36.8 °C) 78 18 129/73 95 %   01/05/23 1700 -- 87 17 123/69 94 %   01/05/23 1630 97.5 °F (36.4 °C) -- -- -- --   01/05/23 1628 -- 80 16 120/69 94 %       Oxygen Therapy  SpO2: 94 %  O2 Device: None (Room air)  O2 Flow Rate (L/min): 2 L/min    Estimated body mass index is 41.39 kg/m² as calculated from the following:    Height as of this encounter: 6' 2\" (1.88 m). Weight as of this encounter: 322 lb 6.4 oz (146.2 kg). Intake/Output Summary (Last 24 hours) at 1/6/2023 1526  Last data filed at 1/6/2023 0000  Gross per 24 hour   Intake 300 ml   Output 650 ml   Net -350 ml         Physical Exam:     Blood pressure (!) 132/57, pulse 83, temperature 98.3 °F (36.8 °C), temperature source Oral, resp.  rate 16, height 6' 2\" (1.88 m), weight (!) 322 lb 6.4 oz (146.2 kg), SpO2 94 %.    General:    Well nourished. Morbidly obese, ill-appearing on nasal cannula,   Head:  Normocephalic, atraumatic  Eyes:  Sclerae appear normal.  Pupils equally round. ENT:  Nares appear normal.  Moist oral mucosa  Neck:  No restricted ROM. Trachea midline   CV:   RRR. No m/r/g. No jugular venous distension. Lungs:   Decreased bs on right. Chest tube in place on the right,  Abdomen:   Soft, nontender, nondistended. Extremities: No cyanosis or clubbing. No edema  Skin:     No rashes and normal coloration. Warm and dry. Neuro:  CN II-XII grossly intact. Sensation intact. Psych:  Normal mood and affect.       I have personally reviewed labs and tests showing:  Recent Labs:  Recent Results (from the past 48 hour(s))   CBC    Collection Time: 01/04/23  3:45 PM   Result Value Ref Range    WBC 9.0 4.3 - 11.1 K/uL    RBC 4.41 4.23 - 5.6 M/uL    Hemoglobin 14.6 13.6 - 17.2 g/dL    Hematocrit 42.9 41.1 - 50.3 %    MCV 97.3 82 - 102 FL    MCH 33.1 (H) 26.1 - 32.9 PG    MCHC 34.0 31.4 - 35.0 g/dL    RDW 13.0 11.9 - 14.6 %    Platelets 961 330 - 377 K/uL    MPV 10.5 9.4 - 12.3 FL    nRBC 0.00 0.0 - 0.2 K/uL   Comprehensive Metabolic Panel    Collection Time: 01/04/23  3:45 PM   Result Value Ref Range    Sodium 133 133 - 143 mmol/L    Potassium 3.9 3.5 - 5.1 mmol/L    Chloride 104 101 - 110 mmol/L    CO2 24 21 - 32 mmol/L    Anion Gap 5 2 - 11 mmol/L    Glucose 196 (H) 65 - 100 mg/dL    BUN 11 8 - 23 MG/DL    Creatinine 0.90 0.8 - 1.5 MG/DL    Est, Glom Filt Rate >60 >60 ml/min/1.73m2    Calcium 9.4 8.3 - 10.4 MG/DL    Total Bilirubin 1.0 0.2 - 1.1 MG/DL    ALT 72 (H) 12 - 65 U/L    AST 37 15 - 37 U/L    Alk Phosphatase 119 50 - 136 U/L    Total Protein 7.1 6.3 - 8.2 g/dL    Albumin 3.6 3.2 - 4.6 g/dL    Globulin 3.5 2.8 - 4.5 g/dL    Albumin/Globulin Ratio 1.0 0.4 - 1.6     Troponin    Collection Time: 01/04/23  3:45 PM   Result Value Ref Range    Troponin, High Sensitivity 3.9 0 - 14 pg/mL   Magnesium Collection Time: 01/04/23  3:45 PM   Result Value Ref Range    Magnesium 2.2 1.8 - 2.4 mg/dL   Brain Natriuretic Peptide    Collection Time: 01/04/23  3:45 PM   Result Value Ref Range    NT Pro- (H) 5 - 125 PG/ML   EKG 12 Lead    Collection Time: 01/04/23  4:00 PM   Result Value Ref Range    Ventricular Rate 82 BPM    Atrial Rate 0 BPM    QRS Duration 96 ms    Q-T Interval 379 ms    QTc Calculation (Bazett) 443 ms    R Axis 75 degrees    T Axis 30 degrees    Diagnosis Atrial fibrillation    Rapid influenza A/B antigens    Collection Time: 01/04/23  4:03 PM    Specimen: Nasal Washing   Result Value Ref Range    Influenza A Ag Negative NEG      Influenza B Ag Negative NEG      Source Nasopharyngeal     COVID-19, Rapid    Collection Time: 01/04/23  4:03 PM    Specimen: Nasopharyngeal   Result Value Ref Range    Source NASAL      SARS-CoV-2, Rapid Not detected NOTD     Troponin    Collection Time: 01/04/23  6:06 PM   Result Value Ref Range    Troponin, High Sensitivity 4.7 0 - 14 pg/mL   POCT Glucose    Collection Time: 01/04/23  9:45 PM   Result Value Ref Range    POC Glucose 196 (H) 65 - 100 mg/dL    Performed by: [employer]    Basic Metabolic Panel w/ Reflex to MG    Collection Time: 01/05/23  5:36 AM   Result Value Ref Range    Sodium 135 133 - 143 mmol/L    Potassium 3.9 3.5 - 5.1 mmol/L    Chloride 104 101 - 110 mmol/L    CO2 29 21 - 32 mmol/L    Anion Gap 2 2 - 11 mmol/L    Glucose 221 (H) 65 - 100 mg/dL    BUN 10 8 - 23 MG/DL    Creatinine 0.80 0.8 - 1.5 MG/DL    Est, Glom Filt Rate >60 >60 ml/min/1.73m2    Calcium 9.2 8.3 - 10.4 MG/DL   CBC with Auto Differential    Collection Time: 01/05/23  5:36 AM   Result Value Ref Range    WBC 7.6 4.3 - 11.1 K/uL    RBC 4.36 4.23 - 5.6 M/uL    Hemoglobin 14.2 13.6 - 17.2 g/dL    Hematocrit 42.3 41.1 - 50.3 %    MCV 97.0 82 - 102 FL    MCH 32.6 26.1 - 32.9 PG    MCHC 33.6 31.4 - 35.0 g/dL    RDW 13.0 11.9 - 14.6 %    Platelets 814 597 - 846 K/uL    MPV 10.1 9.4 - 12.3 FL    nRBC 0.00 0.0 - 0.2 K/uL    Differential Type AUTOMATED      Seg Neutrophils 78 43 - 78 %    Lymphocytes 12 (L) 13 - 44 %    Monocytes 6 4.0 - 12.0 %    Eosinophils % 3 0.5 - 7.8 %    Basophils 0 0.0 - 2.0 %    Immature Granulocytes 1 0.0 - 5.0 %    Segs Absolute 6.0 1.7 - 8.2 K/UL    Absolute Lymph # 0.9 0.5 - 4.6 K/UL    Absolute Mono # 0.4 0.1 - 1.3 K/UL    Absolute Eos # 0.2 0.0 - 0.8 K/UL    Basophils Absolute 0.0 0.0 - 0.2 K/UL    Absolute Immature Granulocyte 0.1 0.0 - 0.5 K/UL   POCT Glucose    Collection Time: 01/05/23  9:06 AM   Result Value Ref Range    POC Glucose 237 (H) 65 - 100 mg/dL    Performed by: Rosey Oppenheim    POCT Glucose    Collection Time: 01/05/23 12:40 PM   Result Value Ref Range    POC Glucose 266 (H) 65 - 100 mg/dL    Performed by: Rosey Oppenheim    POCT Glucose    Collection Time: 01/05/23  5:57 PM   Result Value Ref Range    POC Glucose 253 (H) 65 - 100 mg/dL    Performed by:  Calos    POCT Glucose    Collection Time: 01/05/23  7:58 PM   Result Value Ref Range    POC Glucose 268 (H) 65 - 100 mg/dL    Performed by: Anna    CBC with Auto Differential    Collection Time: 01/06/23  3:15 AM   Result Value Ref Range    WBC 8.8 4.3 - 11.1 K/uL    RBC 4.35 4.23 - 5.6 M/uL    Hemoglobin 14.7 13.6 - 17.2 g/dL    Hematocrit 43.5 41.1 - 50.3 %    .0 82 - 102 FL    MCH 33.8 (H) 26.1 - 32.9 PG    MCHC 33.8 31.4 - 35.0 g/dL    RDW 13.1 11.9 - 14.6 %    Platelets 733 635 - 390 K/uL    MPV 10.2 9.4 - 12.3 FL    nRBC 0.00 0.0 - 0.2 K/uL    Differential Type AUTOMATED      Seg Neutrophils 75 43 - 78 %    Lymphocytes 16 13 - 44 %    Monocytes 6 4.0 - 12.0 %    Eosinophils % 2 0.5 - 7.8 %    Basophils 0 0.0 - 2.0 %    Immature Granulocytes 1 0.0 - 5.0 %    Segs Absolute 6.7 1.7 - 8.2 K/UL    Absolute Lymph # 1.4 0.5 - 4.6 K/UL    Absolute Mono # 0.5 0.1 - 1.3 K/UL    Absolute Eos # 0.2 0.0 - 0.8 K/UL    Basophils Absolute 0.0 0.0 - 0.2 K/UL    Absolute Immature Granulocyte 0.0 0.0 - 0.5 K/UL   Basic Metabolic Panel w/ Reflex to MG    Collection Time: 01/06/23  3:15 AM   Result Value Ref Range    Sodium 134 133 - 143 mmol/L    Potassium 4.2 3.5 - 5.1 mmol/L    Chloride 103 101 - 110 mmol/L    CO2 28 21 - 32 mmol/L    Anion Gap 3 2 - 11 mmol/L    Glucose 229 (H) 65 - 100 mg/dL    BUN 12 8 - 23 MG/DL    Creatinine 0.90 0.8 - 1.5 MG/DL    Est, Glom Filt Rate >60 >60 ml/min/1.73m2    Calcium 9.2 8.3 - 10.4 MG/DL   POCT Glucose    Collection Time: 01/06/23  7:16 AM   Result Value Ref Range    POC Glucose 195 (H) 65 - 100 mg/dL    Performed by: ReneaPied PiperLynn    POCT Glucose    Collection Time: 01/06/23 11:35 AM   Result Value Ref Range    POC Glucose 279 (H) 65 - 100 mg/dL    Performed by: Smart[employer]AKUB        I have personally reviewed imaging studies showing: Other Studies:  XR CHEST PORTABLE   Final Result   Unchanged bibasilar lung atelectasis. XR CHEST PORTABLE   Final Result   No evidence of pneumothorax         XR CHEST PORTABLE   Final Result   1. Resolved right pneumothorax. 2. Mild bibasilar lung atelectasis. XR CHEST PORTABLE   Final Result   1. Right chest tube placed, with markedly smaller pneumothorax. 2.  Resolution of the previous tension component. 3.  Mild bibasilar atelectasis. CT CHEST WO CONTRAST   Final Result   1. Persisting large right pneumothorax. 2.  Leftward deviation of mediastinal contours concerning for tension component. 3.  Partial atelectasis of right lung. 4.  Emphysema. 5.  Vascular disease. Urgent findings communicated to the ordering NP [place] by the secure text   messaging system at 6:30 PM.      XR CHEST PORTABLE   Final Result   1. Large right pneumothorax. 2.  Pulmonary vascular congestion.                Findings discussed with Dr. Nicolasa Null by myself at 4:42 PM.         XR CHEST PORTABLE    (Results Pending)       Current Meds:  Current Facility-Administered Medications   Medication Dose Route Frequency    glucose chewable tablet 16 g  4 tablet Oral PRN    dextrose bolus 10% 125 mL  125 mL IntraVENous PRN    Or    dextrose bolus 10% 250 mL  250 mL IntraVENous PRN    glucagon (rDNA) injection 1 mg  1 mg SubCUTAneous PRN    dextrose 10 % infusion   IntraVENous Continuous PRN    polyethylene glycol (GLYCOLAX) packet 17 g  17 g Oral Daily    metoprolol (LOPRESSOR) injection 5 mg  5 mg IntraVENous Q6H PRN    carvedilol (COREG) tablet 25 mg  25 mg Oral BID WC    [Held by provider] dabigatran (PRADAXA) capsule 150 mg  150 mg Oral BID    lisinopril (PRINIVIL;ZESTRIL) tablet 10 mg  10 mg Oral Daily    gabapentin (NEURONTIN) capsule 600 mg  600 mg Oral BID    guaiFENesin (MUCINEX) extended release tablet 600 mg  600 mg Oral BID    hydroCHLOROthiazide (HYDRODIURIL) tablet 25 mg  25 mg Oral Daily    atorvastatin (LIPITOR) tablet 20 mg  20 mg Oral Daily    spironolactone (ALDACTONE) tablet 25 mg  25 mg Oral Daily    sodium chloride flush 0.9 % injection 5-40 mL  5-40 mL IntraVENous 2 times per day    sodium chloride flush 0.9 % injection 5-40 mL  5-40 mL IntraVENous PRN    0.9 % sodium chloride infusion   IntraVENous PRN    ondansetron (ZOFRAN-ODT) disintegrating tablet 4 mg  4 mg Oral Q8H PRN    Or    ondansetron (ZOFRAN) injection 4 mg  4 mg IntraVENous Q6H PRN    polyethylene glycol (GLYCOLAX) packet 17 g  17 g Oral Daily PRN    acetaminophen (TYLENOL) tablet 650 mg  650 mg Oral Q6H PRN    Or    acetaminophen (TYLENOL) suppository 650 mg  650 mg Rectal Q6H PRN    morphine injection 2 mg  2 mg IntraVENous Q4H PRN    acetaminophen (TYLENOL) tablet 500 mg  500 mg Oral Q4H    oxyCODONE (ROXICODONE) immediate release tablet 5 mg  5 mg Oral Q4H PRN    morphine injection 4 mg  4 mg IntraVENous Q4H PRN    insulin lispro (HUMALOG) injection vial 0-8 Units  0-8 Units SubCUTAneous TID WC    insulin lispro (HUMALOG) injection vial 0-4 Units  0-4 Units SubCUTAneous Nightly       Signed:  Eduardo Wolf MD    Part of this note may have been written by using a voice dictation software. The note has been proof read but may still contain some grammatical/other typographical errors.

## 2023-01-06 NOTE — PROGRESS NOTES
[facility]  Admission Date: 1/4/2023         Daily Progress Note: 1/6/2023    The patient's chart is reviewed and the patient is discussed with the staff. Background: Patient is a 69 y.o. male presents with right sided chest pressure and SOB. Pt has history of spontaneous Ptx that was documented as on the left but it was actually on the right in 2017 which required large bore chest tube at the time. Did not require surgical intervention. This was the same time he had bilateral subdural hematomas and was wearing CPAP. Pt came to ED for further evaluation and CXR showed moderate to large PTX. He is currently on 6L NC with sat 90-92%. He still has some chest discomfort. Denies SOB. Denies any trauma. Mild cough, afebrile. He is COVID negative. He does have a history of CASSY on ASV for central apneas following subdural hematomas. He also has a history of chronic Afib on Pradaxa. His wife is at bedside. Former smoker, but never officially diagnosed with COPD. No inhaler or O2 at home. CT chest in 2009 with mild bullous emphysema noted. Subjective:     Sitting up in recliner on RA. CXR stable after clamping chest tube overnight. Only complains of discomfort at chest tube insertion site. Wife at bedside.      Current Facility-Administered Medications   Medication Dose Route Frequency    glucose chewable tablet 16 g  4 tablet Oral PRN    dextrose bolus 10% 125 mL  125 mL IntraVENous PRN    Or    dextrose bolus 10% 250 mL  250 mL IntraVENous PRN    glucagon (rDNA) injection 1 mg  1 mg SubCUTAneous PRN    dextrose 10 % infusion   IntraVENous Continuous PRN    sennosides-docusate sodium (SENOKOT-S) 8.6-50 MG tablet 2 tablet  2 tablet Oral Once    metoprolol (LOPRESSOR) injection 5 mg  5 mg IntraVENous Q6H PRN    carvedilol (COREG) tablet 25 mg  25 mg Oral BID WC    dabigatran (PRADAXA) capsule 150 mg  150 mg Oral BID    lisinopril (PRINIVIL;ZESTRIL) tablet 10 mg  10 mg Oral Daily gabapentin (NEURONTIN) capsule 600 mg  600 mg Oral BID    guaiFENesin (MUCINEX) extended release tablet 600 mg  600 mg Oral BID    hydroCHLOROthiazide (HYDRODIURIL) tablet 25 mg  25 mg Oral Daily    atorvastatin (LIPITOR) tablet 20 mg  20 mg Oral Daily    spironolactone (ALDACTONE) tablet 25 mg  25 mg Oral Daily    sodium chloride flush 0.9 % injection 5-40 mL  5-40 mL IntraVENous 2 times per day    sodium chloride flush 0.9 % injection 5-40 mL  5-40 mL IntraVENous PRN    0.9 % sodium chloride infusion   IntraVENous PRN    ondansetron (ZOFRAN-ODT) disintegrating tablet 4 mg  4 mg Oral Q8H PRN    Or    ondansetron (ZOFRAN) injection 4 mg  4 mg IntraVENous Q6H PRN    polyethylene glycol (GLYCOLAX) packet 17 g  17 g Oral Daily PRN    acetaminophen (TYLENOL) tablet 650 mg  650 mg Oral Q6H PRN    Or    acetaminophen (TYLENOL) suppository 650 mg  650 mg Rectal Q6H PRN    morphine injection 2 mg  2 mg IntraVENous Q4H PRN    acetaminophen (TYLENOL) tablet 500 mg  500 mg Oral Q4H    oxyCODONE (ROXICODONE) immediate release tablet 5 mg  5 mg Oral Q4H PRN    morphine injection 4 mg  4 mg IntraVENous Q4H PRN    insulin lispro (HUMALOG) injection vial 0-8 Units  0-8 Units SubCUTAneous TID WC    insulin lispro (HUMALOG) injection vial 0-4 Units  0-4 Units SubCUTAneous Nightly     Review of Systems: Comprehensive ROS negative except in HPI  Objective:   Blood pressure 122/68, pulse 79, temperature 97.8 °F (36.6 °C), temperature source Oral, resp. rate 16, height 6' 2\" (1.88 m), weight (!) 322 lb 6.4 oz (146.2 kg), SpO2 93 %. Intake/Output Summary (Last 24 hours) at 1/6/2023 1127  Last data filed at 1/6/2023 0000  Gross per 24 hour   Intake 300 ml   Output 650 ml   Net -350 ml     Physical Exam:   Constitutional:  the patient is well developed and in no acute distress  EENMT:  Sclera clear, pupils equal, oral mucosa moist  Respiratory: symmetric chest rise. CTA bilaterally; on RA   Cardiovascular:  RRR without M,G,R.  There is no lower extremity edema. Gastrointestinal: soft and non-tender; with positive bowel sounds. Musculoskeletal: warm without cyanosis. Normal muscle tone. Skin:  no jaundice or rashes, no wounds   Neurologic: symmetric strength, fluent speech  Psychiatric:  calm, appropriate, oriented x 4    Imaging: I performed an independent interpretation of the patient's images. CXR:   1/6 1/5 1/4    CT chest 1/4    LAB:  Recent Labs     01/04/23  1545 01/05/23  0536 01/06/23  0315   WBC 9.0 7.6 8.8   HGB 14.6 14.2 14.7   HCT 42.9 42.3 43.5    161 177     Recent Labs     01/04/23  1545 01/05/23  0536 01/06/23  0315    135 134   K 3.9 3.9 4.2    104 103   CO2 24 29 28   BUN 11 10 12   CREATININE 0.90 0.80 0.90   MG 2.2  --   --    BILITOT 1.0  --   --    AST 37  --   --    ALT 72*  --   --    ALKPHOS 119  --   --      Recent Labs     01/04/23  1545 01/04/23  1806   TROPHS 3.9 4.7   NTPROBNP 463*  --      Recent Labs     01/04/23  1545 01/05/23  0536 01/06/23  0315   GLUCOSE 196* 221* 229*      Microbiology:   No results for input(s): CULTURE in the last 72 hours. ECHO: 07/19/22    TRANSTHORACIC ECHOCARDIOGRAM (TTE) COMPLETE (CONTRAST/BUBBLE/3D PRN) 07/19/2022  5:17 PM, 07/19/2022 12:00 AM (Final)    Interpretation Summary    Left Ventricle: Normal left ventricular systolic function with a visually estimated EF of 60 - 65%. Left ventricle size is normal. Mildly increased wall thickness. Normal wall motion. Mitral Valve: Mild regurgitation. Tricuspid Valve: Mild regurgitation. Left Atrium: Left atrium is severely dilated. LA Vol Index is  65 ml/m2. Right Atrium: Right atrium is moderately dilated. Technical qualifiers: Technically difficult study, color flow Doppler was performed and pulse wave and/or continuous wave Doppler was performed.     Signed by: Will Gaytan MD on 7/19/2022  5:17 PM, Signed by: Unknown Provider Result on 7/19/2022 12:00 AM    Assessment and Plan:  (Medical Decision Making)   Impression: 68 yo male with history of R spontaneous PTX in 2017 reports to ED with chest pressure and SOB found to have R spontaneous Ptx. History of chronic Afib on pradaxa. Right sided pigtail chest tube placed. Spontaneous pneumothorax  Plan: on right; patient stated his prior PTX was also on right in 2017. chest tube clamped yesterday; pt did well overnight and CXR stable; Gen surgery consult for bullae seen on CT   Chest tube can more than likely be removed today; now on RA       Acute respiratory failure with hypoxia ([address]  Plan: Now on RA, sats 93-96%      Anticoagulant long-term use    Atrial fibrillation (HCC)  Plan: on pradaxa with chronic afib      CASSY on CPAP  Plan: use O2 at night while ongoing PTX. On ASV at home for central apnea      More than 50% of the time documented was spent in face-to-face contact with the patient and in the care of the patient on the floor/unit where the patient is located. In this split/shared evaluation I performed performed a medically appropriate history and exam, counseled and educated the patient and/or family member, documented information in EMR, and coordinated care. which accounted for 12 minutes of clinical time. In this split/shared evaluation I performed reviewed the patients's H&P, available images, labs, cultures. , discussed case in detail with NPP, performed a medically appropriate history and exam, counseled and educated the patient and/or family member, ordered and/or reviewed medications, tests or procedures, documented information in EMR, independently interpreted images, and coordinated care. which accounted for 15 minutes clinical time.      Impression:       Recurrent PTX on R -had first episode in 2017 , now again has air leak,CT left to suction seen by surgery  pradaxa on hold and surgery will likely happen next Tuesday Juli De MD

## 2023-01-06 NOTE — PLAN OF CARE
Problem: Discharge Planning  Goal: Discharge to home or other facility with appropriate resources  Outcome: Progressing  Flowsheets (Taken 1/5/2023 1803 by Rey Welch RN)  Discharge to home or other facility with appropriate resources: Identify barriers to discharge with patient and caregiver     Problem: Pain  Goal: Verbalizes/displays adequate comfort level or baseline comfort level  Outcome: Progressing     Problem: ABCDS Injury Assessment  Goal: Absence of physical injury  Outcome: Progressing

## 2023-01-07 ENCOUNTER — APPOINTMENT (OUTPATIENT)
Dept: GENERAL RADIOLOGY | Age: 68
DRG: 163 | End: 2023-01-07
Payer: MEDICARE

## 2023-01-07 LAB
ANION GAP SERPL CALC-SCNC: 7 MMOL/L (ref 2–11)
BASOPHILS # BLD: 0 K/UL (ref 0–0.2)
BASOPHILS NFR BLD: 1 % (ref 0–2)
BUN SERPL-MCNC: 12 MG/DL (ref 8–23)
CALCIUM SERPL-MCNC: 8.8 MG/DL (ref 8.3–10.4)
CHLORIDE SERPL-SCNC: 102 MMOL/L (ref 101–110)
CO2 SERPL-SCNC: 26 MMOL/L (ref 21–32)
CREAT SERPL-MCNC: 1 MG/DL (ref 0.8–1.5)
DIFFERENTIAL METHOD BLD: NORMAL
EOSINOPHIL # BLD: 0.2 K/UL (ref 0–0.8)
EOSINOPHIL NFR BLD: 3 % (ref 0.5–7.8)
ERYTHROCYTE [DISTWIDTH] IN BLOOD BY AUTOMATED COUNT: 13 % (ref 11.9–14.6)
GLUCOSE BLD STRIP.AUTO-MCNC: 213 MG/DL (ref 65–100)
GLUCOSE BLD STRIP.AUTO-MCNC: 250 MG/DL (ref 65–100)
GLUCOSE BLD STRIP.AUTO-MCNC: 250 MG/DL (ref 65–100)
GLUCOSE BLD STRIP.AUTO-MCNC: 261 MG/DL (ref 65–100)
GLUCOSE SERPL-MCNC: 210 MG/DL (ref 65–100)
HCT VFR BLD AUTO: 41.9 % (ref 41.1–50.3)
HGB BLD-MCNC: 14.1 G/DL (ref 13.6–17.2)
IMM GRANULOCYTES # BLD AUTO: 0.1 K/UL (ref 0–0.5)
IMM GRANULOCYTES NFR BLD AUTO: 1 % (ref 0–5)
LYMPHOCYTES # BLD: 1.4 K/UL (ref 0.5–4.6)
LYMPHOCYTES NFR BLD: 17 % (ref 13–44)
MCH RBC QN AUTO: 32.9 PG (ref 26.1–32.9)
MCHC RBC AUTO-ENTMCNC: 33.7 G/DL (ref 31.4–35)
MCV RBC AUTO: 97.7 FL (ref 82–102)
MONOCYTES # BLD: 0.5 K/UL (ref 0.1–1.3)
MONOCYTES NFR BLD: 5 % (ref 4–12)
NEUTS SEG # BLD: 6.3 K/UL (ref 1.7–8.2)
NEUTS SEG NFR BLD: 73 % (ref 43–78)
NRBC # BLD: 0 K/UL (ref 0–0.2)
PLATELET # BLD AUTO: 181 K/UL (ref 150–450)
PMV BLD AUTO: 9.9 FL (ref 9.4–12.3)
POTASSIUM SERPL-SCNC: 4.1 MMOL/L (ref 3.5–5.1)
RBC # BLD AUTO: 4.29 M/UL (ref 4.23–5.6)
SERVICE CMNT-IMP: ABNORMAL
SODIUM SERPL-SCNC: 135 MMOL/L (ref 133–143)
WBC # BLD AUTO: 8.4 K/UL (ref 4.3–11.1)

## 2023-01-07 PROCEDURE — 36415 COLL VENOUS BLD VENIPUNCTURE: CPT

## 2023-01-07 PROCEDURE — 6370000000 HC RX 637 (ALT 250 FOR IP): Performed by: FAMILY MEDICINE

## 2023-01-07 PROCEDURE — 71045 X-RAY EXAM CHEST 1 VIEW: CPT

## 2023-01-07 PROCEDURE — 99232 SBSQ HOSP IP/OBS MODERATE 35: CPT | Performed by: INTERNAL MEDICINE

## 2023-01-07 PROCEDURE — 85025 COMPLETE CBC W/AUTO DIFF WBC: CPT

## 2023-01-07 PROCEDURE — 2580000003 HC RX 258: Performed by: FAMILY MEDICINE

## 2023-01-07 PROCEDURE — 82962 GLUCOSE BLOOD TEST: CPT

## 2023-01-07 PROCEDURE — 6360000002 HC RX W HCPCS: Performed by: FAMILY MEDICINE

## 2023-01-07 PROCEDURE — 1100000000 HC RM PRIVATE

## 2023-01-07 PROCEDURE — 80048 BASIC METABOLIC PNL TOTAL CA: CPT

## 2023-01-07 PROCEDURE — 6370000000 HC RX 637 (ALT 250 FOR IP): Performed by: HOSPITALIST

## 2023-01-07 RX ORDER — SENNA AND DOCUSATE SODIUM 50; 8.6 MG/1; MG/1
2 TABLET, FILM COATED ORAL DAILY
Status: DISCONTINUED | OUTPATIENT
Start: 2023-01-07 | End: 2023-01-10

## 2023-01-07 RX ORDER — BISACODYL 10 MG
10 SUPPOSITORY, RECTAL RECTAL DAILY PRN
Status: DISCONTINUED | OUTPATIENT
Start: 2023-01-07 | End: 2023-01-19 | Stop reason: HOSPADM

## 2023-01-07 RX ORDER — POLYETHYLENE GLYCOL 3350 17 G/17G
17 POWDER, FOR SOLUTION ORAL ONCE
Status: COMPLETED | OUTPATIENT
Start: 2023-01-07 | End: 2023-01-07

## 2023-01-07 RX ADMIN — BISACODYL 10 MG: 10 SUPPOSITORY RECTAL at 18:20

## 2023-01-07 RX ADMIN — ATORVASTATIN CALCIUM 20 MG: 20 TABLET, FILM COATED ORAL at 08:15

## 2023-01-07 RX ADMIN — CARVEDILOL 25 MG: 25 TABLET, FILM COATED ORAL at 16:14

## 2023-01-07 RX ADMIN — LISINOPRIL 10 MG: 5 TABLET ORAL at 08:17

## 2023-01-07 RX ADMIN — CARVEDILOL 25 MG: 25 TABLET, FILM COATED ORAL at 08:16

## 2023-01-07 RX ADMIN — INSULIN LISPRO 4 UNITS: 100 INJECTION, SOLUTION INTRAVENOUS; SUBCUTANEOUS at 11:53

## 2023-01-07 RX ADMIN — SODIUM CHLORIDE, PRESERVATIVE FREE 10 ML: 5 INJECTION INTRAVENOUS at 08:13

## 2023-01-07 RX ADMIN — GABAPENTIN 600 MG: 300 CAPSULE ORAL at 08:15

## 2023-01-07 RX ADMIN — POLYETHYLENE GLYCOL 3350 17 G: 17 POWDER, FOR SOLUTION ORAL at 15:17

## 2023-01-07 RX ADMIN — GABAPENTIN 600 MG: 300 CAPSULE ORAL at 20:19

## 2023-01-07 RX ADMIN — SPIRONOLACTONE 25 MG: 25 TABLET ORAL at 08:17

## 2023-01-07 RX ADMIN — INSULIN LISPRO 4 UNITS: 100 INJECTION, SOLUTION INTRAVENOUS; SUBCUTANEOUS at 16:14

## 2023-01-07 RX ADMIN — GUAIFENESIN 600 MG: 600 TABLET ORAL at 08:15

## 2023-01-07 RX ADMIN — ACETAMINOPHEN 500 MG: 500 TABLET, FILM COATED ORAL at 05:45

## 2023-01-07 RX ADMIN — SENNOSIDES AND DOCUSATE SODIUM 2 TABLET: 8.6; 5 TABLET ORAL at 09:58

## 2023-01-07 RX ADMIN — ACETAMINOPHEN 500 MG: 500 TABLET, FILM COATED ORAL at 20:19

## 2023-01-07 RX ADMIN — MORPHINE SULFATE 2 MG: 2 INJECTION, SOLUTION INTRAMUSCULAR; INTRAVENOUS at 02:59

## 2023-01-07 RX ADMIN — SODIUM CHLORIDE, PRESERVATIVE FREE 5 ML: 5 INJECTION INTRAVENOUS at 20:45

## 2023-01-07 RX ADMIN — POLYETHYLENE GLYCOL 3350 17 G: 17 POWDER, FOR SOLUTION ORAL at 08:14

## 2023-01-07 RX ADMIN — HYDROCHLOROTHIAZIDE 25 MG: 25 TABLET ORAL at 08:16

## 2023-01-07 RX ADMIN — GUAIFENESIN 600 MG: 600 TABLET ORAL at 20:19

## 2023-01-07 RX ADMIN — INSULIN LISPRO 2 UNITS: 100 INJECTION, SOLUTION INTRAVENOUS; SUBCUTANEOUS at 08:14

## 2023-01-07 ASSESSMENT — PAIN DESCRIPTION - DESCRIPTORS
DESCRIPTORS: DISCOMFORT;SORE
DESCRIPTORS: ACHING;SORE
DESCRIPTORS: ACHING
DESCRIPTORS: ACHING

## 2023-01-07 ASSESSMENT — PAIN SCALES - GENERAL
PAINLEVEL_OUTOF10: 4
PAINLEVEL_OUTOF10: 0
PAINLEVEL_OUTOF10: 6
PAINLEVEL_OUTOF10: 0
PAINLEVEL_OUTOF10: 2
PAINLEVEL_OUTOF10: 0
PAINLEVEL_OUTOF10: 3
PAINLEVEL_OUTOF10: 0
PAINLEVEL_OUTOF10: 0

## 2023-01-07 ASSESSMENT — PAIN DESCRIPTION - PAIN TYPE
TYPE: SURGICAL PAIN

## 2023-01-07 ASSESSMENT — PAIN - FUNCTIONAL ASSESSMENT
PAIN_FUNCTIONAL_ASSESSMENT: PREVENTS OR INTERFERES SOME ACTIVE ACTIVITIES AND ADLS

## 2023-01-07 ASSESSMENT — PAIN DESCRIPTION - ORIENTATION
ORIENTATION: RIGHT

## 2023-01-07 ASSESSMENT — PAIN DESCRIPTION - ONSET
ONSET: ON-GOING

## 2023-01-07 ASSESSMENT — PAIN DESCRIPTION - LOCATION
LOCATION: CHEST

## 2023-01-07 ASSESSMENT — PAIN DESCRIPTION - FREQUENCY
FREQUENCY: CONTINUOUS

## 2023-01-07 NOTE — PLAN OF CARE
Problem: Discharge Planning  Goal: Discharge to home or other facility with appropriate resources  Outcome: Progressing     Problem: Pain  Goal: Verbalizes/displays adequate comfort level or baseline comfort level  Outcome: Progressing  Flowsheets (Taken 1/7/2023 0014 by Nathaniel Guerrero RN)  Verbalizes/displays adequate comfort level or baseline comfort level:   Encourage patient to monitor pain and request assistance   Assess pain using appropriate pain scale   Administer analgesics based on type and severity of pain and evaluate response   Implement non-pharmacological measures as appropriate and evaluate response   Consider cultural and social influences on pain and pain management   Notify Licensed Independent Practitioner if interventions unsuccessful or patient reports new pain     Problem: ABCDS Injury Assessment  Goal: Absence of physical injury  Outcome: Progressing     Problem: Safety - Adult  Goal: Free from fall injury  Outcome: Progressing  Flowsheets (Taken 1/7/2023 0453 by Nathaniel Guerrero RN)  Free From Fall Injury: Instruct family/caregiver on patient safety

## 2023-01-07 NOTE — PROGRESS NOTES
Hospitalist Progress Note   Admit Date:  2023  3:40 PM   Name:  Pamella Frazier   Age:  69 y.o. Sex:  male  :  1955   MRN:  [id number]   Room:  301/01    Presenting Complaint: Chest Pain and Shortness of Breath     Reason(s) for Admission: Acute respiratory failure with hypoxia ([address] [J96.01]  Primary spontaneous pneumothorax [J93.11]  Spontaneous pneumothorax [J93.83]     Hospital Course:   Pamella Frazier is a 69 y.o. male with medical history hypertension, A. fib on Pradaxa, CASSY, CPAP at night, bullous emphysema, history of bilateral subdural hematomas 2017 and diabetes mellitus presented to ED with acute onset right-sided chest pain. Patient reports he was talking to his neighbor when he developed sharp severe right-sided chest pain, associated with shortness of breath. Reports he has history of spontaneous left pneumothorax in 2017, shortly after putting on his CPAP while hospitalized for subdural hematoma. He is a former smoker, quit years ago. Denies nausea, vomiting, fever, chills or abdominal pain. Subjective & 24hr Events (23): Chest tube in place. No chest pain. No fever no chills. He reports no BM in 2 days. Assessment & Plan: This is a 78-year-old male with    Acute hypoxic respiratory failure: 2/2 to spontaneous pneumothorax due to bolus emphysema. S/p Chest Tube in the ER. Pulmonary on board. Appreciate recommendations. Chest tube clamped but unfortunately still has a leak. Chest x-ray repeated this morning was reviewed by me showed small apical pneumothorax. General surgery consulted. Patient may need VATS. He is currently on Pradaxa which was held. Plans for VATS on 1/10/2023 ? HTN:   Blood pressure fairly well controlled. Chronic afib:   rate controlled. Plavix on hold for possible VATS early next week.      DM2:   Sliding scale insulin    CASSY:   no cpap given chest tube placement    Neuropathy:   gabapentin    HLD: statin    Morbid obesity:  complicates care, lifestyle modifications encouraged    Constipation  MiraLAX daily. Add Kiera-Colace. Dulcolax suppository as needed. Anticipated discharge needs:    Anticipate inpatient hospital stay until next week as he is planned to have VATS early next week. Diet:  ADULT DIET; Regular; 4 carb choices (60 gm/meal); Low Fat/Low Chol/High Fiber/2 gm Na  DVT PPx: Dabigatran- held. SCDs  Code status: Full Code    Hospital Problems:  Principal Problem:    Spontaneous pneumothorax  Active Problems:    Acute respiratory failure with hypoxia (HCC)    Anticoagulant long-term use    Primary spontaneous pneumothorax    Benign prostatic hyperplasia with nocturia    Atrial fibrillation (HCC)    CASSY on CPAP  Resolved Problems:    * No resolved hospital problems. *      Objective:   Patient Vitals for the past 24 hrs:   Temp Pulse Resp BP SpO2   01/07/23 1159 98.1 °F (36.7 °C) 90 17 108/67 93 %   01/07/23 0726 97.5 °F (36.4 °C) 73 16 111/71 95 %   01/07/23 0420 97.3 °F (36.3 °C) 83 12 126/76 95 %   01/07/23 0259 -- 75 17 125/77 95 %   01/07/23 0104 97.3 °F (36.3 °C) 78 12 122/74 94 %   01/06/23 2012 97.7 °F (36.5 °C) 91 16 113/76 93 %   01/06/23 1745 97.6 °F (36.4 °C) 86 16 123/75 96 %       Oxygen Therapy  SpO2: 93 %  O2 Device: None (Room air)  O2 Flow Rate (L/min): 2 L/min    Estimated body mass index is 41.5 kg/m² as calculated from the following:    Height as of this encounter: 6' 2\" (1.88 m). Weight as of this encounter: 323 lb 3.1 oz (146.6 kg). Intake/Output Summary (Last 24 hours) at 1/7/2023 1636  Last data filed at 1/7/2023 0607  Gross per 24 hour   Intake 420 ml   Output 5 ml   Net 415 ml         Physical Exam:     Blood pressure 108/67, pulse 90, temperature 98.1 °F (36.7 °C), temperature source Oral, resp. rate 17, height 6' 2\" (1.88 m), weight (!) 323 lb 3.1 oz (146.6 kg), SpO2 93 %. General:    Well nourished.   Morbidly obese, ill-appearing on nasal cannula, Head:  Normocephalic, atraumatic  Eyes:  Sclerae appear normal.  Pupils equally round. ENT:  Nares appear normal.  Moist oral mucosa  Neck:  No restricted ROM. Trachea midline   CV:   RRR. No m/r/g. No jugular venous distension. Lungs:   Decreased bs on right. Chest tube in place on the right,  Abdomen:   Soft, nontender, nondistended. Extremities: No cyanosis or clubbing. No edema  Skin:     No rashes and normal coloration. Warm and dry. Neuro:  CN II-XII grossly intact. Sensation intact. Psych:  Normal mood and affect. I have personally reviewed labs and tests showing:  Recent Labs:  Recent Results (from the past 48 hour(s))   POCT Glucose    Collection Time: 01/05/23  5:57 PM   Result Value Ref Range    POC Glucose 253 (H) 65 - 100 mg/dL    Performed by:  Calos    POCT Glucose    Collection Time: 01/05/23  7:58 PM   Result Value Ref Range    POC Glucose 268 (H) 65 - 100 mg/dL    Performed by: Anna    CBC with Auto Differential    Collection Time: 01/06/23  3:15 AM   Result Value Ref Range    WBC 8.8 4.3 - 11.1 K/uL    RBC 4.35 4.23 - 5.6 M/uL    Hemoglobin 14.7 13.6 - 17.2 g/dL    Hematocrit 43.5 41.1 - 50.3 %    .0 82 - 102 FL    MCH 33.8 (H) 26.1 - 32.9 PG    MCHC 33.8 31.4 - 35.0 g/dL    RDW 13.1 11.9 - 14.6 %    Platelets 059 044 - 765 K/uL    MPV 10.2 9.4 - 12.3 FL    nRBC 0.00 0.0 - 0.2 K/uL    Differential Type AUTOMATED      Seg Neutrophils 75 43 - 78 %    Lymphocytes 16 13 - 44 %    Monocytes 6 4.0 - 12.0 %    Eosinophils % 2 0.5 - 7.8 %    Basophils 0 0.0 - 2.0 %    Immature Granulocytes 1 0.0 - 5.0 %    Segs Absolute 6.7 1.7 - 8.2 K/UL    Absolute Lymph # 1.4 0.5 - 4.6 K/UL    Absolute Mono # 0.5 0.1 - 1.3 K/UL    Absolute Eos # 0.2 0.0 - 0.8 K/UL    Basophils Absolute 0.0 0.0 - 0.2 K/UL    Absolute Immature Granulocyte 0.0 0.0 - 0.5 K/UL   Basic Metabolic Panel w/ Reflex to MG    Collection Time: 01/06/23  3:15 AM   Result Value Ref Range    Sodium 134 133 - 143 mmol/L    Potassium 4.2 3.5 - 5.1 mmol/L    Chloride 103 101 - 110 mmol/L    CO2 28 21 - 32 mmol/L    Anion Gap 3 2 - 11 mmol/L    Glucose 229 (H) 65 - 100 mg/dL    BUN 12 8 - 23 MG/DL    Creatinine 0.90 0.8 - 1.5 MG/DL    Est, Glom Filt Rate >60 >60 ml/min/1.73m2    Calcium 9.2 8.3 - 10.4 MG/DL   POCT Glucose    Collection Time: 01/06/23  7:16 AM   Result Value Ref Range    POC Glucose 195 (H) 65 - 100 mg/dL    Performed by: ReneaPortiaPCT    POCT Glucose    Collection Time: 01/06/23 11:35 AM   Result Value Ref Range    POC Glucose 279 (H) 65 - 100 mg/dL    Performed by:  SelviniaPCT    POCT Glucose    Collection Time: 01/06/23  5:12 PM   Result Value Ref Range    POC Glucose 176 (H) 65 - 100 mg/dL    Performed by: Navarro (Cain)    POCT Glucose    Collection Time: 01/06/23  8:46 PM   Result Value Ref Range    POC Glucose 258 (H) 65 - 100 mg/dL    Performed by: Isela    CBC with Auto Differential    Collection Time: 01/07/23  3:36 AM   Result Value Ref Range    WBC 8.4 4.3 - 11.1 K/uL    RBC 4.29 4.23 - 5.6 M/uL    Hemoglobin 14.1 13.6 - 17.2 g/dL    Hematocrit 41.9 41.1 - 50.3 %    MCV 97.7 82 - 102 FL    MCH 32.9 26.1 - 32.9 PG    MCHC 33.7 31.4 - 35.0 g/dL    RDW 13.0 11.9 - 14.6 %    Platelets 220 256 - 931 K/uL    MPV 9.9 9.4 - 12.3 FL    nRBC 0.00 0.0 - 0.2 K/uL    Differential Type AUTOMATED      Seg Neutrophils 73 43 - 78 %    Lymphocytes 17 13 - 44 %    Monocytes 5 4.0 - 12.0 %    Eosinophils % 3 0.5 - 7.8 %    Basophils 1 0.0 - 2.0 %    Immature Granulocytes 1 0.0 - 5.0 %    Segs Absolute 6.3 1.7 - 8.2 K/UL    Absolute Lymph # 1.4 0.5 - 4.6 K/UL    Absolute Mono # 0.5 0.1 - 1.3 K/UL    Absolute Eos # 0.2 0.0 - 0.8 K/UL    Basophils Absolute 0.0 0.0 - 0.2 K/UL    Absolute Immature Granulocyte 0.1 0.0 - 0.5 K/UL   Basic Metabolic Panel w/ Reflex to MG    Collection Time: 01/07/23  3:36 AM   Result Value Ref Range    Sodium 135 133 - 143 mmol/L    Potassium 4.1 3.5 - 5.1 mmol/L    Chloride 102 101 - 110 mmol/L    CO2 26 21 - 32 mmol/L    Anion Gap 7 2 - 11 mmol/L    Glucose 210 (H) 65 - 100 mg/dL    BUN 12 8 - 23 MG/DL    Creatinine 1.00 0.8 - 1.5 MG/DL    Est, Glom Filt Rate >60 >60 ml/min/1.73m2    Calcium 8.8 8.3 - 10.4 MG/DL   POCT Glucose    Collection Time: 01/07/23  7:20 AM   Result Value Ref Range    POC Glucose 213 (H) 65 - 100 mg/dL    Performed by: ProsperetPCATUL    POCT Glucose    Collection Time: 01/07/23 11:27 AM   Result Value Ref Range    POC Glucose 261 (H) 65 - 100 mg/dL    Performed by: ProsperetPCATUL    POCT Glucose    Collection Time: 01/07/23  3:52 PM   Result Value Ref Range    POC Glucose 250 (H) 65 - 100 mg/dL    Performed by: Ehsan[facility]ios[facility] AdvisorsATUL        I have personally reviewed imaging studies showing: Other Studies:  XR CHEST PORTABLE   Final Result      1. New small right apical pneumothorax, despite presence of the right-sided   chest tube. 2. Bibasilar atelectasis. XR CHEST PORTABLE   Final Result   Unchanged bibasilar lung atelectasis. XR CHEST PORTABLE   Final Result   No evidence of pneumothorax         XR CHEST PORTABLE   Final Result   1. Resolved right pneumothorax. 2. Mild bibasilar lung atelectasis. XR CHEST PORTABLE   Final Result   1. Right chest tube placed, with markedly smaller pneumothorax. 2.  Resolution of the previous tension component. 3.  Mild bibasilar atelectasis. CT CHEST WO CONTRAST   Final Result   1. Persisting large right pneumothorax. 2.  Leftward deviation of mediastinal contours concerning for tension component. 3.  Partial atelectasis of right lung. 4.  Emphysema. 5.  Vascular disease. Urgent findings communicated to the ordering NP Melvi Conroy by the secure text   messaging system at 6:30 PM.      XR CHEST PORTABLE   Final Result   1. Large right pneumothorax. 2.  Pulmonary vascular congestion.                Findings discussed with Dr. Chloe Lopez by myself at 4:42 PM.         XR CHEST PORTABLE    (Results Pending)       Current Meds:  Current Facility-Administered Medications   Medication Dose Route Frequency    sennosides-docusate sodium (SENOKOT-S) 8.6-50 MG tablet 2 tablet  2 tablet Oral Daily    bisacodyl (DULCOLAX) suppository 10 mg  10 mg Rectal Daily PRN    glucose chewable tablet 16 g  4 tablet Oral PRN    dextrose bolus 10% 125 mL  125 mL IntraVENous PRN    Or    dextrose bolus 10% 250 mL  250 mL IntraVENous PRN    glucagon (rDNA) injection 1 mg  1 mg SubCUTAneous PRN    dextrose 10 % infusion   IntraVENous Continuous PRN    polyethylene glycol (GLYCOLAX) packet 17 g  17 g Oral Daily    metoprolol (LOPRESSOR) injection 5 mg  5 mg IntraVENous Q6H PRN    carvedilol (COREG) tablet 25 mg  25 mg Oral BID WC    [Held by provider] dabigatran (PRADAXA) capsule 150 mg  150 mg Oral BID    lisinopril (PRINIVIL;ZESTRIL) tablet 10 mg  10 mg Oral Daily    gabapentin (NEURONTIN) capsule 600 mg  600 mg Oral BID    guaiFENesin (MUCINEX) extended release tablet 600 mg  600 mg Oral BID    hydroCHLOROthiazide (HYDRODIURIL) tablet 25 mg  25 mg Oral Daily    atorvastatin (LIPITOR) tablet 20 mg  20 mg Oral Daily    spironolactone (ALDACTONE) tablet 25 mg  25 mg Oral Daily    sodium chloride flush 0.9 % injection 5-40 mL  5-40 mL IntraVENous 2 times per day    sodium chloride flush 0.9 % injection 5-40 mL  5-40 mL IntraVENous PRN    0.9 % sodium chloride infusion   IntraVENous PRN    ondansetron (ZOFRAN-ODT) disintegrating tablet 4 mg  4 mg Oral Q8H PRN    Or    ondansetron (ZOFRAN) injection 4 mg  4 mg IntraVENous Q6H PRN    acetaminophen (TYLENOL) tablet 650 mg  650 mg Oral Q6H PRN    Or    acetaminophen (TYLENOL) suppository 650 mg  650 mg Rectal Q6H PRN    morphine injection 2 mg  2 mg IntraVENous Q4H PRN    acetaminophen (TYLENOL) tablet 500 mg  500 mg Oral Q4H    oxyCODONE (ROXICODONE) immediate release tablet 5 mg  5 mg Oral Q4H PRN    insulin lispro (HUMALOG) injection vial 0-8 Units  0-8 Units SubCUTAneous TID WC    insulin lispro (HUMALOG) injection vial 0-4 Units  0-4 Units SubCUTAneous Nightly       Signed:  Yonathan Colon MD    Part of this note may have been written by using a voice dictation software. The note has been proof read but may still contain some grammatical/other typographical errors.

## 2023-01-07 NOTE — PROGRESS NOTES
Lilibeth Alvarez  Admission Date: 1/4/2023         Daily Progress Note: 1/7/2023    The patient's chart is reviewed and the patient is discussed with the staff. Background: Patient is a 69 y.o. male presents with right sided chest pressure and SOB. Pt has history of spontaneous Ptx that was documented as on the left but it was actually on the right in 2017 which required large bore chest tube at the time. Did not require surgical intervention. This was the same time he had bilateral subdural hematomas and was wearing CPAP. Pt came to ED for further evaluation and CXR showed moderate to large PTX. He is currently on 6L NC with sat 90-92%. He still has some chest discomfort. Denies SOB. Denies any trauma. Mild cough, afebrile. He is COVID negative. He does have a history of CASSY on ASV for central apneas following subdural hematomas. He also has a history of chronic Afib on Pradaxa. His wife is at bedside. Former smoker, but never officially diagnosed with COPD. No inhaler or O2 at home. CT chest in 2009 with mild bullous emphysema noted.       Subjective:   CT got disconnected yesterday and PTX recurred  Back on suction ,still air leak and CXR with PTX    Current Facility-Administered Medications   Medication Dose Route Frequency    glucose chewable tablet 16 g  4 tablet Oral PRN    dextrose bolus 10% 125 mL  125 mL IntraVENous PRN    Or    dextrose bolus 10% 250 mL  250 mL IntraVENous PRN    glucagon (rDNA) injection 1 mg  1 mg SubCUTAneous PRN    dextrose 10 % infusion   IntraVENous Continuous PRN    polyethylene glycol (GLYCOLAX) packet 17 g  17 g Oral Daily    metoprolol (LOPRESSOR) injection 5 mg  5 mg IntraVENous Q6H PRN    carvedilol (COREG) tablet 25 mg  25 mg Oral BID WC    [Held by provider] dabigatran (PRADAXA) capsule 150 mg  150 mg Oral BID    lisinopril (PRINIVIL;ZESTRIL) tablet 10 mg  10 mg Oral Daily    gabapentin (NEURONTIN) capsule 600 mg  600 mg Oral BID guaiFENesin (MUCINEX) extended release tablet 600 mg  600 mg Oral BID    hydroCHLOROthiazide (HYDRODIURIL) tablet 25 mg  25 mg Oral Daily    atorvastatin (LIPITOR) tablet 20 mg  20 mg Oral Daily    spironolactone (ALDACTONE) tablet 25 mg  25 mg Oral Daily    sodium chloride flush 0.9 % injection 5-40 mL  5-40 mL IntraVENous 2 times per day    sodium chloride flush 0.9 % injection 5-40 mL  5-40 mL IntraVENous PRN    0.9 % sodium chloride infusion   IntraVENous PRN    ondansetron (ZOFRAN-ODT) disintegrating tablet 4 mg  4 mg Oral Q8H PRN    Or    ondansetron (ZOFRAN) injection 4 mg  4 mg IntraVENous Q6H PRN    acetaminophen (TYLENOL) tablet 650 mg  650 mg Oral Q6H PRN    Or    acetaminophen (TYLENOL) suppository 650 mg  650 mg Rectal Q6H PRN    morphine injection 2 mg  2 mg IntraVENous Q4H PRN    acetaminophen (TYLENOL) tablet 500 mg  500 mg Oral Q4H    oxyCODONE (ROXICODONE) immediate release tablet 5 mg  5 mg Oral Q4H PRN    insulin lispro (HUMALOG) injection vial 0-8 Units  0-8 Units SubCUTAneous TID WC    insulin lispro (HUMALOG) injection vial 0-4 Units  0-4 Units SubCUTAneous Nightly     Review of Systems: Comprehensive ROS negative except in HPI  Objective:   Blood pressure 111/71, pulse 73, temperature 97.5 °F (36.4 °C), temperature source Oral, resp. rate 16, height 6' 2\" (1.88 m), weight (!) 323 lb 3.1 oz (146.6 kg), SpO2 95 %. Intake/Output Summary (Last 24 hours) at 1/7/2023 0934  Last data filed at 1/7/2023 4195  Gross per 24 hour   Intake 420 ml   Output 5 ml   Net 415 ml     Physical Exam:   Constitutional:  the patient is well developed and in no acute distress  EENMT:  Sclera clear, pupils equal, oral mucosa moist  Respiratory: symmetric chest rise. CTA bilaterally; on RA   Cardiovascular:  RRR without M,G,R. There is no lower extremity edema. Gastrointestinal: soft and non-tender; with positive bowel sounds. Musculoskeletal: warm without cyanosis. Normal muscle tone.    Skin:  no jaundice or rashes, no wounds   Neurologic: symmetric strength, fluent speech  Psychiatric:  calm, appropriate, oriented x 4    Imaging: I performed an independent interpretation of the patient's images. CXR:       1/6 1/5 1/4    CT chest 1/4    LAB:  Recent Labs     01/05/23  0536 01/06/23  0315 01/07/23  0336   WBC 7.6 8.8 8.4   HGB 14.2 14.7 14.1   HCT 42.3 43.5 41.9    177 181     Recent Labs     01/04/23  1545 01/05/23  0536 01/06/23  0315 01/07/23  0336    135 134 135   K 3.9 3.9 4.2 4.1    104 103 102   CO2 24 29 28 26   BUN 11 10 12 12   CREATININE 0.90 0.80 0.90 1.00   MG 2.2  --   --   --    BILITOT 1.0  --   --   --    AST 37  --   --   --    ALT 72*  --   --   --    ALKPHOS 119  --   --   --      Recent Labs     01/04/23  1545 01/04/23  1806   TROPHS 3.9 4.7   NTPROBNP 463*  --      Recent Labs     01/05/23  0536 01/06/23  0315 01/07/23  0336   GLUCOSE 221* 229* 210*      Microbiology:   No results for input(s): CULTURE in the last 72 hours. ECHO: 07/19/22    TRANSTHORACIC ECHOCARDIOGRAM (TTE) COMPLETE (CONTRAST/BUBBLE/3D PRN) 07/19/2022  5:17 PM, 07/19/2022 12:00 AM (Final)    Interpretation Summary    Left Ventricle: Normal left ventricular systolic function with a visually estimated EF of 60 - 65%. Left ventricle size is normal. Mildly increased wall thickness. Normal wall motion. Mitral Valve: Mild regurgitation. Tricuspid Valve: Mild regurgitation. Left Atrium: Left atrium is severely dilated. LA Vol Index is  65 ml/m2. Right Atrium: Right atrium is moderately dilated. Technical qualifiers: Technically difficult study, color flow Doppler was performed and pulse wave and/or continuous wave Doppler was performed.     Signed by: Josie Wynne MD on 7/19/2022  5:17 PM, Signed by: Unknown Provider Result on 7/19/2022 12:00 AM    Assessment and Plan:  (Medical Decision Making)   Impression: 70 yo male with history of R spontaneous PTX in 2017 reports to ED with chest pressure and SOB found to have R spontaneous Ptx. History of chronic Afib on pradaxa. Right sided pigtail chest tube placed. Spontaneous pneumothorax  Plan: on right; patient stated his prior PTX was also on right in 2017. chest tube clamped yesterday; pt did well overnight and CXR stable; Gen surgery consult for bullae seen on CT   Chest tube can more than likely be removed today; now on RA       Acute respiratory failure with hypoxia ([address]  Plan: Now on RA, sats 93-96%      Anticoagulant long-term use    Atrial fibrillation (HCC)  Plan: on pradaxa with chronic afib      CASSY on CPAP  Plan: use O2 at night while ongoing PTX. On ASV at home for central apnea      More than 50% of the time documented was spent in face-to-face contact with the patient and in the care of the patient on the floor/unit where the patient is located.     Keep on suction for now, PTX has reoccurred with just disconnecting the tube ,just another evidence that surgery is needed, likely Monday or Tuesday per Mery Garcia MD

## 2023-01-08 ENCOUNTER — APPOINTMENT (OUTPATIENT)
Dept: GENERAL RADIOLOGY | Age: 68
DRG: 163 | End: 2023-01-08
Payer: MEDICARE

## 2023-01-08 LAB
ANION GAP SERPL CALC-SCNC: 7 MMOL/L (ref 2–11)
BASOPHILS # BLD: 0 K/UL (ref 0–0.2)
BASOPHILS NFR BLD: 0 % (ref 0–2)
BUN SERPL-MCNC: 11 MG/DL (ref 8–23)
CALCIUM SERPL-MCNC: 9.2 MG/DL (ref 8.3–10.4)
CHLORIDE SERPL-SCNC: 101 MMOL/L (ref 101–110)
CO2 SERPL-SCNC: 26 MMOL/L (ref 21–32)
CREAT SERPL-MCNC: 0.8 MG/DL (ref 0.8–1.5)
DIFFERENTIAL METHOD BLD: NORMAL
EOSINOPHIL # BLD: 0.2 K/UL (ref 0–0.8)
EOSINOPHIL NFR BLD: 2 % (ref 0.5–7.8)
ERYTHROCYTE [DISTWIDTH] IN BLOOD BY AUTOMATED COUNT: 13 % (ref 11.9–14.6)
GLUCOSE BLD STRIP.AUTO-MCNC: 181 MG/DL (ref 65–100)
GLUCOSE BLD STRIP.AUTO-MCNC: 206 MG/DL (ref 65–100)
GLUCOSE BLD STRIP.AUTO-MCNC: 221 MG/DL (ref 65–100)
GLUCOSE BLD STRIP.AUTO-MCNC: 325 MG/DL (ref 65–100)
GLUCOSE SERPL-MCNC: 255 MG/DL (ref 65–100)
HCT VFR BLD AUTO: 42.6 % (ref 41.1–50.3)
HGB BLD-MCNC: 14.3 G/DL (ref 13.6–17.2)
IMM GRANULOCYTES # BLD AUTO: 0 K/UL (ref 0–0.5)
IMM GRANULOCYTES NFR BLD AUTO: 1 % (ref 0–5)
LYMPHOCYTES # BLD: 1.2 K/UL (ref 0.5–4.6)
LYMPHOCYTES NFR BLD: 15 % (ref 13–44)
MCH RBC QN AUTO: 32.9 PG (ref 26.1–32.9)
MCHC RBC AUTO-ENTMCNC: 33.6 G/DL (ref 31.4–35)
MCV RBC AUTO: 97.9 FL (ref 82–102)
MONOCYTES # BLD: 0.5 K/UL (ref 0.1–1.3)
MONOCYTES NFR BLD: 6 % (ref 4–12)
NEUTS SEG # BLD: 6 K/UL (ref 1.7–8.2)
NEUTS SEG NFR BLD: 76 % (ref 43–78)
NRBC # BLD: 0 K/UL (ref 0–0.2)
PLATELET # BLD AUTO: 199 K/UL (ref 150–450)
PMV BLD AUTO: 10.5 FL (ref 9.4–12.3)
POTASSIUM SERPL-SCNC: 4.1 MMOL/L (ref 3.5–5.1)
RBC # BLD AUTO: 4.35 M/UL (ref 4.23–5.6)
SERVICE CMNT-IMP: ABNORMAL
SODIUM SERPL-SCNC: 134 MMOL/L (ref 133–143)
WBC # BLD AUTO: 7.9 K/UL (ref 4.3–11.1)

## 2023-01-08 PROCEDURE — 80048 BASIC METABOLIC PNL TOTAL CA: CPT

## 2023-01-08 PROCEDURE — 6370000000 HC RX 637 (ALT 250 FOR IP): Performed by: HOSPITALIST

## 2023-01-08 PROCEDURE — 85025 COMPLETE CBC W/AUTO DIFF WBC: CPT

## 2023-01-08 PROCEDURE — 99232 SBSQ HOSP IP/OBS MODERATE 35: CPT | Performed by: INTERNAL MEDICINE

## 2023-01-08 PROCEDURE — 6370000000 HC RX 637 (ALT 250 FOR IP): Performed by: FAMILY MEDICINE

## 2023-01-08 PROCEDURE — 71045 X-RAY EXAM CHEST 1 VIEW: CPT

## 2023-01-08 PROCEDURE — 1100000000 HC RM PRIVATE

## 2023-01-08 PROCEDURE — 36415 COLL VENOUS BLD VENIPUNCTURE: CPT

## 2023-01-08 PROCEDURE — 2580000003 HC RX 258: Performed by: FAMILY MEDICINE

## 2023-01-08 PROCEDURE — 82962 GLUCOSE BLOOD TEST: CPT

## 2023-01-08 RX ORDER — INSULIN GLARGINE 100 [IU]/ML
20 INJECTION, SOLUTION SUBCUTANEOUS DAILY
Status: DISCONTINUED | OUTPATIENT
Start: 2023-01-08 | End: 2023-01-09

## 2023-01-08 RX ADMIN — INSULIN LISPRO 6 UNITS: 100 INJECTION, SOLUTION INTRAVENOUS; SUBCUTANEOUS at 11:55

## 2023-01-08 RX ADMIN — ATORVASTATIN CALCIUM 20 MG: 20 TABLET, FILM COATED ORAL at 08:20

## 2023-01-08 RX ADMIN — ACETAMINOPHEN 500 MG: 500 TABLET, FILM COATED ORAL at 16:08

## 2023-01-08 RX ADMIN — INSULIN GLARGINE 20 UNITS: 100 INJECTION, SOLUTION SUBCUTANEOUS at 08:21

## 2023-01-08 RX ADMIN — INSULIN LISPRO 2 UNITS: 100 INJECTION, SOLUTION INTRAVENOUS; SUBCUTANEOUS at 08:21

## 2023-01-08 RX ADMIN — LISINOPRIL 10 MG: 5 TABLET ORAL at 08:18

## 2023-01-08 RX ADMIN — SPIRONOLACTONE 25 MG: 25 TABLET ORAL at 08:21

## 2023-01-08 RX ADMIN — CARVEDILOL 25 MG: 25 TABLET, FILM COATED ORAL at 08:20

## 2023-01-08 RX ADMIN — GUAIFENESIN 600 MG: 600 TABLET ORAL at 20:30

## 2023-01-08 RX ADMIN — ACETAMINOPHEN 500 MG: 500 TABLET, FILM COATED ORAL at 23:53

## 2023-01-08 RX ADMIN — SODIUM CHLORIDE, PRESERVATIVE FREE 10 ML: 5 INJECTION INTRAVENOUS at 20:30

## 2023-01-08 RX ADMIN — ACETAMINOPHEN 500 MG: 500 TABLET, FILM COATED ORAL at 11:58

## 2023-01-08 RX ADMIN — SODIUM CHLORIDE, PRESERVATIVE FREE 5 ML: 5 INJECTION INTRAVENOUS at 08:21

## 2023-01-08 RX ADMIN — INSULIN LISPRO 2 UNITS: 100 INJECTION, SOLUTION INTRAVENOUS; SUBCUTANEOUS at 16:08

## 2023-01-08 RX ADMIN — POLYETHYLENE GLYCOL 3350 17 G: 17 POWDER, FOR SOLUTION ORAL at 08:21

## 2023-01-08 RX ADMIN — ACETAMINOPHEN 500 MG: 500 TABLET, FILM COATED ORAL at 08:17

## 2023-01-08 RX ADMIN — ACETAMINOPHEN 500 MG: 500 TABLET, FILM COATED ORAL at 20:30

## 2023-01-08 RX ADMIN — GABAPENTIN 600 MG: 300 CAPSULE ORAL at 20:30

## 2023-01-08 RX ADMIN — CARVEDILOL 25 MG: 25 TABLET, FILM COATED ORAL at 16:08

## 2023-01-08 RX ADMIN — SENNOSIDES AND DOCUSATE SODIUM 2 TABLET: 8.6; 5 TABLET ORAL at 08:17

## 2023-01-08 RX ADMIN — GUAIFENESIN 600 MG: 600 TABLET ORAL at 08:18

## 2023-01-08 RX ADMIN — HYDROCHLOROTHIAZIDE 25 MG: 25 TABLET ORAL at 08:20

## 2023-01-08 RX ADMIN — GABAPENTIN 600 MG: 300 CAPSULE ORAL at 08:18

## 2023-01-08 ASSESSMENT — PAIN DESCRIPTION - FREQUENCY
FREQUENCY: INTERMITTENT
FREQUENCY: INTERMITTENT

## 2023-01-08 ASSESSMENT — PAIN DESCRIPTION - LOCATION
LOCATION: CHEST
LOCATION: CHEST;INCISION
LOCATION: CHEST
LOCATION: CHEST

## 2023-01-08 ASSESSMENT — PAIN - FUNCTIONAL ASSESSMENT
PAIN_FUNCTIONAL_ASSESSMENT: ACTIVITIES ARE NOT PREVENTED
PAIN_FUNCTIONAL_ASSESSMENT: ACTIVITIES ARE NOT PREVENTED

## 2023-01-08 ASSESSMENT — PAIN DESCRIPTION - ORIENTATION
ORIENTATION: RIGHT
ORIENTATION: RIGHT;UPPER
ORIENTATION: RIGHT
ORIENTATION: RIGHT

## 2023-01-08 ASSESSMENT — PAIN SCALES - GENERAL
PAINLEVEL_OUTOF10: 0
PAINLEVEL_OUTOF10: 0
PAINLEVEL_OUTOF10: 3
PAINLEVEL_OUTOF10: 0
PAINLEVEL_OUTOF10: 2
PAINLEVEL_OUTOF10: 3
PAINLEVEL_OUTOF10: 0
PAINLEVEL_OUTOF10: 0
PAINLEVEL_OUTOF10: 1
PAINLEVEL_OUTOF10: 0

## 2023-01-08 ASSESSMENT — PAIN DESCRIPTION - PAIN TYPE
TYPE: ACUTE PAIN
TYPE: ACUTE PAIN

## 2023-01-08 ASSESSMENT — PAIN DESCRIPTION - DESCRIPTORS
DESCRIPTORS: ACHING;SORE
DESCRIPTORS: ACHING
DESCRIPTORS: ACHING
DESCRIPTORS: ACHING;SORE

## 2023-01-08 ASSESSMENT — PAIN DESCRIPTION - ONSET
ONSET: GRADUAL
ONSET: GRADUAL

## 2023-01-08 NOTE — PROGRESS NOTES
Consult Note  Adelina Sanabria  MRN: [id number]  :1955  Age:67 y.o.    HPI: Adelina Sanabria is a 69 y.o. male who we are asked in consultation by Dr. Hope Opitz to see for question of role for bleb resection in the setting of recurrent right pneumothorax. The patient has a PMHx of PTX right (), ataxia (old subarachnoid hemorrhage), AFIB, BPH, bullous emphysema, DM, HTN, HLD,sleep apnea, and subdural hemorrhage. Pt has history of spontaneous PTX on right in  which required large bore chest tube at the time. Did not require surgical intervention. This was the same time he had bilateral subdural hematomas and was wearing CPAP with approximate pressures 13-18 cm H2o. He had a sleep study following his R PTX in  and was found with central apneas=currently on ASV. He presented 23 with right sided chest pressure and SOB. In the ED 23, patient was noted hypoxic, required 4-6 L nasal cannula to keep oxygen saturation above 90%. Labs unremarkable. Chest x-ray with right large pneumothorax. Chest tube was placed in the ED. Pulmonology consulted. Hospitalist consulted for admission. CXR 23 revealed:   1. Large right pneumothorax. 2.  Pulmonary vascular congestion. He was admitted on 2023 for acute respiratory failure with hypoxia and spontaneous right pneumothorax. On pradaxa for afib-last dose 23 AM  Pt has regular diet at time of consult  Pt has no previous thoracic surgeries. 23: Pt sitting in chair. Right Chest tube remains on suction. Chest x-ray still shows small apical pneumothorax on the right. AF, NAD. 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     Diabetes mellitus ([address] 1/3/2012 Essential hypertension, benign 2/6/2014    Hemochromatosis     Hypercholesteremia     Nodular prostate without urinary obstruction 2/6/2014    Obesity 2/6/2014    Pneumothorax, RIGHT 10/8/2017    pt was hospitalized for subdural hematoma in 2017. upon return home from 2017 hospitlization, pt experienced RIGHT PTX after placement of CPAP approx.  13-18 cm h2o    Sepsis due to urinary tract infection ([address] 11/5/2019    Sleep apnea 10/12/2016    uses ASV instead of CPAP machine - per pt 11/18    Subdural hemorrhage following injury 10/20/2017     Past Surgical History:   Procedure Laterality Date    CHEST SURGERY  10/2017    pneumothorax after fall    COLONOSCOPY N/A 11/13/2018    COLONOSCOPY  BMI 41 performed by Adilene Soriano MD at [address] Right 10/02/2017    [facility]    KNEE ARTHROSCOPY Right     OTHER SURGICAL HISTORY Bilateral 10/18/2017    [facility]    TURP  12/03/2019    UROLOGICAL SURGERY      prostate u/s and BX     Current Facility-Administered Medications   Medication Dose Route Frequency    insulin glargine (LANTUS) injection vial 20 Units  20 Units SubCUTAneous Daily    sennosides-docusate sodium (SENOKOT-S) 8.6-50 MG tablet 2 tablet  2 tablet Oral Daily    bisacodyl (DULCOLAX) suppository 10 mg  10 mg Rectal Daily PRN    glucose chewable tablet 16 g  4 tablet Oral PRN    dextrose bolus 10% 125 mL  125 mL IntraVENous PRN    Or    dextrose bolus 10% 250 mL  250 mL IntraVENous PRN    glucagon (rDNA) injection 1 mg  1 mg SubCUTAneous PRN    dextrose 10 % infusion   IntraVENous Continuous PRN    polyethylene glycol (GLYCOLAX) packet 17 g  17 g Oral Daily    metoprolol (LOPRESSOR) injection 5 mg  5 mg IntraVENous Q6H PRN    carvedilol (COREG) tablet 25 mg  25 mg Oral BID WC    [Held by provider] dabigatran (PRADAXA) capsule 150 mg  150 mg Oral BID    lisinopril (PRINIVIL;ZESTRIL) tablet 10 mg  10 mg Oral Daily    gabapentin (NEURONTIN) capsule 600 mg  600 mg Oral BID    guaiFENesin (MUCINEX) extended release tablet 600 mg  600 mg Oral BID    hydroCHLOROthiazide (HYDRODIURIL) tablet 25 mg  25 mg Oral Daily    atorvastatin (LIPITOR) tablet 20 mg  20 mg Oral Daily    spironolactone (ALDACTONE) tablet 25 mg  25 mg Oral Daily    sodium chloride flush 0.9 % injection 5-40 mL  5-40 mL IntraVENous 2 times per day    sodium chloride flush 0.9 % injection 5-40 mL  5-40 mL IntraVENous PRN    0.9 % sodium chloride infusion   IntraVENous PRN    ondansetron (ZOFRAN-ODT) disintegrating tablet 4 mg  4 mg Oral Q8H PRN    Or    ondansetron (ZOFRAN) injection 4 mg  4 mg IntraVENous Q6H PRN    acetaminophen (TYLENOL) tablet 650 mg  650 mg Oral Q6H PRN    Or    acetaminophen (TYLENOL) suppository 650 mg  650 mg Rectal Q6H PRN    morphine injection 2 mg  2 mg IntraVENous Q4H PRN    acetaminophen (TYLENOL) tablet 500 mg  500 mg Oral Q4H    oxyCODONE (ROXICODONE) immediate release tablet 5 mg  5 mg Oral Q4H PRN    insulin lispro (HUMALOG) injection vial 0-8 Units  0-8 Units SubCUTAneous TID WC    insulin lispro (HUMALOG) injection vial 0-4 Units  0-4 Units SubCUTAneous Nightly     Azithromycin  Social History     Socioeconomic History    Marital status:      Spouse name: None    Number of children: None    Years of education: None    Highest education level: None   Tobacco Use    Smoking status: Former     Packs/day: 1.50     Types: Cigarettes     Quit date: 1989     Years since quittin.4    Smokeless tobacco: Former     Quit date: 2016   Vaping Use    Vaping Use: Never used   Substance and Sexual Activity    Alcohol use:  Yes     Alcohol/week: 8.0 standard drinks    Drug use: No     Social History     Tobacco Use   Smoking Status Former    Packs/day: 1.50    Types: Cigarettes    Quit date: 1989    Years since quittin.4   Smokeless Tobacco Former    Quit date: 2016     Family History   Problem Relation Age of Onset    Prostate Cancer Paternal Grandfather     Alcohol Abuse Brother     Heart Failure Brother     Psychiatric Disorder Brother     Stroke Brother     Psychiatric Disorder Mother     Other Mother         Sarcoidosis    Atrial Fibrillation Father     Lung Cancer Father     Hypertension Father     Heart Disease Father 61            Diabetes Father     Heart Disease Paternal Grandmother      ROS:   No fever or chills. Comprehensive review of systems was otherwise unremarkable except as noted above. Physical Exam:   /75   Pulse 88   Temp 97.3 °F (36.3 °C) (Oral)   Resp 17   Ht 6' 2\" (1.88 m)   Wt (!) 324 lb 4.8 oz (147.1 kg)   SpO2 96%   BMI 41.64 kg/m²   Constitutional: Alert, oriented, cooperative patient in no acute distress; appears stated age    Eyes: Sclera are clear. EOMs intact  ENMT: no external lesions gross hearing normal; no obvious neck masses, no ear or lip lesions, nares normal  CV: RRR. Normal perfusion  Resp: No JVD. Breathing is  non-labored; no audible wheezing. Room air   Right CT to suction. GI: soft and non-distended, non-tender     Musculoskeletal: unremarkable with normal function. No embolic signs or cyanosis.    Neuro:  Oriented; moves all 4; no focal deficits  Psychiatric: normal affect and mood, no memory impairment    Recent vitals (if inpt):  Patient Vitals for the past 24 hrs:   BP Temp Temp src Pulse Resp SpO2 Weight   23 1227 124/75 97.3 °F (36.3 °C) Oral 88 17 96 % --   23 0916 122/84 99 °F (37.2 °C) Oral 76 17 96 % --   23 0511 124/74 97.3 °F (36.3 °C) -- 81 18 94 % (!) 324 lb 4.8 oz (147.1 kg)   23 2333 117/76 98.4 °F (36.9 °C) Oral 63 18 96 % --   23 1930 128/75 98.1 °F (36.7 °C) Oral 83 18 -- --   23 1652 132/74 100 °F (37.8 °C) Oral 84 17 95 % --       Labs:  Recent Labs     23  0654   WBC 7.9   HGB 14.3         K 4.1      CO2 26   BUN 11       Lab Results   Component Value Date/Time    WBC 7.9 2023 06:54 AM    HGB 14.3 01/08/2023 06:54 AM     01/08/2023 06:54 AM     01/08/2023 06:54 AM    K 4.1 01/08/2023 06:54 AM     01/08/2023 06:54 AM    CO2 26 01/08/2023 06:54 AM    BUN 11 01/08/2023 06:54 AM    ALT 72 01/04/2023 03:45 PM   CXR 1/6/23 04:16 S/P 4h clamp trial   EXAM: Chest x-ray. INDICATION: Dyspnea. COMPARISON: Yesterday's chest x-ray. TECHNIQUE: Frontal view chest x-ray. FINDINGS: Bibasilar lung atelectasis is unchanged. A right-sided chest tube   remains in place, with no pneumothorax or pleural effusion. The heart size is   stable. Impression   Unchanged bibasilar lung atelectasis. CXR 1/4/23 16:42   CHEST X-RAY, 1 VIEW       INDICATION: Chest pain       COMPARISON: Chest x-ray 10/21/2017       TECHNIQUE: Single AP view of the chest was obtained. FINDINGS:        Lungs and pleural spaces: Large right pneumothorax. Pulmonary vascular   congestion. Cardiomediastinal silhouette: Normal.       Osseous structures: Normal.               Impression   1. Large right pneumothorax. 2.  Pulmonary vascular congestion. Findings discussed with Dr. Jocelyn Paul by myself at 4:42 PM.     I reviewed recent labs and recent radiologic studies. ASSESSMENT/PLAN:    Principal Problem:    Spontaneous pneumothorax  Active Problems:    Acute respiratory failure with hypoxia (HCC)    Anticoagulant long-term use    Primary spontaneous pneumothorax    Benign prostatic hyperplasia with nocturia    Atrial fibrillation (HCC)    CASSY on CPAP  Resolved Problems:    * No resolved hospital problems. *       Care Management per Hospitalist  Pulmonary following  General Surgery  --Pt with prior PTX also on right in 2017. Have been unable to clamp CT, PTX worsened on Friday when placed to water seal.  CXR this am shows pt still has a small PTX while on suction. Pt wishes to proceed with surgery.   --Pradaxa on hold  --NPO after midnight  --Verify Consent -Right Video-assisted thoracoscopic surgery (VATS), Talc pleurodesis, possible blebectomy 1/9/23 by Dr. Patricia Gaxiola    Signed:  LEANDER Butler-BC

## 2023-01-08 NOTE — PROGRESS NOTES
Mike Rangel  Admission Date: 1/4/2023         Daily Progress Note: 1/8/2023    The patient's chart is reviewed and the patient is discussed with the staff. Background: Patient is a 69 y.o. male presents with right sided chest pressure and SOB. Pt has history of spontaneous Ptx that was documented as on the left but it was actually on the right in 2017 which required large bore chest tube at the time. Did not require surgical intervention. This was the same time he had bilateral subdural hematomas and was wearing CPAP. Pt came to ED for further evaluation and CXR showed moderate to large PTX. He is currently on 6L NC with sat 90-92%. He still has some chest discomfort. Denies SOB. Denies any trauma. Mild cough, afebrile. He is COVID negative. He does have a history of CASSY on ASV for central apneas following subdural hematomas. He also has a history of chronic Afib on Pradaxa. His wife is at bedside. Former smoker, but never officially diagnosed with COPD. No inhaler or O2 at home. CT chest in 2009 with mild bullous emphysema noted. Subjective:   CXR yesterday still with persistent pneumo of R apical.  CT remains to suction. On RA.      Current Facility-Administered Medications   Medication Dose Route Frequency    insulin glargine (LANTUS) injection vial 20 Units  20 Units SubCUTAneous Daily    sennosides-docusate sodium (SENOKOT-S) 8.6-50 MG tablet 2 tablet  2 tablet Oral Daily    bisacodyl (DULCOLAX) suppository 10 mg  10 mg Rectal Daily PRN    glucose chewable tablet 16 g  4 tablet Oral PRN    dextrose bolus 10% 125 mL  125 mL IntraVENous PRN    Or    dextrose bolus 10% 250 mL  250 mL IntraVENous PRN    glucagon (rDNA) injection 1 mg  1 mg SubCUTAneous PRN    dextrose 10 % infusion   IntraVENous Continuous PRN    polyethylene glycol (GLYCOLAX) packet 17 g  17 g Oral Daily    metoprolol (LOPRESSOR) injection 5 mg  5 mg IntraVENous Q6H PRN    carvedilol (COREG) tablet 25 mg  25 mg Oral BID WC    [Held by provider] dabigatran (PRADAXA) capsule 150 mg  150 mg Oral BID    lisinopril (PRINIVIL;ZESTRIL) tablet 10 mg  10 mg Oral Daily    gabapentin (NEURONTIN) capsule 600 mg  600 mg Oral BID    guaiFENesin (MUCINEX) extended release tablet 600 mg  600 mg Oral BID    hydroCHLOROthiazide (HYDRODIURIL) tablet 25 mg  25 mg Oral Daily    atorvastatin (LIPITOR) tablet 20 mg  20 mg Oral Daily    spironolactone (ALDACTONE) tablet 25 mg  25 mg Oral Daily    sodium chloride flush 0.9 % injection 5-40 mL  5-40 mL IntraVENous 2 times per day    sodium chloride flush 0.9 % injection 5-40 mL  5-40 mL IntraVENous PRN    0.9 % sodium chloride infusion   IntraVENous PRN    ondansetron (ZOFRAN-ODT) disintegrating tablet 4 mg  4 mg Oral Q8H PRN    Or    ondansetron (ZOFRAN) injection 4 mg  4 mg IntraVENous Q6H PRN    acetaminophen (TYLENOL) tablet 650 mg  650 mg Oral Q6H PRN    Or    acetaminophen (TYLENOL) suppository 650 mg  650 mg Rectal Q6H PRN    morphine injection 2 mg  2 mg IntraVENous Q4H PRN    acetaminophen (TYLENOL) tablet 500 mg  500 mg Oral Q4H    oxyCODONE (ROXICODONE) immediate release tablet 5 mg  5 mg Oral Q4H PRN    insulin lispro (HUMALOG) injection vial 0-8 Units  0-8 Units SubCUTAneous TID WC    insulin lispro (HUMALOG) injection vial 0-4 Units  0-4 Units SubCUTAneous Nightly     Review of Systems: Comprehensive ROS negative except in HPI  Objective:   Blood pressure 124/74, pulse 81, temperature 97.3 °F (36.3 °C), resp. rate 18, height 6' 2\" (1.88 m), weight (!) 324 lb 4.8 oz (147.1 kg), SpO2 94 %. Intake/Output Summary (Last 24 hours) at 1/8/2023 0819  Last data filed at 1/8/2023 0428  Gross per 24 hour   Intake 350 ml   Output 35 ml   Net 315 ml       Physical Exam:   Constitutional:  the patient is well developed and in no acute distress  EENMT:  Sclera clear, pupils equal, oral mucosa moist  Respiratory: symmetric chest rise.  CTA bilaterally; on RA   Cardiovascular:  RRR without M,G,R. There is no lower extremity edema. Gastrointestinal: soft and non-tender; with positive bowel sounds. Musculoskeletal: warm without cyanosis. Normal muscle tone. Skin:  no jaundice or rashes, no wounds   Neurologic: symmetric strength, fluent speech  Psychiatric:  calm, appropriate, oriented x 4    Imaging: I performed an independent interpretation of the patient's images. CXR:   1/8/2023 1/6 1/5 1/4    CT chest 1/4    LAB:  Recent Labs     01/06/23 0315 01/07/23  0336   WBC 8.8 8.4   HGB 14.7 14.1   HCT 43.5 41.9    181       Recent Labs     01/06/23 0315 01/07/23  0336    135   K 4.2 4.1    102   CO2 28 26   BUN 12 12   CREATININE 0.90 1.00       No results for input(s): TROPHS, NTPROBNP, CRP, ESR in the last 72 hours. Recent Labs     01/06/23 0315 01/07/23  0336   GLUCOSE 229* 210*        Microbiology:   No results for input(s): CULTURE in the last 72 hours. ECHO: 07/19/22    TRANSTHORACIC ECHOCARDIOGRAM (TTE) COMPLETE (CONTRAST/BUBBLE/3D PRN) 07/19/2022  5:17 PM, 07/19/2022 12:00 AM (Final)    Interpretation Summary    Left Ventricle: Normal left ventricular systolic function with a visually estimated EF of 60 - 65%. Left ventricle size is normal. Mildly increased wall thickness. Normal wall motion. Mitral Valve: Mild regurgitation. Tricuspid Valve: Mild regurgitation. Left Atrium: Left atrium is severely dilated. LA Vol Index is  65 ml/m2. Right Atrium: Right atrium is moderately dilated. Technical qualifiers: Technically difficult study, color flow Doppler was performed and pulse wave and/or continuous wave Doppler was performed.     Signed by: Torsten Weathers MD on 7/19/2022  5:17 PM, Signed by: Unknown Provider Result on 7/19/2022 12:00 AM    Assessment and Plan:  (Medical Decision Making)   Impression: 70 yo male with history of R spontaneous PTX in 2017 reports to ED with chest pressure and SOB found to have R spontaneous Ptx. History of chronic Afib on pradaxa. Right sided pigtail chest tube placed. Spontaneous pneumothorax  Plan: on right; patient stated his prior PTX was also on right in 2017. Unable to clamp CT, a PTX worsened just with undoing suction on Friday. Keep to suction for now, inability to clamp or disconnect CT just confirms that surgery is needed. Likely Monday or Tuesday per Dr Jourdan Howell. Acute respiratory failure with hypoxia (HCC)  Plan: Now on RA, sats 93-96%      Anticoagulant long-term use    Atrial fibrillation (HCC)  Plan: on pradaxa with chronic afib      CASSY on CPAP  Plan: use O2 at night while ongoing PTX. On ASV at home for central apnea      More than 50% of the time documented was spent in face-to-face contact with the patient and in the care of the patient on the floor/unit where the patient is located. More than 50% of the time documented was spent in face-to-face contact with the patient and in the care of the patient on the floor/unit where the patient is located. In this split/shared evaluation I performed performed a medically appropriate history and exam, counseled and educated the patient and/or family member, ordered medications, tests or procedures, documented information in EMR, and coordinated care. which accounted for 15 minutes of clinical time. CARA Betancourt CNP  In this split/shared evaluation I performed reviewed the patients's H&P, available images, labs, cultures. , discussed case in detail with NPP, performed a medically appropriate history and exam, counseled and educated the patient and/or family member, ordered and/or reviewed medications, tests or procedures, documented information in EMR, independently interpreted images, and coordinated care. which accounted for 15 minutes clinical time.      Impression:     Still has small PTX while on suction  Plans for surgery Monday or Tuesday per Binu Hills MD

## 2023-01-08 NOTE — PROGRESS NOTES
Hospitalist Progress Note   Admit Date:  2023  3:40 PM   Name:  Silvano Avila   Age:  69 y.o. Sex:  male  :  1955   MRN:  [id number]   Room:  301/    Presenting Complaint: Chest Pain and Shortness of Breath     Reason(s) for Admission: Acute respiratory failure with hypoxia ([address] [J96.01]  Primary spontaneous pneumothorax [J93.11]  Spontaneous pneumothorax [J93.83]     Hospital Course:   Silvano Avila is a 69 y.o. male with medical history hypertension, A. fib on Pradaxa, CASSY, CPAP at night, bullous emphysema, history of bilateral subdural hematomas 2017 and diabetes mellitus presented to ED with acute onset right-sided chest pain. Patient reports he was talking to his neighbor when he developed sharp severe right-sided chest pain, associated with shortness of breath. Reports he has history of spontaneous left pneumothorax in 2017, shortly after putting on his CPAP while hospitalized for subdural hematoma. He is a former smoker, quit years ago. Denies nausea, vomiting, fever, chills or abdominal pain. Subjective & 24hr Events (23): Shortness of breath improved. Chest x-ray still shows small apical pneumothorax on the right. No fever no chills. He had a large bowel movement yesterday. Assessment & Plan: This is a 69-year-old male with    Acute hypoxic respiratory failure: 2/2 to spontaneous pneumothorax due to bolus emphysema. S/p Chest Tube in the ER. Pulmonary on board. Appreciate recommendations. Chest tube clamped but unfortunately still has a leak. Chest x-ray repeated this morning was reviewed by me showed small apical pneumothorax. General surgery consulted. Patient may need VATS. Pradaxa on hold for VATS on 1/10/2023 ? HTN:   Blood pressure fairly well controlled. Chronic afib:   Rate controlled. Pradaxa on hold for possible VATS early next week.      DM2:   Sliding scale insulin    CASSY:   no CPAP given chest tube placement    Neuropathy:   gabapentin    HLD:   statin    Morbid obesity:  complicates care, lifestyle modifications encouraged    Constipation resolved  Bowel regimen. Anticipated discharge needs:    Anticipate inpatient hospital stay until next week as he is planned to have VATS early this week. Diet:  ADULT DIET; Regular; 4 carb choices (60 gm/meal); Low Fat/Low Chol/High Fiber/2 gm Na  DVT PPx: Dabigatran- held. SCDs  Code status: Full Code    Hospital Problems:  Principal Problem:    Spontaneous pneumothorax  Active Problems:    Acute respiratory failure with hypoxia (HCC)    Anticoagulant long-term use    Primary spontaneous pneumothorax    Benign prostatic hyperplasia with nocturia    Atrial fibrillation (HCC)    CASSY on CPAP  Resolved Problems:    * No resolved hospital problems. *      Objective:   Patient Vitals for the past 24 hrs:   Temp Pulse Resp BP SpO2   01/08/23 1227 97.3 °F (36.3 °C) 88 17 124/75 96 %   01/08/23 0916 99 °F (37.2 °C) 76 17 122/84 96 %   01/08/23 0511 97.3 °F (36.3 °C) 81 18 124/74 94 %   01/07/23 2333 98.4 °F (36.9 °C) 63 18 117/76 96 %   01/07/23 1930 98.1 °F (36.7 °C) 83 18 128/75 --   01/07/23 1652 100 °F (37.8 °C) 84 17 132/74 95 %       Oxygen Therapy  SpO2: 96 %  O2 Device: None (Room air)  O2 Flow Rate (L/min): 2 L/min    Estimated body mass index is 41.64 kg/m² as calculated from the following:    Height as of this encounter: 6' 2\" (1.88 m). Weight as of this encounter: 324 lb 4.8 oz (147.1 kg). Intake/Output Summary (Last 24 hours) at 1/8/2023 1531  Last data filed at 1/8/2023 0428  Gross per 24 hour   Intake 350 ml   Output 35 ml   Net 315 ml         Physical Exam:     Blood pressure 124/75, pulse 88, temperature 97.3 °F (36.3 °C), temperature source Oral, resp. rate 17, height 6' 2\" (1.88 m), weight (!) 324 lb 4.8 oz (147.1 kg), SpO2 96 %. General:    Well nourished.   Morbidly obese, ill-appearing on nasal cannula,   Head:  Normocephalic, atraumatic  Eyes:  Sclerae appear normal.  Pupils equally round. ENT:  Nares appear normal.  Moist oral mucosa  Neck:  No restricted ROM. Trachea midline   CV:   RRR. No m/r/g. No jugular venous distension. Lungs:   Decreased breath sounds on right. Chest tube in place on the right,  Abdomen:   Soft, nontender, nondistended. Extremities: No cyanosis or clubbing. No edema  Skin:     No rashes and normal coloration. Warm and dry. Neuro:  CN II-XII grossly intact. Sensation intact. Psych:  Normal mood and affect.       I have personally reviewed labs and tests showing:  Recent Labs:  Recent Results (from the past 48 hour(s))   POCT Glucose    Collection Time: 01/06/23  5:12 PM   Result Value Ref Range    POC Glucose 176 (H) 65 - 100 mg/dL    Performed by: Navarro (Cain)    POCT Glucose    Collection Time: 01/06/23  8:46 PM   Result Value Ref Range    POC Glucose 258 (H) 65 - 100 mg/dL    Performed by: Isela    CBC with Auto Differential    Collection Time: 01/07/23  3:36 AM   Result Value Ref Range    WBC 8.4 4.3 - 11.1 K/uL    RBC 4.29 4.23 - 5.6 M/uL    Hemoglobin 14.1 13.6 - 17.2 g/dL    Hematocrit 41.9 41.1 - 50.3 %    MCV 97.7 82 - 102 FL    MCH 32.9 26.1 - 32.9 PG    MCHC 33.7 31.4 - 35.0 g/dL    RDW 13.0 11.9 - 14.6 %    Platelets 895 555 - 889 K/uL    MPV 9.9 9.4 - 12.3 FL    nRBC 0.00 0.0 - 0.2 K/uL    Differential Type AUTOMATED      Seg Neutrophils 73 43 - 78 %    Lymphocytes 17 13 - 44 %    Monocytes 5 4.0 - 12.0 %    Eosinophils % 3 0.5 - 7.8 %    Basophils 1 0.0 - 2.0 %    Immature Granulocytes 1 0.0 - 5.0 %    Segs Absolute 6.3 1.7 - 8.2 K/UL    Absolute Lymph # 1.4 0.5 - 4.6 K/UL    Absolute Mono # 0.5 0.1 - 1.3 K/UL    Absolute Eos # 0.2 0.0 - 0.8 K/UL    Basophils Absolute 0.0 0.0 - 0.2 K/UL    Absolute Immature Granulocyte 0.1 0.0 - 0.5 K/UL   Basic Metabolic Panel w/ Reflex to MG    Collection Time: 01/07/23  3:36 AM   Result Value Ref Range    Sodium 135 133 - 143 mmol/L    Potassium 4.1 3.5 - 5.1 mmol/L    Chloride 102 101 - 110 mmol/L    CO2 26 21 - 32 mmol/L    Anion Gap 7 2 - 11 mmol/L    Glucose 210 (H) 65 - 100 mg/dL    BUN 12 8 - 23 MG/DL    Creatinine 1.00 0.8 - 1.5 MG/DL    Est, Glom Filt Rate >60 >60 ml/min/1.73m2    Calcium 8.8 8.3 - 10.4 MG/DL   POCT Glucose    Collection Time: 01/07/23  7:20 AM   Result Value Ref Range    POC Glucose 213 (H) 65 - 100 mg/dL    Performed by: EhsanPCATUL    POCT Glucose    Collection Time: 01/07/23 11:27 AM   Result Value Ref Range    POC Glucose 261 (H) 65 - 100 mg/dL    Performed by: ProsperetPCATUL    POCT Glucose    Collection Time: 01/07/23  3:52 PM   Result Value Ref Range    POC Glucose 250 (H) 65 - 100 mg/dL    Performed by: EhsanATUL    POCT Glucose    Collection Time: 01/07/23  8:25 PM   Result Value Ref Range    POC Glucose 250 (H) 65 - 100 mg/dL    Performed by: Favian    CBC with Auto Differential    Collection Time: 01/08/23  6:54 AM   Result Value Ref Range    WBC 7.9 4.3 - 11.1 K/uL    RBC 4.35 4.23 - 5.6 M/uL    Hemoglobin 14.3 13.6 - 17.2 g/dL    Hematocrit 42.6 41.1 - 50.3 %    MCV 97.9 82 - 102 FL    MCH 32.9 26.1 - 32.9 PG    MCHC 33.6 31.4 - 35.0 g/dL    RDW 13.0 11.9 - 14.6 %    Platelets 498 434 - 049 K/uL    MPV 10.5 9.4 - 12.3 FL    nRBC 0.00 0.0 - 0.2 K/uL    Differential Type AUTOMATED      Seg Neutrophils 76 43 - 78 %    Lymphocytes 15 13 - 44 %    Monocytes 6 4.0 - 12.0 %    Eosinophils % 2 0.5 - 7.8 %    Basophils 0 0.0 - 2.0 %    Immature Granulocytes 1 0.0 - 5.0 %    Segs Absolute 6.0 1.7 - 8.2 K/UL    Absolute Lymph # 1.2 0.5 - 4.6 K/UL    Absolute Mono # 0.5 0.1 - 1.3 K/UL    Absolute Eos # 0.2 0.0 - 0.8 K/UL    Basophils Absolute 0.0 0.0 - 0.2 K/UL    Absolute Immature Granulocyte 0.0 0.0 - 0.5 K/UL   Basic Metabolic Panel w/ Reflex to MG    Collection Time: 01/08/23  6:54 AM   Result Value Ref Range    Sodium 134 133 - 143 mmol/L    Potassium 4.1 3.5 - 5.1 mmol/L Chloride 101 101 - 110 mmol/L    CO2 26 21 - 32 mmol/L    Anion Gap 7 2 - 11 mmol/L    Glucose 255 (H) 65 - 100 mg/dL    BUN 11 8 - 23 MG/DL    Creatinine 0.80 0.8 - 1.5 MG/DL    Est, Glom Filt Rate >60 >60 ml/min/1.73m2    Calcium 9.2 8.3 - 10.4 MG/DL   POCT Glucose    Collection Time: 01/08/23  8:14 AM   Result Value Ref Range    POC Glucose 221 (H) 65 - 100 mg/dL    Performed by: Jd    POCT Glucose    Collection Time: 01/08/23 11:08 AM   Result Value Ref Range    POC Glucose 325 (H) 65 - 100 mg/dL    Performed by: Villegas (Cain)BSN        I have personally reviewed imaging studies showing: Other Studies:  XR CHEST PORTABLE   Final Result      1. Persistent small right apical pneumothorax. Right chest tube is stable. XR CHEST PORTABLE   Final Result      1. New small right apical pneumothorax, despite presence of the right-sided   chest tube. 2. Bibasilar atelectasis. XR CHEST PORTABLE   Final Result   Unchanged bibasilar lung atelectasis. XR CHEST PORTABLE   Final Result   No evidence of pneumothorax         XR CHEST PORTABLE   Final Result   1. Resolved right pneumothorax. 2. Mild bibasilar lung atelectasis. XR CHEST PORTABLE   Final Result   1. Right chest tube placed, with markedly smaller pneumothorax. 2.  Resolution of the previous tension component. 3.  Mild bibasilar atelectasis. CT CHEST WO CONTRAST   Final Result   1. Persisting large right pneumothorax. 2.  Leftward deviation of mediastinal contours concerning for tension component. 3.  Partial atelectasis of right lung. 4.  Emphysema. 5.  Vascular disease. Urgent findings communicated to the ordering NP Melvi Conroy by the secure text   messaging system at 6:30 PM.      XR CHEST PORTABLE   Final Result   1. Large right pneumothorax. 2.  Pulmonary vascular congestion.                Findings discussed with Dr. Chloe Lopez by myself at 4:42 PM.         XR CHEST PORTABLE (Results Pending)       Current Meds:  Current Facility-Administered Medications   Medication Dose Route Frequency    insulin glargine (LANTUS) injection vial 20 Units  20 Units SubCUTAneous Daily    sennosides-docusate sodium (SENOKOT-S) 8.6-50 MG tablet 2 tablet  2 tablet Oral Daily    bisacodyl (DULCOLAX) suppository 10 mg  10 mg Rectal Daily PRN    glucose chewable tablet 16 g  4 tablet Oral PRN    dextrose bolus 10% 125 mL  125 mL IntraVENous PRN    Or    dextrose bolus 10% 250 mL  250 mL IntraVENous PRN    glucagon (rDNA) injection 1 mg  1 mg SubCUTAneous PRN    dextrose 10 % infusion   IntraVENous Continuous PRN    polyethylene glycol (GLYCOLAX) packet 17 g  17 g Oral Daily    metoprolol (LOPRESSOR) injection 5 mg  5 mg IntraVENous Q6H PRN    carvedilol (COREG) tablet 25 mg  25 mg Oral BID WC    [Held by provider] dabigatran (PRADAXA) capsule 150 mg  150 mg Oral BID    lisinopril (PRINIVIL;ZESTRIL) tablet 10 mg  10 mg Oral Daily    gabapentin (NEURONTIN) capsule 600 mg  600 mg Oral BID    guaiFENesin (MUCINEX) extended release tablet 600 mg  600 mg Oral BID    hydroCHLOROthiazide (HYDRODIURIL) tablet 25 mg  25 mg Oral Daily    atorvastatin (LIPITOR) tablet 20 mg  20 mg Oral Daily    spironolactone (ALDACTONE) tablet 25 mg  25 mg Oral Daily    sodium chloride flush 0.9 % injection 5-40 mL  5-40 mL IntraVENous 2 times per day    sodium chloride flush 0.9 % injection 5-40 mL  5-40 mL IntraVENous PRN    0.9 % sodium chloride infusion   IntraVENous PRN    ondansetron (ZOFRAN-ODT) disintegrating tablet 4 mg  4 mg Oral Q8H PRN    Or    ondansetron (ZOFRAN) injection 4 mg  4 mg IntraVENous Q6H PRN    acetaminophen (TYLENOL) tablet 650 mg  650 mg Oral Q6H PRN    Or    acetaminophen (TYLENOL) suppository 650 mg  650 mg Rectal Q6H PRN    morphine injection 2 mg  2 mg IntraVENous Q4H PRN    acetaminophen (TYLENOL) tablet 500 mg  500 mg Oral Q4H    oxyCODONE (ROXICODONE) immediate release tablet 5 mg  5 mg Oral Q4H PRN insulin lispro (HUMALOG) injection vial 0-8 Units  0-8 Units SubCUTAneous TID WC    insulin lispro (HUMALOG) injection vial 0-4 Units  0-4 Units SubCUTAneous Nightly       Signed:  Staci Haynes MD    Part of this note may have been written by using a voice dictation software. The note has been proof read but may still contain some grammatical/other typographical errors.

## 2023-01-09 ENCOUNTER — ANESTHESIA EVENT (OUTPATIENT)
Dept: SURGERY | Age: 68
End: 2023-01-09
Payer: MEDICARE

## 2023-01-09 ENCOUNTER — HOME HEALTH ADMISSION (OUTPATIENT)
Dept: HOME HEALTH SERVICES | Facility: HOME HEALTH | Age: 68
End: 2023-01-09
Payer: MEDICARE

## 2023-01-09 ENCOUNTER — ANESTHESIA (OUTPATIENT)
Dept: SURGERY | Age: 68
End: 2023-01-09
Payer: MEDICARE

## 2023-01-09 ENCOUNTER — APPOINTMENT (OUTPATIENT)
Dept: GENERAL RADIOLOGY | Age: 68
DRG: 163 | End: 2023-01-09
Payer: MEDICARE

## 2023-01-09 ENCOUNTER — TELEPHONE (OUTPATIENT)
Dept: CARDIOLOGY CLINIC | Age: 68
End: 2023-01-09

## 2023-01-09 LAB
ABO + RH BLD: NORMAL
BLOOD GROUP ANTIBODIES SERPL: NORMAL
GLUCOSE BLD STRIP.AUTO-MCNC: 189 MG/DL (ref 65–100)
GLUCOSE BLD STRIP.AUTO-MCNC: 192 MG/DL (ref 65–100)
GLUCOSE BLD STRIP.AUTO-MCNC: 223 MG/DL (ref 65–100)
GLUCOSE BLD STRIP.AUTO-MCNC: 223 MG/DL (ref 65–100)
GLUCOSE BLD STRIP.AUTO-MCNC: 233 MG/DL (ref 65–100)
SERVICE CMNT-IMP: ABNORMAL
SPECIMEN EXP DATE BLD: NORMAL

## 2023-01-09 PROCEDURE — 71045 X-RAY EXAM CHEST 1 VIEW: CPT

## 2023-01-09 PROCEDURE — 6360000002 HC RX W HCPCS: Performed by: NURSE PRACTITIONER

## 2023-01-09 PROCEDURE — 2580000003 HC RX 258: Performed by: ANESTHESIOLOGY

## 2023-01-09 PROCEDURE — 2500000003 HC RX 250 WO HCPCS: Performed by: NURSE ANESTHETIST, CERTIFIED REGISTERED

## 2023-01-09 PROCEDURE — 6360000002 HC RX W HCPCS: Performed by: SURGERY

## 2023-01-09 PROCEDURE — 7100000000 HC PACU RECOVERY - FIRST 15 MIN: Performed by: SURGERY

## 2023-01-09 PROCEDURE — 88307 TISSUE EXAM BY PATHOLOGIST: CPT

## 2023-01-09 PROCEDURE — C2618 PROBE/NEEDLE, CRYO: HCPCS | Performed by: SURGERY

## 2023-01-09 PROCEDURE — 3600000014 HC SURGERY LEVEL 4 ADDTL 15MIN: Performed by: SURGERY

## 2023-01-09 PROCEDURE — 6370000000 HC RX 637 (ALT 250 FOR IP): Performed by: FAMILY MEDICINE

## 2023-01-09 PROCEDURE — 3700000001 HC ADD 15 MINUTES (ANESTHESIA): Performed by: SURGERY

## 2023-01-09 PROCEDURE — 6370000000 HC RX 637 (ALT 250 FOR IP): Performed by: HOSPITALIST

## 2023-01-09 PROCEDURE — 6370000000 HC RX 637 (ALT 250 FOR IP): Performed by: SURGERY

## 2023-01-09 PROCEDURE — 2709999900 HC NON-CHARGEABLE SUPPLY: Performed by: SURGERY

## 2023-01-09 PROCEDURE — 82962 GLUCOSE BLOOD TEST: CPT

## 2023-01-09 PROCEDURE — 7100000001 HC PACU RECOVERY - ADDTL 15 MIN: Performed by: SURGERY

## 2023-01-09 PROCEDURE — 2720000010 HC SURG SUPPLY STERILE: Performed by: SURGERY

## 2023-01-09 PROCEDURE — C1729 CATH, DRAINAGE: HCPCS | Performed by: SURGERY

## 2023-01-09 PROCEDURE — 88309 TISSUE EXAM BY PATHOLOGIST: CPT

## 2023-01-09 PROCEDURE — 99232 SBSQ HOSP IP/OBS MODERATE 35: CPT | Performed by: INTERNAL MEDICINE

## 2023-01-09 PROCEDURE — 2580000003 HC RX 258: Performed by: NURSE ANESTHETIST, CERTIFIED REGISTERED

## 2023-01-09 PROCEDURE — 3600000004 HC SURGERY LEVEL 4 BASE: Performed by: SURGERY

## 2023-01-09 PROCEDURE — 86901 BLOOD TYPING SEROLOGIC RH(D): CPT

## 2023-01-09 PROCEDURE — 3700000000 HC ANESTHESIA ATTENDED CARE: Performed by: SURGERY

## 2023-01-09 PROCEDURE — 2580000003 HC RX 258: Performed by: FAMILY MEDICINE

## 2023-01-09 PROCEDURE — 6360000002 HC RX W HCPCS: Performed by: NURSE ANESTHETIST, CERTIFIED REGISTERED

## 2023-01-09 PROCEDURE — 6360000002 HC RX W HCPCS: Performed by: ANESTHESIOLOGY

## 2023-01-09 PROCEDURE — 1100000000 HC RM PRIVATE

## 2023-01-09 PROCEDURE — 36415 COLL VENOUS BLD VENIPUNCTURE: CPT

## 2023-01-09 PROCEDURE — 2580000003 HC RX 258: Performed by: SURGERY

## 2023-01-09 PROCEDURE — 6370000000 HC RX 637 (ALT 250 FOR IP): Performed by: ANESTHESIOLOGY

## 2023-01-09 PROCEDURE — 6360000002 HC RX W HCPCS: Performed by: FAMILY MEDICINE

## 2023-01-09 RX ORDER — ONDANSETRON 2 MG/ML
INJECTION INTRAMUSCULAR; INTRAVENOUS PRN
Status: DISCONTINUED | OUTPATIENT
Start: 2023-01-09 | End: 2023-01-09 | Stop reason: SDUPTHER

## 2023-01-09 RX ORDER — SODIUM CHLORIDE 0.9 % (FLUSH) 0.9 %
5-40 SYRINGE (ML) INJECTION PRN
Status: DISCONTINUED | OUTPATIENT
Start: 2023-01-09 | End: 2023-01-09 | Stop reason: HOSPADM

## 2023-01-09 RX ORDER — INSULIN GLARGINE 100 [IU]/ML
10 INJECTION, SOLUTION SUBCUTANEOUS DAILY
Status: DISCONTINUED | OUTPATIENT
Start: 2023-01-09 | End: 2023-01-10

## 2023-01-09 RX ORDER — OXYCODONE HYDROCHLORIDE 5 MG/1
5 TABLET ORAL
Status: COMPLETED | OUTPATIENT
Start: 2023-01-09 | End: 2023-01-09

## 2023-01-09 RX ORDER — PROPOFOL 10 MG/ML
INJECTION, EMULSION INTRAVENOUS PRN
Status: DISCONTINUED | OUTPATIENT
Start: 2023-01-09 | End: 2023-01-09 | Stop reason: SDUPTHER

## 2023-01-09 RX ORDER — LIDOCAINE HYDROCHLORIDE 20 MG/ML
INJECTION, SOLUTION EPIDURAL; INFILTRATION; INTRACAUDAL; PERINEURAL PRN
Status: DISCONTINUED | OUTPATIENT
Start: 2023-01-09 | End: 2023-01-09 | Stop reason: SDUPTHER

## 2023-01-09 RX ORDER — KETAMINE HYDROCHLORIDE 50 MG/ML
INJECTION, SOLUTION, CONCENTRATE INTRAMUSCULAR; INTRAVENOUS PRN
Status: DISCONTINUED | OUTPATIENT
Start: 2023-01-09 | End: 2023-01-09 | Stop reason: SDUPTHER

## 2023-01-09 RX ORDER — LIDOCAINE HYDROCHLORIDE 10 MG/ML
1 INJECTION, SOLUTION INFILTRATION; PERINEURAL
Status: DISCONTINUED | OUTPATIENT
Start: 2023-01-09 | End: 2023-01-09 | Stop reason: HOSPADM

## 2023-01-09 RX ORDER — ACETAMINOPHEN 500 MG
1000 TABLET ORAL ONCE
Status: DISCONTINUED | OUTPATIENT
Start: 2023-01-09 | End: 2023-01-09 | Stop reason: HOSPADM

## 2023-01-09 RX ORDER — HYDROMORPHONE HYDROCHLORIDE 1 MG/ML
INJECTION, SOLUTION INTRAMUSCULAR; INTRAVENOUS; SUBCUTANEOUS PRN
Status: DISCONTINUED | OUTPATIENT
Start: 2023-01-09 | End: 2023-01-09 | Stop reason: SDUPTHER

## 2023-01-09 RX ORDER — MIDAZOLAM HYDROCHLORIDE 2 MG/2ML
2 INJECTION, SOLUTION INTRAMUSCULAR; INTRAVENOUS
Status: DISCONTINUED | OUTPATIENT
Start: 2023-01-09 | End: 2023-01-09 | Stop reason: HOSPADM

## 2023-01-09 RX ORDER — FENTANYL CITRATE 50 UG/ML
100 INJECTION, SOLUTION INTRAMUSCULAR; INTRAVENOUS
Status: DISCONTINUED | OUTPATIENT
Start: 2023-01-09 | End: 2023-01-09 | Stop reason: HOSPADM

## 2023-01-09 RX ORDER — VECURONIUM BROMIDE 1 MG/ML
INJECTION, POWDER, LYOPHILIZED, FOR SOLUTION INTRAVENOUS PRN
Status: DISCONTINUED | OUTPATIENT
Start: 2023-01-09 | End: 2023-01-09 | Stop reason: SDUPTHER

## 2023-01-09 RX ORDER — HALOPERIDOL 5 MG/ML
1 INJECTION INTRAMUSCULAR
Status: DISCONTINUED | OUTPATIENT
Start: 2023-01-09 | End: 2023-01-09 | Stop reason: HOSPADM

## 2023-01-09 RX ORDER — PROCHLORPERAZINE EDISYLATE 5 MG/ML
5 INJECTION INTRAMUSCULAR; INTRAVENOUS
Status: DISCONTINUED | OUTPATIENT
Start: 2023-01-09 | End: 2023-01-09 | Stop reason: HOSPADM

## 2023-01-09 RX ORDER — SODIUM CHLORIDE, SODIUM LACTATE, POTASSIUM CHLORIDE, CALCIUM CHLORIDE 600; 310; 30; 20 MG/100ML; MG/100ML; MG/100ML; MG/100ML
INJECTION, SOLUTION INTRAVENOUS CONTINUOUS
Status: DISCONTINUED | OUTPATIENT
Start: 2023-01-09 | End: 2023-01-09 | Stop reason: HOSPADM

## 2023-01-09 RX ORDER — IPRATROPIUM BROMIDE AND ALBUTEROL SULFATE 2.5; .5 MG/3ML; MG/3ML
1 SOLUTION RESPIRATORY (INHALATION)
Status: DISCONTINUED | OUTPATIENT
Start: 2023-01-09 | End: 2023-01-09 | Stop reason: HOSPADM

## 2023-01-09 RX ORDER — SODIUM CHLORIDE 9 MG/ML
INJECTION, SOLUTION INTRAVENOUS PRN
Status: DISCONTINUED | OUTPATIENT
Start: 2023-01-09 | End: 2023-01-09 | Stop reason: HOSPADM

## 2023-01-09 RX ORDER — KETOROLAC TROMETHAMINE 30 MG/ML
30 INJECTION, SOLUTION INTRAMUSCULAR; INTRAVENOUS ONCE
Status: COMPLETED | OUTPATIENT
Start: 2023-01-09 | End: 2023-01-09

## 2023-01-09 RX ORDER — ROCURONIUM BROMIDE 10 MG/ML
INJECTION, SOLUTION INTRAVENOUS PRN
Status: DISCONTINUED | OUTPATIENT
Start: 2023-01-09 | End: 2023-01-09 | Stop reason: SDUPTHER

## 2023-01-09 RX ORDER — SODIUM CHLORIDE 0.9 % (FLUSH) 0.9 %
5-40 SYRINGE (ML) INJECTION EVERY 12 HOURS SCHEDULED
Status: DISCONTINUED | OUTPATIENT
Start: 2023-01-09 | End: 2023-01-09 | Stop reason: HOSPADM

## 2023-01-09 RX ORDER — HYDROMORPHONE HCL 110MG/55ML
0.5 PATIENT CONTROLLED ANALGESIA SYRINGE INTRAVENOUS EVERY 5 MIN PRN
Status: DISCONTINUED | OUTPATIENT
Start: 2023-01-09 | End: 2023-01-09 | Stop reason: HOSPADM

## 2023-01-09 RX ADMIN — LISINOPRIL 10 MG: 5 TABLET ORAL at 09:08

## 2023-01-09 RX ADMIN — PROPOFOL 20 MG: 10 INJECTION, EMULSION INTRAVENOUS at 16:29

## 2023-01-09 RX ADMIN — FENTANYL CITRATE 50 MCG: 50 INJECTION INTRAMUSCULAR; INTRAVENOUS at 14:35

## 2023-01-09 RX ADMIN — Medication 3000 MG: at 15:16

## 2023-01-09 RX ADMIN — HYDROMORPHONE HYDROCHLORIDE 0.4 MG: 1 INJECTION, SOLUTION INTRAMUSCULAR; INTRAVENOUS; SUBCUTANEOUS at 16:30

## 2023-01-09 RX ADMIN — HYDROMORPHONE HYDROCHLORIDE 0.4 MG: 1 INJECTION, SOLUTION INTRAMUSCULAR; INTRAVENOUS; SUBCUTANEOUS at 16:38

## 2023-01-09 RX ADMIN — PROPOFOL 20 MG: 10 INJECTION, EMULSION INTRAVENOUS at 16:26

## 2023-01-09 RX ADMIN — PHENYLEPHRINE HYDROCHLORIDE 400 MCG: 10 INJECTION INTRAVENOUS at 15:23

## 2023-01-09 RX ADMIN — ATORVASTATIN CALCIUM 20 MG: 20 TABLET, FILM COATED ORAL at 09:08

## 2023-01-09 RX ADMIN — GUAIFENESIN 600 MG: 600 TABLET ORAL at 09:08

## 2023-01-09 RX ADMIN — GABAPENTIN 600 MG: 300 CAPSULE ORAL at 09:08

## 2023-01-09 RX ADMIN — PHENYLEPHRINE HYDROCHLORIDE 200 MCG: 10 INJECTION INTRAVENOUS at 15:21

## 2023-01-09 RX ADMIN — INSULIN LISPRO 2 UNITS: 100 INJECTION, SOLUTION INTRAVENOUS; SUBCUTANEOUS at 09:07

## 2023-01-09 RX ADMIN — ACETAMINOPHEN 500 MG: 500 TABLET, FILM COATED ORAL at 09:08

## 2023-01-09 RX ADMIN — SUGAMMADEX 50 MG: 100 INJECTION, SOLUTION INTRAVENOUS at 16:18

## 2023-01-09 RX ADMIN — SODIUM CHLORIDE, PRESERVATIVE FREE 10 ML: 5 INJECTION INTRAVENOUS at 20:17

## 2023-01-09 RX ADMIN — HYDROMORPHONE HYDROCHLORIDE 0.2 MG: 1 INJECTION, SOLUTION INTRAMUSCULAR; INTRAVENOUS; SUBCUTANEOUS at 16:43

## 2023-01-09 RX ADMIN — CARVEDILOL 25 MG: 25 TABLET, FILM COATED ORAL at 18:38

## 2023-01-09 RX ADMIN — SODIUM CHLORIDE, PRESERVATIVE FREE 10 ML: 5 INJECTION INTRAVENOUS at 09:09

## 2023-01-09 RX ADMIN — LIDOCAINE HYDROCHLORIDE 80 MG: 20 INJECTION, SOLUTION EPIDURAL; INFILTRATION; INTRACAUDAL; PERINEURAL at 14:35

## 2023-01-09 RX ADMIN — ACETAMINOPHEN 500 MG: 500 TABLET, FILM COATED ORAL at 04:15

## 2023-01-09 RX ADMIN — SODIUM CHLORIDE, SODIUM LACTATE, POTASSIUM CHLORIDE, AND CALCIUM CHLORIDE: 600; 310; 30; 20 INJECTION, SOLUTION INTRAVENOUS at 13:12

## 2023-01-09 RX ADMIN — PROPOFOL 100 MG: 10 INJECTION, EMULSION INTRAVENOUS at 14:35

## 2023-01-09 RX ADMIN — GUAIFENESIN 600 MG: 600 TABLET ORAL at 20:17

## 2023-01-09 RX ADMIN — PROPOFOL 20 MG: 10 INJECTION, EMULSION INTRAVENOUS at 16:32

## 2023-01-09 RX ADMIN — INSULIN GLARGINE 10 UNITS: 100 INJECTION, SOLUTION SUBCUTANEOUS at 09:07

## 2023-01-09 RX ADMIN — HYDROMORPHONE HYDROCHLORIDE 0.5 MG: 2 INJECTION, SOLUTION INTRAMUSCULAR; INTRAVENOUS; SUBCUTANEOUS at 16:50

## 2023-01-09 RX ADMIN — FENTANYL CITRATE 50 MCG: 50 INJECTION INTRAMUSCULAR; INTRAVENOUS at 15:16

## 2023-01-09 RX ADMIN — ONDANSETRON 4 MG: 2 INJECTION INTRAMUSCULAR; INTRAVENOUS at 16:03

## 2023-01-09 RX ADMIN — VECURONIUM BROMIDE 1 MG: 1 INJECTION, POWDER, LYOPHILIZED, FOR SOLUTION INTRAVENOUS at 15:49

## 2023-01-09 RX ADMIN — SPIRONOLACTONE 25 MG: 25 TABLET ORAL at 09:08

## 2023-01-09 RX ADMIN — INSULIN LISPRO 2 UNITS: 100 INJECTION, SOLUTION INTRAVENOUS; SUBCUTANEOUS at 17:50

## 2023-01-09 RX ADMIN — ROCURONIUM BROMIDE 50 MG: 50 INJECTION, SOLUTION INTRAVENOUS at 14:35

## 2023-01-09 RX ADMIN — GABAPENTIN 600 MG: 300 CAPSULE ORAL at 20:17

## 2023-01-09 RX ADMIN — SUGAMMADEX 50 MG: 100 INJECTION, SOLUTION INTRAVENOUS at 16:17

## 2023-01-09 RX ADMIN — ACETAMINOPHEN 500 MG: 500 TABLET, FILM COATED ORAL at 12:07

## 2023-01-09 RX ADMIN — KETOROLAC TROMETHAMINE 30 MG: 30 INJECTION, SOLUTION INTRAMUSCULAR at 19:34

## 2023-01-09 RX ADMIN — KETAMINE HYDROCHLORIDE 20 MG: 50 INJECTION, SOLUTION INTRAMUSCULAR; INTRAVENOUS at 14:35

## 2023-01-09 RX ADMIN — HYDROMORPHONE HYDROCHLORIDE 0.2 MG: 1 INJECTION, SOLUTION INTRAMUSCULAR; INTRAVENOUS; SUBCUTANEOUS at 16:33

## 2023-01-09 RX ADMIN — SODIUM CHLORIDE, SODIUM LACTATE, POTASSIUM CHLORIDE, AND CALCIUM CHLORIDE: 600; 310; 30; 20 INJECTION, SOLUTION INTRAVENOUS at 16:07

## 2023-01-09 RX ADMIN — SENNOSIDES AND DOCUSATE SODIUM 2 TABLET: 8.6; 5 TABLET ORAL at 09:08

## 2023-01-09 RX ADMIN — SUGAMMADEX 50 MG: 100 INJECTION, SOLUTION INTRAVENOUS at 16:19

## 2023-01-09 RX ADMIN — HYDROCHLOROTHIAZIDE 25 MG: 25 TABLET ORAL at 09:08

## 2023-01-09 RX ADMIN — MORPHINE SULFATE 2 MG: 2 INJECTION, SOLUTION INTRAMUSCULAR; INTRAVENOUS at 22:42

## 2023-01-09 RX ADMIN — MORPHINE SULFATE 2 MG: 2 INJECTION, SOLUTION INTRAMUSCULAR; INTRAVENOUS at 18:38

## 2023-01-09 RX ADMIN — HYDROMORPHONE HYDROCHLORIDE 0.5 MG: 2 INJECTION, SOLUTION INTRAMUSCULAR; INTRAVENOUS; SUBCUTANEOUS at 16:55

## 2023-01-09 RX ADMIN — VECURONIUM BROMIDE 2 MG: 1 INJECTION, POWDER, LYOPHILIZED, FOR SOLUTION INTRAVENOUS at 15:22

## 2023-01-09 RX ADMIN — CARVEDILOL 25 MG: 25 TABLET, FILM COATED ORAL at 09:08

## 2023-01-09 RX ADMIN — SUGAMMADEX 100 MG: 100 INJECTION, SOLUTION INTRAVENOUS at 16:21

## 2023-01-09 RX ADMIN — SUGAMMADEX 50 MG: 100 INJECTION, SOLUTION INTRAVENOUS at 16:20

## 2023-01-09 RX ADMIN — HYDROMORPHONE HYDROCHLORIDE 0.4 MG: 1 INJECTION, SOLUTION INTRAMUSCULAR; INTRAVENOUS; SUBCUTANEOUS at 16:27

## 2023-01-09 RX ADMIN — OXYCODONE 5 MG: 5 TABLET ORAL at 17:54

## 2023-01-09 RX ADMIN — KETAMINE HYDROCHLORIDE 20 MG: 50 INJECTION, SOLUTION INTRAMUSCULAR; INTRAVENOUS at 15:42

## 2023-01-09 RX ADMIN — PHENYLEPHRINE HYDROCHLORIDE 400 MCG: 10 INJECTION INTRAVENOUS at 15:25

## 2023-01-09 RX ADMIN — HYDROMORPHONE HYDROCHLORIDE 0.4 MG: 1 INJECTION, SOLUTION INTRAMUSCULAR; INTRAVENOUS; SUBCUTANEOUS at 16:41

## 2023-01-09 RX ADMIN — ACETAMINOPHEN 500 MG: 500 TABLET, FILM COATED ORAL at 18:38

## 2023-01-09 RX ADMIN — HYDROMORPHONE HYDROCHLORIDE 0.5 MG: 2 INJECTION, SOLUTION INTRAMUSCULAR; INTRAVENOUS; SUBCUTANEOUS at 16:44

## 2023-01-09 ASSESSMENT — PAIN DESCRIPTION - LOCATION
LOCATION: INCISION
LOCATION: CHEST
LOCATION: BACK;CHEST
LOCATION: BACK;CHEST
LOCATION: INCISION
LOCATION: CHEST;INCISION
LOCATION: INCISION
LOCATION: CHEST

## 2023-01-09 ASSESSMENT — PAIN SCALES - GENERAL
PAINLEVEL_OUTOF10: 9
PAINLEVEL_OUTOF10: 8
PAINLEVEL_OUTOF10: 1
PAINLEVEL_OUTOF10: 6
PAINLEVEL_OUTOF10: 7
PAINLEVEL_OUTOF10: 0
PAINLEVEL_OUTOF10: 1
PAINLEVEL_OUTOF10: 0
PAINLEVEL_OUTOF10: 0
PAINLEVEL_OUTOF10: 8
PAINLEVEL_OUTOF10: 0
PAINLEVEL_OUTOF10: 7
PAINLEVEL_OUTOF10: 9
PAINLEVEL_OUTOF10: 2
PAINLEVEL_OUTOF10: 10

## 2023-01-09 ASSESSMENT — PAIN DESCRIPTION - ORIENTATION
ORIENTATION: RIGHT
ORIENTATION: RIGHT;MID;UPPER

## 2023-01-09 ASSESSMENT — PAIN DESCRIPTION - DESCRIPTORS
DESCRIPTORS: ACHING
DESCRIPTORS: ACHING;SORE
DESCRIPTORS: ACHING;SORE;THROBBING
DESCRIPTORS: ACHING
DESCRIPTORS: ACHING
DESCRIPTORS: ACHING;THROBBING;SORE
DESCRIPTORS: ACHING

## 2023-01-09 ASSESSMENT — PAIN - FUNCTIONAL ASSESSMENT
PAIN_FUNCTIONAL_ASSESSMENT: PREVENTS OR INTERFERES SOME ACTIVE ACTIVITIES AND ADLS
PAIN_FUNCTIONAL_ASSESSMENT: 0-10
PAIN_FUNCTIONAL_ASSESSMENT: PREVENTS OR INTERFERES WITH MANY ACTIVE NOT PASSIVE ACTIVITIES
PAIN_FUNCTIONAL_ASSESSMENT: ACTIVITIES ARE NOT PREVENTED
PAIN_FUNCTIONAL_ASSESSMENT: 0-10

## 2023-01-09 ASSESSMENT — PAIN DESCRIPTION - PAIN TYPE
TYPE: ACUTE PAIN

## 2023-01-09 ASSESSMENT — PAIN DESCRIPTION - ONSET
ONSET: GRADUAL
ONSET: ON-GOING

## 2023-01-09 ASSESSMENT — PAIN DESCRIPTION - FREQUENCY
FREQUENCY: CONTINUOUS
FREQUENCY: INTERMITTENT

## 2023-01-09 ASSESSMENT — COPD QUESTIONNAIRES: CAT_SEVERITY: MODERATE

## 2023-01-09 NOTE — PLAN OF CARE
Pt has bathed with CHG wipes, instructed patient to change into new gown and take off all other articles of clothing in preparation for OR

## 2023-01-09 NOTE — ANESTHESIA PRE PROCEDURE
Department of Anesthesiology  Preprocedure Note       Name:  Dominguez Johnson   Age:  69 y.o.  :  1955                                          MRN:  [id number]         Date:  2023      Surgeon: Ralph Bush):  Ana Porter MD    Procedure: Procedure(s):  THORACOSCOPY    Medications prior to admission:   Prior to Admission medications    Medication Sig Start Date End Date Taking? Authorizing Provider   hydroCHLOROthiazide (HYDRODIURIL) 25 MG tablet Take 1 tablet by mouth daily 22   Christel Sen PA-C   enalapril (VASOTEC) 10 MG tablet Take 1 tablet by mouth daily TAKE 1 TABLET DAILY 11/3/22   Alvarez Cui MD   empagliflozin (JARDIANCE) 10 MG tablet Take 1 tablet by mouth daily  Patient not taking: Reported on 2023 11/3/22   Alvarez Cui MD   spironolactone (ALDACTONE) 25 MG tablet Take 1 tablet by mouth daily 10/18/22   Alvarez Cui MD   CPAP Machine MISC by Other route    Historical Provider, MD   potassium chloride (KLOR-CON M) 10 MEQ extended release tablet Take 10 mEq by mouth daily as needed 22   Historical Provider, MD   clotrimazole-betamethasone (Johnie Lather) 1-0.05 % cream Apply to affected area bid as directed 22   Alvarez Cui MD   simvastatin (ZOCOR) 40 MG tablet Take 1 tablet by mouth daily 22   Alvarez Cui MD   furosemide (LASIX) 20 MG tablet Take 20 mg by mouth daily as needed 22   Historical Provider, MD   gabapentin (NEURONTIN) 600 MG tablet Take 1 tablet by mouth 2 times daily.  22   Historical Provider, MD   carvedilol (COREG) 25 MG tablet Take 25 mg by mouth 2 times daily (with meals) 22   Ar Automatic Reconciliation   cetirizine (ZYRTEC) 10 MG tablet Take by mouth daily as needed    Ar Automatic Reconciliation   dabigatran (PRADAXA) 150 MG capsule TAKE 1 CAPSULE EVERY 12 HOURS 21   Ar Automatic Reconciliation   guaiFENesin (MUCINEX) 600 MG extended release tablet Take 600 mg by mouth 2 times daily    Ar Automatic Reconciliation   metFORMIN (GLUCOPHAGE) 500 MG tablet TAKE 1 TABLET TWICE A DAY WITH A MEAL 3/7/22   Ar Automatic Reconciliation       Current medications:    Current Facility-Administered Medications   Medication Dose Route Frequency Provider Last Rate Last Admin    lidocaine 1 % injection 1 mL  1 mL IntraDERmal Once PRN Gerardo Carter MD        acetaminophen (TYLENOL) tablet 1,000 mg  1,000 mg Oral Once Gerardo Carter MD        fentaNYL (SUBLIMAZE) injection 100 mcg  100 mcg IntraVENous Once PRN Gerardo Carter MD        lactated ringers infusion   IntraVENous Continuous Gerardo Carter  mL/hr at 01/09/23 1312 New Bag at 01/09/23 1312    sodium chloride flush 0.9 % injection 5-40 mL  5-40 mL IntraVENous 2 times per day Gerardo Carter MD        sodium chloride flush 0.9 % injection 5-40 mL  5-40 mL IntraVENous PRN Gerardo Carter MD        0.9 % sodium chloride infusion   IntraVENous PRN Gerardo Carter MD        midazolam PF (VERSED) injection 2 mg  2 mg IntraVENous Once PRN Gerardo Carter MD        insulin glargine (LANTUS) injection vial 10 Units  10 Units SubCUTAneous Daily Barbara Ortiz Ala, MD   10 Units at 01/09/23 0907    ceFAZolin (ANCEF) 3000 mg in sterile water 30 mL IV syringe  3,000 mg IntraVENous On Call to Aquilino Hunt MD        sennosides-docusate sodium (SENOKOT-S) 8.6-50 MG tablet 2 tablet  2 tablet Oral Daily Barbara Ortiz Ala, MD   2 tablet at 01/09/23 0908    bisacodyl (DULCOLAX) suppository 10 mg  10 mg Rectal Daily PRN Barbara Ortiz Ala, MD   10 mg at 01/07/23 1820    glucose chewable tablet 16 g  4 tablet Oral PRN Farhan Ku MD        dextrose bolus 10% 125 mL  125 mL IntraVENous PRN Barbara Ortiz Ala, MD        Or    dextrose bolus 10% 250 mL  250 mL IntraVENous PRN Barbara Ortiz Ala, MD        glucagon (rDNA) injection 1 mg  1 mg SubCUTAneous PRN Barbara Ortiz Ala, MD        dextrose 10 % infusion   IntraVENous Continuous PRN Barbara Ortiz Ala, MD        polyethylene glycol (GLYCOLAX) packet 17 g  17 g Oral Daily Farhan Ku MD   17 g at 01/08/23 0821    metoprolol (LOPRESSOR) injection 5 mg  5 mg IntraVENous Q6H PRN Kaiden Carrillo MD        carvedilol (COREG) tablet 25 mg  25 mg Oral BID WC Iron Cardenas MD   25 mg at 01/09/23 0908    [Held by provider] dabigatran (PRADAXA) capsule 150 mg  150 mg Oral BID Iron Cardenas MD   150 mg at 01/06/23 0810    lisinopril (PRINIVIL;ZESTRIL) tablet 10 mg  10 mg Oral Daily Iron Cardenas MD   10 mg at 01/09/23 0908    gabapentin (NEURONTIN) capsule 600 mg  600 mg Oral BID Iron Cardenas MD   600 mg at 01/09/23 0908    guaiFENesin (MUCINEX) extended release tablet 600 mg  600 mg Oral BID Iron Cardenas MD   600 mg at 01/09/23 0908    hydroCHLOROthiazide (HYDRODIURIL) tablet 25 mg  25 mg Oral Daily Iron Cardenas MD   25 mg at 01/09/23 0908    atorvastatin (LIPITOR) tablet 20 mg  20 mg Oral Daily Iron Cardenas MD   20 mg at 01/09/23 0908    spironolactone (ALDACTONE) tablet 25 mg  25 mg Oral Daily Iron Cardenas MD   25 mg at 01/09/23 0908    sodium chloride flush 0.9 % injection 5-40 mL  5-40 mL IntraVENous 2 times per day Iron Cardenas MD   10 mL at 01/09/23 0909    sodium chloride flush 0.9 % injection 5-40 mL  5-40 mL IntraVENous PRN Iron Cardenas MD        0.9 % sodium chloride infusion   IntraVENous PRN Iron Cardenas MD        ondansetron (ZOFRAN-ODT) disintegrating tablet 4 mg  4 mg Oral Q8H PRN Iron Cardenas MD        Or    ondansetron (ZOFRAN) injection 4 mg  4 mg IntraVENous Q6H PRN Iron Cardenas MD   4 mg at 01/05/23 0911    acetaminophen (TYLENOL) tablet 650 mg  650 mg Oral Q6H PRN Iron Cardenas MD        Or    acetaminophen (TYLENOL) suppository 650 mg  650 mg Rectal Q6H PRN Iron Cardenas MD        morphine injection 2 mg  2 mg IntraVENous Q4H PRN Iron Cardenas MD   2 mg at 01/07/23 0259    acetaminophen (TYLENOL) tablet 500 mg  500 mg Oral Q4H Iron Cardenas MD   500 mg at 01/09/23 1207    oxyCODONE (ROXICODONE) immediate release tablet 5 mg  5 mg Oral Q4H PRN Margarito Khanna MD   5 mg at 01/04/23 2257    insulin lispro (HUMALOG) injection vial 0-8 Units  0-8 Units SubCUTAneous TID WC Margarito Khanna MD   2 Units at 01/09/23 0907    insulin lispro (HUMALOG) injection vial 0-4 Units  0-4 Units SubCUTAneous Nightly Margarito Khanna MD           Allergies: Allergies   Allergen Reactions    Azithromycin Other (See Comments)     Skin dryness/peeling       Problem List:    Patient Active Problem List   Diagnosis Code    HTN (hypertension) I10    History of subdural hematoma (post traumatic) Z87.828    Hepatic steatosis K76.0    Osteoarthritis of lumbosacral spine M47.817    Benign prostatic hyperplasia with nocturia N40.1, R35.1    Obesity E66.9    Nodular prostate without urinary obstruction N40.2    Seasonal allergic rhinitis due to pollen J30.1    Obesity, morbid ([address] E66.01    Subdural hygroma G96.08    Mitral valve regurgitation I34.0    Iron excess E83.19    Atrial fibrillation (HCC) I48.91    CASSY on CPAP G47.33, Z99.89    Hereditary hemochromatosis ([address] E83.110    Polyneuropathy associated with underlying disease ([address] G63    BPH (benign prostatic hyperplasia) N40.0    Coronary artery disease due to lipid rich plaque I25.10, I25.83    Chronic constipation K59.09    Mixed dyslipidemia E78.2    Type 2 diabetes mellitus with diabetic neuropathy (HCC) E11.40    Controlled type 2 diabetes mellitus with peripheral circulatory disorder (HCC) E11.51    Acute respiratory failure with hypoxia (HCC) J96.01    Spontaneous pneumothorax J93.83    Anticoagulant long-term use Z79.01    Primary spontaneous pneumothorax J93.11       Past Medical History:        Diagnosis Date    Arteriosclerosis     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     Diabetes mellitus ([facility] 1/3/2012    Essential hypertension, benign 2014    Hemochromatosis     Hypercholesteremia     Nodular prostate without urinary obstruction 2014    Obesity 2014    Pneumothorax, RIGHT 10/8/2017    pt was hospitalized for subdural hematoma in . upon return home from 2017 hospitlization, pt experienced RIGHT PTX after placement of CPAP approx. 13-18 cm h2o    Sepsis due to urinary tract infection ([facility] 2019    Sleep apnea 10/12/2016    uses ASV instead of CPAP machine - per pt     Subdural hemorrhage following injury 10/20/2017       Past Surgical History:        Procedure Laterality Date    CHEST SURGERY  10/2017    pneumothorax after fall    COLONOSCOPY N/A 2018    COLONOSCOPY  BMI 41 performed by Brant Penaloza MD at [address] Right 10/02/2017    [facility]    KNEE ARTHROSCOPY Right     OTHER SURGICAL HISTORY Bilateral 10/18/2017    [facility]    TURP  2019    UROLOGICAL SURGERY      prostate u/s and BX       Social History:    Social History     Tobacco Use    Smoking status: Former     Packs/day: 1.50     Types: Cigarettes     Quit date: 1989     Years since quittin.4    Smokeless tobacco: Former     Quit date: 2016   Substance Use Topics    Alcohol use:  Yes     Alcohol/week: 8.0 standard drinks                                Counseling given: Not Answered      Vital Signs (Current):   Vitals:    23 0459 23 0820 23 1249 23 1303   BP: 135/83 123/77 122/77 114/60   Pulse: 70 78 77 79   Resp: 18 20 20 16   Temp: 98 °F (36.7 °C) 98.1 °F (36.7 °C) 98.6 °F (37 °C) 97.6 °F (36.4 °C)   TempSrc: Oral   Oral   SpO2: 97% 91% 94% 94%   Weight:       Height:                                                  BP Readings from Last 3 Encounters:   23 114/60   12/15/22 134/82   22 118/74       NPO Status: Time of last liquid consumption: 0000 Time of last solid consumption: 0000                        Date of last liquid consumption: 01/08/23                        Date of last solid food consumption: 01/08/23    BMI:   Wt Readings from Last 3 Encounters:   01/08/23 (!) 324 lb 4.8 oz (147.1 kg)   11/03/22 (!) 331 lb (150.1 kg)   09/15/22 (!) 335 lb 8 oz (152.2 kg)     Body mass index is 41.64 kg/m².     CBC:   Lab Results   Component Value Date/Time    WBC 7.9 01/08/2023 06:54 AM    RBC 4.35 01/08/2023 06:54 AM    HGB 14.3 01/08/2023 06:54 AM    HCT 42.6 01/08/2023 06:54 AM    MCV 97.9 01/08/2023 06:54 AM    RDW 13.0 01/08/2023 06:54 AM     01/08/2023 06:54 AM       CMP:   Lab Results   Component Value Date/Time     01/08/2023 06:54 AM    K 4.1 01/08/2023 06:54 AM     01/08/2023 06:54 AM    CO2 26 01/08/2023 06:54 AM    BUN 11 01/08/2023 06:54 AM    CREATININE 0.80 01/08/2023 06:54 AM    GFRAA >60 09/15/2022 02:25 PM    AGRATIO 1.5 01/25/2022 08:23 AM    LABGLOM >60 01/08/2023 06:54 AM    GLUCOSE 255 01/08/2023 06:54 AM    PROT 7.1 01/04/2023 03:45 PM    CALCIUM 9.2 01/08/2023 06:54 AM    BILITOT 1.0 01/04/2023 03:45 PM    ALKPHOS 119 01/04/2023 03:45 PM    ALKPHOS 106 01/25/2022 08:23 AM    AST 37 01/04/2023 03:45 PM    ALT 72 01/04/2023 03:45 PM       POC Tests:   Recent Labs     01/09/23  1334   POCGLU 189*       Coags: No results found for: PROTIME, INR, APTT    HCG (If Applicable): No results found for: PREGTESTUR, PREGSERUM, HCG, HCGQUANT     ABGs: No results found for: PHART, PO2ART, KKG0MNM, NTQ1RLY, BEART, D6SYPPDY     Type & Screen (If Applicable):  No results found for: LABABO, LABRH    Drug/Infectious Status (If Applicable):  No results found for: HIV, HEPCAB    COVID-19 Screening (If Applicable):   Lab Results   Component Value Date/Time    COVID19 Not detected 01/04/2023 04:03 PM           Anesthesia Evaluation  Patient summary reviewed and Nursing notes reviewed no history of anesthetic complications: Airway: Mallampati: III          Dental: normal exam         Pulmonary:   (+) COPD: moderate,  sleep apnea: on CPAP,                            ROS comment: Spontaneous pneumo with tension physiology if pleurex catheter is taken off suction  PE comment: Right pleurex cathter Cardiovascular:  Exercise tolerance: poor (<4 METS),   (+) hypertension:, CAD:, dysrhythmias: atrial fibrillation,         Rhythm: irregular  Rate: normal  Echocardiogram reviewed                  Neuro/Psych:   Negative Neuro/Psych ROS              GI/Hepatic/Renal:   (+) morbid obesity          Endo/Other:    (+) DiabetesType II DM, , .                 Abdominal:             Vascular: negative vascular ROS. Other Findings:           Anesthesia Plan      general     ASA 3       Induction: intravenous. arterial line  MIPS: One-lung ventilation. Anesthetic plan and risks discussed with patient.                         Gerri Ortiz MD   1/9/2023

## 2023-01-09 NOTE — OP NOTE
Operative Note      Patient: Arely Adams  YOB: 1955  MRN: [id number]    Date of Procedure: 1/9/2023    Pre-Op Diagnosis: Bleb, lung ([address] [J43.9] recurrent right spontaneous pneumothorax. Post-Op Diagnosis: Same       Procedure(s):  THORACOSCOPY right VATS. Wedge blebectomy of right upper lobe. Wedge blebectomy of right lower lobe. Talc pleurodesis. Surgeon(s):  Boby Ames MD    Assistant:   * No surgical staff found *    Anesthesia: General    Estimated Blood Loss (mL): Minimal    Complications: None    Specimens:   ID Type Source Tests Collected by Time Destination   A : RIGHT UPPER LOBE APEX BLEBECTOMY Tissue Lung SURGICAL PATHOLOGY Boby Ames MD 1/9/2023 1602    B : LEFT LOWER LOBE BLEBECTOMY  Tissue Lung SURGICAL PATHOLOGY Boby Ames MD 1/9/2023 1603        Implants:  * No implants in log *      Drains:   Chest Tube Right (Active)       [REMOVED] Chest Tube Anterior;Right Midclavicular 1 (Removed)   $ Chest tube insertion $ Yes 01/04/23 1837   Chest Tube Airleak Yes 01/09/23 0850   Status Continuous Suction 01/09/23 1213   Suction -20 cm H2O 01/09/23 1213   Y Connector Used No 01/09/23 1213   Drainage Description Serous 01/09/23 0850   Dressing Status Clean, dry & intact 01/09/23 0850   Chest Tube Dressing Split Gauze 01/09/23 0850   Site Assessment Clean, dry & intact 01/09/23 0850   Surrounding Skin Clean, dry & intact 01/09/23 1213   Output (ml) 0 ml 01/09/23 1213       Findings: See op note    Detailed Description of Procedure:   Dictated.   Job number  [id number]    Electronically signed by Shelly Barroso MD on 1/9/2023 at 4:16 PM

## 2023-01-09 NOTE — PERIOP NOTE
TRANSFER - OUT REPORT:    Verbal report given to [facility] RN  on Yuri Larkin  being transferred to [phone] for routine post-op       Report consisted of patient's Situation, Background, Assessment and   Recommendations(SBAR). Information from the following report(s) Adult Overview, Surgery Report, and MAR was reviewed with the receiving nurse. Bradford Assessment: No data recorded  Lines:   Peripheral IV 01/04/23 Antecubital (Active)   Site Assessment Clean, dry & intact 01/09/23 1640   Line Status Flushed;Normal saline locked 01/09/23 1640   Line Care Connections checked and tightened 01/09/23 1640   Phlebitis Assessment No symptoms 01/09/23 1640   Infiltration Assessment 0 01/09/23 1640   Alcohol Cap Used Yes 01/09/23 1640   Dressing Status Clean, dry & intact 01/09/23 1640   Dressing Type Transparent 01/09/23 1640       Peripheral IV 01/09/23 Left;Posterior Wrist (Active)   Site Assessment Clean, dry & intact 01/09/23 1640   Line Status Normal saline locked 01/09/23 1640   Line Care Connections checked and tightened 01/09/23 1640   Phlebitis Assessment No symptoms 01/09/23 1640   Infiltration Assessment 0 01/09/23 1640   Alcohol Cap Used No 01/09/23 1352   Dressing Status Clean, dry & intact 01/09/23 1640   Dressing Type Transparent 01/09/23 1640       Arterial Line 01/09/23 Radial (Active)   Site Assessment Clean, dry & intact 01/09/23 1640   Line Status Intact and in place 01/09/23 1640   Art Line Interventions Zeroed and calibrated; Leveled; Connections checked and tightened;Flushed per protocol 01/09/23 1640   Color/Movement/Sensation Capillary refill less than 3 sec 01/09/23 1640   Dressing Type Transparent 01/09/23 1640   Dressing Status Clean, dry & intact 01/09/23 1640        Opportunity for questions and clarification was provided.       Patient transported with:  Registered Nurse

## 2023-01-09 NOTE — ANESTHESIA PROCEDURE NOTES
Airway  Date/Time: 1/9/2023 2:52 PM  Urgency: elective    Airway not difficult    General Information and Staff    Patient location during procedure: OR  Resident/CRNA: CARA Vanegas - CRNA  Performed: resident/CRNA     Indications and Patient Condition  Indications for airway management: anesthesia  Spontaneous Ventilation: absent  Sedation level: deep  Preoxygenated: yes  Patient position: sniffing  MILS not maintained throughout  Mask difficulty assessment: vent by bag mask    Final Airway Details  Final airway type: endotracheal airway      Successful airway: ETT - double lumen left  Cuffed: yes   Successful intubation technique: video laryngoscopy  Facilitating devices/methods: intubating stylet  Endotracheal tube insertion site: oral  Blade: Becky  Blade size: #4  ETT DL size (fr): 39  Cormack-Lehane Classification: grade I - full view of glottis  Placement verified by: chest auscultation and capnometry   Measured from: lips  ETT to lips (cm): 31  Number of attempts at approach: 1  Ventilation between attempts: bag mask  Number of other approaches attempted: 2    Additional Comments  Easy BMV. Bougie placed to use as intubating stylet with glidescope, but unable to thread through ENOC. Easy single lumen glidescope intubation. Cook exchange catheter placed but unable to place ENOC. Eventual successful placement placing ENOC with glidescope.

## 2023-01-09 NOTE — PROGRESS NOTES
Hospitalist Progress Note   Admit Date:  2023  3:40 PM   Name:  Adelia Patel   Age:  69 y.o. Sex:  male  :  1955   MRN:  [id number]   [place]  [facility] [place]    Presenting Complaint: Chest Pain and Shortness of Breath     Reason(s) for Admission: Acute respiratory failure with hypoxia ([address] [J96.01]  Primary spontaneous pneumothorax [J93.11]  Spontaneous pneumothorax [J93.83]     Hospital Course:   Adelia Patel is a 69 y.o. male with medical history hypertension, A. fib on Pradaxa, CASSY, CPAP at night, bullous emphysema, history of bilateral subdural hematomas 2017 and diabetes mellitus presented to ED with acute onset right-sided chest pain. Patient reports he was talking to his neighbor when he developed sharp severe right-sided chest pain, associated with shortness of breath. Reports he has history of spontaneous left pneumothorax in 2017, shortly after putting on his CPAP while hospitalized for subdural hematoma. He is a former smoker, quit years ago. Denies nausea, vomiting, fever, chills or abdominal pain. Subjective & 24hr Events (23): Patient is sitting up in chair this morning. Denies shortness of breath. Patient prepared for surgery today. No fever no chills. No chest pain. No nausea no vomiting. Assessment & Plan: This is a 38-year-old male with    Acute hypoxic respiratory failure: 2/2 to spontaneous pneumothorax due to bolus emphysema. S/p Chest Tube in the ER. Pulmonary on board. Appreciate recommendations. Chest tube clamped but unfortunately still has a leak. Chest x-ray repeated this morning was reviewed by me showed small apical pneumothorax. General surgery consulted.  Plan for VATS today. HTN:   Blood pressure fairly well controlled. Chronic afib:   Rate controlled. Pradaxa on hold for VATS today. DM2:   Sliding scale insulin. Lantus decreased as pt is NPO for surgery today.      CASSY:   no CPAP given chest tube placement    Neuropathy:   gabapentin    HLD:   statin    Morbid obesity:  complicates care, lifestyle modifications encouraged    Constipation resolved  Bowel regimen. Anticipated discharge needs:    Anticipate inpatient hospital stay for atleast 72 hours. Diet:  Diet NPO  DVT PPx: Dabigatran- held. SCDs  Code status: Full Code    Hospital Problems:  Principal Problem:    Spontaneous pneumothorax  Active Problems:    Acute respiratory failure with hypoxia (HCC)    Anticoagulant long-term use    Primary spontaneous pneumothorax    Benign prostatic hyperplasia with nocturia    Atrial fibrillation (HCC)    CASSY on CPAP  Resolved Problems:    * No resolved hospital problems. *      Objective:   Patient Vitals for the past 24 hrs:   Temp Pulse Resp BP SpO2   01/09/23 1303 97.6 °F (36.4 °C) 79 16 114/60 94 %   01/09/23 1249 98.6 °F (37 °C) 77 20 122/77 94 %   01/09/23 0820 98.1 °F (36.7 °C) 78 20 123/77 91 %   01/09/23 0459 98 °F (36.7 °C) 70 18 135/83 97 %   01/09/23 0123 98 °F (36.7 °C) 70 17 130/78 95 %   01/08/23 2049 97.4 °F (36.3 °C) 86 18 135/77 95 %   01/08/23 1741 97.9 °F (36.6 °C) 81 17 119/67 94 %       Oxygen Therapy  SpO2: 94 %  Pulse Oximeter Device Mode: Continuous  Pulse Oximeter Device Location: Left, Finger  O2 Device: None (Room air)  O2 Flow Rate (L/min): 2 L/min    Estimated body mass index is 41.64 kg/m² as calculated from the following:    Height as of this encounter: 6' 2\" (1.88 m). Weight as of this encounter: 324 lb 4.8 oz (147.1 kg). Intake/Output Summary (Last 24 hours) at 1/9/2023 1543  Last data filed at 1/9/2023 1213  Gross per 24 hour   Intake 0 ml   Output 0 ml   Net 0 ml         Physical Exam:     Blood pressure 114/60, pulse 79, temperature 97.6 °F (36.4 °C), temperature source Oral, resp. rate 16, height 6' 2\" (1.88 m), weight (!) 324 lb 4.8 oz (147.1 kg), SpO2 94 %. General:    Well nourished.   Morbidly obese, alert, awake,    Head:  Normocephalic, atraumatic  Eyes:  Sclerae appear normal.  Pupils equally round. ENT:  Nares appear normal.  Moist oral mucosa  Neck:  No restricted ROM. Trachea midline   CV:   RRR. No m/r/g. No jugular venous distension. Lungs:   Decreased breath sounds on right. Chest tube in place on the right,  Abdomen:   Soft, nontender, nondistended. Extremities: No cyanosis or clubbing. No edema  Skin:     No rashes and normal coloration. Warm and dry. Neuro:  CN II-XII grossly intact. Sensation intact. Psych:  Normal mood and affect.       I have personally reviewed labs and tests showing:  Recent Labs:  Recent Results (from the past 48 hour(s))   POCT Glucose    Collection Time: 01/07/23  3:52 PM   Result Value Ref Range    POC Glucose 250 (H) 65 - 100 mg/dL    Performed by: Jd    POCT Glucose    Collection Time: 01/07/23  8:25 PM   Result Value Ref Range    POC Glucose 250 (H) 65 - 100 mg/dL    Performed by: Favian    CBC with Auto Differential    Collection Time: 01/08/23  6:54 AM   Result Value Ref Range    WBC 7.9 4.3 - 11.1 K/uL    RBC 4.35 4.23 - 5.6 M/uL    Hemoglobin 14.3 13.6 - 17.2 g/dL    Hematocrit 42.6 41.1 - 50.3 %    MCV 97.9 82 - 102 FL    MCH 32.9 26.1 - 32.9 PG    MCHC 33.6 31.4 - 35.0 g/dL    RDW 13.0 11.9 - 14.6 %    Platelets 574 348 - 171 K/uL    MPV 10.5 9.4 - 12.3 FL    nRBC 0.00 0.0 - 0.2 K/uL    Differential Type AUTOMATED      Seg Neutrophils 76 43 - 78 %    Lymphocytes 15 13 - 44 %    Monocytes 6 4.0 - 12.0 %    Eosinophils % 2 0.5 - 7.8 %    Basophils 0 0.0 - 2.0 %    Immature Granulocytes 1 0.0 - 5.0 %    Segs Absolute 6.0 1.7 - 8.2 K/UL    Absolute Lymph # 1.2 0.5 - 4.6 K/UL    Absolute Mono # 0.5 0.1 - 1.3 K/UL    Absolute Eos # 0.2 0.0 - 0.8 K/UL    Basophils Absolute 0.0 0.0 - 0.2 K/UL    Absolute Immature Granulocyte 0.0 0.0 - 0.5 K/UL   Basic Metabolic Panel w/ Reflex to MG    Collection Time: 01/08/23  6:54 AM   Result Value Ref Range    Sodium 134 133 - 143 mmol/L Potassium 4.1 3.5 - 5.1 mmol/L    Chloride 101 101 - 110 mmol/L    CO2 26 21 - 32 mmol/L    Anion Gap 7 2 - 11 mmol/L    Glucose 255 (H) 65 - 100 mg/dL    BUN 11 8 - 23 MG/DL    Creatinine 0.80 0.8 - 1.5 MG/DL    Est, Glom Filt Rate >60 >60 ml/min/1.73m2    Calcium 9.2 8.3 - 10.4 MG/DL   POCT Glucose    Collection Time: 01/08/23  8:14 AM   Result Value Ref Range    POC Glucose 221 (H) 65 - 100 mg/dL    Performed by: Jd    POCT Glucose    Collection Time: 01/08/23 11:08 AM   Result Value Ref Range    POC Glucose 325 (H) 65 - 100 mg/dL    Performed by: Navarro (Cain)    POCT Glucose    Collection Time: 01/08/23  4:04 PM   Result Value Ref Range    POC Glucose 206 (H) 65 - 100 mg/dL    Performed by: Navarro (Cain)    POCT Glucose    Collection Time: 01/08/23  9:49 PM   Result Value Ref Range    POC Glucose 181 (H) 65 - 100 mg/dL    Performed by: Duke    POCT Glucose    Collection Time: 01/09/23  7:08 AM   Result Value Ref Range    POC Glucose 223 (H) 65 - 100 mg/dL    Performed by: Freya    TYPE AND SCREEN    Collection Time: 01/09/23 10:05 AM   Result Value Ref Range    Crossmatch expiration date 01/12/2023,2359     ABO/Rh O POSITIVE     Antibody Screen NEG    POCT Glucose    Collection Time: 01/09/23 11:18 AM   Result Value Ref Range    POC Glucose 192 (H) 65 - 100 mg/dL    Performed by: Freya    POCT Glucose    Collection Time: 01/09/23  1:34 PM   Result Value Ref Range    POC Glucose 189 (H) 65 - 100 mg/dL    Performed by: Courtney        I have personally reviewed imaging studies showing: Other Studies:  XR CHEST PORTABLE   Final Result      1. Small right apical pneumothorax. Right chest tube is in place. 2. Right lung base opacity, likely atelectasis. 3. No significant change compared to prior. XR CHEST PORTABLE   Final Result      1. Persistent small right apical pneumothorax. Right chest tube is stable. XR CHEST PORTABLE   Final Result      1. New small right apical pneumothorax, despite presence of the right-sided   chest tube. 2. Bibasilar atelectasis. XR CHEST PORTABLE   Final Result   Unchanged bibasilar lung atelectasis. XR CHEST PORTABLE   Final Result   No evidence of pneumothorax         XR CHEST PORTABLE   Final Result   1. Resolved right pneumothorax. 2. Mild bibasilar lung atelectasis. XR CHEST PORTABLE   Final Result   1. Right chest tube placed, with markedly smaller pneumothorax. 2.  Resolution of the previous tension component. 3.  Mild bibasilar atelectasis. CT CHEST WO CONTRAST   Final Result   1. Persisting large right pneumothorax. 2.  Leftward deviation of mediastinal contours concerning for tension component. 3.  Partial atelectasis of right lung. 4.  Emphysema. 5.  Vascular disease. Urgent findings communicated to the ordering NP Melvi Conroy by the secure text   messaging system at 6:30 PM.      XR CHEST PORTABLE   Final Result   1. Large right pneumothorax. 2.  Pulmonary vascular congestion.                Findings discussed with Dr. Chloe Lopez by myself at 4:42 PM.         XR CHEST PORTABLE    (Results Pending)       Current Meds:  Current Facility-Administered Medications   Medication Dose Route Frequency    lidocaine 1 % injection 1 mL  1 mL IntraDERmal Once PRN    acetaminophen (TYLENOL) tablet 1,000 mg  1,000 mg Oral Once    fentaNYL (SUBLIMAZE) injection 100 mcg  100 mcg IntraVENous Once PRN    lactated ringers infusion   IntraVENous Continuous    sodium chloride flush 0.9 % injection 5-40 mL  5-40 mL IntraVENous 2 times per day    sodium chloride flush 0.9 % injection 5-40 mL  5-40 mL IntraVENous PRN    0.9 % sodium chloride infusion   IntraVENous PRN    midazolam PF (VERSED) injection 2 mg  2 mg IntraVENous Once PRN    insulin glargine (LANTUS) injection vial 10 Units  10 Units SubCUTAneous Daily    ceFAZolin (ANCEF) 3000 mg in sterile water 30 mL IV syringe  3,000 mg IntraVENous On Call to OR    sennosides-docusate sodium (SENOKOT-S) 8.6-50 MG tablet 2 tablet  2 tablet Oral Daily    bisacodyl (DULCOLAX) suppository 10 mg  10 mg Rectal Daily PRN    glucose chewable tablet 16 g  4 tablet Oral PRN    dextrose bolus 10% 125 mL  125 mL IntraVENous PRN    Or    dextrose bolus 10% 250 mL  250 mL IntraVENous PRN    glucagon (rDNA) injection 1 mg  1 mg SubCUTAneous PRN    dextrose 10 % infusion   IntraVENous Continuous PRN    polyethylene glycol (GLYCOLAX) packet 17 g  17 g Oral Daily    metoprolol (LOPRESSOR) injection 5 mg  5 mg IntraVENous Q6H PRN    carvedilol (COREG) tablet 25 mg  25 mg Oral BID WC    [Held by provider] dabigatran (PRADAXA) capsule 150 mg  150 mg Oral BID    lisinopril (PRINIVIL;ZESTRIL) tablet 10 mg  10 mg Oral Daily    gabapentin (NEURONTIN) capsule 600 mg  600 mg Oral BID    guaiFENesin (MUCINEX) extended release tablet 600 mg  600 mg Oral BID    hydroCHLOROthiazide (HYDRODIURIL) tablet 25 mg  25 mg Oral Daily    atorvastatin (LIPITOR) tablet 20 mg  20 mg Oral Daily    spironolactone (ALDACTONE) tablet 25 mg  25 mg Oral Daily    sodium chloride flush 0.9 % injection 5-40 mL  5-40 mL IntraVENous 2 times per day    sodium chloride flush 0.9 % injection 5-40 mL  5-40 mL IntraVENous PRN    0.9 % sodium chloride infusion   IntraVENous PRN    ondansetron (ZOFRAN-ODT) disintegrating tablet 4 mg  4 mg Oral Q8H PRN    Or    ondansetron (ZOFRAN) injection 4 mg  4 mg IntraVENous Q6H PRN    acetaminophen (TYLENOL) tablet 650 mg  650 mg Oral Q6H PRN    Or    acetaminophen (TYLENOL) suppository 650 mg  650 mg Rectal Q6H PRN    morphine injection 2 mg  2 mg IntraVENous Q4H PRN    acetaminophen (TYLENOL) tablet 500 mg  500 mg Oral Q4H    oxyCODONE (ROXICODONE) immediate release tablet 5 mg  5 mg Oral Q4H PRN    insulin lispro (HUMALOG) injection vial 0-8 Units  0-8 Units SubCUTAneous TID WC    insulin lispro (HUMALOG) injection vial 0-4 Units  0-4 Units SubCUTAneous Nightly     Facility-Administered Medications Ordered in Other Encounters   Medication Dose Route Frequency    fentaNYL (SUBLIMAZE) injection   IntraVENous PRN    vecuronium (NORCURON) injection   IntraVENous PRN    ketamine (KETALAR) injection   IntraVENous PRN    propofol injection   IntraVENous PRN    lidocaine PF 2 % injection   IntraVENous PRN    rocuronium (ZEMURON) injection   IntraVENous PRN       Signed:  Joi Xie MD    Part of this note may have been written by using a voice dictation software. The note has been proof read but may still contain some grammatical/other typographical errors.

## 2023-01-09 NOTE — PROGRESS NOTES
PT/OT consulted and recommended HH. Pt agreeable and reported no agency preference. Agreeable to [facility], sent referral for RN/PT/OT. Pt expected to have surgery this afternoon for leaking chest tube. Will continue to follow.

## 2023-01-09 NOTE — ANESTHESIA POSTPROCEDURE EVALUATION
Department of Anesthesiology  Postprocedure Note    Patient: Erma Urrutia  MRN: [id number]  YOB: 1955  Date of evaluation: 1/9/2023      Procedure Summary     Date: 01/09/23 Room / Location: [facility] MAIN OR 03 / [facility] MAIN OR    Anesthesia Start: 5754 Anesthesia Stop: 5362    Procedure: RIGHT VATS/WEDGE BLEBECTOMY/RIGHT UPPER LOBE WEDGE BLEBECTOMY/RIGHT LOWER LOBE WEDGE BLEBECTOMY/TALC PLEURODESIS (Right: Chest) Diagnosis:       Bleb, lung (HCC)      (Bleb, lung ([facility] [J43.9])    Providers: Mio Justin MD Responsible Provider: Olena Harris MD    Anesthesia Type: general ASA Status: 3          Anesthesia Type: No value filed.     Zoila Phase I: Zoila Score: 8    Zoila Phase II:        Anesthesia Post Evaluation    Patient location during evaluation: PACU  Patient participation: complete - patient participated  Level of consciousness: awake  Airway patency: patent  Nausea & Vomiting: no nausea  Complications: no  Cardiovascular status: blood pressure returned to baseline and hemodynamically stable  Respiratory status: acceptable  Hydration status: stable  Multimodal analgesia pain management approach

## 2023-01-09 NOTE — ANESTHESIA PROCEDURE NOTES
Arterial Line:    An arterial line was placed using surface landmarks, in the OR for the following indication(s): continuous blood pressure monitoring and blood sampling needed. A 20 gauge (size), 1 and 3/4 inch (length), Arrow (type) catheter was placed, Seldinger technique used, into the left radial artery, secured by Tegaderm. Events:  patient tolerated procedure well with no complications and EBL < 5mL. 1/9/2023 2:52 PM1/9/2023 2:55 PM  Resident/CRNA: CARA Marcus CRNA  Performed: Resident/CRNA   Preanesthetic Checklist  Completed: patient identified, IV checked, site marked, risks and benefits discussed, surgical/procedural consents, equipment checked, pre-op evaluation, timeout performed, anesthesia consent given, oxygen available, monitors applied/VS acknowledged, fire risk safety assessment completed and verbalized and blood product R/B/A discussed and consented

## 2023-01-09 NOTE — TELEPHONE ENCOUNTER
----- Message from Sydnie Razo MD sent at 1/7/2023  9:55 AM EST -----  Regarding: RE: Planned Thoracic Surgery for Trent Don to stop Pradaxa  ----- Message -----  From: Lindy Duncan MA  Sent: 1/6/2023   5:41 PM EST  To: Sydnie Razo MD  Subject: Diogo Perone: Planned Thoracic Surgery for Baldo Schwab      FYI    ----- Message -----  From: Madhav Fitzgerald MA  Sent: 1/6/2023   2:53 PM EST  To: Lindy Duncan MA  Subject: FW: Planned Thoracic Surgery for Baldo Schwab        ----- Message -----  From: Mali Monge  Sent: 1/6/2023   2:39 PM EST  To: [facility] Clinical Staff  Subject: Planned Thoracic Surgery for Baldo Schwab          Dr Inez Wade, I wanted to give you a heads up on why you saw me in [address] on Wednesday at [facility]. I had a pneumothorax  in my right lung which is second one in that lung. First time was in October 2017. Had tube inserted and lung reinflated but  Long story short, looks like Dr Dionne Gracia will plan on doing surgery on that lung either 1/9 or 1/10/2023. Pulmonology  will be [facility] Pulmonology. I am still in hospital based on their recommendation. We stopped Pradaxa night of 1/6/2023. I wanted to be sure you are ok with this decision. I am will copy Dr Carolyne Watkins on this as well. I have the utmost respect for the both of you. Prayers appreciated!     [facility] [place] [facility] [phone]

## 2023-01-09 NOTE — PROGRESS NOTES
[facility]  Admission Date: 1/4/2023         Daily Progress Note: 1/9/2023    The patient's chart is reviewed and the patient is discussed with the staff. Background: Patient is a 69 y.o. male presents with right sided chest pressure and SOB. Pt has history of spontaneous Ptx that was documented as on the left but it was actually on the right in 2017 which required large bore chest tube at the time. Did not require surgical intervention. This was the same time he had bilateral subdural hematomas and was wearing CPAP. Pt came to ED for further evaluation and CXR showed moderate to large PTX. He is currently on 6L NC with sat 90-92%. He still has some chest discomfort. Denies SOB. Denies any trauma. Mild cough, afebrile. He is COVID negative. He does have a history of CASSY on ASV for central apneas following subdural hematomas. He also has a history of chronic Afib on Pradaxa. His wife is at bedside. Former smoker, but never officially diagnosed with COPD. No inhaler or O2 at home. CT chest in 2009 with mild bullous emphysema noted. Subjective:   CXR yesterday still with persistent pneumo of R apical.  CT remains to suction with air leak. On RA. Pending OR transport now.      Current Facility-Administered Medications   Medication Dose Route Frequency    insulin glargine (LANTUS) injection vial 10 Units  10 Units SubCUTAneous Daily    ceFAZolin (ANCEF) 3000 mg in sterile water 30 mL IV syringe  3,000 mg IntraVENous On Call to OR    sennosides-docusate sodium (SENOKOT-S) 8.6-50 MG tablet 2 tablet  2 tablet Oral Daily    bisacodyl (DULCOLAX) suppository 10 mg  10 mg Rectal Daily PRN    glucose chewable tablet 16 g  4 tablet Oral PRN    dextrose bolus 10% 125 mL  125 mL IntraVENous PRN    Or    dextrose bolus 10% 250 mL  250 mL IntraVENous PRN    glucagon (rDNA) injection 1 mg  1 mg SubCUTAneous PRN    dextrose 10 % infusion   IntraVENous Continuous PRN    polyethylene glycol (GLYCOLAX) packet 17 g  17 g Oral Daily    metoprolol (LOPRESSOR) injection 5 mg  5 mg IntraVENous Q6H PRN    carvedilol (COREG) tablet 25 mg  25 mg Oral BID WC    [Held by provider] dabigatran (PRADAXA) capsule 150 mg  150 mg Oral BID    lisinopril (PRINIVIL;ZESTRIL) tablet 10 mg  10 mg Oral Daily    gabapentin (NEURONTIN) capsule 600 mg  600 mg Oral BID    guaiFENesin (MUCINEX) extended release tablet 600 mg  600 mg Oral BID    hydroCHLOROthiazide (HYDRODIURIL) tablet 25 mg  25 mg Oral Daily    atorvastatin (LIPITOR) tablet 20 mg  20 mg Oral Daily    spironolactone (ALDACTONE) tablet 25 mg  25 mg Oral Daily    sodium chloride flush 0.9 % injection 5-40 mL  5-40 mL IntraVENous 2 times per day    sodium chloride flush 0.9 % injection 5-40 mL  5-40 mL IntraVENous PRN    0.9 % sodium chloride infusion   IntraVENous PRN    ondansetron (ZOFRAN-ODT) disintegrating tablet 4 mg  4 mg Oral Q8H PRN    Or    ondansetron (ZOFRAN) injection 4 mg  4 mg IntraVENous Q6H PRN    acetaminophen (TYLENOL) tablet 650 mg  650 mg Oral Q6H PRN    Or    acetaminophen (TYLENOL) suppository 650 mg  650 mg Rectal Q6H PRN    morphine injection 2 mg  2 mg IntraVENous Q4H PRN    acetaminophen (TYLENOL) tablet 500 mg  500 mg Oral Q4H    oxyCODONE (ROXICODONE) immediate release tablet 5 mg  5 mg Oral Q4H PRN    insulin lispro (HUMALOG) injection vial 0-8 Units  0-8 Units SubCUTAneous TID     insulin lispro (HUMALOG) injection vial 0-4 Units  0-4 Units SubCUTAneous Nightly     Review of Systems: Comprehensive ROS negative except in HPI  Objective:   Blood pressure 123/77, pulse 78, temperature 98.1 °F (36.7 °C), resp. rate 20, height 6' 2\" (1.88 m), weight (!) 324 lb 4.8 oz (147.1 kg), SpO2 91 %.    Intake/Output Summary (Last 24 hours) at 1/9/2023 1246  Last data filed at 1/9/2023 1213  Gross per 24 hour   Intake 0 ml   Output 0 ml   Net 0 ml       Physical Exam:   Constitutional:  the patient is well developed and in no acute distress  EENMT:  Sclera clear, pupils equal, oral mucosa moist  Respiratory: symmetric chest rise. CTA bilaterally; on RA   Cardiovascular:  RRR without M,G,R. There is no lower extremity edema. Gastrointestinal: soft and non-tender; with positive bowel sounds. Musculoskeletal: warm without cyanosis. Normal muscle tone. Skin:  no jaundice or rashes, no wounds   Neurologic: symmetric strength, fluent speech  Psychiatric:  calm, appropriate, oriented x 4    Imaging: I performed an independent interpretation of the patient's images. CXR:   1/8/2023 1/6 1/5 1/4    CT chest 1/4    LAB:  Recent Labs     01/07/23 0336 01/08/23  0654   WBC 8.4 7.9   HGB 14.1 14.3   HCT 41.9 42.6    199       Recent Labs     01/07/23  0336 01/08/23  0654    134   K 4.1 4.1    101   CO2 26 26   BUN 12 11   CREATININE 1.00 0.80       No results for input(s): TROPHS, NTPROBNP, CRP, ESR in the last 72 hours. Recent Labs     01/07/23  0336 01/08/23  0654   GLUCOSE 210* 255*        Microbiology:   No results for input(s): CULTURE in the last 72 hours. ECHO: 07/19/22    TRANSTHORACIC ECHOCARDIOGRAM (TTE) COMPLETE (CONTRAST/BUBBLE/3D PRN) 07/19/2022  5:17 PM, 07/19/2022 12:00 AM (Final)    Interpretation Summary    Left Ventricle: Normal left ventricular systolic function with a visually estimated EF of 60 - 65%. Left ventricle size is normal. Mildly increased wall thickness. Normal wall motion. Mitral Valve: Mild regurgitation. Tricuspid Valve: Mild regurgitation. Left Atrium: Left atrium is severely dilated. LA Vol Index is  65 ml/m2. Right Atrium: Right atrium is moderately dilated. Technical qualifiers: Technically difficult study, color flow Doppler was performed and pulse wave and/or continuous wave Doppler was performed.     Signed by: Josie Wynne MD on 7/19/2022  5:17 PM, Signed by: Zaynab Valle Provider Result on 7/19/2022 12:00 AM    Assessment and Plan:  (Medical Decision Making)   Impression: 68 yo male with history of R spontaneous PTX in 2017 reports to ED with chest pressure and SOB found to have R spontaneous Ptx. History of chronic Afib on pradaxa. Right sided pigtail chest tube placed. Spontaneous pneumothorax  Plan: on right; patient stated his prior PTX was also on right in 2017. Unable to clamp CT, a PTX worsened just with undoing suction on Friday. Keep to suction for now, inability to clamp or disconnect CT just confirms that surgery is needed. OR today with Dr. Jeanne Orourke      Acute respiratory failure with hypoxia ([address]  Plan: Now on RA, sats 93-96%      Anticoagulant long-term use    Atrial fibrillation (HCC)  Plan: on pradaxa with chronic afib      CASSY on CPAP  Plan: use O2 at night while ongoing PTX. On ASV at home for central apnea      More than 50% of the time documented was spent in face-to-face contact with the patient and in the care of the patient on the floor/unit where the patient is located.     Morenita Armijo MD

## 2023-01-09 NOTE — BRIEF OP NOTE
Brief Postoperative Note      Patient: Inga Lira  YOB: 1955  MRN: [id number]    Date of Procedure: 1/9/2023    Pre-Op Diagnosis: Bleb, lung ([address] [J43.9] Recurrent spontaneous pneumothorax on right    Post-Op Diagnosis: Same       Procedure(s):  THORACOSCOPY  Right VATS. Wedge blebectomy right upper lobe. Wedge blebectomy right lower lobe. Talc pleurodesis.     Surgeon(s):  Moody Rodriguez MD    Assistant:  * No surgical staff found *    Anesthesia: General    Estimated Blood Loss (mL): Minimal    Complications: None    Specimens:   ID Type Source Tests Collected by Time Destination   A : RIGHT UPPER LOBE APEX BLEBECTOMY Tissue Lung SURGICAL PATHOLOGY Moody Rodriguez MD 1/9/2023 1602    B : LEFT LOWER LOBE BLEBECTOMY  Tissue Lung SURGICAL PATHOLOGY Moody Rodriguez MD 1/9/2023 1603        Implants:  * No implants in log *      Drains:   Chest Tube Right (Active)       [REMOVED] Chest Tube Anterior;Right Midclavicular 1 (Removed)   $ Chest tube insertion $ Yes 01/04/23 1837   Chest Tube Airleak Yes 01/09/23 0850   Status Continuous Suction 01/09/23 1213   Suction -20 cm H2O 01/09/23 1213   Y Connector Used No 01/09/23 1213   Drainage Description Serous 01/09/23 0850   Dressing Status Clean, dry & intact 01/09/23 0850   Chest Tube Dressing Split Gauze 01/09/23 0850   Site Assessment Clean, dry & intact 01/09/23 0850   Surrounding Skin Clean, dry & intact 01/09/23 1213   Output (ml) 0 ml 01/09/23 1213       Findings: See op note    Electronically signed by Vahid Troy MD on 1/9/2023 at 4:14 PM

## 2023-01-09 NOTE — PROGRESS NOTES
Occupational Therapy Note:    Attempted to see patient this AM for occupational therapy treatment  session. Patient ALON for procedure. Will follow and re-attempt as schedule permits/patient available.  Thank you,    Ifeanyi Johnson, OT    Rehab Caseload Tracker

## 2023-01-10 ENCOUNTER — APPOINTMENT (OUTPATIENT)
Dept: GENERAL RADIOLOGY | Age: 68
DRG: 163 | End: 2023-01-10
Payer: MEDICARE

## 2023-01-10 LAB
ANION GAP SERPL CALC-SCNC: 8 MMOL/L (ref 2–11)
BASOPHILS # BLD: 0 K/UL (ref 0–0.2)
BASOPHILS NFR BLD: 0 % (ref 0–2)
BUN SERPL-MCNC: 14 MG/DL (ref 8–23)
CALCIUM SERPL-MCNC: 8.9 MG/DL (ref 8.3–10.4)
CHLORIDE SERPL-SCNC: 102 MMOL/L (ref 101–110)
CO2 SERPL-SCNC: 25 MMOL/L (ref 21–32)
CREAT SERPL-MCNC: 1 MG/DL (ref 0.8–1.5)
DIFFERENTIAL METHOD BLD: ABNORMAL
EOSINOPHIL # BLD: 0.1 K/UL (ref 0–0.8)
EOSINOPHIL NFR BLD: 2 % (ref 0.5–7.8)
ERYTHROCYTE [DISTWIDTH] IN BLOOD BY AUTOMATED COUNT: 13.2 % (ref 11.9–14.6)
GLUCOSE BLD STRIP.AUTO-MCNC: 163 MG/DL (ref 65–100)
GLUCOSE BLD STRIP.AUTO-MCNC: 166 MG/DL (ref 65–100)
GLUCOSE BLD STRIP.AUTO-MCNC: 198 MG/DL (ref 65–100)
GLUCOSE BLD STRIP.AUTO-MCNC: 216 MG/DL (ref 65–100)
GLUCOSE SERPL-MCNC: 166 MG/DL (ref 65–100)
HCT VFR BLD AUTO: 41.2 % (ref 41.1–50.3)
HGB BLD-MCNC: 13.7 G/DL (ref 13.6–17.2)
IMM GRANULOCYTES # BLD AUTO: 0.1 K/UL (ref 0–0.5)
IMM GRANULOCYTES NFR BLD AUTO: 1 % (ref 0–5)
LYMPHOCYTES # BLD: 1.1 K/UL (ref 0.5–4.6)
LYMPHOCYTES NFR BLD: 11 % (ref 13–44)
MCH RBC QN AUTO: 32.9 PG (ref 26.1–32.9)
MCHC RBC AUTO-ENTMCNC: 33.3 G/DL (ref 31.4–35)
MCV RBC AUTO: 98.8 FL (ref 82–102)
MONOCYTES # BLD: 0.6 K/UL (ref 0.1–1.3)
MONOCYTES NFR BLD: 7 % (ref 4–12)
NEUTS SEG # BLD: 7.7 K/UL (ref 1.7–8.2)
NEUTS SEG NFR BLD: 79 % (ref 43–78)
NRBC # BLD: 0 K/UL (ref 0–0.2)
PLATELET # BLD AUTO: 193 K/UL (ref 150–450)
PMV BLD AUTO: 10.1 FL (ref 9.4–12.3)
POTASSIUM SERPL-SCNC: 4 MMOL/L (ref 3.5–5.1)
RBC # BLD AUTO: 4.17 M/UL (ref 4.23–5.6)
SERVICE CMNT-IMP: ABNORMAL
SODIUM SERPL-SCNC: 135 MMOL/L (ref 133–143)
WBC # BLD AUTO: 9.6 K/UL (ref 4.3–11.1)

## 2023-01-10 PROCEDURE — 94760 N-INVAS EAR/PLS OXIMETRY 1: CPT

## 2023-01-10 PROCEDURE — 36415 COLL VENOUS BLD VENIPUNCTURE: CPT

## 2023-01-10 PROCEDURE — 6370000000 HC RX 637 (ALT 250 FOR IP): Performed by: SURGERY

## 2023-01-10 PROCEDURE — 2700000000 HC OXYGEN THERAPY PER DAY

## 2023-01-10 PROCEDURE — 97162 PT EVAL MOD COMPLEX 30 MIN: CPT

## 2023-01-10 PROCEDURE — 1100000000 HC RM PRIVATE

## 2023-01-10 PROCEDURE — 97530 THERAPEUTIC ACTIVITIES: CPT

## 2023-01-10 PROCEDURE — 3E0L4GC INTRODUCTION OF OTHER THERAPEUTIC SUBSTANCE INTO PLEURAL CAVITY, PERCUTANEOUS ENDOSCOPIC APPROACH: ICD-10-PCS | Performed by: SURGERY

## 2023-01-10 PROCEDURE — 80048 BASIC METABOLIC PNL TOTAL CA: CPT

## 2023-01-10 PROCEDURE — 71045 X-RAY EXAM CHEST 1 VIEW: CPT

## 2023-01-10 PROCEDURE — 82962 GLUCOSE BLOOD TEST: CPT

## 2023-01-10 PROCEDURE — 2580000003 HC RX 258: Performed by: SURGERY

## 2023-01-10 PROCEDURE — 6370000000 HC RX 637 (ALT 250 FOR IP)

## 2023-01-10 PROCEDURE — 0BBC4ZZ EXCISION OF RIGHT UPPER LUNG LOBE, PERCUTANEOUS ENDOSCOPIC APPROACH: ICD-10-PCS | Performed by: SURGERY

## 2023-01-10 PROCEDURE — 85025 COMPLETE CBC W/AUTO DIFF WBC: CPT

## 2023-01-10 PROCEDURE — 0BBF4ZZ EXCISION OF RIGHT LOWER LUNG LOBE, PERCUTANEOUS ENDOSCOPIC APPROACH: ICD-10-PCS | Performed by: SURGERY

## 2023-01-10 PROCEDURE — 99232 SBSQ HOSP IP/OBS MODERATE 35: CPT | Performed by: INTERNAL MEDICINE

## 2023-01-10 RX ORDER — HYDROMORPHONE HYDROCHLORIDE 1 MG/ML
0.5 INJECTION, SOLUTION INTRAMUSCULAR; INTRAVENOUS; SUBCUTANEOUS EVERY 4 HOURS PRN
Status: DISCONTINUED | OUTPATIENT
Start: 2023-01-10 | End: 2023-01-19 | Stop reason: HOSPADM

## 2023-01-10 RX ORDER — OXYCODONE HYDROCHLORIDE AND ACETAMINOPHEN 5; 325 MG/1; MG/1
2 TABLET ORAL EVERY 4 HOURS PRN
Status: DISCONTINUED | OUTPATIENT
Start: 2023-01-10 | End: 2023-01-19 | Stop reason: HOSPADM

## 2023-01-10 RX ORDER — OXYCODONE HYDROCHLORIDE AND ACETAMINOPHEN 5; 325 MG/1; MG/1
1 TABLET ORAL EVERY 4 HOURS PRN
Status: DISCONTINUED | OUTPATIENT
Start: 2023-01-10 | End: 2023-01-19 | Stop reason: HOSPADM

## 2023-01-10 RX ORDER — POLYETHYLENE GLYCOL 3350 17 G/17G
17 POWDER, FOR SOLUTION ORAL 2 TIMES DAILY
Status: DISCONTINUED | OUTPATIENT
Start: 2023-01-10 | End: 2023-01-13

## 2023-01-10 RX ORDER — DOCUSATE SODIUM 100 MG/1
100 CAPSULE, LIQUID FILLED ORAL DAILY
Status: DISCONTINUED | OUTPATIENT
Start: 2023-01-10 | End: 2023-01-13

## 2023-01-10 RX ORDER — INSULIN GLARGINE 100 [IU]/ML
16 INJECTION, SOLUTION SUBCUTANEOUS DAILY
Status: DISCONTINUED | OUTPATIENT
Start: 2023-01-11 | End: 2023-01-13

## 2023-01-10 RX ADMIN — ACETAMINOPHEN 500 MG: 500 TABLET, FILM COATED ORAL at 12:29

## 2023-01-10 RX ADMIN — GABAPENTIN 600 MG: 300 CAPSULE ORAL at 08:47

## 2023-01-10 RX ADMIN — DOCUSATE SODIUM 100 MG: 100 CAPSULE, LIQUID FILLED ORAL at 12:29

## 2023-01-10 RX ADMIN — ACETAMINOPHEN 500 MG: 500 TABLET, FILM COATED ORAL at 04:37

## 2023-01-10 RX ADMIN — GABAPENTIN 600 MG: 300 CAPSULE ORAL at 20:32

## 2023-01-10 RX ADMIN — OXYCODONE 5 MG: 5 TABLET ORAL at 03:46

## 2023-01-10 RX ADMIN — SODIUM CHLORIDE, PRESERVATIVE FREE 10 ML: 5 INJECTION INTRAVENOUS at 08:48

## 2023-01-10 RX ADMIN — GUAIFENESIN 600 MG: 600 TABLET ORAL at 20:32

## 2023-01-10 RX ADMIN — CARVEDILOL 25 MG: 25 TABLET, FILM COATED ORAL at 08:48

## 2023-01-10 RX ADMIN — OXYCODONE AND ACETAMINOPHEN 2 TABLET: 5; 325 TABLET ORAL at 20:32

## 2023-01-10 RX ADMIN — OXYCODONE 5 MG: 5 TABLET ORAL at 08:47

## 2023-01-10 RX ADMIN — ACETAMINOPHEN 500 MG: 500 TABLET, FILM COATED ORAL at 17:04

## 2023-01-10 RX ADMIN — LISINOPRIL 10 MG: 5 TABLET ORAL at 08:47

## 2023-01-10 RX ADMIN — POLYETHYLENE GLYCOL 3350 17 G: 17 POWDER, FOR SOLUTION ORAL at 20:33

## 2023-01-10 RX ADMIN — ACETAMINOPHEN 500 MG: 500 TABLET, FILM COATED ORAL at 08:47

## 2023-01-10 RX ADMIN — INSULIN LISPRO 2 UNITS: 100 INJECTION, SOLUTION INTRAVENOUS; SUBCUTANEOUS at 12:30

## 2023-01-10 RX ADMIN — INSULIN GLARGINE 10 UNITS: 100 INJECTION, SOLUTION SUBCUTANEOUS at 08:50

## 2023-01-10 RX ADMIN — ATORVASTATIN CALCIUM 20 MG: 20 TABLET, FILM COATED ORAL at 08:48

## 2023-01-10 RX ADMIN — HYDROCHLOROTHIAZIDE 25 MG: 25 TABLET ORAL at 08:47

## 2023-01-10 RX ADMIN — OXYCODONE AND ACETAMINOPHEN 2 TABLET: 5; 325 TABLET ORAL at 12:28

## 2023-01-10 RX ADMIN — SODIUM CHLORIDE, PRESERVATIVE FREE 10 ML: 5 INJECTION INTRAVENOUS at 20:33

## 2023-01-10 RX ADMIN — SPIRONOLACTONE 25 MG: 25 TABLET ORAL at 08:48

## 2023-01-10 RX ADMIN — CARVEDILOL 25 MG: 25 TABLET, FILM COATED ORAL at 17:05

## 2023-01-10 RX ADMIN — SENNOSIDES AND DOCUSATE SODIUM 2 TABLET: 8.6; 5 TABLET ORAL at 08:48

## 2023-01-10 RX ADMIN — POLYETHYLENE GLYCOL 3350 17 G: 17 POWDER, FOR SOLUTION ORAL at 08:48

## 2023-01-10 RX ADMIN — GUAIFENESIN 600 MG: 600 TABLET ORAL at 08:47

## 2023-01-10 ASSESSMENT — PAIN DESCRIPTION - LOCATION
LOCATION: CHEST
LOCATION: CHEST;INCISION
LOCATION: CHEST

## 2023-01-10 ASSESSMENT — PAIN DESCRIPTION - PAIN TYPE
TYPE: SURGICAL PAIN
TYPE: ACUTE PAIN
TYPE: SURGICAL PAIN

## 2023-01-10 ASSESSMENT — PAIN DESCRIPTION - DESCRIPTORS
DESCRIPTORS: ACHING;SORE
DESCRIPTORS: ACHING;DISCOMFORT
DESCRIPTORS: SHARP

## 2023-01-10 ASSESSMENT — PAIN SCALES - GENERAL
PAINLEVEL_OUTOF10: 6
PAINLEVEL_OUTOF10: 0
PAINLEVEL_OUTOF10: 5
PAINLEVEL_OUTOF10: 2
PAINLEVEL_OUTOF10: 6

## 2023-01-10 ASSESSMENT — PAIN DESCRIPTION - ORIENTATION
ORIENTATION: RIGHT
ORIENTATION: MID;RIGHT
ORIENTATION: RIGHT;MID
ORIENTATION: MID;RIGHT

## 2023-01-10 ASSESSMENT — PAIN DESCRIPTION - FREQUENCY: FREQUENCY: INTERMITTENT

## 2023-01-10 ASSESSMENT — PAIN DESCRIPTION - ONSET: ONSET: GRADUAL

## 2023-01-10 ASSESSMENT — PAIN - FUNCTIONAL ASSESSMENT
PAIN_FUNCTIONAL_ASSESSMENT: PREVENTS OR INTERFERES WITH MANY ACTIVE NOT PASSIVE ACTIVITIES
PAIN_FUNCTIONAL_ASSESSMENT: PREVENTS OR INTERFERES SOME ACTIVE ACTIVITIES AND ADLS
PAIN_FUNCTIONAL_ASSESSMENT: PREVENTS OR INTERFERES WITH MANY ACTIVE NOT PASSIVE ACTIVITIES

## 2023-01-10 NOTE — PROGRESS NOTES
Henry Sepulveda  Admission Date: 1/4/2023         Daily Progress Note: 1/10/2023    The patient's chart is reviewed and the patient is discussed with the staff. Background: Patient is a 69 y.o. male presents with right sided chest pressure and SOB. Pt has history of spontaneous Ptx that was documented as on the left but it was actually on the right in 2017 which required large bore chest tube at the time. Did not require surgical intervention. This was the same time he had bilateral subdural hematomas and was wearing CPAP. Pt came to ED for further evaluation and CXR showed moderate to large PTX. He is currently on 6L NC with sat 90-92%. He still has some chest discomfort. Denies SOB. Denies any trauma. Mild cough, afebrile. He is COVID negative. He does have a history of CASSY on ASV for central apneas following subdural hematomas. He also has a history of chronic Afib on Pradaxa. His wife is at bedside. Former smoker, but never officially diagnosed with COPD. No inhaler or O2 at home. CT chest in 2009 with mild bullous emphysema noted. Subjective:      S/P right VATS. Wedge blebectomy of right upper lobe. Wedge blebectomy of right lower lobe.   Talc pleurodesis on 1/9  On 4 lpm with sat of 95%    1 Day Post-Op       Current Facility-Administered Medications   Medication Dose Route Frequency    insulin glargine (LANTUS) injection vial 10 Units  10 Units SubCUTAneous Daily    sennosides-docusate sodium (SENOKOT-S) 8.6-50 MG tablet 2 tablet  2 tablet Oral Daily    bisacodyl (DULCOLAX) suppository 10 mg  10 mg Rectal Daily PRN    glucose chewable tablet 16 g  4 tablet Oral PRN    dextrose bolus 10% 125 mL  125 mL IntraVENous PRN    Or    dextrose bolus 10% 250 mL  250 mL IntraVENous PRN    glucagon (rDNA) injection 1 mg  1 mg SubCUTAneous PRN    dextrose 10 % infusion   IntraVENous Continuous PRN    polyethylene glycol (GLYCOLAX) packet 17 g  17 g Oral Daily    metoprolol (LOPRESSOR) injection 5 mg  5 mg IntraVENous Q6H PRN    carvedilol (COREG) tablet 25 mg  25 mg Oral BID WC    [Held by provider] dabigatran (PRADAXA) capsule 150 mg  150 mg Oral BID    lisinopril (PRINIVIL;ZESTRIL) tablet 10 mg  10 mg Oral Daily    gabapentin (NEURONTIN) capsule 600 mg  600 mg Oral BID    guaiFENesin (MUCINEX) extended release tablet 600 mg  600 mg Oral BID    hydroCHLOROthiazide (HYDRODIURIL) tablet 25 mg  25 mg Oral Daily    atorvastatin (LIPITOR) tablet 20 mg  20 mg Oral Daily    spironolactone (ALDACTONE) tablet 25 mg  25 mg Oral Daily    sodium chloride flush 0.9 % injection 5-40 mL  5-40 mL IntraVENous 2 times per day    sodium chloride flush 0.9 % injection 5-40 mL  5-40 mL IntraVENous PRN    0.9 % sodium chloride infusion   IntraVENous PRN    ondansetron (ZOFRAN-ODT) disintegrating tablet 4 mg  4 mg Oral Q8H PRN    Or    ondansetron (ZOFRAN) injection 4 mg  4 mg IntraVENous Q6H PRN    acetaminophen (TYLENOL) tablet 650 mg  650 mg Oral Q6H PRN    Or    acetaminophen (TYLENOL) suppository 650 mg  650 mg Rectal Q6H PRN    morphine injection 2 mg  2 mg IntraVENous Q4H PRN    acetaminophen (TYLENOL) tablet 500 mg  500 mg Oral Q4H    oxyCODONE (ROXICODONE) immediate release tablet 5 mg  5 mg Oral Q4H PRN    insulin lispro (HUMALOG) injection vial 0-8 Units  0-8 Units SubCUTAneous TID WC    insulin lispro (HUMALOG) injection vial 0-4 Units  0-4 Units SubCUTAneous Nightly     Review of Systems: Comprehensive ROS negative except in HPI  Objective:   Blood pressure 127/69, pulse 90, temperature 98.1 °F (36.7 °C), resp. rate 18, height 6' 2\" (1.88 m), weight (!) 324 lb 4.8 oz (147.1 kg), SpO2 95 %.    Intake/Output Summary (Last 24 hours) at 1/10/2023 1004  Last data filed at 1/10/2023 0843  Gross per 24 hour   Intake 1480 ml   Output 585 ml   Net 895 ml       Physical Exam:   Constitutional:  the patient is well developed and in no acute distress  EENMT:  Sclera clear, pupils equal, oral mucosa moist  Respiratory: symmetric chest rise. CTA bilaterally; on RA   Chest tubes X2 - small air leak X 1, no air leak X 1 (< 100 cc drainage)  Cardiovascular:  RRR without M,G,R. There is no lower extremity edema. Gastrointestinal: soft and non-tender; with positive bowel sounds. rounded. Musculoskeletal: warm without cyanosis. Normal muscle tone. Skin:  no jaundice or rashes, no wounds   Neurologic: symmetric strength, fluent speech  Psychiatric:  calm, appropriate, oriented x 4    Imaging: I performed an independent interpretation of the patient's images. CXR:     1/10     1/6                                                                                    1/5      1/4    CT chest 1/4    LAB:  Recent Labs     01/08/23  0654 01/10/23  0429   WBC 7.9 9.6   HGB 14.3 13.7   HCT 42.6 41.2    193       Recent Labs     01/08/23  0654 01/10/23  0429    135   K 4.1 4.0    102   CO2 26 25   BUN 11 14   CREATININE 0.80 1.00       No results for input(s): TROPHS, NTPROBNP, CRP, ESR in the last 72 hours. Recent Labs     01/08/23  0654 01/10/23  0429   GLUCOSE 255* 166*        Microbiology:   No results for input(s): CULTURE in the last 72 hours. ECHO: 07/19/22    TRANSTHORACIC ECHOCARDIOGRAM (TTE) COMPLETE (CONTRAST/BUBBLE/3D PRN) 07/19/2022  5:17 PM, 07/19/2022 12:00 AM (Final)    Interpretation Summary    Left Ventricle: Normal left ventricular systolic function with a visually estimated EF of 60 - 65%. Left ventricle size is normal. Mildly increased wall thickness. Normal wall motion. Mitral Valve: Mild regurgitation. Tricuspid Valve: Mild regurgitation. Left Atrium: Left atrium is severely dilated. LA Vol Index is  65 ml/m2. Right Atrium: Right atrium is moderately dilated. Technical qualifiers: Technically difficult study, color flow Doppler was performed and pulse wave and/or continuous wave Doppler was performed.     Signed by: Janell Champion MD on 7/19/2022  5:17 PM, Signed by: Unknown Provider Result on 7/19/2022 12:00 AM    Assessment and Plan:  (Medical Decision Making)   Impression: 70 yo male with history of R spontaneous PTX in 2017 reports to ED with chest pressure and SOB found to have R spontaneous Ptx. History of chronic Afib on pradaxa. Right sided pigtail chest tube placed. Spontaneous pneumothorax    Acute respiratory failure with hypoxia (HCC)  Post op VAT, blebectomy and pleurodesis      Anticoagulant long-term use    Atrial fibrillation (HCC)  on pradaxa with chronic afib      CASSY on CPAP  O2 at night while has chest tubes  On ASV at home for central apnea    1 Day Post-Op, surgery managing chest tubes. Pains controlled. Continue bowel regimen. On oxygen Q HS for now. Mobilize as tolerated. In this split/shared evaluation I performed performed a medically appropriate history and exam, counseled and educated the patient and/or family member, ordered medications, tests or procedures, documented information in EMR, and coordinated care. which accounted for 13 clinical time. More than 50% of the time documented was spent in face-to-face contact with the patient and in the care of the patient on the floor/unit where the patient is located. Angie Hyatt NP, APRN - CNP    In this split/shared evaluation I performed reviewed the patients's H&P, available images, labs, cultures. , discussed case in detail with NPP, performed a medically appropriate history and exam, counseled and educated the patient and/or family member, ordered and/or reviewed medications, tests or procedures, documented information in EMR, independently interpreted images, and coordinated care. which accounted for 15 minutes clinical time. Impression: 70 yo male with history of R spontaneous PTX in 2017 reports to ED with chest pressure and SOB found to have R spontaneous Ptx. History of chronic Afib on pradaxa.  S/P VATs yesterday, 2 surgical chest tubes in place on suction, tidaling, output reasonable, no air leak. Pain control, IS, mobility. Will sign off, call with questions. Consider pulmonary follow up with C PFTs after discharge.      Cyndee Washburn MD

## 2023-01-10 NOTE — ADDENDUM NOTE
Addendum  created 01/09/23 1923 by Angélica Quinteros, CARA Schneider CRNA    Flowsheet accepted, Intraprocedure Flowsheets edited, Intraprocedure Meds edited, LDA properties accepted, Orders acknowledged in Narrator, SmartForm saved

## 2023-01-10 NOTE — PROGRESS NOTES
ACUTE PHYSICAL THERAPY GOALS:   (Developed with and agreed upon by patient and/or caregiver.)    (1.) Lavern Marroquin  will move from supine to sit and sit to supine  with INDEPENDENCE within 7 treatment day(s). - Goal not met 1/10/2023 (Continue goal)  (2.) Lavern Marroquin will transfer from bed to chair and chair to bed with MODIFIED INDEPENDENCE using the least restrictive device within 7 treatment day(s). - Goal not met 1/10/2023 (Continue goal)  (3.) Lavern Marroquin will ambulate with MODIFIED INDEPENDENCE for 250 feet with the least restrictive device within 7 treatment day(s). - Goal not met 1/10/2023 (Continue goal)  (4.) Lavern Marroquin will perform standing static and dynamic balance activities x 20 minutes with SUPERVISION to improve safety within 7 treatment day(s). - Goal met 1/10/2023  (5.) Lavern Marroquin will perform bilateral lower extremity exercises x 20 min for HEP with INDEPENDENCE to improve strength, endurance, and functional mobility within 7 treatment day(s).   - Goal not met 1/10/2023 (Continue goal)      PHYSICAL THERAPY Daily Note, Re-evaluation, and PM  (Link to Caseload Tracking: PT Visit Days : 1  Acknowledge Orders  Time In/Out  PT Charge Capture  Rehab Caseload Tracker    Lavern Marroquin is a 69 y.o. male   PRIMARY DIAGNOSIS: Spontaneous pneumothorax  Acute respiratory failure with hypoxia ([facility] [address] [J96.01]  Primary spontaneous pneumothorax [J93.11]  Spontaneous pneumothorax [J93.83]  Procedure(s) (LRB):  RIGHT VATS/WEDGE BLEBECTOMY/RIGHT UPPER LOBE WEDGE BLEBECTOMY/RIGHT LOWER LOBE WEDGE BLEBECTOMY/TALC PLEURODESIS (Right)  1 Day Post-Op  Reason for Referral: Generalized Muscle Weakness (M62.81)  Difficulty in walking, Not elsewhere classified (R26.2)  Inpatient: Payor: MEDICARE / Plan: MEDICARE PART A AND B / Product Type: *No Product type* /     ASSESSMENT:     REHAB RECOMMENDATIONS:   Recommendation to date pending progress:  [address] [place] [address] [facility]    Equipment:    To Be Determined     ASSESSMENT:  Mr. Meggan Winter  is a 69year old M who presents with spontaneous pneumothorax now being re-evaluated s/p VATs, blebectomy and chest tube placement on 1/9. Upon PT re-evaluation, pt exhibits gross weakness, impaired balance, high pain levels, and reduced activity tolerance. He was able to perform bed mobility, transfers, and side stepping at bed with min A and SpO2 remaining >93% on 4 lpm.   Will continue to anticipate HHPT pending progress. Of note, pt's chest tube disconnected at taped junction proximal to atrium. Therapist immediately reconnected tube and restored suction. Vitals remained stable. Katy Reynoso RN and Kt Padilla NP made aware and assessed pt. Returned pt to bed per NP request and repositioned in partial sidelying. Chest tubes on suction, all needs in reach. [address] [facility] [address] [id number] AM-PAC 6 Clicks Basic Mobility Inpatient Short Form  AM-PAC Mobility Inpatient   How much difficulty turning over in bed?: A Little  How much difficulty sitting down on / standing up from a chair with arms?: A Little  How much difficulty moving from lying on back to sitting on side of bed?: A Little  How much help from another person moving to and from a bed to a chair?: A Little  How much help from another person needed to walk in hospital room?: A Little  How much help from another person for climbing 3-5 steps with a railing?: A Little  -[facility] Inpatient Mobility Raw Score : 18  AM-PAC Inpatient T-Scale Score : 43.63  Mobility Inpatient CMS 0-100% Score: 46.58  Mobility Inpatient CMS G-Code Modifier : CK    SUBJECTIVE:   Mr. Meggan Winter states, \"Lets go. \"     Social/Functional Lives With: Spouse  Type of Home: Condo  Home Layout: One level  Bathroom Toilet: Standard  ADL Assistance: Independent  Homemaking Assistance: Independent  Transfer Assistance: Independent  Active : Yes  Mode of Transportation: Car  Occupation: Retired    OBJECTIVE:     PAIN: VITALS / O2: Rob Cardozo / Kasandra Appl / DRAINS:   Pre Treatment:    Did not rate      Post Treatment: Did not rate Vitals        Oxygen     See above Chest Tube and Continuous Pulse Oximetry    RESTRICTIONS/PRECAUTIONS:  Restrictions/Precautions: Fall Risk                 GROSS EVALUATION: B LE Intact Impaired (Comments):   AROM [x]     PROM [x]    Strength []  Generalized weakness   Balance []  Wide JUDITH, lateral sway, reaching out for UE support in standing   Posture [] Forward Head  Rounded Shoulders   Sensation [x]     Coordination [x]      Tone [x]     Edema [x]    Activity Tolerance []  On 4 lpm NC    []      COGNITION/  PERCEPTION: Intact Impaired (Comments):   Orientation [x]     Vision [x]     Hearing [x]     Cognition  [x]       MOBILITY: I Mod I S SBA CGA Min Mod Max Total  NT x2 Comments:   Bed Mobility    Rolling [] [] [] [] [] [x] [] [] [] [] []    Supine to Sit [] [] [] [] [] [x] [] [] [] [] []    Scooting [] [] [] [] [] [x] [] [] [] [] []    Sit to Supine [] [] [] [] [] [] [] [] [] [x] []    Transfers    Sit to Stand [] [] [] [] [] [x] [] [] [] [] []    Bed to Chair [] [] [] [] [] [] [] [] [] [x] []    Stand to Sit [] [] [] [] [] [x] [] [x] [] [] []     [] [] [] [] [] [] [] [] [] [] []    I=Independent, Mod I=Modified Independent, S=Supervision, SBA=Standby Assistance, CGA=Contact Guard Assistance,   Min=Minimal Assistance, Mod=Moderate Assistance, Max=Maximal Assistance, Total=Total Assistance, NT=Not Tested    GAIT: I Mod I S SBA CGA Min Mod Max Total  NT x2 Comments:   Level of Assistance [] [] [] [] [] [x] [] [] [] [] []    Distance 10 feet    DME None    Gait Quality Decreased cj , Decreased step length, and Trunk sway increased    Weightbearing Status      Stairs      I=Independent, Mod I=Modified Independent, S=Supervision, SBA=Standby Assistance, CGA=Contact Guard Assistance,   Min=Minimal Assistance, Mod=Moderate Assistance, Max=Maximal Assistance, Total=Total Assistance, NT=Not Tested    PLAN:   FREQUENCY AND DURATION: 3 times/week for duration of hospital stay or until stated goals are met, whichever comes first.    THERAPY PROGNOSIS: Good    PROBLEM LIST:   (Skilled intervention is medically necessary to address:)  Decreased ADL/Functional Activities  Decreased Activity Tolerance  Decreased Balance  Decreased Gait Ability  Decreased Strength  Decreased Transfer Abilities INTERVENTIONS PLANNED:   (Benefits and precautions of physical therapy have been discussed with the patient.)  Self Care Training  Therapeutic Activity  Therapeutic Exercise/HEP  Neuromuscular Re-education  Gait Training  Education       TREATMENT:   EVALUATION: LOW COMPLEXITY: (Untimed Charge)    TREATMENT:   Therapeutic Activity (40 Minutes): Therapeutic activity included Rolling, Supine to Sit, Scooting, Transfer Training, Ambulation on level ground, Sitting balance , and Standing balance to improve functional Activity tolerance, Balance, Mobility, and Strength.     TREATMENT GRID:  N/A    AFTER TREATMENT PRECAUTIONS: Bed, Bed/Chair Locked, Call light within reach, Needs within reach, RN notified, and Visitors at bedside    INTERDISCIPLINARY COLLABORATION:  RN/ PCT and OT/ SCOTT    EDUCATION:      TIME IN/OUT:  Time In: 1340  Time Out: [address]  Minutes: [address], PT

## 2023-01-10 NOTE — PROGRESS NOTES
Hospitalist Progress Note   Admit Date:  2023  3:40 PM   Name:  Zara Lr   Age:  69 y.o. Sex:  male  :  1955   MRN:  [id number]   Room:  [facility]/    Presenting Complaint: Chest Pain and Shortness of Breath     Reason(s) for Admission: Acute respiratory failure with hypoxia ([address] [J96.01]  Primary spontaneous pneumothorax [J93.11]  Spontaneous pneumothorax [J93.83]     Hospital Course:   Zara Lr is a 69 y.o. male with medical history hypertension, A. fib on Pradaxa, CASSY, CPAP at night, bullous emphysema, history of bilateral subdural hematomas 2017 and diabetes mellitus presented to ED with acute onset right-sided chest pain. Patient reports he was talking to his neighbor when he developed sharp severe right-sided chest pain, associated with shortness of breath. Reports he has history of spontaneous left pneumothorax in 2017, shortly after putting on his CPAP while hospitalized for subdural hematoma. He is a former smoker, quit years ago. Denies nausea, vomiting, fever, chills or abdominal pain. Chest tube was placed. He had air leak after chest tube was clamped. CT surgery was consulted. He underwent VATS surgery on . Subjective & 24hr Events (01/10/23):   Postoperatively, patient is doing well. No fever no chills. He has pain at the site of the chest tubes. No nausea no vomiting. Assessment & Plan: This is a 68-year-old male with:     Acute hypoxic respiratory failure: 2/2 to spontaneous pneumothorax due to bullous emphysema. S/p Chest Tube in the ER. Pulmonary on board. Appreciate recommendations. Chest tube clamped but unfortunately still has a leak. Chest x-ray repeated this morning was reviewed by me showed small apical pneumothorax. General surgery consulted. He underwent VATS on .  1/10 currently on pain regimen for pain at the site of the chest tubes. Still has output from the chest tubes.     HTN:   Blood pressure fairly well controlled. Chronic afib:   Rate controlled. Pradaxa on hold. Plan to restart anticoagulation when okay with Dr. Lanie Marin. DM2:   Sliding scale insulin. Lantus 16 units nightly. CASSY:   no CPAP given chest tube placement    Neuropathy:   gabapentin    HLD:   statin    Morbid obesity:  complicates care, lifestyle modifications encouraged    Constipation resolved  Bowel regimen. Anticipated discharge needs:    Anticipate inpatient hospital stay for atleast 48- 72 hours. Home when medically cleared. Diet:  ADULT DIET; Regular; 4 carb choices (60 gm/meal)  DVT PPx: Dabigatran- held. SCDs  Code status: Full Code    Hospital Problems:  Principal Problem:    Spontaneous pneumothorax  Active Problems:    Acute respiratory failure with hypoxia (HCC)    Anticoagulant long-term use    Primary spontaneous pneumothorax    Benign prostatic hyperplasia with nocturia    Atrial fibrillation (HCC)    CASSY on CPAP  Resolved Problems:    * No resolved hospital problems. *      Objective:   Patient Vitals for the past 24 hrs:   Temp Pulse Resp BP SpO2   01/10/23 1504 98 °F (36.7 °C) -- 20 122/77 98 %   01/10/23 1444 98.1 °F (36.7 °C) 80 -- (!) 133/101 96 %   01/10/23 0907 98.1 °F (36.7 °C) 90 18 127/69 95 %   01/10/23 0847 -- 91 -- 120/70 --   01/10/23 0446 98.3 °F (36.8 °C) 91 20 115/75 95 %   01/10/23 0020 98.4 °F (36.9 °C) 76 20 106/62 95 %   01/09/23 2107 97.6 °F (36.4 °C) 93 20 98/62 94 %   01/09/23 1905 97 °F (36.1 °C) 84 20 122/83 97 %       Oxygen Therapy  SpO2: 98 %  Pulse via Oximetry: 82 beats per minute  Pulse Oximeter Device Mode: Continuous  Pulse Oximeter Device Location: Finger  O2 Device: Nasal cannula  O2 Flow Rate (L/min): 4 L/min    Estimated body mass index is 41.64 kg/m² as calculated from the following:    Height as of this encounter: 6' 2\" (1.88 m). Weight as of this encounter: 324 lb 4.8 oz (147.1 kg).     Intake/Output Summary (Last 24 hours) at 1/10/2023 6341  Last data filed at 1/10/2023 1623  Gross per 24 hour   Intake 720 ml   Output 550 ml   Net 170 ml         Physical Exam:     Blood pressure 122/77, pulse 80, temperature 98 °F (36.7 °C), temperature source Oral, resp. rate 20, height 6' 2\" (1.88 m), weight (!) 324 lb 4.8 oz (147.1 kg), SpO2 98 %. General:    Well nourished. Morbidly obese, alert, awake,    Head:  Normocephalic, atraumatic  Eyes:  Sclerae appear normal.  Pupils equally round. ENT:  Nares appear normal.  Moist oral mucosa  Neck:  No restricted ROM. Trachea midline   CV:   RRR. No m/r/g. No jugular venous distension. Lungs:   Decreased breath sounds on right. Chest tube in place on the right,  Abdomen:   Soft, nontender, nondistended. Extremities: No cyanosis or clubbing. No edema  Skin:     No rashes and normal coloration. Warm and dry. Neuro:  CN II-XII grossly intact. Sensation intact. Psych:  Normal mood and affect.       I have personally reviewed labs and tests showing:  Recent Labs:  Recent Results (from the past 48 hour(s))   POCT Glucose    Collection Time: 01/08/23  9:49 PM   Result Value Ref Range    POC Glucose 181 (H) 65 - 100 mg/dL    Performed by: [facility]    POCT Glucose    Collection Time: 01/09/23  7:08 AM   Result Value Ref Range    POC Glucose 223 (H) 65 - 100 mg/dL    Performed by: Freya    TYPE AND SCREEN    Collection Time: 01/09/23 10:05 AM   Result Value Ref Range    Crossmatch expiration date 01/12/2023,2351     ABO/Rh O POSITIVE     Antibody Screen NEG    POCT Glucose    Collection Time: 01/09/23 11:18 AM   Result Value Ref Range    POC Glucose 192 (H) 65 - 100 mg/dL    Performed by: Freya    POCT Glucose    Collection Time: 01/09/23  1:34 PM   Result Value Ref Range    POC Glucose 189 (H) 65 - 100 mg/dL    Performed by: Courtney    POCT Glucose    Collection Time: 01/09/23  5:31 PM   Result Value Ref Range    POC Glucose 233 (H) 65 - 100 mg/dL    Performed by: Kelsey Bradford Glucose    Collection Time: 01/09/23  9:05 PM   Result Value Ref Range    POC Glucose 223 (H) 65 - 100 mg/dL    Performed by: Colleen    CBC with Auto Differential    Collection Time: 01/10/23  4:29 AM   Result Value Ref Range    WBC 9.6 4.3 - 11.1 K/uL    RBC 4.17 (L) 4.23 - 5.6 M/uL    Hemoglobin 13.7 13.6 - 17.2 g/dL    Hematocrit 41.2 41.1 - 50.3 %    MCV 98.8 82 - 102 FL    MCH 32.9 26.1 - 32.9 PG    MCHC 33.3 31.4 - 35.0 g/dL    RDW 13.2 11.9 - 14.6 %    Platelets 646 181 - 158 K/uL    MPV 10.1 9.4 - 12.3 FL    nRBC 0.00 0.0 - 0.2 K/uL    Differential Type AUTOMATED      Seg Neutrophils 79 (H) 43 - 78 %    Lymphocytes 11 (L) 13 - 44 %    Monocytes 7 4.0 - 12.0 %    Eosinophils % 2 0.5 - 7.8 %    Basophils 0 0.0 - 2.0 %    Immature Granulocytes 1 0.0 - 5.0 %    Segs Absolute 7.7 1.7 - 8.2 K/UL    Absolute Lymph # 1.1 0.5 - 4.6 K/UL    Absolute Mono # 0.6 0.1 - 1.3 K/UL    Absolute Eos # 0.1 0.0 - 0.8 K/UL    Basophils Absolute 0.0 0.0 - 0.2 K/UL    Absolute Immature Granulocyte 0.1 0.0 - 0.5 K/UL   Basic Metabolic Panel w/ Reflex to MG    Collection Time: 01/10/23  4:29 AM   Result Value Ref Range    Sodium 135 133 - 143 mmol/L    Potassium 4.0 3.5 - 5.1 mmol/L    Chloride 102 101 - 110 mmol/L    CO2 25 21 - 32 mmol/L    Anion Gap 8 2 - 11 mmol/L    Glucose 166 (H) 65 - 100 mg/dL    BUN 14 8 - 23 MG/DL    Creatinine 1.00 0.8 - 1.5 MG/DL    Est, Glom Filt Rate >60 >60 ml/min/1.73m2    Calcium 8.9 8.3 - 10.4 MG/DL   POCT Glucose    Collection Time: 01/10/23  7:34 AM   Result Value Ref Range    POC Glucose 166 (H) 65 - 100 mg/dL    Performed by: [employer]    POCT Glucose    Collection Time: 01/10/23 12:05 PM   Result Value Ref Range    POC Glucose 216 (H) 65 - 100 mg/dL    Performed by: [facility]    POCT Glucose    Collection Time: 01/10/23  4:53 PM   Result Value Ref Range    POC Glucose 163 (H) 65 - 100 mg/dL    Performed by: Freya        I have personally reviewed imaging studies showing: Other Studies:  XR CHEST PORTABLE   Final Result      1. Stable right chest tubes. No visible pneumothorax on today's study. 2. Right basilar atelectasis. XR CHEST PORTABLE   Final Result   Placement of 2 basilar chest tubes with probable small residual   right apical pneumothorax. XR CHEST PORTABLE   Final Result      1. Small right apical pneumothorax. Right chest tube is in place. 2. Right lung base opacity, likely atelectasis. 3. No significant change compared to prior. XR CHEST PORTABLE   Final Result      1. Persistent small right apical pneumothorax. Right chest tube is stable. XR CHEST PORTABLE   Final Result      1. New small right apical pneumothorax, despite presence of the right-sided   chest tube. 2. Bibasilar atelectasis. XR CHEST PORTABLE   Final Result   Unchanged bibasilar lung atelectasis. XR CHEST PORTABLE   Final Result   No evidence of pneumothorax         XR CHEST PORTABLE   Final Result   1. Resolved right pneumothorax. 2. Mild bibasilar lung atelectasis. XR CHEST PORTABLE   Final Result   1. Right chest tube placed, with markedly smaller pneumothorax. 2.  Resolution of the previous tension component. 3.  Mild bibasilar atelectasis. CT CHEST WO CONTRAST   Final Result   1. Persisting large right pneumothorax. 2.  Leftward deviation of mediastinal contours concerning for tension component. 3.  Partial atelectasis of right lung. 4.  Emphysema. 5.  Vascular disease. Urgent findings communicated to the ordering NP Jefferson Valle by the secure text   messaging system at 6:30 PM.      XR CHEST PORTABLE   Final Result   1. Large right pneumothorax. 2.  Pulmonary vascular congestion.                Findings discussed with Dr. Isreal Moran by myself at 4:42 PM.         XR CHEST PORTABLE    (Results Pending)       Current Meds:  Current Facility-Administered Medications   Medication Dose Route Frequency polyethylene glycol (GLYCOLAX) packet 17 g  17 g Oral BID    docusate sodium (COLACE) capsule 100 mg  100 mg Oral Daily    HYDROmorphone HCl PF (DILAUDID) injection 0.5 mg  0.5 mg IntraVENous Q4H PRN    oxyCODONE-acetaminophen (PERCOCET) 5-325 MG per tablet 1 tablet  1 tablet Oral Q4H PRN    oxyCODONE-acetaminophen (PERCOCET) 5-325 MG per tablet 2 tablet  2 tablet Oral Q4H PRN    insulin glargine (LANTUS) injection vial 10 Units  10 Units SubCUTAneous Daily    bisacodyl (DULCOLAX) suppository 10 mg  10 mg Rectal Daily PRN    glucose chewable tablet 16 g  4 tablet Oral PRN    dextrose bolus 10% 125 mL  125 mL IntraVENous PRN    Or    dextrose bolus 10% 250 mL  250 mL IntraVENous PRN    glucagon (rDNA) injection 1 mg  1 mg SubCUTAneous PRN    dextrose 10 % infusion   IntraVENous Continuous PRN    metoprolol (LOPRESSOR) injection 5 mg  5 mg IntraVENous Q6H PRN    carvedilol (COREG) tablet 25 mg  25 mg Oral BID WC    [Held by provider] dabigatran (PRADAXA) capsule 150 mg  150 mg Oral BID    lisinopril (PRINIVIL;ZESTRIL) tablet 10 mg  10 mg Oral Daily    gabapentin (NEURONTIN) capsule 600 mg  600 mg Oral BID    guaiFENesin (MUCINEX) extended release tablet 600 mg  600 mg Oral BID    hydroCHLOROthiazide (HYDRODIURIL) tablet 25 mg  25 mg Oral Daily    atorvastatin (LIPITOR) tablet 20 mg  20 mg Oral Daily    spironolactone (ALDACTONE) tablet 25 mg  25 mg Oral Daily    sodium chloride flush 0.9 % injection 5-40 mL  5-40 mL IntraVENous 2 times per day    sodium chloride flush 0.9 % injection 5-40 mL  5-40 mL IntraVENous PRN    0.9 % sodium chloride infusion   IntraVENous PRN    ondansetron (ZOFRAN-ODT) disintegrating tablet 4 mg  4 mg Oral Q8H PRN    Or    ondansetron (ZOFRAN) injection 4 mg  4 mg IntraVENous Q6H PRN    acetaminophen (TYLENOL) tablet 650 mg  650 mg Oral Q6H PRN    Or    acetaminophen (TYLENOL) suppository 650 mg  650 mg Rectal Q6H PRN    acetaminophen (TYLENOL) tablet 500 mg  500 mg Oral Q4H    insulin lispro (HUMALOG) injection vial 0-8 Units  0-8 Units SubCUTAneous TID WC    insulin lispro (HUMALOG) injection vial 0-4 Units  0-4 Units SubCUTAneous Nightly       Signed:  Joao Mcdaniels MD    Part of this note may have been written by using a voice dictation software. The note has been proof read but may still contain some grammatical/other typographical errors.

## 2023-01-10 NOTE — PLAN OF CARE
PT assessing patient in room, during assessment posterior CT disconnects from tubing to atrium, tube is still in place in chest wall. Tubes rehooked and tape securing tubes replaced. Pt reporting some increased WOB with tubes displaced, returned to baseline once tubes were reconnected. [facility] NP for general surgery notified, Linette Lama arrived to bedside to assess situation. Tubes and suction checked by Linette Lama, no concerns or orders at this time. PT assessment with patient completed.

## 2023-01-10 NOTE — PROGRESS NOTES
Admit Date: 2023    POD 1 Day Post-Op    Procedure:  Procedure(s):  RIGHT VATS/WEDGE BLEBECTOMY/RIGHT UPPER LOBE WEDGE BLEBECTOMY/RIGHT LOWER LOBE WEDGE BLEBECTOMY/TALC PLEURODESIS    Subjective:     Pt alert with NAD noted. Respirations even and unlabored 4 L/min NC. Right CT x 2 in place. Tolerating clear liquids with no N or V.     Objective:       Vitals:    01/10/23 0020 01/10/23 0446 01/10/23 0847 01/10/23 0907   BP: 106/62 115/75 120/70 127/69   Pulse: 76 91 91 90   Resp: 20 20  18   Temp: 98.4 °F (36.9 °C) 98.3 °F (36.8 °C)  98.1 °F (36.7 °C)   TempSrc:       SpO2: 95% 95%  95%   Weight:       Height:           Temp (24hrs), Av.8 °F (36.6 °C), Min:97 °F (36.1 °C), Max:98.6 °F (37 °C)  . I&O reviewed as documented. Voiding   Right anterior  ml SS past 16h no air leak  Right posterior  ml SS past 16h no air leak  +flatus  Bm 23    Afebrile   VSS    Physical Exam  Constitutional:       General: He is not in acute distress. Appearance: Normal appearance. He is obese. HENT:      Mouth/Throat:      Mouth: Mucous membranes are moist.   Cardiovascular:      Rate and Rhythm: Normal rate and regular rhythm. Pulses: Normal pulses. Heart sounds: Normal heart sounds. No murmur heard. No friction rub. No gallop. Pulmonary:      Effort: Pulmonary effort is normal. No respiratory distress. Breath sounds: Normal breath sounds. Comments: Noted small amount of subcutaneous emphysema upper anterior right chest wall  Abdominal:      General: Bowel sounds are normal. There is distension. Comments: Mild abd distention. Pt reports small hard bms    Genitourinary:     Comments: Voiding   Musculoskeletal:         General: No swelling. Normal range of motion. Cervical back: No rigidity. Skin:     General: Skin is warm and dry. Capillary Refill: Capillary refill takes less than 2 seconds.       Comments: Right CT x 2 with vaseline occlusive drsg c/d/I with no signs of infection. Neurological:      Mental Status: He is alert and oriented to person, place, and time.    Psychiatric:         Behavior: Behavior normal.      Labs:   Recent Results (from the past 24 hour(s))   POCT Glucose    Collection Time: 01/09/23  1:34 PM   Result Value Ref Range    POC Glucose 189 (H) 65 - 100 mg/dL    Performed by: Courtney    POCT Glucose    Collection Time: 01/09/23  5:31 PM   Result Value Ref Range    POC Glucose 233 (H) 65 - 100 mg/dL    Performed by: Clayton Neumann    POCT Glucose    Collection Time: 01/09/23  9:05 PM   Result Value Ref Range    POC Glucose 223 (H) 65 - 100 mg/dL    Performed by: Colleen    CBC with Auto Differential    Collection Time: 01/10/23  4:29 AM   Result Value Ref Range    WBC 9.6 4.3 - 11.1 K/uL    RBC 4.17 (L) 4.23 - 5.6 M/uL    Hemoglobin 13.7 13.6 - 17.2 g/dL    Hematocrit 41.2 41.1 - 50.3 %    MCV 98.8 82 - 102 FL    MCH 32.9 26.1 - 32.9 PG    MCHC 33.3 31.4 - 35.0 g/dL    RDW 13.2 11.9 - 14.6 %    Platelets 231 306 - 548 K/uL    MPV 10.1 9.4 - 12.3 FL    nRBC 0.00 0.0 - 0.2 K/uL    Differential Type AUTOMATED      Seg Neutrophils 79 (H) 43 - 78 %    Lymphocytes 11 (L) 13 - 44 %    Monocytes 7 4.0 - 12.0 %    Eosinophils % 2 0.5 - 7.8 %    Basophils 0 0.0 - 2.0 %    Immature Granulocytes 1 0.0 - 5.0 %    Segs Absolute 7.7 1.7 - 8.2 K/UL    Absolute Lymph # 1.1 0.5 - 4.6 K/UL    Absolute Mono # 0.6 0.1 - 1.3 K/UL    Absolute Eos # 0.1 0.0 - 0.8 K/UL    Basophils Absolute 0.0 0.0 - 0.2 K/UL    Absolute Immature Granulocyte 0.1 0.0 - 0.5 K/UL   Basic Metabolic Panel w/ Reflex to MG    Collection Time: 01/10/23  4:29 AM   Result Value Ref Range    Sodium 135 133 - 143 mmol/L    Potassium 4.0 3.5 - 5.1 mmol/L    Chloride 102 101 - 110 mmol/L    CO2 25 21 - 32 mmol/L    Anion Gap 8 2 - 11 mmol/L    Glucose 166 (H) 65 - 100 mg/dL    BUN 14 8 - 23 MG/DL    Creatinine 1.00 0.8 - 1.5 MG/DL    Est, Glom Filt Rate >60 >60 ml/min/1.73m2    Calcium 8.9 8.3 - 10.4 MG/DL   POCT Glucose    Collection Time: 01/10/23  7:34 AM   Result Value Ref Range    POC Glucose 166 (H) 65 - 100 mg/dL    Performed by: Freya    CXR 1/10/23 05:47  Portable chest xray         COMPARISON: Yesterday       CLINICAL HISTORY: Follow-up pneumothorax. FINDINGS:       Stable right basilar chest tube. No visible pneumothorax on today's study. There   is right basilar opacity, likely atelectasis. No pulmonary edema. Cardiac   mediastinal silhouette and the surrounding bones are stable. Stable right-sided   chest tubes. No visible pneumothorax on today's study. Impression       1. Stable right chest tubes. No visible pneumothorax on today's study. 2. Right basilar atelectasis. Assessment:     Principal Problem:    Spontaneous pneumothorax  Active Problems:    Acute respiratory failure with hypoxia (HCC)    Anticoagulant long-term use    Primary spontaneous pneumothorax    Benign prostatic hyperplasia with nocturia    Atrial fibrillation (HCC)    CASSY on CPAP  Resolved Problems:    * No resolved hospital problems.  *        Plan/Recommendations/Medical Decision Making:     Adjust pain medication regimen and modify to multimodal analgesia  Start pradaxa tomorrow 1/11/23  Carb controlled regular diet  Colace and miralax bid   Plan 4 day CT dwell to allow talc to strengthen pleura     CARA Escudero NP

## 2023-01-10 NOTE — OP NOTE
[facility]  OPERATIVE REPORT    Name:  Radha Fields  MR#:  [id number]  :  1955  ACCOUNT #:  [de-identified]  DATE OF SERVICE:  2023    PREOPERATIVE DIAGNOSIS:  Recurrent right spontaneous pneumothorax. POSTOPERATIVE DIAGNOSES:  1. Right recurrent pneumothorax. 2.  Bleb disease of right upper lobe and right lower lobe. PROCEDURES PERFORMED:  1. Right VATS. 2.  Wedge blebectomy of right upper lobe. 3.  Wedge blebectomy of right lower lobe. 4.  Talc pleurodesis. SURGEON:  Ana Maria Robins MD    ASSISTANT:  None. ANESTHESIA:  General anesthesia. COMPLICATIONS:  None. COUNTS:  Correct. SPECIMENS REMOVED:  None. IMPLANTS:  None. ESTIMATED BLOOD LOSS:  Minimal.    PROCEDURE:  After the patient was brought into the room, he was placed under general anesthesia and rolled onto his left side. Right chest prepped and draped in sterile fashion. Prior to doing this, the right chest tube that was in place anteriorly was removed. Time-out was carried out and all were in agreement. Just below the tip of the scapula, 1-cm anteriorly, a 5-mm incision made and 5-mm OptiVu inserted. Lung was nicely collapsed. Bleb disease was seen in the apex as well as in the right lower lobe. In the anterior axillary line just above the reflection of the diaphragm, I placed a 5-mm trocar. In the posterior axillary line in approximately the 8th and 9th interspace, I put a 12-cm stapling port. There were some adhesions of the right upper lobe to the chest wall. In fact, there was one broad band, this was taken down and divided with the Bovie cautery. I then was then able to mobilize the upper lobe in order to get an Endo-ARLEN with the blue staples in and adequately wedge large bleb and multiple other blebs out. This required three firings. This was removed through the stapling trocar and sent to Pathology. The patient had large blebs within the right lower lobe as well. Endo-ARLEN was inserted and two firings were required to remove this section containing the blebs. This was placed in an EndoCatch bag, removed and sent to Pathology. The inferior pulmonary ligaments were then divided with a Bovie cautery. Inspection of the rest of the lung revealed no obvious bleb disease but at this point, I did not feel I could take any more out. A Talc pleurodesis was then done with excellent coating of the entire lung and chest wall as well as diaphragm. Two chest tubes were placed both size 32 anteriorly through one of the trocar sites. A straight was placed in the apex, the stapling port, I placed a 32 right angle in the base. These were secured to the skin with 0 silks. Staples were used to close the remaining 5 mm trocar port. Sterile dressing applied. The patient tolerated the procedure well.       Shaji Solis MD      [id number]  D:  01/09/2023 16:21  T:  01/10/2023 0:02  JOB #:  [id number]

## 2023-01-11 ENCOUNTER — APPOINTMENT (OUTPATIENT)
Dept: GENERAL RADIOLOGY | Age: 68
DRG: 163 | End: 2023-01-11
Payer: MEDICARE

## 2023-01-11 PROBLEM — E66.813 CLASS 3 SEVERE OBESITY DUE TO EXCESS CALORIES WITH SERIOUS COMORBIDITY AND BODY MASS INDEX (BMI) OF 40.0 TO 44.9 IN ADULT: Chronic | Status: ACTIVE | Noted: 2017-11-29

## 2023-01-11 PROBLEM — E11.65 TYPE 2 DIABETES MELLITUS WITH HYPERGLYCEMIA, WITHOUT LONG-TERM CURRENT USE OF INSULIN (HCC): Status: ACTIVE | Noted: 2021-07-27

## 2023-01-11 PROBLEM — E66.01 CLASS 3 SEVERE OBESITY DUE TO EXCESS CALORIES WITH SERIOUS COMORBIDITY AND BODY MASS INDEX (BMI) OF 40.0 TO 44.9 IN ADULT (HCC): Chronic | Status: ACTIVE | Noted: 2017-11-29

## 2023-01-11 PROBLEM — E11.40 TYPE 2 DIABETES MELLITUS WITH DIABETIC NEUROPATHY (HCC): Status: ACTIVE | Noted: 2021-07-27

## 2023-01-11 PROBLEM — E66.01 OBESITY, MORBID (HCC): Status: ACTIVE | Noted: 2017-11-29

## 2023-01-11 PROBLEM — E11.65 TYPE 2 DIABETES MELLITUS WITH HYPERGLYCEMIA, WITHOUT LONG-TERM CURRENT USE OF INSULIN (HCC): Chronic | Status: ACTIVE | Noted: 2021-07-27

## 2023-01-11 LAB
ANION GAP SERPL CALC-SCNC: 9 MMOL/L (ref 2–11)
BASOPHILS # BLD: 0 K/UL (ref 0–0.2)
BASOPHILS NFR BLD: 0 % (ref 0–2)
BUN SERPL-MCNC: 8 MG/DL (ref 8–23)
CALCIUM SERPL-MCNC: 8.8 MG/DL (ref 8.3–10.4)
CHLORIDE SERPL-SCNC: 100 MMOL/L (ref 101–110)
CO2 SERPL-SCNC: 26 MMOL/L (ref 21–32)
CREAT SERPL-MCNC: 0.7 MG/DL (ref 0.8–1.5)
DIFFERENTIAL METHOD BLD: ABNORMAL
EOSINOPHIL # BLD: 0.2 K/UL (ref 0–0.8)
EOSINOPHIL NFR BLD: 3 % (ref 0.5–7.8)
ERYTHROCYTE [DISTWIDTH] IN BLOOD BY AUTOMATED COUNT: 13 % (ref 11.9–14.6)
GLUCOSE BLD STRIP.AUTO-MCNC: 160 MG/DL (ref 65–100)
GLUCOSE BLD STRIP.AUTO-MCNC: 169 MG/DL (ref 65–100)
GLUCOSE BLD STRIP.AUTO-MCNC: 199 MG/DL (ref 65–100)
GLUCOSE BLD STRIP.AUTO-MCNC: 227 MG/DL (ref 65–100)
GLUCOSE SERPL-MCNC: 167 MG/DL (ref 65–100)
HCT VFR BLD AUTO: 41.1 % (ref 41.1–50.3)
HGB BLD-MCNC: 13.8 G/DL (ref 13.6–17.2)
IMM GRANULOCYTES # BLD AUTO: 0.1 K/UL (ref 0–0.5)
IMM GRANULOCYTES NFR BLD AUTO: 1 % (ref 0–5)
LYMPHOCYTES # BLD: 0.8 K/UL (ref 0.5–4.6)
LYMPHOCYTES NFR BLD: 10 % (ref 13–44)
MCH RBC QN AUTO: 32.9 PG (ref 26.1–32.9)
MCHC RBC AUTO-ENTMCNC: 33.6 G/DL (ref 31.4–35)
MCV RBC AUTO: 98.1 FL (ref 82–102)
MONOCYTES # BLD: 0.6 K/UL (ref 0.1–1.3)
MONOCYTES NFR BLD: 7 % (ref 4–12)
NEUTS SEG # BLD: 6.4 K/UL (ref 1.7–8.2)
NEUTS SEG NFR BLD: 79 % (ref 43–78)
NRBC # BLD: 0 K/UL (ref 0–0.2)
PLATELET # BLD AUTO: 189 K/UL (ref 150–450)
PMV BLD AUTO: 10.2 FL (ref 9.4–12.3)
POTASSIUM SERPL-SCNC: 3.8 MMOL/L (ref 3.5–5.1)
RBC # BLD AUTO: 4.19 M/UL (ref 4.23–5.6)
SERVICE CMNT-IMP: ABNORMAL
SODIUM SERPL-SCNC: 135 MMOL/L (ref 133–143)
WBC # BLD AUTO: 8.1 K/UL (ref 4.3–11.1)

## 2023-01-11 PROCEDURE — 2700000000 HC OXYGEN THERAPY PER DAY

## 2023-01-11 PROCEDURE — 97530 THERAPEUTIC ACTIVITIES: CPT

## 2023-01-11 PROCEDURE — 85025 COMPLETE CBC W/AUTO DIFF WBC: CPT

## 2023-01-11 PROCEDURE — 82962 GLUCOSE BLOOD TEST: CPT

## 2023-01-11 PROCEDURE — 80048 BASIC METABOLIC PNL TOTAL CA: CPT

## 2023-01-11 PROCEDURE — 36415 COLL VENOUS BLD VENIPUNCTURE: CPT

## 2023-01-11 PROCEDURE — 6370000000 HC RX 637 (ALT 250 FOR IP): Performed by: HOSPITALIST

## 2023-01-11 PROCEDURE — 71045 X-RAY EXAM CHEST 1 VIEW: CPT

## 2023-01-11 PROCEDURE — 2580000003 HC RX 258: Performed by: SURGERY

## 2023-01-11 PROCEDURE — 1100000000 HC RM PRIVATE

## 2023-01-11 PROCEDURE — 94760 N-INVAS EAR/PLS OXIMETRY 1: CPT

## 2023-01-11 PROCEDURE — 6370000000 HC RX 637 (ALT 250 FOR IP): Performed by: SURGERY

## 2023-01-11 PROCEDURE — 6370000000 HC RX 637 (ALT 250 FOR IP)

## 2023-01-11 RX ORDER — METHOCARBAMOL 750 MG/1
750 TABLET, FILM COATED ORAL 4 TIMES DAILY
Status: DISCONTINUED | OUTPATIENT
Start: 2023-01-11 | End: 2023-01-19 | Stop reason: HOSPADM

## 2023-01-11 RX ADMIN — OXYCODONE AND ACETAMINOPHEN 1 TABLET: 5; 325 TABLET ORAL at 04:16

## 2023-01-11 RX ADMIN — HYDROCHLOROTHIAZIDE 25 MG: 25 TABLET ORAL at 08:20

## 2023-01-11 RX ADMIN — METHOCARBAMOL TABLETS 750 MG: 750 TABLET, COATED ORAL at 21:37

## 2023-01-11 RX ADMIN — METHOCARBAMOL TABLETS 750 MG: 750 TABLET, COATED ORAL at 12:19

## 2023-01-11 RX ADMIN — OXYCODONE AND ACETAMINOPHEN 1 TABLET: 5; 325 TABLET ORAL at 00:16

## 2023-01-11 RX ADMIN — INSULIN LISPRO 2 UNITS: 100 INJECTION, SOLUTION INTRAVENOUS; SUBCUTANEOUS at 12:21

## 2023-01-11 RX ADMIN — POLYETHYLENE GLYCOL 3350 17 G: 17 POWDER, FOR SOLUTION ORAL at 08:20

## 2023-01-11 RX ADMIN — SODIUM CHLORIDE, PRESERVATIVE FREE 10 ML: 5 INJECTION INTRAVENOUS at 21:40

## 2023-01-11 RX ADMIN — INSULIN GLARGINE 16 UNITS: 100 INJECTION, SOLUTION SUBCUTANEOUS at 08:31

## 2023-01-11 RX ADMIN — LISINOPRIL 10 MG: 5 TABLET ORAL at 08:20

## 2023-01-11 RX ADMIN — POLYETHYLENE GLYCOL 3350 17 G: 17 POWDER, FOR SOLUTION ORAL at 21:37

## 2023-01-11 RX ADMIN — SPIRONOLACTONE 25 MG: 25 TABLET ORAL at 08:20

## 2023-01-11 RX ADMIN — GABAPENTIN 600 MG: 300 CAPSULE ORAL at 08:20

## 2023-01-11 RX ADMIN — METHOCARBAMOL TABLETS 750 MG: 750 TABLET, COATED ORAL at 17:53

## 2023-01-11 RX ADMIN — CARVEDILOL 25 MG: 25 TABLET, FILM COATED ORAL at 17:53

## 2023-01-11 RX ADMIN — GUAIFENESIN 600 MG: 600 TABLET ORAL at 08:19

## 2023-01-11 RX ADMIN — GUAIFENESIN 600 MG: 600 TABLET ORAL at 21:37

## 2023-01-11 RX ADMIN — OXYCODONE AND ACETAMINOPHEN 1 TABLET: 5; 325 TABLET ORAL at 17:57

## 2023-01-11 RX ADMIN — ATORVASTATIN CALCIUM 20 MG: 20 TABLET, FILM COATED ORAL at 08:20

## 2023-01-11 RX ADMIN — SODIUM CHLORIDE, PRESERVATIVE FREE 10 ML: 5 INJECTION INTRAVENOUS at 10:07

## 2023-01-11 RX ADMIN — CARVEDILOL 25 MG: 25 TABLET, FILM COATED ORAL at 08:20

## 2023-01-11 RX ADMIN — DABIGATRAN ETEXILATE MESYLATE 150 MG: 150 CAPSULE ORAL at 21:37

## 2023-01-11 RX ADMIN — GLYCERIN 2 G: 2 SUPPOSITORY RECTAL at 12:21

## 2023-01-11 RX ADMIN — DOCUSATE SODIUM 100 MG: 100 CAPSULE, LIQUID FILLED ORAL at 08:19

## 2023-01-11 RX ADMIN — GABAPENTIN 600 MG: 300 CAPSULE ORAL at 21:37

## 2023-01-11 RX ADMIN — OXYCODONE AND ACETAMINOPHEN 2 TABLET: 5; 325 TABLET ORAL at 09:54

## 2023-01-11 ASSESSMENT — PAIN DESCRIPTION - ORIENTATION
ORIENTATION: RIGHT

## 2023-01-11 ASSESSMENT — PAIN DESCRIPTION - DESCRIPTORS
DESCRIPTORS: ACHING;DISCOMFORT
DESCRIPTORS: SHARP
DESCRIPTORS: SHARP
DESCRIPTORS: ACHING;DISCOMFORT
DESCRIPTORS: SHARP

## 2023-01-11 ASSESSMENT — PAIN SCALES - GENERAL
PAINLEVEL_OUTOF10: 5
PAINLEVEL_OUTOF10: 0
PAINLEVEL_OUTOF10: 1
PAINLEVEL_OUTOF10: 2
PAINLEVEL_OUTOF10: 5
PAINLEVEL_OUTOF10: 2
PAINLEVEL_OUTOF10: 4

## 2023-01-11 ASSESSMENT — PAIN - FUNCTIONAL ASSESSMENT
PAIN_FUNCTIONAL_ASSESSMENT: PREVENTS OR INTERFERES SOME ACTIVE ACTIVITIES AND ADLS

## 2023-01-11 ASSESSMENT — PAIN DESCRIPTION - LOCATION
LOCATION: FLANK
LOCATION: ABDOMEN;CHEST
LOCATION: ABDOMEN
LOCATION: FLANK
LOCATION: FLANK

## 2023-01-11 NOTE — PLAN OF CARE
Problem: Discharge Planning  Goal: Discharge to home or other facility with appropriate resources  Outcome: Progressing  Flowsheets (Taken 1/11/2023 5436)  Discharge to home or other facility with appropriate resources:   Identify barriers to discharge with patient and caregiver   Arrange for needed discharge resources and transportation as appropriate   Identify discharge learning needs (meds, wound care, etc)   Arrange for interpreters to assist at discharge as needed   Refer to discharge planning if patient needs post-hospital services based on physician order or complex needs related to functional status, cognitive ability or social support system     Problem: Pain  Goal: Verbalizes/displays adequate comfort level or baseline comfort level  Outcome: Progressing     Problem: ABCDS Injury Assessment  Goal: Absence of physical injury  Outcome: Progressing  Flowsheets (Taken 1/11/2023 0828)  Absence of Physical Injury: Implement safety measures based on patient assessment     Problem: Safety - Adult  Goal: Free from fall injury  Outcome: Progressing  Flowsheets (Taken 1/11/2023 0828)  Free From Fall Injury:   Instruct family/caregiver on patient safety   Based on caregiver fall risk screen, instruct family/caregiver to ask for assistance with transferring infant if caregiver noted to have fall risk factors     Problem: Chronic Conditions and Co-morbidities  Goal: Patient's chronic conditions and co-morbidity symptoms are monitored and maintained or improved  Outcome: Progressing  Flowsheets (Taken 1/11/2023 0828)  Care Plan - Patient's Chronic Conditions and Co-Morbidity Symptoms are Monitored and Maintained or Improved:   Collaborate with multidisciplinary team to address chronic and comorbid conditions and prevent exacerbation or deterioration   Update acute care plan with appropriate goals if chronic or comorbid symptoms are exacerbated and prevent overall improvement and discharge   Monitor and assess patient's chronic conditions and comorbid symptoms for stability, deterioration, or improvement

## 2023-01-11 NOTE — PROGRESS NOTES
Hospitalist Progress Note   Admit Date:  2023  3:40 PM   Name:  Namita Jade   Age:  69 y.o. Sex:  male  :  1955   MRN:  [id number]   Room:      Presenting Complaint: Chest Pain and Shortness of Breath     Reason(s) for Admission: Acute respiratory failure with hypoxia ([address] [J96.01]  Primary spontaneous pneumothorax [J93.11]  Spontaneous pneumothorax [J93.83]     Hospital Course:   69 y.o. male with medical history hypertension, A. fib on Pradaxa, CASSY, CPAP at night, bullous emphysema, history of bilateral subdural hematomas 2017 and diabetes mellitus presented to ED with acute onset right-sided chest pain. Patient reports he was talking to his neighbor when he developed sharp severe right-sided chest pain, associated with shortness of breath. Reports he has history of spontaneous left pneumothorax in , shortly after putting on his CPAP while hospitalized for subdural hematoma. He is a former smoker, quit years ago. Denies nausea, vomiting, fever, chills or abdominal pain. Chest tube was placed. He had air leak after chest tube was clamped. CT surgery was consulted. He underwent VATS surgery on . Subjective & 24hr Events (23): He still has 2 CTs, currently sealed. Resting comfortably ORA. Surgery anticipates CT removal . Assessment & Plan:   Spontaneous pneumothorax  Hx pneumothorax, sudden onset dyspnea. Admission XR with large pneumothorax.   -CT Surgery: continue chest tubes, consider removal   -clear for PT, OT    Acute respiratory failure with hypoxia   -supportive care, maintain O2 sat > 92    Type 2 diabetes mellitus with hyperglycemia, without long-term current use of insulin   -Sliding scale insulin  -Fingerstick blood glucose  -Titrate basal insulin as indicated  2023: sGlc 167-227 16U basal + 2U SSI last 24 hours, continue basal 16U + SSI    Primary hypertension  -carvedilol,  HCTZ, lisinopril, spironolactone     Class 3 severe obesity due to excess calories with serious comorbidity and body mass index (BMI) of 40.0 to 44.9 in adult  Increased risk of all cause mortality, complicating care  - healthy lifestyle at discharge    Atrial fibrillation   -dabigatran    CASSY on CPAP  -CPAP qhs    Hypercoagulable state  Noted        Anticipated discharge needs:      Home with outpatient follow up    Diet:  ADULT DIET; Regular; 4 carb choices (60 gm/meal)  DVT PPx: on dabigatran  Code status: Full Code    Hospital Problems:  Principal Problem:    Spontaneous pneumothorax  Active Problems:    Acute respiratory failure with hypoxia (HCC)    Primary spontaneous pneumothorax    Primary hypertension    Benign prostatic hyperplasia with nocturia    Class 3 severe obesity due to excess calories with serious comorbidity and body mass index (BMI) of 40.0 to 44.9 in adult [facility])    Atrial fibrillation (HCC)    CASSY on CPAP    Type 2 diabetes mellitus with hyperglycemia, without long-term current use of insulin (HCC)    Anticoagulant long-term use  Resolved Problems:    * No resolved hospital problems.  *      Objective:   Patient Vitals for the past 24 hrs:   Temp Pulse Resp BP SpO2   01/11/23 0416 -- -- 16 -- --   01/11/23 0359 97.7 °F (36.5 °C) 91 18 120/77 90 %   01/11/23 0016 -- -- 18 -- --   01/11/23 0013 97.6 °F (36.4 °C) 90 18 119/71 95 %   01/10/23 2032 -- -- 18 -- --   01/10/23 2000 98.5 °F (36.9 °C) 72 18 125/77 94 %   01/10/23 1504 98 °F (36.7 °C) 75 20 122/77 98 %   01/10/23 1444 98.1 °F (36.7 °C) 80 -- (!) 133/101 96 %   01/10/23 0907 98.1 °F (36.7 °C) 90 18 127/69 95 %   01/10/23 0847 -- 91 -- 120/70 --       Oxygen Therapy  SpO2: 90 %  Pulse via Oximetry: 82 beats per minute  Pulse Oximeter Device Mode: Intermittent  Pulse Oximeter Device Location: Finger  O2 Device: None (Room air)  Skin Assessment: Clean, dry, & intact  O2 Flow Rate (L/min): 4 L/min    Estimated body mass index is 41.92 kg/m² as calculated from the following:    Height as of this encounter: 6' 2\" (1.88 m). Weight as of this encounter: 326 lb 8 oz (148.1 kg). Intake/Output Summary (Last 24 hours) at 1/11/2023 0710  Last data filed at 1/11/2023 0416  Gross per 24 hour   Intake 960 ml   Output 1235 ml   Net -275 ml       Blood pressure 120/77, pulse 91, temperature 97.7 °F (36.5 °C), temperature source Oral, resp. rate 16, height 6' 2\" (1.88 m), weight (!) 326 lb 8 oz (148.1 kg), SpO2 90 %. Physical Exam  Vitals and nursing note reviewed. Constitutional:       General: He is not in acute distress. Appearance: He is morbidly obese. He is not diaphoretic. HENT:      Head: Normocephalic and atraumatic. Eyes:      Extraocular Movements: Extraocular movements intact. Cardiovascular:      Rate and Rhythm: Normal rate and regular rhythm. Pulmonary:      Effort: Pulmonary effort is normal. No respiratory distress. Chest:      Chest wall: No crepitus. Comments: R CT x2, bloody OP to water seal  Abdominal:      General: Abdomen is protuberant. There is no distension. Tenderness: There is no abdominal tenderness. Comments: Massive paniculus   Musculoskeletal:         General: No deformity. Skin:     Coloration: Skin is not jaundiced or pale. Neurological:      General: No focal deficit present. Mental Status: He is alert and oriented to person, place, and time. Psychiatric:         Mood and Affect: Mood normal.         Behavior: Behavior normal. Behavior is cooperative.           I have personally reviewed labs and tests showing:  Recent Labs:  Recent Results (from the past 48 hour(s))   TYPE AND SCREEN    Collection Time: 01/09/23 10:05 AM   Result Value Ref Range    Crossmatch expiration date 01/12/2023,2359     ABO/Rh O POSITIVE     Antibody Screen NEG    POCT Glucose    Collection Time: 01/09/23 11:18 AM   Result Value Ref Range    POC Glucose 192 (H) 65 - 100 mg/dL    Performed by: Freya    POCT Glucose    Collection Time: 01/09/23 1:34 PM   Result Value Ref Range    POC Glucose 189 (H) 65 - 100 mg/dL    Performed by: Courtney    POCT Glucose    Collection Time: 01/09/23  5:31 PM   Result Value Ref Range    POC Glucose 233 (H) 65 - 100 mg/dL    Performed by: Koko Longo    POCT Glucose    Collection Time: 01/09/23  9:05 PM   Result Value Ref Range    POC Glucose 223 (H) 65 - 100 mg/dL    Performed by: Colleen    CBC with Auto Differential    Collection Time: 01/10/23  4:29 AM   Result Value Ref Range    WBC 9.6 4.3 - 11.1 K/uL    RBC 4.17 (L) 4.23 - 5.6 M/uL    Hemoglobin 13.7 13.6 - 17.2 g/dL    Hematocrit 41.2 41.1 - 50.3 %    MCV 98.8 82 - 102 FL    MCH 32.9 26.1 - 32.9 PG    MCHC 33.3 31.4 - 35.0 g/dL    RDW 13.2 11.9 - 14.6 %    Platelets 391 383 - 591 K/uL    MPV 10.1 9.4 - 12.3 FL    nRBC 0.00 0.0 - 0.2 K/uL    Differential Type AUTOMATED      Seg Neutrophils 79 (H) 43 - 78 %    Lymphocytes 11 (L) 13 - 44 %    Monocytes 7 4.0 - 12.0 %    Eosinophils % 2 0.5 - 7.8 %    Basophils 0 0.0 - 2.0 %    Immature Granulocytes 1 0.0 - 5.0 %    Segs Absolute 7.7 1.7 - 8.2 K/UL    Absolute Lymph # 1.1 0.5 - 4.6 K/UL    Absolute Mono # 0.6 0.1 - 1.3 K/UL    Absolute Eos # 0.1 0.0 - 0.8 K/UL    Basophils Absolute 0.0 0.0 - 0.2 K/UL    Absolute Immature Granulocyte 0.1 0.0 - 0.5 K/UL   Basic Metabolic Panel w/ Reflex to MG    Collection Time: 01/10/23  4:29 AM   Result Value Ref Range    Sodium 135 133 - 143 mmol/L    Potassium 4.0 3.5 - 5.1 mmol/L    Chloride 102 101 - 110 mmol/L    CO2 25 21 - 32 mmol/L    Anion Gap 8 2 - 11 mmol/L    Glucose 166 (H) 65 - 100 mg/dL    BUN 14 8 - 23 MG/DL    Creatinine 1.00 0.8 - 1.5 MG/DL    Est, Glom Filt Rate >60 >60 ml/min/1.73m2    Calcium 8.9 8.3 - 10.4 MG/DL   POCT Glucose    Collection Time: 01/10/23  7:34 AM   Result Value Ref Range    POC Glucose 166 (H) 65 - 100 mg/dL    Performed by: Freya    POCT Glucose    Collection Time: 01/10/23 12:05 PM   Result Value Ref Range    POC Glucose 216 (H) 65 - 100 mg/dL    Performed by: DarwinPCTVenus    POCT Glucose    Collection Time: 01/10/23  4:53 PM   Result Value Ref Range    POC Glucose 163 (H) 65 - 100 mg/dL    Performed by: DarwinPCTVenus    POCT Glucose    Collection Time: 01/10/23  8:31 PM   Result Value Ref Range    POC Glucose 198 (H) 65 - 100 mg/dL    Performed by: Favian    CBC with Auto Differential    Collection Time: 01/11/23  5:11 AM   Result Value Ref Range    WBC 8.1 4.3 - 11.1 K/uL    RBC 4.19 (L) 4.23 - 5.6 M/uL    Hemoglobin 13.8 13.6 - 17.2 g/dL    Hematocrit 41.1 41.1 - 50.3 %    MCV 98.1 82 - 102 FL    MCH 32.9 26.1 - 32.9 PG    MCHC 33.6 31.4 - 35.0 g/dL    RDW 13.0 11.9 - 14.6 %    Platelets 912 732 - 192 K/uL    MPV 10.2 9.4 - 12.3 FL    nRBC 0.00 0.0 - 0.2 K/uL    Differential Type AUTOMATED      Seg Neutrophils 79 (H) 43 - 78 %    Lymphocytes 10 (L) 13 - 44 %    Monocytes 7 4.0 - 12.0 %    Eosinophils % 3 0.5 - 7.8 %    Basophils 0 0.0 - 2.0 %    Immature Granulocytes 1 0.0 - 5.0 %    Segs Absolute 6.4 1.7 - 8.2 K/UL    Absolute Lymph # 0.8 0.5 - 4.6 K/UL    Absolute Mono # 0.6 0.1 - 1.3 K/UL    Absolute Eos # 0.2 0.0 - 0.8 K/UL    Basophils Absolute 0.0 0.0 - 0.2 K/UL    Absolute Immature Granulocyte 0.1 0.0 - 0.5 K/UL       I have personally reviewed imaging studies showing: Other Studies:  XR CHEST PORTABLE   Final Result      1. Stable right chest tubes. No significant pneumothorax. 2. Right basilar atelectasis. 3. No significant change. XR CHEST PORTABLE   Final Result      1. Stable right chest tubes. No visible pneumothorax on today's study. 2. Right basilar atelectasis. XR CHEST PORTABLE   Final Result   Placement of 2 basilar chest tubes with probable small residual   right apical pneumothorax. XR CHEST PORTABLE   Final Result      1. Small right apical pneumothorax. Right chest tube is in place. 2. Right lung base opacity, likely atelectasis.       3. No significant change compared to prior. XR CHEST PORTABLE   Final Result      1. Persistent small right apical pneumothorax. Right chest tube is stable. XR CHEST PORTABLE   Final Result      1. New small right apical pneumothorax, despite presence of the right-sided   chest tube. 2. Bibasilar atelectasis. XR CHEST PORTABLE   Final Result   Unchanged bibasilar lung atelectasis. XR CHEST PORTABLE   Final Result   No evidence of pneumothorax         XR CHEST PORTABLE   Final Result   1. Resolved right pneumothorax. 2. Mild bibasilar lung atelectasis. XR CHEST PORTABLE   Final Result   1. Right chest tube placed, with markedly smaller pneumothorax. 2.  Resolution of the previous tension component. 3.  Mild bibasilar atelectasis. CT CHEST WO CONTRAST   Final Result   1. Persisting large right pneumothorax. 2.  Leftward deviation of mediastinal contours concerning for tension component. 3.  Partial atelectasis of right lung. 4.  Emphysema. 5.  Vascular disease. Urgent findings communicated to the ordering NP Naman Bazan by the secure text   messaging system at 6:30 PM.      XR CHEST PORTABLE   Final Result   1. Large right pneumothorax. 2.  Pulmonary vascular congestion.                Findings discussed with Dr. Cristopher Philippe by myself at 4:42 PM.             Current Meds:  Current Facility-Administered Medications   Medication Dose Route Frequency    polyethylene glycol (GLYCOLAX) packet 17 g  17 g Oral BID    docusate sodium (COLACE) capsule 100 mg  100 mg Oral Daily    HYDROmorphone HCl PF (DILAUDID) injection 0.5 mg  0.5 mg IntraVENous Q4H PRN    oxyCODONE-acetaminophen (PERCOCET) 5-325 MG per tablet 1 tablet  1 tablet Oral Q4H PRN    oxyCODONE-acetaminophen (PERCOCET) 5-325 MG per tablet 2 tablet  2 tablet Oral Q4H PRN    insulin glargine (LANTUS) injection vial 16 Units  16 Units SubCUTAneous Daily    bisacodyl (DULCOLAX) suppository 10 mg  10 mg Rectal Daily PRN glucose chewable tablet 16 g  4 tablet Oral PRN    dextrose bolus 10% 125 mL  125 mL IntraVENous PRN    Or    dextrose bolus 10% 250 mL  250 mL IntraVENous PRN    glucagon (rDNA) injection 1 mg  1 mg SubCUTAneous PRN    dextrose 10 % infusion   IntraVENous Continuous PRN    metoprolol (LOPRESSOR) injection 5 mg  5 mg IntraVENous Q6H PRN    carvedilol (COREG) tablet 25 mg  25 mg Oral BID WC    [Held by provider] dabigatran (PRADAXA) capsule 150 mg  150 mg Oral BID    lisinopril (PRINIVIL;ZESTRIL) tablet 10 mg  10 mg Oral Daily    gabapentin (NEURONTIN) capsule 600 mg  600 mg Oral BID    guaiFENesin (MUCINEX) extended release tablet 600 mg  600 mg Oral BID    hydroCHLOROthiazide (HYDRODIURIL) tablet 25 mg  25 mg Oral Daily    atorvastatin (LIPITOR) tablet 20 mg  20 mg Oral Daily    spironolactone (ALDACTONE) tablet 25 mg  25 mg Oral Daily    sodium chloride flush 0.9 % injection 5-40 mL  5-40 mL IntraVENous 2 times per day    sodium chloride flush 0.9 % injection 5-40 mL  5-40 mL IntraVENous PRN    0.9 % sodium chloride infusion   IntraVENous PRN    ondansetron (ZOFRAN-ODT) disintegrating tablet 4 mg  4 mg Oral Q8H PRN    Or    ondansetron (ZOFRAN) injection 4 mg  4 mg IntraVENous Q6H PRN    acetaminophen (TYLENOL) tablet 650 mg  650 mg Oral Q6H PRN    Or    acetaminophen (TYLENOL) suppository 650 mg  650 mg Rectal Q6H PRN    acetaminophen (TYLENOL) tablet 500 mg  500 mg Oral Q4H    insulin lispro (HUMALOG) injection vial 0-8 Units  0-8 Units SubCUTAneous TID     insulin lispro (HUMALOG) injection vial 0-4 Units  0-4 Units SubCUTAneous Nightly       Signed:  Charles Mckay MD

## 2023-01-11 NOTE — PROGRESS NOTES
No new discharge needs identified. Pt still has 2 CTs, currently sealed. Surgery anticipates CT removal 1/14. [facility] arranged with RN/PT/OT. Pt on 3L supplemental; denies home oxygen use.

## 2023-01-11 NOTE — PROGRESS NOTES
ACUTE OCCUPATIONAL THERAPY GOALS:   (Developed with and agreed upon by patient and/or caregiver.)  1. Patient will complete lower body bathing and dressing with SUPERVISION and adaptive equipment as needed. 2. Patient will complete toileting with MINIMAL ASSIST. 3. Patient will complete grooming ADL standing at sink with SUPERVISION. 4. Patient will tolerate 25 minutes of OT treatment with 1-2 rest breaks to increase activity tolerance for ADLs. 5. Patient will complete functional transfers with SUPERVISION and adaptive equipment as needed. 6. Patient will tolerate 10 minutes BUE exercises to increase strength for safe, functional transfers. Timeframe: 7 visits         OCCUPATIONAL THERAPY Daily Note and AM     OT Visit Days: 2   Time  OT Charge Capture  Rehab Caseload Tracker  OT Orders    Zara Lr is a 69 y.o. male   PRIMARY DIAGNOSIS: Spontaneous pneumothorax  Acute respiratory failure with hypoxia ([facility] [address] [J96.01]  Primary spontaneous pneumothorax [J93.11]  Spontaneous pneumothorax [J93.83]  Procedure(s) (LRB):  RIGHT VATS/WEDGE BLEBECTOMY/RIGHT UPPER LOBE WEDGE BLEBECTOMY/RIGHT LOWER LOBE WEDGE BLEBECTOMY/TALC PLEURODESIS (Right)  2 Days Post-Op  Inpatient: Payor: MEDICARE / Plan: MEDICARE PART A AND B / Product Type: *No Product type* /     ASSESSMENT:     REHAB RECOMMENDATIONS: CURRENT LEVEL OF FUNCTION:  (Most Recently Demonstrated)   Recommendation to date pending progress:  Setting:  Home Health Therapy    Equipment:    To Be Determined Bathing:  Not Tested  Dressing:  Not Tested  Feeding/Grooming:  Not Tested  Toileting:  Not Tested  Functional Mobility:  Contact Guard Assist      ASSESSMENT:  Mr. Froilan Harp continues to present with deficits in overall strength, activity tolerance, ADL performance, and functional mobility. Here for spontaneous pneumothorax; resting on RA; 2 chest tube to suction.  Performed functional bed mobility/supine <> sit transfer to EOB with min A; intact EOB sitting balance. Completed sit <> stand and functional mobility in room to chair with CGA. No noted SOB; tolerated well but does endorses ongoing soreness and pain at insertion site. Progressing well towards therapy goals. Will continue OT efforts as indicated.        SUBJECTIVE:     Mr. Stephenson states, \"I'll do whatever.\"     Social/Functional Lives With: Spouse  Type of Home: Condo  Home Layout: One level  Bathroom Toilet: Standard  ADL Assistance: Independent  Homemaking Assistance: Independent  Transfer Assistance: Independent  Active : Yes  Mode of Transportation: Car  Occupation: Retired    OBJECTIVE:     LINES / DRAINS / AIRWAY: Chest Tube    RESTRICTIONS/PRECAUTIONS:  Restrictions/Precautions  Restrictions/Precautions: Fall Risk        PAIN: VITALS / O2:   Pre Treatment:   Numeric: 4/10 (chest tube site)             Post Treatment: 4 /10 Vitals          Oxygen     Stable on RA           MOBILITY: I Mod I S SBA CGA Min Mod Max Total  NT x2 Comments:   Bed Mobility    Rolling [] [] [] [] [] [] [] [] [] [] []    Supine to Sit [] [] [] [] [] [x] [] [] [] [] []    Scooting [] [] [] [] [] [x] [] [] [] [] []    Sit to Supine [] [] [] [] [] [] [] [] [] [] []    Transfers    Sit to Stand [] [] [] [] [x] [x] [] [] [] [] []    Bed to Chair [] [] [] [] [x] [] [] [] [] [] []    Stand to Sit [] [] [] [] [x] [x] [] [] [] [] []    I=Independent, Mod I=Modified Independent, S=Supervision/Setup, SBA=Standby Assistance, CGA=Contact Guard Assistance, Min=Minimal Assistance, Mod=Moderate Assistance, Max=Maximal Assistance, Total=Total Assistance, NT=Not Tested    ACTIVITIES OF DAILY LIVING: I Mod I S SBA CGA Min Mod Max Total NT Comments   BASIC ADLs:              Bathing/ Showering [] [] [] [] [] [] [] [] [] []    Toileting [] [] [] [] [] [] [] [] [] []    Upper Body Dressing [] [] [] [] [] [] [] [] [] []    Lower Body Dressing [] [] [] [] [] [] [] [] [] []    Feeding [] [] [] [] [] [] [] [] [] []    Grooming [] [] [] []  [] [] [] [] [] []    Personal Device Care [] [] [] [] [] [] [] [] [] []    Functional Mobility [] [] [] [] [x] [x] [] [] [] []    I=Independent, Mod I=Modified Independent, S=Supervision/Setup, SBA=Standby Assistance, CGA=Contact Guard Assistance, Min=Minimal Assistance, Mod=Moderate Assistance, Max=Maximal Assistance, Total=Total Assistance, NT=Not Tested    BALANCE: Good Fair+ Fair Fair- Poor NT Comments   Sitting Static [x] [] [] [] [] []    Sitting Dynamic [x] [] [] [] [] []              Standing Static [] [x] [] [] [] []    Standing Dynamic [] [x] [] [] [] []        PLAN:     FREQUENCY/DURATION   OT Plan of Care: 3 times/week for duration of hospital stay or until stated goals are met, whichever comes first.      TREATMENT:     TREATMENT:   Therapeutic Activity (15 Minutes): Therapeutic activity included Rolling, Supine to Sit, Transfer Training, Ambulation on level ground, Sitting balance , and Standing balance to improve functional Activity tolerance, Balance, Coordination, Mobility, and Strength.     TREATMENT GRID:  N/A    AFTER TREATMENT PRECAUTIONS: Call light within reach, Chair, Needs within reach, and Visitors at bedside    INTERDISCIPLINARY COLLABORATION:  RN/ PCT and OT/ SCOTT    EDUCATION:        TOTAL TREATMENT DURATION AND TIME:  Time In: 1130  Time Out: 1700 [facility]  Minutes: Robbie Avila OTR/BEHZAD, EDGAR

## 2023-01-11 NOTE — PROGRESS NOTES
Admit Date: 2023    POD 2 Days Post-Op    Procedure:  Procedure(s):  RIGHT VATS/WEDGE BLEBECTOMY/RIGHT UPPER LOBE WEDGE BLEBECTOMY/RIGHT LOWER LOBE WEDGE BLEBECTOMY/TALC PLEURODESIS    Subjective:     Pt alert with NAD noted. Respirations even and unlabored on 3 L/MIN NC. Tolerating regular diet with no N or V. Working with PT. Right CT x 2 in place. C/o constipation- colace BID and Miralax BID     Objective:       Vitals:    23 0359 23 0416 23 0837 23 0954   BP: 120/77  116/69    Pulse: 91  96    Resp: 18 16 20 16   Temp: 97.7 °F (36.5 °C)  97.5 °F (36.4 °C)    TempSrc: Oral  Oral    SpO2: 90%  96%    Weight: (!) 326 lb 8 oz (148.1 kg)      Height:           Temp (24hrs), Av.9 °F (36.6 °C), Min:97.5 °F (36.4 °C), Max:98.5 °F (36.9 °C)  . I&O reviewed as documented. Voiding with adequate uop  Bm 23-small liquid  Right anterior  ml SS output past 24h no air leak   Right posterior CT 50 ml serous output past 24h. No air leak      Afebrile   VSS    Physical Exam  Constitutional:       General: He is not in acute distress. Appearance: Normal appearance. He is obese. HENT:      Mouth/Throat:      Mouth: Mucous membranes are moist.   Cardiovascular:      Rate and Rhythm: Normal rate and regular rhythm. Pulses: Normal pulses. Heart sounds: Normal heart sounds. No murmur heard. No friction rub. No gallop. Pulmonary:      Effort: Pulmonary effort is normal.      Breath sounds: Normal breath sounds. Abdominal:      General: Bowel sounds are normal. There is distension. Comments: Mild abd distention    Genitourinary:     Comments: Voiding   Musculoskeletal:         General: No swelling. Normal range of motion. Cervical back: No rigidity. Skin:     General: Skin is warm and dry. Capillary Refill: Capillary refill takes less than 2 seconds. Comments: Right CT x 2 and right rib cage trocar sites with no signs of infection.     Neurological: Mental Status: He is alert and oriented to person, place, and time.    Psychiatric:         Behavior: Behavior normal.      Labs:   Recent Results (from the past 24 hour(s))   POCT Glucose    Collection Time: 01/10/23  4:53 PM   Result Value Ref Range    POC Glucose 163 (H) 65 - 100 mg/dL    Performed by: Freya    POCT Glucose    Collection Time: 01/10/23  8:31 PM   Result Value Ref Range    POC Glucose 198 (H) 65 - 100 mg/dL    Performed by: Favian    CBC with Auto Differential    Collection Time: 01/11/23  5:11 AM   Result Value Ref Range    WBC 8.1 4.3 - 11.1 K/uL    RBC 4.19 (L) 4.23 - 5.6 M/uL    Hemoglobin 13.8 13.6 - 17.2 g/dL    Hematocrit 41.1 41.1 - 50.3 %    MCV 98.1 82 - 102 FL    MCH 32.9 26.1 - 32.9 PG    MCHC 33.6 31.4 - 35.0 g/dL    RDW 13.0 11.9 - 14.6 %    Platelets 517 890 - 229 K/uL    MPV 10.2 9.4 - 12.3 FL    nRBC 0.00 0.0 - 0.2 K/uL    Differential Type AUTOMATED      Seg Neutrophils 79 (H) 43 - 78 %    Lymphocytes 10 (L) 13 - 44 %    Monocytes 7 4.0 - 12.0 %    Eosinophils % 3 0.5 - 7.8 %    Basophils 0 0.0 - 2.0 %    Immature Granulocytes 1 0.0 - 5.0 %    Segs Absolute 6.4 1.7 - 8.2 K/UL    Absolute Lymph # 0.8 0.5 - 4.6 K/UL    Absolute Mono # 0.6 0.1 - 1.3 K/UL    Absolute Eos # 0.2 0.0 - 0.8 K/UL    Basophils Absolute 0.0 0.0 - 0.2 K/UL    Absolute Immature Granulocyte 0.1 0.0 - 0.5 K/UL   Basic Metabolic Panel w/ Reflex to MG    Collection Time: 01/11/23  5:11 AM   Result Value Ref Range    Sodium 135 133 - 143 mmol/L    Potassium 3.8 3.5 - 5.1 mmol/L    Chloride 100 (L) 101 - 110 mmol/L    CO2 26 21 - 32 mmol/L    Anion Gap 9 2 - 11 mmol/L    Glucose 167 (H) 65 - 100 mg/dL    BUN 8 8 - 23 MG/DL    Creatinine 0.70 (L) 0.8 - 1.5 MG/DL    Est, Glom Filt Rate >60 >60 ml/min/1.73m2    Calcium 8.8 8.3 - 10.4 MG/DL   POCT Glucose    Collection Time: 01/11/23  7:08 AM   Result Value Ref Range    POC Glucose 169 (H) 65 - 100 mg/dL    Performed by: Freya    POCT Glucose    Collection Time: 01/11/23 11:49 AM   Result Value Ref Range    POC Glucose 227 (H) 65 - 100 mg/dL    Performed by: Freya        Data Review   CXR 1/11/23 04:48  Clinical history: Follow-up pneumothorax. TECHNIQUE: AP portable chest       COMPARISON: Yesterday. FINDINGS:       Stable right-sided chest tubes. No significant pneumothorax. There is right   basilar opacity, likely atelectasis. No pulmonary edema. Cardiac mediastinal   silhouette and the surrounding bones are stable. Impression       1. Stable right chest tubes. No significant pneumothorax. 2. Right basilar atelectasis. 3. No significant change. Assessment:     Principal Problem:    Spontaneous pneumothorax  Active Problems:    Acute respiratory failure with hypoxia (HCC)    Anticoagulant long-term use    Primary spontaneous pneumothorax    Primary hypertension    Benign prostatic hyperplasia with nocturia    Class 3 severe obesity due to excess calories with serious comorbidity and body mass index (BMI) of 40.0 to 44.9 in [facility])    Atrial fibrillation (HCC)    CASSY on CPAP    Type 2 diabetes mellitus with hyperglycemia, without long-term current use of insulin ([facility] [address]  Resolved Problems:    * No resolved hospital problems.  *        Plan/Recommendations/Medical Decision Making:     Restart pradaxa 1/11 PM  Glycerin suppositories  Plan for CT removal Saturday 1/14/23 AM  Acceptable to be off CT suction during ambulation with PT     CARA Escudero NP

## 2023-01-12 ENCOUNTER — APPOINTMENT (OUTPATIENT)
Dept: GENERAL RADIOLOGY | Age: 68
DRG: 163 | End: 2023-01-12
Payer: MEDICARE

## 2023-01-12 LAB
GLUCOSE BLD STRIP.AUTO-MCNC: 169 MG/DL (ref 65–100)
GLUCOSE BLD STRIP.AUTO-MCNC: 183 MG/DL (ref 65–100)
GLUCOSE BLD STRIP.AUTO-MCNC: 189 MG/DL (ref 65–100)
GLUCOSE BLD STRIP.AUTO-MCNC: 226 MG/DL (ref 65–100)
SERVICE CMNT-IMP: ABNORMAL

## 2023-01-12 PROCEDURE — 71045 X-RAY EXAM CHEST 1 VIEW: CPT

## 2023-01-12 PROCEDURE — 6370000000 HC RX 637 (ALT 250 FOR IP)

## 2023-01-12 PROCEDURE — 6370000000 HC RX 637 (ALT 250 FOR IP): Performed by: HOSPITALIST

## 2023-01-12 PROCEDURE — 1100000000 HC RM PRIVATE

## 2023-01-12 PROCEDURE — 6370000000 HC RX 637 (ALT 250 FOR IP): Performed by: SURGERY

## 2023-01-12 PROCEDURE — 97530 THERAPEUTIC ACTIVITIES: CPT

## 2023-01-12 PROCEDURE — 2580000003 HC RX 258: Performed by: SURGERY

## 2023-01-12 PROCEDURE — 82962 GLUCOSE BLOOD TEST: CPT

## 2023-01-12 RX ORDER — CETIRIZINE HYDROCHLORIDE 10 MG/1
10 TABLET ORAL DAILY PRN
Status: DISCONTINUED | OUTPATIENT
Start: 2023-01-12 | End: 2023-01-19 | Stop reason: HOSPADM

## 2023-01-12 RX ADMIN — SODIUM CHLORIDE, PRESERVATIVE FREE 10 ML: 5 INJECTION INTRAVENOUS at 08:26

## 2023-01-12 RX ADMIN — OXYCODONE AND ACETAMINOPHEN 1 TABLET: 5; 325 TABLET ORAL at 04:51

## 2023-01-12 RX ADMIN — DOCUSATE SODIUM 100 MG: 100 CAPSULE, LIQUID FILLED ORAL at 08:21

## 2023-01-12 RX ADMIN — INSULIN GLARGINE 16 UNITS: 100 INJECTION, SOLUTION SUBCUTANEOUS at 08:30

## 2023-01-12 RX ADMIN — GABAPENTIN 600 MG: 300 CAPSULE ORAL at 08:21

## 2023-01-12 RX ADMIN — DABIGATRAN ETEXILATE MESYLATE 150 MG: 150 CAPSULE ORAL at 20:49

## 2023-01-12 RX ADMIN — HYDROCHLOROTHIAZIDE 25 MG: 25 TABLET ORAL at 08:22

## 2023-01-12 RX ADMIN — METFORMIN HYDROCHLORIDE 500 MG: 500 TABLET ORAL at 16:14

## 2023-01-12 RX ADMIN — CARVEDILOL 25 MG: 25 TABLET, FILM COATED ORAL at 08:21

## 2023-01-12 RX ADMIN — METHOCARBAMOL TABLETS 750 MG: 750 TABLET, COATED ORAL at 20:50

## 2023-01-12 RX ADMIN — METHOCARBAMOL TABLETS 750 MG: 750 TABLET, COATED ORAL at 16:14

## 2023-01-12 RX ADMIN — DABIGATRAN ETEXILATE MESYLATE 150 MG: 150 CAPSULE ORAL at 08:22

## 2023-01-12 RX ADMIN — INSULIN LISPRO 2 UNITS: 100 INJECTION, SOLUTION INTRAVENOUS; SUBCUTANEOUS at 12:42

## 2023-01-12 RX ADMIN — METHOCARBAMOL TABLETS 750 MG: 750 TABLET, COATED ORAL at 08:20

## 2023-01-12 RX ADMIN — METHOCARBAMOL TABLETS 750 MG: 750 TABLET, COATED ORAL at 12:40

## 2023-01-12 RX ADMIN — GUAIFENESIN 600 MG: 600 TABLET ORAL at 08:21

## 2023-01-12 RX ADMIN — CARVEDILOL 25 MG: 25 TABLET, FILM COATED ORAL at 16:14

## 2023-01-12 RX ADMIN — SODIUM CHLORIDE, PRESERVATIVE FREE 10 ML: 5 INJECTION INTRAVENOUS at 20:54

## 2023-01-12 RX ADMIN — GABAPENTIN 600 MG: 300 CAPSULE ORAL at 20:49

## 2023-01-12 RX ADMIN — POLYETHYLENE GLYCOL 3350 17 G: 17 POWDER, FOR SOLUTION ORAL at 08:27

## 2023-01-12 RX ADMIN — GUAIFENESIN 600 MG: 600 TABLET ORAL at 20:49

## 2023-01-12 RX ADMIN — SPIRONOLACTONE 25 MG: 25 TABLET ORAL at 08:21

## 2023-01-12 RX ADMIN — OXYCODONE AND ACETAMINOPHEN 1 TABLET: 5; 325 TABLET ORAL at 22:00

## 2023-01-12 RX ADMIN — ACETAMINOPHEN 500 MG: 500 TABLET, FILM COATED ORAL at 08:22

## 2023-01-12 RX ADMIN — ATORVASTATIN CALCIUM 20 MG: 20 TABLET, FILM COATED ORAL at 08:21

## 2023-01-12 RX ADMIN — LISINOPRIL 10 MG: 5 TABLET ORAL at 08:22

## 2023-01-12 ASSESSMENT — PAIN DESCRIPTION - DESCRIPTORS
DESCRIPTORS: SHARP;ACHING
DESCRIPTORS: ACHING;SHARP;SORE
DESCRIPTORS: SORE

## 2023-01-12 ASSESSMENT — PAIN - FUNCTIONAL ASSESSMENT
PAIN_FUNCTIONAL_ASSESSMENT: ACTIVITIES ARE NOT PREVENTED
PAIN_FUNCTIONAL_ASSESSMENT: PREVENTS OR INTERFERES WITH MANY ACTIVE NOT PASSIVE ACTIVITIES
PAIN_FUNCTIONAL_ASSESSMENT: ACTIVITIES ARE NOT PREVENTED

## 2023-01-12 ASSESSMENT — PAIN SCALES - GENERAL
PAINLEVEL_OUTOF10: 0
PAINLEVEL_OUTOF10: 4
PAINLEVEL_OUTOF10: 1
PAINLEVEL_OUTOF10: 0
PAINLEVEL_OUTOF10: 4
PAINLEVEL_OUTOF10: 3
PAINLEVEL_OUTOF10: 3
PAINLEVEL_OUTOF10: 1

## 2023-01-12 ASSESSMENT — PAIN DESCRIPTION - ORIENTATION
ORIENTATION: RIGHT

## 2023-01-12 ASSESSMENT — PAIN DESCRIPTION - LOCATION
LOCATION: BACK
LOCATION: FLANK
LOCATION: FLANK
LOCATION: HIP
LOCATION: FLANK

## 2023-01-12 ASSESSMENT — PAIN DESCRIPTION - PAIN TYPE: TYPE: ACUTE PAIN

## 2023-01-12 ASSESSMENT — PAIN DESCRIPTION - ONSET: ONSET: GRADUAL

## 2023-01-12 ASSESSMENT — PAIN DESCRIPTION - FREQUENCY: FREQUENCY: INTERMITTENT

## 2023-01-12 NOTE — PROGRESS NOTES
ACUTE PHYSICAL THERAPY GOALS:   (Developed with and agreed upon by patient and/or caregiver.)  Goals reviewed 1/10/23  (1.) Huyen Garcia  will move from supine to sit and sit to supine  with INDEPENDENCE within 7 treatment day(s). (2.) Huyen Garcia will transfer from bed to chair and chair to bed with MODIFIED INDEPENDENCE using the least restrictive device within 7 treatment day(s). (3.) Huyen Garcia will ambulate with MODIFIED INDEPENDENCE for 250 feet with the least restrictive device within 7 treatment day(s). (4.) Mariselaanetta Radha will perform standing static and dynamic balance activities x 20 minutes with SUPERVISION to improve safety within 7 treatment day(s). (5.) Huyen Garcia will perform bilateral lower extremity exercises x 20 min for HEP with INDEPENDENCE to improve strength, endurance, and functional mobility within 7 treatment day(s). PHYSICAL THERAPY: Daily Note AM   (Link to Caseload Tracking: PT Visit Days : 2  Time In/Out PT Charge Capture  Rehab Caseload Tracker  Orders    Huyen Garcia is a 69 y.o. male   PRIMARY DIAGNOSIS: Spontaneous pneumothorax  Acute respiratory failure with hypoxia ([facility] [address] [J96.01]  Primary spontaneous pneumothorax [J93.11]  Spontaneous pneumothorax [J93.83]  Procedure(s) (LRB):  RIGHT VATS/WEDGE BLEBECTOMY/RIGHT UPPER LOBE WEDGE BLEBECTOMY/RIGHT LOWER LOBE WEDGE BLEBECTOMY/TALC PLEURODESIS (Right)  3 Days Post-Op  Inpatient: Payor: MEDICARE / Plan: MEDICARE PART A AND B / Product Type: *No Product type* /     ASSESSMENT:     REHAB RECOMMENDATIONS:   Recommendation to date pending progress:  Setting:  Home Health Therapy    Equipment:    Rolling Walker     ASSESSMENT:  Mr. Kacey Negro was sitting up in recliner chair upon contact and agreeable to PT. Patient has 2 chest tubes; RN okay'd disconnecting from suction to ambulate in hallways.  Patient performed several sit to stand transfers with SBA while donning shorts with assistance from wife. Patient then ambulated 125' x 2 with rolling walker, CGA and cues for sequencing/walker manipulation. Patient's knees started to buckle once fatigued so he took a seated rest break in between ambulation bouts. Patient returned to his room where he took a seated rest break. Patient then requested to use the bathroom. Patient ambulated into the bathroom with CGA and cues for sequencing/navigating into bathroom with chest tubes. Patient was left sitting on toilet with wife attending; instructed patient to pull bathroom cord when done. CNA and RN aware. Steady progress. Will continue PT efforts.      SUBJECTIVE:   Mr. Meggan Winter states, \"I'd love to go for a walk\"     Social/Functional Lives With: Spouse  [facility]  Home Layout: One level  Bathroom Toilet: Standard  ADL Assistance: Independent  Homemaking Assistance: Independent  Transfer Assistance: Independent  Active : Yes  Mode of Transportation: Car  Occupation: Retired  OBJECTIVE:     PAIN: VITALS / O2: PRECAUTION / Sheng Ahr / DRAINS:   Pre Treatment:   Pain Assessment: 0-10      Post Treatment: 0 Vitals        Oxygen    Chest Tube    RESTRICTIONS/PRECAUTIONS:        MOBILITY: I Mod I S SBA CGA Min Mod Max Total  NT x2 Comments:   Bed Mobility    Rolling [] [] [] [] [] [] [] [] [] [] []    Supine to Sit [] [] [] [] [] [] [] [] [] [] []    Scooting [] [] [] [] [] [] [] [] [] [] []    Sit to Supine [] [] [] [] [] [] [] [] [] [] []    Transfers    Sit to Stand [] [] [] [x] [] [] [] [] [] [] []    Bed to Chair [] [] [] [x] [x] [] [] [] [] [] []    Stand to Sit [] [] [] [x] [] [] [] [] [] [] []     [] [] [] [] [] [] [] [] [] [] []    I=Independent, Mod I=Modified Independent, S=Supervision, SBA=Standby Assistance, CGA=Contact Guard Assistance,   Min=Minimal Assistance, Mod=Moderate Assistance, Max=Maximal Assistance, Total=Total Assistance, NT=Not Tested    BALANCE: Good Fair+ Fair Fair- Poor NT Comments   Sitting Static [x] [] [] [] [] []    Sitting Dynamic [] [x] [] [] [] []              Standing Static [] [x] [] [] [] []    Standing Dynamic [] [] [x] [] [] []      GAIT: I Mod I S SBA CGA Min Mod Max Total  NT x2 Comments:   Level of Assistance [] [] [] [x] [x] [] [] [] [] [] []    Distance   125' x 2 then 10 feet    DME Rolling Walker    Gait Quality Decreased cj , Decreased step clearance, Decreased step length, Path deviations , and Trunk sway increased    Weightbearing Status      Stairs      I=Independent, Mod I=Modified Independent, S=Supervision, SBA=Standby Assistance, CGA=Contact Guard Assistance,   Min=Minimal Assistance, Mod=Moderate Assistance, Max=Maximal Assistance, Total=Total Assistance, NT=Not Tested    PLAN:   FREQUENCY AND DURATION: 3 times/week for duration of hospital stay or until stated goals are met, whichever comes first.    TREATMENT:   TREATMENT:   Therapeutic Activity (23 Minutes): Therapeutic activity included Scooting, Transfer Training, Ambulation on level ground, Sitting balance , and Standing balance to improve functional Activity tolerance, Balance, Mobility, and Strength. TREATMENT GRID:  N/A    AFTER TREATMENT PRECAUTIONS:  Left sitting on the toilet with RN and CNA aware; wife at bedside    INTERDISCIPLINARY COLLABORATION:  RN/ PCT and PT/ PTA    EDUCATION:      TIME IN/OUT:  Time In: 2756  Time Out: [address]  Minutes: [address] [place] [address] TIARRA Natarajan PTA

## 2023-01-12 NOTE — PROGRESS NOTES
Hospitalist Progress Note   Admit Date:  2023  3:40 PM   Name:  Nancy Mir   Age:  69 y.o. Sex:  male  :  1955   MRN:  [id number]   Room:  [facility][note 3]/01    Presenting Complaint: Chest Pain and Shortness of Breath     Reason(s) for Admission: Acute respiratory failure with hypoxia ([address] [J96.01]  Primary spontaneous pneumothorax [J93.11]  Spontaneous pneumothorax [J93.83]     Hospital Course:   69 y.o. male with medical history hypertension, A. fib on Pradaxa, CASSY, CPAP at night, bullous emphysema, history of bilateral subdural hematomas 2017 and diabetes mellitus presented to ED with acute onset right-sided chest pain. Patient reports he was talking to his neighbor when he developed sharp severe right-sided chest pain, associated with shortness of breath. Reports he has history of spontaneous left pneumothorax in , shortly after putting on his CPAP while hospitalized for subdural hematoma. He is a former smoker, quit years ago. Denies nausea, vomiting, fever, chills or abdominal pain. Chest tube was placed. He had air leak after chest tube was clamped. CT surgery was consulted. He underwent VATS surgery on . Subjective & 24hr Events (23): Stable on 3 L. No respiratory distress. Chest x-ray pending. Worked well with PT. pain adequately controlled on oral medications. Assessment & Plan:   Spontaneous pneumothorax  Hx pneumothorax, sudden onset dyspnea. Admission XR with large pneumothorax.   -CT Surgery: continue chest tubes, consider removal   -clear for PT, OT  2023: Increased oxygen requirement, repeat chest x-ray    Acute respiratory failure with hypoxia   -supportive care, maintain O2 sat > 92  2023: No respiratory distress on 3 L    Type 2 diabetes mellitus with hyperglycemia, without long-term current use of insulin   -Sliding scale insulin  -Fingerstick blood glucose  -Titrate basal insulin as indicated  2023: sGlc 169-226 16U basal + 2U SSI last 24 hours, continue basal 16U + SSI    Primary hypertension  -carvedilol,  HCTZ, lisinopril, spironolactone     Class 3 severe obesity due to excess calories with serious comorbidity and body mass index (BMI) of 40.0 to 44.9 in adult  Increased risk of all cause mortality, complicating care  - healthy lifestyle at discharge    Atrial fibrillation   -dabigatran    CASSY on CPAP  -CPAP qhs    Hypercoagulable state  Noted        Anticipated discharge needs:      Home with outpatient follow up    Diet:  ADULT DIET; Regular; 4 carb choices (60 gm/meal)  DVT PPx: on dabigatran  Code status: Full Code    Hospital Problems:  Principal Problem:    Spontaneous pneumothorax  Active Problems:    Acute respiratory failure with hypoxia (HCC)    Primary spontaneous pneumothorax    Primary hypertension    Benign prostatic hyperplasia with nocturia    Class 3 severe obesity due to excess calories with serious comorbidity and body mass index (BMI) of 40.0 to 44.9 in adult [facility])    Atrial fibrillation (HCC)    CASSY on CPAP    Type 2 diabetes mellitus with hyperglycemia, without long-term current use of insulin (HCC)    Anticoagulant long-term use  Resolved Problems:    * No resolved hospital problems.  *      Objective:   Patient Vitals for the past 24 hrs:   Temp Pulse Resp BP SpO2   01/12/23 0451 97.6 °F (36.4 °C) 85 22 (!) 149/83 96 %   01/12/23 0106 97.9 °F (36.6 °C) 89 18 (!) 146/76 94 %   01/11/23 2108 97.5 °F (36.4 °C) 94 17 (!) 138/91 96 %   01/11/23 1757 -- -- 18 -- --   01/11/23 1639 98.6 °F (37 °C) 78 22 138/64 93 %   01/11/23 0954 -- -- 16 -- --   01/11/23 0837 97.5 °F (36.4 °C) 96 20 116/69 96 %         Oxygen Therapy  SpO2: 96 %  Pulse via Oximetry: 82 beats per minute  Pulse Oximeter Device Mode: Intermittent  Pulse Oximeter Device Location: Finger  O2 Device: Nasal cannula  Skin Assessment: Clean, dry, & intact  O2 Flow Rate (L/min): 3 L/min    Estimated body mass index is 41.92 kg/m² as calculated from the following:    Height as of this encounter: 6' 2\" (1.88 m). Weight as of this encounter: 326 lb 8 oz (148.1 kg). Intake/Output Summary (Last 24 hours) at 1/12/2023 0715  Last data filed at 1/12/2023 0451  Gross per 24 hour   Intake 1260 ml   Output 1668 ml   Net -408 ml         Blood pressure (!) 149/83, pulse 85, temperature 97.6 °F (36.4 °C), temperature source Oral, resp. rate 22, height 6' 2\" (1.88 m), weight (!) 326 lb 8 oz (148.1 kg), SpO2 96 %. Physical Exam  Vitals and nursing note reviewed. Constitutional:       General: He is not in acute distress. Appearance: He is morbidly obese. He is not diaphoretic. HENT:      Head: Normocephalic and atraumatic. Eyes:      Extraocular Movements: Extraocular movements intact. Cardiovascular:      Rate and Rhythm: Normal rate and regular rhythm. Pulmonary:      Effort: Pulmonary effort is normal. No respiratory distress. Chest:      Chest wall: No crepitus. Comments: R CT x2, bloody OP to water seal  Abdominal:      General: Abdomen is protuberant. There is no distension. Tenderness: There is no abdominal tenderness. Comments: Massive paniculus   Musculoskeletal:         General: No deformity. Skin:     Coloration: Skin is not jaundiced or pale. Neurological:      General: No focal deficit present. Mental Status: He is alert and oriented to person, place, and time. Psychiatric:         Mood and Affect: Mood normal.         Behavior: Behavior normal. Behavior is cooperative.           I have personally reviewed labs and tests showing:  Recent Labs:  Recent Results (from the past 48 hour(s))   POCT Glucose    Collection Time: 01/10/23  7:34 AM   Result Value Ref Range    POC Glucose 166 (H) 65 - 100 mg/dL    Performed by: ELERTS    POCT Glucose    Collection Time: 01/10/23 12:05 PM   Result Value Ref Range    POC Glucose 216 (H) 65 - 100 mg/dL    Performed by: ELERTS    POCT Glucose    Collection Time: 01/10/23  4:53 PM   Result Value Ref Range    POC Glucose 163 (H) 65 - 100 mg/dL    Performed by: Freya    POCT Glucose    Collection Time: 01/10/23  8:31 PM   Result Value Ref Range    POC Glucose 198 (H) 65 - 100 mg/dL    Performed by: Favian    CBC with Auto Differential    Collection Time: 01/11/23  5:11 AM   Result Value Ref Range    WBC 8.1 4.3 - 11.1 K/uL    RBC 4.19 (L) 4.23 - 5.6 M/uL    Hemoglobin 13.8 13.6 - 17.2 g/dL    Hematocrit 41.1 41.1 - 50.3 %    MCV 98.1 82 - 102 FL    MCH 32.9 26.1 - 32.9 PG    MCHC 33.6 31.4 - 35.0 g/dL    RDW 13.0 11.9 - 14.6 %    Platelets 891 345 - 285 K/uL    MPV 10.2 9.4 - 12.3 FL    nRBC 0.00 0.0 - 0.2 K/uL    Differential Type AUTOMATED      Seg Neutrophils 79 (H) 43 - 78 %    Lymphocytes 10 (L) 13 - 44 %    Monocytes 7 4.0 - 12.0 %    Eosinophils % 3 0.5 - 7.8 %    Basophils 0 0.0 - 2.0 %    Immature Granulocytes 1 0.0 - 5.0 %    Segs Absolute 6.4 1.7 - 8.2 K/UL    Absolute Lymph # 0.8 0.5 - 4.6 K/UL    Absolute Mono # 0.6 0.1 - 1.3 K/UL    Absolute Eos # 0.2 0.0 - 0.8 K/UL    Basophils Absolute 0.0 0.0 - 0.2 K/UL    Absolute Immature Granulocyte 0.1 0.0 - 0.5 K/UL   Basic Metabolic Panel w/ Reflex to MG    Collection Time: 01/11/23  5:11 AM   Result Value Ref Range    Sodium 135 133 - 143 mmol/L    Potassium 3.8 3.5 - 5.1 mmol/L    Chloride 100 (L) 101 - 110 mmol/L    CO2 26 21 - 32 mmol/L    Anion Gap 9 2 - 11 mmol/L    Glucose 167 (H) 65 - 100 mg/dL    BUN 8 8 - 23 MG/DL    Creatinine 0.70 (L) 0.8 - 1.5 MG/DL    Est, Glom Filt Rate >60 >60 ml/min/1.73m2    Calcium 8.8 8.3 - 10.4 MG/DL   POCT Glucose    Collection Time: 01/11/23  7:08 AM   Result Value Ref Range    POC Glucose 169 (H) 65 - 100 mg/dL    Performed by: [employer]    POCT Glucose    Collection Time: 01/11/23 11:49 AM   Result Value Ref Range    POC Glucose 227 (H) 65 - 100 mg/dL    Performed by: [employer]    POCT Glucose    Collection Time: 01/11/23  4:58 PM   Result Value Ref Range    POC Glucose 160 (H) 65 - 100 mg/dL    Performed by: Freya    POCT Glucose    Collection Time: 01/11/23  8:15 PM   Result Value Ref Range    POC Glucose 199 (H) 65 - 100 mg/dL    Performed by: [facility]        I have personally reviewed imaging studies showing: Other Studies:  XR CHEST PORTABLE   Final Result      1. Stable right chest tubes. No significant pneumothorax. 2. Right basilar atelectasis. 3. No significant change. XR CHEST PORTABLE   Final Result      1. Stable right chest tubes. No visible pneumothorax on today's study. 2. Right basilar atelectasis. XR CHEST PORTABLE   Final Result   Placement of 2 basilar chest tubes with probable small residual   right apical pneumothorax. XR CHEST PORTABLE   Final Result      1. Small right apical pneumothorax. Right chest tube is in place. 2. Right lung base opacity, likely atelectasis. 3. No significant change compared to prior. XR CHEST PORTABLE   Final Result      1. Persistent small right apical pneumothorax. Right chest tube is stable. XR CHEST PORTABLE   Final Result      1. New small right apical pneumothorax, despite presence of the right-sided   chest tube. 2. Bibasilar atelectasis. XR CHEST PORTABLE   Final Result   Unchanged bibasilar lung atelectasis. XR CHEST PORTABLE   Final Result   No evidence of pneumothorax         XR CHEST PORTABLE   Final Result   1. Resolved right pneumothorax. 2. Mild bibasilar lung atelectasis. XR CHEST PORTABLE   Final Result   1. Right chest tube placed, with markedly smaller pneumothorax. 2.  Resolution of the previous tension component. 3.  Mild bibasilar atelectasis. CT CHEST WO CONTRAST   Final Result   1. Persisting large right pneumothorax. 2.  Leftward deviation of mediastinal contours concerning for tension component. 3.  Partial atelectasis of right lung. 4.  Emphysema.    5. Vascular disease.      Urgent findings communicated to the ordering NP Lori by the secure text   messaging system at 6:30 PM.      XR CHEST PORTABLE   Final Result   1.  Large right pneumothorax.   2.  Pulmonary vascular congestion.               Findings discussed with Dr. Hernandez by myself at 4:42 PM.             Current Meds:  Current Facility-Administered Medications   Medication Dose Route Frequency    methocarbamol (ROBAXIN) tablet 750 mg  750 mg Oral 4x Daily    glycerin (ADULT) suppository 2 g  1 suppository Rectal Daily PRN    polyethylene glycol (GLYCOLAX) packet 17 g  17 g Oral BID    docusate sodium (COLACE) capsule 100 mg  100 mg Oral Daily    HYDROmorphone HCl PF (DILAUDID) injection 0.5 mg  0.5 mg IntraVENous Q4H PRN    oxyCODONE-acetaminophen (PERCOCET) 5-325 MG per tablet 1 tablet  1 tablet Oral Q4H PRN    oxyCODONE-acetaminophen (PERCOCET) 5-325 MG per tablet 2 tablet  2 tablet Oral Q4H PRN    insulin glargine (LANTUS) injection vial 16 Units  16 Units SubCUTAneous Daily    bisacodyl (DULCOLAX) suppository 10 mg  10 mg Rectal Daily PRN    glucose chewable tablet 16 g  4 tablet Oral PRN    dextrose bolus 10% 125 mL  125 mL IntraVENous PRN    Or    dextrose bolus 10% 250 mL  250 mL IntraVENous PRN    glucagon (rDNA) injection 1 mg  1 mg SubCUTAneous PRN    dextrose 10 % infusion   IntraVENous Continuous PRN    metoprolol (LOPRESSOR) injection 5 mg  5 mg IntraVENous Q6H PRN    carvedilol (COREG) tablet 25 mg  25 mg Oral BID WC    dabigatran (PRADAXA) capsule 150 mg  150 mg Oral BID    lisinopril (PRINIVIL;ZESTRIL) tablet 10 mg  10 mg Oral Daily    gabapentin (NEURONTIN) capsule 600 mg  600 mg Oral BID    guaiFENesin (MUCINEX) extended release tablet 600 mg  600 mg Oral BID    hydroCHLOROthiazide (HYDRODIURIL) tablet 25 mg  25 mg Oral Daily    atorvastatin (LIPITOR) tablet 20 mg  20 mg Oral Daily    spironolactone (ALDACTONE) tablet 25 mg  25 mg Oral Daily    sodium chloride flush 0.9 % injection  5-40 mL  5-40 mL IntraVENous 2 times per day    sodium chloride flush 0.9 % injection 5-40 mL  5-40 mL IntraVENous PRN    0.9 % sodium chloride infusion   IntraVENous PRN    ondansetron (ZOFRAN-ODT) disintegrating tablet 4 mg  4 mg Oral Q8H PRN    Or    ondansetron (ZOFRAN) injection 4 mg  4 mg IntraVENous Q6H PRN    acetaminophen (TYLENOL) tablet 650 mg  650 mg Oral Q6H PRN    Or    acetaminophen (TYLENOL) suppository 650 mg  650 mg Rectal Q6H PRN    acetaminophen (TYLENOL) tablet 500 mg  500 mg Oral Q4H    insulin lispro (HUMALOG) injection vial 0-8 Units  0-8 Units SubCUTAneous TID WC    insulin lispro (HUMALOG) injection vial 0-4 Units  0-4 Units SubCUTAneous Nightly       Signed:  Gustavo Bennett MD

## 2023-01-12 NOTE — PLAN OF CARE
Problem: Discharge Planning  Goal: Discharge to home or other facility with appropriate resources  Outcome: Progressing  Flowsheets (Taken 1/12/2023 0946)  Discharge to home or other facility with appropriate resources:   Identify barriers to discharge with patient and caregiver   Arrange for needed discharge resources and transportation as appropriate   Arrange for interpreters to assist at discharge as needed   Identify discharge learning needs (meds, wound care, etc)   Refer to discharge planning if patient needs post-hospital services based on physician order or complex needs related to functional status, cognitive ability or social support system     Problem: Pain  Goal: Verbalizes/displays adequate comfort level or baseline comfort level  Outcome: Progressing     Problem: ABCDS Injury Assessment  Goal: Absence of physical injury  Outcome: Progressing  Flowsheets (Taken 1/12/2023 0832)  Absence of Physical Injury: Implement safety measures based on patient assessment     Problem: Safety - Adult  Goal: Free from fall injury  Outcome: Progressing  Flowsheets (Taken 1/12/2023 0036)  Free From Fall Injury:   Instruct family/caregiver on patient safety   Based on caregiver fall risk screen, instruct family/caregiver to ask for assistance with transferring infant if caregiver noted to have fall risk factors     Problem: Chronic Conditions and Co-morbidities  Goal: Patient's chronic conditions and co-morbidity symptoms are monitored and maintained or improved  Outcome: Progressing  Flowsheets (Taken 1/12/2023 0832)  Care Plan - Patient's Chronic Conditions and Co-Morbidity Symptoms are Monitored and Maintained or Improved:   Monitor and assess patient's chronic conditions and comorbid symptoms for stability, deterioration, or improvement   Collaborate with multidisciplinary team to address chronic and comorbid conditions and prevent exacerbation or deterioration   Update acute care plan with appropriate goals if chronic or comorbid symptoms are exacerbated and prevent overall improvement and discharge

## 2023-01-12 NOTE — PROGRESS NOTES
Pt remains on 3L supplemental, may require home oxygen at discharge. MD wants to wait until CTs are removed before ordering a walk test to determine home need. [facility] arranged.

## 2023-01-12 NOTE — PROGRESS NOTES
Admit Date: 2023    POD 3 Days Post-Op    Procedure:  Procedure(s):  RIGHT VATS/WEDGE BLEBECTOMY/RIGHT UPPER LOBE WEDGE BLEBECTOMY/RIGHT LOWER LOBE WEDGE BLEBECTOMY/TALC PLEURODESIS    Subjective:     Pt alert with NAD. Respirations even and unlabored on 3 L/MIN NC. Tolerating diet with no N or V. Working with PT-ambulated with walker in the [place]. Up in chair for meals. Right ribcage pain well controlled with multimodal analgesia. Right CT x 2 in place. Objective:       Vitals:    23 0106 23 0451 23 0715 23 1115   BP: (!) 146/76 (!) 149/83 131/68 127/63   Pulse: 89 85 85 82   Resp: 18  17    Temp: 97.9 °F (36.6 °C) 97.6 °F (36.4 °C) 97.6 °F (36.4 °C) 97.6 °F (36.4 °C)   TempSrc: Oral Oral Oral Oral   SpO2: 94% 96% 93% 96%   Weight:       Height:           Temp (24hrs), Av.8 °F (36.6 °C), Min:97.5 °F (36.4 °C), Max:98.6 °F (37 °C)  . I&O reviewed as documented. Voiding 1400 ml UOP past 24h  Bm x2 23  Right anterior  ML SS output past 24h. No air leak  Right posterior CT 38 ml SEROUS output past 24h. No air leak    Afebrile   VSS    Physical Exam  Constitutional:       General: He is not in acute distress. Appearance: He is obese. HENT:      Mouth/Throat:      Mouth: Mucous membranes are moist.   Cardiovascular:      Rate and Rhythm: Normal rate and regular rhythm. Pulses: Normal pulses. Heart sounds: Normal heart sounds. No murmur heard. No friction rub. No gallop. Pulmonary:      Effort: Pulmonary effort is normal. No respiratory distress. Breath sounds: Normal breath sounds. Abdominal:      General: Bowel sounds are normal. There is no distension. Palpations: Abdomen is soft. Genitourinary:     Comments: VOIDING   Musculoskeletal:         General: No swelling. Normal range of motion. Cervical back: No rigidity. Skin:     General: Skin is warm and dry. Capillary Refill: Capillary refill takes less than 2 seconds. Comments: Right CT x2 with vaseline occlusive drsg c/d/I with no signs of infection. Neurological:      Mental Status: He is alert and oriented to person, place, and time. Psychiatric:         Behavior: Behavior normal.      Labs:   Recent Results (from the past 24 hour(s))   POCT Glucose    Collection Time: 01/11/23  4:58 PM   Result Value Ref Range    POC Glucose 160 (H) 65 - 100 mg/dL    Performed by: Freya    POCT Glucose    Collection Time: 01/11/23  8:15 PM   Result Value Ref Range    POC Glucose 199 (H) 65 - 100 mg/dL    Performed by: NelsonComangelo    POCT Glucose    Collection Time: 01/12/23  7:58 AM   Result Value Ref Range    POC Glucose 169 (H) 65 - 100 mg/dL    Performed by: EhsanPCATUL    POCT Glucose    Collection Time: 01/12/23 11:16 AM   Result Value Ref Range    POC Glucose 226 (H) 65 - 100 mg/dL    Performed by: Jah         Assessment:     Principal Problem:    Spontaneous pneumothorax  Active Problems:    Acute respiratory failure with hypoxia (HCC)    Anticoagulant long-term use    Primary spontaneous pneumothorax    Primary hypertension    Benign prostatic hyperplasia with nocturia    Class 3 severe obesity due to excess calories with serious comorbidity and body mass index (BMI) of 40.0 to 44.9 in [facility])    Atrial fibrillation (HCC)    CASSY on CPAP    Type 2 diabetes mellitus with hyperglycemia, without long-term current use of insulin ([address]  Resolved Problems:    * No resolved hospital problems.  *        Plan/Recommendations/Medical Decision Making:     Continue present treatment  Plan for CT removal Saturday 1/14/23 AM  Acceptable to be off CT suction during ambulation with PT   CARA Chambers NP

## 2023-01-13 ENCOUNTER — APPOINTMENT (OUTPATIENT)
Dept: GENERAL RADIOLOGY | Age: 68
DRG: 163 | End: 2023-01-13
Payer: MEDICARE

## 2023-01-13 LAB
GLUCOSE BLD STRIP.AUTO-MCNC: 164 MG/DL (ref 65–100)
GLUCOSE BLD STRIP.AUTO-MCNC: 183 MG/DL (ref 65–100)
GLUCOSE BLD STRIP.AUTO-MCNC: 208 MG/DL (ref 65–100)
GLUCOSE BLD STRIP.AUTO-MCNC: 229 MG/DL (ref 65–100)
SERVICE CMNT-IMP: ABNORMAL

## 2023-01-13 PROCEDURE — 1100000000 HC RM PRIVATE

## 2023-01-13 PROCEDURE — 82962 GLUCOSE BLOOD TEST: CPT

## 2023-01-13 PROCEDURE — 2580000003 HC RX 258: Performed by: SURGERY

## 2023-01-13 PROCEDURE — 97112 NEUROMUSCULAR REEDUCATION: CPT

## 2023-01-13 PROCEDURE — 6370000000 HC RX 637 (ALT 250 FOR IP): Performed by: SURGERY

## 2023-01-13 PROCEDURE — 6370000000 HC RX 637 (ALT 250 FOR IP): Performed by: HOSPITALIST

## 2023-01-13 PROCEDURE — 6360000002 HC RX W HCPCS

## 2023-01-13 PROCEDURE — 97530 THERAPEUTIC ACTIVITIES: CPT

## 2023-01-13 PROCEDURE — 71045 X-RAY EXAM CHEST 1 VIEW: CPT

## 2023-01-13 PROCEDURE — 97535 SELF CARE MNGMENT TRAINING: CPT

## 2023-01-13 PROCEDURE — 6370000000 HC RX 637 (ALT 250 FOR IP)

## 2023-01-13 RX ORDER — OXYCODONE HYDROCHLORIDE AND ACETAMINOPHEN 5; 325 MG/1; MG/1
1 TABLET ORAL EVERY 6 HOURS PRN
Qty: 12 TABLET | Refills: 0 | Status: SHIPPED | OUTPATIENT
Start: 2023-01-13 | End: 2023-01-18

## 2023-01-13 RX ORDER — POLYETHYLENE GLYCOL 3350 17 G/17G
17 POWDER, FOR SOLUTION ORAL 2 TIMES DAILY
Status: DISCONTINUED | OUTPATIENT
Start: 2023-01-13 | End: 2023-01-19 | Stop reason: HOSPADM

## 2023-01-13 RX ORDER — POLYETHYLENE GLYCOL 3350 17 G/17G
17 POWDER, FOR SOLUTION ORAL 2 TIMES DAILY PRN
Status: DISCONTINUED | OUTPATIENT
Start: 2023-01-13 | End: 2023-01-13

## 2023-01-13 RX ORDER — METHOCARBAMOL 750 MG/1
750 TABLET, FILM COATED ORAL 4 TIMES DAILY
Qty: 40 TABLET | Refills: 0 | Status: SHIPPED | OUTPATIENT
Start: 2023-01-13 | End: 2023-01-23

## 2023-01-13 RX ORDER — DOCUSATE SODIUM 100 MG/1
100 CAPSULE, LIQUID FILLED ORAL 2 TIMES DAILY
Status: DISCONTINUED | OUTPATIENT
Start: 2023-01-13 | End: 2023-01-14

## 2023-01-13 RX ORDER — SIMETHICONE 80 MG
80 TABLET,CHEWABLE ORAL EVERY 6 HOURS PRN
Status: DISCONTINUED | OUTPATIENT
Start: 2023-01-13 | End: 2023-01-19 | Stop reason: HOSPADM

## 2023-01-13 RX ORDER — INSULIN GLARGINE 100 [IU]/ML
18 INJECTION, SOLUTION SUBCUTANEOUS DAILY
Status: DISCONTINUED | OUTPATIENT
Start: 2023-01-14 | End: 2023-01-14

## 2023-01-13 RX ADMIN — LISINOPRIL 10 MG: 5 TABLET ORAL at 08:33

## 2023-01-13 RX ADMIN — DOCUSATE SODIUM 100 MG: 100 CAPSULE, LIQUID FILLED ORAL at 20:22

## 2023-01-13 RX ADMIN — METFORMIN HYDROCHLORIDE 500 MG: 500 TABLET ORAL at 16:38

## 2023-01-13 RX ADMIN — GUAIFENESIN 600 MG: 600 TABLET ORAL at 20:22

## 2023-01-13 RX ADMIN — POLYETHYLENE GLYCOL 3350 17 G: 17 POWDER, FOR SOLUTION ORAL at 08:34

## 2023-01-13 RX ADMIN — ACETAMINOPHEN 500 MG: 500 TABLET, FILM COATED ORAL at 08:32

## 2023-01-13 RX ADMIN — SPIRONOLACTONE 25 MG: 25 TABLET ORAL at 08:33

## 2023-01-13 RX ADMIN — METFORMIN HYDROCHLORIDE 500 MG: 500 TABLET ORAL at 08:32

## 2023-01-13 RX ADMIN — GABAPENTIN 600 MG: 300 CAPSULE ORAL at 08:32

## 2023-01-13 RX ADMIN — CARVEDILOL 25 MG: 25 TABLET, FILM COATED ORAL at 08:33

## 2023-01-13 RX ADMIN — GABAPENTIN 600 MG: 300 CAPSULE ORAL at 20:30

## 2023-01-13 RX ADMIN — DABIGATRAN ETEXILATE MESYLATE 150 MG: 150 CAPSULE ORAL at 20:30

## 2023-01-13 RX ADMIN — SODIUM CHLORIDE, PRESERVATIVE FREE 10 ML: 5 INJECTION INTRAVENOUS at 20:30

## 2023-01-13 RX ADMIN — ATORVASTATIN CALCIUM 20 MG: 20 TABLET, FILM COATED ORAL at 08:33

## 2023-01-13 RX ADMIN — METHOCARBAMOL TABLETS 750 MG: 750 TABLET, COATED ORAL at 16:38

## 2023-01-13 RX ADMIN — HYDROCHLOROTHIAZIDE 25 MG: 25 TABLET ORAL at 08:33

## 2023-01-13 RX ADMIN — CARVEDILOL 25 MG: 25 TABLET, FILM COATED ORAL at 16:38

## 2023-01-13 RX ADMIN — OXYCODONE AND ACETAMINOPHEN 1 TABLET: 5; 325 TABLET ORAL at 05:56

## 2023-01-13 RX ADMIN — INSULIN LISPRO 2 UNITS: 100 INJECTION, SOLUTION INTRAVENOUS; SUBCUTANEOUS at 12:02

## 2023-01-13 RX ADMIN — ACETAMINOPHEN 500 MG: 500 TABLET, FILM COATED ORAL at 11:58

## 2023-01-13 RX ADMIN — ACETAMINOPHEN 500 MG: 500 TABLET, FILM COATED ORAL at 15:57

## 2023-01-13 RX ADMIN — ACETAMINOPHEN 500 MG: 500 TABLET, FILM COATED ORAL at 23:30

## 2023-01-13 RX ADMIN — METHOCARBAMOL TABLETS 750 MG: 750 TABLET, COATED ORAL at 20:22

## 2023-01-13 RX ADMIN — DOCUSATE SODIUM 100 MG: 100 CAPSULE, LIQUID FILLED ORAL at 08:33

## 2023-01-13 RX ADMIN — POLYETHYLENE GLYCOL 3350 17 G: 17 POWDER, FOR SOLUTION ORAL at 20:31

## 2023-01-13 RX ADMIN — SODIUM CHLORIDE, PRESERVATIVE FREE 10 ML: 5 INJECTION INTRAVENOUS at 08:34

## 2023-01-13 RX ADMIN — METHOCARBAMOL TABLETS 750 MG: 750 TABLET, COATED ORAL at 08:33

## 2023-01-13 RX ADMIN — GUAIFENESIN 600 MG: 600 TABLET ORAL at 08:33

## 2023-01-13 RX ADMIN — DABIGATRAN ETEXILATE MESYLATE 150 MG: 150 CAPSULE ORAL at 08:33

## 2023-01-13 RX ADMIN — OXYCODONE AND ACETAMINOPHEN 1 TABLET: 5; 325 TABLET ORAL at 20:20

## 2023-01-13 RX ADMIN — HYDROMORPHONE HYDROCHLORIDE 0.5 MG: 1 INJECTION, SOLUTION INTRAMUSCULAR; INTRAVENOUS; SUBCUTANEOUS at 14:47

## 2023-01-13 RX ADMIN — METHOCARBAMOL TABLETS 750 MG: 750 TABLET, COATED ORAL at 11:58

## 2023-01-13 RX ADMIN — INSULIN GLARGINE 16 UNITS: 100 INJECTION, SOLUTION SUBCUTANEOUS at 08:41

## 2023-01-13 ASSESSMENT — PAIN DESCRIPTION - DESCRIPTORS
DESCRIPTORS: SORE;ACHING;STABBING
DESCRIPTORS: ACHING
DESCRIPTORS: SORE
DESCRIPTORS: ACHING;STABBING;SORE
DESCRIPTORS: ACHING;SHARP;SORE
DESCRIPTORS: ACHING;STABBING;SORE
DESCRIPTORS: ACHING;STABBING;SORE
DESCRIPTORS: SORE

## 2023-01-13 ASSESSMENT — PAIN DESCRIPTION - ORIENTATION
ORIENTATION: RIGHT

## 2023-01-13 ASSESSMENT — PAIN SCALES - GENERAL
PAINLEVEL_OUTOF10: 0
PAINLEVEL_OUTOF10: 0
PAINLEVEL_OUTOF10: 8
PAINLEVEL_OUTOF10: 0
PAINLEVEL_OUTOF10: 0
PAINLEVEL_OUTOF10: 2
PAINLEVEL_OUTOF10: 3
PAINLEVEL_OUTOF10: 7
PAINLEVEL_OUTOF10: 7
PAINLEVEL_OUTOF10: 4
PAINLEVEL_OUTOF10: 3
PAINLEVEL_OUTOF10: 0
PAINLEVEL_OUTOF10: 4
PAINLEVEL_OUTOF10: 2
PAINLEVEL_OUTOF10: 6
PAINLEVEL_OUTOF10: 6
PAINLEVEL_OUTOF10: 0
PAINLEVEL_OUTOF10: 0

## 2023-01-13 ASSESSMENT — PAIN DESCRIPTION - LOCATION
LOCATION: CHEST
LOCATION: INCISION
LOCATION: RIB CAGE
LOCATION: INCISION

## 2023-01-13 ASSESSMENT — PAIN - FUNCTIONAL ASSESSMENT
PAIN_FUNCTIONAL_ASSESSMENT: ACTIVITIES ARE NOT PREVENTED
PAIN_FUNCTIONAL_ASSESSMENT: ACTIVITIES ARE NOT PREVENTED
PAIN_FUNCTIONAL_ASSESSMENT: PREVENTS OR INTERFERES SOME ACTIVE ACTIVITIES AND ADLS
PAIN_FUNCTIONAL_ASSESSMENT: ACTIVITIES ARE NOT PREVENTED

## 2023-01-13 NOTE — PROGRESS NOTES
ACUTE OCCUPATIONAL THERAPY GOALS:   (Developed with and agreed upon by patient and/or caregiver.)  1. Patient will complete lower body bathing and dressing with SUPERVISION and adaptive equipment as needed. 2. Patient will complete toileting with MINIMAL ASSIST. 3. Patient will complete grooming ADL standing at sink with SUPERVISION. 4. Patient will tolerate 25 minutes of OT treatment with 1-2 rest breaks to increase activity tolerance for ADLs. 5. Patient will complete functional transfers with SUPERVISION and adaptive equipment as needed. 6. Patient will tolerate 10 minutes BUE exercises to increase strength for safe, functional transfers. Timeframe: 7 visits         OCCUPATIONAL THERAPY Daily Note and AM     OT Visit Days: 2   Time  OT Charge Capture  Rehab Caseload Tracker  OT Orders    Henry Sepulveda is a 69 y.o. male   PRIMARY DIAGNOSIS: Spontaneous pneumothorax  Acute respiratory failure with hypoxia ([facility] [address] [J96.01]  Primary spontaneous pneumothorax [J93.11]  Spontaneous pneumothorax [J93.83]  Procedure(s) (LRB):  RIGHT VATS/WEDGE BLEBECTOMY/RIGHT UPPER LOBE WEDGE BLEBECTOMY/RIGHT LOWER LOBE WEDGE BLEBECTOMY/TALC PLEURODESIS (Right)  4 Days Post-Op  Inpatient: Payor: Rich Cooks / Plan: MEDICARE PART A AND B / Product Type: *No Product type* /     ASSESSMENT:     REHAB RECOMMENDATIONS: CURRENT LEVEL OF FUNCTION:  (Most Recently Demonstrated)   Recommendation to date pending progress:  Setting:  Home Health Therapy    Equipment:    To Be Determined Bathing:  Not Tested  Dressing:  Minimal Assist donning shorts  Feeding/Grooming:  Stand by Assist standing EOS  Toileting:  Not Tested  Functional Mobility:  Contact Guard Assist  x RW     ASSESSMENT:  Mr. Makenzie Salmon continues to present with deficits in overall strength, activity tolerance, ADL performance, and functional mobility. Here for spontaneous pneumothorax; resting on 3L 02; 2 chest tubes remain.  Already OOB to chair upon arrival. Donned shorts with min A in preparation for completing functional mobility for household distances. Tolerated well; mild SOB noted with mobility. Returned to room to complete self-grooming tasks, while standing, EOS with supervision. Surgery NP attending to bedside and weaned patient from 3L to 2L 02. OOB to chair and doing well. Will continue OT efforts as indicated. SUBJECTIVE:     Mr. Jarod Carpio states, \"I think I am losing my drawers! \"    Social/Functional Lives With: Spouse  Type of Home: Condo  Home Layout: One level  Bathroom Toilet: Standard  ADL Assistance: Independent  Homemaking Assistance: Independent  Transfer Assistance: Independent  Active : Yes  Mode of Transportation: Car  Occupation: Retired    OBJECTIVE:     Mily Baltazar / Vita Tipton / Glenis Govern: Chest Tube    RESTRICTIONS/PRECAUTIONS:  Restrictions/Precautions  Restrictions/Precautions: Fall Risk        PAIN: VITALS / O2:   Pre Treatment:   Numeric: 2/10 (chest tube site)             Post Treatment: 2 /10 Vitals          Oxygen     Stable 2L           MOBILITY: I Mod I S SBA CGA Min Mod Max Total  NT x2 Comments:   Bed Mobility    Rolling [] [] [] [] [] [] [] [] [] [] [] Already up to chair   Supine to Sit [] [] [] [] [] [] [] [] [] [] []    Scooting [] [] [] [] [] [] [] [] [] [] []    Sit to Supine [] [] [] [] [] [] [] [] [] [] []    Transfers    Sit to Stand [] [] [] [] [x] [] [] [] [] [] []    Bed to Chair [] [] [] [] [x] [] [] [] [] [] [] X RW   Stand to Sit [] [] [] [] [x] [] [] [] [] [] []    I=Independent, Mod I=Modified Independent, S=Supervision/Setup, SBA=Standby Assistance, CGA=Contact Guard Assistance, Min=Minimal Assistance, Mod=Moderate Assistance, Max=Maximal Assistance, Total=Total Assistance, NT=Not Tested    ACTIVITIES OF DAILY LIVING: I Mod I S SBA CGA Min Mod Max Total NT Comments   BASIC ADLs:              Bathing/ Showering [] [] [] [] [] [] [] [] [] []    Toileting [] [] [] [] [] [] [] [] [] []    Upper Body Dressing [] [] [] [] [] [] [] [] [] []    Lower Body Dressing [] [] [] [] [] [x] [] [] [] [] Donning shorts   Feeding [] [] [] [] [] [] [] [] [] []    Grooming [] [] [] [x] [] [] [] [] [] [] Standing EOS   Personal Device Care [] [] [] [] [] [] [] [] [] []    Functional Mobility [] [] [] [] [x] [] [] [] [] [] X RW   I=Independent, Mod I=Modified Independent, S=Supervision/Setup, SBA=Standby Assistance, CGA=Contact Guard Assistance, Min=Minimal Assistance, Mod=Moderate Assistance, Max=Maximal Assistance, Total=Total Assistance, NT=Not Tested    BALANCE: Good Fair+ Fair Fair- Poor NT Comments   Sitting Static [x] [] [] [] [] []    Sitting Dynamic [x] [] [] [] [] []              Standing Static [] [x] [] [] [] []    Standing Dynamic [] [x] [] [] [] []        PLAN:     FREQUENCY/DURATION   OT Plan of Care: 3 times/week for duration of hospital stay or until stated goals are met, whichever comes first.      TREATMENT:     TREATMENT:   Neuromuscular Re-education (13 Minutes): Neuromuscular Re-education included Balance Training, Coordination training, Postural training, Sitting balance training, and Standing balance training to improve Balance, Coordination, and Postural Control. Self Care (10 minutes): Patient participated in lower body dressing and grooming ADLs in standing with minimal verbal cueing to increase independence, decrease assistance required, and increase activity tolerance. Patient also participated in functional mobility, functional transfer, and energy conservation training to increase independence, decrease assistance required, and increase activity tolerance.      TREATMENT GRID:  N/A    AFTER TREATMENT PRECAUTIONS: Call light within reach, Chair, Needs within reach, and Visitors at bedside    INTERDISCIPLINARY COLLABORATION:  RN/ PCT, PT/ PTA, and OT/ SCOTT    EDUCATION:        TOTAL TREATMENT DURATION AND TIME:  Time In: 0957  Time Out: [address]  Minutes: 23    Chelsy Owens, OTR/L, CLT

## 2023-01-13 NOTE — DISCHARGE INSTRUCTIONS
Discharge Instructions/Follow-up Plans:   MD Instructions: Follow-up with Maria E Grant NP in 7-10 days. Incisions  Keep incisions clean and dry, may remain uncovered. Do not apply lotions, creams or ointments to incisions. Showers are allowed - gently wash and pat dry your incisions. No tub baths or soaking/submerging until cleared by follow up provider. If your incisions have steri strips, steri strips may be removed in one week. Incision does not need a dressing replaced and remain open to air. The dressing on the old chest tube sites may remain in place for 48 hours (cover with saran wrap for showers). After 48 hours, remove the dressing and no replacement dressing is needed (gently wash and pat dry)      Diet -   Carbohydrate controlled regular consistency diet     Activity   No heavy lifting >10 lb for the first week after surgery. Lift nothing heavier than 25 lbs for 4 weeks after surgery. Walking and non-strenuous exercise promotes good oxygenated blood supply to body tissues and will assist in recovery. Walking and non-strenuous exercise also promotes good lung mechanics and reduces chance of developing pneumonia. Medications  No driving while taking narcotics/(prescription strength pain medication). Do not drink alcohol while taking narcotics. Resume other home medications. Narcotic pain medication is known to cause constipation as narcotic pain medication slows gut motility. Even if you are not taking oral narcotic pain medication, you have likely been given narcotic pain medication intravenously during your surgical procedure. Do not get constipated! No more than 2 days without a bm. If 2 days no bm, then take colace stool softener twice a day. If 24 hours of colace does not produce a bm , then keep taking colace and add miralax laxative twice a day until you have a bm. Once you are having regular bms, you can use colace and miralax as needed.      If problems (fever, signs of infection, uncontrolled bleeding or drainage from surgical incision, uncontrolled pain, uncontrolled nausea and/or vomiting) or any surgical questions arise, please call our office at [phone]. [address]         Contact information for follow-up providers     Mara Lefort, MD. Schedule an appointment as soon as possible for a   visit in 1 week(s). Specialties: General Surgery, Thoracic Surgery, General Surgery  Why: Vats 1/9/23  Follow up CT site  1/26/23 @ 9:30  Contact information:  [address] [place] MD Follow up. Specialty: Internal Medicine  Why: next week for follow up with repeat labwork. please keep a blood sugar log before meals and nightly and take to visit   with you. Contact information:  [facility] [address] Ang[address] [address]  Patito Caldwell MD Follow up on 2/7/2023. Specialty: Infectious Diseases  Why: 2pm  Contact information:  [place] [address] [address] [facility]  [phone]                     Contact information for after-discharge care     Discharge Dialysis/Infusion     [facility] . Service: Infusion and IV Therapy  Contact information:  [address]  [address] [facility] [address]  [phone]                  Discharge [address] [facility] . Service: Home Health Services  Contact information:  [address] [address] [phone]              Peripherally Inserted Central Catheter [facility]): Care Instructions  Overview     A peripherally inserted central catheter (PICC) is a soft, flexible tube that runs under your skin from a vein in your arm to a large vein near your heart. One end of the catheter stays outside your body. It is a type of central vascular access device, or central line.  You may have it for weeks or months. A PICC is used to give you medicine, blood products, nutrients, or fluids. A PICC makes doing these things more comfortable for you because they are put directly into the catheter. So you will not be stuck with a needle every time. A PICC may be used to draw blood for tests only if another vein, such as in the hand or arm, can't be used. The end of the PICC sometimes has two or three openings so that you can get more than one type of fluid or medicine at a time. Your doctor may give you medicine to make you feel relaxed. You may feel a little pain when your doctor numbs your arm. Your doctor will then thread the catheter up a vein in your arm to a larger vein. You will not feel any pain. The doctor may use stitches or other devices to hold the catheter in place where it exits your arm. After the procedure, the site may be sore for a day or two. Follow-up care is a key part of your treatment and safety. Be sure to make and go to all appointments, and call your doctor if you are having problems. It's also a good idea to know your test results and keep a list of the medicines you take. How can you care for yourself at home? Do not wear jewelry, such as necklaces, that can catch on the catheter. If the catheter breaks, follow the instructions your doctor gave you. If you have no instructions, clamp or tie off the catheter. Then see a doctor as soon as possible. To help prevent infection, take a shower instead of a bath. Do not go swimming with the catheter. Try to keep the area dry. When you shower, cover the area with waterproof material, such as plastic wrap. Never touch the open end of the catheter if the cap is off. Never use scissors, knives, pins, or other sharp objects near the catheter or other tubing. If your catheter has a clamp, keep it clamped when you are not using it. Fasten or tape the catheter to your body to prevent pulling or dangling.   Avoid clothing that rubs or pulls on your catheter. Avoid bending or crimping your catheter. Always wash your hands before you touch your catheter. Wear loose clothing over the catheter for the first 10 to 14 days. When getting dressed, be careful not to pull on the catheter. How to change the dressing  Since the PICC is in one of your arms, you won't be able to change the dressing on your own. You'll need someone to help you change the dressing using the same instructions that your doctor or nurse gave you. Your PICC dressing should be changed at least once a week. If the dressing gets loose, wet, or dirty, it must be changed more often to prevent infection. Your doctor may also give you instructions for when to change the dressing. Be sure you have all your supplies ready. These include medical tape, a surgical mask, sterile gloves, and your dressing kit. The names and brands of the items will vary. Your doctor or nurse may give you specific instructions for changing the dressing. Here are basic tips for how to change the dressing. Wash your hands with soap and water. Do this for 15 seconds. Dry them with paper towels. Put on the surgical mask. Loosen and remove your old dressing. Peel it toward the PICC, not away from it. You may need to use an adhesive remover if it doesn't come off easily. Look at the site carefully for redness, swelling, drainage, tenderness, or warmth. If you notice any of these, call your doctor. Wash your hands again. Open your dressing kit, and put on the sterile gloves. Clean the site with the supplies in the dressing kit. Use the dressing that your doctor gave you, and place it over the site. Tape the PICC tubing to your skin. Make sure it doesn't dangle or pull. When should you call for help? Call 911 anytime you think you may need emergency care. For example, call if:    You passed out (lost consciousness). You have severe trouble breathing.      You have sudden chest pain and shortness of breath, or you cough up blood. You have a fast or uneven pulse. Call your doctor now or seek immediate medical care if:    You have signs of infection, such as: Increased pain, swelling, warmth, or redness. Red streaks leading from the area. Pus or blood draining from the area. A fever. You have swelling in your face, chest, neck, or arm on the side where the catheter is. You have signs of a blood clot, such as bulging veins near the catheter. Your catheter is leaking, cracked, or clogged. You feel resistance when you inject medicine or fluids into your catheter. Your catheter is out of place. This may happen after severe coughing or vomiting, or if you pull on the catheter. You have chest pain or shortness of breath. Watch closely for changes in your health, and be sure to contact your doctor if:    You have any concerns about your catheter. Where can you learn more? Go to http://www.woods.com/ and enter L935 to learn more about \"Peripherally Inserted Central Catheter (PICC): Care Instructions. \"  Current as of: April 5, 2022               Content Version: 13.5  © 2702-5722 [employer]. Care instructions adapted under license by [facility] ([facility]). If you have questions about a medical condition or this instruction, always ask your healthcare professional. [place] [address] any warranty or liability for your use of this information.

## 2023-01-13 NOTE — PROGRESS NOTES
Admit Date: 2023    POD 4 Days Post-Op    Procedure:  Procedure(s):  RIGHT VATS/WEDGE BLEBECTOMY/RIGHT UPPER LOBE WEDGE BLEBECTOMY/RIGHT LOWER LOBE WEDGE BLEBECTOMY/TALC PLEURODESIS    Subjective:     Pt sitting on side of bed. No complaints. Alert with NAD. Respirations even and unlabored on 3 L/MIN NC. Tolerating diabetic diet with no nausea or vomiting. Working with PT-ambulating in halls with walker. Up in chair for meals. Right ribcage pain well controlled with multimodal analgesia. Right CT x 2 in place. No air leak. CXR this am showing  Impression       1. Stable right chest tubes. No visible pneumothorax. 2. Persistent bibasilar atelectasis. 3. No significant change       Objective:       Vitals:    23 2200 23 0024 23 0400 23 0845   BP:  121/68 (!) 171/98 124/64   Pulse:  92 82 (!) 102   Resp: 20 20 18 18   Temp:  97.2 °F (36.2 °C) 97.5 °F (36.4 °C) 98 °F (36.7 °C)   TempSrc:  Temporal Temporal Oral   SpO2:  94% 96% 95%   Weight:   (!) 329 lb 1.6 oz (149.3 kg)    Height:           Temp (24hrs), Av.7 °F (36.5 °C), Min:97.2 °F (36.2 °C), Max:98.1 °F (36.7 °C)  . I&O reviewed as documented.    + Bm 23  Right anterior CT 1000 ML SS output past 24h. No air leak  Right posterior CT 0 ml SEROUS output past 24h. No air leak    Afebrile   VSS    Physical Exam  Constitutional:       General: He is not in acute distress. Appearance: He is obese. HENT:      Mouth/Throat:      Mouth: Mucous membranes are moist.   Cardiovascular:      Rate and Rhythm: Normal rate and regular rhythm. Pulses: Normal pulses. Heart sounds: Normal heart sounds. No murmur heard. No friction rub. No gallop. Pulmonary:      Effort: Pulmonary effort is normal. No respiratory distress. Breath sounds: Normal breath sounds. Abdominal:      General: Bowel sounds are normal. There is no distension. Palpations: Abdomen is soft.    Genitourinary:     Comments: VOIDING Musculoskeletal:         General: No swelling. Normal range of motion. Cervical back: No rigidity. Skin:     General: Skin is warm and dry. Capillary Refill: Capillary refill takes less than 2 seconds. Comments: Right CT x2 with vaseline occlusive drsg c/d/I with no signs of infection. Neurological:      Mental Status: He is alert and oriented to person, place, and time. Psychiatric:         Behavior: Behavior normal.      Labs:   Recent Results (from the past 24 hour(s))   POCT Glucose    Collection Time: 01/12/23 11:16 AM   Result Value Ref Range    POC Glucose 226 (H) 65 - 100 mg/dL    Performed by: Jah    POCT Glucose    Collection Time: 01/12/23  4:55 PM   Result Value Ref Range    POC Glucose 189 (H) 65 - 100 mg/dL    Performed by: Heaven Ríos    POCT Glucose    Collection Time: 01/12/23  8:00 PM   Result Value Ref Range    POC Glucose 183 (H) 65 - 100 mg/dL    Performed by: Sonny Mirza    POCT Glucose    Collection Time: 01/13/23  7:24 AM   Result Value Ref Range    POC Glucose 164 (H) 65 - 100 mg/dL    Performed by: Villegas (Cain)BSN         Assessment:     Principal Problem:    Spontaneous pneumothorax  Active Problems:    Acute respiratory failure with hypoxia (HCC)    Anticoagulant long-term use    Primary spontaneous pneumothorax    Primary hypertension    Benign prostatic hyperplasia with nocturia    Class 3 severe obesity due to excess calories with serious comorbidity and body mass index (BMI) of 40.0 to 44.9 in [facility])    Atrial fibrillation (HCC)    CASSY on CPAP    Type 2 diabetes mellitus with hyperglycemia, without long-term current use of insulin ([facility] [address]  Resolved Problems:    * No resolved hospital problems.  *        Plan/Recommendations/Medical Decision Making:   Care Management per Hospitalist  Continue present treatment  Plan for CT removal Saturday 1/14/23 AM  Acceptable to be off CT suction during ambulation with PT     Luz BROWNLEE Frommel, FNP-BC

## 2023-01-13 NOTE — PROGRESS NOTES
Patients right posterior chest tube dressing is saturated with yellow fluid. I changed it about 3 to 4 hours ago and it is already saturated and there is not anything draining through the actual chest tube. It is causing the patient some pain and I have given him pain medicine and it seems to be helping. Primary physician which is hospitalitis, Dr. Beltre was notified. Per Dr. Beltre notify General surgery. Lefty Sánchez, NP notified.     Lefty Sánchez said this is not uncommon when there is a long dwelling chest tube, but that he would be by to look at it.

## 2023-01-13 NOTE — PROGRESS NOTES
Hospitalist Progress Note   Admit Date:  2023  3:40 PM   Name:  Huyen Garcia   Age:  69 y.o. Sex:  male  :  1955   MRN:  [id number]   Room:  [facility]/    Presenting Complaint: Chest Pain and Shortness of Breath     Reason(s) for Admission: Acute respiratory failure with hypoxia ([address] [J96.01]  Primary spontaneous pneumothorax [J93.11]  Spontaneous pneumothorax [J93.83]     Hospital Course:   69 y.o. male with medical history hypertension, A. fib on Pradaxa, CASSY, CPAP at night, bullous emphysema, history of bilateral subdural hematomas 2017 and diabetes mellitus presented to ED with acute onset right-sided chest pain. Patient reports he was talking to his neighbor when he developed sharp severe right-sided chest pain, associated with shortness of breath. Reports he has history of spontaneous left pneumothorax in , shortly after putting on his CPAP while hospitalized for subdural hematoma. He is a former smoker, quit years ago. Denies nausea, vomiting, fever, chills or abdominal pain. Chest tube was placed. He had air leak after chest tube was clamped. CT surgery was consulted. He underwent VATS surgery on . Subjective & 24hr Events (23): Stable on 3 L. No respiratory distress. Chest x-ray reviewed. Working with PT. Pain adequately controlled on oral medications. Assessment & Plan:   Spontaneous pneumothorax  Hx pneumothorax, sudden onset dyspnea. Admission XR with large pneumothorax.   -CT Surgery: continue chest tubes, consider removal   -clear for PT, OT  2023: stable oxygen requirement, repeat chest x-ray personally reviewed and clear    Acute respiratory failure with hypoxia   -supportive care, maintain O2 sat > 92  2023: No respiratory distress on 3 L, continue supportive care    Type 2 diabetes mellitus with hyperglycemia, without long-term current use of insulin   -Sliding scale insulin  -Fingerstick blood glucose  -Titrate basal insulin as indicated  1/13/2023: [facility] [place] [id number] 16U basal + 2U SSI last 24 hours, increase basal 18U + SSI    Primary hypertension  -carvedilol,  HCTZ, lisinopril, spironolactone     Class 3 severe obesity due to excess calories with serious comorbidity and body mass index (BMI) of 40.0 to 44.9 in adult  Increased risk of all cause mortality, complicating care  - healthy lifestyle at discharge    Atrial fibrillation   -dabigatran    CASSY on CPAP  -CPAP qhs    Hypercoagulable state  Noted        Anticipated discharge needs:      Home with outpatient follow up    Diet:  ADULT DIET; Regular; 4 carb choices (60 gm/meal)  DVT PPx: on dabigatran  Code status: Full Code    Hospital Problems:  Principal Problem:    Spontaneous pneumothorax  Active Problems:    Acute respiratory failure with hypoxia (HCC)    Primary spontaneous pneumothorax    Primary hypertension    Benign prostatic hyperplasia with nocturia    Class 3 severe obesity due to excess calories with serious comorbidity and body mass index (BMI) of 40.0 to 44.9 in adult [facility])    Atrial fibrillation (HCC)    CASSY on CPAP    Type 2 diabetes mellitus with hyperglycemia, without long-term current use of insulin (HCC)    Anticoagulant long-term use  Resolved Problems:    * No resolved hospital problems. *      Objective:   Patient Vitals for the past 24 hrs:   Temp Pulse Resp BP SpO2   01/13/23 0400 97.5 °F (36.4 °C) 82 18 (!) 171/98 96 %   01/13/23 0024 97.2 °F (36.2 °C) 92 20 121/68 94 %   01/12/23 2200 -- -- 20 -- --   01/12/23 2000 97.8 °F (36.6 °C) 89 18 127/78 96 %   01/12/23 1617 97.6 °F (36.4 °C) 86 16 138/87 97 %   01/12/23 1246 98.1 °F (36.7 °C) (!) 126 18 118/69 94 %   01/12/23 1115 97.6 °F (36.4 °C) 82 17 127/63 96 %   01/12/23 0715 97.6 °F (36.4 °C) 85 17 131/68 93 %         Oxygen Therapy  SpO2: 96 %  Pulse Oximetry Type:  Intermittent  Pulse via Oximetry: 82 beats per minute  Pulse Oximeter Device Mode: Intermittent  Pulse Oximeter Device Location: Finger  O2 Device: Nasal cannula  Skin Assessment: Clean, dry, & intact  O2 Flow Rate (L/min): 3 L/min    Estimated body mass index is 42.25 kg/m² as calculated from the following:    Height as of this encounter: 6' 2\" (1.88 m). Weight as of this encounter: 329 lb 1.6 oz (149.3 kg). Intake/Output Summary (Last 24 hours) at 1/13/2023 0705  Last data filed at 1/13/2023 0000  Gross per 24 hour   Intake 1180 ml   Output 210 ml   Net 970 ml         Blood pressure (!) 171/98, pulse 82, temperature 97.5 °F (36.4 °C), temperature source Temporal, resp. rate 18, height 6' 2\" (1.88 m), weight (!) 329 lb 1.6 oz (149.3 kg), SpO2 96 %. Physical Exam  Vitals and nursing note reviewed. Constitutional:       General: He is not in acute distress. Appearance: He is morbidly obese. He is not diaphoretic. HENT:      Head: Normocephalic and atraumatic. Eyes:      Extraocular Movements: Extraocular movements intact. Cardiovascular:      Rate and Rhythm: Normal rate and regular rhythm. Pulmonary:      Effort: Pulmonary effort is normal. No respiratory distress. Chest:      Chest wall: No crepitus. Comments: R CT x2, bloody OP to water seal  Abdominal:      General: Abdomen is protuberant. There is no distension. Tenderness: There is no abdominal tenderness. Comments: Massive paniculus   Musculoskeletal:         General: No deformity. Skin:     Coloration: Skin is not jaundiced or pale. Neurological:      General: No focal deficit present. Mental Status: He is alert and oriented to person, place, and time. Psychiatric:         Mood and Affect: Mood normal.         Behavior: Behavior normal. Behavior is cooperative.           I have personally reviewed labs and tests showing:  Recent Labs:  Recent Results (from the past 48 hour(s))   POCT Glucose    Collection Time: 01/11/23  7:08 AM   Result Value Ref Range    POC Glucose 169 (H) 65 - 100 mg/dL    Performed by: Freya    POCT Glucose Collection Time: 01/11/23 11:49 AM   Result Value Ref Range    POC Glucose 227 (H) 65 - 100 mg/dL    Performed by: ValarieVenus    POCT Glucose    Collection Time: 01/11/23  4:58 PM   Result Value Ref Range    POC Glucose 160 (H) 65 - 100 mg/dL    Performed by: DarwinPCTVenus    POCT Glucose    Collection Time: 01/11/23  8:15 PM   Result Value Ref Range    POC Glucose 199 (H) 65 - 100 mg/dL    Performed by: Duke    POCT Glucose    Collection Time: 01/12/23  7:58 AM   Result Value Ref Range    POC Glucose 169 (H) 65 - 100 mg/dL    Performed by: EhsanPCT    POCT Glucose    Collection Time: 01/12/23 11:16 AM   Result Value Ref Range    POC Glucose 226 (H) 65 - 100 mg/dL    Performed by: Jah    POCT Glucose    Collection Time: 01/12/23  4:55 PM   Result Value Ref Range    POC Glucose 189 (H) 65 - 100 mg/dL    Performed by: Latia Dolan    POCT Glucose    Collection Time: 01/12/23  8:00 PM   Result Value Ref Range    POC Glucose 183 (H) 65 - 100 mg/dL    Performed by: Elizabeth Quinones        I have personally reviewed imaging studies showing: Other Studies:  XR CHEST PORTABLE   Final Result      1. Stable right chest tubes. No visible pneumothorax. 2. Persistent bibasilar atelectasis. 3. No significant change. XR CHEST PORTABLE   Final Result   No appreciable pneumothorax in the current study. XR CHEST PORTABLE   Final Result      1. Stable right chest tubes. No significant pneumothorax. 2. Right basilar atelectasis. 3. No significant change. XR CHEST PORTABLE   Final Result      1. Stable right chest tubes. No visible pneumothorax on today's study. 2. Right basilar atelectasis. XR CHEST PORTABLE   Final Result   Placement of 2 basilar chest tubes with probable small residual   right apical pneumothorax. XR CHEST PORTABLE   Final Result      1. Small right apical pneumothorax. Right chest tube is in place.       2. Right lung base opacity, likely atelectasis. 3. No significant change compared to prior. XR CHEST PORTABLE   Final Result      1. Persistent small right apical pneumothorax. Right chest tube is stable. XR CHEST PORTABLE   Final Result      1. New small right apical pneumothorax, despite presence of the right-sided   chest tube. 2. Bibasilar atelectasis. XR CHEST PORTABLE   Final Result   Unchanged bibasilar lung atelectasis. XR CHEST PORTABLE   Final Result   No evidence of pneumothorax         XR CHEST PORTABLE   Final Result   1. Resolved right pneumothorax. 2. Mild bibasilar lung atelectasis. XR CHEST PORTABLE   Final Result   1. Right chest tube placed, with markedly smaller pneumothorax. 2.  Resolution of the previous tension component. 3.  Mild bibasilar atelectasis. CT CHEST WO CONTRAST   Final Result   1. Persisting large right pneumothorax. 2.  Leftward deviation of mediastinal contours concerning for tension component. 3.  Partial atelectasis of right lung. 4.  Emphysema. 5.  Vascular disease. Urgent findings communicated to the ordering NP [place] by the secure text   messaging system at 6:30 PM.      XR CHEST PORTABLE   Final Result   1. Large right pneumothorax. 2.  Pulmonary vascular congestion.                Findings discussed with Dr. Nicolasa Null by myself at 4:42 PM.         XR CHEST PORTABLE    (Results Pending)       Current Meds:  Current Facility-Administered Medications   Medication Dose Route Frequency    metFORMIN (GLUCOPHAGE) tablet 500 mg  500 mg Oral BID WC    cetirizine (ZYRTEC) tablet 10 mg  10 mg Oral Daily PRN    methocarbamol (ROBAXIN) tablet 750 mg  750 mg Oral 4x Daily    glycerin (ADULT) suppository 2 g  1 suppository Rectal Daily PRN    polyethylene glycol (GLYCOLAX) packet 17 g  17 g Oral BID    docusate sodium (COLACE) capsule 100 mg  100 mg Oral Daily    HYDROmorphone HCl PF (DILAUDID) injection 0.5 mg 0.5 mg IntraVENous Q4H PRN    oxyCODONE-acetaminophen (PERCOCET) 5-325 MG per tablet 1 tablet  1 tablet Oral Q4H PRN    oxyCODONE-acetaminophen (PERCOCET) 5-325 MG per tablet 2 tablet  2 tablet Oral Q4H PRN    insulin glargine (LANTUS) injection vial 16 Units  16 Units SubCUTAneous Daily    bisacodyl (DULCOLAX) suppository 10 mg  10 mg Rectal Daily PRN    glucose chewable tablet 16 g  4 tablet Oral PRN    dextrose bolus 10% 125 mL  125 mL IntraVENous PRN    Or    dextrose bolus 10% 250 mL  250 mL IntraVENous PRN    glucagon (rDNA) injection 1 mg  1 mg SubCUTAneous PRN    dextrose 10 % infusion   IntraVENous Continuous PRN    metoprolol (LOPRESSOR) injection 5 mg  5 mg IntraVENous Q6H PRN    carvedilol (COREG) tablet 25 mg  25 mg Oral BID WC    dabigatran (PRADAXA) capsule 150 mg  150 mg Oral BID    lisinopril (PRINIVIL;ZESTRIL) tablet 10 mg  10 mg Oral Daily    gabapentin (NEURONTIN) capsule 600 mg  600 mg Oral BID    guaiFENesin (MUCINEX) extended release tablet 600 mg  600 mg Oral BID    hydroCHLOROthiazide (HYDRODIURIL) tablet 25 mg  25 mg Oral Daily    atorvastatin (LIPITOR) tablet 20 mg  20 mg Oral Daily    spironolactone (ALDACTONE) tablet 25 mg  25 mg Oral Daily    sodium chloride flush 0.9 % injection 5-40 mL  5-40 mL IntraVENous 2 times per day    sodium chloride flush 0.9 % injection 5-40 mL  5-40 mL IntraVENous PRN    0.9 % sodium chloride infusion   IntraVENous PRN    ondansetron (ZOFRAN-ODT) disintegrating tablet 4 mg  4 mg Oral Q8H PRN    Or    ondansetron (ZOFRAN) injection 4 mg  4 mg IntraVENous Q6H PRN    acetaminophen (TYLENOL) tablet 650 mg  650 mg Oral Q6H PRN    Or    acetaminophen (TYLENOL) suppository 650 mg  650 mg Rectal Q6H PRN    acetaminophen (TYLENOL) tablet 500 mg  500 mg Oral Q4H    insulin lispro (HUMALOG) injection vial 0-8 Units  0-8 Units SubCUTAneous TID WC    insulin lispro (HUMALOG) injection vial 0-4 Units  0-4 Units SubCUTAneous Nightly       Signed:  Hood Joy MD

## 2023-01-13 NOTE — PROGRESS NOTES
ACUTE PHYSICAL THERAPY GOALS:   (Developed with and agreed upon by patient and/or caregiver.)  Goals reviewed 1/10/23  (1.) Eliu Kumar  will move from supine to sit and sit to supine  with INDEPENDENCE within 7 treatment day(s). (2.) Eliu Kumar will transfer from bed to chair and chair to bed with MODIFIED INDEPENDENCE using the least restrictive device within 7 treatment day(s). (3.) Eliu Kumar will ambulate with MODIFIED INDEPENDENCE for 250 feet with the least restrictive device within 7 treatment day(s). (4.) Eliu Kumar will perform standing static and dynamic balance activities x 20 minutes with SUPERVISION to improve safety within 7 treatment day(s). (5.) Eliu Kumar will perform bilateral lower extremity exercises x 20 min for HEP with INDEPENDENCE to improve strength, endurance, and functional mobility within 7 treatment day(s). PHYSICAL THERAPY: Daily Note AM   (Link to Caseload Tracking: PT Visit Days : 3  Time In/Out PT Charge Capture  Rehab Caseload Tracker  Orders    Eliu Kumar is a 69 y.o. male   PRIMARY DIAGNOSIS: Spontaneous pneumothorax  Acute respiratory failure with hypoxia ([facility] [address] [J96.01]  Primary spontaneous pneumothorax [J93.11]  Spontaneous pneumothorax [J93.83]  Procedure(s) (LRB):  RIGHT VATS/WEDGE BLEBECTOMY/RIGHT UPPER LOBE WEDGE BLEBECTOMY/RIGHT LOWER LOBE WEDGE BLEBECTOMY/TALC PLEURODESIS (Right)  4 Days Post-Op  Inpatient: Payor: MEDICARE / Plan: MEDICARE PART A AND B / Product Type: *No Product type* /     ASSESSMENT:     REHAB RECOMMENDATIONS:   Recommendation to date pending progress:  Setting:  Home Health Therapy    Equipment:    Rolling Walker     ASSESSMENT:  Mr. Loy Doherty was sitting up in recliner chair upon contact and agreeable to PT. Pt still c 2 chest tubes but again cleared by RN to disconnect from suction to ambulate in hallways.  This session, pt demonstrating continued progress as she was able to inc ambulation distance to 250'x1. Pt cont to amb c dec cj, wide JUDITH and inc postural sway although he ultimately did well to avoid any LOB/ miss-steps. Furthermore, pt completed entire distance without any knee buckling observed. He performed all activity on 3L NC and denied any dizziness, lightheadedness or SOB while moving about. On return to room, pt able to stand at sink for ADLs under guidance of OT. Pt was left sitting in recliner with wife at bedside. Steady progress. Will continue PT efforts.      SUBJECTIVE:   Mr. Martha Hickey states, \" think it's getting better\"     Social/Functional Lives With: Spouse  Type of Home: Condo  Home Layout: One level  Bathroom Toilet: Standard  ADL Assistance: Independent  Homemaking Assistance: Independent  Transfer Assistance: Independent  Active : Yes  Mode of Transportation: Car  Occupation: Retired  OBJECTIVE:     PAIN: VITALS / O2: PRECAUTION / Neil Mantis / Helane Garett:   Pre Treatment:          Post Treatment: 0 Vitals        Oxygen   3L NC Chest Tubex2    RESTRICTIONS/PRECAUTIONS:        MOBILITY: I Mod I S SBA CGA Min Mod Max Total  NT x2 Comments:   Bed Mobility    Rolling [] [] [] [] [] [] [] [] [] [x] []    Supine to Sit [] [] [] [] [] [] [] [] [] [x] []    Scooting [] [] [] [] [] [] [] [] [] [x] []    Sit to Supine [] [] [] [] [] [] [] [] [] [x] []    Transfers    Sit to Stand [] [] [] [x] [] [] [] [] [] [] []    Bed to Chair [] [] [] [x] [x] [] [] [] [] [] []    Stand to Sit [] [] [] [x] [] [] [] [] [] [] []     [] [] [] [] [] [] [] [] [] [] []    I=Independent, Mod I=Modified Independent, S=Supervision, SBA=Standby Assistance, CGA=Contact Guard Assistance,   Min=Minimal Assistance, Mod=Moderate Assistance, Max=Maximal Assistance, Total=Total Assistance, NT=Not Tested    BALANCE: Good Fair+ Fair Fair- Poor NT Comments   Sitting Static [x] [] [] [] [] []    Sitting Dynamic [] [x] [] [] [] []              Standing Static [] [x] [] [] [] []    Standing Dynamic [] [] [x] [] [] [] GAIT: I Mod I S SBA CGA Min Mod Max Total  NT x2 Comments:   Level of Assistance [] [] [] [] [x] [] [] [] [] [] []    Distance   250'x1    DME Rolling Walker    Gait Quality Decreased cj , Decreased step clearance, Decreased step length, Path deviations , and Trunk sway increased    Weightbearing Status      Stairs      I=Independent, Mod I=Modified Independent, S=Supervision, SBA=Standby Assistance, CGA=Contact Guard Assistance,   Min=Minimal Assistance, Mod=Moderate Assistance, Max=Maximal Assistance, Total=Total Assistance, NT=Not Tested    PLAN:   FREQUENCY AND DURATION: 3 times/week for duration of hospital stay or until stated goals are met, whichever comes first.    TREATMENT:   TREATMENT:   Therapeutic Activity (23 Minutes): Therapeutic activity included Scooting, Transfer Training, Ambulation on level ground, Sitting balance , and Standing balance to improve functional Activity tolerance, Balance, Mobility, and Strength.     TREATMENT GRID:  N/A    AFTER TREATMENT PRECAUTIONS:  Left sitting on the toilet with RN and CNA aware; wife at bedside    INTERDISCIPLINARY COLLABORATION:  RN/ PCT and PT/ PTA    EDUCATION:      TIME IN/OUT:  Time In: 0956  Time Out: Shilpi Ramos  Minutes: 23    Bridgette Cross, PT

## 2023-01-13 NOTE — PROGRESS NOTES
Comprehensive Nutrition Assessment    Type and Reason for Visit: Initial, RD Nutrition Re-Screen/LOS  Length of Stay    Nutrition Recommendations/Plan:   Continue current diet     Malnutrition Assessment:  Malnutrition Status: No malnutrition     Nutrition Assessment:  Nutrition History: Pt reports appetite at baseline PTA, however pt was working on portion control to promote weight loss per MD recommendations. Pt reports UBW of ~320-330lb. Pt denies prior intake of oral nutrition supplements. Do You Have Any Cultural, Baptism, or Ethnic Food Preferences?: No   Nutrition Background:    Pt with PMH significant for hypertension, A. fib on Pradaxa, CASSY, CPAP at night, bullous emphysema, history of bilateral subdural hematomas 2017 and diabetes mellitus presented to [facility] with acute onset right-sided chest pain 1/4. Chest tube was placed. He had air leak after chest tube was clamped. He underwent VATS surgery on 1/9. Nutrition Interval:  Pt seen in recliner, wife present. Pt reports appetite continues at baseline, typically consumes % of meals served. Wife confirms intake. Pt notes dislike of sausages served during admission. Pt reports discharge pending 1/15. Current Nutrition Therapies:  ADULT DIET; Regular; 4 carb choices (60 gm/meal)    Current Intake:   Average Meal Intake: % Average Supplements Intake: None Ordered      Anthropometric Measures:  Height: 6' 2\" (188 cm)  Current Body Wt: 329 lb 2.4 oz (149.3 kg) (1/11), Weight source: Standing Scale  BMI: 42.2, Obese Class 3 (BMI 40.0 or greater)  Admission Body Weight: 325 lb (147.4 kg) (1/4)  Ideal Body Weight (Kg) (Calculated): 86 kg (190 lbs), 173.2 %  Usual Body Wt: 320 lb (145.2 kg) (per pt and EMR review), Percent weight change: 2.9       Edema:Peripheral Vascular  Peripheral Vascular (WDL): Exceptions to WDL  Edema: Right lower extremity; Left lower extremity  RLE Edema: +1;Non-pitting  LLE Edema: +1;Non-pitting   BMI Category Obese Class 3 (BMI 40.0 or greater)    Estimated Daily Nutrient Needs:  Energy (kcal/day): 6234-2685 (18-22kcal/kg) (Kcal/kg Weight used: 86.4 kg Ideal  Protein (g/day):  (1-1.2g/kg) Weight Used: (Ideal) 86.4 kg  Fluid (ml/day):   (1 ml/kcal)    Nutrition Diagnosis:   No nutrition diagnosis at this time     Nutrition Interventions:   Food and/or Nutrient Delivery: Continue Current Diet     Coordination of Nutrition Care: Continue to monitor while inpatient       Goals:       Active Goal:  (Continue to meet >75% of estimated nutrition needs via PO intake)       Nutrition Monitoring and Evaluation:      Food/Nutrient Intake Outcomes: Food and Nutrient Intake  Physical Signs/Symptoms Outcomes: Meal Time Behavior, Weight    Discharge Planning:    Continue current diet    Arlette Gar RD, LD  Contact: [phone]

## 2023-01-14 ENCOUNTER — APPOINTMENT (OUTPATIENT)
Dept: GENERAL RADIOLOGY | Age: 68
DRG: 163 | End: 2023-01-14
Payer: MEDICARE

## 2023-01-14 LAB
ALBUMIN SERPL-MCNC: 2.7 G/DL (ref 3.2–4.6)
ANION GAP SERPL CALC-SCNC: 10 MMOL/L (ref 2–11)
ANION GAP SERPL CALC-SCNC: 14 MMOL/L (ref 2–11)
APPEARANCE UR: CLEAR
BACTERIA URNS QL MICRO: ABNORMAL /HPF
BILIRUB UR QL: ABNORMAL
BUN SERPL-MCNC: 7 MG/DL (ref 8–23)
BUN SERPL-MCNC: 8 MG/DL (ref 8–23)
CALCIUM SERPL-MCNC: 9.5 MG/DL (ref 8.3–10.4)
CALCIUM SERPL-MCNC: 9.8 MG/DL (ref 8.3–10.4)
CASTS URNS QL MICRO: 0 /LPF
CHLORIDE SERPL-SCNC: 91 MMOL/L (ref 101–110)
CHLORIDE SERPL-SCNC: 93 MMOL/L (ref 101–110)
CO2 SERPL-SCNC: 23 MMOL/L (ref 21–32)
CO2 SERPL-SCNC: 24 MMOL/L (ref 21–32)
COLOR UR: YELLOW
CREAT SERPL-MCNC: 0.8 MG/DL (ref 0.8–1.5)
CREAT SERPL-MCNC: 1 MG/DL (ref 0.8–1.5)
CREAT UR-MCNC: 220 MG/DL
CRYSTALS URNS QL MICRO: 0 /LPF
EPI CELLS #/AREA URNS HPF: ABNORMAL /HPF
ERYTHROCYTE [DISTWIDTH] IN BLOOD BY AUTOMATED COUNT: 12.7 % (ref 11.9–14.6)
GLUCOSE BLD STRIP.AUTO-MCNC: 218 MG/DL (ref 65–100)
GLUCOSE BLD STRIP.AUTO-MCNC: 228 MG/DL (ref 65–100)
GLUCOSE BLD STRIP.AUTO-MCNC: 229 MG/DL (ref 65–100)
GLUCOSE BLD STRIP.AUTO-MCNC: 230 MG/DL (ref 65–100)
GLUCOSE SERPL-MCNC: 214 MG/DL (ref 65–100)
GLUCOSE SERPL-MCNC: 235 MG/DL (ref 65–100)
GLUCOSE UR STRIP.AUTO-MCNC: >1000 MG/DL
HCT VFR BLD AUTO: 40.9 % (ref 41.1–50.3)
HGB BLD-MCNC: 14.4 G/DL (ref 13.6–17.2)
HGB UR QL STRIP: NEGATIVE
KETONES UR QL STRIP.AUTO: 40 MG/DL
LACTATE SERPL-SCNC: 1.4 MMOL/L (ref 0.4–2)
LEUKOCYTE ESTERASE UR QL STRIP.AUTO: ABNORMAL
MCH RBC QN AUTO: 32.9 PG (ref 26.1–32.9)
MCHC RBC AUTO-ENTMCNC: 35.2 G/DL (ref 31.4–35)
MCV RBC AUTO: 93.4 FL (ref 82–102)
MUCOUS THREADS URNS QL MICRO: ABNORMAL /LPF
NITRITE UR QL STRIP.AUTO: POSITIVE
NRBC # BLD: 0 K/UL (ref 0–0.2)
OSMOLALITY UR: 821 MOSM/KG H2O (ref 50–1400)
OTHER OBSERVATIONS: ABNORMAL
PH UR STRIP: 5 (ref 5–9)
PHOSPHATE SERPL-MCNC: 2.6 MG/DL (ref 2.3–3.7)
PLATELET # BLD AUTO: 224 K/UL (ref 150–450)
PMV BLD AUTO: 9.9 FL (ref 9.4–12.3)
POTASSIUM SERPL-SCNC: 3.6 MMOL/L (ref 3.5–5.1)
POTASSIUM SERPL-SCNC: 3.8 MMOL/L (ref 3.5–5.1)
PROT UR STRIP-MCNC: 30 MG/DL
PROT UR-MCNC: 39 MG/DL
RBC # BLD AUTO: 4.38 M/UL (ref 4.23–5.6)
RBC #/AREA URNS HPF: 0 /HPF
SERVICE CMNT-IMP: ABNORMAL
SODIUM SERPL-SCNC: 127 MMOL/L (ref 133–143)
SODIUM SERPL-SCNC: 128 MMOL/L (ref 133–143)
SODIUM UR-SCNC: <5 MMOL/L
SP GR UR REFRACTOMETRY: >1.035 (ref 1–1.02)
URINE CULTURE IF INDICATED: ABNORMAL
UROBILINOGEN UR QL STRIP.AUTO: 1 EU/DL (ref 0.2–1)
WBC # BLD AUTO: 17 K/UL (ref 4.3–11.1)
WBC URNS QL MICRO: ABNORMAL /HPF

## 2023-01-14 PROCEDURE — 87150 DNA/RNA AMPLIFIED PROBE: CPT

## 2023-01-14 PROCEDURE — 87205 SMEAR GRAM STAIN: CPT

## 2023-01-14 PROCEDURE — 85027 COMPLETE CBC AUTOMATED: CPT

## 2023-01-14 PROCEDURE — 6370000000 HC RX 637 (ALT 250 FOR IP)

## 2023-01-14 PROCEDURE — 83605 ASSAY OF LACTIC ACID: CPT

## 2023-01-14 PROCEDURE — 36415 COLL VENOUS BLD VENIPUNCTURE: CPT

## 2023-01-14 PROCEDURE — 1100000000 HC RM PRIVATE

## 2023-01-14 PROCEDURE — 82040 ASSAY OF SERUM ALBUMIN: CPT

## 2023-01-14 PROCEDURE — 83935 ASSAY OF URINE OSMOLALITY: CPT

## 2023-01-14 PROCEDURE — 87077 CULTURE AEROBIC IDENTIFY: CPT

## 2023-01-14 PROCEDURE — 2580000003 HC RX 258: Performed by: SURGERY

## 2023-01-14 PROCEDURE — 6370000000 HC RX 637 (ALT 250 FOR IP): Performed by: FAMILY MEDICINE

## 2023-01-14 PROCEDURE — 87040 BLOOD CULTURE FOR BACTERIA: CPT

## 2023-01-14 PROCEDURE — 0WP9X0Z REMOVAL OF DRAINAGE DEVICE FROM RIGHT PLEURAL CAVITY, EXTERNAL APPROACH: ICD-10-PCS | Performed by: INTERNAL MEDICINE

## 2023-01-14 PROCEDURE — 82570 ASSAY OF URINE CREATININE: CPT

## 2023-01-14 PROCEDURE — 84100 ASSAY OF PHOSPHORUS: CPT

## 2023-01-14 PROCEDURE — 84300 ASSAY OF URINE SODIUM: CPT

## 2023-01-14 PROCEDURE — 84156 ASSAY OF PROTEIN URINE: CPT

## 2023-01-14 PROCEDURE — 2580000003 HC RX 258: Performed by: FAMILY MEDICINE

## 2023-01-14 PROCEDURE — 6370000000 HC RX 637 (ALT 250 FOR IP): Performed by: SURGERY

## 2023-01-14 PROCEDURE — 71045 X-RAY EXAM CHEST 1 VIEW: CPT

## 2023-01-14 PROCEDURE — 80048 BASIC METABOLIC PNL TOTAL CA: CPT

## 2023-01-14 PROCEDURE — 82962 GLUCOSE BLOOD TEST: CPT

## 2023-01-14 PROCEDURE — 87086 URINE CULTURE/COLONY COUNT: CPT

## 2023-01-14 PROCEDURE — 87186 SC STD MICRODIL/AGAR DIL: CPT

## 2023-01-14 PROCEDURE — 81001 URINALYSIS AUTO W/SCOPE: CPT

## 2023-01-14 RX ORDER — INSULIN GLARGINE 100 [IU]/ML
20 INJECTION, SOLUTION SUBCUTANEOUS DAILY
Status: DISCONTINUED | OUTPATIENT
Start: 2023-01-15 | End: 2023-01-15

## 2023-01-14 RX ORDER — SODIUM CHLORIDE 9 MG/ML
INJECTION, SOLUTION INTRAVENOUS CONTINUOUS
Status: DISCONTINUED | OUTPATIENT
Start: 2023-01-14 | End: 2023-01-17

## 2023-01-14 RX ADMIN — METHOCARBAMOL TABLETS 750 MG: 750 TABLET, COATED ORAL at 17:44

## 2023-01-14 RX ADMIN — DABIGATRAN ETEXILATE MESYLATE 150 MG: 150 CAPSULE ORAL at 20:51

## 2023-01-14 RX ADMIN — METFORMIN HYDROCHLORIDE 500 MG: 500 TABLET ORAL at 17:44

## 2023-01-14 RX ADMIN — INSULIN GLARGINE 18 UNITS: 100 INJECTION, SOLUTION SUBCUTANEOUS at 08:22

## 2023-01-14 RX ADMIN — HYDROCHLOROTHIAZIDE 25 MG: 25 TABLET ORAL at 08:11

## 2023-01-14 RX ADMIN — POLYETHYLENE GLYCOL 3350 17 G: 17 POWDER, FOR SOLUTION ORAL at 20:51

## 2023-01-14 RX ADMIN — GABAPENTIN 600 MG: 300 CAPSULE ORAL at 08:11

## 2023-01-14 RX ADMIN — SODIUM CHLORIDE: 9 INJECTION, SOLUTION INTRAVENOUS at 08:31

## 2023-01-14 RX ADMIN — INSULIN LISPRO 2 UNITS: 100 INJECTION, SOLUTION INTRAVENOUS; SUBCUTANEOUS at 17:44

## 2023-01-14 RX ADMIN — SODIUM CHLORIDE: 9 INJECTION, SOLUTION INTRAVENOUS at 20:50

## 2023-01-14 RX ADMIN — ACETAMINOPHEN 500 MG: 500 TABLET, FILM COATED ORAL at 20:51

## 2023-01-14 RX ADMIN — INSULIN LISPRO 2 UNITS: 100 INJECTION, SOLUTION INTRAVENOUS; SUBCUTANEOUS at 12:24

## 2023-01-14 RX ADMIN — METFORMIN HYDROCHLORIDE 500 MG: 500 TABLET ORAL at 08:11

## 2023-01-14 RX ADMIN — GUAIFENESIN 600 MG: 600 TABLET ORAL at 08:11

## 2023-01-14 RX ADMIN — ATORVASTATIN CALCIUM 20 MG: 20 TABLET, FILM COATED ORAL at 08:11

## 2023-01-14 RX ADMIN — CARVEDILOL 25 MG: 25 TABLET, FILM COATED ORAL at 17:44

## 2023-01-14 RX ADMIN — GUAIFENESIN 600 MG: 600 TABLET ORAL at 20:51

## 2023-01-14 RX ADMIN — POLYETHYLENE GLYCOL 3350 17 G: 17 POWDER, FOR SOLUTION ORAL at 08:07

## 2023-01-14 RX ADMIN — GABAPENTIN 600 MG: 300 CAPSULE ORAL at 20:51

## 2023-01-14 RX ADMIN — SODIUM CHLORIDE, PRESERVATIVE FREE 10 ML: 5 INJECTION INTRAVENOUS at 08:13

## 2023-01-14 RX ADMIN — OXYCODONE AND ACETAMINOPHEN 2 TABLET: 5; 325 TABLET ORAL at 08:03

## 2023-01-14 RX ADMIN — METHOCARBAMOL TABLETS 750 MG: 750 TABLET, COATED ORAL at 20:51

## 2023-01-14 RX ADMIN — SODIUM CHLORIDE: 9 INJECTION, SOLUTION INTRAVENOUS at 14:56

## 2023-01-14 RX ADMIN — OXYCODONE AND ACETAMINOPHEN 2 TABLET: 5; 325 TABLET ORAL at 02:40

## 2023-01-14 RX ADMIN — ACETAMINOPHEN 500 MG: 500 TABLET, FILM COATED ORAL at 16:08

## 2023-01-14 RX ADMIN — DABIGATRAN ETEXILATE MESYLATE 150 MG: 150 CAPSULE ORAL at 08:11

## 2023-01-14 RX ADMIN — METHOCARBAMOL TABLETS 750 MG: 750 TABLET, COATED ORAL at 08:11

## 2023-01-14 RX ADMIN — METHOCARBAMOL TABLETS 750 MG: 750 TABLET, COATED ORAL at 12:24

## 2023-01-14 RX ADMIN — CARVEDILOL 25 MG: 25 TABLET, FILM COATED ORAL at 08:11

## 2023-01-14 RX ADMIN — SIMETHICONE 80 MG: 80 TABLET, CHEWABLE ORAL at 00:03

## 2023-01-14 RX ADMIN — SPIRONOLACTONE 25 MG: 25 TABLET ORAL at 08:11

## 2023-01-14 RX ADMIN — LISINOPRIL 10 MG: 5 TABLET ORAL at 08:11

## 2023-01-14 RX ADMIN — DOCUSATE SODIUM 100 MG: 100 CAPSULE, LIQUID FILLED ORAL at 08:11

## 2023-01-14 RX ADMIN — INSULIN LISPRO 2 UNITS: 100 INJECTION, SOLUTION INTRAVENOUS; SUBCUTANEOUS at 08:23

## 2023-01-14 ASSESSMENT — PAIN DESCRIPTION - LOCATION
LOCATION: INCISION
LOCATION: INCISION
LOCATION: CHEST;ABDOMEN
LOCATION: INCISION

## 2023-01-14 ASSESSMENT — PAIN DESCRIPTION - ORIENTATION
ORIENTATION: RIGHT

## 2023-01-14 ASSESSMENT — PAIN SCALES - GENERAL
PAINLEVEL_OUTOF10: 6
PAINLEVEL_OUTOF10: 6
PAINLEVEL_OUTOF10: 3
PAINLEVEL_OUTOF10: 0
PAINLEVEL_OUTOF10: 6
PAINLEVEL_OUTOF10: 2

## 2023-01-14 ASSESSMENT — PAIN - FUNCTIONAL ASSESSMENT
PAIN_FUNCTIONAL_ASSESSMENT: ACTIVITIES ARE NOT PREVENTED
PAIN_FUNCTIONAL_ASSESSMENT: PREVENTS OR INTERFERES SOME ACTIVE ACTIVITIES AND ADLS
PAIN_FUNCTIONAL_ASSESSMENT: PREVENTS OR INTERFERES SOME ACTIVE ACTIVITIES AND ADLS

## 2023-01-14 ASSESSMENT — PAIN DESCRIPTION - DESCRIPTORS
DESCRIPTORS: SORE
DESCRIPTORS: ACHING;SORE;STABBING
DESCRIPTORS: ACHING;SORE;STABBING
DESCRIPTORS: ACHING

## 2023-01-14 ASSESSMENT — PAIN DESCRIPTION - FREQUENCY: FREQUENCY: INTERMITTENT

## 2023-01-14 ASSESSMENT — PAIN DESCRIPTION - PAIN TYPE: TYPE: ACUTE PAIN

## 2023-01-14 ASSESSMENT — PAIN DESCRIPTION - ONSET: ONSET: ON-GOING

## 2023-01-14 NOTE — PROGRESS NOTES
CXR reviewed. Right CT x 2 in place with no air leak. Anterior CT removed at the end of inspiration. Pt tolerated procedure well. Posterior CT removed at the end of inspiration. Pt tolerated procedure well. Dressing applied and should remain in place x 48 hours.  F/u CXR ordered for am.    Rubin Valle NP

## 2023-01-14 NOTE — PROGRESS NOTES
Admit Date: 2023    POD 5 Days Post-Op    Procedure:  Procedure(s):  RIGHT VATS/WEDGE BLEBECTOMY/RIGHT UPPER LOBE WEDGE BLEBECTOMY/RIGHT LOWER LOBE WEDGE BLEBECTOMY/TALC PLEURODESIS    Subjective:     Pt sitting in recliner. No complaints. Alert with NAD. Respirations even and unlabored on 2 L/MIN NC. Tolerating diabetic diet with no nausea or vomiting. Working with PT-ambulating in halls with walker. Up in chair for meals. Right ribcage pain well controlled with multimodal analgesia. Right CT x 2 in place. No air leak. CXR this am showing      Impression   -No significant interval change. No definite visible pneumothorax. Objective:       Vitals:    23 0530 23 0803 23 0811 23 0833   BP: 139/83  120/77    Pulse: (!) 110  91    Resp: 20 20 20 19   Temp: 97.4 °F (36.3 °C)  98.1 °F (36.7 °C)    TempSrc: Oral  Oral    SpO2: 95%  95%    Weight:       Height:           Temp (24hrs), Av.9 °F (36.6 °C), Min:97.4 °F (36.3 °C), Max:98.4 °F (36.9 °C)  . I&O reviewed as documented.    + Bm 23  Right anterior  ML SS output past 24h. No air leak  Right posterior CT 20 ml SEROUS output past 24h. No air leak    Afebrile   VSS    Physical Exam  Constitutional:       General: He is not in acute distress. Appearance: He is obese. HENT:      Mouth/Throat:      Mouth: Mucous membranes are moist.   Cardiovascular:      Rate and Rhythm: Normal rate and regular rhythm. Pulses: Normal pulses. Heart sounds: Normal heart sounds. No murmur heard. No friction rub. No gallop. Pulmonary:      Effort: Pulmonary effort is normal. No respiratory distress. Breath sounds: Normal breath sounds. Abdominal:      General: Bowel sounds are normal. There is no distension. Palpations: Abdomen is soft. Genitourinary:     Comments: VOIDING   Musculoskeletal:         General: No swelling. Normal range of motion. Cervical back: No rigidity.    Skin:     General: Skin is warm and dry. Capillary Refill: Capillary refill takes less than 2 seconds. Comments: Right CT x2 with vaseline occlusive drsg c/d/I with no signs of infection. Neurological:      Mental Status: He is alert and oriented to person, place, and time. Psychiatric:         Behavior: Behavior normal.      Labs:   Recent Results (from the past 24 hour(s))   POCT Glucose    Collection Time: 01/13/23 11:55 AM   Result Value Ref Range    POC Glucose 229 (H) 65 - 100 mg/dL    Performed by:  Arnold    POCT Glucose    Collection Time: 01/13/23  4:13 PM   Result Value Ref Range    POC Glucose 183 (H) 65 - 100 mg/dL    Performed by: Rehman (Oliver)    POCT Glucose    Collection Time: 01/13/23  8:43 PM   Result Value Ref Range    POC Glucose 208 (H) 65 - 100 mg/dL    Performed by: Phil    Phosphorus    Collection Time: 01/14/23  5:47 AM   Result Value Ref Range    Phosphorus 2.6 2.3 - 3.7 MG/DL   Albumin    Collection Time: 01/14/23  5:47 AM   Result Value Ref Range    Albumin 2.7 (L) 3.2 - 4.6 g/dL   Basic Metabolic Panel w/ Reflex to MG    Collection Time: 01/14/23  5:47 AM   Result Value Ref Range    Sodium 127 (L) 133 - 143 mmol/L    Potassium 3.6 3.5 - 5.1 mmol/L    Chloride 93 (L) 101 - 110 mmol/L    CO2 24 21 - 32 mmol/L    Anion Gap 10 2 - 11 mmol/L    Glucose 214 (H) 65 - 100 mg/dL    BUN 7 (L) 8 - 23 MG/DL    Creatinine 0.80 0.8 - 1.5 MG/DL    Est, Glom Filt Rate >60 >60 ml/min/1.73m2    Calcium 9.8 8.3 - 10.4 MG/DL   CBC    Collection Time: 01/14/23  5:47 AM   Result Value Ref Range    WBC 17.0 (H) 4.3 - 11.1 K/uL    RBC 4.38 4.23 - 5.6 M/uL    Hemoglobin 14.4 13.6 - 17.2 g/dL    Hematocrit 40.9 (L) 41.1 - 50.3 %    MCV 93.4 82 - 102 FL    MCH 32.9 26.1 - 32.9 PG    MCHC 35.2 (H) 31.4 - 35.0 g/dL    RDW 12.7 11.9 - 14.6 %    Platelets 950 627 - 198 K/uL    MPV 9.9 9.4 - 12.3 FL    nRBC 0.00 0.0 - 0.2 K/uL   Culture, Blood 1    Collection Time: 01/14/23  8:07 AM    Specimen: Blood   Result Value Ref Range    Special Requests LEFT  Antecubital        Culture PENDING    Culture, Blood 1    Collection Time: 01/14/23  8:07 AM    Specimen: Blood   Result Value Ref Range    Special Requests RIGHT  Antecubital        Culture PENDING    Lactic Acid    Collection Time: 01/14/23  8:07 AM   Result Value Ref Range    Lactic Acid, Plasma 1.4 0.4 - 2.0 MMOL/L   POCT Glucose    Collection Time: 01/14/23  8:18 AM   Result Value Ref Range    POC Glucose 228 (H) 65 - 100 mg/dL    Performed by: Mariely Camacho         Assessment:     Principal Problem:    Spontaneous pneumothorax  Active Problems:    Acute respiratory failure with hypoxia (HCC)    Anticoagulant long-term use    Primary spontaneous pneumothorax    Primary hypertension    Benign prostatic hyperplasia with nocturia    Class 3 severe obesity due to excess calories with serious comorbidity and body mass index (BMI) of 40.0 to 44.9 in [facility])    Atrial fibrillation (HCC)    CASSY on CPAP    Type 2 diabetes mellitus with hyperglycemia, without long-term current use of insulin ([address]  Resolved Problems:    * No resolved hospital problems.  *        Plan/Recommendations/Medical Decision Making:   Care Management per Hospitalist  Continue present treatment  Acceptable to be off CT suction during ambulation with PT   Will remove CT x 2 today  F/u CXR in am    INDRA Gray

## 2023-01-14 NOTE — PROGRESS NOTES
Hospitalist Progress Note   Admit Date:  2023  3:40 PM   Name:  Alexandria Johnson   Age:  69 y.o. Sex:  male  :  1955   MRN:  [id number]   Room:  301/01    Presenting Complaint: Chest Pain and Shortness of Breath     Reason(s) for Admission: Acute respiratory failure with hypoxia ([address] [J96.01]  Primary spontaneous pneumothorax [J93.11]  Spontaneous pneumothorax [J93.83]     Hospital Course:   69 y.o. male with medical history hypertension, A. fib on Pradaxa, CASSY, CPAP at night, bullous emphysema, history of bilateral subdural hematomas 2017 and diabetes mellitus presented to ED with acute onset right-sided chest pain. Patient reports he was talking to his neighbor when he developed sharp severe right-sided chest pain, associated with shortness of breath. Reports he has history of spontaneous left pneumothorax in , shortly after putting on his CPAP while hospitalized for subdural hematoma. He is a former smoker, quit years ago. Denies nausea, vomiting, fever, chills or abdominal pain. Chest tube was placed. He had air leak after chest tube was clamped. CT surgery was consulted. He underwent VATS surgery on . Subjective & 24hr Events (23): Stable on 2 L. No respiratory distress. Pre CT removal chest x-ray reviewed. Ambulating well. Pain adequately controlled on oral medications. Poor appetite yesterday with hyponatremia today. IVF. Elevated WBC, procal.     Assessment & Plan:   Spontaneous pneumothorax  Hx pneumothorax, sudden onset dyspnea. Admission XR with large pneumothorax.   -CT Surgery: continue chest tubes, consider removal   -clear for PT, OT  2023: repeat chest x-ray personally reviewed and clear, CT removed by surgery    Elevated WBC, procal  -repeat Cx  -consider abx if persistent  -CXR AM    Acute respiratory failure with hypoxia   -supportive care, maintain O2 sat > 92  2023: No respiratory distress on 2L, continue supportive care    Type 2 diabetes mellitus with hyperglycemia, without long-term current use of insulin   -Sliding scale insulin  -Fingerstick blood glucose  -Titrate basal insulin as indicated  1/14/2023: sGlc 183-230 18U basal + 4U SSI last 24 hours, increase basal 20U + SSI    Primary hypertension  -carvedilol,  HCTZ, lisinopril, spironolactone     Class 3 severe obesity due to excess calories with serious comorbidity and body mass index (BMI) of 40.0 to 44.9 in adult  Increased risk of all cause mortality, complicating care  - healthy lifestyle at discharge    Atrial fibrillation   -dabigatran    CASSY on CPAP  -CPAP qhs    Hypercoagulable state  Noted        Anticipated discharge needs:      Home with outpatient follow up    Diet:  ADULT DIET; Regular; 4 carb choices (60 gm/meal)  DVT PPx: on dabigatran  Code status: Full Code    Hospital Problems:  Principal Problem:    Spontaneous pneumothorax  Active Problems:    Acute respiratory failure with hypoxia (HCC)    Primary spontaneous pneumothorax    Primary hypertension    Benign prostatic hyperplasia with nocturia    Class 3 severe obesity due to excess calories with serious comorbidity and body mass index (BMI) of 40.0 to 44.9 in adult [facility])    Atrial fibrillation (HCC)    CASSY on CPAP    Type 2 diabetes mellitus with hyperglycemia, without long-term current use of insulin (HCC)    Anticoagulant long-term use  Resolved Problems:    * No resolved hospital problems.  *      Objective:   Patient Vitals for the past 24 hrs:   Temp Pulse Resp BP SpO2   01/14/23 0530 97.4 °F (36.3 °C) (!) 110 20 139/83 95 %   01/14/23 0310 -- -- 17 -- --   01/14/23 0240 -- -- 18 -- --   01/13/23 2345 98.4 °F (36.9 °C) (!) 110 16 135/79 93 %   01/13/23 2050 -- -- 16 -- --   01/13/23 2041 97.6 °F (36.4 °C) (!) 105 19 129/69 96 %   01/13/23 2020 -- -- 19 -- --   01/13/23 1638 98.2 °F (36.8 °C) (!) 104 19 135/61 96 %   01/13/23 1517 -- -- 20 -- --   01/13/23 1447 -- -- 20 -- --   01/13/23 1154 97.8 °F (36.6 °C) 80 18 116/81 97 %   01/13/23 1015 -- -- -- -- 96 %   01/13/23 0845 98 °F (36.7 °C) (!) 102 18 124/64 95 %         Oxygen Therapy  SpO2: 95 %  Pulse Oximetry Type: Intermittent  Pulse via Oximetry: 82 beats per minute  Pulse Oximeter Device Mode: Intermittent  Pulse Oximeter Device Location: Finger  O2 Device: Nasal cannula  Skin Assessment: Clean, dry, & intact  O2 Flow Rate (L/min): 2 L/min    Estimated body mass index is 42.25 kg/m² as calculated from the following:    Height as of this encounter: 6' 2\" (1.88 m). Weight as of this encounter: 329 lb 1.6 oz (149.3 kg). Intake/Output Summary (Last 24 hours) at 1/14/2023 0729  Last data filed at 1/14/2023 0354  Gross per 24 hour   Intake 615 ml   Output 1670 ml   Net -1055 ml         Blood pressure 139/83, pulse (!) 110, temperature 97.4 °F (36.3 °C), temperature source Oral, resp. rate 20, height 6' 2\" (1.88 m), weight (!) 329 lb 1.6 oz (149.3 kg), SpO2 95 %. Physical Exam  Vitals and nursing note reviewed. Constitutional:       General: He is not in acute distress. Appearance: He is morbidly obese. He is not diaphoretic. HENT:      Head: Normocephalic and atraumatic. Eyes:      Extraocular Movements: Extraocular movements intact. Cardiovascular:      Rate and Rhythm: Normal rate and regular rhythm. Pulmonary:      Effort: Pulmonary effort is normal. No respiratory distress. Breath sounds: Decreased air movement and transmitted upper airway sounds present. No wheezing or rhonchi. Abdominal:      General: Abdomen is protuberant. There is no distension. Tenderness: There is no abdominal tenderness. Comments: Massive paniculus   Musculoskeletal:         General: No deformity. Skin:     Coloration: Skin is not jaundiced or pale. Neurological:      General: No focal deficit present. Mental Status: He is alert and oriented to person, place, and time.    Psychiatric:         Mood and Affect: Mood normal.         Behavior: Behavior normal. Behavior is cooperative. I have personally reviewed labs and tests showing:  Recent Labs:  Recent Results (from the past 48 hour(s))   POCT Glucose    Collection Time: 01/12/23  7:58 AM   Result Value Ref Range    POC Glucose 169 (H) 65 - 100 mg/dL    Performed by: Jd    POCT Glucose    Collection Time: 01/12/23 11:16 AM   Result Value Ref Range    POC Glucose 226 (H) 65 - 100 mg/dL    Performed by: Jah    POCT Glucose    Collection Time: 01/12/23  4:55 PM   Result Value Ref Range    POC Glucose 189 (H) 65 - 100 mg/dL    Performed by: Kathy Leyva    POCT Glucose    Collection Time: 01/12/23  8:00 PM   Result Value Ref Range    POC Glucose 183 (H) 65 - 100 mg/dL    Performed by: Susana Lefort    POCT Glucose    Collection Time: 01/13/23  7:24 AM   Result Value Ref Range    POC Glucose 164 (H) 65 - 100 mg/dL    Performed by: Navarro (Cain)    POCT Glucose    Collection Time: 01/13/23 11:55 AM   Result Value Ref Range    POC Glucose 229 (H) 65 - 100 mg/dL    Performed by:  Arnold    POCT Glucose    Collection Time: 01/13/23  4:13 PM   Result Value Ref Range    POC Glucose 183 (H) 65 - 100 mg/dL    Performed by: Rehman (Oliver)    POCT Glucose    Collection Time: 01/13/23  8:43 PM   Result Value Ref Range    POC Glucose 208 (H) 65 - 100 mg/dL    Performed by: Phil    Phosphorus    Collection Time: 01/14/23  5:47 AM   Result Value Ref Range    Phosphorus 2.6 2.3 - 3.7 MG/DL   Albumin    Collection Time: 01/14/23  5:47 AM   Result Value Ref Range    Albumin 2.7 (L) 3.2 - 4.6 g/dL   Basic Metabolic Panel w/ Reflex to MG    Collection Time: 01/14/23  5:47 AM   Result Value Ref Range    Sodium 127 (L) 133 - 143 mmol/L    Potassium 3.6 3.5 - 5.1 mmol/L    Chloride 93 (L) 101 - 110 mmol/L    CO2 24 21 - 32 mmol/L    Anion Gap 10 2 - 11 mmol/L    Glucose 214 (H) 65 - 100 mg/dL    BUN 7 (L) 8 - 23 MG/DL    Creatinine 0.80 0.8 - 1.5 MG/DL    Est, Glom Filt Rate >60 >60 ml/min/1.73m2    Calcium 9.8 8.3 - 10.4 MG/DL   CBC    Collection Time: 01/14/23  5:47 AM   Result Value Ref Range    WBC 17.0 (H) 4.3 - 11.1 K/uL    RBC 4.38 4.23 - 5.6 M/uL    Hemoglobin 14.4 13.6 - 17.2 g/dL    Hematocrit 40.9 (L) 41.1 - 50.3 %    MCV 93.4 82 - 102 FL    MCH 32.9 26.1 - 32.9 PG    MCHC 35.2 (H) 31.4 - 35.0 g/dL    RDW 12.7 11.9 - 14.6 %    Platelets 743 645 - 162 K/uL    MPV 9.9 9.4 - 12.3 FL    nRBC 0.00 0.0 - 0.2 K/uL       I have personally reviewed imaging studies showing: Other Studies:  XR CHEST PORTABLE   Final Result   -No significant interval change. No definite visible pneumothorax. XR CHEST PORTABLE   Final Result      1. Stable right chest tubes. No visible pneumothorax. 2. Persistent bibasilar atelectasis. 3. No significant change. XR CHEST PORTABLE   Final Result   No appreciable pneumothorax in the current study. XR CHEST PORTABLE   Final Result      1. Stable right chest tubes. No significant pneumothorax. 2. Right basilar atelectasis. 3. No significant change. XR CHEST PORTABLE   Final Result      1. Stable right chest tubes. No visible pneumothorax on today's study. 2. Right basilar atelectasis. XR CHEST PORTABLE   Final Result   Placement of 2 basilar chest tubes with probable small residual   right apical pneumothorax. XR CHEST PORTABLE   Final Result      1. Small right apical pneumothorax. Right chest tube is in place. 2. Right lung base opacity, likely atelectasis. 3. No significant change compared to prior. XR CHEST PORTABLE   Final Result      1. Persistent small right apical pneumothorax. Right chest tube is stable. XR CHEST PORTABLE   Final Result      1. New small right apical pneumothorax, despite presence of the right-sided   chest tube. 2. Bibasilar atelectasis.                   XR CHEST PORTABLE   Final Result   Unchanged bibasilar lung atelectasis. XR CHEST PORTABLE   Final Result   No evidence of pneumothorax         XR CHEST PORTABLE   Final Result   1. Resolved right pneumothorax. 2. Mild bibasilar lung atelectasis. XR CHEST PORTABLE   Final Result   1. Right chest tube placed, with markedly smaller pneumothorax. 2.  Resolution of the previous tension component. 3.  Mild bibasilar atelectasis. CT CHEST WO CONTRAST   Final Result   1. Persisting large right pneumothorax. 2.  Leftward deviation of mediastinal contours concerning for tension component. 3.  Partial atelectasis of right lung. 4.  Emphysema. 5.  Vascular disease. Urgent findings communicated to the ordering NP Higbee by the secure text   messaging system at 6:30 PM.      XR CHEST PORTABLE   Final Result   1. Large right pneumothorax. 2.  Pulmonary vascular congestion.                Findings discussed with Dr. Mylene Gowers by myself at 4:42 PM.             Current Meds:  Current Facility-Administered Medications   Medication Dose Route Frequency    docusate sodium (COLACE) capsule 100 mg  100 mg Oral BID    polyethylene glycol (GLYCOLAX) packet 17 g  17 g Oral BID    insulin glargine (LANTUS) injection vial 18 Units  18 Units SubCUTAneous Daily    simethicone (MYLICON) chewable tablet 80 mg  80 mg Oral Q6H PRN    metFORMIN (GLUCOPHAGE) tablet 500 mg  500 mg Oral BID WC    cetirizine (ZYRTEC) tablet 10 mg  10 mg Oral Daily PRN    methocarbamol (ROBAXIN) tablet 750 mg  750 mg Oral 4x Daily    glycerin (ADULT) suppository 2 g  1 suppository Rectal Daily PRN    HYDROmorphone HCl PF (DILAUDID) injection 0.5 mg  0.5 mg IntraVENous Q4H PRN    oxyCODONE-acetaminophen (PERCOCET) 5-325 MG per tablet 1 tablet  1 tablet Oral Q4H PRN    oxyCODONE-acetaminophen (PERCOCET) 5-325 MG per tablet 2 tablet  2 tablet Oral Q4H PRN    bisacodyl (DULCOLAX) suppository 10 mg  10 mg Rectal Daily PRN    glucose chewable tablet 16 g  4 tablet Oral PRN    dextrose bolus 10% 125 mL  125 mL IntraVENous PRN    Or    dextrose bolus 10% 250 mL  250 mL IntraVENous PRN    glucagon (rDNA) injection 1 mg  1 mg SubCUTAneous PRN    dextrose 10 % infusion   IntraVENous Continuous PRN    metoprolol (LOPRESSOR) injection 5 mg  5 mg IntraVENous Q6H PRN    carvedilol (COREG) tablet 25 mg  25 mg Oral BID WC    dabigatran (PRADAXA) capsule 150 mg  150 mg Oral BID    lisinopril (PRINIVIL;ZESTRIL) tablet 10 mg  10 mg Oral Daily    gabapentin (NEURONTIN) capsule 600 mg  600 mg Oral BID    guaiFENesin (MUCINEX) extended release tablet 600 mg  600 mg Oral BID    hydroCHLOROthiazide (HYDRODIURIL) tablet 25 mg  25 mg Oral Daily    atorvastatin (LIPITOR) tablet 20 mg  20 mg Oral Daily    spironolactone (ALDACTONE) tablet 25 mg  25 mg Oral Daily    sodium chloride flush 0.9 % injection 5-40 mL  5-40 mL IntraVENous 2 times per day    sodium chloride flush 0.9 % injection 5-40 mL  5-40 mL IntraVENous PRN    0.9 % sodium chloride infusion   IntraVENous PRN    ondansetron (ZOFRAN-ODT) disintegrating tablet 4 mg  4 mg Oral Q8H PRN    Or    ondansetron (ZOFRAN) injection 4 mg  4 mg IntraVENous Q6H PRN    acetaminophen (TYLENOL) tablet 650 mg  650 mg Oral Q6H PRN    Or    acetaminophen (TYLENOL) suppository 650 mg  650 mg Rectal Q6H PRN    acetaminophen (TYLENOL) tablet 500 mg  500 mg Oral Q4H    insulin lispro (HUMALOG) injection vial 0-8 Units  0-8 Units SubCUTAneous TID     insulin lispro (HUMALOG) injection vial 0-4 Units  0-4 Units SubCUTAneous Nightly       Signed:  Kash Clemons MD

## 2023-01-15 ENCOUNTER — APPOINTMENT (OUTPATIENT)
Dept: NON INVASIVE DIAGNOSTICS | Age: 68
DRG: 163 | End: 2023-01-15
Payer: MEDICARE

## 2023-01-15 ENCOUNTER — APPOINTMENT (OUTPATIENT)
Dept: GENERAL RADIOLOGY | Age: 68
DRG: 163 | End: 2023-01-15
Payer: MEDICARE

## 2023-01-15 PROBLEM — A41.02 SEPSIS DUE TO METHICILLIN RESISTANT STAPHYLOCOCCUS AUREUS (MRSA) (HCC): Status: ACTIVE | Noted: 2023-01-15

## 2023-01-15 PROBLEM — J96.01 ACUTE RESPIRATORY FAILURE WITH HYPOXIA (HCC): Status: RESOLVED | Noted: 2023-01-04 | Resolved: 2023-01-15

## 2023-01-15 LAB
ACCESSION NUMBER, LLC1M: ABNORMAL
ACINETOBACTER CALCOAC BAUMANNII COMPLEX BY PCR: NOT DETECTED
ALBUMIN SERPL-MCNC: 2.3 G/DL (ref 3.2–4.6)
ANION GAP SERPL CALC-SCNC: 10 MMOL/L (ref 2–11)
BACTEROIDES FRAGILIS BY PCR: NOT DETECTED
BIOFIRE TEST COMMENT: ABNORMAL
BUN SERPL-MCNC: 8 MG/DL (ref 8–23)
C ALBICANS DNA BLD POS QL NAA+NON-PROBE: NOT DETECTED
C GLABRATA DNA BLD POS QL NAA+NON-PROBE: NOT DETECTED
C KRUSEI DNA BLD POS QL NAA+NON-PROBE: NOT DETECTED
C PARAP DNA BLD POS QL NAA+NON-PROBE: NOT DETECTED
C TROPICLS DNA BLD POS QL NAA+NON-PROBE: NOT DETECTED
CALCIUM SERPL-MCNC: 8.5 MG/DL (ref 8.3–10.4)
CANDIDA AURIS BY PCR: NOT DETECTED
CHLORIDE SERPL-SCNC: 96 MMOL/L (ref 101–110)
CO2 SERPL-SCNC: 22 MMOL/L (ref 21–32)
CREAT SERPL-MCNC: 0.7 MG/DL (ref 0.8–1.5)
CRYPTOCOCCUS NEOFORMANS/GATTII BY PCR: NOT DETECTED
E CLOAC COMP DNA BLD POS NAA+NON-PROBE: NOT DETECTED
E COLI DNA BLD POS QL NAA+NON-PROBE: NOT DETECTED
ECHO AO ASC DIAM: 3.6 CM
ECHO AO ASCENDING AORTA INDEX: 1.33 CM/M2
ECHO AO ROOT DIAM: 3.6 CM
ECHO AO ROOT INDEX: 1.33 CM/M2
ECHO AV AREA PEAK VELOCITY: 3.2 CM2
ECHO AV AREA VTI: 3.1 CM2
ECHO AV AREA/BSA PEAK VELOCITY: 1.2 CM2/M2
ECHO AV AREA/BSA VTI: 1.1 CM2/M2
ECHO AV MEAN GRADIENT: 4 MMHG
ECHO AV MEAN VELOCITY: 0.9 M/S
ECHO AV PEAK GRADIENT: 7 MMHG
ECHO AV PEAK VELOCITY: 1.3 M/S
ECHO AV VELOCITY RATIO: 0.77
ECHO AV VTI: 22 CM
ECHO BSA: 2.77 M2
ECHO EST RA PRESSURE: 8 MMHG
ECHO IVC PROX: 2.7 CM
ECHO LA AREA 2C: 27.7 CM2
ECHO LA AREA 4C: 27.4 CM2
ECHO LA DIAMETER INDEX: 2 CM/M2
ECHO LA DIAMETER: 5.4 CM
ECHO LA MAJOR AXIS: 6.6 CM
ECHO LA MINOR AXIS: 6.4 CM
ECHO LA TO AORTIC ROOT RATIO: 1.5
ECHO LA VOL 2C: 96 ML (ref 18–58)
ECHO LA VOL 4C: 87 ML (ref 18–58)
ECHO LA VOL BP: 93 ML (ref 18–58)
ECHO LA VOL/BSA BIPLANE: 34 ML/M2 (ref 16–34)
ECHO LA VOLUME INDEX A2C: 36 ML/M2 (ref 16–34)
ECHO LA VOLUME INDEX A4C: 32 ML/M2 (ref 16–34)
ECHO LV E' LATERAL VELOCITY: 17 CM/S
ECHO LV E' SEPTAL VELOCITY: 12 CM/S
ECHO LV FRACTIONAL SHORTENING: 20 % (ref 28–44)
ECHO LV INTERNAL DIMENSION DIASTOLE INDEX: 2.04 CM/M2
ECHO LV INTERNAL DIMENSION DIASTOLIC: 5.5 CM (ref 4.2–5.9)
ECHO LV INTERNAL DIMENSION SYSTOLIC INDEX: 1.63 CM/M2
ECHO LV INTERNAL DIMENSION SYSTOLIC: 4.4 CM
ECHO LV IVSD: 0.7 CM (ref 0.6–1)
ECHO LV MASS 2D: 160 G (ref 88–224)
ECHO LV MASS INDEX 2D: 59.2 G/M2 (ref 49–115)
ECHO LV POSTERIOR WALL DIASTOLIC: 0.9 CM (ref 0.6–1)
ECHO LV RELATIVE WALL THICKNESS RATIO: 0.33
ECHO LVOT AREA: 4.2 CM2
ECHO LVOT AV VTI INDEX: 0.75
ECHO LVOT DIAM: 2.3 CM
ECHO LVOT MEAN GRADIENT: 2 MMHG
ECHO LVOT PEAK GRADIENT: 4 MMHG
ECHO LVOT PEAK VELOCITY: 1 M/S
ECHO LVOT STROKE VOLUME INDEX: 25.5 ML/M2
ECHO LVOT SV: 68.9 ML
ECHO LVOT VTI: 16.6 CM
ECHO MV E DECELERATION TIME (DT): 213 MS
ECHO MV E VELOCITY: 1.01 M/S
ECHO MV E/E' LATERAL: 5.94
ECHO MV E/E' RATIO (AVERAGED): 7.18
ECHO MV E/E' SEPTAL: 8.42
ECHO PV MAX VELOCITY: 1.4 M/S
ECHO PV PEAK GRADIENT: 8 MMHG
ECHO RIGHT VENTRICULAR SYSTOLIC PRESSURE (RVSP): 31 MMHG
ECHO RV FREE WALL PEAK S': 11 CM/S
ECHO TV REGURGITANT MAX VELOCITY: 2.42 M/S
ECHO TV REGURGITANT PEAK GRADIENT: 23 MMHG
EKG ATRIAL RATE: 125 BPM
EKG DIAGNOSIS: NORMAL
EKG Q-T INTERVAL: 296 MS
EKG QRS DURATION: 92 MS
EKG QTC CALCULATION (BAZETT): 387 MS
EKG R AXIS: 8 DEGREES
EKG T AXIS: -55 DEGREES
EKG VENTRICULAR RATE: 103 BPM
ENTEROBACTERALES BY PCR: NOT DETECTED
ENTEROCOCCUS FAECALIS BY PCR: NOT DETECTED
ENTEROCOCCUS FAECIUM BY PCR: NOT DETECTED
ERYTHROCYTE [DISTWIDTH] IN BLOOD BY AUTOMATED COUNT: 12.5 % (ref 11.9–14.6)
GLUCOSE BLD STRIP.AUTO-MCNC: 163 MG/DL (ref 65–100)
GLUCOSE BLD STRIP.AUTO-MCNC: 173 MG/DL (ref 65–100)
GLUCOSE BLD STRIP.AUTO-MCNC: 189 MG/DL (ref 65–100)
GLUCOSE BLD STRIP.AUTO-MCNC: 203 MG/DL (ref 65–100)
GLUCOSE SERPL-MCNC: 175 MG/DL (ref 65–100)
GP B STREP DNA BLD POS QL NAA+NON-PROBE: NOT DETECTED
HAEM INFLU DNA BLD POS QL NAA+NON-PROBE: NOT DETECTED
HCT VFR BLD AUTO: 38.2 % (ref 41.1–50.3)
HGB BLD-MCNC: 13.4 G/DL (ref 13.6–17.2)
K OXYTOCA DNA BLD POS QL NAA+NON-PROBE: NOT DETECTED
KLEBSIELLA AEROGENES BY PCR: NOT DETECTED
KLEBSIELLA PNEUMONIAE GROUP BY PCR: NOT DETECTED
L MONOCYTOG DNA BLD POS QL NAA+NON-PROBE: NOT DETECTED
LV EF: 48 %
LVEF MODALITY: ABNORMAL
MCH RBC QN AUTO: 33 PG (ref 26.1–32.9)
MCHC RBC AUTO-ENTMCNC: 35.1 G/DL (ref 31.4–35)
MCV RBC AUTO: 94.1 FL (ref 82–102)
N MEN DNA BLD POS QL NAA+NON-PROBE: NOT DETECTED
NRBC # BLD: 0 K/UL (ref 0–0.2)
P AERUGINOSA DNA BLD POS NAA+NON-PROBE: NOT DETECTED
PHOSPHATE SERPL-MCNC: 2 MG/DL (ref 2.3–3.7)
PLATELET # BLD AUTO: 198 K/UL (ref 150–450)
PMV BLD AUTO: 10 FL (ref 9.4–12.3)
POTASSIUM SERPL-SCNC: 3.6 MMOL/L (ref 3.5–5.1)
PROCALCITONIN SERPL-MCNC: 0.36 NG/ML (ref 0–0.49)
PROTEUS SP DNA BLD POS QL NAA+NON-PROBE: NOT DETECTED
RBC # BLD AUTO: 4.06 M/UL (ref 4.23–5.6)
RESISTANT GENE MECA/C & MREJ BY PCR: DETECTED
RESISTANT GENE TARGETS: ABNORMAL
S AUREUS DNA BLD POS QL NAA+NON-PROBE: DETECTED
S AUREUS+CONS DNA BLD POS NAA+NON-PROBE: DETECTED
S MARCESCENS DNA BLD POS NAA+NON-PROBE: NOT DETECTED
S PNEUM DNA BLD POS QL NAA+NON-PROBE: NOT DETECTED
S PYO DNA BLD POS QL NAA+NON-PROBE: NOT DETECTED
SALMONELLA SPECIES BY PCR: NOT DETECTED
SERVICE CMNT-IMP: ABNORMAL
SODIUM SERPL-SCNC: 128 MMOL/L (ref 133–143)
STAPHYLOCOCCUS EPIDERMIDIS BY PCR: NOT DETECTED
STAPHYLOCOCCUS LUGDUNENSIS BY PCR: NOT DETECTED
STENOTROPHOMONAS MALTOPHILIA BY PCR: NOT DETECTED
STREPTOCOCCUS DNA BLD POS NAA+NON-PROBE: NOT DETECTED
WBC # BLD AUTO: 19.3 K/UL (ref 4.3–11.1)

## 2023-01-15 PROCEDURE — 6360000002 HC RX W HCPCS: Performed by: FAMILY MEDICINE

## 2023-01-15 PROCEDURE — 6370000000 HC RX 637 (ALT 250 FOR IP): Performed by: FAMILY MEDICINE

## 2023-01-15 PROCEDURE — 82040 ASSAY OF SERUM ALBUMIN: CPT

## 2023-01-15 PROCEDURE — 2580000003 HC RX 258: Performed by: SURGERY

## 2023-01-15 PROCEDURE — 6360000004 HC RX CONTRAST MEDICATION: Performed by: FAMILY MEDICINE

## 2023-01-15 PROCEDURE — A4216 STERILE WATER/SALINE, 10 ML: HCPCS | Performed by: FAMILY MEDICINE

## 2023-01-15 PROCEDURE — 1100000000 HC RM PRIVATE

## 2023-01-15 PROCEDURE — 80048 BASIC METABOLIC PNL TOTAL CA: CPT

## 2023-01-15 PROCEDURE — 36415 COLL VENOUS BLD VENIPUNCTURE: CPT

## 2023-01-15 PROCEDURE — 84100 ASSAY OF PHOSPHORUS: CPT

## 2023-01-15 PROCEDURE — 82962 GLUCOSE BLOOD TEST: CPT

## 2023-01-15 PROCEDURE — 84145 PROCALCITONIN (PCT): CPT

## 2023-01-15 PROCEDURE — 6370000000 HC RX 637 (ALT 250 FOR IP)

## 2023-01-15 PROCEDURE — 76937 US GUIDE VASCULAR ACCESS: CPT

## 2023-01-15 PROCEDURE — 71045 X-RAY EXAM CHEST 1 VIEW: CPT

## 2023-01-15 PROCEDURE — C8929 TTE W OR WO FOL WCON,DOPPLER: HCPCS

## 2023-01-15 PROCEDURE — 6370000000 HC RX 637 (ALT 250 FOR IP): Performed by: SURGERY

## 2023-01-15 PROCEDURE — 85027 COMPLETE CBC AUTOMATED: CPT

## 2023-01-15 PROCEDURE — 93005 ELECTROCARDIOGRAM TRACING: CPT | Performed by: FAMILY MEDICINE

## 2023-01-15 PROCEDURE — 2580000003 HC RX 258: Performed by: FAMILY MEDICINE

## 2023-01-15 RX ORDER — 0.9 % SODIUM CHLORIDE 0.9 %
1000 INTRAVENOUS SOLUTION INTRAVENOUS ONCE
Status: COMPLETED | OUTPATIENT
Start: 2023-01-15 | End: 2023-01-15

## 2023-01-15 RX ORDER — INSULIN GLARGINE 100 [IU]/ML
22 INJECTION, SOLUTION SUBCUTANEOUS DAILY
Status: DISCONTINUED | OUTPATIENT
Start: 2023-01-16 | End: 2023-01-16

## 2023-01-15 RX ADMIN — CARVEDILOL 25 MG: 25 TABLET, FILM COATED ORAL at 08:20

## 2023-01-15 RX ADMIN — CARVEDILOL 25 MG: 25 TABLET, FILM COATED ORAL at 17:11

## 2023-01-15 RX ADMIN — METHOCARBAMOL TABLETS 750 MG: 750 TABLET, COATED ORAL at 08:21

## 2023-01-15 RX ADMIN — LISINOPRIL 10 MG: 5 TABLET ORAL at 08:20

## 2023-01-15 RX ADMIN — GABAPENTIN 600 MG: 300 CAPSULE ORAL at 21:00

## 2023-01-15 RX ADMIN — INSULIN GLARGINE 20 UNITS: 100 INJECTION, SOLUTION SUBCUTANEOUS at 08:18

## 2023-01-15 RX ADMIN — ACETAMINOPHEN 500 MG: 500 TABLET, FILM COATED ORAL at 08:20

## 2023-01-15 RX ADMIN — METHOCARBAMOL TABLETS 750 MG: 750 TABLET, COATED ORAL at 17:10

## 2023-01-15 RX ADMIN — SODIUM CHLORIDE: 9 INJECTION, SOLUTION INTRAVENOUS at 03:42

## 2023-01-15 RX ADMIN — VANCOMYCIN HYDROCHLORIDE 1500 MG: 100 INJECTION, POWDER, LYOPHILIZED, FOR SOLUTION INTRAVENOUS at 22:02

## 2023-01-15 RX ADMIN — SODIUM CHLORIDE, PRESERVATIVE FREE 10 ML: 5 INJECTION INTRAVENOUS at 21:15

## 2023-01-15 RX ADMIN — GUAIFENESIN 600 MG: 600 TABLET ORAL at 08:21

## 2023-01-15 RX ADMIN — SODIUM CHLORIDE: 9 INJECTION, SOLUTION INTRAVENOUS at 09:44

## 2023-01-15 RX ADMIN — ACETAMINOPHEN 500 MG: 500 TABLET, FILM COATED ORAL at 17:10

## 2023-01-15 RX ADMIN — ATORVASTATIN CALCIUM 20 MG: 20 TABLET, FILM COATED ORAL at 08:19

## 2023-01-15 RX ADMIN — ACETAMINOPHEN 500 MG: 500 TABLET, FILM COATED ORAL at 11:48

## 2023-01-15 RX ADMIN — GABAPENTIN 600 MG: 300 CAPSULE ORAL at 08:20

## 2023-01-15 RX ADMIN — SODIUM CHLORIDE, PRESERVATIVE FREE 10 ML: 5 INJECTION INTRAVENOUS at 08:21

## 2023-01-15 RX ADMIN — PERFLUTREN 1.2 ML: 6.52 INJECTION, SUSPENSION INTRAVENOUS at 12:30

## 2023-01-15 RX ADMIN — SODIUM CHLORIDE 1000 ML: 9 INJECTION, SOLUTION INTRAVENOUS at 08:27

## 2023-01-15 RX ADMIN — Medication 2500 MG: at 09:23

## 2023-01-15 RX ADMIN — DABIGATRAN ETEXILATE MESYLATE 150 MG: 150 CAPSULE ORAL at 08:19

## 2023-01-15 RX ADMIN — DABIGATRAN ETEXILATE MESYLATE 150 MG: 150 CAPSULE ORAL at 21:01

## 2023-01-15 RX ADMIN — ACETAMINOPHEN 500 MG: 500 TABLET, FILM COATED ORAL at 20:00

## 2023-01-15 RX ADMIN — METHOCARBAMOL TABLETS 750 MG: 750 TABLET, COATED ORAL at 20:59

## 2023-01-15 RX ADMIN — POLYETHYLENE GLYCOL 3350 17 G: 17 POWDER, FOR SOLUTION ORAL at 08:19

## 2023-01-15 RX ADMIN — GUAIFENESIN 600 MG: 600 TABLET ORAL at 21:00

## 2023-01-15 RX ADMIN — METHOCARBAMOL TABLETS 750 MG: 750 TABLET, COATED ORAL at 11:46

## 2023-01-15 RX ADMIN — INSULIN LISPRO 2 UNITS: 100 INJECTION, SOLUTION INTRAVENOUS; SUBCUTANEOUS at 11:48

## 2023-01-15 RX ADMIN — ACETAMINOPHEN 500 MG: 500 TABLET, FILM COATED ORAL at 03:38

## 2023-01-15 RX ADMIN — SPIRONOLACTONE 25 MG: 25 TABLET ORAL at 08:19

## 2023-01-15 ASSESSMENT — PAIN DESCRIPTION - DESCRIPTORS
DESCRIPTORS: SORE
DESCRIPTORS: ACHING
DESCRIPTORS: ACHING;SORE
DESCRIPTORS: ACHING;SORE
DESCRIPTORS: SORE

## 2023-01-15 ASSESSMENT — PAIN SCALES - GENERAL
PAINLEVEL_OUTOF10: 4
PAINLEVEL_OUTOF10: 0
PAINLEVEL_OUTOF10: 2
PAINLEVEL_OUTOF10: 2
PAINLEVEL_OUTOF10: 0
PAINLEVEL_OUTOF10: 1
PAINLEVEL_OUTOF10: 2
PAINLEVEL_OUTOF10: 0

## 2023-01-15 ASSESSMENT — PAIN DESCRIPTION - LOCATION
LOCATION: INCISION
LOCATION: GENERALIZED
LOCATION: INCISION

## 2023-01-15 ASSESSMENT — PAIN DESCRIPTION - ORIENTATION
ORIENTATION: RIGHT
ORIENTATION: ANTERIOR
ORIENTATION: RIGHT

## 2023-01-15 ASSESSMENT — PAIN - FUNCTIONAL ASSESSMENT
PAIN_FUNCTIONAL_ASSESSMENT: PREVENTS OR INTERFERES SOME ACTIVE ACTIVITIES AND ADLS
PAIN_FUNCTIONAL_ASSESSMENT: ACTIVITIES ARE NOT PREVENTED

## 2023-01-15 NOTE — PROGRESS NOTES
VANCO DAILY FOLLOW UP NOTE  [address] [facility] Pharmacokinetic Monitoring Service - Vancomycin    Consulting Provider: Elias Méndez NP   Indication: MRSA BSI  Target Concentration: Goal AUC/LAINA 400-600 mg*hr/L  Day of Therapy: 1  Additional Antimicrobials: n/a    Patient eligible for piperacillin-tazobactam to cefepime auto-substitution per P&T approved protocol? N/A    Pertinent Laboratory Values: Wt Readings from Last 1 Encounters:   01/15/23 (!) 332 lb 12.8 oz (151 kg)     Temp Readings from Last 1 Encounters:   01/15/23 98.1 °F (36.7 °C) (Oral)     Recent Labs     01/14/23  0547 01/14/23  0807 01/14/23  1309 01/15/23  0439   BUN 7*  --  8 8   CREATININE 0.80  --  1.00 0.70*   WBC 17.0*  --   --  19.3*   LACACIDPL  --  1.4  --   --      Estimated Creatinine Clearance: 155 mL/min (A) (based on SCr of 0.7 mg/dL (L)). No results found for: Larisa Randle    MRSA Nasal Swab: N/A.  Non-respiratory infection      Assessment:  Date/Time Dose Concentration AUC    1500 mg q12h     Note: Serum concentrations collected for AUC dosing may appear elevated if collected in close proximity to the dose administered, this is not necessarily an indication of toxicity    Plan:  Dosing recommendations based on Bayesian software  Start vancomycin with 2500 mg x 1 and then 1500 mg q12h  Anticipated AUC of 529 and trough concentration of 13.3 at steady state  Renal labs as indicated   Vancomycin concentrations will be ordered as clinically appropriate   Pharmacy will continue to monitor patient and adjust therapy as indicated    Thank you for the consult,  Pia Coppola, PharmD, BCOP  Clinical Pharmacist  Contact [employer]

## 2023-01-15 NOTE — PROGRESS NOTES
Hospitalist Progress Note   Admit Date:  2023  3:40 PM   Name:  Crow Lennon   Age:  69 y.o. Sex:  male  :  1955   MRN:  [id number]   Room:  301/01    Presenting Complaint: Chest Pain and Shortness of Breath     Reason(s) for Admission: Acute respiratory failure with hypoxia ([address] [J96.01]  Primary spontaneous pneumothorax [J93.11]  Spontaneous pneumothorax [J93.83]     Hospital Course:   69 y.o. male with medical history hypertension, A. fib on Pradaxa, CASSY, CPAP at night, bullous emphysema, history of bilateral subdural hematomas 2017 and diabetes mellitus presented to ED with acute onset right-sided chest pain. Patient reports he was talking to his neighbor when he developed sharp severe right-sided chest pain, associated with shortness of breath. Reports he has history of spontaneous left pneumothorax in , shortly after putting on his CPAP while hospitalized for subdural hematoma. He is a former smoker, quit years ago. Denies nausea, vomiting, fever, chills or abdominal pain. Chest tube was placed. He had air leak after chest tube was clamped. CT surgery was consulted. He underwent VATS surgery on . Subjective & 24hr Events (01/15/23):   Cultures resulted MRSA overnight. Vancomycin initiated by overnight provider. Spoke with infectious disease regarding plan. 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 No internal hardware. Assessment & Plan:   Spontaneous pneumothorax  Hx pneumothorax, sudden onset dyspnea. Admission XR with large pneumothorax. CT removed .  1/15/2023: stable    Sepsis with MRSA bacteremia  Met criteria with , WBC 19, source MRSA bacteremia  -Follow cx  -Consult infectious disease: discussed plan  -Vancomycin  -ECG, echo    Acute respiratory failure with hypoxia   1/15/2023: resolved    Type 2 diabetes mellitus with hyperglycemia, without long-term current use of insulin   -Sliding scale insulin  -Fingerstick blood glucose  -Titrate basal insulin as indicated  1/15/2023: sGlc 173-218 20U basal + 2U SSI last 24 hours, increase basal 22U + SSI    Primary hypertension  -carvedilol,  HCTZ, lisinopril, spironolactone     Class 3 severe obesity due to excess calories with serious comorbidity and body mass index (BMI) of 40.0 to 44.9 in adult  Increased risk of all cause mortality, complicating care  - healthy lifestyle at discharge    Atrial fibrillation   -dabigatran    CASSY on CPAP  -CPAP qhs    Hypercoagulable state  Noted        Anticipated discharge needs:      Home with outpatient follow up    Diet:  ADULT DIET; Regular; 4 carb choices (60 gm/meal)  DVT PPx: on dabigatran  Code status: Full Code    Hospital Problems:  Principal Problem:    Sepsis due to methicillin resistant Staphylococcus aureus (MRSA) ([facility] [address]  Active Problems:    Spontaneous pneumothorax    Primary spontaneous pneumothorax    Primary hypertension    Benign prostatic hyperplasia with nocturia    Class 3 severe obesity due to excess calories with serious comorbidity and body mass index (BMI) of 40.0 to 44.9 in adult [facility])    Atrial fibrillation (HCC)    CASSY on CPAP    Type 2 diabetes mellitus with hyperglycemia, without long-term current use of insulin (HCC)    Anticoagulant long-term use  Resolved Problems:    Acute respiratory failure with hypoxia (HCC)      Objective:   Patient Vitals for the past 24 hrs:   Temp Pulse Resp BP SpO2   01/15/23 0507 98.1 °F (36.7 °C) 94 20 114/67 92 %   01/15/23 0340 98.9 °F (37.2 °C) -- -- -- --   01/15/23 0023 99 °F (37.2 °C) 97 20 119/68 92 %   01/14/23 2019 97.7 °F (36.5 °C) (!) 104 20 91/74 92 %   01/14/23 1652 98.3 °F (36.8 °C) (!) 106 20 113/69 93 %   01/14/23 1134 98.1 °F (36.7 °C) 95 19 123/72 92 %   01/14/23 0833 -- -- 19 -- --   01/14/23 0811 98.1 °F (36.7 °C) 91 20 120/77 95 %   01/14/23 0803 -- -- 20 -- --         Oxygen Therapy  SpO2: 92 %  Pulse Oximetry Type:  Intermittent  Pulse via Oximetry: 82 beats per minute  Pulse Oximeter Device Mode: Intermittent  Pulse Oximeter Device Location: Finger  O2 Device: None (Room air)  Skin Assessment: Clean, dry, & intact  O2 Flow Rate (L/min): 2 L/min    Estimated body mass index is 42.73 kg/m² as calculated from the following:    Height as of this encounter: 6' 2\" (1.88 m). Weight as of this encounter: 332 lb 12.8 oz (151 kg). Intake/Output Summary (Last 24 hours) at 1/15/2023 0734  Last data filed at 1/14/2023 1610  Gross per 24 hour   Intake 1727.5 ml   Output 290 ml   Net 1437.5 ml         Blood pressure 114/67, pulse 94, temperature 98.1 °F (36.7 °C), temperature source Oral, resp. rate 20, height 6' 2\" (1.88 m), weight (!) 332 lb 12.8 oz (151 kg), SpO2 92 %. Physical Exam  Vitals and nursing note reviewed. Constitutional:       General: He is not in acute distress. Appearance: He is morbidly obese. He is not diaphoretic. HENT:      Head: Normocephalic and atraumatic. Eyes:      Extraocular Movements: Extraocular movements intact. Cardiovascular:      Rate and Rhythm: Normal rate and regular rhythm. Pulmonary:      Effort: Pulmonary effort is normal. No respiratory distress. Breath sounds: Decreased air movement and transmitted upper airway sounds present. No wheezing or rhonchi. Abdominal:      General: Abdomen is protuberant. There is no distension. Tenderness: There is no abdominal tenderness. Comments: Massive paniculus   Musculoskeletal:         General: No deformity. Skin:     Coloration: Skin is not jaundiced or pale. Neurological:      General: No focal deficit present. Mental Status: He is alert and oriented to person, place, and time. Psychiatric:         Mood and Affect: Mood normal.         Behavior: Behavior normal. Behavior is cooperative.           I have personally reviewed labs and tests showing:  Recent Labs:  Recent Results (from the past 48 hour(s))   POCT Glucose    Collection Time: 01/13/23 11:55 AM   Result Value Ref Range    POC Glucose 229 (H) 65 - 100 mg/dL    Performed by:  Arnold    POCT Glucose    Collection Time: 01/13/23  4:13 PM   Result Value Ref Range    POC Glucose 183 (H) 65 - 100 mg/dL    Performed by: Rehman (Oliver)    POCT Glucose    Collection Time: 01/13/23  8:43 PM   Result Value Ref Range    POC Glucose 208 (H) 65 - 100 mg/dL    Performed by: Phil    Phosphorus    Collection Time: 01/14/23  5:47 AM   Result Value Ref Range    Phosphorus 2.6 2.3 - 3.7 MG/DL   Albumin    Collection Time: 01/14/23  5:47 AM   Result Value Ref Range    Albumin 2.7 (L) 3.2 - 4.6 g/dL   Basic Metabolic Panel w/ Reflex to MG    Collection Time: 01/14/23  5:47 AM   Result Value Ref Range    Sodium 127 (L) 133 - 143 mmol/L    Potassium 3.6 3.5 - 5.1 mmol/L    Chloride 93 (L) 101 - 110 mmol/L    CO2 24 21 - 32 mmol/L    Anion Gap 10 2 - 11 mmol/L    Glucose 214 (H) 65 - 100 mg/dL    BUN 7 (L) 8 - 23 MG/DL    Creatinine 0.80 0.8 - 1.5 MG/DL    Est, Glom Filt Rate >60 >60 ml/min/1.73m2    Calcium 9.8 8.3 - 10.4 MG/DL   CBC    Collection Time: 01/14/23  5:47 AM   Result Value Ref Range    WBC 17.0 (H) 4.3 - 11.1 K/uL    RBC 4.38 4.23 - 5.6 M/uL    Hemoglobin 14.4 13.6 - 17.2 g/dL    Hematocrit 40.9 (L) 41.1 - 50.3 %    MCV 93.4 82 - 102 FL    MCH 32.9 26.1 - 32.9 PG    MCHC 35.2 (H) 31.4 - 35.0 g/dL    RDW 12.7 11.9 - 14.6 %    Platelets 960 997 - 220 K/uL    MPV 9.9 9.4 - 12.3 FL    nRBC 0.00 0.0 - 0.2 K/uL   Culture, Blood 1    Collection Time: 01/14/23  8:07 AM    Specimen: Blood   Result Value Ref Range    Special Requests LEFT  Antecubital        Culture NO GROWTH AFTER 22 HOURS     Culture, Blood 1    Collection Time: 01/14/23  8:07 AM    Specimen: Blood   Result Value Ref Range    Special Requests RIGHT  Antecubital        Gram stain GRAM POS COCCI IN CLUSTERS      Gram stain ANAEROBIC BOTTLE POSITIVE      Gram stain        RESULTS VERIFIED, PHONED TO AND READ BACK BY JOHN PATRICK RN ON 1/15/23 AT 0644 CJ    Culture CULTURE IN PROGRESS,FURTHER UPDATES TO FOLLOW      Culture Refer to Blood Culture ID Panel Accession [id number]     Lactic Acid    Collection Time: 01/14/23  8:07 AM   Result Value Ref Range    Lactic Acid, Plasma 1.4 0.4 - 2.0 MMOL/L   Culture, Blood, PCR ID Panel    Collection Time: 01/14/23  8:07 AM    Specimen: Blood   Result Value Ref Range    Accession Number [id number]     Enterococcus faecalis by PCR NOT DETECTED NOTDET      Enterococcus faecium by PCR NOT DETECTED NOTDET      Listeria monocytogenes by PCR NOT DETECTED NOTDET      STAPHYLOCOCCUS Detected (A) NOTDET      Staphylococcus Aureus Detected (A) NOTDET      Staphylococcus epidermidis by PCR NOT DETECTED NOTDET      Staphylococcus lugdunensis by PCR NOT DETECTED NOTDET      STREPTOCOCCUS NOT DETECTED NOTDET      Streptococcus agalactiae (Group B) NOT DETECTED NOTDET      Strep pneumoniae NOT DETECTED NOTDET      Strep pyogenes,(Grp. A) NOT DETECTED NOTDET      Acinetobacter calcoac baumannii complex by PCR NOT DETECTED NOTDET      Bacteroides fragilis by PCR NOT DETECTED NOTDET      Enterobacteriaceae by PCR NOT DETECTED NOTDET      Enterobacter cloacae complex by PCR NOT DETECTED NOTDET      Escherichia Coli NOT DETECTED NOTDET      Klebsiella aerogenes by PCR NOT DETECTED NOTDET      Klebsiella oxytoca by PCR NOT DETECTED NOTDET      Klebsiella pneumoniae group by PCR NOT DETECTED NOTDET      Proteus by PCR NOT DETECTED NOTDET      Salmonella species by PCR NOT DETECTED NOTDET      Serratia marcescens by PCR NOT DETECTED NOTDET      Haemophilus Influenzae by PCR NOT DETECTED NOTDET      Neisseria meningitidis by PCR NOT DETECTED NOTDET      Pseudomonas aeruginosa NOT DETECTED NOTDET      Stenotrophomonas maltophilia by PCR NOT DETECTED NOTDET      Candida albicans by PCR NOT DETECTED NOTDET      Candida auris by PCR NOT DETECTED NOTDET      Candida glabrata NOT DETECTED NOTDET      Candida krusei by PCR NOT DETECTED NOTDET      Candida parapsilosis by PCR NOT DETECTED NOTDET      Candida tropicalis by PCR NOT DETECTED NOTDET      Cryptococcus neoformans/gattii by PCR NOT DETECTED NOTDET      Resistant gene targets          Resistant gene meca/c & mrej by PCR Detected (A) NOTDET      [employer] test comment        False positive results may rarely occur.  Correlate with clinical,epidemiologic, and other laboratory findings   POCT Glucose    Collection Time: 01/14/23  8:18 AM   Result Value Ref Range    POC Glucose 228 (H) 65 - 100 mg/dL    Performed by: Papa Mckay    Sodium, urine, random    Collection Time: 01/14/23 10:44 AM   Result Value Ref Range    SODIUM, RANDOM URINE <5 MMOL/L   Protein, urine, random    Collection Time: 01/14/23 10:44 AM   Result Value Ref Range    Protein, Urine, Random 39 (H) <11.9 mg/dL   Creatinine, Random Urine    Collection Time: 01/14/23 10:44 AM   Result Value Ref Range    Creatinine, Ur 220.00 mg/dL   Osmolality, Urine    Collection Time: 01/14/23 10:44 AM   Result Value Ref Range    Osmolality, Ur 821 50 - 1400 MOSM/kg H2O   Urinalysis with Reflex to Culture    Collection Time: 01/14/23 10:44 AM    Specimen: Urine   Result Value Ref Range    Color, UA YELLOW      Appearance CLEAR      Specific Gravity, UA >1.035 (H) 1.001 - 1.023    pH, Urine 5.0 5.0 - 9.0      Protein, UA 30 (A) NEG mg/dL    Glucose, UA >1000 mg/dL    Ketones, Urine 40 (A) NEG mg/dL    Bilirubin Urine MODERATE (A) NEG      Blood, Urine Negative NEG      Urobilinogen, Urine 1.0 0.2 - 1.0 EU/dL    Nitrite, Urine Positive (A) NEG      Leukocyte Esterase, Urine TRACE (A) NEG      WBC, UA 10-20 0 /hpf    RBC, UA 0 0 /hpf    BACTERIA, URINE TRACE 0 /hpf    Urine Culture if Indicated URINE CULTURE ORDERED      Epithelial Cells UA 3-5 0 /hpf    Casts 0 0 /lpf    Crystals 0 0 /LPF    Mucus, UA 3+ (H) 0 /lpf    Other observations RESULTS VERIFIED MANUALLY     POCT Glucose    Collection Time: 01/14/23 11:34 AM   Result Value Ref Range    POC Glucose 230 (H) 65 - 100 mg/dL Performed by: Jesus Moses    Basic Metabolic Panel w/ Reflex to MG    Collection Time: 01/14/23  1:09 PM   Result Value Ref Range    Sodium 128 (L) 133 - 143 mmol/L    Potassium 3.8 3.5 - 5.1 mmol/L    Chloride 91 (L) 101 - 110 mmol/L    CO2 23 21 - 32 mmol/L    Anion Gap 14 (H) 2 - 11 mmol/L    Glucose 235 (H) 65 - 100 mg/dL    BUN 8 8 - 23 MG/DL    Creatinine 1.00 0.8 - 1.5 MG/DL    Est, Glom Filt Rate >60 >60 ml/min/1.73m2    Calcium 9.5 8.3 - 10.4 MG/DL   POCT Glucose    Collection Time: 01/14/23  4:53 PM   Result Value Ref Range    POC Glucose 229 (H) 65 - 100 mg/dL    Performed by: Jesus Moses    POCT Glucose    Collection Time: 01/14/23  8:20 PM   Result Value Ref Range    POC Glucose 218 (H) 65 - 100 mg/dL    Performed by: Phil    Phosphorus    Collection Time: 01/15/23  4:39 AM   Result Value Ref Range    Phosphorus 2.0 (L) 2.3 - 3.7 MG/DL   Albumin    Collection Time: 01/15/23  4:39 AM   Result Value Ref Range    Albumin 2.3 (L) 3.2 - 4.6 g/dL   Basic Metabolic Panel w/ Reflex to MG    Collection Time: 01/15/23  4:39 AM   Result Value Ref Range    Sodium 128 (L) 133 - 143 mmol/L    Potassium 3.6 3.5 - 5.1 mmol/L    Chloride 96 (L) 101 - 110 mmol/L    CO2 22 21 - 32 mmol/L    Anion Gap 10 2 - 11 mmol/L    Glucose 175 (H) 65 - 100 mg/dL    BUN 8 8 - 23 MG/DL    Creatinine 0.70 (L) 0.8 - 1.5 MG/DL    Est, Glom Filt Rate >60 >60 ml/min/1.73m2    Calcium 8.5 8.3 - 10.4 MG/DL   CBC    Collection Time: 01/15/23  4:39 AM   Result Value Ref Range    WBC 19.3 (H) 4.3 - 11.1 K/uL    RBC 4.06 (L) 4.23 - 5.6 M/uL    Hemoglobin 13.4 (L) 13.6 - 17.2 g/dL    Hematocrit 38.2 (L) 41.1 - 50.3 %    MCV 94.1 82 - 102 FL    MCH 33.0 (H) 26.1 - 32.9 PG    MCHC 35.1 (H) 31.4 - 35.0 g/dL    RDW 12.5 11.9 - 14.6 %    Platelets 838 755 - 348 K/uL    MPV 10.0 9.4 - 12.3 FL    nRBC 0.00 0.0 - 0.2 K/uL       I have personally reviewed imaging studies showing:   Other Studies:  XR CHEST PORTABLE   Final Result -No significant interval change. No definite visible pneumothorax. XR CHEST PORTABLE   Final Result      1. Stable right chest tubes. No visible pneumothorax. 2. Persistent bibasilar atelectasis. 3. No significant change. XR CHEST PORTABLE   Final Result   No appreciable pneumothorax in the current study. XR CHEST PORTABLE   Final Result      1. Stable right chest tubes. No significant pneumothorax. 2. Right basilar atelectasis. 3. No significant change. XR CHEST PORTABLE   Final Result      1. Stable right chest tubes. No visible pneumothorax on today's study. 2. Right basilar atelectasis. XR CHEST PORTABLE   Final Result   Placement of 2 basilar chest tubes with probable small residual   right apical pneumothorax. XR CHEST PORTABLE   Final Result      1. Small right apical pneumothorax. Right chest tube is in place. 2. Right lung base opacity, likely atelectasis. 3. No significant change compared to prior. XR CHEST PORTABLE   Final Result      1. Persistent small right apical pneumothorax. Right chest tube is stable. XR CHEST PORTABLE   Final Result      1. New small right apical pneumothorax, despite presence of the right-sided   chest tube. 2. Bibasilar atelectasis. XR CHEST PORTABLE   Final Result   Unchanged bibasilar lung atelectasis. XR CHEST PORTABLE   Final Result   No evidence of pneumothorax         XR CHEST PORTABLE   Final Result   1. Resolved right pneumothorax. 2. Mild bibasilar lung atelectasis. XR CHEST PORTABLE   Final Result   1. Right chest tube placed, with markedly smaller pneumothorax. 2.  Resolution of the previous tension component. 3.  Mild bibasilar atelectasis. CT CHEST WO CONTRAST   Final Result   1. Persisting large right pneumothorax. 2.  Leftward deviation of mediastinal contours concerning for tension component.    3.  Partial atelectasis of right lung.   4.  Emphysema. 5.  Vascular disease. Urgent findings communicated to the ordering NP Vance Diane by the secure text   messaging system at 6:30 PM.      XR CHEST PORTABLE   Final Result   1. Large right pneumothorax. 2.  Pulmonary vascular congestion.                Findings discussed with Dr. Gia Dinero by myself at 4:42 PM.         XR CHEST PORTABLE    (Results Pending)       Current Meds:  Current Facility-Administered Medications   Medication Dose Route Frequency    0.9 % sodium chloride bolus  1,000 mL IntraVENous Once    0.9 % sodium chloride infusion   IntraVENous Continuous    insulin glargine (LANTUS) injection vial 20 Units  20 Units SubCUTAneous Daily    polyethylene glycol (GLYCOLAX) packet 17 g  17 g Oral BID    simethicone (MYLICON) chewable tablet 80 mg  80 mg Oral Q6H PRN    [Held by provider] metFORMIN (GLUCOPHAGE) tablet 500 mg  500 mg Oral BID     cetirizine (ZYRTEC) tablet 10 mg  10 mg Oral Daily PRN    methocarbamol (ROBAXIN) tablet 750 mg  750 mg Oral 4x Daily    glycerin (ADULT) suppository 2 g  1 suppository Rectal Daily PRN    HYDROmorphone HCl PF (DILAUDID) injection 0.5 mg  0.5 mg IntraVENous Q4H PRN    oxyCODONE-acetaminophen (PERCOCET) 5-325 MG per tablet 1 tablet  1 tablet Oral Q4H PRN    oxyCODONE-acetaminophen (PERCOCET) 5-325 MG per tablet 2 tablet  2 tablet Oral Q4H PRN    bisacodyl (DULCOLAX) suppository 10 mg  10 mg Rectal Daily PRN    glucose chewable tablet 16 g  4 tablet Oral PRN    dextrose bolus 10% 125 mL  125 mL IntraVENous PRN    Or    dextrose bolus 10% 250 mL  250 mL IntraVENous PRN    glucagon (rDNA) injection 1 mg  1 mg SubCUTAneous PRN    dextrose 10 % infusion   IntraVENous Continuous PRN    metoprolol (LOPRESSOR) injection 5 mg  5 mg IntraVENous Q6H PRN    carvedilol (COREG) tablet 25 mg  25 mg Oral BID     dabigatran (PRADAXA) capsule 150 mg  150 mg Oral BID    lisinopril (PRINIVIL;ZESTRIL) tablet 10 mg  10 mg Oral Daily    gabapentin (NEURONTIN) capsule 600 mg  600 mg Oral BID    guaiFENesin (MUCINEX) extended release tablet 600 mg  600 mg Oral BID    [Held by provider] hydroCHLOROthiazide (HYDRODIURIL) tablet 25 mg  25 mg Oral Daily    atorvastatin (LIPITOR) tablet 20 mg  20 mg Oral Daily    spironolactone (ALDACTONE) tablet 25 mg  25 mg Oral Daily    sodium chloride flush 0.9 % injection 5-40 mL  5-40 mL IntraVENous 2 times per day    sodium chloride flush 0.9 % injection 5-40 mL  5-40 mL IntraVENous PRN    0.9 % sodium chloride infusion   IntraVENous PRN    ondansetron (ZOFRAN-ODT) disintegrating tablet 4 mg  4 mg Oral Q8H PRN    Or    ondansetron (ZOFRAN) injection 4 mg  4 mg IntraVENous Q6H PRN    acetaminophen (TYLENOL) tablet 650 mg  650 mg Oral Q6H PRN    Or    acetaminophen (TYLENOL) suppository 650 mg  650 mg Rectal Q6H PRN    acetaminophen (TYLENOL) tablet 500 mg  500 mg Oral Q4H    insulin lispro (HUMALOG) injection vial 0-8 Units  0-8 Units SubCUTAneous TID     insulin lispro (HUMALOG) injection vial 0-4 Units  0-4 Units SubCUTAneous Nightly       Signed:  Demetrice Mckeon MD

## 2023-01-15 NOTE — PROGRESS NOTES
US Guided PIV access-   Skin was cleaned and disinfected prior to IV puncture. Ultrasound was used to find the vein which was compressible and does not have any ultrasound features of an artery or nerve bundle. Under real-time ultrasound guidance peripheral access was obtained in the left forearm using 20 G 6 CM Extended Dwell Peripheral IV catheter LOT # [id number]  . Blood return was present and IV flushed without difficulty with no clinical signs of infiltration. IV dressing applied and there were no immediate complications noted and patient tolerated the procedure well.

## 2023-01-15 NOTE — PROGRESS NOTES
Admit Date: 2023    POD 6 Days Post-Op    Procedure:  Procedure(s):  RIGHT VATS/WEDGE BLEBECTOMY/RIGHT UPPER LOBE WEDGE BLEBECTOMY/RIGHT LOWER LOBE WEDGE BLEBECTOMY/TALC PLEURODESIS    Subjective:     Pt sitting in recliner. Alert with NAD. Respirations even and unlabored on room air. Tolerating diabetic diet with no nausea or vomiting. Working with PT-ambulating in halls with walker. Up in chair for meals. Right ribcage pain well controlled with multimodal analgesia. WBC up to 19.3. Bcx drawn came back positive for MRSA. ID notes etiology is posterior CT site. Right CT x 2 removed yesterday. CXR this am showing      Impression   1. Removal of the right chest tubes. No pneumothorax. 2.  Little interval change otherwise. Objective:       Vitals:    01/15/23 0340 01/15/23 0507 01/15/23 0802 01/15/23 1131   BP:  114/67 (!) 138/53 135/81   Pulse:  94 84 79   Resp:  20 20 20   Temp: 98.9 °F (37.2 °C) 98.1 °F (36.7 °C) 98.1 °F (36.7 °C) 98.1 °F (36.7 °C)   TempSrc: Oral Oral Oral Oral   SpO2:  92% 94% 94%   Weight:  (!) 332 lb 12.8 oz (151 kg)     Height:           Temp (24hrs), Av.3 °F (36.8 °C), Min:97.7 °F (36.5 °C), Max:99 °F (37.2 °C)  . I&O reviewed as documented. Afebrile   VSS    Physical Exam  Constitutional:       General: He is not in acute distress. Appearance: He is obese. HENT:      Mouth/Throat:      Mouth: Mucous membranes are moist.   Cardiovascular:      Rate and Rhythm: Normal rate and regular rhythm. Pulses: Normal pulses. Heart sounds: Normal heart sounds. No murmur heard. No friction rub. No gallop. Pulmonary:      Effort: Pulmonary effort is normal. No respiratory distress. Breath sounds: Normal breath sounds. Abdominal:      General: Bowel sounds are normal. There is no distension. Palpations: Abdomen is soft. Genitourinary:     Comments: VOIDING   Musculoskeletal:         General: No swelling. Normal range of motion.       Cervical back: No rigidity. Skin:     General: Skin is warm and dry. Capillary Refill: Capillary refill takes less than 2 seconds. Comments: Dressing to Right chest/ back c/d/i   Neurological:      Mental Status: He is alert and oriented to person, place, and time.    Psychiatric:         Behavior: Behavior normal.      Labs:   Recent Results (from the past 24 hour(s))   Basic Metabolic Panel w/ Reflex to MG    Collection Time: 01/14/23  1:09 PM   Result Value Ref Range    Sodium 128 (L) 133 - 143 mmol/L    Potassium 3.8 3.5 - 5.1 mmol/L    Chloride 91 (L) 101 - 110 mmol/L    CO2 23 21 - 32 mmol/L    Anion Gap 14 (H) 2 - 11 mmol/L    Glucose 235 (H) 65 - 100 mg/dL    BUN 8 8 - 23 MG/DL    Creatinine 1.00 0.8 - 1.5 MG/DL    Est, Glom Filt Rate >60 >60 ml/min/1.73m2    Calcium 9.5 8.3 - 10.4 MG/DL   POCT Glucose    Collection Time: 01/14/23  4:53 PM   Result Value Ref Range    POC Glucose 229 (H) 65 - 100 mg/dL    Performed by: Barb Arauz    POCT Glucose    Collection Time: 01/14/23  8:20 PM   Result Value Ref Range    POC Glucose 218 (H) 65 - 100 mg/dL    Performed by: Phil    Phosphorus    Collection Time: 01/15/23  4:39 AM   Result Value Ref Range    Phosphorus 2.0 (L) 2.3 - 3.7 MG/DL   Albumin    Collection Time: 01/15/23  4:39 AM   Result Value Ref Range    Albumin 2.3 (L) 3.2 - 4.6 g/dL   Basic Metabolic Panel w/ Reflex to MG    Collection Time: 01/15/23  4:39 AM   Result Value Ref Range    Sodium 128 (L) 133 - 143 mmol/L    Potassium 3.6 3.5 - 5.1 mmol/L    Chloride 96 (L) 101 - 110 mmol/L    CO2 22 21 - 32 mmol/L    Anion Gap 10 2 - 11 mmol/L    Glucose 175 (H) 65 - 100 mg/dL    BUN 8 8 - 23 MG/DL    Creatinine 0.70 (L) 0.8 - 1.5 MG/DL    Est, Glom Filt Rate >60 >60 ml/min/1.73m2    Calcium 8.5 8.3 - 10.4 MG/DL   CBC    Collection Time: 01/15/23  4:39 AM   Result Value Ref Range    WBC 19.3 (H) 4.3 - 11.1 K/uL    RBC 4.06 (L) 4.23 - 5.6 M/uL    Hemoglobin 13.4 (L) 13.6 - 17.2 g/dL Hematocrit 38.2 (L) 41.1 - 50.3 %    MCV 94.1 82 - 102 FL    MCH 33.0 (H) 26.1 - 32.9 PG    MCHC 35.1 (H) 31.4 - 35.0 g/dL    RDW 12.5 11.9 - 14.6 %    Platelets 500 738 - 322 K/uL    MPV 10.0 9.4 - 12.3 FL    nRBC 0.00 0.0 - 0.2 K/uL   Procalcitonin    Collection Time: 01/15/23  4:39 AM   Result Value Ref Range    Procalcitonin 0.36 0.00 - 0.49 ng/mL   Transthoracic echocardiogram (TTE) complete with contrast, bubble, strain, and 3D PRN    Collection Time: 01/15/23  7:22 AM   Result Value Ref Range    LA Minor Axis 6.4 cm    LA Major Axis 6.6 cm    LA Area 2C 27.7 cm2    LA Area 4C 27.4 cm2    LA Volume 2C 96 (A) 18 - 58 mL    LA Volume 4C 87 (A) 18 - 58 mL    LA Volume BP 93 (A) 18 - 58 mL    LA Diameter 5.4 cm    AV Mean Velocity 0.9 m/s    AV Mean Gradient 4 mmHg    AV VTI 22.0 cm    AV Peak Velocity 1.3 m/s    AV Peak Gradient 7 mmHg    AV Area by VTI 3.1 cm2    AV Area by Peak Velocity 3.2 cm2    Aortic Root 3.6 cm    Ascending Aorta 3.6 cm    IVC Proxmal 2.7 cm    IVSd 0.7 0.6 - 1.0 cm    LVIDd 5.5 4.2 - 5.9 cm    LVIDs 4.4 cm    LVOT Diameter 2.3 cm    LVOT Mean Gradient 2 mmHg    LVOT VTI 16.6 cm    LVOT Peak Velocity 1.0 m/s    LVOT Peak Gradient 4 mmHg    LVPWd 0.9 0.6 - 1.0 cm    LV E' Lateral Velocity 17 cm/s    LV E' Septal Velocity 12 cm/s    LVOT Area 4.2 cm2    LVOT SV 69.0 ml    MV E Wave Deceleration Time 213.0 ms    MV E Velocity 1.01 m/s    PV Max Velocity 1.4 m/s    PV Peak Gradient 8 mmHg    Est. RA Pressure 8 mmHg    RV Free Wall Peak S' 11 cm/s    TR Max Velocity 2.42 m/s    TR Peak Gradient 23 mmHg   POCT Glucose    Collection Time: 01/15/23  8:03 AM   Result Value Ref Range    POC Glucose 173 (H) 65 - 100 mg/dL    Performed by: Pantera    EKG 12 Lead    Collection Time: 01/15/23  8:27 AM   Result Value Ref Range    Ventricular Rate 103 BPM    Atrial Rate 125 BPM    QRS Duration 92 ms    Q-T Interval 296 ms    QTc Calculation (Bazett) 387 ms    R Axis 8 degrees    T Axis -55 degrees    Diagnosis       Atrial fibrillation with rapid ventricular response with premature   ventricular or aberrantly conducted complexes     POCT Glucose    Collection Time: 01/15/23 11:31 AM   Result Value Ref Range    POC Glucose 203 (H) 65 - 100 mg/dL    Performed by: Chichi Agosto         Assessment:     Principal Problem:    Sepsis due to methicillin resistant Staphylococcus aureus (MRSA) ([facility] [address]  Active Problems:    Spontaneous pneumothorax    Anticoagulant long-term use    Primary spontaneous pneumothorax    Primary hypertension    Benign prostatic hyperplasia with nocturia    Class 3 severe obesity due to excess calories with serious comorbidity and body mass index (BMI) of 40.0 to 44.9 in adult [facility])    Atrial fibrillation (HCC)    CASSY on CPAP    Type 2 diabetes mellitus with hyperglycemia, without long-term current use of insulin ([facility] [address]  Resolved Problems:    Acute respiratory failure with hypoxia [facility])        Plan/Recommendations/Medical Decision Making:   Care Management per Hospitalist  Right CT x 2 removed 1/14/23  F/u CXR 1/15/23 No pneumothorax  ID following - MRSA  IV Abx - Vancomycin    Arvil Blood, FNP-BC

## 2023-01-15 NOTE — CONSULTS
Infectious Disease Consult      Today's Date: 1/15/2023   Admit Date: 1/4/2023    Impression:   Patient is a 69 with recurrent pneumothorax, s/p VATS on 1/11/23, developed leukocytosis on 1/14/23, Bcx drawn came back positive for MRSA    #MRSA bacteremia  - Etiology: Right superior back CT drain site  - Work-up:  Recommend TTE   - Treatment: Appropriately on vancomycin: needs local wound care  - Follow-up: Blood cx positive 1/14/23 - blood cx ordered for 1/16/23  - Duration: If repeat blood cultures are negative and TTE is negative , would treat for 2 weeks    Plan:   TTE  Repeat blood cultures  Continue therapeutic dosing vancomycin   Engage wound care   ID will continue to follow    Anti-infectives:   Vancomycin 1/15/23- present  Cefazolin 1/15/23    Subjective:   Date of Consultation:  January 15, 2023  Date of Admission: 1/4/2023   Referring Provider: Dr Harshad Yang    Patient is a 69 y.o. male with PMH of  right sided spontaneous pneumothorax in 2017, required tube placement , admitted on 1/4/23 with sudden onset chest pain and shortness of breath. Found to have a large right sided pneumothorax. Had tube placement. Subsequently underwent VATS on 1/11/23. Noted to have leukocytosis on 1/14/23, as part of work-up, had blood cultures which came back positive for MRSA. By Callie Pollack. On evaluation today, patient is sitting out comfortably in chair. He denies any new complaints  No worrisome IV sites  His superior back previous chest tube site was inspected along with other previous tube sites.  Nursing staff informed me that there was pus draining from the left upper back site  Drainage is minimal today but appears likely infected with tenderness and erythema (unfortunately I was unable to get pictures, do not yet have epic for on my phone for STF)  No fluctuance      Patient Active Problem List   Diagnosis    Primary hypertension    History of subdural hematoma (post traumatic)    Hepatic steatosis    Osteoarthritis of lumbosacral spine    Benign prostatic hyperplasia with nocturia    Nodular prostate without urinary obstruction    Seasonal allergic rhinitis due to pollen    Class 3 severe obesity due to excess calories with serious comorbidity and body mass index (BMI) of 40.0 to 44.9 in adult [facility])    Subdural hygroma    Mitral valve regurgitation    Iron excess    Atrial fibrillation (HCC)    CASSY on CPAP    Hereditary hemochromatosis (HCC)    Polyneuropathy associated with underlying disease ([address]    BPH (benign prostatic hyperplasia)    Coronary artery disease due to lipid rich plaque    Chronic constipation    Mixed dyslipidemia    Type 2 diabetes mellitus with hyperglycemia, without long-term current use of insulin (HCC)    Controlled type 2 diabetes mellitus with peripheral circulatory disorder (HCC)    Acute respiratory failure with hypoxia (HCC)    Spontaneous pneumothorax    Anticoagulant long-term use    Primary spontaneous pneumothorax    Sepsis due to methicillin resistant Staphylococcus aureus (MRSA) ([address]     Past Medical History:   Diagnosis Date    Arteriosclerosis     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 Diabetes mellitus ([address] 1/3/2012    Essential hypertension, benign 2/6/2014    Hemochromatosis     Hypercholesteremia     Nodular prostate without urinary obstruction 2/6/2014    Obesity 2/6/2014    Pneumothorax, RIGHT 10/8/2017    pt was hospitalized for subdural hematoma in 2017. upon return home from 2017 hospitlization, pt experienced RIGHT PTX after placement of CPAP approx.  13-18 cm h2o    Sepsis due to urinary tract infection ([address] 11/5/2019    Sleep apnea 10/12/2016    uses ASV instead of CPAP machine - per pt 11/18    Subdural hemorrhage following injury 10/20/2017      Family History   Problem Relation Age of Onset    Prostate Cancer Paternal Grandfather     Alcohol Abuse Brother     Heart Failure Brother     Psychiatric Disorder Brother     Stroke Brother     Psychiatric Disorder Mother     Other Mother         Sarcoidosis    Atrial Fibrillation Father     Lung Cancer Father     Hypertension Father     Heart Disease Father 61            Diabetes Father     Heart Disease Paternal Grandmother       Social History     Tobacco Use    Smoking status: Former     Packs/day: 1.50     Types: Cigarettes     Quit date: 1989     Years since quittin.4    Smokeless tobacco: Former     Quit date: 2016   Substance Use Topics    Alcohol use: Yes     Alcohol/week: 8.0 standard drinks     Past Surgical History:   Procedure Laterality Date    CHEST SURGERY  10/2017    pneumothorax after fall    COLONOSCOPY N/A 2018    COLONOSCOPY  BMI 41 performed by Mera Woodard MD at  [facility] Right 10/02/2017    [facility] for Subdural Hematoma    KNEE ARTHROSCOPY Right     OTHER SURGICAL HISTORY Bilateral 10/18/2017    [facility] for Subdural Hematoma    THORACOSCOPY Right 2023    RIGHT VATS/WEDGE BLEBECTOMY/RIGHT UPPER LOBE WEDGE BLEBECTOMY/RIGHT LOWER LOBE WEDGE BLEBECTOMY/TALC PLEURODESIS performed by Jhoana Nieves MD at [facility] MAIN OR    TURP  2019    UROLOGICAL SURGERY      prostate u/s and BX      Prior to Admission medications    Medication Sig Start Date End Date Taking? Authorizing Provider   methocarbamol (ROBAXIN-750) 750 MG tablet Take 1 tablet by mouth 4 times daily for 10 days 23 Yes Ena Pellet, APRN - NP   oxyCODONE-acetaminophen (PERCOCET) 5-325 MG per tablet Take 1 tablet by mouth every 6 hours as needed for Pain for up to 5 days. Intended supply: 5 days.  Take lowest dose possible to manage pain Max Daily Amount: 4 tablets 23 Yes Ena Pellet, APRN - NP   hydroCHLOROthiazide (HYDRODIURIL) 25 MG tablet Take 1 tablet by mouth daily 12/22/22   Emy Estrada PA-C   enalapril (VASOTEC) 10 MG tablet Take 1 tablet by mouth daily TAKE 1 TABLET DAILY 11/3/22   Polly Perkins MD   empagliflozin (JARDIANCE) 10 MG tablet Take 1 tablet by mouth daily  Patient not taking: Reported on 1/5/2023 11/3/22   Polly Perkins MD   spironolactone (ALDACTONE) 25 MG tablet Take 1 tablet by mouth daily 10/18/22   Polly Perkins MD   CPAP Machine MISC by Other route    Historical Provider, MD   potassium chloride (KLOR-CON M) 10 MEQ extended release tablet Take 10 mEq by mouth daily as needed 5/9/22   Historical Provider, MD   clotrimazole-betamethasone (Carlynn Cordial) 1-0.05 % cream Apply to affected area bid as directed 8/2/22   Polly Perkins MD   simvastatin (ZOCOR) 40 MG tablet Take 1 tablet by mouth daily 7/11/22   Polly Perkins MD   furosemide (LASIX) 20 MG tablet Take 20 mg by mouth daily as needed 5/9/22   Historical Provider, MD   gabapentin (NEURONTIN) 600 MG tablet Take 1 tablet by mouth 2 times daily. 4/19/22   Historical Provider, MD   carvedilol (COREG) 25 MG tablet Take 25 mg by mouth 2 times daily (with meals) 2/1/22   Ar Automatic Reconciliation   cetirizine (ZYRTEC) 10 MG tablet Take by mouth daily as needed    Ar Automatic Reconciliation   dabigatran (PRADAXA) 150 MG capsule TAKE 1 CAPSULE EVERY 12 HOURS 12/9/21   Ar Automatic Reconciliation   guaiFENesin (MUCINEX) 600 MG extended release tablet Take 600 mg by mouth 2 times daily    Ar Automatic Reconciliation   metFORMIN (GLUCOPHAGE) 500 MG tablet TAKE 1 TABLET TWICE A DAY WITH A MEAL 3/7/22   Ar Automatic Reconciliation     Allergies   Allergen Reactions    Azithromycin Other (See Comments)     Skin dryness/peeling        Review of Systems:  All systems reviewed and negative except as otherwise stated in the HPI    Objective:   Blood pressure 114/67, pulse 94, temperature 98.1 °F (36.7 °C), temperature source Oral, resp.  rate 20, height 6' 2\" (1.88 m), weight (!) 332 lb 12.8 oz (151 kg), SpO2 92 %.  Visit Vitals  /67   Pulse 94   Temp 98.1 °F (36.7 °C) (Oral)   Resp 20   Ht 6' 2\" (1.88 m)   Wt (!) 332 lb 12.8 oz (151 kg)   SpO2 92%   BMI 42.73 kg/m²     Temp (24hrs), Av.3 °F (36.8 °C), Min:97.7 °F (36.5 °C), Max:99 °F (37.2 °C)       Exam:    General:  Alert, cooperative, well noursished, well developed, appears stated age   Eyes:  Sclera anicteric. Pupils equally round and reactive to light.   Mouth/Throat: Mucous membranes normal, oral pharynx clear   Neck: Supple   Lungs:   Good effort   CV:  Regular rate and rhythm,no murmur   Abdomen:   Soft, non-tender. bowel sounds normal. non-distended   Extremities: No cyanosis or edema   Skin: Right upper back previous tube site. Drainage is minimal today but appears likely infected with tenderness and erythema. No other areas of concern       Musculoskeletal: No swelling or deformity   Lines/Devices:  Intact, no erythema, drainage or tenderness   Psych: Alert and oriented, normal mood affect given the setting       CBC:  Recent Labs     23  0547 01/15/23  0439   WBC 17.0* 19.3*   HGB 14.4 13.4*   HCT 40.9* 38.2*    198       BMP:  Recent Labs     23  0547 23  1309 01/15/23  0439   BUN 7* 8 8   * 128* 128*   K 3.6 3.8 3.6   CL 93* 91* 96*   CO2        LFTS:  No results for input(s): ALT, TP, ALB in the last 72 hours.    Invalid input(s): TBILI, SGOT, AP    Data Review:   Recent Results (from the past 24 hour(s))   Culture, Blood 1    Collection Time: 23  8:07 AM    Specimen: Blood   Result Value Ref Range    Special Requests LEFT  Antecubital        Culture NO GROWTH AFTER 22 HOURS     Culture, Blood 1    Collection Time: 23  8:07 AM    Specimen: Blood   Result Value Ref Range    Special Requests RIGHT  Antecubital        Gram stain GRAM POS COCCI IN CLUSTERS      Gram stain ANAEROBIC BOTTLE POSITIVE      Gram stain        RESULTS VERIFIED, PHONED TO AND READ  BACK BY Domingo Correia RN ON 1/15/23 AT 0644 CJ    Culture CULTURE IN [address] UPDATES TO FOLLOW      Culture Refer to Blood Culture ID Panel Accession [id number]     Lactic Acid    Collection Time: 01/14/23  8:07 AM   Result Value Ref Range    Lactic Acid, Plasma 1.4 0.4 - 2.0 MMOL/L   Culture, Blood, PCR ID Panel    Collection Time: 01/14/23  8:07 AM    Specimen: Blood   Result Value Ref Range    Accession Number [id number]     Enterococcus faecalis by PCR NOT DETECTED NOTDET      Enterococcus faecium by PCR NOT DETECTED NOTDET      Listeria monocytogenes by PCR NOT DETECTED NOTDET      STAPHYLOCOCCUS Detected (A) NOTDET      Staphylococcus Aureus Detected (A) NOTDET      Staphylococcus epidermidis by PCR NOT DETECTED NOTDET      Staphylococcus lugdunensis by PCR NOT DETECTED NOTDET      STREPTOCOCCUS NOT DETECTED NOTDET      Streptococcus agalactiae (Group B) NOT DETECTED NOTDET      Strep pneumoniae NOT DETECTED NOTDET      Strep pyogenes,(Grp. A) NOT DETECTED NOTDET      Acinetobacter calcoac baumannii complex by PCR NOT DETECTED NOTDET      Bacteroides fragilis by PCR NOT DETECTED NOTDET      Enterobacteriaceae by PCR NOT DETECTED NOTDET      Enterobacter cloacae complex by PCR NOT DETECTED NOTDET      Escherichia Coli NOT DETECTED NOTDET      Klebsiella aerogenes by PCR NOT DETECTED NOTDET      Klebsiella oxytoca by PCR NOT DETECTED NOTDET      Klebsiella pneumoniae group by PCR NOT DETECTED NOTDET      Proteus by PCR NOT DETECTED NOTDET      Salmonella species by PCR NOT DETECTED NOTDET      Serratia marcescens by PCR NOT DETECTED NOTDET      Haemophilus Influenzae by PCR NOT DETECTED NOTDET      Neisseria meningitidis by PCR NOT DETECTED NOTDET      Pseudomonas aeruginosa NOT DETECTED NOTDET      Stenotrophomonas maltophilia by PCR NOT DETECTED NOTDET      Candida albicans by PCR NOT DETECTED NOTDET      Candida auris by PCR NOT DETECTED NOTDET      Candida glabrata NOT DETECTED NOTDET      Brigette krusei by PCR NOT DETECTED NOTDET      Candida parapsilosis by PCR NOT DETECTED NOTDET      Candida tropicalis by PCR NOT DETECTED NOTDET      Cryptococcus neoformans/gattii by PCR NOT DETECTED NOTDET      Resistant gene targets          Resistant gene meca/c & mrej by PCR Detected (A) NOTDET      [employer] test comment        False positive results may rarely occur.  Correlate with clinical,epidemiologic, and other laboratory findings   POCT Glucose    Collection Time: 01/14/23  8:18 AM   Result Value Ref Range    POC Glucose 228 (H) 65 - 100 mg/dL    Performed by: Ephraim Colunga    Sodium, urine, random    Collection Time: 01/14/23 10:44 AM   Result Value Ref Range    SODIUM, RANDOM URINE <5 MMOL/L   Protein, urine, random    Collection Time: 01/14/23 10:44 AM   Result Value Ref Range    Protein, Urine, Random 39 (H) <11.9 mg/dL   Creatinine, Random Urine    Collection Time: 01/14/23 10:44 AM   Result Value Ref Range    Creatinine, Ur 220.00 mg/dL   Osmolality, Urine    Collection Time: 01/14/23 10:44 AM   Result Value Ref Range    Osmolality, Ur 821 50 - 1400 MOSM/kg H2O   Urinalysis with Reflex to Culture    Collection Time: 01/14/23 10:44 AM    Specimen: Urine   Result Value Ref Range    Color, UA YELLOW      Appearance CLEAR      Specific Gravity, UA >1.035 (H) 1.001 - 1.023    pH, Urine 5.0 5.0 - 9.0      Protein, UA 30 (A) NEG mg/dL    Glucose, UA >1000 mg/dL    Ketones, Urine 40 (A) NEG mg/dL    Bilirubin Urine MODERATE (A) NEG      Blood, Urine Negative NEG      Urobilinogen, Urine 1.0 0.2 - 1.0 EU/dL    Nitrite, Urine Positive (A) NEG      Leukocyte Esterase, Urine TRACE (A) NEG      WBC, UA 10-20 0 /hpf    RBC, UA 0 0 /hpf    BACTERIA, URINE TRACE 0 /hpf    Urine Culture if Indicated URINE CULTURE ORDERED      Epithelial Cells UA 3-5 0 /hpf    Casts 0 0 /lpf    Crystals 0 0 /LPF    Mucus, UA 3+ (H) 0 /lpf    Other observations RESULTS VERIFIED MANUALLY     POCT Glucose    Collection Time: 01/14/23 11:34 AM Result Value Ref Range    POC Glucose 230 (H) 65 - 100 mg/dL    Performed by: Tanya Houghton    Basic Metabolic Panel w/ Reflex to MG    Collection Time: 01/14/23  1:09 PM   Result Value Ref Range    Sodium 128 (L) 133 - 143 mmol/L    Potassium 3.8 3.5 - 5.1 mmol/L    Chloride 91 (L) 101 - 110 mmol/L    CO2 23 21 - 32 mmol/L    Anion Gap 14 (H) 2 - 11 mmol/L    Glucose 235 (H) 65 - 100 mg/dL    BUN 8 8 - 23 MG/DL    Creatinine 1.00 0.8 - 1.5 MG/DL    Est, Glom Filt Rate >60 >60 ml/min/1.73m2    Calcium 9.5 8.3 - 10.4 MG/DL   POCT Glucose    Collection Time: 01/14/23  4:53 PM   Result Value Ref Range    POC Glucose 229 (H) 65 - 100 mg/dL    Performed by: [facility]    POCT Glucose    Collection Time: 01/14/23  8:20 PM   Result Value Ref Range    POC Glucose 218 (H) 65 - 100 mg/dL    Performed by: Phil    Phosphorus    Collection Time: 01/15/23  4:39 AM   Result Value Ref Range    Phosphorus 2.0 (L) 2.3 - 3.7 MG/DL   Albumin    Collection Time: 01/15/23  4:39 AM   Result Value Ref Range    Albumin 2.3 (L) 3.2 - 4.6 g/dL   Basic Metabolic Panel w/ Reflex to MG    Collection Time: 01/15/23  4:39 AM   Result Value Ref Range    Sodium 128 (L) 133 - 143 mmol/L    Potassium 3.6 3.5 - 5.1 mmol/L    Chloride 96 (L) 101 - 110 mmol/L    CO2 22 21 - 32 mmol/L    Anion Gap 10 2 - 11 mmol/L    Glucose 175 (H) 65 - 100 mg/dL    BUN 8 8 - 23 MG/DL    Creatinine 0.70 (L) 0.8 - 1.5 MG/DL    Est, Glom Filt Rate >60 >60 ml/min/1.73m2    Calcium 8.5 8.3 - 10.4 MG/DL   CBC    Collection Time: 01/15/23  4:39 AM   Result Value Ref Range    WBC 19.3 (H) 4.3 - 11.1 K/uL    RBC 4.06 (L) 4.23 - 5.6 M/uL    Hemoglobin 13.4 (L) 13.6 - 17.2 g/dL    Hematocrit 38.2 (L) 41.1 - 50.3 %    MCV 94.1 82 - 102 FL    MCH 33.0 (H) 26.1 - 32.9 PG    MCHC 35.1 (H) 31.4 - 35.0 g/dL    RDW 12.5 11.9 - 14.6 %    Platelets 429 473 - 892 K/uL    MPV 10.0 9.4 - 12.3 FL    nRBC 0.00 0.0 - 0.2 K/uL        Microbiology:  Reviewed    Studies: Reviewed    Signed By: Humberto Blake MD     January 15, 2023

## 2023-01-15 NOTE — SIGNIFICANT EVENT
RN alerted that blood culture is growing Gram positive cocci, positive MRSA. Have ordered IV vancomycin.

## 2023-01-16 ENCOUNTER — APPOINTMENT (OUTPATIENT)
Dept: GENERAL RADIOLOGY | Age: 68
DRG: 163 | End: 2023-01-16
Payer: MEDICARE

## 2023-01-16 PROBLEM — B00.9 HERPES SIMPLEX VIRUS INFECTION: Status: ACTIVE | Noted: 2023-01-16

## 2023-01-16 LAB
ALBUMIN SERPL-MCNC: 2.1 G/DL (ref 3.2–4.6)
ANION GAP SERPL CALC-SCNC: 7 MMOL/L (ref 2–11)
BACTERIA SPEC CULT: NORMAL
BUN SERPL-MCNC: 7 MG/DL (ref 8–23)
CALCIUM SERPL-MCNC: 8.1 MG/DL (ref 8.3–10.4)
CHLORIDE SERPL-SCNC: 96 MMOL/L (ref 101–110)
CO2 SERPL-SCNC: 24 MMOL/L (ref 21–32)
CREAT SERPL-MCNC: 0.6 MG/DL (ref 0.8–1.5)
ERYTHROCYTE [DISTWIDTH] IN BLOOD BY AUTOMATED COUNT: 12.7 % (ref 11.9–14.6)
GLUCOSE BLD STRIP.AUTO-MCNC: 152 MG/DL (ref 65–100)
GLUCOSE BLD STRIP.AUTO-MCNC: 206 MG/DL (ref 65–100)
GLUCOSE BLD STRIP.AUTO-MCNC: 211 MG/DL (ref 65–100)
GLUCOSE BLD STRIP.AUTO-MCNC: 213 MG/DL (ref 65–100)
GLUCOSE SERPL-MCNC: 154 MG/DL (ref 65–100)
HCT VFR BLD AUTO: 36.3 % (ref 41.1–50.3)
HGB BLD-MCNC: 12.5 G/DL (ref 13.6–17.2)
MCH RBC QN AUTO: 32.5 PG (ref 26.1–32.9)
MCHC RBC AUTO-ENTMCNC: 34.4 G/DL (ref 31.4–35)
MCV RBC AUTO: 94.3 FL (ref 82–102)
NRBC # BLD: 0 K/UL (ref 0–0.2)
PHOSPHATE SERPL-MCNC: 1.9 MG/DL (ref 2.3–3.7)
PLATELET # BLD AUTO: 205 K/UL (ref 150–450)
PMV BLD AUTO: 9.8 FL (ref 9.4–12.3)
POTASSIUM SERPL-SCNC: 3.6 MMOL/L (ref 3.5–5.1)
PROCALCITONIN SERPL-MCNC: 0.55 NG/ML (ref 0–0.49)
RBC # BLD AUTO: 3.85 M/UL (ref 4.23–5.6)
SERVICE CMNT-IMP: ABNORMAL
SERVICE CMNT-IMP: NORMAL
SODIUM SERPL-SCNC: 127 MMOL/L (ref 133–143)
VANCOMYCIN SERPL-MCNC: 15.7 UG/ML
WBC # BLD AUTO: 16.4 K/UL (ref 4.3–11.1)

## 2023-01-16 PROCEDURE — 6370000000 HC RX 637 (ALT 250 FOR IP): Performed by: FAMILY MEDICINE

## 2023-01-16 PROCEDURE — 82040 ASSAY OF SERUM ALBUMIN: CPT

## 2023-01-16 PROCEDURE — 36415 COLL VENOUS BLD VENIPUNCTURE: CPT

## 2023-01-16 PROCEDURE — 80048 BASIC METABOLIC PNL TOTAL CA: CPT

## 2023-01-16 PROCEDURE — 80202 ASSAY OF VANCOMYCIN: CPT

## 2023-01-16 PROCEDURE — 71046 X-RAY EXAM CHEST 2 VIEWS: CPT

## 2023-01-16 PROCEDURE — 84145 PROCALCITONIN (PCT): CPT

## 2023-01-16 PROCEDURE — 1100000000 HC RM PRIVATE

## 2023-01-16 PROCEDURE — 84100 ASSAY OF PHOSPHORUS: CPT

## 2023-01-16 PROCEDURE — 6370000000 HC RX 637 (ALT 250 FOR IP)

## 2023-01-16 PROCEDURE — 2580000003 HC RX 258: Performed by: SURGERY

## 2023-01-16 PROCEDURE — 82962 GLUCOSE BLOOD TEST: CPT

## 2023-01-16 PROCEDURE — 85027 COMPLETE CBC AUTOMATED: CPT

## 2023-01-16 PROCEDURE — 87040 BLOOD CULTURE FOR BACTERIA: CPT

## 2023-01-16 PROCEDURE — 2580000003 HC RX 258: Performed by: FAMILY MEDICINE

## 2023-01-16 PROCEDURE — 6360000002 HC RX W HCPCS: Performed by: FAMILY MEDICINE

## 2023-01-16 PROCEDURE — 6370000000 HC RX 637 (ALT 250 FOR IP): Performed by: SURGERY

## 2023-01-16 RX ORDER — LEVALBUTEROL INHALATION SOLUTION 0.63 MG/3ML
0.63 SOLUTION RESPIRATORY (INHALATION) EVERY 8 HOURS PRN
Status: DISCONTINUED | OUTPATIENT
Start: 2023-01-16 | End: 2023-01-19 | Stop reason: HOSPADM

## 2023-01-16 RX ORDER — ACYCLOVIR 200 MG/1
400 CAPSULE ORAL 3 TIMES DAILY
Status: DISCONTINUED | OUTPATIENT
Start: 2023-01-16 | End: 2023-01-19 | Stop reason: HOSPADM

## 2023-01-16 RX ORDER — INSULIN GLARGINE 100 [IU]/ML
24 INJECTION, SOLUTION SUBCUTANEOUS DAILY
Status: DISCONTINUED | OUTPATIENT
Start: 2023-01-17 | End: 2023-01-18

## 2023-01-16 RX ORDER — SODIUM CHLORIDE 1000 MG
1 TABLET, SOLUBLE MISCELLANEOUS
Status: DISCONTINUED | OUTPATIENT
Start: 2023-01-16 | End: 2023-01-18

## 2023-01-16 RX ADMIN — DABIGATRAN ETEXILATE MESYLATE 150 MG: 150 CAPSULE ORAL at 09:17

## 2023-01-16 RX ADMIN — GUAIFENESIN 600 MG: 600 TABLET ORAL at 21:05

## 2023-01-16 RX ADMIN — CARVEDILOL 25 MG: 25 TABLET, FILM COATED ORAL at 16:23

## 2023-01-16 RX ADMIN — GUAIFENESIN 600 MG: 600 TABLET ORAL at 09:16

## 2023-01-16 RX ADMIN — ACYCLOVIR 400 MG: 200 CAPSULE ORAL at 13:52

## 2023-01-16 RX ADMIN — METHOCARBAMOL TABLETS 750 MG: 750 TABLET, COATED ORAL at 16:23

## 2023-01-16 RX ADMIN — LISINOPRIL 10 MG: 5 TABLET ORAL at 09:15

## 2023-01-16 RX ADMIN — METHOCARBAMOL TABLETS 750 MG: 750 TABLET, COATED ORAL at 11:53

## 2023-01-16 RX ADMIN — VANCOMYCIN HYDROCHLORIDE 1750 MG: 10 INJECTION, POWDER, LYOPHILIZED, FOR SOLUTION INTRAVENOUS at 09:51

## 2023-01-16 RX ADMIN — METHOCARBAMOL TABLETS 750 MG: 750 TABLET, COATED ORAL at 09:16

## 2023-01-16 RX ADMIN — INSULIN GLARGINE 22 UNITS: 100 INJECTION, SOLUTION SUBCUTANEOUS at 09:18

## 2023-01-16 RX ADMIN — GABAPENTIN 600 MG: 300 CAPSULE ORAL at 21:05

## 2023-01-16 RX ADMIN — Medication 1 G: at 09:17

## 2023-01-16 RX ADMIN — Medication 1 G: at 16:23

## 2023-01-16 RX ADMIN — SODIUM CHLORIDE, PRESERVATIVE FREE 10 ML: 5 INJECTION INTRAVENOUS at 09:16

## 2023-01-16 RX ADMIN — ACETAMINOPHEN 500 MG: 500 TABLET, FILM COATED ORAL at 04:00

## 2023-01-16 RX ADMIN — METHOCARBAMOL TABLETS 750 MG: 750 TABLET, COATED ORAL at 21:04

## 2023-01-16 RX ADMIN — POLYETHYLENE GLYCOL 3350 17 G: 17 POWDER, FOR SOLUTION ORAL at 21:11

## 2023-01-16 RX ADMIN — ATORVASTATIN CALCIUM 20 MG: 20 TABLET, FILM COATED ORAL at 09:16

## 2023-01-16 RX ADMIN — ACETAMINOPHEN 500 MG: 500 TABLET, FILM COATED ORAL at 00:30

## 2023-01-16 RX ADMIN — INSULIN LISPRO 2 UNITS: 100 INJECTION, SOLUTION INTRAVENOUS; SUBCUTANEOUS at 11:53

## 2023-01-16 RX ADMIN — ACYCLOVIR 400 MG: 200 CAPSULE ORAL at 11:48

## 2023-01-16 RX ADMIN — POLYETHYLENE GLYCOL 3350 17 G: 17 POWDER, FOR SOLUTION ORAL at 09:17

## 2023-01-16 RX ADMIN — ACYCLOVIR 400 MG: 200 CAPSULE ORAL at 21:03

## 2023-01-16 RX ADMIN — SPIRONOLACTONE 25 MG: 25 TABLET ORAL at 09:16

## 2023-01-16 RX ADMIN — INSULIN LISPRO 2 UNITS: 100 INJECTION, SOLUTION INTRAVENOUS; SUBCUTANEOUS at 18:18

## 2023-01-16 RX ADMIN — ACETAMINOPHEN 500 MG: 500 TABLET, FILM COATED ORAL at 20:00

## 2023-01-16 RX ADMIN — GABAPENTIN 600 MG: 300 CAPSULE ORAL at 09:16

## 2023-01-16 RX ADMIN — CARVEDILOL 25 MG: 25 TABLET, FILM COATED ORAL at 09:16

## 2023-01-16 RX ADMIN — ACETAMINOPHEN 500 MG: 500 TABLET, FILM COATED ORAL at 09:16

## 2023-01-16 RX ADMIN — DABIGATRAN ETEXILATE MESYLATE 150 MG: 150 CAPSULE ORAL at 21:04

## 2023-01-16 RX ADMIN — ACETAMINOPHEN 500 MG: 500 TABLET, FILM COATED ORAL at 16:24

## 2023-01-16 RX ADMIN — ACETAMINOPHEN 500 MG: 500 TABLET, FILM COATED ORAL at 11:48

## 2023-01-16 RX ADMIN — VANCOMYCIN HYDROCHLORIDE 1750 MG: 10 INJECTION, POWDER, LYOPHILIZED, FOR SOLUTION INTRAVENOUS at 18:19

## 2023-01-16 RX ADMIN — Medication 1 G: at 11:48

## 2023-01-16 ASSESSMENT — PAIN SCALES - GENERAL
PAINLEVEL_OUTOF10: 0
PAINLEVEL_OUTOF10: 3
PAINLEVEL_OUTOF10: 0

## 2023-01-16 ASSESSMENT — PAIN DESCRIPTION - ORIENTATION
ORIENTATION: RIGHT;POSTERIOR
ORIENTATION: ANTERIOR
ORIENTATION: RIGHT;MID
ORIENTATION: RIGHT;MID;POSTERIOR

## 2023-01-16 ASSESSMENT — PAIN DESCRIPTION - DESCRIPTORS
DESCRIPTORS: ACHING
DESCRIPTORS: ACHING

## 2023-01-16 ASSESSMENT — PAIN SCALES - WONG BAKER: WONGBAKER_NUMERICALRESPONSE: 0

## 2023-01-16 ASSESSMENT — PAIN DESCRIPTION - LOCATION
LOCATION: CHEST
LOCATION: INCISION
LOCATION: INCISION
LOCATION: ABDOMEN

## 2023-01-16 NOTE — PROGRESS NOTES
Spiritual Care Visit. Initial visit. Patient was visited by [facility]. Entered by Arron Arnold, Staff Chaplain. Tomlinson, Kayla

## 2023-01-16 NOTE — WOUND CARE
Patient seen per ID request for possible infection to chest tube sites. Tubes removed this weekend. Right lateral is flat and without local signs of infection. Right posterior flank/back tube site is raised but normal skin coloring. Slough to margins of incision. Serosanguinous drainage on old gauze dressing. Added silver Opticel and air tight seal with foam for drainage amount. Discussed with patient and wife. All questions answered. Will continue to monitor.

## 2023-01-16 NOTE — PROGRESS NOTES
Infectious Disease Progress Note      Today's Date: 1/16/2023   Admit Date: 1/4/2023    Impression:   Patient is a 69 with recurrent pneumothorax, s/p VATS on 1/11/23, developed leukocytosis on 1/14/23, Bcx drawn came back positive for MRSA    #MRSA bacteremia  - Etiology: Right superior back CT drain site  - Work-up:  Recommend TTE   - Treatment: Appropriately on vancomycin: needs local wound care  - Follow-up: Blood cx positive 1/14/23 - blood cx ordered for 1/16/23  - Duration: If repeat blood cultures are negative and TTE is negative , would treat for 2 weeks    Plan:   TTE: poor study; however would pursue PERRY only if 01/16 BC positive  Follow 01/16 f/u blood cultures; if they remain negative would treat with Vancomycin through 01/30/23  Could place midline when [facility] 01/16 negative at 72hrs  Continue therapeutic dosing vancomycin   Engage wound care   ID will continue to follow    Anti-infectives:   Vancomycin 1/15/23- present  Cefazolin 1/15/23    Subjective:   Date of Consultation:  January 16, 2023  Date of Admission: 1/4/2023   Referring Provider: Dr Sofi Morillo    Overnight Events: Walking in hallway with family; minimal chest discomfort; no drainage from CT sites; (Chest tubes on right removed 01/14/23)     Patient is a 69 y.o. male with PMH of  right sided spontaneous pneumothorax in 2017, required tube placement , admitted on 1/4/23 with sudden onset chest pain and shortness of breath. Found to have a large right sided pneumothorax. Had tube placement. Subsequently underwent VATS on 1/11/23. Noted to have leukocytosis on 1/14/23, as part of work-up, had blood cultures which came back positive for MRSA. By Bette Severino. On evaluation today, patient is sitting out comfortably in chair. He denies any new complaints  No worrisome IV sites  His superior back previous chest tube site was inspected along with other previous tube sites.  Nursing staff informed me that there was pus draining from the left upper back site  Drainage is minimal today but appears likely infected with tenderness and erythema (unfortunately I was unable to get pictures, do not yet have epic for on my phone for STF)  No fluctuance      Patient Active Problem List   Diagnosis    Primary hypertension    History of subdural hematoma (post traumatic)    Hepatic steatosis    Osteoarthritis of lumbosacral spine    Benign prostatic hyperplasia with nocturia    Nodular prostate without urinary obstruction    Seasonal allergic rhinitis due to pollen    Class 3 severe obesity due to excess calories with serious comorbidity and body mass index (BMI) of 40.0 to 44.9 in adult [facility])    Subdural hygroma    Mitral valve regurgitation    Iron excess    Atrial fibrillation (HCC)    CASSY on CPAP    Hereditary hemochromatosis (HCC)    Polyneuropathy associated with underlying disease ([address]    BPH (benign prostatic hyperplasia)    Coronary artery disease due to lipid rich plaque    Chronic constipation    Mixed dyslipidemia    Type 2 diabetes mellitus with hyperglycemia, without long-term current use of insulin (HCC)    Controlled type 2 diabetes mellitus with peripheral circulatory disorder (HCC)    Spontaneous pneumothorax    Anticoagulant long-term use    Primary spontaneous pneumothorax    Sepsis due to methicillin resistant Staphylococcus aureus (MRSA) ([address]     Past Medical History:   Diagnosis Date    Acute respiratory failure with hypoxia ([address] 1/4/2023    Arteriosclerosis     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     Diabetes mellitus ([address] 1/3/2012    Essential hypertension, benign 2/6/2014    Hemochromatosis     Hypercholesteremia     Nodular prostate without urinary obstruction 2/6/2014    Obesity 2/6/2014    Pneumothorax, right 10/8/2017    pt was hospitalized for subdural hematoma in 2017. upon return home from 2017 hospitlization, pt experienced RIGHT PTX after placement of CPAP approx. 13-18 cm h2o    Sepsis due to urinary tract infection ([address] 2019    Sleep apnea 10/12/2016    uses ASV instead of CPAP machine - per pt     Subdural hemorrhage following injury 10/20/2017      Family History   Problem Relation Age of Onset    Prostate Cancer Paternal Grandfather     Alcohol Abuse Brother     Heart Failure Brother     Psychiatric Disorder Brother     Stroke Brother     Psychiatric Disorder Mother     Other Mother         Sarcoidosis    Atrial Fibrillation Father     Lung Cancer Father     Hypertension Father     Heart Disease Father 61            Diabetes Father     Heart Disease Paternal Grandmother       Social History     Tobacco Use    Smoking status: Former     Packs/day: 1.50     Types: Cigarettes     Quit date: 1989     Years since quittin.4    Smokeless tobacco: Former     Quit date: 2016   Substance Use Topics    Alcohol use: Yes     Alcohol/week: 8.0 standard drinks     Past Surgical History:   Procedure Laterality Date    CHEST SURGERY  10/2017    pneumothorax after fall    COLONOSCOPY N/A 2018    COLONOSCOPY  BMI 41 performed by Matt Khanna MD at  [facility] Right 10/02/2017    [facility]    KNEE ARTHROSCOPY Right     OTHER SURGICAL HISTORY Bilateral 10/18/2017    [facility]    THORACOSCOPY Right 2023    RIGHT VATS/WEDGE BLEBECTOMY/RIGHT UPPER LOBE WEDGE BLEBECTOMY/RIGHT LOWER LOBE WEDGE BLEBECTOMY/TALC PLEURODESIS performed by Kim Ellison MD at [facility] MAIN OR    TURP  2019    UROLOGICAL SURGERY      prostate u/s and BX      Prior to Admission medications    Medication Sig Start Date End Date Taking?  Authorizing Provider   methocarbamol (ROBAXIN-750) 750 MG tablet Take 1 tablet by mouth 4 times daily for 10 days 23 Yes CARA Arevalo NP   oxyCODONE-acetaminophen (PERCOCET) 5-325 MG per tablet Take 1 tablet by mouth every 6 hours as needed for Pain for up to 5 days. Intended supply: 5 days. Take lowest dose possible to manage pain Max Daily Amount: 4 tablets 1/13/23 1/18/23 Yes CARA Arevalo NP   hydroCHLOROthiazide (HYDRODIURIL) 25 MG tablet Take 1 tablet by mouth daily 12/22/22   Wallace Camacho PA-C   enalapril (VASOTEC) 10 MG tablet Take 1 tablet by mouth daily TAKE 1 TABLET DAILY 11/3/22   Kim Abrams MD   empagliflozin (JARDIANCE) 10 MG tablet Take 1 tablet by mouth daily  Patient not taking: Reported on 1/5/2023 11/3/22   Kim Abrams MD   spironolactone (ALDACTONE) 25 MG tablet Take 1 tablet by mouth daily 10/18/22   Kim Abrams MD   CPAP Machine MISC by Other route    Historical Provider, MD   potassium chloride (KLOR-CON M) 10 MEQ extended release tablet Take 10 mEq by mouth daily as needed 5/9/22   Historical Provider, MD   clotrimazole-betamethasone ([place]) 1-0.05 % cream Apply to affected area bid as directed 8/2/22   Kim Abrams MD   simvastatin (ZOCOR) 40 MG tablet Take 1 tablet by mouth daily 7/11/22   Kim Abrams MD   furosemide (LASIX) 20 MG tablet Take 20 mg by mouth daily as needed 5/9/22   Historical Provider, MD   gabapentin (NEURONTIN) 600 MG tablet Take 1 tablet by mouth 2 times daily.  4/19/22   Historical Provider, MD   carvedilol (COREG) 25 MG tablet Take 25 mg by mouth 2 times daily (with meals) 2/1/22   Ar Automatic Reconciliation   cetirizine (ZYRTEC) 10 MG tablet Take by mouth daily as needed    Ar Automatic Reconciliation   dabigatran (PRADAXA) 150 MG capsule TAKE 1 CAPSULE EVERY 12 HOURS 12/9/21   Ar Automatic Reconciliation   guaiFENesin (MUCINEX) 600 MG extended release tablet Take 600 mg by mouth 2 times daily    Ar Automatic Reconciliation   metFORMIN (GLUCOPHAGE) 500 MG tablet TAKE 1 TABLET TWICE A DAY WITH A MEAL 3/7/22 Ar Automatic Reconciliation     Allergies   Allergen Reactions    Azithromycin Other (See Comments)     Skin dryness/peeling        Review of Systems:  All systems reviewed and negative except as otherwise stated in the HPI    Objective:   Blood pressure (!) 163/82, pulse 95, temperature 98.2 °F (36.8 °C), temperature source Oral, resp. rate 18, height 6' 2\" (1.88 m), weight (!) 332 lb 12.8 oz (151 kg), SpO2 93 %. Visit Vitals  BP (!) 163/82   Pulse 95   Temp 98.2 °F (36.8 °C) (Oral)   Resp 18   Ht 6' 2\" (1.88 m)   Wt (!) 332 lb 12.8 oz (151 kg)   SpO2 93%   BMI 42.73 kg/m²     Temp (24hrs), Av °F (36.7 °C), Min:97.9 °F (36.6 °C), Max:98.2 °F (36.8 °C)       Exam:    General:  Alert, cooperative, well noursished, well developed, appears stated age   Eyes:  Sclera anicteric. Pupils equally round and reactive to light. Mouth/Throat: Mucous membranes normal, oral pharynx clear   Neck: Supple   Lungs:   Good effort   CV:  Regular rate and rhythm,no murmur   Abdomen:   Soft, non-tender. bowel sounds normal. non-distended   Extremities: No cyanosis or edema   Skin: Right upper back previous tube site. Drainage is minimal today but appears likely infected with tenderness and erythema. No other areas of concern       Musculoskeletal: No swelling or deformity   Lines/Devices:  Intact, no erythema, drainage or tenderness   Psych: Alert and oriented, normal mood affect given the setting       CBC:  Recent Labs     23  0547 01/15/23  0439 23  0326   WBC 17.0* 19.3* 16.4*   HGB 14.4 13.4* 12.5*   HCT 40.9* 38.2* 36.3*    198 205         BMP:  Recent Labs     23  1309 01/15/23  0439 23  0326   BUN 8 8 7*   * 128* 127*   K 3.8 3.6 3.6   CL 91* 96* 96*   CO2 23 22 24         LFTS:  No results for input(s): ALT, TP, ALB in the last 72 hours.     Invalid input(s): TBILI, SGOT, AP    Data Review:   Recent Results (from the past 24 hour(s))   POCT Glucose    Collection Time: 01/15/23 11:31 AM Result Value Ref Range    POC Glucose 203 (H) 65 - 100 mg/dL    Performed by: Felecia Lew    Transthoracic echocardiogram (TTE) complete with contrast, bubble, strain, and 3D PRN    Collection Time: 01/15/23  1:13 PM   Result Value Ref Range    LA Minor Axis 6.4 cm    LA Major Axis 6.6 cm    LA Area 2C 27.7 cm2    LA Area 4C 27.4 cm2    LA Volume 2C 96 (A) 18 - 58 mL    LA Volume 4C 87 (A) 18 - 58 mL    LA Volume BP 93 (A) 18 - 58 mL    LA Diameter 5.4 cm    AV Mean Velocity 0.9 m/s    AV Mean Gradient 4 mmHg    AV VTI 22.0 cm    AV Peak Velocity 1.3 m/s    AV Peak Gradient 7 mmHg    AV Area by VTI 3.1 cm2    AV Area by Peak Velocity 3.2 cm2    Aortic Root 3.6 cm    Ascending Aorta 3.6 cm    IVC Proxmal 2.7 cm    IVSd 0.7 0.6 - 1.0 cm    LVIDd 5.5 4.2 - 5.9 cm    LVIDs 4.4 cm    LVOT Diameter 2.3 cm    LVOT Mean Gradient 2 mmHg    LVOT VTI 16.6 cm    LVOT Peak Velocity 1.0 m/s    LVOT Peak Gradient 4 mmHg    LVPWd 0.9 0.6 - 1.0 cm    LV E' Lateral Velocity 17 cm/s    LV E' Septal Velocity 12 cm/s    LVOT Area 4.2 cm2    LVOT SV 68.9 ml    MV E Wave Deceleration Time 213.0 ms    MV E Velocity 1.01 m/s    PV Max Velocity 1.4 m/s    PV Peak Gradient 8 mmHg    Est. RA Pressure 8 mmHg    RV Free Wall Peak S' 11 cm/s    TR Max Velocity 2.42 m/s    TR Peak Gradient 23 mmHg    Body Surface Area 2.77 m2    Fractional Shortening 2D 20 28 - 44 %    LVIDd Index 2.04 cm/m2    LVIDs Index 1.63 cm/m2    LV RWT Ratio 0.33     LV Mass 2D 160.0 88 - 224 g    LV Mass 2D Index 59.2 49 - 115 g/m2    E/E' Ratio (Averaged) 7.18     E/E' Lateral 5.94     E/E' Septal 8.42     LA Volume Index BP 34 16 - 34 ml/m2    LVOT Stroke Volume Index 25.5 mL/m2    LA Volume Index 2C 36 (A) 16 - 34 mL/m2    LA Volume Index 4C 32 16 - 34 mL/m2    LA Size Index 2.00 cm/m2    LA/AO Root Ratio 1.50     Ao Root Index 1.33 cm/m2    Ascending Aorta Index 1.33 cm/m2    AV Velocity Ratio 0.77     LVOT:AV VTI Index 0.75     TRINH/BSA VTI 1.1 cm2/m2 TRINH/BSA Peak Velocity 1.2 cm2/m2    RVSP 31 mmHg    Left Ventricular Ejection Fraction 48     LVEF MODALITY ECHO    POCT Glucose    Collection Time: 01/15/23  3:46 PM   Result Value Ref Range    POC Glucose 163 (H) 65 - 100 mg/dL    Performed by: Muriel Hester    POCT Glucose    Collection Time: 01/15/23  8:11 PM   Result Value Ref Range    POC Glucose 189 (H) 65 - 100 mg/dL    Performed by: Colleen    Phosphorus    Collection Time: 01/16/23  3:26 AM   Result Value Ref Range    Phosphorus 1.9 (L) 2.3 - 3.7 MG/DL   Albumin    Collection Time: 01/16/23  3:26 AM   Result Value Ref Range    Albumin 2.1 (L) 3.2 - 4.6 g/dL   Basic Metabolic Panel w/ Reflex to MG    Collection Time: 01/16/23  3:26 AM   Result Value Ref Range    Sodium 127 (L) 133 - 143 mmol/L    Potassium 3.6 3.5 - 5.1 mmol/L    Chloride 96 (L) 101 - 110 mmol/L    CO2 24 21 - 32 mmol/L    Anion Gap 7 2 - 11 mmol/L    Glucose 154 (H) 65 - 100 mg/dL    BUN 7 (L) 8 - 23 MG/DL    Creatinine 0.60 (L) 0.8 - 1.5 MG/DL    Est, Glom Filt Rate >60 >60 ml/min/1.73m2    Calcium 8.1 (L) 8.3 - 10.4 MG/DL   CBC    Collection Time: 01/16/23  3:26 AM   Result Value Ref Range    WBC 16.4 (H) 4.3 - 11.1 K/uL    RBC 3.85 (L) 4.23 - 5.6 M/uL    Hemoglobin 12.5 (L) 13.6 - 17.2 g/dL    Hematocrit 36.3 (L) 41.1 - 50.3 %    MCV 94.3 82 - 102 FL    MCH 32.5 26.1 - 32.9 PG    MCHC 34.4 31.4 - 35.0 g/dL    RDW 12.7 11.9 - 14.6 %    Platelets 912 494 - 581 K/uL    MPV 9.8 9.4 - 12.3 FL    nRBC 0.00 0.0 - 0.2 K/uL   Procalcitonin    Collection Time: 01/16/23  3:26 AM   Result Value Ref Range    Procalcitonin 0.55 (H) 0.00 - 0.49 ng/mL   Vancomycin Level, Random    Collection Time: 01/16/23  3:26 AM   Result Value Ref Range    Vancomycin Rm 15.7 UG/ML   POCT Glucose    Collection Time: 01/16/23  7:05 AM   Result Value Ref Range    POC Glucose 152 (H) 65 - 100 mg/dL    Performed by: Freya           Microbiology:  Reviewed    Studies:  Reviewed    Signed By: Tara Seals [facility], MD     January 16, 2023

## 2023-01-16 NOTE — PROGRESS NOTES
Hospitalist Progress Note   Admit Date:  2023  3:40 PM   Name:  Louie Mcdaniel   Age:  69 y.o. Sex:  male  :  1955   MRN:  [id number]   Room:  301/01    Presenting Complaint: Chest Pain and Shortness of Breath     Reason(s) for Admission: Acute respiratory failure with hypoxia ([address] [J96.01]  Primary spontaneous pneumothorax [J93.11]  Spontaneous pneumothorax [J93.83]     Hospital Course:   69 y.o. male with medical history hypertension, A. fib on Pradaxa, CASSY, CPAP at night, bullous emphysema, history of bilateral subdural hematomas 2017 and diabetes mellitus presented to ED with acute onset right-sided chest pain. Patient reports he was talking to his neighbor when he developed sharp severe right-sided chest pain, associated with shortness of breath. Reports he has history of spontaneous left pneumothorax in , shortly after putting on his CPAP while hospitalized for subdural hematoma. He is a former smoker, quit years ago. Denies nausea, vomiting, fever, chills or abdominal pain. Chest tube was placed. He had air leak after chest tube was clamped. CT surgery was consulted. He underwent VATS surgery on . Subjective & 24hr Events (23): Repeat cultures drawn today. Feels well. Some bloating but + BM, no pain. Discussed with family including daughter who is PA in [place]. Plan discussed with nurse and . Assessment & Plan:   Spontaneous pneumothorax  Hx pneumothorax, sudden onset dyspnea. Admission XR with large pneumothorax. CT removed .  2023: stable    Sepsis with MRSA bacteremia  Met criteria with , WBC 19, source MRSA bacteremia.  Echo without evidence of valvular involvement.  -Follow cx  -Consult infectious disease: repeat cultures  -Vancomycin    Acute respiratory failure with hypoxia   2023: resolved    Type 2 diabetes mellitus with hyperglycemia, without long-term current use of insulin   -Sliding scale insulin  -Fingerstick blood glucose  -Titrate basal insulin as indicated  1/16/2023: sGlc 154-213 22U basal + 2U SSI last 26 hours, increase basal 22U + SSI    Primary hypertension  -carvedilol,  HCTZ, lisinopril, spironolactone    Herpes Simplex infection  -acyclovir     Class 3 severe obesity due to excess calories with serious comorbidity and body mass index (BMI) of 40.0 to 44.9 in adult  Increased risk of all cause mortality, complicating care  - healthy lifestyle at discharge    Atrial fibrillation   -dabigatran    CASSY on CPAP  -CPAP qhs    Hypercoagulable state  Noted        Anticipated discharge needs:      Home with outpatient follow up    Diet:  ADULT DIET;  Regular; 4 carb choices (60 gm/meal)  DVT PPx: on dabigatran  Code status: Full Code    Hospital Problems:  Principal Problem:    Sepsis due to methicillin resistant Staphylococcus aureus (MRSA) ([facility] [address]  Active Problems:    Spontaneous pneumothorax    Primary spontaneous pneumothorax    Herpes simplex virus infection    Primary hypertension    Benign prostatic hyperplasia with nocturia    Class 3 severe obesity due to excess calories with serious comorbidity and body mass index (BMI) of 40.0 to 44.9 in adult [facility])    Atrial fibrillation (HCC)    CASSY on CPAP    Type 2 diabetes mellitus with hyperglycemia, without long-term current use of insulin (HCC)    Anticoagulant long-term use  Resolved Problems:    Acute respiratory failure with hypoxia (HCC)      Objective:   Patient Vitals for the past 24 hrs:   Temp Pulse Resp BP SpO2   01/16/23 0512 98 °F (36.7 °C) 94 20 130/66 93 %   01/16/23 0035 97.9 °F (36.6 °C) 87 20 119/80 94 %   01/15/23 2053 98.1 °F (36.7 °C) 88 20 111/67 93 %   01/15/23 2000 -- 80 -- -- --   01/15/23 1549 97.9 °F (36.6 °C) -- -- -- --   01/15/23 1547 97.9 °F (36.6 °C) 89 20 (!) 140/88 94 %   01/15/23 1131 98.1 °F (36.7 °C) 79 20 135/81 94 %   01/15/23 0802 98.1 °F (36.7 °C) 84 20 (!) 138/53 94 %         Oxygen Therapy  SpO2: 93 %  Pulse Oximetry Type: Intermittent  Pulse via Oximetry: 82 beats per minute  Pulse Oximeter Device Mode: Intermittent  Pulse Oximeter Device Location: Finger  O2 Device: None (Room air)  Skin Assessment: Clean, dry, & intact  O2 Flow Rate (L/min): 2 L/min    Estimated body mass index is 42.73 kg/m² as calculated from the following:    Height as of this encounter: 6' 2\" (1.88 m). Weight as of this encounter: 332 lb 12.8 oz (151 kg). Intake/Output Summary (Last 24 hours) at 1/16/2023 0711  Last data filed at 1/15/2023 2011  Gross per 24 hour   Intake 890 ml   Output 550 ml   Net 340 ml         Blood pressure 130/66, pulse 94, temperature 98 °F (36.7 °C), resp. rate 20, height 6' 2\" (1.88 m), weight (!) 332 lb 12.8 oz (151 kg), SpO2 93 %. Physical Exam  Vitals and nursing note reviewed. Constitutional:       General: He is not in acute distress. Appearance: He is morbidly obese. He is not diaphoretic. HENT:      Head: Normocephalic and atraumatic. Eyes:      Extraocular Movements: Extraocular movements intact. Cardiovascular:      Rate and Rhythm: Normal rate and regular rhythm. Pulmonary:      Effort: Pulmonary effort is normal. No respiratory distress. Breath sounds: Decreased air movement and transmitted upper airway sounds present. No wheezing or rhonchi. Abdominal:      General: Abdomen is protuberant. There is no distension. Palpations: Abdomen is soft. Tenderness: There is no abdominal tenderness. Comments: Massive paniculus   Musculoskeletal:         General: No deformity. Skin:     Coloration: Skin is not jaundiced or pale. Neurological:      General: No focal deficit present. Mental Status: He is alert and oriented to person, place, and time. Psychiatric:         Mood and Affect: Mood normal.         Behavior: Behavior normal. Behavior is cooperative.           I have personally reviewed labs and tests showing:  Recent Labs:  Recent Results (from the past 48 hour(s))   Culture, Blood 1    Collection Time: 01/14/23  8:07 AM    Specimen: Blood   Result Value Ref Range    Special Requests LEFT  Antecubital        Culture NO GROWTH AFTER 22 HOURS     Culture, Blood 1    Collection Time: 01/14/23  8:07 AM    Specimen: Blood   Result Value Ref Range    Special Requests RIGHT  Antecubital        Gram stain GRAM POS COCCI IN CLUSTERS      Gram stain BOTH BOTTLES POSITIVE      Gram stain        RESULTS VERIFIED, PHONED TO AND READ BACK BY Luz Duffy RN ON 1/15/23 AT 0644 CJ    Culture CULTURE IN [address] UPDATES TO FOLLOW      Culture Refer to Blood Culture ID Panel Accession [id number]     Lactic Acid    Collection Time: 01/14/23  8:07 AM   Result Value Ref Range    Lactic Acid, Plasma 1.4 0.4 - 2.0 MMOL/L   Culture, Blood, PCR ID Panel    Collection Time: 01/14/23  8:07 AM    Specimen: Blood   Result Value Ref Range    Accession Number [id number]     Enterococcus faecalis by PCR NOT DETECTED NOTDET      Enterococcus faecium by PCR NOT DETECTED NOTDET      Listeria monocytogenes by PCR NOT DETECTED NOTDET      STAPHYLOCOCCUS Detected (A) NOTDET      Staphylococcus Aureus Detected (A) NOTDET      Staphylococcus epidermidis by PCR NOT DETECTED NOTDET      Staphylococcus lugdunensis by PCR NOT DETECTED NOTDET      STREPTOCOCCUS NOT DETECTED NOTDET      Streptococcus agalactiae (Group B) NOT DETECTED NOTDET      Strep pneumoniae NOT DETECTED NOTDET      Strep pyogenes,(Grp. A) NOT DETECTED NOTDET      Acinetobacter calcoac baumannii complex by PCR NOT DETECTED NOTDET      Bacteroides fragilis by PCR NOT DETECTED NOTDET      Enterobacteriaceae by PCR NOT DETECTED NOTDET      Enterobacter cloacae complex by PCR NOT DETECTED NOTDET      Escherichia Coli NOT DETECTED NOTDET      Klebsiella aerogenes by PCR NOT DETECTED NOTDET      Klebsiella oxytoca by PCR NOT DETECTED NOTDET      Klebsiella pneumoniae group by PCR NOT DETECTED NOTDET      Proteus by PCR NOT DETECTED NOTDET      Salmonella species by PCR NOT DETECTED NOTDET      Serratia marcescens by PCR NOT DETECTED NOTDET      Haemophilus Influenzae by PCR NOT DETECTED NOTDET      Neisseria meningitidis by PCR NOT DETECTED NOTDET      Pseudomonas aeruginosa NOT DETECTED NOTDET      Stenotrophomonas maltophilia by PCR NOT DETECTED NOTDET      Candida albicans by PCR NOT DETECTED NOTDET      Candida auris by PCR NOT DETECTED NOTDET      Candida glabrata NOT DETECTED NOTDET      Candida krusei by PCR NOT DETECTED NOTDET      Candida parapsilosis by PCR NOT DETECTED NOTDET      Candida tropicalis by PCR NOT DETECTED NOTDET      Cryptococcus neoformans/gattii by PCR NOT DETECTED NOTDET      Resistant gene targets          Resistant gene meca/c & mrej by PCR Detected (A) NOTDET      [employer] test comment        False positive results may rarely occur.  Correlate with clinical,epidemiologic, and other laboratory findings   POCT Glucose    Collection Time: 01/14/23  8:18 AM   Result Value Ref Range    POC Glucose 228 (H) 65 - 100 mg/dL    Performed by: Francis Cortes    Sodium, urine, random    Collection Time: 01/14/23 10:44 AM   Result Value Ref Range    SODIUM, RANDOM URINE <5 MMOL/L   Protein, urine, random    Collection Time: 01/14/23 10:44 AM   Result Value Ref Range    Protein, Urine, Random 39 (H) <11.9 mg/dL   Creatinine, Random Urine    Collection Time: 01/14/23 10:44 AM   Result Value Ref Range    Creatinine, Ur 220.00 mg/dL   Osmolality, Urine    Collection Time: 01/14/23 10:44 AM   Result Value Ref Range    Osmolality, Ur 821 50 - 1400 MOSM/kg H2O   Urinalysis with Reflex to Culture    Collection Time: 01/14/23 10:44 AM    Specimen: Urine   Result Value Ref Range    Color, UA YELLOW      Appearance CLEAR      Specific Gravity, UA >1.035 (H) 1.001 - 1.023    pH, Urine 5.0 5.0 - 9.0      Protein, UA 30 (A) NEG mg/dL    Glucose, UA >1000 mg/dL    Ketones, Urine 40 (A) NEG mg/dL    Bilirubin Urine MODERATE (A) NEG      Blood, Urine Negative NEG Urobilinogen, Urine 1.0 0.2 - 1.0 EU/dL    Nitrite, Urine Positive (A) NEG      Leukocyte Esterase, Urine TRACE (A) NEG      WBC, UA 10-20 0 /hpf    RBC, UA 0 0 /hpf    BACTERIA, URINE TRACE 0 /hpf    Urine Culture if Indicated URINE CULTURE ORDERED      Epithelial Cells UA 3-5 0 /hpf    Casts 0 0 /lpf    Crystals 0 0 /LPF    Mucus, UA 3+ (H) 0 /lpf    Other observations RESULTS VERIFIED MANUALLY     Culture, Urine    Collection Time: 01/14/23 10:44 AM    Specimen: Urine   Result Value Ref Range    Special Requests NO SPECIAL REQUESTS  Reflexed from [id number]        Culture NO GROWTH 1 DAY     POCT Glucose    Collection Time: 01/14/23 11:34 AM   Result Value Ref Range    POC Glucose 230 (H) 65 - 100 mg/dL    Performed by: Tanya Grover    Basic Metabolic Panel w/ Reflex to MG    Collection Time: 01/14/23  1:09 PM   Result Value Ref Range    Sodium 128 (L) 133 - 143 mmol/L    Potassium 3.8 3.5 - 5.1 mmol/L    Chloride 91 (L) 101 - 110 mmol/L    CO2 23 21 - 32 mmol/L    Anion Gap 14 (H) 2 - 11 mmol/L    Glucose 235 (H) 65 - 100 mg/dL    BUN 8 8 - 23 MG/DL    Creatinine 1.00 0.8 - 1.5 MG/DL    Est, Glom Filt Rate >60 >60 ml/min/1.73m2    Calcium 9.5 8.3 - 10.4 MG/DL   POCT Glucose    Collection Time: 01/14/23  4:53 PM   Result Value Ref Range    POC Glucose 229 (H) 65 - 100 mg/dL    Performed by: Tanya Grover    POCT Glucose    Collection Time: 01/14/23  8:20 PM   Result Value Ref Range    POC Glucose 218 (H) 65 - 100 mg/dL    Performed by: Phil    Phosphorus    Collection Time: 01/15/23  4:39 AM   Result Value Ref Range    Phosphorus 2.0 (L) 2.3 - 3.7 MG/DL   Albumin    Collection Time: 01/15/23  4:39 AM   Result Value Ref Range    Albumin 2.3 (L) 3.2 - 4.6 g/dL   Basic Metabolic Panel w/ Reflex to MG    Collection Time: 01/15/23  4:39 AM   Result Value Ref Range    Sodium 128 (L) 133 - 143 mmol/L    Potassium 3.6 3.5 - 5.1 mmol/L    Chloride 96 (L) 101 - 110 mmol/L    CO2 22 21 - 32 mmol/L Anion Gap 10 2 - 11 mmol/L    Glucose 175 (H) 65 - 100 mg/dL    BUN 8 8 - 23 MG/DL    Creatinine 0.70 (L) 0.8 - 1.5 MG/DL    Est, Glom Filt Rate >60 >60 ml/min/1.73m2    Calcium 8.5 8.3 - 10.4 MG/DL   CBC    Collection Time: 01/15/23  4:39 AM   Result Value Ref Range    WBC 19.3 (H) 4.3 - 11.1 K/uL    RBC 4.06 (L) 4.23 - 5.6 M/uL    Hemoglobin 13.4 (L) 13.6 - 17.2 g/dL    Hematocrit 38.2 (L) 41.1 - 50.3 %    MCV 94.1 82 - 102 FL    MCH 33.0 (H) 26.1 - 32.9 PG    MCHC 35.1 (H) 31.4 - 35.0 g/dL    RDW 12.5 11.9 - 14.6 %    Platelets 780 937 - 277 K/uL    MPV 10.0 9.4 - 12.3 FL    nRBC 0.00 0.0 - 0.2 K/uL   Procalcitonin    Collection Time: 01/15/23  4:39 AM   Result Value Ref Range    Procalcitonin 0.36 0.00 - 0.49 ng/mL   POCT Glucose    Collection Time: 01/15/23  8:03 AM   Result Value Ref Range    POC Glucose 173 (H) 65 - 100 mg/dL    Performed by: Namita Armenta    EKG 12 Lead    Collection Time: 01/15/23  8:27 AM   Result Value Ref Range    Ventricular Rate 103 BPM    Atrial Rate 125 BPM    QRS Duration 92 ms    Q-T Interval 296 ms    QTc Calculation (Bazett) 387 ms    R Axis 8 degrees    T Axis -55 degrees    Diagnosis       Atrial fibrillation with rapid ventricular response with premature   ventricular or aberrantly conducted complexes     POCT Glucose    Collection Time: 01/15/23 11:31 AM   Result Value Ref Range    POC Glucose 203 (H) 65 - 100 mg/dL    Performed by: Namita Armenta    Transthoracic echocardiogram (TTE) complete with contrast, bubble, strain, and 3D PRN    Collection Time: 01/15/23  1:13 PM   Result Value Ref Range    LA Minor Axis 6.4 cm    LA Major Axis 6.6 cm    LA Area 2C 27.7 cm2    LA Area 4C 27.4 cm2    LA Volume 2C 96 (A) 18 - 58 mL    LA Volume 4C 87 (A) 18 - 58 mL    LA Volume BP 93 (A) 18 - 58 mL    LA Diameter 5.4 cm    AV Mean Velocity 0.9 m/s    AV Mean Gradient 4 mmHg    AV VTI 22.0 cm    AV Peak Velocity 1.3 m/s    AV Peak Gradient 7 mmHg    AV Area by VTI 3.1 cm2    AV Area by Peak Velocity 3.2 cm2    Aortic Root 3.6 cm    Ascending Aorta 3.6 cm    IVC Proxmal 2.7 cm    IVSd 0.7 0.6 - 1.0 cm    LVIDd 5.5 4.2 - 5.9 cm    LVIDs 4.4 cm    LVOT Diameter 2.3 cm    LVOT Mean Gradient 2 mmHg    LVOT VTI 16.6 cm    LVOT Peak Velocity 1.0 m/s    LVOT Peak Gradient 4 mmHg    LVPWd 0.9 0.6 - 1.0 cm    LV E' Lateral Velocity 17 cm/s    LV E' Septal Velocity 12 cm/s    LVOT Area 4.2 cm2    LVOT SV 68.9 ml    MV E Wave Deceleration Time 213.0 ms    MV E Velocity 1.01 m/s    PV Max Velocity 1.4 m/s    PV Peak Gradient 8 mmHg    Est. RA Pressure 8 mmHg    RV Free Wall Peak S' 11 cm/s    TR Max Velocity 2.42 m/s    TR Peak Gradient 23 mmHg    Body Surface Area 2.77 m2    Fractional Shortening 2D 20 28 - 44 %    LVIDd Index 2.04 cm/m2    LVIDs Index 1.63 cm/m2    LV RWT Ratio 0.33     LV Mass 2D 160.0 88 - 224 g    LV Mass 2D Index 59.2 49 - 115 g/m2    E/E' Ratio (Averaged) 7.18     E/E' Lateral 5.94     E/E' Septal 8.42     LA Volume Index BP 34 16 - 34 ml/m2    LVOT Stroke Volume Index 25.5 mL/m2    LA Volume Index 2C 36 (A) 16 - 34 mL/m2    LA Volume Index 4C 32 16 - 34 mL/m2    LA Size Index 2.00 cm/m2    LA/AO Root Ratio 1.50     Ao Root Index 1.33 cm/m2    Ascending Aorta Index 1.33 cm/m2    AV Velocity Ratio 0.77     LVOT:AV VTI Index 0.75     TRINH/BSA VTI 1.1 cm2/m2    TRINH/BSA Peak Velocity 1.2 cm2/m2    RVSP 31 mmHg   POCT Glucose    Collection Time: 01/15/23  3:46 PM   Result Value Ref Range    POC Glucose 163 (H) 65 - 100 mg/dL    Performed by: Barb Arauz    POCT Glucose    Collection Time: 01/15/23  8:11 PM   Result Value Ref Range    POC Glucose 189 (H) 65 - 100 mg/dL    Performed by: DanielCarriePCT    Phosphorus    Collection Time: 01/16/23  3:26 AM   Result Value Ref Range    Phosphorus 1.9 (L) 2.3 - 3.7 MG/DL   Albumin    Collection Time: 01/16/23  3:26 AM   Result Value Ref Range    Albumin 2.1 (L) 3.2 - 4.6 g/dL   Basic Metabolic Panel w/ Reflex to MG    Collection Time: 01/16/23  3:26 AM   Result Value Ref Range    Sodium 127 (L) 133 - 143 mmol/L    Potassium 3.6 3.5 - 5.1 mmol/L    Chloride 96 (L) 101 - 110 mmol/L    CO2 24 21 - 32 mmol/L    Anion Gap 7 2 - 11 mmol/L    Glucose 154 (H) 65 - 100 mg/dL    BUN 7 (L) 8 - 23 MG/DL    Creatinine 0.60 (L) 0.8 - 1.5 MG/DL    Est, Glom Filt Rate >60 >60 ml/min/1.73m2    Calcium 8.1 (L) 8.3 - 10.4 MG/DL   CBC    Collection Time: 01/16/23  3:26 AM   Result Value Ref Range    WBC 16.4 (H) 4.3 - 11.1 K/uL    RBC 3.85 (L) 4.23 - 5.6 M/uL    Hemoglobin 12.5 (L) 13.6 - 17.2 g/dL    Hematocrit 36.3 (L) 41.1 - 50.3 %    MCV 94.3 82 - 102 FL    MCH 32.5 26.1 - 32.9 PG    MCHC 34.4 31.4 - 35.0 g/dL    RDW 12.7 11.9 - 14.6 %    Platelets 205 150 - 450 K/uL    MPV 9.8 9.4 - 12.3 FL    nRBC 0.00 0.0 - 0.2 K/uL   Procalcitonin    Collection Time: 01/16/23  3:26 AM   Result Value Ref Range    Procalcitonin 0.55 (H) 0.00 - 0.49 ng/mL   Vancomycin Level, Random    Collection Time: 01/16/23  3:26 AM   Result Value Ref Range    Vancomycin Rm 15.7 UG/ML       I have personally reviewed imaging studies showing:  Other Studies:  XR CHEST PORTABLE   Final Result   1.  Removal of the right chest tubes.  No pneumothorax.   2.  Little interval change otherwise.         XR CHEST PORTABLE   Final Result   -No significant interval change.  No definite visible pneumothorax.            XR CHEST PORTABLE   Final Result      1. Stable right chest tubes. No visible pneumothorax.      2. Persistent bibasilar atelectasis.      3. No significant change.      XR CHEST PORTABLE   Final Result   No appreciable pneumothorax in the current study.         XR CHEST PORTABLE   Final Result      1. Stable right chest tubes. No significant pneumothorax.      2. Right basilar atelectasis.      3. No significant change.      XR CHEST PORTABLE   Final Result      1. Stable right chest tubes. No visible pneumothorax on today's study.      2. Right basilar atelectasis.      XR CHEST PORTABLE  Final Result   Placement of 2 basilar chest tubes with probable small residual   right apical pneumothorax. XR CHEST PORTABLE   Final Result      1. Small right apical pneumothorax. Right chest tube is in place. 2. Right lung base opacity, likely atelectasis. 3. No significant change compared to prior. XR CHEST PORTABLE   Final Result      1. Persistent small right apical pneumothorax. Right chest tube is stable. XR CHEST PORTABLE   Final Result      1. New small right apical pneumothorax, despite presence of the right-sided   chest tube. 2. Bibasilar atelectasis. XR CHEST PORTABLE   Final Result   Unchanged bibasilar lung atelectasis. XR CHEST PORTABLE   Final Result   No evidence of pneumothorax         XR CHEST PORTABLE   Final Result   1. Resolved right pneumothorax. 2. Mild bibasilar lung atelectasis. XR CHEST PORTABLE   Final Result   1. Right chest tube placed, with markedly smaller pneumothorax. 2.  Resolution of the previous tension component. 3.  Mild bibasilar atelectasis. CT CHEST WO CONTRAST   Final Result   1. Persisting large right pneumothorax. 2.  Leftward deviation of mediastinal contours concerning for tension component. 3.  Partial atelectasis of right lung. 4.  Emphysema. 5.  Vascular disease. Urgent findings communicated to the ordering NP Bristow by the secure text   messaging system at 6:30 PM.      XR CHEST PORTABLE   Final Result   1. Large right pneumothorax. 2.  Pulmonary vascular congestion.                Findings discussed with Dr. Reggie Gilliam by myself at 4:42 PM.             Current Meds:  Current Facility-Administered Medications   Medication Dose Route Frequency    vancomycin (VANCOCIN) 1500 mg in sodium chloride 0.9% 500 mL IVPB  1,500 mg IntraVENous Q12H    insulin glargine (LANTUS) injection vial 22 Units  22 Units SubCUTAneous Daily    0.9 % sodium chloride infusion   IntraVENous Continuous polyethylene glycol (GLYCOLAX) packet 17 g  17 g Oral BID    simethicone (MYLICON) chewable tablet 80 mg  80 mg Oral Q6H PRN    [Held by provider] metFORMIN (GLUCOPHAGE) tablet 500 mg  500 mg Oral BID WC    cetirizine (ZYRTEC) tablet 10 mg  10 mg Oral Daily PRN    methocarbamol (ROBAXIN) tablet 750 mg  750 mg Oral 4x Daily    glycerin (ADULT) suppository 2 g  1 suppository Rectal Daily PRN    HYDROmorphone HCl PF (DILAUDID) injection 0.5 mg  0.5 mg IntraVENous Q4H PRN    oxyCODONE-acetaminophen (PERCOCET) 5-325 MG per tablet 1 tablet  1 tablet Oral Q4H PRN    oxyCODONE-acetaminophen (PERCOCET) 5-325 MG per tablet 2 tablet  2 tablet Oral Q4H PRN    bisacodyl (DULCOLAX) suppository 10 mg  10 mg Rectal Daily PRN    glucose chewable tablet 16 g  4 tablet Oral PRN    dextrose bolus 10% 125 mL  125 mL IntraVENous PRN    Or    dextrose bolus 10% 250 mL  250 mL IntraVENous PRN    glucagon (rDNA) injection 1 mg  1 mg SubCUTAneous PRN    dextrose 10 % infusion   IntraVENous Continuous PRN    metoprolol (LOPRESSOR) injection 5 mg  5 mg IntraVENous Q6H PRN    carvedilol (COREG) tablet 25 mg  25 mg Oral BID     dabigatran (PRADAXA) capsule 150 mg  150 mg Oral BID    lisinopril (PRINIVIL;ZESTRIL) tablet 10 mg  10 mg Oral Daily    gabapentin (NEURONTIN) capsule 600 mg  600 mg Oral BID    guaiFENesin (MUCINEX) extended release tablet 600 mg  600 mg Oral BID    [Held by provider] hydroCHLOROthiazide (HYDRODIURIL) tablet 25 mg  25 mg Oral Daily    atorvastatin (LIPITOR) tablet 20 mg  20 mg Oral Daily    spironolactone (ALDACTONE) tablet 25 mg  25 mg Oral Daily    sodium chloride flush 0.9 % injection 5-40 mL  5-40 mL IntraVENous 2 times per day    sodium chloride flush 0.9 % injection 5-40 mL  5-40 mL IntraVENous PRN    0.9 % sodium chloride infusion   IntraVENous PRN    ondansetron (ZOFRAN-ODT) disintegrating tablet 4 mg  4 mg Oral Q8H PRN    Or    ondansetron (ZOFRAN) injection 4 mg  4 mg IntraVENous Q6H PRN    acetaminophen (TYLENOL) tablet 650 mg  650 mg Oral Q6H PRN    Or    acetaminophen (TYLENOL) suppository 650 mg  650 mg Rectal Q6H PRN    acetaminophen (TYLENOL) tablet 500 mg  500 mg Oral Q4H    insulin lispro (HUMALOG) injection vial 0-8 Units  0-8 Units SubCUTAneous TID WC    insulin lispro (HUMALOG) injection vial 0-4 Units  0-4 Units SubCUTAneous Nightly       Signed:  Deya Nina MD

## 2023-01-16 NOTE — PLAN OF CARE
Problem: Discharge Planning  Goal: Discharge to home or other facility with appropriate resources  Outcome: Progressing  Flowsheets (Taken 1/16/2023 0915)  Discharge to home or other facility with appropriate resources:   Identify barriers to discharge with patient and caregiver   Arrange for needed discharge resources and transportation as appropriate   Identify discharge learning needs (meds, wound care, etc)     Problem: Chronic Conditions and Co-morbidities  Goal: Patient's chronic conditions and co-morbidity symptoms are monitored and maintained or improved  Outcome: Progressing  Flowsheets (Taken 1/16/2023 0915)  Care Plan - Patient's Chronic Conditions and Co-Morbidity Symptoms are Monitored and Maintained or Improved:   Monitor and assess patient's chronic conditions and comorbid symptoms for stability, deterioration, or improvement   Collaborate with multidisciplinary team to address chronic and comorbid conditions and prevent exacerbation or deterioration

## 2023-01-16 NOTE — PROGRESS NOTES
CT removed. Developed leukocytosis on 1/14/23, [address] drawn came back positive for MRSA. ID following, placed on Vanc and Cefazolin. Pt on RA now. [facility] arranged with RN/PT/OT. Will wait to see if pt needs IV abx at discharge.

## 2023-01-16 NOTE — PROGRESS NOTES
Pt stated he felt \"wheezy\". Assessed pt and fine crackles/wheezing heard on L lobe. [address] [facility] [address]. Molly Tovar and orders received for PRN nebulizer treatment, CXR, and lower continuous fluid rate. Applied 2L NC for supplemental O2. Will continue to monitor and pass along to night shift RN.

## 2023-01-16 NOTE — PROGRESS NOTES
Admit Date: 2023    POD 7 Days Post-Op    Procedure:  Procedure(s):  RIGHT VATS/WEDGE BLEBECTOMY/RIGHT UPPER LOBE WEDGE BLEBECTOMY/RIGHT LOWER LOBE WEDGE BLEBECTOMY/TALC PLEURODESIS    Subjective:     Pt sitting in bed. No complaints. Wife at bedside. Alert with NAD. Off oxygen. CT x 2 removed over weekend    Objective:       Vitals:    23 0512 23 0800 23 0915 23 1230   BP: 130/66 134/75 (!) 163/82 118/65   Pulse: 94 96 95 93   Resp: 20 18  18   Temp: 98 °F (36.7 °C) 98.2 °F (36.8 °C)  97.7 °F (36.5 °C)   TempSrc:  Oral  Oral   SpO2: 93% 93%  91%   Weight:       Height:           Temp (24hrs), Av °F (36.7 °C), Min:97.7 °F (36.5 °C), Max:98.2 °F (36.8 °C)    Intake/Output Summary (Last 24 hours) at 2023 1410  Last data filed at 2023 0951  Gross per 24 hour   Intake 690 ml   Output 600 ml   Net 90 ml       Physical Exam  Constitutional:       General: He is not in acute distress. Appearance: He is obese. Cardiovascular:      Rate and Rhythm: Normal rate. Heart sounds: Normal heart sounds. Pulmonary:      Effort: Pulmonary effort is normal. No respiratory distress. Breath sounds: Normal breath sounds. Comments: Posterior chest tube sites: most superior: skin dehiscence with purulent drainage. Silver dressing in place. Interior CT site: clean, mild skin separation, scant drainage. Staple site below axilla R intact without erythema or drainage. Musculoskeletal:         General: No swelling. Normal range of motion. Cervical back: No rigidity. Skin:     General: Skin is warm and dry. Neurological:      Mental Status: He is alert.       Labs:   Recent Results (from the past 24 hour(s))   POCT Glucose    Collection Time: 01/15/23  3:46 PM   Result Value Ref Range    POC Glucose 163 (H) 65 - 100 mg/dL    Performed by: Aidan Kenyon    POCT Glucose    Collection Time: 01/15/23  8:11 PM   Result Value Ref Range    POC Glucose 189 (H) 65 - 100 mg/dL    Performed by: Colleen    Phosphorus    Collection Time: 01/16/23  3:26 AM   Result Value Ref Range    Phosphorus 1.9 (L) 2.3 - 3.7 MG/DL   Albumin    Collection Time: 01/16/23  3:26 AM   Result Value Ref Range    Albumin 2.1 (L) 3.2 - 4.6 g/dL   Basic Metabolic Panel w/ Reflex to MG    Collection Time: 01/16/23  3:26 AM   Result Value Ref Range    Sodium 127 (L) 133 - 143 mmol/L    Potassium 3.6 3.5 - 5.1 mmol/L    Chloride 96 (L) 101 - 110 mmol/L    CO2 24 21 - 32 mmol/L    Anion Gap 7 2 - 11 mmol/L    Glucose 154 (H) 65 - 100 mg/dL    BUN 7 (L) 8 - 23 MG/DL    Creatinine 0.60 (L) 0.8 - 1.5 MG/DL    Est, Glom Filt Rate >60 >60 ml/min/1.73m2    Calcium 8.1 (L) 8.3 - 10.4 MG/DL   CBC    Collection Time: 01/16/23  3:26 AM   Result Value Ref Range    WBC 16.4 (H) 4.3 - 11.1 K/uL    RBC 3.85 (L) 4.23 - 5.6 M/uL    Hemoglobin 12.5 (L) 13.6 - 17.2 g/dL    Hematocrit 36.3 (L) 41.1 - 50.3 %    MCV 94.3 82 - 102 FL    MCH 32.5 26.1 - 32.9 PG    MCHC 34.4 31.4 - 35.0 g/dL    RDW 12.7 11.9 - 14.6 %    Platelets 840 975 - 842 K/uL    MPV 9.8 9.4 - 12.3 FL    nRBC 0.00 0.0 - 0.2 K/uL   Procalcitonin    Collection Time: 01/16/23  3:26 AM   Result Value Ref Range    Procalcitonin 0.55 (H) 0.00 - 0.49 ng/mL   Vancomycin Level, Random    Collection Time: 01/16/23  3:26 AM   Result Value Ref Range    Vancomycin Rm 15.7 UG/ML   POCT Glucose    Collection Time: 01/16/23  7:05 AM   Result Value Ref Range    POC Glucose 152 (H) 65 - 100 mg/dL    Performed by: Freya    POCT Glucose    Collection Time: 01/16/23 11:47 AM   Result Value Ref Range    POC Glucose 213 (H) 65 - 100 mg/dL    Performed by: Freya         Assessment:     Principal Problem:    Sepsis due to methicillin resistant Staphylococcus aureus (MRSA) (HCC)  Active Problems:    Spontaneous pneumothorax    Anticoagulant long-term use    Primary spontaneous pneumothorax    Herpes simplex virus infection    Primary hypertension    Benign prostatic hyperplasia with nocturia    Class 3 severe obesity due to excess calories with serious comorbidity and body mass index (BMI) of 40.0 to 44.9 in adult [facility])    Atrial fibrillation (HCC)    CASSY on CPAP    Type 2 diabetes mellitus with hyperglycemia, without long-term current use of insulin ([address]  Resolved Problems:    Acute respiratory failure with hypoxia [facility])        Plan/Recommendations/Medical Decision Making:   Care Management per Hospitalist  ID consulted for MRSA + [facility] 1/14/23 thought to be related to CT site. Treatment with Vancomycin and local wound care [facility] following and applied silver dressing)  Will continue to follow during hospital course incase Upper CT wound requires debridement.

## 2023-01-16 NOTE — PROGRESS NOTES
VANCO DAILY FOLLOW UP NOTE  [facility] Pharmacokinetic Monitoring Service - Vancomycin    Consulting Provider: Zenon Keane NP   Indication: MRSA BSI  Target Concentration: Goal AUC/LAINA 400-600 mg*hr/L  Day of Therapy: 2  Additional Antimicrobials: n/a    Patient eligible for piperacillin-tazobactam to cefepime auto-substitution per P&T approved protocol? N/A    Pertinent Laboratory Values: Wt Readings from Last 1 Encounters:   01/15/23 (!) 332 lb 12.8 oz (151 kg)     Temp Readings from Last 1 Encounters:   01/16/23 98.2 °F (36.8 °C)     Recent Labs     01/14/23  0547 01/14/23  0807 01/14/23  1309 01/15/23  0439 01/16/23  0326   BUN 7*  --  8 8 7*   CREATININE 0.80  --  1.00 0.70* 0.60*   WBC 17.0*  --   --  19.3* 16.4*   PROCAL  --   --   --  0.36 0.55*   LACACIDPL  --  1.4  --   --   --      Estimated Creatinine Clearance: 180 mL/min (A) (based on SCr of 0.6 mg/dL (L)). Lab Results   Component Value Date/Time    VANCORANDOM 15.7 01/16/2023 03:26 AM       MRSA Nasal Swab: N/A.  Non-respiratory infection      Assessment:  Date/Time Dose Concentration AUC   1/16 0326 1500 mg q12h 15.7 337   Note: Serum concentrations collected for AUC dosing may appear elevated if collected in close proximity to the dose administered, this is not necessarily an indication of toxicity    Plan:  Current dosing regimen is sub-therapeutic  Increase dose to 1750 mg x 1 dose now followed by 1750 mg every 12 hours to begin at 1800 (8 hours after dose now to assist with accumulation)  Repeat vancomycin concentration ordered for 1/17 @ 0200    Pharmacy will continue to monitor patient and adjust therapy as indicated    Thank you for the consult,  Mara Shin, [facility]

## 2023-01-17 LAB
ALBUMIN SERPL-MCNC: 2.2 G/DL (ref 3.2–4.6)
ALBUMIN/GLOB SERPL: 0.6 (ref 0.4–1.6)
ALP SERPL-CCNC: 101 U/L (ref 50–136)
ALT SERPL-CCNC: 27 U/L (ref 12–65)
ANION GAP SERPL CALC-SCNC: 7 MMOL/L (ref 2–11)
AST SERPL-CCNC: 21 U/L (ref 15–37)
BACTERIA SPEC CULT: ABNORMAL
BACTERIA SPEC CULT: ABNORMAL
BASOPHILS # BLD: 0 K/UL (ref 0–0.2)
BASOPHILS NFR BLD: 0 % (ref 0–2)
BILIRUB SERPL-MCNC: 0.7 MG/DL (ref 0.2–1.1)
BUN SERPL-MCNC: 5 MG/DL (ref 8–23)
CALCIUM SERPL-MCNC: 8.6 MG/DL (ref 8.3–10.4)
CHLORIDE SERPL-SCNC: 103 MMOL/L (ref 101–110)
CO2 SERPL-SCNC: 24 MMOL/L (ref 21–32)
CREAT SERPL-MCNC: 0.6 MG/DL (ref 0.8–1.5)
DIFFERENTIAL METHOD BLD: ABNORMAL
EOSINOPHIL # BLD: 0.2 K/UL (ref 0–0.8)
EOSINOPHIL NFR BLD: 2 % (ref 0.5–7.8)
ERYTHROCYTE [DISTWIDTH] IN BLOOD BY AUTOMATED COUNT: 12.9 % (ref 11.9–14.6)
GLOBULIN SER CALC-MCNC: 4 G/DL (ref 2.8–4.5)
GLUCOSE BLD STRIP.AUTO-MCNC: 155 MG/DL (ref 65–100)
GLUCOSE BLD STRIP.AUTO-MCNC: 183 MG/DL (ref 65–100)
GLUCOSE BLD STRIP.AUTO-MCNC: 191 MG/DL (ref 65–100)
GLUCOSE BLD STRIP.AUTO-MCNC: 203 MG/DL (ref 65–100)
GLUCOSE SERPL-MCNC: 193 MG/DL (ref 65–100)
GRAM STN SPEC: ABNORMAL
HCT VFR BLD AUTO: 35.5 % (ref 41.1–50.3)
HGB BLD-MCNC: 12.2 G/DL (ref 13.6–17.2)
IMM GRANULOCYTES # BLD AUTO: 0.1 K/UL (ref 0–0.5)
IMM GRANULOCYTES NFR BLD AUTO: 1 % (ref 0–5)
LYMPHOCYTES # BLD: 0.5 K/UL (ref 0.5–4.6)
LYMPHOCYTES NFR BLD: 5 % (ref 13–44)
MAGNESIUM SERPL-MCNC: 2.3 MG/DL (ref 1.8–2.4)
MCH RBC QN AUTO: 33.2 PG (ref 26.1–32.9)
MCHC RBC AUTO-ENTMCNC: 34.4 G/DL (ref 31.4–35)
MCV RBC AUTO: 96.5 FL (ref 82–102)
MONOCYTES # BLD: 0.7 K/UL (ref 0.1–1.3)
MONOCYTES NFR BLD: 6 % (ref 4–12)
NEUTS SEG # BLD: 9.7 K/UL (ref 1.7–8.2)
NEUTS SEG NFR BLD: 86 % (ref 43–78)
NRBC # BLD: 0 K/UL (ref 0–0.2)
OSMOLALITY SERPL: 279 MOSM/KG H2O (ref 280–301)
PLATELET # BLD AUTO: 260 K/UL (ref 150–450)
PMV BLD AUTO: 10.3 FL (ref 9.4–12.3)
POTASSIUM SERPL-SCNC: 3.2 MMOL/L (ref 3.5–5.1)
PROCALCITONIN SERPL-MCNC: 0.3 NG/ML (ref 0–0.49)
PROT SERPL-MCNC: 6.2 G/DL (ref 6.3–8.2)
RBC # BLD AUTO: 3.68 M/UL (ref 4.23–5.6)
SERVICE CMNT-IMP: ABNORMAL
SODIUM SERPL-SCNC: 134 MMOL/L (ref 133–143)
VANCOMYCIN SERPL-MCNC: 11.8 UG/ML
WBC # BLD AUTO: 11.2 K/UL (ref 4.3–11.1)

## 2023-01-17 PROCEDURE — 6360000002 HC RX W HCPCS: Performed by: FAMILY MEDICINE

## 2023-01-17 PROCEDURE — 94640 AIRWAY INHALATION TREATMENT: CPT

## 2023-01-17 PROCEDURE — 83735 ASSAY OF MAGNESIUM: CPT

## 2023-01-17 PROCEDURE — 6370000000 HC RX 637 (ALT 250 FOR IP): Performed by: FAMILY MEDICINE

## 2023-01-17 PROCEDURE — 6370000000 HC RX 637 (ALT 250 FOR IP): Performed by: SURGERY

## 2023-01-17 PROCEDURE — 2580000003 HC RX 258: Performed by: FAMILY MEDICINE

## 2023-01-17 PROCEDURE — 83930 ASSAY OF BLOOD OSMOLALITY: CPT

## 2023-01-17 PROCEDURE — 97530 THERAPEUTIC ACTIVITIES: CPT

## 2023-01-17 PROCEDURE — 1100000000 HC RM PRIVATE

## 2023-01-17 PROCEDURE — 2580000003 HC RX 258: Performed by: SURGERY

## 2023-01-17 PROCEDURE — 94760 N-INVAS EAR/PLS OXIMETRY 1: CPT

## 2023-01-17 PROCEDURE — 80053 COMPREHEN METABOLIC PANEL: CPT

## 2023-01-17 PROCEDURE — 85025 COMPLETE CBC W/AUTO DIFF WBC: CPT

## 2023-01-17 PROCEDURE — 84145 PROCALCITONIN (PCT): CPT

## 2023-01-17 PROCEDURE — 36415 COLL VENOUS BLD VENIPUNCTURE: CPT

## 2023-01-17 PROCEDURE — 2700000000 HC OXYGEN THERAPY PER DAY

## 2023-01-17 PROCEDURE — 80202 ASSAY OF VANCOMYCIN: CPT

## 2023-01-17 PROCEDURE — 82962 GLUCOSE BLOOD TEST: CPT

## 2023-01-17 PROCEDURE — 2500000003 HC RX 250 WO HCPCS: Performed by: FAMILY MEDICINE

## 2023-01-17 PROCEDURE — 6370000000 HC RX 637 (ALT 250 FOR IP)

## 2023-01-17 PROCEDURE — P9047 ALBUMIN (HUMAN), 25%, 50ML: HCPCS | Performed by: FAMILY MEDICINE

## 2023-01-17 RX ORDER — BUMETANIDE 0.25 MG/ML
1 INJECTION, SOLUTION INTRAMUSCULAR; INTRAVENOUS 2 TIMES DAILY
Status: COMPLETED | OUTPATIENT
Start: 2023-01-17 | End: 2023-01-18

## 2023-01-17 RX ORDER — PANTOPRAZOLE SODIUM 40 MG/1
40 TABLET, DELAYED RELEASE ORAL
Status: DISCONTINUED | OUTPATIENT
Start: 2023-01-18 | End: 2023-01-17

## 2023-01-17 RX ORDER — POTASSIUM CHLORIDE 7.45 MG/ML
10 INJECTION INTRAVENOUS PRN
Status: DISCONTINUED | OUTPATIENT
Start: 2023-01-17 | End: 2023-01-19 | Stop reason: HOSPADM

## 2023-01-17 RX ORDER — POTASSIUM CHLORIDE 20 MEQ/1
40 TABLET, EXTENDED RELEASE ORAL PRN
Status: DISCONTINUED | OUTPATIENT
Start: 2023-01-17 | End: 2023-01-19 | Stop reason: HOSPADM

## 2023-01-17 RX ORDER — ALBUMIN (HUMAN) 12.5 G/50ML
12.5 SOLUTION INTRAVENOUS EVERY 6 HOURS
Status: COMPLETED | OUTPATIENT
Start: 2023-01-17 | End: 2023-01-18

## 2023-01-17 RX ADMIN — Medication 1 G: at 08:22

## 2023-01-17 RX ADMIN — Medication 1 G: at 12:20

## 2023-01-17 RX ADMIN — SODIUM CHLORIDE, PRESERVATIVE FREE 10 ML: 5 INJECTION INTRAVENOUS at 08:23

## 2023-01-17 RX ADMIN — ACETAMINOPHEN 500 MG: 500 TABLET, FILM COATED ORAL at 16:24

## 2023-01-17 RX ADMIN — ALBUMIN (HUMAN) 12.5 G: 0.25 INJECTION, SOLUTION INTRAVENOUS at 12:21

## 2023-01-17 RX ADMIN — ACETAMINOPHEN 500 MG: 500 TABLET, FILM COATED ORAL at 04:00

## 2023-01-17 RX ADMIN — ACETAMINOPHEN 500 MG: 500 TABLET, FILM COATED ORAL at 08:23

## 2023-01-17 RX ADMIN — SPIRONOLACTONE 25 MG: 25 TABLET ORAL at 08:23

## 2023-01-17 RX ADMIN — METHOCARBAMOL TABLETS 750 MG: 750 TABLET, COATED ORAL at 08:22

## 2023-01-17 RX ADMIN — LISINOPRIL 10 MG: 5 TABLET ORAL at 08:23

## 2023-01-17 RX ADMIN — ATORVASTATIN CALCIUM 20 MG: 20 TABLET, FILM COATED ORAL at 08:23

## 2023-01-17 RX ADMIN — VANCOMYCIN HYDROCHLORIDE 1750 MG: 10 INJECTION, POWDER, LYOPHILIZED, FOR SOLUTION INTRAVENOUS at 18:01

## 2023-01-17 RX ADMIN — POTASSIUM CHLORIDE 40 MEQ: 1500 TABLET, EXTENDED RELEASE ORAL at 14:07

## 2023-01-17 RX ADMIN — METHOCARBAMOL TABLETS 750 MG: 750 TABLET, COATED ORAL at 16:24

## 2023-01-17 RX ADMIN — ACETAMINOPHEN 500 MG: 500 TABLET, FILM COATED ORAL at 12:21

## 2023-01-17 RX ADMIN — Medication 1 G: at 16:24

## 2023-01-17 RX ADMIN — ACYCLOVIR 400 MG: 200 CAPSULE ORAL at 20:40

## 2023-01-17 RX ADMIN — DABIGATRAN ETEXILATE MESYLATE 150 MG: 150 CAPSULE ORAL at 20:40

## 2023-01-17 RX ADMIN — OXYCODONE AND ACETAMINOPHEN 2 TABLET: 5; 325 TABLET ORAL at 23:12

## 2023-01-17 RX ADMIN — BUMETANIDE 1 MG: 0.25 INJECTION INTRAMUSCULAR; INTRAVENOUS at 13:15

## 2023-01-17 RX ADMIN — BUMETANIDE 1 MG: 0.25 INJECTION INTRAMUSCULAR; INTRAVENOUS at 20:41

## 2023-01-17 RX ADMIN — LEVALBUTEROL HYDROCHLORIDE 0.63 MG: 0.63 SOLUTION RESPIRATORY (INHALATION) at 09:05

## 2023-01-17 RX ADMIN — ALBUMIN (HUMAN) 12.5 G: 0.25 INJECTION, SOLUTION INTRAVENOUS at 20:40

## 2023-01-17 RX ADMIN — CARVEDILOL 25 MG: 25 TABLET, FILM COATED ORAL at 16:24

## 2023-01-17 RX ADMIN — METHOCARBAMOL TABLETS 750 MG: 750 TABLET, COATED ORAL at 20:40

## 2023-01-17 RX ADMIN — INSULIN LISPRO 2 UNITS: 100 INJECTION, SOLUTION INTRAVENOUS; SUBCUTANEOUS at 16:45

## 2023-01-17 RX ADMIN — SODIUM CHLORIDE, PRESERVATIVE FREE 10 ML: 5 INJECTION INTRAVENOUS at 20:49

## 2023-01-17 RX ADMIN — ACYCLOVIR 400 MG: 200 CAPSULE ORAL at 08:23

## 2023-01-17 RX ADMIN — ACETAMINOPHEN 500 MG: 500 TABLET, FILM COATED ORAL at 00:30

## 2023-01-17 RX ADMIN — GABAPENTIN 600 MG: 300 CAPSULE ORAL at 20:40

## 2023-01-17 RX ADMIN — METHOCARBAMOL TABLETS 750 MG: 750 TABLET, COATED ORAL at 12:21

## 2023-01-17 RX ADMIN — POLYETHYLENE GLYCOL 3350 17 G: 17 POWDER, FOR SOLUTION ORAL at 20:57

## 2023-01-17 RX ADMIN — VANCOMYCIN HYDROCHLORIDE 1750 MG: 10 INJECTION, POWDER, LYOPHILIZED, FOR SOLUTION INTRAVENOUS at 05:30

## 2023-01-17 RX ADMIN — POLYETHYLENE GLYCOL 3350 17 G: 17 POWDER, FOR SOLUTION ORAL at 08:24

## 2023-01-17 RX ADMIN — ACYCLOVIR 400 MG: 200 CAPSULE ORAL at 13:15

## 2023-01-17 RX ADMIN — CARVEDILOL 25 MG: 25 TABLET, FILM COATED ORAL at 08:23

## 2023-01-17 RX ADMIN — INSULIN GLARGINE 24 UNITS: 100 INJECTION, SOLUTION SUBCUTANEOUS at 08:25

## 2023-01-17 RX ADMIN — GABAPENTIN 600 MG: 300 CAPSULE ORAL at 08:23

## 2023-01-17 RX ADMIN — DABIGATRAN ETEXILATE MESYLATE 150 MG: 150 CAPSULE ORAL at 08:22

## 2023-01-17 RX ADMIN — ACETAMINOPHEN 500 MG: 500 TABLET, FILM COATED ORAL at 20:00

## 2023-01-17 ASSESSMENT — PAIN DESCRIPTION - DESCRIPTORS
DESCRIPTORS: ACHING;CRAMPING;DISCOMFORT;SORE
DESCRIPTORS: ACHING

## 2023-01-17 ASSESSMENT — PAIN SCALES - GENERAL
PAINLEVEL_OUTOF10: 0
PAINLEVEL_OUTOF10: 3
PAINLEVEL_OUTOF10: 0
PAINLEVEL_OUTOF10: 7
PAINLEVEL_OUTOF10: 0

## 2023-01-17 ASSESSMENT — PAIN - FUNCTIONAL ASSESSMENT: PAIN_FUNCTIONAL_ASSESSMENT: PREVENTS OR INTERFERES WITH MANY ACTIVE NOT PASSIVE ACTIVITIES

## 2023-01-17 ASSESSMENT — PAIN SCALES - WONG BAKER
WONGBAKER_NUMERICALRESPONSE: 0
WONGBAKER_NUMERICALRESPONSE: 0

## 2023-01-17 ASSESSMENT — PAIN DESCRIPTION - ORIENTATION
ORIENTATION: POSTERIOR
ORIENTATION: MID;LOWER

## 2023-01-17 ASSESSMENT — PAIN DESCRIPTION - LOCATION
LOCATION: BACK
LOCATION: BACK

## 2023-01-17 NOTE — PROGRESS NOTES
ACUTE PHYSICAL THERAPY GOALS:   (Developed with and agreed upon by patient and/or caregiver.)  Goals reviewed 1/10/23  (1.) Steve Sierra  will move from supine to sit and sit to supine  with INDEPENDENCE within 7 treatment day(s). (2.) Steve Sierra will transfer from bed to chair and chair to bed with MODIFIED INDEPENDENCE using the least restrictive device within 7 treatment day(s). (3.) Steve Sierra will ambulate with MODIFIED INDEPENDENCE for 250 feet with the least restrictive device within 7 treatment day(s). (4.) Steve Sierra will perform standing static and dynamic balance activities x 20 minutes with SUPERVISION to improve safety within 7 treatment day(s). (5.) Steve Sierra will perform bilateral lower extremity exercises x 20 min for HEP with INDEPENDENCE to improve strength, endurance, and functional mobility within 7 treatment day(s). PHYSICAL THERAPY: Daily Note AM   (Link to Caseload Tracking: PT Visit Days : 3  Time In/Out PT Charge Capture  Rehab Caseload Tracker  Orders    Steve Sierra is a 69 y.o. male   PRIMARY DIAGNOSIS: Sepsis due to methicillin resistant Staphylococcus aureus (MRSA) ([facility] [address]  Acute respiratory failure with hypoxia ([facility] [address] [J96.01]  Primary spontaneous pneumothorax [J93.11]  Spontaneous pneumothorax [J93.83]  Procedure(s) (LRB):  RIGHT VATS/WEDGE BLEBECTOMY/RIGHT UPPER LOBE WEDGE BLEBECTOMY/RIGHT LOWER LOBE WEDGE BLEBECTOMY/TALC PLEURODESIS (Right)  8 Days Post-Op  Inpatient: Payor: MEDICARE / Plan: MEDICARE PART A AND B / Product Type: *No Product type* /     ASSESSMENT:     REHAB RECOMMENDATIONS:   Recommendation to date pending progress:  Setting:  Home Health Therapy    Equipment:    Rolling Walker     ASSESSMENT:  Mr. Meggan Winter was sitting up in recliner chair upon contact and agreeable to PT. Pt now without chest tubes in place.  This session, pt demonstrating relatively consistent performance c previous sessions as he was able to match ambulation distance of 250' but required 4-5 standing breaks d/t so. Pt cont to amb c dec cj, wide JUDITH and inc postural sway although he ultimately did well to avoid any LOB/ miss-steps. Furthermore, pt performed all activity on RA and denied any dizziness, lightheadedness or SOB while moving about. Pt primarily limited by/ requesting breaks d/t ongoing LBP that he rates as 5/10. On return to room, pt able to ambulate in/out of crowded bathroom space without physical (A) required. Pt was left sitting in recliner with wife at bedside. No significant progress noted. Will continue PT efforts.      SUBJECTIVE:   Mr. Lynnette Redmond states, \"The pain just stays in the middle of my back\"     Social/Functional Lives With: Spouse  Type of Home: Condo  Home Layout: One level  Bathroom Toilet: Standard  ADL Assistance: Independent  Homemaking Assistance: Independent  Transfer Assistance: Independent  Active : Yes  Mode of Transportation: Car  Occupation: Retired  OBJECTIVE:     PAIN: VITALS / O2: PRECAUTION / Radha Clark / Tomy Claw:   Pre Treatment: 5/10 LBP         Post Treatment: 5/10 LBP Vitals        Oxygen   RA IV    RESTRICTIONS/PRECAUTIONS:        MOBILITY: I Mod I S SBA CGA Min Mod Max Total  NT x2 Comments:   Bed Mobility    Rolling [] [] [] [] [] [] [] [] [] [x] []    Supine to Sit [] [] [] [] [] [] [] [] [] [x] []    Scooting [] [] [] [] [] [] [] [] [] [x] []    Sit to Supine [] [] [] [] [] [] [] [] [] [x] []    Transfers    Sit to Stand [] [] [] [x] [] [] [] [] [] [] []    Bed to Chair [] [] [] [x] [x] [] [] [] [] [] []    Stand to Sit [] [] [] [x] [] [] [] [] [] [] []     [] [] [] [] [] [] [] [] [] [] []    I=Independent, Mod I=Modified Independent, S=Supervision, SBA=Standby Assistance, CGA=Contact Guard Assistance,   Min=Minimal Assistance, Mod=Moderate Assistance, Max=Maximal Assistance, Total=Total Assistance, NT=Not Tested    BALANCE: Good Fair+ Fair Fair- Poor NT Comments   Sitting Static [x] [] [] [] [] [] Sitting Dynamic [] [x] [] [] [] []              Standing Static [] [x] [] [] [] []    Standing Dynamic [] [x] [] [] [] []      GAIT: I Mod I S SBA CGA Min Mod Max Total  NT x2 Comments:   Level of Assistance [] [] [] [] [x] [] [] [] [] [] [] Slow but steady   Distance   250' (4-5 standing breaks)    DME Rolling Walker    Gait Quality Decreased cj , Decreased step clearance, Decreased step length, Path deviations , and Trunk sway increased    Weightbearing Status      Stairs      I=Independent, Mod I=Modified Independent, S=Supervision, SBA=Standby Assistance, CGA=Contact Guard Assistance,   Min=Minimal Assistance, Mod=Moderate Assistance, Max=Maximal Assistance, Total=Total Assistance, NT=Not Tested    PLAN:   FREQUENCY AND DURATION: 3 times/week for duration of hospital stay or until stated goals are met, whichever comes first.    TREATMENT:   TREATMENT:   Therapeutic Activity (25 Minutes): Therapeutic activity included Scooting, Transfer Training, Ambulation on level ground, Sitting balance , and Standing balance to improve functional Activity tolerance, Balance, Mobility, and Strength.     TREATMENT GRID:  N/A    AFTER TREATMENT PRECAUTIONS: Bed/Chair Locked, Call light within reach, Chair, Needs within reach, and RN notified    INTERDISCIPLINARY COLLABORATION:  RN/ PCT and PT/ PTA    EDUCATION:      TIME IN/OUT:  Time In: 1125  Time Out: Marie 75  Minutes: 25    Garry Bowles PT

## 2023-01-17 NOTE — PROGRESS NOTES
VANCO DAILY FOLLOW UP NOTE  [address] [facility] Pharmacokinetic Monitoring Service - Vancomycin    Consulting Provider: Elijah Coreas NP   Indication: MRSA BSI  Target Concentration: Goal AUC/LAINA 400-600 mg*hr/L  Day of Therapy: 3  Additional Antimicrobials: n/a    Patient eligible for piperacillin-tazobactam to cefepime auto-substitution per P&T approved protocol? N/A    Pertinent Laboratory Values: Wt Readings from Last 1 Encounters:   01/15/23 (!) 332 lb 12.8 oz (151 kg)     Temp Readings from Last 1 Encounters:   01/17/23 97.7 °F (36.5 °C) (Oral)     Recent Labs     01/14/23  1309 01/15/23  0439 01/16/23  0326 01/17/23  0142   BUN 8 8 7*  --    CREATININE 1.00 0.70* 0.60*  --    WBC  --  19.3* 16.4* 11.2*   PROCAL  --  0.36 0.55* 0.30     Estimated Creatinine Clearance: 180 mL/min (A) (based on SCr of 0.6 mg/dL (L)). Lab Results   Component Value Date/Time    VANCORANDOM 11.8 01/17/2023 01:42 AM       MRSA Nasal Swab: N/A.  Non-respiratory infection      Assessment:  Date/Time Dose Concentration AUC   1/16 0326 1500 mg q12h 15.7 337   1/17 0142 1750 mg q12h 11.8 409   Note: Serum concentrations collected for AUC dosing may appear elevated if collected in close proximity to the dose administered, this is not necessarily an indication of toxicity    Plan:  Current dosing regimen is therapeutic  Continue current dose of 1750 mg q12h  Repeat vancomycin concentration ordered for 1/18 @ 0400    Pharmacy will continue to monitor patient and adjust therapy as indicated    Thank you for the consult,  Compa Hoffmann [facility]

## 2023-01-17 NOTE — PROGRESS NOTES
Infectious Disease Progress Note      Today's Date: 1/17/2023   Admit Date: 1/4/2023    Impression:   Patient is a 69 with recurrent pneumothorax, s/p VATS on 1/11/23, developed leukocytosis on 1/14/23, Bcx drawn came back positive for MRSA    #MRSA bacteremia  - Etiology: Right superior back CT drain site  - Treatment: Appropriately on vancomycin: needs local wound care  - Follow-up: Blood cx positive 1/14/23 - blood cx ordered for 1/16/23  - Duration: If repeat blood cultures are negative and TTE is negative , would treat for 2 weeks    Plan:   TTE: poor study; however would pursue PERRY only if 01/16 blood cx positive  Follow 01/16 repeat blood cultures; if they remain negative would treat with Vancomycin through 01/30/23  Could place midline when blood cx 01/16 negative at 72hrs  Continue therapeutic dosing vancomycin   Engage wound care   ID will continue to follow    Anti-infectives:   Vancomycin 1/15/23- present  Cefazolin 1/15/23    Subjective:     Overnight Events: WBC 11.2. Afebrile. Patient up to chair with wife at bedside. Reviewed plan for two weeks abx treatment with vancomycin if blood cx from yesterday remain negative. Discussed home abx vs infusion center, patient and wife would like to do home abx. Patient states he feels good today, no pain, no N/V/D. Patient is a 69 y.o. male with PMH of  right sided spontaneous pneumothorax in 2017, required tube placement , admitted on 1/4/23 with sudden onset chest pain and shortness of breath. Found to have a large right sided pneumothorax. Had tube placement. Subsequently underwent VATS on 1/11/23. Noted to have leukocytosis on 1/14/23, as part of work-up, had blood cultures which came back positive for MRSA. By Lisandra Sanderson. On evaluation today, patient is sitting out comfortably in chair. He denies any new complaints  No worrisome IV sites  His superior back previous chest tube site was inspected along with other previous tube sites.  Nursing staff informed me that there was pus draining from the left upper back site  Drainage is minimal today but appears likely infected with tenderness and erythema (unfortunately I was unable to get pictures, do not yet have epic for on my phone for STF)  No fluctuance      Patient Active Problem List   Diagnosis    Primary hypertension    History of subdural hematoma (post traumatic)    Hepatic steatosis    Osteoarthritis of lumbosacral spine    Benign prostatic hyperplasia with nocturia    Nodular prostate without urinary obstruction    Seasonal allergic rhinitis due to pollen    Class 3 severe obesity due to excess calories with serious comorbidity and body mass index (BMI) of 40.0 to 44.9 in adult [facility])    Subdural hygroma    Mitral valve regurgitation    Iron excess    Atrial fibrillation (HCC)    CASSY on CPAP    Hereditary hemochromatosis (HCC)    Polyneuropathy associated with underlying disease ([address]    BPH (benign prostatic hyperplasia)    Coronary artery disease due to lipid rich plaque    Chronic constipation    Mixed dyslipidemia    Type 2 diabetes mellitus with hyperglycemia, without long-term current use of insulin (HCC)    Controlled type 2 diabetes mellitus with peripheral circulatory disorder (HCC)    Spontaneous pneumothorax    Anticoagulant long-term use    Primary spontaneous pneumothorax    Sepsis due to methicillin resistant Staphylococcus aureus (MRSA) ([address]    Herpes simplex virus infection     Past Medical History:   Diagnosis Date    Acute respiratory failure with hypoxia ([address] 1/4/2023    Arteriosclerosis     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     Diabetes mellitus ([address] 1/3/2012    Essential hypertension, benign 2/6/2014    Hemochromatosis     Hypercholesteremia     Nodular prostate without urinary obstruction 2014    Obesity 2014    Pneumothorax, right 10/8/2017    pt was hospitalized for subdural hematoma in 2017. upon return home from 2017 hospitlization, pt experienced RIGHT PTX after placement of CPAP approx. 13-18 cm h2o    Sepsis due to urinary tract infection ([facility] 2019    Sleep apnea 10/12/2016    uses ASV instead of CPAP machine - per pt     Subdural hemorrhage following injury 10/20/2017      Family History   Problem Relation Age of Onset    Prostate Cancer Paternal Grandfather     Alcohol Abuse Brother     Heart Failure Brother     Psychiatric Disorder Brother     Stroke Brother     Psychiatric Disorder Mother     Other Mother         Sarcoidosis    Atrial Fibrillation Father     Lung Cancer Father     Hypertension Father     Heart Disease Father 61            Diabetes Father     Heart Disease Paternal Grandmother       Social History     Tobacco Use    Smoking status: Former     Packs/day: 1.50     Types: Cigarettes     Quit date: 1989     Years since quittin.4    Smokeless tobacco: Former     Quit date: 2016   Substance Use Topics    Alcohol use: Yes     Alcohol/week: 8.0 standard drinks     Past Surgical History:   Procedure Laterality Date    CHEST SURGERY  10/2017    pneumothorax after fall    COLONOSCOPY N/A 2018    COLONOSCOPY  BMI 41 performed by Adilene Soriano MD at  [facility] Right 10/02/2017    [facility] for Subdural Hematoma    KNEE ARTHROSCOPY Right     OTHER SURGICAL HISTORY Bilateral 10/18/2017    [facility] for Subdural Hematoma    THORACOSCOPY Right 2023    RIGHT VATS/WEDGE BLEBECTOMY/RIGHT UPPER LOBE WEDGE BLEBECTOMY/RIGHT LOWER LOBE WEDGE BLEBECTOMY/TALC PLEURODESIS performed by Klaus Trejo MD at [facility] MAIN OR    TURP  2019    UROLOGICAL SURGERY      prostate u/s and BX      Prior to Admission medications    Medication Sig Start Date End Date Taking?  Authorizing Provider   methocarbamol (ROBAXIN-750) 750 MG tablet Take 1 tablet by mouth 4 times daily for 10 days 1/13/23 1/23/23 Yes CARA Lewis NP   oxyCODONE-acetaminophen (PERCOCET) 5-325 MG per tablet Take 1 tablet by mouth every 6 hours as needed for Pain for up to 5 days. Intended supply: 5 days. Take lowest dose possible to manage pain Max Daily Amount: 4 tablets 1/13/23 1/18/23 Yes CARA Lewis NP   hydroCHLOROthiazide (HYDRODIURIL) 25 MG tablet Take 1 tablet by mouth daily 12/22/22   Scooter Hoffmann PA-C   enalapril (VASOTEC) 10 MG tablet Take 1 tablet by mouth daily TAKE 1 TABLET DAILY 11/3/22   Liam Valadez MD   empagliflozin (JARDIANCE) 10 MG tablet Take 1 tablet by mouth daily  Patient not taking: Reported on 1/5/2023 11/3/22   Liam Valadez MD   spironolactone (ALDACTONE) 25 MG tablet Take 1 tablet by mouth daily 10/18/22   Liam Valadez MD   CPAP Machine MISC by Other route    Historical Provider, MD   potassium chloride (KLOR-CON M) 10 MEQ extended release tablet Take 10 mEq by mouth daily as needed 5/9/22   Historical Provider, MD   clotrimazole-betamethasone (Deveron Sat) 1-0.05 % cream Apply to affected area bid as directed 8/2/22   Liam Valadez MD   simvastatin (ZOCOR) 40 MG tablet Take 1 tablet by mouth daily 7/11/22   Liam Valadez MD   furosemide (LASIX) 20 MG tablet Take 20 mg by mouth daily as needed 5/9/22   Historical Provider, MD   gabapentin (NEURONTIN) 600 MG tablet Take 1 tablet by mouth 2 times daily.  4/19/22   Historical Provider, MD   carvedilol (COREG) 25 MG tablet Take 25 mg by mouth 2 times daily (with meals) 2/1/22   Ar Automatic Reconciliation   cetirizine (ZYRTEC) 10 MG tablet Take by mouth daily as needed    Ar Automatic Reconciliation   dabigatran (PRADAXA) 150 MG capsule TAKE 1 CAPSULE EVERY 12 HOURS 12/9/21   Ar Automatic Reconciliation   guaiFENesin (MUCINEX) 600 MG extended release tablet Take 600 mg by mouth 2 times daily    Ar Automatic Reconciliation metFORMIN (GLUCOPHAGE) 500 MG tablet TAKE 1 TABLET TWICE A DAY WITH A MEAL 3/7/22   Ar Automatic Reconciliation     Allergies   Allergen Reactions    Azithromycin Other (See Comments)     Skin dryness/peeling        Review of Systems:  All systems reviewed and negative except as otherwise stated in the HPI    Objective:   Blood pressure (!) 143/73, pulse 85, temperature 97.6 °F (36.4 °C), temperature source Oral, resp. rate 17, height 6' 2\" (1.88 m), weight (!) 332 lb 12.8 oz (151 kg), SpO2 95 %. Visit Vitals  BP (!) 143/73   Pulse 85   Temp 97.6 °F (36.4 °C) (Oral)   Resp 17   Ht 6' 2\" (1.88 m)   Wt (!) 332 lb 12.8 oz (151 kg)   SpO2 95%   BMI 42.73 kg/m²     Temp (24hrs), Av °F (36.7 °C), Min:97.5 °F (36.4 °C), Max:99 °F (37.2 °C)       Exam:    General:  Alert, cooperative, well noursished, well developed, appears stated age   Eyes:  Sclera anicteric. Pupils equally round and reactive to light. Mouth/Throat: Mucous membranes normal, oral pharynx clear   Neck: Supple   Lungs:   Good effort   CV:  Regular rate and rhythm,no murmur   Abdomen:   Soft, non-tender. bowel sounds normal. non-distended   Extremities: No cyanosis. Bilateral lower extremity edema    Skin: Right upper back previous tube site. Drainage is minimal today but appears likely infected with tenderness and erythema.  No other areas of concern       Musculoskeletal: No swelling or deformity   Lines/Devices:  Intact, no erythema, drainage or tenderness   Psych: Alert and oriented, normal mood affect given the setting       CBC:  Recent Labs     01/15/23  0439 23  0326 23  0142   WBC 19.3* 16.4* 11.2*   HGB 13.4* 12.5* 12.2*   HCT 38.2* 36.3* 35.5*    205 260         BMP:  Recent Labs     01/15/23  0439 23  0326 23  1151   BUN 8 7* 5*   * 127* 134   K 3.6 3.6 3.2*   CL 96* 96* 103   CO2 22 24 24         LFTS:  Recent Labs     23  1151   ALT 27       Data Review:   Recent Results (from the past 24 hour(s))   POCT Glucose    Collection Time: 01/16/23  5:53 PM   Result Value Ref Range    POC Glucose 206 (H) 65 - 100 mg/dL    Performed by: Freya    POCT Glucose    Collection Time: 01/16/23  8:03 PM   Result Value Ref Range    POC Glucose 211 (H) 65 - 100 mg/dL    Performed by: Colleen    Vancomycin Level, Random    Collection Time: 01/17/23  1:42 AM   Result Value Ref Range    Vancomycin Rm 11.8 UG/ML   Procalcitonin    Collection Time: 01/17/23  1:42 AM   Result Value Ref Range    Procalcitonin 0.30 0.00 - 0.49 ng/mL   CBC with Auto Differential    Collection Time: 01/17/23  1:42 AM   Result Value Ref Range    WBC 11.2 (H) 4.3 - 11.1 K/uL    RBC 3.68 (L) 4.23 - 5.6 M/uL    Hemoglobin 12.2 (L) 13.6 - 17.2 g/dL    Hematocrit 35.5 (L) 41.1 - 50.3 %    MCV 96.5 82 - 102 FL    MCH 33.2 (H) 26.1 - 32.9 PG    MCHC 34.4 31.4 - 35.0 g/dL    RDW 12.9 11.9 - 14.6 %    Platelets 631 892 - 944 K/uL    MPV 10.3 9.4 - 12.3 FL    nRBC 0.00 0.0 - 0.2 K/uL    Differential Type AUTOMATED      Seg Neutrophils 86 (H) 43 - 78 %    Lymphocytes 5 (L) 13 - 44 %    Monocytes 6 4.0 - 12.0 %    Eosinophils % 2 0.5 - 7.8 %    Basophils 0 0.0 - 2.0 %    Immature Granulocytes 1 0.0 - 5.0 %    Segs Absolute 9.7 (H) 1.7 - 8.2 K/UL    Absolute Lymph # 0.5 0.5 - 4.6 K/UL    Absolute Mono # 0.7 0.1 - 1.3 K/UL    Absolute Eos # 0.2 0.0 - 0.8 K/UL    Basophils Absolute 0.0 0.0 - 0.2 K/UL    Absolute Immature Granulocyte 0.1 0.0 - 0.5 K/UL   POCT Glucose    Collection Time: 01/17/23  8:05 AM   Result Value Ref Range    POC Glucose 155 (H) 65 - 100 mg/dL    Performed by:  Arnold    POCT Glucose    Collection Time: 01/17/23 11:50 AM   Result Value Ref Range    POC Glucose 183 (H) 65 - 100 mg/dL    Performed by: Max Rand    Comprehensive Metabolic Panel w/ Reflex to MG    Collection Time: 01/17/23 11:51 AM   Result Value Ref Range    Sodium 134 133 - 143 mmol/L    Potassium 3.2 (L) 3.5 - 5.1 mmol/L    Chloride 103 101 - 110 mmol/L    CO2 24 21 - 32 mmol/L    Anion Gap 7 2 - 11 mmol/L    Glucose 193 (H) 65 - 100 mg/dL    BUN 5 (L) 8 - 23 MG/DL    Creatinine 0.60 (L) 0.8 - 1.5 MG/DL    Est, Glom Filt Rate >60 >60 ml/min/1.73m2    Calcium 8.6 8.3 - 10.4 MG/DL    Total Bilirubin 0.7 0.2 - 1.1 MG/DL    ALT 27 12 - 65 U/L    AST 21 15 - 37 U/L    Alk Phosphatase 101 50 - 136 U/L    Total Protein 6.2 (L) 6.3 - 8.2 g/dL    Albumin 2.2 (L) 3.2 - 4.6 g/dL    Globulin 4.0 2.8 - 4.5 g/dL    Albumin/Globulin Ratio 0.6 0.4 - 1.6     Magnesium    Collection Time: 01/17/23 11:51 AM   Result Value Ref Range    Magnesium 2.3 1.8 - 2.4 mg/dL          Microbiology:  Reviewed    Studies:  Reviewed    Signed By: LEANDER Diaz     January 17, 2023

## 2023-01-17 NOTE — PROGRESS NOTES
Hospitalist Progress Note   Admit Date:  2023  3:40 PM   Name:  Steve Sierra   Age:  69 y.o. Sex:  male  :  1955   MRN:  [id number]   Room:  301/01    Presenting Complaint: Chest Pain and Shortness of Breath     Reason(s) for Admission: Acute respiratory failure with hypoxia ([address] [J96.01]  Primary spontaneous pneumothorax [J93.11]  Spontaneous pneumothorax [J93.83]     Hospital Course:   69 y.o. male with medical history hypertension, A. fib on Pradaxa, CASSY, CPAP at night, bullous emphysema, history of bilateral subdural hematomas  and diabetes mellitus presented to ED with acute onset right-sided chest pain. Patient reports he was talking to his neighbor when he developed sharp severe right-sided chest pain, associated with shortness of breath. Reports he has history of spontaneous left pneumothorax in , shortly after putting on his CPAP while hospitalized for subdural hematoma. He is a former smoker, quit years ago. Denies nausea, vomiting, fever, chills or abdominal pain. Chest tube was placed. He had air leak after chest tube was clamped. CT surgery was consulted. He underwent VATS surgery on . Chest removed . Developed fever, BCx grew MRSA. Vancomycin started. ID consulted. Negative echo. Repeat cultures drawn. Subjective & 24hr Events (23): Reviewed CXR, no interval change. Repeat Cx NGTD @48h. Need to wait 72h for PICC placement per ID. Plan EoT . HypoNa with active RASS per urine studies. Ordering AM cortisol. Assessment & Plan:   Spontaneous pneumothorax  Hx pneumothorax, sudden onset dyspnea. Admission XR with large pneumothorax. CT removed .  2023: resolved    Sepsis with MRSA bacteremia  Met criteria with , WBC 19, source MRSA bacteremia.  Echo without evidence of valvular involvement.  -Follow cx  -Consult infectious disease: repeat cultures  -Vancomycin EoT     HypoNatremia  Has required salt tabs for diuresis in the past. Usom >800, Torie <5. Diuresis held. Delayed results 1/17. Appears hypervolemic. -AM cortisol, ACTH  -Salt tabs  -bumetanide x4, albumin    Acute respiratory failure with hypoxia   1/17/2023: resolved    Type 2 diabetes mellitus with hyperglycemia, without long-term current use of insulin   -Sliding scale insulin  -Fingerstick blood glucose  -Titrate basal insulin as indicated  1/17/2023: sGlc 155-211 with 24U basal + 0U SSI last 24 hours, continue basal 24U + SSI    Primary hypertension  -carvedilol, lisinopril, spironolactone  -hold HCTZ for hypoNa    Herpes Simplex infection  -acyclovir     Class 3 severe obesity due to excess calories with serious comorbidity and body mass index (BMI) of 40.0 to 44.9 in adult  Increased risk of all cause mortality, complicating care  - healthy lifestyle at discharge    Atrial fibrillation   -dabigatran    CASSY on CPAP  -CPAP qhs    Hypercoagulable state  Noted        Anticipated discharge needs:      Home with outpatient follow up    Diet:  ADULT DIET;  Regular; 4 carb choices (60 gm/meal)  DVT PPx: on dabigatran  Code status: Full Code    Hospital Problems:  Principal Problem:    Sepsis due to methicillin resistant Staphylococcus aureus (MRSA) ([facility] [address]  Active Problems:    Spontaneous pneumothorax    Primary spontaneous pneumothorax    Herpes simplex virus infection    Primary hypertension    Benign prostatic hyperplasia with nocturia    Class 3 severe obesity due to excess calories with serious comorbidity and body mass index (BMI) of 40.0 to 44.9 in adult [facility])    Atrial fibrillation (HCC)    CASSY on CPAP    Type 2 diabetes mellitus with hyperglycemia, without long-term current use of insulin (HCC)    Anticoagulant long-term use  Resolved Problems:    Acute respiratory failure with hypoxia (HCC)      Objective:   Patient Vitals for the past 24 hrs:   Temp Pulse Resp BP SpO2   01/17/23 0430 97.5 °F (36.4 °C) 93 20 (!) 146/84 95 %   01/17/23 0048 98 °F (36.7 °C) 92 20 131/67 96 %   01/16/23 2036 98 °F (36.7 °C) 78 20 137/72 93 %   01/16/23 1837 99 °F (37.2 °C) (!) 101 20 110/77 94 %   01/16/23 1623 -- 100 -- 133/81 --   01/16/23 1230 97.7 °F (36.5 °C) 93 18 118/65 91 %   01/16/23 0915 -- 95 -- (!) 163/82 --   01/16/23 0800 98.2 °F (36.8 °C) 96 18 134/75 93 %         Oxygen Therapy  SpO2: 95 %  Pulse Oximetry Type: Intermittent  Pulse via Oximetry: 82 beats per minute  Pulse Oximeter Device Mode: Intermittent  Pulse Oximeter Device Location: Finger  O2 Device: None (Room air)  Skin Assessment: Clean, dry, & intact  O2 Flow Rate (L/min): 2 L/min    Estimated body mass index is 42.73 kg/m² as calculated from the following:    Height as of this encounter: 6' 2\" (1.88 m). Weight as of this encounter: 332 lb 12.8 oz (151 kg). Intake/Output Summary (Last 24 hours) at 1/17/2023 0713  Last data filed at 1/17/2023 0436  Gross per 24 hour   Intake 2569.95 ml   Output 1000 ml   Net 1569.95 ml         Blood pressure (!) 146/84, pulse 93, temperature 97.5 °F (36.4 °C), resp. rate 20, height 6' 2\" (1.88 m), weight (!) 332 lb 12.8 oz (151 kg), SpO2 95 %. Physical Exam  Vitals and nursing note reviewed. Constitutional:       General: He is not in acute distress. Appearance: He is morbidly obese. He is not diaphoretic. HENT:      Head: Normocephalic and atraumatic. Eyes:      Extraocular Movements: Extraocular movements intact. Cardiovascular:      Rate and Rhythm: Normal rate and regular rhythm. Pulmonary:      Effort: Pulmonary effort is normal. No respiratory distress. Abdominal:      General: Abdomen is protuberant. There is no distension. Palpations: Abdomen is soft. Tenderness: There is no abdominal tenderness. Comments: Massive paniculus   Musculoskeletal:         General: No deformity. Right lower leg: Edema present. Left lower leg: Edema present. Skin:     Coloration: Skin is not jaundiced or pale.    Neurological:      General: No focal deficit present. Mental Status: He is alert and oriented to person, place, and time. Psychiatric:         Mood and Affect: Mood normal.         Behavior: Behavior normal. Behavior is cooperative.           I have personally reviewed labs and tests showing:  Recent Labs:  Recent Results (from the past 48 hour(s))   POCT Glucose    Collection Time: 01/15/23  8:03 AM   Result Value Ref Range    POC Glucose 173 (H) 65 - 100 mg/dL    Performed by: Ephraim Colunga    EKG 12 Lead    Collection Time: 01/15/23  8:27 AM   Result Value Ref Range    Ventricular Rate 103 BPM    Atrial Rate 125 BPM    QRS Duration 92 ms    Q-T Interval 296 ms    QTc Calculation (Bazett) 387 ms    R Axis 8 degrees    T Axis -55 degrees    Diagnosis       Atrial fibrillation with rapid ventricular response with premature   ventricular or aberrantly conducted complexes     POCT Glucose    Collection Time: 01/15/23 11:31 AM   Result Value Ref Range    POC Glucose 203 (H) 65 - 100 mg/dL    Performed by: Ephraim Colunga    Transthoracic echocardiogram (TTE) complete with contrast, bubble, strain, and 3D PRN    Collection Time: 01/15/23  1:13 PM   Result Value Ref Range    LA Minor Axis 6.4 cm    LA Major Axis 6.6 cm    LA Area 2C 27.7 cm2    LA Area 4C 27.4 cm2    LA Volume 2C 96 (A) 18 - 58 mL    LA Volume 4C 87 (A) 18 - 58 mL    LA Volume BP 93 (A) 18 - 58 mL    LA Diameter 5.4 cm    AV Mean Velocity 0.9 m/s    AV Mean Gradient 4 mmHg    AV VTI 22.0 cm    AV Peak Velocity 1.3 m/s    AV Peak Gradient 7 mmHg    AV Area by VTI 3.1 cm2    AV Area by Peak Velocity 3.2 cm2    Aortic Root 3.6 cm    Ascending Aorta 3.6 cm    IVC Proxmal 2.7 cm    IVSd 0.7 0.6 - 1.0 cm    LVIDd 5.5 4.2 - 5.9 cm    LVIDs 4.4 cm    LVOT Diameter 2.3 cm    LVOT Mean Gradient 2 mmHg    LVOT VTI 16.6 cm    LVOT Peak Velocity 1.0 m/s    LVOT Peak Gradient 4 mmHg    LVPWd 0.9 0.6 - 1.0 cm    LV E' Lateral Velocity 17 cm/s    LV E' Septal Velocity 12 cm/s    LVOT Area 4.2 cm2    LVOT SV 68.9 ml    MV E Wave Deceleration Time 213.0 ms    MV E Velocity 1.01 m/s    PV Max Velocity 1.4 m/s    PV Peak Gradient 8 mmHg    Est. RA Pressure 8 mmHg    RV Free Wall Peak S' 11 cm/s    TR Max Velocity 2.42 m/s    TR Peak Gradient 23 mmHg    Body Surface Area 2.77 m2    Fractional Shortening 2D 20 28 - 44 %    LVIDd Index 2.04 cm/m2    LVIDs Index 1.63 cm/m2    LV RWT Ratio 0.33     LV Mass 2D 160.0 88 - 224 g    LV Mass 2D Index 59.2 49 - 115 g/m2    E/E' Ratio (Averaged) 7.18     E/E' Lateral 5.94     E/E' Septal 8.42     LA Volume Index BP 34 16 - 34 ml/m2    LVOT Stroke Volume Index 25.5 mL/m2    LA Volume Index 2C 36 (A) 16 - 34 mL/m2    LA Volume Index 4C 32 16 - 34 mL/m2    LA Size Index 2.00 cm/m2    LA/AO Root Ratio 1.50     Ao Root Index 1.33 cm/m2    Ascending Aorta Index 1.33 cm/m2    AV Velocity Ratio 0.77     LVOT:AV VTI Index 0.75     TRINH/BSA VTI 1.1 cm2/m2    TRINH/BSA Peak Velocity 1.2 cm2/m2    RVSP 31 mmHg    Left Ventricular Ejection Fraction 48     LVEF MODALITY ECHO    POCT Glucose    Collection Time: 01/15/23  3:46 PM   Result Value Ref Range    POC Glucose 163 (H) 65 - 100 mg/dL    Performed by: Felecia Lew    POCT Glucose    Collection Time: 01/15/23  8:11 PM   Result Value Ref Range    POC Glucose 189 (H) 65 - 100 mg/dL    Performed by: Colleen    Phosphorus    Collection Time: 01/16/23  3:26 AM   Result Value Ref Range    Phosphorus 1.9 (L) 2.3 - 3.7 MG/DL   Albumin    Collection Time: 01/16/23  3:26 AM   Result Value Ref Range    Albumin 2.1 (L) 3.2 - 4.6 g/dL   Basic Metabolic Panel w/ Reflex to MG    Collection Time: 01/16/23  3:26 AM   Result Value Ref Range    Sodium 127 (L) 133 - 143 mmol/L    Potassium 3.6 3.5 - 5.1 mmol/L    Chloride 96 (L) 101 - 110 mmol/L    CO2 24 21 - 32 mmol/L    Anion Gap 7 2 - 11 mmol/L    Glucose 154 (H) 65 - 100 mg/dL    BUN 7 (L) 8 - 23 MG/DL    Creatinine 0.60 (L) 0.8 - 1.5 MG/DL    Est, Glom Filt Rate >60 >60 ml/min/1.73m2    Calcium 8.1 (L) 8.3 - 10.4 MG/DL   CBC    Collection Time: 01/16/23  3:26 AM   Result Value Ref Range    WBC 16.4 (H) 4.3 - 11.1 K/uL    RBC 3.85 (L) 4.23 - 5.6 M/uL    Hemoglobin 12.5 (L) 13.6 - 17.2 g/dL    Hematocrit 36.3 (L) 41.1 - 50.3 %    MCV 94.3 82 - 102 FL    MCH 32.5 26.1 - 32.9 PG    MCHC 34.4 31.4 - 35.0 g/dL    RDW 12.7 11.9 - 14.6 %    Platelets 061 370 - 669 K/uL    MPV 9.8 9.4 - 12.3 FL    nRBC 0.00 0.0 - 0.2 K/uL   Procalcitonin    Collection Time: 01/16/23  3:26 AM   Result Value Ref Range    Procalcitonin 0.55 (H) 0.00 - 0.49 ng/mL   Vancomycin Level, Random    Collection Time: 01/16/23  3:26 AM   Result Value Ref Range    Vancomycin Rm 15.7 UG/ML   POCT Glucose    Collection Time: 01/16/23  7:05 AM   Result Value Ref Range    POC Glucose 152 (H) 65 - 100 mg/dL    Performed by: [employer]    POCT Glucose    Collection Time: 01/16/23 11:47 AM   Result Value Ref Range    POC Glucose 213 (H) 65 - 100 mg/dL    Performed by: [employer]    POCT Glucose    Collection Time: 01/16/23  5:53 PM   Result Value Ref Range    POC Glucose 206 (H) 65 - 100 mg/dL    Performed by: [email or web address]    POCT Glucose    Collection Time: 01/16/23  8:03 PM   Result Value Ref Range    POC Glucose 211 (H) 65 - 100 mg/dL    Performed by: Colleen    Vancomycin Level, Random    Collection Time: 01/17/23  1:42 AM   Result Value Ref Range    Vancomycin Rm 11.8 UG/ML   Procalcitonin    Collection Time: 01/17/23  1:42 AM   Result Value Ref Range    Procalcitonin 0.30 0.00 - 0.49 ng/mL       I have personally reviewed imaging studies showing: Other Studies:  XR CHEST (2 VW)   Final Result      No significant interval change. XR CHEST PORTABLE   Final Result   1. Removal of the right chest tubes. No pneumothorax. 2.  Little interval change otherwise. XR CHEST PORTABLE   Final Result   -No significant interval change. No definite visible pneumothorax.             XR CHEST PORTABLE Final Result      1. Stable right chest tubes. No visible pneumothorax. 2. Persistent bibasilar atelectasis. 3. No significant change. XR CHEST PORTABLE   Final Result   No appreciable pneumothorax in the current study. XR CHEST PORTABLE   Final Result      1. Stable right chest tubes. No significant pneumothorax. 2. Right basilar atelectasis. 3. No significant change. XR CHEST PORTABLE   Final Result      1. Stable right chest tubes. No visible pneumothorax on today's study. 2. Right basilar atelectasis. XR CHEST PORTABLE   Final Result   Placement of 2 basilar chest tubes with probable small residual   right apical pneumothorax. XR CHEST PORTABLE   Final Result      1. Small right apical pneumothorax. Right chest tube is in place. 2. Right lung base opacity, likely atelectasis. 3. No significant change compared to prior. XR CHEST PORTABLE   Final Result      1. Persistent small right apical pneumothorax. Right chest tube is stable. XR CHEST PORTABLE   Final Result      1. New small right apical pneumothorax, despite presence of the right-sided   chest tube. 2. Bibasilar atelectasis. XR CHEST PORTABLE   Final Result   Unchanged bibasilar lung atelectasis. XR CHEST PORTABLE   Final Result   No evidence of pneumothorax         XR CHEST PORTABLE   Final Result   1. Resolved right pneumothorax. 2. Mild bibasilar lung atelectasis. XR CHEST PORTABLE   Final Result   1. Right chest tube placed, with markedly smaller pneumothorax. 2.  Resolution of the previous tension component. 3.  Mild bibasilar atelectasis. CT CHEST WO CONTRAST   Final Result   1. Persisting large right pneumothorax. 2.  Leftward deviation of mediastinal contours concerning for tension component. 3.  Partial atelectasis of right lung. 4.  Emphysema. 5.  Vascular disease.       Urgent findings communicated to the ordering NP Lupillo Cannon by the secure text   messaging system at 6:30 PM.      XR CHEST PORTABLE   Final Result   1. Large right pneumothorax. 2.  Pulmonary vascular congestion.                Findings discussed with Dr. Mark Victoria by myself at 4:42 PM.             Current Meds:  Current Facility-Administered Medications   Medication Dose Route Frequency    sodium chloride tablet 1 g  1 g Oral TID     vancomycin (VANCOCIN) 1750 mg in sodium chloride 0.9 % 500 mL IVPB  1,750 mg IntraVENous Q12H    acyclovir (ZOVIRAX) capsule 400 mg  400 mg Oral TID    insulin glargine (LANTUS) injection vial 24 Units  24 Units SubCUTAneous Daily    levalbuterol (XOPENEX) nebulization 0.63 mg  0.63 mg Nebulization Q8H PRN    0.9 % sodium chloride infusion   IntraVENous Continuous    polyethylene glycol (GLYCOLAX) packet 17 g  17 g Oral BID    simethicone (MYLICON) chewable tablet 80 mg  80 mg Oral Q6H PRN    [Held by provider] metFORMIN (GLUCOPHAGE) tablet 500 mg  500 mg Oral BID     cetirizine (ZYRTEC) tablet 10 mg  10 mg Oral Daily PRN    methocarbamol (ROBAXIN) tablet 750 mg  750 mg Oral 4x Daily    glycerin (ADULT) suppository 2 g  1 suppository Rectal Daily PRN    HYDROmorphone HCl PF (DILAUDID) injection 0.5 mg  0.5 mg IntraVENous Q4H PRN    oxyCODONE-acetaminophen (PERCOCET) 5-325 MG per tablet 1 tablet  1 tablet Oral Q4H PRN    oxyCODONE-acetaminophen (PERCOCET) 5-325 MG per tablet 2 tablet  2 tablet Oral Q4H PRN    bisacodyl (DULCOLAX) suppository 10 mg  10 mg Rectal Daily PRN    glucose chewable tablet 16 g  4 tablet Oral PRN    dextrose bolus 10% 125 mL  125 mL IntraVENous PRN    Or    dextrose bolus 10% 250 mL  250 mL IntraVENous PRN    glucagon (rDNA) injection 1 mg  1 mg SubCUTAneous PRN    dextrose 10 % infusion   IntraVENous Continuous PRN    metoprolol (LOPRESSOR) injection 5 mg  5 mg IntraVENous Q6H PRN    carvedilol (COREG) tablet 25 mg  25 mg Oral BID     dabigatran (PRADAXA) capsule 150 mg  150 mg Oral BID    lisinopril (PRINIVIL;ZESTRIL) tablet 10 mg  10 mg Oral Daily    gabapentin (NEURONTIN) capsule 600 mg  600 mg Oral BID    [Held by provider] hydroCHLOROthiazide (HYDRODIURIL) tablet 25 mg  25 mg Oral Daily    atorvastatin (LIPITOR) tablet 20 mg  20 mg Oral Daily    spironolactone (ALDACTONE) tablet 25 mg  25 mg Oral Daily    sodium chloride flush 0.9 % injection 5-40 mL  5-40 mL IntraVENous 2 times per day    sodium chloride flush 0.9 % injection 5-40 mL  5-40 mL IntraVENous PRN    0.9 % sodium chloride infusion   IntraVENous PRN    ondansetron (ZOFRAN-ODT) disintegrating tablet 4 mg  4 mg Oral Q8H PRN    Or    ondansetron (ZOFRAN) injection 4 mg  4 mg IntraVENous Q6H PRN    acetaminophen (TYLENOL) tablet 650 mg  650 mg Oral Q6H PRN    Or    acetaminophen (TYLENOL) suppository 650 mg  650 mg Rectal Q6H PRN    acetaminophen (TYLENOL) tablet 500 mg  500 mg Oral Q4H    insulin lispro (HUMALOG) injection vial 0-8 Units  0-8 Units SubCUTAneous TID     insulin lispro (HUMALOG) injection vial 0-4 Units  0-4 Units SubCUTAneous Nightly       Signed:  Geoffrey Montejo MD

## 2023-01-17 NOTE — PROGRESS NOTES
Need to wait 72h for PICC placement per ID. EOT: 1/30/23. CM sent referral to [facility] and made aware that the pt will likely need IV abx at discharge. Ellen aware and will run insurance ITM. ID will need to consult CM with abx dosage to order with infusion company when labs are back.

## 2023-01-17 NOTE — PROGRESS NOTES
Admit Date: 2023    POD 8 Days Post-Op    Procedure:  Procedure(s):  RIGHT VATS/WEDGE BLEBECTOMY/RIGHT UPPER LOBE WEDGE BLEBECTOMY/RIGHT LOWER LOBE WEDGE BLEBECTOMY/TALC PLEURODESIS    Subjective:     Pt sitting recliner eating lunch. No complaints. Wife at bedside. Alert with NAD. Off oxygen. CT x 2 removed over weekend    Objective:       Vitals:    23 0048 23 0430 23 0803 23 0910   BP: 131/67 (!) 146/84 (!) 143/86    Pulse: 92 93 85 (!) 101   Resp: 20 20 19 15   Temp: 98 °F (36.7 °C) 97.5 °F (36.4 °C) 97.7 °F (36.5 °C)    TempSrc: Oral  Oral    SpO2: 96% 95% 94% 94%   Weight:       Height:           Temp (24hrs), Av °F (36.7 °C), Min:97.5 °F (36.4 °C), Max:99 °F (37.2 °C)    Intake/Output Summary (Last 24 hours) at 2023 1150  Last data filed at 2023 0910  Gross per 24 hour   Intake 2569.95 ml   Output 800 ml   Net 1769.95 ml         Physical Exam  Constitutional:       General: He is not in acute distress. Appearance: He is obese. Cardiovascular:      Rate and Rhythm: Normal rate. Heart sounds: Normal heart sounds. Pulmonary:      Effort: Pulmonary effort is normal. No respiratory distress. Breath sounds: Normal breath sounds. Comments: Posterior chest tube sites: most superior: skin dehiscence with purulent drainage. Silver dressing in place. Interior CT site: clean, mild skin separation, scant drainage. Staple site below axilla R intact without erythema or drainage. Musculoskeletal:         General: No swelling. Normal range of motion. Cervical back: No rigidity. Skin:     General: Skin is warm and dry. Neurological:      Mental Status: He is alert.       Labs:   Recent Results (from the past 24 hour(s))   POCT Glucose    Collection Time: 23  5:53 PM   Result Value Ref Range    POC Glucose 206 (H) 65 - 100 mg/dL    Performed by: Freya    POCT Glucose    Collection Time: 23  8:03 PM   Result Value Ref Range    POC Glucose 211 (H) 65 - 100 mg/dL    Performed by: Colleen    Vancomycin Level, Random    Collection Time: 01/17/23  1:42 AM   Result Value Ref Range    Vancomycin Rm 11.8 UG/ML   Procalcitonin    Collection Time: 01/17/23  1:42 AM   Result Value Ref Range    Procalcitonin 0.30 0.00 - 0.49 ng/mL   CBC with Auto Differential    Collection Time: 01/17/23  1:42 AM   Result Value Ref Range    WBC 11.2 (H) 4.3 - 11.1 K/uL    RBC 3.68 (L) 4.23 - 5.6 M/uL    Hemoglobin 12.2 (L) 13.6 - 17.2 g/dL    Hematocrit 35.5 (L) 41.1 - 50.3 %    MCV 96.5 82 - 102 FL    MCH 33.2 (H) 26.1 - 32.9 PG    MCHC 34.4 31.4 - 35.0 g/dL    RDW 12.9 11.9 - 14.6 %    Platelets 228 208 - 034 K/uL    MPV 10.3 9.4 - 12.3 FL    nRBC 0.00 0.0 - 0.2 K/uL    Differential Type AUTOMATED      Seg Neutrophils 86 (H) 43 - 78 %    Lymphocytes 5 (L) 13 - 44 %    Monocytes 6 4.0 - 12.0 %    Eosinophils % 2 0.5 - 7.8 %    Basophils 0 0.0 - 2.0 %    Immature Granulocytes 1 0.0 - 5.0 %    Segs Absolute 9.7 (H) 1.7 - 8.2 K/UL    Absolute Lymph # 0.5 0.5 - 4.6 K/UL    Absolute Mono # 0.7 0.1 - 1.3 K/UL    Absolute Eos # 0.2 0.0 - 0.8 K/UL    Basophils Absolute 0.0 0.0 - 0.2 K/UL    Absolute Immature Granulocyte 0.1 0.0 - 0.5 K/UL   POCT Glucose    Collection Time: 01/17/23  8:05 AM   Result Value Ref Range    POC Glucose 155 (H) 65 - 100 mg/dL    Performed by:  Lefty Carmona         Assessment:     Principal Problem:    Sepsis due to methicillin resistant Staphylococcus aureus (MRSA) ([address]  Active Problems:    Spontaneous pneumothorax    Anticoagulant long-term use    Primary spontaneous pneumothorax    Herpes simplex virus infection    Primary hypertension    Benign prostatic hyperplasia with nocturia    Class 3 severe obesity due to excess calories with serious comorbidity and body mass index (BMI) of 40.0 to 44.9 in adult [facility])    Atrial fibrillation (HCC)    CASSY on CPAP    Type 2 diabetes mellitus with hyperglycemia, without long-term current use of insulin [facility])  Resolved Problems:    Acute respiratory failure with hypoxia [facility])        Plan/Recommendations/Medical Decision Making:   Care Management per Hospitalist  Leukocytosis markedly improved  ID consulted for MRSA + [facility] 1/14/23 thought to be related to CT site. Treatment with Vancomycin and local wound care [facility] following and applied silver dressing)- site stable/healing  Will continue to follow during hospital course incase Upper CT wound requires debridement. Will remove staples tomorrow.

## 2023-01-17 NOTE — PLAN OF CARE
Problem: Discharge Planning  Goal: Discharge to home or other facility with appropriate resources  Outcome: Progressing  Flowsheets  Taken 1/17/2023 0823 by Tierney Stafford RN  Discharge to home or other facility with appropriate resources:   Identify barriers to discharge with patient and caregiver   Arrange for needed discharge resources and transportation as appropriate   Identify discharge learning needs (meds, wound care, etc)  Taken 1/16/2023 2000 by Ahsan Mckenzie RN  Discharge to home or other facility with appropriate resources: Identify barriers to discharge with patient and caregiver     Problem: Chronic Conditions and Co-morbidities  Goal: Patient's chronic conditions and co-morbidity symptoms are monitored and maintained or improved  Outcome: Progressing  Flowsheets  Taken 1/17/2023 0823 by Lexie Robertson 34 - Patient's Chronic Conditions and Co-Morbidity Symptoms are Monitored and Maintained or Improved:   Monitor and assess patient's chronic conditions and comorbid symptoms for stability, deterioration, or improvement   Collaborate with multidisciplinary team to address chronic and comorbid conditions and prevent exacerbation or deterioration  Taken 1/16/2023 2000 by Lexie Lee 34 - Patient's Chronic Conditions and Co-Morbidity Symptoms are Monitored and Maintained or Improved: Monitor and assess patient's chronic conditions and comorbid symptoms for stability, deterioration, or improvement

## 2023-01-17 NOTE — PROGRESS NOTES
Educated patient on using urinal so his output can be measured to make sure the IVP Bumex is working. Pt states he understands. Urinal at bedside.

## 2023-01-18 LAB
ALBUMIN SERPL-MCNC: 2.5 G/DL (ref 3.2–4.6)
ANION GAP SERPL CALC-SCNC: 7 MMOL/L (ref 2–11)
BUN SERPL-MCNC: 5 MG/DL (ref 8–23)
CALCIUM SERPL-MCNC: 8.3 MG/DL (ref 8.3–10.4)
CHLORIDE SERPL-SCNC: 103 MMOL/L (ref 101–110)
CO2 SERPL-SCNC: 27 MMOL/L (ref 21–32)
CORTIS 1H P CHAL SERPL-MCNC: 35.3 UG/DL
CORTIS 30M P CHAL SERPL-MCNC: 29.9 UG/DL
CORTIS BS SERPL-MCNC: 9.5 UG/DL
CREAT SERPL-MCNC: 0.6 MG/DL (ref 0.8–1.5)
ERYTHROCYTE [DISTWIDTH] IN BLOOD BY AUTOMATED COUNT: 13 % (ref 11.9–14.6)
GLUCOSE BLD STRIP.AUTO-MCNC: 164 MG/DL (ref 65–100)
GLUCOSE BLD STRIP.AUTO-MCNC: 175 MG/DL (ref 65–100)
GLUCOSE BLD STRIP.AUTO-MCNC: 218 MG/DL (ref 65–100)
GLUCOSE BLD STRIP.AUTO-MCNC: 259 MG/DL (ref 65–100)
GLUCOSE SERPL-MCNC: 144 MG/DL (ref 65–100)
HCT VFR BLD AUTO: 32.9 % (ref 41.1–50.3)
HGB BLD-MCNC: 11.4 G/DL (ref 13.6–17.2)
MAGNESIUM SERPL-MCNC: 2.1 MG/DL (ref 1.8–2.4)
MCH RBC QN AUTO: 32.7 PG (ref 26.1–32.9)
MCHC RBC AUTO-ENTMCNC: 34.7 G/DL (ref 31.4–35)
MCV RBC AUTO: 94.3 FL (ref 82–102)
NRBC # BLD: 0 K/UL (ref 0–0.2)
PHOSPHATE SERPL-MCNC: 2.7 MG/DL (ref 2.3–3.7)
PLATELET # BLD AUTO: 269 K/UL (ref 150–450)
PMV BLD AUTO: 9 FL (ref 9.4–12.3)
POTASSIUM SERPL-SCNC: 3.2 MMOL/L (ref 3.5–5.1)
PROCALCITONIN SERPL-MCNC: 0.21 NG/ML (ref 0–0.49)
RBC # BLD AUTO: 3.49 M/UL (ref 4.23–5.6)
SERVICE CMNT-IMP: ABNORMAL
SODIUM SERPL-SCNC: 137 MMOL/L (ref 133–143)
VANCOMYCIN SERPL-MCNC: 10.7 UG/ML
WBC # BLD AUTO: 8.4 K/UL (ref 4.3–11.1)

## 2023-01-18 PROCEDURE — 6370000000 HC RX 637 (ALT 250 FOR IP): Performed by: FAMILY MEDICINE

## 2023-01-18 PROCEDURE — 80048 BASIC METABOLIC PNL TOTAL CA: CPT

## 2023-01-18 PROCEDURE — 83735 ASSAY OF MAGNESIUM: CPT

## 2023-01-18 PROCEDURE — 1100000000 HC RM PRIVATE

## 2023-01-18 PROCEDURE — 2580000003 HC RX 258: Performed by: FAMILY MEDICINE

## 2023-01-18 PROCEDURE — 80202 ASSAY OF VANCOMYCIN: CPT

## 2023-01-18 PROCEDURE — 82533 TOTAL CORTISOL: CPT

## 2023-01-18 PROCEDURE — 84145 PROCALCITONIN (PCT): CPT

## 2023-01-18 PROCEDURE — 84100 ASSAY OF PHOSPHORUS: CPT

## 2023-01-18 PROCEDURE — 6370000000 HC RX 637 (ALT 250 FOR IP): Performed by: INTERNAL MEDICINE

## 2023-01-18 PROCEDURE — P9047 ALBUMIN (HUMAN), 25%, 50ML: HCPCS | Performed by: FAMILY MEDICINE

## 2023-01-18 PROCEDURE — 82040 ASSAY OF SERUM ALBUMIN: CPT

## 2023-01-18 PROCEDURE — 6370000000 HC RX 637 (ALT 250 FOR IP)

## 2023-01-18 PROCEDURE — 6370000000 HC RX 637 (ALT 250 FOR IP): Performed by: SURGERY

## 2023-01-18 PROCEDURE — 97530 THERAPEUTIC ACTIVITIES: CPT

## 2023-01-18 PROCEDURE — 82962 GLUCOSE BLOOD TEST: CPT

## 2023-01-18 PROCEDURE — 6360000002 HC RX W HCPCS: Performed by: INTERNAL MEDICINE

## 2023-01-18 PROCEDURE — 2580000003 HC RX 258: Performed by: SURGERY

## 2023-01-18 PROCEDURE — 6360000002 HC RX W HCPCS: Performed by: FAMILY MEDICINE

## 2023-01-18 PROCEDURE — 2500000003 HC RX 250 WO HCPCS: Performed by: FAMILY MEDICINE

## 2023-01-18 PROCEDURE — 85027 COMPLETE CBC AUTOMATED: CPT

## 2023-01-18 PROCEDURE — 36415 COLL VENOUS BLD VENIPUNCTURE: CPT

## 2023-01-18 PROCEDURE — 2580000003 HC RX 258: Performed by: INTERNAL MEDICINE

## 2023-01-18 RX ORDER — INSULIN GLARGINE 100 [IU]/ML
25 INJECTION, SOLUTION SUBCUTANEOUS DAILY
Status: DISCONTINUED | OUTPATIENT
Start: 2023-01-19 | End: 2023-01-19 | Stop reason: HOSPADM

## 2023-01-18 RX ORDER — INSULIN LISPRO 100 [IU]/ML
5 INJECTION, SOLUTION INTRAVENOUS; SUBCUTANEOUS
Status: DISCONTINUED | OUTPATIENT
Start: 2023-01-18 | End: 2023-01-19 | Stop reason: HOSPADM

## 2023-01-18 RX ORDER — LANOLIN ALCOHOL/MO/W.PET/CERES
CREAM (GRAM) TOPICAL 2 TIMES DAILY PRN
Status: DISCONTINUED | OUTPATIENT
Start: 2023-01-18 | End: 2023-01-19 | Stop reason: HOSPADM

## 2023-01-18 RX ORDER — POTASSIUM CHLORIDE 20 MEQ/1
40 TABLET, EXTENDED RELEASE ORAL ONCE
Status: DISCONTINUED | OUTPATIENT
Start: 2023-01-18 | End: 2023-01-19 | Stop reason: HOSPADM

## 2023-01-18 RX ADMIN — BUMETANIDE 1 MG: 0.25 INJECTION INTRAMUSCULAR; INTRAVENOUS at 20:24

## 2023-01-18 RX ADMIN — INSULIN LISPRO 4 UNITS: 100 INJECTION, SOLUTION INTRAVENOUS; SUBCUTANEOUS at 12:25

## 2023-01-18 RX ADMIN — ACETAMINOPHEN 500 MG: 500 TABLET, FILM COATED ORAL at 17:10

## 2023-01-18 RX ADMIN — GABAPENTIN 600 MG: 300 CAPSULE ORAL at 08:19

## 2023-01-18 RX ADMIN — OXYCODONE AND ACETAMINOPHEN 2 TABLET: 5; 325 TABLET ORAL at 20:29

## 2023-01-18 RX ADMIN — ACETAMINOPHEN 500 MG: 500 TABLET, FILM COATED ORAL at 08:20

## 2023-01-18 RX ADMIN — ACETAMINOPHEN 500 MG: 500 TABLET, FILM COATED ORAL at 04:00

## 2023-01-18 RX ADMIN — SPIRONOLACTONE 25 MG: 25 TABLET ORAL at 08:19

## 2023-01-18 RX ADMIN — INSULIN LISPRO 5 UNITS: 100 INJECTION, SOLUTION INTRAVENOUS; SUBCUTANEOUS at 13:57

## 2023-01-18 RX ADMIN — METHOCARBAMOL TABLETS 750 MG: 750 TABLET, COATED ORAL at 20:23

## 2023-01-18 RX ADMIN — BUMETANIDE 1 MG: 0.25 INJECTION INTRAMUSCULAR; INTRAVENOUS at 08:16

## 2023-01-18 RX ADMIN — Medication 1 G: at 08:19

## 2023-01-18 RX ADMIN — CARVEDILOL 25 MG: 25 TABLET, FILM COATED ORAL at 08:19

## 2023-01-18 RX ADMIN — VANCOMYCIN HYDROCHLORIDE 1500 MG: 10 INJECTION, POWDER, LYOPHILIZED, FOR SOLUTION INTRAVENOUS at 13:11

## 2023-01-18 RX ADMIN — SODIUM CHLORIDE, PRESERVATIVE FREE 10 ML: 5 INJECTION INTRAVENOUS at 20:25

## 2023-01-18 RX ADMIN — DEXTROSE MONOHYDRATE 0.25 MG/HR: 5 INJECTION, SOLUTION INTRAVENOUS at 08:24

## 2023-01-18 RX ADMIN — POLYETHYLENE GLYCOL 3350 17 G: 17 POWDER, FOR SOLUTION ORAL at 08:20

## 2023-01-18 RX ADMIN — GABAPENTIN 600 MG: 300 CAPSULE ORAL at 20:30

## 2023-01-18 RX ADMIN — ATORVASTATIN CALCIUM 20 MG: 20 TABLET, FILM COATED ORAL at 08:19

## 2023-01-18 RX ADMIN — ACYCLOVIR 400 MG: 200 CAPSULE ORAL at 08:19

## 2023-01-18 RX ADMIN — POTASSIUM BICARBONATE 40 MEQ: 782 TABLET, EFFERVESCENT ORAL at 09:38

## 2023-01-18 RX ADMIN — LISINOPRIL 10 MG: 5 TABLET ORAL at 08:18

## 2023-01-18 RX ADMIN — ACYCLOVIR 400 MG: 200 CAPSULE ORAL at 20:30

## 2023-01-18 RX ADMIN — VANCOMYCIN HYDROCHLORIDE 1750 MG: 10 INJECTION, POWDER, LYOPHILIZED, FOR SOLUTION INTRAVENOUS at 05:30

## 2023-01-18 RX ADMIN — DABIGATRAN ETEXILATE MESYLATE 150 MG: 150 CAPSULE ORAL at 20:30

## 2023-01-18 RX ADMIN — INSULIN GLARGINE 24 UNITS: 100 INJECTION, SOLUTION SUBCUTANEOUS at 08:20

## 2023-01-18 RX ADMIN — ACYCLOVIR 400 MG: 200 CAPSULE ORAL at 13:11

## 2023-01-18 RX ADMIN — INSULIN LISPRO 2 UNITS: 100 INJECTION, SOLUTION INTRAVENOUS; SUBCUTANEOUS at 17:11

## 2023-01-18 RX ADMIN — VANCOMYCIN HYDROCHLORIDE 1500 MG: 10 INJECTION, POWDER, LYOPHILIZED, FOR SOLUTION INTRAVENOUS at 21:38

## 2023-01-18 RX ADMIN — CARVEDILOL 25 MG: 25 TABLET, FILM COATED ORAL at 17:10

## 2023-01-18 RX ADMIN — ALBUMIN (HUMAN) 12.5 G: 0.25 INJECTION, SOLUTION INTRAVENOUS at 00:51

## 2023-01-18 RX ADMIN — METFORMIN HYDROCHLORIDE 500 MG: 500 TABLET ORAL at 17:10

## 2023-01-18 RX ADMIN — ALBUMIN (HUMAN) 12.5 G: 0.25 INJECTION, SOLUTION INTRAVENOUS at 07:25

## 2023-01-18 RX ADMIN — METHOCARBAMOL TABLETS 750 MG: 750 TABLET, COATED ORAL at 12:25

## 2023-01-18 RX ADMIN — SODIUM CHLORIDE, PRESERVATIVE FREE 10 ML: 5 INJECTION INTRAVENOUS at 08:24

## 2023-01-18 RX ADMIN — DABIGATRAN ETEXILATE MESYLATE 150 MG: 150 CAPSULE ORAL at 08:19

## 2023-01-18 RX ADMIN — ACETAMINOPHEN 500 MG: 500 TABLET, FILM COATED ORAL at 12:25

## 2023-01-18 RX ADMIN — INSULIN LISPRO 5 UNITS: 100 INJECTION, SOLUTION INTRAVENOUS; SUBCUTANEOUS at 17:11

## 2023-01-18 RX ADMIN — METHOCARBAMOL TABLETS 750 MG: 750 TABLET, COATED ORAL at 17:10

## 2023-01-18 RX ADMIN — Medication 1 G: at 12:25

## 2023-01-18 RX ADMIN — METHOCARBAMOL TABLETS 750 MG: 750 TABLET, COATED ORAL at 08:19

## 2023-01-18 ASSESSMENT — PAIN SCALES - GENERAL
PAINLEVEL_OUTOF10: 0
PAINLEVEL_OUTOF10: 6
PAINLEVEL_OUTOF10: 0

## 2023-01-18 ASSESSMENT — PAIN DESCRIPTION - LOCATION: LOCATION: BACK

## 2023-01-18 ASSESSMENT — PAIN DESCRIPTION - ORIENTATION: ORIENTATION: MID

## 2023-01-18 ASSESSMENT — PAIN DESCRIPTION - DESCRIPTORS: DESCRIPTORS: ACHING

## 2023-01-18 NOTE — PROGRESS NOTES
Occupational Therapy Note:    Occupational therapy orders are being completed at this time. Since initial evaluation, pt has progressed well with ADL performance and functional mobility. Patient now completing ADLs with supervision. Activity tolerance and mobility remains patient's main limiting factor to occupational performance. Will defer to PT for functional mobility and activity tolerance needs.     Yahaira Arango, OTR/L, CLT

## 2023-01-18 NOTE — PROGRESS NOTES
VANCO DAILY FOLLOW UP NOTE  [address] [facility] Pharmacokinetic Monitoring Service - Vancomycin    Consulting Provider: Zaheer Roblero NP   Indication: MRSA BSI  Target Concentration: Goal AUC/LAINA 400-600 mg*hr/L  Day of Therapy: 4  Additional Antimicrobials: n/a    Patient eligible for piperacillin-tazobactam to cefepime auto-substitution per P&T approved protocol? N/A    Pertinent Laboratory Values: Wt Readings from Last 1 Encounters:   01/18/23 (!) 337 lb 9.6 oz (153.1 kg)     Temp Readings from Last 1 Encounters:   01/18/23 98.2 °F (36.8 °C) (Oral)     Recent Labs     01/16/23  0326 01/17/23  0142 01/17/23  1151 01/18/23  0400   BUN 7*  --  5* 5*   CREATININE 0.60*  --  0.60* 0.60*   WBC 16.4* 11.2*  --  8.4   PROCAL 0.55* 0.30  --  0.21     Estimated Creatinine Clearance: 182 mL/min (A) (based on SCr of 0.6 mg/dL (L)). Lab Results   Component Value Date/Time    VANCORANDOM 10.7 01/18/2023 04:00 AM       MRSA Nasal Swab: N/A.  Non-respiratory infection      Assessment:  Date/Time Dose Concentration AUC   1/16 0326 1500 mg q12h 15.7 337   1/17 0142 1750 mg q12h 11.8 409   1/18 0400 1750 mg q12h 10.8 370   Note: Serum concentrations collected for AUC dosing may appear elevated if collected in close proximity to the dose administered, this is not necessarily an indication of toxicity    Plan:  Current dosing regimen is sub-therapeutic  Increase dose to 1500 mg q8h for predicted AUC/Tr of 476/13.3  Repeat vancomycin concentration ordered for 1/19 @ 0900    Pharmacy will continue to monitor patient and adjust therapy as indicated    Thank you for the consult,  VINEET Payan SABRINA [facility]

## 2023-01-18 NOTE — PROGRESS NOTES
Admit Date: 2023    POD 9 Days Post-Op    Procedure:  Procedure(s):  RIGHT VATS/WEDGE BLEBECTOMY/RIGHT UPPER LOBE WEDGE BLEBECTOMY/RIGHT LOWER LOBE WEDGE BLEBECTOMY/TALC PLEURODESIS    Subjective:     Pt sitting recliner. No complaints. Wife at bedside. Alert with NAD. Stable on RA  CT x 2 removed over weekend  Surgical staples intact/dry to be removed    Objective:       Vitals:    23 0135 23 0439 23 0816 23 1247   BP: 123/79 127/87 123/89 (!) 156/71   Pulse: 93 86 87 94   Resp:    Temp: 97.3 °F (36.3 °C) 98.2 °F (36.8 °C)  98.1 °F (36.7 °C)   TempSrc: Oral Oral  Oral   SpO2: 95% 93%  95%   Weight:  (!) 337 lb 9.6 oz (153.1 kg)     Height:           Temp (24hrs), Av.8 °F (36.6 °C), Min:97.3 °F (36.3 °C), Max:98.2 °F (36.8 °C)    Intake/Output Summary (Last 24 hours) at 2023 1526  Last data filed at 2023 1238  Gross per 24 hour   Intake 970 ml   Output --   Net 970 ml         Physical Exam  Constitutional:       General: He is not in acute distress. Appearance: He is obese. Cardiovascular:      Rate and Rhythm: Normal rate. Heart sounds: Normal heart sounds. Pulmonary:      Effort: Pulmonary effort is normal. No respiratory distress. Breath sounds: Normal breath sounds. Comments: Posterior chest tube sites: with intact dressings, no drainage. Not removed. R axilla staples removed. Dry dressing applied. ER Chest tube site anterior chest apex x 1 suture removed/site is dry/healed    Abdominal:      Comments: Linear rash/abrasion right upper abdomen is dry. Looks like tape burn   Musculoskeletal:         General: No swelling. Normal range of motion. Cervical back: No rigidity. Skin:     General: Skin is warm and dry. Neurological:      Mental Status: He is alert.       Labs:   Recent Results (from the past 24 hour(s))   POCT Glucose    Collection Time: 23  4:28 PM   Result Value Ref Range    POC Glucose 203 (H) 65 - 100 mg/dL Performed by: Dwyer (Cain)N    POCT Glucose    Collection Time: 01/17/23  7:48 PM   Result Value Ref Range    POC Glucose 191 (H) 65 - 100 mg/dL    Performed by: Colleen    Procalcitonin    Collection Time: 01/18/23  4:00 AM   Result Value Ref Range    Procalcitonin 0.21 0.00 - 0.49 ng/mL   Phosphorus    Collection Time: 01/18/23  4:00 AM   Result Value Ref Range    Phosphorus 2.7 2.3 - 3.7 MG/DL   Albumin    Collection Time: 01/18/23  4:00 AM   Result Value Ref Range    Albumin 2.5 (L) 3.2 - 4.6 g/dL   Basic Metabolic Panel w/ Reflex to MG    Collection Time: 01/18/23  4:00 AM   Result Value Ref Range    Sodium 137 133 - 143 mmol/L    Potassium 3.2 (L) 3.5 - 5.1 mmol/L    Chloride 103 101 - 110 mmol/L    CO2 27 21 - 32 mmol/L    Anion Gap 7 2 - 11 mmol/L    Glucose 144 (H) 65 - 100 mg/dL    BUN 5 (L) 8 - 23 MG/DL    Creatinine 0.60 (L) 0.8 - 1.5 MG/DL    Est, Glom Filt Rate >60 >60 ml/min/1.73m2    Calcium 8.3 8.3 - 10.4 MG/DL   CBC    Collection Time: 01/18/23  4:00 AM   Result Value Ref Range    WBC 8.4 4.3 - 11.1 K/uL    RBC 3.49 (L) 4.23 - 5.6 M/uL    Hemoglobin 11.4 (L) 13.6 - 17.2 g/dL    Hematocrit 32.9 (L) 41.1 - 50.3 %    MCV 94.3 82 - 102 FL    MCH 32.7 26.1 - 32.9 PG    MCHC 34.7 31.4 - 35.0 g/dL    RDW 13.0 11.9 - 14.6 %    Platelets 254 328 - 433 K/uL    MPV 9.0 (L) 9.4 - 12.3 FL    nRBC 0.00 0.0 - 0.2 K/uL   Cortisol, Baseline    Collection Time: 01/18/23  4:00 AM   Result Value Ref Range    Cortisol 9.5 ug/dL   Vancomycin Level, Random    Collection Time: 01/18/23  4:00 AM   Result Value Ref Range    Vancomycin Rm 10.7 UG/ML   Magnesium    Collection Time: 01/18/23  4:00 AM   Result Value Ref Range    Magnesium 2.1 1.8 - 2.4 mg/dL   POCT Glucose    Collection Time: 01/18/23  7:03 AM   Result Value Ref Range    POC Glucose 164 (H) 65 - 100 mg/dL    Performed by: Freya    Cortisol 30 Min, Total    Collection Time: 01/18/23  8:56 AM   Result Value Ref Range    Cortisol, 30 Min 29.9 ug/dL   Cortisol 60 Min, Total    Collection Time: 01/18/23  9:25 AM   Result Value Ref Range    Cortisol, 60 Min 35.3 ug/dL   POCT Glucose    Collection Time: 01/18/23 11:50 AM   Result Value Ref Range    POC Glucose 259 (H) 65 - 100 mg/dL    Performed by: Freya         Assessment:     Principal Problem:    Sepsis due to methicillin resistant Staphylococcus aureus (MRSA) ([facility] [address]  Active Problems:    Spontaneous pneumothorax    Anticoagulant long-term use    Primary spontaneous pneumothorax    Herpes simplex virus infection    Primary hypertension    Benign prostatic hyperplasia with nocturia    Class 3 severe obesity due to excess calories with serious comorbidity and body mass index (BMI) of 40.0 to 44.9 in adult [facility])    Atrial fibrillation (HCC)    CASSY on CPAP    Type 2 diabetes mellitus with hyperglycemia, without long-term current use of insulin ([facility]  Resolved Problems:    Acute respiratory failure with hypoxia [facility])        Plan/Recommendations/Medical Decision Making:   Care Management per Hospitalist  Leukocytosis markedly improved  ID consulted for MRSA + [facility] 1/14/23 thought to be related to CT site. Treatment with Vancomycin and local wound care [facility] following and applied silver dressing)- site stable/healing  Will continue to follow during hospital course incase Upper CT wound requires debridement. Surgical staples removed. Will provide a follow up appt with Dr Gennaro Lechuga to ensure well healed CT sites in 2 weeks.

## 2023-01-18 NOTE — PROGRESS NOTES
Hospitalist Progress Note   Admit Date:  2023  3:40 PM   Name:  Lilibeth Alvarez   Age:  69 y.o. Sex:  male  :  1955   MRN:  [id number]   Room:  301/01    Presenting Complaint: Chest Pain and Shortness of Breath     Reason(s) for Admission: Acute respiratory failure with hypoxia ([address] [J96.01]  Primary spontaneous pneumothorax [J93.11]  Spontaneous pneumothorax [J93.83]     Hospital Course:   69 y.o. male with medical history hypertension, A. fib on Pradaxa, CASSY, CPAP at night, bullous emphysema, history of bilateral subdural hematomas 2017 and diabetes mellitus presented to ED with acute onset right-sided chest pain. Patient reports he was talking to his neighbor when he developed sharp severe right-sided chest pain, associated with shortness of breath. Reports he has history of spontaneous left pneumothorax in , shortly after putting on his CPAP while hospitalized for subdural hematoma. He is a former smoker, quit years ago. Denies nausea, vomiting, fever, chills or abdominal pain. Chest tube was placed. He had air leak after chest tube was clamped. CT surgery was consulted. He underwent VATS surgery on . Chest removed . Developed fever, BCx grew MRSA. Vancomycin started. ID consulted. Negative echo. Repeat cultures drawn. Subjective & 24hr Events (23): Pt feeling better. Still has some pain on R side at CT site. Some drainage as well. No SOB and not on oxygen. Hb dropping but no overt bleeding. Assessment & Plan:   Spontaneous pneumothorax  -recheck CXR in AM before possible discharge      Anemia  -worsening. check CXR in AM to rule out blood pleural effusion or other issue. No overt bleeding noted. -daily CBC      Volume overload  -may be from IVF. Echo no CHF. Cont bumex for now  -daily BMP  -cont aldactone    Sepsis with MRSA bacteremia  -repeat blood cx NGTD.   May be able to leave tomorrow with PICC  -Vancomycin EoT 1/30    HypoNatremia  -resolved. AM cortisol wnl. Stop salt tabs    Type 2 diabetes mellitus with hyperglycemia, without long-term current use of insulin   -uncontrolled. increase lantus to 25u and add prandial insulin    Primary hypertension  -carvedilol, lisinopril, spironolactone  -hold HCTZ for hypoNa    Herpes Simplex infection  -acyclovir    Atrial fibrillation   -dabigatran    CASSY on CPAP  -CPAP qhs      Anticipated discharge needs:      Home with outpatient follow up     Diet:  ADULT DIET; Regular; 4 carb choices (60 gm/meal)  DVT PPx: on dabigatran  Code status: Full Code    Hospital Problems:  Principal Problem:    Sepsis due to methicillin resistant Staphylococcus aureus (MRSA) ([facility] [address]  Active Problems:    Spontaneous pneumothorax    Anticoagulant long-term use    Primary spontaneous pneumothorax    Herpes simplex virus infection    Primary hypertension    Benign prostatic hyperplasia with nocturia    Class 3 severe obesity due to excess calories with serious comorbidity and body mass index (BMI) of 40.0 to 44.9 in adult [facility])    Atrial fibrillation (HCC)    CASSY on CPAP    Type 2 diabetes mellitus with hyperglycemia, without long-term current use of insulin ([facility] [address]  Resolved Problems:    Acute respiratory failure with hypoxia (HCC)      Objective:   Patient Vitals for the past 24 hrs:   Temp Pulse Resp BP SpO2   01/18/23 1247 98.1 °F (36.7 °C) 94 20 (!) 156/71 95 %   01/18/23 0816 -- 87 18 123/89 --   01/18/23 0439 98.2 °F (36.8 °C) 86 18 127/87 93 %   01/18/23 0135 97.3 °F (36.3 °C) 93 18 123/79 95 %   01/17/23 2342 -- -- 17 -- --   01/17/23 2312 -- -- 18 -- --   01/17/23 2057 97.5 °F (36.4 °C) (!) 101 20 (!) 143/79 94 %   01/17/23 1624 97.8 °F (36.6 °C) 94 20 137/77 94 %         Oxygen Therapy  SpO2: 95 %  Pulse Oximetry Type:  Intermittent  Pulse via Oximetry: 82 beats per minute  Pulse Oximeter Device Mode: Intermittent  Pulse Oximeter Device Location: Finger  O2 Device: None (Room air)  Skin Assessment: Clean, dry, & intact  O2 Flow Rate (L/min): 2 L/min    Estimated body mass index is 43.35 kg/m² as calculated from the following:    Height as of this encounter: 6' 2\" (1.88 m). Weight as of this encounter: 337 lb 9.6 oz (153.1 kg). Intake/Output Summary (Last 24 hours) at 1/18/2023 1348  Last data filed at 1/18/2023 1238  Gross per 24 hour   Intake 970 ml   Output --   Net 970 ml         Blood pressure (!) 156/71, pulse 94, temperature 98.1 °F (36.7 °C), temperature source Oral, resp. rate 20, height 6' 2\" (1.88 m), weight (!) 337 lb 9.6 oz (153.1 kg), SpO2 95 %. Physical Exam  Vitals and nursing note reviewed. Constitutional:       General: He is not in acute distress. Appearance: He is morbidly obese. He is not diaphoretic. HENT:      Head: Normocephalic and atraumatic. Eyes:      Extraocular Movements: Extraocular movements intact. Cardiovascular:      Rate and Rhythm: Normal rate and regular rhythm. Pulmonary:      Effort: No respiratory distress. Abdominal:      General: Abdomen is protuberant. There is no distension. Comments: Massive paniculus   Musculoskeletal:         General: No deformity. Right lower leg: Edema present. Left lower leg: Edema present. Skin:     Coloration: Skin is not jaundiced or pale. Neurological:      General: No focal deficit present. Mental Status: He is alert. Psychiatric:         Mood and Affect: Mood normal.         Behavior: Behavior normal. Behavior is cooperative.           I have personally reviewed labs and tests showing:  Recent Labs:  Recent Results (from the past 48 hour(s))   POCT Glucose    Collection Time: 01/16/23  5:53 PM   Result Value Ref Range    POC Glucose 206 (H) 65 - 100 mg/dL    Performed by: Freya    POCT Glucose    Collection Time: 01/16/23  8:03 PM   Result Value Ref Range    POC Glucose 211 (H) 65 - 100 mg/dL    Performed by: Colleen    Vancomycin Level, Random    Collection Time: 01/17/23  1:42 AM   Result Value Ref Range    Vancomycin Rm 11.8 UG/ML   Procalcitonin    Collection Time: 01/17/23  1:42 AM   Result Value Ref Range    Procalcitonin 0.30 0.00 - 0.49 ng/mL   CBC with Auto Differential    Collection Time: 01/17/23  1:42 AM   Result Value Ref Range    WBC 11.2 (H) 4.3 - 11.1 K/uL    RBC 3.68 (L) 4.23 - 5.6 M/uL    Hemoglobin 12.2 (L) 13.6 - 17.2 g/dL    Hematocrit 35.5 (L) 41.1 - 50.3 %    MCV 96.5 82 - 102 FL    MCH 33.2 (H) 26.1 - 32.9 PG    MCHC 34.4 31.4 - 35.0 g/dL    RDW 12.9 11.9 - 14.6 %    Platelets 342 867 - 969 K/uL    MPV 10.3 9.4 - 12.3 FL    nRBC 0.00 0.0 - 0.2 K/uL    Differential Type AUTOMATED      Seg Neutrophils 86 (H) 43 - 78 %    Lymphocytes 5 (L) 13 - 44 %    Monocytes 6 4.0 - 12.0 %    Eosinophils % 2 0.5 - 7.8 %    Basophils 0 0.0 - 2.0 %    Immature Granulocytes 1 0.0 - 5.0 %    Segs Absolute 9.7 (H) 1.7 - 8.2 K/UL    Absolute Lymph # 0.5 0.5 - 4.6 K/UL    Absolute Mono # 0.7 0.1 - 1.3 K/UL    Absolute Eos # 0.2 0.0 - 0.8 K/UL    Basophils Absolute 0.0 0.0 - 0.2 K/UL    Absolute Immature Granulocyte 0.1 0.0 - 0.5 K/UL   POCT Glucose    Collection Time: 01/17/23  8:05 AM   Result Value Ref Range    POC Glucose 155 (H) 65 - 100 mg/dL    Performed by:  Arnold    POCT Glucose    Collection Time: 01/17/23 11:50 AM   Result Value Ref Range    POC Glucose 183 (H) 65 - 100 mg/dL    Performed by: Max Rand    Comprehensive Metabolic Panel w/ Reflex to MG    Collection Time: 01/17/23 11:51 AM   Result Value Ref Range    Sodium 134 133 - 143 mmol/L    Potassium 3.2 (L) 3.5 - 5.1 mmol/L    Chloride 103 101 - 110 mmol/L    CO2 24 21 - 32 mmol/L    Anion Gap 7 2 - 11 mmol/L    Glucose 193 (H) 65 - 100 mg/dL    BUN 5 (L) 8 - 23 MG/DL    Creatinine 0.60 (L) 0.8 - 1.5 MG/DL    Est, Glom Filt Rate >60 >60 ml/min/1.73m2    Calcium 8.6 8.3 - 10.4 MG/DL    Total Bilirubin 0.7 0.2 - 1.1 MG/DL    ALT 27 12 - 65 U/L    AST 21 15 - 37 U/L    Alk Phosphatase 101 50 - 136 U/L    Total Protein 6.2 (L) 6.3 - 8.2 g/dL    Albumin 2.2 (L) 3.2 - 4.6 g/dL    Globulin 4.0 2.8 - 4.5 g/dL    Albumin/Globulin Ratio 0.6 0.4 - 1.6     Magnesium    Collection Time: 01/17/23 11:51 AM   Result Value Ref Range    Magnesium 2.3 1.8 - 2.4 mg/dL   Osmolality    Collection Time: 01/17/23  1:42 PM   Result Value Ref Range    Serum Osmolality 279 (L) 280 - 301 MOSM/kg H2O   POCT Glucose    Collection Time: 01/17/23  4:28 PM   Result Value Ref Range    POC Glucose 203 (H) 65 - 100 mg/dL    Performed by: Navarro (Cain)    POCT Glucose    Collection Time: 01/17/23  7:48 PM   Result Value Ref Range    POC Glucose 191 (H) 65 - 100 mg/dL    Performed by: Colleen    Procalcitonin    Collection Time: 01/18/23  4:00 AM   Result Value Ref Range    Procalcitonin 0.21 0.00 - 0.49 ng/mL   Phosphorus    Collection Time: 01/18/23  4:00 AM   Result Value Ref Range    Phosphorus 2.7 2.3 - 3.7 MG/DL   Albumin    Collection Time: 01/18/23  4:00 AM   Result Value Ref Range    Albumin 2.5 (L) 3.2 - 4.6 g/dL   Basic Metabolic Panel w/ Reflex to MG    Collection Time: 01/18/23  4:00 AM   Result Value Ref Range    Sodium 137 133 - 143 mmol/L    Potassium 3.2 (L) 3.5 - 5.1 mmol/L    Chloride 103 101 - 110 mmol/L    CO2 27 21 - 32 mmol/L    Anion Gap 7 2 - 11 mmol/L    Glucose 144 (H) 65 - 100 mg/dL    BUN 5 (L) 8 - 23 MG/DL    Creatinine 0.60 (L) 0.8 - 1.5 MG/DL    Est, Glom Filt Rate >60 >60 ml/min/1.73m2    Calcium 8.3 8.3 - 10.4 MG/DL   CBC    Collection Time: 01/18/23  4:00 AM   Result Value Ref Range    WBC 8.4 4.3 - 11.1 K/uL    RBC 3.49 (L) 4.23 - 5.6 M/uL    Hemoglobin 11.4 (L) 13.6 - 17.2 g/dL    Hematocrit 32.9 (L) 41.1 - 50.3 %    MCV 94.3 82 - 102 FL    MCH 32.7 26.1 - 32.9 PG    MCHC 34.7 31.4 - 35.0 g/dL    RDW 13.0 11.9 - 14.6 %    Platelets 419 172 - 899 K/uL    MPV 9.0 (L) 9.4 - 12.3 FL    nRBC 0.00 0.0 - 0.2 K/uL   Cortisol, Baseline    Collection Time: 01/18/23  4:00 AM   Result Value Ref Range    Cortisol 9.5 ug/dL   Vancomycin Level, Random    Collection Time: 01/18/23  4:00 AM   Result Value Ref Range    Vancomycin Rm 10.7 UG/ML   Magnesium    Collection Time: 01/18/23  4:00 AM   Result Value Ref Range    Magnesium 2.1 1.8 - 2.4 mg/dL   POCT Glucose    Collection Time: 01/18/23  7:03 AM   Result Value Ref Range    POC Glucose 164 (H) 65 - 100 mg/dL    Performed by: Freya    Cortisol 30 Min, Total    Collection Time: 01/18/23  8:56 AM   Result Value Ref Range    Cortisol, 30 Min 29.9 ug/dL   Cortisol 60 Min, Total    Collection Time: 01/18/23  9:25 AM   Result Value Ref Range    Cortisol, 60 Min 35.3 ug/dL   POCT Glucose    Collection Time: 01/18/23 11:50 AM   Result Value Ref Range    POC Glucose 259 (H) 65 - 100 mg/dL    Performed by: Freya        I have personally reviewed imaging studies showing: Other Studies:  XR CHEST (2 VW)   Final Result      No significant interval change. XR CHEST PORTABLE   Final Result   1. Removal of the right chest tubes. No pneumothorax. 2.  Little interval change otherwise. XR CHEST PORTABLE   Final Result   -No significant interval change. No definite visible pneumothorax. XR CHEST PORTABLE   Final Result      1. Stable right chest tubes. No visible pneumothorax. 2. Persistent bibasilar atelectasis. 3. No significant change. XR CHEST PORTABLE   Final Result   No appreciable pneumothorax in the current study. XR CHEST PORTABLE   Final Result      1. Stable right chest tubes. No significant pneumothorax. 2. Right basilar atelectasis. 3. No significant change. XR CHEST PORTABLE   Final Result      1. Stable right chest tubes. No visible pneumothorax on today's study. 2. Right basilar atelectasis. XR CHEST PORTABLE   Final Result   Placement of 2 basilar chest tubes with probable small residual   right apical pneumothorax. XR CHEST PORTABLE   Final Result      1. Small right apical pneumothorax. Right chest tube is in place. 2. Right lung base opacity, likely atelectasis. 3. No significant change compared to prior. XR CHEST PORTABLE   Final Result      1. Persistent small right apical pneumothorax. Right chest tube is stable. XR CHEST PORTABLE   Final Result      1. New small right apical pneumothorax, despite presence of the right-sided   chest tube. 2. Bibasilar atelectasis. XR CHEST PORTABLE   Final Result   Unchanged bibasilar lung atelectasis. XR CHEST PORTABLE   Final Result   No evidence of pneumothorax         XR CHEST PORTABLE   Final Result   1. Resolved right pneumothorax. 2. Mild bibasilar lung atelectasis. XR CHEST PORTABLE   Final Result   1. Right chest tube placed, with markedly smaller pneumothorax. 2.  Resolution of the previous tension component. 3.  Mild bibasilar atelectasis. CT CHEST WO CONTRAST   Final Result   1. Persisting large right pneumothorax. 2.  Leftward deviation of mediastinal contours concerning for tension component. 3.  Partial atelectasis of right lung. 4.  Emphysema. 5.  Vascular disease. Urgent findings communicated to the ordering NP Carolin Davies by the secure text   messaging system at 6:30 PM.      XR CHEST PORTABLE   Final Result   1. Large right pneumothorax. 2.  Pulmonary vascular congestion.                Findings discussed with Dr. Chad Gonzales by myself at 4:42 PM.         XR CHEST PORTABLE    (Results Pending)       Current Meds:  Current Facility-Administered Medications   Medication Dose Route Frequency    vancomycin (VANCOCIN) 1500 mg in sodium chloride 0.9% 500 mL IVPB  1,500 mg IntraVENous Q8H    [START ON 1/19/2023] insulin glargine (LANTUS) injection vial 25 Units  25 Units SubCUTAneous Daily    insulin lispro (HUMALOG) injection vial 5 Units  5 Units SubCUTAneous TID WC    bumetanide (BUMEX) injection 1 mg  1 mg IntraVENous BID    potassium chloride (KLOR-CON M) extended release tablet 40 mEq  40 mEq Oral PRN    Or    potassium bicarb-citric acid (EFFER-K) effervescent tablet 40 mEq  40 mEq Oral PRN    Or    potassium chloride 10 mEq/100 mL IVPB (Peripheral Line)  10 mEq IntraVENous PRN    sodium chloride tablet 1 g  1 g Oral TID     acyclovir (ZOVIRAX) capsule 400 mg  400 mg Oral TID    levalbuterol (XOPENEX) nebulization 0.63 mg  0.63 mg Nebulization Q8H PRN    polyethylene glycol (GLYCOLAX) packet 17 g  17 g Oral BID    simethicone (MYLICON) chewable tablet 80 mg  80 mg Oral Q6H PRN    metFORMIN (GLUCOPHAGE) tablet 500 mg  500 mg Oral BID     cetirizine (ZYRTEC) tablet 10 mg  10 mg Oral Daily PRN    methocarbamol (ROBAXIN) tablet 750 mg  750 mg Oral 4x Daily    glycerin (ADULT) suppository 2 g  1 suppository Rectal Daily PRN    HYDROmorphone HCl PF (DILAUDID) injection 0.5 mg  0.5 mg IntraVENous Q4H PRN    oxyCODONE-acetaminophen (PERCOCET) 5-325 MG per tablet 1 tablet  1 tablet Oral Q4H PRN    oxyCODONE-acetaminophen (PERCOCET) 5-325 MG per tablet 2 tablet  2 tablet Oral Q4H PRN    bisacodyl (DULCOLAX) suppository 10 mg  10 mg Rectal Daily PRN    glucose chewable tablet 16 g  4 tablet Oral PRN    dextrose bolus 10% 125 mL  125 mL IntraVENous PRN    Or    dextrose bolus 10% 250 mL  250 mL IntraVENous PRN    glucagon (rDNA) injection 1 mg  1 mg SubCUTAneous PRN    dextrose 10 % infusion   IntraVENous Continuous PRN    metoprolol (LOPRESSOR) injection 5 mg  5 mg IntraVENous Q6H PRN    carvedilol (COREG) tablet 25 mg  25 mg Oral BID     dabigatran (PRADAXA) capsule 150 mg  150 mg Oral BID    lisinopril (PRINIVIL;ZESTRIL) tablet 10 mg  10 mg Oral Daily    gabapentin (NEURONTIN) capsule 600 mg  600 mg Oral BID    [Held by provider] hydroCHLOROthiazide (HYDRODIURIL) tablet 25 mg  25 mg Oral Daily    atorvastatin (LIPITOR) tablet 20 mg  20 mg Oral Daily    spironolactone (ALDACTONE) tablet 25 mg  25 mg Oral Daily    sodium chloride flush 0.9 % injection 5-40 mL  5-40 mL IntraVENous 2 times per day    sodium chloride flush 0.9 % injection 5-40 mL  5-40 mL IntraVENous PRN    0.9 % sodium chloride infusion   IntraVENous PRN    ondansetron (ZOFRAN-ODT) disintegrating tablet 4 mg  4 mg Oral Q8H PRN    Or    ondansetron (ZOFRAN) injection 4 mg  4 mg IntraVENous Q6H PRN    acetaminophen (TYLENOL) tablet 650 mg  650 mg Oral Q6H PRN    Or    acetaminophen (TYLENOL) suppository 650 mg  650 mg Rectal Q6H PRN    acetaminophen (TYLENOL) tablet 500 mg  500 mg Oral Q4H    insulin lispro (HUMALOG) injection vial 0-8 Units  0-8 Units SubCUTAneous TID WC    insulin lispro (HUMALOG) injection vial 0-4 Units  0-4 Units SubCUTAneous Nightly       Signed:  Ifeanyi Thomas MD

## 2023-01-18 NOTE — PLAN OF CARE
Problem: Discharge Planning  Goal: Discharge to home or other facility with appropriate resources  Outcome: Progressing  Flowsheets (Taken 1/18/2023 0816)  Discharge to home or other facility with appropriate resources:   Identify barriers to discharge with patient and caregiver   Arrange for needed discharge resources and transportation as appropriate   Identify discharge learning needs (meds, wound care, etc)     Problem: Chronic Conditions and Co-morbidities  Goal: Patient's chronic conditions and co-morbidity symptoms are monitored and maintained or improved  Outcome: Progressing  Flowsheets  Taken 1/18/2023 0816 by Lexie Sotmoayor 34 - Patient's Chronic Conditions and Co-Morbidity Symptoms are Monitored and Maintained or Improved:   Monitor and assess patient's chronic conditions and comorbid symptoms for stability, deterioration, or improvement   Collaborate with multidisciplinary team to address chronic and comorbid conditions and prevent exacerbation or deterioration  Taken 1/17/2023 1936 by Lexie Hoffman 34 - Patient's Chronic Conditions and Co-Morbidity Symptoms are Monitored and Maintained or Improved: Monitor and assess patient's chronic conditions and comorbid symptoms for stability, deterioration, or improvement

## 2023-01-18 NOTE — PROGRESS NOTES
ACUTE PHYSICAL THERAPY GOALS:   (Developed with and agreed upon by patient and/or caregiver.)  Goals reviewed 1/10/23  (1.) Adelia Patel  will move from supine to sit and sit to supine  with INDEPENDENCE within 7 treatment day(s). (2.) Adelia Patel will transfer from bed to chair and chair to bed with MODIFIED INDEPENDENCE using the least restrictive device within 7 treatment day(s). (3.) Adelia Patel will ambulate with MODIFIED INDEPENDENCE for 250 feet with the least restrictive device within 7 treatment day(s). (4.) Adelia Patel will perform standing static and dynamic balance activities x 20 minutes with SUPERVISION to improve safety within 7 treatment day(s). (5.) Adelia Patel will perform bilateral lower extremity exercises x 20 min for HEP with INDEPENDENCE to improve strength, endurance, and functional mobility within 7 treatment day(s). PHYSICAL THERAPY: Daily Note AM   (Link to Caseload Tracking: PT Visit Days : 4  Time In/Out PT Charge Capture  Rehab Caseload Tracker  Orders    Adelia Patel is a 69 y.o. male   PRIMARY DIAGNOSIS: Sepsis due to methicillin resistant Staphylococcus aureus (MRSA) ([facility] [address]  Acute respiratory failure with hypoxia ([facility] [address] [J96.01]  Primary spontaneous pneumothorax [J93.11]  Spontaneous pneumothorax [J93.83]  Procedure(s) (LRB):  RIGHT VATS/WEDGE BLEBECTOMY/RIGHT UPPER LOBE WEDGE BLEBECTOMY/RIGHT LOWER LOBE WEDGE BLEBECTOMY/TALC PLEURODESIS (Right)  9 Days Post-Op  Inpatient: Payor: MEDICARE / Plan: MEDICARE PART A AND B / Product Type: *No Product type* /     ASSESSMENT:     REHAB RECOMMENDATIONS:   Recommendation to date pending progress:  Setting:  Home Health Therapy    Equipment:    Rolling Walker     ASSESSMENT:  Mr. Tessa Romero was sitting up in recliner chair upon contact and agreeable to PT.   This session, pt demonstrating slight regression relative to previous sessions as he was limited to half his previous ambulation distance (125' today vs previous 250') and cont to require 4-5 standing breaks in order to cover that distance. Similar to yesterday, pt primarily limited by ongoing LBP that he rates as 5/10 as well as generalized fatigue d/t chronic lack of sleep throughout stay. His gait pattern cont to demonstrate dec cj, wide JUDITH and inc postural sway although he ultimately did well to avoid any LOB/ miss-steps. Furthermore, pt performed all activity on RA and denied any dizziness, lightheadedness or SOB while moving about. On return to room, pt able to ambulate in/out of crowded bathroom space without physical (A) required. Pt was left sitting in recliner with wife at bedside. No significant progress noted. Will continue PT efforts.      SUBJECTIVE:   Mr. Radha Linn states, \"I just don't have the same energy today\"     Social/Functional Lives With: Spouse  Type of Home: Cond  Home Layout: One level  Bathroom Toilet: Standard  ADL Assistance: Independent  Homemaking Assistance: Independent  Transfer Assistance: Independent  Active : Yes  Mode of Transportation: Car  Occupation: Retired  OBJECTIVE:     PAIN: VITALS / O2: PRECAUTION / Velton Carota / Perry Muñoz:   Pre Treatment: 5/10 LBP         Post Treatment: 5/10 LBP Vitals        Oxygen   RA IV    RESTRICTIONS/PRECAUTIONS:        MOBILITY: I Mod I S SBA CGA Min Mod Max Total  NT x2 Comments:   Bed Mobility    Rolling [] [] [] [] [] [] [] [] [] [x] []    Supine to Sit [] [] [] [] [] [] [] [] [] [x] []    Scooting [] [] [] [] [] [] [] [] [] [x] []    Sit to Supine [] [] [] [] [] [] [] [] [] [x] []    Transfers    Sit to Stand [] [] [] [x] [] [] [] [] [] [] []    Bed to Chair [] [] [] [x] [x] [] [] [] [] [] []    Stand to Sit [] [] [] [x] [] [] [] [] [] [] []     [] [] [] [] [] [] [] [] [] [] []    I=Independent, Mod I=Modified Independent, S=Supervision, SBA=Standby Assistance, CGA=Contact Guard Assistance,   Min=Minimal Assistance, Mod=Moderate Assistance, Max=Maximal Assistance, Total=Total Assistance, NT=Not Tested    BALANCE: Good Fair+ Fair Fair- Poor NT Comments   Sitting Static [x] [] [] [] [] []    Sitting Dynamic [] [x] [] [] [] []              Standing Static [] [x] [] [] [] []    Standing Dynamic [] [x] [] [] [] []      GAIT: I Mod I S SBA CGA Min Mod Max Total  NT x2 Comments:   Level of Assistance [] [] [] [] [x] [] [] [] [] [] [] Slow but steady   Distance   125' (4-5 standing breaks)    DME Rolling Walker    Gait Quality Decreased cj , Decreased step clearance, Decreased step length, Path deviations , and Trunk sway increased    Weightbearing Status      Stairs      I=Independent, Mod I=Modified Independent, S=Supervision, SBA=Standby Assistance, CGA=Contact Guard Assistance,   Min=Minimal Assistance, Mod=Moderate Assistance, Max=Maximal Assistance, Total=Total Assistance, NT=Not Tested    PLAN:   FREQUENCY AND DURATION: 3 times/week for duration of hospital stay or until stated goals are met, whichever comes first.    TREATMENT:   TREATMENT:   Therapeutic Activity (24 Minutes): Therapeutic activity included Scooting, Transfer Training, Ambulation on level ground, Sitting balance , and Standing balance to improve functional Activity tolerance, Balance, Mobility, and Strength.     TREATMENT GRID:  N/A    AFTER TREATMENT PRECAUTIONS: Bed/Chair Locked, Call light within reach, Chair, Needs within reach, and RN notified    INTERDISCIPLINARY COLLABORATION:  RN/ PCT and PT/ PTA    EDUCATION:      TIME IN/OUT:  Time In: 1040  Time Out: 1104  Minutes: 24    Clyda Patella, PT

## 2023-01-19 ENCOUNTER — APPOINTMENT (OUTPATIENT)
Dept: GENERAL RADIOLOGY | Age: 68
DRG: 163 | End: 2023-01-19
Payer: MEDICARE

## 2023-01-19 VITALS
TEMPERATURE: 98.4 F | OXYGEN SATURATION: 93 % | WEIGHT: 315 LBS | DIASTOLIC BLOOD PRESSURE: 74 MMHG | HEART RATE: 100 BPM | BODY MASS INDEX: 40.43 KG/M2 | RESPIRATION RATE: 18 BRPM | SYSTOLIC BLOOD PRESSURE: 136 MMHG | HEIGHT: 74 IN

## 2023-01-19 PROBLEM — E87.70 VOLUME OVERLOAD: Status: ACTIVE | Noted: 2023-01-19

## 2023-01-19 PROBLEM — J93.11 PRIMARY SPONTANEOUS PNEUMOTHORAX: Status: RESOLVED | Noted: 2023-01-04 | Resolved: 2023-01-19

## 2023-01-19 PROBLEM — E88.09 EDEMA DUE TO HYPOALBUMINEMIA: Status: ACTIVE | Noted: 2023-01-19

## 2023-01-19 PROBLEM — R35.1 BENIGN PROSTATIC HYPERPLASIA WITH NOCTURIA: Chronic | Status: ACTIVE | Noted: 2019-10-02

## 2023-01-19 PROBLEM — I48.91 ATRIAL FIBRILLATION (HCC): Chronic | Status: ACTIVE | Noted: 2017-10-08

## 2023-01-19 PROBLEM — Z99.89 OSA ON CPAP: Chronic | Status: ACTIVE | Noted: 2017-10-13

## 2023-01-19 PROBLEM — Z79.01 ANTICOAGULANT LONG-TERM USE: Chronic | Status: ACTIVE | Noted: 2023-01-04

## 2023-01-19 PROBLEM — G47.33 OSA ON CPAP: Chronic | Status: ACTIVE | Noted: 2017-10-13

## 2023-01-19 PROBLEM — J93.83 SPONTANEOUS PNEUMOTHORAX: Status: RESOLVED | Noted: 2023-01-04 | Resolved: 2023-01-19

## 2023-01-19 PROBLEM — N40.1 BENIGN PROSTATIC HYPERPLASIA WITH NOCTURIA: Chronic | Status: ACTIVE | Noted: 2019-10-02

## 2023-01-19 LAB
ANION GAP SERPL CALC-SCNC: 12 MMOL/L (ref 2–11)
BACTERIA SPEC CULT: NORMAL
BUN SERPL-MCNC: 6 MG/DL (ref 8–23)
CALCIUM SERPL-MCNC: 9.5 MG/DL (ref 8.3–10.4)
CHLORIDE SERPL-SCNC: 102 MMOL/L (ref 101–110)
CO2 SERPL-SCNC: 25 MMOL/L (ref 21–32)
CREAT SERPL-MCNC: 0.7 MG/DL (ref 0.8–1.5)
ERYTHROCYTE [DISTWIDTH] IN BLOOD BY AUTOMATED COUNT: 13.2 % (ref 11.9–14.6)
EST. AVERAGE GLUCOSE BLD GHB EST-MCNC: 166 MG/DL
GLUCOSE BLD STRIP.AUTO-MCNC: 165 MG/DL (ref 65–100)
GLUCOSE BLD STRIP.AUTO-MCNC: 168 MG/DL (ref 65–100)
GLUCOSE SERPL-MCNC: 167 MG/DL (ref 65–100)
HBA1C MFR BLD: 7.4 % (ref 4.8–5.6)
HCT VFR BLD AUTO: 34 % (ref 41.1–50.3)
HGB BLD-MCNC: 11.5 G/DL (ref 13.6–17.2)
MAGNESIUM SERPL-MCNC: 2 MG/DL (ref 1.8–2.4)
MCH RBC QN AUTO: 32.3 PG (ref 26.1–32.9)
MCHC RBC AUTO-ENTMCNC: 33.8 G/DL (ref 31.4–35)
MCV RBC AUTO: 95.5 FL (ref 82–102)
NRBC # BLD: 0 K/UL (ref 0–0.2)
PLATELET # BLD AUTO: 292 K/UL (ref 150–450)
PMV BLD AUTO: 8.8 FL (ref 9.4–12.3)
POTASSIUM SERPL-SCNC: 3.1 MMOL/L (ref 3.5–5.1)
RBC # BLD AUTO: 3.56 M/UL (ref 4.23–5.6)
SERVICE CMNT-IMP: ABNORMAL
SERVICE CMNT-IMP: ABNORMAL
SERVICE CMNT-IMP: NORMAL
SODIUM SERPL-SCNC: 139 MMOL/L (ref 133–143)
WBC # BLD AUTO: 9.2 K/UL (ref 4.3–11.1)

## 2023-01-19 PROCEDURE — 36573 INSJ PICC RS&I 5 YR+: CPT

## 2023-01-19 PROCEDURE — C1751 CATH, INF, PER/CENT/MIDLINE: HCPCS

## 2023-01-19 PROCEDURE — 02HV33Z INSERTION OF INFUSION DEVICE INTO SUPERIOR VENA CAVA, PERCUTANEOUS APPROACH: ICD-10-PCS | Performed by: INTERNAL MEDICINE

## 2023-01-19 PROCEDURE — 6370000000 HC RX 637 (ALT 250 FOR IP): Performed by: SURGERY

## 2023-01-19 PROCEDURE — 82962 GLUCOSE BLOOD TEST: CPT

## 2023-01-19 PROCEDURE — 80048 BASIC METABOLIC PNL TOTAL CA: CPT

## 2023-01-19 PROCEDURE — 71045 X-RAY EXAM CHEST 1 VIEW: CPT

## 2023-01-19 PROCEDURE — 2580000003 HC RX 258: Performed by: INTERNAL MEDICINE

## 2023-01-19 PROCEDURE — 6370000000 HC RX 637 (ALT 250 FOR IP)

## 2023-01-19 PROCEDURE — 97530 THERAPEUTIC ACTIVITIES: CPT

## 2023-01-19 PROCEDURE — 36415 COLL VENOUS BLD VENIPUNCTURE: CPT

## 2023-01-19 PROCEDURE — 83735 ASSAY OF MAGNESIUM: CPT

## 2023-01-19 PROCEDURE — 83036 HEMOGLOBIN GLYCOSYLATED A1C: CPT

## 2023-01-19 PROCEDURE — 6360000002 HC RX W HCPCS: Performed by: INTERNAL MEDICINE

## 2023-01-19 PROCEDURE — 6370000000 HC RX 637 (ALT 250 FOR IP): Performed by: FAMILY MEDICINE

## 2023-01-19 PROCEDURE — 85027 COMPLETE CBC AUTOMATED: CPT

## 2023-01-19 PROCEDURE — 6370000000 HC RX 637 (ALT 250 FOR IP): Performed by: INTERNAL MEDICINE

## 2023-01-19 PROCEDURE — 6370000000 HC RX 637 (ALT 250 FOR IP): Performed by: HOSPITALIST

## 2023-01-19 RX ORDER — FUROSEMIDE 40 MG/1
40 TABLET ORAL DAILY
Status: DISCONTINUED | OUTPATIENT
Start: 2023-01-19 | End: 2023-01-19 | Stop reason: HOSPADM

## 2023-01-19 RX ORDER — PEN NEEDLE, DIABETIC 30 GX3/16"
NEEDLE, DISPOSABLE MISCELLANEOUS
Qty: 100 EACH | Refills: 1 | Status: SHIPPED | OUTPATIENT
Start: 2023-01-19

## 2023-01-19 RX ORDER — POTASSIUM CHLORIDE 20 MEQ/1
40 TABLET, EXTENDED RELEASE ORAL ONCE
Status: COMPLETED | OUTPATIENT
Start: 2023-01-19 | End: 2023-01-19

## 2023-01-19 RX ORDER — SYRING-NEEDL,DISP,INSUL,0.3 ML 30 GX5/16"
1 SYRINGE, EMPTY DISPOSABLE MISCELLANEOUS SEE ADMIN INSTRUCTIONS
Qty: 100 EACH | Refills: 1 | Status: SHIPPED | OUTPATIENT
Start: 2023-01-19

## 2023-01-19 RX ORDER — SODIUM CHLORIDE 0.9 % (FLUSH) 0.9 %
5-40 SYRINGE (ML) INJECTION PRN
Status: DISCONTINUED | OUTPATIENT
Start: 2023-01-19 | End: 2023-01-19 | Stop reason: HOSPADM

## 2023-01-19 RX ORDER — SODIUM CHLORIDE 0.9 % (FLUSH) 0.9 %
5-40 SYRINGE (ML) INJECTION EVERY 12 HOURS SCHEDULED
Status: DISCONTINUED | OUTPATIENT
Start: 2023-01-19 | End: 2023-01-19 | Stop reason: HOSPADM

## 2023-01-19 RX ORDER — INSULIN GLARGINE 100 [IU]/ML
25 INJECTION, SOLUTION SUBCUTANEOUS NIGHTLY
Qty: 7.5 ML | Refills: 2 | Status: SHIPPED | OUTPATIENT
Start: 2023-01-19 | End: 2023-01-19 | Stop reason: SDUPTHER

## 2023-01-19 RX ORDER — BLOOD-GLUCOSE METER
KIT MISCELLANEOUS
Qty: 1 KIT | Refills: 0 | Status: SHIPPED | OUTPATIENT
Start: 2023-01-19

## 2023-01-19 RX ORDER — POTASSIUM CHLORIDE 750 MG/1
TABLET, EXTENDED RELEASE ORAL
Qty: 60 TABLET | Refills: 3 | Status: SHIPPED | OUTPATIENT
Start: 2023-01-19

## 2023-01-19 RX ORDER — INSULIN GLARGINE 100 [IU]/ML
25 INJECTION, SOLUTION SUBCUTANEOUS NIGHTLY
Qty: 7.5 ML | Refills: 2 | Status: SHIPPED | OUTPATIENT
Start: 2023-01-19

## 2023-01-19 RX ORDER — GLUCOSAMINE HCL/CHONDROITIN SU 500-400 MG
CAPSULE ORAL SEE ADMIN INSTRUCTIONS
Qty: 100 STRIP | Refills: 1 | Status: SHIPPED | OUTPATIENT
Start: 2023-01-19 | End: 2023-01-19 | Stop reason: SDUPTHER

## 2023-01-19 RX ORDER — PEN NEEDLE, DIABETIC 30 GX3/16"
NEEDLE, DISPOSABLE MISCELLANEOUS
Qty: 100 EACH | Refills: 1 | Status: SHIPPED | OUTPATIENT
Start: 2023-01-19 | End: 2023-01-19 | Stop reason: SDUPTHER

## 2023-01-19 RX ORDER — SYRING-NEEDL,DISP,INSUL,0.3 ML 30 GX5/16"
1 SYRINGE, EMPTY DISPOSABLE MISCELLANEOUS SEE ADMIN INSTRUCTIONS
Qty: 100 EACH | Refills: 1 | Status: SHIPPED | OUTPATIENT
Start: 2023-01-19 | End: 2023-01-19 | Stop reason: SDUPTHER

## 2023-01-19 RX ORDER — BLOOD-GLUCOSE METER
KIT MISCELLANEOUS
Qty: 1 KIT | Refills: 0 | Status: SHIPPED | OUTPATIENT
Start: 2023-01-19 | End: 2023-01-19 | Stop reason: SDUPTHER

## 2023-01-19 RX ORDER — FUROSEMIDE 20 MG/1
40 TABLET ORAL DAILY PRN
Qty: 60 TABLET | Refills: 1 | Status: SHIPPED | OUTPATIENT
Start: 2023-01-19

## 2023-01-19 RX ORDER — GLUCOSAMINE HCL/CHONDROITIN SU 500-400 MG
CAPSULE ORAL SEE ADMIN INSTRUCTIONS
Qty: 100 STRIP | Refills: 1 | Status: SHIPPED | OUTPATIENT
Start: 2023-01-19

## 2023-01-19 RX ORDER — ACYCLOVIR 200 MG/1
400 CAPSULE ORAL 3 TIMES DAILY
Qty: 12 CAPSULE | Refills: 0 | Status: SHIPPED | OUTPATIENT
Start: 2023-01-19 | End: 2023-01-21

## 2023-01-19 RX ORDER — SODIUM CHLORIDE 9 MG/ML
25 INJECTION, SOLUTION INTRAVENOUS PRN
Status: DISCONTINUED | OUTPATIENT
Start: 2023-01-19 | End: 2023-01-19 | Stop reason: HOSPADM

## 2023-01-19 RX ORDER — LIDOCAINE HYDROCHLORIDE 10 MG/ML
5 INJECTION, SOLUTION EPIDURAL; INFILTRATION; INTRACAUDAL; PERINEURAL ONCE
Status: DISCONTINUED | OUTPATIENT
Start: 2023-01-19 | End: 2023-01-19 | Stop reason: HOSPADM

## 2023-01-19 RX ADMIN — SPIRONOLACTONE 25 MG: 25 TABLET ORAL at 08:08

## 2023-01-19 RX ADMIN — ACYCLOVIR 400 MG: 200 CAPSULE ORAL at 08:08

## 2023-01-19 RX ADMIN — POTASSIUM CHLORIDE 40 MEQ: 20 TABLET, EXTENDED RELEASE ORAL at 06:25

## 2023-01-19 RX ADMIN — ACETAMINOPHEN 500 MG: 500 TABLET, FILM COATED ORAL at 13:13

## 2023-01-19 RX ADMIN — FUROSEMIDE 40 MG: 40 TABLET ORAL at 09:07

## 2023-01-19 RX ADMIN — METHOCARBAMOL TABLETS 750 MG: 750 TABLET, COATED ORAL at 13:12

## 2023-01-19 RX ADMIN — ACETAMINOPHEN 500 MG: 500 TABLET, FILM COATED ORAL at 08:09

## 2023-01-19 RX ADMIN — ACYCLOVIR 400 MG: 200 CAPSULE ORAL at 13:14

## 2023-01-19 RX ADMIN — INSULIN LISPRO 5 UNITS: 100 INJECTION, SOLUTION INTRAVENOUS; SUBCUTANEOUS at 08:14

## 2023-01-19 RX ADMIN — POLYETHYLENE GLYCOL 3350 17 G: 17 POWDER, FOR SOLUTION ORAL at 08:10

## 2023-01-19 RX ADMIN — CARVEDILOL 25 MG: 25 TABLET, FILM COATED ORAL at 08:09

## 2023-01-19 RX ADMIN — ACETAMINOPHEN 500 MG: 500 TABLET, FILM COATED ORAL at 05:22

## 2023-01-19 RX ADMIN — VANCOMYCIN HYDROCHLORIDE 1500 MG: 10 INJECTION, POWDER, LYOPHILIZED, FOR SOLUTION INTRAVENOUS at 05:22

## 2023-01-19 RX ADMIN — LISINOPRIL 10 MG: 5 TABLET ORAL at 08:08

## 2023-01-19 RX ADMIN — INSULIN LISPRO 5 UNITS: 100 INJECTION, SOLUTION INTRAVENOUS; SUBCUTANEOUS at 12:31

## 2023-01-19 RX ADMIN — ATORVASTATIN CALCIUM 20 MG: 20 TABLET, FILM COATED ORAL at 08:08

## 2023-01-19 RX ADMIN — DABIGATRAN ETEXILATE MESYLATE 150 MG: 150 CAPSULE ORAL at 08:08

## 2023-01-19 RX ADMIN — GABAPENTIN 600 MG: 300 CAPSULE ORAL at 08:08

## 2023-01-19 RX ADMIN — INSULIN GLARGINE 25 UNITS: 100 INJECTION, SOLUTION SUBCUTANEOUS at 09:07

## 2023-01-19 RX ADMIN — ACETAMINOPHEN 500 MG: 500 TABLET, FILM COATED ORAL at 00:03

## 2023-01-19 RX ADMIN — METFORMIN HYDROCHLORIDE 500 MG: 500 TABLET ORAL at 08:09

## 2023-01-19 RX ADMIN — METHOCARBAMOL TABLETS 750 MG: 750 TABLET, COATED ORAL at 08:08

## 2023-01-19 ASSESSMENT — PAIN DESCRIPTION - LOCATION
LOCATION: CHEST
LOCATION: BACK
LOCATION: BACK

## 2023-01-19 ASSESSMENT — PAIN DESCRIPTION - DESCRIPTORS
DESCRIPTORS: ACHING
DESCRIPTORS: ACHING;DISCOMFORT
DESCRIPTORS: ACHING

## 2023-01-19 ASSESSMENT — PAIN SCALES - GENERAL
PAINLEVEL_OUTOF10: 3
PAINLEVEL_OUTOF10: 0
PAINLEVEL_OUTOF10: 3
PAINLEVEL_OUTOF10: 3

## 2023-01-19 ASSESSMENT — PAIN DESCRIPTION - ORIENTATION
ORIENTATION: RIGHT
ORIENTATION: MID

## 2023-01-19 NOTE — DIABETES MGMT
Patient admitted with acute respiratory failure, spontaneous pneumothorax, sepsis due to MRSA. Spouse, Laure at bedside. Orders acknowledged for insulin and meter instruction/ DM assessment. FBS: 165. Blood glucose range yesterday 144-259 with pt receiving Lantus 24 units and Humalog 16 units. Patient seen for assessment regarding diabetes management. Patient has a past medical history of type 2 DM, HTN, obesity, CASSY on CPAP, A fib, bullous emphysema, and bilateral subdural hematomas. Patient states they have been living with diabetes for many years and voices positive family history of diabetes. Patient states that prior to admission medications include Metformin 500 mg bid. Pt had tried Jardiance in the past, but developed itching and it was discontinued. Pt states that he has never monitored his blood glucose or attended diabetes education in the past.   Patient given educational material, \"Diabetes Self-Management: A Patient Teaching Guide\", which was reviewed with patient. Explained basic physiology of diabetes, as well as causes, signs and symptoms, and treatments for hypoglycemia and hyperglycemia. Described the effects of poor glycemic control and the development of long-term complications such as renal, eye, nerve, and cardiovascular disease. Patient has numbness and tingling in feet (takes Gabapentin). Described proper diabetic foot care and the importance of checking feet daily. Reviewed effects of sweetened beverages on glycemic control and discussed alternative beverages to help improve glycemic control. Educated re: effects of carbohydrates on blood glucose, the \"plate method\" of healthy meal planning, basics of healthy meal plan, Consistent Carbohydrate Diet, discussed the basics of carb counting and how to read a nutrition label. Explained the relationship between hyperglycemia and infection and delayed healing. Discussed target goals for blood glucose and A1C.  Educated patient regarding diabetic medications including mechanism of action, timing, and possible side effects. Patient verbalizes understanding of teaching. Explained the importance of blood glucose monitoring to patient and how this will provide their primary care provider with more information to help them safely titrate their regimen. Recommended frequency of qid and to record in log book to take to primary care provider appointment, then as per provider. Demonstrated how to use a blood glucose meter, care of strips, and sharps disposal. Educated patient that all glucometers are similar but differ in small ways. Educated patient that they should try to get the glucometer covered by their insurance formulary to keep their costs down. Educated patient to seek out affordable glucometer options that exist at some stores or drug stores if they are ever uninsured. Reviewed target blood glucose goals per American Diabetes Association recommendations. Patient was able to return demonstrate correct use of meter. Patient will need prescription for glucometer kit, test strips, and lancets at discharge so that the patient may obtain the meter covered by their insurance. Educated patient regarding current basal/bolus regimen of Lantus 25 units daily and Humalog correctional scale coverage 4x/day ac and hs, including type of insulin, timing with meals, onset, duration of effect, and peak of insulin dose. Instructed patient to always seek guidance from their primary care provider about adjusting their insulin doses and not to adjust them on their own as this could negatively impact their glycemic control or result in hypoglycemia. Patient and spouse verbalize understanding. Patient instructed on the preparation and injection of insulin dose via vial and syringe method. Nursing will continue to have patient practice insulin self-injection when insulin dose is due and document progress or refusals of insulin practice in progress notes.  Patient educated regarding insulin administration sites. Patient also educated regarding the importance of site rotation, proper storage of insulin, and proper sharps disposal. Patient was also instructed in proper use of insulin pens. Patient was able to return demonstrate proper use of insulin pen using injection model and saline demo pen. Patient prefers insulin pens. Patient will need prescription for insulin pens and pen needles at discharge. Stressed the importance of follow up care for diabetes management with PCP and to bring blood glucose log book to PCP appointments to assist with medication titration. Discussed the importance of good blood  glucose control for proper healing and to prevent complications from DM. Pt states that he has never attended outpatient diabetes education. Referral has been initiated and pt and spouse are agreeable to plan. Pt and spouse have no further questions regarding diabetes management at this time.

## 2023-01-19 NOTE — CARE COORDINATION
Discharge order is in. Pt is discharging home today in stable condition. [facility] arranged with RN/PT/OT with daily wound care and lab draw instructions. [facility] will deliver home IV abx (prior to discharge) per ID OPAT orders:     Vancomycin 1,500 mg q 8 hours with EOT 01/30/23   Weekly labs:   Monday: CBC w/ diff, LFTs, Cr, Vanc trough   Thursday: Cr, Vanc trough   Please pull midline at EOT    CM updated HH order with instructions and made Kim aware. Therapy recommended a standard RW. Given pts H/W, he would need a bariatric walker, however, according to therapy the pt and wife requested a standard one so it will fit through the doorways of their home, plus he has been working with a standard walker while in the hospital and did fine with it. No other discharge needs were identified. Tx goals met. 1405-Pt with PICC line now. Amended [facility] order and alerted Kim of the change. [facility] will deliver his Vanc this evening and his first dose will run tonight at 2100, also made Ellen aware of line change. 01/19/23 9286 [facility] Discharge   Transition of Care Consult (CM Consult) Discharge Planning;Home Health;DME/Supply Assistance; Other  (IV abx-[facility])   Internal Home Health Yes   Services At/After Discharge IV Therapy;OT;PT;Nursing services;DME   [employer] Information Provided? No   Mode of Transport at Discharge Other (see comment)  (Spouse)   Confirm Follow Up Transport Family   Condition of Participation: Discharge Planning   The Patient and/or Patient Representative was provided with a Choice of Provider? Patient   The Patient and/Or Patient Representative agree with the Discharge Plan? Yes   Freedom of Choice list was provided with basic dialogue that supports the patient's individualized plan of care/goals, treatment preferences, and shares the quality data associated with the providers?   Yes

## 2023-01-19 NOTE — PLAN OF CARE
Problem: Discharge Planning  Goal: Discharge to home or other facility with appropriate resources  Outcome: Completed     Problem: Pain  Goal: Verbalizes/displays adequate comfort level or baseline comfort level  Outcome: Completed     Problem: ABCDS Injury Assessment  Goal: Absence of physical injury  Outcome: Completed     Problem: Safety - Adult  Goal: Free from fall injury  Outcome: Completed     Problem: Chronic Conditions and Co-morbidities  Goal: Patient's chronic conditions and co-morbidity symptoms are monitored and maintained or improved  Outcome: Completed

## 2023-01-19 NOTE — WOUND CARE
Patient seen, he stated he is going home today. Wife present and shown how to do dressing at home and gave further suggestions on home dressing options. Answered all questions.

## 2023-01-19 NOTE — PROGRESS NOTES
ACUTE PHYSICAL THERAPY GOALS:   (Developed with and agreed upon by patient and/or caregiver.)  Goals reviewed 1/10/23  (1.) Lilibeth Alvarez  will move from supine to sit and sit to supine  with INDEPENDENCE within 7 treatment day(s). (2.) Lilibeth Alvarez will transfer from bed to chair and chair to bed with MODIFIED INDEPENDENCE using the least restrictive device within 7 treatment day(s). (3.) Lilibeth Alvarez will ambulate with MODIFIED INDEPENDENCE for 250 feet with the least restrictive device within 7 treatment day(s). (4.) Lilibeth Alvarez will perform standing static and dynamic balance activities x 20 minutes with SUPERVISION to improve safety within 7 treatment day(s). (5.) Lilibeth Alvarez will perform bilateral lower extremity exercises x 20 min for HEP with INDEPENDENCE to improve strength, endurance, and functional mobility within 7 treatment day(s). PHYSICAL THERAPY: Daily Note AM   (Link to Caseload Tracking: PT Visit Days : 5  Time In/Out PT Charge Capture  Rehab Caseload Tracker  Orders    Lilibeth Alvarez is a 69 y.o. male   PRIMARY DIAGNOSIS: Sepsis due to methicillin resistant Staphylococcus aureus (MRSA) ([facility] [address]  Acute respiratory failure with hypoxia ([facility] [address] [J96.01]  Primary spontaneous pneumothorax [J93.11]  Spontaneous pneumothorax [J93.83]  Procedure(s) (LRB):  RIGHT VATS/WEDGE BLEBECTOMY/RIGHT UPPER LOBE WEDGE BLEBECTOMY/RIGHT LOWER LOBE WEDGE BLEBECTOMY/TALC PLEURODESIS (Right)  10 Days Post-Op  Inpatient: Payor: MEDICARE / Plan: MEDICARE PART A AND B / Product Type: *No Product type* /     ASSESSMENT:     REHAB RECOMMENDATIONS:   Recommendation to date pending progress:  Setting:  Home Health Therapy    Equipment:    Rolling Walker     ASSESSMENT:  Mr. Devin Mota was sitting up in recliner chair upon contact and agreeable to PT. Patient is making good progress towards PT goals.  Patient ambulated 200' with rolling walker, SBA and several short standing rest breaks noted. Patient returned to recliner chair where he participated in LE exercises. Discussed activity pacing and d/c instructions regarding PT in preparation for discharge home later today. Patient and wife verbalize understanding. Will continue PT efforts.      SUBJECTIVE:   Mr. Diane Weber states, \"Thank you\"     Social/Functional Lives With: Spouse  Type of Home: Condo  Home Layout: One level  Bathroom Toilet: Standard  ADL Assistance: Independent  Homemaking Assistance: Independent  Transfer Assistance: Independent  Active : Yes  Mode of Transportation: Car  Occupation: Retired  OBJECTIVE:     PAIN: VITALS / O2: Juan Daniel Figueroa / Ferne Stalls / [place] Chum:   Pre Treatment: 0  Pain Assessment: None - Denies Pain      Post Treatment: 0 Vitals        Oxygen   RA None    RESTRICTIONS/PRECAUTIONS:        MOBILITY: I Mod I S SBA CGA Min Mod Max Total  NT x2 Comments:   Bed Mobility    Rolling [] [] [] [] [] [] [] [] [] [x] []    Supine to Sit [] [] [] [] [] [] [] [] [] [x] []    Scooting [] [] [] [] [] [] [] [] [] [x] []    Sit to Supine [] [] [] [] [] [] [] [] [] [x] []    Transfers    Sit to Stand [] [] [] [x] [] [] [] [] [] [] []    Bed to Chair [] [] [] [x] [] [] [] [] [] [] []    Stand to Sit [] [] [] [x] [] [] [] [] [] [] []     [] [] [] [] [] [] [] [] [] [] []    I=Independent, Mod I=Modified Independent, S=Supervision, SBA=Standby Assistance, CGA=Contact Guard Assistance,   Min=Minimal Assistance, Mod=Moderate Assistance, Max=Maximal Assistance, Total=Total Assistance, NT=Not Tested    BALANCE: Good Fair+ Fair Fair- Poor NT Comments   Sitting Static [x] [] [] [] [] []    Sitting Dynamic [] [x] [] [] [] []              Standing Static [] [x] [] [] [] []    Standing Dynamic [] [x] [] [] [] []      GAIT: I Mod I S SBA CGA Min Mod Max Total  NT x2 Comments:   Level of Assistance [] [] [] [x] [x] [] [] [] [] [] [] Slow but steady   Distance   200' with several short standing rest breaks    DME Rolling Walker    Gait Quality Decreased cj , Decreased step clearance, Decreased step length, Path deviations , and Trunk sway increased    Weightbearing Status      Stairs      I=Independent, Mod I=Modified Independent, S=Supervision, SBA=Standby Assistance, CGA=Contact Guard Assistance,   Min=Minimal Assistance, Mod=Moderate Assistance, Max=Maximal Assistance, Total=Total Assistance, NT=Not Tested    PLAN:   FREQUENCY AND DURATION: 3 times/week for duration of hospital stay or until stated goals are met, whichever comes first.    TREATMENT:   TREATMENT:   Therapeutic Activity (25 Minutes): Therapeutic activity included Scooting, Transfer Training, Ambulation on level ground, Sitting balance , Standing balance, and LE exercises to improve functional Activity tolerance, Balance, Mobility, and Strength. TREATMENT GRID:  N/A    AFTER TREATMENT PRECAUTIONS: Bed/Chair Locked, Call light within reach, Chair, Needs within reach, and RN notified    INTERDISCIPLINARY COLLABORATION:  RN/ PCT and PT/ PTA    EDUCATION:      TIME IN/OUT:  Time In: 0825  Time Out: 6332 In [employer]  Minutes: 100 [employer] Richard.  EMMIE Natarajan

## 2023-01-19 NOTE — PROGRESS NOTES
Infectious Disease Progress Note      Today's Date: 1/19/2023   Admit Date: 1/4/2023    Impression:   Patient is a 69 with recurrent pneumothorax, s/p VATS on 1/11/23, developed leukocytosis on 1/14/23, Bcx drawn came back positive for MRSA    #MRSA bacteremia  - Etiology: Right superior back CT drain site  - Treatment: Appropriately on vancomycin: needs local wound care  - Follow-up: Blood cx positive 1/14/23 - blood cx ordered for 1/16/23, negative to date  - Duration: If repeat blood cultures are negative and TTE is negative , would treat for 2 weeks    Plan:     Repeat blood cultures (01/16) negative to date. Ok to place midline today for home IV abx. Treat MRSA bacteremia for 2 weeks with Vancomycin through 01/30/23  Engage wound care   Will place OPAT orders today  Patient has scheduled ID office visit 02/07/23 at 2 pm     OPAT  Vancomycin 1,500 mg q 8 hours with EOT 01/30/23  Weekly labs:  Monday: CBC w/ diff, LFTs, Cr, Vanc trough  Thursday: Cr, Vanc trough   Please pull midline at EOT    Anti-infectives:   Vancomycin 1/15/23- present  Cefazolin 1/15/23    Subjective:     Overnight Events: WBC 9.2. Afebrile. Patient up to chair, wife at bedside. Patient feels good this morning. Reviewed plan for 2 weeks IV abx at home, patient and wife with no concerns at this time. Planning for discharge today    Patient is a 69 y.o. male with PMH of  right sided spontaneous pneumothorax in 2017, required tube placement , admitted on 1/4/23 with sudden onset chest pain and shortness of breath. Found to have a large right sided pneumothorax. Had tube placement. Subsequently underwent VATS on 1/11/23. Noted to have leukocytosis on 1/14/23, as part of work-up, had blood cultures which came back positive for MRSA. By Bette Severino. On evaluation today, patient is sitting out comfortably in chair.  He denies any new complaints  No worrisome IV sites  His superior back previous chest tube site was inspected along with other previous tube sites. Nursing staff informed me that there was pus draining from the left upper back site  Drainage is minimal today but appears likely infected with tenderness and erythema (unfortunately I was unable to get pictures, do not yet have epic for on my phone for STF)  No fluctuance      Patient Active Problem List   Diagnosis    Primary hypertension    History of subdural hematoma (post traumatic)    Hepatic steatosis    Osteoarthritis of lumbosacral spine    Benign prostatic hyperplasia with nocturia    Nodular prostate without urinary obstruction    Seasonal allergic rhinitis due to pollen    Class 3 severe obesity due to excess calories with serious comorbidity and body mass index (BMI) of 40.0 to 44.9 in adult (HCC)    Subdural hygroma    Mitral valve regurgitation    Iron excess    Atrial fibrillation (HCC)    CASSY on CPAP    Hereditary hemochromatosis (HCC)    Polyneuropathy associated with underlying disease (HCC)    BPH (benign prostatic hyperplasia)    Coronary artery disease due to lipid rich plaque    Chronic constipation    Mixed dyslipidemia    Type 2 diabetes mellitus with hyperglycemia, without long-term current use of insulin (HCC)    Controlled type 2 diabetes mellitus with peripheral circulatory disorder (HCC)    Spontaneous pneumothorax    Anticoagulant long-term use    Primary spontaneous pneumothorax    Sepsis due to methicillin resistant Staphylococcus aureus (MRSA) (HCC)    Herpes simplex virus infection     Past Medical History:   Diagnosis Date    Acute respiratory failure with hypoxia (HCC) 1/4/2023    Arteriosclerosis     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    Diabetes mellitus (HCC) 1/3/2012    Essential hypertension, benign 2/6/2014    Hemochromatosis     Hypercholesteremia      Nodular prostate without urinary obstruction 2014    Obesity 2014    Pneumothorax, right 10/8/2017    pt was hospitalized for subdural hematoma in 2017. upon return home from 2017 hospitlization, pt experienced RIGHT PTX after placement of CPAP approx. 13-18 cm h2o    Sepsis due to urinary tract infection ([address] 2019    Sleep apnea 10/12/2016    uses ASV instead of CPAP machine - per pt     Subdural hemorrhage following injury 10/20/2017      Family History   Problem Relation Age of Onset    Prostate Cancer Paternal Grandfather     Alcohol Abuse Brother     Heart Failure Brother     Psychiatric Disorder Brother     Stroke Brother     Psychiatric Disorder Mother     Other Mother         Sarcoidosis    Atrial Fibrillation Father     Lung Cancer Father     Hypertension Father     Heart Disease Father 61            Diabetes Father     Heart Disease Paternal Grandmother       Social History     Tobacco Use    Smoking status: Former     Packs/day: 1.50     Types: Cigarettes     Quit date: 1989     Years since quittin.4    Smokeless tobacco: Former     Quit date: 2016   Substance Use Topics    Alcohol use: Yes     Alcohol/week: 8.0 standard drinks     Past Surgical History:   Procedure Laterality Date    CHEST SURGERY  10/2017    pneumothorax after fall    COLONOSCOPY N/A 2018    COLONOSCOPY  BMI 41 performed by Autumn Champion MD at  [facility] Right 10/02/2017    [facility]    KNEE ARTHROSCOPY Right     OTHER SURGICAL HISTORY Bilateral 10/18/2017    [facility]    THORACOSCOPY Right 2023    RIGHT VATS/WEDGE BLEBECTOMY/RIGHT UPPER LOBE WEDGE BLEBECTOMY/RIGHT LOWER LOBE WEDGE BLEBECTOMY/TALC PLEURODESIS performed by Corey Cote MD at [facility] MAIN OR    TURP  2019    UROLOGICAL SURGERY      prostate u/s and BX      Prior to Admission medications    Medication Sig Start Date End Date Taking?  Authorizing Provider   acyclovir (ZOVIRAX) 200 MG capsule Take 2 capsules by mouth 3 times daily for 2 days 1/19/23 1/21/23 Yes Yanni Sotomayor MD   furosemide (LASIX) 20 MG tablet Take 2 tablets by mouth daily as needed (edema or fluid retention) 1/19/23  Yes Yanni Sotomayor MD   potassium chloride (KLOR-CON M) 10 MEQ extended release tablet Take 10meq when taking 20mg lasix. Take 20meq when taking 40mg lasix 1/19/23  Yes Yanni Sotomayor MD   methocarbamol (ROBAXIN-750) 750 MG tablet Take 1 tablet by mouth 4 times daily for 10 days 1/13/23 1/23/23 Yes CARA Bain NP   hydroCHLOROthiazide (HYDRODIURIL) 25 MG tablet Take 1 tablet by mouth daily 12/22/22   Mariella Ibanez PA-C   enalapril (VASOTEC) 10 MG tablet Take 1 tablet by mouth daily TAKE 1 TABLET DAILY 11/3/22   Jose Apodaca MD   spironolactone (ALDACTONE) 25 MG tablet Take 1 tablet by mouth daily 10/18/22   Jose Apodaca MD   CPAP Machine MISC by Other route    Historical Provider, MD   clotrimazole-betamethasone (Meagan Apley) 1-0.05 % cream Apply to affected area bid as directed 8/2/22   Jose Apodaca MD   simvastatin (ZOCOR) 40 MG tablet Take 1 tablet by mouth daily 7/11/22   Jose Apodaca MD   gabapentin (NEURONTIN) 600 MG tablet Take 1 tablet by mouth 2 times daily.  4/19/22   Historical Provider, MD   carvedilol (COREG) 25 MG tablet Take 25 mg by mouth 2 times daily (with meals) 2/1/22   Ar Automatic Reconciliation   cetirizine (ZYRTEC) 10 MG tablet Take by mouth daily as needed    Ar Automatic Reconciliation   dabigatran (PRADAXA) 150 MG capsule TAKE 1 CAPSULE EVERY 12 HOURS 12/9/21   Ar Automatic Reconciliation   metFORMIN (GLUCOPHAGE) 500 MG tablet TAKE 1 TABLET TWICE A DAY WITH A MEAL 3/7/22   Ar Automatic Reconciliation     Allergies   Allergen Reactions    Azithromycin Other (See Comments)     Skin dryness/peeling        Review of Systems:  All systems reviewed and negative except as otherwise stated in the HPI    Objective:   Blood pressure (!) 142/77, pulse 94, temperature 97.7 °F (36.5 °C), temperature source Oral, resp. rate 18, height 6' 2\" (1.88 m), weight (!) 337 lb 3.2 oz (153 kg), SpO2 95 %. Visit Vitals  BP (!) 142/77   Pulse 94   Temp 97.7 °F (36.5 °C) (Oral)   Resp 18   Ht 6' 2\" (1.88 m)   Wt (!) 337 lb 3.2 oz (153 kg)   SpO2 95%   BMI 43.29 kg/m²     Temp (24hrs), Av °F (36.7 °C), Min:97.7 °F (36.5 °C), Max:98.4 °F (36.9 °C)       Exam:    General:  Alert, cooperative, well noursished, well developed, appears stated age   Eyes:  Sclera anicteric. Pupils equally round and reactive to light. Mouth/Throat: Mucous membranes normal, oral pharynx clear   Neck: Supple   Lungs:   Good effort   CV:  Regular rate and rhythm,no murmur   Abdomen:   Soft, non-tender. bowel sounds normal. non-distended   Extremities: No cyanosis. Bilateral lower extremity edema    Skin: Right upper back previous tube site.  Dressing over site is clean/dry/intact       Musculoskeletal: No swelling or deformity   Lines/Devices:  Intact, no erythema, drainage or tenderness   Psych: Alert and oriented, normal mood affect given the setting       CBC:  Recent Labs     23  0142 23  0400 23  0227   WBC 11.2* 8.4 9.2   HGB 12.2* 11.4* 11.5*   HCT 35.5* 32.9* 34.0*    269 292         BMP:  Recent Labs     23  1151 23  0400 23  0227   BUN 5* 5* 6*    137 139   K 3.2* 3.2* 3.1*    103 102   CO2 24 27 25         LFTS:  Recent Labs     23  1151   ALT 27         Data Review:   Recent Results (from the past 24 hour(s))   Cortisol 30 Min, Total    Collection Time: 23  8:56 AM   Result Value Ref Range    Cortisol, 30 Min 29.9 ug/dL   Cortisol 60 Min, Total    Collection Time: 23  9:25 AM   Result Value Ref Range    Cortisol, 60 Min 35.3 ug/dL   POCT Glucose    Collection Time: 23 11:50 AM   Result Value Ref Range    POC Glucose 259 (H) 65 - 100 mg/dL    Performed by: Freya    POCT Glucose    Collection Time: 01/18/23  4:42 PM   Result Value Ref Range    POC Glucose 218 (H) 65 - 100 mg/dL    Performed by: Freya    POCT Glucose    Collection Time: 01/18/23  8:43 PM   Result Value Ref Range    POC Glucose 175 (H) 65 - 100 mg/dL    Performed by: [facility]    Basic Metabolic Panel w/ Reflex to MG    Collection Time: 01/19/23  2:27 AM   Result Value Ref Range    Sodium 139 133 - 143 mmol/L    Potassium 3.1 (L) 3.5 - 5.1 mmol/L    Chloride 102 101 - 110 mmol/L    CO2 25 21 - 32 mmol/L    Anion Gap 12 (H) 2 - 11 mmol/L    Glucose 167 (H) 65 - 100 mg/dL    BUN 6 (L) 8 - 23 MG/DL    Creatinine 0.70 (L) 0.8 - 1.5 MG/DL    Est, Glom Filt Rate >60 >60 ml/min/1.73m2    Calcium 9.5 8.3 - 10.4 MG/DL   CBC    Collection Time: 01/19/23  2:27 AM   Result Value Ref Range    WBC 9.2 4.3 - 11.1 K/uL    RBC 3.56 (L) 4.23 - 5.6 M/uL    Hemoglobin 11.5 (L) 13.6 - 17.2 g/dL    Hematocrit 34.0 (L) 41.1 - 50.3 %    MCV 95.5 82 - 102 FL    MCH 32.3 26.1 - 32.9 PG    MCHC 33.8 31.4 - 35.0 g/dL    RDW 13.2 11.9 - 14.6 %    Platelets 579 606 - 371 K/uL    MPV 8.8 (L) 9.4 - 12.3 FL    nRBC 0.00 0.0 - 0.2 K/uL   Magnesium    Collection Time: 01/19/23  2:27 AM   Result Value Ref Range    Magnesium 2.0 1.8 - 2.4 mg/dL   POCT Glucose    Collection Time: 01/19/23  7:41 AM   Result Value Ref Range    POC Glucose 165 (H) 65 - 100 mg/dL    Performed by: Brijesh           Microbiology:  Reviewed    Studies:  Reviewed    Signed By: LEANDER Slater     January 19, 2023

## 2023-01-19 NOTE — PROCEDURES
PICC Placement Note    PRE-PROCEDURE VERIFICATION  Correct Procedure: yes  Time out completed with assistant  Crow Parekh RN, [facility]-BC and all persons present in agreement with time out. Risks and benefits reviewed with patient and informed consent obtained prior to assessment and procedure. Correct Site: yes  Temperature: Temp: 97.7 °F (36.5 °C), Temperature Source:    Recent Labs     01/19/23  0227   BUN 6*      WBC 9.2     Allergies: Azithromycin  Education materials for PICC Care given to patient or family. PROCEDURE DETAIL  A single lumen PICC line was started for Long term IV/antibiotic administration. The following documentation is in addition to the PICC properties in the lines/airways flowsheet:    Lot #: [id number]  Xylocaine used: Yes  Mid-Arm Circumference: 38 (cm)  Internal Catheter Length: 41 (cm)  External Catheter Length: 0 (cm)  Total Catheter Length: 41 (cm)  Vein Selection for PICC: right cephalic  Central Line Insertion Bundle followed: Yes  Complication Related to Insertion: inability to access vein    Both the insertion guidewire and ECG guidewire were removed intact all ports have positive blood return and were flush well with normal saline. The placement was verified by X-ray: Yes  The x-ray results state PICC in good placement in the distal SVC.     Line is okay to use: yes                              Velma Castro RN RE

## 2023-01-19 NOTE — PROGRESS NOTES
0522: IV vancomycin initiated through extended dwell catheter    0550: pt complains of leaking around IV site. Vancomycin stopped & MD Governor Ahumada notified. Pt to receive midline for long term home antibiotic usage today. Ok per Dr. Governor Ahumada to skip  current dose of vanc and for pt to go without IV until midline placement. Pt not complaining of pain, but some redness present around & above IV site. Will apply cold compress & monitor.

## 2023-01-20 ENCOUNTER — TELEPHONE (OUTPATIENT)
Dept: INTERNAL MEDICINE CLINIC | Facility: CLINIC | Age: 68
End: 2023-01-20

## 2023-01-20 ENCOUNTER — CARE COORDINATION (OUTPATIENT)
Dept: CARE COORDINATION | Facility: CLINIC | Age: 68
End: 2023-01-20

## 2023-01-20 ENCOUNTER — HOME CARE VISIT (OUTPATIENT)
Dept: SCHEDULING | Facility: HOME HEALTH | Age: 68
End: 2023-01-20

## 2023-01-20 VITALS
RESPIRATION RATE: 16 BRPM | OXYGEN SATURATION: 95 % | DIASTOLIC BLOOD PRESSURE: 80 MMHG | TEMPERATURE: 96.9 F | SYSTOLIC BLOOD PRESSURE: 132 MMHG | HEART RATE: 89 BPM

## 2023-01-20 PROCEDURE — 0221000100 HH NO PAY CLAIM PROCEDURE

## 2023-01-20 PROCEDURE — G0299 HHS/HOSPICE OF RN EA 15 MIN: HCPCS

## 2023-01-20 ASSESSMENT — ENCOUNTER SYMPTOMS
PAIN LOCATION - PAIN QUALITY: ACHING
SPUTUM AMOUNT: SCANT
DIARRHEA: 1
COUGH: 1
SPUTUM COLOR: WHITE
STOOL DESCRIPTION: DIARRHEA
DYSPNEA ACTIVITY LEVEL: AFTER AMBULATING MORE THAN 20 FT
CONSTIPATION: 1
SPUTUM PRODUCTION: 1
COUGH CHARACTERISTICS: PRODUCTIVE

## 2023-01-20 NOTE — TELEPHONE ENCOUNTER
Care Transitions Initial Follow Up Call    Outreach made within 2 business days of discharge: Yes    Patient: Laisha Yang Patient : 1955   MRN: [id number]  Reason for Admission: Sepsis  Discharge Date: 23       Spoke with: Chan Banuelos    Discharge department/facility: [facility] Interactive Patient Contact:  Was patient able to fill all prescriptions: Yes  Was patient instructed to bring all medications to the follow-up visit: Yes  Is patient taking all medications as directed in the discharge summary?  Yes  Does patient understand their discharge instructions: Yes  Does patient have questions or concerns that need addressed prior to 7-14 day follow up office visit: no    Scheduled appointment with PCP within 7-14 days    Follow Up  Future Appointments   Date Time Provider Yael Jimenez   2023 To Be Determined HERMINIA Wilcox 48   2023 To Be Determined HERMINIA Brooks   2023 To Be Determined HERMINIA Brooks   2023 To Be Determined Patience Ez, HERMINIA Baker 48   2023 To Be Determined Patience Ricks, HERMINIA Baker 48   2023  9:30 AM Sunni Darby MD [facility] GVL AMB   2023 11:00 AM CARA Jaramillo - CNP MAT GVL AMB   2023 To Be Determined Patience Ricks, HERMINIA Sutton   2023 To Be Determined Patience Ricks, HERMINIA Baker 48   2023 To Be Determined Patience Ricks, HERMINIA Baker 48   2023  8:00 AM S[facility] LAB DS [facility]S SFE   2023 To Be Determined Patience Ricks, RN Samuel 48   2023 To Be Determined Patience Ricks, RN Samuel 48   2/15/2023 11:40 AM Kevin Bray MD MAT GVL AMB   2023 To Be Determined Patience Ricks, HERMINIA Sutton   2023 To Be Determined Patience Ricks, HERMINIA Baker 48   2023 To Be Determined Patience Ricks, RN Samuel Sutton   2023 To Be

## 2023-01-20 NOTE — TELEPHONE ENCOUNTER
Nurse from [address] [place] [address] called to schedule f/u for patient who was admitted from 1/4-1/19 for sepsis due to MRSA.  A follow-up has been scheduled with Jennifer on 1/30/23

## 2023-01-20 NOTE — CARE COORDINATION
[facility] Care Transitions Initial Follow Up Call    Call within 2 business days of discharge: Yes    LPN Care Coordinator contacted the patient by telephone to perform post hospital discharge assessment. Verified name and  with patient as identifiers. Provided introduction to self, and explanation of the LPN Care Coordinator role. Patient: Adelina Sanabria Patient : 1955   MRN: [id number]  Reason for Admission: Sepsis due to methicillin resistant Staphylococcus aureus  Discharge Date: 23 RARS: Readmission Risk Score: 15.7      Last Discharge  Street       Date Complaint Diagnosis Description Type Department Provider    23 Chest Pain; Shortness of Breath Hospital discharge follow-up . .. ED to Hosp-Admission (Discharged) (ADMITTED) Keeley Ch MD; Peri Rand ... Was this an external facility discharge? No Discharge Facility: sfd    Challenges to be reviewed by the provider   Additional needs identified to be addressed with provider: Yes  Scheduling hospital follow up               Method of communication with provider: phone. Patient requested outreach to PCP office to request hospital follow up. Phoned office three times without success, forwarded to Devora Hatfield, [id number] [facility] [address] Specialist for assistance with scheduling. LPN Care Coordinator reviewed discharge instructions, medical action plan, and red flags with patient who verbalized understanding. The patient was given an opportunity to ask questions and does not have any further questions or concerns at this time. Were discharge instructions available to patient? Yes. Reviewed appropriate site of care based on symptoms and resources available to patient including: PCP  Specialist  Home health  When to call [address] The patient agrees to contact the PCP office for questions related to their healthcare.      Advance Care Planning:   Does patient have an Advance Directive: reviewed and current. Medication reconciliation was performed with patient, who verbalizes understanding of administration of home medications. Medications reviewed, 1111F entered: N/A    Was patient discharged with a pulse oximeter? no    Non-face-to-face services provided:  Obtained and reviewed discharge summary and/or continuity of care documents    Offered patient enrollment in the Remote Patient Monitoring (RPM) program for in-home monitoring: NA.    Care Transitions 24 Hour Call    Do you have a copy of your discharge instructions?: Yes  Do you have all of your prescriptions and are they filled?: Yes  Have you been contacted by a [id number] [facility] Pharmacist?: No  Have you scheduled your follow up appointment?: Yes  How are you going to get to your appointment?: Car - family or friend to transport  Do you have support at home?: Partner/Spouse/SO  Do you feel like you have everything you need to keep you well at home?: Yes  Care Transitions Interventions  No Identified Needs     Other Services: Completed      DME Assistance: Completed            Follow Up  Future Appointments   Date Time Provider Yael Jimenez   1/26/2023  9:30 AM Alessandro Huddleston MD [facility] GVL AMB   2/8/2023  8:00 AM SFE LAB DS SFEDS SFE   2/15/2023 11:40 AM Jose Apodaca MD MAT GVL AMB   3/16/2023  2:20 PM [address] [place] [address]   3/16/2023  3:00 PM POD1A [address] [place] [address]   6/22/2023  1:50 PM [address] [place] [address] OUTREACH [address] [place] [address]   6/22/2023  2:30 PM Geneva Frank MD [facility] GVL AMB   6/22/2023  3:30 PM [address] [place] [address],[address] [place] [address] Care Coordinator provided contact information. Plan for follow-up call in 5-7 days based on severity of symptoms and risk factors.   Plan for next call: follow-up appointment-assess attendance and compliance with plan of care    Jonh Tejada LPN

## 2023-01-22 ENCOUNTER — HOME CARE VISIT (OUTPATIENT)
Dept: SCHEDULING | Facility: HOME HEALTH | Age: 68
End: 2023-01-22

## 2023-01-22 VITALS
TEMPERATURE: 97.5 F | OXYGEN SATURATION: 97 % | HEART RATE: 81 BPM | DIASTOLIC BLOOD PRESSURE: 74 MMHG | RESPIRATION RATE: 18 BRPM | SYSTOLIC BLOOD PRESSURE: 134 MMHG

## 2023-01-22 PROCEDURE — G0299 HHS/HOSPICE OF RN EA 15 MIN: HCPCS

## 2023-01-22 ASSESSMENT — ENCOUNTER SYMPTOMS
DYSPNEA ACTIVITY LEVEL: AFTER AMBULATING 10 - 20 FT
PAIN LOCATION - PAIN QUALITY: ACHING

## 2023-01-23 ENCOUNTER — HOME CARE VISIT (OUTPATIENT)
Dept: SCHEDULING | Facility: HOME HEALTH | Age: 68
End: 2023-01-23

## 2023-01-23 ENCOUNTER — HOSPITAL ENCOUNTER (OUTPATIENT)
Dept: LAB | Age: 68
Discharge: HOME | End: 2023-01-26
Payer: MEDICARE

## 2023-01-23 VITALS
DIASTOLIC BLOOD PRESSURE: 70 MMHG | OXYGEN SATURATION: 96 % | TEMPERATURE: 97.8 F | SYSTOLIC BLOOD PRESSURE: 130 MMHG | RESPIRATION RATE: 18 BRPM | HEART RATE: 78 BPM

## 2023-01-23 VITALS
OXYGEN SATURATION: 94 % | DIASTOLIC BLOOD PRESSURE: 78 MMHG | HEART RATE: 68 BPM | SYSTOLIC BLOOD PRESSURE: 126 MMHG | TEMPERATURE: 97.6 F

## 2023-01-23 LAB
ALBUMIN SERPL-MCNC: 2.5 G/DL (ref 3.2–4.6)
ALBUMIN/GLOB SERPL: 0.7 (ref 0.4–1.6)
ALP SERPL-CCNC: 98 U/L (ref 50–136)
ALT SERPL-CCNC: 43 U/L (ref 12–65)
AST SERPL-CCNC: 33 U/L (ref 15–37)
BASOPHILS # BLD: 0.1 K/UL (ref 0–0.2)
BASOPHILS NFR BLD: 1 % (ref 0–2)
BILIRUB DIRECT SERPL-MCNC: 0.2 MG/DL
BILIRUB SERPL-MCNC: 0.6 MG/DL (ref 0.2–1.1)
CREAT SERPL-MCNC: 0.6 MG/DL (ref 0.8–1.5)
DIFFERENTIAL METHOD BLD: ABNORMAL
EOSINOPHIL # BLD: 0.3 K/UL (ref 0–0.8)
EOSINOPHIL NFR BLD: 3 % (ref 0.5–7.8)
ERYTHROCYTE [DISTWIDTH] IN BLOOD BY AUTOMATED COUNT: 13.5 % (ref 11.9–14.6)
GLOBULIN SER CALC-MCNC: 3.6 G/DL (ref 2.8–4.5)
HCT VFR BLD AUTO: 37 % (ref 41.1–50.3)
HGB BLD-MCNC: 12.3 G/DL (ref 13.6–17.2)
IMM GRANULOCYTES # BLD AUTO: 0.2 K/UL (ref 0–0.5)
IMM GRANULOCYTES NFR BLD AUTO: 2 % (ref 0–5)
LYMPHOCYTES # BLD: 0.8 K/UL (ref 0.5–4.6)
LYMPHOCYTES NFR BLD: 9 % (ref 13–44)
MCH RBC QN AUTO: 32.9 PG (ref 26.1–32.9)
MCHC RBC AUTO-ENTMCNC: 33.2 G/DL (ref 31.4–35)
MCV RBC AUTO: 98.9 FL (ref 82–102)
MONOCYTES # BLD: 0.5 K/UL (ref 0.1–1.3)
MONOCYTES NFR BLD: 6 % (ref 4–12)
NEUTS SEG # BLD: 7.4 K/UL (ref 1.7–8.2)
NEUTS SEG NFR BLD: 79 % (ref 43–78)
NRBC # BLD: 0 K/UL (ref 0–0.2)
PLATELET # BLD AUTO: 354 K/UL (ref 150–450)
PMV BLD AUTO: 9 FL (ref 9.4–12.3)
PROT SERPL-MCNC: 6.1 G/DL (ref 6.3–8.2)
RBC # BLD AUTO: 3.74 M/UL (ref 4.23–5.6)
VANCOMYCIN TROUGH SERPL-MCNC: 15.7 UG/ML (ref 5–20)
WBC # BLD AUTO: 9.2 K/UL (ref 4.3–11.1)

## 2023-01-23 PROCEDURE — 85025 COMPLETE CBC W/AUTO DIFF WBC: CPT

## 2023-01-23 PROCEDURE — 80202 ASSAY OF VANCOMYCIN: CPT

## 2023-01-23 PROCEDURE — 82565 ASSAY OF CREATININE: CPT

## 2023-01-23 PROCEDURE — 80076 HEPATIC FUNCTION PANEL: CPT

## 2023-01-23 PROCEDURE — G0152 HHCP-SERV OF OT,EA 15 MIN: HCPCS

## 2023-01-23 PROCEDURE — G0299 HHS/HOSPICE OF RN EA 15 MIN: HCPCS

## 2023-01-23 ASSESSMENT — ENCOUNTER SYMPTOMS
DYSPNEA ACTIVITY LEVEL: AFTER AMBULATING 10 - 20 FT
HEMOPTYSIS: 0
PAIN LOCATION - PAIN QUALITY: ACHY
STOOL DESCRIPTION: FORMED

## 2023-01-24 ENCOUNTER — HOME CARE VISIT (OUTPATIENT)
Dept: SCHEDULING | Facility: HOME HEALTH | Age: 68
End: 2023-01-24

## 2023-01-24 VITALS
SYSTOLIC BLOOD PRESSURE: 121 MMHG | HEART RATE: 83 BPM | DIASTOLIC BLOOD PRESSURE: 69 MMHG | RESPIRATION RATE: 18 BRPM | OXYGEN SATURATION: 94 % | TEMPERATURE: 97.8 F

## 2023-01-24 VITALS
TEMPERATURE: 97.6 F | RESPIRATION RATE: 18 BRPM | HEART RATE: 70 BPM | OXYGEN SATURATION: 97 % | SYSTOLIC BLOOD PRESSURE: 128 MMHG | DIASTOLIC BLOOD PRESSURE: 74 MMHG

## 2023-01-24 PROCEDURE — G0151 HHCP-SERV OF PT,EA 15 MIN: HCPCS

## 2023-01-24 PROCEDURE — G0299 HHS/HOSPICE OF RN EA 15 MIN: HCPCS

## 2023-01-24 ASSESSMENT — ENCOUNTER SYMPTOMS
STOOL DESCRIPTION: FORMED
COUGH: 1
PAIN LOCATION - PAIN QUALITY: THROBBING
SPUTUM AMOUNT: SCANT
DYSPNEA ACTIVITY LEVEL: AFTER AMBULATING 10 - 20 FT
SPUTUM COLOR: WHITE
COUGH CHARACTERISTICS: PRODUCTIVE
PAIN LOCATION - PAIN QUALITY: SORE
SPUTUM PRODUCTION: 1
HEMOPTYSIS: 0
DYSPNEA ACTIVITY LEVEL: AFTER AMBULATING MORE THAN 20 FT

## 2023-01-25 ENCOUNTER — HOME CARE VISIT (OUTPATIENT)
Dept: SCHEDULING | Facility: HOME HEALTH | Age: 68
End: 2023-01-25

## 2023-01-26 ENCOUNTER — OFFICE VISIT (OUTPATIENT)
Dept: SURGERY | Age: 68
End: 2023-01-26

## 2023-01-26 ENCOUNTER — HOSPITAL ENCOUNTER (OUTPATIENT)
Dept: LAB | Age: 68
Discharge: HOME | End: 2023-01-26
Payer: MEDICARE

## 2023-01-26 ENCOUNTER — HOME CARE VISIT (OUTPATIENT)
Dept: SCHEDULING | Facility: HOME HEALTH | Age: 68
End: 2023-01-26

## 2023-01-26 VITALS
OXYGEN SATURATION: 98 % | HEART RATE: 80 BPM | RESPIRATION RATE: 18 BRPM | TEMPERATURE: 97.8 F | SYSTOLIC BLOOD PRESSURE: 130 MMHG | DIASTOLIC BLOOD PRESSURE: 74 MMHG

## 2023-01-26 VITALS — HEIGHT: 74 IN | WEIGHT: 315 LBS | BODY MASS INDEX: 40.43 KG/M2

## 2023-01-26 DIAGNOSIS — Z48.89 POSTOPERATIVE VISIT: ICD-10-CM

## 2023-01-26 DIAGNOSIS — I50.22 CHRONIC SYSTOLIC (CONGESTIVE) HEART FAILURE (HCC): ICD-10-CM

## 2023-01-26 LAB
CREAT SERPL-MCNC: 0.7 MG/DL (ref 0.8–1.5)
VANCOMYCIN TROUGH SERPL-MCNC: 16.8 UG/ML (ref 5–20)

## 2023-01-26 PROCEDURE — 80202 ASSAY OF VANCOMYCIN: CPT

## 2023-01-26 PROCEDURE — 82565 ASSAY OF CREATININE: CPT

## 2023-01-26 PROCEDURE — 99024 POSTOP FOLLOW-UP VISIT: CPT | Performed by: SURGERY

## 2023-01-26 PROCEDURE — G0299 HHS/HOSPICE OF RN EA 15 MIN: HCPCS

## 2023-01-26 ASSESSMENT — ENCOUNTER SYMPTOMS
DYSPNEA ACTIVITY LEVEL: AFTER AMBULATING MORE THAN 20 FT
STOOL DESCRIPTION: FORMED
PAIN LOCATION - PAIN QUALITY: ACHY
HEMOPTYSIS: 0

## 2023-01-26 NOTE — PROGRESS NOTES
[facility]  [phone]        poDate: 2023      Name: Christel Wadsworth      MRN: [id number]       : 1955       Age: 69 y.o. Sex: male        Darcey Kanner, MD       CC:    Chief Complaint   Patient presents with    Post-Op Check       HPI:  The patient presents for a post-op visit s/p right VATS with blebectomy and talc pleurodesis for recurrent spontaneous pneumothorax. He had a prolonged hospitalization secondary to MRSA sepsis. He is doing well. Incisions of healed except for one chest tube site. I did debride this in the office today. I then packed it with iodoform gauze. Home health is seeing him routinely. Physical Exam:     Ht 6' 2\" (1.88 m)   Wt (!) 337 lb (152.9 kg)   BMI 43.27 kg/m²     General: Alert, oriented, cooperative and in no acute distress. Neck: Supple, trachea midline, no appreciable thyromegaly  Resp: No JVD. Breathing is  non-labored. Lungs clear to auscultation without wheezing or rhonchi   CV: RRR. No murmurs, rubs or gallops appreciated. Abd: soft non-tender and non-distended without peritoneal signs. +bs    Skin/incision: 1 chest tube site is open with some necrotic tissue. This was debrided back to good tissue today and packed with iodoform gauze. Assessment/Plan:  Christel Wadsworth is a 69 y.o. male who is improving. Home health is seeing him for his wound. He is on vancomycin. .    1. Follow-up in 2 weeks. 2. Return to full activity    3.  Regular diet    Signed: Harish Weiss MD    2023  10:02 AM

## 2023-01-27 ENCOUNTER — CARE COORDINATION (OUTPATIENT)
Dept: CARE COORDINATION | Facility: CLINIC | Age: 68
End: 2023-01-27

## 2023-01-27 ENCOUNTER — HOME CARE VISIT (OUTPATIENT)
Dept: SCHEDULING | Facility: HOME HEALTH | Age: 68
End: 2023-01-27

## 2023-01-27 PROCEDURE — G0157 HHC PT ASSISTANT EA 15: HCPCS

## 2023-01-27 NOTE — CARE COORDINATION
[facility] Care Transitions Follow Up Call    N Care Coordinator contacted the patient by telephone to follow up after admission on 23. Verified name and  with patient as identifiers. Patient: Perla Win  Patient : 1955   MRN: [id number]  Reason for Admission: Sepsis due to methicillin resistant Staphylococcus aureus  Discharge Date: 23 RARS: Readmission Risk Score: 15.7      Needs to be reviewed by the provider   Additional needs identified to be addressed with provider: No  none             Method of communication with provider: none. Patient states to be progressing well though unhappy with cost of medication from [facility], patient will discuss with provider at follow up with Infectious Disease if there are programs for assistance. [facility] remains active. No other needs or concerns at this time. Addressed changes since last contact:  none  Discussed follow-up appointments. If no appointment was previously scheduled, appointment scheduling offered: Yes. Is follow up appointment scheduled within 7 days of discharge? Yes.     Follow Up  Future Appointments   Date Time Provider Yael Jimenez   2023  7:00 AM Sulma Stout RN [facility] HOME HEAL   2023 11:00 AM CARA Thompson - CNP [facility] GVL AMB   2023 To Be Determined EMMIE [address]   2023  9:30 AM Yael Orr MD [facility] GVL AMB   2023 To Be Determined HERMINIA Candelario 48   2/3/2023 To Be Determined EMMIE [address] 48   2023 To Be Determined HERMINIA Candelario   2023 To Be Determined GOPI Ervin 48   2023  8:00 AM [facility]   2023 To Be Determined HERMINIA Candelario   2023 To Be Determined HERMINIA Candelario   2/15/2023 11:40 AM Dave Castañeda MD [facility] GVL AMB   2023 To Be Determined HERMINIA Candelario 2/20/2023 To Be Determined Alonzo Brim, RN [address]   2/23/2023 To Be Determined Alonzo Brim, RN [address]   2/27/2023 To Be Determined Alonzo Brim, RN [address]   3/2/2023 To Be Determined Alonzo Brim, RN [address]   3/6/2023 To Be Determined Alonzo Brim, RN [address]   3/9/2023 To Be Determined Alonzo Brim, RN [address]   3/13/2023 To Be Determined Alonzo Brim, RN [address]   3/16/2023 To Be Determined Alonzo Brim, RN [address]   3/16/2023  2:20 PM [address] [facility]   3/16/2023  3:00 PM POD1A [address] [facility]   6/22/2023  1:50 PM [facility] OUTREACH INSURANCE R Wilda Vane 23 [facility]   6/22/2023  2:30 PM Skye Oliveira MD [facility] GVL AMB   6/22/2023  3:30 PM POD3B [address] [facility]     Non-[facility] follow up appointment(s): Dr Steven Lilly, ID, 2.7.23    [facility] Care Coordinator reviewed medical action plan with patient and discussed any barriers to care and/or understanding of plan of care after discharge. Discussed appropriate site of care based on symptoms and resources available to patient including: PCP  [address]. The patient agrees to contact the PCP office for questions related to their healthcare. Advance Care Planning:   reviewed and current.      Patients top risk factors for readmission:  comorbidities and/or recurrent symptoms/complications of surgery  Interventions to address risk factors:  continued compliance with plan of care    Offered patient enrollment in the Remote Patient Monitoring (RPM) program for in-home monitoring: NA.     Care Transitions Subsequent and Final Call    Subsequent and Final Calls  Have your medications changed?: No  Do you have any questions related to your medications?: No  Do you currently have any active services?: Yes  Are you currently active with any services?: Home Health  Do you have any needs or concerns that I can assist you with?: No  Identified Barriers: None  Care Transitions Interventions  No Identified Needs     Other Services: Completed (Comment: [facility] SN/PT/OT [facility]S \[facility] [place] [facility]\"" 1/20/23)      DME Assistance: Completed   Other Interventions:             LPN Care Coordinator provided contact information for future needs. Plan for follow-up call in 10-14 days based on severity of symptoms and risk factors.   Plan for next call:  patient will continue to attend follow up as scheduled and remain compliant with plan of care    Gerri Lovett LPN

## 2023-01-28 VITALS
DIASTOLIC BLOOD PRESSURE: 78 MMHG | SYSTOLIC BLOOD PRESSURE: 122 MMHG | RESPIRATION RATE: 17 BRPM | OXYGEN SATURATION: 97 % | HEART RATE: 96 BPM | TEMPERATURE: 97.1 F

## 2023-01-28 ASSESSMENT — ENCOUNTER SYMPTOMS: PAIN LOCATION - PAIN QUALITY: TENDER

## 2023-01-30 ENCOUNTER — TELEPHONE (OUTPATIENT)
Dept: INTERNAL MEDICINE CLINIC | Facility: CLINIC | Age: 68
End: 2023-01-30

## 2023-01-30 ENCOUNTER — HOME CARE VISIT (OUTPATIENT)
Dept: SCHEDULING | Facility: HOME HEALTH | Age: 68
End: 2023-01-30

## 2023-01-30 ENCOUNTER — HOSPITAL ENCOUNTER (OUTPATIENT)
Dept: LAB | Age: 68
Discharge: HOME | End: 2023-02-02
Payer: MEDICARE

## 2023-01-30 ENCOUNTER — OFFICE VISIT (OUTPATIENT)
Dept: INTERNAL MEDICINE CLINIC | Facility: CLINIC | Age: 68
End: 2023-01-30
Payer: MEDICARE

## 2023-01-30 VITALS
SYSTOLIC BLOOD PRESSURE: 100 MMHG | HEIGHT: 74 IN | OXYGEN SATURATION: 97 % | DIASTOLIC BLOOD PRESSURE: 70 MMHG | TEMPERATURE: 97 F | BODY MASS INDEX: 40.43 KG/M2 | HEART RATE: 100 BPM | WEIGHT: 315 LBS

## 2023-01-30 VITALS
TEMPERATURE: 97.8 F | SYSTOLIC BLOOD PRESSURE: 138 MMHG | OXYGEN SATURATION: 96 % | HEART RATE: 80 BPM | DIASTOLIC BLOOD PRESSURE: 70 MMHG | RESPIRATION RATE: 18 BRPM

## 2023-01-30 DIAGNOSIS — Z09 HOSPITAL DISCHARGE FOLLOW-UP: Primary | ICD-10-CM

## 2023-01-30 DIAGNOSIS — I87.2 VENOUS STASIS DERMATITIS OF BOTH LOWER EXTREMITIES: ICD-10-CM

## 2023-01-30 DIAGNOSIS — J93.83 SPONTANEOUS PNEUMOTHORAX: ICD-10-CM

## 2023-01-30 DIAGNOSIS — J96.01 ACUTE RESPIRATORY FAILURE WITH HYPOXIA (HCC): ICD-10-CM

## 2023-01-30 LAB
ALBUMIN SERPL-MCNC: 3 G/DL (ref 3.2–4.6)
ALBUMIN/GLOB SERPL: 0.8 (ref 0.4–1.6)
ALP SERPL-CCNC: 114 U/L (ref 50–136)
ALT SERPL-CCNC: 34 U/L (ref 12–65)
AST SERPL-CCNC: 29 U/L (ref 15–37)
BASOPHILS # BLD: 0.1 K/UL (ref 0–0.2)
BASOPHILS NFR BLD: 1 % (ref 0–2)
BILIRUB DIRECT SERPL-MCNC: 0.2 MG/DL
BILIRUB SERPL-MCNC: 0.6 MG/DL (ref 0.2–1.1)
CREAT SERPL-MCNC: 0.8 MG/DL (ref 0.8–1.5)
DIFFERENTIAL METHOD BLD: ABNORMAL
EOSINOPHIL # BLD: 0.2 K/UL (ref 0–0.8)
EOSINOPHIL NFR BLD: 4 % (ref 0.5–7.8)
ERYTHROCYTE [DISTWIDTH] IN BLOOD BY AUTOMATED COUNT: 13.4 % (ref 11.9–14.6)
GLOBULIN SER CALC-MCNC: 4 G/DL (ref 2.8–4.5)
HCT VFR BLD AUTO: 39.1 % (ref 41.1–50.3)
HGB BLD-MCNC: 12.6 G/DL (ref 13.6–17.2)
IMM GRANULOCYTES # BLD AUTO: 0 K/UL (ref 0–0.5)
IMM GRANULOCYTES NFR BLD AUTO: 1 % (ref 0–5)
LYMPHOCYTES # BLD: 0.9 K/UL (ref 0.5–4.6)
LYMPHOCYTES NFR BLD: 14 % (ref 13–44)
MCH RBC QN AUTO: 31.8 PG (ref 26.1–32.9)
MCHC RBC AUTO-ENTMCNC: 32.2 G/DL (ref 31.4–35)
MCV RBC AUTO: 98.7 FL (ref 82–102)
MONOCYTES # BLD: 0.5 K/UL (ref 0.1–1.3)
MONOCYTES NFR BLD: 7 % (ref 4–12)
NEUTS SEG # BLD: 4.9 K/UL (ref 1.7–8.2)
NEUTS SEG NFR BLD: 73 % (ref 43–78)
NRBC # BLD: 0 K/UL (ref 0–0.2)
PLATELET # BLD AUTO: 318 K/UL (ref 150–450)
PMV BLD AUTO: 9.9 FL (ref 9.4–12.3)
PROT SERPL-MCNC: 7 G/DL (ref 6.3–8.2)
RBC # BLD AUTO: 3.96 M/UL (ref 4.23–5.6)
VANCOMYCIN TROUGH SERPL-MCNC: 21.4 UG/ML (ref 5–20)
WBC # BLD AUTO: 6.5 K/UL (ref 4.3–11.1)

## 2023-01-30 PROCEDURE — 3078F DIAST BP <80 MM HG: CPT | Performed by: NURSE PRACTITIONER

## 2023-01-30 PROCEDURE — 1123F ACP DISCUSS/DSCN MKR DOCD: CPT | Performed by: NURSE PRACTITIONER

## 2023-01-30 PROCEDURE — 3074F SYST BP LT 130 MM HG: CPT | Performed by: NURSE PRACTITIONER

## 2023-01-30 PROCEDURE — G8427 DOCREV CUR MEDS BY ELIG CLIN: HCPCS | Performed by: NURSE PRACTITIONER

## 2023-01-30 PROCEDURE — 80076 HEPATIC FUNCTION PANEL: CPT

## 2023-01-30 PROCEDURE — 1036F TOBACCO NON-USER: CPT | Performed by: NURSE PRACTITIONER

## 2023-01-30 PROCEDURE — 85025 COMPLETE CBC W/AUTO DIFF WBC: CPT

## 2023-01-30 PROCEDURE — 1111F DSCHRG MED/CURRENT MED MERGE: CPT | Performed by: NURSE PRACTITIONER

## 2023-01-30 PROCEDURE — G0299 HHS/HOSPICE OF RN EA 15 MIN: HCPCS

## 2023-01-30 PROCEDURE — G8417 CALC BMI ABV UP PARAM F/U: HCPCS | Performed by: NURSE PRACTITIONER

## 2023-01-30 PROCEDURE — 99214 OFFICE O/P EST MOD 30 MIN: CPT | Performed by: NURSE PRACTITIONER

## 2023-01-30 PROCEDURE — 80202 ASSAY OF VANCOMYCIN: CPT

## 2023-01-30 PROCEDURE — 3017F COLORECTAL CA SCREEN DOC REV: CPT | Performed by: NURSE PRACTITIONER

## 2023-01-30 PROCEDURE — G8484 FLU IMMUNIZE NO ADMIN: HCPCS | Performed by: NURSE PRACTITIONER

## 2023-01-30 PROCEDURE — 82565 ASSAY OF CREATININE: CPT

## 2023-01-30 ASSESSMENT — ENCOUNTER SYMPTOMS
STOOL DESCRIPTION: FORMED
DYSPNEA ACTIVITY LEVEL: AFTER AMBULATING MORE THAN 20 FT
HEMOPTYSIS: 0

## 2023-01-30 ASSESSMENT — PATIENT HEALTH QUESTIONNAIRE - PHQ9
SUM OF ALL RESPONSES TO PHQ QUESTIONS 1-9: 0
2. FEELING DOWN, DEPRESSED OR HOPELESS: 0
1. LITTLE INTEREST OR PLEASURE IN DOING THINGS: 0
SUM OF ALL RESPONSES TO PHQ9 QUESTIONS 1 & 2: 0
SUM OF ALL RESPONSES TO PHQ QUESTIONS 1-9: 0

## 2023-01-30 NOTE — TELEPHONE ENCOUNTER
Please forward to infectious diseases. I don't think any changes need to be made, but since they are managing his IV antibiotics, probably best to have them take a look.       Job Linares MD

## 2023-01-30 NOTE — PROGRESS NOTES
Post-Discharge Transitional Care Follow Up      Shayla Ramires   YOB: 1955    Date of Office Visit:  1/30/2023  Date of Hospital Admission: 1/4/23  Date of Hospital Discharge: 1/19/23  Readmission Risk Score (high >=14%. Medium >=10%):Readmission Risk Score: 15.7      Care management risk score Rising risk (score 2-5) and Complex Care (Scores >=6): No Risk Score On File     Non face to face  following discharge, date last encounter closed (first attempt may have been earlier): 01/20/2023     Call initiated 2 business days of discharge: Yes     Hospital discharge follow-up  -     [place] DISCHARGE MEDS RECONCILED W/ CURRENT OUTPATIENT MED LIST    Spontaneous pneumothorax  S/p VATS. Keep follow-up with general surgery on 2/2/23. Chest tube site wound per gen surg. Acute respiratory failure with hypoxia (HCC)    Venous stasis dermatitis of both lower extremities  -     triamcinolone (KENALOG) 0.025 % cream; Apply topically 2 times daily. , Disp-80 g, R-1, Normal    Medical Decision Making: high complexity  Return in 16 days (on 2/15/2023) for next schedled routine follow-up appt or sooner if needed. On this date 1/30/2023 I have spent 50 minutes reviewing previous notes, test results and face to face with the patient discussing the diagnosis and importance of compliance with the treatment plan as well as documenting on the day of the visit. Subjective:   HPI    Inpatient course: Discharge summary reviewed- see chart. Interval history/Current status:     Hospital Course:  69 y.o. male with medical history hypertension, A. fib on Pradaxa, CASSY, CPAP at night, bullous emphysema, history of bilateral subdural hematomas 2017 and diabetes mellitus presented to ED with acute onset right-sided chest pain. Patient reports he was talking to his neighbor when he developed sharp severe right-sided chest pain, associated with shortness of breath.   Reports he has history of spontaneous left pneumothorax in 2017, shortly after putting on his CPAP while hospitalized for subdural hematoma. He is a former smoker, quit years ago. Denies nausea, vomiting, fever, chills or abdominal pain. Admitted with PTX. Chest tube was placed. He had air leak after chest tube was clamped. CT surgery was consulted. He underwent VATS surgery on 1/9. Chest tube removed 1/14. Developed fever, BCx grew MRSA. Vancomycin started. ID consulted. Suspected from chest tube site. Negative echo EF 45-50%. Repeat cultures drawn 1-16 and were negative. Per ID pt to be discharged with 2 weeks vancomycin via PICC through 1-30-23. He has ID followup 2-7-23 at 2pm.    He was volume overloaded which is multifactorial from IVF, weight, lack of mobility, low albumin as result of surgery and malnutrition in hospital (previously albumin wnl). He was also getting salt tabs for hyponatremia which were stopped yesterday. Echo no CHF. Overload should improve with time as he gets back into normal dietary pattern. More mobility will help also. He already has some lasix PRN at home but will adjust so he can take 40mg PRN until edema has resolved. He was started on Lantus in the hospital with better DM control. doing well with insulin injections at home. He reports that he has cut back on carbs in his diet since discharge. Seen by general surgery on 1/26/23. All incisions are healing well except for one chest tube site which was debrided and packed with iodoform gauze in the office by Dr. Mayte Martin on 1/26. He is scheduled to return to general surgery on 2/2/23.         Patient Active Problem List   Diagnosis    Primary hypertension    History of subdural hematoma (post traumatic)    Hepatic steatosis    Osteoarthritis of lumbosacral spine    Benign prostatic hyperplasia with nocturia    Nodular prostate without urinary obstruction    Seasonal allergic rhinitis due to pollen    Class 3 severe obesity due to excess calories with serious comorbidity and body mass index (BMI) of 40.0 to 44.9 in adult [facility])    Subdural hygroma    Mitral valve regurgitation    Iron excess    Atrial fibrillation (HCC)    CASSY on CPAP    Hereditary hemochromatosis (HCC)    Polyneuropathy associated with underlying disease ([facility] [address]    BPH (benign prostatic hyperplasia)    Coronary artery disease due to lipid rich plaque    Chronic constipation    Mixed dyslipidemia    Type 2 diabetes mellitus with hyperglycemia, without long-term current use of insulin ([facility] [address]    Controlled type 2 diabetes mellitus with peripheral circulatory disorder (HCC)    Anticoagulant long-term use    Sepsis due to methicillin resistant Staphylococcus aureus ([facility]SA) ([facility]    Herpes simplex virus infection    Edema due to hypoalbuminemia    Volume overload    Postoperative visit    Chronic systolic (congestive) heart failure       Medications listed as ordered at the time of discharge from hospital     Medication List            Accurate as of January 30, 2023 11:59 PM. If you have any questions, ask your nurse or doctor. START taking these medications      triamcinolone 0.025 % cream  Commonly known as: KENALOG  Apply topically 2 times daily. Started by: CARA Silverman CNP            CHANGE how you take these medications      potassium chloride 10 MEQ extended release tablet  Commonly known as: KLOR-CON M  Take 10meq when taking 20mg lasix. Take 20meq when taking 40mg lasix  What changed: how to take this            CONTINUE taking these medications      blood glucose test strips  by Other route See Admin Instructions Test before meals and bedtime & as needed for symptoms of irregular blood glucose. Dispense sufficient amount for indicated testing frequency plus additional to accommodate PRN testing needs.      carvedilol 25 MG tablet  Commonly known as: COREG     cetirizine 10 MG tablet  Commonly known as: ZYRTEC     clotrimazole-betamethasone 1-0.05 % cream  Commonly known as: LOTRISONE  Apply to affected area bid as directed     CPAP Machine Misc     dabigatran 150 MG capsule  Commonly known as: PRADAXA     enalapril 10 MG tablet  Commonly known as: VASOTEC  Take 1 tablet by mouth daily TAKE 1 TABLET DAILY     furosemide 20 MG tablet  Commonly known as: LASIX  Take 2 tablets by mouth daily as needed (edema or fluid retention)     gabapentin 600 MG tablet  Commonly known as: NEURONTIN     glucose monitoring kit  Please provide one glucometer compatible with patient's test strips     HEPARIN SODIUM LOCK FLUSH IV     hydroCHLOROthiazide 25 MG tablet  Commonly known as: HYDRODIURIL  Take 1 tablet by mouth daily     Lancet Device Misc  1 Device by Does not apply route See Admin Instructions For use to test before meals and bedtime & as needed for symptoms of irregular blood glucose. Dispense sufficient amount for indicated testing frequency plus additional to accommodate PRN testing needs.      Lantus SoloStar 100 UNIT/ML injection pen  Generic drug: insulin glargine  Inject 25 Units into the skin nightly     metFORMIN 500 MG tablet  Commonly known as: GLUCOPHAGE     Pen Needles 31G X 5 MM Misc  Please dispense pen needles for use with patient's insulin pen.     simvastatin 40 MG tablet  Commonly known as: ZOCOR  Take 1 tablet by mouth daily     sodium chloride flush 0.9 % injection     spironolactone 25 MG tablet  Commonly known as: ALDACTONE  Take 1 tablet by mouth daily     Vancomycin HCl in NaCl 1.5-0.9 GM/250ML-% Soln IVPB  Commonly known as: VANCOCIN               Where to Get Your Medications        These medications were sent to [id number] [address], [address] [place] [id number]      Phone: [phone]   triamcinolone 0.025 % cream          Medications marked \"taking\" at this time  Outpatient Medications Marked as Taking for the 1/30/23 encounter (Office Visit) with CARA Baker CNP   Medication Sig Dispense Refill    triamcinolone (KENALOG) 0.025 % cream Apply topically 2 times daily. 80 g 1    Vancomycin HCl in NaCl (VANCOCIN) 1.5-0.9 GM/250ML-% SOLN IVPB Infuse 1,500 mg intravenously every 8 hours. Infuse per elastomeric device over 90 minutes via PICC      Heparin Sod, Pork, Lock Flush (HEPARIN SODIUM LOCK FLUSH IV) Infuse 5 mLs intravenously every 8 (eight) hours. 100 units/ml. [place] after saline flush       sodium chloride flush 0.9 % injection Infuse 10 mLs intravenously every 8 (eight) hours. flush with 10ML saline flush before and after IV antibiotics or before or after blood draws or if needed for clearing line      furosemide (LASIX) 20 MG tablet Take 2 tablets by mouth daily as needed (edema or fluid retention) 60 tablet 1    potassium chloride (KLOR-CON M) 10 MEQ extended release tablet Take 10meq when taking 20mg lasix. Take 20meq when taking 40mg lasix (Patient taking differently: Take by mouth Take 10meq when taking 20mg lasix. Take 20meq when taking 40mg lasix) 60 tablet 3    blood glucose monitor strips by Other route See Admin Instructions Test before meals and bedtime & as needed for symptoms of irregular blood glucose. Dispense sufficient amount for indicated testing frequency plus additional to accommodate PRN testing needs. 100 strip 1    insulin glargine (LANTUS SOLOSTAR) 100 UNIT/ML injection pen Inject 25 Units into the skin nightly 7.5 mL 2    Insulin Pen Needle (PEN NEEDLES) 31G X 5 MM MISC Please dispense pen needles for use with patient's insulin pen. 100 each 1    Lancet Device MISC 1 Device by Does not apply route See Admin Instructions For use to test before meals and bedtime & as needed for symptoms of irregular blood glucose. Dispense sufficient amount for indicated testing frequency plus additional to accommodate PRN testing needs.  100 each 1    hydroCHLOROthiazide (HYDRODIURIL) 25 MG tablet Take 1 tablet by mouth daily 90 tablet 0    enalapril (VASOTEC) 10 MG tablet Take 1 tablet by mouth daily TAKE 1 TABLET DAILY 90 tablet 3    spironolactone (ALDACTONE) 25 MG tablet Take 1 tablet by mouth daily 90 tablet 3    CPAP Machine MISC by Other route      simvastatin (ZOCOR) 40 MG tablet Take 1 tablet by mouth daily 90 tablet 3    gabapentin (NEURONTIN) 600 MG tablet Take 1 tablet by mouth 2 times daily. carvedilol (COREG) 25 MG tablet Take 25 mg by mouth 2 times daily (with meals)      cetirizine (ZYRTEC) 10 MG tablet Take 10 mg by mouth daily as needed for Allergies or Rhinitis      dabigatran (PRADAXA) 150 MG capsule Take 150 mg by mouth 2 times daily TAKE 1 CAPSULE EVERY 12 HOURS      metFORMIN (GLUCOPHAGE) 500 MG tablet Take 500 mg by mouth 2 times daily (with meals) TAKE 1 TABLET TWICE A DAY WITH A MEAL          Medications patient taking as of now reconciled against medications ordered at time of hospital discharge: Yes    Review of Systems   Constitutional:  Positive for fatigue. Negative for chills and fever. HENT:  Negative for congestion, ear pain and sore throat. Respiratory:  Positive for shortness of breath (on exertion; improving). Negative for cough and wheezing. Cardiovascular:  Positive for leg swelling (improving). Negative for chest pain and palpitations. Gastrointestinal:  Negative for abdominal pain, diarrhea, nausea and vomiting. Genitourinary:  Negative for dysuria and hematuria. Skin:  Positive for wound. Neurological:  Negative for dizziness, light-headedness and headaches. Objective:    /70 (Site: Left Upper Arm, Position: Sitting, Cuff Size: Large Adult)   Pulse 100   Temp 97 °F (36.1 °C) (Temporal)   Ht 6' 2\" (1.88 m)   Wt (!) 316 lb (143.3 kg)   SpO2 97%   BMI 40.57 kg/m²       Physical Exam  Constitutional:       General: He is not in acute distress. Appearance: Normal appearance.    HENT:      Right Ear: Tympanic membrane, ear canal and external ear normal.      Left Ear: Tympanic membrane, ear canal and external ear normal.      Nose: Nose normal.      Mouth/Throat:      Mouth: Mucous membranes are moist.      Pharynx: Oropharynx is clear.   Cardiovascular:      Rate and Rhythm: Normal rate. Rhythm irregular.   Pulmonary:      Effort: Pulmonary effort is normal. No respiratory distress.      Breath sounds: No wheezing or rales.   Abdominal:      General: Bowel sounds are normal.      Palpations: Abdomen is soft.   Musculoskeletal:      Right lower leg: Edema (1+) present.      Left lower leg: Edema (1+) present.      Comments: Chronic dark pigmentation to anterior lower legs. Mild dryness and scaling present.   Skin:     General: Skin is warm and dry.      Comments: 2 cm x 1 cm chest tube site wound with slough. See wound below.   Neurological:      Mental Status: He is alert and oriented to person, place, and time.   Psychiatric:         Mood and Affect: Mood normal.       Media Information  Document Information    Wound Care Image:  Wound   Right back- chest tube site wound   01/30/2023 11:49   Attached To:   Office Visit on 1/30/23 with CARA Tapia CNP     Source Information    CARA Tapia CNP  [facility]       An electronic signature was used to authenticate this note.  --CARA Tapia CNP

## 2023-01-31 ENCOUNTER — HOME CARE VISIT (OUTPATIENT)
Dept: SCHEDULING | Facility: HOME HEALTH | Age: 68
End: 2023-01-31

## 2023-01-31 PROCEDURE — G0151 HHCP-SERV OF PT,EA 15 MIN: HCPCS

## 2023-01-31 RX ORDER — TRIAMCINOLONE ACETONIDE 0.25 MG/G
CREAM TOPICAL
Qty: 80 G | Refills: 1 | Status: SHIPPED | OUTPATIENT
Start: 2023-01-31

## 2023-01-31 ASSESSMENT — ENCOUNTER SYMPTOMS
COUGH: 0
ABDOMINAL PAIN: 0
VOMITING: 0
NAUSEA: 0
SORE THROAT: 0
DIARRHEA: 0
SHORTNESS OF BREATH: 1
WHEEZING: 0

## 2023-02-01 ENCOUNTER — HOME CARE VISIT (OUTPATIENT)
Dept: SCHEDULING | Facility: HOME HEALTH | Age: 68
End: 2023-02-01

## 2023-02-01 VITALS
DIASTOLIC BLOOD PRESSURE: 78 MMHG | OXYGEN SATURATION: 98 % | TEMPERATURE: 97.9 F | RESPIRATION RATE: 16 BRPM | SYSTOLIC BLOOD PRESSURE: 118 MMHG | HEART RATE: 80 BPM

## 2023-02-01 PROCEDURE — G0299 HHS/HOSPICE OF RN EA 15 MIN: HCPCS

## 2023-02-01 ASSESSMENT — ENCOUNTER SYMPTOMS: PAIN LOCATION - PAIN QUALITY: ACHE

## 2023-02-02 ENCOUNTER — HOME CARE VISIT (OUTPATIENT)
Dept: SCHEDULING | Facility: HOME HEALTH | Age: 68
End: 2023-02-02

## 2023-02-02 ENCOUNTER — OFFICE VISIT (OUTPATIENT)
Dept: SURGERY | Age: 68
End: 2023-02-02

## 2023-02-02 VITALS
DIASTOLIC BLOOD PRESSURE: 68 MMHG | HEART RATE: 88 BPM | OXYGEN SATURATION: 98 % | TEMPERATURE: 97.2 F | SYSTOLIC BLOOD PRESSURE: 138 MMHG

## 2023-02-02 VITALS
TEMPERATURE: 96.8 F | DIASTOLIC BLOOD PRESSURE: 62 MMHG | OXYGEN SATURATION: 95 % | HEART RATE: 68 BPM | RESPIRATION RATE: 18 BRPM | SYSTOLIC BLOOD PRESSURE: 109 MMHG

## 2023-02-02 VITALS — BODY MASS INDEX: 40.43 KG/M2 | WEIGHT: 315 LBS | HEIGHT: 74 IN

## 2023-02-02 DIAGNOSIS — Z51.89 VISIT FOR WOUND CHECK: ICD-10-CM

## 2023-02-02 PROCEDURE — 99024 POSTOP FOLLOW-UP VISIT: CPT | Performed by: SURGERY

## 2023-02-02 PROCEDURE — G0151 HHCP-SERV OF PT,EA 15 MIN: HCPCS

## 2023-02-02 ASSESSMENT — ENCOUNTER SYMPTOMS
DYSPNEA ACTIVITY LEVEL: AFTER AMBULATING MORE THAN 20 FT
STOOL DESCRIPTION: FORMED
PAIN LOCATION - PAIN QUALITY: IRRITATION
HEMOPTYSIS: 0

## 2023-02-02 NOTE — PROGRESS NOTES
[address] [facility]  [phone]        poDate: 2023      Name: Genesis Morales      MRN: [id number]       : 1955       Age: 69 y.o. Sex: male        Flakito Herrera MD       CC:    Chief Complaint   Patient presents with    Post-Op Check       HPI:  The patient presents for a wound check. He had a draining chest tube site. He had MRSA. He is on antibiotics. He has been doing well. Physical Exam:     Ht 6' 2\" (1.88 m)   Wt (!) 316 lb (143.3 kg)   BMI 40.57 kg/m²     General: Alert, oriented, cooperative and in no acute distress. Neck: Supple, trachea midline, no appreciable thyromegaly  Resp: No JVD. Breathing is  non-labored. Lungs clear to auscultation without wheezing or rhonchi   CV: RRR. No murmurs, rubs or gallops appreciated. Abd: soft non-tender and non-distended without peritoneal signs. +bs    Skin/incision: Chest tube site is significantly improved. Not able to pack. Silver nitrate is applied. Assessment/Plan:  Genesis Morales is a 69 y.o. male who is doing well. 1. Follow-up as needed    2. Return to full activity    3.  Regular diet    Signed: Cara Essex, MD    2023  10:05 AM

## 2023-02-04 ENCOUNTER — HOME CARE VISIT (OUTPATIENT)
Dept: SCHEDULING | Facility: HOME HEALTH | Age: 68
End: 2023-02-04

## 2023-02-04 VITALS
RESPIRATION RATE: 18 BRPM | SYSTOLIC BLOOD PRESSURE: 126 MMHG | HEART RATE: 96 BPM | DIASTOLIC BLOOD PRESSURE: 70 MMHG | OXYGEN SATURATION: 97 % | TEMPERATURE: 96.9 F

## 2023-02-04 PROCEDURE — G0299 HHS/HOSPICE OF RN EA 15 MIN: HCPCS

## 2023-02-06 ENCOUNTER — HOME CARE VISIT (OUTPATIENT)
Dept: SCHEDULING | Facility: HOME HEALTH | Age: 68
End: 2023-02-06

## 2023-02-06 VITALS
OXYGEN SATURATION: 97 % | HEART RATE: 92 BPM | DIASTOLIC BLOOD PRESSURE: 76 MMHG | SYSTOLIC BLOOD PRESSURE: 136 MMHG | RESPIRATION RATE: 18 BRPM | TEMPERATURE: 97.2 F

## 2023-02-06 PROCEDURE — G0299 HHS/HOSPICE OF RN EA 15 MIN: HCPCS

## 2023-02-07 ENCOUNTER — HOME CARE VISIT (OUTPATIENT)
Dept: SCHEDULING | Facility: HOME HEALTH | Age: 68
End: 2023-02-07

## 2023-02-07 VITALS
SYSTOLIC BLOOD PRESSURE: 122 MMHG | HEART RATE: 97 BPM | OXYGEN SATURATION: 95 % | DIASTOLIC BLOOD PRESSURE: 72 MMHG | RESPIRATION RATE: 18 BRPM | TEMPERATURE: 97.7 F

## 2023-02-07 PROCEDURE — G0151 HHCP-SERV OF PT,EA 15 MIN: HCPCS

## 2023-02-07 ASSESSMENT — ENCOUNTER SYMPTOMS
SPUTUM COLOR: CLEAR
COUGH CHARACTERISTICS: PRODUCTIVE
SPUTUM CONSISTENCY: THIN
COUGH: 1
PAIN LOCATION - PAIN QUALITY: ACHY
SPUTUM PRODUCTION: 1
SPUTUM AMOUNT: SCANT

## 2023-02-08 ENCOUNTER — HOME CARE VISIT (OUTPATIENT)
Dept: SCHEDULING | Facility: HOME HEALTH | Age: 68
End: 2023-02-08

## 2023-02-08 VITALS
OXYGEN SATURATION: 95 % | HEART RATE: 89 BPM | TEMPERATURE: 97.4 F | DIASTOLIC BLOOD PRESSURE: 70 MMHG | SYSTOLIC BLOOD PRESSURE: 130 MMHG

## 2023-02-08 DIAGNOSIS — I48.91 ATRIAL FIBRILLATION (HCC): Primary | ICD-10-CM

## 2023-02-08 PROCEDURE — G0299 HHS/HOSPICE OF RN EA 15 MIN: HCPCS

## 2023-02-08 RX ORDER — DABIGATRAN ETEXILATE 150 MG/1
150 CAPSULE ORAL 2 TIMES DAILY
Qty: 180 CAPSULE | Refills: 3 | Status: SHIPPED | OUTPATIENT
Start: 2023-02-08

## 2023-02-08 ASSESSMENT — ENCOUNTER SYMPTOMS
HEMOPTYSIS: 0
STOOL DESCRIPTION: FORMED
DYSPNEA ACTIVITY LEVEL: AFTER AMBULATING MORE THAN 20 FT

## 2023-02-08 NOTE — TELEPHONE ENCOUNTER
Requested Prescriptions     Pending Prescriptions Disp Refills    dabigatran (PRADAXA) 150 MG capsule 180 capsule 3     Sig: Take 1 capsule by mouth 2 times daily TAKE 1 CAPSULE EVERY 12 HOURS

## 2023-02-08 NOTE — TELEPHONE ENCOUNTER
MEDICATION REFILL REQUEST      Name of Medication:  Pradaxa  Dose:150 mg  Frequency:  2 a day  Quantity:  180  Days' supply:  80 with refills  Pharmacy Name/Location:  [facility]

## 2023-02-10 ENCOUNTER — CARE COORDINATION (OUTPATIENT)
Dept: CARE COORDINATION | Facility: CLINIC | Age: 68
End: 2023-02-10

## 2023-02-11 ENCOUNTER — HOME CARE VISIT (OUTPATIENT)
Dept: SCHEDULING | Facility: HOME HEALTH | Age: 68
End: 2023-02-11

## 2023-02-11 PROCEDURE — G0299 HHS/HOSPICE OF RN EA 15 MIN: HCPCS

## 2023-02-12 VITALS
DIASTOLIC BLOOD PRESSURE: 74 MMHG | SYSTOLIC BLOOD PRESSURE: 120 MMHG | TEMPERATURE: 97.8 F | RESPIRATION RATE: 18 BRPM | OXYGEN SATURATION: 97 % | HEART RATE: 86 BPM

## 2023-02-12 ASSESSMENT — ENCOUNTER SYMPTOMS
STOOL DESCRIPTION: FORMED
HEMOPTYSIS: 0
DYSPNEA ACTIVITY LEVEL: AFTER AMBULATING MORE THAN 20 FT

## 2023-02-13 ENCOUNTER — HOME CARE VISIT (OUTPATIENT)
Dept: SCHEDULING | Facility: HOME HEALTH | Age: 68
End: 2023-02-13
Payer: MEDICARE

## 2023-02-13 VITALS
HEART RATE: 84 BPM | RESPIRATION RATE: 18 BRPM | SYSTOLIC BLOOD PRESSURE: 136 MMHG | OXYGEN SATURATION: 98 % | DIASTOLIC BLOOD PRESSURE: 76 MMHG | TEMPERATURE: 98.6 F

## 2023-02-13 PROCEDURE — G0299 HHS/HOSPICE OF RN EA 15 MIN: HCPCS

## 2023-02-13 ASSESSMENT — ENCOUNTER SYMPTOMS
STOOL DESCRIPTION: FORMED
HEMOPTYSIS: 0

## 2023-02-15 ENCOUNTER — HOME CARE VISIT (OUTPATIENT)
Dept: SCHEDULING | Facility: HOME HEALTH | Age: 68
End: 2023-02-15
Payer: MEDICARE

## 2023-02-15 ENCOUNTER — OFFICE VISIT (OUTPATIENT)
Dept: INTERNAL MEDICINE CLINIC | Facility: CLINIC | Age: 68
End: 2023-02-15
Payer: MEDICARE

## 2023-02-15 VITALS
RESPIRATION RATE: 16 BRPM | WEIGHT: 315 LBS | BODY MASS INDEX: 40.43 KG/M2 | OXYGEN SATURATION: 96 % | TEMPERATURE: 97 F | HEART RATE: 90 BPM | SYSTOLIC BLOOD PRESSURE: 100 MMHG | DIASTOLIC BLOOD PRESSURE: 70 MMHG | HEIGHT: 74 IN

## 2023-02-15 DIAGNOSIS — Z87.09 HX OF PNEUMOTHORAX: ICD-10-CM

## 2023-02-15 DIAGNOSIS — I10 ESSENTIAL HYPERTENSION: ICD-10-CM

## 2023-02-15 DIAGNOSIS — N39.0 URINARY TRACT INFECTION WITHOUT HEMATURIA, SITE UNSPECIFIED: ICD-10-CM

## 2023-02-15 DIAGNOSIS — E11.51 CONTROLLED TYPE 2 DIABETES MELLITUS WITH PERIPHERAL CIRCULATORY DISORDER (HCC): ICD-10-CM

## 2023-02-15 DIAGNOSIS — G63 POLYNEUROPATHY IN DISEASES CLASSIFIED ELSEWHERE (HCC): ICD-10-CM

## 2023-02-15 DIAGNOSIS — E83.110 HEREDITARY HEMOCHROMATOSIS (HCC): ICD-10-CM

## 2023-02-15 DIAGNOSIS — N39.0 URINARY TRACT INFECTION WITHOUT HEMATURIA, SITE UNSPECIFIED: Primary | ICD-10-CM

## 2023-02-15 DIAGNOSIS — I50.22 CHRONIC SYSTOLIC (CONGESTIVE) HEART FAILURE (HCC): ICD-10-CM

## 2023-02-15 DIAGNOSIS — J93.83 SPONTANEOUS PNEUMOTHORAX: ICD-10-CM

## 2023-02-15 LAB
BILIRUBIN, URINE, POC: NEGATIVE
BLOOD URINE, POC: ABNORMAL
GLUCOSE URINE, POC: NEGATIVE
KETONES, URINE, POC: NEGATIVE
LEUKOCYTE ESTERASE, URINE, POC: ABNORMAL
NITRITE, URINE, POC: NEGATIVE
PH, URINE, POC: 6 (ref 4.6–8)
PROTEIN,URINE, POC: NEGATIVE
SPECIFIC GRAVITY, URINE, POC: 1.01 (ref 1–1.03)
URINALYSIS CLARITY, POC: ABNORMAL
URINALYSIS COLOR, POC: YELLOW
UROBILINOGEN, POC: 0.2

## 2023-02-15 PROCEDURE — 3051F HG A1C>EQUAL 7.0%<8.0%: CPT | Performed by: INTERNAL MEDICINE

## 2023-02-15 PROCEDURE — 3074F SYST BP LT 130 MM HG: CPT | Performed by: INTERNAL MEDICINE

## 2023-02-15 PROCEDURE — 81003 URINALYSIS AUTO W/O SCOPE: CPT | Performed by: INTERNAL MEDICINE

## 2023-02-15 PROCEDURE — 1036F TOBACCO NON-USER: CPT | Performed by: INTERNAL MEDICINE

## 2023-02-15 PROCEDURE — 2022F DILAT RTA XM EVC RTNOPTHY: CPT | Performed by: INTERNAL MEDICINE

## 2023-02-15 PROCEDURE — G8417 CALC BMI ABV UP PARAM F/U: HCPCS | Performed by: INTERNAL MEDICINE

## 2023-02-15 PROCEDURE — 3078F DIAST BP <80 MM HG: CPT | Performed by: INTERNAL MEDICINE

## 2023-02-15 PROCEDURE — G8484 FLU IMMUNIZE NO ADMIN: HCPCS | Performed by: INTERNAL MEDICINE

## 2023-02-15 PROCEDURE — 3017F COLORECTAL CA SCREEN DOC REV: CPT | Performed by: INTERNAL MEDICINE

## 2023-02-15 PROCEDURE — 1111F DSCHRG MED/CURRENT MED MERGE: CPT | Performed by: INTERNAL MEDICINE

## 2023-02-15 PROCEDURE — G8427 DOCREV CUR MEDS BY ELIG CLIN: HCPCS | Performed by: INTERNAL MEDICINE

## 2023-02-15 PROCEDURE — 1123F ACP DISCUSS/DSCN MKR DOCD: CPT | Performed by: INTERNAL MEDICINE

## 2023-02-15 PROCEDURE — 99215 OFFICE O/P EST HI 40 MIN: CPT | Performed by: INTERNAL MEDICINE

## 2023-02-15 RX ORDER — CARVEDILOL 25 MG/1
25 TABLET ORAL 2 TIMES DAILY WITH MEALS
Qty: 60 TABLET | Refills: 3 | Status: SHIPPED | OUTPATIENT
Start: 2023-02-15

## 2023-02-15 RX ORDER — CEFPODOXIME PROXETIL 100 MG/1
100 TABLET, FILM COATED ORAL 2 TIMES DAILY
Qty: 20 TABLET | Refills: 0 | Status: SHIPPED | OUTPATIENT
Start: 2023-02-15 | End: 2023-02-25

## 2023-02-15 RX ORDER — SEMAGLUTIDE 1.34 MG/ML
0.25 INJECTION, SOLUTION SUBCUTANEOUS
Qty: 1.5 ML | Refills: 1
Start: 2023-02-15

## 2023-02-15 RX ORDER — SEMAGLUTIDE 1.34 MG/ML
0.5 INJECTION, SOLUTION SUBCUTANEOUS
Qty: 1.5 ML | Refills: 1
Start: 2023-02-15

## 2023-02-15 SDOH — ECONOMIC STABILITY: HOUSING INSECURITY
IN THE LAST 12 MONTHS, WAS THERE A TIME WHEN YOU DID NOT HAVE A STEADY PLACE TO SLEEP OR SLEPT IN A SHELTER (INCLUDING NOW)?: NO

## 2023-02-15 SDOH — ECONOMIC STABILITY: FOOD INSECURITY: WITHIN THE PAST 12 MONTHS, THE FOOD YOU BOUGHT JUST DIDN'T LAST AND YOU DIDN'T HAVE MONEY TO GET MORE.: NEVER TRUE

## 2023-02-15 SDOH — ECONOMIC STABILITY: INCOME INSECURITY: HOW HARD IS IT FOR YOU TO PAY FOR THE VERY BASICS LIKE FOOD, HOUSING, MEDICAL CARE, AND HEATING?: NOT HARD AT ALL

## 2023-02-15 SDOH — ECONOMIC STABILITY: FOOD INSECURITY: WITHIN THE PAST 12 MONTHS, YOU WORRIED THAT YOUR FOOD WOULD RUN OUT BEFORE YOU GOT MONEY TO BUY MORE.: NEVER TRUE

## 2023-02-15 ASSESSMENT — PATIENT HEALTH QUESTIONNAIRE - PHQ9
1. LITTLE INTEREST OR PLEASURE IN DOING THINGS: 0
SUM OF ALL RESPONSES TO PHQ QUESTIONS 1-9: 0
SUM OF ALL RESPONSES TO PHQ QUESTIONS 1-9: 0
2. FEELING DOWN, DEPRESSED OR HOPELESS: 0
SUM OF ALL RESPONSES TO PHQ QUESTIONS 1-9: 0
SUM OF ALL RESPONSES TO PHQ9 QUESTIONS 1 & 2: 0
SUM OF ALL RESPONSES TO PHQ QUESTIONS 1-9: 0

## 2023-02-15 NOTE — PROGRESS NOTES
ASSESSMENT/PLAN:  I have reviewed extensive records and personally reviewed images of his recent hospitalization and illness. He had a spontaneous pneumothorax that required thoracotomy and pleurodesis in the right chest.  Thought to be spontaneous related to apical bleb. He had a prior pneumothorax in the past as well. COPD related? Trauma related? Cough related? As result of? Nevertheless he required hospitalization and subsequently developed MRSA infection which had to be treated with IV vancomycin. He has followed up with infectious diseases and Dr. Niko Lam and completed therapy. He has no clinical symptoms of infection in regards to sepsis at this time. He does have some urine frequency and difficulty urinating which has been an ongoing issue and relating to BPH and prior surgery and TURP. His urinalysis is abnormal today. Start Vantin 100 mg twice daily and urine culture. I recommended he have a chest x-ray in about 2 weeks to monitor progression. Diminished breath sounds probably related to surgery and no respiratory distress. Vital signs are stable at this time. I recommended the following instructions below. We did discuss his blood sugar and I reviewed his blood sugar logs. Some higher blood sugars and he is interested in trying Ozempic. There is no obvious contraindication his medications are reviewed and medication list is updated. Very complex patient with multiple issues. He is here with his wife. I have attempted to answer all their questions to the best of my ability. On this date, 2/15/23, I have spent 59 minutes reviewing previous notes, test results and face to face with the patient discussing the diagnosis and importance of compliance with the treatment plan as well as documenting on the day of the visit. An electronic signature was used to authenticate this note. -- Adilnee Whalen MD   Patient Instructions   Follow up with me in 4 weeks.     Start Vantin 100 mg BID  Urine culture sent    Chest xray in about 2 weeks. Standing order. Stop Potassium. BMP prior to visit in 4 wks. Rx written to try Ozempic 0.25 mg weekly. Will adjust dose at follow up.       Medication refilled      Results for orders placed or performed in visit on 02/08/23 (from the past 2160 hour(s))   Hepatic Function Panel   Result Value Ref Range    Total Protein 7.3 6.3 - 8.2 g/dL    Albumin 3.5 3.2 - 4.6 g/dL    Globulin 3.8 2.8 - 4.5 g/dL    Albumin/Globulin Ratio 0.9 0.4 - 1.6      Total Bilirubin 1.4 (H) 0.2 - 1.1 MG/DL    Bilirubin, Direct 0.2 <0.4 MG/DL    Alk Phosphatase 122 50 - 136 U/L    AST 29 15 - 37 U/L    ALT 33 12 - 65 U/L   Lipid Panel   Result Value Ref Range    Cholesterol, Total 117 <200 MG/DL    Triglycerides 115 35 - 150 MG/DL    HDL 35 (L) 40 - 60 MG/DL    LDL Calculated 59 <100 MG/DL    VLDL Cholesterol Calculated 23 6.0 - 23.0 MG/DL    Chol/HDL Ratio 3.3     CBC   Result Value Ref Range    WBC 6.7 4.3 - 11.1 K/uL    RBC 4.16 (L) 4.23 - 5.6 M/uL    Hemoglobin 13.7 13.6 - 17.2 g/dL    Hematocrit 40.0 (L) 41.1 - 50.3 %    MCV 96.2 82 - 102 FL    MCH 32.9 26.1 - 32.9 PG    MCHC 34.3 31.4 - 35.0 g/dL    RDW 13.4 11.9 - 14.6 %    Platelets 906 544 - 453 K/uL    MPV 10.6 9.4 - 12.3 FL    nRBC 0.00 0.0 - 0.2 K/uL   Hemoglobin A1C   Result Value Ref Range    Hemoglobin A1C 7.4 (H) 4.8 - 5.6 %    eAG 166 mg/dL   Basic Metabolic Panel   Result Value Ref Range    Sodium 137 133 - 143 mmol/L    Potassium 5.6 (H) 3.5 - 5.1 mmol/L    Chloride 100 (L) 101 - 110 mmol/L    CO2 27 21 - 32 mmol/L    Anion Gap 10 2 - 11 mmol/L    Glucose 139 (H) 65 - 100 mg/dL    BUN 15 8 - 23 MG/DL    Creatinine 1.00 0.8 - 1.5 MG/DL    Est, Glom Filt Rate >60 >60 ml/min/1.73m2    Calcium 10.0 8.3 - 10.4 MG/DL   Results for orders placed or performed during the hospital encounter of 01/30/23 (from the past 2160 hour(s))   CBC with Auto Differential   Result Value Ref Range    WBC 6.5 4.3 - 11.1 K/uL RBC 3.96 (L) 4.23 - 5.6 M/uL    Hemoglobin 12.6 (L) 13.6 - 17.2 g/dL    Hematocrit 39.1 (L) 41.1 - 50.3 %    MCV 98.7 82 - 102 FL    MCH 31.8 26.1 - 32.9 PG    MCHC 32.2 31.4 - 35.0 g/dL    RDW 13.4 11.9 - 14.6 %    Platelets 546 396 - 499 K/uL    MPV 9.9 9.4 - 12.3 FL    nRBC 0.00 0.0 - 0.2 K/uL    Differential Type AUTOMATED      Seg Neutrophils 73 43 - 78 %    Lymphocytes 14 13 - 44 %    Monocytes 7 4.0 - 12.0 %    Eosinophils % 4 0.5 - 7.8 %    Basophils 1 0.0 - 2.0 %    Immature Granulocytes 1 0.0 - 5.0 %    Segs Absolute 4.9 1.7 - 8.2 K/UL    Absolute Lymph # 0.9 0.5 - 4.6 K/UL    Absolute Mono # 0.5 0.1 - 1.3 K/UL    Absolute Eos # 0.2 0.0 - 0.8 K/UL    Basophils Absolute 0.1 0.0 - 0.2 K/UL    Absolute Immature Granulocyte 0.0 0.0 - 0.5 K/UL   Creatinine   Result Value Ref Range    Creatinine 0.80 0.8 - 1.5 MG/DL   Hepatic Function Panel   Result Value Ref Range    Total Protein 7.0 6.3 - 8.2 g/dL    Albumin 3.0 (L) 3.2 - 4.6 g/dL    Globulin 4.0 2.8 - 4.5 g/dL    Albumin/Globulin Ratio 0.8 0.4 - 1.6      Total Bilirubin 0.6 0.2 - 1.1 MG/DL    Bilirubin, Direct 0.2 <0.4 MG/DL    Alk Phosphatase 114 50 - 136 U/L    AST 29 15 - 37 U/L    ALT 34 12 - 65 U/L   Vancomycin Level, Trough   Result Value Ref Range    Vancomycin Tr 21.4 (HH) 5 - 20 ug/mL   Results for orders placed or performed during the hospital encounter of 01/26/23 (from the past 2160 hour(s))   Creatinine   Result Value Ref Range    Creatinine 0.70 (L) 0.8 - 1.5 MG/DL   Vancomycin Level, Trough   Result Value Ref Range    Vancomycin Tr 16.8 5 - 20 ug/mL   Results for orders placed or performed during the hospital encounter of 01/23/23 (from the past 2160 hour(s))   CBC with Auto Differential   Result Value Ref Range    WBC 9.2 4.3 - 11.1 K/uL    RBC 3.74 (L) 4.23 - 5.6 M/uL    Hemoglobin 12.3 (L) 13.6 - 17.2 g/dL    Hematocrit 37.0 (L) 41.1 - 50.3 %    MCV 98.9 82 - 102 FL    MCH 32.9 26.1 - 32.9 PG    MCHC 33.2 31.4 - 35.0 g/dL    RDW 13.5 11.9 - 14.6 %    Platelets 250 515 - 471 K/uL    MPV 9.0 (L) 9.4 - 12.3 FL    nRBC 0.00 0.0 - 0.2 K/uL    Differential Type AUTOMATED      Seg Neutrophils 79 (H) 43 - 78 %    Lymphocytes 9 (L) 13 - 44 %    Monocytes 6 4.0 - 12.0 %    Eosinophils % 3 0.5 - 7.8 %    Basophils 1 0.0 - 2.0 %    Immature Granulocytes 2 0.0 - 5.0 %    Segs Absolute 7.4 1.7 - 8.2 K/UL    Absolute Lymph # 0.8 0.5 - 4.6 K/UL    Absolute Mono # 0.5 0.1 - 1.3 K/UL    Absolute Eos # 0.3 0.0 - 0.8 K/UL    Basophils Absolute 0.1 0.0 - 0.2 K/UL    Absolute Immature Granulocyte 0.2 0.0 - 0.5 K/UL   Creatinine   Result Value Ref Range    Creatinine 0.60 (L) 0.8 - 1.5 MG/DL   Hepatic Function Panel   Result Value Ref Range    Total Protein 6.1 (L) 6.3 - 8.2 g/dL    Albumin 2.5 (L) 3.2 - 4.6 g/dL    Globulin 3.6 2.8 - 4.5 g/dL    Albumin/Globulin Ratio 0.7 0.4 - 1.6      Total Bilirubin 0.6 0.2 - 1.1 MG/DL    Bilirubin, Direct 0.2 <0.4 MG/DL    Alk Phosphatase 98 50 - 136 U/L    AST 33 15 - 37 U/L    ALT 43 12 - 65 U/L   Vancomycin Level, Trough   Result Value Ref Range    Vancomycin Tr 15.7 5 - 20 ug/mL   Results for orders placed or performed during the hospital encounter of 01/04/23 (from the past 2160 hour(s))   Rapid influenza A/B antigens    Specimen: Nasal Washing   Result Value Ref Range    Influenza A Ag Negative NEG      Influenza B Ag Negative NEG      Source Nasopharyngeal     COVID-19, Rapid    Specimen: Nasopharyngeal   Result Value Ref Range    Source NASAL      SARS-CoV-2, Rapid Not detected NOTD     Culture, Blood 1    Specimen: Blood   Result Value Ref Range    Special Requests LEFT  Antecubital        Culture NO GROWTH 5 DAYS     Culture, Blood 1    Specimen: Blood   Result Value Ref Range    Special Requests RIGHT  Antecubital        Gram stain GRAM POS COCCI IN CLUSTERS      Gram stain AEROBIC AND ANAEROBIC BOTTLES      Gram stain        RESULTS VERIFIED, PHONED TO AND READ BACK BY JOHN PATRICK RN ON 1/15/23 AT 0644     Culture * Methicillin Resistant Staphylococcus aureus * (A)      Culture Refer to Blood Culture ID Panel Accession [id number]         Susceptibility    Methicillin-Resistant Staphylococcus aureus - BACTERIAL SUSCEPTIBILITY PANEL LAINA     trimethoprim-sulfamethoxazole <=0.5/9.5 Sensitive ug/mL     tetracycline <=0.5 Sensitive ug/mL     clindamycin <=0.5 Resistant ug/mL     vancomycin 1 Sensitive ug/mL     oxacillin >2 Resistant ug/mL     rifampin* <=0.5 Sensitive ug/mL      * Rifampin is not to be used for mono-therapy.    Culture, Urine    Specimen: Urine   Result Value Ref Range    Special Requests NO SPECIAL REQUESTS  Reflexed from [id number]        Culture NO GROWTH 2 DAYS     Culture, Blood, PCR ID Panel    Specimen: Blood   Result Value Ref Range    Accession Number [id number]     Enterococcus faecalis by PCR NOT DETECTED NOTDET      Enterococcus faecium by PCR NOT DETECTED NOTDET      Listeria monocytogenes by PCR NOT DETECTED NOTDET      STAPHYLOCOCCUS Detected (A) NOTDET      Staphylococcus Aureus Detected (A) NOTDET      Staphylococcus epidermidis by PCR NOT DETECTED NOTDET      Staphylococcus lugdunensis by PCR NOT DETECTED NOTDET      STREPTOCOCCUS NOT DETECTED NOTDET      Streptococcus agalactiae (Group B) NOT DETECTED NOTDET      Strep pneumoniae NOT DETECTED NOTDET      Strep pyogenes,(Grp. A) NOT DETECTED NOTDET      Acinetobacter calcoac baumannii complex by PCR NOT DETECTED NOTDET      Bacteroides fragilis by PCR NOT DETECTED NOTDET      Enterobacteriaceae by PCR NOT DETECTED NOTDET      Enterobacter cloacae complex by PCR NOT DETECTED NOTDET      Escherichia Coli NOT DETECTED NOTDET      Klebsiella aerogenes by PCR NOT DETECTED NOTDET      Klebsiella oxytoca by PCR NOT DETECTED NOTDET      Klebsiella pneumoniae group by PCR NOT DETECTED NOTDET      Proteus by PCR NOT DETECTED NOTDET      Salmonella species by PCR NOT DETECTED NOTDET      Serratia marcescens by PCR NOT DETECTED NOTDET      Haemophilus Influenzae by PCR NOT DETECTED NOTDET      Neisseria meningitidis by PCR NOT DETECTED NOTDET      Pseudomonas aeruginosa NOT DETECTED NOTDET      Stenotrophomonas maltophilia by PCR NOT DETECTED NOTDET      Candida albicans by PCR NOT DETECTED NOTDET      Candida auris by PCR NOT DETECTED NOTDET      Candida glabrata NOT DETECTED NOTDET      Candida krusei by PCR NOT DETECTED NOTDET      Candida parapsilosis by PCR NOT DETECTED NOTDET      Candida tropicalis by PCR NOT DETECTED NOTDET      Cryptococcus neoformans/gattii by PCR NOT DETECTED NOTDET      Resistant gene targets          Resistant gene meca/c & mrej by PCR Detected (A) NOTDET      [employer] test comment        False positive results may rarely occur. Correlate with clinical,epidemiologic, and other laboratory findings   Culture, Blood 1    Specimen: Blood   Result Value Ref Range    Special Requests RIGHT  HAND        Culture NO GROWTH 5 DAYS     Culture, Blood 1    Specimen: Blood   Result Value Ref Range    Special Requests RIGHT  Antecubital        Culture NO GROWTH 5 DAYS     XR CHEST PORTABLE    Impression    1. Large right pneumothorax. 2.  Pulmonary vascular congestion. Findings discussed with Dr. Phil Betts by myself at 4:42 PM.     CT CHEST WO CONTRAST    Impression    1. Persisting large right pneumothorax. 2.  Leftward deviation of mediastinal contours concerning for tension component. 3.  Partial atelectasis of right lung. 4.  Emphysema. 5.  Vascular disease. Urgent findings communicated to the ordering NP [employer] [facility] by the secure text  messaging system at 6:30 PM.   XR CHEST PORTABLE    Impression    1. Right chest tube placed, with markedly smaller pneumothorax. 2.  Resolution of the previous tension component. 3.  Mild bibasilar atelectasis. XR CHEST PORTABLE    Impression    1. Resolved right pneumothorax. 2. Mild bibasilar lung atelectasis. XR CHEST PORTABLE    Impression    Unchanged bibasilar lung atelectasis.    XR CHEST PORTABLE Impression    No evidence of pneumothorax     XR CHEST PORTABLE    Impression    1. New small right apical pneumothorax, despite presence of the right-sided  chest tube. 2. Bibasilar atelectasis. XR CHEST PORTABLE    Impression    1. Persistent small right apical pneumothorax. Right chest tube is stable. XR CHEST PORTABLE    Impression    1. Small right apical pneumothorax. Right chest tube is in place. 2. Right lung base opacity, likely atelectasis. 3. No significant change compared to prior. XR CHEST PORTABLE    Impression    1. Stable right chest tubes. No visible pneumothorax on today's study. 2. Right basilar atelectasis. XR CHEST PORTABLE    Impression    Placement of 2 basilar chest tubes with probable small residual  right apical pneumothorax. XR CHEST PORTABLE    Impression    1. Stable right chest tubes. No significant pneumothorax. 2. Right basilar atelectasis. 3. No significant change. XR CHEST PORTABLE    Impression    No appreciable pneumothorax in the current study. XR CHEST PORTABLE    Impression    1. Stable right chest tubes. No visible pneumothorax. 2. Persistent bibasilar atelectasis. 3. No significant change. XR CHEST PORTABLE    Impression    -No significant interval change. No definite visible pneumothorax. XR CHEST PORTABLE    Impression    1. Removal of the right chest tubes. No pneumothorax. 2.  Little interval change otherwise. XR CHEST (2 VW)    Impression    No significant interval change. XR CHEST PORTABLE    Impression    No significant interval change. XR CHEST PORTABLE    Impression    Atelectasis or infiltrate right lung base is stable. Right PICC line  has been placed with the tip in the distal SVC in good position. Left lung is  clear. The heart is normal in size. No pneumothorax. Possible small right  pleural effusion. No significant left pleural effusion.  Oval-shaped opacity  medial aspect right mid lung is unchanged possibly loculated fluid in the major  fissure. Right upper lobe lung mass not completely ruled out.      CBC   Result Value Ref Range    WBC 9.0 4.3 - 11.1 K/uL    RBC 4.41 4.23 - 5.6 M/uL    Hemoglobin 14.6 13.6 - 17.2 g/dL    Hematocrit 42.9 41.1 - 50.3 %    MCV 97.3 82 - 102 FL    MCH 33.1 (H) 26.1 - 32.9 PG    MCHC 34.0 31.4 - 35.0 g/dL    RDW 13.0 11.9 - 14.6 %    Platelets 301 363 - 992 K/uL    MPV 10.5 9.4 - 12.3 FL    nRBC 0.00 0.0 - 0.2 K/uL   Comprehensive Metabolic Panel   Result Value Ref Range    Sodium 133 133 - 143 mmol/L    Potassium 3.9 3.5 - 5.1 mmol/L    Chloride 104 101 - 110 mmol/L    CO2 24 21 - 32 mmol/L    Anion Gap 5 2 - 11 mmol/L    Glucose 196 (H) 65 - 100 mg/dL    BUN 11 8 - 23 MG/DL    Creatinine 0.90 0.8 - 1.5 MG/DL    Est, Glom Filt Rate >60 >60 ml/min/1.73m2    Calcium 9.4 8.3 - 10.4 MG/DL    Total Bilirubin 1.0 0.2 - 1.1 MG/DL    ALT 72 (H) 12 - 65 U/L    AST 37 15 - 37 U/L    Alk Phosphatase 119 50 - 136 U/L    Total Protein 7.1 6.3 - 8.2 g/dL    Albumin 3.6 3.2 - 4.6 g/dL    Globulin 3.5 2.8 - 4.5 g/dL    Albumin/Globulin Ratio 1.0 0.4 - 1.6     Troponin   Result Value Ref Range    Troponin, High Sensitivity 3.9 0 - 14 pg/mL   Troponin   Result Value Ref Range    Troponin, High Sensitivity 4.7 0 - 14 pg/mL   Magnesium   Result Value Ref Range    Magnesium 2.2 1.8 - 2.4 mg/dL   Brain Natriuretic Peptide   Result Value Ref Range    NT Pro- (H) 5 - 125 PG/ML   Basic Metabolic Panel w/ Reflex to MG   Result Value Ref Range    Sodium 135 133 - 143 mmol/L    Potassium 3.9 3.5 - 5.1 mmol/L    Chloride 104 101 - 110 mmol/L    CO2 29 21 - 32 mmol/L    Anion Gap 2 2 - 11 mmol/L    Glucose 221 (H) 65 - 100 mg/dL    BUN 10 8 - 23 MG/DL    Creatinine 0.80 0.8 - 1.5 MG/DL    Est, Glom Filt Rate >60 >60 ml/min/1.73m2    Calcium 9.2 8.3 - 10.4 MG/DL   CBC with Auto Differential   Result Value Ref Range    WBC 7.6 4.3 - 11.1 K/uL    RBC 4.36 4.23 - 5.6 M/uL    Hemoglobin 14.2 13.6 - 17.2 g/dL    Hematocrit 42.3 41.1 - 50.3 %    MCV 97.0 82 - 102 FL    MCH 32.6 26.1 - 32.9 PG    MCHC 33.6 31.4 - 35.0 g/dL    RDW 13.0 11.9 - 14.6 %    Platelets 036 605 - 644 K/uL    MPV 10.1 9.4 - 12.3 FL    nRBC 0.00 0.0 - 0.2 K/uL    Differential Type AUTOMATED      Seg Neutrophils 78 43 - 78 %    Lymphocytes 12 (L) 13 - 44 %    Monocytes 6 4.0 - 12.0 %    Eosinophils % 3 0.5 - 7.8 %    Basophils 0 0.0 - 2.0 %    Immature Granulocytes 1 0.0 - 5.0 %    Segs Absolute 6.0 1.7 - 8.2 K/UL    Absolute Lymph # 0.9 0.5 - 4.6 K/UL    Absolute Mono # 0.4 0.1 - 1.3 K/UL    Absolute Eos # 0.2 0.0 - 0.8 K/UL    Basophils Absolute 0.0 0.0 - 0.2 K/UL    Absolute Immature Granulocyte 0.1 0.0 - 0.5 K/UL   CBC with Auto Differential   Result Value Ref Range    WBC 8.8 4.3 - 11.1 K/uL    RBC 4.35 4.23 - 5.6 M/uL    Hemoglobin 14.7 13.6 - 17.2 g/dL    Hematocrit 43.5 41.1 - 50.3 %    .0 82 - 102 FL    MCH 33.8 (H) 26.1 - 32.9 PG    MCHC 33.8 31.4 - 35.0 g/dL    RDW 13.1 11.9 - 14.6 %    Platelets 857 339 - 492 K/uL    MPV 10.2 9.4 - 12.3 FL    nRBC 0.00 0.0 - 0.2 K/uL    Differential Type AUTOMATED      Seg Neutrophils 75 43 - 78 %    Lymphocytes 16 13 - 44 %    Monocytes 6 4.0 - 12.0 %    Eosinophils % 2 0.5 - 7.8 %    Basophils 0 0.0 - 2.0 %    Immature Granulocytes 1 0.0 - 5.0 %    Segs Absolute 6.7 1.7 - 8.2 K/UL    Absolute Lymph # 1.4 0.5 - 4.6 K/UL    Absolute Mono # 0.5 0.1 - 1.3 K/UL    Absolute Eos # 0.2 0.0 - 0.8 K/UL    Basophils Absolute 0.0 0.0 - 0.2 K/UL    Absolute Immature Granulocyte 0.0 0.0 - 0.5 K/UL   Basic Metabolic Panel w/ Reflex to MG   Result Value Ref Range    Sodium 134 133 - 143 mmol/L    Potassium 4.2 3.5 - 5.1 mmol/L    Chloride 103 101 - 110 mmol/L    CO2 28 21 - 32 mmol/L    Anion Gap 3 2 - 11 mmol/L    Glucose 229 (H) 65 - 100 mg/dL    BUN 12 8 - 23 MG/DL    Creatinine 0.90 0.8 - 1.5 MG/DL    Est, Glom Filt Rate >60 >60 ml/min/1.73m2    Calcium 9.2 8.3 - 10.4 MG/DL   CBC with Auto Differential   Result Value Ref Range    WBC 8.4 4.3 - 11.1 K/uL    RBC 4.29 4.23 - 5.6 M/uL    Hemoglobin 14.1 13.6 - 17.2 g/dL    Hematocrit 41.9 41.1 - 50.3 %    MCV 97.7 82 - 102 FL    MCH 32.9 26.1 - 32.9 PG    MCHC 33.7 31.4 - 35.0 g/dL    RDW 13.0 11.9 - 14.6 %    Platelets 426 767 - 100 K/uL    MPV 9.9 9.4 - 12.3 FL    nRBC 0.00 0.0 - 0.2 K/uL    Differential Type AUTOMATED      Seg Neutrophils 73 43 - 78 %    Lymphocytes 17 13 - 44 %    Monocytes 5 4.0 - 12.0 %    Eosinophils % 3 0.5 - 7.8 %    Basophils 1 0.0 - 2.0 %    Immature Granulocytes 1 0.0 - 5.0 %    Segs Absolute 6.3 1.7 - 8.2 K/UL    Absolute Lymph # 1.4 0.5 - 4.6 K/UL    Absolute Mono # 0.5 0.1 - 1.3 K/UL    Absolute Eos # 0.2 0.0 - 0.8 K/UL    Basophils Absolute 0.0 0.0 - 0.2 K/UL    Absolute Immature Granulocyte 0.1 0.0 - 0.5 K/UL   Basic Metabolic Panel w/ Reflex to MG   Result Value Ref Range    Sodium 135 133 - 143 mmol/L    Potassium 4.1 3.5 - 5.1 mmol/L    Chloride 102 101 - 110 mmol/L    CO2 26 21 - 32 mmol/L    Anion Gap 7 2 - 11 mmol/L    Glucose 210 (H) 65 - 100 mg/dL    BUN 12 8 - 23 MG/DL    Creatinine 1.00 0.8 - 1.5 MG/DL    Est, Glom Filt Rate >60 >60 ml/min/1.73m2    Calcium 8.8 8.3 - 10.4 MG/DL   CBC with Auto Differential   Result Value Ref Range    WBC 7.9 4.3 - 11.1 K/uL    RBC 4.35 4.23 - 5.6 M/uL    Hemoglobin 14.3 13.6 - 17.2 g/dL    Hematocrit 42.6 41.1 - 50.3 %    MCV 97.9 82 - 102 FL    MCH 32.9 26.1 - 32.9 PG    MCHC 33.6 31.4 - 35.0 g/dL    RDW 13.0 11.9 - 14.6 %    Platelets 521 406 - 367 K/uL    MPV 10.5 9.4 - 12.3 FL    nRBC 0.00 0.0 - 0.2 K/uL    Differential Type AUTOMATED      Seg Neutrophils 76 43 - 78 %    Lymphocytes 15 13 - 44 %    Monocytes 6 4.0 - 12.0 %    Eosinophils % 2 0.5 - 7.8 %    Basophils 0 0.0 - 2.0 %    Immature Granulocytes 1 0.0 - 5.0 %    Segs Absolute 6.0 1.7 - 8.2 K/UL    Absolute Lymph # 1.2 0.5 - 4.6 K/UL    Absolute Mono # 0.5 0.1 - 1.3 K/UL    Absolute Eos # 0.2 0.0 - 0.8 K/UL Basophils Absolute 0.0 0.0 - 0.2 K/UL    Absolute Immature Granulocyte 0.0 0.0 - 0.5 K/UL   Basic Metabolic Panel w/ Reflex to MG   Result Value Ref Range    Sodium 134 133 - 143 mmol/L    Potassium 4.1 3.5 - 5.1 mmol/L    Chloride 101 101 - 110 mmol/L    CO2 26 21 - 32 mmol/L    Anion Gap 7 2 - 11 mmol/L    Glucose 255 (H) 65 - 100 mg/dL    BUN 11 8 - 23 MG/DL    Creatinine 0.80 0.8 - 1.5 MG/DL    Est, Glom Filt Rate >60 >60 ml/min/1.73m2    Calcium 9.2 8.3 - 10.4 MG/DL   CBC with Auto Differential   Result Value Ref Range    WBC 9.6 4.3 - 11.1 K/uL    RBC 4.17 (L) 4.23 - 5.6 M/uL    Hemoglobin 13.7 13.6 - 17.2 g/dL    Hematocrit 41.2 41.1 - 50.3 %    MCV 98.8 82 - 102 FL    MCH 32.9 26.1 - 32.9 PG    MCHC 33.3 31.4 - 35.0 g/dL    RDW 13.2 11.9 - 14.6 %    Platelets 678 230 - 526 K/uL    MPV 10.1 9.4 - 12.3 FL    nRBC 0.00 0.0 - 0.2 K/uL    Differential Type AUTOMATED      Seg Neutrophils 79 (H) 43 - 78 %    Lymphocytes 11 (L) 13 - 44 %    Monocytes 7 4.0 - 12.0 %    Eosinophils % 2 0.5 - 7.8 %    Basophils 0 0.0 - 2.0 %    Immature Granulocytes 1 0.0 - 5.0 %    Segs Absolute 7.7 1.7 - 8.2 K/UL    Absolute Lymph # 1.1 0.5 - 4.6 K/UL    Absolute Mono # 0.6 0.1 - 1.3 K/UL    Absolute Eos # 0.1 0.0 - 0.8 K/UL    Basophils Absolute 0.0 0.0 - 0.2 K/UL    Absolute Immature Granulocyte 0.1 0.0 - 0.5 K/UL   Basic Metabolic Panel w/ Reflex to MG   Result Value Ref Range    Sodium 135 133 - 143 mmol/L    Potassium 4.0 3.5 - 5.1 mmol/L    Chloride 102 101 - 110 mmol/L    CO2 25 21 - 32 mmol/L    Anion Gap 8 2 - 11 mmol/L    Glucose 166 (H) 65 - 100 mg/dL    BUN 14 8 - 23 MG/DL    Creatinine 1.00 0.8 - 1.5 MG/DL    Est, Glom Filt Rate >60 >60 ml/min/1.73m2    Calcium 8.9 8.3 - 10.4 MG/DL   CBC with Auto Differential   Result Value Ref Range    WBC 8.1 4.3 - 11.1 K/uL    RBC 4.19 (L) 4.23 - 5.6 M/uL    Hemoglobin 13.8 13.6 - 17.2 g/dL    Hematocrit 41.1 41.1 - 50.3 %    MCV 98.1 82 - 102 FL    MCH 32.9 26.1 - 32.9 PG    MCHC 33.6 31.4 - 35.0 g/dL    RDW 13.0 11.9 - 14.6 %    Platelets 318 153 - 671 K/uL    MPV 10.2 9.4 - 12.3 FL    nRBC 0.00 0.0 - 0.2 K/uL    Differential Type AUTOMATED      Seg Neutrophils 79 (H) 43 - 78 %    Lymphocytes 10 (L) 13 - 44 %    Monocytes 7 4.0 - 12.0 %    Eosinophils % 3 0.5 - 7.8 %    Basophils 0 0.0 - 2.0 %    Immature Granulocytes 1 0.0 - 5.0 %    Segs Absolute 6.4 1.7 - 8.2 K/UL    Absolute Lymph # 0.8 0.5 - 4.6 K/UL    Absolute Mono # 0.6 0.1 - 1.3 K/UL    Absolute Eos # 0.2 0.0 - 0.8 K/UL    Basophils Absolute 0.0 0.0 - 0.2 K/UL    Absolute Immature Granulocyte 0.1 0.0 - 0.5 K/UL   Basic Metabolic Panel w/ Reflex to MG   Result Value Ref Range    Sodium 135 133 - 143 mmol/L    Potassium 3.8 3.5 - 5.1 mmol/L    Chloride 100 (L) 101 - 110 mmol/L    CO2 26 21 - 32 mmol/L    Anion Gap 9 2 - 11 mmol/L    Glucose 167 (H) 65 - 100 mg/dL    BUN 8 8 - 23 MG/DL    Creatinine 0.70 (L) 0.8 - 1.5 MG/DL    Est, Glom Filt Rate >60 >60 ml/min/1.73m2    Calcium 8.8 8.3 - 10.4 MG/DL   Phosphorus   Result Value Ref Range    Phosphorus 2.6 2.3 - 3.7 MG/DL   Albumin   Result Value Ref Range    Albumin 2.7 (L) 3.2 - 4.6 g/dL   Basic Metabolic Panel w/ Reflex to MG   Result Value Ref Range    Sodium 127 (L) 133 - 143 mmol/L    Potassium 3.6 3.5 - 5.1 mmol/L    Chloride 93 (L) 101 - 110 mmol/L    CO2 24 21 - 32 mmol/L    Anion Gap 10 2 - 11 mmol/L    Glucose 214 (H) 65 - 100 mg/dL    BUN 7 (L) 8 - 23 MG/DL    Creatinine 0.80 0.8 - 1.5 MG/DL    Est, Glom Filt Rate >60 >60 ml/min/1.73m2    Calcium 9.8 8.3 - 10.4 MG/DL   CBC   Result Value Ref Range    WBC 17.0 (H) 4.3 - 11.1 K/uL    RBC 4.38 4.23 - 5.6 M/uL    Hemoglobin 14.4 13.6 - 17.2 g/dL    Hematocrit 40.9 (L) 41.1 - 50.3 %    MCV 93.4 82 - 102 FL    MCH 32.9 26.1 - 32.9 PG    MCHC 35.2 (H) 31.4 - 35.0 g/dL    RDW 12.7 11.9 - 14.6 %    Platelets 475 828 - 556 K/uL    MPV 9.9 9.4 - 12.3 FL    nRBC 0.00 0.0 - 0.2 K/uL   Lactic Acid   Result Value Ref Range    Lactic Acid, Plasma 1.4 0.4 - 2.0 MMOL/L   Sodium, urine, random   Result Value Ref Range    SODIUM, RANDOM URINE <5 MMOL/L   Protein, urine, random   Result Value Ref Range    Protein, Urine, Random 39 (H) <11.9 mg/dL   Creatinine, Random Urine   Result Value Ref Range    Creatinine, Ur 220.00 mg/dL   Osmolality, Urine   Result Value Ref Range    Osmolality, Ur 821 50 - 1400 MOSM/kg H2O   Urinalysis with Reflex to Culture    Specimen: Urine   Result Value Ref Range    Color, UA YELLOW      Appearance CLEAR      Specific Gravity, UA >1.035 (H) 1.001 - 1.023    pH, Urine 5.0 5.0 - 9.0      Protein, UA 30 (A) NEG mg/dL    Glucose, UA >1000 mg/dL    Ketones, Urine 40 (A) NEG mg/dL    Bilirubin Urine MODERATE (A) NEG      Blood, Urine Negative NEG      Urobilinogen, Urine 1.0 0.2 - 1.0 EU/dL    Nitrite, Urine Positive (A) NEG      Leukocyte Esterase, Urine TRACE (A) NEG      WBC, UA 10-20 0 /hpf    RBC, UA 0 0 /hpf    BACTERIA, URINE TRACE 0 /hpf    Urine Culture if Indicated URINE CULTURE ORDERED      Epithelial Cells UA 3-5 0 /hpf    Casts 0 0 /lpf    Crystals 0 0 /LPF    Mucus, UA 3+ (H) 0 /lpf    Other observations RESULTS VERIFIED MANUALLY     Basic Metabolic Panel w/ Reflex to MG   Result Value Ref Range    Sodium 128 (L) 133 - 143 mmol/L    Potassium 3.8 3.5 - 5.1 mmol/L    Chloride 91 (L) 101 - 110 mmol/L    CO2 23 21 - 32 mmol/L    Anion Gap 14 (H) 2 - 11 mmol/L    Glucose 235 (H) 65 - 100 mg/dL    BUN 8 8 - 23 MG/DL    Creatinine 1.00 0.8 - 1.5 MG/DL    Est, Glom Filt Rate >60 >60 ml/min/1.73m2    Calcium 9.5 8.3 - 10.4 MG/DL   Phosphorus   Result Value Ref Range    Phosphorus 2.0 (L) 2.3 - 3.7 MG/DL   Albumin   Result Value Ref Range    Albumin 2.3 (L) 3.2 - 4.6 g/dL   Basic Metabolic Panel w/ Reflex to MG   Result Value Ref Range    Sodium 128 (L) 133 - 143 mmol/L    Potassium 3.6 3.5 - 5.1 mmol/L    Chloride 96 (L) 101 - 110 mmol/L    CO2 22 21 - 32 mmol/L    Anion Gap 10 2 - 11 mmol/L    Glucose 175 (H) 65 - 100 mg/dL BUN 8 8 - 23 MG/DL    Creatinine 0.70 (L) 0.8 - 1.5 MG/DL    Est, Glom Filt Rate >60 >60 ml/min/1.73m2    Calcium 8.5 8.3 - 10.4 MG/DL   CBC   Result Value Ref Range    WBC 19.3 (H) 4.3 - 11.1 K/uL    RBC 4.06 (L) 4.23 - 5.6 M/uL    Hemoglobin 13.4 (L) 13.6 - 17.2 g/dL    Hematocrit 38.2 (L) 41.1 - 50.3 %    MCV 94.1 82 - 102 FL    MCH 33.0 (H) 26.1 - 32.9 PG    MCHC 35.1 (H) 31.4 - 35.0 g/dL    RDW 12.5 11.9 - 14.6 %    Platelets 664 581 - 836 K/uL    MPV 10.0 9.4 - 12.3 FL    nRBC 0.00 0.0 - 0.2 K/uL   Procalcitonin   Result Value Ref Range    Procalcitonin 0.36 0.00 - 0.49 ng/mL   Phosphorus   Result Value Ref Range    Phosphorus 1.9 (L) 2.3 - 3.7 MG/DL   Albumin   Result Value Ref Range    Albumin 2.1 (L) 3.2 - 4.6 g/dL   Basic Metabolic Panel w/ Reflex to MG   Result Value Ref Range    Sodium 127 (L) 133 - 143 mmol/L    Potassium 3.6 3.5 - 5.1 mmol/L    Chloride 96 (L) 101 - 110 mmol/L    CO2 24 21 - 32 mmol/L    Anion Gap 7 2 - 11 mmol/L    Glucose 154 (H) 65 - 100 mg/dL    BUN 7 (L) 8 - 23 MG/DL    Creatinine 0.60 (L) 0.8 - 1.5 MG/DL    Est, Glom Filt Rate >60 >60 ml/min/1.73m2    Calcium 8.1 (L) 8.3 - 10.4 MG/DL   CBC   Result Value Ref Range    WBC 16.4 (H) 4.3 - 11.1 K/uL    RBC 3.85 (L) 4.23 - 5.6 M/uL    Hemoglobin 12.5 (L) 13.6 - 17.2 g/dL    Hematocrit 36.3 (L) 41.1 - 50.3 %    MCV 94.3 82 - 102 FL    MCH 32.5 26.1 - 32.9 PG    MCHC 34.4 31.4 - 35.0 g/dL    RDW 12.7 11.9 - 14.6 %    Platelets 323 711 - 810 K/uL    MPV 9.8 9.4 - 12.3 FL    nRBC 0.00 0.0 - 0.2 K/uL   Procalcitonin   Result Value Ref Range    Procalcitonin 0.55 (H) 0.00 - 0.49 ng/mL   Vancomycin Level, Random   Result Value Ref Range    Vancomycin Rm 15.7 UG/ML   Vancomycin Level, Random   Result Value Ref Range    Vancomycin Rm 11.8 UG/ML   Procalcitonin   Result Value Ref Range    Procalcitonin 0.30 0.00 - 0.49 ng/mL   CBC with Auto Differential   Result Value Ref Range    WBC 11.2 (H) 4.3 - 11.1 K/uL    RBC 3.68 (L) 4.23 - 5.6 M/uL Hemoglobin 12.2 (L) 13.6 - 17.2 g/dL    Hematocrit 35.5 (L) 41.1 - 50.3 %    MCV 96.5 82 - 102 FL    MCH 33.2 (H) 26.1 - 32.9 PG    MCHC 34.4 31.4 - 35.0 g/dL    RDW 12.9 11.9 - 14.6 %    Platelets 032 877 - 866 K/uL    MPV 10.3 9.4 - 12.3 FL    nRBC 0.00 0.0 - 0.2 K/uL    Differential Type AUTOMATED      Seg Neutrophils 86 (H) 43 - 78 %    Lymphocytes 5 (L) 13 - 44 %    Monocytes 6 4.0 - 12.0 %    Eosinophils % 2 0.5 - 7.8 %    Basophils 0 0.0 - 2.0 %    Immature Granulocytes 1 0.0 - 5.0 %    Segs Absolute 9.7 (H) 1.7 - 8.2 K/UL    Absolute Lymph # 0.5 0.5 - 4.6 K/UL    Absolute Mono # 0.7 0.1 - 1.3 K/UL    Absolute Eos # 0.2 0.0 - 0.8 K/UL    Basophils Absolute 0.0 0.0 - 0.2 K/UL    Absolute Immature Granulocyte 0.1 0.0 - 0.5 K/UL   Comprehensive Metabolic Panel w/ Reflex to MG   Result Value Ref Range    Sodium 134 133 - 143 mmol/L    Potassium 3.2 (L) 3.5 - 5.1 mmol/L    Chloride 103 101 - 110 mmol/L    CO2 24 21 - 32 mmol/L    Anion Gap 7 2 - 11 mmol/L    Glucose 193 (H) 65 - 100 mg/dL    BUN 5 (L) 8 - 23 MG/DL    Creatinine 0.60 (L) 0.8 - 1.5 MG/DL    Est, Glom Filt Rate >60 >60 ml/min/1.73m2    Calcium 8.6 8.3 - 10.4 MG/DL    Total Bilirubin 0.7 0.2 - 1.1 MG/DL    ALT 27 12 - 65 U/L    AST 21 15 - 37 U/L    Alk Phosphatase 101 50 - 136 U/L    Total Protein 6.2 (L) 6.3 - 8.2 g/dL    Albumin 2.2 (L) 3.2 - 4.6 g/dL    Globulin 4.0 2.8 - 4.5 g/dL    Albumin/Globulin Ratio 0.6 0.4 - 1.6     Osmolality   Result Value Ref Range    Serum Osmolality 279 (L) 280 - 301 MOSM/kg H2O   Magnesium   Result Value Ref Range    Magnesium 2.3 1.8 - 2.4 mg/dL   Procalcitonin   Result Value Ref Range    Procalcitonin 0.21 0.00 - 0.49 ng/mL   Phosphorus   Result Value Ref Range    Phosphorus 2.7 2.3 - 3.7 MG/DL   Albumin   Result Value Ref Range    Albumin 2.5 (L) 3.2 - 4.6 g/dL   Basic Metabolic Panel w/ Reflex to MG   Result Value Ref Range    Sodium 137 133 - 143 mmol/L    Potassium 3.2 (L) 3.5 - 5.1 mmol/L    Chloride 103 101 - 110 mmol/L    CO2 27 21 - 32 mmol/L    Anion Gap 7 2 - 11 mmol/L    Glucose 144 (H) 65 - 100 mg/dL    BUN 5 (L) 8 - 23 MG/DL    Creatinine 0.60 (L) 0.8 - 1.5 MG/DL    Est, Glom Filt Rate >60 >60 ml/min/1.73m2    Calcium 8.3 8.3 - 10.4 MG/DL   CBC   Result Value Ref Range    WBC 8.4 4.3 - 11.1 K/uL    RBC 3.49 (L) 4.23 - 5.6 M/uL    Hemoglobin 11.4 (L) 13.6 - 17.2 g/dL    Hematocrit 32.9 (L) 41.1 - 50.3 %    MCV 94.3 82 - 102 FL    MCH 32.7 26.1 - 32.9 PG    MCHC 34.7 31.4 - 35.0 g/dL    RDW 13.0 11.9 - 14.6 %    Platelets 742 760 - 070 K/uL    MPV 9.0 (L) 9.4 - 12.3 FL    nRBC 0.00 0.0 - 0.2 K/uL   Cortisol 30 Min, Total   Result Value Ref Range    Cortisol, 30 Min 29.9 ug/dL   Cortisol 60 Min, Total   Result Value Ref Range    Cortisol, 60 Min 35.3 ug/dL   Cortisol, Baseline   Result Value Ref Range    Cortisol 9.5 ug/dL   Vancomycin Level, Random   Result Value Ref Range    Vancomycin Rm 10.7 UG/ML   Magnesium   Result Value Ref Range    Magnesium 2.1 1.8 - 2.4 mg/dL   Basic Metabolic Panel w/ Reflex to MG   Result Value Ref Range    Sodium 139 133 - 143 mmol/L    Potassium 3.1 (L) 3.5 - 5.1 mmol/L    Chloride 102 101 - 110 mmol/L    CO2 25 21 - 32 mmol/L    Anion Gap 12 (H) 2 - 11 mmol/L    Glucose 167 (H) 65 - 100 mg/dL    BUN 6 (L) 8 - 23 MG/DL    Creatinine 0.70 (L) 0.8 - 1.5 MG/DL    Est, Glom Filt Rate >60 >60 ml/min/1.73m2    Calcium 9.5 8.3 - 10.4 MG/DL   CBC   Result Value Ref Range    WBC 9.2 4.3 - 11.1 K/uL    RBC 3.56 (L) 4.23 - 5.6 M/uL    Hemoglobin 11.5 (L) 13.6 - 17.2 g/dL    Hematocrit 34.0 (L) 41.1 - 50.3 %    MCV 95.5 82 - 102 FL    MCH 32.3 26.1 - 32.9 PG    MCHC 33.8 31.4 - 35.0 g/dL    RDW 13.2 11.9 - 14.6 %    Platelets 284 765 - 653 K/uL    MPV 8.8 (L) 9.4 - 12.3 FL    nRBC 0.00 0.0 - 0.2 K/uL   Magnesium   Result Value Ref Range    Magnesium 2.0 1.8 - 2.4 mg/dL   Hemoglobin A1C   Result Value Ref Range    Hemoglobin A1C 7.4 (H) 4.8 - 5.6 %    eAG 166 mg/dL   POCT Glucose   Result Value Ref Range    POC Glucose 196 (H) 65 - 100 mg/dL    Performed by: Gail Orellana    POCT Glucose   Result Value Ref Range    POC Glucose 237 (H) 65 - 100 mg/dL    Performed by: Ivania Tomlin    POCT Glucose   Result Value Ref Range    POC Glucose 266 (H) 65 - 100 mg/dL    Performed by: Ivania Tomlin    POCT Glucose   Result Value Ref Range    POC Glucose 253 (H) 65 - 100 mg/dL    Performed by: Calos    POCT Glucose   Result Value Ref Range    POC Glucose 268 (H) 65 - 100 mg/dL    Performed by: Anna    POCT Glucose   Result Value Ref Range    POC Glucose 195 (H) 65 - 100 mg/dL    Performed by: ReneaPortiaPCT    POCT Glucose   Result Value Ref Range    POC Glucose 279 (H) 65 - 100 mg/dL    Performed by:  ReneaPortiaPCT    POCT Glucose   Result Value Ref Range    POC Glucose 176 (H) 65 - 100 mg/dL    Performed by: Navarro (Cain)    POCT Glucose   Result Value Ref Range    POC Glucose 258 (H) 65 - 100 mg/dL    Performed by: KarolinaPCT    POCT Glucose   Result Value Ref Range    POC Glucose 213 (H) 65 - 100 mg/dL    Performed by: ProsperetPCT    POCT Glucose   Result Value Ref Range    POC Glucose 261 (H) 65 - 100 mg/dL    Performed by: BatoolgaretPCT    POCT Glucose   Result Value Ref Range    POC Glucose 250 (H) 65 - 100 mg/dL    Performed by: BatoolgaretPCT    POCT Glucose   Result Value Ref Range    POC Glucose 250 (H) 65 - 100 mg/dL    Performed by: Favian    POCT Glucose   Result Value Ref Range    POC Glucose 221 (H) 65 - 100 mg/dL    Performed by: EhsanPCT    POCT Glucose   Result Value Ref Range    POC Glucose 325 (H) 65 - 100 mg/dL    Performed by: Villegas (Cain)BSBETZAIDA    POCT Glucose   Result Value Ref Range    POC Glucose 206 (H) 65 - 100 mg/dL    Performed by: Villegas (Cain)BSBETZAIDA    POCT Glucose   Result Value Ref Range    POC Glucose 181 (H) 65 - 100 mg/dL    Performed by: Duke    POCT Glucose Result Value Ref Range    POC Glucose 223 (H) 65 - 100 mg/dL    Performed by: BennettPCTVenus    POCT Glucose   Result Value Ref Range    POC Glucose 192 (H) 65 - 100 mg/dL    Performed by: BasilionettPCTVenus    POCT Glucose   Result Value Ref Range    POC Glucose 189 (H) 65 - 100 mg/dL    Performed by: Courtney    POCT Glucose   Result Value Ref Range    POC Glucose 233 (H) 65 - 100 mg/dL    Performed by: Michelle Villeda    POCT Glucose   Result Value Ref Range    POC Glucose 223 (H) 65 - 100 mg/dL    Performed by: Colleen    POCT Glucose   Result Value Ref Range    POC Glucose 166 (H) 65 - 100 mg/dL    Performed by: DarwinPCTVenus    POCT Glucose   Result Value Ref Range    POC Glucose 216 (H) 65 - 100 mg/dL    Performed by: BennettPCTVenus    POCT Glucose   Result Value Ref Range    POC Glucose 163 (H) 65 - 100 mg/dL    Performed by: DarwinPCTVenus    POCT Glucose   Result Value Ref Range    POC Glucose 198 (H) 65 - 100 mg/dL    Performed by: Favian    POCT Glucose   Result Value Ref Range    POC Glucose 169 (H) 65 - 100 mg/dL    Performed by: DarwinPCTVenus    POCT Glucose   Result Value Ref Range    POC Glucose 227 (H) 65 - 100 mg/dL    Performed by: DarwinPCTVenus    POCT Glucose   Result Value Ref Range    POC Glucose 160 (H) 65 - 100 mg/dL    Performed by: BennettPCTVenus    POCT Glucose   Result Value Ref Range    POC Glucose 199 (H) 65 - 100 mg/dL    Performed by: Duke    POCT Glucose   Result Value Ref Range    POC Glucose 169 (H) 65 - 100 mg/dL    Performed by: EhsanPCT    POCT Glucose   Result Value Ref Range    POC Glucose 226 (H) 65 - 100 mg/dL    Performed by: Jah    POCT Glucose   Result Value Ref Range    POC Glucose 189 (H) 65 - 100 mg/dL    Performed by: Jane    POCT Glucose   Result Value Ref Range    POC Glucose 183 (H) 65 - 100 mg/dL    Performed by: Kizzy    POCT Glucose   Result Value Ref Range POC Glucose 164 (H) 65 - 100 mg/dL    Performed by: Navarro (Cain)    POCT Glucose   Result Value Ref Range    POC Glucose 229 (H) 65 - 100 mg/dL    Performed by: Arnold    POCT Glucose   Result Value Ref Range    POC Glucose 183 (H) 65 - 100 mg/dL    Performed by: Rehman (Oliver)    POCT Glucose   Result Value Ref Range    POC Glucose 208 (H) 65 - 100 mg/dL    Performed by: BassamxisPCA    POCT Glucose   Result Value Ref Range    POC Glucose 228 (H) 65 - 100 mg/dL    Performed by: Verner Robert    POCT Glucose   Result Value Ref Range    POC Glucose 230 (H) 65 - 100 mg/dL    Performed by: ErasmozCNAJennifer    POCT Glucose   Result Value Ref Range    POC Glucose 229 (H) 65 - 100 mg/dL    Performed by: DavidenezCNAJennifer    POCT Glucose   Result Value Ref Range    POC Glucose 218 (H) 65 - 100 mg/dL    Performed by: IsmaelAlexisPCA    POCT Glucose   Result Value Ref Range    POC Glucose 173 (H) 65 - 100 mg/dL    Performed by: DavidenezCNAJennifer    POCT Glucose   Result Value Ref Range    POC Glucose 203 (H) 65 - 100 mg/dL    Performed by: DavidenezCNAJennifer    POCT Glucose   Result Value Ref Range    POC Glucose 163 (H) 65 - 100 mg/dL    Performed by: JimenezCNAJennifer    POCT Glucose   Result Value Ref Range    POC Glucose 189 (H) 65 - 100 mg/dL    Performed by: WilyelCarriePCT    POCT Glucose   Result Value Ref Range    POC Glucose 152 (H) 65 - 100 mg/dL    Performed by: BennettPCTVenus    POCT Glucose   Result Value Ref Range    POC Glucose 213 (H) 65 - 100 mg/dL    Performed by: BennettPCTVenus    POCT Glucose   Result Value Ref Range    POC Glucose 206 (H) 65 - 100 mg/dL    Performed by: BennettPCTVenus    POCT Glucose   Result Value Ref Range    POC Glucose 211 (H) 65 - 100 mg/dL    Performed by: DanielCarriePCT    POCT Glucose   Result Value Ref Range    POC Glucose 155 (H) 65 - 100 mg/dL    Performed by:  RaduerADN    POCT Glucose   Result Value Ref Range    POC Glucose 183 (H) 65 - 100 mg/dL    Performed by: Jane    POCT Glucose   Result Value Ref Range    POC Glucose 203 (H) 65 - 100 mg/dL    Performed by: Navarro (Cain)    POCT Glucose   Result Value Ref Range    POC Glucose 191 (H) 65 - 100 mg/dL    Performed by: Colleen    POCT Glucose   Result Value Ref Range    POC Glucose 164 (H) 65 - 100 mg/dL    Performed by: DarwinPCTVenus    POCT Glucose   Result Value Ref Range    POC Glucose 259 (H) 65 - 100 mg/dL    Performed by: BasilionettPCTVenus    POCT Glucose   Result Value Ref Range    POC Glucose 218 (H) 65 - 100 mg/dL    Performed by: DarwinPCTVenus    POCT Glucose   Result Value Ref Range    POC Glucose 175 (H) 65 - 100 mg/dL    Performed by: Duke    POCT Glucose   Result Value Ref Range    POC Glucose 165 (H) 65 - 100 mg/dL    Performed by: Teestheo    POCT Glucose   Result Value Ref Range    POC Glucose 168 (H) 65 - 100 mg/dL    Performed by: Brijesh    EKG 12 Lead   Result Value Ref Range    Ventricular Rate 82 BPM    Atrial Rate 0 BPM    QRS Duration 96 ms    Q-T Interval 379 ms    QTc Calculation (Bazett) 443 ms    R Axis 75 degrees    T Axis 30 degrees    Diagnosis Atrial fibrillation    EKG 12 Lead   Result Value Ref Range    Ventricular Rate 103 BPM    Atrial Rate 125 BPM    QRS Duration 92 ms    Q-T Interval 296 ms    QTc Calculation (Bazett) 387 ms    R Axis 8 degrees    T Axis -55 degrees    Diagnosis       Atrial fibrillation with rapid ventricular response with premature   ventricular or aberrantly conducted complexes     TYPE AND SCREEN   Result Value Ref Range    Crossmatch expiration date 01/12/2023,2359     ABO/Rh O POSITIVE     Antibody Screen NEG    Transthoracic echocardiogram (TTE) complete with contrast, bubble, strain, and 3D PRN   Result Value Ref Range    LA Minor Axis 6.4 cm    LA Major Axis 6.6 cm    LA Area 2C 27.7 cm2    LA Area 4C 27.4 cm2    LA Volume 2C 96 (A) 18 - 58 mL    LA  Volume 4C 87 (A) 18 - 58 mL    LA Volume BP 93 (A) 18 - 58 mL    LA Diameter 5.4 cm    AV Mean Velocity 0.9 m/s    AV Mean Gradient 4 mmHg    AV VTI 22.0 cm    AV Peak Velocity 1.3 m/s    AV Peak Gradient 7 mmHg    AV Area by VTI 3.1 cm2    AV Area by Peak Velocity 3.2 cm2    Aortic Root 3.6 cm    Ascending Aorta 3.6 cm    IVC Proxmal 2.7 cm    IVSd 0.7 0.6 - 1.0 cm    LVIDd 5.5 4.2 - 5.9 cm    LVIDs 4.4 cm    LVOT Diameter 2.3 cm    LVOT Mean Gradient 2 mmHg    LVOT VTI 16.6 cm    LVOT Peak Velocity 1.0 m/s    LVOT Peak Gradient 4 mmHg    LVPWd 0.9 0.6 - 1.0 cm    LV E' Lateral Velocity 17 cm/s    LV E' Septal Velocity 12 cm/s    LVOT Area 4.2 cm2    LVOT SV 68.9 ml    MV E Wave Deceleration Time 213.0 ms    MV E Velocity 1.01 m/s    PV Max Velocity 1.4 m/s    PV Peak Gradient 8 mmHg    Est. RA Pressure 8 mmHg    RV Free Wall Peak S' 11 cm/s    TR Max Velocity 2.42 m/s    TR Peak Gradient 23 mmHg    Body Surface Area 2.77 m2    Fractional Shortening 2D 20 28 - 44 %    LVIDd Index 2.04 cm/m2    LVIDs Index 1.63 cm/m2    LV RWT Ratio 0.33     LV Mass 2D 160.0 88 - 224 g    LV Mass 2D Index 59.2 49 - 115 g/m2    E/E' Ratio (Averaged) 7.18     E/E' Lateral 5.94     E/E' Septal 8.42     LA Volume Index BP 34 16 - 34 ml/m2    LVOT Stroke Volume Index 25.5 mL/m2    LA Volume Index 2C 36 (A) 16 - 34 mL/m2    LA Volume Index 4C 32 16 - 34 mL/m2    LA Size Index 2.00 cm/m2    LA/AO Root Ratio 1.50     Ao Root Index 1.33 cm/m2    Ascending Aorta Index 1.33 cm/m2    AV Velocity Ratio 0.77     LVOT:AV VTI Index 0.75     TRINH/BSA VTI 1.1 cm2/m2    TRINH/BSA Peak Velocity 1.2 cm2/m2    RVSP 31 mmHg    Left Ventricular Ejection Fraction 48     LVEF MODALITY ECHO    Results for orders placed or performed during the hospital encounter of 12/15/22 (from the past 2160 hour(s))   Ferritin   Result Value Ref Range    Ferritin 120 8 - 388 NG/ML         Evaluation and management of the chronic condition(s) delineated.   No negative side effects reported. I have reviewed all the lab results. There are some abnormalities that are either expected or not critical to the patient's health, and are discussed in the office today and are addressed. Please refer to the above assessement and plan narrative and orders and follow up plan. Medication discussed and refilled as needed. Physical exam findings are stable unless otherwise indicated and this is addressed. The most recent lab work and imaging and consultant/urgent care visits and imaging are reviewed and discussed and considered during this visit encounter. Saman Rios was seen today for follow-up. Diagnoses and all orders for this visit:    Urinary tract infection without hematuria, site unspecified  -     cefpodoxime (VANTIN) 100 MG tablet; Take 1 tablet by mouth 2 times daily for 10 days  -     Cancel: Culture, Urine  -     AMB POC URINALYSIS DIP STICK AUTO W/O MICRO  -     Culture, Urine; Future    Essential hypertension  -     Basic Metabolic Panel; Future    Chronic systolic (congestive) heart failure  -     carvedilol (COREG) 25 MG tablet; Take 1 tablet by mouth 2 times daily (with meals)    Spontaneous pneumothorax    Polyneuropathy in diseases classified elsewhere ([facility] [address]    Controlled type 2 diabetes mellitus with peripheral circulatory disorder (HCC)  -     OZEMPIC, 0.25 OR 0.5 MG/DOSE, 2 MG/1.5ML SOPN; Inject 0.5 mg into the skin every 7 days  -     OZEMPIC, 0.25 OR 0.5 MG/DOSE, 2 MG/1.5ML SOPN; Inject 0.25 mg into the skin every 7 days    Hereditary hemochromatosis (HCC)    Hx of pneumothorax  -     XR CHEST PA LAT (2 VIEWS); Future          An electronic signature was used to authenticate this note. -- Ching Matthew MD     Return in about 4 weeks (around 3/15/2023).     SUBJECTIVE/OBJECTIVE:    HPI:   Travis Issa (: 1955 is a 77 y.o. male, here for evaluation of the following chief complaint(s):   Chief Complaint   Patient presents with    Follow-up     3 month Patient is here for follow-up and management of chronic medical conditions, review of recent labs, review of any imaging completed since our last office visit and discuss any consultants opinions or management changes. MRSA bacteremia-suspected source was infected chest tube site. Now at the completion of a 2-week course of IV antibiotics. History of right pneumothorax  Diabetes mellitus    Labs reviewed and discussed and medication refilled as needed for chronic medications during ov or adjusted based on lab results and/or our discussion as appropriate. See discussion. The patient's available records and electronic chart records are reviewed. The PMH, PSH, medications, allergies, medications, FH, health maintenance and vaccination status are all reviewed and updated as appropriate. Records from outside providers have been reviewed, summarized, and considered as noted in the history of present illness, past medical history, and objective data of this note and encounter.           Health Maintenance   Topic Date Due    Colorectal Cancer Screen  11/13/2022    Diabetic Alb to Cr ratio (uACR) test  01/25/2023    Annual Wellness Visit (AWV)  02/02/2023    Diabetic retinal exam  05/13/2023    Prostate Specific Antigen (PSA) Screening or Monitoring  07/26/2023    Diabetic foot exam  08/02/2023    Depression Screen  01/30/2024    A1C test (Diabetic or Prediabetic)  02/08/2024    Lipids  02/08/2024    GFR test (Diabetes, CKD 3-4, OR last GFR 15-59)  02/08/2024    DTaP/Tdap/Td vaccine (3 - Td or Tdap) 09/29/2025    Flu vaccine  Completed    Shingles vaccine  Completed    Pneumococcal 65+ years Vaccine  Completed    COVID-19 Vaccine  Completed    Hepatitis A vaccine  Aged Out    Hib vaccine  Aged Out    Meningococcal (ACWY) vaccine  Aged Out    AAA screen  Discontinued    Hepatitis C screen  Discontinued     Patient Active Problem List   Diagnosis    Primary hypertension    History of subdural hematoma (post traumatic)    Hepatic steatosis    Osteoarthritis of lumbosacral spine    Benign prostatic hyperplasia with nocturia    Nodular prostate without urinary obstruction    Seasonal allergic rhinitis due to pollen    Class 3 severe obesity due to excess calories with serious comorbidity and body mass index (BMI) of 40.0 to 44.9 in adult ([facility]    Subdural hygroma    Mitral valve regurgitation    Iron excess    Atrial fibrillation ([facility]    CASSY on CPAP    Hereditary hemochromatosis ([facility]    Polyneuropathy associated with underlying disease ([facility]    BPH (benign prostatic hyperplasia)    Coronary artery disease due to lipid rich plaque    Chronic constipation    Mixed dyslipidemia    Type 2 diabetes mellitus with hyperglycemia, without long-term current use of insulin ([facility]    Controlled type 2 diabetes mellitus with peripheral circulatory disorder (HCC)    Anticoagulant long-term use    Sepsis due to methicillin resistant Staphylococcus aureus (MRSA) (HCC)    Herpes simplex virus infection    Edema due to hypoalbuminemia    Volume overload    Postoperative visit    Chronic systolic (congestive) heart failure    Visit for wound check       Review of Systems    Lab Results   Component Value Date/Time    WBC 6.7 02/08/2023 07:49 AM    HGB 13.7 02/08/2023 07:49 AM    HCT 40.0 02/08/2023 07:49 AM    MCV 96.2 02/08/2023 07:49 AM    RDW 13.4 02/08/2023 07:49 AM     02/08/2023 07:49 AM    NEUTOPHILPCT 75 01/25/2022 08:23 AM    LYMPHOPCT 14 01/30/2023 07:15 AM    LYMPHOPCT 16 01/25/2022 08:23 AM    MONOPCT 7 01/30/2023 07:15 AM    MONOPCT 6 01/25/2022 08:23 AM    EOSRELPCT 4 01/30/2023 07:15 AM    BASOPCT 1 01/30/2023 07:15 AM    BASOPCT 1 01/25/2022 08:23 AM    LYMPHSABS 0.9 01/30/2023 07:15 AM    LYMPHSABS 1.2 01/25/2022 08:23 AM    MONOSABS 0.5 01/30/2023 07:15 AM    MONOSABS 0.4 01/25/2022 08:23 AM    EOSABS 0.2 01/30/2023 07:15 AM    EOSABS 0.1 01/25/2022 08:23 AM BASOSABS 0.1 01/30/2023 07:15 AM    IMMGRAN 1 01/30/2023 07:15 AM    GRANULOCYTEABSOLUTECOUNT 0.1 01/25/2022 08:23 AM       Lab Results   Component Value Date/Time     02/08/2023 07:49 AM    K 5.6 02/08/2023 07:49 AM     02/08/2023 07:49 AM    CO2 27 02/08/2023 07:49 AM    ANIONGAP 10 02/08/2023 07:49 AM    GLUCOSE 139 02/08/2023 07:49 AM    BUN 15 02/08/2023 07:49 AM    CREATININE 1.00 02/08/2023 07:49 AM    GFRAA >60 09/15/2022 02:25 PM    LABGLOM >60 02/08/2023 07:49 AM    CALCIUM 10.0 02/08/2023 07:49 AM    BILITOT 1.4 02/08/2023 07:49 AM    ALT 33 02/08/2023 07:49 AM    AST 29 02/08/2023 07:49 AM    ALKPHOS 122 02/08/2023 07:49 AM    ALKPHOS 106 01/25/2022 08:23 AM    PROT 7.3 02/08/2023 07:49 AM    LABALBU 3.5 02/08/2023 07:49 AM    GLOB 3.8 02/08/2023 07:49 AM    ALBUMIN 0.9 02/08/2023 07:49 AM       Lab Results   Component Value Date/Time    CHOL 117 02/08/2023 07:49 AM    HDL 35 02/08/2023 07:49 AM    TRIG 115 02/08/2023 07:49 AM    LDLCALC 59 02/08/2023 07:49 AM    VLDL 21 01/25/2022 08:23 AM       Lab Results   Component Value Date/Time    LABA1C 7.4 02/08/2023 07:49 AM    LABA1C 7.4 01/19/2023 02:22 AM    LABA1C 7.8 07/26/2022 02:59 PM    LABA1C 7.8 01/25/2022 08:23 AM    LABA1C 6.9 07/20/2021 08:36 AM       Lab Results   Component Value Date/Time    TSH 2.420 09/26/2019 07:50 AM       Lab Results   Component Value Date/Time    PSA 0.1 07/26/2022 02:59 PM    PSA <0.1 01/25/2022 08:23 AM    PSA 0.1 01/13/2021 09:20 AM       Lab Results   Component Value Date/Time    SPECGRAV >1.035 01/14/2023 10:44 AM    PHUR 6.5 12/03/2019 06:54 AM    COLORU YELLOW 01/14/2023 10:44 AM    CLARITYU CLEAR 12/03/2019 06:54 AM    LEUKOCYTESUR TRACE 01/14/2023 10:44 AM    PROTEINU 30 01/14/2023 10:44 AM    GLUCOSEU >1000 01/14/2023 10:44 AM    KETUA 40 01/14/2023 10:44 AM    BLOODU Negative 01/14/2023 10:44 AM    BILIRUBINUR MODERATE 01/14/2023 10:44 AM    BILIRUBINUR NEGATIVE 12/03/2019 06:54 AM    UROBILINOGEN 1.0 01/14/2023 10:44 AM    NITRU Positive 01/14/2023 10:44 AM    LABMICR Comment 09/26/2019 11:23 AM     Results for orders placed or performed in visit on 02/15/23 (from the past 2160 hour(s))   AMB POC URINALYSIS DIP STICK AUTO W/O MICRO   Result Value Ref Range    Color, Urine, POC YELLOW     Clarity, Urine, POC SLIGHTLY CLOUDY     Glucose, Urine, POC Negative Negative    Bilirubin, Urine, POC Negative Negative    Ketones, Urine, POC Negative Negative    Specific Gravity, Urine, POC 1.010 1.001 - 1.035    Blood, Urine, POC 2+ Negative    pH, Urine, POC 6.0 4.6 - 8.0    Protein, Urine, POC Negative Negative    Urobilinogen, POC 0.2     Nitrate, Urine, POC NEGATIVE Negative    Leukocyte Esterase, Urine, POC 2+ Negative   Results for orders placed or performed in visit on 02/08/23 (from the past 2160 hour(s))   Hepatic Function Panel   Result Value Ref Range    Total Protein 7.3 6.3 - 8.2 g/dL    Albumin 3.5 3.2 - 4.6 g/dL    Globulin 3.8 2.8 - 4.5 g/dL    Albumin/Globulin Ratio 0.9 0.4 - 1.6      Total Bilirubin 1.4 (H) 0.2 - 1.1 MG/DL    Bilirubin, Direct 0.2 <0.4 MG/DL    Alk Phosphatase 122 50 - 136 U/L    AST 29 15 - 37 U/L    ALT 33 12 - 65 U/L   Lipid Panel   Result Value Ref Range    Cholesterol, Total 117 <200 MG/DL    Triglycerides 115 35 - 150 MG/DL    HDL 35 (L) 40 - 60 MG/DL    LDL Calculated 59 <100 MG/DL    VLDL Cholesterol Calculated 23 6.0 - 23.0 MG/DL    Chol/HDL Ratio 3.3     CBC   Result Value Ref Range    WBC 6.7 4.3 - 11.1 K/uL    RBC 4.16 (L) 4.23 - 5.6 M/uL    Hemoglobin 13.7 13.6 - 17.2 g/dL    Hematocrit 40.0 (L) 41.1 - 50.3 %    MCV 96.2 82 - 102 FL    MCH 32.9 26.1 - 32.9 PG    MCHC 34.3 31.4 - 35.0 g/dL    RDW 13.4 11.9 - 14.6 %    Platelets 079 349 - 661 K/uL    MPV 10.6 9.4 - 12.3 FL    nRBC 0.00 0.0 - 0.2 K/uL   Hemoglobin A1C   Result Value Ref Range    Hemoglobin A1C 7.4 (H) 4.8 - 5.6 %    eAG 166 mg/dL   Basic Metabolic Panel   Result Value Ref Range    Sodium 137 133 - 143 mmol/L Potassium 5.6 (H) 3.5 - 5.1 mmol/L    Chloride 100 (L) 101 - 110 mmol/L    CO2 27 21 - 32 mmol/L    Anion Gap 10 2 - 11 mmol/L    Glucose 139 (H) 65 - 100 mg/dL    BUN 15 8 - 23 MG/DL    Creatinine 1.00 0.8 - 1.5 MG/DL    Est, Glom Filt Rate >60 >60 ml/min/1.73m2    Calcium 10.0 8.3 - 10.4 MG/DL   Results for orders placed or performed during the hospital encounter of 01/30/23 (from the past 2160 hour(s))   CBC with Auto Differential   Result Value Ref Range    WBC 6.5 4.3 - 11.1 K/uL    RBC 3.96 (L) 4.23 - 5.6 M/uL    Hemoglobin 12.6 (L) 13.6 - 17.2 g/dL    Hematocrit 39.1 (L) 41.1 - 50.3 %    MCV 98.7 82 - 102 FL    MCH 31.8 26.1 - 32.9 PG    MCHC 32.2 31.4 - 35.0 g/dL    RDW 13.4 11.9 - 14.6 %    Platelets 870 681 - 057 K/uL    MPV 9.9 9.4 - 12.3 FL    nRBC 0.00 0.0 - 0.2 K/uL    Differential Type AUTOMATED      Seg Neutrophils 73 43 - 78 %    Lymphocytes 14 13 - 44 %    Monocytes 7 4.0 - 12.0 %    Eosinophils % 4 0.5 - 7.8 %    Basophils 1 0.0 - 2.0 %    Immature Granulocytes 1 0.0 - 5.0 %    Segs Absolute 4.9 1.7 - 8.2 K/UL    Absolute Lymph # 0.9 0.5 - 4.6 K/UL    Absolute Mono # 0.5 0.1 - 1.3 K/UL    Absolute Eos # 0.2 0.0 - 0.8 K/UL    Basophils Absolute 0.1 0.0 - 0.2 K/UL    Absolute Immature Granulocyte 0.0 0.0 - 0.5 K/UL   Creatinine   Result Value Ref Range    Creatinine 0.80 0.8 - 1.5 MG/DL   Hepatic Function Panel   Result Value Ref Range    Total Protein 7.0 6.3 - 8.2 g/dL    Albumin 3.0 (L) 3.2 - 4.6 g/dL    Globulin 4.0 2.8 - 4.5 g/dL    Albumin/Globulin Ratio 0.8 0.4 - 1.6      Total Bilirubin 0.6 0.2 - 1.1 MG/DL    Bilirubin, Direct 0.2 <0.4 MG/DL    Alk Phosphatase 114 50 - 136 U/L    AST 29 15 - 37 U/L    ALT 34 12 - 65 U/L   Vancomycin Level, Trough   Result Value Ref Range    Vancomycin Tr 21.4 (HH) 5 - 20 ug/mL   Results for orders placed or performed during the hospital encounter of 01/26/23 (from the past 2160 hour(s))   Creatinine   Result Value Ref Range    Creatinine 0.70 (L) 0.8 - 1.5 MG/DL   Vancomycin Level, Trough   Result Value Ref Range    Vancomycin Tr 16.8 5 - 20 ug/mL   Results for orders placed or performed during the hospital encounter of 01/23/23 (from the past 2160 hour(s))   CBC with Auto Differential   Result Value Ref Range    WBC 9.2 4.3 - 11.1 K/uL    RBC 3.74 (L) 4.23 - 5.6 M/uL    Hemoglobin 12.3 (L) 13.6 - 17.2 g/dL    Hematocrit 37.0 (L) 41.1 - 50.3 %    MCV 98.9 82 - 102 FL    MCH 32.9 26.1 - 32.9 PG    MCHC 33.2 31.4 - 35.0 g/dL    RDW 13.5 11.9 - 14.6 %    Platelets 112 068 - 750 K/uL    MPV 9.0 (L) 9.4 - 12.3 FL    nRBC 0.00 0.0 - 0.2 K/uL    Differential Type AUTOMATED      Seg Neutrophils 79 (H) 43 - 78 %    Lymphocytes 9 (L) 13 - 44 %    Monocytes 6 4.0 - 12.0 %    Eosinophils % 3 0.5 - 7.8 %    Basophils 1 0.0 - 2.0 %    Immature Granulocytes 2 0.0 - 5.0 %    Segs Absolute 7.4 1.7 - 8.2 K/UL    Absolute Lymph # 0.8 0.5 - 4.6 K/UL    Absolute Mono # 0.5 0.1 - 1.3 K/UL    Absolute Eos # 0.3 0.0 - 0.8 K/UL    Basophils Absolute 0.1 0.0 - 0.2 K/UL    Absolute Immature Granulocyte 0.2 0.0 - 0.5 K/UL   Creatinine   Result Value Ref Range    Creatinine 0.60 (L) 0.8 - 1.5 MG/DL   Hepatic Function Panel   Result Value Ref Range    Total Protein 6.1 (L) 6.3 - 8.2 g/dL    Albumin 2.5 (L) 3.2 - 4.6 g/dL    Globulin 3.6 2.8 - 4.5 g/dL    Albumin/Globulin Ratio 0.7 0.4 - 1.6      Total Bilirubin 0.6 0.2 - 1.1 MG/DL    Bilirubin, Direct 0.2 <0.4 MG/DL    Alk Phosphatase 98 50 - 136 U/L    AST 33 15 - 37 U/L    ALT 43 12 - 65 U/L   Vancomycin Level, Trough   Result Value Ref Range    Vancomycin Tr 15.7 5 - 20 ug/mL   Results for orders placed or performed during the hospital encounter of 01/04/23 (from the past 2160 hour(s))   Rapid influenza A/B antigens    Specimen: Nasal Washing   Result Value Ref Range    Influenza A Ag Negative NEG      Influenza B Ag Negative NEG      Source Nasopharyngeal     COVID-19, Rapid    Specimen: Nasopharyngeal   Result Value Ref Range    Source NASAL      SARS-CoV-2, Rapid Not detected NOTD     Culture, Blood 1    Specimen: Blood   Result Value Ref Range    Special Requests LEFT  Antecubital        Culture NO GROWTH 5 DAYS     Culture, Blood 1    Specimen: Blood   Result Value Ref Range    Special Requests RIGHT  Antecubital        Gram stain GRAM POS COCCI IN CLUSTERS      Gram stain AEROBIC AND ANAEROBIC BOTTLES      Gram stain        RESULTS VERIFIED, PHONED TO AND READ BACK BY JOHN PATRICK RN ON 1/15/23 AT 0644 CJ    Culture * Methicillin Resistant Staphylococcus aureus * (A)      Culture Refer to Blood Culture ID Panel Accession [id number]         Susceptibility    Methicillin-Resistant Staphylococcus aureus - BACTERIAL SUSCEPTIBILITY PANEL LAINA     trimethoprim-sulfamethoxazole <=0.5/9.5 Sensitive ug/mL     tetracycline <=0.5 Sensitive ug/mL     clindamycin <=0.5 Resistant ug/mL     vancomycin 1 Sensitive ug/mL     oxacillin >2 Resistant ug/mL     rifampin* <=0.5 Sensitive ug/mL      * Rifampin is not to be used for mono-therapy.    Culture, Urine    Specimen: Urine   Result Value Ref Range    Special Requests NO SPECIAL REQUESTS  Reflexed from [id number]        Culture NO GROWTH 2 DAYS     Culture, Blood, PCR ID Panel    Specimen: Blood   Result Value Ref Range    Accession Number [id number]     Enterococcus faecalis by PCR NOT DETECTED NOTDET      Enterococcus faecium by PCR NOT DETECTED NOTDET      Listeria monocytogenes by PCR NOT DETECTED NOTDET      STAPHYLOCOCCUS Detected (A) NOTDET      Staphylococcus Aureus Detected (A) NOTDET      Staphylococcus epidermidis by PCR NOT DETECTED NOTDET      Staphylococcus lugdunensis by PCR NOT DETECTED NOTDET      STREPTOCOCCUS NOT DETECTED NOTDET      Streptococcus agalactiae (Group B) NOT DETECTED NOTDET      Strep pneumoniae NOT DETECTED NOTDET      Strep pyogenes,(Grp. A) NOT DETECTED NOTDET      Acinetobacter calcoac baumannii complex by PCR NOT DETECTED NOTDET      Bacteroides fragilis by PCR NOT DETECTED NOTDET      Enterobacteriaceae by PCR NOT DETECTED NOTDET      Enterobacter cloacae complex by PCR NOT DETECTED NOTDET      Escherichia Coli NOT DETECTED NOTDET      Klebsiella aerogenes by PCR NOT DETECTED NOTDET      Klebsiella oxytoca by PCR NOT DETECTED NOTDET      Klebsiella pneumoniae group by PCR NOT DETECTED NOTDET      Proteus by PCR NOT DETECTED NOTDET      Salmonella species by PCR NOT DETECTED NOTDET      Serratia marcescens by PCR NOT DETECTED NOTDET      Haemophilus Influenzae by PCR NOT DETECTED NOTDET      Neisseria meningitidis by PCR NOT DETECTED NOTDET      Pseudomonas aeruginosa NOT DETECTED NOTDET      Stenotrophomonas maltophilia by PCR NOT DETECTED NOTDET      Candida albicans by PCR NOT DETECTED NOTDET      Candida auris by PCR NOT DETECTED NOTDET      Candida glabrata NOT DETECTED NOTDET      Candida krusei by PCR NOT DETECTED NOTDET      Candida parapsilosis by PCR NOT DETECTED NOTDET      Candida tropicalis by PCR NOT DETECTED NOTDET      Cryptococcus neoformans/gattii by PCR NOT DETECTED NOTDET      Resistant gene targets          Resistant gene meca/c & mrej by PCR Detected (A) NOTDET      [employer] test comment        False positive results may rarely occur. Correlate with clinical,epidemiologic, and other laboratory findings   Culture, Blood 1    Specimen: Blood   Result Value Ref Range    Special Requests RIGHT  HAND        Culture NO GROWTH 5 DAYS     Culture, Blood 1    Specimen: Blood   Result Value Ref Range    Special Requests RIGHT  Antecubital        Culture NO GROWTH 5 DAYS     XR CHEST PORTABLE    Impression    1. Large right pneumothorax. 2.  Pulmonary vascular congestion. Findings discussed with Dr. Bertrand Tilley by myself at 4:42 PM.     CT CHEST WO CONTRAST    Impression    1. Persisting large right pneumothorax. 2.  Leftward deviation of mediastinal contours concerning for tension component. 3.  Partial atelectasis of right lung. 4.  Emphysema.   5.  Vascular disease. Urgent findings communicated to the ordering NP Tito Jones by the secure text  messaging system at 6:30 PM.   XR CHEST PORTABLE    Impression    1. Right chest tube placed, with markedly smaller pneumothorax. 2.  Resolution of the previous tension component. 3.  Mild bibasilar atelectasis. XR CHEST PORTABLE    Impression    1. Resolved right pneumothorax. 2. Mild bibasilar lung atelectasis. XR CHEST PORTABLE    Impression    Unchanged bibasilar lung atelectasis. XR CHEST PORTABLE    Impression    No evidence of pneumothorax     XR CHEST PORTABLE    Impression    1. New small right apical pneumothorax, despite presence of the right-sided  chest tube. 2. Bibasilar atelectasis. XR CHEST PORTABLE    Impression    1. Persistent small right apical pneumothorax. Right chest tube is stable. XR CHEST PORTABLE    Impression    1. Small right apical pneumothorax. Right chest tube is in place. 2. Right lung base opacity, likely atelectasis. 3. No significant change compared to prior. XR CHEST PORTABLE    Impression    1. Stable right chest tubes. No visible pneumothorax on today's study. 2. Right basilar atelectasis. XR CHEST PORTABLE    Impression    Placement of 2 basilar chest tubes with probable small residual  right apical pneumothorax. XR CHEST PORTABLE    Impression    1. Stable right chest tubes. No significant pneumothorax. 2. Right basilar atelectasis. 3. No significant change. XR CHEST PORTABLE    Impression    No appreciable pneumothorax in the current study. XR CHEST PORTABLE    Impression    1. Stable right chest tubes. No visible pneumothorax. 2. Persistent bibasilar atelectasis. 3. No significant change. XR CHEST PORTABLE    Impression    -No significant interval change. No definite visible pneumothorax. XR CHEST PORTABLE    Impression    1. Removal of the right chest tubes. No pneumothorax. 2.  Little interval change otherwise. XR CHEST (2 VW)    Impression    No significant interval change. XR CHEST PORTABLE    Impression    No significant interval change. XR CHEST PORTABLE    Impression    Atelectasis or infiltrate right lung base is stable. Right PICC line  has been placed with the tip in the distal SVC in good position. Left lung is  clear. The heart is normal in size. No pneumothorax. Possible small right  pleural effusion. No significant left pleural effusion. Oval-shaped opacity  medial aspect right mid lung is unchanged possibly loculated fluid in the major  fissure. Right upper lobe lung mass not completely ruled out.      CBC   Result Value Ref Range    WBC 9.0 4.3 - 11.1 K/uL    RBC 4.41 4.23 - 5.6 M/uL    Hemoglobin 14.6 13.6 - 17.2 g/dL    Hematocrit 42.9 41.1 - 50.3 %    MCV 97.3 82 - 102 FL    MCH 33.1 (H) 26.1 - 32.9 PG    MCHC 34.0 31.4 - 35.0 g/dL    RDW 13.0 11.9 - 14.6 %    Platelets 410 979 - 410 K/uL    MPV 10.5 9.4 - 12.3 FL    nRBC 0.00 0.0 - 0.2 K/uL   Comprehensive Metabolic Panel   Result Value Ref Range    Sodium 133 133 - 143 mmol/L    Potassium 3.9 3.5 - 5.1 mmol/L    Chloride 104 101 - 110 mmol/L    CO2 24 21 - 32 mmol/L    Anion Gap 5 2 - 11 mmol/L    Glucose 196 (H) 65 - 100 mg/dL    BUN 11 8 - 23 MG/DL    Creatinine 0.90 0.8 - 1.5 MG/DL    Est, Glom Filt Rate >60 >60 ml/min/1.73m2    Calcium 9.4 8.3 - 10.4 MG/DL    Total Bilirubin 1.0 0.2 - 1.1 MG/DL    ALT 72 (H) 12 - 65 U/L    AST 37 15 - 37 U/L    Alk Phosphatase 119 50 - 136 U/L    Total Protein 7.1 6.3 - 8.2 g/dL    Albumin 3.6 3.2 - 4.6 g/dL    Globulin 3.5 2.8 - 4.5 g/dL    Albumin/Globulin Ratio 1.0 0.4 - 1.6     Troponin   Result Value Ref Range    Troponin, High Sensitivity 3.9 0 - 14 pg/mL   Troponin   Result Value Ref Range    Troponin, High Sensitivity 4.7 0 - 14 pg/mL   Magnesium   Result Value Ref Range    Magnesium 2.2 1.8 - 2.4 mg/dL   Brain Natriuretic Peptide   Result Value Ref Range    NT Pro- (H) 5 - 125 PG/ML   Basic Metabolic Panel w/ Reflex to MG   Result Value Ref Range    Sodium 135 133 - 143 mmol/L    Potassium 3.9 3.5 - 5.1 mmol/L    Chloride 104 101 - 110 mmol/L    CO2 29 21 - 32 mmol/L    Anion Gap 2 2 - 11 mmol/L    Glucose 221 (H) 65 - 100 mg/dL    BUN 10 8 - 23 MG/DL    Creatinine 0.80 0.8 - 1.5 MG/DL    Est, Glom Filt Rate >60 >60 ml/min/1.73m2    Calcium 9.2 8.3 - 10.4 MG/DL   CBC with Auto Differential   Result Value Ref Range    WBC 7.6 4.3 - 11.1 K/uL    RBC 4.36 4.23 - 5.6 M/uL    Hemoglobin 14.2 13.6 - 17.2 g/dL    Hematocrit 42.3 41.1 - 50.3 %    MCV 97.0 82 - 102 FL    MCH 32.6 26.1 - 32.9 PG    MCHC 33.6 31.4 - 35.0 g/dL    RDW 13.0 11.9 - 14.6 %    Platelets 605 569 - 206 K/uL    MPV 10.1 9.4 - 12.3 FL    nRBC 0.00 0.0 - 0.2 K/uL    Differential Type AUTOMATED      Seg Neutrophils 78 43 - 78 %    Lymphocytes 12 (L) 13 - 44 %    Monocytes 6 4.0 - 12.0 %    Eosinophils % 3 0.5 - 7.8 %    Basophils 0 0.0 - 2.0 %    Immature Granulocytes 1 0.0 - 5.0 %    Segs Absolute 6.0 1.7 - 8.2 K/UL    Absolute Lymph # 0.9 0.5 - 4.6 K/UL    Absolute Mono # 0.4 0.1 - 1.3 K/UL    Absolute Eos # 0.2 0.0 - 0.8 K/UL    Basophils Absolute 0.0 0.0 - 0.2 K/UL    Absolute Immature Granulocyte 0.1 0.0 - 0.5 K/UL   CBC with Auto Differential   Result Value Ref Range    WBC 8.8 4.3 - 11.1 K/uL    RBC 4.35 4.23 - 5.6 M/uL    Hemoglobin 14.7 13.6 - 17.2 g/dL    Hematocrit 43.5 41.1 - 50.3 %    .0 82 - 102 FL    MCH 33.8 (H) 26.1 - 32.9 PG    MCHC 33.8 31.4 - 35.0 g/dL    RDW 13.1 11.9 - 14.6 %    Platelets 116 735 - 670 K/uL    MPV 10.2 9.4 - 12.3 FL    nRBC 0.00 0.0 - 0.2 K/uL    Differential Type AUTOMATED      Seg Neutrophils 75 43 - 78 %    Lymphocytes 16 13 - 44 %    Monocytes 6 4.0 - 12.0 %    Eosinophils % 2 0.5 - 7.8 %    Basophils 0 0.0 - 2.0 %    Immature Granulocytes 1 0.0 - 5.0 %    Segs Absolute 6.7 1.7 - 8.2 K/UL    Absolute Lymph # 1.4 0.5 - 4.6 K/UL    Absolute Mono # 0.5 0.1 - 1.3 K/UL    Absolute Eos # 0.2 0.0 - 0.8 K/UL    Basophils Absolute 0.0 0.0 - 0.2 K/UL    Absolute Immature Granulocyte 0.0 0.0 - 0.5 K/UL   Basic Metabolic Panel w/ Reflex to MG   Result Value Ref Range    Sodium 134 133 - 143 mmol/L    Potassium 4.2 3.5 - 5.1 mmol/L    Chloride 103 101 - 110 mmol/L    CO2 28 21 - 32 mmol/L    Anion Gap 3 2 - 11 mmol/L    Glucose 229 (H) 65 - 100 mg/dL    BUN 12 8 - 23 MG/DL    Creatinine 0.90 0.8 - 1.5 MG/DL    Est, Glom Filt Rate >60 >60 ml/min/1.73m2    Calcium 9.2 8.3 - 10.4 MG/DL   CBC with Auto Differential   Result Value Ref Range    WBC 8.4 4.3 - 11.1 K/uL    RBC 4.29 4.23 - 5.6 M/uL    Hemoglobin 14.1 13.6 - 17.2 g/dL    Hematocrit 41.9 41.1 - 50.3 %    MCV 97.7 82 - 102 FL    MCH 32.9 26.1 - 32.9 PG    MCHC 33.7 31.4 - 35.0 g/dL    RDW 13.0 11.9 - 14.6 %    Platelets 184 401 - 849 K/uL    MPV 9.9 9.4 - 12.3 FL    nRBC 0.00 0.0 - 0.2 K/uL    Differential Type AUTOMATED      Seg Neutrophils 73 43 - 78 %    Lymphocytes 17 13 - 44 %    Monocytes 5 4.0 - 12.0 %    Eosinophils % 3 0.5 - 7.8 %    Basophils 1 0.0 - 2.0 %    Immature Granulocytes 1 0.0 - 5.0 %    Segs Absolute 6.3 1.7 - 8.2 K/UL    Absolute Lymph # 1.4 0.5 - 4.6 K/UL    Absolute Mono # 0.5 0.1 - 1.3 K/UL    Absolute Eos # 0.2 0.0 - 0.8 K/UL    Basophils Absolute 0.0 0.0 - 0.2 K/UL    Absolute Immature Granulocyte 0.1 0.0 - 0.5 K/UL   Basic Metabolic Panel w/ Reflex to MG   Result Value Ref Range    Sodium 135 133 - 143 mmol/L    Potassium 4.1 3.5 - 5.1 mmol/L    Chloride 102 101 - 110 mmol/L    CO2 26 21 - 32 mmol/L    Anion Gap 7 2 - 11 mmol/L    Glucose 210 (H) 65 - 100 mg/dL    BUN 12 8 - 23 MG/DL    Creatinine 1.00 0.8 - 1.5 MG/DL    Est, Glom Filt Rate >60 >60 ml/min/1.73m2    Calcium 8.8 8.3 - 10.4 MG/DL   CBC with Auto Differential   Result Value Ref Range    WBC 7.9 4.3 - 11.1 K/uL    RBC 4.35 4.23 - 5.6 M/uL    Hemoglobin 14.3 13.6 - 17.2 g/dL    Hematocrit 42.6 41.1 - 50.3 %    MCV 97.9 82 - 102 FL    MCH 32.9 26.1 - 32.9 PG    MCHC 33.6 31.4 - 35.0 g/dL    RDW 13.0 11.9 - 14.6 %    Platelets 598 836 - 257 K/uL    MPV 10.5 9.4 - 12.3 FL    nRBC 0.00 0.0 - 0.2 K/uL    Differential Type AUTOMATED      Seg Neutrophils 76 43 - 78 %    Lymphocytes 15 13 - 44 %    Monocytes 6 4.0 - 12.0 %    Eosinophils % 2 0.5 - 7.8 %    Basophils 0 0.0 - 2.0 %    Immature Granulocytes 1 0.0 - 5.0 %    Segs Absolute 6.0 1.7 - 8.2 K/UL    Absolute Lymph # 1.2 0.5 - 4.6 K/UL    Absolute Mono # 0.5 0.1 - 1.3 K/UL    Absolute Eos # 0.2 0.0 - 0.8 K/UL    Basophils Absolute 0.0 0.0 - 0.2 K/UL    Absolute Immature Granulocyte 0.0 0.0 - 0.5 K/UL   Basic Metabolic Panel w/ Reflex to MG   Result Value Ref Range    Sodium 134 133 - 143 mmol/L    Potassium 4.1 3.5 - 5.1 mmol/L    Chloride 101 101 - 110 mmol/L    CO2 26 21 - 32 mmol/L    Anion Gap 7 2 - 11 mmol/L    Glucose 255 (H) 65 - 100 mg/dL    BUN 11 8 - 23 MG/DL    Creatinine 0.80 0.8 - 1.5 MG/DL    Est, Glom Filt Rate >60 >60 ml/min/1.73m2    Calcium 9.2 8.3 - 10.4 MG/DL   CBC with Auto Differential   Result Value Ref Range    WBC 9.6 4.3 - 11.1 K/uL    RBC 4.17 (L) 4.23 - 5.6 M/uL    Hemoglobin 13.7 13.6 - 17.2 g/dL    Hematocrit 41.2 41.1 - 50.3 %    MCV 98.8 82 - 102 FL    MCH 32.9 26.1 - 32.9 PG    MCHC 33.3 31.4 - 35.0 g/dL    RDW 13.2 11.9 - 14.6 %    Platelets 044 426 - 428 K/uL    MPV 10.1 9.4 - 12.3 FL    nRBC 0.00 0.0 - 0.2 K/uL    Differential Type AUTOMATED      Seg Neutrophils 79 (H) 43 - 78 %    Lymphocytes 11 (L) 13 - 44 %    Monocytes 7 4.0 - 12.0 %    Eosinophils % 2 0.5 - 7.8 %    Basophils 0 0.0 - 2.0 %    Immature Granulocytes 1 0.0 - 5.0 %    Segs Absolute 7.7 1.7 - 8.2 K/UL    Absolute Lymph # 1.1 0.5 - 4.6 K/UL    Absolute Mono # 0.6 0.1 - 1.3 K/UL    Absolute Eos # 0.1 0.0 - 0.8 K/UL    Basophils Absolute 0.0 0.0 - 0.2 K/UL    Absolute Immature Granulocyte 0.1 0.0 - 0.5 K/UL   Basic Metabolic Panel w/ Reflex to MG   Result Value Ref Range    Sodium 135 133 - 143 mmol/L    Potassium 4.0 3.5 - 5.1 mmol/L Chloride 102 101 - 110 mmol/L    CO2 25 21 - 32 mmol/L    Anion Gap 8 2 - 11 mmol/L    Glucose 166 (H) 65 - 100 mg/dL    BUN 14 8 - 23 MG/DL    Creatinine 1.00 0.8 - 1.5 MG/DL    Est, Glom Filt Rate >60 >60 ml/min/1.73m2    Calcium 8.9 8.3 - 10.4 MG/DL   CBC with Auto Differential   Result Value Ref Range    WBC 8.1 4.3 - 11.1 K/uL    RBC 4.19 (L) 4.23 - 5.6 M/uL    Hemoglobin 13.8 13.6 - 17.2 g/dL    Hematocrit 41.1 41.1 - 50.3 %    MCV 98.1 82 - 102 FL    MCH 32.9 26.1 - 32.9 PG    MCHC 33.6 31.4 - 35.0 g/dL    RDW 13.0 11.9 - 14.6 %    Platelets 398 582 - 793 K/uL    MPV 10.2 9.4 - 12.3 FL    nRBC 0.00 0.0 - 0.2 K/uL    Differential Type AUTOMATED      Seg Neutrophils 79 (H) 43 - 78 %    Lymphocytes 10 (L) 13 - 44 %    Monocytes 7 4.0 - 12.0 %    Eosinophils % 3 0.5 - 7.8 %    Basophils 0 0.0 - 2.0 %    Immature Granulocytes 1 0.0 - 5.0 %    Segs Absolute 6.4 1.7 - 8.2 K/UL    Absolute Lymph # 0.8 0.5 - 4.6 K/UL    Absolute Mono # 0.6 0.1 - 1.3 K/UL    Absolute Eos # 0.2 0.0 - 0.8 K/UL    Basophils Absolute 0.0 0.0 - 0.2 K/UL    Absolute Immature Granulocyte 0.1 0.0 - 0.5 K/UL   Basic Metabolic Panel w/ Reflex to MG   Result Value Ref Range    Sodium 135 133 - 143 mmol/L    Potassium 3.8 3.5 - 5.1 mmol/L    Chloride 100 (L) 101 - 110 mmol/L    CO2 26 21 - 32 mmol/L    Anion Gap 9 2 - 11 mmol/L    Glucose 167 (H) 65 - 100 mg/dL    BUN 8 8 - 23 MG/DL    Creatinine 0.70 (L) 0.8 - 1.5 MG/DL    Est, Glom Filt Rate >60 >60 ml/min/1.73m2    Calcium 8.8 8.3 - 10.4 MG/DL   Phosphorus   Result Value Ref Range    Phosphorus 2.6 2.3 - 3.7 MG/DL   Albumin   Result Value Ref Range    Albumin 2.7 (L) 3.2 - 4.6 g/dL   Basic Metabolic Panel w/ Reflex to MG   Result Value Ref Range    Sodium 127 (L) 133 - 143 mmol/L    Potassium 3.6 3.5 - 5.1 mmol/L    Chloride 93 (L) 101 - 110 mmol/L    CO2 24 21 - 32 mmol/L    Anion Gap 10 2 - 11 mmol/L    Glucose 214 (H) 65 - 100 mg/dL    BUN 7 (L) 8 - 23 MG/DL    Creatinine 0.80 0.8 - 1.5 MG/DL    Est, Glom Filt Rate >60 >60 ml/min/1.73m2    Calcium 9.8 8.3 - 10.4 MG/DL   CBC   Result Value Ref Range    WBC 17.0 (H) 4.3 - 11.1 K/uL    RBC 4.38 4.23 - 5.6 M/uL    Hemoglobin 14.4 13.6 - 17.2 g/dL    Hematocrit 40.9 (L) 41.1 - 50.3 %    MCV 93.4 82 - 102 FL    MCH 32.9 26.1 - 32.9 PG    MCHC 35.2 (H) 31.4 - 35.0 g/dL    RDW 12.7 11.9 - 14.6 %    Platelets 007 789 - 930 K/uL    MPV 9.9 9.4 - 12.3 FL    nRBC 0.00 0.0 - 0.2 K/uL   Lactic Acid   Result Value Ref Range    Lactic Acid, Plasma 1.4 0.4 - 2.0 MMOL/L   Sodium, urine, random   Result Value Ref Range    SODIUM, RANDOM URINE <5 MMOL/L   Protein, urine, random   Result Value Ref Range    Protein, Urine, Random 39 (H) <11.9 mg/dL   Creatinine, Random Urine   Result Value Ref Range    Creatinine, Ur 220.00 mg/dL   Osmolality, Urine   Result Value Ref Range    Osmolality, Ur 821 50 - 1400 MOSM/kg H2O   Urinalysis with Reflex to Culture    Specimen: Urine   Result Value Ref Range    Color, UA YELLOW      Appearance CLEAR      Specific Gravity, UA >1.035 (H) 1.001 - 1.023    pH, Urine 5.0 5.0 - 9.0      Protein, UA 30 (A) NEG mg/dL    Glucose, UA >1000 mg/dL    Ketones, Urine 40 (A) NEG mg/dL    Bilirubin Urine MODERATE (A) NEG      Blood, Urine Negative NEG      Urobilinogen, Urine 1.0 0.2 - 1.0 EU/dL    Nitrite, Urine Positive (A) NEG      Leukocyte Esterase, Urine TRACE (A) NEG      WBC, UA 10-20 0 /hpf    RBC, UA 0 0 /hpf    BACTERIA, URINE TRACE 0 /hpf    Urine Culture if Indicated URINE CULTURE ORDERED      Epithelial Cells UA 3-5 0 /hpf    Casts 0 0 /lpf    Crystals 0 0 /LPF    Mucus, UA 3+ (H) 0 /lpf    Other observations RESULTS VERIFIED MANUALLY     Basic Metabolic Panel w/ Reflex to MG   Result Value Ref Range    Sodium 128 (L) 133 - 143 mmol/L    Potassium 3.8 3.5 - 5.1 mmol/L    Chloride 91 (L) 101 - 110 mmol/L    CO2 23 21 - 32 mmol/L    Anion Gap 14 (H) 2 - 11 mmol/L    Glucose 235 (H) 65 - 100 mg/dL    BUN 8 8 - 23 MG/DL    Creatinine 1.00 0.8 - 1.5 MG/DL    Est, Glom Filt Rate >60 >60 ml/min/1.73m2    Calcium 9.5 8.3 - 10.4 MG/DL   Phosphorus   Result Value Ref Range    Phosphorus 2.0 (L) 2.3 - 3.7 MG/DL   Albumin   Result Value Ref Range    Albumin 2.3 (L) 3.2 - 4.6 g/dL   Basic Metabolic Panel w/ Reflex to MG   Result Value Ref Range    Sodium 128 (L) 133 - 143 mmol/L    Potassium 3.6 3.5 - 5.1 mmol/L    Chloride 96 (L) 101 - 110 mmol/L    CO2 22 21 - 32 mmol/L    Anion Gap 10 2 - 11 mmol/L    Glucose 175 (H) 65 - 100 mg/dL    BUN 8 8 - 23 MG/DL    Creatinine 0.70 (L) 0.8 - 1.5 MG/DL    Est, Glom Filt Rate >60 >60 ml/min/1.73m2    Calcium 8.5 8.3 - 10.4 MG/DL   CBC   Result Value Ref Range    WBC 19.3 (H) 4.3 - 11.1 K/uL    RBC 4.06 (L) 4.23 - 5.6 M/uL    Hemoglobin 13.4 (L) 13.6 - 17.2 g/dL    Hematocrit 38.2 (L) 41.1 - 50.3 %    MCV 94.1 82 - 102 FL    MCH 33.0 (H) 26.1 - 32.9 PG    MCHC 35.1 (H) 31.4 - 35.0 g/dL    RDW 12.5 11.9 - 14.6 %    Platelets 384 746 - 067 K/uL    MPV 10.0 9.4 - 12.3 FL    nRBC 0.00 0.0 - 0.2 K/uL   Procalcitonin   Result Value Ref Range    Procalcitonin 0.36 0.00 - 0.49 ng/mL   Phosphorus   Result Value Ref Range    Phosphorus 1.9 (L) 2.3 - 3.7 MG/DL   Albumin   Result Value Ref Range    Albumin 2.1 (L) 3.2 - 4.6 g/dL   Basic Metabolic Panel w/ Reflex to MG   Result Value Ref Range    Sodium 127 (L) 133 - 143 mmol/L    Potassium 3.6 3.5 - 5.1 mmol/L    Chloride 96 (L) 101 - 110 mmol/L    CO2 24 21 - 32 mmol/L    Anion Gap 7 2 - 11 mmol/L    Glucose 154 (H) 65 - 100 mg/dL    BUN 7 (L) 8 - 23 MG/DL    Creatinine 0.60 (L) 0.8 - 1.5 MG/DL    Est, Glom Filt Rate >60 >60 ml/min/1.73m2    Calcium 8.1 (L) 8.3 - 10.4 MG/DL   CBC   Result Value Ref Range    WBC 16.4 (H) 4.3 - 11.1 K/uL    RBC 3.85 (L) 4.23 - 5.6 M/uL    Hemoglobin 12.5 (L) 13.6 - 17.2 g/dL    Hematocrit 36.3 (L) 41.1 - 50.3 %    MCV 94.3 82 - 102 FL    MCH 32.5 26.1 - 32.9 PG    MCHC 34.4 31.4 - 35.0 g/dL    RDW 12.7 11.9 - 14.6 %    Platelets 528 186 - 044 K/uL MPV 9.8 9.4 - 12.3 FL    nRBC 0.00 0.0 - 0.2 K/uL   Procalcitonin   Result Value Ref Range    Procalcitonin 0.55 (H) 0.00 - 0.49 ng/mL   Vancomycin Level, Random   Result Value Ref Range    Vancomycin Rm 15.7 UG/ML   Vancomycin Level, Random   Result Value Ref Range    Vancomycin Rm 11.8 UG/ML   Procalcitonin   Result Value Ref Range    Procalcitonin 0.30 0.00 - 0.49 ng/mL   CBC with Auto Differential   Result Value Ref Range    WBC 11.2 (H) 4.3 - 11.1 K/uL    RBC 3.68 (L) 4.23 - 5.6 M/uL    Hemoglobin 12.2 (L) 13.6 - 17.2 g/dL    Hematocrit 35.5 (L) 41.1 - 50.3 %    MCV 96.5 82 - 102 FL    MCH 33.2 (H) 26.1 - 32.9 PG    MCHC 34.4 31.4 - 35.0 g/dL    RDW 12.9 11.9 - 14.6 %    Platelets 401 709 - 611 K/uL    MPV 10.3 9.4 - 12.3 FL    nRBC 0.00 0.0 - 0.2 K/uL    Differential Type AUTOMATED      Seg Neutrophils 86 (H) 43 - 78 %    Lymphocytes 5 (L) 13 - 44 %    Monocytes 6 4.0 - 12.0 %    Eosinophils % 2 0.5 - 7.8 %    Basophils 0 0.0 - 2.0 %    Immature Granulocytes 1 0.0 - 5.0 %    Segs Absolute 9.7 (H) 1.7 - 8.2 K/UL    Absolute Lymph # 0.5 0.5 - 4.6 K/UL    Absolute Mono # 0.7 0.1 - 1.3 K/UL    Absolute Eos # 0.2 0.0 - 0.8 K/UL    Basophils Absolute 0.0 0.0 - 0.2 K/UL    Absolute Immature Granulocyte 0.1 0.0 - 0.5 K/UL   Comprehensive Metabolic Panel w/ Reflex to MG   Result Value Ref Range    Sodium 134 133 - 143 mmol/L    Potassium 3.2 (L) 3.5 - 5.1 mmol/L    Chloride 103 101 - 110 mmol/L    CO2 24 21 - 32 mmol/L    Anion Gap 7 2 - 11 mmol/L    Glucose 193 (H) 65 - 100 mg/dL    BUN 5 (L) 8 - 23 MG/DL    Creatinine 0.60 (L) 0.8 - 1.5 MG/DL    Est, Glom Filt Rate >60 >60 ml/min/1.73m2    Calcium 8.6 8.3 - 10.4 MG/DL    Total Bilirubin 0.7 0.2 - 1.1 MG/DL    ALT 27 12 - 65 U/L    AST 21 15 - 37 U/L    Alk Phosphatase 101 50 - 136 U/L    Total Protein 6.2 (L) 6.3 - 8.2 g/dL    Albumin 2.2 (L) 3.2 - 4.6 g/dL    Globulin 4.0 2.8 - 4.5 g/dL    Albumin/Globulin Ratio 0.6 0.4 - 1.6     Osmolality   Result Value Ref Range    Serum Osmolality 279 (L) 280 - 301 MOSM/kg H2O   Magnesium   Result Value Ref Range    Magnesium 2.3 1.8 - 2.4 mg/dL   Procalcitonin   Result Value Ref Range    Procalcitonin 0.21 0.00 - 0.49 ng/mL   Phosphorus   Result Value Ref Range    Phosphorus 2.7 2.3 - 3.7 MG/DL   Albumin   Result Value Ref Range    Albumin 2.5 (L) 3.2 - 4.6 g/dL   Basic Metabolic Panel w/ Reflex to MG   Result Value Ref Range    Sodium 137 133 - 143 mmol/L    Potassium 3.2 (L) 3.5 - 5.1 mmol/L    Chloride 103 101 - 110 mmol/L    CO2 27 21 - 32 mmol/L    Anion Gap 7 2 - 11 mmol/L    Glucose 144 (H) 65 - 100 mg/dL    BUN 5 (L) 8 - 23 MG/DL    Creatinine 0.60 (L) 0.8 - 1.5 MG/DL    Est, Glom Filt Rate >60 >60 ml/min/1.73m2    Calcium 8.3 8.3 - 10.4 MG/DL   CBC   Result Value Ref Range    WBC 8.4 4.3 - 11.1 K/uL    RBC 3.49 (L) 4.23 - 5.6 M/uL    Hemoglobin 11.4 (L) 13.6 - 17.2 g/dL    Hematocrit 32.9 (L) 41.1 - 50.3 %    MCV 94.3 82 - 102 FL    MCH 32.7 26.1 - 32.9 PG    MCHC 34.7 31.4 - 35.0 g/dL    RDW 13.0 11.9 - 14.6 %    Platelets 091 645 - 346 K/uL    MPV 9.0 (L) 9.4 - 12.3 FL    nRBC 0.00 0.0 - 0.2 K/uL   Cortisol 30 Min, Total   Result Value Ref Range    Cortisol, 30 Min 29.9 ug/dL   Cortisol 60 Min, Total   Result Value Ref Range    Cortisol, 60 Min 35.3 ug/dL   Cortisol, Baseline   Result Value Ref Range    Cortisol 9.5 ug/dL   Vancomycin Level, Random   Result Value Ref Range    Vancomycin Rm 10.7 UG/ML   Magnesium   Result Value Ref Range    Magnesium 2.1 1.8 - 2.4 mg/dL   Basic Metabolic Panel w/ Reflex to MG   Result Value Ref Range    Sodium 139 133 - 143 mmol/L    Potassium 3.1 (L) 3.5 - 5.1 mmol/L    Chloride 102 101 - 110 mmol/L    CO2 25 21 - 32 mmol/L    Anion Gap 12 (H) 2 - 11 mmol/L    Glucose 167 (H) 65 - 100 mg/dL    BUN 6 (L) 8 - 23 MG/DL    Creatinine 0.70 (L) 0.8 - 1.5 MG/DL    Est, Glom Filt Rate >60 >60 ml/min/1.73m2    Calcium 9.5 8.3 - 10.4 MG/DL   CBC   Result Value Ref Range    WBC 9.2 4.3 - 11.1 K/uL RBC 3.56 (L) 4.23 - 5.6 M/uL    Hemoglobin 11.5 (L) 13.6 - 17.2 g/dL    Hematocrit 34.0 (L) 41.1 - 50.3 %    MCV 95.5 82 - 102 FL    MCH 32.3 26.1 - 32.9 PG    MCHC 33.8 31.4 - 35.0 g/dL    RDW 13.2 11.9 - 14.6 %    Platelets 314 293 - 901 K/uL    MPV 8.8 (L) 9.4 - 12.3 FL    nRBC 0.00 0.0 - 0.2 K/uL   Magnesium   Result Value Ref Range    Magnesium 2.0 1.8 - 2.4 mg/dL   Hemoglobin A1C   Result Value Ref Range    Hemoglobin A1C 7.4 (H) 4.8 - 5.6 %    eAG 166 mg/dL   POCT Glucose   Result Value Ref Range    POC Glucose 196 (H) 65 - 100 mg/dL    Performed by: Dania Cabrera    POCT Glucose   Result Value Ref Range    POC Glucose 237 (H) 65 - 100 mg/dL    Performed by: Carly Virk    POCT Glucose   Result Value Ref Range    POC Glucose 266 (H) 65 - 100 mg/dL    Performed by: Esme    POCT Glucose   Result Value Ref Range    POC Glucose 253 (H) 65 - 100 mg/dL    Performed by: Calos    POCT Glucose   Result Value Ref Range    POC Glucose 268 (H) 65 - 100 mg/dL    Performed by: Anna    POCT Glucose   Result Value Ref Range    POC Glucose 195 (H) 65 - 100 mg/dL    Performed by: MadhuPCATUL    POCT Glucose   Result Value Ref Range    POC Glucose 279 (H) 65 - 100 mg/dL    Performed by:  MadhuPCT    POCT Glucose   Result Value Ref Range    POC Glucose 176 (H) 65 - 100 mg/dL    Performed by: Navarro (Cain)    POCT Glucose   Result Value Ref Range    POC Glucose 258 (H) 65 - 100 mg/dL    Performed by: KarolinaPCATUL    POCT Glucose   Result Value Ref Range    POC Glucose 213 (H) 65 - 100 mg/dL    Performed by: EhsanPCT    POCT Glucose   Result Value Ref Range    POC Glucose 261 (H) 65 - 100 mg/dL    Performed by: EhsanATUL    POCT Glucose   Result Value Ref Range    POC Glucose 250 (H) 65 - 100 mg/dL    Performed by: Jd    POCT Glucose   Result Value Ref Range    POC Glucose 250 (H) 65 - 100 mg/dL    Performed by: ANNE May POCT Glucose   Result Value Ref Range    POC Glucose 221 (H) 65 - 100 mg/dL    Performed by: Jd    POCT Glucose   Result Value Ref Range    POC Glucose 325 (H) 65 - 100 mg/dL    Performed by: Navarro (Cain)    POCT Glucose   Result Value Ref Range    POC Glucose 206 (H) 65 - 100 mg/dL    Performed by: Navarro (Cain)    POCT Glucose   Result Value Ref Range    POC Glucose 181 (H) 65 - 100 mg/dL    Performed by: PerInfotopjoseAnnCareComclaudioion    POCT Glucose   Result Value Ref Range    POC Glucose 223 (H) 65 - 100 mg/dL    Performed by: BiTVenus    POCT Glucose   Result Value Ref Range    POC Glucose 192 (H) 65 - 100 mg/dL    Performed by: DarwinPCATULVenus    POCT Glucose   Result Value Ref Range    POC Glucose 189 (H) 65 - 100 mg/dL    Performed by: Courtney    POCT Glucose   Result Value Ref Range    POC Glucose 233 (H) 65 - 100 mg/dL    Performed by: Jaye Evans    POCT Glucose   Result Value Ref Range    POC Glucose 223 (H) 65 - 100 mg/dL    Performed by: Colleen    POCT Glucose   Result Value Ref Range    POC Glucose 166 (H) 65 - 100 mg/dL    Performed by: BiTVenus    POCT Glucose   Result Value Ref Range    POC Glucose 216 (H) 65 - 100 mg/dL    Performed by: DarwinPCTVenus    POCT Glucose   Result Value Ref Range    POC Glucose 163 (H) 65 - 100 mg/dL    Performed by: DarwinPCTVenus    POCT Glucose   Result Value Ref Range    POC Glucose 198 (H) 65 - 100 mg/dL    Performed by: Favian    POCT Glucose   Result Value Ref Range    POC Glucose 169 (H) 65 - 100 mg/dL    Performed by: DarwinPCTVenus    POCT Glucose   Result Value Ref Range    POC Glucose 227 (H) 65 - 100 mg/dL    Performed by: DarwinPCTVenus    POCT Glucose   Result Value Ref Range    POC Glucose 160 (H) 65 - 100 mg/dL    Performed by: DarwinPCTVenus    POCT Glucose   Result Value Ref Range    POC Glucose 199 (H) 65 - 100 mg/dL    Performed by: AnselmoAnnCareCompanannmarie POCT Glucose   Result Value Ref Range    POC Glucose 169 (H) 65 - 100 mg/dL    Performed by: WinnieT    POCT Glucose   Result Value Ref Range    POC Glucose 226 (H) 65 - 100 mg/dL    Performed by: Jah    POCT Glucose   Result Value Ref Range    POC Glucose 189 (H) 65 - 100 mg/dL    Performed by: Jane    POCT Glucose   Result Value Ref Range    POC Glucose 183 (H) 65 - 100 mg/dL    Performed by: Kizzy    POCT Glucose   Result Value Ref Range    POC Glucose 164 (H) 65 - 100 mg/dL    Performed by: Navarro (Cain)    POCT Glucose   Result Value Ref Range    POC Glucose 229 (H) 65 - 100 mg/dL    Performed by:  Arnold    POCT Glucose   Result Value Ref Range    POC Glucose 183 (H) 65 - 100 mg/dL    Performed by: Rehman (Oliver)    POCT Glucose   Result Value Ref Range    POC Glucose 208 (H) 65 - 100 mg/dL    Performed by: Phil    POCT Glucose   Result Value Ref Range    POC Glucose 228 (H) 65 - 100 mg/dL    Performed by: Verner Robert    POCT Glucose   Result Value Ref Range    POC Glucose 230 (H) 65 - 100 mg/dL    Performed by: AideAGurjitnnsari    POCT Glucose   Result Value Ref Range    POC Glucose 229 (H) 65 - 100 mg/dL    Performed by: ErasmozCNAJennifer    POCT Glucose   Result Value Ref Range    POC Glucose 218 (H) 65 - 100 mg/dL    Performed by: Phil    POCT Glucose   Result Value Ref Range    POC Glucose 173 (H) 65 - 100 mg/dL    Performed by: ErasmozCNAJennifer    POCT Glucose   Result Value Ref Range    POC Glucose 203 (H) 65 - 100 mg/dL    Performed by: BlackCNAJennifer    POCT Glucose   Result Value Ref Range    POC Glucose 163 (H) 65 - 100 mg/dL    Performed by: ErasmozCNAJennifer    POCT Glucose   Result Value Ref Range    POC Glucose 189 (H) 65 - 100 mg/dL    Performed by: Colleen    POCT Glucose   Result Value Ref Range    POC Glucose 152 (H) 65 - 100 mg/dL    Performed by: Freya    POCT Glucose   Result Value Ref Range    POC Glucose 213 (H) 65 - 100 mg/dL    Performed by: BennettPCTVenus    POCT Glucose   Result Value Ref Range    POC Glucose 206 (H) 65 - 100 mg/dL    Performed by: BennettPCTVenus    POCT Glucose   Result Value Ref Range    POC Glucose 211 (H) 65 - 100 mg/dL    Performed by: WilyelCarriePCT    POCT Glucose   Result Value Ref Range    POC Glucose 155 (H) 65 - 100 mg/dL    Performed by:  Arnold    POCT Glucose   Result Value Ref Range    POC Glucose 183 (H) 65 - 100 mg/dL    Performed by: Jane    POCT Glucose   Result Value Ref Range    POC Glucose 203 (H) 65 - 100 mg/dL    Performed by: Navarro (Cain)    POCT Glucose   Result Value Ref Range    POC Glucose 191 (H) 65 - 100 mg/dL    Performed by: WilyelCarriePCT    POCT Glucose   Result Value Ref Range    POC Glucose 164 (H) 65 - 100 mg/dL    Performed by: BennettPCTVenus    POCT Glucose   Result Value Ref Range    POC Glucose 259 (H) 65 - 100 mg/dL    Performed by: BennettPCTVenus    POCT Glucose   Result Value Ref Range    POC Glucose 218 (H) 65 - 100 mg/dL    Performed by: BennettPCTVenus    POCT Glucose   Result Value Ref Range    POC Glucose 175 (H) 65 - 100 mg/dL    Performed by: Duke    POCT Glucose   Result Value Ref Range    POC Glucose 165 (H) 65 - 100 mg/dL    Performed by: Teestheo    POCT Glucose   Result Value Ref Range    POC Glucose 168 (H) 65 - 100 mg/dL    Performed by: Brijesh    EKG 12 Lead   Result Value Ref Range    Ventricular Rate 82 BPM    Atrial Rate 0 BPM    QRS Duration 96 ms    Q-T Interval 379 ms    QTc Calculation (Bazett) 443 ms    R Axis 75 degrees    T Axis 30 degrees    Diagnosis Atrial fibrillation    EKG 12 Lead   Result Value Ref Range    Ventricular Rate 103 BPM    Atrial Rate 125 BPM    QRS Duration 92 ms    Q-T Interval 296 ms    QTc Calculation (Bazett) 387 ms    R Axis 8 degrees    T Axis -55 degrees    Diagnosis Atrial fibrillation with rapid ventricular response with premature   ventricular or aberrantly conducted complexes     TYPE AND SCREEN   Result Value Ref Range    Crossmatch expiration date 01/12/2023,2359     ABO/Rh O POSITIVE     Antibody Screen NEG    Transthoracic echocardiogram (TTE) complete with contrast, bubble, strain, and 3D PRN   Result Value Ref Range    LA Minor Axis 6.4 cm    LA Major Axis 6.6 cm    LA Area 2C 27.7 cm2    LA Area 4C 27.4 cm2    LA Volume 2C 96 (A) 18 - 58 mL    LA Volume 4C 87 (A) 18 - 58 mL    LA Volume BP 93 (A) 18 - 58 mL    LA Diameter 5.4 cm    AV Mean Velocity 0.9 m/s    AV Mean Gradient 4 mmHg    AV VTI 22.0 cm    AV Peak Velocity 1.3 m/s    AV Peak Gradient 7 mmHg    AV Area by VTI 3.1 cm2    AV Area by Peak Velocity 3.2 cm2    Aortic Root 3.6 cm    Ascending Aorta 3.6 cm    IVC Proxmal 2.7 cm    IVSd 0.7 0.6 - 1.0 cm    LVIDd 5.5 4.2 - 5.9 cm    LVIDs 4.4 cm    LVOT Diameter 2.3 cm    LVOT Mean Gradient 2 mmHg    LVOT VTI 16.6 cm    LVOT Peak Velocity 1.0 m/s    LVOT Peak Gradient 4 mmHg    LVPWd 0.9 0.6 - 1.0 cm    LV E' Lateral Velocity 17 cm/s    LV E' Septal Velocity 12 cm/s    LVOT Area 4.2 cm2    LVOT SV 68.9 ml    MV E Wave Deceleration Time 213.0 ms    MV E Velocity 1.01 m/s    PV Max Velocity 1.4 m/s    PV Peak Gradient 8 mmHg    Est. RA Pressure 8 mmHg    RV Free Wall Peak S' 11 cm/s    TR Max Velocity 2.42 m/s    TR Peak Gradient 23 mmHg    Body Surface Area 2.77 m2    Fractional Shortening 2D 20 28 - 44 %    LVIDd Index 2.04 cm/m2    LVIDs Index 1.63 cm/m2    LV RWT Ratio 0.33     LV Mass 2D 160.0 88 - 224 g    LV Mass 2D Index 59.2 49 - 115 g/m2    E/E' Ratio (Averaged) 7.18     E/E' Lateral 5.94     E/E' Septal 8.42     LA Volume Index BP 34 16 - 34 ml/m2    LVOT Stroke Volume Index 25.5 mL/m2    LA Volume Index 2C 36 (A) 16 - 34 mL/m2    LA Volume Index 4C 32 16 - 34 mL/m2    LA Size Index 2.00 cm/m2    LA/AO Root Ratio 1.50     Ao Root Index 1.33 cm/m2 Ascending Aorta Index 1.33 cm/m2    AV Velocity Ratio 0.77     LVOT:AV VTI Index 0.75     TRINH/BSA VTI 1.1 cm2/m2    TRINH/BSA Peak Velocity 1.2 cm2/m2    RVSP 31 mmHg    Left Ventricular Ejection Fraction 48     LVEF MODALITY ECHO    Results for orders placed or performed during the hospital encounter of 12/15/22 (from the past 2160 hour(s))   Ferritin   Result Value Ref Range    Ferritin 120 8 - 388 NG/ML           Vitals:    02/15/23 1119   BP: 100/70   Site: Right Upper Arm   Position: Sitting   Cuff Size: Large Adult   Pulse: 90   Resp: 16   Temp: 97 °F (36.1 °C)   TempSrc: Temporal   SpO2: 96%   Weight: (!) 315 lb (142.9 kg)   Height: 6' 2\" (1.88 m)     Wt Readings from Last 3 Encounters:   02/15/23 (!) 315 lb (142.9 kg)   02/02/23 (!) 316 lb (143.3 kg)   01/30/23 (!) 316 lb (143.3 kg)     BP Readings from Last 3 Encounters:   02/15/23 100/70   02/13/23 136/76   02/11/23 120/74     Physical Exam

## 2023-02-15 NOTE — PATIENT INSTRUCTIONS
Follow up with me in 4 weeks. Start Vantin 100 mg BID  Urine culture sent    Chest xray in about 2 weeks. Standing order. Stop Potassium. BMP prior to visit in 4 wks. Rx written to try Ozempic 0.25 mg weekly. Will adjust dose at follow up.       Medication refilled      Results for orders placed or performed in visit on 02/08/23 (from the past 2160 hour(s))   Hepatic Function Panel   Result Value Ref Range    Total Protein 7.3 6.3 - 8.2 g/dL    Albumin 3.5 3.2 - 4.6 g/dL    Globulin 3.8 2.8 - 4.5 g/dL    Albumin/Globulin Ratio 0.9 0.4 - 1.6      Total Bilirubin 1.4 (H) 0.2 - 1.1 MG/DL    Bilirubin, Direct 0.2 <0.4 MG/DL    Alk Phosphatase 122 50 - 136 U/L    AST 29 15 - 37 U/L    ALT 33 12 - 65 U/L   Lipid Panel   Result Value Ref Range    Cholesterol, Total 117 <200 MG/DL    Triglycerides 115 35 - 150 MG/DL    HDL 35 (L) 40 - 60 MG/DL    LDL Calculated 59 <100 MG/DL    VLDL Cholesterol Calculated 23 6.0 - 23.0 MG/DL    Chol/HDL Ratio 3.3     CBC   Result Value Ref Range    WBC 6.7 4.3 - 11.1 K/uL    RBC 4.16 (L) 4.23 - 5.6 M/uL    Hemoglobin 13.7 13.6 - 17.2 g/dL    Hematocrit 40.0 (L) 41.1 - 50.3 %    MCV 96.2 82 - 102 FL    MCH 32.9 26.1 - 32.9 PG    MCHC 34.3 31.4 - 35.0 g/dL    RDW 13.4 11.9 - 14.6 %    Platelets 415 539 - 740 K/uL    MPV 10.6 9.4 - 12.3 FL    nRBC 0.00 0.0 - 0.2 K/uL   Hemoglobin A1C   Result Value Ref Range    Hemoglobin A1C 7.4 (H) 4.8 - 5.6 %    eAG 166 mg/dL   Basic Metabolic Panel   Result Value Ref Range    Sodium 137 133 - 143 mmol/L    Potassium 5.6 (H) 3.5 - 5.1 mmol/L    Chloride 100 (L) 101 - 110 mmol/L    CO2 27 21 - 32 mmol/L    Anion Gap 10 2 - 11 mmol/L    Glucose 139 (H) 65 - 100 mg/dL    BUN 15 8 - 23 MG/DL    Creatinine 1.00 0.8 - 1.5 MG/DL    Est, Glom Filt Rate >60 >60 ml/min/1.73m2    Calcium 10.0 8.3 - 10.4 MG/DL   Results for orders placed or performed during the hospital encounter of 01/30/23 (from the past 2160 hour(s))   CBC with Auto Differential Result Value Ref Range    WBC 6.5 4.3 - 11.1 K/uL    RBC 3.96 (L) 4.23 - 5.6 M/uL    Hemoglobin 12.6 (L) 13.6 - 17.2 g/dL    Hematocrit 39.1 (L) 41.1 - 50.3 %    MCV 98.7 82 - 102 FL    MCH 31.8 26.1 - 32.9 PG    MCHC 32.2 31.4 - 35.0 g/dL    RDW 13.4 11.9 - 14.6 %    Platelets 433 143 - 357 K/uL    MPV 9.9 9.4 - 12.3 FL    nRBC 0.00 0.0 - 0.2 K/uL    Differential Type AUTOMATED      Seg Neutrophils 73 43 - 78 %    Lymphocytes 14 13 - 44 %    Monocytes 7 4.0 - 12.0 %    Eosinophils % 4 0.5 - 7.8 %    Basophils 1 0.0 - 2.0 %    Immature Granulocytes 1 0.0 - 5.0 %    Segs Absolute 4.9 1.7 - 8.2 K/UL    Absolute Lymph # 0.9 0.5 - 4.6 K/UL    Absolute Mono # 0.5 0.1 - 1.3 K/UL    Absolute Eos # 0.2 0.0 - 0.8 K/UL    Basophils Absolute 0.1 0.0 - 0.2 K/UL    Absolute Immature Granulocyte 0.0 0.0 - 0.5 K/UL   Creatinine   Result Value Ref Range    Creatinine 0.80 0.8 - 1.5 MG/DL   Hepatic Function Panel   Result Value Ref Range    Total Protein 7.0 6.3 - 8.2 g/dL    Albumin 3.0 (L) 3.2 - 4.6 g/dL    Globulin 4.0 2.8 - 4.5 g/dL    Albumin/Globulin Ratio 0.8 0.4 - 1.6      Total Bilirubin 0.6 0.2 - 1.1 MG/DL    Bilirubin, Direct 0.2 <0.4 MG/DL    Alk Phosphatase 114 50 - 136 U/L    AST 29 15 - 37 U/L    ALT 34 12 - 65 U/L   Vancomycin Level, Trough   Result Value Ref Range    Vancomycin Tr 21.4 (HH) 5 - 20 ug/mL   Results for orders placed or performed during the hospital encounter of 01/26/23 (from the past 2160 hour(s))   Creatinine   Result Value Ref Range    Creatinine 0.70 (L) 0.8 - 1.5 MG/DL   Vancomycin Level, Trough   Result Value Ref Range    Vancomycin Tr 16.8 5 - 20 ug/mL   Results for orders placed or performed during the hospital encounter of 01/23/23 (from the past 2160 hour(s))   CBC with Auto Differential   Result Value Ref Range    WBC 9.2 4.3 - 11.1 K/uL    RBC 3.74 (L) 4.23 - 5.6 M/uL    Hemoglobin 12.3 (L) 13.6 - 17.2 g/dL    Hematocrit 37.0 (L) 41.1 - 50.3 %    MCV 98.9 82 - 102 FL    MCH 32.9 26.1 - 32.9 PG    MCHC 33.2 31.4 - 35.0 g/dL    RDW 13.5 11.9 - 14.6 %    Platelets 070 087 - 116 K/uL    MPV 9.0 (L) 9.4 - 12.3 FL    nRBC 0.00 0.0 - 0.2 K/uL    Differential Type AUTOMATED      Seg Neutrophils 79 (H) 43 - 78 %    Lymphocytes 9 (L) 13 - 44 %    Monocytes 6 4.0 - 12.0 %    Eosinophils % 3 0.5 - 7.8 %    Basophils 1 0.0 - 2.0 %    Immature Granulocytes 2 0.0 - 5.0 %    Segs Absolute 7.4 1.7 - 8.2 K/UL    Absolute Lymph # 0.8 0.5 - 4.6 K/UL    Absolute Mono # 0.5 0.1 - 1.3 K/UL    Absolute Eos # 0.3 0.0 - 0.8 K/UL    Basophils Absolute 0.1 0.0 - 0.2 K/UL    Absolute Immature Granulocyte 0.2 0.0 - 0.5 K/UL   Creatinine   Result Value Ref Range    Creatinine 0.60 (L) 0.8 - 1.5 MG/DL   Hepatic Function Panel   Result Value Ref Range    Total Protein 6.1 (L) 6.3 - 8.2 g/dL    Albumin 2.5 (L) 3.2 - 4.6 g/dL    Globulin 3.6 2.8 - 4.5 g/dL    Albumin/Globulin Ratio 0.7 0.4 - 1.6      Total Bilirubin 0.6 0.2 - 1.1 MG/DL    Bilirubin, Direct 0.2 <0.4 MG/DL    Alk Phosphatase 98 50 - 136 U/L    AST 33 15 - 37 U/L    ALT 43 12 - 65 U/L   Vancomycin Level, Trough   Result Value Ref Range    Vancomycin Tr 15.7 5 - 20 ug/mL   Results for orders placed or performed during the hospital encounter of 01/04/23 (from the past 2160 hour(s))   Rapid influenza A/B antigens    Specimen: Nasal Washing   Result Value Ref Range    Influenza A Ag Negative NEG      Influenza B Ag Negative NEG      Source Nasopharyngeal     COVID-19, Rapid    Specimen: Nasopharyngeal   Result Value Ref Range    Source NASAL      SARS-CoV-2, Rapid Not detected NOTD     Culture, Blood 1    Specimen: Blood   Result Value Ref Range    Special Requests LEFT  Antecubital        Culture NO GROWTH 5 DAYS     Culture, Blood 1    Specimen: Blood   Result Value Ref Range    Special Requests RIGHT  Antecubital        Gram stain GRAM POS COCCI IN CLUSTERS      Gram stain AEROBIC AND ANAEROBIC BOTTLES      Gram stain        RESULTS VERIFIED, PHONED TO AND READ BACK BY Arsalan Rodriguez RN ON 1/15/23 AT 4384 CJ    Culture * Methicillin Resistant Staphylococcus aureus * (A)      Culture Refer to Blood Culture ID Panel Accession [id number]         Susceptibility    Methicillin-Resistant Staphylococcus aureus - BACTERIAL SUSCEPTIBILITY PANEL LAINA     trimethoprim-sulfamethoxazole <=0.5/9.5 Sensitive ug/mL     tetracycline <=0.5 Sensitive ug/mL     clindamycin <=0.5 Resistant ug/mL     vancomycin 1 Sensitive ug/mL     oxacillin >2 Resistant ug/mL     rifampin* <=0.5 Sensitive ug/mL      * Rifampin is not to be used for mono-therapy.    Culture, Urine    Specimen: Urine   Result Value Ref Range    Special Requests NO SPECIAL REQUESTS  Reflexed from [id number]        Culture NO GROWTH 2 DAYS     Culture, Blood, PCR ID Panel    Specimen: Blood   Result Value Ref Range    Accession Number [id number]     Enterococcus faecalis by PCR NOT DETECTED NOTDET      Enterococcus faecium by PCR NOT DETECTED NOTDET      Listeria monocytogenes by PCR NOT DETECTED NOTDET      STAPHYLOCOCCUS Detected (A) NOTDET      Staphylococcus Aureus Detected (A) NOTDET      Staphylococcus epidermidis by PCR NOT DETECTED NOTDET      Staphylococcus lugdunensis by PCR NOT DETECTED NOTDET      STREPTOCOCCUS NOT DETECTED NOTDET      Streptococcus agalactiae (Group B) NOT DETECTED NOTDET      Strep pneumoniae NOT DETECTED NOTDET      Strep pyogenes,(Grp. A) NOT DETECTED NOTDET      Acinetobacter calcoac baumannii complex by PCR NOT DETECTED NOTDET      Bacteroides fragilis by PCR NOT DETECTED NOTDET      Enterobacteriaceae by PCR NOT DETECTED NOTDET      Enterobacter cloacae complex by PCR NOT DETECTED NOTDET      Escherichia Coli NOT DETECTED NOTDET      Klebsiella aerogenes by PCR NOT DETECTED NOTDET      Klebsiella oxytoca by PCR NOT DETECTED NOTDET      Klebsiella pneumoniae group by PCR NOT DETECTED NOTDET      Proteus by PCR NOT DETECTED NOTDET      Salmonella species by PCR NOT DETECTED NOTDET      Serratia marcescens by PCR NOT DETECTED NOTDET      Haemophilus Influenzae by PCR NOT DETECTED NOTDET      Neisseria meningitidis by PCR NOT DETECTED NOTDET      Pseudomonas aeruginosa NOT DETECTED NOTDET      Stenotrophomonas maltophilia by PCR NOT DETECTED NOTDET      Candida albicans by PCR NOT DETECTED NOTDET      Candida auris by PCR NOT DETECTED NOTDET      Candida glabrata NOT DETECTED NOTDET      Candida krusei by PCR NOT DETECTED NOTDET      Candida parapsilosis by PCR NOT DETECTED NOTDET      Candida tropicalis by PCR NOT DETECTED NOTDET      Cryptococcus neoformans/gattii by PCR NOT DETECTED NOTDET      Resistant gene targets          Resistant gene meca/c & mrej by PCR Detected (A) NOTDET      [employer] test comment        False positive results may rarely occur. Correlate with clinical,epidemiologic, and other laboratory findings   Culture, Blood 1    Specimen: Blood   Result Value Ref Range    Special Requests RIGHT  HAND        Culture NO GROWTH 5 DAYS     Culture, Blood 1    Specimen: Blood   Result Value Ref Range    Special Requests RIGHT  Antecubital        Culture NO GROWTH 5 DAYS     XR CHEST PORTABLE    Impression    1. Large right pneumothorax. 2.  Pulmonary vascular congestion. Findings discussed with Dr. Sue Dangelo by myself at 4:42 PM.     CT CHEST WO CONTRAST    Impression    1. Persisting large right pneumothorax. 2.  Leftward deviation of mediastinal contours concerning for tension component. 3.  Partial atelectasis of right lung. 4.  Emphysema. 5.  Vascular disease. Urgent findings communicated to the ordering NP Gulshan Winn by the secure text  messaging system at 6:30 PM.   XR CHEST PORTABLE    Impression    1. Right chest tube placed, with markedly smaller pneumothorax. 2.  Resolution of the previous tension component. 3.  Mild bibasilar atelectasis. XR CHEST PORTABLE    Impression    1. Resolved right pneumothorax. 2. Mild bibasilar lung atelectasis.    XR CHEST PORTABLE    Impression Unchanged bibasilar lung atelectasis. XR CHEST PORTABLE    Impression    No evidence of pneumothorax     XR CHEST PORTABLE    Impression    1. New small right apical pneumothorax, despite presence of the right-sided  chest tube. 2. Bibasilar atelectasis. XR CHEST PORTABLE    Impression    1. Persistent small right apical pneumothorax. Right chest tube is stable. XR CHEST PORTABLE    Impression    1. Small right apical pneumothorax. Right chest tube is in place. 2. Right lung base opacity, likely atelectasis. 3. No significant change compared to prior. XR CHEST PORTABLE    Impression    1. Stable right chest tubes. No visible pneumothorax on today's study. 2. Right basilar atelectasis. XR CHEST PORTABLE    Impression    Placement of 2 basilar chest tubes with probable small residual  right apical pneumothorax. XR CHEST PORTABLE    Impression    1. Stable right chest tubes. No significant pneumothorax. 2. Right basilar atelectasis. 3. No significant change. XR CHEST PORTABLE    Impression    No appreciable pneumothorax in the current study. XR CHEST PORTABLE    Impression    1. Stable right chest tubes. No visible pneumothorax. 2. Persistent bibasilar atelectasis. 3. No significant change. XR CHEST PORTABLE    Impression    -No significant interval change. No definite visible pneumothorax. XR CHEST PORTABLE    Impression    1. Removal of the right chest tubes. No pneumothorax. 2.  Little interval change otherwise. XR CHEST (2 VW)    Impression    No significant interval change. XR CHEST PORTABLE    Impression    No significant interval change. XR CHEST PORTABLE    Impression    Atelectasis or infiltrate right lung base is stable. Right PICC line  has been placed with the tip in the distal SVC in good position. Left lung is  clear. The heart is normal in size. No pneumothorax. Possible small right  pleural effusion.  No significant left pleural effusion. Oval-shaped opacity  medial aspect right mid lung is unchanged possibly loculated fluid in the major  fissure. Right upper lobe lung mass not completely ruled out.      CBC   Result Value Ref Range    WBC 9.0 4.3 - 11.1 K/uL    RBC 4.41 4.23 - 5.6 M/uL    Hemoglobin 14.6 13.6 - 17.2 g/dL    Hematocrit 42.9 41.1 - 50.3 %    MCV 97.3 82 - 102 FL    MCH 33.1 (H) 26.1 - 32.9 PG    MCHC 34.0 31.4 - 35.0 g/dL    RDW 13.0 11.9 - 14.6 %    Platelets 832 844 - 108 K/uL    MPV 10.5 9.4 - 12.3 FL    nRBC 0.00 0.0 - 0.2 K/uL   Comprehensive Metabolic Panel   Result Value Ref Range    Sodium 133 133 - 143 mmol/L    Potassium 3.9 3.5 - 5.1 mmol/L    Chloride 104 101 - 110 mmol/L    CO2 24 21 - 32 mmol/L    Anion Gap 5 2 - 11 mmol/L    Glucose 196 (H) 65 - 100 mg/dL    BUN 11 8 - 23 MG/DL    Creatinine 0.90 0.8 - 1.5 MG/DL    Est, Glom Filt Rate >60 >60 ml/min/1.73m2    Calcium 9.4 8.3 - 10.4 MG/DL    Total Bilirubin 1.0 0.2 - 1.1 MG/DL    ALT 72 (H) 12 - 65 U/L    AST 37 15 - 37 U/L    Alk Phosphatase 119 50 - 136 U/L    Total Protein 7.1 6.3 - 8.2 g/dL    Albumin 3.6 3.2 - 4.6 g/dL    Globulin 3.5 2.8 - 4.5 g/dL    Albumin/Globulin Ratio 1.0 0.4 - 1.6     Troponin   Result Value Ref Range    Troponin, High Sensitivity 3.9 0 - 14 pg/mL   Troponin   Result Value Ref Range    Troponin, High Sensitivity 4.7 0 - 14 pg/mL   Magnesium   Result Value Ref Range    Magnesium 2.2 1.8 - 2.4 mg/dL   Brain Natriuretic Peptide   Result Value Ref Range    NT Pro- (H) 5 - 125 PG/ML   Basic Metabolic Panel w/ Reflex to MG   Result Value Ref Range    Sodium 135 133 - 143 mmol/L    Potassium 3.9 3.5 - 5.1 mmol/L    Chloride 104 101 - 110 mmol/L    CO2 29 21 - 32 mmol/L    Anion Gap 2 2 - 11 mmol/L    Glucose 221 (H) 65 - 100 mg/dL    BUN 10 8 - 23 MG/DL    Creatinine 0.80 0.8 - 1.5 MG/DL    Est, Glom Filt Rate >60 >60 ml/min/1.73m2    Calcium 9.2 8.3 - 10.4 MG/DL   CBC with Auto Differential   Result Value Ref Range    WBC 7.6 4.3 - 11.1 K/uL    RBC 4.36 4.23 - 5.6 M/uL    Hemoglobin 14.2 13.6 - 17.2 g/dL    Hematocrit 42.3 41.1 - 50.3 %    MCV 97.0 82 - 102 FL    MCH 32.6 26.1 - 32.9 PG    MCHC 33.6 31.4 - 35.0 g/dL    RDW 13.0 11.9 - 14.6 %    Platelets 021 550 - 995 K/uL    MPV 10.1 9.4 - 12.3 FL    nRBC 0.00 0.0 - 0.2 K/uL    Differential Type AUTOMATED      Seg Neutrophils 78 43 - 78 %    Lymphocytes 12 (L) 13 - 44 %    Monocytes 6 4.0 - 12.0 %    Eosinophils % 3 0.5 - 7.8 %    Basophils 0 0.0 - 2.0 %    Immature Granulocytes 1 0.0 - 5.0 %    Segs Absolute 6.0 1.7 - 8.2 K/UL    Absolute Lymph # 0.9 0.5 - 4.6 K/UL    Absolute Mono # 0.4 0.1 - 1.3 K/UL    Absolute Eos # 0.2 0.0 - 0.8 K/UL    Basophils Absolute 0.0 0.0 - 0.2 K/UL    Absolute Immature Granulocyte 0.1 0.0 - 0.5 K/UL   CBC with Auto Differential   Result Value Ref Range    WBC 8.8 4.3 - 11.1 K/uL    RBC 4.35 4.23 - 5.6 M/uL    Hemoglobin 14.7 13.6 - 17.2 g/dL    Hematocrit 43.5 41.1 - 50.3 %    .0 82 - 102 FL    MCH 33.8 (H) 26.1 - 32.9 PG    MCHC 33.8 31.4 - 35.0 g/dL    RDW 13.1 11.9 - 14.6 %    Platelets 270 203 - 723 K/uL    MPV 10.2 9.4 - 12.3 FL    nRBC 0.00 0.0 - 0.2 K/uL    Differential Type AUTOMATED      Seg Neutrophils 75 43 - 78 %    Lymphocytes 16 13 - 44 %    Monocytes 6 4.0 - 12.0 %    Eosinophils % 2 0.5 - 7.8 %    Basophils 0 0.0 - 2.0 %    Immature Granulocytes 1 0.0 - 5.0 %    Segs Absolute 6.7 1.7 - 8.2 K/UL    Absolute Lymph # 1.4 0.5 - 4.6 K/UL    Absolute Mono # 0.5 0.1 - 1.3 K/UL    Absolute Eos # 0.2 0.0 - 0.8 K/UL    Basophils Absolute 0.0 0.0 - 0.2 K/UL    Absolute Immature Granulocyte 0.0 0.0 - 0.5 K/UL   Basic Metabolic Panel w/ Reflex to MG   Result Value Ref Range    Sodium 134 133 - 143 mmol/L    Potassium 4.2 3.5 - 5.1 mmol/L    Chloride 103 101 - 110 mmol/L    CO2 28 21 - 32 mmol/L    Anion Gap 3 2 - 11 mmol/L    Glucose 229 (H) 65 - 100 mg/dL    BUN 12 8 - 23 MG/DL    Creatinine 0.90 0.8 - 1.5 MG/DL    Est, Glom Filt Rate >60 >60 ml/min/1.73m2    Calcium 9.2 8.3 - 10.4 MG/DL   CBC with Auto Differential   Result Value Ref Range    WBC 8.4 4.3 - 11.1 K/uL    RBC 4.29 4.23 - 5.6 M/uL    Hemoglobin 14.1 13.6 - 17.2 g/dL    Hematocrit 41.9 41.1 - 50.3 %    MCV 97.7 82 - 102 FL    MCH 32.9 26.1 - 32.9 PG    MCHC 33.7 31.4 - 35.0 g/dL    RDW 13.0 11.9 - 14.6 %    Platelets 793 962 - 716 K/uL    MPV 9.9 9.4 - 12.3 FL    nRBC 0.00 0.0 - 0.2 K/uL    Differential Type AUTOMATED      Seg Neutrophils 73 43 - 78 %    Lymphocytes 17 13 - 44 %    Monocytes 5 4.0 - 12.0 %    Eosinophils % 3 0.5 - 7.8 %    Basophils 1 0.0 - 2.0 %    Immature Granulocytes 1 0.0 - 5.0 %    Segs Absolute 6.3 1.7 - 8.2 K/UL    Absolute Lymph # 1.4 0.5 - 4.6 K/UL    Absolute Mono # 0.5 0.1 - 1.3 K/UL    Absolute Eos # 0.2 0.0 - 0.8 K/UL    Basophils Absolute 0.0 0.0 - 0.2 K/UL    Absolute Immature Granulocyte 0.1 0.0 - 0.5 K/UL   Basic Metabolic Panel w/ Reflex to MG   Result Value Ref Range    Sodium 135 133 - 143 mmol/L    Potassium 4.1 3.5 - 5.1 mmol/L    Chloride 102 101 - 110 mmol/L    CO2 26 21 - 32 mmol/L    Anion Gap 7 2 - 11 mmol/L    Glucose 210 (H) 65 - 100 mg/dL    BUN 12 8 - 23 MG/DL    Creatinine 1.00 0.8 - 1.5 MG/DL    Est, Glom Filt Rate >60 >60 ml/min/1.73m2    Calcium 8.8 8.3 - 10.4 MG/DL   CBC with Auto Differential   Result Value Ref Range    WBC 7.9 4.3 - 11.1 K/uL    RBC 4.35 4.23 - 5.6 M/uL    Hemoglobin 14.3 13.6 - 17.2 g/dL    Hematocrit 42.6 41.1 - 50.3 %    MCV 97.9 82 - 102 FL    MCH 32.9 26.1 - 32.9 PG    MCHC 33.6 31.4 - 35.0 g/dL    RDW 13.0 11.9 - 14.6 %    Platelets 762 432 - 902 K/uL    MPV 10.5 9.4 - 12.3 FL    nRBC 0.00 0.0 - 0.2 K/uL    Differential Type AUTOMATED      Seg Neutrophils 76 43 - 78 %    Lymphocytes 15 13 - 44 %    Monocytes 6 4.0 - 12.0 %    Eosinophils % 2 0.5 - 7.8 %    Basophils 0 0.0 - 2.0 %    Immature Granulocytes 1 0.0 - 5.0 %    Segs Absolute 6.0 1.7 - 8.2 K/UL    Absolute Lymph # 1.2 0.5 - 4.6 K/UL    Absolute Mono # 0.5 0.1 - 1.3 K/UL    Absolute Eos # 0.2 0.0 - 0.8 K/UL    Basophils Absolute 0.0 0.0 - 0.2 K/UL    Absolute Immature Granulocyte 0.0 0.0 - 0.5 K/UL   Basic Metabolic Panel w/ Reflex to MG   Result Value Ref Range    Sodium 134 133 - 143 mmol/L    Potassium 4.1 3.5 - 5.1 mmol/L    Chloride 101 101 - 110 mmol/L    CO2 26 21 - 32 mmol/L    Anion Gap 7 2 - 11 mmol/L    Glucose 255 (H) 65 - 100 mg/dL    BUN 11 8 - 23 MG/DL    Creatinine 0.80 0.8 - 1.5 MG/DL    Est, Glom Filt Rate >60 >60 ml/min/1.73m2    Calcium 9.2 8.3 - 10.4 MG/DL   CBC with Auto Differential   Result Value Ref Range    WBC 9.6 4.3 - 11.1 K/uL    RBC 4.17 (L) 4.23 - 5.6 M/uL    Hemoglobin 13.7 13.6 - 17.2 g/dL    Hematocrit 41.2 41.1 - 50.3 %    MCV 98.8 82 - 102 FL    MCH 32.9 26.1 - 32.9 PG    MCHC 33.3 31.4 - 35.0 g/dL    RDW 13.2 11.9 - 14.6 %    Platelets 959 563 - 020 K/uL    MPV 10.1 9.4 - 12.3 FL    nRBC 0.00 0.0 - 0.2 K/uL    Differential Type AUTOMATED      Seg Neutrophils 79 (H) 43 - 78 %    Lymphocytes 11 (L) 13 - 44 %    Monocytes 7 4.0 - 12.0 %    Eosinophils % 2 0.5 - 7.8 %    Basophils 0 0.0 - 2.0 %    Immature Granulocytes 1 0.0 - 5.0 %    Segs Absolute 7.7 1.7 - 8.2 K/UL    Absolute Lymph # 1.1 0.5 - 4.6 K/UL    Absolute Mono # 0.6 0.1 - 1.3 K/UL    Absolute Eos # 0.1 0.0 - 0.8 K/UL    Basophils Absolute 0.0 0.0 - 0.2 K/UL    Absolute Immature Granulocyte 0.1 0.0 - 0.5 K/UL   Basic Metabolic Panel w/ Reflex to MG   Result Value Ref Range    Sodium 135 133 - 143 mmol/L    Potassium 4.0 3.5 - 5.1 mmol/L    Chloride 102 101 - 110 mmol/L    CO2 25 21 - 32 mmol/L    Anion Gap 8 2 - 11 mmol/L    Glucose 166 (H) 65 - 100 mg/dL    BUN 14 8 - 23 MG/DL    Creatinine 1.00 0.8 - 1.5 MG/DL    Est, Glom Filt Rate >60 >60 ml/min/1.73m2    Calcium 8.9 8.3 - 10.4 MG/DL   CBC with Auto Differential   Result Value Ref Range    WBC 8.1 4.3 - 11.1 K/uL    RBC 4.19 (L) 4.23 - 5.6 M/uL    Hemoglobin 13.8 13.6 - 17.2 g/dL    Hematocrit 41.1 41.1 - 50.3 %    MCV 98.1 82 - 102 FL    MCH 32.9 26.1 - 32.9 PG    MCHC 33.6 31.4 - 35.0 g/dL    RDW 13.0 11.9 - 14.6 %    Platelets 126 376 - 034 K/uL    MPV 10.2 9.4 - 12.3 FL    nRBC 0.00 0.0 - 0.2 K/uL    Differential Type AUTOMATED      Seg Neutrophils 79 (H) 43 - 78 %    Lymphocytes 10 (L) 13 - 44 %    Monocytes 7 4.0 - 12.0 %    Eosinophils % 3 0.5 - 7.8 %    Basophils 0 0.0 - 2.0 %    Immature Granulocytes 1 0.0 - 5.0 %    Segs Absolute 6.4 1.7 - 8.2 K/UL    Absolute Lymph # 0.8 0.5 - 4.6 K/UL    Absolute Mono # 0.6 0.1 - 1.3 K/UL    Absolute Eos # 0.2 0.0 - 0.8 K/UL    Basophils Absolute 0.0 0.0 - 0.2 K/UL    Absolute Immature Granulocyte 0.1 0.0 - 0.5 K/UL   Basic Metabolic Panel w/ Reflex to MG   Result Value Ref Range    Sodium 135 133 - 143 mmol/L    Potassium 3.8 3.5 - 5.1 mmol/L    Chloride 100 (L) 101 - 110 mmol/L    CO2 26 21 - 32 mmol/L    Anion Gap 9 2 - 11 mmol/L    Glucose 167 (H) 65 - 100 mg/dL    BUN 8 8 - 23 MG/DL    Creatinine 0.70 (L) 0.8 - 1.5 MG/DL    Est, Glom Filt Rate >60 >60 ml/min/1.73m2    Calcium 8.8 8.3 - 10.4 MG/DL   Phosphorus   Result Value Ref Range    Phosphorus 2.6 2.3 - 3.7 MG/DL   Albumin   Result Value Ref Range    Albumin 2.7 (L) 3.2 - 4.6 g/dL   Basic Metabolic Panel w/ Reflex to MG   Result Value Ref Range    Sodium 127 (L) 133 - 143 mmol/L    Potassium 3.6 3.5 - 5.1 mmol/L    Chloride 93 (L) 101 - 110 mmol/L    CO2 24 21 - 32 mmol/L    Anion Gap 10 2 - 11 mmol/L    Glucose 214 (H) 65 - 100 mg/dL    BUN 7 (L) 8 - 23 MG/DL    Creatinine 0.80 0.8 - 1.5 MG/DL    Est, Glom Filt Rate >60 >60 ml/min/1.73m2    Calcium 9.8 8.3 - 10.4 MG/DL   CBC   Result Value Ref Range    WBC 17.0 (H) 4.3 - 11.1 K/uL    RBC 4.38 4.23 - 5.6 M/uL    Hemoglobin 14.4 13.6 - 17.2 g/dL    Hematocrit 40.9 (L) 41.1 - 50.3 %    MCV 93.4 82 - 102 FL    MCH 32.9 26.1 - 32.9 PG    MCHC 35.2 (H) 31.4 - 35.0 g/dL    RDW 12.7 11.9 - 14.6 %    Platelets 295 645 - 659 K/uL    MPV 9.9 9.4 - 12.3 FL    nRBC 0.00 0.0 - 0.2 K/uL Lactic Acid   Result Value Ref Range    Lactic Acid, Plasma 1.4 0.4 - 2.0 MMOL/L   Sodium, urine, random   Result Value Ref Range    SODIUM, RANDOM URINE <5 MMOL/L   Protein, urine, random   Result Value Ref Range    Protein, Urine, Random 39 (H) <11.9 mg/dL   Creatinine, Random Urine   Result Value Ref Range    Creatinine, Ur 220.00 mg/dL   Osmolality, Urine   Result Value Ref Range    Osmolality, Ur 821 50 - 1400 MOSM/kg H2O   Urinalysis with Reflex to Culture    Specimen: Urine   Result Value Ref Range    Color, UA YELLOW      Appearance CLEAR      Specific Gravity, UA >1.035 (H) 1.001 - 1.023    pH, Urine 5.0 5.0 - 9.0      Protein, UA 30 (A) NEG mg/dL    Glucose, UA >1000 mg/dL    Ketones, Urine 40 (A) NEG mg/dL    Bilirubin Urine MODERATE (A) NEG      Blood, Urine Negative NEG      Urobilinogen, Urine 1.0 0.2 - 1.0 EU/dL    Nitrite, Urine Positive (A) NEG      Leukocyte Esterase, Urine TRACE (A) NEG      WBC, UA 10-20 0 /hpf    RBC, UA 0 0 /hpf    BACTERIA, URINE TRACE 0 /hpf    Urine Culture if Indicated URINE CULTURE ORDERED      Epithelial Cells UA 3-5 0 /hpf    Casts 0 0 /lpf    Crystals 0 0 /LPF    Mucus, UA 3+ (H) 0 /lpf    Other observations RESULTS VERIFIED MANUALLY     Basic Metabolic Panel w/ Reflex to MG   Result Value Ref Range    Sodium 128 (L) 133 - 143 mmol/L    Potassium 3.8 3.5 - 5.1 mmol/L    Chloride 91 (L) 101 - 110 mmol/L    CO2 23 21 - 32 mmol/L    Anion Gap 14 (H) 2 - 11 mmol/L    Glucose 235 (H) 65 - 100 mg/dL    BUN 8 8 - 23 MG/DL    Creatinine 1.00 0.8 - 1.5 MG/DL    Est, Glom Filt Rate >60 >60 ml/min/1.73m2    Calcium 9.5 8.3 - 10.4 MG/DL   Phosphorus   Result Value Ref Range    Phosphorus 2.0 (L) 2.3 - 3.7 MG/DL   Albumin   Result Value Ref Range    Albumin 2.3 (L) 3.2 - 4.6 g/dL   Basic Metabolic Panel w/ Reflex to MG   Result Value Ref Range    Sodium 128 (L) 133 - 143 mmol/L    Potassium 3.6 3.5 - 5.1 mmol/L    Chloride 96 (L) 101 - 110 mmol/L    CO2 22 21 - 32 mmol/L    Anion Gap 10 2 - 11 mmol/L    Glucose 175 (H) 65 - 100 mg/dL    BUN 8 8 - 23 MG/DL    Creatinine 0.70 (L) 0.8 - 1.5 MG/DL    Est, Glom Filt Rate >60 >60 ml/min/1.73m2    Calcium 8.5 8.3 - 10.4 MG/DL   CBC   Result Value Ref Range    WBC 19.3 (H) 4.3 - 11.1 K/uL    RBC 4.06 (L) 4.23 - 5.6 M/uL    Hemoglobin 13.4 (L) 13.6 - 17.2 g/dL    Hematocrit 38.2 (L) 41.1 - 50.3 %    MCV 94.1 82 - 102 FL    MCH 33.0 (H) 26.1 - 32.9 PG    MCHC 35.1 (H) 31.4 - 35.0 g/dL    RDW 12.5 11.9 - 14.6 %    Platelets 671 193 - 282 K/uL    MPV 10.0 9.4 - 12.3 FL    nRBC 0.00 0.0 - 0.2 K/uL   Procalcitonin   Result Value Ref Range    Procalcitonin 0.36 0.00 - 0.49 ng/mL   Phosphorus   Result Value Ref Range    Phosphorus 1.9 (L) 2.3 - 3.7 MG/DL   Albumin   Result Value Ref Range    Albumin 2.1 (L) 3.2 - 4.6 g/dL   Basic Metabolic Panel w/ Reflex to MG   Result Value Ref Range    Sodium 127 (L) 133 - 143 mmol/L    Potassium 3.6 3.5 - 5.1 mmol/L    Chloride 96 (L) 101 - 110 mmol/L    CO2 24 21 - 32 mmol/L    Anion Gap 7 2 - 11 mmol/L    Glucose 154 (H) 65 - 100 mg/dL    BUN 7 (L) 8 - 23 MG/DL    Creatinine 0.60 (L) 0.8 - 1.5 MG/DL    Est, Glom Filt Rate >60 >60 ml/min/1.73m2    Calcium 8.1 (L) 8.3 - 10.4 MG/DL   CBC   Result Value Ref Range    WBC 16.4 (H) 4.3 - 11.1 K/uL    RBC 3.85 (L) 4.23 - 5.6 M/uL    Hemoglobin 12.5 (L) 13.6 - 17.2 g/dL    Hematocrit 36.3 (L) 41.1 - 50.3 %    MCV 94.3 82 - 102 FL    MCH 32.5 26.1 - 32.9 PG    MCHC 34.4 31.4 - 35.0 g/dL    RDW 12.7 11.9 - 14.6 %    Platelets 393 366 - 178 K/uL    MPV 9.8 9.4 - 12.3 FL    nRBC 0.00 0.0 - 0.2 K/uL   Procalcitonin   Result Value Ref Range    Procalcitonin 0.55 (H) 0.00 - 0.49 ng/mL   Vancomycin Level, Random   Result Value Ref Range    Vancomycin Rm 15.7 UG/ML   Vancomycin Level, Random   Result Value Ref Range    Vancomycin Rm 11.8 UG/ML   Procalcitonin   Result Value Ref Range    Procalcitonin 0.30 0.00 - 0.49 ng/mL   CBC with Auto Differential   Result Value Ref Range    WBC 11. 2 (H) 4.3 - 11.1 K/uL    RBC 3.68 (L) 4.23 - 5.6 M/uL    Hemoglobin 12.2 (L) 13.6 - 17.2 g/dL    Hematocrit 35.5 (L) 41.1 - 50.3 %    MCV 96.5 82 - 102 FL    MCH 33.2 (H) 26.1 - 32.9 PG    MCHC 34.4 31.4 - 35.0 g/dL    RDW 12.9 11.9 - 14.6 %    Platelets 904 169 - 021 K/uL    MPV 10.3 9.4 - 12.3 FL    nRBC 0.00 0.0 - 0.2 K/uL    Differential Type AUTOMATED      Seg Neutrophils 86 (H) 43 - 78 %    Lymphocytes 5 (L) 13 - 44 %    Monocytes 6 4.0 - 12.0 %    Eosinophils % 2 0.5 - 7.8 %    Basophils 0 0.0 - 2.0 %    Immature Granulocytes 1 0.0 - 5.0 %    Segs Absolute 9.7 (H) 1.7 - 8.2 K/UL    Absolute Lymph # 0.5 0.5 - 4.6 K/UL    Absolute Mono # 0.7 0.1 - 1.3 K/UL    Absolute Eos # 0.2 0.0 - 0.8 K/UL    Basophils Absolute 0.0 0.0 - 0.2 K/UL    Absolute Immature Granulocyte 0.1 0.0 - 0.5 K/UL   Comprehensive Metabolic Panel w/ Reflex to MG   Result Value Ref Range    Sodium 134 133 - 143 mmol/L    Potassium 3.2 (L) 3.5 - 5.1 mmol/L    Chloride 103 101 - 110 mmol/L    CO2 24 21 - 32 mmol/L    Anion Gap 7 2 - 11 mmol/L    Glucose 193 (H) 65 - 100 mg/dL    BUN 5 (L) 8 - 23 MG/DL    Creatinine 0.60 (L) 0.8 - 1.5 MG/DL    Est, Glom Filt Rate >60 >60 ml/min/1.73m2    Calcium 8.6 8.3 - 10.4 MG/DL    Total Bilirubin 0.7 0.2 - 1.1 MG/DL    ALT 27 12 - 65 U/L    AST 21 15 - 37 U/L    Alk Phosphatase 101 50 - 136 U/L    Total Protein 6.2 (L) 6.3 - 8.2 g/dL    Albumin 2.2 (L) 3.2 - 4.6 g/dL    Globulin 4.0 2.8 - 4.5 g/dL    Albumin/Globulin Ratio 0.6 0.4 - 1.6     Osmolality   Result Value Ref Range    Serum Osmolality 279 (L) 280 - 301 MOSM/kg H2O   Magnesium   Result Value Ref Range    Magnesium 2.3 1.8 - 2.4 mg/dL   Procalcitonin   Result Value Ref Range    Procalcitonin 0.21 0.00 - 0.49 ng/mL   Phosphorus   Result Value Ref Range    Phosphorus 2.7 2.3 - 3.7 MG/DL   Albumin   Result Value Ref Range    Albumin 2.5 (L) 3.2 - 4.6 g/dL   Basic Metabolic Panel w/ Reflex to MG   Result Value Ref Range    Sodium 137 133 - 143 mmol/L Potassium 3.2 (L) 3.5 - 5.1 mmol/L    Chloride 103 101 - 110 mmol/L    CO2 27 21 - 32 mmol/L    Anion Gap 7 2 - 11 mmol/L    Glucose 144 (H) 65 - 100 mg/dL    BUN 5 (L) 8 - 23 MG/DL    Creatinine 0.60 (L) 0.8 - 1.5 MG/DL    Est, Glom Filt Rate >60 >60 ml/min/1.73m2    Calcium 8.3 8.3 - 10.4 MG/DL   CBC   Result Value Ref Range    WBC 8.4 4.3 - 11.1 K/uL    RBC 3.49 (L) 4.23 - 5.6 M/uL    Hemoglobin 11.4 (L) 13.6 - 17.2 g/dL    Hematocrit 32.9 (L) 41.1 - 50.3 %    MCV 94.3 82 - 102 FL    MCH 32.7 26.1 - 32.9 PG    MCHC 34.7 31.4 - 35.0 g/dL    RDW 13.0 11.9 - 14.6 %    Platelets 146 370 - 304 K/uL    MPV 9.0 (L) 9.4 - 12.3 FL    nRBC 0.00 0.0 - 0.2 K/uL   Cortisol 30 Min, Total   Result Value Ref Range    Cortisol, 30 Min 29.9 ug/dL   Cortisol 60 Min, Total   Result Value Ref Range    Cortisol, 60 Min 35.3 ug/dL   Cortisol, Baseline   Result Value Ref Range    Cortisol 9.5 ug/dL   Vancomycin Level, Random   Result Value Ref Range    Vancomycin Rm 10.7 UG/ML   Magnesium   Result Value Ref Range    Magnesium 2.1 1.8 - 2.4 mg/dL   Basic Metabolic Panel w/ Reflex to MG   Result Value Ref Range    Sodium 139 133 - 143 mmol/L    Potassium 3.1 (L) 3.5 - 5.1 mmol/L    Chloride 102 101 - 110 mmol/L    CO2 25 21 - 32 mmol/L    Anion Gap 12 (H) 2 - 11 mmol/L    Glucose 167 (H) 65 - 100 mg/dL    BUN 6 (L) 8 - 23 MG/DL    Creatinine 0.70 (L) 0.8 - 1.5 MG/DL    Est, Glom Filt Rate >60 >60 ml/min/1.73m2    Calcium 9.5 8.3 - 10.4 MG/DL   CBC   Result Value Ref Range    WBC 9.2 4.3 - 11.1 K/uL    RBC 3.56 (L) 4.23 - 5.6 M/uL    Hemoglobin 11.5 (L) 13.6 - 17.2 g/dL    Hematocrit 34.0 (L) 41.1 - 50.3 %    MCV 95.5 82 - 102 FL    MCH 32.3 26.1 - 32.9 PG    MCHC 33.8 31.4 - 35.0 g/dL    RDW 13.2 11.9 - 14.6 %    Platelets 880 212 - 263 K/uL    MPV 8.8 (L) 9.4 - 12.3 FL    nRBC 0.00 0.0 - 0.2 K/uL   Magnesium   Result Value Ref Range    Magnesium 2.0 1.8 - 2.4 mg/dL   Hemoglobin A1C   Result Value Ref Range    Hemoglobin A1C 7.4 (H) 4.8 - 5.6 %    eAG 166 mg/dL   POCT Glucose   Result Value Ref Range    POC Glucose 196 (H) 65 - 100 mg/dL    Performed by: Rose Durant    POCT Glucose   Result Value Ref Range    POC Glucose 237 (H) 65 - 100 mg/dL    Performed by: Howard Cure    POCT Glucose   Result Value Ref Range    POC Glucose 266 (H) 65 - 100 mg/dL    Performed by: Howard Cure    POCT Glucose   Result Value Ref Range    POC Glucose 253 (H) 65 - 100 mg/dL    Performed by: Calos    POCT Glucose   Result Value Ref Range    POC Glucose 268 (H) 65 - 100 mg/dL    Performed by: Anna    POCT Glucose   Result Value Ref Range    POC Glucose 195 (H) 65 - 100 mg/dL    Performed by: MadhuPCT    POCT Glucose   Result Value Ref Range    POC Glucose 279 (H) 65 - 100 mg/dL    Performed by:  ReneaPortiaPCT    POCT Glucose   Result Value Ref Range    POC Glucose 176 (H) 65 - 100 mg/dL    Performed by: Villegas (Cain)BSBETZAIDA    POCT Glucose   Result Value Ref Range    POC Glucose 258 (H) 65 - 100 mg/dL    Performed by: KarolinaPCATUL    POCT Glucose   Result Value Ref Range    POC Glucose 213 (H) 65 - 100 mg/dL    Performed by: ProsperetPCT    POCT Glucose   Result Value Ref Range    POC Glucose 261 (H) 65 - 100 mg/dL    Performed by: BatoolgaretPCT    POCT Glucose   Result Value Ref Range    POC Glucose 250 (H) 65 - 100 mg/dL    Performed by: ProsperetPCT    POCT Glucose   Result Value Ref Range    POC Glucose 250 (H) 65 - 100 mg/dL    Performed by: Favian    POCT Glucose   Result Value Ref Range    POC Glucose 221 (H) 65 - 100 mg/dL    Performed by: ProsperetPCT    POCT Glucose   Result Value Ref Range    POC Glucose 325 (H) 65 - 100 mg/dL    Performed by: Villegas (Cain)BSBETZAIDA    POCT Glucose   Result Value Ref Range    POC Glucose 206 (H) 65 - 100 mg/dL    Performed by: Villegas (Cain)BSN    POCT Glucose   Result Value Ref Range    POC Glucose 181 (H) 65 - 100 mg/dL    Performed by: PerGracyAnnCareCompanion    POCT Glucose   Result Value Ref Range    POC Glucose 223 (H) 65 - 100 mg/dL    Performed by: DarwinPCTVenus    POCT Glucose   Result Value Ref Range    POC Glucose 192 (H) 65 - 100 mg/dL    Performed by: DarwinPCTVenus    POCT Glucose   Result Value Ref Range    POC Glucose 189 (H) 65 - 100 mg/dL    Performed by: Courtney    POCT Glucose   Result Value Ref Range    POC Glucose 233 (H) 65 - 100 mg/dL    Performed by: Jaye Evans    POCT Glucose   Result Value Ref Range    POC Glucose 223 (H) 65 - 100 mg/dL    Performed by: Colleen    POCT Glucose   Result Value Ref Range    POC Glucose 166 (H) 65 - 100 mg/dL    Performed by: DarwinPCTVenus    POCT Glucose   Result Value Ref Range    POC Glucose 216 (H) 65 - 100 mg/dL    Performed by: DarwinPCTVenus    POCT Glucose   Result Value Ref Range    POC Glucose 163 (H) 65 - 100 mg/dL    Performed by: DarwinPCTVenus    POCT Glucose   Result Value Ref Range    POC Glucose 198 (H) 65 - 100 mg/dL    Performed by: Favian    POCT Glucose   Result Value Ref Range    POC Glucose 169 (H) 65 - 100 mg/dL    Performed by: DarwinPCTVenus    POCT Glucose   Result Value Ref Range    POC Glucose 227 (H) 65 - 100 mg/dL    Performed by: DarwinPCTVenus    POCT Glucose   Result Value Ref Range    POC Glucose 160 (H) 65 - 100 mg/dL    Performed by: DarwinPCTVenus    POCT Glucose   Result Value Ref Range    POC Glucose 199 (H) 65 - 100 mg/dL    Performed by: AnselmoAnnCareCompanion    POCT Glucose   Result Value Ref Range    POC Glucose 169 (H) 65 - 100 mg/dL    Performed by: EhsanPCT    POCT Glucose   Result Value Ref Range    POC Glucose 226 (H) 65 - 100 mg/dL    Performed by: Jah    POCT Glucose   Result Value Ref Range    POC Glucose 189 (H) 65 - 100 mg/dL    Performed by: Jane    POCT Glucose   Result Value Ref Range    POC Glucose 183 (H) 65 - 100 mg/dL    Performed by: YaakovCA    POCT Glucose   Result Value Ref Range    POC Glucose 164 (H) 65 - 100 mg/dL    Performed by: Navarro (Cain)    POCT Glucose   Result Value Ref Range    POC Glucose 229 (H) 65 - 100 mg/dL    Performed by: Arnold    POCT Glucose   Result Value Ref Range    POC Glucose 183 (H) 65 - 100 mg/dL    Performed by: Rehman (Oliver)    POCT Glucose   Result Value Ref Range    POC Glucose 208 (H) 65 - 100 mg/dL    Performed by: BlackAlexisPCA    POCT Glucose   Result Value Ref Range    POC Glucose 228 (H) 65 - 100 mg/dL    Performed by: Skip Nelson    POCT Glucose   Result Value Ref Range    POC Glucose 230 (H) 65 - 100 mg/dL    Performed by: DavidenezCNAJennifer    POCT Glucose   Result Value Ref Range    POC Glucose 229 (H) 65 - 100 mg/dL    Performed by: JimenezCNAJennifer    POCT Glucose   Result Value Ref Range    POC Glucose 218 (H) 65 - 100 mg/dL    Performed by: IsmaelAlexisPCA    POCT Glucose   Result Value Ref Range    POC Glucose 173 (H) 65 - 100 mg/dL    Performed by: DavidenezCNAJennifer    POCT Glucose   Result Value Ref Range    POC Glucose 203 (H) 65 - 100 mg/dL    Performed by: JimenezCNAJennifer    POCT Glucose   Result Value Ref Range    POC Glucose 163 (H) 65 - 100 mg/dL    Performed by: JimenezCNAJennifer    POCT Glucose   Result Value Ref Range    POC Glucose 189 (H) 65 - 100 mg/dL    Performed by: DanielCarriePCT    POCT Glucose   Result Value Ref Range    POC Glucose 152 (H) 65 - 100 mg/dL    Performed by: BennettPCTVenus    POCT Glucose   Result Value Ref Range    POC Glucose 213 (H) 65 - 100 mg/dL    Performed by: BennettPCTVenus    POCT Glucose   Result Value Ref Range    POC Glucose 206 (H) 65 - 100 mg/dL    Performed by: BennettPCTVenus    POCT Glucose   Result Value Ref Range    POC Glucose 211 (H) 65 - 100 mg/dL    Performed by: DanielCarriePCT    POCT Glucose   Result Value Ref Range    POC Glucose 155 (H) 65 - 100 mg/dL    Performed by:  Dimitris Mc POCT Glucose   Result Value Ref Range    POC Glucose 183 (H) 65 - 100 mg/dL    Performed by: Jane    POCT Glucose   Result Value Ref Range    POC Glucose 203 (H) 65 - 100 mg/dL    Performed by: Navarro (Cain)    POCT Glucose   Result Value Ref Range    POC Glucose 191 (H) 65 - 100 mg/dL    Performed by: Colleen    POCT Glucose   Result Value Ref Range    POC Glucose 164 (H) 65 - 100 mg/dL    Performed by: DarwinPCTVenus    POCT Glucose   Result Value Ref Range    POC Glucose 259 (H) 65 - 100 mg/dL    Performed by: DarwinPCTVenus    POCT Glucose   Result Value Ref Range    POC Glucose 218 (H) 65 - 100 mg/dL    Performed by: DarwinPCTVenus    POCT Glucose   Result Value Ref Range    POC Glucose 175 (H) 65 - 100 mg/dL    Performed by: Duke    POCT Glucose   Result Value Ref Range    POC Glucose 165 (H) 65 - 100 mg/dL    Performed by: Teestheo    POCT Glucose   Result Value Ref Range    POC Glucose 168 (H) 65 - 100 mg/dL    Performed by: Brijesh    EKG 12 Lead   Result Value Ref Range    Ventricular Rate 82 BPM    Atrial Rate 0 BPM    QRS Duration 96 ms    Q-T Interval 379 ms    QTc Calculation (Bazett) 443 ms    R Axis 75 degrees    T Axis 30 degrees    Diagnosis Atrial fibrillation    EKG 12 Lead   Result Value Ref Range    Ventricular Rate 103 BPM    Atrial Rate 125 BPM    QRS Duration 92 ms    Q-T Interval 296 ms    QTc Calculation (Bazett) 387 ms    R Axis 8 degrees    T Axis -55 degrees    Diagnosis       Atrial fibrillation with rapid ventricular response with premature   ventricular or aberrantly conducted complexes     TYPE AND SCREEN   Result Value Ref Range    Crossmatch expiration date 01/12/2023,2359     ABO/Rh O POSITIVE     Antibody Screen NEG    Transthoracic echocardiogram (TTE) complete with contrast, bubble, strain, and 3D PRN   Result Value Ref Range    LA Minor Axis 6.4 cm    LA Major Axis 6.6 cm    LA Area 2C 27.7 cm2    LA Area 4C 27.4 cm2    LA Volume 2C 96 (A) 18 - 58 mL    LA Volume 4C 87 (A) 18 - 58 mL    LA Volume BP 93 (A) 18 - 58 mL    LA Diameter 5.4 cm    AV Mean Velocity 0.9 m/s    AV Mean Gradient 4 mmHg    AV VTI 22.0 cm    AV Peak Velocity 1.3 m/s    AV Peak Gradient 7 mmHg    AV Area by VTI 3.1 cm2    AV Area by Peak Velocity 3.2 cm2    Aortic Root 3.6 cm    Ascending Aorta 3.6 cm    IVC Proxmal 2.7 cm    IVSd 0.7 0.6 - 1.0 cm    LVIDd 5.5 4.2 - 5.9 cm    LVIDs 4.4 cm    LVOT Diameter 2.3 cm    LVOT Mean Gradient 2 mmHg    LVOT VTI 16.6 cm    LVOT Peak Velocity 1.0 m/s    LVOT Peak Gradient 4 mmHg    LVPWd 0.9 0.6 - 1.0 cm    LV E' Lateral Velocity 17 cm/s    LV E' Septal Velocity 12 cm/s    LVOT Area 4.2 cm2    LVOT SV 68.9 ml    MV E Wave Deceleration Time 213.0 ms    MV E Velocity 1.01 m/s    PV Max Velocity 1.4 m/s    PV Peak Gradient 8 mmHg    Est. RA Pressure 8 mmHg    RV Free Wall Peak S' 11 cm/s    TR Max Velocity 2.42 m/s    TR Peak Gradient 23 mmHg    Body Surface Area 2.77 m2    Fractional Shortening 2D 20 28 - 44 %    LVIDd Index 2.04 cm/m2    LVIDs Index 1.63 cm/m2    LV RWT Ratio 0.33     LV Mass 2D 160.0 88 - 224 g    LV Mass 2D Index 59.2 49 - 115 g/m2    E/E' Ratio (Averaged) 7.18     E/E' Lateral 5.94     E/E' Septal 8.42     LA Volume Index BP 34 16 - 34 ml/m2    LVOT Stroke Volume Index 25.5 mL/m2    LA Volume Index 2C 36 (A) 16 - 34 mL/m2    LA Volume Index 4C 32 16 - 34 mL/m2    LA Size Index 2.00 cm/m2    LA/AO Root Ratio 1.50     Ao Root Index 1.33 cm/m2    Ascending Aorta Index 1.33 cm/m2    AV Velocity Ratio 0.77     LVOT:AV VTI Index 0.75     TRINH/BSA VTI 1.1 cm2/m2    TRINH/BSA Peak Velocity 1.2 cm2/m2    RVSP 31 mmHg    Left Ventricular Ejection Fraction 48     LVEF MODALITY ECHO    Results for orders placed or performed during the hospital encounter of 12/15/22 (from the past 2160 hour(s))   Ferritin   Result Value Ref Range    Ferritin 120 8 - 388 NG/ML

## 2023-02-17 ENCOUNTER — HOME CARE VISIT (OUTPATIENT)
Dept: SCHEDULING | Facility: HOME HEALTH | Age: 68
End: 2023-02-17
Payer: MEDICARE

## 2023-02-17 VITALS
SYSTOLIC BLOOD PRESSURE: 124 MMHG | RESPIRATION RATE: 18 BRPM | OXYGEN SATURATION: 96 % | HEART RATE: 90 BPM | TEMPERATURE: 97.8 F | DIASTOLIC BLOOD PRESSURE: 70 MMHG

## 2023-02-17 PROCEDURE — G0299 HHS/HOSPICE OF RN EA 15 MIN: HCPCS

## 2023-02-18 LAB
BACTERIA SPEC CULT: ABNORMAL
SERVICE CMNT-IMP: ABNORMAL

## 2023-02-20 ENCOUNTER — TELEPHONE (OUTPATIENT)
Dept: INTERNAL MEDICINE CLINIC | Facility: CLINIC | Age: 68
End: 2023-02-20

## 2023-02-20 ENCOUNTER — HOME CARE VISIT (OUTPATIENT)
Dept: SCHEDULING | Facility: HOME HEALTH | Age: 68
End: 2023-02-20
Payer: MEDICARE

## 2023-02-20 VITALS
DIASTOLIC BLOOD PRESSURE: 76 MMHG | TEMPERATURE: 97.3 F | OXYGEN SATURATION: 96 % | RESPIRATION RATE: 18 BRPM | HEART RATE: 94 BPM | SYSTOLIC BLOOD PRESSURE: 128 MMHG

## 2023-02-20 PROCEDURE — G0299 HHS/HOSPICE OF RN EA 15 MIN: HCPCS

## 2023-02-20 RX ORDER — SEMAGLUTIDE 1.34 MG/ML
0.5 INJECTION, SOLUTION SUBCUTANEOUS
Qty: 1.5 ML | Refills: 5 | Status: SHIPPED | OUTPATIENT
Start: 2023-02-20

## 2023-02-20 NOTE — TELEPHONE ENCOUNTER
Patient states that at last OV, Dr Darline Clay was going to prescribe Ozempic . But pharmacy did not have prescription. Can Ozempic prescription be sent to pharmacy?

## 2023-02-20 NOTE — TELEPHONE ENCOUNTER
Orders Placed This Encounter    OZEMPIC, 0.25 OR 0.5 MG/DOSE, 2 MG/1.5ML SOPN     Sig: Inject 0.5 mg into the skin every 7 days     Dispense:  1.5 mL     Refill:  Loretta Chavez MD

## 2023-02-22 ENCOUNTER — HOME CARE VISIT (OUTPATIENT)
Dept: SCHEDULING | Facility: HOME HEALTH | Age: 68
End: 2023-02-22
Payer: MEDICARE

## 2023-02-22 VITALS
TEMPERATURE: 97.8 F | HEART RATE: 88 BPM | RESPIRATION RATE: 18 BRPM | DIASTOLIC BLOOD PRESSURE: 76 MMHG | OXYGEN SATURATION: 95 % | SYSTOLIC BLOOD PRESSURE: 128 MMHG

## 2023-02-22 PROCEDURE — G0299 HHS/HOSPICE OF RN EA 15 MIN: HCPCS

## 2023-02-22 ASSESSMENT — ENCOUNTER SYMPTOMS
STOOL DESCRIPTION: FORMED
HEMOPTYSIS: 0

## 2023-02-24 ENCOUNTER — HOME CARE VISIT (OUTPATIENT)
Dept: SCHEDULING | Facility: HOME HEALTH | Age: 68
End: 2023-02-24
Payer: MEDICARE

## 2023-02-24 PROCEDURE — G0299 HHS/HOSPICE OF RN EA 15 MIN: HCPCS

## 2023-02-25 VITALS
RESPIRATION RATE: 18 BRPM | HEART RATE: 85 BPM | OXYGEN SATURATION: 95 % | SYSTOLIC BLOOD PRESSURE: 128 MMHG | DIASTOLIC BLOOD PRESSURE: 72 MMHG | TEMPERATURE: 96.9 F

## 2023-02-27 ENCOUNTER — HOME CARE VISIT (OUTPATIENT)
Dept: SCHEDULING | Facility: HOME HEALTH | Age: 68
End: 2023-02-27
Payer: MEDICARE

## 2023-02-27 PROCEDURE — G0299 HHS/HOSPICE OF RN EA 15 MIN: HCPCS

## 2023-02-28 VITALS
TEMPERATURE: 96.7 F | OXYGEN SATURATION: 96 % | SYSTOLIC BLOOD PRESSURE: 128 MMHG | DIASTOLIC BLOOD PRESSURE: 76 MMHG | RESPIRATION RATE: 16 BRPM | HEART RATE: 97 BPM

## 2023-03-01 ENCOUNTER — HOME CARE VISIT (OUTPATIENT)
Dept: SCHEDULING | Facility: HOME HEALTH | Age: 68
End: 2023-03-01
Payer: MEDICARE

## 2023-03-01 VITALS
HEART RATE: 75 BPM | SYSTOLIC BLOOD PRESSURE: 128 MMHG | RESPIRATION RATE: 18 BRPM | OXYGEN SATURATION: 94 % | DIASTOLIC BLOOD PRESSURE: 74 MMHG | TEMPERATURE: 97.1 F

## 2023-03-01 PROCEDURE — G0299 HHS/HOSPICE OF RN EA 15 MIN: HCPCS

## 2023-03-01 ASSESSMENT — ENCOUNTER SYMPTOMS
STOOL DESCRIPTION: FORMED
PAIN LOCATION - PAIN QUALITY: ACHY
HEMOPTYSIS: 0

## 2023-03-03 ENCOUNTER — HOME CARE VISIT (OUTPATIENT)
Dept: SCHEDULING | Facility: HOME HEALTH | Age: 68
End: 2023-03-03
Payer: MEDICARE

## 2023-03-03 PROCEDURE — G0299 HHS/HOSPICE OF RN EA 15 MIN: HCPCS

## 2023-03-05 VITALS
DIASTOLIC BLOOD PRESSURE: 68 MMHG | OXYGEN SATURATION: 96 % | RESPIRATION RATE: 18 BRPM | TEMPERATURE: 97.8 F | SYSTOLIC BLOOD PRESSURE: 130 MMHG | HEART RATE: 89 BPM

## 2023-03-06 ENCOUNTER — TELEPHONE (OUTPATIENT)
Dept: INTERNAL MEDICINE CLINIC | Facility: CLINIC | Age: 68
End: 2023-03-06

## 2023-03-06 ENCOUNTER — HOSPITAL ENCOUNTER (OUTPATIENT)
Dept: GENERAL RADIOLOGY | Age: 68
Discharge: HOME | End: 2023-03-09
Payer: MEDICARE

## 2023-03-06 ENCOUNTER — HOME CARE VISIT (OUTPATIENT)
Dept: SCHEDULING | Facility: HOME HEALTH | Age: 68
End: 2023-03-06
Payer: MEDICARE

## 2023-03-06 VITALS
HEART RATE: 83 BPM | DIASTOLIC BLOOD PRESSURE: 74 MMHG | OXYGEN SATURATION: 98 % | SYSTOLIC BLOOD PRESSURE: 126 MMHG | RESPIRATION RATE: 18 BRPM | TEMPERATURE: 97 F

## 2023-03-06 DIAGNOSIS — Z87.09 HX OF PNEUMOTHORAX: ICD-10-CM

## 2023-03-06 PROCEDURE — 71046 X-RAY EXAM CHEST 2 VIEWS: CPT

## 2023-03-06 PROCEDURE — G0299 HHS/HOSPICE OF RN EA 15 MIN: HCPCS

## 2023-03-06 NOTE — TELEPHONE ENCOUNTER
Nolvia Pacheco from [facility] [place] states that they have been providing wound care for patient 3 times a week. She states that would is completely healed and closed over but there is a small amount of redness in the [place] area. She states that she has been applying antibiotic ointment with foam. A picture has been uploaded to patient's chart. If wound looks good then she will be discharging patient this week.  Any further instructions call Nolvia Pacheco @ [phone]

## 2023-03-08 ENCOUNTER — HOME CARE VISIT (OUTPATIENT)
Dept: SCHEDULING | Facility: HOME HEALTH | Age: 68
End: 2023-03-08
Payer: MEDICARE

## 2023-03-08 DIAGNOSIS — I10 ESSENTIAL HYPERTENSION: ICD-10-CM

## 2023-03-08 DIAGNOSIS — E11.51 CONTROLLED TYPE 2 DIABETES MELLITUS WITH PERIPHERAL CIRCULATORY DISORDER (HCC): ICD-10-CM

## 2023-03-08 LAB
ANION GAP SERPL CALC-SCNC: 16 MMOL/L (ref 2–11)
BUN SERPL-MCNC: 13 MG/DL (ref 8–23)
CALCIUM SERPL-MCNC: 10 MG/DL (ref 8.3–10.4)
CHLORIDE SERPL-SCNC: 105 MMOL/L (ref 101–110)
CHOLEST SERPL-MCNC: 105 MG/DL
CO2 SERPL-SCNC: 14 MMOL/L (ref 21–32)
CREAT SERPL-MCNC: 0.9 MG/DL (ref 0.8–1.5)
GLUCOSE SERPL-MCNC: 132 MG/DL (ref 65–100)
HDLC SERPL-MCNC: 33 MG/DL (ref 40–60)
HDLC SERPL: 3.2
LDLC SERPL CALC-MCNC: 48.6 MG/DL
POTASSIUM SERPL-SCNC: 4.4 MMOL/L (ref 3.5–5.1)
SODIUM SERPL-SCNC: 135 MMOL/L (ref 133–143)
TRIGL SERPL-MCNC: 117 MG/DL (ref 35–150)
VLDLC SERPL CALC-MCNC: 23.4 MG/DL (ref 6–23)

## 2023-03-08 PROCEDURE — G0299 HHS/HOSPICE OF RN EA 15 MIN: HCPCS

## 2023-03-09 VITALS
TEMPERATURE: 97.3 F | DIASTOLIC BLOOD PRESSURE: 68 MMHG | SYSTOLIC BLOOD PRESSURE: 124 MMHG | HEART RATE: 77 BPM | RESPIRATION RATE: 18 BRPM | OXYGEN SATURATION: 96 %

## 2023-03-13 ENCOUNTER — HOME CARE VISIT (OUTPATIENT)
Dept: SCHEDULING | Facility: HOME HEALTH | Age: 68
End: 2023-03-13
Payer: MEDICARE

## 2023-03-15 ENCOUNTER — PATIENT MESSAGE (OUTPATIENT)
Dept: INTERNAL MEDICINE CLINIC | Facility: CLINIC | Age: 68
End: 2023-03-15

## 2023-03-15 ENCOUNTER — HOME CARE VISIT (OUTPATIENT)
Dept: SCHEDULING | Facility: HOME HEALTH | Age: 68
End: 2023-03-15
Payer: MEDICARE

## 2023-03-15 ENCOUNTER — OFFICE VISIT (OUTPATIENT)
Dept: INTERNAL MEDICINE CLINIC | Facility: CLINIC | Age: 68
End: 2023-03-15
Payer: MEDICARE

## 2023-03-15 VITALS
DIASTOLIC BLOOD PRESSURE: 78 MMHG | TEMPERATURE: 97.8 F | RESPIRATION RATE: 18 BRPM | OXYGEN SATURATION: 97 % | HEART RATE: 77 BPM | SYSTOLIC BLOOD PRESSURE: 120 MMHG

## 2023-03-15 VITALS
DIASTOLIC BLOOD PRESSURE: 60 MMHG | WEIGHT: 315 LBS | HEART RATE: 90 BPM | OXYGEN SATURATION: 98 % | SYSTOLIC BLOOD PRESSURE: 90 MMHG | BODY MASS INDEX: 40.43 KG/M2 | HEIGHT: 74 IN | TEMPERATURE: 97.2 F

## 2023-03-15 DIAGNOSIS — E11.65 TYPE 2 DIABETES MELLITUS WITH HYPERGLYCEMIA, WITH LONG-TERM CURRENT USE OF INSULIN (HCC): ICD-10-CM

## 2023-03-15 DIAGNOSIS — N39.0 URINARY TRACT INFECTION WITHOUT HEMATURIA, SITE UNSPECIFIED: ICD-10-CM

## 2023-03-15 DIAGNOSIS — Z79.4 TYPE 2 DIABETES MELLITUS WITH HYPERGLYCEMIA, WITH LONG-TERM CURRENT USE OF INSULIN (HCC): ICD-10-CM

## 2023-03-15 DIAGNOSIS — J93.83 SPONTANEOUS PNEUMOTHORAX: Primary | ICD-10-CM

## 2023-03-15 LAB
BILIRUBIN, URINE, POC: NEGATIVE
BLOOD URINE, POC: ABNORMAL
GLUCOSE URINE, POC: NEGATIVE
KETONES, URINE, POC: NEGATIVE
LEUKOCYTE ESTERASE, URINE, POC: ABNORMAL
NITRITE, URINE, POC: NEGATIVE
PH, URINE, POC: 5.5 (ref 4.6–8)
PROTEIN,URINE, POC: NEGATIVE
SPECIFIC GRAVITY, URINE, POC: 1.01 (ref 1–1.03)
URINALYSIS CLARITY, POC: CLEAR
URINALYSIS COLOR, POC: YELLOW
UROBILINOGEN, POC: NORMAL

## 2023-03-15 PROCEDURE — 81003 URINALYSIS AUTO W/O SCOPE: CPT | Performed by: NURSE PRACTITIONER

## 2023-03-15 PROCEDURE — 3078F DIAST BP <80 MM HG: CPT | Performed by: NURSE PRACTITIONER

## 2023-03-15 PROCEDURE — 3074F SYST BP LT 130 MM HG: CPT | Performed by: NURSE PRACTITIONER

## 2023-03-15 PROCEDURE — G0299 HHS/HOSPICE OF RN EA 15 MIN: HCPCS

## 2023-03-15 PROCEDURE — 99214 OFFICE O/P EST MOD 30 MIN: CPT | Performed by: NURSE PRACTITIONER

## 2023-03-15 PROCEDURE — 3051F HG A1C>EQUAL 7.0%<8.0%: CPT | Performed by: NURSE PRACTITIONER

## 2023-03-15 PROCEDURE — G8427 DOCREV CUR MEDS BY ELIG CLIN: HCPCS | Performed by: NURSE PRACTITIONER

## 2023-03-15 PROCEDURE — 1123F ACP DISCUSS/DSCN MKR DOCD: CPT | Performed by: NURSE PRACTITIONER

## 2023-03-15 PROCEDURE — G8417 CALC BMI ABV UP PARAM F/U: HCPCS | Performed by: NURSE PRACTITIONER

## 2023-03-15 PROCEDURE — 1036F TOBACCO NON-USER: CPT | Performed by: NURSE PRACTITIONER

## 2023-03-15 PROCEDURE — 2022F DILAT RTA XM EVC RTNOPTHY: CPT | Performed by: NURSE PRACTITIONER

## 2023-03-15 PROCEDURE — G8484 FLU IMMUNIZE NO ADMIN: HCPCS | Performed by: NURSE PRACTITIONER

## 2023-03-15 PROCEDURE — 3017F COLORECTAL CA SCREEN DOC REV: CPT | Performed by: NURSE PRACTITIONER

## 2023-03-15 SDOH — ECONOMIC STABILITY: FOOD INSECURITY: WITHIN THE PAST 12 MONTHS, YOU WORRIED THAT YOUR FOOD WOULD RUN OUT BEFORE YOU GOT MONEY TO BUY MORE.: NEVER TRUE

## 2023-03-15 SDOH — ECONOMIC STABILITY: FOOD INSECURITY: WITHIN THE PAST 12 MONTHS, THE FOOD YOU BOUGHT JUST DIDN'T LAST AND YOU DIDN'T HAVE MONEY TO GET MORE.: NEVER TRUE

## 2023-03-15 SDOH — ECONOMIC STABILITY: INCOME INSECURITY: HOW HARD IS IT FOR YOU TO PAY FOR THE VERY BASICS LIKE FOOD, HOUSING, MEDICAL CARE, AND HEATING?: NOT HARD AT ALL

## 2023-03-15 ASSESSMENT — PATIENT HEALTH QUESTIONNAIRE - PHQ9
SUM OF ALL RESPONSES TO PHQ9 QUESTIONS 1 & 2: 0
SUM OF ALL RESPONSES TO PHQ QUESTIONS 1-9: 0
SUM OF ALL RESPONSES TO PHQ QUESTIONS 1-9: 0
1. LITTLE INTEREST OR PLEASURE IN DOING THINGS: 0
SUM OF ALL RESPONSES TO PHQ QUESTIONS 1-9: 0
2. FEELING DOWN, DEPRESSED OR HOPELESS: 0
SUM OF ALL RESPONSES TO PHQ QUESTIONS 1-9: 0

## 2023-03-15 ASSESSMENT — ENCOUNTER SYMPTOMS
HEMOPTYSIS: 0
STOOL DESCRIPTION: FORMED
PAIN LOCATION - PAIN QUALITY: ACHY

## 2023-03-15 NOTE — PROGRESS NOTES
tablet, Take 1 tablet by mouth daily TAKE 1 TABLET DAILY, Disp: 90 tablet, Rfl: 3    spironolactone (ALDACTONE) 25 MG tablet, Take 1 tablet by mouth daily, Disp: 90 tablet, Rfl: 3    CPAP Machine MISC, by Other route, Disp: , Rfl:     simvastatin (ZOCOR) 40 MG tablet, Take 1 tablet by mouth daily, Disp: 90 tablet, Rfl: 3    gabapentin (NEURONTIN) 600 MG tablet, Take 1 tablet by mouth 2 times daily. , Disp: , Rfl:     cetirizine (ZYRTEC) 10 MG tablet, Take 10 mg by mouth daily as needed for Allergies or Rhinitis, Disp: , Rfl:     metFORMIN (GLUCOPHAGE) 500 MG tablet, Take 500 mg by mouth 2 times daily (with meals) TAKE 1 TABLET TWICE A DAY WITH A MEAL, Disp: , Rfl:     ciprofloxacin (CIPRO) 250 MG tablet, Take 1 tablet by mouth 2 times daily for 7 days, Disp: 14 tablet, Rfl: 0    Allergies   Allergen Reactions    Azithromycin Itching and Other (See Comments)     Skin dryness/peeling  Brand Name: Sunni Dean        Review of Systems   Constitutional:  Negative for chills and fever. HENT:  Negative for congestion, ear pain and sore throat. Respiratory:  Negative for cough, shortness of breath and wheezing. Cardiovascular:  Positive for leg swelling. Negative for chest pain and palpitations. Gastrointestinal:  Negative for abdominal pain, constipation, diarrhea, nausea and vomiting. Genitourinary:  Negative for dysuria and hematuria. Musculoskeletal:  Positive for arthralgias. Neurological:  Negative for dizziness, light-headedness and headaches. Objective:  BP 90/60 (Site: Left Upper Arm, Position: Sitting, Cuff Size: Large Adult)   Pulse 90   Temp 97.2 °F (36.2 °C) (Temporal)   Ht 6' 2\" (1.88 m)   Wt (!) 317 lb (143.8 kg)   SpO2 98%   BMI 40.70 kg/m²     Examination:  Physical Exam  Vitals and nursing note reviewed. Constitutional:       General: He is not in acute distress. Appearance: Normal appearance.    HENT:      Right Ear: Tympanic membrane, ear canal and external ear normal.      Left

## 2023-03-15 NOTE — TELEPHONE ENCOUNTER
From: Freedom Cunningham  To: Daniel Bharati  Sent: 3/15/2023 1:50 PM EDT  Subject: Bi Polar TURP    Jennifer, would you please ask Dr Garth Sparrow about all of the UTI and urinary problems that I have had, and continue to have since I had the Bi Polar TURP surgery that I had in 2019. I leak constantly and have to use 2 pads in my underwear, I have a very hard time giving a urine sample unless I drink a gallon of water prior to. I would like to see another urologist and do not want return to Dr Belia Antunez. I would appreciate a referral to who y'all would recommend. I don't want to wait 3 months. Thank you, please feel free to call me [phone] if you have any questions.

## 2023-03-16 ENCOUNTER — HOSPITAL ENCOUNTER (OUTPATIENT)
Dept: INFUSION THERAPY | Age: 68
Discharge: HOME | End: 2023-03-16

## 2023-03-16 ENCOUNTER — HOSPITAL ENCOUNTER (OUTPATIENT)
Dept: LAB | Age: 68
Discharge: HOME | End: 2023-03-16
Payer: MEDICARE

## 2023-03-16 VITALS
SYSTOLIC BLOOD PRESSURE: 111 MMHG | OXYGEN SATURATION: 95 % | DIASTOLIC BLOOD PRESSURE: 57 MMHG | HEART RATE: 86 BPM | RESPIRATION RATE: 16 BRPM | TEMPERATURE: 97.9 F

## 2023-03-16 DIAGNOSIS — E83.110 HEREDITARY HEMOCHROMATOSIS (HCC): Primary | ICD-10-CM

## 2023-03-16 DIAGNOSIS — Z90.79 S/P TURP: ICD-10-CM

## 2023-03-16 DIAGNOSIS — N40.1 BENIGN PROSTATIC HYPERPLASIA WITH LOWER URINARY TRACT SYMPTOMS, SYMPTOM DETAILS UNSPECIFIED: Primary | ICD-10-CM

## 2023-03-16 DIAGNOSIS — E83.110 HEREDITARY HEMOCHROMATOSIS (HCC): ICD-10-CM

## 2023-03-16 LAB — FERRITIN SERPL-MCNC: 45 NG/ML (ref 8–388)

## 2023-03-16 PROCEDURE — 36415 COLL VENOUS BLD VENIPUNCTURE: CPT

## 2023-03-16 PROCEDURE — 82728 ASSAY OF FERRITIN: CPT

## 2023-03-16 RX ORDER — 0.9 % SODIUM CHLORIDE 0.9 %
250 INTRAVENOUS SOLUTION INTRAVENOUS ONCE
OUTPATIENT
Start: 2023-03-16 | End: 2023-03-16

## 2023-03-16 RX ORDER — 0.9 % SODIUM CHLORIDE 0.9 %
500 INTRAVENOUS SOLUTION INTRAVENOUS ONCE
OUTPATIENT
Start: 2023-03-16 | End: 2023-03-16

## 2023-03-16 NOTE — PROGRESS NOTES
No phlebotomy required per treatment parameters. Patient not seen in infusion center. Aware of next appointment.

## 2023-03-18 DIAGNOSIS — N39.0 URINARY TRACT INFECTION WITHOUT HEMATURIA, SITE UNSPECIFIED: Primary | ICD-10-CM

## 2023-03-18 LAB
BACTERIA SPEC CULT: ABNORMAL
SERVICE CMNT-IMP: ABNORMAL

## 2023-03-18 RX ORDER — CIPROFLOXACIN 250 MG/1
250 TABLET, FILM COATED ORAL 2 TIMES DAILY
Qty: 14 TABLET | Refills: 0 | Status: SHIPPED | OUTPATIENT
Start: 2023-03-18 | End: 2023-03-25

## 2023-03-20 DIAGNOSIS — N30.01 ACUTE CYSTITIS WITH HEMATURIA: Primary | ICD-10-CM

## 2023-03-20 ASSESSMENT — ENCOUNTER SYMPTOMS
COUGH: 0
SORE THROAT: 0
DIARRHEA: 0
ABDOMINAL PAIN: 0
NAUSEA: 0
VOMITING: 0
SHORTNESS OF BREATH: 0
WHEEZING: 0
CONSTIPATION: 0

## 2023-03-31 ENCOUNTER — NURSE ONLY (OUTPATIENT)
Dept: INTERNAL MEDICINE CLINIC | Facility: CLINIC | Age: 68
End: 2023-03-31

## 2023-03-31 DIAGNOSIS — N39.0 URINARY TRACT INFECTION WITHOUT HEMATURIA, SITE UNSPECIFIED: Primary | ICD-10-CM

## 2023-03-31 DIAGNOSIS — I10 ESSENTIAL HYPERTENSION: ICD-10-CM

## 2023-03-31 DIAGNOSIS — R82.90 ABNORMAL FINDING ON URINALYSIS: ICD-10-CM

## 2023-03-31 DIAGNOSIS — N39.0 URINARY TRACT INFECTION WITHOUT HEMATURIA, SITE UNSPECIFIED: ICD-10-CM

## 2023-03-31 RX ORDER — HYDROCHLOROTHIAZIDE 25 MG/1
25 TABLET ORAL DAILY
Qty: 90 TABLET | Refills: 1 | Status: SHIPPED | OUTPATIENT
Start: 2023-03-31

## 2023-03-31 RX ORDER — HYDROCHLOROTHIAZIDE 25 MG/1
TABLET ORAL
Qty: 90 TABLET | Refills: 0 | OUTPATIENT
Start: 2023-03-31

## 2023-03-31 NOTE — TELEPHONE ENCOUNTER
Pt is requesting a refill of Metformin Hydrochloride 500 mg. Please send to [facility]. Pt is requesting a 90 day supply for refills.

## 2023-04-03 DIAGNOSIS — B96.5 URINARY TRACT INFECTION DUE TO PSEUDOMONAS AERUGINOSA: Primary | ICD-10-CM

## 2023-04-03 DIAGNOSIS — N39.0 URINARY TRACT INFECTION DUE TO PSEUDOMONAS AERUGINOSA: Primary | ICD-10-CM

## 2023-04-03 PROBLEM — E11.9 DIABETES MELLITUS WITHOUT COMPLICATION (HCC): Status: RESOLVED | Noted: 2019-10-02 | Resolved: 2021-07-27

## 2023-04-03 LAB
BACTERIA SPEC CULT: ABNORMAL
BILIRUBIN, URINE, POC: NEGATIVE
BLOOD URINE, POC: ABNORMAL
GLUCOSE URINE, POC: NEGATIVE
KETONES, URINE, POC: NEGATIVE
LEUKOCYTE ESTERASE, URINE, POC: ABNORMAL
NITRITE, URINE, POC: POSITIVE
PH, URINE, POC: 5.5 (ref 4.6–8)
PROTEIN,URINE, POC: NEGATIVE
SERVICE CMNT-IMP: ABNORMAL
SPECIFIC GRAVITY, URINE, POC: 1.02 (ref 1–1.03)
URINALYSIS CLARITY, POC: CLEAR
URINALYSIS COLOR, POC: YELLOW
UROBILINOGEN, POC: NORMAL

## 2023-04-03 RX ORDER — CIPROFLOXACIN 500 MG/1
500 TABLET, FILM COATED ORAL 2 TIMES DAILY
Qty: 14 TABLET | Refills: 0 | Status: SHIPPED | OUTPATIENT
Start: 2023-04-03 | End: 2023-04-10

## 2023-04-03 NOTE — TELEPHONE ENCOUNTER
Called and advised patient per Gus Biswas NP, that he has another UTI. Same bacteria as last time. Based on susceptibility report, can be treated with Cipro. Sending in a larger dose this time. He needs to call and schedule a visit with urology given recurrent infections. Patient voiced understanding and states he has the number for Urology and will call them to schedule.

## 2023-04-05 ENCOUNTER — TELEPHONE (OUTPATIENT)
Dept: INTERNAL MEDICINE CLINIC | Facility: CLINIC | Age: 68
End: 2023-04-05

## 2023-04-05 DIAGNOSIS — E11.65 TYPE 2 DIABETES MELLITUS WITH HYPERGLYCEMIA, WITH LONG-TERM CURRENT USE OF INSULIN (HCC): Primary | ICD-10-CM

## 2023-04-05 DIAGNOSIS — Z79.4 TYPE 2 DIABETES MELLITUS WITH HYPERGLYCEMIA, WITH LONG-TERM CURRENT USE OF INSULIN (HCC): Primary | ICD-10-CM

## 2023-04-05 NOTE — TELEPHONE ENCOUNTER
Pt came in the office to inform us that the ozempic 2 mg/15 ml is out of stock at his pharmacy because it is being discontinued. Pt is requesting the 3 ml pen instead. Please send to [facility] [address].

## 2023-04-05 NOTE — TELEPHONE ENCOUNTER
Ozempic 1.5 mg pens are being discontinued. Please send new RX (pended) for 3 mg pens. He is completely out of medication. Thank you.

## 2023-04-07 RX ORDER — SEMAGLUTIDE 0.68 MG/ML
0.5 INJECTION, SOLUTION SUBCUTANEOUS
Qty: 9 ML | Refills: 0 | Status: SHIPPED | OUTPATIENT
Start: 2023-04-07

## 2023-04-24 ENCOUNTER — NURSE ONLY (OUTPATIENT)
Dept: INTERNAL MEDICINE CLINIC | Facility: CLINIC | Age: 68
End: 2023-04-24
Payer: MEDICARE

## 2023-04-24 DIAGNOSIS — R82.90 ABNORMAL URINE FINDINGS: Primary | ICD-10-CM

## 2023-04-24 DIAGNOSIS — R82.90 ABNORMAL URINE FINDINGS: ICD-10-CM

## 2023-04-24 DIAGNOSIS — N39.0 URINARY TRACT INFECTION WITHOUT HEMATURIA, SITE UNSPECIFIED: ICD-10-CM

## 2023-04-24 LAB
BILIRUBIN, URINE, POC: NEGATIVE
BLOOD URINE, POC: NEGATIVE
GLUCOSE URINE, POC: NEGATIVE
KETONES, URINE, POC: NEGATIVE
LEUKOCYTE ESTERASE, URINE, POC: ABNORMAL
NITRITE, URINE, POC: POSITIVE
PH, URINE, POC: 6.5 (ref 4.6–8)
PROTEIN,URINE, POC: NEGATIVE
SPECIFIC GRAVITY, URINE, POC: 1.01 (ref 1–1.03)
URINALYSIS CLARITY, POC: ABNORMAL
URINALYSIS COLOR, POC: YELLOW
UROBILINOGEN, POC: NORMAL

## 2023-04-24 PROCEDURE — 81003 URINALYSIS AUTO W/O SCOPE: CPT | Performed by: NURSE PRACTITIONER

## 2023-04-24 NOTE — PERIOP NOTE
Preop department called to notify patient of arrival time for scheduled procedure. Instructions given to   - Arrive at [address]. - Remain NPO after midnight, unless otherwise indicated, including gum, mints, and ice chips. - Have a responsible adult to drive patient to the hospital, stay during surgery, and patient will need supervision 24 hours after anesthesia. - Use antibacterial soap in shower the night before surgery and on the morning of surgery.        Was patient contacted: Yes   Voicemail left: No  Numbers contacted: [phone]   Arrival time: 0600

## 2023-04-25 ENCOUNTER — APPOINTMENT (OUTPATIENT)
Dept: GENERAL RADIOLOGY | Age: 68
End: 2023-04-25
Attending: ANESTHESIOLOGY
Payer: MEDICARE

## 2023-04-25 ENCOUNTER — HOSPITAL ENCOUNTER (OUTPATIENT)
Age: 68
Setting detail: OUTPATIENT SURGERY
Discharge: HOME | End: 2023-04-25
Attending: ANESTHESIOLOGY | Admitting: ANESTHESIOLOGY
Payer: MEDICARE

## 2023-04-25 VITALS
TEMPERATURE: 97.7 F | WEIGHT: 315 LBS | DIASTOLIC BLOOD PRESSURE: 64 MMHG | RESPIRATION RATE: 20 BRPM | SYSTOLIC BLOOD PRESSURE: 111 MMHG | HEIGHT: 74 IN | OXYGEN SATURATION: 96 % | HEART RATE: 94 BPM | BODY MASS INDEX: 40.43 KG/M2

## 2023-04-25 PROCEDURE — 3600000015 HC SURGERY LEVEL 5 ADDTL 15MIN: Performed by: ANESTHESIOLOGY

## 2023-04-25 PROCEDURE — 7100000010 HC PHASE II RECOVERY - FIRST 15 MIN: Performed by: ANESTHESIOLOGY

## 2023-04-25 PROCEDURE — 2500000003 HC RX 250 WO HCPCS: Performed by: ANESTHESIOLOGY

## 2023-04-25 PROCEDURE — 7100000000 HC PACU RECOVERY - FIRST 15 MIN: Performed by: ANESTHESIOLOGY

## 2023-04-25 PROCEDURE — 2709999900 HC NON-CHARGEABLE SUPPLY: Performed by: ANESTHESIOLOGY

## 2023-04-25 PROCEDURE — 6360000002 HC RX W HCPCS: Performed by: ANESTHESIOLOGY

## 2023-04-25 PROCEDURE — 2720000010 HC SURG SUPPLY STERILE: Performed by: ANESTHESIOLOGY

## 2023-04-25 PROCEDURE — 3600000005 HC SURGERY LEVEL 5 BASE: Performed by: ANESTHESIOLOGY

## 2023-04-25 PROCEDURE — 7100000001 HC PACU RECOVERY - ADDTL 15 MIN: Performed by: ANESTHESIOLOGY

## 2023-04-25 RX ORDER — MIDAZOLAM HYDROCHLORIDE 1 MG/ML
INJECTION INTRAMUSCULAR; INTRAVENOUS PRN
Status: DISCONTINUED | OUTPATIENT
Start: 2023-04-25 | End: 2023-04-25 | Stop reason: HOSPADM

## 2023-04-25 RX ORDER — LIDOCAINE HYDROCHLORIDE AND EPINEPHRINE BITARTRATE 20; .01 MG/ML; MG/ML
INJECTION, SOLUTION SUBCUTANEOUS PRN
Status: DISCONTINUED | OUTPATIENT
Start: 2023-04-25 | End: 2023-04-25 | Stop reason: HOSPADM

## 2023-04-25 ASSESSMENT — PAIN - FUNCTIONAL ASSESSMENT
PAIN_FUNCTIONAL_ASSESSMENT: 0-10
PAIN_FUNCTIONAL_ASSESSMENT: 0-10

## 2023-04-25 NOTE — PERIOP NOTE
Dr. Ovalle Else notified of possible UTI and area to right lower leg, no new orders given, will proceed to OR.

## 2023-04-25 NOTE — BRIEF OP NOTE
Brief Postoperative Note      Patient: Cindy Rivas  YOB: 1955  MRN: [id number]    Date of Procedure: 4/25/2023    Pre-Op Diagnosis: Right knee pain, unspecified chronicity [M25.561]    Post-Op Diagnosis: same       Procedure(s):  RIGHT KNEE COOLIEF    Surgeon(s):  Orly Lester MD    Assistant:  none    Anesthesia: Local    Estimated Blood Loss (mL): none    Complications: None    Specimens:   * No specimens in log *    Implants:  * No implants in log *      Drains: none    Findings: none    Electronically signed by Marcela Urban MD on 4/25/2023 at 8:49 AMBrief Postoperative Note      Patient: Cindy Rivas  YOB: 1955  MRN: [id number]    Date of Procedure: 4/25/2023    Pre-Op Diagnosis Codes:     * Right knee pain, unspecified chronicity [M25.561]    Post-Op Diagnosis: same       Procedure(s):  RIGHT KNEE COOLIEF    Surgeon(s):  Orly Lester MD    Assistant:  * No surgical staff found *    Anesthesia: Local    Estimated Blood Loss (mL): Minimal    Complications: None    Specimens:   none    Implants:  none    Drains:   [REMOVED] Chest Tube Anterior;Right Midclavicular 1 (Removed)       [REMOVED] Chest Tube Right;Posterior 1 (Removed)       [REMOVED] Chest Tube Right; Anterior 2 (Removed)       Findings: none      Electronically signed by Marcela Urban MD on 4/25/2023 at 8:49 AM

## 2023-04-25 NOTE — OP NOTE
[facility]  OPERATIVE REPORT    Name:  Analilia Casper  MR#:  [id number]  :  1955  ACCOUNT #:  [de-identified]  DATE OF SERVICE:  2023    PREOPERATIVE DIAGNOSIS:  Degenerative joint disease of the right knee with severe right knee pain. POSTOPERATIVE DIAGNOSIS:  Degenerative joint disease of the right knee with severe right knee pain. PROCEDURE PERFORMED:  1.  COOLIEF to the right superior medial genicular nerve. 2.  COOLIEF to the right superior lateral genicular nerve. 3.  COOLIEF to the right inferior medial genicular nerve. 4.  Fluoroscopy. 5.  Local with IV sedation. SURGEON:  Clare Van MD    ASSISTANT:  ***    ANESTHESIA:  Local with IV sedation. COMPLICATIONS:  None. SPECIMENS REMOVED:  ***. IMPLANTS:  ***. ESTIMATED BLOOD LOSS:  None. FLUIDS:  None. CONDITION:  Stable. INDICATIONS:  The patient is a 77-year-old gentleman with right knee pain. He presents today for the COOLIEF procedure to his right knee. He is not a good candidate for knee replacement due to his obesity and multiple medical problems and edema. PROCEDURE:  After informed consent was obtained, the patient was taken to the fluoroscopy suite and positioned supine. Monitors were applied and vital signs were stable. The right knee was prepped and draped in usual sterile fashion. A small skin wheal was made over the right superior medial aspect of the knee using 2% lidocaine and a 7.5-cm radiofrequency needle with a 10-mm active tip was advanced until contact was made with bone. The needle was advanced off the bone to a distance nursing home the diaphysis of the distal femur. Views were confirmed on AP and lateral fluoroscopy. Next, sensory stimulation at 50 Hz up to 3 volts produced pain and pressure in the right knee at about 1.5 volts. Motor stimulation at 2 Hz up to 3 volts produced no movement of the right leg or foot.   Next, 2 mL of 2% lidocaine were injected and

## 2023-04-25 NOTE — PROGRESS NOTES
Spiritual Care Visit. Initial visit. Patient was visited by [facility]. Entered by Yara Padron, Staff .  Davi, Le.

## 2023-04-27 ENCOUNTER — OFFICE VISIT (OUTPATIENT)
Dept: INTERNAL MEDICINE CLINIC | Facility: CLINIC | Age: 68
End: 2023-04-27
Payer: MEDICARE

## 2023-04-27 VITALS
WEIGHT: 315 LBS | DIASTOLIC BLOOD PRESSURE: 70 MMHG | BODY MASS INDEX: 40.43 KG/M2 | HEIGHT: 74 IN | OXYGEN SATURATION: 97 % | TEMPERATURE: 97.2 F | HEART RATE: 91 BPM | SYSTOLIC BLOOD PRESSURE: 115 MMHG

## 2023-04-27 DIAGNOSIS — N30.01 ACUTE CYSTITIS WITH HEMATURIA: Primary | ICD-10-CM

## 2023-04-27 DIAGNOSIS — L97.811 SKIN ULCER OF RIGHT PRETIBIAL REGION LIMITED TO BREAKDOWN OF SKIN (HCC): ICD-10-CM

## 2023-04-27 LAB
BACTERIA SPEC CULT: ABNORMAL
SERVICE CMNT-IMP: ABNORMAL

## 2023-04-27 PROCEDURE — 3017F COLORECTAL CA SCREEN DOC REV: CPT | Performed by: NURSE PRACTITIONER

## 2023-04-27 PROCEDURE — G8427 DOCREV CUR MEDS BY ELIG CLIN: HCPCS | Performed by: NURSE PRACTITIONER

## 2023-04-27 PROCEDURE — 99214 OFFICE O/P EST MOD 30 MIN: CPT | Performed by: NURSE PRACTITIONER

## 2023-04-27 PROCEDURE — 1123F ACP DISCUSS/DSCN MKR DOCD: CPT | Performed by: NURSE PRACTITIONER

## 2023-04-27 PROCEDURE — 1036F TOBACCO NON-USER: CPT | Performed by: NURSE PRACTITIONER

## 2023-04-27 PROCEDURE — G8417 CALC BMI ABV UP PARAM F/U: HCPCS | Performed by: NURSE PRACTITIONER

## 2023-04-27 PROCEDURE — 3074F SYST BP LT 130 MM HG: CPT | Performed by: NURSE PRACTITIONER

## 2023-04-27 PROCEDURE — 3078F DIAST BP <80 MM HG: CPT | Performed by: NURSE PRACTITIONER

## 2023-04-27 RX ORDER — CEFUROXIME AXETIL 500 MG/1
500 TABLET ORAL 2 TIMES DAILY
Qty: 20 TABLET | Refills: 0 | Status: SHIPPED | OUTPATIENT
Start: 2023-04-27 | End: 2023-05-07

## 2023-04-27 ASSESSMENT — PATIENT HEALTH QUESTIONNAIRE - PHQ9
SUM OF ALL RESPONSES TO PHQ9 QUESTIONS 1 & 2: 0
SUM OF ALL RESPONSES TO PHQ QUESTIONS 1-9: 0
2. FEELING DOWN, DEPRESSED OR HOPELESS: 0
1. LITTLE INTEREST OR PLEASURE IN DOING THINGS: 0
SUM OF ALL RESPONSES TO PHQ QUESTIONS 1-9: 0

## 2023-04-27 ASSESSMENT — ENCOUNTER SYMPTOMS
BACK PAIN: 0
EYE DISCHARGE: 0
ABDOMINAL DISTENTION: 0
COLOR CHANGE: 0
EYE ITCHING: 0
ABDOMINAL PAIN: 0
COUGH: 0
DIARRHEA: 0
SHORTNESS OF BREATH: 0
EYE REDNESS: 0
SINUS PAIN: 0
CONSTIPATION: 0
APNEA: 0
EYE PAIN: 0
NAUSEA: 0

## 2023-04-27 NOTE — PROGRESS NOTES
[unfilled]  [address] [place] [id number]      PROGRESS NOTE    SUBJECTIVE:   Cindy Rivas is a 77 y.o. male seen for a follow up visit regarding the following chief complaint:     Chief Complaint   Patient presents with    Leg Injury     Patient c/o ulcer on right shin since Monday evening. Dizziness     Patient states he had a dizzy episode in March and his BP was low. States he started cutting back on his carvedilol and dizziness has subsided. Leg Injury  Pertinent negatives include no abdominal pain, arthralgias, chest pain, chills, congestion, coughing, fatigue, fever, headaches, joint swelling, myalgias, nausea, neck pain, rash or weakness. Dizziness  Pertinent negatives include no abdominal pain, arthralgias, chest pain, chills, congestion, coughing, fatigue, fever, headaches, joint swelling, myalgias, nausea, neck pain, rash or weakness. Patient presents with concerns of ulcer on right shin since Monday. Having some clear drainage. Denies injury. He has had cellulitis in lower extremities before with hx of PAD. He is also diabetic. Last A1c 7.4% 2/23. Repeat urine abnormal, culture pending. He finished cipro recently for UTI. Following urology in 2-3 weeks. HTN: he is compliant with current treatment regimen, denies any intolerance. BP controlled today.     Current Outpatient Medications   Medication Sig Dispense Refill    cefUROXime (CEFTIN) 500 MG tablet Take 1 tablet by mouth 2 times daily for 10 days 20 tablet 0    Semaglutide,0.25 or 0.5MG/DOS, (OZEMPIC, 0.25 OR 0.5 MG/DOSE,) 2 MG/3ML SOPN Inject 0.5 mg into the skin every 7 days 9 mL 5    Omega-3 Fatty Acids (FISH OIL PO) Take by mouth      Probiotic Product (PROBIOTIC PO) Take by mouth      metFORMIN (GLUCOPHAGE) 500 MG tablet Take 1 tablet by mouth 2 times daily (with meals) 180 tablet 1    hydroCHLOROthiazide (HYDRODIURIL) 25 MG tablet Take 1 tablet by mouth daily 90 tablet 1    OZEMPIC, 0.25 OR

## 2023-05-12 ENCOUNTER — TELEPHONE (OUTPATIENT)
Dept: INTERNAL MEDICINE CLINIC | Facility: CLINIC | Age: 68
End: 2023-05-12

## 2023-05-12 DIAGNOSIS — E11.65 TYPE 2 DIABETES MELLITUS WITH HYPERGLYCEMIA, WITHOUT LONG-TERM CURRENT USE OF INSULIN (HCC): ICD-10-CM

## 2023-05-12 DIAGNOSIS — E11.65 TYPE 2 DIABETES MELLITUS WITH HYPERGLYCEMIA, WITH LONG-TERM CURRENT USE OF INSULIN (HCC): ICD-10-CM

## 2023-05-12 DIAGNOSIS — Z79.4 TYPE 2 DIABETES MELLITUS WITH HYPERGLYCEMIA, WITH LONG-TERM CURRENT USE OF INSULIN (HCC): ICD-10-CM

## 2023-05-12 RX ORDER — INSULIN GLARGINE 100 [IU]/ML
25 INJECTION, SOLUTION SUBCUTANEOUS NIGHTLY
Qty: 7.5 ML | Refills: 2 | Status: SHIPPED | OUTPATIENT
Start: 2023-05-12

## 2023-05-12 RX ORDER — SEMAGLUTIDE 0.68 MG/ML
0.5 INJECTION, SOLUTION SUBCUTANEOUS
Qty: 9 ML | Refills: 5 | Status: SHIPPED | OUTPATIENT
Start: 2023-05-12

## 2023-05-12 NOTE — TELEPHONE ENCOUNTER
Called patient to notify he should have refills from prescription sent in April to [facility]. Patient states he had to get that prescription transferred to [facility] due to availability. Can a prescription for Ozempic and Lantus (added this one before call ended) [facility]?

## 2023-05-12 NOTE — TELEPHONE ENCOUNTER
Patient requesting refill on Lantus 100 units/mL.  Send to [facility] on the [facility]

## 2023-05-15 ENCOUNTER — OFFICE VISIT (OUTPATIENT)
Dept: UROLOGY | Age: 68
End: 2023-05-15
Payer: MEDICARE

## 2023-05-15 DIAGNOSIS — N40.1 BENIGN PROSTATIC HYPERPLASIA WITH LOWER URINARY TRACT SYMPTOMS, SYMPTOM DETAILS UNSPECIFIED: Primary | ICD-10-CM

## 2023-05-15 DIAGNOSIS — R31.29 MICROSCOPIC HEMATURIA: ICD-10-CM

## 2023-05-15 DIAGNOSIS — N52.01 ERECTILE DYSFUNCTION DUE TO ARTERIAL INSUFFICIENCY: ICD-10-CM

## 2023-05-15 LAB — PVR, POC: 205 CC

## 2023-05-15 PROCEDURE — 99204 OFFICE O/P NEW MOD 45 MIN: CPT | Performed by: UROLOGY

## 2023-05-15 PROCEDURE — 3017F COLORECTAL CA SCREEN DOC REV: CPT | Performed by: UROLOGY

## 2023-05-15 PROCEDURE — 1036F TOBACCO NON-USER: CPT | Performed by: UROLOGY

## 2023-05-15 PROCEDURE — 51798 US URINE CAPACITY MEASURE: CPT | Performed by: UROLOGY

## 2023-05-15 PROCEDURE — 1123F ACP DISCUSS/DSCN MKR DOCD: CPT | Performed by: UROLOGY

## 2023-05-15 PROCEDURE — G8427 DOCREV CUR MEDS BY ELIG CLIN: HCPCS | Performed by: UROLOGY

## 2023-05-15 PROCEDURE — G8417 CALC BMI ABV UP PARAM F/U: HCPCS | Performed by: UROLOGY

## 2023-05-15 RX ORDER — TAMSULOSIN HYDROCHLORIDE 0.4 MG/1
0.4 CAPSULE ORAL
Qty: 90 CAPSULE | Refills: 3 | Status: SHIPPED | OUTPATIENT
Start: 2023-05-15

## 2023-05-15 RX ORDER — SILDENAFIL CITRATE 20 MG/1
20-100 TABLET ORAL PRN
Qty: 50 TABLET | Refills: 6 | Status: SHIPPED | OUTPATIENT
Start: 2023-05-15

## 2023-05-15 ASSESSMENT — ENCOUNTER SYMPTOMS
SKIN LESIONS: 0
ABDOMINAL PAIN: 0
VOMITING: 0
INDIGESTION: 0
WHEEZING: 0
BACK PAIN: 0
SHORTNESS OF BREATH: 0
BLOOD IN STOOL: 0
EYE PAIN: 0
COUGH: 0
HEARTBURN: 0
CONSTIPATION: 0
NAUSEA: 0
DIARRHEA: 0
EYE DISCHARGE: 0

## 2023-05-15 NOTE — PROGRESS NOTES
malaise/fatigue, headaches and weight loss. Skin:  Negative for skin lesions, rash and itching. Eyes:  Negative for visual disturbance, eye pain and eye discharge. ENT:  Negative for difficulty articulating words, pain swallowing, high frequency hearing loss and dry mouth. Respiratory:  Negative for cough, blood in sputum, shortness of breath and wheezing. Cardiovascular:  Negative for chest pain, hypertension, irregular heartbeat, leg pain, leg swelling, regular rate and rhythm and varicose veins. GI:  Negative for nausea, vomiting, abdominal pain, blood in stool, constipation, diarrhea, indigestion and heartburn. Genitourinary: Positive for recurrent UTIs, slower stream, leakage w/ urge and erectile dysfunction. Negative for urinary burning, hematuria, flank pain, history of urolithiasis, nocturia, straining, urgency, frequent urination, incomplete emptying, testicular pain, sexually transmitted disease, discharge and urethral stricture. Musculoskeletal:  Negative for back pain, bone pain, arthralgias, tenderness, muscle weakness and neck pain. Neurological:  Negative for dizziness, focal weakness, numbness, seizures and tremors. Psychological:  Negative for depression and psychiatric problem. Endocrine:  Negative for cold intolerance, thirst, excessive urination, fatigue and heat intolerance. Hem/Lymphatic:  Negative for easy bleeding, easy bruising and frequent infections.     Urinalysis  UA - Dipstick  Results for orders placed or performed in visit on 04/24/23   AMB POC URINALYSIS DIP STICK AUTO W/O MICRO   Result Value Ref Range    Color, Urine, POC Yellow     Clarity, Urine, POC Slightly Cloudy     Glucose, Urine, POC Negative Negative    Bilirubin, Urine, POC Negative Negative    Ketones, Urine, POC Negative Negative    Specific Gravity, Urine, POC 1.015 1.001 - 1.035    Blood, Urine, POC Negative Negative    pH, Urine, POC 6.5 4.6 - 8.0    Protein, Urine, POC Negative Negative    Urobilinogen,

## 2023-05-23 ENCOUNTER — HOSPITAL ENCOUNTER (OUTPATIENT)
Dept: CT IMAGING | Age: 68
Discharge: HOME | End: 2023-05-26
Payer: MEDICARE

## 2023-05-23 DIAGNOSIS — R31.29 MICROSCOPIC HEMATURIA: ICD-10-CM

## 2023-05-23 LAB — CREAT BLD-MCNC: 0.86 MG/DL (ref 0.8–1.5)

## 2023-05-23 PROCEDURE — 74178 CT ABD&PLV WO CNTR FLWD CNTR: CPT

## 2023-05-23 PROCEDURE — 82565 ASSAY OF CREATININE: CPT

## 2023-05-23 PROCEDURE — 6360000004 HC RX CONTRAST MEDICATION: Performed by: UROLOGY

## 2023-05-23 RX ADMIN — IOPAMIDOL 100 ML: 755 INJECTION, SOLUTION INTRAVENOUS at 13:15

## 2023-06-16 ENCOUNTER — HOSPITAL ENCOUNTER (OUTPATIENT)
Dept: LAB | Age: 68
Discharge: HOME | End: 2023-06-19

## 2023-06-16 DIAGNOSIS — Z79.4 TYPE 2 DIABETES MELLITUS WITH HYPERGLYCEMIA, WITH LONG-TERM CURRENT USE OF INSULIN (HCC): ICD-10-CM

## 2023-06-16 DIAGNOSIS — E78.2 MIXED DYSLIPIDEMIA: ICD-10-CM

## 2023-06-16 DIAGNOSIS — I10 ESSENTIAL HYPERTENSION: ICD-10-CM

## 2023-06-16 DIAGNOSIS — E11.65 TYPE 2 DIABETES MELLITUS WITH HYPERGLYCEMIA, WITH LONG-TERM CURRENT USE OF INSULIN (HCC): ICD-10-CM

## 2023-06-16 LAB
ALBUMIN SERPL-MCNC: 3.6 G/DL (ref 3.2–4.6)
ALBUMIN/GLOB SERPL: 1.1 (ref 0.4–1.6)
ALP SERPL-CCNC: 91 U/L (ref 50–136)
ALT SERPL-CCNC: 20 U/L (ref 12–65)
ANION GAP SERPL CALC-SCNC: 7 MMOL/L (ref 2–11)
AST SERPL-CCNC: 17 U/L (ref 15–37)
BASOPHILS # BLD: 0 K/UL (ref 0–0.2)
BASOPHILS NFR BLD: 0 % (ref 0–2)
BILIRUB SERPL-MCNC: 0.7 MG/DL (ref 0.2–1.1)
BUN SERPL-MCNC: 13 MG/DL (ref 8–23)
CALCIUM SERPL-MCNC: 9.1 MG/DL (ref 8.3–10.4)
CHLORIDE SERPL-SCNC: 105 MMOL/L (ref 101–110)
CHOLEST SERPL-MCNC: 115 MG/DL
CO2 SERPL-SCNC: 23 MMOL/L (ref 21–32)
CREAT SERPL-MCNC: 1 MG/DL (ref 0.8–1.5)
CREAT UR-MCNC: 169 MG/DL
DIFFERENTIAL METHOD BLD: ABNORMAL
EOSINOPHIL # BLD: 0.3 K/UL (ref 0–0.8)
EOSINOPHIL NFR BLD: 3 % (ref 0.5–7.8)
ERYTHROCYTE [DISTWIDTH] IN BLOOD BY AUTOMATED COUNT: 14.3 % (ref 11.9–14.6)
EST. AVERAGE GLUCOSE BLD GHB EST-MCNC: 140 MG/DL
GLOBULIN SER CALC-MCNC: 3.3 G/DL (ref 2.8–4.5)
GLUCOSE SERPL-MCNC: 128 MG/DL (ref 65–100)
HBA1C MFR BLD: 6.5 % (ref 4.8–5.6)
HCT VFR BLD AUTO: 35 % (ref 41.1–50.3)
HDLC SERPL-MCNC: 36 MG/DL (ref 40–60)
HDLC SERPL: 3.2
HGB BLD-MCNC: 10.9 G/DL (ref 13.6–17.2)
IMM GRANULOCYTES # BLD AUTO: 0.1 K/UL (ref 0–0.5)
IMM GRANULOCYTES NFR BLD AUTO: 1 % (ref 0–5)
LDLC SERPL CALC-MCNC: 56.8 MG/DL
LYMPHOCYTES # BLD: 0.9 K/UL (ref 0.5–4.6)
LYMPHOCYTES NFR BLD: 12 % (ref 13–44)
MCH RBC QN AUTO: 27.5 PG (ref 26.1–32.9)
MCHC RBC AUTO-ENTMCNC: 31.1 G/DL (ref 31.4–35)
MCV RBC AUTO: 88.2 FL (ref 82–102)
MICROALBUMIN UR-MCNC: 18.2 MG/DL
MICROALBUMIN/CREAT UR-RTO: 108 MG/G (ref 0–30)
MONOCYTES # BLD: 0.4 K/UL (ref 0.1–1.3)
MONOCYTES NFR BLD: 5 % (ref 4–12)
NEUTS SEG # BLD: 5.9 K/UL (ref 1.7–8.2)
NEUTS SEG NFR BLD: 79 % (ref 43–78)
NRBC # BLD: 0 K/UL (ref 0–0.2)
PLATELET # BLD AUTO: 225 K/UL (ref 150–450)
PMV BLD AUTO: 10.3 FL (ref 9.4–12.3)
POTASSIUM SERPL-SCNC: 4.8 MMOL/L (ref 3.5–5.1)
PROT SERPL-MCNC: 6.9 G/DL (ref 6.3–8.2)
RBC # BLD AUTO: 3.97 M/UL (ref 4.23–5.6)
SODIUM SERPL-SCNC: 135 MMOL/L (ref 133–143)
TRIGL SERPL-MCNC: 111 MG/DL (ref 35–150)
VLDLC SERPL CALC-MCNC: 22.2 MG/DL (ref 6–23)
WBC # BLD AUTO: 7.5 K/UL (ref 4.3–11.1)

## 2023-06-21 DIAGNOSIS — E83.110 HEREDITARY HEMOCHROMATOSIS (HCC): Primary | ICD-10-CM

## 2023-06-22 ENCOUNTER — HOSPITAL ENCOUNTER (OUTPATIENT)
Dept: INFUSION THERAPY | Age: 68
Discharge: HOME | End: 2023-06-22

## 2023-06-22 ENCOUNTER — HOSPITAL ENCOUNTER (OUTPATIENT)
Dept: LAB | Age: 68
Discharge: HOME | End: 2023-06-22
Payer: MEDICARE

## 2023-06-22 ENCOUNTER — OFFICE VISIT (OUTPATIENT)
Dept: ONCOLOGY | Age: 68
End: 2023-06-22
Payer: MEDICARE

## 2023-06-22 ENCOUNTER — TELEPHONE (OUTPATIENT)
Dept: INTERNAL MEDICINE CLINIC | Facility: CLINIC | Age: 68
End: 2023-06-22

## 2023-06-22 ENCOUNTER — OFFICE VISIT (OUTPATIENT)
Dept: INTERNAL MEDICINE CLINIC | Facility: CLINIC | Age: 68
End: 2023-06-22
Payer: MEDICARE

## 2023-06-22 VITALS
HEART RATE: 91 BPM | OXYGEN SATURATION: 97 % | DIASTOLIC BLOOD PRESSURE: 60 MMHG | HEIGHT: 74 IN | TEMPERATURE: 97.2 F | SYSTOLIC BLOOD PRESSURE: 100 MMHG | WEIGHT: 315 LBS | BODY MASS INDEX: 40.43 KG/M2

## 2023-06-22 VITALS
SYSTOLIC BLOOD PRESSURE: 94 MMHG | TEMPERATURE: 97.8 F | DIASTOLIC BLOOD PRESSURE: 63 MMHG | OXYGEN SATURATION: 97 % | HEART RATE: 92 BPM | BODY MASS INDEX: 41.75 KG/M2 | WEIGHT: 315 LBS | RESPIRATION RATE: 16 BRPM | HEIGHT: 73 IN

## 2023-06-22 DIAGNOSIS — E11.51 CONTROLLED TYPE 2 DIABETES MELLITUS WITH PERIPHERAL CIRCULATORY DISORDER (HCC): ICD-10-CM

## 2023-06-22 DIAGNOSIS — J93.83 SPONTANEOUS PNEUMOTHORAX: ICD-10-CM

## 2023-06-22 DIAGNOSIS — E83.110 HEREDITARY HEMOCHROMATOSIS (HCC): ICD-10-CM

## 2023-06-22 DIAGNOSIS — I48.21 PERMANENT ATRIAL FIBRILLATION (HCC): Chronic | ICD-10-CM

## 2023-06-22 DIAGNOSIS — D50.8 OTHER IRON DEFICIENCY ANEMIA: ICD-10-CM

## 2023-06-22 DIAGNOSIS — E78.49 OTHER HYPERLIPIDEMIA: ICD-10-CM

## 2023-06-22 DIAGNOSIS — R89.7 ABNORMAL HISTOLOGICAL FINDINGS IN SPECIMENS FROM OTHER ORGANS, SYSTEMS AND TISSUES: ICD-10-CM

## 2023-06-22 DIAGNOSIS — D64.9 MILD ANEMIA: ICD-10-CM

## 2023-06-22 DIAGNOSIS — E11.51 CONTROLLED TYPE 2 DIABETES MELLITUS WITH PERIPHERAL CIRCULATORY DISORDER (HCC): Primary | ICD-10-CM

## 2023-06-22 DIAGNOSIS — D50.8 OTHER IRON DEFICIENCY ANEMIA: Primary | ICD-10-CM

## 2023-06-22 DIAGNOSIS — E55.9 VITAMIN D DEFICIENCY: ICD-10-CM

## 2023-06-22 LAB
25(OH)D3 SERPL-MCNC: 44.3 NG/ML (ref 30–100)
ALBUMIN SERPL-MCNC: 3.4 G/DL (ref 3.2–4.6)
ALBUMIN/GLOB SERPL: 0.9 (ref 0.4–1.6)
ALP SERPL-CCNC: 84 U/L (ref 50–136)
ALT SERPL-CCNC: 21 U/L (ref 12–65)
ANION GAP SERPL CALC-SCNC: 3 MMOL/L (ref 2–11)
AST SERPL-CCNC: 14 U/L (ref 15–37)
BASOPHILS # BLD: 0 K/UL (ref 0–0.2)
BASOPHILS NFR BLD: 1 % (ref 0–2)
BILIRUB SERPL-MCNC: 0.7 MG/DL (ref 0.2–1.1)
BUN SERPL-MCNC: 28 MG/DL (ref 8–23)
CALCIUM SERPL-MCNC: 9.9 MG/DL (ref 8.3–10.4)
CHLORIDE SERPL-SCNC: 106 MMOL/L (ref 101–110)
CO2 SERPL-SCNC: 25 MMOL/L (ref 21–32)
CREAT SERPL-MCNC: 1.1 MG/DL (ref 0.8–1.5)
DIFFERENTIAL METHOD BLD: ABNORMAL
EOSINOPHIL # BLD: 0.4 K/UL (ref 0–0.8)
EOSINOPHIL NFR BLD: 5 % (ref 0.5–7.8)
ERYTHROCYTE [DISTWIDTH] IN BLOOD BY AUTOMATED COUNT: 14.7 % (ref 11.9–14.6)
FERRITIN SERPL-MCNC: 8 NG/ML (ref 8–388)
GLOBULIN SER CALC-MCNC: 3.9 G/DL (ref 2.8–4.5)
GLUCOSE SERPL-MCNC: 168 MG/DL (ref 65–100)
HCT VFR BLD AUTO: 31.4 % (ref 41.1–50.3)
HGB BLD-MCNC: 10 G/DL (ref 13.6–17.2)
IMM GRANULOCYTES # BLD AUTO: 0 K/UL (ref 0–0.5)
IMM GRANULOCYTES NFR BLD AUTO: 0 % (ref 0–5)
IRON SATN MFR SERPL: 6 %
IRON SERPL-MCNC: 25 UG/DL (ref 35–150)
LYMPHOCYTES # BLD: 1.2 K/UL (ref 0.5–4.6)
LYMPHOCYTES NFR BLD: 16 % (ref 13–44)
MCH RBC QN AUTO: 27.2 PG (ref 26.1–32.9)
MCHC RBC AUTO-ENTMCNC: 31.8 G/DL (ref 31.4–35)
MCV RBC AUTO: 85.3 FL (ref 82–102)
MONOCYTES # BLD: 0.4 K/UL (ref 0.1–1.3)
MONOCYTES NFR BLD: 6 % (ref 4–12)
NEUTS SEG # BLD: 5.4 K/UL (ref 1.7–8.2)
NEUTS SEG NFR BLD: 72 % (ref 43–78)
NRBC # BLD: 0 K/UL (ref 0–0.2)
PLATELET # BLD AUTO: 227 K/UL (ref 150–450)
PMV BLD AUTO: 9.6 FL (ref 9.4–12.3)
POTASSIUM SERPL-SCNC: 4.3 MMOL/L (ref 3.5–5.1)
PROT SERPL-MCNC: 7.3 G/DL (ref 6.3–8.2)
RBC # BLD AUTO: 3.68 M/UL (ref 4.23–5.6)
SODIUM SERPL-SCNC: 134 MMOL/L (ref 133–143)
TIBC SERPL-MCNC: 434 UG/DL (ref 250–450)
VIT B12 SERPL-MCNC: 318 PG/ML (ref 193–986)
WBC # BLD AUTO: 7.4 K/UL (ref 4.3–11.1)

## 2023-06-22 PROCEDURE — 1123F ACP DISCUSS/DSCN MKR DOCD: CPT | Performed by: INTERNAL MEDICINE

## 2023-06-22 PROCEDURE — G8417 CALC BMI ABV UP PARAM F/U: HCPCS | Performed by: INTERNAL MEDICINE

## 2023-06-22 PROCEDURE — G8427 DOCREV CUR MEDS BY ELIG CLIN: HCPCS | Performed by: INTERNAL MEDICINE

## 2023-06-22 PROCEDURE — 3017F COLORECTAL CA SCREEN DOC REV: CPT | Performed by: INTERNAL MEDICINE

## 2023-06-22 PROCEDURE — 83550 IRON BINDING TEST: CPT

## 2023-06-22 PROCEDURE — 1036F TOBACCO NON-USER: CPT | Performed by: INTERNAL MEDICINE

## 2023-06-22 PROCEDURE — 99215 OFFICE O/P EST HI 40 MIN: CPT | Performed by: INTERNAL MEDICINE

## 2023-06-22 PROCEDURE — 83540 ASSAY OF IRON: CPT

## 2023-06-22 PROCEDURE — 3078F DIAST BP <80 MM HG: CPT | Performed by: INTERNAL MEDICINE

## 2023-06-22 PROCEDURE — 99214 OFFICE O/P EST MOD 30 MIN: CPT | Performed by: INTERNAL MEDICINE

## 2023-06-22 PROCEDURE — 2022F DILAT RTA XM EVC RTNOPTHY: CPT | Performed by: INTERNAL MEDICINE

## 2023-06-22 PROCEDURE — 82306 VITAMIN D 25 HYDROXY: CPT

## 2023-06-22 PROCEDURE — 86334 IMMUNOFIX E-PHORESIS SERUM: CPT

## 2023-06-22 PROCEDURE — 80053 COMPREHEN METABOLIC PANEL: CPT

## 2023-06-22 PROCEDURE — 84165 PROTEIN E-PHORESIS SERUM: CPT

## 2023-06-22 PROCEDURE — 3074F SYST BP LT 130 MM HG: CPT | Performed by: INTERNAL MEDICINE

## 2023-06-22 PROCEDURE — 82784 ASSAY IGA/IGD/IGG/IGM EACH: CPT

## 2023-06-22 PROCEDURE — 82728 ASSAY OF FERRITIN: CPT

## 2023-06-22 PROCEDURE — 3044F HG A1C LEVEL LT 7.0%: CPT | Performed by: INTERNAL MEDICINE

## 2023-06-22 PROCEDURE — 36415 COLL VENOUS BLD VENIPUNCTURE: CPT

## 2023-06-22 PROCEDURE — 85025 COMPLETE CBC W/AUTO DIFF WBC: CPT

## 2023-06-22 PROCEDURE — 82607 VITAMIN B-12: CPT

## 2023-06-22 RX ORDER — SEMAGLUTIDE 1.34 MG/ML
1 INJECTION, SOLUTION SUBCUTANEOUS WEEKLY
Qty: 3 ML | Refills: 5 | Status: SHIPPED | OUTPATIENT
Start: 2023-06-22 | End: 2023-06-23 | Stop reason: SDUPTHER

## 2023-06-22 ASSESSMENT — ENCOUNTER SYMPTOMS
ANAL BLEEDING: 0
SHORTNESS OF BREATH: 0

## 2023-06-22 ASSESSMENT — PATIENT HEALTH QUESTIONNAIRE - PHQ9
SUM OF ALL RESPONSES TO PHQ9 QUESTIONS 1 & 2: 0
8. MOVING OR SPEAKING SO SLOWLY THAT OTHER PEOPLE COULD HAVE NOTICED. OR THE OPPOSITE, BEING SO FIGETY OR RESTLESS THAT YOU HAVE BEEN MOVING AROUND A LOT MORE THAN USUAL: 0
6. FEELING BAD ABOUT YOURSELF - OR THAT YOU ARE A FAILURE OR HAVE LET YOURSELF OR YOUR FAMILY DOWN: 0
SUM OF ALL RESPONSES TO PHQ QUESTIONS 1-9: 0
SUM OF ALL RESPONSES TO PHQ9 QUESTIONS 1 & 2: 0
5. POOR APPETITE OR OVEREATING: 0
SUM OF ALL RESPONSES TO PHQ QUESTIONS 1-9: 0
4. FEELING TIRED OR HAVING LITTLE ENERGY: 0
SUM OF ALL RESPONSES TO PHQ QUESTIONS 1-9: 0
9. THOUGHTS THAT YOU WOULD BE BETTER OFF DEAD, OR OF HURTING YOURSELF: 0
1. LITTLE INTEREST OR PLEASURE IN DOING THINGS: 0
2. FEELING DOWN, DEPRESSED OR HOPELESS: 0
SUM OF ALL RESPONSES TO PHQ QUESTIONS 1-9: 0
7. TROUBLE CONCENTRATING ON THINGS, SUCH AS READING THE NEWSPAPER OR WATCHING TELEVISION: 0
SUM OF ALL RESPONSES TO PHQ QUESTIONS 1-9: 0
3. TROUBLE FALLING OR STAYING ASLEEP: 0
1. LITTLE INTEREST OR PLEASURE IN DOING THINGS: 0
SUM OF ALL RESPONSES TO PHQ QUESTIONS 1-9: 0
2. FEELING DOWN, DEPRESSED OR HOPELESS: 0
SUM OF ALL RESPONSES TO PHQ QUESTIONS 1-9: 0
SUM OF ALL RESPONSES TO PHQ QUESTIONS 1-9: 0

## 2023-06-22 ASSESSMENT — ANXIETY QUESTIONNAIRES
7. FEELING AFRAID AS IF SOMETHING AWFUL MIGHT HAPPEN: 0
GAD7 TOTAL SCORE: 0
2. NOT BEING ABLE TO STOP OR CONTROL WORRYING: 0
1. FEELING NERVOUS, ANXIOUS, OR ON EDGE: 0
IF YOU CHECKED OFF ANY PROBLEMS ON THIS QUESTIONNAIRE, HOW DIFFICULT HAVE THESE PROBLEMS MADE IT FOR YOU TO DO YOUR WORK, TAKE CARE OF THINGS AT HOME, OR GET ALONG WITH OTHER PEOPLE: NOT DIFFICULT AT ALL
6. BECOMING EASILY ANNOYED OR IRRITABLE: 0
5. BEING SO RESTLESS THAT IT IS HARD TO SIT STILL: 0
4. TROUBLE RELAXING: 0
3. WORRYING TOO MUCH ABOUT DIFFERENT THINGS: 0

## 2023-06-22 NOTE — TELEPHONE ENCOUNTER
Pt called the office inform their PCP that they need their [place]empic [place] [facility] [address] [place] [facility] [address]. Please advise.

## 2023-06-22 NOTE — PATIENT INSTRUCTIONS
Ozempic dosing instructions. Start at 0.25 mg once weekly    After 4 weeks, increase the dose to 0.5 mg once weekly. If you are tolerating the injections after 4 weeks of 0.5 mg dose, you may increase the dose to 1 mg once weekly            Administer by subcutaneous injection into the abdomen, thigh, or upper arm at any time of day on the same day each week, with or without food. If changing the day of administration is necessary, allow ? 48 hours between 2 doses. Rotate injection sites weekly if injecting in the same area of the body. Do not mix with other products (administer as separate injections). Avoid adjacent injections if administering other agents in the same area of the body. Solution should be clear; do not use if particulate matter and coloration are seen. Warnings and Precautions  Pancreatitis: Has been reported in clinical trials. Discontinue promptly if pancreatitis is  suspected. Do not restart if pancreatitis is confirmed  Diabetic Retinopathy Complications: Has been reported in a clinical trial. Patients with a history  of diabetic retinopathy should be monitored   Never share an OZEMPIC® pen or any needle or injection between patients, even if the needle is changed   Hypoglycemia: Concomitant use with an insulin secretagogue or insulin may increase the risk of  hypoglycemia, including severe hypoglycemia. Reducing dose of insulin secretagogue or insulin  may be necessary   Acute Kidney Injury: Monitor renal function in patients with renal impairment reporting severe  adverse gastrointestinal reactions   Hypersensitivity Reactions: Serious hypersensitivity reactions (e.g., anaphylaxis and  angioedema) have been reported. Discontinue OZEMPIC® if suspected and promptly seek  medical advice . Acute Gallbladder Disease:  If cholelithiasis or cholecystitis are suspected, gallbladder studies  are indicated

## 2023-06-22 NOTE — PROGRESS NOTES
ASSESSMENT/PLAN:    Decrease Metformin to 500 mg daily  Increase Ozempic to 1 mg weekly. See patient instructions. Stool cards x 3  Hematology follow up of mild anemia and Hemochromatosis later today. Mild lightheadedness at times. Decreased Coreg with cardiology. No cough or congestion. Followed up with pulm. Urology follow up and cysto and eval.  Urination improved. On flomax daily. Advised not to increase due to lower blood pressure. Follow up in 3 months. Will plan for A1c in office at that time. Additional labs as needed. Very complex patient with multiple issues. He is here with his wife. I have attempted to answer all their questions to the best of my ability. On this date, 6/22/23, I have spent 30 minutes reviewing previous notes, test results and face to face with the patient discussing the diagnosis and importance of compliance with the treatment plan as well as documenting on the day of the visit. An electronic signature was used to authenticate this note. -- Marybel Gupta MD   There are no Patient Instructions on file for this visit. Evaluation and management of the chronic condition(s) delineated. No negative side effects reported. I have reviewed all the lab results. There are some abnormalities that are either expected or not critical to the patient's health, and are discussed in the office today and are addressed. Please refer to the above assessement and plan narrative and orders and follow up plan. Medication discussed and refilled as needed. Physical exam findings are stable unless otherwise indicated and this is addressed. The most recent lab work and imaging and consultant/urgent care visits and imaging are reviewed and discussed and considered during this visit encounter. Cristina Lino was seen today for 3 month follow-up, diabetes, other and leg injury.     Diagnoses and all orders for this visit:    Controlled type 2 diabetes mellitus with

## 2023-06-23 RX ORDER — SEMAGLUTIDE 1.34 MG/ML
1 INJECTION, SOLUTION SUBCUTANEOUS WEEKLY
Qty: 3 ML | Refills: 5 | Status: SHIPPED | OUTPATIENT
Start: 2023-06-23

## 2023-06-24 ENCOUNTER — PATIENT MESSAGE (OUTPATIENT)
Dept: INTERNAL MEDICINE CLINIC | Facility: CLINIC | Age: 68
End: 2023-06-24

## 2023-06-26 LAB
FECAL OCCULT BLOOD #2, POC: POSITIVE
FECAL OCCULT BLOOD #3, POC: POSITIVE
HEMOCCULT STL QL: POSITIVE
VALID INTERNAL CONTROL: YES

## 2023-06-27 ENCOUNTER — PATIENT MESSAGE (OUTPATIENT)
Dept: INTERNAL MEDICINE CLINIC | Facility: CLINIC | Age: 68
End: 2023-06-27

## 2023-06-27 DIAGNOSIS — D64.9 MILD ANEMIA: Primary | ICD-10-CM

## 2023-06-27 LAB
ALBUMIN SERPL ELPH-MCNC: 3.6 G/DL (ref 2.9–4.4)
ALBUMIN/GLOB SERPL: 1.2 (ref 0.7–1.7)
ALPHA1 GLOB SERPL ELPH-MCNC: 0.2 G/DL (ref 0–0.4)
ALPHA2 GLOB SERPL ELPH-MCNC: 0.7 G/DL (ref 0.4–1)
B-GLOBULIN SERPL ELPH-MCNC: 1 G/DL (ref 0.7–1.3)
GAMMA GLOB SERPL ELPH-MCNC: 1.1 G/DL (ref 0.4–1.8)
GLOBULIN SER-MCNC: 3.1 G/DL (ref 2.2–3.9)
HFE GENE MUT ANL BLD/T: NORMAL
IGA SERPL-MCNC: 317 MG/DL (ref 61–437)
IGG SERPL-MCNC: 1151 MG/DL (ref 603–1613)
IGM SERPL-MCNC: 51 MG/DL (ref 20–172)
INTERPRETATION SERPL IEP-IMP: NORMAL
M PROTEIN SERPL ELPH-MCNC: NORMAL G/DL
PROT SERPL-MCNC: 6.7 G/DL (ref 6–8.5)
REVIEWED BY: NORMAL

## 2023-06-28 DIAGNOSIS — D64.9 MILD ANEMIA: Primary | ICD-10-CM

## 2023-06-28 DIAGNOSIS — D64.9 MILD ANEMIA: ICD-10-CM

## 2023-06-28 LAB — HGB BLD-MCNC: 9.1 G/DL (ref 13.6–17.2)

## 2023-06-29 ENCOUNTER — NURSE ONLY (OUTPATIENT)
Dept: INTERNAL MEDICINE CLINIC | Facility: CLINIC | Age: 68
End: 2023-06-29
Payer: MEDICARE

## 2023-06-29 DIAGNOSIS — R82.90 ABNORMAL FINDING ON URINALYSIS: ICD-10-CM

## 2023-06-29 DIAGNOSIS — R35.0 URINARY FREQUENCY: Primary | ICD-10-CM

## 2023-06-29 DIAGNOSIS — R35.0 URINARY FREQUENCY: ICD-10-CM

## 2023-06-29 LAB
BILIRUBIN, URINE, POC: NEGATIVE
BLOOD URINE, POC: NORMAL
GLUCOSE URINE, POC: NEGATIVE
KETONES, URINE, POC: NEGATIVE
LEUKOCYTE ESTERASE, URINE, POC: NORMAL
NITRITE, URINE, POC: NEGATIVE
PH, URINE, POC: 5.5 (ref 4.6–8)
PROTEIN,URINE, POC: NORMAL
SPECIFIC GRAVITY, URINE, POC: 1.03 (ref 1–1.03)
URINALYSIS CLARITY, POC: NORMAL
URINALYSIS COLOR, POC: YELLOW
UROBILINOGEN, POC: NORMAL

## 2023-06-29 PROCEDURE — 81003 URINALYSIS AUTO W/O SCOPE: CPT | Performed by: INTERNAL MEDICINE

## 2023-07-02 LAB
BACTERIA SPEC CULT: NORMAL
SERVICE CMNT-IMP: NORMAL

## 2023-07-03 PROBLEM — K92.2 GASTROINTESTINAL HEMORRHAGE: Status: ACTIVE | Noted: 2023-07-03

## 2023-07-05 ENCOUNTER — TELEPHONE (OUTPATIENT)
Dept: ONCOLOGY | Age: 68
End: 2023-07-05

## 2023-07-06 ENCOUNTER — HOSPITAL ENCOUNTER (OUTPATIENT)
Dept: INFUSION THERAPY | Age: 68
Discharge: HOME | End: 2023-07-06
Payer: MEDICARE

## 2023-07-06 ENCOUNTER — HOSPITAL ENCOUNTER (OUTPATIENT)
Dept: LAB | Age: 68
End: 2023-07-06
Payer: MEDICARE

## 2023-07-06 VITALS
RESPIRATION RATE: 16 BRPM | OXYGEN SATURATION: 94 % | DIASTOLIC BLOOD PRESSURE: 70 MMHG | SYSTOLIC BLOOD PRESSURE: 122 MMHG | HEART RATE: 86 BPM | TEMPERATURE: 98 F

## 2023-07-06 DIAGNOSIS — K92.2 GASTROINTESTINAL HEMORRHAGE, UNSPECIFIED GASTROINTESTINAL HEMORRHAGE TYPE: ICD-10-CM

## 2023-07-06 DIAGNOSIS — I50.22 CHRONIC SYSTOLIC (CONGESTIVE) HEART FAILURE (HCC): ICD-10-CM

## 2023-07-06 DIAGNOSIS — I48.21 PERMANENT ATRIAL FIBRILLATION (HCC): Primary | ICD-10-CM

## 2023-07-06 DIAGNOSIS — I48.21 PERMANENT ATRIAL FIBRILLATION (HCC): ICD-10-CM

## 2023-07-06 DIAGNOSIS — I25.83 CORONARY ARTERY DISEASE DUE TO LIPID RICH PLAQUE: ICD-10-CM

## 2023-07-06 DIAGNOSIS — I25.10 CORONARY ARTERY DISEASE DUE TO LIPID RICH PLAQUE: ICD-10-CM

## 2023-07-06 DIAGNOSIS — E83.110 HEREDITARY HEMOCHROMATOSIS (HCC): ICD-10-CM

## 2023-07-06 LAB
BASOPHILS # BLD: 0 K/UL (ref 0–0.2)
BASOPHILS NFR BLD: 1 % (ref 0–2)
DIFFERENTIAL METHOD BLD: ABNORMAL
EOSINOPHIL # BLD: 0.2 K/UL (ref 0–0.8)
EOSINOPHIL NFR BLD: 2 % (ref 0.5–7.8)
ERYTHROCYTE [DISTWIDTH] IN BLOOD BY AUTOMATED COUNT: 15.3 % (ref 11.9–14.6)
HCT VFR BLD AUTO: 28.6 % (ref 41.1–50.3)
HGB BLD-MCNC: 8.8 G/DL (ref 13.6–17.2)
HISTORY CHECK: NORMAL
IMM GRANULOCYTES # BLD AUTO: 0.1 K/UL (ref 0–0.5)
IMM GRANULOCYTES NFR BLD AUTO: 2 % (ref 0–5)
LYMPHOCYTES # BLD: 1 K/UL (ref 0.5–4.6)
LYMPHOCYTES NFR BLD: 13 % (ref 13–44)
MCH RBC QN AUTO: 26.1 PG (ref 26.1–32.9)
MCHC RBC AUTO-ENTMCNC: 30.8 G/DL (ref 31.4–35)
MCV RBC AUTO: 84.9 FL (ref 82–102)
MONOCYTES # BLD: 0.6 K/UL (ref 0.1–1.3)
MONOCYTES NFR BLD: 8 % (ref 4–12)
NEUTS SEG # BLD: 6.1 K/UL (ref 1.7–8.2)
NEUTS SEG NFR BLD: 75 % (ref 43–78)
NRBC # BLD: 0 K/UL (ref 0–0.2)
PLATELET # BLD AUTO: 266 K/UL (ref 150–450)
PMV BLD AUTO: 9.5 FL (ref 9.4–12.3)
RBC # BLD AUTO: 3.37 M/UL (ref 4.23–5.6)
WBC # BLD AUTO: 8.1 K/UL (ref 4.3–11.1)

## 2023-07-06 PROCEDURE — 36415 COLL VENOUS BLD VENIPUNCTURE: CPT

## 2023-07-06 PROCEDURE — P9016 RBC LEUKOCYTES REDUCED: HCPCS

## 2023-07-06 PROCEDURE — 36430 TRANSFUSION BLD/BLD COMPNT: CPT

## 2023-07-06 PROCEDURE — 85025 COMPLETE CBC W/AUTO DIFF WBC: CPT

## 2023-07-06 PROCEDURE — 6360000002 HC RX W HCPCS: Performed by: INTERNAL MEDICINE

## 2023-07-06 PROCEDURE — 86923 COMPATIBILITY TEST ELECTRIC: CPT

## 2023-07-06 PROCEDURE — 86901 BLOOD TYPING SEROLOGIC RH(D): CPT

## 2023-07-06 PROCEDURE — 96374 THER/PROPH/DIAG INJ IV PUSH: CPT

## 2023-07-06 PROCEDURE — 86900 BLOOD TYPING SEROLOGIC ABO: CPT

## 2023-07-06 PROCEDURE — 6370000000 HC RX 637 (ALT 250 FOR IP): Performed by: INTERNAL MEDICINE

## 2023-07-06 PROCEDURE — 2580000003 HC RX 258: Performed by: INTERNAL MEDICINE

## 2023-07-06 PROCEDURE — 86850 RBC ANTIBODY SCREEN: CPT

## 2023-07-06 RX ORDER — ACETAMINOPHEN 325 MG/1
650 TABLET ORAL
Status: CANCELLED | OUTPATIENT
Start: 2023-07-06

## 2023-07-06 RX ORDER — SODIUM CHLORIDE 9 MG/ML
INJECTION, SOLUTION INTRAVENOUS CONTINUOUS
Status: CANCELLED | OUTPATIENT
Start: 2023-07-06

## 2023-07-06 RX ORDER — SODIUM CHLORIDE 0.9 % (FLUSH) 0.9 %
5-40 SYRINGE (ML) INJECTION PRN
Status: CANCELLED | OUTPATIENT
Start: 2023-07-06

## 2023-07-06 RX ORDER — EPINEPHRINE 1 MG/ML
0.3 INJECTION, SOLUTION, CONCENTRATE INTRAVENOUS PRN
Status: CANCELLED | OUTPATIENT
Start: 2023-07-06

## 2023-07-06 RX ORDER — FUROSEMIDE 10 MG/ML
20 INJECTION INTRAMUSCULAR; INTRAVENOUS ONCE
Status: CANCELLED | OUTPATIENT
Start: 2023-07-06 | End: 2023-07-06

## 2023-07-06 RX ORDER — DIPHENHYDRAMINE HYDROCHLORIDE 50 MG/ML
50 INJECTION INTRAMUSCULAR; INTRAVENOUS
Status: DISCONTINUED | OUTPATIENT
Start: 2023-07-06 | End: 2023-07-07 | Stop reason: HOSPADM

## 2023-07-06 RX ORDER — SODIUM CHLORIDE 0.9 % (FLUSH) 0.9 %
5-40 SYRINGE (ML) INJECTION PRN
Status: DISCONTINUED | OUTPATIENT
Start: 2023-07-06 | End: 2023-07-07 | Stop reason: HOSPADM

## 2023-07-06 RX ORDER — DIPHENHYDRAMINE HYDROCHLORIDE 50 MG/ML
50 INJECTION INTRAMUSCULAR; INTRAVENOUS
Status: CANCELLED | OUTPATIENT
Start: 2023-07-06

## 2023-07-06 RX ORDER — ACETAMINOPHEN 325 MG/1
650 TABLET ORAL ONCE
Status: COMPLETED | OUTPATIENT
Start: 2023-07-06 | End: 2023-07-06

## 2023-07-06 RX ORDER — ACETAMINOPHEN 325 MG/1
650 TABLET ORAL ONCE
Status: CANCELLED | OUTPATIENT
Start: 2023-07-06 | End: 2023-07-06

## 2023-07-06 RX ORDER — SODIUM CHLORIDE 9 MG/ML
20 INJECTION, SOLUTION INTRAVENOUS CONTINUOUS
Status: CANCELLED | OUTPATIENT
Start: 2023-07-06

## 2023-07-06 RX ORDER — DIPHENHYDRAMINE HYDROCHLORIDE 50 MG/ML
25 INJECTION INTRAMUSCULAR; INTRAVENOUS ONCE
Status: CANCELLED | OUTPATIENT
Start: 2023-07-06 | End: 2023-07-06

## 2023-07-06 RX ORDER — SODIUM CHLORIDE 9 MG/ML
25 INJECTION, SOLUTION INTRAVENOUS PRN
Status: CANCELLED | OUTPATIENT
Start: 2023-07-06

## 2023-07-06 RX ORDER — DIPHENHYDRAMINE HYDROCHLORIDE 50 MG/ML
25 INJECTION INTRAMUSCULAR; INTRAVENOUS ONCE
Status: COMPLETED | OUTPATIENT
Start: 2023-07-06 | End: 2023-07-06

## 2023-07-06 RX ORDER — ALBUTEROL SULFATE 90 UG/1
4 AEROSOL, METERED RESPIRATORY (INHALATION) PRN
Status: CANCELLED | OUTPATIENT
Start: 2023-07-06

## 2023-07-06 RX ORDER — SODIUM CHLORIDE 9 MG/ML
20 INJECTION, SOLUTION INTRAVENOUS CONTINUOUS
Status: DISCONTINUED | OUTPATIENT
Start: 2023-07-06 | End: 2023-07-07 | Stop reason: HOSPADM

## 2023-07-06 RX ORDER — FUROSEMIDE 10 MG/ML
20 INJECTION INTRAMUSCULAR; INTRAVENOUS ONCE
Status: DISCONTINUED | OUTPATIENT
Start: 2023-07-06 | End: 2023-07-07 | Stop reason: HOSPADM

## 2023-07-06 RX ORDER — ONDANSETRON 2 MG/ML
8 INJECTION INTRAMUSCULAR; INTRAVENOUS
Status: CANCELLED | OUTPATIENT
Start: 2023-07-06

## 2023-07-06 RX ADMIN — SODIUM CHLORIDE, PRESERVATIVE FREE 10 ML: 5 INJECTION INTRAVENOUS at 15:51

## 2023-07-06 RX ADMIN — DIPHENHYDRAMINE HYDROCHLORIDE 25 MG: 50 INJECTION, SOLUTION INTRAMUSCULAR; INTRAVENOUS at 15:54

## 2023-07-06 RX ADMIN — ACETAMINOPHEN 650 MG: 325 TABLET ORAL at 15:53

## 2023-07-06 RX ADMIN — SODIUM CHLORIDE 20 ML/HR: 9 INJECTION, SOLUTION INTRAVENOUS at 15:53

## 2023-07-06 RX ADMIN — SODIUM CHLORIDE, PRESERVATIVE FREE 10 ML: 5 INJECTION INTRAVENOUS at 18:10

## 2023-07-08 ENCOUNTER — HOSPITAL ENCOUNTER (OUTPATIENT)
Dept: INFUSION THERAPY | Age: 68
Discharge: HOME | End: 2023-07-08
Payer: MEDICARE

## 2023-07-08 ENCOUNTER — PATIENT MESSAGE (OUTPATIENT)
Dept: INTERNAL MEDICINE CLINIC | Facility: CLINIC | Age: 68
End: 2023-07-08

## 2023-07-08 VITALS
SYSTOLIC BLOOD PRESSURE: 110 MMHG | HEART RATE: 88 BPM | TEMPERATURE: 97.6 F | RESPIRATION RATE: 16 BRPM | OXYGEN SATURATION: 97 % | DIASTOLIC BLOOD PRESSURE: 54 MMHG

## 2023-07-08 DIAGNOSIS — I25.83 CORONARY ARTERY DISEASE DUE TO LIPID RICH PLAQUE: ICD-10-CM

## 2023-07-08 DIAGNOSIS — K92.2 GASTROINTESTINAL HEMORRHAGE, UNSPECIFIED GASTROINTESTINAL HEMORRHAGE TYPE: ICD-10-CM

## 2023-07-08 DIAGNOSIS — E83.110 HEREDITARY HEMOCHROMATOSIS (HCC): ICD-10-CM

## 2023-07-08 DIAGNOSIS — I48.21 PERMANENT ATRIAL FIBRILLATION (HCC): ICD-10-CM

## 2023-07-08 DIAGNOSIS — I25.10 CORONARY ARTERY DISEASE DUE TO LIPID RICH PLAQUE: ICD-10-CM

## 2023-07-08 DIAGNOSIS — I50.22 CHRONIC SYSTOLIC (CONGESTIVE) HEART FAILURE (HCC): Primary | ICD-10-CM

## 2023-07-08 PROCEDURE — 2580000003 HC RX 258: Performed by: INTERNAL MEDICINE

## 2023-07-08 PROCEDURE — 6370000000 HC RX 637 (ALT 250 FOR IP): Performed by: INTERNAL MEDICINE

## 2023-07-08 PROCEDURE — P9016 RBC LEUKOCYTES REDUCED: HCPCS

## 2023-07-08 PROCEDURE — 36430 TRANSFUSION BLD/BLD COMPNT: CPT

## 2023-07-08 RX ORDER — DIPHENHYDRAMINE HYDROCHLORIDE 50 MG/ML
50 INJECTION INTRAMUSCULAR; INTRAVENOUS
Status: DISCONTINUED | OUTPATIENT
Start: 2023-07-08 | End: 2023-07-09 | Stop reason: HOSPADM

## 2023-07-08 RX ORDER — ACETAMINOPHEN 325 MG/1
650 TABLET ORAL
OUTPATIENT
Start: 2023-07-08

## 2023-07-08 RX ORDER — SODIUM CHLORIDE 9 MG/ML
INJECTION, SOLUTION INTRAVENOUS CONTINUOUS
OUTPATIENT
Start: 2023-07-08

## 2023-07-08 RX ORDER — EPINEPHRINE 1 MG/ML
0.3 INJECTION, SOLUTION, CONCENTRATE INTRAVENOUS PRN
Status: DISCONTINUED | OUTPATIENT
Start: 2023-07-08 | End: 2023-07-09 | Stop reason: HOSPADM

## 2023-07-08 RX ORDER — SODIUM CHLORIDE 9 MG/ML
INJECTION, SOLUTION INTRAVENOUS CONTINUOUS
Status: DISCONTINUED | OUTPATIENT
Start: 2023-07-08 | End: 2023-07-09 | Stop reason: HOSPADM

## 2023-07-08 RX ORDER — ACETAMINOPHEN 325 MG/1
650 TABLET ORAL ONCE
Status: COMPLETED | OUTPATIENT
Start: 2023-07-08 | End: 2023-07-08

## 2023-07-08 RX ORDER — EPINEPHRINE 1 MG/ML
0.3 INJECTION, SOLUTION, CONCENTRATE INTRAVENOUS PRN
OUTPATIENT
Start: 2023-07-08

## 2023-07-08 RX ORDER — SODIUM CHLORIDE 9 MG/ML
25 INJECTION, SOLUTION INTRAVENOUS PRN
Status: DISCONTINUED | OUTPATIENT
Start: 2023-07-08 | End: 2023-07-09 | Stop reason: HOSPADM

## 2023-07-08 RX ORDER — ACETAMINOPHEN 325 MG/1
650 TABLET ORAL
Status: DISCONTINUED | OUTPATIENT
Start: 2023-07-08 | End: 2023-07-09 | Stop reason: HOSPADM

## 2023-07-08 RX ORDER — ONDANSETRON 2 MG/ML
8 INJECTION INTRAMUSCULAR; INTRAVENOUS
Status: DISCONTINUED | OUTPATIENT
Start: 2023-07-08 | End: 2023-07-09 | Stop reason: HOSPADM

## 2023-07-08 RX ORDER — ONDANSETRON 2 MG/ML
8 INJECTION INTRAMUSCULAR; INTRAVENOUS
OUTPATIENT
Start: 2023-07-08

## 2023-07-08 RX ORDER — ALBUTEROL SULFATE 90 UG/1
4 AEROSOL, METERED RESPIRATORY (INHALATION) PRN
Status: DISCONTINUED | OUTPATIENT
Start: 2023-07-08 | End: 2023-07-09 | Stop reason: HOSPADM

## 2023-07-08 RX ORDER — ALBUTEROL SULFATE 90 UG/1
4 AEROSOL, METERED RESPIRATORY (INHALATION) PRN
OUTPATIENT
Start: 2023-07-08

## 2023-07-08 RX ORDER — DIPHENHYDRAMINE HCL 25 MG
25 CAPSULE ORAL ONCE
Status: COMPLETED | OUTPATIENT
Start: 2023-07-08 | End: 2023-07-08

## 2023-07-08 RX ORDER — DIPHENHYDRAMINE HYDROCHLORIDE 50 MG/ML
50 INJECTION INTRAMUSCULAR; INTRAVENOUS
OUTPATIENT
Start: 2023-07-08

## 2023-07-08 RX ORDER — SODIUM CHLORIDE 9 MG/ML
25 INJECTION, SOLUTION INTRAVENOUS PRN
Status: CANCELLED | OUTPATIENT
Start: 2023-07-08

## 2023-07-08 RX ORDER — SODIUM CHLORIDE 0.9 % (FLUSH) 0.9 %
10 SYRINGE (ML) INJECTION PRN
Status: DISCONTINUED | OUTPATIENT
Start: 2023-07-08 | End: 2023-07-09 | Stop reason: HOSPADM

## 2023-07-08 RX ADMIN — DIPHENHYDRAMINE HYDROCHLORIDE 25 MG: 25 CAPSULE ORAL at 13:53

## 2023-07-08 RX ADMIN — SODIUM CHLORIDE 25 ML: 9 INJECTION, SOLUTION INTRAVENOUS at 14:06

## 2023-07-08 RX ADMIN — ACETAMINOPHEN 650 MG: 325 TABLET ORAL at 13:53

## 2023-07-08 RX ADMIN — SODIUM CHLORIDE, PRESERVATIVE FREE 10 ML: 5 INJECTION INTRAVENOUS at 14:05

## 2023-07-08 NOTE — PROGRESS NOTES
Arrived to the [facility]. 1 unit of PRBCs completed. Patient tolerated well. Any issues or concerns during appointment: none. Patient aware of next lab and [facility] office visit on 8/3/2023 (date) at 1400 (time). Patient instructed to call provider with temperature of 100.4 or greater or nausea/vomiting/ diarrhea or pain not controlled by medications  Discharged home ambulatory.

## 2023-07-09 LAB
ABO + RH BLD: NORMAL
BLD PROD TYP BPU: NORMAL
BLD PROD TYP BPU: NORMAL
BLOOD BANK DISPENSE STATUS: NORMAL
BLOOD BANK DISPENSE STATUS: NORMAL
BLOOD GROUP ANTIBODIES SERPL: NORMAL
BPU ID: NORMAL
BPU ID: NORMAL
CROSSMATCH RESULT: NORMAL
CROSSMATCH RESULT: NORMAL
SPECIMEN EXP DATE BLD: NORMAL
UNIT DIVISION: 0
UNIT DIVISION: 0

## 2023-07-10 NOTE — TELEPHONE ENCOUNTER
From: Leslie Walters  To: Dr. Ding Mad: 7/8/2023 3:42 PM EDT  Subject: Hemoglobin Issues      Dr Dimitry Saunders, I was able to get 2 units of blood. I got one on Thursday 7/6/23 and the second on Saturday 7/8/23. Please let me know our next steps:   Do I need to have labs done again? Do we need to do a colonoscopy? What would be our plan on resuming Pradaxa? Thank you sir!    [employer] [phone]

## 2023-07-11 DIAGNOSIS — D64.9 MILD ANEMIA: Primary | ICD-10-CM

## 2023-07-11 NOTE — TELEPHONE ENCOUNTER
Spoke to Dr Lindsay Bailey and he states patient needs to get Hemoglobin check tomorrow. Dr Lindsay Bailey made referral to GI for patient to have colonoscopy. Patient was told to call GI to see if they were going to do a colonoscopy. Patient was also told that an order to check hemoglobin has been placed and he should come by the lab tomorrow to get this lab done.  Patient voiced understanding

## 2023-07-11 NOTE — TELEPHONE ENCOUNTER
Regarding: Hemoglobin Issues  Contact: [phone]  ----- Message from Anurag Menjivar MA sent at 7/11/2023  2:23 PM EDT -----       ----- Message from Daron Maguire \"Garret\" to Beth Duran MD sent at 7/8/2023  3:42 PM -----     Dr Esdras Dillon, I was able to get 2 units of blood. I got one on Thursday 7/6/23 and the second on Saturday 7/8/23. Please let me know our next steps:   Do I need to have labs done again? Do we need to do a colonoscopy? What would be our plan on resuming Pradaxa? Thank you sir!    Les Dawn [phone]

## 2023-07-12 DIAGNOSIS — D64.9 MILD ANEMIA: ICD-10-CM

## 2023-07-12 LAB — HGB BLD-MCNC: 11.2 G/DL (ref 13.6–17.2)

## 2023-07-14 DIAGNOSIS — E78.2 MIXED DYSLIPIDEMIA: ICD-10-CM

## 2023-07-14 RX ORDER — SIMVASTATIN 40 MG
40 TABLET ORAL DAILY
Qty: 90 TABLET | Refills: 3 | Status: SHIPPED | OUTPATIENT
Start: 2023-07-14

## 2023-07-18 DIAGNOSIS — E11.65 TYPE 2 DIABETES MELLITUS WITH HYPERGLYCEMIA, WITHOUT LONG-TERM CURRENT USE OF INSULIN (HCC): ICD-10-CM

## 2023-07-18 NOTE — TELEPHONE ENCOUNTER
Pt called, requ refill of BD pen needle mini 5mm 31 grams.     Sent to [facility] on the [facility]

## 2023-07-20 RX ORDER — PEN NEEDLE, DIABETIC 30 GX3/16"
NEEDLE, DISPOSABLE MISCELLANEOUS
Qty: 100 EACH | Refills: 1 | Status: SHIPPED | OUTPATIENT
Start: 2023-07-20

## 2023-07-25 DIAGNOSIS — K92.2 GASTROINTESTINAL HEMORRHAGE, UNSPECIFIED GASTROINTESTINAL HEMORRHAGE TYPE: Primary | ICD-10-CM

## 2023-07-25 DIAGNOSIS — K92.2 GASTROINTESTINAL HEMORRHAGE, UNSPECIFIED GASTROINTESTINAL HEMORRHAGE TYPE: ICD-10-CM

## 2023-07-25 LAB — HGB BLD-MCNC: 12 G/DL (ref 13.6–17.2)

## 2023-08-01 DIAGNOSIS — D50.8 OTHER IRON DEFICIENCY ANEMIA: Primary | ICD-10-CM

## 2023-08-03 ENCOUNTER — OFFICE VISIT (OUTPATIENT)
Dept: ONCOLOGY | Age: 68
End: 2023-08-03
Payer: MEDICARE

## 2023-08-03 ENCOUNTER — HOSPITAL ENCOUNTER (OUTPATIENT)
Dept: LAB | Age: 68
Discharge: HOME | End: 2023-08-03
Payer: MEDICARE

## 2023-08-03 VITALS
TEMPERATURE: 97.7 F | RESPIRATION RATE: 16 BRPM | HEART RATE: 82 BPM | OXYGEN SATURATION: 99 % | DIASTOLIC BLOOD PRESSURE: 72 MMHG | SYSTOLIC BLOOD PRESSURE: 120 MMHG | BODY MASS INDEX: 41.64 KG/M2 | HEIGHT: 73 IN | WEIGHT: 314.2 LBS

## 2023-08-03 DIAGNOSIS — D50.8 OTHER IRON DEFICIENCY ANEMIA: ICD-10-CM

## 2023-08-03 DIAGNOSIS — E78.49 OTHER HYPERLIPIDEMIA: ICD-10-CM

## 2023-08-03 DIAGNOSIS — D50.8 OTHER IRON DEFICIENCY ANEMIA: Primary | ICD-10-CM

## 2023-08-03 LAB
ALBUMIN SERPL-MCNC: 3.5 G/DL (ref 3.2–4.6)
ALBUMIN/GLOB SERPL: 0.9 (ref 0.4–1.6)
ALP SERPL-CCNC: 81 U/L (ref 50–136)
ALT SERPL-CCNC: 26 U/L (ref 12–65)
ANION GAP SERPL CALC-SCNC: 5 MMOL/L (ref 2–11)
AST SERPL-CCNC: 17 U/L (ref 15–37)
BASOPHILS # BLD: 0 K/UL (ref 0–0.2)
BASOPHILS NFR BLD: 0 % (ref 0–2)
BILIRUB SERPL-MCNC: 0.9 MG/DL (ref 0.2–1.1)
BUN SERPL-MCNC: 18 MG/DL (ref 8–23)
CALCIUM SERPL-MCNC: 9.8 MG/DL (ref 8.3–10.4)
CHLORIDE SERPL-SCNC: 105 MMOL/L (ref 101–110)
CO2 SERPL-SCNC: 25 MMOL/L (ref 21–32)
CREAT SERPL-MCNC: 1.1 MG/DL (ref 0.8–1.5)
DIFFERENTIAL METHOD BLD: ABNORMAL
EOSINOPHIL # BLD: 0.2 K/UL (ref 0–0.8)
EOSINOPHIL NFR BLD: 2 % (ref 0.5–7.8)
ERYTHROCYTE [DISTWIDTH] IN BLOOD BY AUTOMATED COUNT: 19.3 % (ref 11.9–14.6)
FERRITIN SERPL-MCNC: 12 NG/ML (ref 8–388)
GLOBULIN SER CALC-MCNC: 3.9 G/DL (ref 2.8–4.5)
GLUCOSE SERPL-MCNC: 131 MG/DL (ref 65–100)
HCT VFR BLD AUTO: 38.4 % (ref 41.1–50.3)
HGB BLD-MCNC: 12.3 G/DL (ref 13.6–17.2)
IMM GRANULOCYTES # BLD AUTO: 0 K/UL (ref 0–0.5)
IMM GRANULOCYTES NFR BLD AUTO: 0 % (ref 0–5)
LYMPHOCYTES # BLD: 1.2 K/UL (ref 0.5–4.6)
LYMPHOCYTES NFR BLD: 17 % (ref 13–44)
MCH RBC QN AUTO: 28 PG (ref 26.1–32.9)
MCHC RBC AUTO-ENTMCNC: 32 G/DL (ref 31.4–35)
MCV RBC AUTO: 87.5 FL (ref 82–102)
MONOCYTES # BLD: 0.5 K/UL (ref 0.1–1.3)
MONOCYTES NFR BLD: 7 % (ref 4–12)
NEUTS SEG # BLD: 5.2 K/UL (ref 1.7–8.2)
NEUTS SEG NFR BLD: 74 % (ref 43–78)
NRBC # BLD: 0 K/UL (ref 0–0.2)
PLATELET # BLD AUTO: 199 K/UL (ref 150–450)
PMV BLD AUTO: 10.1 FL (ref 9.4–12.3)
POTASSIUM SERPL-SCNC: 4.1 MMOL/L (ref 3.5–5.1)
PROT SERPL-MCNC: 7.4 G/DL (ref 6.3–8.2)
RBC # BLD AUTO: 4.39 M/UL (ref 4.23–5.6)
SODIUM SERPL-SCNC: 135 MMOL/L (ref 133–143)
WBC # BLD AUTO: 7.1 K/UL (ref 4.3–11.1)

## 2023-08-03 PROCEDURE — 82728 ASSAY OF FERRITIN: CPT

## 2023-08-03 PROCEDURE — 3017F COLORECTAL CA SCREEN DOC REV: CPT | Performed by: INTERNAL MEDICINE

## 2023-08-03 PROCEDURE — 85025 COMPLETE CBC W/AUTO DIFF WBC: CPT

## 2023-08-03 PROCEDURE — 99214 OFFICE O/P EST MOD 30 MIN: CPT | Performed by: INTERNAL MEDICINE

## 2023-08-03 PROCEDURE — 3074F SYST BP LT 130 MM HG: CPT | Performed by: INTERNAL MEDICINE

## 2023-08-03 PROCEDURE — G8428 CUR MEDS NOT DOCUMENT: HCPCS | Performed by: INTERNAL MEDICINE

## 2023-08-03 PROCEDURE — 1036F TOBACCO NON-USER: CPT | Performed by: INTERNAL MEDICINE

## 2023-08-03 PROCEDURE — 1123F ACP DISCUSS/DSCN MKR DOCD: CPT | Performed by: INTERNAL MEDICINE

## 2023-08-03 PROCEDURE — 3078F DIAST BP <80 MM HG: CPT | Performed by: INTERNAL MEDICINE

## 2023-08-03 PROCEDURE — G8417 CALC BMI ABV UP PARAM F/U: HCPCS | Performed by: INTERNAL MEDICINE

## 2023-08-03 PROCEDURE — 36415 COLL VENOUS BLD VENIPUNCTURE: CPT

## 2023-08-03 PROCEDURE — 80053 COMPREHEN METABOLIC PANEL: CPT

## 2023-08-03 ASSESSMENT — PATIENT HEALTH QUESTIONNAIRE - PHQ9
2. FEELING DOWN, DEPRESSED OR HOPELESS: 0
SUM OF ALL RESPONSES TO PHQ QUESTIONS 1-9: 0
SUM OF ALL RESPONSES TO PHQ QUESTIONS 1-9: 0
1. LITTLE INTEREST OR PLEASURE IN DOING THINGS: 0
SUM OF ALL RESPONSES TO PHQ9 QUESTIONS 1 & 2: 0
SUM OF ALL RESPONSES TO PHQ QUESTIONS 1-9: 0
SUM OF ALL RESPONSES TO PHQ QUESTIONS 1-9: 0

## 2023-08-03 NOTE — PROGRESS NOTES
[address] [facility], [facility] [address]  Phone: [phone]           8/3/2023  Dio Barnes  1955  [id number]        Dio Barnes is a 77year old  man who has returned to my clinic for a follow-up visit; he was previously a patient of Dr. Cordell Mccormack. He carries a label of Hereditary Hemochromatosis despite the fact that he has a low Ferritin level and a low Transferrin Saturation level and is heterozygous for the H63D mutation, his EGD and Colonoscopy revealed a colonic polyp, diverticulosis and internal hemorrhoids. ALLERGIES:    Azithromycin. FAMILY HISTORY:     No hematologic disorders. SOCIAL HISTORY:    He is  and lives with his wife. He is a retired salesman. He stopped smoking cigarettes in 8/1989. PAST MEDICAL HISTORY:     Hypertension, Atrial Fibrillation, Congestive Heart Failure, Coronary Artery Disease, Diabetes Mellitus,  Hepatic Steatosis, Obstructive Sleep Apnea, BPH, Osteoarthritis, Hyperlipidemia and Iron Deficiency Anemia. ROS:  The patient complained of fatigue and exertional dyspnea; all other systems negative. PHYSICAL EXAM:   The patient was alert, awake and oriented, no acute distress was noted. Oral examination did not reveal any mucosal lesions. Lymph node examination did not reveal any adenopathy. Neck examination revealed a supple neck, no thyromegaly or masses were noted. Chest examination revealed normal vesicular breath sounds. Heart examination revealed S-1 and S-2 without any murmurs. Abdominal examination revealed a non-tender abdomen, bowel sounds were positive, no organomegaly could be appreciated. Examination of the extremities did not reveal any tenderness or erythema, 2+ pedal edema was noted. Examination of the skin did not reveal any lesions. KPS:     80.         LABORATORY INVESTIGATIONS:  CBC showed a WBC count of 7.1, ANC was 5.2, Hemoglobin was 12.3 and

## 2023-08-11 DIAGNOSIS — I50.22 CHRONIC SYSTOLIC (CONGESTIVE) HEART FAILURE (HCC): ICD-10-CM

## 2023-08-11 RX ORDER — CARVEDILOL 25 MG/1
25 TABLET ORAL 2 TIMES DAILY WITH MEALS
Qty: 180 TABLET | Refills: 0 | Status: SHIPPED | OUTPATIENT
Start: 2023-08-11 | End: 2023-10-12 | Stop reason: SDUPTHER

## 2023-09-12 ENCOUNTER — OFFICE VISIT (OUTPATIENT)
Age: 68
End: 2023-09-12
Payer: MEDICARE

## 2023-09-12 VITALS
DIASTOLIC BLOOD PRESSURE: 62 MMHG | HEIGHT: 73 IN | SYSTOLIC BLOOD PRESSURE: 110 MMHG | WEIGHT: 312 LBS | BODY MASS INDEX: 41.35 KG/M2 | HEART RATE: 80 BPM

## 2023-09-12 DIAGNOSIS — I48.20 ATRIAL FIBRILLATION, CHRONIC (HCC): Primary | ICD-10-CM

## 2023-09-12 PROCEDURE — 1123F ACP DISCUSS/DSCN MKR DOCD: CPT | Performed by: INTERNAL MEDICINE

## 2023-09-12 PROCEDURE — 3074F SYST BP LT 130 MM HG: CPT | Performed by: INTERNAL MEDICINE

## 2023-09-12 PROCEDURE — G8428 CUR MEDS NOT DOCUMENT: HCPCS | Performed by: INTERNAL MEDICINE

## 2023-09-12 PROCEDURE — 3017F COLORECTAL CA SCREEN DOC REV: CPT | Performed by: INTERNAL MEDICINE

## 2023-09-12 PROCEDURE — 99213 OFFICE O/P EST LOW 20 MIN: CPT | Performed by: INTERNAL MEDICINE

## 2023-09-12 PROCEDURE — 1036F TOBACCO NON-USER: CPT | Performed by: INTERNAL MEDICINE

## 2023-09-12 PROCEDURE — 3078F DIAST BP <80 MM HG: CPT | Performed by: INTERNAL MEDICINE

## 2023-09-12 PROCEDURE — G8417 CALC BMI ABV UP PARAM F/U: HCPCS | Performed by: INTERNAL MEDICINE

## 2023-09-12 NOTE — PROGRESS NOTES
[facility] CARDIOLOGY  [address] [facility]  [place], [address] [facility]  PHONE: [phone]        23        NAME:  Kary Mckeon  : 1955  MRN: [id number]     CHIEF COMPLAINT:    Atrial Fibrillation      SUBJECTIVE:       In , pt had recurrence of spontaneous right pneumothorax. He had subs pleurodesis. During hosp stay pt \"had MRSA\". Hosp after a 3 week stay on 3 week home antibiotic. Worsening anemia re. 2 unit transfusion. Endoscopy +  colon polyp which was suspected to be the bleeding source. [address] resumed. Medications were all reviewed with the patient today and updated as necessary. Current Outpatient Medications   Medication Sig    carvedilol (COREG) 25 MG tablet Take 1 tablet by mouth 2 times daily (with meals) (Patient taking differently: Take 1 tablet by mouth 2 times daily (with meals) Taking 1/2 tablet in the AM and 1 tablet in the PM)    simvastatin (ZOCOR) 40 MG tablet Take 1 tablet by mouth daily    OZEMPIC, 1 MG/DOSE, 4 MG/3ML SOPN Inject 1 mg into the skin once a week Start after 0.5 mg weekly dose. metFORMIN (GLUCOPHAGE) 500 MG tablet Take 1 tablet by mouth daily (with breakfast)    tamsulosin (FLOMAX) 0.4 MG capsule Take 1 capsule by mouth nightly    sildenafil (REVATIO) 20 MG tablet Take 1-5 tablets by mouth as needed (erectile dysfunction)    insulin glargine (LANTUS SOLOSTAR) 100 UNIT/ML injection pen Inject 25 Units into the skin nightly    Omega-3 Fatty Acids (FISH OIL PO) Take by mouth    Probiotic Product (PROBIOTIC PO) Take by mouth    hydroCHLOROthiazide (HYDRODIURIL) 25 MG tablet Take 1 tablet by mouth daily    dabigatran (PRADAXA) 150 MG capsule Take 1 capsule by mouth 2 times daily TAKE 1 CAPSULE EVERY 12 HOURS    guaiFENesin (MUCINEX) 600 MG extended release tablet Take 1 tablet by mouth 2 times daily    triamcinolone (KENALOG) 0.025 % cream Apply topically 2 times daily.     furosemide (LASIX) 20 MG tablet Take 2 tablets by mouth daily as needed

## 2023-09-14 ENCOUNTER — OFFICE VISIT (OUTPATIENT)
Dept: UROLOGY | Age: 68
End: 2023-09-14

## 2023-09-14 DIAGNOSIS — E11.65 TYPE 2 DIABETES MELLITUS WITH HYPERGLYCEMIA, WITHOUT LONG-TERM CURRENT USE OF INSULIN (HCC): ICD-10-CM

## 2023-09-14 DIAGNOSIS — N40.1 BENIGN PROSTATIC HYPERPLASIA WITH LOWER URINARY TRACT SYMPTOMS, SYMPTOM DETAILS UNSPECIFIED: Primary | ICD-10-CM

## 2023-09-14 LAB — PVR, POC: 12 CC

## 2023-09-14 RX ORDER — TAMSULOSIN HYDROCHLORIDE 0.4 MG/1
0.4 CAPSULE ORAL
Qty: 90 CAPSULE | Refills: 3 | Status: SHIPPED | OUTPATIENT
Start: 2023-09-14

## 2023-09-14 ASSESSMENT — ENCOUNTER SYMPTOMS: BACK PAIN: 0

## 2023-09-14 NOTE — PROGRESS NOTES
tract symptoms, symptom details unspecified  N40.1 AMB POC URINALYSIS DIP STICK AUTO W/O MICRO     AMB POC PVR, ADONAY,POST-VOID RES,US,NON-IMAGING     tamsulosin (FLOMAX) 0.4 MG capsule        BPH/LUTS:     We discussed recurrent LUTS today despite TURP and flomax. Cysto 6/2023 showed OPEN prostate urethra. LUTS may be more due to OAB. Will STOP flomax and start gemtesa samples. Follow up left open ended and he will call to let me know his progress on gemtesa. If no improvement, consider botox vs. Interstim vs. Combination with anticholinergic    I have spent 30 minutes today reviewing previous notes, test results and face to face with the patient as well as documenting. Kamron Gaona M.D.     [facility]  [facility], [address] [place] [address]  Phone: [phone]  Fax: [phone]

## 2023-09-15 RX ORDER — INSULIN GLARGINE 100 [IU]/ML
25 INJECTION, SOLUTION SUBCUTANEOUS NIGHTLY
Qty: 7.5 ML | Refills: 2 | Status: SHIPPED | OUTPATIENT
Start: 2023-09-15

## 2023-09-16 ASSESSMENT — ENCOUNTER SYMPTOMS
ABDOMINAL PAIN: 0
BLOOD IN STOOL: 0
DIARRHEA: 0
INDIGESTION: 0
VOMITING: 0
COUGH: 0
HEARTBURN: 0
EYE PAIN: 0
SKIN LESIONS: 0
EYE DISCHARGE: 0
WHEEZING: 0
CONSTIPATION: 0
NAUSEA: 0
SHORTNESS OF BREATH: 0

## 2023-09-16 NOTE — PROGRESS NOTES
Mask (1 per month) Interface/Cushion  (     ) -Pillow (2 per mon)  (     ) -Dhhvthjem (1 per 6 mon)      _________________________________________________________________          Other Supplies:    (  X   )-Tvadvldn (1 per 6 mon)  ( X    )-Tlgojp Tubing (1 per 3 mon)  (  X   )- Disposable Filter (2 per mon)  (   X  )-Wtatzl Humidifier (1 per year)     (  x   )-Lfqhmwkbs (sometimes used with Full Face Mask) (1 per 6 mos)  (     )-Tubing-without heat (1 per 3 mos)  ( X   )-Non-Disposable Filter (1 per 6 mos)  (   x  )-Water Chamber (1 per 6 mos)  (     )-Humidifier non-heated (1 per 5 yrs)      Signed Date: 9/18/2023  Electronically Signed By: Radonna Cooks, APRN - CNP         Collaborating Physician: Dr. Pola Garcia office visit was spent  reviewing test results, prognosis, importance of compliance, education about disease process, benefits of medications, instructions for management of acute flare-ups, and follow up plans. Total time spent with patient was 20 minutes.         Radonna Cooks, APRN - CNP  Electronically signed

## 2023-09-18 ENCOUNTER — OFFICE VISIT (OUTPATIENT)
Dept: SLEEP MEDICINE | Age: 68
End: 2023-09-18
Payer: MEDICARE

## 2023-09-18 VITALS
BODY MASS INDEX: 40.2 KG/M2 | HEIGHT: 74 IN | OXYGEN SATURATION: 96 % | SYSTOLIC BLOOD PRESSURE: 118 MMHG | HEART RATE: 96 BPM | RESPIRATION RATE: 14 BRPM | WEIGHT: 313.2 LBS | DIASTOLIC BLOOD PRESSURE: 70 MMHG

## 2023-09-18 DIAGNOSIS — Z99.89 OSA ON CPAP: Primary | ICD-10-CM

## 2023-09-18 DIAGNOSIS — E66.01 CLASS 3 SEVERE OBESITY DUE TO EXCESS CALORIES WITHOUT SERIOUS COMORBIDITY WITH BODY MASS INDEX (BMI) OF 40.0 TO 44.9 IN ADULT (HCC): ICD-10-CM

## 2023-09-18 DIAGNOSIS — G47.33 OSA ON CPAP: Primary | ICD-10-CM

## 2023-09-18 PROCEDURE — 99213 OFFICE O/P EST LOW 20 MIN: CPT | Performed by: NURSE PRACTITIONER

## 2023-09-18 PROCEDURE — G8417 CALC BMI ABV UP PARAM F/U: HCPCS | Performed by: NURSE PRACTITIONER

## 2023-09-18 PROCEDURE — 3074F SYST BP LT 130 MM HG: CPT | Performed by: NURSE PRACTITIONER

## 2023-09-18 PROCEDURE — 3017F COLORECTAL CA SCREEN DOC REV: CPT | Performed by: NURSE PRACTITIONER

## 2023-09-18 PROCEDURE — 3078F DIAST BP <80 MM HG: CPT | Performed by: NURSE PRACTITIONER

## 2023-09-18 PROCEDURE — 1036F TOBACCO NON-USER: CPT | Performed by: NURSE PRACTITIONER

## 2023-09-18 PROCEDURE — G8427 DOCREV CUR MEDS BY ELIG CLIN: HCPCS | Performed by: NURSE PRACTITIONER

## 2023-09-18 PROCEDURE — 1123F ACP DISCUSS/DSCN MKR DOCD: CPT | Performed by: NURSE PRACTITIONER

## 2023-09-18 ASSESSMENT — SLEEP AND FATIGUE QUESTIONNAIRES
HOW LIKELY ARE YOU TO NOD OFF OR FALL ASLEEP WHEN YOU ARE A PASSENGER IN A CAR FOR AN HOUR WITHOUT A BREAK: 0
HOW LIKELY ARE YOU TO NOD OFF OR FALL ASLEEP WHILE SITTING AND READING: 1
HOW LIKELY ARE YOU TO NOD OFF OR FALL ASLEEP WHILE SITTING INACTIVE IN A PUBLIC PLACE: 0
HOW LIKELY ARE YOU TO NOD OFF OR FALL ASLEEP IN A CAR, WHILE STOPPED FOR A FEW MINUTES IN TRAFFIC: 0
HOW LIKELY ARE YOU TO NOD OFF OR FALL ASLEEP WHILE SITTING QUIETLY AFTER LUNCH WITHOUT ALCOHOL: 0
HOW LIKELY ARE YOU TO NOD OFF OR FALL ASLEEP WHILE SITTING AND TALKING TO SOMEONE: 0
HOW LIKELY ARE YOU TO NOD OFF OR FALL ASLEEP WHILE WATCHING TV: 1
HOW LIKELY ARE YOU TO NOD OFF OR FALL ASLEEP WHILE LYING DOWN TO REST IN THE AFTERNOON WHEN CIRCUMSTANCES PERMIT: 0
ESS TOTAL SCORE: 2

## 2023-09-18 NOTE — PATIENT INSTRUCTIONS
Continue ASV with EPAP 5 cm H2O, minimum pressure support 5 cm H2O, maximum pressure support 15 cm H2O with nightly compliance  Replacement ASV machine and supplies ordered  Recommendations as above  Follow-up in 1 year or sooner if needed

## 2023-09-19 ENCOUNTER — TELEPHONE (OUTPATIENT)
Dept: INTERNAL MEDICINE CLINIC | Facility: CLINIC | Age: 68
End: 2023-09-19

## 2023-09-19 NOTE — TELEPHONE ENCOUNTER
Called [facility] and spoke with Pharmacist Moody Gordillo. Advised her per Spring it is okay to dispense 15 mL box of Lantus pens to patient for a 60 day supply. Moody Gordillo voiced understanding.

## 2023-09-19 NOTE — TELEPHONE ENCOUNTER
[facility] called needing clarification on prescription for patient (lantus). Please call pharmacy, can speak with anyone.

## 2023-10-09 DIAGNOSIS — D64.9 MILD ANEMIA: ICD-10-CM

## 2023-10-09 DIAGNOSIS — K92.1 MELENA: ICD-10-CM

## 2023-10-09 DIAGNOSIS — E11.65 TYPE 2 DIABETES MELLITUS WITH HYPERGLYCEMIA, WITHOUT LONG-TERM CURRENT USE OF INSULIN (HCC): ICD-10-CM

## 2023-10-09 DIAGNOSIS — E11.65 TYPE 2 DIABETES MELLITUS WITH HYPERGLYCEMIA, WITHOUT LONG-TERM CURRENT USE OF INSULIN (HCC): Primary | ICD-10-CM

## 2023-10-09 LAB — HGB BLD-MCNC: 13.7 G/DL (ref 13.6–17.2)

## 2023-10-10 LAB
EST. AVERAGE GLUCOSE BLD GHB EST-MCNC: 117 MG/DL
HBA1C MFR BLD: 5.7 % (ref 4.8–5.6)

## 2023-10-12 ENCOUNTER — OFFICE VISIT (OUTPATIENT)
Dept: INTERNAL MEDICINE CLINIC | Facility: CLINIC | Age: 68
End: 2023-10-12
Payer: MEDICARE

## 2023-10-12 VITALS
DIASTOLIC BLOOD PRESSURE: 64 MMHG | TEMPERATURE: 98.2 F | SYSTOLIC BLOOD PRESSURE: 110 MMHG | WEIGHT: 312 LBS | BODY MASS INDEX: 40.04 KG/M2 | HEIGHT: 74 IN | HEART RATE: 94 BPM | OXYGEN SATURATION: 97 %

## 2023-10-12 DIAGNOSIS — K92.2 GASTROINTESTINAL HEMORRHAGE, UNSPECIFIED GASTROINTESTINAL HEMORRHAGE TYPE: ICD-10-CM

## 2023-10-12 DIAGNOSIS — I10 ESSENTIAL HYPERTENSION: ICD-10-CM

## 2023-10-12 DIAGNOSIS — I48.11 LONGSTANDING PERSISTENT ATRIAL FIBRILLATION (HCC): ICD-10-CM

## 2023-10-12 DIAGNOSIS — E66.01 CLASS 3 SEVERE OBESITY DUE TO EXCESS CALORIES WITH SERIOUS COMORBIDITY AND BODY MASS INDEX (BMI) OF 40.0 TO 44.9 IN ADULT (HCC): Chronic | ICD-10-CM

## 2023-10-12 DIAGNOSIS — I50.22 CHRONIC SYSTOLIC (CONGESTIVE) HEART FAILURE (HCC): ICD-10-CM

## 2023-10-12 DIAGNOSIS — E11.51 CONTROLLED TYPE 2 DIABETES MELLITUS WITH PERIPHERAL CIRCULATORY DISORDER (HCC): Primary | ICD-10-CM

## 2023-10-12 PROCEDURE — 99214 OFFICE O/P EST MOD 30 MIN: CPT | Performed by: INTERNAL MEDICINE

## 2023-10-12 PROCEDURE — G8484 FLU IMMUNIZE NO ADMIN: HCPCS | Performed by: INTERNAL MEDICINE

## 2023-10-12 PROCEDURE — G8417 CALC BMI ABV UP PARAM F/U: HCPCS | Performed by: INTERNAL MEDICINE

## 2023-10-12 PROCEDURE — 1123F ACP DISCUSS/DSCN MKR DOCD: CPT | Performed by: INTERNAL MEDICINE

## 2023-10-12 PROCEDURE — G8427 DOCREV CUR MEDS BY ELIG CLIN: HCPCS | Performed by: INTERNAL MEDICINE

## 2023-10-12 PROCEDURE — 3017F COLORECTAL CA SCREEN DOC REV: CPT | Performed by: INTERNAL MEDICINE

## 2023-10-12 PROCEDURE — 2022F DILAT RTA XM EVC RTNOPTHY: CPT | Performed by: INTERNAL MEDICINE

## 2023-10-12 PROCEDURE — 3078F DIAST BP <80 MM HG: CPT | Performed by: INTERNAL MEDICINE

## 2023-10-12 PROCEDURE — 3074F SYST BP LT 130 MM HG: CPT | Performed by: INTERNAL MEDICINE

## 2023-10-12 PROCEDURE — 1036F TOBACCO NON-USER: CPT | Performed by: INTERNAL MEDICINE

## 2023-10-12 PROCEDURE — 3044F HG A1C LEVEL LT 7.0%: CPT | Performed by: INTERNAL MEDICINE

## 2023-10-12 RX ORDER — HYDROCHLOROTHIAZIDE 25 MG/1
25 TABLET ORAL DAILY
Qty: 90 TABLET | Refills: 1 | Status: SHIPPED | OUTPATIENT
Start: 2023-10-12

## 2023-10-12 RX ORDER — ENALAPRIL MALEATE 10 MG/1
TABLET ORAL
Qty: 90 TABLET | Refills: 3 | Status: SHIPPED | OUTPATIENT
Start: 2023-10-12

## 2023-10-12 RX ORDER — DABIGATRAN ETEXILATE 150 MG/1
150 CAPSULE ORAL 2 TIMES DAILY
Qty: 180 CAPSULE | Refills: 3 | Status: SHIPPED | OUTPATIENT
Start: 2023-10-12

## 2023-10-12 RX ORDER — SPIRONOLACTONE 25 MG/1
25 TABLET ORAL DAILY
Qty: 90 TABLET | Refills: 3 | Status: SHIPPED | OUTPATIENT
Start: 2023-10-12

## 2023-10-12 RX ORDER — CARVEDILOL 25 MG/1
TABLET ORAL
Qty: 180 TABLET | Refills: 3 | Status: SHIPPED | OUTPATIENT
Start: 2023-10-12

## 2023-10-12 RX ORDER — SEMAGLUTIDE 1.34 MG/ML
1 INJECTION, SOLUTION SUBCUTANEOUS WEEKLY
Qty: 3 ML | Refills: 5 | Status: SHIPPED | OUTPATIENT
Start: 2023-10-12

## 2023-10-12 ASSESSMENT — ENCOUNTER SYMPTOMS
COUGH: 0
BLOOD IN STOOL: 0
SHORTNESS OF BREATH: 0
ANAL BLEEDING: 0

## 2023-10-27 ENCOUNTER — PREP FOR PROCEDURE (OUTPATIENT)
Dept: SURGERY | Age: 68
End: 2023-10-27

## 2023-10-30 ENCOUNTER — TELEPHONE (OUTPATIENT)
Dept: INTERNAL MEDICINE CLINIC | Facility: CLINIC | Age: 68
End: 2023-10-30

## 2023-10-30 DIAGNOSIS — K92.1 BLACK STOOL: Primary | ICD-10-CM

## 2023-10-30 DIAGNOSIS — K92.1 BLACK STOOL: ICD-10-CM

## 2023-10-30 LAB — HGB BLD-MCNC: 13 G/DL (ref 13.6–17.2)

## 2023-10-30 NOTE — TELEPHONE ENCOUNTER
Patient called the office and reported black/dark stool for 5 days. Spoke with Dr. Narcisa Angela he requested patient complete hemoccult card (x 1) and hemoglobin lab. Called and notified patient to  stool card today and have hemoglobin lab drawn. Patient voiced understanding.

## 2023-10-31 ENCOUNTER — NURSE ONLY (OUTPATIENT)
Dept: INTERNAL MEDICINE CLINIC | Facility: CLINIC | Age: 68
End: 2023-10-31
Payer: MEDICARE

## 2023-10-31 DIAGNOSIS — R19.5 DARK STOOLS: Primary | ICD-10-CM

## 2023-10-31 LAB
HEMOCCULT STL QL: POSITIVE
VALID INTERNAL CONTROL: YES

## 2023-10-31 PROCEDURE — 82272 OCCULT BLD FECES 1-3 TESTS: CPT | Performed by: INTERNAL MEDICINE

## 2023-10-31 NOTE — PROGRESS NOTES
Patient brought hemoccult card to office. Card was tested and results forwarded to Dr. Eligio Deutsch per telephone encounter on 10/30/2023.

## 2023-11-02 ENCOUNTER — PATIENT MESSAGE (OUTPATIENT)
Dept: INTERNAL MEDICINE CLINIC | Facility: CLINIC | Age: 68
End: 2023-11-02

## 2023-11-02 ENCOUNTER — HOSPITAL ENCOUNTER (OUTPATIENT)
Dept: LAB | Age: 68
Discharge: HOME | End: 2023-11-02
Payer: MEDICARE

## 2023-11-02 ENCOUNTER — OFFICE VISIT (OUTPATIENT)
Dept: ONCOLOGY | Age: 68
End: 2023-11-02
Payer: MEDICARE

## 2023-11-02 ENCOUNTER — TELEPHONE (OUTPATIENT)
Dept: UROLOGY | Age: 68
End: 2023-11-02

## 2023-11-02 VITALS
RESPIRATION RATE: 12 BRPM | SYSTOLIC BLOOD PRESSURE: 123 MMHG | HEIGHT: 73 IN | DIASTOLIC BLOOD PRESSURE: 70 MMHG | WEIGHT: 312 LBS | BODY MASS INDEX: 41.35 KG/M2 | TEMPERATURE: 98.7 F | HEART RATE: 77 BPM | OXYGEN SATURATION: 97 %

## 2023-11-02 DIAGNOSIS — E78.49 OTHER HYPERLIPIDEMIA: ICD-10-CM

## 2023-11-02 DIAGNOSIS — N40.1 BENIGN PROSTATIC HYPERPLASIA WITH LOWER URINARY TRACT SYMPTOMS, SYMPTOM DETAILS UNSPECIFIED: Primary | ICD-10-CM

## 2023-11-02 DIAGNOSIS — D50.8 OTHER IRON DEFICIENCY ANEMIA: Primary | ICD-10-CM

## 2023-11-02 DIAGNOSIS — D50.8 OTHER IRON DEFICIENCY ANEMIA: ICD-10-CM

## 2023-11-02 LAB
ALBUMIN SERPL-MCNC: 3.4 G/DL (ref 3.2–4.6)
ALBUMIN/GLOB SERPL: 0.9 (ref 0.4–1.6)
ALP SERPL-CCNC: 84 U/L (ref 50–136)
ALT SERPL-CCNC: 21 U/L (ref 12–65)
ANION GAP SERPL CALC-SCNC: 4 MMOL/L (ref 2–11)
AST SERPL-CCNC: 16 U/L (ref 15–37)
BASOPHILS # BLD: 0 K/UL (ref 0–0.2)
BASOPHILS NFR BLD: 1 % (ref 0–2)
BILIRUB SERPL-MCNC: 0.9 MG/DL (ref 0.2–1.1)
BUN SERPL-MCNC: 12 MG/DL (ref 8–23)
CALCIUM SERPL-MCNC: 9.7 MG/DL (ref 8.3–10.4)
CHLORIDE SERPL-SCNC: 106 MMOL/L (ref 101–110)
CO2 SERPL-SCNC: 27 MMOL/L (ref 21–32)
CREAT SERPL-MCNC: 1 MG/DL (ref 0.8–1.5)
DIFFERENTIAL METHOD BLD: ABNORMAL
EOSINOPHIL # BLD: 0.2 K/UL (ref 0–0.8)
EOSINOPHIL NFR BLD: 3 % (ref 0.5–7.8)
ERYTHROCYTE [DISTWIDTH] IN BLOOD BY AUTOMATED COUNT: 13.9 % (ref 11.9–14.6)
FERRITIN SERPL-MCNC: 15 NG/ML (ref 8–388)
GLOBULIN SER CALC-MCNC: 3.8 G/DL (ref 2.8–4.5)
GLUCOSE SERPL-MCNC: 140 MG/DL (ref 65–100)
HCT VFR BLD AUTO: 38.6 % (ref 41.1–50.3)
HGB BLD-MCNC: 12.7 G/DL (ref 13.6–17.2)
IMM GRANULOCYTES # BLD AUTO: 0.1 K/UL (ref 0–0.5)
IMM GRANULOCYTES NFR BLD AUTO: 1 % (ref 0–5)
LYMPHOCYTES # BLD: 1.2 K/UL (ref 0.5–4.6)
LYMPHOCYTES NFR BLD: 18 % (ref 13–44)
MCH RBC QN AUTO: 31.4 PG (ref 26.1–32.9)
MCHC RBC AUTO-ENTMCNC: 32.9 G/DL (ref 31.4–35)
MCV RBC AUTO: 95.3 FL (ref 82–102)
MONOCYTES # BLD: 0.4 K/UL (ref 0.1–1.3)
MONOCYTES NFR BLD: 7 % (ref 4–12)
NEUTS SEG # BLD: 4.5 K/UL (ref 1.7–8.2)
NEUTS SEG NFR BLD: 70 % (ref 43–78)
NRBC # BLD: 0 K/UL (ref 0–0.2)
PLATELET # BLD AUTO: 186 K/UL (ref 150–450)
PMV BLD AUTO: 9.6 FL (ref 9.4–12.3)
POTASSIUM SERPL-SCNC: 4 MMOL/L (ref 3.5–5.1)
PROT SERPL-MCNC: 7.2 G/DL (ref 6.3–8.2)
RBC # BLD AUTO: 4.05 M/UL (ref 4.23–5.6)
SODIUM SERPL-SCNC: 137 MMOL/L (ref 133–143)
WBC # BLD AUTO: 6.4 K/UL (ref 4.3–11.1)

## 2023-11-02 PROCEDURE — 82728 ASSAY OF FERRITIN: CPT

## 2023-11-02 PROCEDURE — 1036F TOBACCO NON-USER: CPT | Performed by: INTERNAL MEDICINE

## 2023-11-02 PROCEDURE — 3017F COLORECTAL CA SCREEN DOC REV: CPT | Performed by: INTERNAL MEDICINE

## 2023-11-02 PROCEDURE — 3078F DIAST BP <80 MM HG: CPT | Performed by: INTERNAL MEDICINE

## 2023-11-02 PROCEDURE — 3074F SYST BP LT 130 MM HG: CPT | Performed by: INTERNAL MEDICINE

## 2023-11-02 PROCEDURE — G8427 DOCREV CUR MEDS BY ELIG CLIN: HCPCS | Performed by: INTERNAL MEDICINE

## 2023-11-02 PROCEDURE — 99214 OFFICE O/P EST MOD 30 MIN: CPT | Performed by: INTERNAL MEDICINE

## 2023-11-02 PROCEDURE — 85025 COMPLETE CBC W/AUTO DIFF WBC: CPT

## 2023-11-02 PROCEDURE — 36415 COLL VENOUS BLD VENIPUNCTURE: CPT

## 2023-11-02 PROCEDURE — 80053 COMPREHEN METABOLIC PANEL: CPT

## 2023-11-02 PROCEDURE — G8484 FLU IMMUNIZE NO ADMIN: HCPCS | Performed by: INTERNAL MEDICINE

## 2023-11-02 PROCEDURE — G8417 CALC BMI ABV UP PARAM F/U: HCPCS | Performed by: INTERNAL MEDICINE

## 2023-11-02 PROCEDURE — 1123F ACP DISCUSS/DSCN MKR DOCD: CPT | Performed by: INTERNAL MEDICINE

## 2023-11-02 RX ORDER — EPINEPHRINE 1 MG/ML
0.3 INJECTION, SOLUTION, CONCENTRATE INTRAVENOUS PRN
OUTPATIENT
Start: 2023-11-09

## 2023-11-02 RX ORDER — FAMOTIDINE 10 MG/ML
20 INJECTION, SOLUTION INTRAVENOUS
OUTPATIENT
Start: 2023-11-09

## 2023-11-02 RX ORDER — ALBUTEROL SULFATE 90 UG/1
4 AEROSOL, METERED RESPIRATORY (INHALATION) PRN
OUTPATIENT
Start: 2023-11-09

## 2023-11-02 RX ORDER — ACETAMINOPHEN 325 MG/1
650 TABLET ORAL
OUTPATIENT
Start: 2023-11-09

## 2023-11-02 RX ORDER — TAMSULOSIN HYDROCHLORIDE 0.4 MG/1
0.4 CAPSULE ORAL 2 TIMES DAILY
Qty: 180 CAPSULE | Refills: 3 | Status: SHIPPED | OUTPATIENT
Start: 2023-11-02

## 2023-11-02 RX ORDER — SODIUM CHLORIDE 9 MG/ML
5-250 INJECTION, SOLUTION INTRAVENOUS PRN
OUTPATIENT
Start: 2023-11-09

## 2023-11-02 RX ORDER — ONDANSETRON 2 MG/ML
8 INJECTION INTRAMUSCULAR; INTRAVENOUS
OUTPATIENT
Start: 2023-11-09

## 2023-11-02 RX ORDER — HEPARIN SODIUM (PORCINE) LOCK FLUSH IV SOLN 100 UNIT/ML 100 UNIT/ML
500 SOLUTION INTRAVENOUS PRN
OUTPATIENT
Start: 2023-11-09

## 2023-11-02 RX ORDER — DIPHENHYDRAMINE HYDROCHLORIDE 50 MG/ML
50 INJECTION INTRAMUSCULAR; INTRAVENOUS
OUTPATIENT
Start: 2023-11-09

## 2023-11-02 RX ORDER — SODIUM CHLORIDE 0.9 % (FLUSH) 0.9 %
5-40 SYRINGE (ML) INJECTION PRN
OUTPATIENT
Start: 2023-11-09

## 2023-11-02 RX ORDER — SODIUM CHLORIDE 9 MG/ML
INJECTION, SOLUTION INTRAVENOUS CONTINUOUS
OUTPATIENT
Start: 2023-11-09

## 2023-11-02 ASSESSMENT — PATIENT HEALTH QUESTIONNAIRE - PHQ9
SUM OF ALL RESPONSES TO PHQ QUESTIONS 1-9: 0
1. LITTLE INTEREST OR PLEASURE IN DOING THINGS: 0
SUM OF ALL RESPONSES TO PHQ9 QUESTIONS 1 & 2: 0
2. FEELING DOWN, DEPRESSED OR HOPELESS: 0
SUM OF ALL RESPONSES TO PHQ QUESTIONS 1-9: 0

## 2023-11-02 NOTE — PROGRESS NOTES
Platelets were 231; his Ferritin level was 15. Medical problems and test results were reviewed with the patient today. ASSESSMENT:    Iron Deficiency Anemia; Hyperlipidemia. I spent a total of 32 minutes on the day of the visit, managing the care of this patient. PLAN:   I will arrange for him to receive a course of Feraheme. He should continue taking Simvastatin; at his next clinic visit I will re-check his CBC, CMP and Ferritin level.         Michoacano Wilson MD  Hematology/BMT

## 2023-11-02 NOTE — PATIENT INSTRUCTIONS
experiencing any of the above symptoms. Patient has My Chart. My Chart log in information explained on the after visit summary printout at the [address] desk.     Abdon Anne MA

## 2023-11-02 NOTE — TELEPHONE ENCOUNTER
Patient called in and wants to continue BID flomax as he feels that this is helping his lower urinary tract symptoms. I called in BID flomax to his pharmacy. Chuck Patiño,   Please schedule follow up with me in 12 months for symptom check or he can call to schedule sooner if he feels that he needs to check in sooner. Thanks!   Yvrose

## 2023-11-03 ENCOUNTER — TELEPHONE (OUTPATIENT)
Dept: INTERNAL MEDICINE CLINIC | Facility: CLINIC | Age: 68
End: 2023-11-03

## 2023-11-03 NOTE — TELEPHONE ENCOUNTER
From: Nathan Mendoza  To: Dr. Dumont Nile: 11/2/2023 5:01 PM EDT  Subject: My Appt with Dr. Jose Luis Caballero,   I went to Dr. Jose Luis Martinez today. Bloodwork looked good, except that hemoglobin was 12.7 today, and had been 13.00 on 10/30/23 at your office. [address] called and said I should stop Pradaxa for 72 hours. I stopped it for night dose on 10/31/23 & am planning to restart on Saturday 11/4/23. Dr. Jose Luis Martinez is setting up for me to get iron infusion on 11/9/23 and 11/16/23. He said to talk to Dr. Shawna Anderson to see if I was a candidate for [facility]" procedure. My stool this morning was still very dark, and I will check it again tomorrow. I need some direction: I would prefer to talk with you prior to talking with Dr. Shawna Anderson. I need to know if I should resume pradaxa after 72 hours that [address] [facility] [address] said, and does he he think I should consider the Watchman procedure. Also, my ferratin today was 15, so I would think I am losing blood somewhere. I would love to hear back from you or from someone at your office.      Thank you sir:   Danay Garcia   [phone]

## 2023-11-03 NOTE — TELEPHONE ENCOUNTER
Duplicate request. Pt sent [employer] message and was routed to Dr. Lulu Felix as urgent. Closing encounter.

## 2023-11-06 ENCOUNTER — TELEPHONE (OUTPATIENT)
Dept: INTERNAL MEDICINE CLINIC | Facility: CLINIC | Age: 68
End: 2023-11-06

## 2023-11-06 NOTE — TELEPHONE ENCOUNTER
Pt wants to know if he should be on his Pradaxa Meds.   He  left a message on Friday and no one has called him back

## 2023-11-07 NOTE — TELEPHONE ENCOUNTER
Called patient to clarify message. He states that his stool is back to normal color. He states Dr Jeff Trinh has ordered 2 Iron Infusions next week. Patient wanted to know if he should continue to hold Pradaxa?

## 2023-11-09 ENCOUNTER — HOSPITAL ENCOUNTER (OUTPATIENT)
Dept: INFUSION THERAPY | Age: 68
Discharge: HOME | End: 2023-11-09
Payer: MEDICARE

## 2023-11-09 VITALS
SYSTOLIC BLOOD PRESSURE: 122 MMHG | HEART RATE: 81 BPM | DIASTOLIC BLOOD PRESSURE: 67 MMHG | OXYGEN SATURATION: 96 % | TEMPERATURE: 97.4 F | RESPIRATION RATE: 16 BRPM

## 2023-11-09 DIAGNOSIS — D50.8 OTHER IRON DEFICIENCY ANEMIAS: Primary | ICD-10-CM

## 2023-11-09 PROCEDURE — 2580000003 HC RX 258: Performed by: INTERNAL MEDICINE

## 2023-11-09 PROCEDURE — 96365 THER/PROPH/DIAG IV INF INIT: CPT

## 2023-11-09 PROCEDURE — 6360000002 HC RX W HCPCS: Performed by: INTERNAL MEDICINE

## 2023-11-09 RX ORDER — EPINEPHRINE 1 MG/ML
0.3 INJECTION, SOLUTION, CONCENTRATE INTRAVENOUS PRN
OUTPATIENT
Start: 2023-11-16

## 2023-11-09 RX ORDER — SODIUM CHLORIDE 0.9 % (FLUSH) 0.9 %
5-40 SYRINGE (ML) INJECTION PRN
OUTPATIENT
Start: 2023-11-16

## 2023-11-09 RX ORDER — HEPARIN 100 UNIT/ML
500 SYRINGE INTRAVENOUS PRN
OUTPATIENT
Start: 2023-11-16

## 2023-11-09 RX ORDER — ALBUTEROL SULFATE 90 UG/1
4 AEROSOL, METERED RESPIRATORY (INHALATION) PRN
OUTPATIENT
Start: 2023-11-16

## 2023-11-09 RX ORDER — DIPHENHYDRAMINE HYDROCHLORIDE 50 MG/ML
50 INJECTION INTRAMUSCULAR; INTRAVENOUS
OUTPATIENT
Start: 2023-11-16

## 2023-11-09 RX ORDER — DIPHENHYDRAMINE HYDROCHLORIDE 50 MG/ML
50 INJECTION INTRAMUSCULAR; INTRAVENOUS
Status: DISCONTINUED | OUTPATIENT
Start: 2023-11-09 | End: 2023-11-10 | Stop reason: HOSPADM

## 2023-11-09 RX ORDER — ONDANSETRON 2 MG/ML
8 INJECTION INTRAMUSCULAR; INTRAVENOUS
OUTPATIENT
Start: 2023-11-16

## 2023-11-09 RX ORDER — SODIUM CHLORIDE 9 MG/ML
5-250 INJECTION, SOLUTION INTRAVENOUS PRN
Status: DISCONTINUED | OUTPATIENT
Start: 2023-11-09 | End: 2023-11-10 | Stop reason: HOSPADM

## 2023-11-09 RX ORDER — SODIUM CHLORIDE 9 MG/ML
INJECTION, SOLUTION INTRAVENOUS CONTINUOUS
OUTPATIENT
Start: 2023-11-16

## 2023-11-09 RX ORDER — SODIUM CHLORIDE 9 MG/ML
5-250 INJECTION, SOLUTION INTRAVENOUS PRN
OUTPATIENT
Start: 2023-11-16

## 2023-11-09 RX ORDER — ACETAMINOPHEN 325 MG/1
650 TABLET ORAL
OUTPATIENT
Start: 2023-11-16

## 2023-11-09 RX ORDER — SODIUM CHLORIDE 0.9 % (FLUSH) 0.9 %
5-40 SYRINGE (ML) INJECTION PRN
Status: DISCONTINUED | OUTPATIENT
Start: 2023-11-09 | End: 2023-11-10 | Stop reason: HOSPADM

## 2023-11-09 RX ADMIN — SODIUM CHLORIDE 100 ML/HR: 9 INJECTION, SOLUTION INTRAVENOUS at 15:13

## 2023-11-09 RX ADMIN — SODIUM CHLORIDE, PRESERVATIVE FREE 10 ML: 5 INJECTION INTRAVENOUS at 15:13

## 2023-11-09 RX ADMIN — FERUMOXYTOL 510 MG: 510 INJECTION INTRAVENOUS at 14:50

## 2023-11-09 NOTE — PROGRESS NOTES
Arrived to the [address] [place] [address]. Joshua completed. Patient tolerated well. Any issues or concerns during appointment: none. Patient aware of next infusion appointment on 11/16 (date) at 12:30 PM (time). Discharged ambulatory.

## 2023-11-13 ENCOUNTER — TELEPHONE (OUTPATIENT)
Dept: INTERNAL MEDICINE CLINIC | Facility: CLINIC | Age: 68
End: 2023-11-13

## 2023-11-13 ENCOUNTER — PATIENT MESSAGE (OUTPATIENT)
Dept: INTERNAL MEDICINE CLINIC | Facility: CLINIC | Age: 68
End: 2023-11-13

## 2023-11-13 DIAGNOSIS — K92.1 BLACK STOOL: Primary | ICD-10-CM

## 2023-11-13 LAB — HGB BLD-MCNC: 11.5 G/DL (ref 13.6–17.2)

## 2023-11-14 NOTE — TELEPHONE ENCOUNTER
From: Joelle Welch  To: Dr. Vernon How: 11/13/2023 8:17 AM EST  Subject: Black stool has returned. Dr. Vasu Cunningham,   My black stool has returned. I had an iron infusion last Thursday and have another one scheduled this Thursday. I stopped taking Pradaxa on Sunday morning 11/12/23. I had a RFA coolief procedure scheduled on 11/14 for my left knee, but I have called to reschedule that procedure with all this going on. Please have someone call me to let know what I should do next.    Daija Ambrose   [phone]

## 2023-11-16 ENCOUNTER — HOSPITAL ENCOUNTER (OUTPATIENT)
Dept: INFUSION THERAPY | Age: 68
Discharge: HOME | End: 2023-11-16
Payer: MEDICARE

## 2023-11-16 VITALS
TEMPERATURE: 97.6 F | SYSTOLIC BLOOD PRESSURE: 108 MMHG | RESPIRATION RATE: 16 BRPM | DIASTOLIC BLOOD PRESSURE: 58 MMHG | HEART RATE: 84 BPM | OXYGEN SATURATION: 95 %

## 2023-11-16 DIAGNOSIS — D50.8 OTHER IRON DEFICIENCY ANEMIAS: Primary | ICD-10-CM

## 2023-11-16 PROCEDURE — 2580000003 HC RX 258: Performed by: INTERNAL MEDICINE

## 2023-11-16 PROCEDURE — 96365 THER/PROPH/DIAG IV INF INIT: CPT

## 2023-11-16 PROCEDURE — 6360000002 HC RX W HCPCS: Performed by: INTERNAL MEDICINE

## 2023-11-16 RX ORDER — ONDANSETRON 2 MG/ML
8 INJECTION INTRAMUSCULAR; INTRAVENOUS
Status: DISCONTINUED | OUTPATIENT
Start: 2023-11-16 | End: 2023-11-17 | Stop reason: HOSPADM

## 2023-11-16 RX ORDER — SODIUM CHLORIDE 0.9 % (FLUSH) 0.9 %
5-40 SYRINGE (ML) INJECTION PRN
OUTPATIENT
Start: 2023-11-16

## 2023-11-16 RX ORDER — HEPARIN 100 UNIT/ML
500 SYRINGE INTRAVENOUS PRN
OUTPATIENT
Start: 2023-11-16

## 2023-11-16 RX ORDER — SODIUM CHLORIDE 9 MG/ML
5-250 INJECTION, SOLUTION INTRAVENOUS PRN
OUTPATIENT
Start: 2023-11-16

## 2023-11-16 RX ORDER — ACETAMINOPHEN 325 MG/1
650 TABLET ORAL
OUTPATIENT
Start: 2023-11-16

## 2023-11-16 RX ORDER — ALBUTEROL SULFATE 90 UG/1
4 AEROSOL, METERED RESPIRATORY (INHALATION) PRN
Status: DISCONTINUED | OUTPATIENT
Start: 2023-11-16 | End: 2023-11-17 | Stop reason: HOSPADM

## 2023-11-16 RX ORDER — SODIUM CHLORIDE 0.9 % (FLUSH) 0.9 %
5-40 SYRINGE (ML) INJECTION PRN
Status: DISCONTINUED | OUTPATIENT
Start: 2023-11-16 | End: 2023-11-17 | Stop reason: HOSPADM

## 2023-11-16 RX ORDER — DIPHENHYDRAMINE HYDROCHLORIDE 50 MG/ML
50 INJECTION INTRAMUSCULAR; INTRAVENOUS
OUTPATIENT
Start: 2023-11-16

## 2023-11-16 RX ORDER — ALBUTEROL SULFATE 90 UG/1
4 AEROSOL, METERED RESPIRATORY (INHALATION) PRN
OUTPATIENT
Start: 2023-11-16

## 2023-11-16 RX ORDER — SODIUM CHLORIDE 9 MG/ML
5-250 INJECTION, SOLUTION INTRAVENOUS PRN
Status: DISCONTINUED | OUTPATIENT
Start: 2023-11-16 | End: 2023-11-17 | Stop reason: HOSPADM

## 2023-11-16 RX ORDER — SODIUM CHLORIDE 9 MG/ML
INJECTION, SOLUTION INTRAVENOUS CONTINUOUS
OUTPATIENT
Start: 2023-11-16

## 2023-11-16 RX ORDER — EPINEPHRINE 1 MG/ML
0.3 INJECTION, SOLUTION, CONCENTRATE INTRAVENOUS PRN
OUTPATIENT
Start: 2023-11-16

## 2023-11-16 RX ORDER — SODIUM CHLORIDE 9 MG/ML
5-250 INJECTION, SOLUTION INTRAVENOUS PRN
Status: CANCELLED | OUTPATIENT
Start: 2023-11-16

## 2023-11-16 RX ORDER — ACETAMINOPHEN 325 MG/1
650 TABLET ORAL
Status: DISCONTINUED | OUTPATIENT
Start: 2023-11-16 | End: 2023-11-17 | Stop reason: HOSPADM

## 2023-11-16 RX ORDER — DIPHENHYDRAMINE HYDROCHLORIDE 50 MG/ML
50 INJECTION INTRAMUSCULAR; INTRAVENOUS
Status: DISCONTINUED | OUTPATIENT
Start: 2023-11-16 | End: 2023-11-17 | Stop reason: HOSPADM

## 2023-11-16 RX ORDER — EPINEPHRINE 1 MG/ML
0.3 INJECTION, SOLUTION, CONCENTRATE INTRAVENOUS PRN
Status: DISCONTINUED | OUTPATIENT
Start: 2023-11-16 | End: 2023-11-17 | Stop reason: HOSPADM

## 2023-11-16 RX ORDER — ONDANSETRON 2 MG/ML
8 INJECTION INTRAMUSCULAR; INTRAVENOUS
OUTPATIENT
Start: 2023-11-16

## 2023-11-16 RX ADMIN — SODIUM CHLORIDE 100 ML/HR: 9 INJECTION, SOLUTION INTRAVENOUS at 12:41

## 2023-11-16 RX ADMIN — FERUMOXYTOL 510 MG: 510 INJECTION INTRAVENOUS at 13:05

## 2023-11-16 NOTE — PROGRESS NOTES
Arrived to the [address] [place]. Joshua completed. Patient tolerated without problems. Any issues or concerns during appointment: no.  Patient aware of next lab and [facility] office visit on 12/28/23. Patient instructed to call provider with temperature of 100.4 or greater or nausea/vomiting/ diarrhea or pain not controlled by medications  Discharged ambulatory.

## 2023-11-17 ENCOUNTER — NURSE ONLY (OUTPATIENT)
Dept: INTERNAL MEDICINE CLINIC | Facility: CLINIC | Age: 68
End: 2023-11-17

## 2023-11-17 DIAGNOSIS — K92.1 BLACK STOOL: Primary | ICD-10-CM

## 2023-11-20 DIAGNOSIS — K92.2 GASTROINTESTINAL HEMORRHAGE, UNSPECIFIED GASTROINTESTINAL HEMORRHAGE TYPE: Primary | ICD-10-CM

## 2023-11-21 ENCOUNTER — TELEPHONE (OUTPATIENT)
Age: 68
End: 2023-11-21

## 2023-11-21 ENCOUNTER — PATIENT MESSAGE (OUTPATIENT)
Age: 68
End: 2023-11-21

## 2023-11-21 NOTE — TELEPHONE ENCOUNTER
Regarding: Zulema Neumann  Contact: [phone]  ----- Message from Damion Pimentel sent at 11/21/2023  4:33 PM EST -----       ----- Message from Renae Carias \"Garret\" to Damion Varma MD sent at 11/21/2023  9:39 AM -----   Dr. Shree Santos,   I have been struggling with a GI bleed all year. Seems when I stop Pradaxa, it tends to clear up. Dr. Popeye Hunter at [facility] (He replaced Dr. Jamal Noble) and he suggested to check with you to see if I am a candidate for \" Watchman\". I have an echo with your office scheduled on December 12, 2023. Would you mind looking over my chart records for the year? I have had endoscopy and colonoscopy by Dr. Immanuel Shannon in July of this year. I have an appt. with Dr. Pamela Rain at [facility] today. I stopped Pradaxa this time on the PM dose on 11/11/23. I have had 2 iron infusions on 11/9 and 11/16/23. If you need to talk with me, feel free to call or have your nurse call. Thank you sir!    Zulema Thien   [phone]

## 2023-12-11 ENCOUNTER — PATIENT MESSAGE (OUTPATIENT)
Dept: INTERNAL MEDICINE CLINIC | Facility: CLINIC | Age: 68
End: 2023-12-11

## 2023-12-11 DIAGNOSIS — K92.2 GASTROINTESTINAL HEMORRHAGE, UNSPECIFIED GASTROINTESTINAL HEMORRHAGE TYPE: Primary | ICD-10-CM

## 2023-12-11 DIAGNOSIS — Z01.89: ICD-10-CM

## 2023-12-11 NOTE — TELEPHONE ENCOUNTER
From: David Speak  To: Dr. Tracey Hooper: 12/11/2023 11:40 AM EST  Subject: Nayely Navarrete,   I had a capsule [facility]. I had resumed my Pradaxa 12/4-12/8 per their instructions to help them spot bleeding. I am not taking Pradaxa as of now. I have an annual echo tomorrow at [facility]. I will not see Dr Irish Thomas. I have not passed the capsule/camera to my knowledge. If I don't see it by 12/21/23 I am to notify Roselia Kline and it is my understanding they do an Xray to see if they see the capsule. I need some direction on whether I should resume my blood thinner. Thank you so much.  Araceli Echavarria [phone]

## 2023-12-12 NOTE — TELEPHONE ENCOUNTER
I called the patient and discussed and clarified the message conveyed. Not passed camera yet. Advised to resume Pradaxa on Wednesday. Standing order for CBC and BMP placed if he develops any bleeding after re starting DOAC. I also went ahead and placed an abdominal xray if you needed it and the capsule endoscopy camera had not passed.       Abhilash Cox MD

## 2023-12-17 DIAGNOSIS — E11.65 TYPE 2 DIABETES MELLITUS WITH HYPERGLYCEMIA, WITHOUT LONG-TERM CURRENT USE OF INSULIN (HCC): ICD-10-CM

## 2023-12-18 RX ORDER — PEN NEEDLE, DIABETIC 30 GX3/16"
NEEDLE, DISPOSABLE MISCELLANEOUS
Qty: 100 EACH | Refills: 1 | Status: SHIPPED | OUTPATIENT
Start: 2023-12-18

## 2023-12-27 NOTE — TELEPHONE ENCOUNTER
Pt called, states his pharmacy told him there was a conflict with his prescriptions. States was told by the pharmacy that the prescriptions were received and filled, but was told that they did not have the bottles.     Metformin 500 mg tablet    Vasotec 10 mg tablet

## 2023-12-27 NOTE — TELEPHONE ENCOUNTER
Spoke with [facility] for clarification and they state that they have refills remaining on pt's enalapril, but he needs a new RX for metformin, please.

## 2023-12-28 ENCOUNTER — OFFICE VISIT (OUTPATIENT)
Dept: ONCOLOGY | Age: 68
End: 2023-12-28
Payer: MEDICARE

## 2023-12-28 ENCOUNTER — HOSPITAL ENCOUNTER (OUTPATIENT)
Dept: LAB | Age: 68
Discharge: HOME | End: 2023-12-28
Payer: MEDICARE

## 2023-12-28 VITALS
RESPIRATION RATE: 16 BRPM | SYSTOLIC BLOOD PRESSURE: 131 MMHG | BODY MASS INDEX: 40.87 KG/M2 | HEIGHT: 73 IN | WEIGHT: 308.4 LBS | TEMPERATURE: 98.2 F | HEART RATE: 67 BPM | OXYGEN SATURATION: 96 % | DIASTOLIC BLOOD PRESSURE: 78 MMHG

## 2023-12-28 DIAGNOSIS — D50.8 OTHER IRON DEFICIENCY ANEMIA: Primary | ICD-10-CM

## 2023-12-28 DIAGNOSIS — D50.8 OTHER IRON DEFICIENCY ANEMIA: ICD-10-CM

## 2023-12-28 DIAGNOSIS — E78.49 OTHER HYPERLIPIDEMIA: ICD-10-CM

## 2023-12-28 LAB
ALBUMIN SERPL-MCNC: 3.5 G/DL (ref 3.2–4.6)
ALBUMIN/GLOB SERPL: 0.9 (ref 0.4–1.6)
ALP SERPL-CCNC: 92 U/L (ref 50–136)
ALT SERPL-CCNC: 17 U/L (ref 12–65)
ANION GAP SERPL CALC-SCNC: 5 MMOL/L (ref 2–11)
AST SERPL-CCNC: 12 U/L (ref 15–37)
BASOPHILS # BLD: 0 K/UL (ref 0–0.2)
BASOPHILS NFR BLD: 0 % (ref 0–2)
BILIRUB SERPL-MCNC: 1.3 MG/DL (ref 0.2–1.1)
BUN SERPL-MCNC: 9 MG/DL (ref 8–23)
CALCIUM SERPL-MCNC: 9.2 MG/DL (ref 8.3–10.4)
CHLORIDE SERPL-SCNC: 105 MMOL/L (ref 103–113)
CO2 SERPL-SCNC: 24 MMOL/L (ref 21–32)
CREAT SERPL-MCNC: 0.9 MG/DL (ref 0.8–1.5)
DIFFERENTIAL METHOD BLD: NORMAL
EOSINOPHIL # BLD: 0.2 K/UL (ref 0–0.8)
EOSINOPHIL NFR BLD: 3 % (ref 0.5–7.8)
ERYTHROCYTE [DISTWIDTH] IN BLOOD BY AUTOMATED COUNT: 13.9 % (ref 11.9–14.6)
FERRITIN SERPL-MCNC: 39 NG/ML (ref 8–388)
GLOBULIN SER CALC-MCNC: 3.8 G/DL (ref 2.8–4.5)
GLUCOSE SERPL-MCNC: 113 MG/DL (ref 65–100)
HCT VFR BLD AUTO: 41.9 % (ref 41.1–50.3)
HGB BLD-MCNC: 13.9 G/DL (ref 13.6–17.2)
IMM GRANULOCYTES # BLD AUTO: 0 K/UL (ref 0–0.5)
IMM GRANULOCYTES NFR BLD AUTO: 1 % (ref 0–5)
LYMPHOCYTES # BLD: 1.1 K/UL (ref 0.5–4.6)
LYMPHOCYTES NFR BLD: 16 % (ref 13–44)
MCH RBC QN AUTO: 31.2 PG (ref 26.1–32.9)
MCHC RBC AUTO-ENTMCNC: 33.2 G/DL (ref 31.4–35)
MCV RBC AUTO: 94.2 FL (ref 82–102)
MONOCYTES # BLD: 0.5 K/UL (ref 0.1–1.3)
MONOCYTES NFR BLD: 7 % (ref 4–12)
NEUTS SEG # BLD: 5 K/UL (ref 1.7–8.2)
NEUTS SEG NFR BLD: 73 % (ref 43–78)
NRBC # BLD: 0 K/UL (ref 0–0.2)
PLATELET # BLD AUTO: 184 K/UL (ref 150–450)
PMV BLD AUTO: 10.3 FL (ref 9.4–12.3)
POTASSIUM SERPL-SCNC: 4 MMOL/L (ref 3.5–5.1)
PROT SERPL-MCNC: 7.3 G/DL (ref 6.3–8.2)
RBC # BLD AUTO: 4.45 M/UL (ref 4.23–5.6)
SODIUM SERPL-SCNC: 134 MMOL/L (ref 136–146)
WBC # BLD AUTO: 6.9 K/UL (ref 4.3–11.1)

## 2023-12-28 PROCEDURE — 99214 OFFICE O/P EST MOD 30 MIN: CPT | Performed by: INTERNAL MEDICINE

## 2023-12-28 PROCEDURE — G8417 CALC BMI ABV UP PARAM F/U: HCPCS | Performed by: INTERNAL MEDICINE

## 2023-12-28 PROCEDURE — 3017F COLORECTAL CA SCREEN DOC REV: CPT | Performed by: INTERNAL MEDICINE

## 2023-12-28 PROCEDURE — 36415 COLL VENOUS BLD VENIPUNCTURE: CPT

## 2023-12-28 PROCEDURE — 3078F DIAST BP <80 MM HG: CPT | Performed by: INTERNAL MEDICINE

## 2023-12-28 PROCEDURE — 85025 COMPLETE CBC W/AUTO DIFF WBC: CPT

## 2023-12-28 PROCEDURE — 1123F ACP DISCUSS/DSCN MKR DOCD: CPT | Performed by: INTERNAL MEDICINE

## 2023-12-28 PROCEDURE — 82728 ASSAY OF FERRITIN: CPT

## 2023-12-28 PROCEDURE — 1036F TOBACCO NON-USER: CPT | Performed by: INTERNAL MEDICINE

## 2023-12-28 PROCEDURE — G8484 FLU IMMUNIZE NO ADMIN: HCPCS | Performed by: INTERNAL MEDICINE

## 2023-12-28 PROCEDURE — 80053 COMPREHEN METABOLIC PANEL: CPT

## 2023-12-28 PROCEDURE — G8427 DOCREV CUR MEDS BY ELIG CLIN: HCPCS | Performed by: INTERNAL MEDICINE

## 2023-12-28 PROCEDURE — 3075F SYST BP GE 130 - 139MM HG: CPT | Performed by: INTERNAL MEDICINE

## 2023-12-28 RX ORDER — SODIUM CHLORIDE 0.9 % (FLUSH) 0.9 %
5-40 SYRINGE (ML) INJECTION PRN
OUTPATIENT
Start: 2024-01-11

## 2023-12-28 RX ORDER — ONDANSETRON 2 MG/ML
8 INJECTION INTRAMUSCULAR; INTRAVENOUS
OUTPATIENT
Start: 2024-01-11

## 2023-12-28 RX ORDER — SODIUM CHLORIDE 9 MG/ML
INJECTION, SOLUTION INTRAVENOUS CONTINUOUS
OUTPATIENT
Start: 2024-01-11

## 2023-12-28 RX ORDER — EPINEPHRINE 1 MG/ML
0.3 INJECTION, SOLUTION, CONCENTRATE INTRAVENOUS PRN
OUTPATIENT
Start: 2024-01-11

## 2023-12-28 RX ORDER — ACETAMINOPHEN 325 MG/1
650 TABLET ORAL
OUTPATIENT
Start: 2024-01-11

## 2023-12-28 RX ORDER — DIPHENHYDRAMINE HYDROCHLORIDE 50 MG/ML
50 INJECTION INTRAMUSCULAR; INTRAVENOUS
OUTPATIENT
Start: 2024-01-11

## 2023-12-28 RX ORDER — HEPARIN 100 UNIT/ML
500 SYRINGE INTRAVENOUS PRN
OUTPATIENT
Start: 2024-01-11

## 2023-12-28 RX ORDER — SODIUM CHLORIDE 9 MG/ML
5-250 INJECTION, SOLUTION INTRAVENOUS PRN
OUTPATIENT
Start: 2024-01-11

## 2023-12-28 RX ORDER — ALBUTEROL SULFATE 90 UG/1
4 AEROSOL, METERED RESPIRATORY (INHALATION) PRN
OUTPATIENT
Start: 2024-01-11

## 2023-12-28 NOTE — PATIENT INSTRUCTIONS
Patient Instructions from Today's Visit    Reason for Visit:  Follow Up    Diagnosis Information:  [email or web address]      Plan:  Lab work is stable, but iron is still low  We would like you to have 2 more Iron Infusions  Hemoglobin has come up nicely    Follow Up:   We will bring you back in     Recent Lab Results:  Hospital Outpatient Visit on 12/28/2023   Component Date Value Ref Range Status    WBC 12/28/2023 6.9  4.3 - 11.1 K/uL Final    RBC 12/28/2023 4.45  4.23 - 5.6 M/uL Final    Hemoglobin 12/28/2023 13.9  13.6 - 17.2 g/dL Final    Hematocrit 12/28/2023 41.9  41.1 - 50.3 % Final    MCV 12/28/2023 94.2  82.0 - 102.0 FL Final    MCH 12/28/2023 31.2  26.1 - 32.9 PG Final    MCHC 12/28/2023 33.2  31.4 - 35.0 g/dL Final    RDW 12/28/2023 13.9  11.9 - 14.6 % Final    Platelets 65/30/6341 184  150 - 450 K/uL Final    MPV 12/28/2023 10.3  9.4 - 12.3 FL Final    nRBC 12/28/2023 0.00  0.0 - 0.2 K/uL Final    **Note: Absolute NRBC parameter is now reported with Hemogram**    Neutrophils % 12/28/2023 73  43 - 78 % Final    Lymphocytes % 12/28/2023 16  13 - 44 % Final    Monocytes % 12/28/2023 7  4.0 - 12.0 % Final    Eosinophils % 12/28/2023 3  0.5 - 7.8 % Final    Basophils % 12/28/2023 0  0.0 - 2.0 % Final    Immature Granulocytes 12/28/2023 1  0.0 - 5.0 % Final    Neutrophils Absolute 12/28/2023 5.0  1.7 - 8.2 K/UL Final    Lymphocytes Absolute 12/28/2023 1.1  0.5 - 4.6 K/UL Final    Monocytes Absolute 12/28/2023 0.5  0.1 - 1.3 K/UL Final    Eosinophils Absolute 12/28/2023 0.2  0.0 - 0.8 K/UL Final    Basophils Absolute 12/28/2023 0.0  0.0 - 0.2 K/UL Final    Absolute Immature Granulocyte 12/28/2023 0.0  0.0 - 0.5 K/UL Final    Differential Type 12/28/2023 AUTOMATED    Final    Sodium 12/28/2023 134 (L)  136 - 146 mmol/L Final    Potassium 12/28/2023 4.0  3.5 - 5.1 mmol/L Final    Chloride 12/28/2023 105  103 - 113 mmol/L Final    CO2

## 2024-01-11 ENCOUNTER — HOSPITAL ENCOUNTER (OUTPATIENT)
Dept: INFUSION THERAPY | Age: 69
Setting detail: INFUSION SERIES
Discharge: HOME | End: 2024-01-11
Payer: MEDICARE

## 2024-01-11 VITALS
SYSTOLIC BLOOD PRESSURE: 97 MMHG | HEART RATE: 87 BPM | RESPIRATION RATE: 18 BRPM | TEMPERATURE: 97.5 F | OXYGEN SATURATION: 97 % | DIASTOLIC BLOOD PRESSURE: 52 MMHG

## 2024-01-11 DIAGNOSIS — D50.8 OTHER IRON DEFICIENCY ANEMIAS: Primary | ICD-10-CM

## 2024-01-11 PROCEDURE — 96365 THER/PROPH/DIAG IV INF INIT: CPT

## 2024-01-11 PROCEDURE — 2580000003 HC RX 258: Performed by: INTERNAL MEDICINE

## 2024-01-11 PROCEDURE — 6360000002 HC RX W HCPCS: Performed by: INTERNAL MEDICINE

## 2024-01-11 RX ORDER — SODIUM CHLORIDE 9 MG/ML
INJECTION, SOLUTION INTRAVENOUS CONTINUOUS
Status: DISCONTINUED | OUTPATIENT
Start: 2024-01-11 | End: 2024-01-12 | Stop reason: HOSPADM

## 2024-01-11 RX ORDER — SODIUM CHLORIDE 0.9 % (FLUSH) 0.9 %
5-40 SYRINGE (ML) INJECTION PRN
Status: CANCELLED | OUTPATIENT
Start: 2024-01-18

## 2024-01-11 RX ORDER — SODIUM CHLORIDE 9 MG/ML
5-250 INJECTION, SOLUTION INTRAVENOUS PRN
Status: CANCELLED | OUTPATIENT
Start: 2024-01-18

## 2024-01-11 RX ORDER — DIPHENHYDRAMINE HYDROCHLORIDE 50 MG/ML
50 INJECTION INTRAMUSCULAR; INTRAVENOUS
Status: DISCONTINUED | OUTPATIENT
Start: 2024-01-11 | End: 2024-01-12 | Stop reason: HOSPADM

## 2024-01-11 RX ORDER — HEPARIN 100 UNIT/ML
500 SYRINGE INTRAVENOUS PRN
Status: CANCELLED | OUTPATIENT
Start: 2024-01-18

## 2024-01-11 RX ORDER — SODIUM CHLORIDE 0.9 % (FLUSH) 0.9 %
5-40 SYRINGE (ML) INJECTION PRN
Status: DISCONTINUED | OUTPATIENT
Start: 2024-01-11 | End: 2024-01-12 | Stop reason: HOSPADM

## 2024-01-11 RX ORDER — ONDANSETRON 2 MG/ML
8 INJECTION INTRAMUSCULAR; INTRAVENOUS
Status: DISCONTINUED | OUTPATIENT
Start: 2024-01-11 | End: 2024-01-12 | Stop reason: HOSPADM

## 2024-01-11 RX ORDER — SODIUM CHLORIDE 9 MG/ML
5-250 INJECTION, SOLUTION INTRAVENOUS PRN
Status: DISCONTINUED | OUTPATIENT
Start: 2024-01-11 | End: 2024-01-12 | Stop reason: HOSPADM

## 2024-01-11 RX ORDER — ACETAMINOPHEN 325 MG/1
650 TABLET ORAL
Status: DISCONTINUED | OUTPATIENT
Start: 2024-01-11 | End: 2024-01-12 | Stop reason: HOSPADM

## 2024-01-11 RX ORDER — DIPHENHYDRAMINE HYDROCHLORIDE 50 MG/ML
50 INJECTION INTRAMUSCULAR; INTRAVENOUS
Status: CANCELLED | OUTPATIENT
Start: 2024-01-18

## 2024-01-11 RX ORDER — ALBUTEROL SULFATE 90 UG/1
4 AEROSOL, METERED RESPIRATORY (INHALATION) PRN
Status: CANCELLED | OUTPATIENT
Start: 2024-01-18

## 2024-01-11 RX ORDER — ALBUTEROL SULFATE 90 UG/1
4 AEROSOL, METERED RESPIRATORY (INHALATION) PRN
Status: DISCONTINUED | OUTPATIENT
Start: 2024-01-11 | End: 2024-01-12 | Stop reason: HOSPADM

## 2024-01-11 RX ORDER — ACETAMINOPHEN 325 MG/1
650 TABLET ORAL
Status: CANCELLED | OUTPATIENT
Start: 2024-01-18

## 2024-01-11 RX ORDER — ONDANSETRON 2 MG/ML
8 INJECTION INTRAMUSCULAR; INTRAVENOUS
Status: CANCELLED | OUTPATIENT
Start: 2024-01-18

## 2024-01-11 RX ORDER — EPINEPHRINE 1 MG/ML
0.3 INJECTION, SOLUTION, CONCENTRATE INTRAVENOUS PRN
Status: CANCELLED | OUTPATIENT
Start: 2024-01-18

## 2024-01-11 RX ORDER — SODIUM CHLORIDE 9 MG/ML
INJECTION, SOLUTION INTRAVENOUS CONTINUOUS
Status: CANCELLED | OUTPATIENT
Start: 2024-01-18

## 2024-01-11 RX ORDER — EPINEPHRINE 1 MG/ML
0.3 INJECTION, SOLUTION, CONCENTRATE INTRAVENOUS PRN
Status: DISCONTINUED | OUTPATIENT
Start: 2024-01-11 | End: 2024-01-12 | Stop reason: HOSPADM

## 2024-01-11 RX ADMIN — FERUMOXYTOL 510 MG: 510 INJECTION INTRAVENOUS at 09:58

## 2024-01-11 RX ADMIN — SODIUM CHLORIDE, PRESERVATIVE FREE 10 ML: 5 INJECTION INTRAVENOUS at 09:25

## 2024-01-11 RX ADMIN — SODIUM CHLORIDE 50 ML/HR: 9 INJECTION, SOLUTION INTRAVENOUS at 09:30

## 2024-01-11 NOTE — PROGRESS NOTES
Arrived to the Infusion Center.  Fereheme completed. Patient tolerated without difficulty.   Any issues or concerns during appointment: None.  Patient aware of next infusion appointment on 01/18/2024 (date) at 0900 (time).  Patient instructed to call provider with temperature of 100.4 or greater or nausea/vomiting/ diarrhea or pain not controlled by medications  Discharged ambulatory to home with wife. Patient has been directed that medication can cause black stools and possible constipation.  Encouraged to push oral fluids today.

## 2024-01-12 DIAGNOSIS — K92.2 GASTROINTESTINAL HEMORRHAGE, UNSPECIFIED GASTROINTESTINAL HEMORRHAGE TYPE: ICD-10-CM

## 2024-01-12 DIAGNOSIS — E11.51 CONTROLLED TYPE 2 DIABETES MELLITUS WITH PERIPHERAL CIRCULATORY DISORDER (HCC): ICD-10-CM

## 2024-01-12 DIAGNOSIS — I50.22 CHRONIC SYSTOLIC (CONGESTIVE) HEART FAILURE (HCC): ICD-10-CM

## 2024-01-12 DIAGNOSIS — I48.11 LONGSTANDING PERSISTENT ATRIAL FIBRILLATION (HCC): ICD-10-CM

## 2024-01-12 DIAGNOSIS — I10 ESSENTIAL HYPERTENSION: ICD-10-CM

## 2024-01-12 LAB
ALBUMIN SERPL-MCNC: 3.9 G/DL (ref 3.2–4.6)
ALBUMIN/GLOB SERPL: 1.2 (ref 0.4–1.6)
ALP SERPL-CCNC: 85 U/L (ref 50–136)
ALT SERPL-CCNC: 18 U/L (ref 12–65)
ANION GAP SERPL CALC-SCNC: 4 MMOL/L (ref 2–11)
AST SERPL-CCNC: 17 U/L (ref 15–37)
BILIRUB SERPL-MCNC: 1 MG/DL (ref 0.2–1.1)
BUN SERPL-MCNC: 15 MG/DL (ref 8–23)
CALCIUM SERPL-MCNC: 9.7 MG/DL (ref 8.3–10.4)
CHLORIDE SERPL-SCNC: 106 MMOL/L (ref 103–113)
CO2 SERPL-SCNC: 27 MMOL/L (ref 21–32)
CREAT SERPL-MCNC: 1 MG/DL (ref 0.8–1.5)
ERYTHROCYTE [DISTWIDTH] IN BLOOD BY AUTOMATED COUNT: 13.9 % (ref 11.9–14.6)
EST. AVERAGE GLUCOSE BLD GHB EST-MCNC: 108 MG/DL
GLOBULIN SER CALC-MCNC: 3.2 G/DL (ref 2.8–4.5)
GLUCOSE SERPL-MCNC: 117 MG/DL (ref 65–100)
HBA1C MFR BLD: 5.4 % (ref 4.8–5.6)
HCT VFR BLD AUTO: 45.5 % (ref 41.1–50.3)
HGB BLD-MCNC: 14.8 G/DL (ref 13.6–17.2)
MCH RBC QN AUTO: 31.6 PG (ref 26.1–32.9)
MCHC RBC AUTO-ENTMCNC: 32.5 G/DL (ref 31.4–35)
MCV RBC AUTO: 97.2 FL (ref 82–102)
NRBC # BLD: 0 K/UL (ref 0–0.2)
PLATELET # BLD AUTO: 181 K/UL (ref 150–450)
PMV BLD AUTO: 10.7 FL (ref 9.4–12.3)
POTASSIUM SERPL-SCNC: 4.3 MMOL/L (ref 3.5–5.1)
PROT SERPL-MCNC: 7.1 G/DL (ref 6.3–8.2)
RBC # BLD AUTO: 4.68 M/UL (ref 4.23–5.6)
SODIUM SERPL-SCNC: 137 MMOL/L (ref 136–146)
WBC # BLD AUTO: 7.4 K/UL (ref 4.3–11.1)

## 2024-01-18 ENCOUNTER — HOSPITAL ENCOUNTER (OUTPATIENT)
Dept: INFUSION THERAPY | Age: 69
Discharge: HOME | End: 2024-01-18
Payer: MEDICARE

## 2024-01-18 VITALS
HEART RATE: 88 BPM | SYSTOLIC BLOOD PRESSURE: 136 MMHG | TEMPERATURE: 97 F | OXYGEN SATURATION: 95 % | RESPIRATION RATE: 16 BRPM | DIASTOLIC BLOOD PRESSURE: 62 MMHG

## 2024-01-18 DIAGNOSIS — D50.8 OTHER IRON DEFICIENCY ANEMIAS: Primary | ICD-10-CM

## 2024-01-18 PROCEDURE — 2580000003 HC RX 258: Performed by: INTERNAL MEDICINE

## 2024-01-18 PROCEDURE — 96365 THER/PROPH/DIAG IV INF INIT: CPT

## 2024-01-18 PROCEDURE — 6360000002 HC RX W HCPCS: Performed by: INTERNAL MEDICINE

## 2024-01-18 RX ORDER — EPINEPHRINE 1 MG/ML
0.3 INJECTION, SOLUTION, CONCENTRATE INTRAVENOUS PRN
Status: DISCONTINUED | OUTPATIENT
Start: 2024-01-18 | End: 2024-01-19 | Stop reason: HOSPADM

## 2024-01-18 RX ORDER — ALBUTEROL SULFATE 90 UG/1
4 AEROSOL, METERED RESPIRATORY (INHALATION) PRN
Status: ACTIVE | OUTPATIENT
Start: 2024-01-18 | End: 2024-01-18

## 2024-01-18 RX ORDER — ONDANSETRON 2 MG/ML
8 INJECTION INTRAMUSCULAR; INTRAVENOUS
Status: DISCONTINUED | OUTPATIENT
Start: 2024-01-18 | End: 2024-01-19 | Stop reason: HOSPADM

## 2024-01-18 RX ORDER — HEPARIN 100 UNIT/ML
500 SYRINGE INTRAVENOUS PRN
OUTPATIENT
Start: 2024-01-18

## 2024-01-18 RX ORDER — ACETAMINOPHEN 325 MG/1
650 TABLET ORAL
Status: DISCONTINUED | OUTPATIENT
Start: 2024-01-18 | End: 2024-01-19 | Stop reason: HOSPADM

## 2024-01-18 RX ORDER — SODIUM CHLORIDE 9 MG/ML
5-250 INJECTION, SOLUTION INTRAVENOUS PRN
Status: CANCELLED | OUTPATIENT
Start: 2024-01-18

## 2024-01-18 RX ORDER — EPINEPHRINE 1 MG/ML
0.3 INJECTION, SOLUTION, CONCENTRATE INTRAVENOUS PRN
OUTPATIENT
Start: 2024-01-18

## 2024-01-18 RX ORDER — SODIUM CHLORIDE 0.9 % (FLUSH) 0.9 %
5-40 SYRINGE (ML) INJECTION PRN
OUTPATIENT
Start: 2024-01-18

## 2024-01-18 RX ORDER — SODIUM CHLORIDE 9 MG/ML
INJECTION, SOLUTION INTRAVENOUS CONTINUOUS
Status: ACTIVE | OUTPATIENT
Start: 2024-01-18 | End: 2024-01-18

## 2024-01-18 RX ORDER — SODIUM CHLORIDE 9 MG/ML
INJECTION, SOLUTION INTRAVENOUS CONTINUOUS
OUTPATIENT
Start: 2024-01-18

## 2024-01-18 RX ORDER — SODIUM CHLORIDE 0.9 % (FLUSH) 0.9 %
5-40 SYRINGE (ML) INJECTION PRN
Status: DISCONTINUED | OUTPATIENT
Start: 2024-01-18 | End: 2024-01-19 | Stop reason: HOSPADM

## 2024-01-18 RX ORDER — DIPHENHYDRAMINE HYDROCHLORIDE 50 MG/ML
50 INJECTION INTRAMUSCULAR; INTRAVENOUS
Status: DISCONTINUED | OUTPATIENT
Start: 2024-01-18 | End: 2024-01-19 | Stop reason: HOSPADM

## 2024-01-18 RX ORDER — SODIUM CHLORIDE 9 MG/ML
5-250 INJECTION, SOLUTION INTRAVENOUS PRN
OUTPATIENT
Start: 2024-01-18

## 2024-01-18 RX ORDER — SODIUM CHLORIDE 9 MG/ML
5-250 INJECTION, SOLUTION INTRAVENOUS PRN
Status: DISCONTINUED | OUTPATIENT
Start: 2024-01-18 | End: 2024-01-19 | Stop reason: HOSPADM

## 2024-01-18 RX ORDER — ONDANSETRON 2 MG/ML
8 INJECTION INTRAMUSCULAR; INTRAVENOUS
OUTPATIENT
Start: 2024-01-18

## 2024-01-18 RX ORDER — ACETAMINOPHEN 325 MG/1
650 TABLET ORAL
OUTPATIENT
Start: 2024-01-18

## 2024-01-18 RX ORDER — DIPHENHYDRAMINE HYDROCHLORIDE 50 MG/ML
50 INJECTION INTRAMUSCULAR; INTRAVENOUS
OUTPATIENT
Start: 2024-01-18

## 2024-01-18 RX ORDER — ALBUTEROL SULFATE 90 UG/1
4 AEROSOL, METERED RESPIRATORY (INHALATION) PRN
OUTPATIENT
Start: 2024-01-18

## 2024-01-18 RX ADMIN — SODIUM CHLORIDE 125 ML/HR: 9 INJECTION, SOLUTION INTRAVENOUS at 09:19

## 2024-01-18 RX ADMIN — SODIUM CHLORIDE, PRESERVATIVE FREE 10 ML: 5 INJECTION INTRAVENOUS at 09:24

## 2024-01-18 RX ADMIN — FERUMOXYTOL 510 MG: 510 INJECTION INTRAVENOUS at 09:38

## 2024-01-18 NOTE — PROGRESS NOTES
Arrived to the Infusion Center ambulatory.  Feraheme infusion completed. Patient tolerated well.   Any issues or concerns during appointment: none.  Patient aware of next lab and [facility] office visit on 3/25/2024 (date) at 1020 (time).  Patient instructed to call provider with temperature of 100.4 or greater or nausea/vomiting/ diarrhea or pain not controlled by medications  Discharged home ambulatory.

## 2024-01-23 ENCOUNTER — OFFICE VISIT (OUTPATIENT)
Dept: INTERNAL MEDICINE CLINIC | Facility: CLINIC | Age: 69
End: 2024-01-23
Payer: MEDICARE

## 2024-01-23 VITALS
TEMPERATURE: 97.3 F | SYSTOLIC BLOOD PRESSURE: 114 MMHG | WEIGHT: 302 LBS | OXYGEN SATURATION: 99 % | DIASTOLIC BLOOD PRESSURE: 68 MMHG | HEIGHT: 73 IN | BODY MASS INDEX: 40.02 KG/M2 | HEART RATE: 92 BPM

## 2024-01-23 DIAGNOSIS — I10 ESSENTIAL HYPERTENSION: ICD-10-CM

## 2024-01-23 DIAGNOSIS — G63 POLYNEUROPATHY IN DISEASES CLASSIFIED ELSEWHERE (HCC): ICD-10-CM

## 2024-01-23 DIAGNOSIS — Z12.5 SCREENING PSA (PROSTATE SPECIFIC ANTIGEN): ICD-10-CM

## 2024-01-23 DIAGNOSIS — K76.0 HEPATIC STEATOSIS: ICD-10-CM

## 2024-01-23 DIAGNOSIS — E11.51 CONTROLLED TYPE 2 DIABETES MELLITUS WITH PERIPHERAL CIRCULATORY DISORDER (HCC): Primary | ICD-10-CM

## 2024-01-23 DIAGNOSIS — I87.2 VENOUS STASIS DERMATITIS OF BOTH LOWER EXTREMITIES: ICD-10-CM

## 2024-01-23 DIAGNOSIS — I48.11 LONGSTANDING PERSISTENT ATRIAL FIBRILLATION (HCC): ICD-10-CM

## 2024-01-23 DIAGNOSIS — I50.22 CHRONIC SYSTOLIC (CONGESTIVE) HEART FAILURE (HCC): ICD-10-CM

## 2024-01-23 DIAGNOSIS — E83.110 HEREDITARY HEMOCHROMATOSIS (HCC): ICD-10-CM

## 2024-01-23 DIAGNOSIS — E66.01 SEVERE OBESITY (BMI 35.0-39.9) WITH COMORBIDITY (HCC): ICD-10-CM

## 2024-01-23 DIAGNOSIS — K92.2 CHRONIC GI BLEEDING: ICD-10-CM

## 2024-01-23 PROCEDURE — G8427 DOCREV CUR MEDS BY ELIG CLIN: HCPCS | Performed by: INTERNAL MEDICINE

## 2024-01-23 PROCEDURE — 1036F TOBACCO NON-USER: CPT | Performed by: INTERNAL MEDICINE

## 2024-01-23 PROCEDURE — G8484 FLU IMMUNIZE NO ADMIN: HCPCS | Performed by: INTERNAL MEDICINE

## 2024-01-23 PROCEDURE — 1123F ACP DISCUSS/DSCN MKR DOCD: CPT | Performed by: INTERNAL MEDICINE

## 2024-01-23 PROCEDURE — 3044F HG A1C LEVEL LT 7.0%: CPT | Performed by: INTERNAL MEDICINE

## 2024-01-23 PROCEDURE — 99215 OFFICE O/P EST HI 40 MIN: CPT | Performed by: INTERNAL MEDICINE

## 2024-01-23 PROCEDURE — 2022F DILAT RTA XM EVC RTNOPTHY: CPT | Performed by: INTERNAL MEDICINE

## 2024-01-23 PROCEDURE — 3078F DIAST BP <80 MM HG: CPT | Performed by: INTERNAL MEDICINE

## 2024-01-23 PROCEDURE — 3017F COLORECTAL CA SCREEN DOC REV: CPT | Performed by: INTERNAL MEDICINE

## 2024-01-23 PROCEDURE — 3074F SYST BP LT 130 MM HG: CPT | Performed by: INTERNAL MEDICINE

## 2024-01-23 PROCEDURE — G8417 CALC BMI ABV UP PARAM F/U: HCPCS | Performed by: INTERNAL MEDICINE

## 2024-01-23 RX ORDER — TRIAMCINOLONE ACETONIDE 0.25 MG/G
CREAM TOPICAL
Qty: 80 G | Refills: 1 | Status: SHIPPED | OUTPATIENT
Start: 2024-01-23

## 2024-01-23 RX ORDER — HYDROCHLOROTHIAZIDE 25 MG/1
25 TABLET ORAL DAILY
Qty: 90 TABLET | Refills: 1 | Status: SHIPPED | OUTPATIENT
Start: 2024-01-23

## 2024-01-23 RX ORDER — SEMAGLUTIDE 2.68 MG/ML
2 INJECTION, SOLUTION SUBCUTANEOUS WEEKLY
Qty: 9 ML | Refills: 3 | Status: SHIPPED | OUTPATIENT
Start: 2024-01-23

## 2024-01-23 ASSESSMENT — ENCOUNTER SYMPTOMS
DIARRHEA: 0
BLOOD IN STOOL: 0
CONSTIPATION: 0

## 2024-01-23 NOTE — PROGRESS NOTES
CARD   Result Value Ref Range    VALID INTERNAL CONTROL yes     Fecal Occult Blood, POC Positive Negative   Results for orders placed or performed in visit on 10/30/23 (from the past 2160 hour(s))   Hemoglobin   Result Value Ref Range    Hemoglobin 13.0 (L) 13.6 - 17.2 g/dL       Vitals:    01/23/24 1015   BP: 114/68   Site: Left Upper Arm   Position: Sitting   Cuff Size: Large Adult   Pulse: 92   Temp: 97.3 °F (36.3 °C)   TempSrc: Temporal   SpO2: 99%   Weight: (!) 137 kg (302 lb)   Height: 1.854 m (6' 1\")     Wt Readings from Last 3 Encounters:   01/23/24 (!) 137 kg (302 lb)   12/28/23 (!) 139.9 kg (308 lb 6.4 oz)   12/12/23 (!) 141.5 kg (312 lb)     BP Readings from Last 3 Encounters:   01/23/24 114/68   01/18/24 136/62   01/11/24 (!) 97/52     Physical Exam  Vitals and nursing note reviewed.   Constitutional:       Appearance: He is obese.   Cardiovascular:      Rate and Rhythm: Normal rate. Rhythm irregular.   Pulmonary:      Effort: Pulmonary effort is normal.      Breath sounds: Normal breath sounds. No rales.   Abdominal:      General: Abdomen is flat.      Palpations: Abdomen is soft.      Tenderness: There is no abdominal tenderness.   Musculoskeletal:      Right lower leg: No edema.      Left lower leg: No edema.   Skin:     Coloration: Skin is not pale.   Neurological:      General: No focal deficit present.      Mental Status: He is alert and oriented to person, place, and time. Mental status is at baseline.      Cranial Nerves: No cranial nerve deficit.   Psychiatric:         Mood and Affect: Mood normal.         Behavior: Behavior normal.

## 2024-01-23 NOTE — PATIENT INSTRUCTIONS
Increase Ozempic to 2 mg weekly.    You may be a candidate for Watchman Procedure so we can get you off blood thinning medication due to chronic gi bleeding issues.      Standing Hemoglobin orders are active if needed at the lab    Routine labs in 3 months with follow up.      Medication refilled      SIMRAN FULLER MD

## 2024-01-26 ENCOUNTER — TELEPHONE (OUTPATIENT)
Age: 69
End: 2024-01-26

## 2024-01-26 NOTE — TELEPHONE ENCOUNTER
Patient called and wants to talk to Dr. Aguero to see if he is a canidate for a watchman. Patient wanted to come in but no appointments available in the next few weeks. Please call patient.

## 2024-01-29 ENCOUNTER — TELEPHONE (OUTPATIENT)
Dept: CARDIAC CATH/INVASIVE PROCEDURES | Age: 69
End: 2024-01-29

## 2024-01-29 ENCOUNTER — OFFICE VISIT (OUTPATIENT)
Age: 69
End: 2024-01-29
Payer: MEDICARE

## 2024-01-29 VITALS
DIASTOLIC BLOOD PRESSURE: 70 MMHG | HEIGHT: 73 IN | HEART RATE: 84 BPM | BODY MASS INDEX: 40.42 KG/M2 | WEIGHT: 305 LBS | SYSTOLIC BLOOD PRESSURE: 110 MMHG

## 2024-01-29 DIAGNOSIS — I10 PRIMARY HYPERTENSION: ICD-10-CM

## 2024-01-29 DIAGNOSIS — I48.21 PERMANENT ATRIAL FIBRILLATION (HCC): Primary | ICD-10-CM

## 2024-01-29 DIAGNOSIS — K92.2 CHRONIC GI BLEEDING: ICD-10-CM

## 2024-01-29 PROBLEM — D68.69 SECONDARY HYPERCOAGULABLE STATE (HCC): Status: ACTIVE | Noted: 2024-01-29

## 2024-01-29 PROCEDURE — 3078F DIAST BP <80 MM HG: CPT | Performed by: INTERNAL MEDICINE

## 2024-01-29 PROCEDURE — G8417 CALC BMI ABV UP PARAM F/U: HCPCS | Performed by: INTERNAL MEDICINE

## 2024-01-29 PROCEDURE — 1123F ACP DISCUSS/DSCN MKR DOCD: CPT | Performed by: INTERNAL MEDICINE

## 2024-01-29 PROCEDURE — 3074F SYST BP LT 130 MM HG: CPT | Performed by: INTERNAL MEDICINE

## 2024-01-29 PROCEDURE — 1036F TOBACCO NON-USER: CPT | Performed by: INTERNAL MEDICINE

## 2024-01-29 PROCEDURE — 99214 OFFICE O/P EST MOD 30 MIN: CPT | Performed by: INTERNAL MEDICINE

## 2024-01-29 PROCEDURE — G8428 CUR MEDS NOT DOCUMENT: HCPCS | Performed by: INTERNAL MEDICINE

## 2024-01-29 PROCEDURE — 93000 ELECTROCARDIOGRAM COMPLETE: CPT | Performed by: INTERNAL MEDICINE

## 2024-01-29 PROCEDURE — G8484 FLU IMMUNIZE NO ADMIN: HCPCS | Performed by: INTERNAL MEDICINE

## 2024-01-29 PROCEDURE — 3017F COLORECTAL CA SCREEN DOC REV: CPT | Performed by: INTERNAL MEDICINE

## 2024-01-29 NOTE — TELEPHONE ENCOUNTER
The patient was contacted to discuss LAAO. The watchman process has been explained by DESIREE Mello & Laci, he has no questions at this time. The patient has a consultation with Dr. Guerrero on 1/31/2024 @ 1:00 pm at [facility] [place] office.

## 2024-01-29 NOTE — TELEPHONE ENCOUNTER
The patient was contacted to discuss LAAO and schedule a consultation. LVM with [facility] coordinator contact ([phone]) information.

## 2024-01-29 NOTE — PROGRESS NOTES
[facility] CARDIOLOGY  [address] [place] [address], [facility] [address]  [place] [address]  PHONE: [phone]        24        NAME:  Conrad Stephenson  : 1955  MRN: [id number]     CHIEF COMPLAINT:    Atrial Fibrillation      SUBJECTIVE:     Pt has chronic small bowel  bleeding and melena and occ blood transfusion and ferritin infusion.  No chest pain or palpitation or dizziness.       Medications were all reviewed with the patient today and updated as necessary.   Current Outpatient Medications   Medication Sig    hydroCHLOROthiazide (HYDRODIURIL) 25 MG tablet Take 1 tablet by mouth daily    triamcinolone (KENALOG) 0.025 % cream Apply topically 2 times daily.    OZEMPIC, 2 MG/DOSE, 8 MG/3ML SOPN sc injection Inject 0.75 mLs into the skin once a week    metFORMIN (GLUCOPHAGE) 500 MG tablet Take 1 tablet by mouth daily (with breakfast)    Insulin Pen Needle (PEN NEEDLES) 31G X 5 MM MISC Please dispense pen needles for use with patient's insulin pen.    tamsulosin (FLOMAX) 0.4 MG capsule Take 1 capsule by mouth in the morning and at bedtime    carvedilol (COREG) 25 MG tablet Taking 1/2 tablet in the AM and 1 tablet in the PM    enalapril (VASOTEC) 10 MG tablet TAKE 1 TABLET DAILY    spironolactone (ALDACTONE) 25 MG tablet Take 1 tablet by mouth daily    dabigatran (PRADAXA) 150 MG capsule Take 1 capsule by mouth 2 times daily TAKE 1 CAPSULE EVERY 12 HOURS    insulin glargine (LANTUS SOLOSTAR) 100 UNIT/ML injection pen Inject 25 Units into the skin nightly (Patient taking differently: Inject 20 Units into the skin nightly)    tamsulosin (FLOMAX) 0.4 MG capsule Take 1 capsule by mouth nightly    simvastatin (ZOCOR) 40 MG tablet Take 1 tablet by mouth daily    sildenafil (REVATIO) 20 MG tablet Take 1-5 tablets by mouth as needed (erectile dysfunction)    Probiotic Product (PROBIOTIC PO) Take by mouth    blood glucose monitor strips by Other route See Admin Instructions Test before meals and bedtime & as needed for

## 2024-01-31 ENCOUNTER — OFFICE VISIT (OUTPATIENT)
Age: 69
End: 2024-01-31
Payer: MEDICARE

## 2024-01-31 VITALS
WEIGHT: 304.4 LBS | DIASTOLIC BLOOD PRESSURE: 68 MMHG | SYSTOLIC BLOOD PRESSURE: 118 MMHG | BODY MASS INDEX: 40.34 KG/M2 | HEART RATE: 72 BPM | HEIGHT: 73 IN

## 2024-01-31 DIAGNOSIS — D50.8 OTHER IRON DEFICIENCY ANEMIAS: ICD-10-CM

## 2024-01-31 DIAGNOSIS — I48.11 LONGSTANDING PERSISTENT ATRIAL FIBRILLATION (HCC): Primary | Chronic | ICD-10-CM

## 2024-01-31 DIAGNOSIS — I10 ESSENTIAL HYPERTENSION: ICD-10-CM

## 2024-01-31 PROCEDURE — 3078F DIAST BP <80 MM HG: CPT | Performed by: INTERNAL MEDICINE

## 2024-01-31 PROCEDURE — G8417 CALC BMI ABV UP PARAM F/U: HCPCS | Performed by: INTERNAL MEDICINE

## 2024-01-31 PROCEDURE — 3074F SYST BP LT 130 MM HG: CPT | Performed by: INTERNAL MEDICINE

## 2024-01-31 PROCEDURE — 99215 OFFICE O/P EST HI 40 MIN: CPT | Performed by: INTERNAL MEDICINE

## 2024-01-31 PROCEDURE — G8484 FLU IMMUNIZE NO ADMIN: HCPCS | Performed by: INTERNAL MEDICINE

## 2024-01-31 PROCEDURE — G8427 DOCREV CUR MEDS BY ELIG CLIN: HCPCS | Performed by: INTERNAL MEDICINE

## 2024-01-31 PROCEDURE — 3017F COLORECTAL CA SCREEN DOC REV: CPT | Performed by: INTERNAL MEDICINE

## 2024-01-31 PROCEDURE — 1036F TOBACCO NON-USER: CPT | Performed by: INTERNAL MEDICINE

## 2024-01-31 PROCEDURE — 1123F ACP DISCUSS/DSCN MKR DOCD: CPT | Performed by: INTERNAL MEDICINE

## 2024-01-31 ASSESSMENT — ENCOUNTER SYMPTOMS: SHORTNESS OF BREATH: 0

## 2024-01-31 NOTE — PROGRESS NOTES
Conrad Stephenson has been referred to the [facility] for evaluation for Left Atrial Appendage Closure with the Watchman device for management of stroke risk resulting from non-valvular atrial fibrillation.    Based on this patient's history of anemia and black stools requiring iron and blood transfusions, it has been determined that he is a poor candidate for long-term oral anticoagulation, however may be tolerant of short term treatment needed for watchman implantation.     Atrial Fibrillation CHADSVASC2 Score Stroke Risk:   68 y.o. 65-74 - 1   male Male - 0   CHF HX: No - 0   HTN HX: Yes - 1   Stroke/TIA/Thromboembolism No - 0   Vascular Disease HX: No - 0   Diabetes Mellitus Yes - 1   CHADSVASC 2 Score 3      Annual Stroke Risk 3.2% - moderate-high      HAS-BLED Score     HTN Hx [x] 1 Point   Renal Disease  [] 1 Point   Liver Disease [] 1 Point   Stroke Hx [] 1 Point   Prior Major Bleeding or Predisposition [x] 1 Point   Labile INR [] 1 Point   Age > 65 Years Current: 68 y.o.   [x] 1 Point   Rx Usage Predisposing to Bleeding [] 1 Point   Alcohol Use (> or = 8 Drinks/wk) [] 1 Point   Total: UCHASBLED: Score 3 High Risk 5.8% Risk of major bleeding 3.72  Bleeds Per 100 pts years Alternatives to Anticoagulation can be considered       Dr. Lincoln Guerrero and Dr. Niels Aguero have both had a face to face conversation with this patient explaining atrial fibrillation, stroke risk with atrial fibrillation, and the different treatment plans as related to their elevated stroke risk with atrial fibrillation. Based on stroke risk, as shown with [id number] score, and bleeding risk, as shown with HAS-BLED score, a shared decision has been made to pursue closure of the left atrial appendage as a safe and effective alternative to oral anticoagulation.

## 2024-01-31 NOTE — PROGRESS NOTES
depression.           PHYSICAL EXAM:   /68   Pulse 72   Ht 1.854 m (6' 1\")   Wt (!) 138.1 kg (304 lb 6.4 oz) Comment: with shoes  BMI 40.16 kg/m²      Physical Exam  Constitutional:       General: He is not in acute distress.  Neck:      Vascular: No carotid bruit.   Cardiovascular:      Rate and Rhythm: Normal rate and regular rhythm.   Pulmonary:      Effort: No respiratory distress.      Breath sounds: Normal breath sounds.   Abdominal:      General: Bowel sounds are normal.      Palpations: Abdomen is soft.   Musculoskeletal:         General: No swelling.   Skin:     General: Skin is warm and dry.   Neurological:      General: No focal deficit present.   Psychiatric:         Mood and Affect: Mood normal.         Medical problems and test results were reviewed with the patient today.         Recent Results (from the past 672 hour(s))   Comprehensive Metabolic Panel    Collection Time: 01/12/24  8:22 AM   Result Value Ref Range    Sodium 137 136 - 146 mmol/L    Potassium 4.3 3.5 - 5.1 mmol/L    Chloride 106 103 - 113 mmol/L    CO2 27 21 - 32 mmol/L    Anion Gap 4 2 - 11 mmol/L    Glucose 117 (H) 65 - 100 mg/dL    BUN 15 8 - 23 MG/DL    Creatinine 1.00 0.8 - 1.5 MG/DL    Est, Glom Filt Rate >60 >60 ml/min/1.73m2    Calcium 9.7 8.3 - 10.4 MG/DL    Total Bilirubin 1.0 0.2 - 1.1 MG/DL    ALT 18 12 - 65 U/L    AST 17 15 - 37 U/L    Alk Phosphatase 85 50 - 136 U/L    Total Protein 7.1 6.3 - 8.2 g/dL    Albumin 3.9 3.2 - 4.6 g/dL    Globulin 3.2 2.8 - 4.5 g/dL    Albumin/Globulin Ratio 1.2 0.4 - 1.6     CBC    Collection Time: 01/12/24  8:22 AM   Result Value Ref Range    WBC 7.4 4.3 - 11.1 K/uL    RBC 4.68 4.23 - 5.6 M/uL    Hemoglobin 14.8 13.6 - 17.2 g/dL    Hematocrit 45.5 41.1 - 50.3 %    MCV 97.2 82 - 102 FL    MCH 31.6 26.1 - 32.9 PG    MCHC 32.5 31.4 - 35.0 g/dL    RDW 13.9 11.9 - 14.6 %    Platelets 181 150 - 450 K/uL    MPV 10.7 9.4 - 12.3 FL    nRBC 0.00 0.0 - 0.2 K/uL   Hemoglobin A1C    Collection

## 2024-02-01 NOTE — PROGRESS NOTES
Patient pre-assessment complete for LAAO scheduled for 2024, arrival time 0900. Patient verified using . Patient instructed to bring all home medications in labeled bottles on the day of procedure. NPO status reinforced. Patient's last dose of Pradaxa and metformin will be on 2024.  His last dose of Ozempic was 2024. He will take 80% (15 units) of Lantus PM dose.  He corona been instructed to have only clear liquids the day prior to procedure.  He will take carvedilol, gabapentin, and tamsulosin with a small sip of water, the morning of his procedure. Patient instructed to HOLD all other medications and supplements. Patient verbalizes understanding of all instructions & denies any questions at this time. Patient has watchman coordinator contact ([phone]) information for questions.

## 2024-02-04 ENCOUNTER — PREP FOR PROCEDURE (OUTPATIENT)
Age: 69
End: 2024-02-04

## 2024-02-04 DIAGNOSIS — I48.11 LONGSTANDING PERSISTENT ATRIAL FIBRILLATION (HCC): Primary | ICD-10-CM

## 2024-02-05 ENCOUNTER — HOSPITAL ENCOUNTER (OUTPATIENT)
Dept: LAB | Age: 69
Discharge: HOME | DRG: 274 | End: 2024-02-08
Payer: MEDICARE

## 2024-02-05 ENCOUNTER — ANESTHESIA EVENT (OUTPATIENT)
Dept: SURGERY | Age: 69
DRG: 274 | End: 2024-02-05
Payer: MEDICARE

## 2024-02-05 DIAGNOSIS — I48.11 LONGSTANDING PERSISTENT ATRIAL FIBRILLATION (HCC): ICD-10-CM

## 2024-02-05 LAB
ALBUMIN SERPL-MCNC: 3.5 G/DL (ref 3.2–4.6)
ANION GAP SERPL CALC-SCNC: 4 MMOL/L (ref 2–11)
BUN SERPL-MCNC: 12 MG/DL (ref 8–23)
CALCIUM SERPL-MCNC: 9.7 MG/DL (ref 8.3–10.4)
CHLORIDE SERPL-SCNC: 106 MMOL/L (ref 103–113)
CO2 SERPL-SCNC: 27 MMOL/L (ref 21–32)
CREAT SERPL-MCNC: 1.13 MG/DL (ref 0.8–1.5)
ERYTHROCYTE [DISTWIDTH] IN BLOOD BY AUTOMATED COUNT: 14.2 % (ref 11.9–14.6)
GLUCOSE SERPL-MCNC: 127 MG/DL (ref 65–100)
HCT VFR BLD AUTO: 43.1 % (ref 41.1–50.3)
HGB BLD-MCNC: 14.3 G/DL (ref 13.6–17.2)
HISTORY CHECK: NORMAL
INR PPP: 1.3
MCH RBC QN AUTO: 32 PG (ref 26.1–32.9)
MCHC RBC AUTO-ENTMCNC: 33.2 G/DL (ref 31.4–35)
MCV RBC AUTO: 96.4 FL (ref 82–102)
NRBC # BLD: 0 K/UL (ref 0–0.2)
PLATELET # BLD AUTO: 173 K/UL (ref 150–450)
PMV BLD AUTO: 10.4 FL (ref 9.4–12.3)
POTASSIUM SERPL-SCNC: 4.5 MMOL/L (ref 3.5–5.1)
PROTHROMBIN TIME: 16 SEC (ref 11.3–14.9)
RBC # BLD AUTO: 4.47 M/UL (ref 4.23–5.6)
SODIUM SERPL-SCNC: 137 MMOL/L (ref 136–146)
WBC # BLD AUTO: 6.7 K/UL (ref 4.3–11.1)

## 2024-02-05 PROCEDURE — 80048 BASIC METABOLIC PNL TOTAL CA: CPT

## 2024-02-05 PROCEDURE — 85027 COMPLETE CBC AUTOMATED: CPT

## 2024-02-05 PROCEDURE — 36415 COLL VENOUS BLD VENIPUNCTURE: CPT

## 2024-02-05 PROCEDURE — 86850 RBC ANTIBODY SCREEN: CPT

## 2024-02-05 PROCEDURE — 82040 ASSAY OF SERUM ALBUMIN: CPT

## 2024-02-05 PROCEDURE — 86900 BLOOD TYPING SEROLOGIC ABO: CPT

## 2024-02-05 PROCEDURE — 86923 COMPATIBILITY TEST ELECTRIC: CPT

## 2024-02-05 PROCEDURE — 85610 PROTHROMBIN TIME: CPT

## 2024-02-05 PROCEDURE — 86901 BLOOD TYPING SEROLOGIC RH(D): CPT

## 2024-02-05 RX ORDER — HYDROMORPHONE HYDROCHLORIDE 2 MG/ML
0.25 INJECTION, SOLUTION INTRAMUSCULAR; INTRAVENOUS; SUBCUTANEOUS EVERY 5 MIN PRN
Status: CANCELLED | OUTPATIENT
Start: 2024-02-05

## 2024-02-06 ENCOUNTER — HOSPITAL ENCOUNTER (INPATIENT)
Age: 69
LOS: 1 days | Discharge: HOME | DRG: 274 | End: 2024-02-06
Attending: INTERNAL MEDICINE | Admitting: INTERNAL MEDICINE
Payer: MEDICARE

## 2024-02-06 ENCOUNTER — ANESTHESIA (OUTPATIENT)
Dept: SURGERY | Age: 69
DRG: 274 | End: 2024-02-06
Payer: MEDICARE

## 2024-02-06 ENCOUNTER — APPOINTMENT (OUTPATIENT)
Dept: NON INVASIVE DIAGNOSTICS | Age: 69
DRG: 274 | End: 2024-02-06
Attending: INTERNAL MEDICINE
Payer: MEDICARE

## 2024-02-06 VITALS
BODY MASS INDEX: 38.76 KG/M2 | DIASTOLIC BLOOD PRESSURE: 79 MMHG | SYSTOLIC BLOOD PRESSURE: 133 MMHG | HEIGHT: 74 IN | TEMPERATURE: 97.4 F | RESPIRATION RATE: 16 BRPM | HEART RATE: 86 BPM | WEIGHT: 302 LBS | OXYGEN SATURATION: 94 %

## 2024-02-06 DIAGNOSIS — I48.11 LONGSTANDING PERSISTENT ATRIAL FIBRILLATION (HCC): Primary | ICD-10-CM

## 2024-02-06 LAB
ACT BLD: 336 SECS (ref 70–128)
BASE DEFICIT BLDV-SCNC: 2.7 MMOL/L
ECHO BSA: 2.67 M2
EKG DIAGNOSIS: NORMAL
EKG Q-T INTERVAL: 368 MS
EKG QRS DURATION: 90 MS
EKG QTC CALCULATION (BAZETT): 455 MS
EKG R AXIS: 28 DEGREES
EKG T AXIS: 46 DEGREES
EKG VENTRICULAR RATE: 92 BPM
GLUCOSE BLD STRIP.AUTO-MCNC: 116 MG/DL (ref 65–100)
HCO3 BLDV-SCNC: 22.8 MMOL/L (ref 23–28)
PCO2 BLDV: 41.3 MMHG (ref 41–51)
PH BLDV: 7.35 (ref 7.32–7.42)
PO2 BLDV: 290 MMHG
SAO2 % BLDV: 99.9 % (ref 65–88)
SERVICE CMNT-IMP: ABNORMAL
SERVICE CMNT-IMP: ABNORMAL
SPECIMEN TYPE: ABNORMAL

## 2024-02-06 PROCEDURE — 2580000003 HC RX 258

## 2024-02-06 PROCEDURE — 33340 PERQ CLSR TCAT L ATR APNDGE: CPT | Performed by: INTERNAL MEDICINE

## 2024-02-06 PROCEDURE — 2500000003 HC RX 250 WO HCPCS

## 2024-02-06 PROCEDURE — C1760 CLOSURE DEV, VASC: HCPCS | Performed by: INTERNAL MEDICINE

## 2024-02-06 PROCEDURE — 1100000000 HC RM PRIVATE

## 2024-02-06 PROCEDURE — 3700000000 HC ANESTHESIA ATTENDED CARE: Performed by: INTERNAL MEDICINE

## 2024-02-06 PROCEDURE — 93312 ECHO TRANSESOPHAGEAL: CPT

## 2024-02-06 PROCEDURE — 93355 ECHO TRANSESOPHAGEAL (TEE): CPT | Performed by: INTERNAL MEDICINE

## 2024-02-06 PROCEDURE — B24BZZ4 ULTRASONOGRAPHY OF HEART WITH AORTA, TRANSESOPHAGEAL: ICD-10-PCS | Performed by: INTERNAL MEDICINE

## 2024-02-06 PROCEDURE — 2709999900 HC NON-CHARGEABLE SUPPLY: Performed by: INTERNAL MEDICINE

## 2024-02-06 PROCEDURE — 7100000001 HC PACU RECOVERY - ADDTL 15 MIN: Performed by: INTERNAL MEDICINE

## 2024-02-06 PROCEDURE — C1759 CATH, INTRA ECHOCARDIOGRAPHY: HCPCS | Performed by: INTERNAL MEDICINE

## 2024-02-06 PROCEDURE — 6360000002 HC RX W HCPCS: Performed by: INTERNAL MEDICINE

## 2024-02-06 PROCEDURE — 2580000003 HC RX 258: Performed by: INTERNAL MEDICINE

## 2024-02-06 PROCEDURE — 93005 ELECTROCARDIOGRAM TRACING: CPT | Performed by: INTERNAL MEDICINE

## 2024-02-06 PROCEDURE — 93010 ELECTROCARDIOGRAM REPORT: CPT | Performed by: INTERNAL MEDICINE

## 2024-02-06 PROCEDURE — 7100000000 HC PACU RECOVERY - FIRST 15 MIN: Performed by: INTERNAL MEDICINE

## 2024-02-06 PROCEDURE — 93662 INTRACARDIAC ECG (ICE): CPT | Performed by: INTERNAL MEDICINE

## 2024-02-06 PROCEDURE — C1769 GUIDE WIRE: HCPCS | Performed by: INTERNAL MEDICINE

## 2024-02-06 PROCEDURE — 2500000003 HC RX 250 WO HCPCS: Performed by: INTERNAL MEDICINE

## 2024-02-06 PROCEDURE — 2580000003 HC RX 258: Performed by: ANESTHESIOLOGY

## 2024-02-06 PROCEDURE — 6360000002 HC RX W HCPCS

## 2024-02-06 PROCEDURE — 6360000004 HC RX CONTRAST MEDICATION: Performed by: INTERNAL MEDICINE

## 2024-02-06 PROCEDURE — 3700000001 HC ADD 15 MINUTES (ANESTHESIA): Performed by: INTERNAL MEDICINE

## 2024-02-06 PROCEDURE — C1889 IMPLANT/INSERT DEVICE, NOC: HCPCS | Performed by: INTERNAL MEDICINE

## 2024-02-06 PROCEDURE — 82803 BLOOD GASES ANY COMBINATION: CPT

## 2024-02-06 PROCEDURE — 82962 GLUCOSE BLOOD TEST: CPT

## 2024-02-06 PROCEDURE — C1894 INTRO/SHEATH, NON-LASER: HCPCS | Performed by: INTERNAL MEDICINE

## 2024-02-06 PROCEDURE — 02L73DK OCCLUSION OF LEFT ATRIAL APPENDAGE WITH INTRALUMINAL DEVICE, PERCUTANEOUS APPROACH: ICD-10-PCS | Performed by: INTERNAL MEDICINE

## 2024-02-06 PROCEDURE — 3600000007 HC SURGERY HYBRID BASE: Performed by: INTERNAL MEDICINE

## 2024-02-06 PROCEDURE — 85347 COAGULATION TIME ACTIVATED: CPT

## 2024-02-06 PROCEDURE — 3600000017 HC SURGERY HYBRID ADDL 15MIN: Performed by: INTERNAL MEDICINE

## 2024-02-06 DEVICE — LEFT ATRIAL APPENDAGE CLOSURE DEVICE WITH DELIVERY SYSTEM
Type: IMPLANTABLE DEVICE | Status: FUNCTIONAL
Brand: WATCHMAN FLX™

## 2024-02-06 RX ORDER — SODIUM CHLORIDE 0.9 % (FLUSH) 0.9 %
5-40 SYRINGE (ML) INJECTION PRN
Status: DISCONTINUED | OUTPATIENT
Start: 2024-02-06 | End: 2024-02-06 | Stop reason: HOSPADM

## 2024-02-06 RX ORDER — SODIUM CHLORIDE, SODIUM LACTATE, POTASSIUM CHLORIDE, CALCIUM CHLORIDE 600; 310; 30; 20 MG/100ML; MG/100ML; MG/100ML; MG/100ML
INJECTION, SOLUTION INTRAVENOUS CONTINUOUS
Status: DISCONTINUED | OUTPATIENT
Start: 2024-02-06 | End: 2024-02-06 | Stop reason: HOSPADM

## 2024-02-06 RX ORDER — ESMOLOL HYDROCHLORIDE 10 MG/ML
INJECTION INTRAVENOUS PRN
Status: DISCONTINUED | OUTPATIENT
Start: 2024-02-06 | End: 2024-02-06 | Stop reason: SDUPTHER

## 2024-02-06 RX ORDER — HEPARIN SODIUM 1000 [USP'U]/ML
INJECTION, SOLUTION INTRAVENOUS; SUBCUTANEOUS PRN
Status: DISCONTINUED | OUTPATIENT
Start: 2024-02-06 | End: 2024-02-06 | Stop reason: SDUPTHER

## 2024-02-06 RX ORDER — LIDOCAINE HYDROCHLORIDE 20 MG/ML
INJECTION, SOLUTION EPIDURAL; INFILTRATION; INTRACAUDAL; PERINEURAL PRN
Status: DISCONTINUED | OUTPATIENT
Start: 2024-02-06 | End: 2024-02-06 | Stop reason: SDUPTHER

## 2024-02-06 RX ORDER — FENTANYL CITRATE 50 UG/ML
INJECTION, SOLUTION INTRAMUSCULAR; INTRAVENOUS PRN
Status: DISCONTINUED | OUTPATIENT
Start: 2024-02-06 | End: 2024-02-06 | Stop reason: SDUPTHER

## 2024-02-06 RX ORDER — ROCURONIUM BROMIDE 10 MG/ML
INJECTION, SOLUTION INTRAVENOUS PRN
Status: DISCONTINUED | OUTPATIENT
Start: 2024-02-06 | End: 2024-02-06 | Stop reason: SDUPTHER

## 2024-02-06 RX ORDER — DEXTROSE MONOHYDRATE 100 MG/ML
INJECTION, SOLUTION INTRAVENOUS CONTINUOUS PRN
Status: DISCONTINUED | OUTPATIENT
Start: 2024-02-06 | End: 2024-02-06 | Stop reason: HOSPADM

## 2024-02-06 RX ORDER — DIPHENHYDRAMINE HYDROCHLORIDE 50 MG/ML
50 INJECTION INTRAMUSCULAR; INTRAVENOUS ONCE
Status: COMPLETED | OUTPATIENT
Start: 2024-02-06 | End: 2024-02-06

## 2024-02-06 RX ORDER — SODIUM CHLORIDE 9 MG/ML
INJECTION, SOLUTION INTRAVENOUS PRN
Status: DISCONTINUED | OUTPATIENT
Start: 2024-02-06 | End: 2024-02-06 | Stop reason: HOSPADM

## 2024-02-06 RX ORDER — PROPOFOL 10 MG/ML
INJECTION, EMULSION INTRAVENOUS PRN
Status: DISCONTINUED | OUTPATIENT
Start: 2024-02-06 | End: 2024-02-06 | Stop reason: SDUPTHER

## 2024-02-06 RX ORDER — HEPARIN SODIUM 200 [USP'U]/100ML
INJECTION, SOLUTION INTRAVENOUS CONTINUOUS PRN
Status: DISCONTINUED | OUTPATIENT
Start: 2024-02-06 | End: 2024-02-06 | Stop reason: HOSPADM

## 2024-02-06 RX ORDER — SUCCINYLCHOLINE CHLORIDE 20 MG/ML
INJECTION INTRAMUSCULAR; INTRAVENOUS PRN
Status: DISCONTINUED | OUTPATIENT
Start: 2024-02-06 | End: 2024-02-06 | Stop reason: SDUPTHER

## 2024-02-06 RX ORDER — SODIUM CHLORIDE 0.9 % (FLUSH) 0.9 %
5-40 SYRINGE (ML) INJECTION EVERY 12 HOURS SCHEDULED
Status: DISCONTINUED | OUTPATIENT
Start: 2024-02-06 | End: 2024-02-06 | Stop reason: HOSPADM

## 2024-02-06 RX ORDER — ACETAMINOPHEN 500 MG
1000 TABLET ORAL ONCE
Status: DISCONTINUED | OUTPATIENT
Start: 2024-02-06 | End: 2024-02-06 | Stop reason: HOSPADM

## 2024-02-06 RX ORDER — ONDANSETRON 2 MG/ML
INJECTION INTRAMUSCULAR; INTRAVENOUS PRN
Status: DISCONTINUED | OUTPATIENT
Start: 2024-02-06 | End: 2024-02-06 | Stop reason: SDUPTHER

## 2024-02-06 RX ORDER — ACETAMINOPHEN 325 MG/1
650 TABLET ORAL EVERY 4 HOURS PRN
Status: DISCONTINUED | OUTPATIENT
Start: 2024-02-06 | End: 2024-02-06 | Stop reason: HOSPADM

## 2024-02-06 RX ORDER — HALOPERIDOL 5 MG/ML
1 INJECTION INTRAMUSCULAR
Status: DISCONTINUED | OUTPATIENT
Start: 2024-02-06 | End: 2024-02-06 | Stop reason: HOSPADM

## 2024-02-06 RX ORDER — DEXAMETHASONE SODIUM PHOSPHATE 4 MG/ML
INJECTION, SOLUTION INTRA-ARTICULAR; INTRALESIONAL; INTRAMUSCULAR; INTRAVENOUS; SOFT TISSUE PRN
Status: DISCONTINUED | OUTPATIENT
Start: 2024-02-06 | End: 2024-02-06 | Stop reason: SDUPTHER

## 2024-02-06 RX ORDER — PROTAMINE SULFATE 10 MG/ML
INJECTION, SOLUTION INTRAVENOUS PRN
Status: DISCONTINUED | OUTPATIENT
Start: 2024-02-06 | End: 2024-02-06 | Stop reason: SDUPTHER

## 2024-02-06 RX ORDER — MORPHINE SULFATE 2 MG/ML
1 INJECTION, SOLUTION INTRAMUSCULAR; INTRAVENOUS EVERY 5 MIN PRN
Status: CANCELLED | OUTPATIENT
Start: 2024-02-06

## 2024-02-06 RX ORDER — ONDANSETRON 2 MG/ML
4 INJECTION INTRAMUSCULAR; INTRAVENOUS EVERY 6 HOURS PRN
Status: DISCONTINUED | OUTPATIENT
Start: 2024-02-06 | End: 2024-02-06 | Stop reason: HOSPADM

## 2024-02-06 RX ORDER — OXYCODONE HYDROCHLORIDE 5 MG/1
5 TABLET ORAL
Status: DISCONTINUED | OUTPATIENT
Start: 2024-02-06 | End: 2024-02-06 | Stop reason: HOSPADM

## 2024-02-06 RX ORDER — LIDOCAINE HYDROCHLORIDE 10 MG/ML
1 INJECTION, SOLUTION INFILTRATION; PERINEURAL
Status: DISCONTINUED | OUTPATIENT
Start: 2024-02-06 | End: 2024-02-06 | Stop reason: HOSPADM

## 2024-02-06 RX ORDER — OXYCODONE HYDROCHLORIDE AND ACETAMINOPHEN 5; 325 MG/1; MG/1
1 TABLET ORAL EVERY 4 HOURS PRN
Status: DISCONTINUED | OUTPATIENT
Start: 2024-02-06 | End: 2024-02-06 | Stop reason: HOSPADM

## 2024-02-06 RX ORDER — MIDAZOLAM HYDROCHLORIDE 2 MG/2ML
2 INJECTION, SOLUTION INTRAMUSCULAR; INTRAVENOUS
Status: DISCONTINUED | OUTPATIENT
Start: 2024-02-06 | End: 2024-02-06 | Stop reason: HOSPADM

## 2024-02-06 RX ORDER — SODIUM CHLORIDE 9 MG/ML
INJECTION, SOLUTION INTRAVENOUS CONTINUOUS
Status: DISCONTINUED | OUTPATIENT
Start: 2024-02-06 | End: 2024-02-06 | Stop reason: HOSPADM

## 2024-02-06 RX ORDER — ONDANSETRON 2 MG/ML
4 INJECTION INTRAMUSCULAR; INTRAVENOUS
Status: DISCONTINUED | OUTPATIENT
Start: 2024-02-06 | End: 2024-02-06 | Stop reason: HOSPADM

## 2024-02-06 RX ADMIN — SODIUM CHLORIDE, SODIUM LACTATE, POTASSIUM CHLORIDE, AND CALCIUM CHLORIDE: 600; 310; 30; 20 INJECTION, SOLUTION INTRAVENOUS at 09:44

## 2024-02-06 RX ADMIN — ROCURONIUM BROMIDE 20 MG: 50 INJECTION, SOLUTION INTRAVENOUS at 10:24

## 2024-02-06 RX ADMIN — LIDOCAINE HYDROCHLORIDE 100 MG: 20 INJECTION, SOLUTION EPIDURAL; INFILTRATION; INTRACAUDAL; PERINEURAL at 09:52

## 2024-02-06 RX ADMIN — PROTAMINE SULFATE 10 MG: 10 INJECTION, SOLUTION INTRAVENOUS at 10:55

## 2024-02-06 RX ADMIN — PHENYLEPHRINE HYDROCHLORIDE 200 MCG: 10 INJECTION INTRAVENOUS at 10:37

## 2024-02-06 RX ADMIN — Medication 200 MG: at 09:54

## 2024-02-06 RX ADMIN — HEPARIN SODIUM 7000 UNITS: 1000 INJECTION, SOLUTION INTRAVENOUS; SUBCUTANEOUS at 10:19

## 2024-02-06 RX ADMIN — Medication 3000 MG: at 10:00

## 2024-02-06 RX ADMIN — SUGAMMADEX 300 MG: 100 INJECTION, SOLUTION INTRAVENOUS at 10:58

## 2024-02-06 RX ADMIN — DIPHENHYDRAMINE HYDROCHLORIDE 50 MG: 50 INJECTION, SOLUTION INTRAMUSCULAR; INTRAVENOUS at 07:30

## 2024-02-06 RX ADMIN — DEXAMETHASONE SODIUM PHOSPHATE 4 MG: 4 INJECTION, SOLUTION INTRAMUSCULAR; INTRAVENOUS at 10:05

## 2024-02-06 RX ADMIN — ESMOLOL HYDROCHLORIDE 20 MG: 10 INJECTION INTRAVENOUS at 09:57

## 2024-02-06 RX ADMIN — PHENYLEPHRINE HYDROCHLORIDE 100 MCG: 10 INJECTION INTRAVENOUS at 10:07

## 2024-02-06 RX ADMIN — PROTAMINE SULFATE 10 MG: 10 INJECTION, SOLUTION INTRAVENOUS at 10:56

## 2024-02-06 RX ADMIN — SODIUM CHLORIDE, PRESERVATIVE FREE 20 MG: 5 INJECTION INTRAVENOUS at 07:30

## 2024-02-06 RX ADMIN — PROTAMINE SULFATE 5 MG: 10 INJECTION, SOLUTION INTRAVENOUS at 10:54

## 2024-02-06 RX ADMIN — ESMOLOL HYDROCHLORIDE 20 MG: 10 INJECTION INTRAVENOUS at 10:00

## 2024-02-06 RX ADMIN — ROCURONIUM BROMIDE 30 MG: 50 INJECTION, SOLUTION INTRAVENOUS at 10:00

## 2024-02-06 RX ADMIN — ONDANSETRON 4 MG: 2 INJECTION INTRAMUSCULAR; INTRAVENOUS at 10:05

## 2024-02-06 RX ADMIN — FENTANYL CITRATE 50 MCG: 50 INJECTION, SOLUTION INTRAMUSCULAR; INTRAVENOUS at 10:24

## 2024-02-06 RX ADMIN — PROPOFOL 50 MG: 10 INJECTION, EMULSION INTRAVENOUS at 09:54

## 2024-02-06 RX ADMIN — HYDROCORTISONE SODIUM SUCCINATE 100 MG: 100 INJECTION, POWDER, FOR SOLUTION INTRAMUSCULAR; INTRAVENOUS at 07:30

## 2024-02-06 RX ADMIN — PHENYLEPHRINE HYDROCHLORIDE 100 MCG: 10 INJECTION INTRAVENOUS at 10:05

## 2024-02-06 RX ADMIN — HEPARIN SODIUM 7000 UNITS: 1000 INJECTION, SOLUTION INTRAVENOUS; SUBCUTANEOUS at 10:26

## 2024-02-06 RX ADMIN — PROTAMINE SULFATE 10 MG: 10 INJECTION, SOLUTION INTRAVENOUS at 10:57

## 2024-02-06 RX ADMIN — PROTAMINE SULFATE 15 MG: 10 INJECTION, SOLUTION INTRAVENOUS at 10:58

## 2024-02-06 RX ADMIN — PROPOFOL 40 MG: 10 INJECTION, EMULSION INTRAVENOUS at 10:24

## 2024-02-06 RX ADMIN — PROPOFOL 150 MG: 10 INJECTION, EMULSION INTRAVENOUS at 09:52

## 2024-02-06 RX ADMIN — PHENYLEPHRINE HYDROCHLORIDE 200 MCG: 10 INJECTION INTRAVENOUS at 10:30

## 2024-02-06 RX ADMIN — FENTANYL CITRATE 50 MCG: 50 INJECTION, SOLUTION INTRAMUSCULAR; INTRAVENOUS at 09:52

## 2024-02-06 ASSESSMENT — PAIN SCALES - GENERAL
PAINLEVEL_OUTOF10: 0
PAINLEVEL_OUTOF10: 0

## 2024-02-06 ASSESSMENT — COPD QUESTIONNAIRES: CAT_SEVERITY: MODERATE

## 2024-02-06 NOTE — PROGRESS NOTES
Patient with shellfish allergy. Dr. Guerrero notified. Orders to pre-med patient for contrast allergy, see eMAR.

## 2024-02-06 NOTE — ANESTHESIA PRE PROCEDURE
APRN - CNP   sildenafil (REVATIO) 20 MG tablet Take 1-5 tablets by mouth as needed (erectile dysfunction) 5/15/23   Kamron Frances MD   Probiotic Product (PROBIOTIC PO) Take by mouth    ProviderStephanie MD   blood glucose monitor strips by Other route See Admin Instructions Test before meals and bedtime & as needed for symptoms of irregular blood glucose. Dispense sufficient amount for indicated testing frequency plus additional to accommodate PRN testing needs. 1/19/23   Brown Redding MD   glucose monitoring (FREESTYLE FREEDOM) kit Please provide one glucometer compatible with patient's test strips 1/19/23   Brown Redding MD   Lancet Device MISC 1 Device by Does not apply route See Admin Instructions For use to test before meals and bedtime & as needed for symptoms of irregular blood glucose. Dispense sufficient amount for indicated testing frequency plus additional to accommodate PRN testing needs. 1/19/23   Brown Redding MD   CPAP Machine MISC by Other route ASV    ProviderStephanie MD   gabapentin (NEURONTIN) 600 MG tablet Take 1 tablet by mouth 2 times daily. 4/19/22   Stephanie Barron MD   cetirizine (ZYRTEC) 10 MG tablet Take 1 tablet by mouth daily as needed for Allergies or Rhinitis    Automatic Reconciliation, Ar       Current medications:    Current Facility-Administered Medications   Medication Dose Route Frequency Provider Last Rate Last Admin   • lidocaine 1 % injection 1 mL  1 mL IntraDERmal Once PRN Edelmira Bunn MD       • acetaminophen (TYLENOL) tablet 1,000 mg  1,000 mg Oral Once Edelmira Bunn MD       • lactated ringers IV soln infusion   IntraVENous Continuous Edelmira Bunn MD       • sodium chloride flush 0.9 % injection 5-40 mL  5-40 mL IntraVENous 2 times per day Edelmira Bunn MD       • sodium chloride flush 0.9 % injection 5-40 mL  5-40 mL IntraVENous PRN Edelmira Bunn MD       • 0.9 % sodium chloride infusion   IntraVENous PRN Oni

## 2024-02-06 NOTE — PROGRESS NOTES
Assisted OOB & ambulated to BR & on unit. Tolerated activity without difficulty. Right groin without bleeding or hematomaDischarge instructions given per orders, voiced good understanding of groin care, medications & follow up care. Denies any questions

## 2024-02-06 NOTE — PROGRESS NOTES
FATUMA with Dr. Guerrero  Successful deployment of Watchman device  16 fr RFV closed with Perclose device  10 fr RFV closed with Vascade  Site covered with tegaderm and gauze  No bleeding or hematoma noted at site. Site soft   Pt to go to PACU

## 2024-02-06 NOTE — DISCHARGE SUMMARY
Diagnoses: Type 2 diabetes mellitus with hyperglycemia, without long-term current use of insulin ([facility])      glucose monitoring (FREESTYLE FREEDOM) kit Please provide one glucometer compatible with patient's test strips  Qty: 1 kit, Refills: 0    Associated Diagnoses: Type 2 diabetes mellitus with hyperglycemia, without long-term current use of insulin (HCC)      Lancet Device MISC 1 Device by Does not apply route See Admin Instructions For use to test before meals and bedtime & as needed for symptoms of irregular blood glucose. Dispense sufficient amount for indicated testing frequency plus additional to accommodate PRN testing needs.  Qty: 100 each, Refills: 1    Associated Diagnoses: Type 2 diabetes mellitus with hyperglycemia, without long-term current use of insulin ([facility])      CPAP Machine MISC by Other route ASV      gabapentin (NEURONTIN) 600 MG tablet Take 1 tablet by mouth 2 times daily.      cetirizine (ZYRTEC) 10 MG tablet Take 1 tablet by mouth daily as needed for Allergies or Rhinitis                    Signed:  Jessica Garrett PA-C  2/6/2024  1:52 PM

## 2024-02-06 NOTE — PROGRESS NOTES
Patient received to CPRU room # 14  Ambulatory from [facility]. Patient scheduled for LAAO today with Dr Guerrero. Procedure reviewed & questions answered, voiced good understanding consent obtained & placed on chart. All medications and medical history reviewed. Will prep patient per orders. Patient & family updated on plan of care.      The patient has a fraility score of 4-VULNERABLE, based on patient A&Ox3, patient able to ambulate to room without difficulty.

## 2024-02-06 NOTE — PROGRESS NOTES
Assisted OOB & ambulated to BR & on unit. Tolerated activity without difficulty. Right groin without bleeding or hematoma

## 2024-02-06 NOTE — ANESTHESIA POSTPROCEDURE EVALUATION
Department of Anesthesiology  Postprocedure Note    Patient: Conrad Stephenson  MRN: [id number]  YOB: 1955  Date of evaluation: 2/6/2024    Procedure Summary       Date: 02/06/24 Room / Location: [facility] MAIN OR  / [facility] MAIN OR    Anesthesia Start: 0940 Anesthesia Stop: 1113    Procedure: Watchman felipe closure device Diagnosis:       Longstanding persistent atrial fibrillation (HCC)      (Longstanding persistent atrial fibrillation.)    Providers: Lincoln Guerrero MD Responsible Provider: Edelmira Bunn MD    Anesthesia Type: General ASA Status: 3            Anesthesia Type: General    Zoila Phase I: Zoila Score: 10    Zoila Phase II: Zoila Score: 8    Anesthesia Post Evaluation    Patient location during evaluation: PACU  Patient participation: complete - patient participated  Level of consciousness: awake and alert  Airway patency: patent  Nausea: well controlled.  Cardiovascular status: acceptable.  Respiratory status: acceptable  Hydration status: stable  Pain management: adequate    There were no known notable events for this encounter.

## 2024-02-06 NOTE — DISCHARGE INSTRUCTIONS
Watchman Discharge Instructions      Activity:    Follow your doctor’s recommendations  Return to normal activities gradually, pacing yourself as you feel better, resting when tired.  Activity should be limited for the next 48 hours. Climb stairs as little as possible and avoid any stooping, bending or strenuous activity for 48 hours. No heavy lifting (anything over 10 pounds) for five days.  Do not drive for 48 hoursHave a responsible person drive you home and stay with you for at least 24 hours after your heart catheterization/angiography.  Check the puncture site frequently for swelling or bleeding. If you see any bleeding, lie down and apply pressure over the area with a clean town or washcloth. Notify your doctor for any redness, swelling, drainage or oozing from the puncture site. Notify your doctor for any fever or chills.  You may resume your usual diet. Drink more fluids than usual.        Incision / Wound Care      Cleanse wounds with mild soap and water. Keep wound dry.  A small amount of bloody or clear drainage is normal.  Watch for redness, swelling, incision site hot to touch, foul or colored drainage from the incision site, these are all signs of infection and should be reported immediately to the physicians.  If there is site concern please notify your implanting physician.  You may remove the bandage from your Right and Groin in 24 hours. You may shower in 24 hours. No tub baths, hot tubs or swimming for one week. Do not place any lotions, creams, powders, ointments over the puncture site for one week. You may place a clean band-aid over the puncture site each day for 5 days. Change this daily.     Continued        Medications:  DO NOT STOP TAKING YOUR PRADAXA WITHOUT SPEAKING WITH YOUR CARDIOLOGIST FIRST!  Take your medications as ordered at the time of discharge.  Do NOT stop taking any medications without first discussing it with your doctor.  Notify all your doctors of current medication

## 2024-02-08 ENCOUNTER — TELEPHONE (OUTPATIENT)
Dept: CARDIAC CATH/INVASIVE PROCEDURES | Age: 69
End: 2024-02-08

## 2024-02-08 NOTE — TELEPHONE ENCOUNTER
Post Watchman SDD follow up call - late entry.     Mr. Stephenson is 24 hours post Watchman device placement and SDD on 2/06/2024.  He is doing well w/o complaints.  He denies pain, swelling, and bleeding to the right groin puncture site.  He denies CP and SOB. He resumed Pradaxa on 2/07/2024 as prescribed per Dr. Guerrero.  Upcoming appts were reviewed. Patient has WM coordinator contact ([phone]) information for questions or concerns.

## 2024-02-19 ENCOUNTER — TELEPHONE (OUTPATIENT)
Dept: CARDIAC CATH/INVASIVE PROCEDURES | Age: 69
End: 2024-02-19

## 2024-02-19 NOTE — TELEPHONE ENCOUNTER
Mr. Stephenson called this am.  He's having very dark stools.  He held his Pradaxa last night and this morning.  He has a hx of black stools and anemia.  In the past he has been advised to hold the OAC until the stool appearance normalizes before resuming.  Otherwise he reports feeling fine.  Agree with holding Pradaxa until stools improve. Patient advised to have CBC drawn tomorrow (2/20/24) per Dr. Guerrero.  Patient phoned with advisement.  He will have blood draw at [address] [place] [address] tomorrow.  He verbalized understanding and has no additional questions at this time.  Patient has WM coordinator contact ([phone]) information.

## 2024-02-20 ENCOUNTER — TELEPHONE (OUTPATIENT)
Dept: INTERNAL MEDICINE CLINIC | Facility: CLINIC | Age: 69
End: 2024-02-20

## 2024-02-20 DIAGNOSIS — K92.2 GASTROINTESTINAL HEMORRHAGE, UNSPECIFIED GASTROINTESTINAL HEMORRHAGE TYPE: ICD-10-CM

## 2024-02-20 DIAGNOSIS — E11.51 CONTROLLED TYPE 2 DIABETES MELLITUS WITH PERIPHERAL CIRCULATORY DISORDER (HCC): Primary | ICD-10-CM

## 2024-02-20 LAB
ERYTHROCYTE [DISTWIDTH] IN BLOOD BY AUTOMATED COUNT: 14.6 % (ref 11.9–14.6)
HCT VFR BLD AUTO: 43.6 % (ref 41.1–50.3)
HGB BLD-MCNC: 14.4 G/DL (ref 13.6–17.2)
MCH RBC QN AUTO: 32.1 PG (ref 26.1–32.9)
MCHC RBC AUTO-ENTMCNC: 33 G/DL (ref 31.4–35)
MCV RBC AUTO: 97.1 FL (ref 82–102)
NRBC # BLD: 0 K/UL (ref 0–0.2)
PLATELET # BLD AUTO: 179 K/UL (ref 150–450)
PMV BLD AUTO: 10.8 FL (ref 9.4–12.3)
RBC # BLD AUTO: 4.49 M/UL (ref 4.23–5.6)
WBC # BLD AUTO: 7.7 K/UL (ref 4.3–11.1)

## 2024-02-20 NOTE — TELEPHONE ENCOUNTER
Orders Placed This Encounter    Insulin Degludec 100 UNIT/ML SOPN     Sig: Inject 25 Units into the skin nightly     Dispense:  5 Adjustable Dose Pre-filled Pen Syringe     Refill:  11     Medications Discontinued During This Encounter   Medication Reason    insulin glargine (LANTUS SOLOSTAR) 100 UNIT/ML injection pen Formulary change     Formulary change.      Perry Bartholomew MD

## 2024-02-20 NOTE — TELEPHONE ENCOUNTER
Patient states that his Insurance is no longer covering Lantus. They states alternatives are Basaglar,Tresiba and Toujeo . All should be written for box of 5 pens.

## 2024-02-22 ENCOUNTER — OFFICE VISIT (OUTPATIENT)
Dept: INTERNAL MEDICINE CLINIC | Facility: CLINIC | Age: 69
End: 2024-02-22
Payer: MEDICARE

## 2024-02-22 VITALS
HEART RATE: 70 BPM | TEMPERATURE: 98.6 F | DIASTOLIC BLOOD PRESSURE: 64 MMHG | SYSTOLIC BLOOD PRESSURE: 110 MMHG | WEIGHT: 303 LBS | OXYGEN SATURATION: 98 % | BODY MASS INDEX: 40.16 KG/M2 | HEIGHT: 73 IN

## 2024-02-22 DIAGNOSIS — D68.69 SECONDARY HYPERCOAGULABLE STATE (HCC): ICD-10-CM

## 2024-02-22 DIAGNOSIS — H10.33 ACUTE BACTERIAL CONJUNCTIVITIS OF BOTH EYES: Primary | ICD-10-CM

## 2024-02-22 PROCEDURE — 1123F ACP DISCUSS/DSCN MKR DOCD: CPT | Performed by: INTERNAL MEDICINE

## 2024-02-22 PROCEDURE — G8417 CALC BMI ABV UP PARAM F/U: HCPCS | Performed by: INTERNAL MEDICINE

## 2024-02-22 PROCEDURE — G8427 DOCREV CUR MEDS BY ELIG CLIN: HCPCS | Performed by: INTERNAL MEDICINE

## 2024-02-22 PROCEDURE — 1036F TOBACCO NON-USER: CPT | Performed by: INTERNAL MEDICINE

## 2024-02-22 PROCEDURE — 3017F COLORECTAL CA SCREEN DOC REV: CPT | Performed by: INTERNAL MEDICINE

## 2024-02-22 PROCEDURE — 3074F SYST BP LT 130 MM HG: CPT | Performed by: INTERNAL MEDICINE

## 2024-02-22 PROCEDURE — 99213 OFFICE O/P EST LOW 20 MIN: CPT | Performed by: INTERNAL MEDICINE

## 2024-02-22 PROCEDURE — 3078F DIAST BP <80 MM HG: CPT | Performed by: INTERNAL MEDICINE

## 2024-02-22 PROCEDURE — 1111F DSCHRG MED/CURRENT MED MERGE: CPT | Performed by: INTERNAL MEDICINE

## 2024-02-22 PROCEDURE — G8484 FLU IMMUNIZE NO ADMIN: HCPCS | Performed by: INTERNAL MEDICINE

## 2024-02-22 RX ORDER — MOXIFLOXACIN 5 MG/ML
1 SOLUTION/ DROPS OPHTHALMIC 3 TIMES DAILY
Qty: 1 EACH | Refills: 0 | Status: SHIPPED | OUTPATIENT
Start: 2024-02-22 | End: 2024-02-29

## 2024-02-22 NOTE — PROGRESS NOTES
mmol/L    Chloride 105 103 - 113 mmol/L    CO2 24 21 - 32 mmol/L    Anion Gap 5 2 - 11 mmol/L    Glucose 113 (H) 65 - 100 mg/dL    BUN 9 8 - 23 MG/DL    Creatinine 0.90 0.8 - 1.5 MG/DL    Est, Glom Filt Rate >60 >60 ml/min/1.73m2    Calcium 9.2 8.3 - 10.4 MG/DL    Total Bilirubin 1.3 (H) 0.2 - 1.1 MG/DL    ALT 17 12 - 65 U/L    AST 12 (L) 15 - 37 U/L    Alk Phosphatase 92 50 - 136 U/L    Total Protein 7.3 6.3 - 8.2 g/dL    Albumin 3.5 3.2 - 4.6 g/dL    Globulin 3.8 2.8 - 4.5 g/dL    Albumin/Globulin Ratio 0.9 0.4 - 1.6     Ferritin   Result Value Ref Range    Ferritin 39 8 - 388 NG/ML   Results for orders placed or performed in visit on 12/12/23 (from the past 2160 hour(s))   Echo (TTE) complete with contrast, bubble, strain, and 3D PRN   Result Value Ref Range    LV EDV A2C 69 mL    LV EDV A4C 72 mL    LV ESV A2C 32 mL    LV ESV A4C 34 mL    IVSd 1.4 (A) 0.6 - 1.0 cm    LVIDd 4.5 4.2 - 5.9 cm    LVIDs 3.2 cm    LVOT Diameter 2.5 cm    LVOT Mean Gradient 1 mmHg    LVOT VTI 14.7 cm    LVPWd 1.4 (A) 0.6 - 1.0 cm    LV E' Lateral Velocity 13 cm/s    LV E' Septal Velocity 10 cm/s    LV Ejection Fraction A2C 54 %    LV Ejection Fraction A4C 53 %    EF BP 54 (A) 55 - 100 %    LVOT Area 4.9 cm2    LVOT SV 72.1 ml    LA Minor Axis 6.8 cm    LA Major Axis 7.2 cm    LA Area 2C 38.0 cm2    LA Area 4C 39.0 cm2    LA Volume MOD A2C 167 (A) 18 - 58 mL    LA Volume MOD A4C 171 (A) 18 - 58 mL    LA Volume  (A) 18 - 58 mL    LA Diameter 4.9 cm    AV Mean Gradient 3 mmHg    AV VTI 22.2 cm    AV Mean Velocity 0.8 m/s    AV Area by VTI 3.3 cm2    Aortic Root 3.0 cm    Est. RA Pressure 3 mmHg    RVIDd 3.5 cm    TR Max Velocity 2.32 m/s    TR Peak Gradient 22 mmHg    Body Surface Area 2.7 m2    Fractional Shortening 2D 29 28 - 44 %    LV ESV Index A4C 13 mL/m2    LV EDV Index A4C 28 mL/m2    LV ESV Index A2C 12 mL/m2    LV EDV Index A2C 27 mL/m2    LVIDd Index 1.73 cm/m2    LVIDs Index 1.23 cm/m2    LV RWT Ratio 0.62     LV Mass 2D

## 2024-02-22 NOTE — TELEPHONE ENCOUNTER
Patient was notified alternative generic Tresiba 100 units/ml was sent to pharmacy to replace generic Tresiba 200 units/ml

## 2024-02-22 NOTE — PATIENT INSTRUCTIONS
your eye for 30 to 60 seconds to let the drops or ointment move around.  Do not touch the ointment or dropper tip to your eyelashes or any other surface.  Do not share towels, pillows, or washcloths while you have pinkeye.  When should you call for help?   Call your doctor now or seek immediate medical care if:    You have pain in your eye, not just irritation on the surface.     You have a change in vision or loss of vision.     You have an increase in discharge from the eye.     Your eye has not started to improve or begins to get worse within 48 hours after you start using antibiotics.     Pinkeye lasts longer than 7 days.   Watch closely for changes in your health, and be sure to contact your doctor if you have any problems.  Where can you learn more?  Go to https://www.[email or web address]/patientEd and enter Y392 to learn more about \"Pinkeye: Care Instructions.\"  Current as of: June 6, 2023               Content Version: 13.9  © 2006-2023 [employer].   Care instructions adapted under license by [employer]. If you have questions about a medical condition or this instruction, always ask your healthcare professional. [employer] disclaims any warranty or liability for your use of this information.

## 2024-02-27 ENCOUNTER — TELEMEDICINE (OUTPATIENT)
Dept: INTERNAL MEDICINE CLINIC | Facility: CLINIC | Age: 69
End: 2024-02-27
Payer: MEDICARE

## 2024-02-27 DIAGNOSIS — U07.1 POSITIVE SELF-ADMINISTERED ANTIGEN TEST FOR COVID-19: Primary | ICD-10-CM

## 2024-02-27 PROCEDURE — G8427 DOCREV CUR MEDS BY ELIG CLIN: HCPCS | Performed by: NURSE PRACTITIONER

## 2024-02-27 PROCEDURE — 1123F ACP DISCUSS/DSCN MKR DOCD: CPT | Performed by: NURSE PRACTITIONER

## 2024-02-27 PROCEDURE — 99213 OFFICE O/P EST LOW 20 MIN: CPT | Performed by: NURSE PRACTITIONER

## 2024-02-27 PROCEDURE — 1111F DSCHRG MED/CURRENT MED MERGE: CPT | Performed by: NURSE PRACTITIONER

## 2024-02-27 PROCEDURE — 3017F COLORECTAL CA SCREEN DOC REV: CPT | Performed by: NURSE PRACTITIONER

## 2024-02-27 ASSESSMENT — ENCOUNTER SYMPTOMS
COUGH: 1
SORE THROAT: 0
WHEEZING: 0
SINUS PRESSURE: 0
NAUSEA: 0
SHORTNESS OF BREATH: 0
BLOOD IN STOOL: 0
ANAL BLEEDING: 0
VOMITING: 0
DIARRHEA: 0
ABDOMINAL PAIN: 0
CONSTIPATION: 0
RHINORRHEA: 1

## 2024-02-27 NOTE — PROGRESS NOTES
Systems   Constitutional:  Negative for chills and fever.   HENT:  Positive for congestion, postnasal drip and rhinorrhea. Negative for ear pain, sinus pressure and sore throat.    Respiratory:  Positive for cough. Negative for shortness of breath and wheezing.    Cardiovascular:  Negative for chest pain and palpitations.   Gastrointestinal:  Negative for abdominal pain, anal bleeding, blood in stool, constipation, diarrhea, nausea and vomiting.   Neurological:  Negative for dizziness, light-headedness and headaches.        Objective     Physical Exam  Constitutional:       General: He is not in acute distress.     Appearance: Normal appearance. He is not ill-appearing or toxic-appearing.   HENT:      Head: Normocephalic and atraumatic.   Pulmonary:      Effort: No respiratory distress.   Neurological:      Mental Status: He is alert and oriented to person, place, and time.   Psychiatric:         Mood and Affect: Mood normal.              On this date 2/27/2024 I have spent 25 minutes reviewing previous notes, test results and face to face (virtual) with the patient discussing the diagnosis and importance of compliance with the treatment plan as well as documenting on the day of the visit.    --CARA Tapia - CNP

## 2024-03-08 DIAGNOSIS — R05.2 SUBACUTE COUGH: Primary | ICD-10-CM

## 2024-03-08 DIAGNOSIS — R05.2 SUBACUTE COUGH: ICD-10-CM

## 2024-03-08 DIAGNOSIS — U07.1 POSITIVE SELF-ADMINISTERED ANTIGEN TEST FOR COVID-19: ICD-10-CM

## 2024-03-08 RX ORDER — DEXTROMETHORPHAN HYDROBROMIDE AND PROMETHAZINE HYDROCHLORIDE 15; 6.25 MG/5ML; MG/5ML
5 SYRUP ORAL 4 TIMES DAILY PRN
Qty: 118 ML | Refills: 0 | Status: SHIPPED | OUTPATIENT
Start: 2024-03-08 | End: 2024-03-08

## 2024-03-08 RX ORDER — DEXTROMETHORPHAN HYDROBROMIDE AND PROMETHAZINE HYDROCHLORIDE 15; 6.25 MG/5ML; MG/5ML
5 SYRUP ORAL 4 TIMES DAILY PRN
Qty: 118 ML | Refills: 0 | Status: SHIPPED | OUTPATIENT
Start: 2024-03-08 | End: 2024-03-15

## 2024-03-15 NOTE — PROGRESS NOTES
Patient pre-assessment complete for PERRY scheduled for 3/18/2024, arrival time 0930. Patient verified using . Patient instructed to bring all home medications in labeled bottles on the day of procedure. NPO status and need for  reinforced. Patient's last dose of Pradaxa will be on 3/17/2024. He will take 80% (20 units) of his usual PM dose of Tresiba the evening prior to procedure.  He will take carvedilol, gabapentin, and tamsulosin with a small sip of water, the morning of his procedure. Patient instructed to HOLD all other medications and supplements. Patient verbalizes understanding of all instructions & denies any questions at this time. Patient has watchman coordinator contact ([phone]) information for questions or concerns.

## 2024-03-18 ENCOUNTER — HOSPITAL ENCOUNTER (OUTPATIENT)
Dept: CARDIAC CATH/INVASIVE PROCEDURES | Age: 69
Discharge: HOME | End: 2024-03-18
Attending: INTERNAL MEDICINE | Admitting: INTERNAL MEDICINE
Payer: MEDICARE

## 2024-03-18 VITALS
BODY MASS INDEX: 39.76 KG/M2 | HEART RATE: 76 BPM | OXYGEN SATURATION: 96 % | DIASTOLIC BLOOD PRESSURE: 62 MMHG | HEIGHT: 73 IN | TEMPERATURE: 98 F | SYSTOLIC BLOOD PRESSURE: 98 MMHG | WEIGHT: 300 LBS | RESPIRATION RATE: 12 BRPM

## 2024-03-18 DIAGNOSIS — I48.11 LONGSTANDING PERSISTENT ATRIAL FIBRILLATION (HCC): ICD-10-CM

## 2024-03-18 LAB
ANION GAP SERPL CALC-SCNC: 2 MMOL/L (ref 2–11)
BASOPHILS # BLD: 0 K/UL (ref 0–0.2)
BASOPHILS NFR BLD: 0 % (ref 0–2)
BUN SERPL-MCNC: 13 MG/DL (ref 8–23)
CALCIUM SERPL-MCNC: 9.7 MG/DL (ref 8.3–10.4)
CHLORIDE SERPL-SCNC: 107 MMOL/L (ref 103–113)
CO2 SERPL-SCNC: 28 MMOL/L (ref 21–32)
CREAT SERPL-MCNC: 0.9 MG/DL (ref 0.8–1.5)
DIFFERENTIAL METHOD BLD: ABNORMAL
ECHO AO SINUS VALSALVA DIAM: 3.3 CM
ECHO AO SINUS VALSALVA INDEX: 1.29 CM/M2
ECHO BSA: 2.65 M2
EKG DIAGNOSIS: NORMAL
EKG Q-T INTERVAL: 352 MS
EKG QRS DURATION: 86 MS
EKG QTC CALCULATION (BAZETT): 425 MS
EKG R AXIS: 36 DEGREES
EKG T AXIS: 37 DEGREES
EKG VENTRICULAR RATE: 88 BPM
EOSINOPHIL # BLD: 0.1 K/UL (ref 0–0.8)
EOSINOPHIL NFR BLD: 2 % (ref 0.5–7.8)
ERYTHROCYTE [DISTWIDTH] IN BLOOD BY AUTOMATED COUNT: 13.9 % (ref 11.9–14.6)
GLUCOSE SERPL-MCNC: 115 MG/DL (ref 65–100)
HCT VFR BLD AUTO: 42.8 % (ref 41.1–50.3)
HGB BLD-MCNC: 14.3 G/DL (ref 13.6–17.2)
IMM GRANULOCYTES # BLD AUTO: 0.1 K/UL (ref 0–0.5)
IMM GRANULOCYTES NFR BLD AUTO: 1 % (ref 0–5)
LYMPHOCYTES # BLD: 1 K/UL (ref 0.5–4.6)
LYMPHOCYTES NFR BLD: 12 % (ref 13–44)
MAGNESIUM SERPL-MCNC: 2.2 MG/DL (ref 1.8–2.4)
MCH RBC QN AUTO: 32.5 PG (ref 26.1–32.9)
MCHC RBC AUTO-ENTMCNC: 33.4 G/DL (ref 31.4–35)
MCV RBC AUTO: 97.3 FL (ref 82–102)
MONOCYTES # BLD: 0.5 K/UL (ref 0.1–1.3)
MONOCYTES NFR BLD: 6 % (ref 4–12)
NEUTS SEG # BLD: 6.6 K/UL (ref 1.7–8.2)
NEUTS SEG NFR BLD: 79 % (ref 43–78)
NRBC # BLD: 0 K/UL (ref 0–0.2)
PLATELET # BLD AUTO: 183 K/UL (ref 150–450)
PMV BLD AUTO: 9.7 FL (ref 9.4–12.3)
POTASSIUM SERPL-SCNC: 4.4 MMOL/L (ref 3.5–5.1)
RBC # BLD AUTO: 4.4 M/UL (ref 4.23–5.6)
SODIUM SERPL-SCNC: 137 MMOL/L (ref 136–146)
WBC # BLD AUTO: 8.2 K/UL (ref 4.3–11.1)

## 2024-03-18 PROCEDURE — 93320 DOPPLER ECHO COMPLETE: CPT | Performed by: INTERNAL MEDICINE

## 2024-03-18 PROCEDURE — 93325 DOPPLER ECHO COLOR FLOW MAPG: CPT | Performed by: INTERNAL MEDICINE

## 2024-03-18 PROCEDURE — 93312 ECHO TRANSESOPHAGEAL: CPT | Performed by: INTERNAL MEDICINE

## 2024-03-18 PROCEDURE — 93312 ECHO TRANSESOPHAGEAL: CPT

## 2024-03-18 PROCEDURE — 93010 ELECTROCARDIOGRAM REPORT: CPT | Performed by: INTERNAL MEDICINE

## 2024-03-18 PROCEDURE — 85025 COMPLETE CBC W/AUTO DIFF WBC: CPT

## 2024-03-18 PROCEDURE — 6360000002 HC RX W HCPCS: Performed by: INTERNAL MEDICINE

## 2024-03-18 PROCEDURE — 93005 ELECTROCARDIOGRAM TRACING: CPT | Performed by: INTERNAL MEDICINE

## 2024-03-18 PROCEDURE — 99152 MOD SED SAME PHYS/QHP 5/>YRS: CPT | Performed by: INTERNAL MEDICINE

## 2024-03-18 PROCEDURE — 83735 ASSAY OF MAGNESIUM: CPT

## 2024-03-18 PROCEDURE — 6370000000 HC RX 637 (ALT 250 FOR IP): Performed by: INTERNAL MEDICINE

## 2024-03-18 PROCEDURE — 80048 BASIC METABOLIC PNL TOTAL CA: CPT

## 2024-03-18 PROCEDURE — 99152 MOD SED SAME PHYS/QHP 5/>YRS: CPT

## 2024-03-18 RX ORDER — CLOPIDOGREL BISULFATE 75 MG/1
75 TABLET ORAL DAILY
Qty: 90 TABLET | Refills: 1 | Status: SHIPPED | OUTPATIENT
Start: 2024-03-18

## 2024-03-18 RX ORDER — LIDOCAINE HYDROCHLORIDE 20 MG/ML
SOLUTION OROPHARYNGEAL PRN
Status: COMPLETED | OUTPATIENT
Start: 2024-03-18 | End: 2024-03-18

## 2024-03-18 RX ORDER — MIDAZOLAM HYDROCHLORIDE 1 MG/ML
INJECTION INTRAMUSCULAR; INTRAVENOUS PRN
Status: COMPLETED | OUTPATIENT
Start: 2024-03-18 | End: 2024-03-18

## 2024-03-18 RX ORDER — SODIUM CHLORIDE 9 MG/ML
INJECTION, SOLUTION INTRAVENOUS CONTINUOUS
Status: DISCONTINUED | OUTPATIENT
Start: 2024-03-18 | End: 2024-03-18 | Stop reason: HOSPADM

## 2024-03-18 RX ORDER — FENTANYL CITRATE 50 UG/ML
INJECTION, SOLUTION INTRAMUSCULAR; INTRAVENOUS PRN
Status: COMPLETED | OUTPATIENT
Start: 2024-03-18 | End: 2024-03-18

## 2024-03-18 RX ORDER — ASPIRIN 81 MG/1
81 TABLET ORAL DAILY
Qty: 90 TABLET | Refills: 1 | Status: SHIPPED | OUTPATIENT
Start: 2024-03-18

## 2024-03-18 RX ADMIN — MIDAZOLAM 2 MG: 1 INJECTION INTRAMUSCULAR; INTRAVENOUS at 12:03

## 2024-03-18 RX ADMIN — MIDAZOLAM 2 MG: 1 INJECTION INTRAMUSCULAR; INTRAVENOUS at 12:05

## 2024-03-18 RX ADMIN — LIDOCAINE HYDROCHLORIDE 15 ML: 20 SOLUTION ORAL at 11:31

## 2024-03-18 RX ADMIN — MIDAZOLAM 2 MG: 1 INJECTION INTRAMUSCULAR; INTRAVENOUS at 12:10

## 2024-03-18 RX ADMIN — FENTANYL CITRATE 25 MCG: 50 INJECTION, SOLUTION INTRAMUSCULAR; INTRAVENOUS at 12:05

## 2024-03-18 RX ADMIN — MIDAZOLAM 2 MG: 1 INJECTION INTRAMUSCULAR; INTRAVENOUS at 12:08

## 2024-03-18 RX ADMIN — FENTANYL CITRATE 25 MCG: 50 INJECTION, SOLUTION INTRAMUSCULAR; INTRAVENOUS at 12:03

## 2024-03-18 ASSESSMENT — ENCOUNTER SYMPTOMS
COUGH: 0
RESPIRATORY NEGATIVE: 1
SHORTNESS OF BREATH: 0
WHEEZING: 0
GASTROINTESTINAL NEGATIVE: 1

## 2024-03-18 NOTE — PROGRESS NOTES
Transesophageal Echo Note  - Indication: post Watchman   - pt underwent successful PERRY today in cath holding  - start 1203  - stop 1215  - sedation: 8 mg IV Midazolam, 50 mcg Fentanyl IV given by Rain Varela RN under my direct supervision. Direct monitoring of vital signs and respiratory status throughout the procedure.   - An independent trained observer pushed medications at my direction. We monitored the patient's level of consciousness and vital signs/physiologic status throughout the procedure duration (see start and stop times above).   - No complications, pt in stable condition  - PERRY Brief Findings: Watchman device is well seated without evidence of perioprosthetic leak on color Doppler or thrombus on the device (LA side), mild MR. Small ASD with left to right shunt.   - Complete PERRY report to follow.   - OK for oral intake at 1415  - No driving or heavy machine operation today.   - Results and medication changes discussed with patient and family at bedside   - acceptable to stop Pradaxa  - start Aspirin 81 mg oral once daily  - start Plavix 75 mg oral once daily

## 2024-03-18 NOTE — H&P
Collection Time: 03/18/24 10:37 AM   Result Value Ref Range    WBC 8.2 4.3 - 11.1 K/uL    RBC 4.40 4.23 - 5.6 M/uL    Hemoglobin 14.3 13.6 - 17.2 g/dL    Hematocrit 42.8 41.1 - 50.3 %    MCV 97.3 82 - 102 FL    MCH 32.5 26.1 - 32.9 PG    MCHC 33.4 31.4 - 35.0 g/dL    RDW 13.9 11.9 - 14.6 %    Platelets 183 150 - 450 K/uL    MPV 9.7 9.4 - 12.3 FL    nRBC 0.00 0.0 - 0.2 K/uL    Differential Type AUTOMATED      Neutrophils % 79 (H) 43 - 78 %    Lymphocytes % 12 (L) 13 - 44 %    Monocytes % 6 4.0 - 12.0 %    Eosinophils % 2 0.5 - 7.8 %    Basophils % 0 0.0 - 2.0 %    Immature Granulocytes 1 0.0 - 5.0 %    Neutrophils Absolute 6.6 1.7 - 8.2 K/UL    Lymphocytes Absolute 1.0 0.5 - 4.6 K/UL    Monocytes Absolute 0.5 0.1 - 1.3 K/UL    Eosinophils Absolute 0.1 0.0 - 0.8 K/UL    Basophils Absolute 0.0 0.0 - 0.2 K/UL    Absolute Immature Granulocyte 0.1 0.0 - 0.5 K/UL   Magnesium    Collection Time: 03/18/24 10:37 AM   Result Value Ref Range    Magnesium 2.2 1.8 - 2.4 mg/dL   EKG 12 Lead    Collection Time: 03/18/24 10:49 AM   Result Value Ref Range    Ventricular Rate 88 BPM    QRS Duration 86 ms    Q-T Interval 352 ms    QTc Calculation (Bazett) 425 ms    R Axis 36 degrees    T Axis 37 degrees    Diagnosis       Atrial fibrillation with premature ventricular or aberrantly conducted   complexes  Low voltage QRS  Abnormal ECG  When compared with ECG of 06-FEB-2024 07:17,  No significant change was found     PERRY (PRN contrast/bubble/3D)    Collection Time: 03/18/24 12:15 PM   Result Value Ref Range    Body Surface Area 2.65 m2       ASSESSMENT/PLAN:      ICD-10-CM    1. Longstanding persistent atrial fibrillation (HCC)  I48.11 PERRY (PRN contrast/bubble/3D)     PERRY (PRN contrast/bubble/3D)          Atrial fibrillation  - patient is post  Watchman (left atrial occluder device) implantation  - NPO  - consent on chart, risks benefits, alternatives have been discussed with the patient/patient's family   - questions answered  -

## 2024-03-18 NOTE — PROGRESS NOTES
Patient received to CPRU room # 15  Ambulatory from [facility]. Patient scheduled for PERRY today with Dr Roche. Procedure reviewed & questions answered, voiced good understanding consent obtained & placed on chart. All medications and medical history reviewed. Will prep patient per orders. Patient & family updated on plan of care.      The patient has a fraility score of 3-MANAGING WELL, based on ambulation.

## 2024-03-18 NOTE — PROGRESS NOTES
Discharge instructions given per orders, voiced good understanding of  care, medications & follow up care. Denies any questions

## 2024-03-18 NOTE — DISCHARGE INSTRUCTIONS
AFTER YOU TRANSESOPHAGEAL ECHOCARDIOGRAM    Be sure someone else drives you home. You may feel drowsy for several hours.    Do not eat or drink for at least two hours after your procedure. Your throat will be numb and there is a risk you might have difficulty swallowing for a while. Be careful when you do eat or drink for the first time especially with hot fluids since you could easily burn your throat.    Call your doctor if:    You are bleeding from your throat or mouth.  You have trouble breathing all of a sudden.  You have chest pain or any pain that spreads to your neck, jaw, or arms.  You have questions or concerns.  You have a fever greater than 101°F.    Doctor: [facility]    Special Instructions:    No driving for 24 hours.  YOUR THROAT WAS NUMBED AT 1130. DO NOT EAT OR DRINK UNTIL 1:30pm. START WITH WATER, IF NO COUGHING/CHOKING, PROCEED TO REGULAR FOOD

## 2024-04-17 ENCOUNTER — APPOINTMENT (RX ONLY)
Dept: URBAN - METROPOLITAN AREA CLINIC 24 | Facility: CLINIC | Age: 69
Setting detail: DERMATOLOGY
End: 2024-04-17

## 2024-04-17 DIAGNOSIS — D22 MELANOCYTIC NEVI: ICD-10-CM

## 2024-04-17 DIAGNOSIS — D18.0 HEMANGIOMA: ICD-10-CM

## 2024-04-17 DIAGNOSIS — L57.8 OTHER SKIN CHANGES DUE TO CHRONIC EXPOSURE TO NONIONIZING RADIATION: ICD-10-CM

## 2024-04-17 DIAGNOSIS — L84 CORNS AND CALLOSITIES: ICD-10-CM

## 2024-04-17 DIAGNOSIS — Z71.89 OTHER SPECIFIED COUNSELING: ICD-10-CM

## 2024-04-17 DIAGNOSIS — L82.1 OTHER SEBORRHEIC KERATOSIS: ICD-10-CM

## 2024-04-17 PROBLEM — D22.5 MELANOCYTIC NEVI OF TRUNK: Status: ACTIVE | Noted: 2024-04-17

## 2024-04-17 PROBLEM — D18.01 HEMANGIOMA OF SKIN AND SUBCUTANEOUS TISSUE: Status: ACTIVE | Noted: 2024-04-17

## 2024-04-17 PROCEDURE — 99213 OFFICE O/P EST LOW 20 MIN: CPT

## 2024-04-17 PROCEDURE — ? COUNSELING

## 2024-04-17 ASSESSMENT — LOCATION SIMPLE DESCRIPTION DERM
LOCATION SIMPLE: RIGHT FOREARM
LOCATION SIMPLE: LEFT UPPER BACK
LOCATION SIMPLE: RIGHT UPPER BACK
LOCATION SIMPLE: RIGHT SHOULDER
LOCATION SIMPLE: LEFT FOREARM
LOCATION SIMPLE: ABDOMEN
LOCATION SIMPLE: LEFT POSTERIOR THIGH
LOCATION SIMPLE: RIGHT PLANTAR SURFACE
LOCATION SIMPLE: CHEST
LOCATION SIMPLE: POSTERIOR NECK

## 2024-04-17 ASSESSMENT — LOCATION DETAILED DESCRIPTION DERM
LOCATION DETAILED: RIGHT SUPERIOR LATERAL UPPER BACK
LOCATION DETAILED: RIGHT PLANTAR FOREFOOT OVERLYING 4TH METATARSAL
LOCATION DETAILED: LEFT DISTAL POSTERIOR THIGH
LOCATION DETAILED: RIGHT DISTAL DORSAL FOREARM
LOCATION DETAILED: LEFT PROXIMAL DORSAL FOREARM
LOCATION DETAILED: LEFT MID-UPPER BACK
LOCATION DETAILED: MID POSTERIOR NECK
LOCATION DETAILED: LEFT LATERAL SUPERIOR CHEST
LOCATION DETAILED: PERIUMBILICAL SKIN
LOCATION DETAILED: RIGHT POSTERIOR SHOULDER

## 2024-04-17 ASSESSMENT — LOCATION ZONE DERM
LOCATION ZONE: NECK
LOCATION ZONE: TRUNK
LOCATION ZONE: FEET
LOCATION ZONE: LEG
LOCATION ZONE: ARM

## 2024-04-23 PROBLEM — Z95.818 PRESENCE OF WATCHMAN LEFT ATRIAL APPENDAGE CLOSURE DEVICE: Status: ACTIVE | Noted: 2024-04-23

## 2024-04-23 ASSESSMENT — ENCOUNTER SYMPTOMS: SHORTNESS OF BREATH: 0

## 2024-04-24 ENCOUNTER — OFFICE VISIT (OUTPATIENT)
Age: 69
End: 2024-04-24
Payer: MEDICARE

## 2024-04-24 VITALS
DIASTOLIC BLOOD PRESSURE: 84 MMHG | WEIGHT: 302.6 LBS | HEART RATE: 80 BPM | HEIGHT: 73 IN | SYSTOLIC BLOOD PRESSURE: 110 MMHG | BODY MASS INDEX: 40.11 KG/M2

## 2024-04-24 DIAGNOSIS — I34.0 NONRHEUMATIC MITRAL VALVE REGURGITATION: ICD-10-CM

## 2024-04-24 DIAGNOSIS — K92.1 MELENA: ICD-10-CM

## 2024-04-24 DIAGNOSIS — I10 ESSENTIAL HYPERTENSION: ICD-10-CM

## 2024-04-24 DIAGNOSIS — Z95.818 PRESENCE OF WATCHMAN LEFT ATRIAL APPENDAGE CLOSURE DEVICE: ICD-10-CM

## 2024-04-24 DIAGNOSIS — I48.11 LONGSTANDING PERSISTENT ATRIAL FIBRILLATION (HCC): Primary | ICD-10-CM

## 2024-04-24 PROCEDURE — 1036F TOBACCO NON-USER: CPT | Performed by: INTERNAL MEDICINE

## 2024-04-24 PROCEDURE — 3079F DIAST BP 80-89 MM HG: CPT | Performed by: INTERNAL MEDICINE

## 2024-04-24 PROCEDURE — G8417 CALC BMI ABV UP PARAM F/U: HCPCS | Performed by: INTERNAL MEDICINE

## 2024-04-24 PROCEDURE — 3017F COLORECTAL CA SCREEN DOC REV: CPT | Performed by: INTERNAL MEDICINE

## 2024-04-24 PROCEDURE — 99214 OFFICE O/P EST MOD 30 MIN: CPT | Performed by: INTERNAL MEDICINE

## 2024-04-24 PROCEDURE — G8427 DOCREV CUR MEDS BY ELIG CLIN: HCPCS | Performed by: INTERNAL MEDICINE

## 2024-04-24 PROCEDURE — 1123F ACP DISCUSS/DSCN MKR DOCD: CPT | Performed by: INTERNAL MEDICINE

## 2024-04-24 PROCEDURE — 3074F SYST BP LT 130 MM HG: CPT | Performed by: INTERNAL MEDICINE

## 2024-04-24 NOTE — PROGRESS NOTES
(ZYRTEC) 10 MG tablet, Take 1 tablet by mouth daily as needed for Allergies or Rhinitis, Disp: , Rfl:      Allergies   Allergen Reactions    Azithromycin Itching and Other (See Comments)     Skin dryness/peeling    Brand Name: ZPACK    Other Reaction(s): Itching-Allergy, Other- (not listed) - Allergy    Dilaudid [Hydromorphone]      Makes patient \"wild and mean\"    Shellfish-Derived Products Swelling     mouth        Patient Active Problem List    Diagnosis Date Noted    Presence of Watchman left atrial appendage closure device 04/23/2024     Priority: High     Left atrial appendage occlusion (2/6/24):  27 mm Watchman FLX device.       Longstanding persistent atrial fibrillation (HCC) 02/06/2024     Priority: High    Visit for wound check 02/02/2023     Priority: Medium    Postoperative visit 01/26/2023     Priority: Medium    Chronic systolic (congestive) heart failure 01/26/2023     Priority: Medium    Edema due to hypoalbuminemia 01/19/2023     Priority: Medium    Volume overload 01/19/2023     Priority: Medium    Herpes simplex virus infection 01/16/2023     Priority: Medium    Sepsis due to methicillin resistant Staphylococcus aureus (MRSA) ([facility]) 01/15/2023     Priority: Medium    Anticoagulant long-term use 01/04/2023     Priority: Medium    Secondary hypercoagulable state ([facility]) 01/29/2024     Priority: Low    Other iron deficiency anemias 11/02/2023     Priority: Low    Gastrointestinal hemorrhage 07/03/2023     Priority: Low    Controlled type 2 diabetes mellitus with peripheral circulatory disorder ([facility]) 02/01/2022     Priority: Low    Osteoarthritis of lumbosacral spine 07/27/2021     Priority: Low    Type 2 diabetes mellitus with hyperglycemia, without long-term current use of insulin ([facility]) 07/27/2021     Priority: Low    History of subdural hematoma (post traumatic) 07/27/2020     Priority: Low    Polyneuropathy associated with underlying disease ([facility]) 07/27/2020     Priority: Low    Coronary artery

## 2024-05-03 ENCOUNTER — NURSE ONLY (OUTPATIENT)
Dept: INTERNAL MEDICINE CLINIC | Facility: CLINIC | Age: 69
End: 2024-05-03

## 2024-05-03 DIAGNOSIS — E11.51 CONTROLLED TYPE 2 DIABETES MELLITUS WITH PERIPHERAL CIRCULATORY DISORDER (HCC): ICD-10-CM

## 2024-05-03 LAB
ANION GAP SERPL CALC-SCNC: 10 MMOL/L (ref 9–18)
BUN SERPL-MCNC: 12 MG/DL (ref 8–23)
CALCIUM SERPL-MCNC: 9.6 MG/DL (ref 8.8–10.2)
CHLORIDE SERPL-SCNC: 103 MMOL/L (ref 98–107)
CHOLEST SERPL-MCNC: 106 MG/DL (ref 0–200)
CO2 SERPL-SCNC: 24 MMOL/L (ref 20–28)
CREAT SERPL-MCNC: 0.91 MG/DL (ref 0.8–1.3)
EST. AVERAGE GLUCOSE BLD GHB EST-MCNC: 113 MG/DL
GLUCOSE SERPL-MCNC: 107 MG/DL (ref 70–99)
HBA1C MFR BLD: 5.6 % (ref 0–5.6)
HDLC SERPL-MCNC: 31 MG/DL (ref 40–60)
HDLC SERPL: 3.4 (ref 0–5)
LDLC SERPL CALC-MCNC: 56 MG/DL (ref 0–100)
POTASSIUM SERPL-SCNC: 4.3 MMOL/L (ref 3.5–5.1)
SODIUM SERPL-SCNC: 137 MMOL/L (ref 136–145)
TRIGL SERPL-MCNC: 96 MG/DL (ref 0–150)
VLDLC SERPL CALC-MCNC: 19 MG/DL (ref 6–23)

## 2024-05-09 ENCOUNTER — OFFICE VISIT (OUTPATIENT)
Dept: INTERNAL MEDICINE CLINIC | Facility: CLINIC | Age: 69
End: 2024-05-09
Payer: MEDICARE

## 2024-05-09 VITALS
WEIGHT: 302 LBS | HEART RATE: 85 BPM | SYSTOLIC BLOOD PRESSURE: 112 MMHG | OXYGEN SATURATION: 98 % | DIASTOLIC BLOOD PRESSURE: 64 MMHG | TEMPERATURE: 97.3 F | HEIGHT: 73 IN | BODY MASS INDEX: 40.02 KG/M2

## 2024-05-09 DIAGNOSIS — E66.01 CLASS 3 SEVERE OBESITY DUE TO EXCESS CALORIES WITH SERIOUS COMORBIDITY AND BODY MASS INDEX (BMI) OF 40.0 TO 44.9 IN ADULT (HCC): Chronic | ICD-10-CM

## 2024-05-09 DIAGNOSIS — E11.51 CONTROLLED TYPE 2 DIABETES MELLITUS WITH PERIPHERAL CIRCULATORY DISORDER (HCC): Primary | ICD-10-CM

## 2024-05-09 DIAGNOSIS — I48.11 LONGSTANDING PERSISTENT ATRIAL FIBRILLATION (HCC): Chronic | ICD-10-CM

## 2024-05-09 DIAGNOSIS — Z95.818 PRESENCE OF WATCHMAN LEFT ATRIAL APPENDAGE CLOSURE DEVICE: ICD-10-CM

## 2024-05-09 DIAGNOSIS — I10 ESSENTIAL HYPERTENSION: ICD-10-CM

## 2024-05-09 DIAGNOSIS — E78.2 MIXED DYSLIPIDEMIA: ICD-10-CM

## 2024-05-09 DIAGNOSIS — I34.0 NONRHEUMATIC MITRAL VALVE REGURGITATION: ICD-10-CM

## 2024-05-09 DIAGNOSIS — I50.22 CHRONIC SYSTOLIC (CONGESTIVE) HEART FAILURE (HCC): ICD-10-CM

## 2024-05-09 PROBLEM — Z48.89 POSTOPERATIVE VISIT: Status: RESOLVED | Noted: 2023-01-26 | Resolved: 2024-05-09

## 2024-05-09 PROBLEM — A41.02 SEPSIS DUE TO METHICILLIN RESISTANT STAPHYLOCOCCUS AUREUS (MRSA) (HCC): Status: RESOLVED | Noted: 2023-01-15 | Resolved: 2024-05-09

## 2024-05-09 PROBLEM — Z51.89 VISIT FOR WOUND CHECK: Status: RESOLVED | Noted: 2023-02-02 | Resolved: 2024-05-09

## 2024-05-09 PROBLEM — E87.70 VOLUME OVERLOAD: Status: RESOLVED | Noted: 2023-01-19 | Resolved: 2024-05-09

## 2024-05-09 PROCEDURE — 1123F ACP DISCUSS/DSCN MKR DOCD: CPT | Performed by: INTERNAL MEDICINE

## 2024-05-09 PROCEDURE — G8417 CALC BMI ABV UP PARAM F/U: HCPCS | Performed by: INTERNAL MEDICINE

## 2024-05-09 PROCEDURE — 1036F TOBACCO NON-USER: CPT | Performed by: INTERNAL MEDICINE

## 2024-05-09 PROCEDURE — 2022F DILAT RTA XM EVC RTNOPTHY: CPT | Performed by: INTERNAL MEDICINE

## 2024-05-09 PROCEDURE — 3017F COLORECTAL CA SCREEN DOC REV: CPT | Performed by: INTERNAL MEDICINE

## 2024-05-09 PROCEDURE — 3074F SYST BP LT 130 MM HG: CPT | Performed by: INTERNAL MEDICINE

## 2024-05-09 PROCEDURE — 3078F DIAST BP <80 MM HG: CPT | Performed by: INTERNAL MEDICINE

## 2024-05-09 PROCEDURE — 3044F HG A1C LEVEL LT 7.0%: CPT | Performed by: INTERNAL MEDICINE

## 2024-05-09 PROCEDURE — 99214 OFFICE O/P EST MOD 30 MIN: CPT | Performed by: INTERNAL MEDICINE

## 2024-05-09 PROCEDURE — G2211 COMPLEX E/M VISIT ADD ON: HCPCS | Performed by: INTERNAL MEDICINE

## 2024-05-09 PROCEDURE — G8427 DOCREV CUR MEDS BY ELIG CLIN: HCPCS | Performed by: INTERNAL MEDICINE

## 2024-05-09 ASSESSMENT — ENCOUNTER SYMPTOMS
VOMITING: 0
COUGH: 0
ANAL BLEEDING: 0
NAUSEA: 0
BLOOD IN STOOL: 0
SHORTNESS OF BREATH: 0
ABDOMINAL PAIN: 0

## 2024-05-09 NOTE — PROGRESS NOTES
02/20/24 (from the past 2160 hour(s))   CBC   Result Value Ref Range    WBC 7.7 4.3 - 11.1 K/uL    RBC 4.49 4.23 - 5.6 M/uL    Hemoglobin 14.4 13.6 - 17.2 g/dL    Hematocrit 43.6 41.1 - 50.3 %    MCV 97.1 82 - 102 FL    MCH 32.1 26.1 - 32.9 PG    MCHC 33.0 31.4 - 35.0 g/dL    RDW 14.6 11.9 - 14.6 %    Platelets 179 150 - 450 K/uL    MPV 10.8 9.4 - 12.3 FL    nRBC 0.00 0.0 - 0.2 K/uL       Vitals:    05/09/24 1606   BP: 112/64   Site: Left Upper Arm   Position: Sitting   Cuff Size: Large Adult   Pulse: 85   Temp: 97.3 °F (36.3 °C)   TempSrc: Temporal   SpO2: 98%   Weight: (Abnormal) 137 kg (302 lb)   Height: 1.854 m (6' 1\")     Wt Readings from Last 3 Encounters:   05/09/24 (Abnormal) 137 kg (302 lb)   04/24/24 (Abnormal) 137.3 kg (302 lb 9.6 oz)   03/18/24 136.1 kg (300 lb)     BP Readings from Last 3 Encounters:   05/09/24 112/64   04/24/24 110/84   03/18/24 98/62     Physical Exam  Vitals and nursing note reviewed.   Constitutional:       Appearance: He is obese.   Cardiovascular:      Rate and Rhythm: Normal rate. Rhythm irregular.      Heart sounds: Murmur heard.      Systolic murmur is present with a grade of 1/6.   Pulmonary:      Effort: Pulmonary effort is normal.      Breath sounds: Normal breath sounds. No rales.   Abdominal:      General: Abdomen is flat. Bowel sounds are normal.      Palpations: Abdomen is soft.      Tenderness: There is no abdominal tenderness.   Musculoskeletal:      Right lower leg: No edema.      Left lower leg: No edema.   Skin:     General: Skin is warm and dry.      Coloration: Skin is not pale.   Neurological:      General: No focal deficit present.      Mental Status: He is alert and oriented to person, place, and time. Mental status is at baseline.      Cranial Nerves: No cranial nerve deficit.   Psychiatric:         Mood and Affect: Mood normal.         Behavior: Behavior normal.

## 2024-05-09 NOTE — PATIENT INSTRUCTIONS
The medication list included in this document is our record of what you are currently taking, including any changes that were made at today's visit.  If you find any differences when compared to your medications at home, or have any questions that were not answered at your visit, please contact the office.    Follow up in six months with labs prior.  See orders.     Perry Bartholomew MD

## 2024-05-09 NOTE — ASSESSMENT & PLAN NOTE
Treatment regimen is effective.     Euvolemic on exam.  Echo stable.   Cardiology follow up notes reviewed.    Echo reviewed.

## 2024-05-09 NOTE — ASSESSMENT & PLAN NOTE
Well-controlled, continue current medications  Treatment regimen is effective.     -Continue chronic medications as prescribed w/ the following diabetic medication changes:  continue Ozempic 2 mg weekly.   No hypoglycemia  Diabetic Medications       Insulin       Insulin Degludec 100 UNIT/ML SOPN Inject 25 Units into the skin nightly      Biguanides       metFORMIN (GLUCOPHAGE) 500 MG tablet Take 1 tablet by mouth daily (with breakfast)      Incretin Mimetic Agents       OZEMPIC, 2 MG/DOSE, 8 MG/3ML SOPN sc injection Inject 0.75 mLs into the skin once a week     Patient taking differently: Inject 2.5 mg into the skin once a week          Lab Results   Component Value Date    LABA1C 5.6 05/03/2024    LABA1C 5.4 01/12/2024    LABA1C 5.7 (H) 10/09/2023     Lab Results   Component Value Date    MALBCR 108 (H) 06/16/2023    CREATININE 0.91 05/03/2024       -Education: Reviewed ‘ABCs’ of diabetes management (respective goals in parentheses):  A1C (<7), blood pressure (<130/80), and cholesterol (LDL <100).  -Diabetic treatment medications and compliance is emphasized to achieve glycemic control and prevent the potential long term diabetic complications (retinopathy, stroke, heart attack, neuropathy, oral disease, increased cancer risk as well as many others)   Encouraged efforts to improve compliance efforts with moderate cardiovascular exercise dietary modifications.  Glycemic control is promoted by cardiovascular exercise (walking, swimming, aerobics) for 30-45 minutes, 3-4 times weekly.  A more mediterranean type diet with legumes, lean meats, fish and poultry, and lean sources of protein and limiting excessive red meat is recommended.   The importance of a healthy lifestyle are discussed.  Avoid high fructose containing foods, frequent fast food meals, overly processed foods.  A healthy BMI will be easier to achieve if by moderating portion sizes.    -Monitor blood sugar at home daily as directed and keep record to

## 2024-05-09 NOTE — ASSESSMENT & PLAN NOTE
No bleeding.  S/p watchman.    '  Cardiac Medications       ACE Inhibitors       enalapril (VASOTEC) 10 MG tablet TAKE 1 TABLET DAILY      Potassium Sparing Diuretics       spironolactone (ALDACTONE) 25 MG tablet Take 1 tablet by mouth daily      Thiazides and Thiazide-Like Diuretics       hydroCHLOROthiazide (HYDRODIURIL) 25 MG tablet Take 1 tablet by mouth daily      HMG CoA Reductase Inhibitors       simvastatin (ZOCOR) 40 MG tablet Take 1 tablet by mouth daily      Alpha-Beta Blockers       carvedilol (COREG) 25 MG tablet Taking 1/2 tablet in the AM and 1 tablet in the PM      Pulmonary Hypertension - Phosphodiesterase Inhibitors       sildenafil (REVATIO) 20 MG tablet Take 1-5 tablets by mouth as needed (erectile dysfunction)      Salicylates       aspirin 81 MG EC tablet Take 1 tablet by mouth daily      Platelet Aggregation Inhibitors       clopidogrel (PLAVIX) 75 MG tablet Take 1 tablet by mouth daily          Lab Results   Component Value Date    WBC 8.2 03/18/2024    HGB 14.3 03/18/2024    HCT 42.8 03/18/2024    MCV 97.3 03/18/2024     03/18/2024

## 2024-06-18 DIAGNOSIS — E78.2 MIXED DYSLIPIDEMIA: ICD-10-CM

## 2024-06-18 RX ORDER — SIMVASTATIN 40 MG
40 TABLET ORAL DAILY
Qty: 90 TABLET | Refills: 3 | OUTPATIENT
Start: 2024-06-18

## 2024-06-19 DIAGNOSIS — E78.2 MIXED DYSLIPIDEMIA: ICD-10-CM

## 2024-06-20 ENCOUNTER — TELEPHONE (OUTPATIENT)
Age: 69
End: 2024-06-20

## 2024-06-20 DIAGNOSIS — I10 ESSENTIAL HYPERTENSION: Primary | ICD-10-CM

## 2024-06-20 DIAGNOSIS — K92.2 GASTROINTESTINAL HEMORRHAGE: ICD-10-CM

## 2024-06-20 RX ORDER — SIMVASTATIN 40 MG
40 TABLET ORAL DAILY
Qty: 90 TABLET | Refills: 3 | Status: SHIPPED | OUTPATIENT
Start: 2024-06-20

## 2024-06-20 NOTE — TELEPHONE ENCOUNTER
I spoke w/pt.he said he had Watchman placed Feb.6th.Tuesday noticed darker than normal stool(not black) when he wiped had a little blood on toilet paper but none in underware/toilet.Hx of GI bleed.He held Plavix yesterday and this am.He is scheduled to go off Plavix in August.Stool was still dark this am but not black or tarry.Does he continue to hold Plavix?Any lab work needed?

## 2024-06-20 NOTE — TELEPHONE ENCOUNTER
----- Message from Ольга Carnes MA sent at 6/20/2024  8:04 AM EDT -----  Regarding: FW: Dark stool and red blood  Contact: [phone]    ----- Message -----  From: Conrad Stephenson \"Garret\"  Sent: 6/20/2024   7:25 AM EDT  To: [facility] Clinical Staff  Subject: Dark stool and red blood                         Dr Guerrero, I had the watchman procedure on 2/6/24.  I have been taking 1 plavix and 1 baby asprin.  On Tuesday I noticed my stool was very dark, not black but dark. I also noticed a very small amount of red blood when I wiped with a wipe. My plan was to suspend the plavix for Wed-Sunday this week. Is this a good plan and should I get my hemoglobin checked?           Garret Stephenson [phone].  Thank you

## 2024-06-20 NOTE — TELEPHONE ENCOUNTER
Lincoln lopez MD  You5 minutes ago (12:46 PM)       Go ahead and stop Plavix.  Continue his aspirin.  Check CBC next Monday.   I called and informed pt.of MD response and he v/u.

## 2024-06-24 ENCOUNTER — OFFICE VISIT (OUTPATIENT)
Dept: INTERNAL MEDICINE CLINIC | Facility: CLINIC | Age: 69
End: 2024-06-24
Payer: MEDICARE

## 2024-06-24 ENCOUNTER — PATIENT MESSAGE (OUTPATIENT)
Dept: INTERNAL MEDICINE CLINIC | Facility: CLINIC | Age: 69
End: 2024-06-24

## 2024-06-24 VITALS
BODY MASS INDEX: 39.1 KG/M2 | DIASTOLIC BLOOD PRESSURE: 72 MMHG | OXYGEN SATURATION: 96 % | HEIGHT: 73 IN | HEART RATE: 88 BPM | TEMPERATURE: 97.9 F | SYSTOLIC BLOOD PRESSURE: 132 MMHG | WEIGHT: 295 LBS

## 2024-06-24 DIAGNOSIS — K92.2 GASTROINTESTINAL HEMORRHAGE: ICD-10-CM

## 2024-06-24 DIAGNOSIS — I87.2 CHRONIC STASIS DERMATITIS OF RIGHT LOWER EXTREMITY: Primary | ICD-10-CM

## 2024-06-24 DIAGNOSIS — I10 ESSENTIAL HYPERTENSION: ICD-10-CM

## 2024-06-24 LAB
ERYTHROCYTE [DISTWIDTH] IN BLOOD BY AUTOMATED COUNT: 13.1 % (ref 11.9–14.6)
HCT VFR BLD AUTO: 42.7 % (ref 41.1–50.3)
HGB BLD-MCNC: 14.2 G/DL (ref 13.6–17.2)
MCH RBC QN AUTO: 32.4 PG (ref 26.1–32.9)
MCHC RBC AUTO-ENTMCNC: 33.3 G/DL (ref 31.4–35)
MCV RBC AUTO: 97.5 FL (ref 82–102)
NRBC # BLD: 0 K/UL (ref 0–0.2)
PLATELET # BLD AUTO: 187 K/UL (ref 150–450)
PMV BLD AUTO: 10.7 FL (ref 9.4–12.3)
RBC # BLD AUTO: 4.38 M/UL (ref 4.23–5.6)
WBC # BLD AUTO: 8 K/UL (ref 4.3–11.1)

## 2024-06-24 PROCEDURE — 3075F SYST BP GE 130 - 139MM HG: CPT | Performed by: INTERNAL MEDICINE

## 2024-06-24 PROCEDURE — 1036F TOBACCO NON-USER: CPT | Performed by: INTERNAL MEDICINE

## 2024-06-24 PROCEDURE — 3078F DIAST BP <80 MM HG: CPT | Performed by: INTERNAL MEDICINE

## 2024-06-24 PROCEDURE — 1123F ACP DISCUSS/DSCN MKR DOCD: CPT | Performed by: INTERNAL MEDICINE

## 2024-06-24 PROCEDURE — 3017F COLORECTAL CA SCREEN DOC REV: CPT | Performed by: INTERNAL MEDICINE

## 2024-06-24 PROCEDURE — G8417 CALC BMI ABV UP PARAM F/U: HCPCS | Performed by: INTERNAL MEDICINE

## 2024-06-24 PROCEDURE — G8427 DOCREV CUR MEDS BY ELIG CLIN: HCPCS | Performed by: INTERNAL MEDICINE

## 2024-06-24 PROCEDURE — 99213 OFFICE O/P EST LOW 20 MIN: CPT | Performed by: INTERNAL MEDICINE

## 2024-06-24 RX ORDER — MINOCYCLINE HYDROCHLORIDE 50 MG/1
50 CAPSULE ORAL 2 TIMES DAILY
Qty: 28 CAPSULE | Refills: 0 | Status: SHIPPED | OUTPATIENT
Start: 2024-06-24 | End: 2024-07-08

## 2024-06-24 RX ORDER — CLOTRIMAZOLE AND BETAMETHASONE DIPROPIONATE 10; .64 MG/G; MG/G
CREAM TOPICAL
Qty: 60 G | Refills: 0 | Status: SHIPPED | OUTPATIENT
Start: 2024-06-24

## 2024-06-24 NOTE — TELEPHONE ENCOUNTER
----- Message from Conrad Stephenson sent at 6/24/2024 11:27 AM EDT -----  Regarding: Redness and draining on back of lower right leg  Contact: [phone]  I went to [facility] on 5/31/24. NP examined draining wound and prescribed 100 MG Doxycycline Monohydrate twice daily for 10 days. Also Mupirocin ointment USP 2%.  I took the med for 10 Days and have continued to use the ointment but area is still draining and is red. I called [facility] this morning and no one could see me at your office until 9:00 AM 6/27/24.  Should I go back to Urgent Care, or could someone at your office look at it sooner ? Thanks Garret [phone]

## 2024-06-24 NOTE — PATIENT INSTRUCTIONS
The medication list included in this document is our record of what you are currently taking, including any changes that were made at today's visit.  If you find any differences when compared to your medications at home, or have any questions that were not answered at your visit, please contact the office.    Minocycline 50 mg twice daily for 14 days.      Topical Lotrisone twice daily.    Keep clean and dry and bandage daily.      Please let me know if doesn't improve.      There may be a fungal ringworm component to this lesion?  Not sure?      Keep me posted.    SIMRAN FULLER MD

## 2024-06-24 NOTE — PROGRESS NOTES
Chief Complaint   Patient presents with    Infected Wound     Right lower leg, treated at [facility] on 5/31/2024 and was prescribed Doxyccline 100 mg BID for 10 days and Mupirocin Ointment 2%       Conrad Sotoman 69 y.o. male         Blood Pressure 132/72 (Site: Left Upper Arm, Position: Sitting, Cuff Size: Large Adult)   Pulse 88   Temperature 97.9 °F (36.6 °C) (Temporal)   Height 1.854 m (6' 1\")   Weight 133.8 kg (295 lb)   Oxygen Saturation 96%   Body Mass Index 38.92 kg/m²   Physical Exam  Vitals and nursing note reviewed.   Constitutional:       Appearance: Normal appearance. He is not ill-appearing.   HENT:      Head: Normocephalic and atraumatic.   Eyes:      Extraocular Movements: Extraocular movements intact.      Conjunctiva/sclera: Conjunctivae normal.   Cardiovascular:      Rate and Rhythm: Normal rate.   Pulmonary:      Effort: Pulmonary effort is normal.   Skin:     General: Skin is warm and dry.      Findings: Erythema present.      Comments: See media image of the right posterior lower leg.  Fungal component?  Varicose vein related?  Not sure.  No pus.     Neurological:      General: No focal deficit present.      Mental Status: He is alert and oriented to person, place, and time. Mental status is at baseline.   Psychiatric:         Mood and Affect: Mood normal.         Behavior: Behavior normal.         Assessment & Plan     1. Chronic stasis dermatitis of right lower extremity     - clotrimazole-betamethasone (LOTRISONE) 1-0.05 % cream; Apply topically 2 times daily.  Dispense: 60 g; Refill: 0  - minocycline (MINOCIN;DYNACIN) 50 MG capsule; Take 1 capsule by mouth 2 times daily for 14 days  Dispense: 28 capsule; Refill: 0      Patient Instructions   The medication list included in this document is our record of what you are currently taking, including any changes that were made at today's visit.  If you find any differences when compared to your medications at home, or have any questions that

## 2024-06-28 ENCOUNTER — TELEPHONE (OUTPATIENT)
Age: 69
End: 2024-06-28

## 2024-06-28 NOTE — TELEPHONE ENCOUNTER
Patient called with results//brendab    ----- Message from Lincoln Guerrero MD sent at 6/27/2024  9:57 PM EDT -----  Please call patient.  Labs look good.  His hemoglobin is 14.2.  No evidence of anemia.  Thank you

## 2024-07-06 DIAGNOSIS — E11.65 TYPE 2 DIABETES MELLITUS WITH HYPERGLYCEMIA, WITHOUT LONG-TERM CURRENT USE OF INSULIN (HCC): ICD-10-CM

## 2024-07-07 RX ORDER — FLURBIPROFEN SODIUM 0.3 MG/ML
SOLUTION/ DROPS OPHTHALMIC
Qty: 100 EACH | Refills: 1 | OUTPATIENT
Start: 2024-07-07

## 2024-07-11 ENCOUNTER — TELEPHONE (OUTPATIENT)
Dept: INTERNAL MEDICINE CLINIC | Facility: CLINIC | Age: 69
End: 2024-07-11

## 2024-07-12 DIAGNOSIS — E83.110 HEREDITARY HEMOCHROMATOSIS (HCC): ICD-10-CM

## 2024-07-12 DIAGNOSIS — D50.8 OTHER IRON DEFICIENCY ANEMIA: Primary | ICD-10-CM

## 2024-07-15 ENCOUNTER — OFFICE VISIT (OUTPATIENT)
Dept: ONCOLOGY | Age: 69
End: 2024-07-15
Payer: MEDICARE

## 2024-07-15 ENCOUNTER — HOSPITAL ENCOUNTER (OUTPATIENT)
Dept: LAB | Age: 69
Discharge: HOME | End: 2024-07-18
Payer: MEDICARE

## 2024-07-15 VITALS
DIASTOLIC BLOOD PRESSURE: 67 MMHG | SYSTOLIC BLOOD PRESSURE: 123 MMHG | HEART RATE: 78 BPM | BODY MASS INDEX: 39.84 KG/M2 | WEIGHT: 300.6 LBS | OXYGEN SATURATION: 94 % | TEMPERATURE: 97.4 F | RESPIRATION RATE: 16 BRPM | HEIGHT: 73 IN

## 2024-07-15 DIAGNOSIS — E83.110 HEREDITARY HEMOCHROMATOSIS (HCC): ICD-10-CM

## 2024-07-15 DIAGNOSIS — E78.49 OTHER HYPERLIPIDEMIA: ICD-10-CM

## 2024-07-15 DIAGNOSIS — D50.8 OTHER IRON DEFICIENCY ANEMIA: Primary | ICD-10-CM

## 2024-07-15 DIAGNOSIS — D50.8 OTHER IRON DEFICIENCY ANEMIA: ICD-10-CM

## 2024-07-15 LAB
ALBUMIN SERPL-MCNC: 3.6 G/DL (ref 3.2–4.6)
ALBUMIN/GLOB SERPL: 1.1 (ref 1–1.9)
ALP SERPL-CCNC: 103 U/L (ref 40–129)
ALT SERPL-CCNC: 13 U/L (ref 12–65)
ANION GAP SERPL CALC-SCNC: 9 MMOL/L (ref 9–18)
AST SERPL-CCNC: 18 U/L (ref 15–37)
BASOPHILS # BLD: 0 K/UL (ref 0–0.2)
BASOPHILS NFR BLD: 0 % (ref 0–2)
BILIRUB SERPL-MCNC: 0.8 MG/DL (ref 0–1.2)
BUN SERPL-MCNC: 11 MG/DL (ref 8–23)
CALCIUM SERPL-MCNC: 9.3 MG/DL (ref 8.8–10.2)
CHLORIDE SERPL-SCNC: 104 MMOL/L (ref 98–107)
CO2 SERPL-SCNC: 25 MMOL/L (ref 20–28)
CREAT SERPL-MCNC: 0.86 MG/DL (ref 0.8–1.3)
DIFFERENTIAL METHOD BLD: NORMAL
EOSINOPHIL # BLD: 0.2 K/UL (ref 0–0.8)
EOSINOPHIL NFR BLD: 3 % (ref 0.5–7.8)
ERYTHROCYTE [DISTWIDTH] IN BLOOD BY AUTOMATED COUNT: 13.1 % (ref 11.9–14.6)
FERRITIN SERPL-MCNC: 40 NG/ML (ref 8–388)
GLOBULIN SER CALC-MCNC: 3.2 G/DL (ref 2.3–3.5)
GLUCOSE SERPL-MCNC: 109 MG/DL (ref 70–99)
HCT VFR BLD AUTO: 42.1 % (ref 41.1–50.3)
HGB BLD-MCNC: 14.1 G/DL (ref 13.6–17.2)
IMM GRANULOCYTES # BLD AUTO: 0 K/UL (ref 0–0.5)
IMM GRANULOCYTES NFR BLD AUTO: 1 % (ref 0–5)
LYMPHOCYTES # BLD: 0.9 K/UL (ref 0.5–4.6)
LYMPHOCYTES NFR BLD: 15 % (ref 13–44)
MCH RBC QN AUTO: 32.6 PG (ref 26.1–32.9)
MCHC RBC AUTO-ENTMCNC: 33.5 G/DL (ref 31.4–35)
MCV RBC AUTO: 97.2 FL (ref 82–102)
MONOCYTES # BLD: 0.4 K/UL (ref 0.1–1.3)
MONOCYTES NFR BLD: 6 % (ref 4–12)
NEUTS SEG # BLD: 4.8 K/UL (ref 1.7–8.2)
NEUTS SEG NFR BLD: 75 % (ref 43–78)
NRBC # BLD: 0 K/UL (ref 0–0.2)
PLATELET # BLD AUTO: 158 K/UL (ref 150–450)
PMV BLD AUTO: 10.6 FL (ref 9.4–12.3)
POTASSIUM SERPL-SCNC: 4.7 MMOL/L (ref 3.5–5.1)
PROT SERPL-MCNC: 6.8 G/DL (ref 6.3–8.2)
RBC # BLD AUTO: 4.33 M/UL (ref 4.23–5.6)
SODIUM SERPL-SCNC: 138 MMOL/L (ref 136–145)
WBC # BLD AUTO: 6.3 K/UL (ref 4.3–11.1)

## 2024-07-15 PROCEDURE — G8417 CALC BMI ABV UP PARAM F/U: HCPCS | Performed by: INTERNAL MEDICINE

## 2024-07-15 PROCEDURE — 85025 COMPLETE CBC W/AUTO DIFF WBC: CPT

## 2024-07-15 PROCEDURE — 99214 OFFICE O/P EST MOD 30 MIN: CPT | Performed by: INTERNAL MEDICINE

## 2024-07-15 PROCEDURE — 3074F SYST BP LT 130 MM HG: CPT | Performed by: INTERNAL MEDICINE

## 2024-07-15 PROCEDURE — 3078F DIAST BP <80 MM HG: CPT | Performed by: INTERNAL MEDICINE

## 2024-07-15 PROCEDURE — 80053 COMPREHEN METABOLIC PANEL: CPT

## 2024-07-15 PROCEDURE — 1036F TOBACCO NON-USER: CPT | Performed by: INTERNAL MEDICINE

## 2024-07-15 PROCEDURE — 1123F ACP DISCUSS/DSCN MKR DOCD: CPT | Performed by: INTERNAL MEDICINE

## 2024-07-15 PROCEDURE — G8427 DOCREV CUR MEDS BY ELIG CLIN: HCPCS | Performed by: INTERNAL MEDICINE

## 2024-07-15 PROCEDURE — 82728 ASSAY OF FERRITIN: CPT

## 2024-07-15 PROCEDURE — 36415 COLL VENOUS BLD VENIPUNCTURE: CPT

## 2024-07-15 PROCEDURE — 3017F COLORECTAL CA SCREEN DOC REV: CPT | Performed by: INTERNAL MEDICINE

## 2024-07-15 NOTE — PROGRESS NOTES
[place] [id number]  Phone: [phone]           7/15/2024  Conrad Stephenson  1955  [id number]        Conrad Stephenson is a 69 year old  man who has returned to my clinic for a follow-up visit; he was previously a patient of Dr. Conrad Klein. He carries a label of Hereditary Hemochromatosis despite the fact that he has a low Ferritin level and a low Transferrin Saturation level and is heterozygous for the H63D mutation, his EGD and Colonoscopy revealed a colonic polyp, diverticulosis and internal hemorrhoids. He received parenteral Iron infusions in 11/2023 and 1/2024.        ALLERGIES:    Azithromycin.        FAMILY HISTORY:     No hematologic disorders.        SOCIAL HISTORY:    He is  and lives with his wife. He is a retired salesman. He stopped smoking cigarettes in 8/1989.        PAST MEDICAL HISTORY:     Hypertension, Atrial Fibrillation, Congestive Heart Failure, Coronary Artery Disease, Diabetes Mellitus, Hepatic Steatosis, Obstructive Sleep Apnea, BPH, Osteoarthritis, Hyperlipidemia and Iron Deficiency Anemia.        ROS:  The patient complained of fatigue and exertional dyspnea; all other systems negative.        PHYSICAL EXAM:   The patient was alert, awake and oriented, no acute distress was noted. Oral examination did not reveal any mucosal lesions. Lymph node examination did not reveal any adenopathy. Neck examination revealed a supple neck, no thyromegaly or masses were noted. Chest examination revealed normal vesicular breath sounds. Heart examination revealed S-1 and S-2 without any murmurs. Abdominal examination revealed a non-tender abdomen, bowel sounds were positive, no organomegaly could be appreciated. Examination of the extremities did not reveal any tenderness or erythema, 2+ pedal edema was noted. Examination of the skin did not reveal any lesions.        KPS:     80.        LABORATORY INVESTIGATIONS:  CBC showed a 
- - -

## 2024-07-15 NOTE — PATIENT INSTRUCTIONS
call our office for rescheduling needs at least 24 hours before your scheduled appointment time.If you have any questions regarding your upcoming schedule please reach out to your care team through [facility] or call [phone].     Please notify your assigned Nurse Navigator of any unplanned hospital admissions or Emergency Room visits within 24 hours of discharge.    -------------------------------------------------------------------------------------------------------------------  Please call our office at [phone] if you have any  of the following symptoms:   Fever of 100.5 or greater  Chills  Shortness of breath  Swelling or pain in one leg    After office hours an answering service is available and will contact a provider for emergencies or if you are experiencing any of the above symptoms.        Felicia Handy MA

## 2024-07-16 ENCOUNTER — OFFICE VISIT (OUTPATIENT)
Dept: INTERNAL MEDICINE CLINIC | Facility: CLINIC | Age: 69
End: 2024-07-16
Payer: MEDICARE

## 2024-07-16 VITALS
BODY MASS INDEX: 39.49 KG/M2 | OXYGEN SATURATION: 98 % | TEMPERATURE: 97.1 F | WEIGHT: 298 LBS | HEART RATE: 100 BPM | HEIGHT: 73 IN | SYSTOLIC BLOOD PRESSURE: 120 MMHG | DIASTOLIC BLOOD PRESSURE: 78 MMHG

## 2024-07-16 DIAGNOSIS — L28.2 PRURITIC RASH: Primary | ICD-10-CM

## 2024-07-16 DIAGNOSIS — E11.65 TYPE 2 DIABETES MELLITUS WITH HYPERGLYCEMIA, WITHOUT LONG-TERM CURRENT USE OF INSULIN (HCC): ICD-10-CM

## 2024-07-16 PROCEDURE — 3074F SYST BP LT 130 MM HG: CPT | Performed by: NURSE PRACTITIONER

## 2024-07-16 PROCEDURE — 3078F DIAST BP <80 MM HG: CPT | Performed by: NURSE PRACTITIONER

## 2024-07-16 PROCEDURE — G8427 DOCREV CUR MEDS BY ELIG CLIN: HCPCS | Performed by: NURSE PRACTITIONER

## 2024-07-16 PROCEDURE — G8417 CALC BMI ABV UP PARAM F/U: HCPCS | Performed by: NURSE PRACTITIONER

## 2024-07-16 PROCEDURE — 1036F TOBACCO NON-USER: CPT | Performed by: NURSE PRACTITIONER

## 2024-07-16 PROCEDURE — 1123F ACP DISCUSS/DSCN MKR DOCD: CPT | Performed by: NURSE PRACTITIONER

## 2024-07-16 PROCEDURE — 99213 OFFICE O/P EST LOW 20 MIN: CPT | Performed by: NURSE PRACTITIONER

## 2024-07-16 PROCEDURE — 3017F COLORECTAL CA SCREEN DOC REV: CPT | Performed by: NURSE PRACTITIONER

## 2024-07-16 RX ORDER — PEN NEEDLE, DIABETIC 30 GX3/16"
NEEDLE, DISPOSABLE MISCELLANEOUS
Qty: 100 EACH | Refills: 1 | Status: SHIPPED | OUTPATIENT
Start: 2024-07-16

## 2024-07-16 SDOH — ECONOMIC STABILITY: FOOD INSECURITY: WITHIN THE PAST 12 MONTHS, THE FOOD YOU BOUGHT JUST DIDN'T LAST AND YOU DIDN'T HAVE MONEY TO GET MORE.: NEVER TRUE

## 2024-07-16 SDOH — ECONOMIC STABILITY: INCOME INSECURITY: HOW HARD IS IT FOR YOU TO PAY FOR THE VERY BASICS LIKE FOOD, HOUSING, MEDICAL CARE, AND HEATING?: NOT HARD AT ALL

## 2024-07-16 SDOH — ECONOMIC STABILITY: FOOD INSECURITY: WITHIN THE PAST 12 MONTHS, YOU WORRIED THAT YOUR FOOD WOULD RUN OUT BEFORE YOU GOT MONEY TO BUY MORE.: NEVER TRUE

## 2024-07-16 ASSESSMENT — PATIENT HEALTH QUESTIONNAIRE - PHQ9
2. FEELING DOWN, DEPRESSED OR HOPELESS: NOT AT ALL
SUM OF ALL RESPONSES TO PHQ QUESTIONS 1-9: 0
SUM OF ALL RESPONSES TO PHQ9 QUESTIONS 1 & 2: 0
1. LITTLE INTEREST OR PLEASURE IN DOING THINGS: NOT AT ALL
SUM OF ALL RESPONSES TO PHQ QUESTIONS 1-9: 0

## 2024-07-16 ASSESSMENT — ENCOUNTER SYMPTOMS
ABDOMINAL PAIN: 0
SHORTNESS OF BREATH: 0
COLOR CHANGE: 1

## 2024-07-16 NOTE — PROGRESS NOTES
sufficient amount for indicated testing frequency plus additional to accommodate PRN testing needs., Disp: 100 each, Rfl: 1    CPAP Machine MISC, by Other route ASV, Disp: , Rfl:     gabapentin (NEURONTIN) 600 MG tablet, Take 1 tablet by mouth 2 times daily., Disp: , Rfl:     Insulin Pen Needle (PEN NEEDLES) 31G X 5 MM MISC, Please dispense pen needles for use with patient's insulin pen., Disp: 100 each, Rfl: 1    triamcinolone (KENALOG) 0.025 % cream, Apply topically 2 times daily. (Patient not taking: Reported on 6/24/2024), Disp: 80 g, Rfl: 1    cetirizine (ZYRTEC) 10 MG tablet, Take 1 tablet by mouth daily as needed for Allergies or Rhinitis (Patient not taking: Reported on 6/24/2024), Disp: , Rfl:     Allergies   Allergen Reactions    Azithromycin Itching and Other (See Comments)     Skin dryness/peeling    Brand Name: ZPACK    Other Reaction(s): Itching-Allergy, Other- (not listed) - Allergy    Dilaudid [Hydromorphone]      Makes patient \"wild and mean\"    Shellfish-Derived Products Swelling     mouth       Review of Systems   Constitutional:  Negative for chills and fever.   Respiratory:  Negative for shortness of breath.    Cardiovascular:  Negative for chest pain.   Gastrointestinal:  Negative for abdominal pain.   Skin:  Positive for color change and rash.   Neurological:  Negative for dizziness, light-headedness and headaches.       Objective:  /78 (Site: Left Upper Arm, Position: Sitting, Cuff Size: Large Adult)   Pulse 100   Temp 97.1 °F (36.2 °C) (Temporal)   Ht 1.854 m (6' 1\")   Wt 135.2 kg (298 lb)   SpO2 98%   BMI 39.32 kg/m²       Examination:  Physical Exam  Vitals and nursing note reviewed.   Constitutional:       General: He is not in acute distress.     Appearance: Normal appearance.   Cardiovascular:      Rate and Rhythm: Normal rate.   Pulmonary:      Effort: Pulmonary effort is normal. No respiratory distress.      Breath sounds: Normal breath sounds. No wheezing or rales.

## 2024-08-12 NOTE — PROGRESS NOTES
Abdomen is soft. There is no mass.   Musculoskeletal:         General: Normal range of motion.      Cervical back: Normal range of motion.   Skin:     General: Skin is warm and dry.   Neurological:      General: No focal deficit present.      Mental Status: He is alert and oriented to person, place, and time.   Psychiatric:         Mood and Affect: Mood normal.           RECENT LABS AND RECORDS REVIEW        No results found for any visits on 08/13/24.   ASSESSMENT and PLAN    Conrad \"Garret\" was seen today for atrial fibrillation.    Diagnoses and all orders for this visit:    Atrial fibrillation, chronic (HCC)  Doing well. Continue current rx.  Primary hypertension  Doing well. Continue current rx.  Chronic GI bleeding  Doing well. Continue current rx.  Presence of Watchman left atrial appendage closure device       Return in about 1 year (around 8/13/2025).       JERAD DAVID MD  8/13/2024  10:14 AM

## 2024-08-13 ENCOUNTER — OFFICE VISIT (OUTPATIENT)
Age: 69
End: 2024-08-13
Payer: MEDICARE

## 2024-08-13 VITALS
SYSTOLIC BLOOD PRESSURE: 126 MMHG | HEIGHT: 74 IN | WEIGHT: 293 LBS | DIASTOLIC BLOOD PRESSURE: 80 MMHG | BODY MASS INDEX: 37.6 KG/M2 | HEART RATE: 76 BPM

## 2024-08-13 DIAGNOSIS — Z95.818 PRESENCE OF WATCHMAN LEFT ATRIAL APPENDAGE CLOSURE DEVICE: ICD-10-CM

## 2024-08-13 DIAGNOSIS — K92.2 CHRONIC GI BLEEDING: ICD-10-CM

## 2024-08-13 DIAGNOSIS — I48.20 ATRIAL FIBRILLATION, CHRONIC (HCC): Primary | ICD-10-CM

## 2024-08-13 DIAGNOSIS — I10 PRIMARY HYPERTENSION: ICD-10-CM

## 2024-08-13 PROCEDURE — 1036F TOBACCO NON-USER: CPT | Performed by: INTERNAL MEDICINE

## 2024-08-13 PROCEDURE — 1123F ACP DISCUSS/DSCN MKR DOCD: CPT | Performed by: INTERNAL MEDICINE

## 2024-08-13 PROCEDURE — G8417 CALC BMI ABV UP PARAM F/U: HCPCS | Performed by: INTERNAL MEDICINE

## 2024-08-13 PROCEDURE — 3079F DIAST BP 80-89 MM HG: CPT | Performed by: INTERNAL MEDICINE

## 2024-08-13 PROCEDURE — 99214 OFFICE O/P EST MOD 30 MIN: CPT | Performed by: INTERNAL MEDICINE

## 2024-08-13 PROCEDURE — 3017F COLORECTAL CA SCREEN DOC REV: CPT | Performed by: INTERNAL MEDICINE

## 2024-08-13 PROCEDURE — G8428 CUR MEDS NOT DOCUMENT: HCPCS | Performed by: INTERNAL MEDICINE

## 2024-08-13 PROCEDURE — 3074F SYST BP LT 130 MM HG: CPT | Performed by: INTERNAL MEDICINE

## 2024-08-13 RX ORDER — ASPIRIN 81 MG/1
81 TABLET ORAL DAILY
Qty: 90 TABLET | Refills: 3 | Status: SHIPPED | OUTPATIENT
Start: 2024-08-13

## 2024-09-12 ENCOUNTER — OFFICE VISIT (OUTPATIENT)
Dept: SLEEP MEDICINE | Age: 69
End: 2024-09-12

## 2024-09-12 ENCOUNTER — TELEPHONE (OUTPATIENT)
Dept: UROLOGY | Age: 69
End: 2024-09-12

## 2024-09-12 VITALS
HEIGHT: 74 IN | HEART RATE: 93 BPM | WEIGHT: 294 LBS | RESPIRATION RATE: 17 BRPM | BODY MASS INDEX: 37.73 KG/M2 | DIASTOLIC BLOOD PRESSURE: 71 MMHG | SYSTOLIC BLOOD PRESSURE: 114 MMHG | OXYGEN SATURATION: 97 %

## 2024-09-12 DIAGNOSIS — E66.9 OBESITY (BMI 35.0-39.9 WITHOUT COMORBIDITY): ICD-10-CM

## 2024-09-12 DIAGNOSIS — G47.33 OSA (OBSTRUCTIVE SLEEP APNEA): Primary | ICD-10-CM

## 2024-09-12 ASSESSMENT — SLEEP AND FATIGUE QUESTIONNAIRES
HOW LIKELY ARE YOU TO NOD OFF OR FALL ASLEEP WHILE SITTING INACTIVE IN A PUBLIC PLACE: WOULD NEVER DOZE
HOW LIKELY ARE YOU TO NOD OFF OR FALL ASLEEP WHILE LYING DOWN TO REST IN THE AFTERNOON WHEN CIRCUMSTANCES PERMIT: MODERATE CHANCE OF DOZING
HOW LIKELY ARE YOU TO NOD OFF OR FALL ASLEEP WHILE SITTING AND TALKING TO SOMEONE: WOULD NEVER DOZE
HOW LIKELY ARE YOU TO NOD OFF OR FALL ASLEEP WHILE SITTING QUIETLY AFTER LUNCH WITHOUT ALCOHOL: SLIGHT CHANCE OF DOZING
HOW LIKELY ARE YOU TO NOD OFF OR FALL ASLEEP IN A CAR, WHILE STOPPED FOR A FEW MINUTES IN TRAFFIC: WOULD NEVER DOZE
HOW LIKELY ARE YOU TO NOD OFF OR FALL ASLEEP WHILE WATCHING TV: WOULD NEVER DOZE
HOW LIKELY ARE YOU TO NOD OFF OR FALL ASLEEP WHEN YOU ARE A PASSENGER IN A CAR FOR AN HOUR WITHOUT A BREAK: WOULD NEVER DOZE
HOW LIKELY ARE YOU TO NOD OFF OR FALL ASLEEP WHILE SITTING AND READING: MODERATE CHANCE OF DOZING
ESS TOTAL SCORE: 5

## 2024-09-12 NOTE — TELEPHONE ENCOUNTER
Pt called this morning, wanting to come in and drop off a specimen,he is having cloudy urine, odor, and back pain a couple days ago. I did inform pt that he hasn't been seen since 9/14/23 and that he would need to come in for an ov. Pt does have an upcoming ov with edie in nov. Pt has seen nichole before, first available is booked with her for 9/16 @10. Pt was informed that he can go to an urgent care if he can not wait until then. Pt okay with that and said he might go to PCP instead and would call and let us know. If pt can still come in for a specimen drop off due to symptoms, he can be called back at [phone]

## 2024-09-14 ENCOUNTER — APPOINTMENT (OUTPATIENT)
Dept: GENERAL RADIOLOGY | Age: 69
DRG: 690 | End: 2024-09-14
Payer: MEDICARE

## 2024-09-14 ENCOUNTER — HOSPITAL ENCOUNTER (INPATIENT)
Age: 69
LOS: 2 days | Discharge: HOME | DRG: 690 | End: 2024-09-16
Attending: STUDENT IN AN ORGANIZED HEALTH CARE EDUCATION/TRAINING PROGRAM | Admitting: INTERNAL MEDICINE
Payer: MEDICARE

## 2024-09-14 ENCOUNTER — APPOINTMENT (OUTPATIENT)
Dept: CT IMAGING | Age: 69
DRG: 690 | End: 2024-09-14
Payer: MEDICARE

## 2024-09-14 DIAGNOSIS — K57.90 DIVERTICULOSIS: ICD-10-CM

## 2024-09-14 DIAGNOSIS — N32.3 DIVERTICULA, BLADDER: ICD-10-CM

## 2024-09-14 DIAGNOSIS — K42.9 UMBILICAL HERNIA WITHOUT OBSTRUCTION AND WITHOUT GANGRENE: ICD-10-CM

## 2024-09-14 DIAGNOSIS — K92.1 MELENA: ICD-10-CM

## 2024-09-14 DIAGNOSIS — N30.91 CYSTITIS WITH HEMATURIA: Primary | ICD-10-CM

## 2024-09-14 DIAGNOSIS — K57.10 DUODENAL DIVERTICULUM: ICD-10-CM

## 2024-09-14 DIAGNOSIS — I48.91 ATRIAL FIBRILLATION WITH RVR (HCC): ICD-10-CM

## 2024-09-14 PROBLEM — N39.0 SEPSIS SECONDARY TO UTI (HCC): Status: ACTIVE | Noted: 2024-09-14

## 2024-09-14 PROBLEM — A41.9 SEPSIS SECONDARY TO UTI (HCC): Status: ACTIVE | Noted: 2024-09-14

## 2024-09-14 LAB
ALBUMIN SERPL-MCNC: 3.4 G/DL (ref 3.2–4.6)
ALBUMIN/GLOB SERPL: 1.1 (ref 1–1.9)
ALP SERPL-CCNC: 94 U/L (ref 40–129)
ALT SERPL-CCNC: 15 U/L (ref 12–65)
ANION GAP SERPL CALC-SCNC: 13 MMOL/L (ref 9–18)
APPEARANCE UR: ABNORMAL
AST SERPL-CCNC: 15 U/L (ref 15–37)
BACTERIA URNS QL MICRO: ABNORMAL /HPF
BASOPHILS # BLD: 0 K/UL (ref 0–0.2)
BASOPHILS NFR BLD: 0 % (ref 0–2)
BILIRUB SERPL-MCNC: 2 MG/DL (ref 0–1.2)
BILIRUB UR QL: NEGATIVE
BUN SERPL-MCNC: 15 MG/DL (ref 8–23)
CALCIUM SERPL-MCNC: 8.8 MG/DL (ref 8.8–10.2)
CHLORIDE SERPL-SCNC: 96 MMOL/L (ref 98–107)
CO2 SERPL-SCNC: 22 MMOL/L (ref 20–28)
COLOR UR: ABNORMAL
CREAT SERPL-MCNC: 1 MG/DL (ref 0.8–1.3)
DIFFERENTIAL METHOD BLD: ABNORMAL
EOSINOPHIL # BLD: 0 K/UL (ref 0–0.8)
EOSINOPHIL NFR BLD: 0 % (ref 0.5–7.8)
EPI CELLS #/AREA URNS HPF: ABNORMAL /HPF
ERYTHROCYTE [DISTWIDTH] IN BLOOD BY AUTOMATED COUNT: 13.6 % (ref 11.9–14.6)
GLOBULIN SER CALC-MCNC: 3 G/DL (ref 2.3–3.5)
GLUCOSE SERPL-MCNC: 124 MG/DL (ref 70–99)
GLUCOSE UR STRIP.AUTO-MCNC: NEGATIVE MG/DL
HCT VFR BLD AUTO: 37.7 % (ref 41.1–50.3)
HGB BLD-MCNC: 12.7 G/DL (ref 13.6–17.2)
HGB UR QL STRIP: ABNORMAL
IMM GRANULOCYTES # BLD AUTO: 0.1 K/UL (ref 0–0.5)
IMM GRANULOCYTES NFR BLD AUTO: 1 % (ref 0–5)
KETONES UR QL STRIP.AUTO: ABNORMAL MG/DL
LACTATE SERPL-SCNC: 1.1 MMOL/L (ref 0.5–2)
LEUKOCYTE ESTERASE UR QL STRIP.AUTO: ABNORMAL
LYMPHOCYTES # BLD: 0.3 K/UL (ref 0.5–4.6)
LYMPHOCYTES NFR BLD: 3 % (ref 13–44)
MCH RBC QN AUTO: 32.5 PG (ref 26.1–32.9)
MCHC RBC AUTO-ENTMCNC: 33.7 G/DL (ref 31.4–35)
MCV RBC AUTO: 96.4 FL (ref 82–102)
MONOCYTES # BLD: 0.6 K/UL (ref 0.1–1.3)
MONOCYTES NFR BLD: 5 % (ref 4–12)
NEUTS SEG # BLD: 10 K/UL (ref 1.7–8.2)
NEUTS SEG NFR BLD: 91 % (ref 43–78)
NITRITE UR QL STRIP.AUTO: NEGATIVE
NRBC # BLD: 0 K/UL (ref 0–0.2)
OTHER OBSERVATIONS: ABNORMAL
PH UR STRIP: 5.5 (ref 5–9)
PLATELET # BLD AUTO: 150 K/UL (ref 150–450)
PMV BLD AUTO: 9.8 FL (ref 9.4–12.3)
POTASSIUM SERPL-SCNC: 4.3 MMOL/L (ref 3.5–5.1)
PROCALCITONIN SERPL-MCNC: 0.14 NG/ML (ref 0–0.1)
PROT SERPL-MCNC: 6.4 G/DL (ref 6.3–8.2)
PROT UR STRIP-MCNC: 30 MG/DL
RBC # BLD AUTO: 3.91 M/UL (ref 4.23–5.6)
RBC #/AREA URNS HPF: ABNORMAL /HPF
SODIUM SERPL-SCNC: 131 MMOL/L (ref 136–145)
SP GR UR REFRACTOMETRY: 1.01 (ref 1–1.02)
UROBILINOGEN UR QL STRIP.AUTO: 0.2 EU/DL (ref 0.2–1)
WBC # BLD AUTO: 11 K/UL (ref 4.3–11.1)
WBC URNS QL MICRO: ABNORMAL /HPF

## 2024-09-14 PROCEDURE — 84145 PROCALCITONIN (PCT): CPT

## 2024-09-14 PROCEDURE — 87086 URINE CULTURE/COLONY COUNT: CPT

## 2024-09-14 PROCEDURE — 96374 THER/PROPH/DIAG INJ IV PUSH: CPT

## 2024-09-14 PROCEDURE — 99285 EMERGENCY DEPT VISIT HI MDM: CPT

## 2024-09-14 PROCEDURE — 2580000003 HC RX 258

## 2024-09-14 PROCEDURE — 6360000002 HC RX W HCPCS

## 2024-09-14 PROCEDURE — 96361 HYDRATE IV INFUSION ADD-ON: CPT

## 2024-09-14 PROCEDURE — 1100000000 HC RM PRIVATE

## 2024-09-14 PROCEDURE — 6360000004 HC RX CONTRAST MEDICATION

## 2024-09-14 PROCEDURE — 83605 ASSAY OF LACTIC ACID: CPT

## 2024-09-14 PROCEDURE — 93005 ELECTROCARDIOGRAM TRACING: CPT

## 2024-09-14 PROCEDURE — 85025 COMPLETE CBC W/AUTO DIFF WBC: CPT

## 2024-09-14 PROCEDURE — 74178 CT ABD&PLV WO CNTR FLWD CNTR: CPT

## 2024-09-14 PROCEDURE — 87040 BLOOD CULTURE FOR BACTERIA: CPT

## 2024-09-14 PROCEDURE — 71046 X-RAY EXAM CHEST 2 VIEWS: CPT

## 2024-09-14 PROCEDURE — 87088 URINE BACTERIA CULTURE: CPT

## 2024-09-14 PROCEDURE — 80053 COMPREHEN METABOLIC PANEL: CPT

## 2024-09-14 PROCEDURE — 81001 URINALYSIS AUTO W/SCOPE: CPT

## 2024-09-14 PROCEDURE — 87186 SC STD MICRODIL/AGAR DIL: CPT

## 2024-09-14 PROCEDURE — 87635 SARS-COV-2 COVID-19 AMP PRB: CPT

## 2024-09-14 RX ORDER — METOPROLOL TARTRATE 1 MG/ML
5 INJECTION, SOLUTION INTRAVENOUS
Status: ACTIVE | OUTPATIENT
Start: 2024-09-14 | End: 2024-09-15

## 2024-09-14 RX ORDER — IOPAMIDOL 755 MG/ML
100 INJECTION, SOLUTION INTRAVASCULAR
Status: COMPLETED | OUTPATIENT
Start: 2024-09-14 | End: 2024-09-14

## 2024-09-14 RX ORDER — 0.9 % SODIUM CHLORIDE 0.9 %
500 INTRAVENOUS SOLUTION INTRAVENOUS ONCE
Status: COMPLETED | OUTPATIENT
Start: 2024-09-14 | End: 2024-09-15

## 2024-09-14 RX ADMIN — IOPAMIDOL 100 ML: 755 INJECTION, SOLUTION INTRAVENOUS at 21:26

## 2024-09-14 RX ADMIN — WATER 1000 MG: 1 INJECTION INTRAMUSCULAR; INTRAVENOUS; SUBCUTANEOUS at 20:31

## 2024-09-14 RX ADMIN — SODIUM CHLORIDE 500 ML: 9 INJECTION, SOLUTION INTRAVENOUS at 20:33

## 2024-09-14 ASSESSMENT — LIFESTYLE VARIABLES
HOW MANY STANDARD DRINKS CONTAINING ALCOHOL DO YOU HAVE ON A TYPICAL DAY: 1 OR 2
HOW OFTEN DO YOU HAVE A DRINK CONTAINING ALCOHOL: 2-3 TIMES A WEEK

## 2024-09-14 ASSESSMENT — PAIN - FUNCTIONAL ASSESSMENT: PAIN_FUNCTIONAL_ASSESSMENT: NONE - DENIES PAIN

## 2024-09-15 LAB
ANION GAP SERPL CALC-SCNC: 11 MMOL/L (ref 9–18)
BASOPHILS # BLD: 0 K/UL (ref 0–0.2)
BASOPHILS NFR BLD: 0 % (ref 0–2)
BUN SERPL-MCNC: 12 MG/DL (ref 8–23)
CALCIUM SERPL-MCNC: 8.6 MG/DL (ref 8.8–10.2)
CHLORIDE SERPL-SCNC: 101 MMOL/L (ref 98–107)
CO2 SERPL-SCNC: 23 MMOL/L (ref 20–28)
CREAT SERPL-MCNC: 0.81 MG/DL (ref 0.8–1.3)
DIFFERENTIAL METHOD BLD: ABNORMAL
EKG ATRIAL RATE: 115 BPM
EKG DIAGNOSIS: NORMAL
EKG Q-T INTERVAL: 288 MS
EKG QRS DURATION: 82 MS
EKG QTC CALCULATION (BAZETT): 417 MS
EKG R AXIS: 10 DEGREES
EKG T AXIS: 33 DEGREES
EKG VENTRICULAR RATE: 126 BPM
EOSINOPHIL # BLD: 0 K/UL (ref 0–0.8)
EOSINOPHIL NFR BLD: 0 % (ref 0.5–7.8)
ERYTHROCYTE [DISTWIDTH] IN BLOOD BY AUTOMATED COUNT: 13.5 % (ref 11.9–14.6)
GLUCOSE SERPL-MCNC: 117 MG/DL (ref 70–99)
HCT VFR BLD AUTO: 38.1 % (ref 41.1–50.3)
HGB BLD-MCNC: 12.9 G/DL (ref 13.6–17.2)
IMM GRANULOCYTES # BLD AUTO: 0 K/UL (ref 0–0.5)
IMM GRANULOCYTES NFR BLD AUTO: 1 % (ref 0–5)
LYMPHOCYTES # BLD: 0.5 K/UL (ref 0.5–4.6)
LYMPHOCYTES NFR BLD: 6 % (ref 13–44)
MCH RBC QN AUTO: 32.5 PG (ref 26.1–32.9)
MCHC RBC AUTO-ENTMCNC: 33.9 G/DL (ref 31.4–35)
MCV RBC AUTO: 96 FL (ref 82–102)
MONOCYTES # BLD: 0.7 K/UL (ref 0.1–1.3)
MONOCYTES NFR BLD: 9 % (ref 4–12)
NEUTS SEG # BLD: 6.8 K/UL (ref 1.7–8.2)
NEUTS SEG NFR BLD: 84 % (ref 43–78)
NRBC # BLD: 0 K/UL (ref 0–0.2)
PLATELET # BLD AUTO: 147 K/UL (ref 150–450)
PMV BLD AUTO: 9.9 FL (ref 9.4–12.3)
POTASSIUM SERPL-SCNC: 3.7 MMOL/L (ref 3.5–5.1)
RBC # BLD AUTO: 3.97 M/UL (ref 4.23–5.6)
SARS-COV-2 RDRP RESP QL NAA+PROBE: NOT DETECTED
SODIUM SERPL-SCNC: 135 MMOL/L (ref 136–145)
SOURCE: NORMAL
WBC # BLD AUTO: 8.1 K/UL (ref 4.3–11.1)

## 2024-09-15 PROCEDURE — 80048 BASIC METABOLIC PNL TOTAL CA: CPT

## 2024-09-15 PROCEDURE — 1100000003 HC PRIVATE W/ TELEMETRY

## 2024-09-15 PROCEDURE — 6370000000 HC RX 637 (ALT 250 FOR IP): Performed by: INTERNAL MEDICINE

## 2024-09-15 PROCEDURE — 6360000002 HC RX W HCPCS: Performed by: INTERNAL MEDICINE

## 2024-09-15 PROCEDURE — 93010 ELECTROCARDIOGRAM REPORT: CPT | Performed by: INTERNAL MEDICINE

## 2024-09-15 PROCEDURE — 36415 COLL VENOUS BLD VENIPUNCTURE: CPT

## 2024-09-15 PROCEDURE — 85025 COMPLETE CBC W/AUTO DIFF WBC: CPT

## 2024-09-15 PROCEDURE — 2580000003 HC RX 258: Performed by: INTERNAL MEDICINE

## 2024-09-15 RX ORDER — SODIUM CHLORIDE 0.9 % (FLUSH) 0.9 %
5-40 SYRINGE (ML) INJECTION PRN
Status: DISCONTINUED | OUTPATIENT
Start: 2024-09-15 | End: 2024-09-16 | Stop reason: HOSPADM

## 2024-09-15 RX ORDER — LISINOPRIL 5 MG/1
10 TABLET ORAL
Status: DISCONTINUED | OUTPATIENT
Start: 2024-09-15 | End: 2024-09-16 | Stop reason: HOSPADM

## 2024-09-15 RX ORDER — DEXTROSE MONOHYDRATE 100 MG/ML
INJECTION, SOLUTION INTRAVENOUS CONTINUOUS PRN
Status: DISCONTINUED | OUTPATIENT
Start: 2024-09-15 | End: 2024-09-16 | Stop reason: HOSPADM

## 2024-09-15 RX ORDER — TAMSULOSIN HYDROCHLORIDE 0.4 MG/1
0.4 CAPSULE ORAL 2 TIMES DAILY
Status: DISCONTINUED | OUTPATIENT
Start: 2024-09-15 | End: 2024-09-16 | Stop reason: HOSPADM

## 2024-09-15 RX ORDER — TAMSULOSIN HYDROCHLORIDE 0.4 MG/1
0.4 CAPSULE ORAL 2 TIMES DAILY
Status: DISCONTINUED | OUTPATIENT
Start: 2024-09-15 | End: 2024-09-15

## 2024-09-15 RX ORDER — ENOXAPARIN SODIUM 100 MG/ML
30 INJECTION SUBCUTANEOUS 2 TIMES DAILY
Status: DISCONTINUED | OUTPATIENT
Start: 2024-09-15 | End: 2024-09-16 | Stop reason: HOSPADM

## 2024-09-15 RX ORDER — FAMOTIDINE 20 MG/1
10 TABLET, FILM COATED ORAL DAILY PRN
Status: DISCONTINUED | OUTPATIENT
Start: 2024-09-15 | End: 2024-09-16 | Stop reason: HOSPADM

## 2024-09-15 RX ORDER — POTASSIUM CHLORIDE 7.45 MG/ML
10 INJECTION INTRAVENOUS PRN
Status: DISCONTINUED | OUTPATIENT
Start: 2024-09-15 | End: 2024-09-16 | Stop reason: HOSPADM

## 2024-09-15 RX ORDER — BISACODYL 10 MG
10 SUPPOSITORY, RECTAL RECTAL DAILY PRN
Status: DISCONTINUED | OUTPATIENT
Start: 2024-09-15 | End: 2024-09-16 | Stop reason: HOSPADM

## 2024-09-15 RX ORDER — POLYETHYLENE GLYCOL 3350 17 G/17G
17 POWDER, FOR SOLUTION ORAL DAILY PRN
Status: DISCONTINUED | OUTPATIENT
Start: 2024-09-15 | End: 2024-09-16 | Stop reason: HOSPADM

## 2024-09-15 RX ORDER — ATORVASTATIN CALCIUM 10 MG/1
20 TABLET, FILM COATED ORAL DAILY
Status: DISCONTINUED | OUTPATIENT
Start: 2024-09-15 | End: 2024-09-15

## 2024-09-15 RX ORDER — CARVEDILOL 25 MG/1
25 TABLET ORAL EVERY 24 HOURS
Status: DISCONTINUED | OUTPATIENT
Start: 2024-09-15 | End: 2024-09-16 | Stop reason: HOSPADM

## 2024-09-15 RX ORDER — TRIAMCINOLONE ACETONIDE 0.25 MG/G
CREAM TOPICAL 2 TIMES DAILY
Status: DISCONTINUED | OUTPATIENT
Start: 2024-09-15 | End: 2024-09-15 | Stop reason: CLARIF

## 2024-09-15 RX ORDER — LISINOPRIL 5 MG/1
10 TABLET ORAL DAILY
Status: DISCONTINUED | OUTPATIENT
Start: 2024-09-15 | End: 2024-09-15

## 2024-09-15 RX ORDER — CARVEDILOL 6.25 MG/1
12.5 TABLET ORAL 2 TIMES DAILY WITH MEALS
Status: DISCONTINUED | OUTPATIENT
Start: 2024-09-15 | End: 2024-09-15

## 2024-09-15 RX ORDER — INSULIN GLARGINE 100 [IU]/ML
20 INJECTION, SOLUTION SUBCUTANEOUS NIGHTLY
Status: DISCONTINUED | OUTPATIENT
Start: 2024-09-15 | End: 2024-09-16 | Stop reason: HOSPADM

## 2024-09-15 RX ORDER — INSULIN GLARGINE 100 [IU]/ML
20 INJECTION, SOLUTION SUBCUTANEOUS NIGHTLY
Status: DISCONTINUED | OUTPATIENT
Start: 2024-09-15 | End: 2024-09-15

## 2024-09-15 RX ORDER — SODIUM CHLORIDE 9 MG/ML
INJECTION, SOLUTION INTRAVENOUS PRN
Status: DISCONTINUED | OUTPATIENT
Start: 2024-09-15 | End: 2024-09-16 | Stop reason: HOSPADM

## 2024-09-15 RX ORDER — MAGNESIUM SULFATE IN WATER 40 MG/ML
2000 INJECTION, SOLUTION INTRAVENOUS PRN
Status: DISCONTINUED | OUTPATIENT
Start: 2024-09-15 | End: 2024-09-16 | Stop reason: HOSPADM

## 2024-09-15 RX ORDER — ENALAPRIL MALEATE 10 MG/1
10 TABLET ORAL DAILY
Status: DISCONTINUED | OUTPATIENT
Start: 2024-09-15 | End: 2024-09-15 | Stop reason: CLARIF

## 2024-09-15 RX ORDER — MAGNESIUM HYDROXIDE/ALUMINUM HYDROXICE/SIMETHICONE 120; 1200; 1200 MG/30ML; MG/30ML; MG/30ML
30 SUSPENSION ORAL EVERY 6 HOURS PRN
Status: DISCONTINUED | OUTPATIENT
Start: 2024-09-15 | End: 2024-09-16 | Stop reason: HOSPADM

## 2024-09-15 RX ORDER — IBUPROFEN 600 MG/1
1 TABLET ORAL PRN
Status: DISCONTINUED | OUTPATIENT
Start: 2024-09-15 | End: 2024-09-16 | Stop reason: HOSPADM

## 2024-09-15 RX ORDER — ACETAMINOPHEN 650 MG/1
650 SUPPOSITORY RECTAL EVERY 6 HOURS PRN
Status: DISCONTINUED | OUTPATIENT
Start: 2024-09-15 | End: 2024-09-16 | Stop reason: HOSPADM

## 2024-09-15 RX ORDER — SODIUM CHLORIDE 0.9 % (FLUSH) 0.9 %
5-40 SYRINGE (ML) INJECTION EVERY 12 HOURS SCHEDULED
Status: DISCONTINUED | OUTPATIENT
Start: 2024-09-15 | End: 2024-09-16 | Stop reason: HOSPADM

## 2024-09-15 RX ORDER — CLOTRIMAZOLE AND BETAMETHASONE DIPROPIONATE 10; .64 MG/G; MG/G
CREAM TOPICAL 2 TIMES DAILY
Status: DISCONTINUED | OUTPATIENT
Start: 2024-09-15 | End: 2024-09-16 | Stop reason: HOSPADM

## 2024-09-15 RX ORDER — SPIRONOLACTONE 25 MG/1
25 TABLET ORAL DAILY
Status: DISCONTINUED | OUTPATIENT
Start: 2024-09-15 | End: 2024-09-15

## 2024-09-15 RX ORDER — METOPROLOL TARTRATE 1 MG/ML
5 INJECTION, SOLUTION INTRAVENOUS EVERY 6 HOURS PRN
Status: DISCONTINUED | OUTPATIENT
Start: 2024-09-15 | End: 2024-09-16 | Stop reason: HOSPADM

## 2024-09-15 RX ORDER — NICOTINE 14MG/24HR
1 PATCH, TRANSDERMAL 24 HOURS TRANSDERMAL DAILY
Status: DISCONTINUED | OUTPATIENT
Start: 2024-09-15 | End: 2024-09-15 | Stop reason: CLARIF

## 2024-09-15 RX ORDER — ASPIRIN 81 MG/1
81 TABLET ORAL DAILY
Status: DISCONTINUED | OUTPATIENT
Start: 2024-09-15 | End: 2024-09-16 | Stop reason: HOSPADM

## 2024-09-15 RX ORDER — ATORVASTATIN CALCIUM 10 MG/1
20 TABLET, FILM COATED ORAL DAILY
Status: DISCONTINUED | OUTPATIENT
Start: 2024-09-15 | End: 2024-09-16 | Stop reason: HOSPADM

## 2024-09-15 RX ORDER — POTASSIUM CHLORIDE 1500 MG/1
40 TABLET, EXTENDED RELEASE ORAL PRN
Status: DISCONTINUED | OUTPATIENT
Start: 2024-09-15 | End: 2024-09-16 | Stop reason: HOSPADM

## 2024-09-15 RX ORDER — CARVEDILOL 25 MG/1
25 TABLET ORAL
Status: COMPLETED | OUTPATIENT
Start: 2024-09-15 | End: 2024-09-15

## 2024-09-15 RX ORDER — CARVEDILOL 6.25 MG/1
12.5 TABLET ORAL EVERY 24 HOURS
Status: DISCONTINUED | OUTPATIENT
Start: 2024-09-15 | End: 2024-09-16 | Stop reason: HOSPADM

## 2024-09-15 RX ORDER — CETIRIZINE HYDROCHLORIDE 10 MG/1
10 TABLET ORAL DAILY PRN
Status: DISCONTINUED | OUTPATIENT
Start: 2024-09-15 | End: 2024-09-16 | Stop reason: HOSPADM

## 2024-09-15 RX ORDER — SPIRONOLACTONE 25 MG/1
25 TABLET ORAL
Status: DISCONTINUED | OUTPATIENT
Start: 2024-09-15 | End: 2024-09-16 | Stop reason: HOSPADM

## 2024-09-15 RX ORDER — GABAPENTIN 300 MG/1
600 CAPSULE ORAL 2 TIMES DAILY
Status: DISCONTINUED | OUTPATIENT
Start: 2024-09-15 | End: 2024-09-16 | Stop reason: HOSPADM

## 2024-09-15 RX ORDER — ACETAMINOPHEN 325 MG/1
650 TABLET ORAL EVERY 6 HOURS PRN
Status: DISCONTINUED | OUTPATIENT
Start: 2024-09-15 | End: 2024-09-16 | Stop reason: HOSPADM

## 2024-09-15 RX ORDER — HYDROCHLOROTHIAZIDE 25 MG/1
25 TABLET ORAL DAILY
Status: DISCONTINUED | OUTPATIENT
Start: 2024-09-15 | End: 2024-09-16 | Stop reason: HOSPADM

## 2024-09-15 RX ADMIN — CLOTRIMAZOLE AND BETAMETHASONE DIPROPIONATE: 10; .5 CREAM TOPICAL at 09:16

## 2024-09-15 RX ADMIN — ATORVASTATIN CALCIUM 20 MG: 10 TABLET, FILM COATED ORAL at 01:51

## 2024-09-15 RX ADMIN — CARVEDILOL 25 MG: 25 TABLET, FILM COATED ORAL at 01:51

## 2024-09-15 RX ADMIN — LISINOPRIL 10 MG: 5 TABLET ORAL at 21:12

## 2024-09-15 RX ADMIN — HYDROCHLOROTHIAZIDE 25 MG: 25 TABLET ORAL at 09:17

## 2024-09-15 RX ADMIN — GABAPENTIN 600 MG: 300 CAPSULE ORAL at 21:12

## 2024-09-15 RX ADMIN — LISINOPRIL 10 MG: 5 TABLET ORAL at 01:52

## 2024-09-15 RX ADMIN — SPIRONOLACTONE 25 MG: 25 TABLET ORAL at 01:52

## 2024-09-15 RX ADMIN — SODIUM CHLORIDE, PRESERVATIVE FREE 10 ML: 5 INJECTION INTRAVENOUS at 09:24

## 2024-09-15 RX ADMIN — TAMSULOSIN HYDROCHLORIDE 0.4 MG: 0.4 CAPSULE ORAL at 21:12

## 2024-09-15 RX ADMIN — TAMSULOSIN HYDROCHLORIDE 0.4 MG: 0.4 CAPSULE ORAL at 09:16

## 2024-09-15 RX ADMIN — ATORVASTATIN CALCIUM 20 MG: 10 TABLET, FILM COATED ORAL at 21:12

## 2024-09-15 RX ADMIN — CARVEDILOL 12.5 MG: 6.25 TABLET, FILM COATED ORAL at 09:16

## 2024-09-15 RX ADMIN — WATER 1000 MG: 1 INJECTION INTRAMUSCULAR; INTRAVENOUS; SUBCUTANEOUS at 21:13

## 2024-09-15 RX ADMIN — ENOXAPARIN SODIUM 30 MG: 100 INJECTION SUBCUTANEOUS at 21:20

## 2024-09-15 RX ADMIN — SPIRONOLACTONE 25 MG: 25 TABLET ORAL at 21:12

## 2024-09-15 RX ADMIN — ENOXAPARIN SODIUM 30 MG: 100 INJECTION SUBCUTANEOUS at 09:18

## 2024-09-15 RX ADMIN — TAMSULOSIN HYDROCHLORIDE 0.4 MG: 0.4 CAPSULE ORAL at 01:52

## 2024-09-15 RX ADMIN — CARVEDILOL 25 MG: 25 TABLET, FILM COATED ORAL at 17:43

## 2024-09-15 RX ADMIN — GABAPENTIN 600 MG: 300 CAPSULE ORAL at 09:17

## 2024-09-15 RX ADMIN — ASPIRIN 81 MG: 81 TABLET, COATED ORAL at 09:16

## 2024-09-15 RX ADMIN — INSULIN GLARGINE 20 UNITS: 100 INJECTION, SOLUTION SUBCUTANEOUS at 21:12

## 2024-09-15 RX ADMIN — INSULIN GLARGINE 20 UNITS: 100 INJECTION, SOLUTION SUBCUTANEOUS at 01:48

## 2024-09-16 VITALS
RESPIRATION RATE: 16 BRPM | SYSTOLIC BLOOD PRESSURE: 102 MMHG | OXYGEN SATURATION: 94 % | HEART RATE: 96 BPM | DIASTOLIC BLOOD PRESSURE: 61 MMHG | WEIGHT: 295 LBS | BODY MASS INDEX: 37.86 KG/M2 | TEMPERATURE: 97.9 F | HEIGHT: 74 IN

## 2024-09-16 LAB
ANION GAP SERPL CALC-SCNC: 10 MMOL/L (ref 9–18)
BASOPHILS # BLD: 0 K/UL (ref 0–0.2)
BASOPHILS NFR BLD: 0 % (ref 0–2)
BUN SERPL-MCNC: 10 MG/DL (ref 8–23)
CALCIUM SERPL-MCNC: 8.7 MG/DL (ref 8.8–10.2)
CHLORIDE SERPL-SCNC: 101 MMOL/L (ref 98–107)
CO2 SERPL-SCNC: 25 MMOL/L (ref 20–28)
CREAT SERPL-MCNC: 0.81 MG/DL (ref 0.8–1.3)
DIFFERENTIAL METHOD BLD: ABNORMAL
EOSINOPHIL # BLD: 0 K/UL (ref 0–0.8)
EOSINOPHIL NFR BLD: 1 % (ref 0.5–7.8)
ERYTHROCYTE [DISTWIDTH] IN BLOOD BY AUTOMATED COUNT: 13.8 % (ref 11.9–14.6)
GLUCOSE BLD STRIP.AUTO-MCNC: 103 MG/DL (ref 65–100)
GLUCOSE SERPL-MCNC: 99 MG/DL (ref 70–99)
HCT VFR BLD AUTO: 36 % (ref 41.1–50.3)
HGB BLD-MCNC: 12.2 G/DL (ref 13.6–17.2)
IMM GRANULOCYTES # BLD AUTO: 0 K/UL (ref 0–0.5)
IMM GRANULOCYTES NFR BLD AUTO: 1 % (ref 0–5)
LYMPHOCYTES # BLD: 0.6 K/UL (ref 0.5–4.6)
LYMPHOCYTES NFR BLD: 10 % (ref 13–44)
MCH RBC QN AUTO: 32.5 PG (ref 26.1–32.9)
MCHC RBC AUTO-ENTMCNC: 33.9 G/DL (ref 31.4–35)
MCV RBC AUTO: 96 FL (ref 82–102)
MONOCYTES # BLD: 0.8 K/UL (ref 0.1–1.3)
MONOCYTES NFR BLD: 13 % (ref 4–12)
NEUTS SEG # BLD: 4.7 K/UL (ref 1.7–8.2)
NEUTS SEG NFR BLD: 76 % (ref 43–78)
NRBC # BLD: 0 K/UL (ref 0–0.2)
PLATELET # BLD AUTO: 132 K/UL (ref 150–450)
PMV BLD AUTO: 9.7 FL (ref 9.4–12.3)
POTASSIUM SERPL-SCNC: 3.9 MMOL/L (ref 3.5–5.1)
RBC # BLD AUTO: 3.75 M/UL (ref 4.23–5.6)
SERVICE CMNT-IMP: ABNORMAL
SODIUM SERPL-SCNC: 136 MMOL/L (ref 136–145)
WBC # BLD AUTO: 6.2 K/UL (ref 4.3–11.1)

## 2024-09-16 PROCEDURE — 36415 COLL VENOUS BLD VENIPUNCTURE: CPT

## 2024-09-16 PROCEDURE — 6360000002 HC RX W HCPCS: Performed by: INTERNAL MEDICINE

## 2024-09-16 PROCEDURE — 6370000000 HC RX 637 (ALT 250 FOR IP): Performed by: INTERNAL MEDICINE

## 2024-09-16 PROCEDURE — 80048 BASIC METABOLIC PNL TOTAL CA: CPT

## 2024-09-16 PROCEDURE — 82962 GLUCOSE BLOOD TEST: CPT

## 2024-09-16 PROCEDURE — 85025 COMPLETE CBC W/AUTO DIFF WBC: CPT

## 2024-09-16 RX ORDER — CEFPODOXIME PROXETIL 200 MG/1
200 TABLET, FILM COATED ORAL 2 TIMES DAILY
Qty: 6 TABLET | Refills: 0 | Status: SHIPPED | OUTPATIENT
Start: 2024-09-16 | End: 2024-09-19

## 2024-09-16 RX ADMIN — ASPIRIN 81 MG: 81 TABLET, COATED ORAL at 08:47

## 2024-09-16 RX ADMIN — CLOTRIMAZOLE AND BETAMETHASONE DIPROPIONATE: 10; .5 CREAM TOPICAL at 08:56

## 2024-09-16 RX ADMIN — TAMSULOSIN HYDROCHLORIDE 0.4 MG: 0.4 CAPSULE ORAL at 08:47

## 2024-09-16 RX ADMIN — CARVEDILOL 12.5 MG: 6.25 TABLET, FILM COATED ORAL at 08:49

## 2024-09-16 RX ADMIN — ENOXAPARIN SODIUM 30 MG: 100 INJECTION SUBCUTANEOUS at 08:48

## 2024-09-16 RX ADMIN — HYDROCHLOROTHIAZIDE 25 MG: 25 TABLET ORAL at 08:47

## 2024-09-16 RX ADMIN — GABAPENTIN 600 MG: 300 CAPSULE ORAL at 08:47

## 2024-09-17 ENCOUNTER — TELEPHONE (OUTPATIENT)
Dept: INTERNAL MEDICINE CLINIC | Facility: CLINIC | Age: 69
End: 2024-09-17

## 2024-09-17 ENCOUNTER — PATIENT MESSAGE (OUTPATIENT)
Dept: INTERNAL MEDICINE CLINIC | Facility: CLINIC | Age: 69
End: 2024-09-17

## 2024-09-17 LAB
BACTERIA SPEC CULT: ABNORMAL
BACTERIA SPEC CULT: ABNORMAL
SERVICE CMNT-IMP: ABNORMAL

## 2024-09-26 ENCOUNTER — LAB (OUTPATIENT)
Dept: INTERNAL MEDICINE CLINIC | Facility: CLINIC | Age: 69
End: 2024-09-26

## 2024-09-26 ENCOUNTER — OFFICE VISIT (OUTPATIENT)
Dept: INTERNAL MEDICINE CLINIC | Facility: CLINIC | Age: 69
End: 2024-09-26

## 2024-09-26 VITALS
OXYGEN SATURATION: 97 % | SYSTOLIC BLOOD PRESSURE: 124 MMHG | HEART RATE: 88 BPM | HEIGHT: 74 IN | DIASTOLIC BLOOD PRESSURE: 82 MMHG | WEIGHT: 295 LBS | TEMPERATURE: 97 F | BODY MASS INDEX: 37.86 KG/M2

## 2024-09-26 DIAGNOSIS — Z87.19 HISTORY OF GI BLEED: ICD-10-CM

## 2024-09-26 DIAGNOSIS — R82.90 ABNORMAL URINALYSIS: ICD-10-CM

## 2024-09-26 DIAGNOSIS — Z09 HOSPITAL DISCHARGE FOLLOW-UP: Primary | ICD-10-CM

## 2024-09-26 DIAGNOSIS — Z23 NEEDS FLU SHOT: ICD-10-CM

## 2024-09-26 DIAGNOSIS — R19.5 DARK STOOLS: ICD-10-CM

## 2024-09-26 DIAGNOSIS — Z87.440 HISTORY OF UTI: ICD-10-CM

## 2024-09-26 LAB
BASOPHILS # BLD: 0 K/UL (ref 0–0.2)
BASOPHILS NFR BLD: 0 % (ref 0–2)
BILIRUBIN, URINE, POC: NEGATIVE
BLOOD URINE, POC: NEGATIVE
DIFFERENTIAL METHOD BLD: ABNORMAL
EOSINOPHIL # BLD: 0.1 K/UL (ref 0–0.8)
EOSINOPHIL NFR BLD: 1 % (ref 0.5–7.8)
ERYTHROCYTE [DISTWIDTH] IN BLOOD BY AUTOMATED COUNT: 13.5 % (ref 11.9–14.6)
GLUCOSE URINE, POC: NEGATIVE
HCT VFR BLD AUTO: 42.7 % (ref 41.1–50.3)
HGB BLD-MCNC: 13.8 G/DL (ref 13.6–17.2)
IMM GRANULOCYTES # BLD AUTO: 0.1 K/UL (ref 0–0.5)
IMM GRANULOCYTES NFR BLD AUTO: 1 % (ref 0–5)
KETONES, URINE, POC: NEGATIVE
LEUKOCYTE ESTERASE, URINE, POC: NORMAL
LYMPHOCYTES # BLD: 1 K/UL (ref 0.5–4.6)
LYMPHOCYTES NFR BLD: 12 % (ref 13–44)
MCH RBC QN AUTO: 32 PG (ref 26.1–32.9)
MCHC RBC AUTO-ENTMCNC: 32.3 G/DL (ref 31.4–35)
MCV RBC AUTO: 99.1 FL (ref 82–102)
MONOCYTES # BLD: 0.4 K/UL (ref 0.1–1.3)
MONOCYTES NFR BLD: 5 % (ref 4–12)
NEUTS SEG # BLD: 6.9 K/UL (ref 1.7–8.2)
NEUTS SEG NFR BLD: 81 % (ref 43–78)
NITRITE, URINE, POC: POSITIVE
NRBC # BLD: 0 K/UL (ref 0–0.2)
PH, URINE, POC: 6 (ref 4.6–8)
PLATELET # BLD AUTO: 234 K/UL (ref 150–450)
PMV BLD AUTO: 10.3 FL (ref 9.4–12.3)
PROTEIN,URINE, POC: NEGATIVE
RBC # BLD AUTO: 4.31 M/UL (ref 4.23–5.6)
SPECIFIC GRAVITY, URINE, POC: 1.01 (ref 1–1.03)
URINALYSIS CLARITY, POC: NORMAL
URINALYSIS COLOR, POC: YELLOW
UROBILINOGEN, POC: NORMAL
WBC # BLD AUTO: 8.5 K/UL (ref 4.3–11.1)

## 2024-09-26 ASSESSMENT — ENCOUNTER SYMPTOMS
DIARRHEA: 0
NAUSEA: 0
BLOOD IN STOOL: 0
VOMITING: 0
COUGH: 0
SHORTNESS OF BREATH: 0
ABDOMINAL PAIN: 0
CONSTIPATION: 0

## 2024-09-30 DIAGNOSIS — N30.00 ACUTE CYSTITIS WITHOUT HEMATURIA: Primary | ICD-10-CM

## 2024-09-30 LAB
BACTERIA SPEC CULT: ABNORMAL
BACTERIA SPEC CULT: ABNORMAL
SERVICE CMNT-IMP: ABNORMAL

## 2024-09-30 RX ORDER — CIPROFLOXACIN 500 MG/1
500 TABLET, FILM COATED ORAL 2 TIMES DAILY
Qty: 14 TABLET | Refills: 0 | Status: SHIPPED | OUTPATIENT
Start: 2024-09-30 | End: 2024-10-07

## 2024-10-03 DIAGNOSIS — I10 ESSENTIAL HYPERTENSION: ICD-10-CM

## 2024-10-03 RX ORDER — HYDROCHLOROTHIAZIDE 25 MG/1
25 TABLET ORAL DAILY
Qty: 90 TABLET | Refills: 0 | Status: SHIPPED | OUTPATIENT
Start: 2024-10-03

## 2024-10-09 ENCOUNTER — HOSPITAL ENCOUNTER (OUTPATIENT)
Dept: GENERAL RADIOLOGY | Age: 69
Discharge: HOME | End: 2024-10-12
Payer: MEDICARE

## 2024-10-09 DIAGNOSIS — R52 PAIN: ICD-10-CM

## 2024-10-09 DIAGNOSIS — I87.2 VENOUS STASIS DERMATITIS OF BOTH LOWER EXTREMITIES: ICD-10-CM

## 2024-10-09 PROCEDURE — 73562 X-RAY EXAM OF KNEE 3: CPT

## 2024-10-09 RX ORDER — TRIAMCINOLONE ACETONIDE 0.25 MG/G
CREAM TOPICAL
Refills: 0 | OUTPATIENT
Start: 2024-10-09

## 2024-10-11 DIAGNOSIS — I87.2 VENOUS STASIS DERMATITIS OF BOTH LOWER EXTREMITIES: ICD-10-CM

## 2024-10-13 RX ORDER — TRIAMCINOLONE ACETONIDE 0.25 MG/G
CREAM TOPICAL
Qty: 80 G | Refills: 1 | Status: SHIPPED | OUTPATIENT
Start: 2024-10-13

## 2024-10-15 PROBLEM — N39.0 UTI (URINARY TRACT INFECTION): Status: RESOLVED | Noted: 2024-09-14 | Resolved: 2024-10-15

## 2024-10-17 ENCOUNTER — OFFICE VISIT (OUTPATIENT)
Dept: UROLOGY | Age: 69
End: 2024-10-17
Payer: MEDICARE

## 2024-10-17 DIAGNOSIS — R33.9 URINE RETENTION: ICD-10-CM

## 2024-10-17 DIAGNOSIS — N40.1 BENIGN PROSTATIC HYPERPLASIA WITH LOWER URINARY TRACT SYMPTOMS, SYMPTOM DETAILS UNSPECIFIED: Primary | ICD-10-CM

## 2024-10-17 LAB
BILIRUBIN, URINE, POC: NEGATIVE
BLOOD URINE, POC: NEGATIVE
GLUCOSE URINE, POC: NEGATIVE MG/DL
KETONES, URINE, POC: NEGATIVE MG/DL
LEUKOCYTE ESTERASE, URINE, POC: NEGATIVE
NITRITE, URINE, POC: NEGATIVE
PH, URINE, POC: 5.5 (ref 4.6–8)
PROTEIN,URINE, POC: NEGATIVE MG/DL
PVR, POC: 278 CC
SPECIFIC GRAVITY, URINE, POC: 1.02 (ref 1–1.03)
URINALYSIS CLARITY, POC: NORMAL
URINALYSIS COLOR, POC: NORMAL
UROBILINOGEN, POC: NORMAL MG/DL

## 2024-10-17 PROCEDURE — 1036F TOBACCO NON-USER: CPT | Performed by: NURSE PRACTITIONER

## 2024-10-17 PROCEDURE — 99215 OFFICE O/P EST HI 40 MIN: CPT | Performed by: NURSE PRACTITIONER

## 2024-10-17 PROCEDURE — 1123F ACP DISCUSS/DSCN MKR DOCD: CPT | Performed by: NURSE PRACTITIONER

## 2024-10-17 PROCEDURE — 81003 URINALYSIS AUTO W/O SCOPE: CPT | Performed by: NURSE PRACTITIONER

## 2024-10-17 PROCEDURE — 51798 US URINE CAPACITY MEASURE: CPT | Performed by: NURSE PRACTITIONER

## 2024-10-17 PROCEDURE — 3017F COLORECTAL CA SCREEN DOC REV: CPT | Performed by: NURSE PRACTITIONER

## 2024-10-17 PROCEDURE — G8417 CALC BMI ABV UP PARAM F/U: HCPCS | Performed by: NURSE PRACTITIONER

## 2024-10-17 PROCEDURE — G8482 FLU IMMUNIZE ORDER/ADMIN: HCPCS | Performed by: NURSE PRACTITIONER

## 2024-10-17 PROCEDURE — G8427 DOCREV CUR MEDS BY ELIG CLIN: HCPCS | Performed by: NURSE PRACTITIONER

## 2024-10-17 ASSESSMENT — ENCOUNTER SYMPTOMS: BACK PAIN: 0

## 2024-10-17 NOTE — PROGRESS NOTES
by mouth daily 90 tablet 3    Insulin Pen Needle (PEN NEEDLES) 31G X 5 MM MISC Please dispense pen needles for use with patient's insulin pen. 100 each 1    clotrimazole-betamethasone (LOTRISONE) 1-0.05 % cream Apply topically 2 times daily. 60 g 0    simvastatin (ZOCOR) 40 MG tablet Take 1 tablet by mouth daily 90 tablet 3    Insulin Degludec 100 UNIT/ML SOPN Inject 25 Units into the skin nightly 5 Adjustable Dose Pre-filled Pen Syringe 11    OZEMPIC, 2 MG/DOSE, 8 MG/3ML SOPN sc injection Inject 0.75 mLs into the skin once a week (Patient taking differently: Inject 2.5 mg into the skin once a week) 9 mL 3    metFORMIN (GLUCOPHAGE) 500 MG tablet Take 1 tablet by mouth daily (with breakfast) 90 tablet 3    tamsulosin (FLOMAX) 0.4 MG capsule Take 1 capsule by mouth in the morning and at bedtime 180 capsule 3    carvedilol (COREG) 25 MG tablet Taking 1/2 tablet in the AM and 1 tablet in the  tablet 3    enalapril (VASOTEC) 10 MG tablet TAKE 1 TABLET DAILY 90 tablet 3    spironolactone (ALDACTONE) 25 MG tablet Take 1 tablet by mouth daily 90 tablet 3    sildenafil (REVATIO) 20 MG tablet Take 1-5 tablets by mouth as needed (erectile dysfunction) 50 tablet 6    Probiotic Product (PROBIOTIC PO) Take by mouth      blood glucose monitor strips by Other route See Admin Instructions Test before meals and bedtime & as needed for symptoms of irregular blood glucose. Dispense sufficient amount for indicated testing frequency plus additional to accommodate PRN testing needs. 100 strip 1    glucose monitoring (FREESTYLE FREEDOM) kit Please provide one glucometer compatible with patient's test strips 1 kit 0    Lancet Device MISC 1 Device by Does not apply route See Admin Instructions For use to test before meals and bedtime & as needed for symptoms of irregular blood glucose. Dispense sufficient amount for indicated testing frequency plus additional to accommodate PRN testing needs. 100 each 1    CPAP Machine MISC by Other

## 2024-11-04 ENCOUNTER — OFFICE VISIT (OUTPATIENT)
Dept: UROLOGY | Age: 69
End: 2024-11-04
Payer: MEDICARE

## 2024-11-04 DIAGNOSIS — R33.9 URINE RETENTION: ICD-10-CM

## 2024-11-04 DIAGNOSIS — N52.01 ERECTILE DYSFUNCTION DUE TO ARTERIAL INSUFFICIENCY: ICD-10-CM

## 2024-11-04 DIAGNOSIS — N40.1 BENIGN PROSTATIC HYPERPLASIA WITH LOWER URINARY TRACT SYMPTOMS, SYMPTOM DETAILS UNSPECIFIED: Primary | ICD-10-CM

## 2024-11-04 LAB
BILIRUBIN, URINE, POC: NEGATIVE
BLOOD URINE, POC: NEGATIVE
GLUCOSE URINE, POC: NEGATIVE MG/DL
KETONES, URINE, POC: NEGATIVE MG/DL
LEUKOCYTE ESTERASE, URINE, POC: NEGATIVE
NITRITE, URINE, POC: NEGATIVE
PH, URINE, POC: 6 (ref 4.6–8)
PROTEIN,URINE, POC: NEGATIVE MG/DL
PVR, POC: 198 CC
SPECIFIC GRAVITY, URINE, POC: 1.01 (ref 1–1.03)
URINALYSIS CLARITY, POC: NORMAL
URINALYSIS COLOR, POC: NORMAL
UROBILINOGEN, POC: NORMAL MG/DL

## 2024-11-04 PROCEDURE — 81003 URINALYSIS AUTO W/O SCOPE: CPT | Performed by: UROLOGY

## 2024-11-04 PROCEDURE — 51798 US URINE CAPACITY MEASURE: CPT | Performed by: UROLOGY

## 2024-11-04 PROCEDURE — G8417 CALC BMI ABV UP PARAM F/U: HCPCS | Performed by: UROLOGY

## 2024-11-04 PROCEDURE — G8427 DOCREV CUR MEDS BY ELIG CLIN: HCPCS | Performed by: UROLOGY

## 2024-11-04 PROCEDURE — 1159F MED LIST DOCD IN RCRD: CPT | Performed by: UROLOGY

## 2024-11-04 PROCEDURE — 1036F TOBACCO NON-USER: CPT | Performed by: UROLOGY

## 2024-11-04 PROCEDURE — 1123F ACP DISCUSS/DSCN MKR DOCD: CPT | Performed by: UROLOGY

## 2024-11-04 PROCEDURE — 99214 OFFICE O/P EST MOD 30 MIN: CPT | Performed by: UROLOGY

## 2024-11-04 PROCEDURE — G8482 FLU IMMUNIZE ORDER/ADMIN: HCPCS | Performed by: UROLOGY

## 2024-11-04 PROCEDURE — 3017F COLORECTAL CA SCREEN DOC REV: CPT | Performed by: UROLOGY

## 2024-11-05 DIAGNOSIS — E11.65 TYPE 2 DIABETES MELLITUS WITH HYPERGLYCEMIA, WITHOUT LONG-TERM CURRENT USE OF INSULIN (HCC): Primary | ICD-10-CM

## 2024-11-06 RX ORDER — SPIRONOLACTONE 25 MG/1
25 TABLET ORAL DAILY
Qty: 90 TABLET | Refills: 3 | Status: SHIPPED | OUTPATIENT
Start: 2024-11-06

## 2024-11-06 ASSESSMENT — ENCOUNTER SYMPTOMS
VOMITING: 0
DIARRHEA: 0
BLOOD IN STOOL: 0
INDIGESTION: 0
COUGH: 0
HEARTBURN: 0
EYE PAIN: 0
ABDOMINAL PAIN: 0
WHEEZING: 0
CONSTIPATION: 0
BACK PAIN: 0
SKIN LESIONS: 0
NAUSEA: 0
EYE DISCHARGE: 0
SHORTNESS OF BREATH: 0

## 2024-11-07 NOTE — PROGRESS NOTES
aspirin 81 MG EC tablet Take 1 tablet by mouth daily 90 tablet 3    Insulin Pen Needle (PEN NEEDLES) 31G X 5 MM MISC Please dispense pen needles for use with patient's insulin pen. 100 each 1    clotrimazole-betamethasone (LOTRISONE) 1-0.05 % cream Apply topically 2 times daily. 60 g 0    simvastatin (ZOCOR) 40 MG tablet Take 1 tablet by mouth daily 90 tablet 3    Insulin Degludec 100 UNIT/ML SOPN Inject 25 Units into the skin nightly 5 Adjustable Dose Pre-filled Pen Syringe 11    OZEMPIC, 2 MG/DOSE, 8 MG/3ML SOPN sc injection Inject 0.75 mLs into the skin once a week (Patient taking differently: Inject 2.5 mg into the skin once a week) 9 mL 3    metFORMIN (GLUCOPHAGE) 500 MG tablet Take 1 tablet by mouth daily (with breakfast) 90 tablet 3    tamsulosin (FLOMAX) 0.4 MG capsule Take 1 capsule by mouth in the morning and at bedtime 180 capsule 3    carvedilol (COREG) 25 MG tablet Taking 1/2 tablet in the AM and 1 tablet in the  tablet 3    enalapril (VASOTEC) 10 MG tablet TAKE 1 TABLET DAILY 90 tablet 3    sildenafil (REVATIO) 20 MG tablet Take 1-5 tablets by mouth as needed (erectile dysfunction) 50 tablet 6    Probiotic Product (PROBIOTIC PO) Take by mouth      blood glucose monitor strips by Other route See Admin Instructions Test before meals and bedtime & as needed for symptoms of irregular blood glucose. Dispense sufficient amount for indicated testing frequency plus additional to accommodate PRN testing needs. 100 strip 1    glucose monitoring (FREESTYLE FREEDOM) kit Please provide one glucometer compatible with patient's test strips 1 kit 0    Lancet Device MISC 1 Device by Does not apply route See Admin Instructions For use to test before meals and bedtime & as needed for symptoms of irregular blood glucose. Dispense sufficient amount for indicated testing frequency plus additional to accommodate PRN testing needs. 100 each 1    CPAP Machine MISC by Other route ASV      gabapentin (NEURONTIN) 600 MG

## 2024-11-11 ENCOUNTER — LAB (OUTPATIENT)
Dept: INTERNAL MEDICINE CLINIC | Facility: CLINIC | Age: 69
End: 2024-11-11

## 2024-11-11 DIAGNOSIS — E11.65 TYPE 2 DIABETES MELLITUS WITH HYPERGLYCEMIA, WITHOUT LONG-TERM CURRENT USE OF INSULIN (HCC): ICD-10-CM

## 2024-11-11 DIAGNOSIS — Z12.5 SCREENING PSA (PROSTATE SPECIFIC ANTIGEN): ICD-10-CM

## 2024-11-11 DIAGNOSIS — E11.51 CONTROLLED TYPE 2 DIABETES MELLITUS WITH PERIPHERAL CIRCULATORY DISORDER (HCC): ICD-10-CM

## 2024-11-11 DIAGNOSIS — E78.2 MIXED DYSLIPIDEMIA: ICD-10-CM

## 2024-11-11 DIAGNOSIS — I10 ESSENTIAL HYPERTENSION: ICD-10-CM

## 2024-11-11 DIAGNOSIS — I48.11 LONGSTANDING PERSISTENT ATRIAL FIBRILLATION (HCC): Chronic | ICD-10-CM

## 2024-11-11 LAB
ALBUMIN SERPL-MCNC: 3.5 G/DL (ref 3.2–4.6)
ALBUMIN/GLOB SERPL: 1 (ref 1–1.9)
ALP SERPL-CCNC: 86 U/L (ref 40–129)
ALT SERPL-CCNC: 14 U/L (ref 8–55)
ANION GAP SERPL CALC-SCNC: 10 MMOL/L (ref 7–16)
AST SERPL-CCNC: 21 U/L (ref 15–37)
BILIRUB SERPL-MCNC: 0.9 MG/DL (ref 0–1.2)
BUN SERPL-MCNC: 13 MG/DL (ref 8–23)
CALCIUM SERPL-MCNC: 9.6 MG/DL (ref 8.8–10.2)
CHLORIDE SERPL-SCNC: 104 MMOL/L (ref 98–107)
CHOLEST SERPL-MCNC: 110 MG/DL (ref 0–200)
CO2 SERPL-SCNC: 25 MMOL/L (ref 20–29)
CREAT SERPL-MCNC: 0.88 MG/DL (ref 0.8–1.3)
CREAT UR-MCNC: 204 MG/DL (ref 39–259)
ERYTHROCYTE [DISTWIDTH] IN BLOOD BY AUTOMATED COUNT: 13.5 % (ref 11.9–14.6)
GLOBULIN SER CALC-MCNC: 3.4 G/DL (ref 2.3–3.5)
GLUCOSE SERPL-MCNC: 104 MG/DL (ref 70–99)
HCT VFR BLD AUTO: 42.9 % (ref 41.1–50.3)
HDLC SERPL-MCNC: 41 MG/DL (ref 40–60)
HDLC SERPL: 2.7 (ref 0–5)
HGB BLD-MCNC: 13.8 G/DL (ref 13.6–17.2)
LDLC SERPL CALC-MCNC: 58 MG/DL (ref 0–100)
MCH RBC QN AUTO: 32.3 PG (ref 26.1–32.9)
MCHC RBC AUTO-ENTMCNC: 32.2 G/DL (ref 31.4–35)
MCV RBC AUTO: 100.5 FL (ref 82–102)
MICROALBUMIN UR-MCNC: 7.5 MG/DL (ref 0–20)
MICROALBUMIN/CREAT UR-RTO: 37 MG/G (ref 0–30)
NRBC # BLD: 0 K/UL (ref 0–0.2)
PLATELET # BLD AUTO: 169 K/UL (ref 150–450)
PMV BLD AUTO: 10.9 FL (ref 9.4–12.3)
POTASSIUM SERPL-SCNC: 4.3 MMOL/L (ref 3.5–5.1)
PROT SERPL-MCNC: 6.8 G/DL (ref 6.3–8.2)
RBC # BLD AUTO: 4.27 M/UL (ref 4.23–5.6)
SODIUM SERPL-SCNC: 139 MMOL/L (ref 136–145)
TRIGL SERPL-MCNC: 57 MG/DL (ref 0–150)
VLDLC SERPL CALC-MCNC: 11 MG/DL (ref 6–23)
WBC # BLD AUTO: 7.4 K/UL (ref 4.3–11.1)

## 2024-11-12 LAB
EST. AVERAGE GLUCOSE BLD GHB EST-MCNC: 108 MG/DL
HBA1C MFR BLD: 5.4 % (ref 0–5.6)
PSA SERPL-MCNC: <0.1 NG/ML (ref 0–4)

## 2024-11-18 ENCOUNTER — OFFICE VISIT (OUTPATIENT)
Dept: INTERNAL MEDICINE CLINIC | Facility: CLINIC | Age: 69
End: 2024-11-18

## 2024-11-18 VITALS
DIASTOLIC BLOOD PRESSURE: 70 MMHG | WEIGHT: 297 LBS | HEART RATE: 74 BPM | TEMPERATURE: 97.8 F | BODY MASS INDEX: 38.12 KG/M2 | SYSTOLIC BLOOD PRESSURE: 122 MMHG | HEIGHT: 74 IN | OXYGEN SATURATION: 96 %

## 2024-11-18 DIAGNOSIS — M17.0 PRIMARY OSTEOARTHRITIS OF BOTH KNEES: ICD-10-CM

## 2024-11-18 DIAGNOSIS — N40.1 BENIGN PROSTATIC HYPERPLASIA WITH NOCTURIA: Chronic | ICD-10-CM

## 2024-11-18 DIAGNOSIS — Z00.00 MEDICARE ANNUAL WELLNESS VISIT, SUBSEQUENT: Primary | ICD-10-CM

## 2024-11-18 DIAGNOSIS — E66.813 CLASS 3 SEVERE OBESITY DUE TO EXCESS CALORIES WITH SERIOUS COMORBIDITY AND BODY MASS INDEX (BMI) OF 40.0 TO 44.9 IN ADULT: Chronic | ICD-10-CM

## 2024-11-18 DIAGNOSIS — I10 ESSENTIAL HYPERTENSION: ICD-10-CM

## 2024-11-18 DIAGNOSIS — E66.01 CLASS 3 SEVERE OBESITY DUE TO EXCESS CALORIES WITH SERIOUS COMORBIDITY AND BODY MASS INDEX (BMI) OF 40.0 TO 44.9 IN ADULT: Chronic | ICD-10-CM

## 2024-11-18 DIAGNOSIS — E11.51 CONTROLLED TYPE 2 DIABETES MELLITUS WITH PERIPHERAL CIRCULATORY DISORDER (HCC): ICD-10-CM

## 2024-11-18 DIAGNOSIS — I50.22 CHRONIC SYSTOLIC (CONGESTIVE) HEART FAILURE (HCC): ICD-10-CM

## 2024-11-18 DIAGNOSIS — E66.01 SEVERE OBESITY (BMI 35.0-39.9) WITH COMORBIDITY: ICD-10-CM

## 2024-11-18 DIAGNOSIS — R35.1 BENIGN PROSTATIC HYPERPLASIA WITH NOCTURIA: Chronic | ICD-10-CM

## 2024-11-18 DIAGNOSIS — Z95.818 PRESENCE OF WATCHMAN LEFT ATRIAL APPENDAGE CLOSURE DEVICE: ICD-10-CM

## 2024-11-18 DIAGNOSIS — K76.0 HEPATIC STEATOSIS: ICD-10-CM

## 2024-11-18 ASSESSMENT — LIFESTYLE VARIABLES
HOW OFTEN DO YOU HAVE A DRINK CONTAINING ALCOHOL: 2-3 TIMES A WEEK
HOW MANY STANDARD DRINKS CONTAINING ALCOHOL DO YOU HAVE ON A TYPICAL DAY: 1 OR 2

## 2024-11-18 ASSESSMENT — PATIENT HEALTH QUESTIONNAIRE - PHQ9
SUM OF ALL RESPONSES TO PHQ QUESTIONS 1-9: 0
8. MOVING OR SPEAKING SO SLOWLY THAT OTHER PEOPLE COULD HAVE NOTICED. OR THE OPPOSITE, BEING SO FIGETY OR RESTLESS THAT YOU HAVE BEEN MOVING AROUND A LOT MORE THAN USUAL: NOT AT ALL
6. FEELING BAD ABOUT YOURSELF - OR THAT YOU ARE A FAILURE OR HAVE LET YOURSELF OR YOUR FAMILY DOWN: NOT AT ALL
2. FEELING DOWN, DEPRESSED OR HOPELESS: NOT AT ALL
10. IF YOU CHECKED OFF ANY PROBLEMS, HOW DIFFICULT HAVE THESE PROBLEMS MADE IT FOR YOU TO DO YOUR WORK, TAKE CARE OF THINGS AT HOME, OR GET ALONG WITH OTHER PEOPLE: NOT DIFFICULT AT ALL
3. TROUBLE FALLING OR STAYING ASLEEP: NOT AT ALL
SUM OF ALL RESPONSES TO PHQ9 QUESTIONS 1 & 2: 0
7. TROUBLE CONCENTRATING ON THINGS, SUCH AS READING THE NEWSPAPER OR WATCHING TELEVISION: NOT AT ALL
5. POOR APPETITE OR OVEREATING: NOT AT ALL
9. THOUGHTS THAT YOU WOULD BE BETTER OFF DEAD, OR OF HURTING YOURSELF: NOT AT ALL
SUM OF ALL RESPONSES TO PHQ QUESTIONS 1-9: 0
1. LITTLE INTEREST OR PLEASURE IN DOING THINGS: NOT AT ALL
4. FEELING TIRED OR HAVING LITTLE ENERGY: NOT AT ALL
SUM OF ALL RESPONSES TO PHQ QUESTIONS 1-9: 0
SUM OF ALL RESPONSES TO PHQ QUESTIONS 1-9: 0

## 2024-11-18 NOTE — ASSESSMENT & PLAN NOTE
Treatment regimen is effective.     Euvolemic on exam.  Echo stable.   Cardiology follow up notes reviewed.    Echo reviewed.  Euvolemic    Cardiac Medications       ACE Inhibitors       enalapril (VASOTEC) 10 MG tablet TAKE 1 TABLET DAILY       Potassium Sparing Diuretics       spironolactone (ALDACTONE) 25 MG tablet Take 1 tablet by mouth daily       Thiazides and Thiazide-Like Diuretics       hydroCHLOROthiazide (HYDRODIURIL) 25 MG tablet Take 1 tablet by mouth daily       HMG CoA Reductase Inhibitors       simvastatin (ZOCOR) 40 MG tablet Take 1 tablet by mouth daily       Alpha-Beta Blockers       carvedilol (COREG) 25 MG tablet Taking 1/2 tablet in the AM and 1 tablet in the PM       Pulmonary Hypertension - Phosphodiesterase Inhibitors       sildenafil (REVATIO) 20 MG tablet Take 1-5 tablets by mouth as needed (erectile dysfunction)     Patient not taking: Reported on 11/18/2024       Salicylates       aspirin 81 MG EC tablet Take 1 tablet by mouth daily            
 He experienced a UTI recently and was treated with antibiotics by a nurse practitioner. He reports bladder retention and frequent leakage. A message will be sent to his urologist regarding the potential use of finasteride. He is advised to try self-catheterization as recommended by his urologist to prevent recurrent UTIs. A standing order for urinalysis will be placed, allowing him to bring a sample for testing if he suspects an infection. He is also taking over-the-counter cranberry supplements as recommended.    A standing order for urinalysis will be placed, allowing him to bring a sample for testing if he suspects an infection.    Discuss with Dr. Frances.  Finasteride?    Continue Flomax.    Encouraged self cath as directed by urology.   
 Monitored by specialist- no acute findings meriting change in the plan    He has received gel shots in both knees and is experiencing bone-on-bone contact with orthopedics. He does not want to undergo knee replacement surgery and refuses to take opioids. Weight loss was recommended to alleviate knee discomfort. He is advised to continue with non-opioid pain management strategies and follow up with his pain management specialist as needed.  Weight loss should help as well.  Semaglutide increased to 2.4 mg weekly  
18;123):772. doi: [id number]. PMID: 96085702; PMCID: [id number].

## 2024-11-18 NOTE — PROGRESS NOTES
chronic changes at the right  greater than left lung bases. No new acute findings seen.      Electronically signed by Ramsey Sandoval   Urinalysis w rflx microscopic   Result Value Ref Range    Color, UA YELLOW/STRAW      Appearance CLOUDY      Specific [place], UA 1.015 1.001 - 1.023      pH, Urine 5.5 5.0 - 9.0      Protein, UA 30 (A) NEG mg/dL    Glucose, Ur Negative mg/dL    Ketones, Urine TRACE (A) NEG mg/dL    Bilirubin, Urine Negative NEG      Blood, Urine SMALL (A) NEG      Urobilinogen, Urine 0.2 0.2 - 1.0 EU/dL    Nitrite, Urine Negative NEG      Leukocyte Esterase, Urine LARGE (A) NEG      WBC, UA  0 /hpf    RBC, UA 5-10 0 /hpf    Epithelial Cells, UA 0-3 0 /hpf    BACTERIA, URINE 2+ (H) 0 /hpf    Other observations RESULTS VERIFIED MANUALLY     Comprehensive Metabolic Panel   Result Value Ref Range    Sodium 131 (L) 136 - 145 mmol/L    Potassium 4.3 3.5 - 5.1 mmol/L    Chloride 96 (L) 98 - 107 mmol/L    CO2 22 20 - 28 mmol/L    Anion Gap 13 9 - 18 mmol/L    Glucose 124 (H) 70 - 99 mg/dL    BUN 15 8 - 23 MG/DL    Creatinine 1.00 0.80 - 1.30 MG/DL    Est, Glom Filt Rate 80 >60 ml/min/1.73m2    Calcium 8.8 8.8 - 10.2 MG/DL    Total Bilirubin 2.0 (H) 0.0 - 1.2 MG/DL    ALT 15 12 - 65 U/L    AST 15 15 - 37 U/L    Alk Phosphatase 94 40 - 129 U/L    Total Protein 6.4 6.3 - 8.2 g/dL    Albumin 3.4 3.2 - 4.6 g/dL    Globulin 3.0 2.3 - 3.5 g/dL    Albumin/Globulin Ratio 1.1 1.0 - 1.9     Procalcitonin   Result Value Ref Range    Procalcitonin 0.14 (H) 0.00 - 0.10 ng/mL   Lactate, Sepsis   Result Value Ref Range    Lactic Acid, Sepsis 1.1 0.5 - 2.0 MMOL/L   CBC with Auto Differential   Result Value Ref Range    WBC 11.0 4.3 - 11.1 K/uL    RBC 3.91 (L) 4.23 - 5.6 M/uL    Hemoglobin 12.7 (L) 13.6 - 17.2 g/dL    Hematocrit 37.7 (L) 41.1 - 50.3 %    MCV 96.4 82.0 - 102.0 FL    MCH 32.5 26.1 - 32.9 PG    MCHC 33.7 31.4 - 35.0 g/dL    RDW 13.6 11.9 - 14.6 %    Platelets 150 150 - 450 K/uL    MPV 9.8 9.4 - 12.3 FL    
caffeine

## 2024-11-18 NOTE — PATIENT INSTRUCTIONS
Learning About Being Active as an Older Adult  Why is being active important as you get older?     Being active is one of the best things you can do for your health. And it's never too late to start. Being active--or getting active, if you aren't already--has definite benefits. It can:  Give you more energy,  Keep your mind sharp.  Improve balance to reduce your risk of falls.  Help you manage chronic illness with fewer medicines.  No matter how old you are, how fit you are, or what health problems you have, there is a form of activity that will work for you. And the more physical activity you can do, the better your overall health will be.  What kinds of activity can help you stay healthy?  Being more active will make your daily activities easier. Physical activity includes planned exercise and things you do in daily life. There are four types of activity:  Aerobic.  Doing aerobic activity makes your heart and lungs strong.  Includes walking, dancing, and gardening.  Aim for at least 2½ hours spread throughout the week.  It improves your energy and can help you sleep better.  Muscle-strengthening.  This type of activity can help maintain muscle and strengthen bones.  Includes climbing stairs, using resistance bands, and lifting or carrying heavy loads.  Aim for at least twice a week.  It can help protect the knees and other joints.  Stretching.  Stretching gives you better range of motion in joints and muscles.  Includes upper arm stretches, calf stretches, and gentle yoga.  Aim for at least twice a week, preferably after your muscles are warmed up from other activities.  It can help you function better in daily life.  Balancing.  This helps you stay coordinated and have good posture.  Includes heel-to-toe walking, zaida chi, and certain types of yoga.  Aim for at least 3 days a week.  It can reduce your risk of falling.  Even if you have a hard time meeting the recommendations, it's better to be more active

## 2024-11-19 DIAGNOSIS — N40.1 BENIGN PROSTATIC HYPERPLASIA WITH NOCTURIA: Primary | ICD-10-CM

## 2024-11-19 DIAGNOSIS — R35.1 BENIGN PROSTATIC HYPERPLASIA WITH NOCTURIA: Primary | ICD-10-CM

## 2024-11-19 RX ORDER — FINASTERIDE 5 MG/1
5 TABLET, FILM COATED ORAL DAILY
Qty: 30 TABLET | Refills: 5 | Status: SHIPPED | OUTPATIENT
Start: 2024-11-19 | End: 2024-11-19 | Stop reason: SDUPTHER

## 2024-11-19 NOTE — PROGRESS NOTES
Discussed trial of Finasteride with Dr. Frances.      [employer] message sent to the patient.      Orders Placed This Encounter    finasteride (PROSCAR) 5 MG tablet     Sig: Take 1 tablet by mouth daily     Dispense:  30 tablet     Refill:  5     SIMRAN FULLER MD

## 2024-11-26 ENCOUNTER — PATIENT MESSAGE (OUTPATIENT)
Dept: INTERNAL MEDICINE CLINIC | Facility: CLINIC | Age: 69
End: 2024-11-26

## 2024-11-26 DIAGNOSIS — I10 ESSENTIAL HYPERTENSION: ICD-10-CM

## 2024-11-26 DIAGNOSIS — N40.1 BENIGN PROSTATIC HYPERPLASIA WITH NOCTURIA: ICD-10-CM

## 2024-11-26 DIAGNOSIS — I25.10 CORONARY ARTERY DISEASE DUE TO LIPID RICH PLAQUE: ICD-10-CM

## 2024-11-26 DIAGNOSIS — I50.22 CHRONIC SYSTOLIC (CONGESTIVE) HEART FAILURE (HCC): ICD-10-CM

## 2024-11-26 DIAGNOSIS — K76.0 HEPATIC STEATOSIS: ICD-10-CM

## 2024-11-26 DIAGNOSIS — I25.83 CORONARY ARTERY DISEASE DUE TO LIPID RICH PLAQUE: ICD-10-CM

## 2024-11-26 DIAGNOSIS — E11.51 CONTROLLED TYPE 2 DIABETES MELLITUS WITH PERIPHERAL CIRCULATORY DISORDER (HCC): ICD-10-CM

## 2024-11-26 DIAGNOSIS — E66.01 SEVERE OBESITY (BMI 35.0-39.9) WITH COMORBIDITY: Primary | ICD-10-CM

## 2024-11-26 DIAGNOSIS — R35.1 BENIGN PROSTATIC HYPERPLASIA WITH NOCTURIA: ICD-10-CM

## 2024-12-03 RX ORDER — TIRZEPATIDE 2.5 MG/.5ML
2.5 INJECTION, SOLUTION SUBCUTANEOUS
Qty: 2 ML | Refills: 5 | Status: SHIPPED | OUTPATIENT
Start: 2024-12-03

## 2024-12-07 DIAGNOSIS — I50.22 CHRONIC SYSTOLIC (CONGESTIVE) HEART FAILURE (HCC): ICD-10-CM

## 2024-12-07 DIAGNOSIS — N40.1 BENIGN PROSTATIC HYPERPLASIA WITH LOWER URINARY TRACT SYMPTOMS, SYMPTOM DETAILS UNSPECIFIED: ICD-10-CM

## 2024-12-08 RX ORDER — TAMSULOSIN HYDROCHLORIDE 0.4 MG/1
0.4 CAPSULE ORAL 2 TIMES DAILY
Qty: 180 CAPSULE | Refills: 3 | Status: SHIPPED | OUTPATIENT
Start: 2024-12-08

## 2024-12-08 RX ORDER — CARVEDILOL 25 MG/1
TABLET ORAL
Qty: 180 TABLET | Refills: 3 | Status: SHIPPED | OUTPATIENT
Start: 2024-12-08

## 2024-12-11 DIAGNOSIS — I10 ESSENTIAL HYPERTENSION: ICD-10-CM

## 2024-12-11 RX ORDER — ENALAPRIL MALEATE 10 MG/1
TABLET ORAL
Qty: 90 TABLET | Refills: 3 | Status: SHIPPED | OUTPATIENT
Start: 2024-12-11

## 2024-12-19 ENCOUNTER — HOSPITAL ENCOUNTER (EMERGENCY)
Age: 69
Discharge: HOME | End: 2024-12-19
Attending: GENERAL PRACTICE
Payer: MEDICARE

## 2024-12-19 ENCOUNTER — APPOINTMENT (OUTPATIENT)
Dept: GENERAL RADIOLOGY | Age: 69
End: 2024-12-19
Payer: MEDICARE

## 2024-12-19 VITALS
SYSTOLIC BLOOD PRESSURE: 107 MMHG | HEART RATE: 75 BPM | DIASTOLIC BLOOD PRESSURE: 73 MMHG | HEIGHT: 74 IN | TEMPERATURE: 98.4 F | OXYGEN SATURATION: 95 % | RESPIRATION RATE: 15 BRPM | WEIGHT: 297 LBS | BODY MASS INDEX: 38.12 KG/M2

## 2024-12-19 DIAGNOSIS — R07.89 CHEST WALL PAIN: Primary | ICD-10-CM

## 2024-12-19 LAB
ALBUMIN SERPL-MCNC: 3.6 G/DL (ref 3.2–4.6)
ALBUMIN/GLOB SERPL: 1.1 (ref 1–1.9)
ALP SERPL-CCNC: 96 U/L (ref 40–129)
ALT SERPL-CCNC: 18 U/L (ref 8–55)
ANION GAP SERPL CALC-SCNC: 12 MMOL/L (ref 7–16)
AST SERPL-CCNC: 30 U/L (ref 15–37)
BASOPHILS # BLD: 0 K/UL (ref 0–0.2)
BASOPHILS NFR BLD: 0 % (ref 0–2)
BILIRUB SERPL-MCNC: 1 MG/DL (ref 0–1.2)
BUN SERPL-MCNC: 14 MG/DL (ref 8–23)
CALCIUM SERPL-MCNC: 10.1 MG/DL (ref 8.8–10.2)
CHLORIDE SERPL-SCNC: 101 MMOL/L (ref 98–107)
CO2 SERPL-SCNC: 24 MMOL/L (ref 20–29)
CREAT SERPL-MCNC: 0.92 MG/DL (ref 0.8–1.3)
DIFFERENTIAL METHOD BLD: NORMAL
EOSINOPHIL # BLD: 0.1 K/UL (ref 0–0.8)
EOSINOPHIL NFR BLD: 2 % (ref 0.5–7.8)
ERYTHROCYTE [DISTWIDTH] IN BLOOD BY AUTOMATED COUNT: 13.4 % (ref 11.9–14.6)
GLOBULIN SER CALC-MCNC: 3.2 G/DL (ref 2.3–3.5)
GLUCOSE SERPL-MCNC: 109 MG/DL (ref 70–99)
HCT VFR BLD AUTO: 41.7 % (ref 41.1–50.3)
HGB BLD-MCNC: 14.3 G/DL (ref 13.6–17.2)
IMM GRANULOCYTES # BLD AUTO: 0 K/UL (ref 0–0.5)
IMM GRANULOCYTES NFR BLD AUTO: 0 % (ref 0–5)
LYMPHOCYTES # BLD: 1.2 K/UL (ref 0.5–4.6)
LYMPHOCYTES NFR BLD: 17 % (ref 13–44)
MCH RBC QN AUTO: 32.7 PG (ref 26.1–32.9)
MCHC RBC AUTO-ENTMCNC: 34.3 G/DL (ref 31.4–35)
MCV RBC AUTO: 95.4 FL (ref 82–102)
MONOCYTES # BLD: 0.5 K/UL (ref 0.1–1.3)
MONOCYTES NFR BLD: 6 % (ref 4–12)
NEUTS SEG # BLD: 5.5 K/UL (ref 1.7–8.2)
NEUTS SEG NFR BLD: 75 % (ref 43–78)
NRBC # BLD: 0 K/UL (ref 0–0.2)
PLATELET # BLD AUTO: 165 K/UL (ref 150–450)
PMV BLD AUTO: 10.4 FL (ref 9.4–12.3)
POTASSIUM SERPL-SCNC: 4 MMOL/L (ref 3.5–5.1)
PROT SERPL-MCNC: 6.8 G/DL (ref 6.3–8.2)
RBC # BLD AUTO: 4.37 M/UL (ref 4.23–5.6)
SODIUM SERPL-SCNC: 137 MMOL/L (ref 136–145)
TROPONIN T SERPL HS-MCNC: 10 NG/L (ref 0–22)
TROPONIN T SERPL HS-MCNC: 8 NG/L (ref 0–22)
WBC # BLD AUTO: 7.3 K/UL (ref 4.3–11.1)

## 2024-12-19 PROCEDURE — 99285 EMERGENCY DEPT VISIT HI MDM: CPT

## 2024-12-19 PROCEDURE — 71046 X-RAY EXAM CHEST 2 VIEWS: CPT

## 2024-12-19 PROCEDURE — 93005 ELECTROCARDIOGRAM TRACING: CPT | Performed by: GENERAL PRACTICE

## 2024-12-19 PROCEDURE — 84484 ASSAY OF TROPONIN QUANT: CPT

## 2024-12-19 PROCEDURE — 85025 COMPLETE CBC W/AUTO DIFF WBC: CPT

## 2024-12-19 PROCEDURE — 80053 COMPREHEN METABOLIC PANEL: CPT

## 2024-12-19 ASSESSMENT — PAIN SCALES - GENERAL: PAINLEVEL_OUTOF10: 0

## 2024-12-19 ASSESSMENT — PAIN - FUNCTIONAL ASSESSMENT
PAIN_FUNCTIONAL_ASSESSMENT: NONE - DENIES PAIN
PAIN_FUNCTIONAL_ASSESSMENT: 0-10

## 2024-12-19 NOTE — ED PROVIDER NOTES
Emergency Department Provider Note       PCP: Perry Bartholomew MD   Age: 69 y.o.   Sex: male     DISPOSITION Decision To Discharge 12/19/2024 09:33:06 PM   DISPOSITION CONDITION Stable            ICD-10-CM    1. Chest wall pain  R07.89           Medical Decision Making     Patient presents with chest pain.  Very atypical in nature likely chest wall pain.  I have considered cardiopulmonary emergency such as ACS, PE, TAD, pneumonia, pneumothorax, among other cardiopulmonary emergencies.  Nothing to suggest emergent pathology at this time.  Patient is stable to be discharged home to be treated symptomatically and expectantly.  Return precautions are given.     1 or more acute illnesses that pose a threat to life or bodily function.   Patient was discharged risks and benefits of hospitalization were considered.  Chronic medical problems impacting care include atrial fibrillation.  Shared medical decision making was utilized in creating the patients health plan today.    I independently ordered and reviewed each unique test.  I reviewed external records: provider visit note from outside specialist.  I reviewed external records: previous lab results from outside ED.  I reviewed external records: previous imaging study including radiologist interpretation.     I interpreted the X-rays chest x-ray shows no evidence of infiltrate or edema and heart size is normal.  I have reviewed and agree with radiology report.  My Independent EKG Interpretation: sinus rhythm, no evidence of arrhythmia      ST Segments:Normal ST segments - NO STEMI   Rate: 90, atrial fibrillation, not sinus rhythm            History     Patient presents with chest pain.  Pain is in the far left lateral side of his chest more in the axillary area.  He grabs the ribs around 3-5 over on the left side.  Been there since this morning.  Only lasts a few seconds comes and goes.  He is unable to pinpoint any exacerbating alleviating factors.  Of note

## 2024-12-19 NOTE — ED TRIAGE NOTES
Presents complaining of elevated BP and left sided chest pain.    Detail Level: Simple Additional Notes: Patient consent was obtained to proceed with the visit and recommended plan of care after discussion of all risks and benefits, including the risks of COVID-19 exposure.

## 2024-12-20 LAB
EKG ATRIAL RATE: 96 BPM
EKG DIAGNOSIS: NORMAL
EKG Q-T INTERVAL: 356 MS
EKG QRS DURATION: 88 MS
EKG QTC CALCULATION (BAZETT): 435 MS
EKG R AXIS: 51 DEGREES
EKG T AXIS: 45 DEGREES
EKG VENTRICULAR RATE: 90 BPM

## 2024-12-20 PROCEDURE — 93010 ELECTROCARDIOGRAM REPORT: CPT | Performed by: INTERNAL MEDICINE

## 2024-12-23 DIAGNOSIS — R35.1 BENIGN PROSTATIC HYPERPLASIA WITH NOCTURIA: Chronic | ICD-10-CM

## 2024-12-23 DIAGNOSIS — N40.1 BENIGN PROSTATIC HYPERPLASIA WITH NOCTURIA: Chronic | ICD-10-CM

## 2024-12-23 LAB
APPEARANCE UR: CLEAR
BACTERIA URNS QL MICRO: NEGATIVE /HPF
BILIRUB UR QL: NEGATIVE
CASTS URNS QL MICRO: 0 /LPF
COLOR UR: NORMAL
CRYSTALS URNS QL MICRO: 0 /LPF
EPI CELLS #/AREA URNS HPF: NORMAL /HPF (ref 0–5)
GLUCOSE UR STRIP.AUTO-MCNC: NEGATIVE MG/DL
HGB UR QL STRIP: NEGATIVE
HYALINE CASTS URNS QL MICRO: NORMAL /LPF
KETONES UR QL STRIP.AUTO: NEGATIVE MG/DL
LEUKOCYTE ESTERASE UR QL STRIP.AUTO: NEGATIVE
MUCOUS THREADS URNS QL MICRO: 0 /LPF
NITRITE UR QL STRIP.AUTO: NEGATIVE
PH UR STRIP: 6.5 (ref 5–9)
PROT UR STRIP-MCNC: NEGATIVE MG/DL
RBC #/AREA URNS HPF: NORMAL /HPF (ref 0–5)
SP GR UR REFRACTOMETRY: 1.01 (ref 1–1.02)
URINE CULTURE IF INDICATED: NORMAL
UROBILINOGEN UR QL STRIP.AUTO: 1 EU/DL (ref 0.2–1)
WBC URNS QL MICRO: NORMAL /HPF (ref 0–4)

## 2025-01-13 DIAGNOSIS — K76.0 HEPATIC STEATOSIS: ICD-10-CM

## 2025-01-13 DIAGNOSIS — E11.51 CONTROLLED TYPE 2 DIABETES MELLITUS WITH PERIPHERAL CIRCULATORY DISORDER (HCC): ICD-10-CM

## 2025-01-13 DIAGNOSIS — E66.01 SEVERE OBESITY (BMI 35.0-39.9) WITH COMORBIDITY: ICD-10-CM

## 2025-01-13 RX ORDER — SEMAGLUTIDE 2.68 MG/ML
INJECTION, SOLUTION SUBCUTANEOUS
Qty: 9 ML | Refills: 3 | OUTPATIENT
Start: 2025-01-13

## 2025-01-21 DIAGNOSIS — I10 ESSENTIAL HYPERTENSION: ICD-10-CM

## 2025-01-21 DIAGNOSIS — E11.65 TYPE 2 DIABETES MELLITUS WITH HYPERGLYCEMIA, WITHOUT LONG-TERM CURRENT USE OF INSULIN (HCC): ICD-10-CM

## 2025-01-22 DIAGNOSIS — E11.65 TYPE 2 DIABETES MELLITUS WITH HYPERGLYCEMIA, WITHOUT LONG-TERM CURRENT USE OF INSULIN (HCC): ICD-10-CM

## 2025-01-22 RX ORDER — PEN NEEDLE, DIABETIC 30 GX3/16"
NEEDLE, DISPOSABLE MISCELLANEOUS
Qty: 100 EACH | Refills: 1 | Status: SHIPPED | OUTPATIENT
Start: 2025-01-22 | End: 2025-01-23 | Stop reason: SDUPTHER

## 2025-01-22 RX ORDER — HYDROCHLOROTHIAZIDE 25 MG/1
25 TABLET ORAL DAILY
Qty: 90 TABLET | Refills: 0 | Status: SHIPPED | OUTPATIENT
Start: 2025-01-22

## 2025-01-22 RX ORDER — FLURBIPROFEN SODIUM 0.3 MG/ML
SOLUTION/ DROPS OPHTHALMIC
OUTPATIENT
Start: 2025-01-22

## 2025-01-23 ENCOUNTER — TELEPHONE (OUTPATIENT)
Dept: INTERNAL MEDICINE CLINIC | Facility: CLINIC | Age: 70
End: 2025-01-23

## 2025-01-23 DIAGNOSIS — E11.65 TYPE 2 DIABETES MELLITUS WITH HYPERGLYCEMIA, WITHOUT LONG-TERM CURRENT USE OF INSULIN (HCC): Primary | ICD-10-CM

## 2025-01-23 RX ORDER — PEN NEEDLE, DIABETIC 30 GX3/16"
NEEDLE, DISPOSABLE MISCELLANEOUS
Qty: 100 EACH | Refills: 1 | Status: SHIPPED | OUTPATIENT
Start: 2025-01-23

## 2025-02-05 ENCOUNTER — OFFICE VISIT (OUTPATIENT)
Dept: UROLOGY | Age: 70
End: 2025-02-05
Payer: MEDICARE

## 2025-02-05 DIAGNOSIS — N39.0 RECURRENT UTI: ICD-10-CM

## 2025-02-05 DIAGNOSIS — N52.01 ERECTILE DYSFUNCTION DUE TO ARTERIAL INSUFFICIENCY: ICD-10-CM

## 2025-02-05 DIAGNOSIS — N40.1 BENIGN PROSTATIC HYPERPLASIA WITH LOWER URINARY TRACT SYMPTOMS, SYMPTOM DETAILS UNSPECIFIED: Primary | ICD-10-CM

## 2025-02-05 DIAGNOSIS — R33.9 URINE RETENTION: ICD-10-CM

## 2025-02-05 LAB
BILIRUBIN, URINE, POC: NEGATIVE
BLOOD URINE, POC: NEGATIVE
GLUCOSE URINE, POC: NEGATIVE MG/DL
KETONES, URINE, POC: NEGATIVE MG/DL
LEUKOCYTE ESTERASE, URINE, POC: NEGATIVE
NITRITE, URINE, POC: NEGATIVE
PH, URINE, POC: 5.5 (ref 4.6–8)
PROTEIN,URINE, POC: NEGATIVE MG/DL
PVR, POC: 259 CC
SPECIFIC GRAVITY, URINE, POC: 1 (ref 1–1.03)
URINALYSIS CLARITY, POC: NORMAL
URINALYSIS COLOR, POC: NORMAL
UROBILINOGEN, POC: NORMAL MG/DL

## 2025-02-05 PROCEDURE — 1123F ACP DISCUSS/DSCN MKR DOCD: CPT | Performed by: UROLOGY

## 2025-02-05 PROCEDURE — G8427 DOCREV CUR MEDS BY ELIG CLIN: HCPCS | Performed by: UROLOGY

## 2025-02-05 PROCEDURE — 1159F MED LIST DOCD IN RCRD: CPT | Performed by: UROLOGY

## 2025-02-05 PROCEDURE — 81003 URINALYSIS AUTO W/O SCOPE: CPT | Performed by: UROLOGY

## 2025-02-05 PROCEDURE — 99214 OFFICE O/P EST MOD 30 MIN: CPT | Performed by: UROLOGY

## 2025-02-05 PROCEDURE — 3017F COLORECTAL CA SCREEN DOC REV: CPT | Performed by: UROLOGY

## 2025-02-05 PROCEDURE — G8417 CALC BMI ABV UP PARAM F/U: HCPCS | Performed by: UROLOGY

## 2025-02-05 PROCEDURE — 1036F TOBACCO NON-USER: CPT | Performed by: UROLOGY

## 2025-02-05 PROCEDURE — 51798 US URINE CAPACITY MEASURE: CPT | Performed by: UROLOGY

## 2025-02-05 RX ORDER — METHENAMINE HIPPURATE 1000 MG/1
1 TABLET ORAL 2 TIMES DAILY WITH MEALS
Qty: 180 TABLET | Refills: 3 | Status: SHIPPED | OUTPATIENT
Start: 2025-02-05

## 2025-02-06 ASSESSMENT — ENCOUNTER SYMPTOMS
EYE PAIN: 0
ABDOMINAL PAIN: 0
CONSTIPATION: 0
BACK PAIN: 0
INDIGESTION: 0
DIARRHEA: 0
HEARTBURN: 0
WHEEZING: 0
SKIN LESIONS: 0
EYE DISCHARGE: 0
COUGH: 0
BLOOD IN STOOL: 0
SHORTNESS OF BREATH: 0
NAUSEA: 0
VOMITING: 0

## 2025-02-07 NOTE — PROGRESS NOTES
HISTORY Bilateral 10/18/2017    [place] [facility] for Subdural Hematoma    PAIN MANAGEMENT PROCEDURE Right 04/25/2023    RIGHT KNEE COOLIEF performed by Roscoe Hickey MD at [facility]    THORACOSCOPY Right 01/09/2023    RIGHT VATS/WEDGE BLEBECTOMY/RIGHT UPPER LOBE WEDGE BLEBECTOMY/RIGHT LOWER LOBE WEDGE BLEBECTOMY/TALC PLEURODESIS performed by Adolfo Alberts Jr., MD at [facility]    TURP  12/03/2019    UROLOGICAL SURGERY      prostate u/s and BX       Current Outpatient Medications   Medication Sig Dispense Refill    methenamine (HIPREX) 1 g tablet Take 1 tablet by mouth 2 times daily (with meals) 180 tablet 3    Insulin Pen Needle (PEN NEEDLES) 31G X 5 MM MISC Please dispense pen needles for use with patient's insulin pen. Use to inject insulin once daily. Dx: E11.65 100 each 1    hydroCHLOROthiazide (HYDRODIURIL) 25 MG tablet Take 1 tablet by mouth daily 90 tablet 0    enalapril (VASOTEC) 10 MG tablet TAKE 1 TABLET DAILY 90 tablet 3    metFORMIN (GLUCOPHAGE) 500 MG tablet Take 1 tablet by mouth daily (with breakfast) 90 tablet 3    carvedilol (COREG) 25 MG tablet Taking 1/2 tablet in the AM and 1 tablet in the  tablet 3    tamsulosin (FLOMAX) 0.4 MG capsule Take 1 capsule by mouth in the morning and at bedtime 180 capsule 3    Tirzepatide (MOUNJARO) 2.5 MG/0.5ML SOAJ Inject 2.5 mg into the skin every 7 days 2 mL 5    finasteride (PROSCAR) 5 MG tablet Take 1 tablet by mouth daily 30 tablet 5    spironolactone (ALDACTONE) 25 MG tablet Take 1 tablet by mouth daily 90 tablet 3    triamcinolone (KENALOG) 0.025 % cream Apply topically 2 times daily. 80 g 1    aspirin 81 MG EC tablet Take 1 tablet by mouth daily 90 tablet 3    clotrimazole-betamethasone (LOTRISONE) 1-0.05 % cream Apply topically 2 times daily. 60 g 0    simvastatin (ZOCOR) 40 MG tablet Take 1 tablet by mouth daily 90 tablet 3    Insulin Degludec 100 UNIT/ML SOPN Inject 25 Units into the skin nightly 5 Adjustable Dose Pre-filled Pen Syringe 11

## 2025-02-26 ENCOUNTER — OFFICE VISIT (OUTPATIENT)
Dept: INTERNAL MEDICINE CLINIC | Facility: CLINIC | Age: 70
End: 2025-02-26
Payer: MEDICARE

## 2025-02-26 VITALS
HEIGHT: 74 IN | BODY MASS INDEX: 38.89 KG/M2 | RESPIRATION RATE: 15 BRPM | WEIGHT: 303 LBS | OXYGEN SATURATION: 96 % | SYSTOLIC BLOOD PRESSURE: 118 MMHG | TEMPERATURE: 97.2 F | HEART RATE: 80 BPM | DIASTOLIC BLOOD PRESSURE: 72 MMHG

## 2025-02-26 DIAGNOSIS — I83.91 ASYMPTOMATIC VARICOSE VEINS OF RIGHT LOWER EXTREMITY: ICD-10-CM

## 2025-02-26 DIAGNOSIS — R60.0 EDEMA OF RIGHT LOWER EXTREMITY: Primary | ICD-10-CM

## 2025-02-26 PROCEDURE — 1036F TOBACCO NON-USER: CPT | Performed by: PHYSICIAN ASSISTANT

## 2025-02-26 PROCEDURE — 99213 OFFICE O/P EST LOW 20 MIN: CPT | Performed by: PHYSICIAN ASSISTANT

## 2025-02-26 PROCEDURE — 1123F ACP DISCUSS/DSCN MKR DOCD: CPT | Performed by: PHYSICIAN ASSISTANT

## 2025-02-26 PROCEDURE — 3074F SYST BP LT 130 MM HG: CPT | Performed by: PHYSICIAN ASSISTANT

## 2025-02-26 PROCEDURE — G8417 CALC BMI ABV UP PARAM F/U: HCPCS | Performed by: PHYSICIAN ASSISTANT

## 2025-02-26 PROCEDURE — 3017F COLORECTAL CA SCREEN DOC REV: CPT | Performed by: PHYSICIAN ASSISTANT

## 2025-02-26 PROCEDURE — G8427 DOCREV CUR MEDS BY ELIG CLIN: HCPCS | Performed by: PHYSICIAN ASSISTANT

## 2025-02-26 PROCEDURE — 1126F AMNT PAIN NOTED NONE PRSNT: CPT | Performed by: PHYSICIAN ASSISTANT

## 2025-02-26 PROCEDURE — 1159F MED LIST DOCD IN RCRD: CPT | Performed by: PHYSICIAN ASSISTANT

## 2025-02-26 PROCEDURE — 3078F DIAST BP <80 MM HG: CPT | Performed by: PHYSICIAN ASSISTANT

## 2025-02-26 ASSESSMENT — ENCOUNTER SYMPTOMS
RESPIRATORY NEGATIVE: 1
WHEEZING: 0
COUGH: 0
CHEST TIGHTNESS: 0
SHORTNESS OF BREATH: 0

## 2025-02-26 NOTE — PROGRESS NOTES
Chief Complaint   Patient presents with    Swelling     Calf sweeling pt states this started for him in january        HISTORY OF PRESENT ILLNESS:  Conrad Stephenson is a very pleasant 70 y.o. male who presents with a complaint of RLE swelling that has been chronic but has worsened over the last several weeks. He has chronic skin discoloration of the RLE due to swelling. He denies significant leg pain. Reports swelling worsened after returning from beach trip last week. He tries to elevate legs, uses compression stockings, and takes HCTZ. He has no hx of DVT and takes asa daily. Denies CP, SOB, palpitations.       PAST MEDICAL HISTORY:   Current Outpatient Medications   Medication Sig    methenamine (HIPREX) 1 g tablet Take 1 tablet by mouth 2 times daily (with meals)    Insulin Pen Needle (PEN NEEDLES) 31G X 5 MM MISC Please dispense pen needles for use with patient's insulin pen. Use to inject insulin once daily. Dx: E11.65    hydroCHLOROthiazide (HYDRODIURIL) 25 MG tablet Take 1 tablet by mouth daily    enalapril (VASOTEC) 10 MG tablet TAKE 1 TABLET DAILY    metFORMIN (GLUCOPHAGE) 500 MG tablet Take 1 tablet by mouth daily (with breakfast)    carvedilol (COREG) 25 MG tablet Taking 1/2 tablet in the AM and 1 tablet in the PM    tamsulosin (FLOMAX) 0.4 MG capsule Take 1 capsule by mouth in the morning and at bedtime    Tirzepatide (MOUNJARO) 2.5 MG/0.5ML SOAJ Inject 2.5 mg into the skin every 7 days    finasteride (PROSCAR) 5 MG tablet Take 1 tablet by mouth daily    spironolactone (ALDACTONE) 25 MG tablet Take 1 tablet by mouth daily    triamcinolone (KENALOG) 0.025 % cream Apply topically 2 times daily.    aspirin 81 MG EC tablet Take 1 tablet by mouth daily    clotrimazole-betamethasone (LOTRISONE) 1-0.05 % cream Apply topically 2 times daily.    simvastatin (ZOCOR) 40 MG tablet Take 1 tablet by mouth daily    Insulin Degludec 100 UNIT/ML SOPN Inject 25 Units into the skin nightly    sildenafil (REVATIO) 20

## 2025-03-05 DIAGNOSIS — E83.110 HEREDITARY HEMOCHROMATOSIS: ICD-10-CM

## 2025-03-05 DIAGNOSIS — D50.8 OTHER IRON DEFICIENCY ANEMIA: Primary | ICD-10-CM

## 2025-03-10 ENCOUNTER — OFFICE VISIT (OUTPATIENT)
Dept: ONCOLOGY | Age: 70
End: 2025-03-10
Payer: MEDICARE

## 2025-03-10 ENCOUNTER — HOSPITAL ENCOUNTER (OUTPATIENT)
Dept: LAB | Age: 70
Discharge: HOME | End: 2025-03-10
Payer: MEDICARE

## 2025-03-10 VITALS
OXYGEN SATURATION: 98 % | BODY MASS INDEX: 40.42 KG/M2 | HEART RATE: 84 BPM | TEMPERATURE: 97.9 F | RESPIRATION RATE: 16 BRPM | SYSTOLIC BLOOD PRESSURE: 129 MMHG | DIASTOLIC BLOOD PRESSURE: 76 MMHG | HEIGHT: 73 IN | WEIGHT: 305 LBS

## 2025-03-10 DIAGNOSIS — E83.110 HEREDITARY HEMOCHROMATOSIS: ICD-10-CM

## 2025-03-10 DIAGNOSIS — D50.8 OTHER IRON DEFICIENCY ANEMIA: ICD-10-CM

## 2025-03-10 DIAGNOSIS — E78.49 OTHER HYPERLIPIDEMIA: ICD-10-CM

## 2025-03-10 DIAGNOSIS — D50.8 OTHER IRON DEFICIENCY ANEMIA: Primary | ICD-10-CM

## 2025-03-10 LAB
ALBUMIN SERPL-MCNC: 3.6 G/DL (ref 3.2–4.6)
ALBUMIN/GLOB SERPL: 1.1 (ref 1–1.9)
ALP SERPL-CCNC: 90 U/L (ref 40–129)
ALT SERPL-CCNC: 17 U/L (ref 8–55)
ANION GAP SERPL CALC-SCNC: 10 MMOL/L (ref 7–16)
AST SERPL-CCNC: 19 U/L (ref 15–37)
BASOPHILS # BLD: 0.02 K/UL (ref 0–0.2)
BASOPHILS NFR BLD: 0.3 % (ref 0–2)
BILIRUB SERPL-MCNC: 1.1 MG/DL (ref 0–1.2)
BUN SERPL-MCNC: 12 MG/DL (ref 8–23)
CALCIUM SERPL-MCNC: 9.4 MG/DL (ref 8.8–10.2)
CHLORIDE SERPL-SCNC: 101 MMOL/L (ref 98–107)
CO2 SERPL-SCNC: 25 MMOL/L (ref 20–29)
CREAT SERPL-MCNC: 0.91 MG/DL (ref 0.8–1.3)
DIFFERENTIAL METHOD BLD: ABNORMAL
EOSINOPHIL # BLD: 0.09 K/UL (ref 0–0.8)
EOSINOPHIL NFR BLD: 1.4 % (ref 0.5–7.8)
ERYTHROCYTE [DISTWIDTH] IN BLOOD BY AUTOMATED COUNT: 13.5 % (ref 11.9–14.6)
FERRITIN SERPL-MCNC: 27 NG/ML (ref 8–388)
GLOBULIN SER CALC-MCNC: 3.2 G/DL (ref 2.3–3.5)
GLUCOSE SERPL-MCNC: 128 MG/DL (ref 70–99)
HCT VFR BLD AUTO: 40.9 % (ref 41.1–50.3)
HGB BLD-MCNC: 13.7 G/DL (ref 13.6–17.2)
IMM GRANULOCYTES # BLD AUTO: 0.02 K/UL (ref 0–0.5)
IMM GRANULOCYTES NFR BLD AUTO: 0.3 % (ref 0–5)
LYMPHOCYTES # BLD: 0.88 K/UL (ref 0.5–4.6)
LYMPHOCYTES NFR BLD: 13.3 % (ref 13–44)
MCH RBC QN AUTO: 32.9 PG (ref 26.1–32.9)
MCHC RBC AUTO-ENTMCNC: 33.5 G/DL (ref 31.4–35)
MCV RBC AUTO: 98.1 FL (ref 82–102)
MONOCYTES # BLD: 0.38 K/UL (ref 0.1–1.3)
MONOCYTES NFR BLD: 5.7 % (ref 4–12)
NEUTS SEG # BLD: 5.25 K/UL (ref 1.7–8.2)
NEUTS SEG NFR BLD: 79 % (ref 43–78)
NRBC # BLD: 0 K/UL (ref 0–0.2)
PLATELET # BLD AUTO: 148 K/UL (ref 150–450)
PMV BLD AUTO: 10.4 FL (ref 9.4–12.3)
POTASSIUM SERPL-SCNC: 4.7 MMOL/L (ref 3.5–5.1)
PROT SERPL-MCNC: 6.9 G/DL (ref 6.3–8.2)
RBC # BLD AUTO: 4.17 M/UL (ref 4.23–5.6)
SODIUM SERPL-SCNC: 136 MMOL/L (ref 136–145)
WBC # BLD AUTO: 6.6 K/UL (ref 4.3–11.1)

## 2025-03-10 PROCEDURE — 3078F DIAST BP <80 MM HG: CPT | Performed by: INTERNAL MEDICINE

## 2025-03-10 PROCEDURE — 82728 ASSAY OF FERRITIN: CPT

## 2025-03-10 PROCEDURE — 1160F RVW MEDS BY RX/DR IN RCRD: CPT | Performed by: INTERNAL MEDICINE

## 2025-03-10 PROCEDURE — 1159F MED LIST DOCD IN RCRD: CPT | Performed by: INTERNAL MEDICINE

## 2025-03-10 PROCEDURE — 85025 COMPLETE CBC W/AUTO DIFF WBC: CPT

## 2025-03-10 PROCEDURE — 1036F TOBACCO NON-USER: CPT | Performed by: INTERNAL MEDICINE

## 2025-03-10 PROCEDURE — 1126F AMNT PAIN NOTED NONE PRSNT: CPT | Performed by: INTERNAL MEDICINE

## 2025-03-10 PROCEDURE — G8417 CALC BMI ABV UP PARAM F/U: HCPCS | Performed by: INTERNAL MEDICINE

## 2025-03-10 PROCEDURE — 1123F ACP DISCUSS/DSCN MKR DOCD: CPT | Performed by: INTERNAL MEDICINE

## 2025-03-10 PROCEDURE — 80053 COMPREHEN METABOLIC PANEL: CPT

## 2025-03-10 PROCEDURE — 3017F COLORECTAL CA SCREEN DOC REV: CPT | Performed by: INTERNAL MEDICINE

## 2025-03-10 PROCEDURE — 99214 OFFICE O/P EST MOD 30 MIN: CPT | Performed by: INTERNAL MEDICINE

## 2025-03-10 PROCEDURE — 36415 COLL VENOUS BLD VENIPUNCTURE: CPT

## 2025-03-10 PROCEDURE — G8427 DOCREV CUR MEDS BY ELIG CLIN: HCPCS | Performed by: INTERNAL MEDICINE

## 2025-03-10 PROCEDURE — 3074F SYST BP LT 130 MM HG: CPT | Performed by: INTERNAL MEDICINE

## 2025-03-10 ASSESSMENT — PATIENT HEALTH QUESTIONNAIRE - PHQ9
SUM OF ALL RESPONSES TO PHQ QUESTIONS 1-9: 0
2. FEELING DOWN, DEPRESSED OR HOPELESS: NOT AT ALL
1. LITTLE INTEREST OR PLEASURE IN DOING THINGS: NOT AT ALL
SUM OF ALL RESPONSES TO PHQ QUESTIONS 1-9: 0

## 2025-03-10 NOTE — PATIENT INSTRUCTIONS
Patient Information from Today's Visit    The members of your [facility] are listed below:    Physician Provider: Todd Wilburn Medical Oncologist  Advanced Practice Clinician: Kathleen Brower NP  Registered Nurse: Rain MAYEN   Navigator: N/A  Medical Assistant: Felicia RAMÍREZ MA and Anne-Marie VENTURA MA  : Izzy LASSITER   Supportive Care Services: Diogo LIU LMSW    Diagnosis: VERN      Follow Up Instructions:     -Labs reviewed.   -      Treatment Summary has been discussed and given to patient:N/A      Current Labs:   Hospital Outpatient Visit on 03/10/2025   Component Date Value Ref Range Status    WBC 03/10/2025 6.6  4.3 - 11.1 K/uL Final    RBC 03/10/2025 4.17 (L)  4.23 - 5.6 M/uL Final    Hemoglobin 03/10/2025 13.7  13.6 - 17.2 g/dL Final    Hematocrit 03/10/2025 40.9 (L)  41.1 - 50.3 % Final    MCV 03/10/2025 98.1  82.0 - 102.0 FL Final    MCH 03/10/2025 32.9  26.1 - 32.9 PG Final    MCHC 03/10/2025 33.5  31.4 - 35.0 g/dL Final    RDW 03/10/2025 13.5  11.9 - 14.6 % Final    Platelets 03/10/2025 148 (L)  150 - 450 K/uL Final    MPV 03/10/2025 10.4  9.4 - 12.3 FL Final    nRBC 03/10/2025 0.00  0.0 - 0.2 K/uL Final    **Note: Absolute NRBC parameter is now reported with Hemogram**    Neutrophils % 03/10/2025 79.0 (H)  43.0 - 78.0 % Final    Lymphocytes % 03/10/2025 13.3  13.0 - 44.0 % Final    Monocytes % 03/10/2025 5.7  4.0 - 12.0 % Final    Eosinophils % 03/10/2025 1.4  0.5 - 7.8 % Final    Basophils % 03/10/2025 0.3  0.0 - 2.0 % Final    Immature Granulocytes % 03/10/2025 0.3  0.0 - 5.0 % Final    Neutrophils Absolute 03/10/2025 5.25  1.70 - 8.20 K/UL Final    Lymphocytes Absolute 03/10/2025 0.88  0.50 - 4.60 K/UL Final    Monocytes Absolute 03/10/2025 0.38  0.10 - 1.30 K/UL Final    Eosinophils Absolute 03/10/2025 0.09  0.00 - 0.80 K/UL Final    Basophils Absolute 03/10/2025 0.02  0.00 - 0.20 K/UL Final    Immature Granulocytes Absolute 03/10/2025 0.02  0.00 - 0.50 K/UL Final    Differential Type

## 2025-03-10 NOTE — PROGRESS NOTES
WBC count of 6.6, ANC was 5.2, Hemoglobin was 13.7 and Platelets were 148; his Ferritin level was 27. Medical problems and test results were reviewed with the patient today.        ASSESSMENT:    Iron Deficiency Anemia; Hyperlipidemia. I spent a total of 32 minutes on the day of the visit, managing the care of this patient.        PLAN:    He should continue taking Simvastatin; at his next clinic visit I will re-check his CBC, CMP and Ferritin level.        Todd Wilburn MD  Hematology/BMT

## 2025-03-25 DIAGNOSIS — N40.1 BENIGN PROSTATIC HYPERPLASIA WITH NOCTURIA: Chronic | ICD-10-CM

## 2025-03-25 DIAGNOSIS — R35.1 BENIGN PROSTATIC HYPERPLASIA WITH NOCTURIA: Chronic | ICD-10-CM

## 2025-03-25 LAB
APPEARANCE UR: CLEAR
BACTERIA URNS QL MICRO: NEGATIVE /HPF
BILIRUB UR QL: NEGATIVE
CASTS URNS QL MICRO: 0 /LPF
COLOR UR: NORMAL
CRYSTALS URNS QL MICRO: 0 /LPF
EPI CELLS #/AREA URNS HPF: NORMAL /HPF (ref 0–5)
GLUCOSE UR STRIP.AUTO-MCNC: NEGATIVE MG/DL
HGB UR QL STRIP: NEGATIVE
HYALINE CASTS URNS QL MICRO: NORMAL /LPF
KETONES UR QL STRIP.AUTO: NEGATIVE MG/DL
LEUKOCYTE ESTERASE UR QL STRIP.AUTO: NEGATIVE
MUCOUS THREADS URNS QL MICRO: 0 /LPF
NITRITE UR QL STRIP.AUTO: NEGATIVE
PH UR STRIP: 5.5 (ref 5–9)
PROT UR STRIP-MCNC: NEGATIVE MG/DL
RBC #/AREA URNS HPF: NORMAL /HPF (ref 0–5)
SP GR UR REFRACTOMETRY: 1.01 (ref 1–1.02)
URINE CULTURE IF INDICATED: NORMAL
UROBILINOGEN UR QL STRIP.AUTO: 0.2 EU/DL (ref 0.2–1)
WBC URNS QL MICRO: NORMAL /HPF (ref 0–4)

## 2025-04-09 DIAGNOSIS — I10 ESSENTIAL HYPERTENSION: ICD-10-CM

## 2025-04-09 RX ORDER — HYDROCHLOROTHIAZIDE 25 MG/1
25 TABLET ORAL DAILY
Qty: 90 TABLET | Refills: 0 | Status: SHIPPED | OUTPATIENT
Start: 2025-04-09

## 2025-04-23 ENCOUNTER — APPOINTMENT (OUTPATIENT)
Dept: URBAN - METROPOLITAN AREA CLINIC 24 | Facility: CLINIC | Age: 70
Setting detail: DERMATOLOGY
End: 2025-04-23

## 2025-04-23 DIAGNOSIS — L82.1 OTHER SEBORRHEIC KERATOSIS: ICD-10-CM

## 2025-04-23 DIAGNOSIS — L57.8 OTHER SKIN CHANGES DUE TO CHRONIC EXPOSURE TO NONIONIZING RADIATION: ICD-10-CM

## 2025-04-23 DIAGNOSIS — R35.1 BENIGN PROSTATIC HYPERPLASIA WITH NOCTURIA: Chronic | ICD-10-CM

## 2025-04-23 DIAGNOSIS — D22 MELANOCYTIC NEVI: ICD-10-CM

## 2025-04-23 DIAGNOSIS — L81.4 OTHER MELANIN HYPERPIGMENTATION: ICD-10-CM

## 2025-04-23 DIAGNOSIS — Z71.89 OTHER SPECIFIED COUNSELING: ICD-10-CM

## 2025-04-23 DIAGNOSIS — D18.0 HEMANGIOMA: ICD-10-CM

## 2025-04-23 DIAGNOSIS — D69.2 OTHER NONTHROMBOCYTOPENIC PURPURA: ICD-10-CM

## 2025-04-23 DIAGNOSIS — N40.1 BENIGN PROSTATIC HYPERPLASIA WITH NOCTURIA: Chronic | ICD-10-CM

## 2025-04-23 PROBLEM — D18.01 HEMANGIOMA OF SKIN AND SUBCUTANEOUS TISSUE: Status: ACTIVE | Noted: 2025-04-23

## 2025-04-23 PROBLEM — D22.5 MELANOCYTIC NEVI OF TRUNK: Status: ACTIVE | Noted: 2025-04-23

## 2025-04-23 LAB
APPEARANCE UR: CLEAR
BACTERIA URNS QL MICRO: 0 /HPF
BILIRUB UR QL: NEGATIVE
CASTS URNS QL MICRO: 0 /LPF
COLOR UR: NORMAL
CRYSTALS URNS QL MICRO: NORMAL /LPF
EPI CELLS #/AREA URNS HPF: NORMAL /HPF
GLUCOSE UR STRIP.AUTO-MCNC: NEGATIVE MG/DL
HGB UR QL STRIP: NEGATIVE
KETONES UR QL STRIP.AUTO: NEGATIVE MG/DL
LEUKOCYTE ESTERASE UR QL STRIP.AUTO: NEGATIVE
MUCOUS THREADS URNS QL MICRO: 0 /LPF
NITRITE UR QL STRIP.AUTO: NEGATIVE
OTHER OBSERVATIONS: NORMAL
PH UR STRIP: 6 (ref 5–9)
PROT UR STRIP-MCNC: NEGATIVE MG/DL
RBC #/AREA URNS HPF: NORMAL /HPF
SP GR UR REFRACTOMETRY: 1.01 (ref 1–1.02)
URINE CULTURE IF INDICATED: NORMAL
UROBILINOGEN UR QL STRIP.AUTO: 0.2 EU/DL (ref 0.2–1)
WBC URNS QL MICRO: NORMAL /HPF

## 2025-04-23 PROCEDURE — 99213 OFFICE O/P EST LOW 20 MIN: CPT

## 2025-04-23 PROCEDURE — ? COUNSELING

## 2025-04-23 ASSESSMENT — LOCATION DETAILED DESCRIPTION DERM
LOCATION DETAILED: RIGHT SUPERIOR MEDIAL UPPER BACK
LOCATION DETAILED: LEFT LATERAL SUPERIOR CHEST
LOCATION DETAILED: LEFT MID-UPPER BACK
LOCATION DETAILED: RIGHT DISTAL DORSAL FOREARM
LOCATION DETAILED: LEFT DISTAL DORSAL FOREARM
LOCATION DETAILED: STERNUM
LOCATION DETAILED: RIGHT SUPERIOR LATERAL UPPER BACK
LOCATION DETAILED: RIGHT POSTERIOR SHOULDER
LOCATION DETAILED: LEFT PROXIMAL DORSAL FOREARM
LOCATION DETAILED: RIGHT PROXIMAL DORSAL FOREARM
LOCATION DETAILED: PERIUMBILICAL SKIN
LOCATION DETAILED: LEFT DISTAL POSTERIOR THIGH
LOCATION DETAILED: MID POSTERIOR NECK

## 2025-04-23 ASSESSMENT — LOCATION SIMPLE DESCRIPTION DERM
LOCATION SIMPLE: ABDOMEN
LOCATION SIMPLE: POSTERIOR NECK
LOCATION SIMPLE: RIGHT UPPER BACK
LOCATION SIMPLE: CHEST
LOCATION SIMPLE: LEFT POSTERIOR THIGH
LOCATION SIMPLE: LEFT UPPER BACK
LOCATION SIMPLE: RIGHT FOREARM
LOCATION SIMPLE: RIGHT SHOULDER
LOCATION SIMPLE: LEFT FOREARM

## 2025-04-23 ASSESSMENT — LOCATION ZONE DERM
LOCATION ZONE: TRUNK
LOCATION ZONE: LEG
LOCATION ZONE: NECK
LOCATION ZONE: ARM

## 2025-04-23 NOTE — HPI: EVALUATION OF SKIN LESION(S)
What Type Of Note Output Would You Prefer (Optional)?: Standard Output
Hpi Title: Evaluation of Skin Lesions
How Severe Are Your Spot(S)?: mild
Name band;

## 2025-05-12 ENCOUNTER — PATIENT MESSAGE (OUTPATIENT)
Dept: INTERNAL MEDICINE CLINIC | Facility: CLINIC | Age: 70
End: 2025-05-12

## 2025-05-12 DIAGNOSIS — R35.1 BENIGN PROSTATIC HYPERPLASIA WITH NOCTURIA: ICD-10-CM

## 2025-05-12 DIAGNOSIS — N40.1 BENIGN PROSTATIC HYPERPLASIA WITH NOCTURIA: ICD-10-CM

## 2025-05-13 RX ORDER — FINASTERIDE 5 MG/1
5 TABLET, FILM COATED ORAL DAILY
Qty: 30 TABLET | Refills: 5 | Status: SHIPPED | OUTPATIENT
Start: 2025-05-13

## 2025-05-13 NOTE — TELEPHONE ENCOUNTER
Refills have been requested for the following medications:         finasteride (PROSCAR) 5 MG tablet

## 2025-06-02 ENCOUNTER — LAB (OUTPATIENT)
Dept: INTERNAL MEDICINE CLINIC | Facility: CLINIC | Age: 70
End: 2025-06-02

## 2025-06-02 DIAGNOSIS — E66.01 SEVERE OBESITY (BMI 35.0-39.9) WITH COMORBIDITY (HCC): ICD-10-CM

## 2025-06-02 DIAGNOSIS — E66.813 CLASS 3 SEVERE OBESITY DUE TO EXCESS CALORIES WITH SERIOUS COMORBIDITY AND BODY MASS INDEX (BMI) OF 40.0 TO 44.9 IN ADULT (HCC): Chronic | ICD-10-CM

## 2025-06-02 DIAGNOSIS — E11.51 CONTROLLED TYPE 2 DIABETES MELLITUS WITH PERIPHERAL CIRCULATORY DISORDER (HCC): ICD-10-CM

## 2025-06-02 DIAGNOSIS — N40.1 BENIGN PROSTATIC HYPERPLASIA WITH NOCTURIA: Chronic | ICD-10-CM

## 2025-06-02 DIAGNOSIS — I10 ESSENTIAL HYPERTENSION: ICD-10-CM

## 2025-06-02 DIAGNOSIS — R35.1 BENIGN PROSTATIC HYPERPLASIA WITH NOCTURIA: Chronic | ICD-10-CM

## 2025-06-02 DIAGNOSIS — I50.22 CHRONIC SYSTOLIC (CONGESTIVE) HEART FAILURE (HCC): ICD-10-CM

## 2025-06-02 DIAGNOSIS — K76.0 HEPATIC STEATOSIS: ICD-10-CM

## 2025-06-02 LAB
ALBUMIN SERPL-MCNC: 3.6 G/DL (ref 3.2–4.6)
ALBUMIN/GLOB SERPL: 1.2 (ref 1–1.9)
ALP SERPL-CCNC: 103 U/L (ref 40–129)
ALT SERPL-CCNC: 17 U/L (ref 8–55)
ANION GAP SERPL CALC-SCNC: 11 MMOL/L (ref 7–16)
APPEARANCE UR: CLEAR
AST SERPL-CCNC: 18 U/L (ref 15–37)
BACTERIA URNS QL MICRO: NEGATIVE /HPF
BILIRUB SERPL-MCNC: 1.1 MG/DL (ref 0–1.2)
BILIRUB UR QL: NEGATIVE
BUN SERPL-MCNC: 11 MG/DL (ref 8–23)
CALCIUM SERPL-MCNC: 9 MG/DL (ref 8.8–10.2)
CASTS URNS QL MICRO: 0 /LPF
CHLORIDE SERPL-SCNC: 102 MMOL/L (ref 98–107)
CHOLEST SERPL-MCNC: 109 MG/DL (ref 0–200)
CO2 SERPL-SCNC: 22 MMOL/L (ref 20–29)
COLOR UR: ABNORMAL
CREAT SERPL-MCNC: 0.8 MG/DL (ref 0.8–1.3)
CRYSTALS URNS QL MICRO: 0 /LPF
EPI CELLS #/AREA URNS HPF: ABNORMAL /HPF (ref 0–5)
ERYTHROCYTE [DISTWIDTH] IN BLOOD BY AUTOMATED COUNT: 13.4 % (ref 11.9–14.6)
EST. AVERAGE GLUCOSE BLD GHB EST-MCNC: 119 MG/DL
GLOBULIN SER CALC-MCNC: 3 G/DL (ref 2.3–3.5)
GLUCOSE SERPL-MCNC: 129 MG/DL (ref 70–99)
GLUCOSE UR STRIP.AUTO-MCNC: NEGATIVE MG/DL
HBA1C MFR BLD: 5.8 % (ref 0–5.6)
HCT VFR BLD AUTO: 39.9 % (ref 41.1–50.3)
HDLC SERPL-MCNC: 38 MG/DL (ref 40–60)
HDLC SERPL: 2.9 (ref 0–5)
HGB BLD-MCNC: 13.8 G/DL (ref 13.6–17.2)
HGB UR QL STRIP: NEGATIVE
HYALINE CASTS URNS QL MICRO: ABNORMAL /LPF
KETONES UR QL STRIP.AUTO: NEGATIVE MG/DL
LDLC SERPL CALC-MCNC: 55 MG/DL (ref 0–100)
LEUKOCYTE ESTERASE UR QL STRIP.AUTO: ABNORMAL
MCH RBC QN AUTO: 32.9 PG (ref 26.1–32.9)
MCHC RBC AUTO-ENTMCNC: 34.6 G/DL (ref 31.4–35)
MCV RBC AUTO: 95.2 FL (ref 82–102)
MUCOUS THREADS URNS QL MICRO: 0 /LPF
NITRITE UR QL STRIP.AUTO: NEGATIVE
NRBC # BLD: 0 K/UL (ref 0–0.2)
PH UR STRIP: 6.5 (ref 5–9)
PLATELET # BLD AUTO: 158 K/UL (ref 150–450)
PMV BLD AUTO: 11.3 FL (ref 9.4–12.3)
POTASSIUM SERPL-SCNC: 4.4 MMOL/L (ref 3.5–5.1)
PROT SERPL-MCNC: 6.5 G/DL (ref 6.3–8.2)
PROT UR STRIP-MCNC: NEGATIVE MG/DL
RBC # BLD AUTO: 4.19 M/UL (ref 4.23–5.6)
RBC #/AREA URNS HPF: ABNORMAL /HPF (ref 0–5)
SODIUM SERPL-SCNC: 135 MMOL/L (ref 136–145)
SP GR UR REFRACTOMETRY: 1.02 (ref 1–1.02)
TRIGL SERPL-MCNC: 83 MG/DL (ref 0–150)
URINE CULTURE IF INDICATED: ABNORMAL
UROBILINOGEN UR QL STRIP.AUTO: 0.2 EU/DL (ref 0.2–1)
VLDLC SERPL CALC-MCNC: 17 MG/DL (ref 6–23)
WBC # BLD AUTO: 7.5 K/UL (ref 4.3–11.1)
WBC URNS QL MICRO: ABNORMAL /HPF (ref 0–4)

## 2025-06-09 ENCOUNTER — OFFICE VISIT (OUTPATIENT)
Dept: INTERNAL MEDICINE CLINIC | Facility: CLINIC | Age: 70
End: 2025-06-09
Payer: MEDICARE

## 2025-06-09 VITALS
BODY MASS INDEX: 38.24 KG/M2 | WEIGHT: 298 LBS | HEART RATE: 74 BPM | HEIGHT: 74 IN | OXYGEN SATURATION: 97 % | SYSTOLIC BLOOD PRESSURE: 124 MMHG | TEMPERATURE: 97.8 F | DIASTOLIC BLOOD PRESSURE: 64 MMHG

## 2025-06-09 DIAGNOSIS — E11.51 CONTROLLED TYPE 2 DIABETES MELLITUS WITH PERIPHERAL CIRCULATORY DISORDER (HCC): ICD-10-CM

## 2025-06-09 DIAGNOSIS — E78.2 MIXED DYSLIPIDEMIA: ICD-10-CM

## 2025-06-09 DIAGNOSIS — I48.21 PERMANENT ATRIAL FIBRILLATION (HCC): ICD-10-CM

## 2025-06-09 DIAGNOSIS — I10 ESSENTIAL HYPERTENSION: ICD-10-CM

## 2025-06-09 DIAGNOSIS — R35.1 BENIGN PROSTATIC HYPERPLASIA WITH NOCTURIA: Chronic | ICD-10-CM

## 2025-06-09 DIAGNOSIS — N40.1 BENIGN PROSTATIC HYPERPLASIA WITH NOCTURIA: Chronic | ICD-10-CM

## 2025-06-09 DIAGNOSIS — I50.22 CHRONIC SYSTOLIC (CONGESTIVE) HEART FAILURE (HCC): ICD-10-CM

## 2025-06-09 DIAGNOSIS — E11.65 TYPE 2 DIABETES MELLITUS WITH HYPERGLYCEMIA, WITHOUT LONG-TERM CURRENT USE OF INSULIN (HCC): Primary | Chronic | ICD-10-CM

## 2025-06-09 DIAGNOSIS — N30.00 ACUTE CYSTITIS WITHOUT HEMATURIA: ICD-10-CM

## 2025-06-09 PROCEDURE — 3044F HG A1C LEVEL LT 7.0%: CPT | Performed by: INTERNAL MEDICINE

## 2025-06-09 PROCEDURE — 3078F DIAST BP <80 MM HG: CPT | Performed by: INTERNAL MEDICINE

## 2025-06-09 PROCEDURE — 99215 OFFICE O/P EST HI 40 MIN: CPT | Performed by: INTERNAL MEDICINE

## 2025-06-09 PROCEDURE — 2022F DILAT RTA XM EVC RTNOPTHY: CPT | Performed by: INTERNAL MEDICINE

## 2025-06-09 PROCEDURE — 3074F SYST BP LT 130 MM HG: CPT | Performed by: INTERNAL MEDICINE

## 2025-06-09 PROCEDURE — G8417 CALC BMI ABV UP PARAM F/U: HCPCS | Performed by: INTERNAL MEDICINE

## 2025-06-09 PROCEDURE — 1159F MED LIST DOCD IN RCRD: CPT | Performed by: INTERNAL MEDICINE

## 2025-06-09 PROCEDURE — 1036F TOBACCO NON-USER: CPT | Performed by: INTERNAL MEDICINE

## 2025-06-09 PROCEDURE — 1123F ACP DISCUSS/DSCN MKR DOCD: CPT | Performed by: INTERNAL MEDICINE

## 2025-06-09 PROCEDURE — 3017F COLORECTAL CA SCREEN DOC REV: CPT | Performed by: INTERNAL MEDICINE

## 2025-06-09 PROCEDURE — G8427 DOCREV CUR MEDS BY ELIG CLIN: HCPCS | Performed by: INTERNAL MEDICINE

## 2025-06-09 PROCEDURE — G2211 COMPLEX E/M VISIT ADD ON: HCPCS | Performed by: INTERNAL MEDICINE

## 2025-06-09 RX ORDER — PEN NEEDLE, DIABETIC 30 GX3/16"
NEEDLE, DISPOSABLE MISCELLANEOUS
Qty: 100 EACH | Refills: 1 | Status: SHIPPED | OUTPATIENT
Start: 2025-06-09

## 2025-06-09 RX ORDER — CIPROFLOXACIN 500 MG/1
500 TABLET, FILM COATED ORAL 2 TIMES DAILY
Qty: 14 TABLET | Refills: 0 | Status: SHIPPED | OUTPATIENT
Start: 2025-06-09 | End: 2025-06-16

## 2025-06-09 RX ORDER — LUBIPROSTONE 24 UG/1
24 CAPSULE ORAL 2 TIMES DAILY
COMMUNITY
Start: 2025-05-13

## 2025-06-09 SDOH — ECONOMIC STABILITY: FOOD INSECURITY: WITHIN THE PAST 12 MONTHS, THE FOOD YOU BOUGHT JUST DIDN'T LAST AND YOU DIDN'T HAVE MONEY TO GET MORE.: NEVER TRUE

## 2025-06-09 SDOH — ECONOMIC STABILITY: FOOD INSECURITY: WITHIN THE PAST 12 MONTHS, YOU WORRIED THAT YOUR FOOD WOULD RUN OUT BEFORE YOU GOT MONEY TO BUY MORE.: NEVER TRUE

## 2025-06-09 ASSESSMENT — PATIENT HEALTH QUESTIONNAIRE - PHQ9
1. LITTLE INTEREST OR PLEASURE IN DOING THINGS: NOT AT ALL
2. FEELING DOWN, DEPRESSED OR HOPELESS: NOT AT ALL
SUM OF ALL RESPONSES TO PHQ QUESTIONS 1-9: 0

## 2025-06-09 NOTE — ASSESSMENT & PLAN NOTE
No bleeding.  S/p watchman.    '  Cardiac Medications       ACE Inhibitors       enalapril (VASOTEC) 10 MG tablet TAKE 1 TABLET DAILY       Potassium Sparing Diuretics       spironolactone (ALDACTONE) 25 MG tablet Take 1 tablet by mouth daily       Thiazides and Thiazide-Like Diuretics       hydroCHLOROthiazide (HYDRODIURIL) 25 MG tablet Take 1 tablet by mouth daily       HMG CoA Reductase Inhibitors       simvastatin (ZOCOR) 40 MG tablet Take 1 tablet by mouth daily       Alpha-Beta Blockers       carvedilol (COREG) 25 MG tablet Taking 1/2 tablet in the AM and 1 tablet in the PM       Pulmonary Hypertension - Phosphodiesterase Inhibitors       sildenafil (REVATIO) 20 MG tablet Take 1-5 tablets by mouth as needed (erectile dysfunction)       Salicylates       aspirin 81 MG EC tablet Take 1 tablet by mouth daily          Lab Results   Component Value Date    WBC 7.5 06/02/2025    HGB 13.8 06/02/2025    HCT 39.9 (L) 06/02/2025    MCV 95.2 06/02/2025     06/02/2025       
   Treatment regimen is effective.     Euvolemic on exam.  Echo stable.   Cardiology follow up notes reviewed.    Echo reviewed.  Euvolemic    Cardiac Medications       ACE Inhibitors       enalapril (VASOTEC) 10 MG tablet TAKE 1 TABLET DAILY       Potassium Sparing Diuretics       spironolactone (ALDACTONE) 25 MG tablet Take 1 tablet by mouth daily       Thiazides and Thiazide-Like Diuretics       hydroCHLOROthiazide (HYDRODIURIL) 25 MG tablet Take 1 tablet by mouth daily       HMG CoA Reductase Inhibitors       simvastatin (ZOCOR) 40 MG tablet Take 1 tablet by mouth daily       Alpha-Beta Blockers       carvedilol (COREG) 25 MG tablet Taking 1/2 tablet in the AM and 1 tablet in the PM       Pulmonary Hypertension - Phosphodiesterase Inhibitors       sildenafil (REVATIO) 20 MG tablet Take 1-5 tablets by mouth as needed (erectile dysfunction)       Salicylates       aspirin 81 MG EC tablet Take 1 tablet by mouth daily            
 He experienced a UTI recently and was treated with antibiotics by a nurse practitioner. He reports bladder retention and frequent leakage. A message will be sent to his urologist regarding the potential use of finasteride. He is advised to try self-catheterization as recommended by his urologist to prevent recurrent UTIs. A standing order for urinalysis will be placed, allowing him to bring a sample for testing if he suspects an infection. He is also taking over-the-counter cranberry supplements as recommended.    A standing order for urinalysis will be placed, allowing him to bring a sample for testing if he suspects an infection.          Encouraged self cath as directed by urology.   - History of UTIs with a few leukocytes found in urine.  - Cipro antibiotics for an upcoming trip to [place] to have on hand.    
Systematic Review and Pierce-Analysis. J Clin Med. 2023 Jan 18;12(3):772. doi: 10.3390/rmn47532509. PMID: 76245144; PMCID: CZA2272820.

## 2025-06-09 NOTE — PROGRESS NOTES
UA RESULT      WBC, UA 0-4 0 - 4 /hpf    RBC, UA 0-5 0 - 5 /hpf    BACTERIA, URINE Negative Negative /hpf    Epithelial Cells, UA 0-5 0 - 5 /hpf    Hyaline Casts, UA 0-2 /lpf    Casts 0 0 /lpf    Crystals 0 0 /LPF    Mucus, UA 0 0 /lpf     *Note: Due to a large number of results and/or encounters for the requested time period, some results have not been displayed. A complete set of results can be found in Results Review.       Vitals:    06/09/25 1056   BP: 124/64   BP Site: Left Upper Arm   Patient Position: Sitting   BP Cuff Size: Large Adult   Pulse: 74   Temp: 97.8 °F (36.6 °C)   TempSrc: Temporal   SpO2: 97%   Weight: 135.2 kg (298 lb)   Height: 1.88 m (6' 2\")     Wt Readings from Last 3 Encounters:   06/09/25 135.2 kg (298 lb)   03/10/25 (!) 138.3 kg (305 lb)   02/26/25 (!) 137.4 kg (303 lb)     BP Readings from Last 3 Encounters:   06/09/25 124/64   03/10/25 129/76   02/26/25 118/72     Physical Exam

## 2025-06-25 DIAGNOSIS — E78.2 MIXED DYSLIPIDEMIA: ICD-10-CM

## 2025-06-25 RX ORDER — SIMVASTATIN 40 MG
40 TABLET ORAL DAILY
Qty: 90 TABLET | Refills: 3 | Status: SHIPPED | OUTPATIENT
Start: 2025-06-25

## 2025-07-03 ENCOUNTER — PATIENT MESSAGE (OUTPATIENT)
Dept: INTERNAL MEDICINE CLINIC | Facility: CLINIC | Age: 70
End: 2025-07-03

## 2025-07-09 DIAGNOSIS — N40.1 BENIGN PROSTATIC HYPERPLASIA WITH NOCTURIA: Chronic | ICD-10-CM

## 2025-07-09 DIAGNOSIS — R35.1 BENIGN PROSTATIC HYPERPLASIA WITH NOCTURIA: Chronic | ICD-10-CM

## 2025-07-23 NOTE — TELEPHONE ENCOUNTER
Please contact pt to schedule a visit with Dr. Bartholomew to further discuss CT findings. Thank you.

## 2025-07-30 ENCOUNTER — HOSPITAL ENCOUNTER (OUTPATIENT)
Dept: GENERAL RADIOLOGY | Age: 70
Discharge: HOME | End: 2025-08-02
Payer: MEDICARE

## 2025-07-30 ENCOUNTER — OFFICE VISIT (OUTPATIENT)
Dept: INTERNAL MEDICINE CLINIC | Facility: CLINIC | Age: 70
End: 2025-07-30

## 2025-07-30 VITALS
BODY MASS INDEX: 38.24 KG/M2 | DIASTOLIC BLOOD PRESSURE: 64 MMHG | SYSTOLIC BLOOD PRESSURE: 122 MMHG | OXYGEN SATURATION: 97 % | HEART RATE: 78 BPM | TEMPERATURE: 97.2 F | WEIGHT: 298 LBS | HEIGHT: 74 IN

## 2025-07-30 DIAGNOSIS — I10 ESSENTIAL HYPERTENSION: ICD-10-CM

## 2025-07-30 DIAGNOSIS — I48.11 LONGSTANDING PERSISTENT ATRIAL FIBRILLATION (HCC): Chronic | ICD-10-CM

## 2025-07-30 DIAGNOSIS — R05.8 COUGH PRODUCTIVE OF CLEAR SPUTUM: ICD-10-CM

## 2025-07-30 DIAGNOSIS — I50.22 CHRONIC SYSTOLIC (CONGESTIVE) HEART FAILURE (HCC): ICD-10-CM

## 2025-07-30 DIAGNOSIS — E11.51 CONTROLLED TYPE 2 DIABETES MELLITUS WITH PERIPHERAL CIRCULATORY DISORDER (HCC): ICD-10-CM

## 2025-07-30 DIAGNOSIS — N28.1 CYST OF RIGHT KIDNEY: Primary | ICD-10-CM

## 2025-07-30 PROCEDURE — 71046 X-RAY EXAM CHEST 2 VIEWS: CPT

## 2025-07-30 RX ORDER — ENALAPRIL MALEATE 10 MG/1
TABLET ORAL
Qty: 90 TABLET | Refills: 3 | Status: SHIPPED | OUTPATIENT
Start: 2025-07-30

## 2025-07-30 RX ORDER — CARVEDILOL 25 MG/1
TABLET ORAL
Qty: 180 TABLET | Refills: 3 | Status: SHIPPED | OUTPATIENT
Start: 2025-07-30

## 2025-07-30 RX ORDER — HYDROCHLOROTHIAZIDE 25 MG/1
25 TABLET ORAL DAILY
Qty: 90 TABLET | Refills: 1 | Status: SHIPPED | OUTPATIENT
Start: 2025-07-30

## 2025-07-30 RX ORDER — AMOXICILLIN AND CLAVULANATE POTASSIUM 500; 125 MG/1; MG/1
1 TABLET, FILM COATED ORAL 2 TIMES DAILY WITH MEALS
Qty: 10 TABLET | Refills: 0 | Status: SHIPPED | OUTPATIENT
Start: 2025-07-30 | End: 2025-08-04

## 2025-07-30 RX ORDER — SPIRONOLACTONE 25 MG/1
25 TABLET ORAL DAILY
Qty: 90 TABLET | Refills: 3 | Status: SHIPPED | OUTPATIENT
Start: 2025-07-30

## 2025-07-30 RX ORDER — LACTULOSE 10 G/15ML
10 SOLUTION ORAL 2 TIMES DAILY
COMMUNITY
Start: 2025-06-19

## 2025-07-30 NOTE — PROGRESS NOTES
erythromycin, which causes peeling of his hands and feet. He tolerates Augmentin and penicillin well.    He has noticed swelling in his right calf over the past 3 to 4 months. He wears compression stockings, which provide some relief, and the swelling reduces overnight. He had a previous episode of cellulitis in the same leg. An ultrasound performed 2025 at that time showed no abnormalities.  The swelling in his leg has been ongoing prior to his trip to [place].  On exam today I cannot appreciate any significant change from prior exams.    His blood sugar levels have been slightly elevated over the past week, ranging from 110 to 123 in the mornings when fasting. He continues to take 25 units of insulin degludec and is making efforts to control his diabetes.    CT scan of the abdomen and pelvis was completed at [facility] 2025 with gastroenterology and.  He has diverticulosis and a large known duodenal diverticulum without inflammation. He has a Bosniak cyst on his right kidney, which is likely benign. He has not had any urinary tract infections since our last visit.   He takes MiraLAX in the morning to ensure regular bowel movements. He has been taking carvedilol 25 mg in the morning for his blood pressure, which has been stable. Occasionally, his blood pressure may be slightly elevated at night, but he takes his medication before bedtime.    Chronic OA of the knees.  Planning to proceed with injection therapy with orthopedics soon.      Diet: He eats breakfast, sometimes a sandwich for lunch, and a piece of meat and vegetables for dinner.  Tobacco: He quit smoking in     PAST SURGICAL HISTORY:  - Two instances of right pneumothorax, both requiring surgical intervention.    Tobacco Use      Smoking status: Former        Packs/day: 0.00        Years: 1.5 packs/day for 15.0 years (22.5 ttl pk-yrs)        Types: Cigarettes        Quit date: 1989        Years since quittin.0        Passive exposure: Past

## 2025-07-30 NOTE — ASSESSMENT & PLAN NOTE
Lobulated Bosniak 2 cyst (6 cm) on right kidney:  very likely benign and does not appear to have changed when I reviewed CT imaging from 2024 with most recent imaging.  There was no mention of this cyst on CT imaging 9/14/2024, but when I personally reviewed the images, I do see evidence of a kidney cyst of similar size described in the CT report from 7/2/2025.  Discuss with radiology and ask for addendum.  I will plan to monitor with ultrasound in six months.

## 2025-07-30 NOTE — ASSESSMENT & PLAN NOTE
S/p flip.      Cardiac Medications       ACE Inhibitors       enalapril (VASOTEC) 10 MG tablet TAKE 1 TABLET DAILY       Potassium Sparing Diuretics       spironolactone (ALDACTONE) 25 MG tablet Take 1 tablet by mouth daily       Thiazides and Thiazide-Like Diuretics       hydroCHLOROthiazide (HYDRODIURIL) 25 MG tablet Take 1 tablet by mouth daily       HMG CoA Reductase Inhibitors       simvastatin (ZOCOR) 40 MG tablet Take 1 tablet by mouth daily       Alpha-Beta Blockers       carvedilol (COREG) 25 MG tablet Taking 1/2 tablet in the AM and 1 tablet in the PM       Pulmonary Hypertension - Phosphodiesterase Inhibitors       sildenafil (REVATIO) 20 MG tablet Take 1-5 tablets by mouth as needed (erectile dysfunction)       Salicylates       aspirin 81 MG EC tablet Take 1 tablet by mouth daily          Lab Results   Component Value Date    WBC 7.5 06/02/2025    HGB 13.8 06/02/2025    HCT 39.9 (L) 06/02/2025    MCV 95.2 06/02/2025     06/02/2025

## 2025-07-30 NOTE — ASSESSMENT & PLAN NOTE
Well-controlled, continue current medications     -discontinued GLP-1 in recent months due to constipation.     -- Blood sugar levels have been slightly elevated, ranging between 110 and 123 in the mornings when fasting.  - The patient is currently on 25 units of insulin degludec.  - The patient is motivated to control diabetes and has been monitoring blood sugar levels regularly.  - No changes to the current diabetes management plan were made during this visit.         Diabetic Medications       Insulin       Insulin Degludec 100 UNIT/ML SOPN Inject 25 Units into the skin nightly       Biguanides       metFORMIN (GLUCOPHAGE) 500 MG tablet Take 1 tablet by mouth daily (with breakfast)       Incretin Mimetic Agents       Tirzepatide (MOUNJARO) 2.5 MG/0.5ML SOAJ Inject 2.5 mg into the skin every 7 days     Patient not taking: Reported on 7/30/2025          Lab Results   Component Value Date    LABA1C 5.8 (H) 06/02/2025    LABA1C 5.4 11/11/2024    LABA1C 5.6 05/03/2024     Lab Results   Component Value Date    CREATININE 0.80 06/02/2025     Diabetic Medications       Insulin       Insulin Degludec 100 UNIT/ML SOPN Inject 25 Units into the skin nightly       Biguanides       metFORMIN (GLUCOPHAGE) 500 MG tablet Take 1 tablet by mouth daily (with breakfast)       Incretin Mimetic Agents       Tirzepatide (MOUNJARO) 2.5 MG/0.5ML SOAJ Inject 2.5 mg into the skin every 7 days     Patient not taking: Reported on 7/30/2025

## 2025-07-30 NOTE — ASSESSMENT & PLAN NOTE
Chronic, at goal (stable), continue current treatment plan  Hypertension Medications       ACE Inhibitors       enalapril (VASOTEC) 10 MG tablet TAKE 1 TABLET DAILY       Potassium Sparing Diuretics       spironolactone (ALDACTONE) 25 MG tablet Take 1 tablet by mouth daily       Thiazides and Thiazide-Like Diuretics       hydroCHLOROthiazide (HYDRODIURIL) 25 MG tablet Take 1 tablet by mouth daily       Alpha-Beta Blockers       carvedilol (COREG) 25 MG tablet Taking 1/2 tablet in the AM and 1 tablet in the PM

## 2025-07-30 NOTE — ASSESSMENT & PLAN NOTE
Treatment regimen is effective.     Euvolemic on exam.  Echo stable.   Cardiology follow up notes reviewed.       Lab Results   Component Value Date    LVEF 48 01/15/2023             Cardiac Medications       ACE Inhibitors       enalapril (VASOTEC) 10 MG tablet TAKE 1 TABLET DAILY       Potassium Sparing Diuretics       spironolactone (ALDACTONE) 25 MG tablet Take 1 tablet by mouth daily       Thiazides and Thiazide-Like Diuretics       hydroCHLOROthiazide (HYDRODIURIL) 25 MG tablet Take 1 tablet by mouth daily       HMG CoA Reductase Inhibitors       simvastatin (ZOCOR) 40 MG tablet Take 1 tablet by mouth daily       Alpha-Beta Blockers       carvedilol (COREG) 25 MG tablet Taking 1/2 tablet in the AM and 1 tablet in the PM       Pulmonary Hypertension - Phosphodiesterase Inhibitors       sildenafil (REVATIO) 20 MG tablet Take 1-5 tablets by mouth as needed (erectile dysfunction)       Salicylates       aspirin 81 MG EC tablet Take 1 tablet by mouth daily

## 2025-07-30 NOTE — PATIENT INSTRUCTIONS
Layperson Summary:  Symmetric enhancement: Both kidneys are enhancing normally with contrast--this typically suggests they are getting good blood flow and there is no clear mass.  Bilateral renal cysts: Cysts (fluid-filled sacs) are present in both kidneys. These are common, especially with age, and often benign.  Lobulated Bosniak 2 cyst (6 cm) on right kidney: This is a complex cyst with some internal features, but Bosniak 2 means it is very likely benign and usually just monitored.  Subcentimeter hypodensities: Tiny spots in both kidneys that are too small to definitively identify--likely harmless cysts, but may need follow-up.  Nonobstructing right renal calculus: A kidney stone in the right kidney that is not currently blocking urine flow--likely not causing pain or damage now.  No hydronephrosis: No swelling of the kidney, which is good--indicates no current blockage.  Multiple urinary bladder diverticula: Small pouches/outpouchings in the bladder wall--may be from chronic bladder pressure or obstruction. Sometimes associated with urinary symptoms or infections.

## 2025-08-15 ENCOUNTER — TELEPHONE (OUTPATIENT)
Age: 70
End: 2025-08-15

## 2025-08-15 ENCOUNTER — OFFICE VISIT (OUTPATIENT)
Age: 70
End: 2025-08-15
Payer: MEDICARE

## 2025-08-15 VITALS
WEIGHT: 296 LBS | DIASTOLIC BLOOD PRESSURE: 78 MMHG | HEIGHT: 74 IN | BODY MASS INDEX: 37.99 KG/M2 | HEART RATE: 68 BPM | SYSTOLIC BLOOD PRESSURE: 130 MMHG

## 2025-08-15 DIAGNOSIS — I10 PRIMARY HYPERTENSION: ICD-10-CM

## 2025-08-15 DIAGNOSIS — I48.21 PERMANENT ATRIAL FIBRILLATION (HCC): Primary | ICD-10-CM

## 2025-08-15 PROCEDURE — 3017F COLORECTAL CA SCREEN DOC REV: CPT | Performed by: INTERNAL MEDICINE

## 2025-08-15 PROCEDURE — 3075F SYST BP GE 130 - 139MM HG: CPT | Performed by: INTERNAL MEDICINE

## 2025-08-15 PROCEDURE — 1036F TOBACCO NON-USER: CPT | Performed by: INTERNAL MEDICINE

## 2025-08-15 PROCEDURE — 3078F DIAST BP <80 MM HG: CPT | Performed by: INTERNAL MEDICINE

## 2025-08-15 PROCEDURE — 99214 OFFICE O/P EST MOD 30 MIN: CPT | Performed by: INTERNAL MEDICINE

## 2025-08-15 PROCEDURE — G8417 CALC BMI ABV UP PARAM F/U: HCPCS | Performed by: INTERNAL MEDICINE

## 2025-08-15 PROCEDURE — G8428 CUR MEDS NOT DOCUMENT: HCPCS | Performed by: INTERNAL MEDICINE

## 2025-08-15 PROCEDURE — 1123F ACP DISCUSS/DSCN MKR DOCD: CPT | Performed by: INTERNAL MEDICINE

## 2025-08-15 PROCEDURE — 1126F AMNT PAIN NOTED NONE PRSNT: CPT | Performed by: INTERNAL MEDICINE

## 2025-08-17 DIAGNOSIS — E11.65 TYPE 2 DIABETES MELLITUS WITH HYPERGLYCEMIA, WITHOUT LONG-TERM CURRENT USE OF INSULIN (HCC): Chronic | ICD-10-CM

## 2025-08-18 RX ORDER — PEN NEEDLE, DIABETIC 30 GX3/16"
NEEDLE, DISPOSABLE MISCELLANEOUS
Qty: 100 EACH | Refills: 1 | Status: SHIPPED | OUTPATIENT
Start: 2025-08-18

## 2025-08-26 RX ORDER — ASPIRIN 81 MG/1
81 TABLET ORAL DAILY
Qty: 90 TABLET | Refills: 3 | Status: SHIPPED | OUTPATIENT
Start: 2025-08-26

## (undated) DEVICE — BAG SPEC REM 224ML W4XL6IN DIA10MM 1 HND GYN DISP ENDOPCH

## (undated) DEVICE — CONTAINER,SPECIMEN,O.R.STRL,4.5OZ: Brand: MEDLINE

## (undated) DEVICE — PINNACLE INTRODUCER SHEATH: Brand: PINNACLE

## (undated) DEVICE — GUIDE NDL ST W/ 14 X 147CM TELESCOPICALLY FLD CIV FLX CVR

## (undated) DEVICE — PACKING 8004000 NEURAY 200PK 13X13MM: Brand: NEURAY ®

## (undated) DEVICE — SYR LR LCK 1ML GRAD NSAF 30ML --

## (undated) DEVICE — ELECTRODE ES AD DISPER HYDRGEL THN FOAM ADH SCALLOPED EDGE

## (undated) DEVICE — STAPLER EXT SKIN 35 WIDE S STL STPL SQUEEZE HNDL VISISTAT

## (undated) DEVICE — CRKA-17-75-5.5 AVNS,RF,RFQKIT,,-,1: Brand: AVANOS

## (undated) DEVICE — 1 X VERSACROSS CONNECT TRANSSEPTAL DILATOR (INCLUDING 1 X J-TIP MECHANICAL GUIDEWIRE); 1 X VERSACROSS RF WIRE (INCLUDING 1 X CONNECTOR CABLE (SINGLE USE)); 1 X DISPERSIVE ELECTRODE: Brand: VERSACROSS CONNECT LAAC ACCESS SOLUTION

## (undated) DEVICE — PACKING 8004002 NEURAY 200PK 13X25MM: Brand: NEURAY ®

## (undated) DEVICE — DRAPE TWL SURG 16X26IN BLU ORB04] ALLCARE INC]

## (undated) DEVICE — ADPT INTERMEDIC LL 16G --

## (undated) DEVICE — BUTTON SWITCH PENCIL BLADE ELECTRODE, HOLSTER: Brand: EDGE

## (undated) DEVICE — TROCAR: Brand: KII FIOS FIRST ENTRY

## (undated) DEVICE — FORCEPS BX L240CM JAW DIA2.8MM L CAP W/ NDL MIC MESH TOOTH

## (undated) DEVICE — AMD ANTIMICROBIAL BANDAGE ROLL,6 PLY: Brand: KERLIX

## (undated) DEVICE — MASTISOL ADHESIVE LIQ 2/3ML

## (undated) DEVICE — 3M™ DURAPORE™ SURGICAL TAPE 2 INCHES X 10YARDS (5.0CM X 9.1M) 6ROLLS/CARTON 10CARTONS/CASE 1538-2: Brand: 3M™ DURAPORE™

## (undated) DEVICE — SOLUTION IV 250ML 0.9% SOD CHL PH 5 INJ USP VIAFLX PLAS

## (undated) DEVICE — CANNULA NSL ORAL AD FOR CAPNOFLEX CO2 O2 AIRLFE

## (undated) DEVICE — CONTAINER PREFIL FRMLN 40ML --

## (undated) DEVICE — REM POLYHESIVE ADULT PATIENT RETURN ELECTRODE: Brand: VALLEYLAB

## (undated) DEVICE — Y-TYPE TUR/BLADDER IRRIGATION SET, REGULATING CLAMP

## (undated) DEVICE — 3M™ TEGADERM™ TRANSPARENT FILM DRESSING FRAME STYLE, 1624W, 2-3/8 IN X 2-3/4 IN (6 CM X 7 CM), 100/CT 4CT/CASE: Brand: 3M™ TEGADERM™

## (undated) DEVICE — WAX SURG 2.5GM HEMSTAT BNE BEESWAX PARAFFIN ISO PALMITATE

## (undated) DEVICE — ACCESS SHEATH WITH DILATOR: Brand: WATCHMAN FXD CURVE™ ACCESS SYSTEM

## (undated) DEVICE — BIPOLAR FORCEPS CORD,BANANA LEADS: Brand: VALLEYLAB

## (undated) DEVICE — HF-RESECTION ELECTRODE PLASMABUTTON BUTTON, 24 FR., 12°-30°, PK TURIS: Brand: OLYMPUS

## (undated) DEVICE — CODMAN® DISPOSABLE PERFORATOR 14MM: Brand: CODMAN®

## (undated) DEVICE — Device

## (undated) DEVICE — BAG DRNGE 4000ML CONT IRRIG ROUNDED TEARDROP SHP DISP

## (undated) DEVICE — SOLUTION IV 1000ML 0.9% SOD CHL

## (undated) DEVICE — DRAIN SURG L49IN DIA1/8IN 10IN H SIL W/O TRCR END PERF

## (undated) DEVICE — SYRINGE MED 10ML LUERLOCK TIP W/O SFTY DISP

## (undated) DEVICE — TUBING INSUFFLATION SMK EVAC HI FLO SET PNEUMOCLEAR

## (undated) DEVICE — AMD ANTIMICROBIAL NON-ADHERENT PAD,0.2% POLYHEXAMETHYLENE BIGUANIDE HCI (PHMB): Brand: TELFA

## (undated) DEVICE — SUTURE NRLN SZ 4-0 L18IN NONABSORBABLE BLK L13MM TF 1/2 CIR C584D

## (undated) DEVICE — SUTURE VCRL + SZ 3-0 L18IN ABSRB UD SH 1/2 CIR TAPERCUT NDL VCP864D

## (undated) DEVICE — SOLUTION ANTIFOG VIS SYS CLEARIFY LAPSCP

## (undated) DEVICE — SYR BULB 60ML IRRIGATION -- CONVERT TO ITEM 116413

## (undated) DEVICE — GLOVE ORANGE PI 8   MSG9080

## (undated) DEVICE — DRAIN SURG SGL COLL PT TB FOR ATS BG OASIS

## (undated) DEVICE — CATHETER US 10FR L90CM HI RESOL 4 W STEERING PRECIS DOPP

## (undated) DEVICE — SOLUTION IRRIG 1000ML H2O STRL BLT

## (undated) DEVICE — SYR 5ML 1/5 GRAD LL NSAF LF --

## (undated) DEVICE — GARMENT,MEDLINE,DVT,INT,CALF,MED, GEN2: Brand: MEDLINE

## (undated) DEVICE — GOWN,REINFORCED,POLY,AURORA,XXLARGE,STR: Brand: MEDLINE

## (undated) DEVICE — TUBING, SUCTION, 1/4" X 10', STRAIGHT: Brand: MEDLINE

## (undated) DEVICE — THORACOSCOPY: Brand: MEDLINE INDUSTRIES, INC.

## (undated) DEVICE — TRAY,URINE METER,100% SILICONE,16FR10ML: Brand: MEDLINE

## (undated) DEVICE — (D)PREP SKN CHLRAPRP APPL 26ML -- CONVERT TO ITEM 371833

## (undated) DEVICE — KENDALL RADIOLUCENT FOAM MONITORING ELECTRODE RECTANGULAR SHAPE: Brand: KENDALL

## (undated) DEVICE — TRAY PREP DRY W/ PREM GLV 2 APPL 6 SPNG 2 UNDPD 1 OVERWRAP

## (undated) DEVICE — STERILE LATEX POWDER FREE SURGICAL GLOVES WITH HYDROGEL COATING: Brand: PROTEXIS

## (undated) DEVICE — KENDALL SCD EXPRESS SLEEVES, KNEE LENGTH, MEDIUM: Brand: KENDALL SCD

## (undated) DEVICE — GOWN,PREVENTION PLUS,2XL,ST,22/CS: Brand: MEDLINE

## (undated) DEVICE — AGENT HEMSTAT W3XL4IN OXIDIZED REGENERATED CELOS ABSRB FOR

## (undated) DEVICE — CONNECTOR TBNG OD5-7MM O2 END DISP

## (undated) DEVICE — SKIN MARKER,REGULAR TIP WITH RULER AND LABELS: Brand: DEVON

## (undated) DEVICE — INTENDED FOR TISSUE SEPARATION, AND OTHER PROCEDURES THAT REQUIRE A SHARP SURGICAL BLADE TO PUNCTURE OR CUT.: Brand: BARD-PARKER SAFETY BLADES SIZE 15, STERILE

## (undated) DEVICE — LOGICUT SCISSOR LENGTH 320MM: Brand: LOGI - LAPAROSCOPIC INSTRUMENT SYSTEM

## (undated) DEVICE — SOL IRR SOD CL 0.9% 3000ML --

## (undated) DEVICE — STAPLER INT L34CM 60MM LNG ENDOSCP ARTC PWR + ECHELON FLX

## (undated) DEVICE — DEVICE STBL AD TRICOT ANCHR PD FOR 3 W F CATH STATLOK

## (undated) DEVICE — TRAY EPI PERIFIX CONT 17GAX3.5IN TUOHY 19GA SPRINGWOUND OPN

## (undated) DEVICE — SURGIFOAM SPNG SZ 100

## (undated) DEVICE — CODMAN® INTEGRATED BIPOLAR CORD AND TUBING SET FLYING LEADS, ROTARY PUMP: Brand: CODMAN®

## (undated) DEVICE — NDL PRT INJ NSAF BLNT 18GX1.5 --

## (undated) DEVICE — SET IV ADMIN L55IN ID0054IN EXTN MICBOR TBNG W

## (undated) DEVICE — HF-RESECTION ELECTRODE PLASMALOOP LOOP, MEDIUM, 24 FR., 12°-30°, PK TURIS: Brand: OLYMPUS

## (undated) DEVICE — TURP TURB: Brand: MEDLINE INDUSTRIES, INC.

## (undated) DEVICE — PROBE ABLATN NERVE BLOCK 180 DEG 8 MM BALL TIP CRYOSPHERE

## (undated) DEVICE — 3M™ IOBAN™ 2 ANTIMICROBIAL INCISE DRAPE 6650EZ: Brand: IOBAN™ 2

## (undated) DEVICE — SYR 3ML LL TIP 1/10ML GRAD --

## (undated) DEVICE — TOOL F2/8TA23 LEGEND 8CM 2.3MM TAPER: Brand: MIDAS REX™

## (undated) DEVICE — BLADE ASSEMB CLP HAIR COARSE --

## (undated) DEVICE — STERILE (15.2 TAPERED TO 7.6 X 183CM) POLYETHYLENE ACCORDION-FOLDED COVER FOR USE WITH SIEMENS ACUNAV ULTRASOUND CATHETER FAMILY CONNECTOR: Brand: SWIFTLINK TRANSDUCER COVER

## (undated) DEVICE — RELOAD STPL L60MM H1.5-3.6MM REG TISS BLU GRIPPING SURF B

## (undated) DEVICE — SYR IRR CATH TIP LR ADPT 70ML -- CONVERT TO ITEM 363120

## (undated) DEVICE — TRAY CATH 16F URIN MTR LTX -- CONVERT TO ITEM 363111

## (undated) DEVICE — SHEET,DRAPE,53X77,STERILE: Brand: MEDLINE

## (undated) DEVICE — CONTAINER SPEC FRMLN 120ML --

## (undated) DEVICE — NEEDLE HYPO 21GA L1.5IN INTRAMUSCULAR S STL LATCH BVL UP

## (undated) DEVICE — AGENT SCLEROSING 3GM TALC STERITALC

## (undated) DEVICE — PERCLOSE™ PROSTYLE™ SUTURE-MEDIATED CLOSURE AND REPAIR SYSTEM: Brand: PERCLOSE™ PROSTYLE™

## (undated) DEVICE — CATHETER URETH 24FR BLLN 30CC STD LTX 3 W TWO OPP DRNGE EYE

## (undated) DEVICE — CATHETER THOR 32FR L23IN PVC 5 EYELET STR ATRAUM

## (undated) DEVICE — SUT ETHLN 3-0 18IN PS1 BLK --

## (undated) DEVICE — CATHETER DIAG AD 6FR L110CM 0.038IN COR POLYUR PGTL W/ 6

## (undated) DEVICE — TROCAR: Brand: KII® SLEEVE

## (undated) DEVICE — SUTURE PERMAHAND SZ 0 L30IN NONABSORBABLE BLK L30MM PSL 3/8 590H

## (undated) DEVICE — BLOWER PWD 3GM FOR STERITALC TALCAIR NOVATECH

## (undated) DEVICE — DRESSING PETRO W3XL18IN WHT GZ FOR N ADH WND CURAD

## (undated) DEVICE — SYSTEM CLOSURE 6-12 FR VEN VASC VASCADE MVP

## (undated) DEVICE — CHECK-FLO PERFORMER INTRODUCER: Brand: PERFORMER